# Patient Record
Sex: MALE | Race: OTHER | NOT HISPANIC OR LATINO | URBAN - METROPOLITAN AREA
[De-identification: names, ages, dates, MRNs, and addresses within clinical notes are randomized per-mention and may not be internally consistent; named-entity substitution may affect disease eponyms.]

---

## 2017-03-10 ENCOUNTER — INPATIENT (INPATIENT)
Facility: HOSPITAL | Age: 72
LOS: 2 days | Discharge: HOME | DRG: 270 | End: 2017-03-13
Attending: INTERNAL MEDICINE | Admitting: HOSPITALIST
Payer: MEDICARE

## 2017-03-10 VITALS
OXYGEN SATURATION: 100 % | TEMPERATURE: 98 F | RESPIRATION RATE: 20 BRPM | WEIGHT: 151.9 LBS | DIASTOLIC BLOOD PRESSURE: 79 MMHG | SYSTOLIC BLOOD PRESSURE: 129 MMHG | HEIGHT: 67 IN | HEART RATE: 84 BPM

## 2017-03-10 DIAGNOSIS — E11.9 TYPE 2 DIABETES MELLITUS WITHOUT COMPLICATIONS: ICD-10-CM

## 2017-03-10 DIAGNOSIS — I10 ESSENTIAL (PRIMARY) HYPERTENSION: ICD-10-CM

## 2017-03-10 DIAGNOSIS — K92.0 HEMATEMESIS: ICD-10-CM

## 2017-03-10 DIAGNOSIS — I24.9 ACUTE ISCHEMIC HEART DISEASE, UNSPECIFIED: ICD-10-CM

## 2017-03-10 DIAGNOSIS — Z95.1 PRESENCE OF AORTOCORONARY BYPASS GRAFT: Chronic | ICD-10-CM

## 2017-03-10 DIAGNOSIS — Z95.5 PRESENCE OF CORONARY ANGIOPLASTY IMPLANT AND GRAFT: Chronic | ICD-10-CM

## 2017-03-10 DIAGNOSIS — E78.00 PURE HYPERCHOLESTEROLEMIA, UNSPECIFIED: ICD-10-CM

## 2017-03-10 DIAGNOSIS — I25.10 ATHEROSCLEROTIC HEART DISEASE OF NATIVE CORONARY ARTERY WITHOUT ANGINA PECTORIS: Chronic | ICD-10-CM

## 2017-03-10 DIAGNOSIS — I25.700 ATHEROSCLEROSIS OF CORONARY ARTERY BYPASS GRAFT(S), UNSPECIFIED, WITH UNSTABLE ANGINA PECTORIS: ICD-10-CM

## 2017-03-10 DIAGNOSIS — I50.21 ACUTE SYSTOLIC (CONGESTIVE) HEART FAILURE: ICD-10-CM

## 2017-03-10 LAB
ALBUMIN SERPL ELPH-MCNC: 3.7 G/DL — SIGNIFICANT CHANGE UP (ref 3.3–5.2)
ALBUMIN SERPL ELPH-MCNC: 3.8 G/DL — SIGNIFICANT CHANGE UP (ref 3.3–5.2)
ALP SERPL-CCNC: 95 U/L — SIGNIFICANT CHANGE UP (ref 40–120)
ALP SERPL-CCNC: 97 U/L — SIGNIFICANT CHANGE UP (ref 40–120)
ALT FLD-CCNC: 19 U/L — SIGNIFICANT CHANGE UP
ALT FLD-CCNC: 21 U/L — SIGNIFICANT CHANGE UP
ANION GAP SERPL CALC-SCNC: 11 MMOL/L — SIGNIFICANT CHANGE UP (ref 5–17)
ANION GAP SERPL CALC-SCNC: 12 MMOL/L — SIGNIFICANT CHANGE UP (ref 5–17)
APTT BLD: 31.6 SEC — SIGNIFICANT CHANGE UP (ref 27.5–37.4)
APTT BLD: 76.7 SEC — HIGH (ref 27.5–37.4)
AST SERPL-CCNC: 20 U/L — SIGNIFICANT CHANGE UP
AST SERPL-CCNC: 24 U/L — SIGNIFICANT CHANGE UP
BASOPHILS # BLD AUTO: 0 K/UL — SIGNIFICANT CHANGE UP (ref 0–0.2)
BASOPHILS NFR BLD AUTO: 0.2 % — SIGNIFICANT CHANGE UP (ref 0–2)
BILIRUB SERPL-MCNC: 0.6 MG/DL — SIGNIFICANT CHANGE UP (ref 0.4–2)
BILIRUB SERPL-MCNC: 0.9 MG/DL — SIGNIFICANT CHANGE UP (ref 0.4–2)
BLD GP AB SCN SERPL QL: SIGNIFICANT CHANGE UP
BUN SERPL-MCNC: 12 MG/DL — SIGNIFICANT CHANGE UP (ref 8–20)
BUN SERPL-MCNC: 13 MG/DL — SIGNIFICANT CHANGE UP (ref 8–20)
CALCIUM SERPL-MCNC: 8.3 MG/DL — LOW (ref 8.6–10.2)
CALCIUM SERPL-MCNC: 9.3 MG/DL — SIGNIFICANT CHANGE UP (ref 8.6–10.2)
CHLORIDE SERPL-SCNC: 92 MMOL/L — LOW (ref 98–107)
CHLORIDE SERPL-SCNC: 93 MMOL/L — LOW (ref 98–107)
CHOLEST SERPL-MCNC: 117 MG/DL — SIGNIFICANT CHANGE UP (ref 110–199)
CK SERPL-CCNC: 71 U/L — SIGNIFICANT CHANGE UP (ref 30–200)
CO2 SERPL-SCNC: 26 MMOL/L — SIGNIFICANT CHANGE UP (ref 22–29)
CO2 SERPL-SCNC: 27 MMOL/L — SIGNIFICANT CHANGE UP (ref 22–29)
CREAT SERPL-MCNC: 0.81 MG/DL — SIGNIFICANT CHANGE UP (ref 0.5–1.3)
CREAT SERPL-MCNC: 0.88 MG/DL — SIGNIFICANT CHANGE UP (ref 0.5–1.3)
EOSINOPHIL # BLD AUTO: 0.4 K/UL — SIGNIFICANT CHANGE UP (ref 0–0.5)
EOSINOPHIL NFR BLD AUTO: 4.3 % — SIGNIFICANT CHANGE UP (ref 0–5)
GAS PNL BLDA: SIGNIFICANT CHANGE UP
GLUCOSE SERPL-MCNC: 249 MG/DL — HIGH (ref 70–115)
GLUCOSE SERPL-MCNC: 271 MG/DL — HIGH (ref 70–115)
HCT VFR BLD CALC: 34.5 % — LOW (ref 42–52)
HCT VFR BLD CALC: 36 % — LOW (ref 42–52)
HCT VFR BLD CALC: 37.4 % — LOW (ref 42–52)
HDLC SERPL-MCNC: 36 MG/DL — LOW
HGB BLD-MCNC: 12.4 G/DL — LOW (ref 14–18)
HGB BLD-MCNC: 12.6 G/DL — LOW (ref 14–18)
HGB BLD-MCNC: 13 G/DL — LOW (ref 14–18)
INR BLD: 1.4 RATIO — HIGH (ref 0.88–1.16)
INR BLD: 1.47 RATIO — HIGH (ref 0.88–1.16)
LIPID PNL WITH DIRECT LDL SERPL: 52 MG/DL — SIGNIFICANT CHANGE UP
LYMPHOCYTES # BLD AUTO: 2.4 K/UL — SIGNIFICANT CHANGE UP (ref 1–4.8)
LYMPHOCYTES # BLD AUTO: 25.3 % — SIGNIFICANT CHANGE UP (ref 20–55)
MAGNESIUM SERPL-MCNC: 1.9 MG/DL — SIGNIFICANT CHANGE UP (ref 1.8–2.5)
MAGNESIUM SERPL-MCNC: 2 MG/DL — SIGNIFICANT CHANGE UP (ref 1.8–2.5)
MCHC RBC-ENTMCNC: 28.7 PG — SIGNIFICANT CHANGE UP (ref 27–31)
MCHC RBC-ENTMCNC: 28.8 PG — SIGNIFICANT CHANGE UP (ref 27–31)
MCHC RBC-ENTMCNC: 34.4 G/DL — SIGNIFICANT CHANGE UP (ref 32–36)
MCHC RBC-ENTMCNC: 34.8 G/DL — SIGNIFICANT CHANGE UP (ref 32–36)
MCV RBC AUTO: 82.9 FL — SIGNIFICANT CHANGE UP (ref 80–94)
MCV RBC AUTO: 83.3 FL — SIGNIFICANT CHANGE UP (ref 80–94)
MONOCYTES # BLD AUTO: 0.9 K/UL — HIGH (ref 0–0.8)
MONOCYTES NFR BLD AUTO: 9.3 % — SIGNIFICANT CHANGE UP (ref 3–10)
NEUTROPHILS # BLD AUTO: 5.8 K/UL — SIGNIFICANT CHANGE UP (ref 1.8–8)
NEUTROPHILS NFR BLD AUTO: 60.6 % — SIGNIFICANT CHANGE UP (ref 37–73)
PHOSPHATE SERPL-MCNC: 3.1 MG/DL — SIGNIFICANT CHANGE UP (ref 2.4–4.7)
PHOSPHATE SERPL-MCNC: 4 MG/DL — SIGNIFICANT CHANGE UP (ref 2.4–4.7)
PLATELET # BLD AUTO: 259 K/UL — SIGNIFICANT CHANGE UP (ref 150–400)
PLATELET # BLD AUTO: 284 K/UL — SIGNIFICANT CHANGE UP (ref 150–400)
POTASSIUM SERPL-MCNC: 4.3 MMOL/L — SIGNIFICANT CHANGE UP (ref 3.5–5.3)
POTASSIUM SERPL-MCNC: 4.4 MMOL/L — SIGNIFICANT CHANGE UP (ref 3.5–5.3)
POTASSIUM SERPL-SCNC: 4.3 MMOL/L — SIGNIFICANT CHANGE UP (ref 3.5–5.3)
POTASSIUM SERPL-SCNC: 4.4 MMOL/L — SIGNIFICANT CHANGE UP (ref 3.5–5.3)
PROT SERPL-MCNC: 7.2 G/DL — SIGNIFICANT CHANGE UP (ref 6.6–8.7)
PROT SERPL-MCNC: 7.5 G/DL — SIGNIFICANT CHANGE UP (ref 6.6–8.7)
PROTHROM AB SERPL-ACNC: 15.5 SEC — HIGH (ref 10–13.1)
PROTHROM AB SERPL-ACNC: 16.2 SEC — HIGH (ref 10–13.1)
RBC # BLD: 4.32 M/UL — LOW (ref 4.6–6.2)
RBC # BLD: 4.51 M/UL — LOW (ref 4.6–6.2)
RBC # FLD: 12.1 % — SIGNIFICANT CHANGE UP (ref 11–15.6)
RBC # FLD: 12.2 % — SIGNIFICANT CHANGE UP (ref 11–15.6)
SODIUM SERPL-SCNC: 130 MMOL/L — LOW (ref 135–145)
SODIUM SERPL-SCNC: 131 MMOL/L — LOW (ref 135–145)
TOTAL CHOLESTEROL/HDL RATIO MEASUREMENT: 3 RATIO — LOW (ref 3.4–9.6)
TRIGL SERPL-MCNC: 146 MG/DL — SIGNIFICANT CHANGE UP (ref 10–200)
TROPONIN T SERPL-MCNC: 0.03 NG/ML — SIGNIFICANT CHANGE UP (ref 0–0.06)
TYPE + AB SCN PNL BLD: SIGNIFICANT CHANGE UP
WBC # BLD: 17.9 K/UL — HIGH (ref 4.8–10.8)
WBC # BLD: 9.5 K/UL — SIGNIFICANT CHANGE UP (ref 4.8–10.8)
WBC # FLD AUTO: 17.9 K/UL — HIGH (ref 4.8–10.8)
WBC # FLD AUTO: 9.5 K/UL — SIGNIFICANT CHANGE UP (ref 4.8–10.8)

## 2017-03-10 PROCEDURE — 99223 1ST HOSP IP/OBS HIGH 75: CPT

## 2017-03-10 PROCEDURE — 93010 ELECTROCARDIOGRAM REPORT: CPT

## 2017-03-10 PROCEDURE — 99291 CRITICAL CARE FIRST HOUR: CPT | Mod: 25

## 2017-03-10 PROCEDURE — 93459 L HRT ART/GRFT ANGIO: CPT | Mod: 26,XU

## 2017-03-10 PROCEDURE — 99285 EMERGENCY DEPT VISIT HI MDM: CPT | Mod: 25

## 2017-03-10 PROCEDURE — 71010: CPT | Mod: 26,59,77

## 2017-03-10 PROCEDURE — 92937 PRQ TRLUML REVSC CAB GRF 1: CPT | Mod: LC

## 2017-03-10 PROCEDURE — 93306 TTE W/DOPPLER COMPLETE: CPT | Mod: 26

## 2017-03-10 PROCEDURE — 71010: CPT | Mod: 26,59

## 2017-03-10 PROCEDURE — 33967 INSERT I-AORT PERCUT DEVICE: CPT

## 2017-03-10 RX ORDER — INSULIN LISPRO 100/ML
VIAL (ML) SUBCUTANEOUS EVERY 6 HOURS
Qty: 0 | Refills: 0 | Status: DISCONTINUED | OUTPATIENT
Start: 2017-03-10 | End: 2017-03-11

## 2017-03-10 RX ORDER — LOSARTAN POTASSIUM 100 MG/1
50 TABLET, FILM COATED ORAL DAILY
Qty: 0 | Refills: 0 | Status: DISCONTINUED | OUTPATIENT
Start: 2017-03-10 | End: 2017-03-10

## 2017-03-10 RX ORDER — PHENYLEPHRINE HYDROCHLORIDE 10 MG/ML
0.1 INJECTION INTRAVENOUS
Qty: 80 | Refills: 0 | Status: DISCONTINUED | OUTPATIENT
Start: 2017-03-10 | End: 2017-03-10

## 2017-03-10 RX ORDER — NOREPINEPHRINE BITARTRATE/D5W 8 MG/250ML
0.05 PLASTIC BAG, INJECTION (ML) INTRAVENOUS
Qty: 8 | Refills: 0 | Status: DISCONTINUED | OUTPATIENT
Start: 2017-03-10 | End: 2017-03-11

## 2017-03-10 RX ORDER — INSULIN GLARGINE 100 [IU]/ML
12 INJECTION, SOLUTION SUBCUTANEOUS AT BEDTIME
Qty: 0 | Refills: 0 | Status: DISCONTINUED | OUTPATIENT
Start: 2017-03-11 | End: 2017-03-13

## 2017-03-10 RX ORDER — PANTOPRAZOLE SODIUM 20 MG/1
40 TABLET, DELAYED RELEASE ORAL EVERY 12 HOURS
Qty: 0 | Refills: 0 | Status: DISCONTINUED | OUTPATIENT
Start: 2017-03-10 | End: 2017-03-10

## 2017-03-10 RX ORDER — ASPIRIN/CALCIUM CARB/MAGNESIUM 324 MG
1 TABLET ORAL
Qty: 0 | Refills: 0 | COMMUNITY

## 2017-03-10 RX ORDER — NITROGLYCERIN 6.5 MG
0.4 CAPSULE, EXTENDED RELEASE ORAL
Qty: 0 | Refills: 0 | Status: DISCONTINUED | OUTPATIENT
Start: 2017-03-10 | End: 2017-03-13

## 2017-03-10 RX ORDER — ATORVASTATIN CALCIUM 80 MG/1
80 TABLET, FILM COATED ORAL AT BEDTIME
Qty: 0 | Refills: 0 | Status: DISCONTINUED | OUTPATIENT
Start: 2017-03-10 | End: 2017-03-13

## 2017-03-10 RX ORDER — CLOPIDOGREL BISULFATE 75 MG/1
75 TABLET, FILM COATED ORAL DAILY
Qty: 0 | Refills: 0 | Status: DISCONTINUED | OUTPATIENT
Start: 2017-03-10 | End: 2017-03-13

## 2017-03-10 RX ORDER — ASPIRIN/CALCIUM CARB/MAGNESIUM 324 MG
325 TABLET ORAL ONCE
Qty: 0 | Refills: 0 | Status: COMPLETED | OUTPATIENT
Start: 2017-03-10 | End: 2017-03-10

## 2017-03-10 RX ORDER — INSULIN HUMAN 100 [IU]/ML
4 INJECTION, SOLUTION SUBCUTANEOUS
Qty: 100 | Refills: 0 | Status: DISCONTINUED | OUTPATIENT
Start: 2017-03-10 | End: 2017-03-11

## 2017-03-10 RX ORDER — DEXTROSE 50 % IN WATER 50 %
12.5 SYRINGE (ML) INTRAVENOUS ONCE
Qty: 0 | Refills: 0 | Status: DISCONTINUED | OUTPATIENT
Start: 2017-03-10 | End: 2017-03-13

## 2017-03-10 RX ORDER — CARVEDILOL PHOSPHATE 80 MG/1
25 CAPSULE, EXTENDED RELEASE ORAL EVERY 12 HOURS
Qty: 0 | Refills: 0 | Status: DISCONTINUED | OUTPATIENT
Start: 2017-03-10 | End: 2017-03-10

## 2017-03-10 RX ORDER — FAMOTIDINE 10 MG/ML
40 INJECTION INTRAVENOUS
Qty: 0 | Refills: 0 | Status: DISCONTINUED | OUTPATIENT
Start: 2017-03-10 | End: 2017-03-10

## 2017-03-10 RX ORDER — PANTOPRAZOLE SODIUM 20 MG/1
8 TABLET, DELAYED RELEASE ORAL
Qty: 80 | Refills: 0 | Status: DISCONTINUED | OUTPATIENT
Start: 2017-03-10 | End: 2017-03-11

## 2017-03-10 RX ORDER — INSULIN LISPRO 100/ML
VIAL (ML) SUBCUTANEOUS AT BEDTIME
Qty: 0 | Refills: 0 | Status: DISCONTINUED | OUTPATIENT
Start: 2017-03-10 | End: 2017-03-10

## 2017-03-10 RX ORDER — DEXMEDETOMIDINE HYDROCHLORIDE IN 0.9% SODIUM CHLORIDE 4 UG/ML
0.3 INJECTION INTRAVENOUS
Qty: 200 | Refills: 0 | Status: DISCONTINUED | OUTPATIENT
Start: 2017-03-10 | End: 2017-03-11

## 2017-03-10 RX ORDER — SODIUM CHLORIDE 9 MG/ML
1000 INJECTION, SOLUTION INTRAVENOUS
Qty: 0 | Refills: 0 | Status: DISCONTINUED | OUTPATIENT
Start: 2017-03-10 | End: 2017-03-13

## 2017-03-10 RX ORDER — HEPARIN SODIUM 5000 [USP'U]/ML
500 INJECTION INTRAVENOUS; SUBCUTANEOUS
Qty: 25000 | Refills: 0 | Status: DISCONTINUED | OUTPATIENT
Start: 2017-03-10 | End: 2017-03-10

## 2017-03-10 RX ORDER — NITROGLYCERIN 6.5 MG
80 CAPSULE, EXTENDED RELEASE ORAL
Qty: 50 | Refills: 0 | Status: DISCONTINUED | OUTPATIENT
Start: 2017-03-10 | End: 2017-03-10

## 2017-03-10 RX ORDER — DEXTROSE 50 % IN WATER 50 %
1 SYRINGE (ML) INTRAVENOUS ONCE
Qty: 0 | Refills: 0 | Status: DISCONTINUED | OUTPATIENT
Start: 2017-03-10 | End: 2017-03-13

## 2017-03-10 RX ORDER — ENOXAPARIN SODIUM 100 MG/ML
40 INJECTION SUBCUTANEOUS EVERY 24 HOURS
Qty: 0 | Refills: 0 | Status: DISCONTINUED | OUTPATIENT
Start: 2017-03-10 | End: 2017-03-10

## 2017-03-10 RX ORDER — DEXTROSE 50 % IN WATER 50 %
25 SYRINGE (ML) INTRAVENOUS ONCE
Qty: 0 | Refills: 0 | Status: DISCONTINUED | OUTPATIENT
Start: 2017-03-10 | End: 2017-03-13

## 2017-03-10 RX ORDER — CHLORHEXIDINE GLUCONATE 213 G/1000ML
15 SOLUTION TOPICAL
Qty: 0 | Refills: 0 | Status: DISCONTINUED | OUTPATIENT
Start: 2017-03-10 | End: 2017-03-11

## 2017-03-10 RX ORDER — ATORVASTATIN CALCIUM 80 MG/1
40 TABLET, FILM COATED ORAL AT BEDTIME
Qty: 0 | Refills: 0 | Status: DISCONTINUED | OUTPATIENT
Start: 2017-03-10 | End: 2017-03-10

## 2017-03-10 RX ORDER — MAGNESIUM HYDROXIDE 400 MG/1
5 TABLET, CHEWABLE ORAL DAILY
Qty: 0 | Refills: 0 | Status: DISCONTINUED | OUTPATIENT
Start: 2017-03-10 | End: 2017-03-10

## 2017-03-10 RX ORDER — INSULIN GLARGINE 100 [IU]/ML
12 INJECTION, SOLUTION SUBCUTANEOUS ONCE
Qty: 0 | Refills: 0 | Status: COMPLETED | OUTPATIENT
Start: 2017-03-10 | End: 2017-03-10

## 2017-03-10 RX ORDER — GLUCAGON INJECTION, SOLUTION 0.5 MG/.1ML
1 INJECTION, SOLUTION SUBCUTANEOUS ONCE
Qty: 0 | Refills: 0 | Status: DISCONTINUED | OUTPATIENT
Start: 2017-03-10 | End: 2017-03-13

## 2017-03-10 RX ORDER — ASPIRIN/CALCIUM CARB/MAGNESIUM 324 MG
325 TABLET ORAL DAILY
Qty: 0 | Refills: 0 | Status: DISCONTINUED | OUTPATIENT
Start: 2017-03-10 | End: 2017-03-11

## 2017-03-10 RX ORDER — INSULIN LISPRO 100/ML
VIAL (ML) SUBCUTANEOUS
Qty: 0 | Refills: 0 | Status: DISCONTINUED | OUTPATIENT
Start: 2017-03-10 | End: 2017-03-10

## 2017-03-10 RX ORDER — NITROGLYCERIN 6.5 MG
1 CAPSULE, EXTENDED RELEASE ORAL DAILY
Qty: 0 | Refills: 0 | Status: DISCONTINUED | OUTPATIENT
Start: 2017-03-10 | End: 2017-03-10

## 2017-03-10 RX ORDER — ISOSORBIDE MONONITRATE 60 MG/1
30 TABLET, EXTENDED RELEASE ORAL DAILY
Qty: 0 | Refills: 0 | Status: DISCONTINUED | OUTPATIENT
Start: 2017-03-10 | End: 2017-03-10

## 2017-03-10 RX ORDER — AMLODIPINE BESYLATE 2.5 MG/1
10 TABLET ORAL DAILY
Qty: 0 | Refills: 0 | Status: DISCONTINUED | OUTPATIENT
Start: 2017-03-10 | End: 2017-03-10

## 2017-03-10 RX ORDER — ASPIRIN/CALCIUM CARB/MAGNESIUM 324 MG
81 TABLET ORAL DAILY
Qty: 0 | Refills: 0 | Status: DISCONTINUED | OUTPATIENT
Start: 2017-03-10 | End: 2017-03-10

## 2017-03-10 RX ORDER — ERGOCALCIFEROL 1.25 MG/1
50000 CAPSULE ORAL
Qty: 0 | Refills: 0 | Status: DISCONTINUED | OUTPATIENT
Start: 2017-03-10 | End: 2017-03-13

## 2017-03-10 RX ORDER — PANTOPRAZOLE SODIUM 20 MG/1
80 TABLET, DELAYED RELEASE ORAL ONCE
Qty: 0 | Refills: 0 | Status: COMPLETED | OUTPATIENT
Start: 2017-03-10 | End: 2017-03-10

## 2017-03-10 RX ORDER — PANTOPRAZOLE SODIUM 20 MG/1
40 TABLET, DELAYED RELEASE ORAL DAILY
Qty: 0 | Refills: 0 | Status: DISCONTINUED | OUTPATIENT
Start: 2017-03-10 | End: 2017-03-10

## 2017-03-10 RX ORDER — SODIUM CHLORIDE 9 MG/ML
3 INJECTION INTRAMUSCULAR; INTRAVENOUS; SUBCUTANEOUS ONCE
Qty: 0 | Refills: 0 | Status: COMPLETED | OUTPATIENT
Start: 2017-03-10 | End: 2017-03-10

## 2017-03-10 RX ADMIN — Medication 4: at 08:50

## 2017-03-10 RX ADMIN — DEXMEDETOMIDINE HYDROCHLORIDE IN 0.9% SODIUM CHLORIDE 5.17 MICROGRAM(S)/KG/HR: 4 INJECTION INTRAVENOUS at 14:25

## 2017-03-10 RX ADMIN — SODIUM CHLORIDE 3 MILLILITER(S): 9 INJECTION INTRAMUSCULAR; INTRAVENOUS; SUBCUTANEOUS at 05:01

## 2017-03-10 RX ADMIN — Medication 325 MILLIGRAM(S): at 06:33

## 2017-03-10 RX ADMIN — PANTOPRAZOLE SODIUM 10 MG/HR: 20 TABLET, DELAYED RELEASE ORAL at 14:45

## 2017-03-10 RX ADMIN — INSULIN HUMAN 4 UNIT(S)/HR: 100 INJECTION, SOLUTION SUBCUTANEOUS at 13:54

## 2017-03-10 RX ADMIN — INSULIN GLARGINE 12 UNIT(S): 100 INJECTION, SOLUTION SUBCUTANEOUS at 22:35

## 2017-03-10 RX ADMIN — PANTOPRAZOLE SODIUM 10 MG/HR: 20 TABLET, DELAYED RELEASE ORAL at 23:32

## 2017-03-10 RX ADMIN — HEPARIN SODIUM 5 UNIT(S)/HR: 5000 INJECTION INTRAVENOUS; SUBCUTANEOUS at 13:54

## 2017-03-10 RX ADMIN — PANTOPRAZOLE SODIUM 80 MILLIGRAM(S): 20 TABLET, DELAYED RELEASE ORAL at 14:44

## 2017-03-10 RX ADMIN — CLOPIDOGREL BISULFATE 75 MILLIGRAM(S): 75 TABLET, FILM COATED ORAL at 09:56

## 2017-03-10 RX ADMIN — Medication 0.4 MILLIGRAM(S): at 09:50

## 2017-03-10 RX ADMIN — CHLORHEXIDINE GLUCONATE 15 MILLILITER(S): 213 SOLUTION TOPICAL at 18:24

## 2017-03-10 NOTE — DISCHARGE NOTE ADULT - MEDICATION SUMMARY - MEDICATIONS TO TAKE
I will START or STAY ON the medications listed below when I get home from the hospital:    aspirin 81 mg oral tablet  -- 1 tab(s) by mouth once a day (at bedtime)  -- Indication: For ACS (acute coronary syndrome)    losartan 25 mg oral tablet  -- 1 tab(s) by mouth once a day  -- Indication: For CHF    isosorbide mononitrate 30 mg oral tablet, extended release  -- 1 tab(s) by mouth once a day (in the morning)  -- Indication: For ACS (acute coronary syndrome)    GlipiZIDE XL 10 mg oral tablet, extended release  -- 1 tab(s) by mouth once a day  -- Indication: For Diabetes mellitus    metFORMIN 1000 mg oral tablet, extended release  -- 1 tab(s) by mouth 2 times a day  -- Indication: For Diabetes mellitus    Januvia 100 mg oral tablet  -- 1 tab(s) by mouth once (at bedtime)  -- Indication: For Diabetes mellitus    atorvastatin 80 mg oral tablet  -- 1 tab(s) by mouth once a day (at bedtime)  -- Indication: For ACS (acute coronary syndrome)    Plavix 75 mg oral tablet  -- 1 tab(s) by mouth once a day  -- Indication: For ACS (acute coronary syndrome)    carvedilol 12.5 mg oral tablet  -- 1 tab(s) by mouth every 12 hours  -- Indication: For ACS (acute coronary syndrome)    furosemide 40 mg oral tablet  -- 1 tab(s) by mouth once a day  -- Indication: For CHF    pantoprazole 40 mg oral delayed release tablet  -- 1 tab(s) by mouth once a day (before a meal)  -- Indication: For gastritis    Vitamin D2 50,000 intl units (1.25 mg) oral capsule  -- 1 cap(s) by mouth once a week  -- Indication: For Supplement I will START or STAY ON the medications listed below when I get home from the hospital:    aspirin 81 mg oral tablet  -- 1 tab(s) by mouth once a day (at bedtime)  -- Indication: For ACS (acute coronary syndrome)    losartan 25 mg oral tablet  -- 1 tab(s) by mouth once a day  -- Indication: For NSTEMI    isosorbide mononitrate 30 mg oral tablet, extended release  -- 1 tab(s) by mouth once a day (in the morning)  -- Indication: For ACS (acute coronary syndrome)    GlipiZIDE XL 10 mg oral tablet, extended release  -- 1 tab(s) by mouth once a day  -- Indication: For Diabetes mellitus    metFORMIN 1000 mg oral tablet, extended release  -- 1 tab(s) by mouth 2 times a day RESTART 3/14/17  -- Indication: For Diabetes mellitus    Januvia 100 mg oral tablet  -- 1 tab(s) by mouth once (at bedtime)  -- Indication: For Essential hypertension    atorvastatin 80 mg oral tablet  -- 1 tab(s) by mouth once a day (at bedtime)  -- Indication: For ACS (acute coronary syndrome)    Plavix 75 mg oral tablet  -- 1 tab(s) by mouth once a day  -- Indication: For ACS (acute coronary syndrome)    carvedilol 12.5 mg oral tablet  -- 1 tab(s) by mouth every 12 hours  -- Indication: For NSTEMI    furosemide 40 mg oral tablet  -- 1 tab(s) by mouth once a day  -- Indication: For CHF    pantoprazole 40 mg oral delayed release tablet  -- 1 tab(s) by mouth once a day (before a meal)  -- Indication: For Gastritis    Vitamin D2 50,000 intl units (1.25 mg) oral capsule  -- 1 cap(s) by mouth once a week  -- Indication: For Supplement

## 2017-03-10 NOTE — ED ADULT TRIAGE NOTE - CHIEF COMPLAINT QUOTE
patient states having chest pains thtat started about 8 pm patient states took ntg one at 8pm and #2 at 12 mid

## 2017-03-10 NOTE — ED PROVIDER NOTE - PROGRESS NOTE DETAILS
Labs as noted.  Case d/w Hospitalist/Dr. Mixon and will admit.  Cardiology consult called to [facility]

## 2017-03-10 NOTE — DISCHARGE NOTE ADULT - MEDICATION SUMMARY - MEDICATIONS TO CHANGE
I will SWITCH the dose or number of times a day I take the medications listed below when I get home from the hospital:    losartan 50 mg oral tablet  -- 1 tab(s) by mouth once a day    Coreg 25 mg oral tablet  -- 1 tab(s) by mouth 2 times a day

## 2017-03-10 NOTE — PROGRESS NOTE ADULT - SUBJECTIVE AND OBJECTIVE BOX
Patient is a 71y old  Male who presents with a chief complaint of Chest pain - tightness (10 Mar 2017 11:51)      BRIEF HOSPITAL COURSE: 71 yom with CABG, DM, HLD, HTN presenting with chest pain ACS went to cath lab stent to SVG to OM, went in to pulmonary edema and intubated given 40 lasix, IABP placed, had one episode of vomiting blood, resolved, periods of hypotension likely secondary to sedation    PAST MEDICAL & SURGICAL HISTORY:  Coronary artery disease involving coronary bypass graft of native heart with unstable angina pectoris  High cholesterol  Diabetes mellitus  Hypertension  S/P primary angioplasty with coronary stent  S/P CABG (coronary artery bypass graft)      Review of Systems:  unable to obtain    Medications:    nitroglycerin     SubLingual 0.4milliGRAM(s) SubLingual every 5 minutes PRN  norepinephrine Infusion 0.05MICROgram(s)/kG/Min IV Continuous <Continuous>      aspirin 325milliGRAM(s) Oral daily  dexmedetomidine Infusion 0.3MICROgram(s)/kG/Hr IV Continuous <Continuous>      clopidogrel Tablet 75milliGRAM(s) Oral daily    pantoprazole Infusion 8mG/Hr IV Continuous <Continuous>      dextrose Gel 1Dose(s) Oral once PRN  dextrose 50% Injectable 12.5Gram(s) IV Push once  dextrose 50% Injectable 25Gram(s) IV Push once  dextrose 50% Injectable 25Gram(s) IV Push once  glucagon  Injectable 1milliGRAM(s) IntraMuscular once PRN  atorvastatin 80milliGRAM(s) Oral at bedtime  insulin Infusion 4Unit(s)/Hr IV Continuous <Continuous>    ergocalciferol 57965Bmzg(s) Oral every week  dextrose 5%. 1000milliLiter(s) IV Continuous <Continuous>      chlorhexidine 0.12% Liquid 15milliLiter(s) Swish and Spit two times a day        Mode: AC/ CMV (Assist Control/ Continuous Mandatory Ventilation)  RR (machine): 18  TV (machine): 400  FiO2: 40  PEEP: 5  MAP: 9  PIP: 26      ICU Vital Signs Last 24 Hrs  T(C): 35.1, Max: 36.8 (03-10 @ 04:07)  T(F): 95.2, Max: 98.2 (03-10 @ 04:07)  HR: 81 (74 - 104)  BP: 132/80 (129/79 - 140/83)  BP(mean): --  ABP: 90/74 (74/43 - 146/70)  ABP(mean): 83 (63 - 120)  RR: 18 (18 - 24)  SpO2: 100% (91% - 100%)      ABG - ( 10 Mar 2017 17:19 )  pH: 7.37  /  pCO2: 50    /  pO2: 114   / HCO3: 27    / Base Excess: 2.8   /  SaO2: 98                  I&O's Detail    I & Os for current day (as of 10 Mar 2017 17:39)  =============================================  IN:    pantoprazole Infusion: 30 ml    norepinephrine Infusion: 10 ml    insulin Infusion: 9 ml    dexmedetomidine Infusion: 6 ml    Total IN: 55 ml  ---------------------------------------------  OUT:    Indwelling Catheter - Urethral: 1100 ml    Nasoenteral Tube: 150 ml    Total OUT: 1250 ml  ---------------------------------------------  Total NET: -1195 ml        LABS:                        13.0   17.9  )-----------( 284      ( 10 Mar 2017 14:32 )             37.4     10 Mar 2017 13:46    130    |  92     |  12.0   ----------------------------<  271    4.3     |  26.0   |  0.81     Ca    8.3        10 Mar 2017 13:46  Phos  4.0       10 Mar 2017 13:46  Mg     1.9       10 Mar 2017 13:46    TPro  7.2    /  Alb  3.7    /  TBili  0.9    /  DBili  x      /  AST  24     /  ALT  19     /  AlkPhos  97     10 Mar 2017 13:46      CARDIAC MARKERS ( 10 Mar 2017 05:12 )  x     / 0.03 ng/mL / 71 U/L / x     / x          CAPILLARY BLOOD GLUCOSE  125 (10 Mar 2017 17:00)    PT/INR - ( 10 Mar 2017 13:46 )   PT: 16.2 sec;   INR: 1.47 ratio         PTT - ( 10 Mar 2017 13:46 )  PTT:76.7 sec    CULTURES:      Physical Examination:    General: No acute distress.  intubated    HEENT: Pupils equal, reactive to light.  Symmetric.    PULM: Course bs left > right    CVS: Regular rate and rhythm, no murmurs, rubs, or gallops    ABD: Soft, nondistended, nontender, normoactive bowel sounds, no masses    EXT: No edema, nontender, left balloon pump, right femoral a line and single lumen CVC    SKIN: Warm and well perfused, no rashes noted.      CRITICAL CARE TIME SPENT: 45 min

## 2017-03-10 NOTE — ED PROVIDER NOTE - OBJECTIVE STATEMENT
72 yo M presents to ED with family c/o not feeling well x last few days and developed chest tightness last evening and took SL NTG at 2000 and then again at 0000.  Pt also admits to taking SL NTG for chest pain bout 1 week ago.  Pt with hx of CAD s/p 4 vessel CABG in 2004 and has approx 7 stents-last stent placed at [facility] around 6 months ago.  Pt denies any assoc SOB, N/V, diaphoresis or syncope 72 yo M presents to ED with family c/o not feeling well x last few days and developed chest tightness last evening and took SL NTG at 2000 and then again at 0000. Pt also admits to taking SL NTG for chest pain bout 1 week ago.  Pt with hx of CAD s/p 4 vessel CABG in 2004 and has approx 7 stents-last stent placed at [facility] around 6 months ago.  Pt denies any assoc SOB, N/V, diaphoresis or syncope    Home Cardiologist  Dr. Meño Medina MD in [place] ([facility])  [phone], last visit 2 weeks ago;    PCP: Dr.Abdul Blackburn in [place]  [phone]

## 2017-03-10 NOTE — H&P ADULT - HISTORY OF PRESENT ILLNESS
71 y.o. male with PMHx of HTN, HLD, DMII, CAD s/p CABG and PCIs p/w CP - tightness, relieved by NTG to 5/10 from 8/10, worse with lying down, not associated with exertion from 8 pm last night. Last Cardiac cath at [facility] 6 m.a.

## 2017-03-10 NOTE — CONSULT NOTE ADULT - ASSESSMENT
71 y.o. male with PMHx of HTN, HLD, DMII, CAD s/p CABG 1993 and PCIs last PCI as per patient's report 3/2016 [facility] p/w CP - tightness, relieved by NTG to 5/10 from 8/10, worse with lying down, not associated with exertion from 8 pm last night. Patient with signs and symptoms consistent with UA. 12 -lead ECG SR with ns st twa    recc;  ASA , Plavix , UFH  Serial biomarkers, TTE, obtain old records  Will discuss with Dr Elena diagnostic cardiac cath vs stress 71 y.o. male with PMHx of HTN, HLD, DMII, CAD s/p CABG 1993 and PCIs last PCI as per patient's report 3/2016 [facility] p/w CP - tightness, relieved by NTG to 5/10 from 8/10, worse with lying down, not associated with exertion from 8 pm last night. 12-lead ECG NSR LAD,LVH IVCD   QRS 124ms , hyperdynamic ST- TW changes V1-V4 with 1mm STD v5-V5   Initial TNI <.01,CAROLINA risk score pre-test int-high risk probability  for ACS.      recc;  ASA , Plavix , UFH, Statin  sl TNG prn  Serial biomarkers, TTE, obtain old records  Will discuss with interventional team --Ischemic eval with diagnostic cardiac cath.

## 2017-03-10 NOTE — PROGRESS NOTE ADULT - SUBJECTIVE AND OBJECTIVE BOX
RFA 6fr and RFV 5fr  sheaths pulled and manual compression held for 20 minutes and hemostasis obtained.  Patient tolerated well and no bleeding/hematoma/ecchymosis noted at right groin site. VS per protocol.

## 2017-03-10 NOTE — H&P ADULT - PROBLEM SELECTOR PLAN 1
no evidence of  ACS - ? unstable angina, concerned about pt using different hospitals for evals - f/u TTE, series CE, cardio eval - ? need in cath vs to obtain previous cath report

## 2017-03-10 NOTE — DISCHARGE NOTE ADULT - CARE PROVIDER_API CALL
Sydney Valenzuela), Cardiology; Internal Medicine  [address] [place] [address]  Phone: [phone]  Fax: [phone]

## 2017-03-10 NOTE — H&P ADULT - ASSESSMENT
71 y.o. male with Hx of CAD, HTN, HLD, DMII with recurrent episodes of CP with frequent visits to different hospitals.

## 2017-03-10 NOTE — H&P ADULT - NSHPPHYSICALEXAM_GEN_ALL_CORE
PHYSICAL EXAM:    General: Well developed; well nourished; in no acute distress  Eyes: PERRLA, EOMI; conjunctiva and sclera clear  Head: Normocephalic; atraumatic  ENMT: No nasal discharge; airway clear  Neck: Supple; non tender; no masses  Respiratory: No wheezes, rales or rhonchi  Cardiovascular: Regular rate and rhythm. S1 and S2 Normal  Gastrointestinal: Soft non-tender non-distended; Normal bowel sounds  Genitourinary: No costovertebral angle tenderness  Extremities: Normal range of motion, No clubbing, cyanosis or edema  Vascular: Peripheral pulses palpable 2+ bilaterally  Neurological: Alert and oriented x4  Skin: Warm and dry.   Musculoskeletal: Normal tone, without deformities  Psychiatric: Cooperative and appropriate Vital Signs Last 24 Hrs  T(C): 36.2, Max: 36.8 (03-10 @ 04:07)  T(F): 97.2, Max: 98.2 (03-10 @ 04:07)  HR: 74 (74 - 84)  BP: 140/83 (129/79 - 140/83)  RR: 20 (20 - 20)  SpO2: 100% (100% - 100%)    PHYSICAL EXAM:    General: Well developed; well nourished; in no acute distress  Eyes: PERRLA, EOMI; conjunctiva and sclera clear  Head: Normocephalic; atraumatic  ENMT: No nasal discharge; airway clear  Neck: Supple; non tender; no masses  Respiratory: No wheezes, rales or rhonchi  Cardiovascular: Regular rate and rhythm. S1 and S2 Normal  Gastrointestinal: Soft non-tender non-distended; Normal bowel sounds  Genitourinary: No costovertebral angle tenderness  Extremities: Normal range of motion, No clubbing, cyanosis or edema  Vascular: Peripheral pulses palpable 2+ bilaterally  Neurological: Alert and oriented x4  Skin: Warm and dry.   Musculoskeletal: Normal tone, without deformities  Psychiatric: Cooperative and appropriate

## 2017-03-10 NOTE — H&P ADULT - PMH
Coronary artery disease involving coronary bypass graft of native heart with unstable angina pectoris    Diabetes mellitus    High cholesterol    Hypertension

## 2017-03-10 NOTE — DISCHARGE NOTE ADULT - MEDICATION SUMMARY - MEDICATIONS TO STOP TAKING
I will STOP taking the medications listed below when I get home from the hospital:    Norvasc 10 mg oral tablet  -- 1 tab(s) by mouth once a day at night    Toprol-XL 25 mg oral tablet, extended release  -- 1 tab(s) by mouth once a day    Pepcid 40 mg oral tablet  -- 1 tab(s) by mouth 2 times a day

## 2017-03-10 NOTE — DISCHARGE NOTE ADULT - CARE PLAN
Instructions for follow-up, activity and diet:	No heavy lifting, driving, sex, tub baths, swimming, or any activity that submerges the lower half of the body in water for 48 hours.  Limited walking and stairs for 48 hours.    Observe the site frequently.  If bleeding or a large lump (the size of a golf ball or bigger) occurs lie flat, apply continuous direct pressure just above the puncture site for at least 10 minutes, and notify your physician immediately.  If the bleeding cannot be controlled, call 911 immediately for assistance.  Notify your physician of pain, swelling or any drainage.    Notify your physician immediately if coldness, numbness, discoloration or pain in your foot occurs. Principal Discharge DX:	ACS (acute coronary syndrome)  Goal:	stable for discharge  Instructions for follow-up, activity and diet:	No heavy lifting, driving, sex, tub baths, swimming, or any activity that submerges the lower half of the body in water for 48 hours.  Limited walking and stairs for 48 hours.    Observe the site frequently.  If bleeding or a large lump (the size of a golf ball or bigger) occurs lie flat, apply continuous direct pressure just above the puncture site for at least 10 minutes, and notify your physician immediately.  If the bleeding cannot be controlled, call 911 immediately for assistance.  Notify your physician of pain, swelling or any drainage.    Notify your physician immediately if coldness, numbness, discoloration or pain in your foot occurs.  Secondary Diagnosis:	Acute systolic congestive heart failure  Secondary Diagnosis:	Cardiogenic shock  Secondary Diagnosis:	Acute respiratory failure with hypoxia  Secondary Diagnosis:	Type 2 diabetes mellitus without complication, without long-term current use of insulin  Secondary Diagnosis:	Essential hypertension Principal Discharge DX:	NSTEMI (non-ST elevated myocardial infarction)  Goal:	stable for discharge  Instructions for follow-up, activity and diet:	No heavy lifting, driving, sex, tub baths, swimming, or any activity that submerges the lower half of the body in water for 48 hours.  Limited walking and stairs for 48 hours.    Observe the site frequently.  If bleeding or a large lump (the size of a golf ball or bigger) occurs lie flat, apply continuous direct pressure just above the puncture site for at least 10 minutes, and notify your physician immediately.  If the bleeding cannot be controlled, call 911 immediately for assistance.  Notify your physician of pain, swelling or any drainage.    Notify your physician immediately if coldness, numbness, discoloration or pain in your foot occurs.  Secondary Diagnosis:	Acute systolic congestive heart failure  Secondary Diagnosis:	Cardiogenic shock  Secondary Diagnosis:	Acute respiratory failure with hypoxia  Secondary Diagnosis:	Type 2 diabetes mellitus without complication, without long-term current use of insulin  Secondary Diagnosis:	Essential hypertension

## 2017-03-10 NOTE — DISCHARGE NOTE ADULT - ADDITIONAL INSTRUCTIONS
It is important to see your primary physician as well as the physicians noted below within the next week to perform a comprehensive medical review.  Call their offices for an appointment as soon as you leave the hospital.  If you do not have a primary physician, contact the [facility] [phone] located on [address] [place] [address].  Your medical issues appear to be stable at this time, but if your symptoms recur or worsen, contact your physicians and/or return to the hospital if necessary.  If you encounter any issues or questions with your medication, call your physicians before stopping the medication.  Do not drive.  Limit your diet to 2 grams of sodium daily.

## 2017-03-10 NOTE — ED ADULT NURSE NOTE - OBJECTIVE STATEMENT
Assumed pt care @ 0450. Pt received sitting on stretcher in NAD resp even and unlabored. Pt AOx3 C/O 5/10 left sided chest pain occasionally radiating to the left arm. "Chest tightness". Pt states he took sublingual NGT @ 2000 and then a second @ 2400. Pt also had chest pain last week and took NGT 1 day. Pt is s/p last stent about 6 months ago, with a history of 7 in total and s/p CABG x4. Neuro WNL. PERRLA. Lungs CTA, RR even unlabored. Abd soft non tender, + bowel sounds x 4quads. Denies Nausea, Vomiting, Diarrhea. Skin warm, dry, color appropriate for age and race.

## 2017-03-10 NOTE — DISCHARGE NOTE ADULT - NS AS ACTIVITY OBS
Showering allowed/Walking-Outdoors allowed/Do not drive or operate machinery/Walking-Indoors allowed/No Heavy lifting/straining

## 2017-03-10 NOTE — DISCHARGE NOTE ADULT - NS AS DC HF EDUCATION INSTRUCTIONS
Activities as tolerated/Report weight gain of 2 or more pounds in one day or 3 or more pounds in one week, worsening shortness of breath, fatigue, weakness, increased swelling of hands and feet to primary care provider/Call Primary Care Provider for follow-up after discharge/Monitor Weight Daily/Low salt diet

## 2017-03-10 NOTE — DISCHARGE NOTE ADULT - SECONDARY DIAGNOSIS.
Acute systolic congestive heart failure Cardiogenic shock Acute respiratory failure with hypoxia Type 2 diabetes mellitus without complication, without long-term current use of insulin Essential hypertension

## 2017-03-10 NOTE — CONSULT NOTE ADULT - PROBLEM SELECTOR RECOMMENDATION 9
Appears to have slowed down and may be secondary to possible esophageal intubation with initial attempt at endotracheal intubation with subsequent trauma to esophageal mucosa secondary to the above and bleeding exacerbated by Heparin and anticoagulant therapy for CAD. His Heparin has been discontinued and the amount of blood in his orogastric tube has decreased. Would recommend continous IV Pantoprazole infusion and keeping orogastric tube to suction. Given the severity of his CAD would prefer to not have to endoscope pt unless his bleeding becomes more active and need for EGD becomes emergent which it presently is not. The above case and magement was discussed with the pt's family, interventional cardiology and Dr. Wisdom her in the MICU. Will follow pt. closely with you.

## 2017-03-10 NOTE — PROGRESS NOTE ADULT - SUBJECTIVE AND OBJECTIVE BOX
HPI:  71 y.o. male with PMHx of HTN, HLD, DMII, CAD s/p CABG and PCIs p/w CP - tightness, relieved by NTG to 5/10 from 8/10, worse with lying down, not associated with exertion from 8 pm last night. Last Cardiac cath at [facility] 6 months ago. Now S/P urgent cath CODI to SVG to OM, RFA #6fr sheath, RFV #5fr sheath sutured in place, LFA with IABP via #8fr sheath sutured in place. Cardiac cath complicated by patient going into pulmonary edema, patient was intubated and sedated. Nitro gtt was given during procedure, now off due to hypotension. Neosynephrine gtt running.     PAST MEDICAL & SURGICAL HISTORY:  Coronary artery disease involving coronary bypass graft of native heart with unstable angina pectoris  High cholesterol  Diabetes mellitus  Hypertension  S/P primary angioplasty with coronary stent  S/P CABG (coronary artery bypass graft)    FAMILY HISTORY:  No pertinent family history in first degree relatives    MEDICATIONS  (STANDING):  losartan 50milliGRAM(s) Oral daily  magnesium hydroxide Suspension 5milliLiter(s) Oral daily  isosorbide   mononitrate ER Tablet (IMDUR) 30milliGRAM(s) Oral daily  clopidogrel Tablet 75milliGRAM(s) Oral daily  amLODIPine   Tablet 10milliGRAM(s) Oral daily  ergocalciferol [id number](s) Oral every week  carvedilol 25milliGRAM(s) Oral every 12 hours  dextrose 5%. 1000milliLiter(s) IV Continuous <Continuous>  dextrose 50% Injectable 12.5Gram(s) IV Push once  dextrose 50% Injectable 25Gram(s) IV Push once  dextrose 50% Injectable 25Gram(s) IV Push once  heparin  Infusion 500Unit(s)/Hr IV Continuous <Continuous>  aspirin 325milliGRAM(s) Oral daily  chlorhexidine 0.12% Liquid 15milliLiter(s) Swish and Spit two times a day  pantoprazole  Injectable 40milliGRAM(s) IV Push daily  atorvastatin 80milliGRAM(s) Oral at bedtime  phenylephrine    Infusion 0.1MICROgram(s)/kG/Min IV Continuous <Continuous>          Objective:  Vital Signs Last 24 Hrs  T(C): 36.2, Max: 36.8 (0310 @ 04:07)  T(F): 97.2, Max: 98.2 (03-10 @ 04:07)  HR: 84 (74 - 84)  BP: 132/80 (129/79 - 140/83)  BP(mean): --  RR: 18 (18 - 20)  SpO2: 97% (97% - 100%)      CM: SR  Neuro: Intubated and sebated  HEENT: NC, AT  Lungs: CTA B/L  CV: S1, S2, no murmur, RRR  Abd: Soft  Right Groin: Soft, no bleeding, no hematoma  Extremity: + distal pulses  EK.4   9.5   )-----------( 259      ( 10 Mar 2017 05:12 )             36.0     10 Mar 2017 05:12    131    |  93     |  13.0   ----------------------------<  249    4.4     |  27.0   |  0.88     Ca    9.3        10 Mar 2017 05:12  Phos  3.1       10 Mar 2017 05:12  Mg     2.0       10 Mar 2017 05:12    TPro  7.5    /  Alb  3.8    /  TBili  0.6    /  DBili  x      /  AST  20     /  ALT  21     /  AlkPhos  95     10 Mar 2017 05:12    PT/INR - ( 10 Mar 2017 05:12 )   PT: 15.5 sec;   INR: 1.40 ratio         PTT - ( 10 Mar 2017 05:12 )  PTT:31.6 sec    A/P: CAD s/p PCI: CODI to SVG to OM  1.                 Post procedure labs/EKG reviewed and stable.    2.                 Aspirin/Plavix/Statin. Will hold BB until SBP stable  3.                 Bedrest while sheaths in place  4.                 IABP via RFA #8fr sutured in place  5.                 Neosynephrine gtt, keep sbp>90  6.                 Admit to ICU, report given to intensivist by Dr. Elena  7.                 Keep right groin sheaths in place until IABP D/C'd  8.                 Heparin gtt @500units/hr to maintain IABP patency HPI:  71 y.o. male with PMHx of HTN, HLD, DMII, CAD s/p CABG and PCIs p/w CP - tightness, relieved by NTG to 5/10 from 8/10, worse with lying down, not associated with exertion from 8 pm last night. Last Cardiac cath at [facility] 6 months ago. Now S/P urgent cath CODI to SVG to OM, RFA #6fr sheath, RFV #5fr sheath sutured in place, LFA with IABP via #8fr sheath sutured in place. Cardiac cath complicated by patient going into pulmonary edema, patient was intubated and sedated. Nitro gtt was given during procedure, now off due to hypotension. Neosynephrine gtt running.     PAST MEDICAL & SURGICAL HISTORY:  Coronary artery disease involving coronary bypass graft of native heart with unstable angina pectoris  High cholesterol  Diabetes mellitus  Hypertension  S/P primary angioplasty with coronary stent  S/P CABG (coronary artery bypass graft)    FAMILY HISTORY:  No pertinent family history in first degree relatives    MEDICATIONS  (STANDING):  losartan 50milliGRAM(s) Oral daily  magnesium hydroxide Suspension 5milliLiter(s) Oral daily  isosorbide   mononitrate ER Tablet (IMDUR) 30milliGRAM(s) Oral daily  clopidogrel Tablet 75milliGRAM(s) Oral daily  amLODIPine   Tablet 10milliGRAM(s) Oral daily  ergocalciferol [id number](s) Oral every week  carvedilol 25milliGRAM(s) Oral every 12 hours  dextrose 5%. 1000milliLiter(s) IV Continuous <Continuous>  dextrose 50% Injectable 12.5Gram(s) IV Push once  dextrose 50% Injectable 25Gram(s) IV Push once  dextrose 50% Injectable 25Gram(s) IV Push once  heparin  Infusion 500Unit(s)/Hr IV Continuous <Continuous>  aspirin 325milliGRAM(s) Oral daily  chlorhexidine 0.12% Liquid 15milliLiter(s) Swish and Spit two times a day  pantoprazole  Injectable 40milliGRAM(s) IV Push daily  atorvastatin 80milliGRAM(s) Oral at bedtime  phenylephrine    Infusion 0.1MICROgram(s)/kG/Min IV Continuous <Continuous>          Objective:  Vital Signs Last 24 Hrs  T(C): 36.2, Max: 36.8 (03-10 @ 04:07)  T(F): 97.2, Max: 98.2 (03-10 @ 04:07)  HR: 84 (74 - 84)  BP: 132/80 (129/79 - 140/83)  BP(mean): --  RR: 18 (18 - 20)  SpO2: 97% (97% - 100%)      CM: SR  Neuro: Intubated and sebated  HEENT: NC, AT  Lungs: CTA B/L  CV: S1, S2, no murmur, RRR  Abd: Soft  Right Groin: Soft, no bleeding, no hematoma  Extremity: + distal pulses  EKG: NSR @94bpm st depressions better than previous in anterior leads                        12.4   9.5   )-----------( 259      ( 10 Mar 2017 05:12 )             36.0     10 Mar 2017 05:12    131    |  93     |  13.0   ----------------------------<  249    4.4     |  27.0   |  0.88     Ca    9.3        10 Mar 2017 05:12  Phos  3.1       10 Mar 2017 05:12  Mg     2.0       10 Mar 2017 05:12    TPro  7.5    /  Alb  3.8    /  TBili  0.6    /  DBili  x      /  AST  20     /  ALT  21     /  AlkPhos  95     10 Mar 2017 05:12    PT/INR - ( 10 Mar 2017 05:12 )   PT: 15.5 sec;   INR: 1.40 ratio         PTT - ( 10 Mar 2017 05:12 )  PTT:31.6 sec    A/P: CAD s/p PCI: CODI to SVG to OM  1.                 Post procedure labs/EKG reviewed and stable.    2.                 Aspirin/Plavix/Statin. Will hold BB until SBP stable  3.                 Bedrest while sheaths in place  4.                 IABP via RFA #8fr sutured in place  5.                 Neosynephrine gtt, keep sbp>90  6.                 Admit to ICU, report given to intensivist by Dr. Elena  7.                 Keep right groin sheaths in place until IABP D/C'd  8.                 Heparin gtt @500units/hr to maintain IABP patency

## 2017-03-10 NOTE — DISCHARGE NOTE ADULT - PLAN OF CARE
No heavy lifting, driving, sex, tub baths, swimming, or any activity that submerges the lower half of the body in water for 48 hours.  Limited walking and stairs for 48 hours.    Observe the site frequently.  If bleeding or a large lump (the size of a golf ball or bigger) occurs lie flat, apply continuous direct pressure just above the puncture site for at least 10 minutes, and notify your physician immediately.  If the bleeding cannot be controlled, call 911 immediately for assistance.  Notify your physician of pain, swelling or any drainage.    Notify your physician immediately if coldness, numbness, discoloration or pain in your foot occurs. stable for discharge

## 2017-03-10 NOTE — DISCHARGE NOTE ADULT - HOSPITAL COURSE
71 y.o.male with PMHx of HTN, HLD, DMII, CAD s/p CABG and PCIs p/w CP - tightness, relieved by NTG to 5/10 from 8/10.  Underwent cath with stent to SVG to OM.  During procedure developed SOB, flash pulmonary edema requiring intubation and IABP.  Pt was extubated and transferred from ICU to Telemetry floor.  TTE: ER 35-40% 71 y.o.male with PMHx of HTN, HLD, DMII, CAD s/p CABG and PCIs p/w CP - tightness, relieved by NTG to 5/10 from 8/10.  Underwent cath with stent to SVG to OM.  During procedure developed SOB, flash pulmonary edema requiring intubation and IABP.  Pt was extubated and transferred from ICU to Telemetry floor.  TTE: ER 35-40%  Seen by cardiology, stable for discharge.  Possible initiation of aldactone as outpatient

## 2017-03-10 NOTE — ED ADULT NURSE REASSESSMENT NOTE - NS ED NURSE REASSESS COMMENT FT1
pt received awake and alert c/o on and off chest pain states that he feels a little better. remains on cardiac monitor. pt made aware of plan of care.

## 2017-03-10 NOTE — DISCHARGE NOTE ADULT - PATIENT PORTAL LINK FT
“You can access the [employer] Patient Portal, offered by [facility], by registering with the following website: [email or web address]”

## 2017-03-10 NOTE — CONSULT NOTE ADULT - SUBJECTIVE AND OBJECTIVE BOX
Patient is a 71y old  Male who presents with a chief complaint of Chest pain - tightness (10 Mar 2017 08:07)      HPI:  71 y.o. male with PMHx of HTN, HLD, DMII, CAD s/p CABG 1993 and PCIs last PCI as per patient's report 3/2016 [facility] p/w CP - tightness, relieved by NTG to 5/10 from 8/10, worse with lying down, not associated with exertion from 8 pm last night.     Associated factor: radiation to left arm (-) sob (-) orthopnea (-) palp (-0 PND  Aggravating factor: exertion  Improving factor: some improvement with SL TNG  sxs similar to past presentation when he required a stent.        PAST MEDICAL & SURGICAL HISTORY:  Coronary artery disease involving coronary bypass graft of native heart with unstable angina pectoris  High cholesterol  Diabetes mellitus  Hypertension  S/P primary angioplasty with coronary stent  S/P CABG (coronary artery bypass graft)      PREVIOUS DIAGNOSTIC TESTING:      ECHO  FINDINGS:pending    cath     Allergies    No Known Allergies    Intolerances        MEDICATIONS  (STANDING):  aspirin  chewable 81milliGRAM(s) Oral daily  losartan 50milliGRAM(s) Oral daily  magnesium hydroxide Suspension 5milliLiter(s) Oral daily  isosorbide   mononitrate ER Tablet (IMDUR) 30milliGRAM(s) Oral daily  atorvastatin 40milliGRAM(s) Oral at bedtime  clopidogrel Tablet 75milliGRAM(s) Oral daily  amLODIPine   Tablet 10milliGRAM(s) Oral daily  famotidine    Tablet 40milliGRAM(s) Oral two times a day  ergocalciferol [id number](s) Oral every week  carvedilol 25milliGRAM(s) Oral every 12 hours  enoxaparin Injectable 40milliGRAM(s) SubCutaneous every 24 hours  insulin lispro (HumaLOG) corrective regimen sliding scale  SubCutaneous three times a day before meals  insulin lispro (HumaLOG) corrective regimen sliding scale  SubCutaneous at bedtime  dextrose 5%. 1000milliLiter(s) IV Continuous <Continuous>  dextrose 50% Injectable 12.5Gram(s) IV Push once  dextrose 50% Injectable 25Gram(s) IV Push once  dextrose 50% Injectable 25Gram(s) IV Push once  nitroglycerin    Patch 0.1 mG/Hr(s) 1patch Transdermal daily    MEDICATIONS  (PRN):  dextrose Gel 1Dose(s) Oral once PRN Blood Glucose LESS THAN 70 milliGRAM(s)/deciliter  glucagon  Injectable 1milliGRAM(s) IntraMuscular once PRN Glucose LESS THAN 70 milligrams/deciliter      FAMILY HISTORY:  No pertinent family history in first degree relatives      SOCIAL HISTORY:    CIGARETTES:    ALCOHOL:    REVIEW OF SYSTEMS:  CONSTITUTIONAL: No fever, weight loss, or fatigue  EYES: No eye pain, visual disturbances, or discharge  ENMT:  No difficulty hearing, tinnitus, vertigo; No sinus or throat pain  NECK: No pain or stiffness  RESPIRATORY: No cough, wheezing, chills or hemoptysis; No Shortness of Breath  CARDIOVASCULAR: No chest pain, palpitations, passing out, dizziness, or leg swelling  GASTROINTESTINAL: No abdominal or epigastric pain. No nausea, vomiting, or hematemesis; No diarrhea or constipation. No melena or hematochezia.  GENITOURINARY: No dysuria, frequency, hematuria, or incontinence  NEUROLOGICAL: No headaches, memory loss, loss of strength, numbness, or tremors  SKIN: No itching, burning, rashes, or lesions   LYMPH Nodes: No enlarged glands  ENDOCRINE: No heat or cold intolerance; No hair loss  MUSCULOSKELETAL: No joint pain or swelling; No muscle, back, or extremity pain  PSYCHIATRIC: No depression, anxiety, mood swings, or difficulty sleeping  HEME/LYMPH: No easy bruising, or bleeding gums  ALLERY AND IMMUNOLOGIC: No hives or eczema	    Vital Signs Last 24 Hrs  T(C): 36.2, Max: 36.8 (03-10 @ 04:07)  T(F): 97.2, Max: 98.2 (03-10 @ 04:07)  HR: 74 (74 - 84)  BP: 140/83 (129/79 - 140/83)  BP(mean): --  RR: 20 (20 - 20)  SpO2: 100% (100% - 100%)    Daily Height in cm: 170.18 (10 Mar 2017 04:07)    Daily     I&O's Detail      PHYSICAL EXAM:  Appearance: Normal, well nourished	  HEENT:   Normal oral mucosa, PERRL, EOMI, sclera non-icteric	  Lymphatic: No cervical lymphadenopathy  Cardiovascular: Normal S1 S2, No JVD, No cardiac murmurs, No carotid bruits, No peripheral edema  Respiratory: Lungs clear to auscultation	  Psychiatry: A & O x 3, Mood & affect appropriate  Gastrointestinal:  Soft, Non-tender, + BS, no bruits	  Skin: No rashes, No ecchymoses, No cyanosis  Neurologic: Grossly non-focal with full strength in all four extremities  Extremities: Normal range of motion, No clubbing, cyanosis or edema  Vascular: Peripheral pulses palpable 2+ bilaterally      INTERPRETATION OF TELEMETRY:    ECG:    LABS:                        12.4   9.5   )-----------( 259      ( 10 Mar 2017 05:12 )             36.0     10 Mar 2017 05:12    131    |  93     |  13.0   ----------------------------<  249    4.4     |  27.0   |  0.88     Ca    9.3        10 Mar 2017 05:12  Phos  3.1       10 Mar 2017 05:12  Mg     2.0       10 Mar 2017 05:12    TPro  7.5    /  Alb  3.8    /  TBili  0.6    /  DBili  x      /  AST  20     /  ALT  21     /  AlkPhos  95     10 Mar 2017 05:12    CARDIAC MARKERS ( 10 Mar 2017 05:12 )  x     / 0.03 ng/mL / 71 U/L / x     / x          PT/INR - ( 10 Mar 2017 05:12 )   PT: 15.5 sec;   INR: 1.40 ratio         PTT - ( 10 Mar 2017 05:12 )  PTT:31.6 sec    I&O's Summary    BNP  RADIOLOGY & ADDITIONAL STUDIES: Patient is a 71y old  Male who presents with a chief complaint of Chest pain - tightness (10 Mar 2017 08:07)      HPI:  71 y.o. male with PMHx of HTN, HLD, DMII, CAD s/p CABG 1993 and PCIs last PCI as per patient's report 3/2016 [facility] p/w CP - tightness, relieved by NTG to 5/10 from 8/10, worse with lying down, not associated with exertion from 8 pm last night.     Associated factor: radiation to left arm (-) sob (-) orthopnea (-) palp (-) PND  Aggravating factor: exertion  Improving factor: some improvement with SL TNG  sxs similar to past presentation when he required a stent at that time of PCI/stent developed acute pulmonary edema resulting in intubation.  Current 12-lead ECG NSR LAD,LVH IVCD   QRS 124ms , hyperdynamic ST- TW changes V1-V4 with 1mm STD v5-V5           PAST MEDICAL & SURGICAL HISTORY:  Coronary artery disease involving coronary bypass graft of native heart with unstable angina pectoris  High cholesterol  Diabetes mellitus  Hypertension  S/P primary angioplasty with coronary stent  S/P CABG (coronary artery bypass graft)      PREVIOUS DIAGNOSTIC TESTING:      ECHO  FINDINGS:pending    cath     Allergies    No Known Allergies    Intolerances        MEDICATIONS  (STANDING):  aspirin  chewable 81milliGRAM(s) Oral daily  losartan 50milliGRAM(s) Oral daily  magnesium hydroxide Suspension 5milliLiter(s) Oral daily  isosorbide   mononitrate ER Tablet (IMDUR) 30milliGRAM(s) Oral daily  atorvastatin 40milliGRAM(s) Oral at bedtime  clopidogrel Tablet 75milliGRAM(s) Oral daily  amLODIPine   Tablet 10milliGRAM(s) Oral daily  famotidine    Tablet 40milliGRAM(s) Oral two times a day  ergocalciferol [id number](s) Oral every week  carvedilol 25milliGRAM(s) Oral every 12 hours  enoxaparin Injectable 40milliGRAM(s) SubCutaneous every 24 hours  insulin lispro (HumaLOG) corrective regimen sliding scale  SubCutaneous three times a day before meals  insulin lispro (HumaLOG) corrective regimen sliding scale  SubCutaneous at bedtime  dextrose 5%. 1000milliLiter(s) IV Continuous <Continuous>  dextrose 50% Injectable 12.5Gram(s) IV Push once  dextrose 50% Injectable 25Gram(s) IV Push once  dextrose 50% Injectable 25Gram(s) IV Push once  nitroglycerin    Patch 0.1 mG/Hr(s) 1patch Transdermal daily    MEDICATIONS  (PRN):  dextrose Gel 1Dose(s) Oral once PRN Blood Glucose LESS THAN 70 milliGRAM(s)/deciliter  glucagon  Injectable 1milliGRAM(s) IntraMuscular once PRN Glucose LESS THAN 70 milligrams/deciliter      FAMILY HISTORY:  No pertinent family history in first degree relatives      SOCIAL HISTORY:    CIGARETTES:    ALCOHOL:    REVIEW OF SYSTEMS:  CONSTITUTIONAL: No fever, weight loss, or fatigue  EYES: No eye pain, visual disturbances, or discharge  ENMT:  No difficulty hearing, tinnitus, vertigo; No sinus or throat pain  NECK: No pain or stiffness  RESPIRATORY: No cough, wheezing, chills or hemoptysis; No Shortness of Breath  CARDIOVASCULAR: No chest pain, palpitations, passing out, dizziness, or leg swelling  GASTROINTESTINAL: No abdominal or epigastric pain. No nausea, vomiting, or hematemesis; No diarrhea or constipation. No melena or hematochezia.  GENITOURINARY: No dysuria, frequency, hematuria, or incontinence  NEUROLOGICAL: No headaches, memory loss, loss of strength, numbness, or tremors  SKIN: No itching, burning, rashes, or lesions   LYMPH Nodes: No enlarged glands  ENDOCRINE: No heat or cold intolerance; No hair loss  MUSCULOSKELETAL: No joint pain or swelling; No muscle, back, or extremity pain  PSYCHIATRIC: No depression, anxiety, mood swings, or difficulty sleeping  HEME/LYMPH: No easy bruising, or bleeding gums  ALLERY AND IMMUNOLOGIC: No hives or eczema	    Vital Signs Last 24 Hrs  T(C): 36.2, Max: 36.8 (03-10 @ 04:07)  T(F): 97.2, Max: 98.2 (03-10 @ 04:07)  HR: 74 (74 - 84)  BP: 140/83 (129/79 - 140/83)  BP(mean): --  RR: 20 (20 - 20)  SpO2: 100% (100% - 100%)    Daily Height in cm: 170.18 (10 Mar 2017 04:07)    Daily     I&O's Detail      PHYSICAL EXAM:  Appearance: Normal, well nourished	  HEENT:   Normal oral mucosa, PERRL, EOMI, sclera non-icteric	  Lymphatic: No cervical lymphadenopathy  Cardiovascular: Normal S1 S2, No JVD, No cardiac murmurs, No carotid bruits, No peripheral edema  Respiratory: Lungs clear to auscultation	  Psychiatry: A & O x 3, Mood & affect appropriate  Gastrointestinal:  Soft, Non-tender, + BS, no bruits	  Skin: No rashes, No ecchymoses, No cyanosis  Neurologic: Grossly non-focal with full strength in all four extremities  Extremities: Normal range of motion, No clubbing, cyanosis or edema  Vascular: Peripheral pulses palpable 2+ bilaterally      INTERPRETATION OF TELEMETRY:    ECG:    LABS:                        12.4   9.5   )-----------( 259      ( 10 Mar 2017 05:12 )             36.0     10 Mar 2017 05:12    131    |  93     |  13.0   ----------------------------<  249    4.4     |  27.0   |  0.88     Ca    9.3        10 Mar 2017 05:12  Phos  3.1       10 Mar 2017 05:12  Mg     2.0       10 Mar 2017 05:12    TPro  7.5    /  Alb  3.8    /  TBili  0.6    /  DBili  x      /  AST  20     /  ALT  21     /  AlkPhos  95     10 Mar 2017 05:12    CARDIAC MARKERS ( 10 Mar 2017 05:12 )  x     / 0.03 ng/mL / 71 U/L / x     / x          PT/INR - ( 10 Mar 2017 05:12 )   PT: 15.5 sec;   INR: 1.40 ratio         PTT - ( 10 Mar 2017 05:12 )  PTT:31.6 sec    I&O's Summary    BNP  RADIOLOGY & ADDITIONAL STUDIES:

## 2017-03-10 NOTE — H&P ADULT - PROBLEM SELECTOR PROBLEM 2
Coronary artery disease involving coronary bypass graft of native heart with unstable angina pectoris

## 2017-03-10 NOTE — H&P ADULT - NSHPLABSRESULTS_GEN_ALL_CORE
CARDIAC MARKERS ( 10 Mar 2017 05:12 )  x     / 0.03 ng/mL / 71 U/L / x     / x                                12.4   9.5   )-----------( 259      ( 10 Mar 2017 05:12 )             36.0     10 Mar 2017 05:12    131    |  93     |  13.0   ----------------------------<  249    4.4     |  27.0   |  0.88     Ca    9.3        10 Mar 2017 05:12  Phos  3.1       10 Mar 2017 05:12  Mg     2.0       10 Mar 2017 05:12    TPro  7.5    /  Alb  3.8    /  TBili  0.6    /  DBili  x      /  AST  20     /  ALT  21     /  AlkPhos  95     10 Mar 2017 05:12    PT/INR - ( 10 Mar 2017 05:12 )   PT: 15.5 sec;   INR: 1.40 ratio         PTT - ( 10 Mar 2017 05:12 )  PTT:31.6 sec  CAPILLARY BLOOD GLUCOSE    LIVER FUNCTIONS - ( 10 Mar 2017 05:12 )  Alb: 3.8 g/dL / Pro: 7.5 g/dL / ALK PHOS: 95 U/L / ALT: 21 U/L / AST: 20 U/L / GGT: x    EKG: NST with LAD, LVH

## 2017-03-11 DIAGNOSIS — R57.0 CARDIOGENIC SHOCK: ICD-10-CM

## 2017-03-11 LAB
ANION GAP SERPL CALC-SCNC: 14 MMOL/L — SIGNIFICANT CHANGE UP (ref 5–17)
BUN SERPL-MCNC: 16 MG/DL — SIGNIFICANT CHANGE UP (ref 8–20)
CALCIUM SERPL-MCNC: 8.7 MG/DL — SIGNIFICANT CHANGE UP (ref 8.6–10.2)
CHLORIDE SERPL-SCNC: 95 MMOL/L — LOW (ref 98–107)
CO2 SERPL-SCNC: 27 MMOL/L — SIGNIFICANT CHANGE UP (ref 22–29)
CREAT SERPL-MCNC: 1.01 MG/DL — SIGNIFICANT CHANGE UP (ref 0.5–1.3)
GLUCOSE SERPL-MCNC: 105 MG/DL — SIGNIFICANT CHANGE UP (ref 70–115)
HBA1C BLD-MCNC: 9 % — HIGH (ref 4–5.6)
HCT VFR BLD CALC: 33.5 % — LOW (ref 42–52)
HGB BLD-MCNC: 11.6 G/DL — LOW (ref 14–18)
MAGNESIUM SERPL-MCNC: 1.8 MG/DL — SIGNIFICANT CHANGE UP (ref 1.8–2.5)
MCHC RBC-ENTMCNC: 28.6 PG — SIGNIFICANT CHANGE UP (ref 27–31)
MCHC RBC-ENTMCNC: 34.6 G/DL — SIGNIFICANT CHANGE UP (ref 32–36)
MCV RBC AUTO: 82.5 FL — SIGNIFICANT CHANGE UP (ref 80–94)
PHOSPHATE SERPL-MCNC: 3.8 MG/DL — SIGNIFICANT CHANGE UP (ref 2.4–4.7)
PLATELET # BLD AUTO: 198 K/UL — SIGNIFICANT CHANGE UP (ref 150–400)
POTASSIUM SERPL-MCNC: 3.2 MMOL/L — LOW (ref 3.5–5.3)
POTASSIUM SERPL-SCNC: 3.2 MMOL/L — LOW (ref 3.5–5.3)
RBC # BLD: 4.06 M/UL — LOW (ref 4.6–6.2)
RBC # FLD: 12.2 % — SIGNIFICANT CHANGE UP (ref 11–15.6)
SODIUM SERPL-SCNC: 136 MMOL/L — SIGNIFICANT CHANGE UP (ref 135–145)
WBC # BLD: 16 K/UL — HIGH (ref 4.8–10.8)
WBC # FLD AUTO: 16 K/UL — HIGH (ref 4.8–10.8)

## 2017-03-11 PROCEDURE — 99232 SBSQ HOSP IP/OBS MODERATE 35: CPT

## 2017-03-11 PROCEDURE — 93010 ELECTROCARDIOGRAM REPORT: CPT

## 2017-03-11 PROCEDURE — 99233 SBSQ HOSP IP/OBS HIGH 50: CPT

## 2017-03-11 PROCEDURE — 99291 CRITICAL CARE FIRST HOUR: CPT

## 2017-03-11 RX ORDER — FUROSEMIDE 40 MG
20 TABLET ORAL DAILY
Qty: 0 | Refills: 0 | Status: DISCONTINUED | OUTPATIENT
Start: 2017-03-11 | End: 2017-03-12

## 2017-03-11 RX ORDER — POTASSIUM CHLORIDE 20 MEQ
10 PACKET (EA) ORAL
Qty: 0 | Refills: 0 | Status: COMPLETED | OUTPATIENT
Start: 2017-03-11 | End: 2017-03-11

## 2017-03-11 RX ORDER — PANTOPRAZOLE SODIUM 20 MG/1
40 TABLET, DELAYED RELEASE ORAL
Qty: 0 | Refills: 0 | Status: DISCONTINUED | OUTPATIENT
Start: 2017-03-11 | End: 2017-03-13

## 2017-03-11 RX ORDER — INSULIN LISPRO 100/ML
VIAL (ML) SUBCUTANEOUS
Qty: 0 | Refills: 0 | Status: DISCONTINUED | OUTPATIENT
Start: 2017-03-11 | End: 2017-03-13

## 2017-03-11 RX ORDER — ASPIRIN/CALCIUM CARB/MAGNESIUM 324 MG
81 TABLET ORAL DAILY
Qty: 0 | Refills: 0 | Status: DISCONTINUED | OUTPATIENT
Start: 2017-03-11 | End: 2017-03-13

## 2017-03-11 RX ORDER — LOSARTAN POTASSIUM 100 MG/1
25 TABLET, FILM COATED ORAL DAILY
Qty: 0 | Refills: 0 | Status: DISCONTINUED | OUTPATIENT
Start: 2017-03-11 | End: 2017-03-13

## 2017-03-11 RX ORDER — PANTOPRAZOLE SODIUM 20 MG/1
40 TABLET, DELAYED RELEASE ORAL EVERY 12 HOURS
Qty: 0 | Refills: 0 | Status: DISCONTINUED | OUTPATIENT
Start: 2017-03-11 | End: 2017-03-11

## 2017-03-11 RX ORDER — POTASSIUM CHLORIDE 20 MEQ
20 PACKET (EA) ORAL ONCE
Qty: 0 | Refills: 0 | Status: DISCONTINUED | OUTPATIENT
Start: 2017-03-11 | End: 2017-03-11

## 2017-03-11 RX ORDER — MAGNESIUM SULFATE 500 MG/ML
2 VIAL (ML) INJECTION ONCE
Qty: 0 | Refills: 0 | Status: COMPLETED | OUTPATIENT
Start: 2017-03-11 | End: 2017-03-11

## 2017-03-11 RX ORDER — POTASSIUM CHLORIDE 20 MEQ
20 PACKET (EA) ORAL ONCE
Qty: 0 | Refills: 0 | Status: COMPLETED | OUTPATIENT
Start: 2017-03-11 | End: 2017-03-11

## 2017-03-11 RX ORDER — CARVEDILOL PHOSPHATE 80 MG/1
6.25 CAPSULE, EXTENDED RELEASE ORAL EVERY 12 HOURS
Qty: 0 | Refills: 0 | Status: DISCONTINUED | OUTPATIENT
Start: 2017-03-11 | End: 2017-03-12

## 2017-03-11 RX ADMIN — Medication 100 MILLIEQUIVALENT(S): at 12:50

## 2017-03-11 RX ADMIN — CARVEDILOL PHOSPHATE 6.25 MILLIGRAM(S): 80 CAPSULE, EXTENDED RELEASE ORAL at 20:48

## 2017-03-11 RX ADMIN — CLOPIDOGREL BISULFATE 75 MILLIGRAM(S): 75 TABLET, FILM COATED ORAL at 11:52

## 2017-03-11 RX ADMIN — PANTOPRAZOLE SODIUM 10 MG/HR: 20 TABLET, DELAYED RELEASE ORAL at 07:33

## 2017-03-11 RX ADMIN — Medication 100 MILLIEQUIVALENT(S): at 10:54

## 2017-03-11 RX ADMIN — Medication 1: at 18:09

## 2017-03-11 RX ADMIN — INSULIN GLARGINE 12 UNIT(S): 100 INJECTION, SOLUTION SUBCUTANEOUS at 22:33

## 2017-03-11 RX ADMIN — Medication 50 GRAM(S): at 10:54

## 2017-03-11 RX ADMIN — ATORVASTATIN CALCIUM 80 MILLIGRAM(S): 80 TABLET, FILM COATED ORAL at 22:33

## 2017-03-11 RX ADMIN — CHLORHEXIDINE GLUCONATE 15 MILLILITER(S): 213 SOLUTION TOPICAL at 06:11

## 2017-03-11 RX ADMIN — Medication 20 MILLIEQUIVALENT(S): at 16:09

## 2017-03-11 RX ADMIN — Medication 20 MILLIGRAM(S): at 09:44

## 2017-03-11 RX ADMIN — Medication 1: at 22:33

## 2017-03-11 RX ADMIN — PANTOPRAZOLE SODIUM 40 MILLIGRAM(S): 20 TABLET, DELAYED RELEASE ORAL at 18:10

## 2017-03-11 RX ADMIN — Medication 100 MILLIEQUIVALENT(S): at 11:50

## 2017-03-11 RX ADMIN — Medication 325 MILLIGRAM(S): at 11:52

## 2017-03-11 RX ADMIN — LOSARTAN POTASSIUM 25 MILLIGRAM(S): 100 TABLET, FILM COATED ORAL at 20:49

## 2017-03-11 NOTE — PROGRESS NOTE ADULT - PROBLEM SELECTOR PLAN 1
Resolved. Likely secondary to esophageal trauma during initial attempt at endotracheal intubation. Would continue IV Pantoprazole for now but could change from continuos infusion to IVP q 12 hrs. If. pt. extubated can start diet as tolerated. No plans or need for EGD presently.

## 2017-03-11 NOTE — PROGRESS NOTE ADULT - SUBJECTIVE AND OBJECTIVE BOX
Patient is a 71y old  Male who presents with a chief complaint of Chest pain - tightness (10 Mar 2017 11:51)      BRIEF HOSPITAL COURSE: 71 yom with CABG, DM, HLD, HTN presenting with chest pain ACS went to cath lab stent to SVG to OM, went in to pulmonary edema and intubated given 40 lasix, IABP placed, had one episode of vomiting blood, resolved, periods of hypotension likely secondary to sedation    Events last 24 hours: Doing well this am, IABP out, extubated, no further episodes of bleeding    PAST MEDICAL & SURGICAL HISTORY:  Coronary artery disease involving coronary bypass graft of native heart with unstable angina pectoris  High cholesterol  Diabetes mellitus  Hypertension  S/P primary angioplasty with coronary stent  S/P CABG (coronary artery bypass graft)      Review of Systems:  CONSTITUTIONAL: No fever, chills, or fatigue  EYES: No eye pain, visual disturbances, or discharge  ENMT:  No difficulty hearing, tinnitus, vertigo; No sinus or throat pain  NECK: No pain or stiffness  RESPIRATORY: No cough, wheezing, chills or hemoptysis; No shortness of breath  CARDIOVASCULAR: No chest pain, palpitations, dizziness, or leg swelling  GASTROINTESTINAL: No abdominal or epigastric pain. No nausea, vomiting, or hematemesis; No diarrhea or constipation. No melena or hematochezia.  GENITOURINARY: No dysuria, frequency, hematuria, or incontinence  NEUROLOGICAL: No headaches, memory loss, loss of strength, numbness, or tremors  SKIN: No itching, burning, rashes, or lesions   MUSCULOSKELETAL: No joint pain or swelling; No muscle, back, or extremity pain  PSYCHIATRIC: No depression, anxiety, mood swings, or difficulty sleeping      Medications:    nitroglycerin     SubLingual 0.4milliGRAM(s) SubLingual every 5 minutes PRN  furosemide   Injectable 20milliGRAM(s) IV Push daily  aspirin 325milliGRAM(s) Oral daily  clopidogrel Tablet 75milliGRAM(s) Oral daily    pantoprazole  Injectable 40milliGRAM(s) IV Push every 12 hours      dextrose Gel 1Dose(s) Oral once PRN  dextrose 50% Injectable 12.5Gram(s) IV Push once  dextrose 50% Injectable 25Gram(s) IV Push once  dextrose 50% Injectable 25Gram(s) IV Push once  glucagon  Injectable 1milliGRAM(s) IntraMuscular once PRN  atorvastatin 80milliGRAM(s) Oral at bedtime  insulin glargine Injectable (LANTUS) 12Unit(s) SubCutaneous at bedtime  insulin lispro (HumaLOG) corrective regimen sliding scale  SubCutaneous every 6 hours    ergocalciferol 01710Gtkg(s) Oral every week  dextrose 5%. 1000milliLiter(s) IV Continuous <Continuous>  potassium chloride    Tablet ER 20milliEquivalent(s) Oral once  potassium chloride  10 mEq/100 mL IVPB 10milliEquivalent(s) IV Intermittent every 1 hour            Mode: CPAP with PS  FiO2: 30  PEEP: 5  PS: 5  MAP: 7      ICU Vital Signs Last 24 Hrs  T(C): 37.9, Max: 37.9 (03-11 @ 08:00)  T(F): 100.2, Max: 100.2 (03-11 @ 08:00)  HR: 92 (75 - 104)  BP: 129/71 (120/82 - 153/102)  BP(mean): 94 (91 - 123)  ABP: 161/74 (74/43 - 161/74)  ABP(mean): 104 (63 - 120)  RR: 25 (16 - 26)  SpO2: 97% (91% - 100%)      ABG - ( 10 Mar 2017 17:19 )  pH: 7.37  /  pCO2: 50    /  pO2: 114   / HCO3: 27    / Base Excess: 2.8   /  SaO2: 98                  I&O's Detail  I & Os for 24h ending 11 Mar 2017 07:00  =============================================  IN:    pantoprazole Infusion: 160 ml    insulin Infusion: 13 ml    norepinephrine Infusion: 10 ml    dexmedetomidine Infusion: 6 ml    Total IN: 189 ml  ---------------------------------------------  OUT:    Indwelling Catheter - Urethral: 2805 ml    Nasoenteral Tube: 150 ml    Total OUT: 2955 ml  ---------------------------------------------  Total NET: -2766 ml    I & Os for current day (as of 11 Mar 2017 12:22)  =============================================  IN:    Solution: 200 ml    Solution: 50 ml    pantoprazole Infusion: 20 ml    Total IN: 270 ml  ---------------------------------------------  OUT:    Indwelling Catheter - Urethral: 670 ml    Total OUT: 670 ml  ---------------------------------------------  Total NET: -400 ml        LABS:                        11.6   16.0  )-----------( 198      ( 11 Mar 2017 06:25 )             33.5     11 Mar 2017 06:25    136    |  95     |  16.0   ----------------------------<  105    3.2     |  27.0   |  1.01     Ca    8.7        11 Mar 2017 06:25  Phos  3.8       11 Mar 2017 06:25  Mg     1.8       11 Mar 2017 06:25    TPro  7.2    /  Alb  3.7    /  TBili  0.9    /  DBili  x      /  AST  24     /  ALT  19     /  AlkPhos  97     10 Mar 2017 13:46      CARDIAC MARKERS ( 10 Mar 2017 05:12 )  x     / 0.03 ng/mL / 71 U/L / x     / x          CAPILLARY BLOOD GLUCOSE  121 (11 Mar 2017 12:00)    PT/INR - ( 10 Mar 2017 13:46 )   PT: 16.2 sec;   INR: 1.47 ratio         PTT - ( 10 Mar 2017 13:46 )  PTT:76.7 sec    CULTURES:      Physical Examination:    General: No acute distress.      HEENT: Pupils equal, reactive to light.  Symmetric.    PULM: Clear to auscultation bilaterally, no significant sputum production    CVS: Regular rate and rhythm, no murmurs, rubs, or gallops    ABD: Soft, nondistended, nontender, normoactive bowel sounds, no masses    EXT: No edema, nontender, b/l groins dressing c/d/i    SKIN: Warm and well perfused, no rashes noted.

## 2017-03-11 NOTE — PROGRESS NOTE ADULT - ATTENDING COMMENTS
Thank you for allowing me to participate in care of your patient.   Please call as needed. Critical care cardiology. Time spent > 30 mins including discuss with patient, family and primary team and referring   Thank you for allowing me to participate in care of your patient.   Please call as needed.

## 2017-03-11 NOTE — PROGRESS NOTE ADULT - SUBJECTIVE AND OBJECTIVE BOX
Pt seen and examined. He has had no further blood in the orogastric tube. Balloon pump has been removed and plans are to try to extubate pt. today.    MEDICATIONS:  MEDICATIONS  (STANDING):  clopidogrel Tablet 75milliGRAM(s) Oral daily  ergocalciferol 32591Apod(s) Oral every week  dextrose 5%. 1000milliLiter(s) IV Continuous <Continuous>  dextrose 50% Injectable 12.5Gram(s) IV Push once  dextrose 50% Injectable 25Gram(s) IV Push once  dextrose 50% Injectable 25Gram(s) IV Push once  aspirin 325milliGRAM(s) Oral daily  chlorhexidine 0.12% Liquid 15milliLiter(s) Swish and Spit two times a day  atorvastatin 80milliGRAM(s) Oral at bedtime  pantoprazole Infusion 8mG/Hr IV Continuous <Continuous>  insulin glargine Injectable (LANTUS) 12Unit(s) SubCutaneous at bedtime  insulin lispro (HumaLOG) corrective regimen sliding scale  SubCutaneous every 6 hours  potassium chloride    Tablet ER 20milliEquivalent(s) Oral once    MEDICATIONS  (PRN):  dextrose Gel 1Dose(s) Oral once PRN Blood Glucose LESS THAN 70 milliGRAM(s)/deciliter  glucagon  Injectable 1milliGRAM(s) IntraMuscular once PRN Glucose LESS THAN 70 milligrams/deciliter  nitroglycerin     SubLingual 0.4milliGRAM(s) SubLingual every 5 minutes PRN Chest Pain      Allergies    No Known Allergies    Intolerances        Vital Signs Last 24 Hrs  T(C): 37.9, Max: 37.9 (03-11 @ 08:00)  T(F): 100.2, Max: 100.2 (03-11 @ 08:00)  HR: 101 (75 - 104)  BP: 139/78 (120/82 - 182/90)  BP(mean): 103 (91 - 132)  RR: 21 (16 - 24)  SpO2: 100% (91% - 100%)  I & Os for 24h ending 03-11 @ 07:00  =============================================  IN: 189 ml / OUT: 2955 ml / NET: -2766 ml    I & Os for current day (as of 03-11 @ 08:55)  =============================================  IN: 10 ml / OUT: 45 ml / NET: -35 ml      PHYSICAL EXAM:    General: Well developed; well nourished; in no acute distress, remains intubated.  Orogastric tube aspirate is bilious.  HEENT: MMM, conjunctiva and sclera clear  Lungs: clear to auscultation and percussion.  Cor: RRR S1, S2 only.  Gastrointestinal: Abdomen: Soft non-tender non-distended; Normal bowel sounds; No hepatosplenomegaly  Extremities: no cyanosis, clubbing or edema.  Skin: Warm and dry. No obvious rash  Neuro: Pt. a + o x 3, intubated.    LABS:  ABG - ( 10 Mar 2017 17:19 )  pH: 7.37  /  pCO2: 50    /  pO2: 114   / HCO3: 27    / Base Excess: 2.8   /  SaO2: 98                  CBC Full  -  ( 11 Mar 2017 06:25 )  WBC Count : 16.0 K/uL  Hemoglobin : 11.6 g/dL  Hematocrit : 33.5 %  Platelet Count - Automated : 198 K/uL  Mean Cell Volume : 82.5 fl  Mean Cell Hemoglobin : 28.6 pg  Mean Cell Hemoglobin Concentration : 34.6 g/dL  Auto Neutrophil # : x  Auto Lymphocyte # : x  Auto Monocyte # : x  Auto Eosinophil # : x  Auto Basophil # : x  Auto Neutrophil % : x  Auto Lymphocyte % : x  Auto Monocyte % : x  Auto Eosinophil % : x  Auto Basophil % : x    11 Mar 2017 06:25    136    |  95     |  16.0   ----------------------------<  105    3.2     |  27.0   |  1.01     Ca    8.7        11 Mar 2017 06:25  Phos  4.0       10 Mar 2017 13:46  Mg     1.9       10 Mar 2017 13:46    TPro  7.2    /  Alb  3.7    /  TBili  0.9    /  DBili  x      /  AST  24     /  ALT  19     /  AlkPhos  97     10 Mar 2017 13:46    PT/INR - ( 10 Mar 2017 13:46 )   PT: 16.2 sec;   INR: 1.47 ratio         PTT - ( 10 Mar 2017 13:46 )  PTT:76.7 sec                  RADIOLOGY & ADDITIONAL STUDIES (The following images were personally reviewed):

## 2017-03-11 NOTE — PROGRESS NOTE ADULT - SUBJECTIVE AND OBJECTIVE BOX
CARDIOLOGY PROGRESS NOTE   ([facility])                             Reason for follow up:                             Overnight: No new events.   Update: 71 yom with CABG, DM, HLD, HTN presenting with chest pain ACS went to cath lab stent to SVG to OM, went in to pulmonary edema and intubated given 40 lasix, IABP placed, had one episode of vomiting blood, resolved, periods of hypotension likely secondary to sedation.  Lot of blood from ET tube, probably due to traumatic intubation and on anticoagulation and DAPT.  Also some aspiration .  IABP out today. NO new symptoms.       Subjective: " I am feeling better.   Complains of:  No new symptoms.  + Cough.   Review of symptoms: Cardiac:  No chest pain. No dyspnea. No palpitations.  Respiratory: +  cough. No dyspnea  Gastrointestinal: No diarrhea. No abdominal pain. No bleeding.     Past medical history: No updates.   Chronic conditions:  Hypertension: controlled. CHF: Stable.  	  Vitals:  T(C): 37.2, Max: 37.9 (03-11 @ 08:00)  HR: 85 (81 - 101)  BP: 142/66 (113/61 - 153/102)  RR: 23 (16 - 26)  SpO2: 100% (95% - 100%)  Wt(kg): --  I&O's Summary  I & Os for 24h ending 11 Mar 2017 07:00  =============================================  IN: 189 ml / OUT: 2955 ml / NET: -2766 ml    I & Os for current day (as of 11 Mar 2017 18:48)  =============================================  IN: 770 ml / OUT: 1180 ml / NET: -410 ml    Weight (kg): 68.9 (03-10 @ 04:07)    PHYSICAL EXAM:  Appearance: Comfortable. No acute distress  HEENT:  Head and neck: Atraumatic. Normocephalic.  Normal oral mucosa, PERRL, Neck is supple. No JVD, No carotid bruit.   Neurologic: A & O x 3, no focal deficits. EOMI , Cranial nerves are intact.  Lymphatic: No cervical lymphadenopathy  Cardiovascular: Normal S1 S2, No murmur, rubs/gallops. No JVD, No edema  Respiratory: Bilateral basal crackles. Left worse than right. left lung upto mid lung crepts.   Gastrointestinal:  Soft, Non-tender, + BS  Lower Extremities: No edema  Psychiatry: Patient is calm. No agitation. Mood & affect appropriate  Skin: No rashes, No ecchymoses, No cyanosis  GROIN: Bilateral. No hematoma.  DP: 2+ Bilateral equal.     CURRENT MEDICATIONS:  nitroglycerin     SubLingual 0.4milliGRAM(s) SubLingual every 5 minutes PRN  furosemide   Injectable 20milliGRAM(s) IV Push daily    aspirin  pantoprazole  Injectable  dextrose 50% Injectable  dextrose 50% Injectable  dextrose 50% Injectable  atorvastatin  insulin glargine Injectable (LANTUS)  insulin lispro (HumaLOG) corrective regimen sliding scale  clopidogrel Tablet  ergocalciferol  dextrose 5%.      LABS:	 	  CARDIAC MARKERS ( 10 Mar 2017 05:12 )  x     / 0.03 ng/mL / 71 U/L / x     / x      p-BNP 10 Mar 2017 05:12: x                              11.6   16.0  )-----------( 198      ( 11 Mar 2017 06:25 )             33.5     11 Mar 2017 06:25    136    |  95     |  16.0   ----------------------------<  105    3.2     |  27.0   |  1.01     Ca    8.7        11 Mar 2017 06:25  Phos  3.8       11 Mar 2017 06:25  Mg     1.8       11 Mar 2017 06:25    TPro  7.2    /  Alb  3.7    /  TBili  0.9    /  DBili  x      /  AST  24     /  ALT  19     /  AlkPhos  97     10 Mar 2017 13:46    proBNP:   Lipid Profile: Date: 03-10 @ 05:12  Total cholesterol 117; Direct LDL: 52; HDL: 36; Triglycerides:146    HgA1c: Hemoglobin A1C, Whole Blood: 9.0 %  TSH:     TELEMETRY: Reviewed  Sinus rhythm. 8 beat NSVT .   ECG:  Reviewed by me. 	 Sinu tachycardia. .ivcd .ST changes suggestive of inferolateral ischemia.     DIAGNOSTIC TESTING:  [ ] Echocardiogram: EF 35-40%. segmental wall motion abnormality as seen in coronary artery disease. Mild MR.   [ ]  Catheterization:  REport not available Stents in SVG to OM.

## 2017-03-11 NOTE — PROGRESS NOTE ADULT - SUBJECTIVE AND OBJECTIVE BOX
S/P IABP removal, uneventful  Left groin benign, no bleeding, no hematoma noted  Patient to be extubated today  Check groin, vitals and pedal pulse as ordered  Consider beta blocker and ace   VSS HR 90's SR with occasional PVC's  Hypokalemia supplemented  Will continue to monitor MICU team to follow.

## 2017-03-12 DIAGNOSIS — I10 ESSENTIAL (PRIMARY) HYPERTENSION: ICD-10-CM

## 2017-03-12 LAB
ANION GAP SERPL CALC-SCNC: 12 MMOL/L — SIGNIFICANT CHANGE UP (ref 5–17)
BUN SERPL-MCNC: 18 MG/DL — SIGNIFICANT CHANGE UP (ref 8–20)
CALCIUM SERPL-MCNC: 8.3 MG/DL — LOW (ref 8.6–10.2)
CHLORIDE SERPL-SCNC: 94 MMOL/L — LOW (ref 98–107)
CO2 SERPL-SCNC: 29 MMOL/L — SIGNIFICANT CHANGE UP (ref 22–29)
CREAT SERPL-MCNC: 0.88 MG/DL — SIGNIFICANT CHANGE UP (ref 0.5–1.3)
GLUCOSE SERPL-MCNC: 131 MG/DL — HIGH (ref 70–115)
MAGNESIUM SERPL-MCNC: 2.2 MG/DL — SIGNIFICANT CHANGE UP (ref 1.8–2.5)
PHOSPHATE SERPL-MCNC: 3 MG/DL — SIGNIFICANT CHANGE UP (ref 2.4–4.7)
POTASSIUM SERPL-MCNC: 3.5 MMOL/L — SIGNIFICANT CHANGE UP (ref 3.5–5.3)
POTASSIUM SERPL-SCNC: 3.5 MMOL/L — SIGNIFICANT CHANGE UP (ref 3.5–5.3)
SODIUM SERPL-SCNC: 135 MMOL/L — SIGNIFICANT CHANGE UP (ref 135–145)

## 2017-03-12 PROCEDURE — 99233 SBSQ HOSP IP/OBS HIGH 50: CPT

## 2017-03-12 PROCEDURE — 99232 SBSQ HOSP IP/OBS MODERATE 35: CPT

## 2017-03-12 RX ORDER — DOCUSATE SODIUM 100 MG
100 CAPSULE ORAL
Qty: 0 | Refills: 0 | Status: DISCONTINUED | OUTPATIENT
Start: 2017-03-12 | End: 2017-03-13

## 2017-03-12 RX ORDER — FUROSEMIDE 40 MG
40 TABLET ORAL DAILY
Qty: 0 | Refills: 0 | Status: DISCONTINUED | OUTPATIENT
Start: 2017-03-12 | End: 2017-03-13

## 2017-03-12 RX ORDER — SPIRONOLACTONE 25 MG/1
25 TABLET, FILM COATED ORAL DAILY
Qty: 0 | Refills: 0 | Status: DISCONTINUED | OUTPATIENT
Start: 2017-03-12 | End: 2017-03-12

## 2017-03-12 RX ORDER — ACETAMINOPHEN 500 MG
650 TABLET ORAL EVERY 6 HOURS
Qty: 0 | Refills: 0 | Status: DISCONTINUED | OUTPATIENT
Start: 2017-03-12 | End: 2017-03-13

## 2017-03-12 RX ORDER — MAGNESIUM OXIDE 400 MG ORAL TABLET 241.3 MG
400 TABLET ORAL DAILY
Qty: 0 | Refills: 0 | Status: DISCONTINUED | OUTPATIENT
Start: 2017-03-12 | End: 2017-03-13

## 2017-03-12 RX ORDER — ONDANSETRON 8 MG/1
4 TABLET, FILM COATED ORAL EVERY 6 HOURS
Qty: 0 | Refills: 0 | Status: DISCONTINUED | OUTPATIENT
Start: 2017-03-12 | End: 2017-03-13

## 2017-03-12 RX ORDER — MORPHINE SULFATE 50 MG/1
2 CAPSULE, EXTENDED RELEASE ORAL EVERY 6 HOURS
Qty: 0 | Refills: 0 | Status: DISCONTINUED | OUTPATIENT
Start: 2017-03-12 | End: 2017-03-13

## 2017-03-12 RX ORDER — CARVEDILOL PHOSPHATE 80 MG/1
12.5 CAPSULE, EXTENDED RELEASE ORAL EVERY 12 HOURS
Qty: 0 | Refills: 0 | Status: DISCONTINUED | OUTPATIENT
Start: 2017-03-12 | End: 2017-03-13

## 2017-03-12 RX ORDER — POTASSIUM CHLORIDE 20 MEQ
40 PACKET (EA) ORAL ONCE
Qty: 0 | Refills: 0 | Status: COMPLETED | OUTPATIENT
Start: 2017-03-12 | End: 2017-03-12

## 2017-03-12 RX ORDER — LIDOCAINE 4 G/100G
5 CREAM TOPICAL EVERY 8 HOURS
Qty: 0 | Refills: 0 | Status: DISCONTINUED | OUTPATIENT
Start: 2017-03-12 | End: 2017-03-13

## 2017-03-12 RX ORDER — SENNA PLUS 8.6 MG/1
2 TABLET ORAL AT BEDTIME
Qty: 0 | Refills: 0 | Status: DISCONTINUED | OUTPATIENT
Start: 2017-03-12 | End: 2017-03-13

## 2017-03-12 RX ORDER — BENZOCAINE AND MENTHOL 5; 1 G/100ML; G/100ML
1 LIQUID ORAL EVERY 8 HOURS
Qty: 0 | Refills: 0 | Status: DISCONTINUED | OUTPATIENT
Start: 2017-03-12 | End: 2017-03-13

## 2017-03-12 RX ADMIN — Medication 1: at 21:42

## 2017-03-12 RX ADMIN — Medication 20 MILLIGRAM(S): at 06:29

## 2017-03-12 RX ADMIN — CARVEDILOL PHOSPHATE 6.25 MILLIGRAM(S): 80 CAPSULE, EXTENDED RELEASE ORAL at 06:29

## 2017-03-12 RX ADMIN — PANTOPRAZOLE SODIUM 40 MILLIGRAM(S): 20 TABLET, DELAYED RELEASE ORAL at 06:28

## 2017-03-12 RX ADMIN — LOSARTAN POTASSIUM 25 MILLIGRAM(S): 100 TABLET, FILM COATED ORAL at 06:29

## 2017-03-12 RX ADMIN — BENZOCAINE AND MENTHOL 1 LOZENGE: 5; 1 LIQUID ORAL at 17:14

## 2017-03-12 RX ADMIN — Medication 40 MILLIEQUIVALENT(S): at 21:42

## 2017-03-12 RX ADMIN — INSULIN GLARGINE 12 UNIT(S): 100 INJECTION, SOLUTION SUBCUTANEOUS at 21:42

## 2017-03-12 RX ADMIN — BENZOCAINE AND MENTHOL 1 LOZENGE: 5; 1 LIQUID ORAL at 21:42

## 2017-03-12 RX ADMIN — ATORVASTATIN CALCIUM 80 MILLIGRAM(S): 80 TABLET, FILM COATED ORAL at 21:42

## 2017-03-12 RX ADMIN — CARVEDILOL PHOSPHATE 6.25 MILLIGRAM(S): 80 CAPSULE, EXTENDED RELEASE ORAL at 17:14

## 2017-03-12 RX ADMIN — Medication 81 MILLIGRAM(S): at 11:32

## 2017-03-12 RX ADMIN — Medication 1: at 17:42

## 2017-03-12 RX ADMIN — Medication 2: at 11:32

## 2017-03-12 RX ADMIN — MAGNESIUM OXIDE 400 MG ORAL TABLET 400 MILLIGRAM(S): 241.3 TABLET ORAL at 21:42

## 2017-03-12 RX ADMIN — CLOPIDOGREL BISULFATE 75 MILLIGRAM(S): 75 TABLET, FILM COATED ORAL at 11:32

## 2017-03-12 NOTE — PROGRESS NOTE ADULT - SUBJECTIVE AND OBJECTIVE BOX
Pt seen and examined He was extubated yesterday and has had no further hematemesis. Hb this AM is stable at roughly 12 grams.    MEDICATIONS:  MEDICATIONS  (STANDING):  clopidogrel Tablet 75milliGRAM(s) Oral daily  ergocalciferol 02363Juzf(s) Oral every week  dextrose 5%. 1000milliLiter(s) IV Continuous <Continuous>  dextrose 50% Injectable 12.5Gram(s) IV Push once  dextrose 50% Injectable 25Gram(s) IV Push once  dextrose 50% Injectable 25Gram(s) IV Push once  atorvastatin 80milliGRAM(s) Oral at bedtime  insulin glargine Injectable (LANTUS) 12Unit(s) SubCutaneous at bedtime  furosemide   Injectable 20milliGRAM(s) IV Push daily  aspirin enteric coated 81milliGRAM(s) Oral daily  carvedilol 6.25milliGRAM(s) Oral every 12 hours  losartan 25milliGRAM(s) Oral daily  insulin lispro (HumaLOG) corrective regimen sliding scale  SubCutaneous Before meals and at bedtime  pantoprazole    Tablet 40milliGRAM(s) Oral before breakfast    MEDICATIONS  (PRN):  dextrose Gel 1Dose(s) Oral once PRN Blood Glucose LESS THAN 70 milliGRAM(s)/deciliter  glucagon  Injectable 1milliGRAM(s) IntraMuscular once PRN Glucose LESS THAN 70 milligrams/deciliter  nitroglycerin     SubLingual 0.4milliGRAM(s) SubLingual every 5 minutes PRN Chest Pain      Allergies    No Known Allergies    Intolerances        Vital Signs Last 24 Hrs  T(C): 37.2, Max: 37.2 (03-11 @ 16:00)  T(F): 99, Max: 99 (03-11 @ 16:00)  HR: 81 (74 - 101)  BP: 110/56 (98/54 - 150/79)  BP(mean): 78 (73 - 107)  RR: 24 (16 - 28)  SpO2: 97% (94% - 100%)    I & Os for current day (as of 03-12 @ 08:15)  =============================================  IN: 1190 ml / OUT: 1300 ml / NET: -110 ml      PHYSICAL EXAM:    General: Well developed; well nourished; in no acute distress  HEENT: MMM, conjunctiva and sclera clear  Lungs: clear to auscultation and percussion.  Cor: RRR S1, S2 only.  Gastrointestinal: Abdomen: Soft non-tender non-distended; Normal bowel sounds; No hepatosplenomegaly  Extremities: no cyanosis, clubbing or edema.  Skin: Warm and dry. No obvious rash  Neuro: Pt. a + o x 3    LABS:  ABG - ( 10 Mar 2017 17:19 )  pH: 7.37  /  pCO2: 50    /  pO2: 114   / HCO3: 27    / Base Excess: 2.8   /  SaO2: 98                  CBC Full  -  ( 11 Mar 2017 06:25 )  WBC Count : 16.0 K/uL  Hemoglobin : 11.6 g/dL  Hematocrit : 33.5 %  Platelet Count - Automated : 198 K/uL  Mean Cell Volume : 82.5 fl  Mean Cell Hemoglobin : 28.6 pg  Mean Cell Hemoglobin Concentration : 34.6 g/dL  Auto Neutrophil # : x  Auto Lymphocyte # : x  Auto Monocyte # : x  Auto Eosinophil # : x  Auto Basophil # : x  Auto Neutrophil % : x  Auto Lymphocyte % : x  Auto Monocyte % : x  Auto Eosinophil % : x  Auto Basophil % : x    12 Mar 2017 06:36    135    |  94     |  18.0   ----------------------------<  131    3.5     |  29.0   |  0.88     Ca    8.3        12 Mar 2017 06:36  Phos  3.0       12 Mar 2017 06:36  Mg     2.2       12 Mar 2017 06:36    TPro  7.2    /  Alb  3.7    /  TBili  0.9    /  DBili  x      /  AST  24     /  ALT  19     /  AlkPhos  97     10 Mar 2017 13:46    PT/INR - ( 10 Mar 2017 13:46 )   PT: 16.2 sec;   INR: 1.47 ratio         PTT - ( 10 Mar 2017 13:46 )  PTT:76.7 sec                  RADIOLOGY & ADDITIONAL STUDIES (The following images were personally reviewed):

## 2017-03-12 NOTE — PROGRESS NOTE ADULT - PROBLEM SELECTOR PLAN 1
Resolved and was likely secondary to inadvertent esophageal intubation during initial attempt at endotracheal intubation Friday. No need for EGD. Can convert Pantoprazole to PO 40 mg./d. Can also advance diet as tolerated. Signing off. Reconsult as needed. Resolved and was likely secondary to inadvertent esophageal intubation during initial attempt at endotracheal intubation Friday. No need for EGD. Continue Pantoprazole  PO 40 mg./d. Continue current diet as tolerated. Signing off. Reconsult as needed.

## 2017-03-12 NOTE — PROGRESS NOTE ADULT - ATTENDING COMMENTS
discharge plan tomorrow.     Thank you for allowing me to participate in care of your patient.   Please call as needed.

## 2017-03-12 NOTE — PROGRESS NOTE ADULT - SUBJECTIVE AND OBJECTIVE BOX
CARDIOLOGY PROGRESS NOTE   ([facility])                             Reason for follow up:                             Overnight: No new events.   Update: got transfered to floor.  ambulates. Out of Bed to Chair       Subjective: " I am doing good" .  Complains of:  I walked around. . Did all my prayers. Just a little sore throat.   + Cough.   Review of symptoms: Cardiac:  No chest pain. No dyspnea. No palpitations.  Respiratory: +  cough. No dyspnea  Gastrointestinal: No diarrhea. No abdominal pain. No bleeding.     Past medical history: No updates.   Chronic conditions:  Hypertension: controlled. CHF: Stable.  	  Vitals:  Vital Signs Last 24 Hrs  T(C): 36.7, Max: 37.2 (03-12 @ 00:00)  T(F): 98.1, Max: 99 (03-12 @ 04:00)  HR: 88 (74 - 88)  BP: 118/58 (98/54 - 137/70)  BP(mean): 86 (73 - 97)  RR: 20 (17 - 28)  SpO2: 98% (94% - 100%)  Weight (kg): 68.9 (03-10 @ 04:07)    PHYSICAL EXAM:  Appearance: Comfortable. No acute distress  HEENT:  Head and neck: Atraumatic. Normocephalic.  Normal oral mucosa, PERRL, Neck is supple. No JVD, No carotid bruit.   Neurologic: A & O x 3, no focal deficits. EOMI , Cranial nerves are intact.  Lymphatic: No cervical lymphadenopathy  Cardiovascular: Normal S1 S2, No murmur, rubs/gallops. No JVD, No edema  Respiratory: minimal basal crepts. Significantly improved from yesterday.   Gastrointestinal:  Soft, Non-tender, + BS  Lower Extremities: No edema  Psychiatry: Patient is calm. No agitation. Mood & affect appropriate  Skin: No rashes, No ecchymoses, No cyanosis  GROIN: Bilateral. No hematoma.  DP: 2+ Bilateral equal.     CURRENT MEDICATIONS:  MEDICATIONS  (STANDING):  nitroglycerin     SubLingual 0.4milliGRAM(s) SubLingual every 5 minutes PRN  furosemide   Injectable 20milliGRAM(s) IV Push daily  carvedilol 6.25milliGRAM(s) Oral every 12 hours  losartan 25milliGRAM(s) Oral daily    pantoprazole    Tablet  clopidogrel Tablet  ergocalciferol  dextrose 5%.  dextrose 50% Injectable  dextrose 50% Injectable  dextrose 50% Injectable  atorvastatin  insulin glargine Injectable (LANTUS)  aspirin enteric coated  insulin lispro (HumaLOG) corrective regimen sliding scale  benzocaine 15 mG/menthol 3.6 mG Lozenge    PRN: dextrose Gel PRN  glucagon  Injectable PRN  nitroglycerin     SubLingual PRN  acetaminophen   Tablet. PRN  ondansetron Injectable PRN  morphine  - Injectable PRN  senna PRN  docusate sodium PRN  lidocaine 2% Viscous PRN      LABS:	 	             CARDIAC MARKERS ( 10 Mar 2017 05:12 )  x     / 0.03 ng/mL / 71 U/L / x     / x      p-BNP 10 Mar 2017 05:12: x                   11.6   16.0  )-----------( 198      ( 11 Mar 2017 06:25 )             33.5   N=x    ; L=x        12 Mar 2017 06:36    135    |  94     |  18.0   ----------------------------<  131    3.5     |  29.0   |  0.88     Ca    8.3        12 Mar 2017 06:36  Phos  3.0       12 Mar 2017 06:36  Mg     2.2       12 Mar 2017 06:36        Lipid Profile: Date: 03-10 @ 05:12  Total cholesterol 117; Direct LDL: 52; HDL: 36; Triglycerides:146    HgA1c: Hemoglobin A1C, Whole Blood: 9.0 %  TSH:     TELEMETRY: Reviewed  Sinus rhythm. patient transferred to [facility]. Not available to review.   ECG:  Reviewed by me. 	 Sinu tachycardia. .ivcd .ST changes suggestive of inferolateral ischemia.     DIAGNOSTIC TESTING:  [ ] Echocardiogram: EF 35-40%. segmental wall motion abnormality as seen in coronary artery disease. Mild MR.   [ ]  Catheterization:  REport not available Stents in SVG to OM.

## 2017-03-12 NOTE — PROGRESS NOTE ADULT - SUBJECTIVE AND OBJECTIVE BOX
Patient is a 71y old  Male who presents with a chief complaint of Chest pain - tightness (10 Mar 2017 11:51)      BRIEF HOSPITAL COURSE: ***    Events last 24 hours: ***    PAST MEDICAL & SURGICAL HISTORY:  Coronary artery disease involving coronary bypass graft of native heart with unstable angina pectoris  High cholesterol  Diabetes mellitus  Hypertension  S/P primary angioplasty with coronary stent  S/P CABG (coronary artery bypass graft)      Review of Systems:  CONSTITUTIONAL: No fever, chills, or fatigue  EYES: No eye pain, visual disturbances, or discharge  ENMT:  No difficulty hearing, tinnitus, vertigo; No sinus or throat pain  NECK: No pain or stiffness  RESPIRATORY: No cough, wheezing, chills or hemoptysis; No shortness of breath  CARDIOVASCULAR: No chest pain, palpitations, dizziness, or leg swelling  GASTROINTESTINAL: No abdominal or epigastric pain. No nausea, vomiting, or hematemesis; No diarrhea or constipation. No melena or hematochezia.  GENITOURINARY: No dysuria, frequency, hematuria, or incontinence  NEUROLOGICAL: No headaches, memory loss, loss of strength, numbness, or tremors  SKIN: No itching, burning, rashes, or lesions   MUSCULOSKELETAL: No joint pain or swelling; No muscle, back, or extremity pain  PSYCHIATRIC: No depression, anxiety, mood swings, or difficulty sleeping      Medications:    nitroglycerin     SubLingual 0.4milliGRAM(s) SubLingual every 5 minutes PRN  furosemide   Injectable 20milliGRAM(s) IV Push daily  carvedilol 6.25milliGRAM(s) Oral every 12 hours  losartan 25milliGRAM(s) Oral daily          clopidogrel Tablet 75milliGRAM(s) Oral daily  aspirin enteric coated 81milliGRAM(s) Oral daily    pantoprazole    Tablet 40milliGRAM(s) Oral before breakfast      dextrose Gel 1Dose(s) Oral once PRN  dextrose 50% Injectable 12.5Gram(s) IV Push once  dextrose 50% Injectable 25Gram(s) IV Push once  dextrose 50% Injectable 25Gram(s) IV Push once  glucagon  Injectable 1milliGRAM(s) IntraMuscular once PRN  atorvastatin 80milliGRAM(s) Oral at bedtime  insulin glargine Injectable (LANTUS) 12Unit(s) SubCutaneous at bedtime  insulin lispro (HumaLOG) corrective regimen sliding scale  SubCutaneous Before meals and at bedtime    ergocalciferol 11334Xokx(s) Oral every week  dextrose 5%. 1000milliLiter(s) IV Continuous <Continuous>                ICU Vital Signs Last 24 Hrs  T(C): 37, Max: 37.2 (03-11 @ 16:00)  T(F): 98.6, Max: 99 (03-11 @ 16:00)  HR: 82 (74 - 93)  BP: 122/62 (98/54 - 142/66)  BP(mean): 86 (73 - 97)  ABP: --  ABP(mean): --  RR: 26 (17 - 28)  SpO2: 98% (94% - 100%)      ABG - ( 10 Mar 2017 17:19 )  pH: 7.37  /  pCO2: 50    /  pO2: 114   / HCO3: 27    / Base Excess: 2.8   /  SaO2: 98                  I&O's Detail    I & Os for current day (as of 12 Mar 2017 12:27)  =============================================  IN:    Oral Fluid: 820 ml    Solution: 300 ml    Solution: 50 ml    pantoprazole Infusion: 20 ml    Total IN: 1190 ml  ---------------------------------------------  OUT:    Indwelling Catheter - Urethral: 1180 ml    Voided: 120 ml    Total OUT: 1300 ml  ---------------------------------------------  Total NET: -110 ml        LABS:                        11.6   16.0  )-----------( 198      ( 11 Mar 2017 06:25 )             33.5     12 Mar 2017 06:36    135    |  94     |  18.0   ----------------------------<  131    3.5     |  29.0   |  0.88     Ca    8.3        12 Mar 2017 06:36  Phos  3.0       12 Mar 2017 06:36  Mg     2.2       12 Mar 2017 06:36    TPro  7.2    /  Alb  3.7    /  TBili  0.9    /  DBili  x      /  AST  24     /  ALT  19     /  AlkPhos  97     10 Mar 2017 13:46          CAPILLARY BLOOD GLUCOSE  133 (12 Mar 2017 07:00)    PT/INR - ( 10 Mar 2017 13:46 )   PT: 16.2 sec;   INR: 1.47 ratio         PTT - ( 10 Mar 2017 13:46 )  PTT:76.7 sec    CULTURES:      Physical Examination:    General: No acute distress.  Alert, oriented, interactive, nonfocal    HEENT: Pupils equal, reactive to light.  Symmetric.    PULM: Clear to auscultation bilaterally, no significant sputum production    CVS: Regular rate and rhythm, no murmurs, rubs, or gallops    ABD: Soft, nondistended, nontender, normoactive bowel sounds, no masses    EXT: No edema, nontender    SKIN: Warm and well perfused, no rashes noted.    RADIOLOGY: ***    CRITICAL CARE TIME SPENT: *** Patient is a 71y old  Male who presents with a chief complaint of Chest pain - tightness (10 Mar 2017 11:51)      BRIEF HOSPITAL COURSE: 71 yom with CABG, DM, HLD, HTN presenting with chest pain ACS went to cath lab stent to SVG to OM, went in to pulmonary edema and intubated given 40 lasix, IABP placed, had one episode of vomiting blood, resolved, periods of hypotension likely secondary to sedation    Events last 24 hours: extubated, IABP out x 24 hrs, doing well this am    PAST MEDICAL & SURGICAL HISTORY:  Coronary artery disease involving coronary bypass graft of native heart with unstable angina pectoris  High cholesterol  Diabetes mellitus  Hypertension  S/P primary angioplasty with coronary stent  S/P CABG (coronary artery bypass graft)      Review of Systems:  CONSTITUTIONAL: No fever, chills, or fatigue  EYES: No eye pain, visual disturbances, or discharge  ENMT:  No difficulty hearing, tinnitus, vertigo; No sinus or throat pain  NECK: No pain or stiffness  RESPIRATORY: No cough, wheezing, chills or hemoptysis; No shortness of breath  CARDIOVASCULAR: No chest pain, palpitations, dizziness, or leg swelling  GASTROINTESTINAL: No abdominal or epigastric pain. No nausea, vomiting, or hematemesis; No diarrhea or constipation. No melena or hematochezia.  GENITOURINARY: No dysuria, frequency, hematuria, or incontinence  NEUROLOGICAL: No headaches, memory loss, loss of strength, numbness, or tremors  SKIN: No itching, burning, rashes, or lesions   MUSCULOSKELETAL: No joint pain or swelling; No muscle, back, or extremity pain  PSYCHIATRIC: No depression, anxiety, mood swings, or difficulty sleeping      Medications:    nitroglycerin     SubLingual 0.4milliGRAM(s) SubLingual every 5 minutes PRN  furosemide   Injectable 20milliGRAM(s) IV Push daily  carvedilol 6.25milliGRAM(s) Oral every 12 hours  losartan 25milliGRAM(s) Oral daily          clopidogrel Tablet 75milliGRAM(s) Oral daily  aspirin enteric coated 81milliGRAM(s) Oral daily    pantoprazole    Tablet 40milliGRAM(s) Oral before breakfast      dextrose Gel 1Dose(s) Oral once PRN  dextrose 50% Injectable 12.5Gram(s) IV Push once  dextrose 50% Injectable 25Gram(s) IV Push once  dextrose 50% Injectable 25Gram(s) IV Push once  glucagon  Injectable 1milliGRAM(s) IntraMuscular once PRN  atorvastatin 80milliGRAM(s) Oral at bedtime  insulin glargine Injectable (LANTUS) 12Unit(s) SubCutaneous at bedtime  insulin lispro (HumaLOG) corrective regimen sliding scale  SubCutaneous Before meals and at bedtime    ergocalciferol 13114Orqz(s) Oral every week  dextrose 5%. 1000milliLiter(s) IV Continuous <Continuous>                ICU Vital Signs Last 24 Hrs  T(C): 37, Max: 37.2 (03-11 @ 16:00)  T(F): 98.6, Max: 99 (03-11 @ 16:00)  HR: 82 (74 - 93)  BP: 122/62 (98/54 - 142/66)  BP(mean): 86 (73 - 97)  ABP: --  ABP(mean): --  RR: 26 (17 - 28)  SpO2: 98% (94% - 100%)      ABG - ( 10 Mar 2017 17:19 )  pH: 7.37  /  pCO2: 50    /  pO2: 114   / HCO3: 27    / Base Excess: 2.8   /  SaO2: 98                  I&O's Detail    I & Os for current day (as of 12 Mar 2017 12:27)  =============================================  IN:    Oral Fluid: 820 ml    Solution: 300 ml    Solution: 50 ml    pantoprazole Infusion: 20 ml    Total IN: 1190 ml  ---------------------------------------------  OUT:    Indwelling Catheter - Urethral: 1180 ml    Voided: 120 ml    Total OUT: 1300 ml  ---------------------------------------------  Total NET: -110 ml        LABS:                        11.6   16.0  )-----------( 198      ( 11 Mar 2017 06:25 )             33.5     12 Mar 2017 06:36    135    |  94     |  18.0   ----------------------------<  131    3.5     |  29.0   |  0.88     Ca    8.3        12 Mar 2017 06:36  Phos  3.0       12 Mar 2017 06:36  Mg     2.2       12 Mar 2017 06:36    TPro  7.2    /  Alb  3.7    /  TBili  0.9    /  DBili  x      /  AST  24     /  ALT  19     /  AlkPhos  97     10 Mar 2017 13:46          CAPILLARY BLOOD GLUCOSE  133 (12 Mar 2017 07:00)    PT/INR - ( 10 Mar 2017 13:46 )   PT: 16.2 sec;   INR: 1.47 ratio         PTT - ( 10 Mar 2017 13:46 )  PTT:76.7 sec    CULTURES:      Physical Examination:    General: No acute distress.  Alert, oriented, interactive, nonfocal    HEENT: Pupils equal, reactive to light.  Symmetric.    PULM: Clear to auscultation bilaterally, no significant sputum production    CVS: Regular rate and rhythm, no murmurs, rubs, or gallops    ABD: Soft, nondistended, nontender, normoactive bowel sounds, no masses    EXT: No edema, nontender    SKIN: Warm and well perfused, no rashes noted.

## 2017-03-12 NOTE — DIETITIAN INITIAL EVALUATION ADULT. - OTHER INFO
Pt admitted with ACS, s/p cardiac cath. Diet recall with son shows good compliance. Diet education provided.

## 2017-03-13 VITALS
TEMPERATURE: 98 F | SYSTOLIC BLOOD PRESSURE: 128 MMHG | DIASTOLIC BLOOD PRESSURE: 70 MMHG | HEART RATE: 80 BPM | RESPIRATION RATE: 17 BRPM | OXYGEN SATURATION: 98 %

## 2017-03-13 LAB
ANION GAP SERPL CALC-SCNC: 10 MMOL/L — SIGNIFICANT CHANGE UP (ref 5–17)
BUN SERPL-MCNC: 18 MG/DL — SIGNIFICANT CHANGE UP (ref 8–20)
CALCIUM SERPL-MCNC: 8.6 MG/DL — SIGNIFICANT CHANGE UP (ref 8.6–10.2)
CHLORIDE SERPL-SCNC: 97 MMOL/L — LOW (ref 98–107)
CO2 SERPL-SCNC: 30 MMOL/L — HIGH (ref 22–29)
CREAT SERPL-MCNC: 0.94 MG/DL — SIGNIFICANT CHANGE UP (ref 0.5–1.3)
GLUCOSE SERPL-MCNC: 156 MG/DL — HIGH (ref 70–115)
HCT VFR BLD CALC: 34 % — LOW (ref 42–52)
HGB BLD-MCNC: 11.7 G/DL — LOW (ref 14–18)
MAGNESIUM SERPL-MCNC: 2.1 MG/DL — SIGNIFICANT CHANGE UP (ref 1.8–2.5)
MCHC RBC-ENTMCNC: 28.8 PG — SIGNIFICANT CHANGE UP (ref 27–31)
MCHC RBC-ENTMCNC: 34.4 G/DL — SIGNIFICANT CHANGE UP (ref 32–36)
MCV RBC AUTO: 83.7 FL — SIGNIFICANT CHANGE UP (ref 80–94)
PLATELET # BLD AUTO: 194 K/UL — SIGNIFICANT CHANGE UP (ref 150–400)
POTASSIUM SERPL-MCNC: 4 MMOL/L — SIGNIFICANT CHANGE UP (ref 3.5–5.3)
POTASSIUM SERPL-SCNC: 4 MMOL/L — SIGNIFICANT CHANGE UP (ref 3.5–5.3)
RBC # BLD: 4.06 M/UL — LOW (ref 4.6–6.2)
RBC # FLD: 11.9 % — SIGNIFICANT CHANGE UP (ref 11–15.6)
SODIUM SERPL-SCNC: 137 MMOL/L — SIGNIFICANT CHANGE UP (ref 135–145)
TSH SERPL-MCNC: 1.96 UIU/ML — SIGNIFICANT CHANGE UP (ref 0.27–4.2)
WBC # BLD: 12.9 K/UL — HIGH (ref 4.8–10.8)
WBC # FLD AUTO: 12.9 K/UL — HIGH (ref 4.8–10.8)

## 2017-03-13 PROCEDURE — 86900 BLOOD TYPING SEROLOGIC ABO: CPT

## 2017-03-13 PROCEDURE — 82435 ASSAY OF BLOOD CHLORIDE: CPT

## 2017-03-13 PROCEDURE — 85730 THROMBOPLASTIN TIME PARTIAL: CPT

## 2017-03-13 PROCEDURE — 80053 COMPREHEN METABOLIC PANEL: CPT

## 2017-03-13 PROCEDURE — 71045 X-RAY EXAM CHEST 1 VIEW: CPT

## 2017-03-13 PROCEDURE — 93459 L HRT ART/GRFT ANGIO: CPT | Mod: XU

## 2017-03-13 PROCEDURE — 83036 HEMOGLOBIN GLYCOSYLATED A1C: CPT

## 2017-03-13 PROCEDURE — 84100 ASSAY OF PHOSPHORUS: CPT

## 2017-03-13 PROCEDURE — C1889: CPT

## 2017-03-13 PROCEDURE — C9604: CPT | Mod: LC

## 2017-03-13 PROCEDURE — 36415 COLL VENOUS BLD VENIPUNCTURE: CPT

## 2017-03-13 PROCEDURE — 85014 HEMATOCRIT: CPT

## 2017-03-13 PROCEDURE — 97163 PT EVAL HIGH COMPLEX 45 MIN: CPT

## 2017-03-13 PROCEDURE — 93306 TTE W/DOPPLER COMPLETE: CPT

## 2017-03-13 PROCEDURE — 94002 VENT MGMT INPAT INIT DAY: CPT

## 2017-03-13 PROCEDURE — 83605 ASSAY OF LACTIC ACID: CPT

## 2017-03-13 PROCEDURE — C1894: CPT

## 2017-03-13 PROCEDURE — C1874: CPT

## 2017-03-13 PROCEDURE — 85018 HEMOGLOBIN: CPT

## 2017-03-13 PROCEDURE — 99239 HOSP IP/OBS DSCHRG MGMT >30: CPT

## 2017-03-13 PROCEDURE — 85610 PROTHROMBIN TIME: CPT

## 2017-03-13 PROCEDURE — 99285 EMERGENCY DEPT VISIT HI MDM: CPT | Mod: 25

## 2017-03-13 PROCEDURE — 99231 SBSQ HOSP IP/OBS SF/LOW 25: CPT

## 2017-03-13 PROCEDURE — 84132 ASSAY OF SERUM POTASSIUM: CPT

## 2017-03-13 PROCEDURE — 86850 RBC ANTIBODY SCREEN: CPT

## 2017-03-13 PROCEDURE — 85027 COMPLETE CBC AUTOMATED: CPT

## 2017-03-13 PROCEDURE — 86901 BLOOD TYPING SEROLOGIC RH(D): CPT

## 2017-03-13 PROCEDURE — 82330 ASSAY OF CALCIUM: CPT

## 2017-03-13 PROCEDURE — 83735 ASSAY OF MAGNESIUM: CPT

## 2017-03-13 PROCEDURE — 93005 ELECTROCARDIOGRAM TRACING: CPT

## 2017-03-13 PROCEDURE — C1769: CPT

## 2017-03-13 PROCEDURE — 80061 LIPID PANEL: CPT

## 2017-03-13 PROCEDURE — 94003 VENT MGMT INPAT SUBQ DAY: CPT

## 2017-03-13 PROCEDURE — C1887: CPT

## 2017-03-13 PROCEDURE — 33967 INSERT I-AORT PERCUT DEVICE: CPT

## 2017-03-13 PROCEDURE — 82947 ASSAY GLUCOSE BLOOD QUANT: CPT

## 2017-03-13 PROCEDURE — 84484 ASSAY OF TROPONIN QUANT: CPT

## 2017-03-13 PROCEDURE — 82803 BLOOD GASES ANY COMBINATION: CPT

## 2017-03-13 PROCEDURE — 80048 BASIC METABOLIC PNL TOTAL CA: CPT

## 2017-03-13 PROCEDURE — 84295 ASSAY OF SERUM SODIUM: CPT

## 2017-03-13 PROCEDURE — 82550 ASSAY OF CK (CPK): CPT

## 2017-03-13 PROCEDURE — 84443 ASSAY THYROID STIM HORMONE: CPT

## 2017-03-13 RX ORDER — CARVEDILOL PHOSPHATE 80 MG/1
1 CAPSULE, EXTENDED RELEASE ORAL
Qty: 0 | Refills: 0 | COMMUNITY
Start: 2017-03-13

## 2017-03-13 RX ORDER — ATORVASTATIN CALCIUM 80 MG/1
1 TABLET, FILM COATED ORAL
Qty: 0 | Refills: 0 | COMMUNITY
Start: 2017-03-13

## 2017-03-13 RX ORDER — LOSARTAN POTASSIUM 100 MG/1
1 TABLET, FILM COATED ORAL
Qty: 0 | Refills: 0 | COMMUNITY
Start: 2017-03-13

## 2017-03-13 RX ORDER — PANTOPRAZOLE SODIUM 20 MG/1
1 TABLET, DELAYED RELEASE ORAL
Qty: 0 | Refills: 0 | COMMUNITY
Start: 2017-03-13

## 2017-03-13 RX ORDER — FAMOTIDINE 10 MG/ML
1 INJECTION INTRAVENOUS
Qty: 0 | Refills: 0 | COMMUNITY

## 2017-03-13 RX ORDER — CARVEDILOL PHOSPHATE 80 MG/1
1 CAPSULE, EXTENDED RELEASE ORAL
Qty: 60 | Refills: 0 | OUTPATIENT
Start: 2017-03-13 | End: 2017-04-12

## 2017-03-13 RX ORDER — PANTOPRAZOLE SODIUM 20 MG/1
1 TABLET, DELAYED RELEASE ORAL
Qty: 0 | Refills: 0 | DISCHARGE
Start: 2017-03-13

## 2017-03-13 RX ORDER — METFORMIN HYDROCHLORIDE 850 MG/1
1 TABLET ORAL
Qty: 0 | Refills: 0 | COMMUNITY

## 2017-03-13 RX ORDER — LOSARTAN POTASSIUM 100 MG/1
1 TABLET, FILM COATED ORAL
Qty: 0 | Refills: 0 | COMMUNITY

## 2017-03-13 RX ORDER — AMLODIPINE BESYLATE 2.5 MG/1
1 TABLET ORAL
Qty: 0 | Refills: 0 | COMMUNITY

## 2017-03-13 RX ORDER — LOSARTAN POTASSIUM 100 MG/1
1 TABLET, FILM COATED ORAL
Qty: 30 | Refills: 0
Start: 2017-03-13 | End: 2017-04-12

## 2017-03-13 RX ORDER — CARVEDILOL PHOSPHATE 80 MG/1
1 CAPSULE, EXTENDED RELEASE ORAL
Qty: 0 | Refills: 0 | COMMUNITY

## 2017-03-13 RX ORDER — FUROSEMIDE 40 MG
1 TABLET ORAL
Qty: 0 | Refills: 0 | COMMUNITY
Start: 2017-03-13

## 2017-03-13 RX ORDER — ATORVASTATIN CALCIUM 80 MG/1
1 TABLET, FILM COATED ORAL
Qty: 0 | Refills: 0 | COMMUNITY

## 2017-03-13 RX ORDER — ATORVASTATIN CALCIUM 80 MG/1
1 TABLET, FILM COATED ORAL
Qty: 0 | Refills: 0 | DISCHARGE
Start: 2017-03-13

## 2017-03-13 RX ORDER — FUROSEMIDE 40 MG
1 TABLET ORAL
Qty: 30 | Refills: 0
Start: 2017-03-13 | End: 2017-04-12

## 2017-03-13 RX ADMIN — LOSARTAN POTASSIUM 25 MILLIGRAM(S): 100 TABLET, FILM COATED ORAL at 05:14

## 2017-03-13 RX ADMIN — BENZOCAINE AND MENTHOL 1 LOZENGE: 5; 1 LIQUID ORAL at 05:14

## 2017-03-13 RX ADMIN — Medication 3: at 12:27

## 2017-03-13 RX ADMIN — Medication 81 MILLIGRAM(S): at 11:29

## 2017-03-13 RX ADMIN — CARVEDILOL PHOSPHATE 12.5 MILLIGRAM(S): 80 CAPSULE, EXTENDED RELEASE ORAL at 05:14

## 2017-03-13 RX ADMIN — MAGNESIUM OXIDE 400 MG ORAL TABLET 400 MILLIGRAM(S): 241.3 TABLET ORAL at 11:29

## 2017-03-13 RX ADMIN — Medication 40 MILLIGRAM(S): at 05:14

## 2017-03-13 RX ADMIN — CLOPIDOGREL BISULFATE 75 MILLIGRAM(S): 75 TABLET, FILM COATED ORAL at 11:29

## 2017-03-13 RX ADMIN — Medication 1: at 08:35

## 2017-03-13 RX ADMIN — PANTOPRAZOLE SODIUM 40 MILLIGRAM(S): 20 TABLET, DELAYED RELEASE ORAL at 05:14

## 2017-03-13 NOTE — PROGRESS NOTE ADULT - PROBLEM SELECTOR PROBLEM 2
ACS (acute coronary syndrome)
ACS (acute coronary syndrome)
Acute systolic congestive heart failure
ACS (acute coronary syndrome)
Hematemesis without nausea
Type 2 diabetes mellitus without complication, without long-term current use of insulin

## 2017-03-13 NOTE — PROGRESS NOTE ADULT - PROBLEM SELECTOR PLAN 5
SSI and lantus, start diet today
SSI and lantus, start diet today
as above.
asa and plavix
coreg and losartan

## 2017-03-13 NOTE — PROGRESS NOTE ADULT - ASSESSMENT
71 yom with CHF EF 35-40, acute pulmonary edema during cath for NSTEMI, IABP, acute respiratory failure, GI bleed
1) Acute respitory failure secondary acute on chronic systolic CHF --> remains asymptomatic off vent support.  Continue Lasix, monitor I&Os.  ARB, BBlocker.     2) ACS, CAD of grafted coronary after s/p stent --> c.w asa and plavix  --> c.w meds.  Possible d/c     3) hematemesis --> resolved--> gi input appreciated.  Continue ASA / Plavix    4) Diabetes - monitor fingersticks.  Insulin coverage for hyperglycemia.    5) hypokalemia --> resplved    6) odynophagia --> cepacol and lidocaine    7) pt consult     Plan d/c home pending cardiology f/u.
1) Acute respitory failure secondary to flash pulmonary edema --> s.p intubation and subsequent extubation  --> doing well    2) CAD s/p stent --> plan as per cardio  --> c.w asa and plavix  --> c.w meds    3) hematemesis --> resolved  --> gi following    4) dm2 --> elevated hemoglobin a1c  --> c.w current plan    5) hypokalemia --> resplved    6) odynophagia --> cepacol and lidocaine    7) pt consult
71 yom with CHF EF 35-40, acute pulmonary edema during cath for NSTEMI, IABP, acute respiratory failure, GI bleed
71 yom with MI, DM, HTN, HLD s/p flash pulmonary edema and IABP doing well this am    - Stable for transfer Dr. Ambrocio accepted
Pt is a 71 yom with CAD and CHF intubated for respiratory failure, fluid overload, GI bleed

## 2017-03-13 NOTE — PROGRESS NOTE ADULT - PROBLEM SELECTOR PROBLEM 5
Type 2 diabetes mellitus without complication, without long-term current use of insulin
Essential hypertension
Type 2 diabetes mellitus without complication, without long-term current use of insulin
ACS (acute coronary syndrome)
Hypertension

## 2017-03-13 NOTE — PHYSICAL THERAPY INITIAL EVALUATION ADULT - ADDITIONAL COMMENTS
Pt lives in a private home with his son, Pt has total 13 children and 52 grandchildren, all supportive. 2 steps to enter without handrails, no steps inside Pt was independent PTA without assist device. Pt does not own DME

## 2017-03-13 NOTE — PROGRESS NOTE ADULT - PROBLEM SELECTOR PLAN 4
no further bleeding protonix BID  Likely due to traumatic intubation.
continue lantus and short acting insulin as per primary team
no further bleeding protonix BID  Likely due to traumatic intubation.
lasix x 1
restart b-blocker and acei in am
statin

## 2017-03-13 NOTE — PROGRESS NOTE ADULT - PROBLEM SELECTOR PLAN 3
Change to IV lasix. Add beta blocker coreg 6.25 Q12. Losartan 25 mg daily.  Increase as needed.
PO lasix tomorrow. D/c IV lasix.    increase coreg 12.5 Q12.   increase Losartan tomorrow 25--> 50  mg daily if BP improves.   outpatient initiation of aldactone .
no further bleeding protonix BID  Likely due to traumatic intubation.
SSI and lantus, start diet today
asa, plavix, hold heparin gtt, statin hold b-blocker and ACEI secondary to hypotension
lantus, SSI

## 2017-03-13 NOTE — PROGRESS NOTE ADULT - SUBJECTIVE AND OBJECTIVE BOX
CARDIOLOGY PROGRESS NOTE   ([facility])                  Subjective:No chest pain or SOB. No dizziness  Review of symptoms: Cardiac:  No chest pain. No dyspnea. No palpitations.  Respiratory: +  cough. No dyspnea  Gastrointestinal: No diarrhea. No abdominal pain. No bleeding.     Past medical history: No updates.   Chronic conditions:  Hypertension: controlled. CHF: Stable.  	  Vitals:  Vital Signs Last 24 Hrs  T(C): 36.8, Max: 37.4 (03-12 @ 21:36)  T(F): 98.2, Max: 99.4 (03-12 @ 21:36)  HR: 80 (77 - 88)  BP: 128/70 (118/58 - 129/62)  BP(mean): --  RR: 17 (16 - 20)  SpO2: 98% (95% - 98%)    PHYSICAL EXAM:  Appearance: Comfortable. No acute distress  HEENT:  Head and neck: Atraumatic. Normocephalic.  Normal oral mucosa, PERRL, Neck is supple. No JVD, No carotid bruit.   Neurologic: A & O x 3, no focal deficits. EOMI , Cranial nerves are intact.  Lymphatic: No cervical lymphadenopathy  Cardiovascular: Normal S1 S2, No murmur, rubs/gallops. No JVD, No edema  Respiratory: No rales  Gastrointestinal:  Soft, Non-tender, + BS  Lower Extremities: No edema  Psychiatry: Patient is calm. No agitation. Mood & affect appropriate  Skin: No rashes, No ecchymoses, No cyanosis  GROIN: Bilateral. No hematoma.  DP: 2+ Bilateral equal.     CURRENT MEDICATIONS:  MEDICATIONS  (STANDING):    MEDICATIONS  (STANDING):  nitroglycerin     SubLingual 0.4milliGRAM(s) SubLingual every 5 minutes PRN  losartan 25milliGRAM(s) Oral daily  carvedilol 12.5milliGRAM(s) Oral every 12 hours  furosemide    Tablet 40milliGRAM(s) Oral daily    pantoprazole    Tablet  clopidogrel Tablet  ergocalciferol  dextrose 5%.  dextrose 50% Injectable  dextrose 50% Injectable  dextrose 50% Injectable  atorvastatin  insulin glargine Injectable (LANTUS)  aspirin enteric coated  insulin lispro (HumaLOG) corrective regimen sliding scale  benzocaine 15 mG/menthol 3.6 mG Lozenge  magnesium oxide    PRN: dextrose Gel PRN  glucagon  Injectable PRN  nitroglycerin     SubLingual PRN  acetaminophen   Tablet. PRN  ondansetron Injectable PRN  morphine  - Injectable PRN  senna PRN  docusate sodium PRN  lidocaine 2% Viscous PRN        LABS:	 	                        11.7   12.9  )-----------( 194      ( 13 Mar 2017 05:04 )             34.0   N=x    ; L=x        13 Mar 2017 05:04    137    |  97     |  18.0   ----------------------------<  156    4.0     |  30.0   |  0.94     Ca    8.6        13 Mar 2017 05:04  Phos  3.0       12 Mar 2017 06:36  Mg     2.1       13 Mar 2017 05:04            Lipid Profile: Date: 03-10 @ 05:12  Total cholesterol 117; Direct LDL: 52; HDL: 36; Triglycerides:146    HgA1c: Hemoglobin A1C, Whole Blood: 9.0 %      TELEMETRY:sinus rhythm, no arrhythmias  ECG:  Reviewed by me. 	 Sinu tachycardia. .ivcd .ST changes suggestive of inferolateral ischemia.     DIAGNOSTIC TESTING:  [ ] Echocardiogram: EF 35-40%. segmental wall motion abnormality as seen in coronary artery disease. Mild MR.   [ ]  Catheterization: Resolute stent 2.75 x 26 to SVG to OM. occluded native vessels, Patent LIMA to LAD with collaterals to the RCA.

## 2017-03-13 NOTE — PHYSICAL THERAPY INITIAL EVALUATION ADULT - PERTINENT HX OF CURRENT PROBLEM, REHAB EVAL
71 y.o.male with PMHx of HTN, HLD, DMII, CAD s/p CABG and PCIs p/w CP - tightness, relieved by NTG to 5/10 from 8/10.  Underwent cath with stent to SVG to OM.  During procedure developed SOB, flash pulmonary edema requiring intubation and IABP.  Pt was extubated and transferred from ICU to Telemetry floor.

## 2017-03-13 NOTE — PROGRESS NOTE ADULT - PROBLEM SELECTOR PLAN 2
decrease asa to 81 mg daily and  ct plavix. added beta-blocker
asa to 81 mg daily and  ct plavix.  increase beta-blocker . tolerated beta-blocker and ARB
continue lasix 40 mg po daily, continue carvedilol 12.5 mg twice daily, continue losartan at current dose  outpatient initiation of aldactone .
asa and plavix, statin, b-blocker and ARB
insulin
no further bleeding protonix BID

## 2017-03-13 NOTE — PROGRESS NOTE ADULT - PROBLEM SELECTOR PROBLEM 3
Acute systolic congestive heart failure
Acute systolic congestive heart failure
Hematemesis without nausea
ACS (acute coronary syndrome)
Type 2 diabetes mellitus without complication, without long-term current use of insulin
Type 2 diabetes mellitus without complication, without long-term current use of insulin

## 2017-03-13 NOTE — PROGRESS NOTE ADULT - PROBLEM SELECTOR PROBLEM 4
Hematemesis without nausea
Hematemesis without nausea
Type 2 diabetes mellitus without complication, without long-term current use of insulin
Acute systolic congestive heart failure
Essential hypertension
High cholesterol

## 2017-03-22 ENCOUNTER — NON-APPOINTMENT (OUTPATIENT)
Age: 72
End: 2017-03-22

## 2017-03-22 ENCOUNTER — APPOINTMENT (OUTPATIENT)
Dept: CARDIOLOGY | Facility: CLINIC | Age: 72
End: 2017-03-22

## 2017-03-22 VITALS — OXYGEN SATURATION: 100 % | SYSTOLIC BLOOD PRESSURE: 139 MMHG | HEART RATE: 83 BPM | DIASTOLIC BLOOD PRESSURE: 82 MMHG

## 2017-03-22 VITALS
HEIGHT: 67 IN | WEIGHT: 149 LBS | OXYGEN SATURATION: 100 % | SYSTOLIC BLOOD PRESSURE: 172 MMHG | HEART RATE: 83 BPM | DIASTOLIC BLOOD PRESSURE: 85 MMHG | BODY MASS INDEX: 23.39 KG/M2

## 2017-04-05 ENCOUNTER — APPOINTMENT (OUTPATIENT)
Dept: CARDIOLOGY | Facility: CLINIC | Age: 72
End: 2017-04-05

## 2017-04-05 VITALS
BODY MASS INDEX: 24.01 KG/M2 | OXYGEN SATURATION: 100 % | DIASTOLIC BLOOD PRESSURE: 79 MMHG | SYSTOLIC BLOOD PRESSURE: 186 MMHG | WEIGHT: 153 LBS | HEART RATE: 79 BPM | HEIGHT: 67 IN

## 2017-04-05 VITALS — SYSTOLIC BLOOD PRESSURE: 160 MMHG | DIASTOLIC BLOOD PRESSURE: 84 MMHG

## 2017-04-05 VITALS — SYSTOLIC BLOOD PRESSURE: 164 MMHG | DIASTOLIC BLOOD PRESSURE: 82 MMHG

## 2017-04-10 ENCOUNTER — TRANSCRIPTION ENCOUNTER (OUTPATIENT)
Age: 72
End: 2017-04-10

## 2017-04-17 NOTE — PROGRESS NOTE ADULT - SUBJECTIVE AND OBJECTIVE BOX
MYRINGOTOMY TUBE INSERTION     DATE OF OPERATION:  4/17/2017    PREOPERATIVE DIAGNOSIS:    1. Eustachian tube dysfunction, left    2. Chronic serous otitis media of left ear        POSTOPERATIVE DIAGNOSIS:  Same.    PROCEDURE PERFORMED:  left and bilateral myringotomy and tube insertion.    SURGEON:  Nathan Chen MD    ANESTHESIA: topical phenol    INDICATIONS:  Ricardo Hugo is a 71 y.o. male with the above diagnosis.  After failure of conservative medical management, myringotomy tube insertion was felt to be indicated.  The risks and benefits of myringotomy tube insertion were explained including but not limited to pain, aural fullness, bleeding, infection, risks of the anesthesia, persistent tympanic membrane perforation, chronic otorrhea, early and late extrusion, and the possibility for the need of reinsertion after extrusion. Alternatives were discussed. An understanding of these risks was demonstrated. Questions were asked appropriately answered.      ESTIMATED BLOOD LOSS:  Negligible.    DRAINS: None.    SPECIMEN:  None.    COMPLICATIONS:  None.    FINDINGS: clear fluid effusion    PROCEDURE DESCRIPTION:  The patient was taken back to the treatment room and placed supine on the procedure table. A time out was performed. After this was done the operating microscope was wheeled into view. Using the speculum and curette, the external auditory canal was cleaned of its cerumen and this exposed the tympanic membrane. A myringotomy was created in a radial fashion. After suctioning, a Austin modified beveled was placed in the myringotomy. There were no complications during the case.    Nathan Chen MD  4/17/2017  2:04 PM     Patient: ADRIANNA SHANKS [id number] 71y Male  Internal Medicine Hospitalist Progress Note - Dr. aGry Argueta    Chief Complaint: Patient is a 71y old  Male who presents with a chief complaint of Chest pain - tightness (10 Mar 2017 11:51)    HPI:  71 y.o.male with PMHx of HTN, HLD, DMII, CAD s/p CABG and PCIs p/w CP - tightness, relieved by NTG to 5/10 from 8/10.  Underwent cath with stent to SVG to OM.  During procedure developed SOB, flash pulmonary edema requiring intubation and IABP.  Pt was extubated and transferred from ICU to Telemetry floor.  Seen with son at bedside.  Denies complaints.  No Chest pain / SOB / Palp.    Ambulating freely.   No additional complaints.     ____________________PHYSICAL EXAM:  Vitals reviewed as indicated below  GENERAL:  NAD Alert and Oriented x 3   HEENT: NCAT  CARDIOVASCULAR:  S1, S2  LUNGS: CTAB  ABDOMEN:  soft, (-) tenderness, (-) distension, (+) bowel sounds, (-) guarding, (-) rebound (-) rigidity  EXTREMITIES:  no cyanosis / clubbing / edema. Bl groin sites without bleeding.   ____________________    BACKGROUND:  HEALTH ISSUES - PROBLEM Dx:  Hypertension: Hypertension  Cardiogenic shock: Cardiogenic shock  Acute systolic congestive heart failure: Acute systolic congestive heart failure  Hematemesis without nausea: Hematemesis without nausea  Essential hypertension: Essential hypertension  High cholesterol: High cholesterol  Type 2 diabetes mellitus without complication, without long-term current use of insulin: Type 2 diabetes mellitus without complication, without long-term current use of insulin  Coronary artery disease involving coronary bypass graft of native heart with unstable angina pectoris: Coronary artery disease involving coronary bypass graft of native heart with unstable angina pectoris  ACS (acute coronary syndrome): ACS (acute coronary syndrome)        MEDICATIONS  (STANDING):  clopidogrel Tablet 75milliGRAM(s) Oral daily  ergocalciferol 96234Uytt(s) Oral every week  dextrose 5%. 1000milliLiter(s) IV Continuous <Continuous>  dextrose 50% Injectable 12.5Gram(s) IV Push once  dextrose 50% Injectable 25Gram(s) IV Push once  dextrose 50% Injectable 25Gram(s) IV Push once  atorvastatin 80milliGRAM(s) Oral at bedtime  insulin glargine Injectable (LANTUS) 12Unit(s) SubCutaneous at bedtime  aspirin enteric coated 81milliGRAM(s) Oral daily  losartan 25milliGRAM(s) Oral daily  insulin lispro (HumaLOG) corrective regimen sliding scale  SubCutaneous Before meals and at bedtime  pantoprazole    Tablet 40milliGRAM(s) Oral before breakfast  benzocaine 15 mG/menthol 3.6 mG Lozenge 1Lozenge Oral every 8 hours  carvedilol 12.5milliGRAM(s) Oral every 12 hours  magnesium oxide 400milliGRAM(s) Oral daily  furosemide    Tablet 40milliGRAM(s) Oral daily    MEDICATIONS  (PRN):  dextrose Gel 1Dose(s) Oral once PRN Blood Glucose LESS THAN 70 milliGRAM(s)/deciliter  glucagon  Injectable 1milliGRAM(s) IntraMuscular once PRN Glucose LESS THAN 70 milligrams/deciliter  nitroglycerin     SubLingual 0.4milliGRAM(s) SubLingual every 5 minutes PRN Chest Pain  acetaminophen   Tablet. 650milliGRAM(s) Oral every 6 hours PRN Mild Pain (1 - 3)  ondansetron Injectable 4milliGRAM(s) IV Push every 6 hours PRN Nausea and/or Vomiting  morphine  - Injectable 2milliGRAM(s) IV Push every 6 hours PRN Moderate Pain (4 - 6)  senna 2Tablet(s) Oral at bedtime PRN Constipation  docusate sodium 100milliGRAM(s) Oral two times a day PRN Constipation  lidocaine 2% Viscous 5milliLiter(s) Swish and Spit every 8 hours PRN Mouth Care    Allergies    No Known Allergies    Intolerances      PAST MEDICAL & SURGICAL HISTORY:  Coronary artery disease involving coronary bypass graft of native heart with unstable angina pectoris  High cholesterol  Diabetes mellitus  Hypertension  S/P primary angioplasty with coronary stent  S/P CABG (coronary artery bypass graft)      VITALS:  Vital Signs Last 24 Hrs  T(C): 36.8, Max: 37.4 (03-12 @ 21:36)  T(F): 98.2, Max: 99.4 (03-12 @ 21:36)  HR: 83 (77 - 88)  BP: 124/56 (115/59 - 129/62)  BP(mean): 86 (84 - 86)  RR: 16 (16 - 26)  SpO2: 98% (95% - 100%) Daily     Daily Weight in k.2 (13 Mar 2017 06:10)  CAPILLARY BLOOD GLUCOSE  172 (13 Mar 2017 08:18)  196 (12 Mar 2017 21:36)  189 (12 Mar 2017 17:00)    I&O's Summary  I & Os for 24h ending 13 Mar 2017 07:00  =============================================  IN: 200 ml / OUT: 0 ml / NET: 200 ml    I & Os for current day (as of 13 Mar 2017 09:49)  =============================================  IN: 0 ml / OUT: 250 ml / NET: -250 ml      LABS:                        11.7   12.9  )-----------( 194      ( 13 Mar 2017 05:04 )             34.0     13 Mar 2017 05:04    137    |  97     |  18.0   ----------------------------<  156    4.0     |  30.0   |  0.94     Ca    8.6        13 Mar 2017 05:04  Phos  3.0       12 Mar 2017 06:36  Mg     2.1       13 Mar 2017 05:04        RECENT CULTURES:

## 2017-04-24 ENCOUNTER — APPOINTMENT (OUTPATIENT)
Dept: CARDIOLOGY | Facility: CLINIC | Age: 72
End: 2017-04-24

## 2017-07-05 ENCOUNTER — NON-APPOINTMENT (OUTPATIENT)
Age: 72
End: 2017-07-05

## 2017-07-05 ENCOUNTER — APPOINTMENT (OUTPATIENT)
Dept: CARDIOLOGY | Facility: CLINIC | Age: 72
End: 2017-07-05

## 2017-07-05 VITALS — HEART RATE: 85 BPM | WEIGHT: 153 LBS | BODY MASS INDEX: 24.01 KG/M2 | OXYGEN SATURATION: 97 % | HEIGHT: 67 IN

## 2017-07-12 ENCOUNTER — APPOINTMENT (OUTPATIENT)
Dept: CARDIOLOGY | Facility: CLINIC | Age: 72
End: 2017-07-12

## 2017-07-12 VITALS
DIASTOLIC BLOOD PRESSURE: 72 MMHG | HEART RATE: 74 BPM | HEIGHT: 67 IN | SYSTOLIC BLOOD PRESSURE: 162 MMHG | OXYGEN SATURATION: 96 %

## 2017-07-12 VITALS — DIASTOLIC BLOOD PRESSURE: 75 MMHG | SYSTOLIC BLOOD PRESSURE: 156 MMHG

## 2017-11-01 ENCOUNTER — APPOINTMENT (OUTPATIENT)
Dept: CARDIOLOGY | Facility: CLINIC | Age: 72
End: 2017-11-01
Payer: MEDICARE

## 2017-11-01 ENCOUNTER — NON-APPOINTMENT (OUTPATIENT)
Age: 72
End: 2017-11-01

## 2017-11-01 VITALS
HEART RATE: 92 BPM | DIASTOLIC BLOOD PRESSURE: 81 MMHG | SYSTOLIC BLOOD PRESSURE: 177 MMHG | OXYGEN SATURATION: 97 % | HEIGHT: 67 IN

## 2017-11-01 PROCEDURE — 99215 OFFICE O/P EST HI 40 MIN: CPT

## 2017-11-01 PROCEDURE — 93000 ELECTROCARDIOGRAM COMPLETE: CPT

## 2017-11-02 ENCOUNTER — EMERGENCY (EMERGENCY)
Facility: HOSPITAL | Age: 72
LOS: 1 days | Discharge: HOME | End: 2017-11-02
Attending: EMERGENCY MEDICINE | Admitting: EMERGENCY MEDICINE
Payer: MEDICARE

## 2017-11-02 VITALS
DIASTOLIC BLOOD PRESSURE: 81 MMHG | TEMPERATURE: 98 F | OXYGEN SATURATION: 100 % | HEIGHT: 67 IN | HEART RATE: 74 BPM | RESPIRATION RATE: 18 BRPM | SYSTOLIC BLOOD PRESSURE: 155 MMHG | WEIGHT: 154.98 LBS

## 2017-11-02 DIAGNOSIS — I25.10 ATHEROSCLEROTIC HEART DISEASE OF NATIVE CORONARY ARTERY WITHOUT ANGINA PECTORIS: Chronic | ICD-10-CM

## 2017-11-02 DIAGNOSIS — Z95.1 PRESENCE OF AORTOCORONARY BYPASS GRAFT: Chronic | ICD-10-CM

## 2017-11-02 DIAGNOSIS — Z95.5 PRESENCE OF CORONARY ANGIOPLASTY IMPLANT AND GRAFT: Chronic | ICD-10-CM

## 2017-11-02 PROBLEM — E78.00 PURE HYPERCHOLESTEROLEMIA, UNSPECIFIED: Chronic | Status: ACTIVE | Noted: 2017-03-10

## 2017-11-02 PROCEDURE — 12002 RPR S/N/AX/GEN/TRNK2.6-7.5CM: CPT

## 2017-11-02 PROCEDURE — 90715 TDAP VACCINE 7 YRS/> IM: CPT

## 2017-11-02 PROCEDURE — 99283 EMERGENCY DEPT VISIT LOW MDM: CPT | Mod: 25

## 2017-11-02 PROCEDURE — 99284 EMERGENCY DEPT VISIT MOD MDM: CPT | Mod: 25

## 2017-11-02 PROCEDURE — 90471 IMMUNIZATION ADMIN: CPT

## 2017-11-02 RX ORDER — TETANUS TOXOID, REDUCED DIPHTHERIA TOXOID AND ACELLULAR PERTUSSIS VACCINE, ADSORBED 5; 2.5; 8; 8; 2.5 [IU]/.5ML; [IU]/.5ML; UG/.5ML; UG/.5ML; UG/.5ML
0.5 SUSPENSION INTRAMUSCULAR ONCE
Qty: 0 | Refills: 0 | Status: COMPLETED | OUTPATIENT
Start: 2017-11-02 | End: 2017-11-02

## 2017-11-02 RX ADMIN — TETANUS TOXOID, REDUCED DIPHTHERIA TOXOID AND ACELLULAR PERTUSSIS VACCINE, ADSORBED 0.5 MILLILITER(S): 5; 2.5; 8; 8; 2.5 SUSPENSION INTRAMUSCULAR at 16:11

## 2017-11-02 NOTE — ED ADULT TRIAGE NOTE - CHIEF COMPLAINT QUOTE
Patient arrived to ED today with c/o laceration to his right knee and right elbow after a shower door broke while he was leaning on it.

## 2017-11-02 NOTE — ED STATDOCS - SKIN, MLM
multiple superficial abrasion over right shin, right thigh. hematoma super medial right knee, 1 V-shaped laceration superior part of right knee,  laceration 3cm curvilinear medial knee, some oozing. 4cm curvilinear over the right elbow. 2 linear abrasion right forearm, 3 cm laceration lateral to elbow, slight oozing multiple superficial abrasion over right shin, right thigh. mod hematoma anterior medial right knee, 1 V-shaped laceration superior part of right knee,  laceration 3cm curvilinear medial knee, some oozing. 4cm curvilinear superficial appearing abrasion above the right elbow. 2 linear abrasion right forearm, 3 cm laceration lateral to elbow with slight oozing multiple superficial abrasion over right shin, right thigh. mod hematoma anterior medial right knee, 1 V-shaped laceration 3cm superior part of right knee,   curvilinear medial knee, some oozing. 4cm superficial appearing abrasion above the right elbow. 2 linear abrasion right forearm, 1 cm laceration lateral to elbow with slight oozing

## 2017-11-02 NOTE — ED STATDOCS - ATTENDING CONTRIBUTION TO CARE
I, Linda Peck, performed the initial face to face bedside interview with this patient regarding history of present illness, review of symptoms and relevant past medical, social and family history.  I completed an independent physical examination.  I was the initial provider who evaluated this patient. I have signed out the follow up of any pending tests (i.e. labs, radiological studies) to the ACP.  I have communicated the patient’s plan of care and disposition with the ACP.  The history, relevant review of systems, past medical and surgical history, medical decision making, and physical examination was documented by the scribe in my presence and I attest to the accuracy of the documentation.

## 2017-11-02 NOTE — ED ADULT NURSE NOTE - OBJECTIVE STATEMENT
patient was trying to fix shower door when it shattered, and has laceration to the right knee and right elbow.

## 2017-11-02 NOTE — ED STATDOCS - PROGRESS NOTE DETAILS
pt is refusing X-ray and pain medication. Pt seen and evaluated. Agree with HPI/PE and plan. Lac repair, dT, local wound care and pmd f/u

## 2017-11-02 NOTE — ED STATDOCS - OBJECTIVE STATEMENT
73 y/o M pt with hx of diabetes, CAD, cardiac stent, CABG presents to ED c/o laceration to right knee and right elbow s/p shower door breaking on him. Pt was fixing the broken door when it shattered on him. Pt is on Plavix and aspirin. No LOC. no head injury. denies pain. Tetanus not up to date. No other injuries. No further complaints at this time.

## 2017-11-06 ENCOUNTER — EMERGENCY (EMERGENCY)
Facility: HOSPITAL | Age: 72
LOS: 1 days | Discharge: HOME | End: 2017-11-06
Attending: EMERGENCY MEDICINE | Admitting: EMERGENCY MEDICINE
Payer: MEDICARE

## 2017-11-06 VITALS
WEIGHT: 154.98 LBS | SYSTOLIC BLOOD PRESSURE: 144 MMHG | OXYGEN SATURATION: 99 % | HEART RATE: 84 BPM | HEIGHT: 67 IN | DIASTOLIC BLOOD PRESSURE: 69 MMHG | RESPIRATION RATE: 20 BRPM | TEMPERATURE: 98 F

## 2017-11-06 DIAGNOSIS — Z95.1 PRESENCE OF AORTOCORONARY BYPASS GRAFT: Chronic | ICD-10-CM

## 2017-11-06 DIAGNOSIS — Z95.5 PRESENCE OF CORONARY ANGIOPLASTY IMPLANT AND GRAFT: Chronic | ICD-10-CM

## 2017-11-06 DIAGNOSIS — I25.10 ATHEROSCLEROTIC HEART DISEASE OF NATIVE CORONARY ARTERY WITHOUT ANGINA PECTORIS: Chronic | ICD-10-CM

## 2017-11-06 PROCEDURE — 99283 EMERGENCY DEPT VISIT LOW MDM: CPT

## 2017-11-06 NOTE — ED STATDOCS - MEDICAL DECISION MAKING DETAILS
Will place steri strips, bulking dressing. Pt to continue abx as prescribed and f/u in 10-14 days for suture removal .

## 2017-11-06 NOTE — ED ADULT TRIAGE NOTE - CHIEF COMPLAINT QUOTE
Pt c/o bleeding from stitches in right knee.  Pt was treated at [facility] last week after a fall.  Bleeding controlled by pressure dressing at this time.

## 2017-11-06 NOTE — ED STATDOCS - SKIN, MLM
Healing laceration over right knee with sutures in place with + eschar. No active bleeding. No drainage. No other signs of infection.

## 2017-11-06 NOTE — ED STATDOCS - OBJECTIVE STATEMENT
71 yo M presents to ED c/o bleeding from stitches. Pt had stitches placed to right knee on 11/2 s/p mechanical fall. Pt admits to walking and bending knee and reports bleeding from stitches. Pt is currently on Keflex. Denies fevers.

## 2017-11-14 ENCOUNTER — EMERGENCY (EMERGENCY)
Facility: HOSPITAL | Age: 72
LOS: 1 days | Discharge: HOME | End: 2017-11-14
Attending: EMERGENCY MEDICINE | Admitting: EMERGENCY MEDICINE
Payer: MEDICARE

## 2017-11-14 VITALS
SYSTOLIC BLOOD PRESSURE: 146 MMHG | RESPIRATION RATE: 18 BRPM | DIASTOLIC BLOOD PRESSURE: 69 MMHG | OXYGEN SATURATION: 99 % | TEMPERATURE: 98 F | HEART RATE: 78 BPM

## 2017-11-14 VITALS — HEIGHT: 67 IN | WEIGHT: 154.98 LBS

## 2017-11-14 DIAGNOSIS — Z95.1 PRESENCE OF AORTOCORONARY BYPASS GRAFT: Chronic | ICD-10-CM

## 2017-11-14 DIAGNOSIS — I25.10 ATHEROSCLEROTIC HEART DISEASE OF NATIVE CORONARY ARTERY WITHOUT ANGINA PECTORIS: Chronic | ICD-10-CM

## 2017-11-14 DIAGNOSIS — Z95.5 PRESENCE OF CORONARY ANGIOPLASTY IMPLANT AND GRAFT: Chronic | ICD-10-CM

## 2017-11-14 PROCEDURE — G0463: CPT

## 2017-11-14 NOTE — ED PROVIDER NOTE - ATTENDING CONTRIBUTION TO CARE
I, Ranjit Jarrett, performed the initial face to face bedside interview with this patient regarding history of present illness, review of symptoms and relevant past medical, social and family history.  I completed an independent physical examination.  I was the initial provider who evaluated this patient. I have signed out the follow up of any pending tests (i.e. labs, radiological studies) to the ACP.  I have communicated the patient’s plan of care and disposition with the ACP.

## 2017-11-14 NOTE — ED PROVIDER NOTE - PHYSICAL EXAMINATION
SKIN: + 12 SUTURES REMOVED R KNEE. R KNEE MILDLY ERYTHEMATOUS WITHOUT WARMTH OR DRAINING. WOUND HEALING WELL.

## 2017-11-14 NOTE — ED PROVIDER NOTE - MEDICAL DECISION MAKING DETAILS
PT SUTURES REMOVED. WOUND APPEARS TO BE HEALING WELL BUT MILDLY ERYTHEMATOUS. WILL GIVE DOXYCYCLINE FOR INFECTION AS PT IS DIABETIC. PT ADVISED TO FOLLOW UP WITH PMD. All questions answered and concerns addressed. pt verbalized understanding and agreement with plan and dx. pt advised to follow up with PMD. pt advised to return to ed for worsening symptoms including fever, cp, sob. will dc.

## 2017-11-14 NOTE — ED PROVIDER NOTE - OBJECTIVE STATEMENT
pt is a 71yo male with pmhx of DM and HTN c/o suture removal x today. pt reports his shower door broke on 11/6/17 and he received sutures at [facility]. pt took 1 week of keflex which he completed. pt reports he presents today for his sutures to be removed. pt denies fever, cp, sob, numbness or tingling. nkda

## 2017-11-17 NOTE — PATIENT PROFILE ADULT. - PURPOSEFUL PROACTIVE ROUNDING
I will STOP taking the medications listed below when I get home from the hospital:  None Patient Representative

## 2017-11-25 ENCOUNTER — INPATIENT (INPATIENT)
Facility: HOSPITAL | Age: 72
LOS: 3 days | Discharge: HOME | DRG: 481 | End: 2017-11-29
Attending: INTERNAL MEDICINE | Admitting: INTERNAL MEDICINE
Payer: MEDICARE

## 2017-11-25 VITALS
DIASTOLIC BLOOD PRESSURE: 74 MMHG | HEIGHT: 67 IN | WEIGHT: 154.98 LBS | RESPIRATION RATE: 19 BRPM | HEART RATE: 93 BPM | OXYGEN SATURATION: 96 % | SYSTOLIC BLOOD PRESSURE: 145 MMHG | TEMPERATURE: 98 F

## 2017-11-25 DIAGNOSIS — S72.009A FRACTURE OF UNSPECIFIED PART OF NECK OF UNSPECIFIED FEMUR, INITIAL ENCOUNTER FOR CLOSED FRACTURE: ICD-10-CM

## 2017-11-25 DIAGNOSIS — Z95.1 PRESENCE OF AORTOCORONARY BYPASS GRAFT: Chronic | ICD-10-CM

## 2017-11-25 DIAGNOSIS — Z95.5 PRESENCE OF CORONARY ANGIOPLASTY IMPLANT AND GRAFT: Chronic | ICD-10-CM

## 2017-11-25 DIAGNOSIS — I25.10 ATHEROSCLEROTIC HEART DISEASE OF NATIVE CORONARY ARTERY WITHOUT ANGINA PECTORIS: Chronic | ICD-10-CM

## 2017-11-25 LAB
ALBUMIN SERPL ELPH-MCNC: 3.9 G/DL — SIGNIFICANT CHANGE UP (ref 3.3–5.2)
ALP SERPL-CCNC: 95 U/L — SIGNIFICANT CHANGE UP (ref 40–120)
ALT FLD-CCNC: 27 U/L — SIGNIFICANT CHANGE UP
ANION GAP SERPL CALC-SCNC: 12 MMOL/L — SIGNIFICANT CHANGE UP (ref 5–17)
APTT BLD: 27.8 SEC — SIGNIFICANT CHANGE UP (ref 27.5–37.4)
AST SERPL-CCNC: 32 U/L — SIGNIFICANT CHANGE UP
BILIRUB SERPL-MCNC: 0.6 MG/DL — SIGNIFICANT CHANGE UP (ref 0.4–2)
BUN SERPL-MCNC: 13 MG/DL — SIGNIFICANT CHANGE UP (ref 8–20)
CALCIUM SERPL-MCNC: 9.2 MG/DL — SIGNIFICANT CHANGE UP (ref 8.6–10.2)
CHLORIDE SERPL-SCNC: 83 MMOL/L — LOW (ref 98–107)
CO2 SERPL-SCNC: 26 MMOL/L — SIGNIFICANT CHANGE UP (ref 22–29)
CREAT SERPL-MCNC: 0.82 MG/DL — SIGNIFICANT CHANGE UP (ref 0.5–1.3)
EOSINOPHIL NFR BLD AUTO: 7 % — HIGH (ref 0–6)
GLUCOSE SERPL-MCNC: 172 MG/DL — HIGH (ref 70–115)
HCT VFR BLD CALC: 33.2 % — LOW (ref 42–52)
HGB BLD-MCNC: 11.7 G/DL — LOW (ref 14–18)
INR BLD: 1.34 RATIO — HIGH (ref 0.88–1.16)
LYMPHOCYTES # BLD AUTO: 7 % — LOW (ref 20–55)
MANUAL DIF COMMENT BLD-IMP: SIGNIFICANT CHANGE UP
MANUAL DIF COMMENT BLD-IMP: SIGNIFICANT CHANGE UP
MCHC RBC-ENTMCNC: 28.5 PG — SIGNIFICANT CHANGE UP (ref 27–31)
MCHC RBC-ENTMCNC: 35.2 G/DL — SIGNIFICANT CHANGE UP (ref 32–36)
MCV RBC AUTO: 80.8 FL — SIGNIFICANT CHANGE UP (ref 80–94)
MONOCYTES NFR BLD AUTO: 7 % — SIGNIFICANT CHANGE UP (ref 3–10)
NEUTROPHILS NFR BLD AUTO: 79 % — HIGH (ref 37–73)
OSMOLALITY UR: 457 MOSM/KG — SIGNIFICANT CHANGE UP (ref 300–1000)
PLAT MORPH BLD: NORMAL — SIGNIFICANT CHANGE UP
PLATELET # BLD AUTO: 274 K/UL — SIGNIFICANT CHANGE UP (ref 150–400)
POTASSIUM SERPL-MCNC: 3.7 MMOL/L — SIGNIFICANT CHANGE UP (ref 3.5–5.3)
POTASSIUM SERPL-SCNC: 3.7 MMOL/L — SIGNIFICANT CHANGE UP (ref 3.5–5.3)
PROT SERPL-MCNC: 7.6 G/DL — SIGNIFICANT CHANGE UP (ref 6.6–8.7)
PROTHROM AB SERPL-ACNC: 14.8 SEC — HIGH (ref 9.8–12.7)
RBC # BLD: 4.11 M/UL — LOW (ref 4.6–6.2)
RBC # FLD: 11.4 % — SIGNIFICANT CHANGE UP (ref 11–15.6)
RBC BLD AUTO: NORMAL — SIGNIFICANT CHANGE UP
SODIUM SERPL-SCNC: 121 MMOL/L — LOW (ref 135–145)
SODIUM UR-SCNC: 54 MMOL/L — SIGNIFICANT CHANGE UP
WBC # BLD: 12.6 K/UL — HIGH (ref 4.8–10.8)
WBC # FLD AUTO: 12.6 K/UL — HIGH (ref 4.8–10.8)

## 2017-11-25 PROCEDURE — 71010: CPT | Mod: 26

## 2017-11-25 PROCEDURE — 99285 EMERGENCY DEPT VISIT HI MDM: CPT

## 2017-11-25 PROCEDURE — 93010 ELECTROCARDIOGRAM REPORT: CPT

## 2017-11-25 PROCEDURE — 93971 EXTREMITY STUDY: CPT | Mod: 26,RT

## 2017-11-25 PROCEDURE — 73502 X-RAY EXAM HIP UNI 2-3 VIEWS: CPT | Mod: 26,RT

## 2017-11-25 PROCEDURE — 99223 1ST HOSP IP/OBS HIGH 75: CPT

## 2017-11-25 RX ORDER — ENOXAPARIN SODIUM 100 MG/ML
40 INJECTION SUBCUTANEOUS DAILY
Qty: 0 | Refills: 0 | Status: DISCONTINUED | OUTPATIENT
Start: 2017-11-25 | End: 2017-11-26

## 2017-11-25 RX ORDER — ONDANSETRON 8 MG/1
4 TABLET, FILM COATED ORAL EVERY 6 HOURS
Qty: 0 | Refills: 0 | Status: COMPLETED | OUTPATIENT
Start: 2017-11-25 | End: 2017-11-26

## 2017-11-25 RX ORDER — SODIUM CHLORIDE 9 MG/ML
3 INJECTION INTRAMUSCULAR; INTRAVENOUS; SUBCUTANEOUS ONCE
Qty: 0 | Refills: 0 | Status: COMPLETED | OUTPATIENT
Start: 2017-11-25 | End: 2017-11-25

## 2017-11-25 RX ORDER — CLOPIDOGREL BISULFATE 75 MG/1
75 TABLET, FILM COATED ORAL DAILY
Qty: 0 | Refills: 0 | Status: DISCONTINUED | OUTPATIENT
Start: 2017-11-25 | End: 2017-11-25

## 2017-11-25 RX ORDER — DEXTROSE 50 % IN WATER 50 %
12.5 SYRINGE (ML) INTRAVENOUS ONCE
Qty: 0 | Refills: 0 | Status: DISCONTINUED | OUTPATIENT
Start: 2017-11-25 | End: 2017-11-29

## 2017-11-25 RX ORDER — ATORVASTATIN CALCIUM 80 MG/1
80 TABLET, FILM COATED ORAL AT BEDTIME
Qty: 0 | Refills: 0 | Status: DISCONTINUED | OUTPATIENT
Start: 2017-11-25 | End: 2017-11-27

## 2017-11-25 RX ORDER — ONDANSETRON 8 MG/1
4 TABLET, FILM COATED ORAL ONCE
Qty: 0 | Refills: 0 | Status: COMPLETED | OUTPATIENT
Start: 2017-11-25 | End: 2017-11-25

## 2017-11-25 RX ORDER — CARVEDILOL PHOSPHATE 80 MG/1
12.5 CAPSULE, EXTENDED RELEASE ORAL EVERY 12 HOURS
Qty: 0 | Refills: 0 | Status: DISCONTINUED | OUTPATIENT
Start: 2017-11-25 | End: 2017-11-27

## 2017-11-25 RX ORDER — METFORMIN HYDROCHLORIDE 850 MG/1
1 TABLET ORAL
Qty: 0 | Refills: 0 | COMMUNITY

## 2017-11-25 RX ORDER — SODIUM CHLORIDE 9 MG/ML
1000 INJECTION, SOLUTION INTRAVENOUS
Qty: 0 | Refills: 0 | Status: DISCONTINUED | OUTPATIENT
Start: 2017-11-25 | End: 2017-11-29

## 2017-11-25 RX ORDER — PANTOPRAZOLE SODIUM 20 MG/1
40 TABLET, DELAYED RELEASE ORAL
Qty: 0 | Refills: 0 | Status: DISCONTINUED | OUTPATIENT
Start: 2017-11-25 | End: 2017-11-27

## 2017-11-25 RX ORDER — SODIUM CHLORIDE 9 MG/ML
1000 INJECTION INTRAMUSCULAR; INTRAVENOUS; SUBCUTANEOUS
Qty: 0 | Refills: 0 | Status: DISCONTINUED | OUTPATIENT
Start: 2017-11-25 | End: 2017-11-25

## 2017-11-25 RX ORDER — INSULIN LISPRO 100/ML
VIAL (ML) SUBCUTANEOUS
Qty: 0 | Refills: 0 | Status: DISCONTINUED | OUTPATIENT
Start: 2017-11-25 | End: 2017-11-27

## 2017-11-25 RX ORDER — OXYCODONE AND ACETAMINOPHEN 5; 325 MG/1; MG/1
1 TABLET ORAL EVERY 4 HOURS
Qty: 0 | Refills: 0 | Status: DISCONTINUED | OUTPATIENT
Start: 2017-11-25 | End: 2017-11-29

## 2017-11-25 RX ORDER — DEXTROSE 50 % IN WATER 50 %
1 SYRINGE (ML) INTRAVENOUS ONCE
Qty: 0 | Refills: 0 | Status: DISCONTINUED | OUTPATIENT
Start: 2017-11-25 | End: 2017-11-29

## 2017-11-25 RX ORDER — GLUCAGON INJECTION, SOLUTION 0.5 MG/.1ML
1 INJECTION, SOLUTION SUBCUTANEOUS ONCE
Qty: 0 | Refills: 0 | Status: DISCONTINUED | OUTPATIENT
Start: 2017-11-25 | End: 2017-11-29

## 2017-11-25 RX ORDER — DEXTROSE 50 % IN WATER 50 %
25 SYRINGE (ML) INTRAVENOUS ONCE
Qty: 0 | Refills: 0 | Status: DISCONTINUED | OUTPATIENT
Start: 2017-11-25 | End: 2017-11-29

## 2017-11-25 RX ORDER — MORPHINE SULFATE 50 MG/1
2 CAPSULE, EXTENDED RELEASE ORAL
Qty: 0 | Refills: 0 | Status: DISCONTINUED | OUTPATIENT
Start: 2017-11-25 | End: 2017-11-29

## 2017-11-25 RX ORDER — FUROSEMIDE 40 MG
40 TABLET ORAL DAILY
Qty: 0 | Refills: 0 | Status: DISCONTINUED | OUTPATIENT
Start: 2017-11-25 | End: 2017-11-25

## 2017-11-25 RX ORDER — MORPHINE SULFATE 50 MG/1
4 CAPSULE, EXTENDED RELEASE ORAL ONCE
Qty: 0 | Refills: 0 | Status: DISCONTINUED | OUTPATIENT
Start: 2017-11-25 | End: 2017-11-25

## 2017-11-25 RX ADMIN — Medication 2: at 19:07

## 2017-11-25 RX ADMIN — MORPHINE SULFATE 2 MILLIGRAM(S): 50 CAPSULE, EXTENDED RELEASE ORAL at 19:50

## 2017-11-25 RX ADMIN — CARVEDILOL PHOSPHATE 12.5 MILLIGRAM(S): 80 CAPSULE, EXTENDED RELEASE ORAL at 22:50

## 2017-11-25 RX ADMIN — ONDANSETRON 4 MILLIGRAM(S): 8 TABLET, FILM COATED ORAL at 17:46

## 2017-11-25 RX ADMIN — OXYCODONE AND ACETAMINOPHEN 1 TABLET(S): 5; 325 TABLET ORAL at 23:49

## 2017-11-25 RX ADMIN — OXYCODONE AND ACETAMINOPHEN 1 TABLET(S): 5; 325 TABLET ORAL at 22:49

## 2017-11-25 RX ADMIN — MORPHINE SULFATE 2 MILLIGRAM(S): 50 CAPSULE, EXTENDED RELEASE ORAL at 20:20

## 2017-11-25 RX ADMIN — MORPHINE SULFATE 4 MILLIGRAM(S): 50 CAPSULE, EXTENDED RELEASE ORAL at 19:35

## 2017-11-25 RX ADMIN — SODIUM CHLORIDE 75 MILLILITER(S): 9 INJECTION INTRAMUSCULAR; INTRAVENOUS; SUBCUTANEOUS at 19:50

## 2017-11-25 RX ADMIN — SODIUM CHLORIDE 3 MILLILITER(S): 9 INJECTION INTRAMUSCULAR; INTRAVENOUS; SUBCUTANEOUS at 17:47

## 2017-11-25 RX ADMIN — ATORVASTATIN CALCIUM 80 MILLIGRAM(S): 80 TABLET, FILM COATED ORAL at 22:50

## 2017-11-25 RX ADMIN — MORPHINE SULFATE 4 MILLIGRAM(S): 50 CAPSULE, EXTENDED RELEASE ORAL at 17:47

## 2017-11-25 RX ADMIN — SODIUM CHLORIDE 3 MILLILITER(S): 9 INJECTION INTRAMUSCULAR; INTRAVENOUS; SUBCUTANEOUS at 19:05

## 2017-11-25 RX ADMIN — PANTOPRAZOLE SODIUM 40 MILLIGRAM(S): 20 TABLET, DELAYED RELEASE ORAL at 22:50

## 2017-11-25 NOTE — CONSULT NOTE ADULT - SUBJECTIVE AND OBJECTIVE BOX
[place] [facility]/EP                                                        [facility]                                                             Office: [address], [place] [address]                                                            Telephone: [phone]. Fax:[phone]            Patient is a 72y old  Male who presents with a chief complaint of     HPI:  73 y/o M pt with a hx of DM, HLD, HTN, HfrEF, CAD s/p  CABG  last cardiac cath 3/2017 at that [facility] native #VD patient had stenting of SVG-Om1  with CODI stent placement , dyana-procedure the patient developed acute pulmonary edema and was intubated  for airway protection.  He was successfully extubated and discharged for follow-up. He has been maintained on DAPT presents to the ED with c/o hip pain/injury which onset yesterday in [place]. Pt is present with X-ray results. Pt has a fx on his left hip after a fall.   Cardiology called for pre-op CV eval.      Patient denies othopnea, PND,LE edema , syncope or pre-syncope  Functional status > 10 mets  No limitations with AODL  Co-morbid: (-) DM, (_) CVA, (_) COPD (-) PE (-) DVT (-) Bleeding or clotting disorders  ECG: SRwith ns st twa  no phenotypic evidence of Brs, HCM ,LQTS  Functional status : > 4 METS          PAST MEDICAL & SURGICAL HISTORY:  Coronary artery disease involving coronary bypass graft of native heart with unstable angina pectoris  High cholesterol  Diabetes mellitus  Hypertension  S/P primary angioplasty with coronary stent  S/P CABG (coronary artery bypass graft)      PREVIOUS DIAGNOSTIC TESTING:      ECHO  FINDINGS:  < from: TTE Echo Complete w/Doppler (03.10.17 @ 15:28) >   Summary:   1. Technically limited study. Limited information is available.   2. Endocardial visualization was enhanced with intravenous echo contrast.   3. Moderately decreased global left ventricular systolic function.   4. Left ventricular ejection fraction, by visual estimation, is 35 to   40%.   5. Multiple left ventricular regional wall motion abnormalities exist.   See wall motion findings.   6. The left ventricular diastolic function could not be assessed in this   study.   7. The mitral valve leaflets are tethered which is due to reduced   systolic function and elevated LVEDP.   8. Mild mitral valve regurgitation.   9. There is no evidence of pericardial effusion.     MD Vadim Electronically signed on 3/10/2017 at 6:53:08 PM    < end of copied text >      STRESS  FINDINGS:    CATHETERIZATION  FINDINGS:< from: Cardiac Cath Lab - Adult (03.10.17 @ 10:01) >  VENTRICLES: No LV gram was performed; however, a recent echocardiogram  demonstrated an EF of 35 %.  CORONARY VESSELS: The coronary circulation is right dominant.  LM:   --  LM: Angiography showed severe atherosclerosis.  LAD:   --  LAD: There was a stenosis. This lesion is a chronic total  occlusion at its ostium Distal vessel angiography showed a large sized  vessel and minor luminal irregularities.  CX:   --  Circumflex: The vessel was small to medium sized. Angiography  showed severe atherosclerosis.  --  OM1: The vessel was medium sized supplies by SVG  RCA:   --  RCA: The vessel was small sized. There was a stenosis. This  lesion is a chronic total occlusion. There was good collateral blood  supply to the distal myocardium thorough the LAD.  GRAFTS:   --  Graft to the LAD: The graft was a LIMA. LIMA was patent  --  Graft to the 1st obtuse marginal: The graft was a saphenous vein graft  from the aorta. There was a tubular 99 % stenosis at the distal  anastomosis, at the site of a prior stent, within the stented segment. The  lesion was smoothly contoured, hazy, and concentric. There was no evidence  of associated thrombus and CAROLINA grade 2 flow through the graft (partial  perfusion). This lesion is a likely culprit for the patient's anginal  symptoms. An intervention was performed.  COMPLICATIONS: There were no complications.  DIAGNOSTIC IMPRESSIONS: 100% occluded native arteries  LIMA to LAD patent  SVG to OM1 with severe ISR in distal segment of the graft and distal  anastomosis  DIAGNOSTIC RECOMMENDATIONS: Proceed to PCI of SVG to OM1  INTERVENTIONAL RECOMMENDATIONS: s/p CODI 2.75 x 26 mm stent Patient  developed acute pulmonary edema during the procedure despite recieving IV  Lasix 40 mg IV and being started on IV NTG .  Endotracheal intubation was peformed and mechanical ventilation used  IABP was inserted for afterload reduction    < end of copied text >        Allergies    No Known Allergies    Intolerances        MEDICATIONS  (STANDING):  atorvastatin 80 milliGRAM(s) Oral at bedtime  carvedilol 12.5 milliGRAM(s) Oral every 12 hours  clopidogrel Tablet 75 milliGRAM(s) Oral daily  dextrose 5%. 1000 milliLiter(s) (50 mL/Hr) IV Continuous <Continuous>  dextrose 50% Injectable 12.5 Gram(s) IV Push once  dextrose 50% Injectable 25 Gram(s) IV Push once  dextrose 50% Injectable 25 Gram(s) IV Push once  furosemide    Tablet 40 milliGRAM(s) Oral daily  insulin lispro (HumaLOG) corrective regimen sliding scale   SubCutaneous three times a day before meals  pantoprazole    Tablet 40 milliGRAM(s) Oral before breakfast    MEDICATIONS  (PRN):  dextrose Gel 1 Dose(s) Oral once PRN Blood Glucose LESS THAN 70 milliGRAM(s)/deciliter  glucagon  Injectable 1 milliGRAM(s) IntraMuscular once PRN Glucose LESS THAN 70 milligrams/deciliter      FAMILY HISTORY:  No pertinent family history in first degree relatives      SOCIAL HISTORY:Lives with family    CIGARETTES:None    ALCOHOL:None    REVIEW OF SYSTEMS:  CONSTITUTIONAL: No fever, weight loss, or fatigue  EYES: No eye pain, visual disturbances, or discharge  ENMT:  No difficulty hearing, tinnitus, vertigo; No sinus or throat pain  NECK: No pain or stiffness  RESPIRATORY: No cough, wheezing, chills or hemoptysis; No Shortness of Breath  CARDIOVASCULAR: No chest pain, palpitations, passing out, dizziness, or leg swelling  GASTROINTESTINAL: No abdominal or epigastric pain. No nausea, vomiting, or hematemesis; No diarrhea or constipation. No melena or hematochezia.  GENITOURINARY: No dysuria, frequency, hematuria, or incontinence  NEUROLOGICAL: No headaches, memory loss, loss of strength, numbness, or tremors  SKIN: No itching, burning, rashes, or lesions   LYMPH Nodes: No enlarged glands  ENDOCRINE: No heat or cold intolerance; No hair loss  MUSCULOSKELETAL: No joint pain or swelling; No muscle, back, or extremity pain  PSYCHIATRIC: No depression, anxiety, mood swings, or difficulty sleeping  HEME/LYMPH: No easy bruising, or bleeding gums  ALLERY AND IMMUNOLOGIC: No hives or eczema	    Vital Signs Last 24 Hrs  T(C): 36.6 (25 Nov 2017 14:58), Max: 36.6 (25 Nov 2017 14:58)  T(F): 97.9 (25 Nov 2017 14:58), Max: 97.9 (25 Nov 2017 14:58)  HR: 93 (25 Nov 2017 14:58) (93 - 93)  BP: 145/74 (25 Nov 2017 14:58) (145/74 - 145/74)  BP(mean): --  RR: 19 (25 Nov 2017 14:58) (19 - 19)  SpO2: 96% (25 Nov 2017 14:58) (96% - 96%)    Daily Height in cm: 170.18 (25 Nov 2017 14:58)    Daily     I&O's Detail      PHYSICAL EXAM:  Appearance: Normal, well nourished	  HEENT:   Normal oral mucosa, PERRL, EOMI, sclera non-icteric	  Lymphatic: No cervical lymphadenopathy  Cardiovascular: Normal S1 S2, No JVD, No cardiac murmurs, No carotid bruits, No peripheral edema  Respiratory: Lungs clear to auscultation	  Psychiatry: A & O x 3, Mood & affect appropriate  Gastrointestinal:  Soft, Non-tender, + BS, no bruits	  Skin: No rashes, No ecchymoses, No cyanosis  Neurologic: Grossly non-focal with full strength in all four extremities  Extremities: Normal range of motion, No clubbing, cyanosis or edema  Vascular: Peripheral pulses palpable 2+ bilaterally      INTERPRETATION OF TELEMETRY:    ECG:    LABS:                        11.7   12.6  )-----------( 274      ( 25 Nov 2017 17:57 )             33.2                 PT/INR - ( 25 Nov 2017 17:57 )   PT: 14.8 sec;   INR: 1.34 ratio         PTT - ( 25 Nov 2017 17:57 )  PTT:27.8 sec    I&O's Summary    BNP  RADIOLOGY & ADDITIONAL STUDIES: [place] [facility]/EP                                                        [facility]                                                             Office: [address], [place] [address]                                                            Telephone: [phone]. Fax:[phone]            Patient is a 72y old  Male who presents with a chief complaint of     HPI:  73 y/o M pt with a hx of DM, HLD, HTN, HfrEF, CAD s/p  CABG  last cardiac cath 3/2017 at that [facility] native #VD patient had stenting of SVG-Om1  with CODI stent placement , dyana-procedure the patient developed acute pulmonary edema and was intubated  for airway protection.  He was successfully extubated and discharged for follow-up. He has been maintained on DAPT presents to the ED with c/o hip pain/injury which onset yesterday in [place] after mechanical fall. Pt is present with X-ray results. Pt has a fx on his left hip after a fall. Orthopedic team is planing surgical hip repair tomorrow  Cardiology called for pre-op CV eval.      Patient denies othopnea, PND,LE edema , syncope or pre-syncope  Functional status > 10 mets  No limitations with AODL  Co-morbid: (+) DM, (_) CVA, (_) COPD (-) PE (-) DVT (-) Bleeding or clotting disorders  ECG: SR with PVCs ns st twa  no phenotypic evidence of Brs, HCM ,LQTS  Functional status : > 4 METS          PAST MEDICAL & SURGICAL HISTORY:  Coronary artery disease involving coronary bypass graft of native heart with unstable angina pectoris  High cholesterol  Diabetes mellitus  Hypertension  S/P primary angioplasty with coronary stent  S/P CABG (coronary artery bypass graft)      PREVIOUS DIAGNOSTIC TESTING:      ECHO  FINDINGS:  < from: TTE Echo Complete w/Doppler (03.10.17 @ 15:28) >   Summary:   1. Technically limited study. Limited information is available.   2. Endocardial visualization was enhanced with intravenous echo contrast.   3. Moderately decreased global left ventricular systolic function.   4. Left ventricular ejection fraction, by visual estimation, is 35 to   40%.   5. Multiple left ventricular regional wall motion abnormalities exist.   See wall motion findings.   6. The left ventricular diastolic function could not be assessed in this   study.   7. The mitral valve leaflets are tethered which is due to reduced   systolic function and elevated LVEDP.   8. Mild mitral valve regurgitation.   9. There is no evidence of pericardial effusion.     MD Vadim Electronically signed on 3/10/2017 at 6:53:08 PM    < end of copied text >      STRESS  FINDINGS:    CATHETERIZATION  FINDINGS:< from: Cardiac Cath Lab - Adult (03.10.17 @ 10:01) >  VENTRICLES: No LV gram was performed; however, a recent echocardiogram  demonstrated an EF of 35 %.  CORONARY VESSELS: The coronary circulation is right dominant.  LM:   --  LM: Angiography showed severe atherosclerosis.  LAD:   --  LAD: There was a stenosis. This lesion is a chronic total  occlusion at its ostium Distal vessel angiography showed a large sized  vessel and minor luminal irregularities.  CX:   --  Circumflex: The vessel was small to medium sized. Angiography  showed severe atherosclerosis.  --  OM1: The vessel was medium sized supplies by SVG  RCA:   --  RCA: The vessel was small sized. There was a stenosis. This  lesion is a chronic total occlusion. There was good collateral blood  supply to the distal myocardium thorough the LAD.  GRAFTS:   --  Graft to the LAD: The graft was a LIMA. LIMA was patent  --  Graft to the 1st obtuse marginal: The graft was a saphenous vein graft  from the aorta. There was a tubular 99 % stenosis at the distal  anastomosis, at the site of a prior stent, within the stented segment. The  lesion was smoothly contoured, hazy, and concentric. There was no evidence  of associated thrombus and CAROLINA grade 2 flow through the graft (partial  perfusion). This lesion is a likely culprit for the patient's anginal  symptoms. An intervention was performed.  COMPLICATIONS: There were no complications.  DIAGNOSTIC IMPRESSIONS: 100% occluded native arteries  LIMA to LAD patent  SVG to OM1 with severe ISR in distal segment of the graft and distal  anastomosis  DIAGNOSTIC RECOMMENDATIONS: Proceed to PCI of SVG to OM1  INTERVENTIONAL RECOMMENDATIONS: s/p CODI 2.75 x 26 mm stent Patient  developed acute pulmonary edema during the procedure despite recieving IV  Lasix 40 mg IV and being started on IV NTG .  Endotracheal intubation was peformed and mechanical ventilation used  IABP was inserted for afterload reduction    < end of copied text >        Allergies    No Known Allergies    Intolerances        MEDICATIONS  (STANDING):  atorvastatin 80 milliGRAM(s) Oral at bedtime  carvedilol 12.5 milliGRAM(s) Oral every 12 hours  clopidogrel Tablet 75 milliGRAM(s) Oral daily  dextrose 5%. 1000 milliLiter(s) (50 mL/Hr) IV Continuous <Continuous>  dextrose 50% Injectable 12.5 Gram(s) IV Push once  dextrose 50% Injectable 25 Gram(s) IV Push once  dextrose 50% Injectable 25 Gram(s) IV Push once  furosemide    Tablet 40 milliGRAM(s) Oral daily  insulin lispro (HumaLOG) corrective regimen sliding scale   SubCutaneous three times a day before meals  pantoprazole    Tablet 40 milliGRAM(s) Oral before breakfast    MEDICATIONS  (PRN):  dextrose Gel 1 Dose(s) Oral once PRN Blood Glucose LESS THAN 70 milliGRAM(s)/deciliter  glucagon  Injectable 1 milliGRAM(s) IntraMuscular once PRN Glucose LESS THAN 70 milligrams/deciliter      FAMILY HISTORY:  No pertinent family history in first degree relatives      SOCIAL HISTORY:Lives with family    CIGARETTES:None    ALCOHOL:None    REVIEW OF SYSTEMS:  CONSTITUTIONAL: No fever, weight loss, or fatigue  EYES: No eye pain, visual disturbances, or discharge  ENMT:  No difficulty hearing, tinnitus, vertigo; No sinus or throat pain  NECK: No pain or stiffness  RESPIRATORY: No cough, wheezing, chills or hemoptysis; No Shortness of Breath  CARDIOVASCULAR: No chest pain, palpitations, passing out, dizziness, or leg swelling  GASTROINTESTINAL: No abdominal or epigastric pain. No nausea, vomiting, or hematemesis; No diarrhea or constipation. No melena or hematochezia.  GENITOURINARY: No dysuria, frequency, hematuria, or incontinence  NEUROLOGICAL: No headaches, memory loss, loss of strength, numbness, or tremors  SKIN: No itching, burning, rashes, or lesions   LYMPH Nodes: No enlarged glands  ENDOCRINE: No heat or cold intolerance; No hair loss  MUSCULOSKELETAL: No joint pain or swelling; No muscle, back, or extremity pain  PSYCHIATRIC: No depression, anxiety, mood swings, or difficulty sleeping  HEME/LYMPH: No easy bruising, or bleeding gums  ALLERY AND IMMUNOLOGIC: No hives or eczema	    Vital Signs Last 24 Hrs  T(C): 36.6 (25 Nov 2017 14:58), Max: 36.6 (25 Nov 2017 14:58)  T(F): 97.9 (25 Nov 2017 14:58), Max: 97.9 (25 Nov 2017 14:58)  HR: 93 (25 Nov 2017 14:58) (93 - 93)  BP: 145/74 (25 Nov 2017 14:58) (145/74 - 145/74)  BP(mean): --  RR: 19 (25 Nov 2017 14:58) (19 - 19)  SpO2: 96% (25 Nov 2017 14:58) (96% - 96%)    Daily Height in cm: 170.18 (25 Nov 2017 14:58)    Daily     I&O's Detail      PHYSICAL EXAM:  Appearance: Normal, well nourished	  HEENT:   Normal oral mucosa, PERRL, EOMI, sclera non-icteric	  Lymphatic: No cervical lymphadenopathy  Cardiovascular: Normal S1 S2, No JVD, No cardiac murmurs, No carotid bruits, No peripheral edema  Respiratory: Lungs clear to auscultation	  Psychiatry: A & O x 3, Mood & affect appropriate  Gastrointestinal:  Soft, Non-tender, + BS, no bruits	  Skin: No rashes, No ecchymoses, No cyanosis  Neurologic: Grossly non-focal with full strength in all four extremities  Extremities: Normal range of motion, No clubbing, cyanosis or edema  Vascular: Peripheral pulses palpable 2+ bilaterally      INTERPRETATION OF TELEMETRY:    ECG:    LABS:                        11.7   12.6  )-----------( 274      ( 25 Nov 2017 17:57 )             33.2                 PT/INR - ( 25 Nov 2017 17:57 )   PT: 14.8 sec;   INR: 1.34 ratio         PTT - ( 25 Nov 2017 17:57 )  PTT:27.8 sec    I&O's Summary    BNP  RADIOLOGY & ADDITIONAL STUDIES:

## 2017-11-25 NOTE — CONSULT NOTE ADULT - SUBJECTIVE AND OBJECTIVE BOX
Asked by the ED department to evaluate a 72 year old male who was brought in by his family after getting of a plane earlier today with a possible hip fracture.  Patient and family present at bedside helped with history.  Approximately 2 days ago while visiting in [place] patient sustained a fall.  At that time patient went to a hospital there and X-rays were taken.  It was discussed that patient sustained a hip fracture a required surgical interventions. However, patient has a complex cardio history and [place] the [place].  Found patient this even sitting up on stretcher with a fournier, he is presently comfortable but states any time he moves it hurts.  Since the fall he hasn't been able to move.  Normal he doesn't use any assistive device to ambulate.  X-rays taken by the ED department showed patient to have a Right Intratrochanteric Hip Fracture.  He will be admitted to the medical team to be optimized for surgery.      PMHx:  Coronary artery disease involving coronary bypass graft of native heart with unstable angina pectoris    Diabetes mellitus    High cholesterol    Hypertension    PSHx:  S/P CABG (coronary artery bypass graft)    S/P primary angioplasty with coronary stent    Allergies;  NKDA    Review of Systems:  Muscular Skeletal: Right Hip Pain following fall  Remaining systems were found to be negative from the history obtained    Physical:  Vital Signs Last 24 Hrs  T(C): 36.6 (25 Nov 2017 14:58), Max: 36.6 (25 Nov 2017 14:58)  T(F): 97.9 (25 Nov 2017 14:58), Max: 97.9 (25 Nov 2017 14:58)  HR: 93 (25 Nov 2017 14:58) (93 - 93)  BP: 145/74 (25 Nov 2017 14:58) (145/74 - 145/74)  BP(mean): --  RR: 19 (25 Nov 2017 14:58) (19 - 19)  SpO2: 96% (25 Nov 2017 14:58) (96% - 96%)    Right Lower Extremity:  + External Rotation, + Shortening  Calf soft, non-tender  2+ Pedal Pulse,   Deferred ROM of hip secondary to known fracture  + Sensation  + ROM of foot, + Dorsal/Plantar Flexion of foot  Bruise noted over patella from prior fall.      X-Ray  Pelvis/Right Hip  + Intratrochanteric Fracture    A/P: Right Hip Intratrochanteric Fracture  - NPO, Bedrest  - Pain medication as needed  - Hold therapeutic Anti-coagulation if ok with Cardiac team  - Cardiac Consult appreciated  - Waiting for medical optimization  - Discussed plan with Dr. Doyle and patient and family

## 2017-11-25 NOTE — H&P ADULT - HISTORY OF PRESENT ILLNESS
Mr Castellon, He is a 73 Y/O male with the past medical H/O CABG, CAD Last stenting march 2017 on ASA, plavix presented to ER with the chief presenting C/O Mechanical fall with X ray pelvis showing Rt Hip Intertrochanteric fracture. Pt reports that he was in [facility] for Visiting and there, along the side walkway, he twisted and fell. He was taken to Local ER there, where X ray showed Hip fracture. He was Offered Surgical intervention, but because of complex Cardiac History, family decided to bring him back to [facility] and have his surgery done here. C/O Severe Pain Along the Right Hip  X ray chest done in ER neg. EKG: NSR with PVC's and LVH. [facility] cardiology consulted for cardiac clearance. Orthopedics planning on surgery pending cardiac clearance  Pt currently denies CP, SOB  His Last ECHO : March 10, 2017: EF 35 -40 %

## 2017-11-25 NOTE — DIETITIAN INITIAL EVALUATION ADULT. - SIGNS/SYMPTOMS
pt returning from [facility] with right hip fx after fall, dx in [facility] yesterday, fournier cath in place to allow pt to take plane home. as evidenced by Hgb A1C 9.0.

## 2017-11-25 NOTE — H&P ADULT - ASSESSMENT
Mr Castellon, He is a 73 Y/O male with the past medical H/O CABG, CAD Last stenting march 2017 on ASA, plavix presented to ER with the chief presenting C/O Mechanical fall with X ray pelvis showing Rt Hip Intertrochanteric fracture. Pt reports that he was in [facility] for Visiting and there, along the side walkway, he twisted and fell. He was taken to Local ER there, where X ray showed Hip fracture. He was Offered Surgical intervention, but because of complex Cardiac History, family decided to bring him back to [facility] and have his surgery done here. C/O Severe Pain Along the Right Hip  X ray chest done in ER neg. EKG: NSR with PVC's and LVH. [facility] cardiology consulted for cardiac clearance. Orthopedics planning on surgery pending cardiac clearance  Pt currently denies CP, SOB  His Last ECHO : March 10, 2017: EF 35 -40 %    Rt Hip Trochenteric fracture, S/P mechanical fall  consulted Orthopedics  Planning for OR in am  consulted [facility] cardiology, notified, for Pre op clearance    H/O CAD with CABG, last stents March 2017:  consulted cardiology regarding ASA, plavix  continue statin, Coreg  no CP currently    Rt Lower Limb swelling :  get USG limb, to evaluate for DVT  pt had a long flight and Immobile with fracture    Chronic Systolic CHF:  Currently not in exacerbation  CONTINUE lasix  His Last ECHO : March 10, 2017: EF 35 -40 %  repeat ECHO and if BP permits, add low dose ACE-I    DVT prophylaxis : SQ lovenox Mr Castellon, He is a 71 Y/O male with the past medical H/O CABG, CAD Last stenting march 2017 on ASA, plavix presented to ER with the chief presenting C/O Mechanical fall with X ray pelvis showing Rt Hip Intertrochanteric fracture. Pt reports that he was in [facility] for Visiting and there, along the side walkway, he twisted and fell. He was taken to Local ER there, where X ray showed Hip fracture. He was Offered Surgical intervention, but because of complex Cardiac History, family decided to bring him back to [facility] and have his surgery done here. C/O Severe Pain Along the Right Hip  X ray chest done in ER neg. EKG: NSR with PVC's and LVH. [facility] cardiology consulted for cardiac clearance. Orthopedics planning on surgery pending cardiac clearance  Pt currently denies CP, SOB  His Last ECHO : March 10, 2017: EF 35 -40 %    Rt Hip Trochenteric fracture, S/P mechanical fall  consulted Orthopedics  Planning for OR in am  consulted [facility] cardiology, notified, for Pre op clearance    H/O CAD with CABG, last stents March 2017:  consulted cardiology regarding ASA, plavix  continue statin, Coreg  no CP currently    Rt Lower Limb swelling :  get USG limb, to evaluate for DVT  pt had a long flight and Immobile with fracture    Chronic Systolic CHF:  Currently not in exacerbation  CONTINUE lasix  His Last ECHO : March 10, 2017: EF 35 -40 %  repeat ECHO and if BP permits, add low dose ACE-I    Type 2 DM with Hyperglycemia:  Hold all home PO DM med's  start Low dose SSI  Check HBA1C    DVT prophylaxis : SQ lovenox

## 2017-11-25 NOTE — PROGRESS NOTE ADULT - SUBJECTIVE AND OBJECTIVE BOX
Labs subsequently came back and reviewed    BMP showed: Mod- Hyponatremia with serum sodium 121    Check UA, Urine Na, Urine Osm and serum osm  start NS @ 75 CC/HR  hold lasix  consulted Nephrology: Notified and requested consult to be seen today  Re check BMP in 4 hrs

## 2017-11-25 NOTE — ED PROVIDER NOTE - OBJECTIVE STATEMENT
PT C/O HIP PAIN. PT STATES HE WAS IN [place] YESTERDAY, ACCIDENTALLY TRIPPED AND FELL , LANDING ON HIS HIP. PT WENT TO A DOCTOR AND HAD AN XRAY WHICH SHOWED AN INTERTROCHANTERIC FRACTURE. THE DOCTOR WRAPPED HIS LEG WITH SIDE SUPPORTS B=INSTEAD OF TREATING HIM THERE BC THE PT TOLD HIM HE WANTED TO [place] TO [place]. PT FLEW HOME AND HIS SONS BROUGHT HIM TO THE HOSPITAL. PT HAS A HX OF CARDIAC DISEASE, CABG, STENTS , CHOLESTEROL.  MED HX Coronary artery disease involving coronary bypass graft of native heart with unstable angina pectoris    Diabetes mellitus    High cholesterol    Hypertension

## 2017-11-25 NOTE — ED ADULT TRIAGE NOTE - CHIEF COMPLAINT QUOTE
pt returning from [facility] with right hip fx after fall, dx in [facility] yesterday, fournier cath in place to allow pt to take plane home. pt returning from [facility] with right hip fx after fall, dx in [facility] yesterday, fournier cath in place to allow pt to take plane home.

## 2017-11-25 NOTE — H&P ADULT - NSHPSOCIALHISTORY_GEN_ALL_CORE
Pt Lives at Home with children  Retired  denies smoking cigarettes  no alcoholism  denies using recreational drugs

## 2017-11-25 NOTE — CONSULT NOTE ADULT - ATTENDING COMMENTS
71 yo M - Right fem IT fx (did not get chance to see pt this AM, He was away for a study in hospital).  Read pt history and reviewed Xrays.  Waiting for clearances before IM nailing surgery.

## 2017-11-25 NOTE — H&P ADULT - NSHPPHYSICALEXAM_GEN_ALL_CORE
General appearance: NAD, Awake, Alert  HEENT: NCAT, Conjunctiva clear, EOMI  Neck: Supple, No JVD  Lungs: Clear to auscultation, Breath sound equal bilaterally  Cardiovascular: S1S2, Regular rhythm  Abdomen: Soft, Nontender, Positive bowel sounds  Extremities: +  edema, Rt Lower Limb  Rt Lower limb short, and externally rotated  CNS: alert and Oriented times 3 General appearance: NAD, Awake, Alert  HEENT: NCAT, Conjunctiva clear, EOMI  Neck: Supple, No JVD  Lungs: Clear to auscultation, Breath sound equal bilaterally  Cardiovascular: S1S2, Regular rhythm  Abdomen: Soft, Nontender, Positive bowel sounds  Extremities: +  edema, Rt Lower Limb  Rt Lower limb short, and externally rotated  CNS: alert and Oriented times 3  Psych : No suicidal ideations. Not suicidal

## 2017-11-25 NOTE — ED STATDOCS - PROGRESS NOTE DETAILS
71 y/o M pt with a hx of DM, HLD, HTN, CABG presents to the ED with c/o hip pain/injury which onset yesterday in [place]. Pt is present with X-ray results. Pt has a fx on his left hip after a fall. He takes Plavix, aspirin, glipizide, metformin, Lipitor, Nexium, Coreg. NKDA. Pt will be sent to the Main for further evaluation.  PCP: Dr. Valenzuela.

## 2017-11-26 ENCOUNTER — TRANSCRIPTION ENCOUNTER (OUTPATIENT)
Age: 72
End: 2017-11-26

## 2017-11-26 LAB
ANION GAP SERPL CALC-SCNC: 11 MMOL/L — SIGNIFICANT CHANGE UP (ref 5–17)
ANION GAP SERPL CALC-SCNC: 8 MMOL/L — SIGNIFICANT CHANGE UP (ref 5–17)
BLD GP AB SCN SERPL QL: SIGNIFICANT CHANGE UP
BUN SERPL-MCNC: 10 MG/DL — SIGNIFICANT CHANGE UP (ref 8–20)
BUN SERPL-MCNC: 11 MG/DL — SIGNIFICANT CHANGE UP (ref 8–20)
CALCIUM SERPL-MCNC: 8.5 MG/DL — LOW (ref 8.6–10.2)
CALCIUM SERPL-MCNC: 8.9 MG/DL — SIGNIFICANT CHANGE UP (ref 8.6–10.2)
CHLORIDE SERPL-SCNC: 89 MMOL/L — LOW (ref 98–107)
CHLORIDE SERPL-SCNC: 90 MMOL/L — LOW (ref 98–107)
CO2 SERPL-SCNC: 28 MMOL/L — SIGNIFICANT CHANGE UP (ref 22–29)
CO2 SERPL-SCNC: 29 MMOL/L — SIGNIFICANT CHANGE UP (ref 22–29)
CREAT SERPL-MCNC: 0.86 MG/DL — SIGNIFICANT CHANGE UP (ref 0.5–1.3)
CREAT SERPL-MCNC: 0.92 MG/DL — SIGNIFICANT CHANGE UP (ref 0.5–1.3)
GLUCOSE BLDC GLUCOMTR-MCNC: 109 MG/DL — HIGH (ref 70–99)
GLUCOSE BLDC GLUCOMTR-MCNC: 109 MG/DL — HIGH (ref 70–99)
GLUCOSE BLDC GLUCOMTR-MCNC: 125 MG/DL — HIGH (ref 70–99)
GLUCOSE BLDC GLUCOMTR-MCNC: 83 MG/DL — SIGNIFICANT CHANGE UP (ref 70–99)
GLUCOSE SERPL-MCNC: 115 MG/DL — SIGNIFICANT CHANGE UP (ref 70–115)
GLUCOSE SERPL-MCNC: 78 MG/DL — SIGNIFICANT CHANGE UP (ref 70–115)
HBA1C BLD-MCNC: 7.2 % — HIGH (ref 4–5.6)
OSMOLALITY SERPL: 271 MOS/KG — LOW (ref 275–300)
OSMOLALITY UR: 328 MOSM/KG — SIGNIFICANT CHANGE UP (ref 300–1000)
POTASSIUM SERPL-MCNC: 3.5 MMOL/L — SIGNIFICANT CHANGE UP (ref 3.5–5.3)
POTASSIUM SERPL-MCNC: 4.2 MMOL/L — SIGNIFICANT CHANGE UP (ref 3.5–5.3)
POTASSIUM SERPL-SCNC: 3.5 MMOL/L — SIGNIFICANT CHANGE UP (ref 3.5–5.3)
POTASSIUM SERPL-SCNC: 4.2 MMOL/L — SIGNIFICANT CHANGE UP (ref 3.5–5.3)
SODIUM SERPL-SCNC: 127 MMOL/L — LOW (ref 135–145)
SODIUM SERPL-SCNC: 128 MMOL/L — LOW (ref 135–145)
SODIUM UR-SCNC: 46 MMOL/L — SIGNIFICANT CHANGE UP
TYPE + AB SCN PNL BLD: SIGNIFICANT CHANGE UP

## 2017-11-26 PROCEDURE — 99233 SBSQ HOSP IP/OBS HIGH 50: CPT

## 2017-11-26 PROCEDURE — 99223 1ST HOSP IP/OBS HIGH 75: CPT | Mod: 57

## 2017-11-26 PROCEDURE — 73502 X-RAY EXAM HIP UNI 2-3 VIEWS: CPT | Mod: 26,RT

## 2017-11-26 PROCEDURE — 27245 TREAT THIGH FRACTURE: CPT | Mod: RT

## 2017-11-26 PROCEDURE — 27245 TREAT THIGH FRACTURE: CPT | Mod: AS,RT

## 2017-11-26 PROCEDURE — 73503 X-RAY EXAM HIP UNI 4/> VIEWS: CPT | Mod: 26,RT

## 2017-11-26 RX ORDER — OXYCODONE HYDROCHLORIDE 5 MG/1
5 TABLET ORAL EVERY 4 HOURS
Qty: 0 | Refills: 0 | Status: DISCONTINUED | OUTPATIENT
Start: 2017-11-27 | End: 2017-11-29

## 2017-11-26 RX ORDER — SODIUM CHLORIDE 9 MG/ML
1000 INJECTION INTRAMUSCULAR; INTRAVENOUS; SUBCUTANEOUS
Qty: 0 | Refills: 0 | Status: DISCONTINUED | OUTPATIENT
Start: 2017-11-26 | End: 2017-11-26

## 2017-11-26 RX ORDER — PANTOPRAZOLE SODIUM 20 MG/1
40 TABLET, DELAYED RELEASE ORAL
Qty: 0 | Refills: 0 | Status: DISCONTINUED | OUTPATIENT
Start: 2017-11-27 | End: 2017-11-27

## 2017-11-26 RX ORDER — CARVEDILOL PHOSPHATE 80 MG/1
12.5 CAPSULE, EXTENDED RELEASE ORAL EVERY 12 HOURS
Qty: 0 | Refills: 0 | Status: DISCONTINUED | OUTPATIENT
Start: 2017-11-27 | End: 2017-11-29

## 2017-11-26 RX ORDER — ONDANSETRON 8 MG/1
4 TABLET, FILM COATED ORAL ONCE
Qty: 0 | Refills: 0 | Status: DISCONTINUED | OUTPATIENT
Start: 2017-11-26 | End: 2017-11-26

## 2017-11-26 RX ORDER — OXYCODONE HYDROCHLORIDE 5 MG/1
10 TABLET ORAL EVERY 4 HOURS
Qty: 0 | Refills: 0 | Status: DISCONTINUED | OUTPATIENT
Start: 2017-11-27 | End: 2017-11-29

## 2017-11-26 RX ORDER — CLOPIDOGREL BISULFATE 75 MG/1
75 TABLET, FILM COATED ORAL DAILY
Qty: 0 | Refills: 0 | Status: DISCONTINUED | OUTPATIENT
Start: 2017-11-27 | End: 2017-11-29

## 2017-11-26 RX ORDER — HYDROMORPHONE HYDROCHLORIDE 2 MG/ML
0.5 INJECTION INTRAMUSCULAR; INTRAVENOUS; SUBCUTANEOUS
Qty: 0 | Refills: 0 | Status: DISCONTINUED | OUTPATIENT
Start: 2017-11-26 | End: 2017-11-26

## 2017-11-26 RX ORDER — ENOXAPARIN SODIUM 100 MG/ML
40 INJECTION SUBCUTANEOUS EVERY 24 HOURS
Qty: 0 | Refills: 0 | Status: DISCONTINUED | OUTPATIENT
Start: 2017-11-27 | End: 2017-11-29

## 2017-11-26 RX ORDER — DEXTROSE 50 % IN WATER 50 %
25 SYRINGE (ML) INTRAVENOUS ONCE
Qty: 0 | Refills: 0 | Status: DISCONTINUED | OUTPATIENT
Start: 2017-11-27 | End: 2017-11-29

## 2017-11-26 RX ORDER — DEXTROSE 50 % IN WATER 50 %
12.5 SYRINGE (ML) INTRAVENOUS ONCE
Qty: 0 | Refills: 0 | Status: DISCONTINUED | OUTPATIENT
Start: 2017-11-27 | End: 2017-11-29

## 2017-11-26 RX ORDER — ATORVASTATIN CALCIUM 80 MG/1
80 TABLET, FILM COATED ORAL AT BEDTIME
Qty: 0 | Refills: 0 | Status: DISCONTINUED | OUTPATIENT
Start: 2017-11-27 | End: 2017-11-29

## 2017-11-26 RX ORDER — FENTANYL CITRATE 50 UG/ML
50 INJECTION INTRAVENOUS
Qty: 0 | Refills: 0 | Status: DISCONTINUED | OUTPATIENT
Start: 2017-11-26 | End: 2017-11-26

## 2017-11-26 RX ORDER — INSULIN LISPRO 100/ML
VIAL (ML) SUBCUTANEOUS
Qty: 0 | Refills: 0 | Status: DISCONTINUED | OUTPATIENT
Start: 2017-11-27 | End: 2017-11-29

## 2017-11-26 RX ORDER — HYDROMORPHONE HYDROCHLORIDE 2 MG/ML
0.5 INJECTION INTRAMUSCULAR; INTRAVENOUS; SUBCUTANEOUS EVERY 4 HOURS
Qty: 0 | Refills: 0 | Status: DISCONTINUED | OUTPATIENT
Start: 2017-11-27 | End: 2017-11-29

## 2017-11-26 RX ORDER — GLUCAGON INJECTION, SOLUTION 0.5 MG/.1ML
1 INJECTION, SOLUTION SUBCUTANEOUS ONCE
Qty: 0 | Refills: 0 | Status: DISCONTINUED | OUTPATIENT
Start: 2017-11-27 | End: 2017-11-29

## 2017-11-26 RX ORDER — DEXTROSE 50 % IN WATER 50 %
1 SYRINGE (ML) INTRAVENOUS ONCE
Qty: 0 | Refills: 0 | Status: DISCONTINUED | OUTPATIENT
Start: 2017-11-27 | End: 2017-11-29

## 2017-11-26 RX ADMIN — PANTOPRAZOLE SODIUM 40 MILLIGRAM(S): 20 TABLET, DELAYED RELEASE ORAL at 22:06

## 2017-11-26 RX ADMIN — ONDANSETRON 4 MILLIGRAM(S): 8 TABLET, FILM COATED ORAL at 23:25

## 2017-11-26 RX ADMIN — CARVEDILOL PHOSPHATE 12.5 MILLIGRAM(S): 80 CAPSULE, EXTENDED RELEASE ORAL at 16:05

## 2017-11-26 RX ADMIN — ATORVASTATIN CALCIUM 80 MILLIGRAM(S): 80 TABLET, FILM COATED ORAL at 22:06

## 2017-11-26 RX ADMIN — CARVEDILOL PHOSPHATE 12.5 MILLIGRAM(S): 80 CAPSULE, EXTENDED RELEASE ORAL at 05:04

## 2017-11-26 NOTE — DISCHARGE NOTE ADULT - CARE PROVIDERS DIRECT ADDRESSES
[email or web address]

## 2017-11-26 NOTE — DISCHARGE NOTE ADULT - PLAN OF CARE
FOLLOW UP ORTHOPEDICS IN 2 weeks  for wound check please continue free water restriction to 1 Litre in 24 hrs. Please follow up with Nephrology, Dr Turpin in 1 week. check BMP in 1 week return to [facility]

## 2017-11-26 NOTE — PROGRESS NOTE ADULT - ASSESSMENT
Mr Castellon, He is a 73 Y/O male with the past medical H/O CABG, CAD Last stenting march 2017 on ASA, plavix presented to ER with the chief presenting C/O Mechanical fall with X ray pelvis showing Rt Hip Intertrochanteric fracture. Pt reports that he was in [facility] for Visiting and there, along the side walkway, he twisted and fell. He was taken to Local ER there, where X ray showed Hip fracture. He was Offered Surgical intervention, but because of complex Cardiac History, family decided to bring him back to [facility] and have his surgery done here. C/O Severe Pain Along the Right Hip  X ray chest done in ER neg. EKG: NSR with PVC's and LVH. [facility] cardiology consulted for cardiac clearance. Orthopedics planning on surgery pending cardiac clearance  Pt currently denies CP, SOB  His Last ECHO : March 10, 2017: EF 35 -40 %    Rt Hip Trochenteric fracture, S/P mechanical fall  consulted Orthopedics  Planning for OR in am  consulted [facility] cardiology, notified, for Pre op clearance    H/O CAD with CABG, last stents March 2017:  consulted cardiology regarding ASA, plavix  continue statin, Coreg  no CP currently    Rt Lower Limb swelling :  get USG limb, to evaluate for DVT  pt had a long flight and Immobile with fracture    Chronic Systolic CHF:  Currently not in exacerbation  CONTINUE lasix  His Last ECHO : March 10, 2017: EF 35 -40 %  repeat ECHO and if BP permits, add low dose ACE-I    Type 2 DM with Hyperglycemia:  Hold all home PO DM med's  start Low dose SSI  Check HBA1C    DVT prophylaxis : SQ lovenox Mr Castellon, He is a 71 Y/O male with the past medical H/O CABG, CAD Last stenting march 2017 on ASA, plavix presented to ER with the chief presenting C/O Mechanical fall with X ray pelvis showing Rt Hip Intertrochanteric fracture. Pt reports that he was in [facility] for Visiting and there, along the side walkway, he twisted and fell. He was taken to Local ER there, where X ray showed Hip fracture. He was Offered Surgical intervention, but because of complex Cardiac History, family decided to bring him back to [facility] and have his surgery done here. C/O Severe Pain Along the Right Hip  X ray chest done in ER neg. EKG: NSR with PVC's and LVH. [facility] for [facility]. 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 spoke with cardiology, recent echo done in their office, EF was 45 %    Type 2 DM with Hyperglycemia:  Hold all home PO DM med's  continue  Low dose SSI  HBA1C : 7.2    DVT prophylaxis : SQ Lovenox re start after today's Hip surgery

## 2017-11-26 NOTE — DISCHARGE NOTE ADULT - CARE PROVIDER_API CALL
Shamika Doyle), Orthopaedic Surgery  [address] [place] [address]  Phone: [phone]  Fax: [phone]    Rajan Turpin), Internal Medicine; Nephrology  [address] [place] [place] [address]  Phone: [phone]  Fax: [phone]    Sydney Valenzuela), Cardiology; Internal Medicine  [address] [id number]  Phone: [phone]  Fax: [phone]

## 2017-11-26 NOTE — DISCHARGE NOTE ADULT - MEDICATION SUMMARY - MEDICATIONS TO TAKE
I will START or STAY ON the medications listed below when I get home from the hospital:    aspirin 81 mg oral tablet  -- 1 tab(s) by mouth once a day (at bedtime)  -- Indication: For CAD    oxyCODONE-acetaminophen 5 mg-325 mg oral tablet  -- 1 tab(s) by mouth every 6 hours, As Needed -Moderate Pain (4 - 6) MDD:20 mg  -- Indication: For PAIN    isosorbide mononitrate 30 mg oral tablet, extended release  -- 1 tab(s) by mouth once a day (in the morning)  -- Indication: For CAD    enoxaparin 40 mg/0.4 mL injectable solution  -- 40 milligram(s) injectable once a day     DISP - 14 units  -- It is very important that you take or use this exactly as directed.  Do not skip doses or discontinue unless directed by your doctor.    -- Indication: For DVT PROPHYLAXIS    metFORMIN 500 mg oral tablet  -- orally once a day  -- Indication: For DM-2    GlipiZIDE XL 10 mg oral tablet, extended release  -- 1 tab(s) by mouth once a day  -- Indication: For DM-2    Janumet 50 mg-1000 mg oral tablet  -- orally once a day  -- Indication: For DM-2    atorvastatin 40 mg oral tablet  -- 1 tab(s) by mouth once a day  -- Indication: For DYSLIPIDEMIA    Plavix 75 mg oral tablet  -- 1 tab(s) by mouth once a day  -- Indication: For CAD    Coreg 25 mg oral tablet  -- orally once a day  -- Indication: For HTN    senna oral tablet  -- 1 tab(s) by mouth 2 times a day   -- Indication: For CoNSTIPATION    NexIUM 40 mg oral delayed release capsule  -- 1 cap(s) by mouth once a day  -- Indication: For ACID REFLUX

## 2017-11-26 NOTE — DISCHARGE NOTE ADULT - OTHER SIGNIFICANT FINDINGS
please continue free water restriction to 1 Litre in 24 hrs. Please follow up with Nephrology, Dr Turpin in 1 week. check BMP in 1 week   Keep a log of home Bp readings and accordingly adjust BP medication doses when you follow up with your PCP in 1 week

## 2017-11-26 NOTE — DISCHARGE NOTE ADULT - PATIENT PORTAL LINK FT
“You can access the Follow[facility] Patient Portal, offered by [facility], by registering with the following website: [email or web address]”

## 2017-11-26 NOTE — DISCHARGE NOTE ADULT - MEDICATION SUMMARY - MEDICATIONS TO STOP TAKING
I will STOP taking the medications listed below when I get home from the hospital:    pantoprazole 40 mg oral delayed release tablet  -- 1 tab(s) by mouth once a day (before a meal)    doxycycline hyclate 100 mg oral tablet  -- 1 tab(s) by mouth 2 times a day   -- Avoid prolonged or excessive exposure to direct and/or artificial sunlight while taking this medication.  Do not take this drug if you are pregnant.  Finish all this medication unless otherwise directed by prescriber.  Medication should be taken with plenty of water.    atorvastatin 40 mg oral tablet  -- 1 tab(s) by mouth once a day    Coreg 25 mg oral tablet  -- orally once a day    metFORMIN 500 mg oral tablet  -- orally once a day    Exforge HCT 10 mg-160 mg-12.5 mg oral tablet  -- 1 tab(s) by mouth once a day

## 2017-11-26 NOTE — DISCHARGE NOTE ADULT - SECONDARY DIAGNOSIS.
Hyponatremia S/P CABG (coronary artery bypass graft) Type 2 diabetes mellitus with hyperglycemia, without long-term current use of insulin Essential hypertension High cholesterol Drug-induced constipation

## 2017-11-26 NOTE — PROGRESS NOTE ADULT - SUBJECTIVE AND OBJECTIVE BOX
Events noted stable overnight no acute complaints      TELE: SR    MEDICATIONS  (STANDING):  atorvastatin 80 milliGRAM(s) Oral at bedtime  carvedilol 12.5 milliGRAM(s) Oral every 12 hours  dextrose 5%. 1000 milliLiter(s) (50 mL/Hr) IV Continuous <Continuous>  dextrose 50% Injectable 12.5 Gram(s) IV Push once  dextrose 50% Injectable 25 Gram(s) IV Push once  dextrose 50% Injectable 25 Gram(s) IV Push once  insulin lispro (HumaLOG) corrective regimen sliding scale   SubCutaneous three times a day before meals  pantoprazole    Tablet 40 milliGRAM(s) Oral before breakfast    MEDICATIONS  (PRN):  dextrose Gel 1 Dose(s) Oral once PRN Blood Glucose LESS THAN 70 milliGRAM(s)/deciliter  glucagon  Injectable 1 milliGRAM(s) IntraMuscular once PRN Glucose LESS THAN 70 milligrams/deciliter  morphine  - Injectable 2 milliGRAM(s) IV Push every 3 hours PRN Moderate Pain (4 - 6)  ondansetron Injectable 4 milliGRAM(s) IV Push every 6 hours PRN Nausea and/or Vomiting  oxyCODONE    5 mG/acetaminophen 325 mG 1 Tablet(s) Oral every 4 hours PRN Moderate Pain (4 - 6)      Allergies    No Known Allergies    Intolerances      PAST MEDICAL & SURGICAL HISTORY:  Coronary artery disease involving coronary bypass graft of native heart with unstable angina pectoris  High cholesterol  Diabetes mellitus  Hypertension  S/P primary angioplasty with coronary stent  S/P CABG (coronary artery bypass graft)      Vital Signs Last 24 Hrs  T(C): 36.7 (26 Nov 2017 04:57), Max: 36.7 (25 Nov 2017 21:41)  T(F): 98 (26 Nov 2017 04:57), Max: 98 (25 Nov 2017 21:41)  HR: 84 (26 Nov 2017 04:57) (84 - 93)  BP: 139/71 (26 Nov 2017 04:57) (126/66 - 145/74)  BP(mean): --  RR: 16 (26 Nov 2017 04:57) (16 - 19)  SpO2: 100% (26 Nov 2017 04:57) (96% - 100%)    Physical Exam:  Constitutional: NAD, AAOx3  Cardiovascular: +S1S2 RRR  Pulmonary: CTA b/l, unlabored  Abd: soft NTND +BS  Groins: C/D/I bilaterally; no bleeding, hematoma, edema  Extremities: no pedal edema, +distal pulses b/l  Neuro: non focal, GRAVES x4    LABS:                        11.7   12.6  )-----------( 274      ( 25 Nov 2017 17:57 )             33.2     11-26    128<L>  |  89<L>  |  10.0  ----------------------------<  78  4.2   |  28.0  |  0.92    Ca    8.9      26 Nov 2017 05:54    TPro  7.6  /  Alb  3.9  /  TBili  0.6  /  DBili  x   /  AST  32  /  ALT  27  /  AlkPhos  95  11-25    PT/INR - ( 25 Nov 2017 17:57 )   PT: 14.8 sec;   INR: 1.34 ratio         PTT - ( 25 Nov 2017 17:57 )  PTT:27.8 sec

## 2017-11-26 NOTE — PROGRESS NOTE ADULT - SUBJECTIVE AND OBJECTIVE BOX
ADRIANNA SHANKS  ----------------------------------------  The patient was seen and evaluated for   The patient is in no acute distress.  Denied any chest pain, palpitations, dyspnea, or abdominal pain.    Vital Signs Last 24 Hrs  T(C): 36.7 (26 Nov 2017 04:57), Max: 36.7 (25 Nov 2017 21:41)  T(F): 98 (26 Nov 2017 04:57), Max: 98 (25 Nov 2017 21:41)  HR: 84 (26 Nov 2017 04:57) (84 - 93)  BP: 139/71 (26 Nov 2017 04:57) (126/66 - 145/74)  BP(mean): --  RR: 16 (26 Nov 2017 04:57) (16 - 19)  SpO2: 100% (26 Nov 2017 04:57) (96% - 100%)    CAPILLARY BLOOD GLUCOSE      POCT Blood Glucose.: 83 mg/dL (26 Nov 2017 06:04)  POCT Blood Glucose.: 202 mg/dL (25 Nov 2017 19:04)    PHYSICAL EXAMINATION:  ----------------------------------------      LABORATORY STUDIES:  ----------------------------------------                        11.7   12.6  )-----------( 274      ( 25 Nov 2017 17:57 )             33.2     11-26    128<L>  |  89<L>  |  10.0  ----------------------------<  78  4.2   |  28.0  |  0.92    Ca    8.9      26 Nov 2017 05:54    TPro  7.6  /  Alb  3.9  /  TBili  0.6  /  DBili  x   /  AST  32  /  ALT  27  /  AlkPhos  95  11-25    LIVER FUNCTIONS - ( 25 Nov 2017 17:57 )  Alb: 3.9 g/dL / Pro: 7.6 g/dL / ALK PHOS: 95 U/L / ALT: 27 U/L / AST: 32 U/L / GGT: x           PT/INR - ( 25 Nov 2017 17:57 )   PT: 14.8 sec;   INR: 1.34 ratio         PTT - ( 25 Nov 2017 17:57 )  PTT:27.8 sec        MEDICATIONS  (STANDING):  atorvastatin 80 milliGRAM(s) Oral at bedtime  carvedilol 12.5 milliGRAM(s) Oral every 12 hours  dextrose 5%. 1000 milliLiter(s) (50 mL/Hr) IV Continuous <Continuous>  dextrose 50% Injectable 12.5 Gram(s) IV Push once  dextrose 50% Injectable 25 Gram(s) IV Push once  dextrose 50% Injectable 25 Gram(s) IV Push once  insulin lispro (HumaLOG) corrective regimen sliding scale   SubCutaneous three times a day before meals  pantoprazole    Tablet 40 milliGRAM(s) Oral before breakfast    MEDICATIONS  (PRN):  dextrose Gel 1 Dose(s) Oral once PRN Blood Glucose LESS THAN 70 milliGRAM(s)/deciliter  glucagon  Injectable 1 milliGRAM(s) IntraMuscular once PRN Glucose LESS THAN 70 milligrams/deciliter  morphine  - Injectable 2 milliGRAM(s) IV Push every 3 hours PRN Moderate Pain (4 - 6)  ondansetron Injectable 4 milliGRAM(s) IV Push every 6 hours PRN Nausea and/or Vomiting  oxyCODONE    5 mG/acetaminophen 325 mG 1 Tablet(s) Oral every 4 hours PRN Moderate Pain (4 - 6)      ASSESSMENT / PLAN:  ---------------------------------------- ADRIANNA SHANKS  ----------------------------------------    CC: FOLLOW UP VISIT FOR RT SIDED HIP FRACTURE, HYPONATREMIA AND PAIN    Pain better today  family at bed side, answered all questions  discussed with cardiology, provided clearance for surgery  sodium levels improving nicely: 128 this am, with fluid restriction    Vital Signs Last 24 Hrs  T(C): 36.7 (26 Nov 2017 04:57), Max: 36.7 (25 Nov 2017 21:41)  T(F): 98 (26 Nov 2017 04:57), Max: 98 (25 Nov 2017 21:41)  HR: 84 (26 Nov 2017 04:57) (84 - 93)  BP: 139/71 (26 Nov 2017 04:57) (126/66 - 145/74)  BP(mean): --  RR: 16 (26 Nov 2017 04:57) (16 - 19)  SpO2: 100% (26 Nov 2017 04:57) (96% - 100%)    CAPILLARY BLOOD GLUCOSE      POCT Blood Glucose.: 83 mg/dL (26 Nov 2017 06:04)  POCT Blood Glucose.: 202 mg/dL (25 Nov 2017 19:04)    PHYSICAL EXAMINATION:  ----------------------------------------    General appearance: NAD, Awake, Alert  HEENT: NCAT, Conjunctiva clear, EOMI  Neck: Supple, No JVD  Lungs: Decreased Breath sounds bilaterally  Cardiovascular: S1S2, Regular rhythm  Abdomen: Soft, Nontender, Positive bowel sounds  Extremities: No clubbing, No cyanosis, No edema  Rt Lower Limb externally rotated and shortened  CNS: alert and oriented times       LABORATORY STUDIES:  ----------------------------------------                        11.7   12.6  )-----------( 274      ( 25 Nov 2017 17:57 )             33.2     11-26    128<L>  |  89<L>  |  10.0  ----------------------------<  78  4.2   |  28.0  |  0.92    Ca    8.9      26 Nov 2017 05:54    TPro  7.6  /  Alb  3.9  /  TBili  0.6  /  DBili  x   /  AST  32  /  ALT  27  /  AlkPhos  95  11-25    LIVER FUNCTIONS - ( 25 Nov 2017 17:57 )  Alb: 3.9 g/dL / Pro: 7.6 g/dL / ALK PHOS: 95 U/L / ALT: 27 U/L / AST: 32 U/L / GGT: x           PT/INR - ( 25 Nov 2017 17:57 )   PT: 14.8 sec;   INR: 1.34 ratio         PTT - ( 25 Nov 2017 17:57 )  PTT:27.8 sec      MEDICATIONS  (STANDING):    atorvastatin 80 milliGRAM(s) Oral at bedtime  carvedilol 12.5 milliGRAM(s) Oral every 12 hours  dextrose 5%. 1000 milliLiter(s) (50 mL/Hr) IV Continuous <Continuous>  dextrose 50% Injectable 12.5 Gram(s) IV Push once  dextrose 50% Injectable 25 Gram(s) IV Push once  dextrose 50% Injectable 25 Gram(s) IV Push once  insulin lispro (HumaLOG) corrective regimen sliding scale   SubCutaneous three times a day before meals  pantoprazole    Tablet 40 milliGRAM(s) Oral before breakfast    MEDICATIONS  (PRN):  dextrose Gel 1 Dose(s) Oral once PRN Blood Glucose LESS THAN 70 milliGRAM(s)/deciliter  glucagon  Injectable 1 milliGRAM(s) IntraMuscular once PRN Glucose LESS THAN 70 milligrams/deciliter  morphine  - Injectable 2 milliGRAM(s) IV Push every 3 hours PRN Moderate Pain (4 - 6)  ondansetron Injectable 4 milliGRAM(s) IV Push every 6 hours PRN Nausea and/or Vomiting  oxyCODONE    5 mG/acetaminophen 325 mG 1 Tablet(s) Oral every 4 hours PRN Moderate Pain (4 - 6)      ASSESSMENT / PLAN:  ----------------------------------------

## 2017-11-26 NOTE — DISCHARGE NOTE ADULT - HOSPITAL COURSE
Mr Castellon, He is a 73 Y/O male with the past medical H/O CABG, CAD Last stenting march 2017 on ASA, plavix presented to ER with the chief presenting C/O Mechanical fall with X ray pelvis showing Rt Hip Intertrochanteric fracture. Pt reports that he was in [facility] for Visiting and there, along the side walkway, he twisted and fell. He was taken to Local ER there, where X ray showed Hip fracture. He was Offered Surgical intervention, but because of complex Cardiac History, family decided to bring him back to [facility] and have his surgery done here. C/O Severe Pain Along the Right Hip  X ray chest done in ER neg. EKG: NSR with PVC's and LVH. [facility] cardiology consulted for cardiac clearance. Orthopedics planning on surgery pending cardiac clearance  Pt currently denies CP, SOB  His Last ECHO : March 10, 2017: EF 35 -40 %    Rt Hip Trochanteric fracture, S/P mechanical fall,  POD#2 s/p right hip IM nail.     PT.OT consulted  D/C home with C in am, consulted SW/CSM    Hyponatremia moderate:  ? from SIADH from pain  sodium levels 122 again today  continue fluid restriction to 1200 cc in 24 hrs. spoke with Nurse   repeat BMP in am  spoke with  nephrology : will see the pt  Repeat urine Osm and Urine Na pending    Constipation: Opioid induced constipation  start Senna, Miralax, dulcolax    Type 2 DM with Hyperglycemia: sugars High today  HBA1C : 7.2  Increase Lantus 6 U Q HS to 12 U and Add Humalog 6 U TID before meals along with sliding scale   Hold all home PO DM med's  continue  Low dose SSI    Leukocytosis : Likely reactive stress response from surgery post Op  No Infection  trend wbc counts    H/O CAD with CABG, last stents March 2017:  Resumed ASA, Plavix continue statin, Coreg  no CP currently    Chronic Systolic CHF:  Currently not in exacerbation  hold  Lasix, because of slight Hypotension  His Last ECHO : March 10, 2017: EF 35 -40 %    DVT prophylaxis : SQ Lovenox. Pt to Go Home on Lovenox Mr Castellon, He is a 71 Y/O male with the past medical H/O CABG, CAD Last stenting march 2017 on ASA, plavix presented to ER with the chief presenting C/O Mechanical fall with X ray pelvis showing Rt Hip Intertrochanteric fracture. Pt reports that he was in [facility] for Visiting and there, along the side walkway, he twisted and fell. He was taken to Local ER there, where X ray showed Hip fracture. He was Offered Surgical intervention, but because of complex Cardiac History, family decided to bring him back to [facility] and have his surgery done here. C/O Severe Pain Along the Right Hip  X ray chest done in ER neg. EKG: NSR with PVC's and LVH. [facility] cardiology consulted for cardiac clearance. His Last ECHO : March 10, 2017: EF 35 -40 %    Rt Hip Trochanteric fracture, S/P mechanical fall,  POD#3 at the time of discharge   s/p right hip IM nail.     PT.OT consulted  D/C home with [facility]HC consulted SW/CSM for arranging home care services    Hyponatremia moderate:  ? from SIADH from pain and HCTZ he was taking at home  sodium levels 119 on the day of admission  Nephrology Dr Turpin's team was consulted  with fluid restriction to 1200 cc in 24 hrs and salt tablets, his sodium levels improved to 130 on the day of D/C  Recommended stop taking HCTZ and continue fluid restriction at Home as well and then F/U with Nephrology in clinic in 1 week with BMP    Constipation: Opioid induced constipation  Recommended fruits and fiber in diet  gave prescriptions for Senna    Type 2 DM with Hyperglycemia: sugars High  because of stress from surery  HBA1C : 7.2  Required Insulin while in the hospital,  D/C ed home on all home PO DM med's ( Metformin, Janumet, and Glipizide )  continue  Low dose SSI    Leukocytosis : Likely reactive stress response from surgery post Op  No Infection    H/O CAD with CABG, last stents March 2017:  Resumed ASA, Plavix continue statin, Coreg  [facility] also [facility]     Chronic Systolic CHF:  Currently not in exacerbation  His Last ECHO : March 10, 2017: EF 35 -40 %  on Coreg  Not on ACE-I currently, because of slight Hypotension  Recommended follow up with Cardiology and start Low dose ACE-I/ARB if BP     DVT prophylaxis : SQ Lovenox. Pt to Go Home on Lovenox Injections for 2 more weeks to prevent from clots. pt was given education and he is comfortable giving himself injections    Disposition: Home with Home health care. Pt was offered rehab, but instead, he chouse to go home    Total time spent 45 minutes, more than 50 % time spent in counseling/cor dination of patient's care

## 2017-11-26 NOTE — BRIEF OPERATIVE NOTE - POST-OP DX
Closed displaced intertrochanteric fracture of left femur, initial encounter  11/26/2017    Active  Shamika Doyle

## 2017-11-26 NOTE — BRIEF OPERATIVE NOTE - PROCEDURE
<<-----Click on this checkbox to enter Procedure Intertrochanteric hip fracture surgery  11/26/2017  using intramedullary nail  Active  [place]

## 2017-11-26 NOTE — BRIEF OPERATIVE NOTE - PRE-OP DX
Closed displaced intertrochanteric fracture of right femur, initial encounter  11/26/2017    Active  Shamika Doyle

## 2017-11-26 NOTE — DISCHARGE NOTE ADULT - CARE PLAN
Principal Discharge DX:	Type I or II open fracture of right hip with routine healing, subsequent encounter  Instructions for follow-up, activity and diet:	FOLLOW UP ORTHOPEDICS IN 2 weeks  for wound check  Secondary Diagnosis:	Hyponatremia  Instructions for follow-up, activity and diet:	please continue free water restriction to 1 Litre in 24 hrs. Please follow up with Nephrology, Dr Turpin in 1 week. check BMP in 1 week  Secondary Diagnosis:	S/P CABG (coronary artery bypass graft)  Secondary Diagnosis:	Type 2 diabetes mellitus with hyperglycemia, without long-term current use of insulin  Secondary Diagnosis:	Essential hypertension  Secondary Diagnosis:	High cholesterol  Secondary Diagnosis:	Drug-induced constipation Principal Discharge DX:	Type I or II open fracture of right hip with routine healing, subsequent encounter  Goal:	return to [facility]  Instructions for follow-up, activity and diet:	FOLLOW UP ORTHOPEDICS IN 2 weeks  for wound check  Secondary Diagnosis:	Hyponatremia  Instructions for follow-up, activity and diet:	please continue free water restriction to 1 Litre in 24 hrs. Please follow up with Nephrology, Dr Turpin in 1 week. check BMP in 1 week  Secondary Diagnosis:	S/P CABG (coronary artery bypass graft)  Secondary Diagnosis:	Type 2 diabetes mellitus with hyperglycemia, without long-term current use of insulin  Secondary Diagnosis:	Essential hypertension  Secondary Diagnosis:	High cholesterol  Secondary Diagnosis:	Drug-induced constipation

## 2017-11-26 NOTE — CONSULT NOTE ADULT - SUBJECTIVE AND OBJECTIVE BOX
HPI:  Mr Castellon, He is a 71 Y/O male with the past medical H/O CABG, CAD Last stenting march 2017 on ASA, plavix presented to ER with the chief presenting C/O Mechanical fall with X ray pelvis showing Rt Hip Intertrochanteric fracture. Pt reports that he was in [facility] for Visiting and there, along the side walkway, he twisted and fell. He was taken to Local ER there, where X ray showed Hip fracture. He was Offered Surgical intervention, but because of complex Cardiac History, family decided to bring him back to [facility] and have his surgery done here. C/O Severe Pain Along the Right Hip  X ray chest done in ER neg. EKG: NSR with PVC's and LVH. [facility] cardiology consulted for cardiac clearance. Orthopedics planning on surgery pending cardiac clearanceWe are consulted as his Serum Na found to be 121yest evening.    ROS: Pt currently denies CP, SOB, HA N/V/D     His Last ECHO : March 10, 2017: EF 35 -40 %       PAST MEDICAL & SURGICAL HISTORY:  Coronary artery disease involving coronary bypass graft of native heart with unstable angina pectoris  High cholesterol  Diabetes mellitus  Hypertension  S/P primary angioplasty with coronary stent  S/P CABG (coronary artery bypass graft)   DM 2, MO, hypothyroidism, prior DVT and IVC filter; Cholecystectomy    FAMILY HISTORY:  No pertinent family history in first degree relatives  [place]    Social History:Non smoker    MEDICATIONS  (STANDING):  atorvastatin 80 milliGRAM(s) Oral at bedtime  carvedilol 12.5 milliGRAM(s) Oral every 12 hours  dextrose 5%. 1000 milliLiter(s) (50 mL/Hr) IV Continuous <Continuous>  dextrose 50% Injectable 12.5 Gram(s) IV Push once  dextrose 50% Injectable 25 Gram(s) IV Push once  dextrose 50% Injectable 25 Gram(s) IV Push once  insulin lispro (HumaLOG) corrective regimen sliding scale   SubCutaneous three times a day before meals  pantoprazole    Tablet 40 milliGRAM(s) Oral before breakfast    MEDICATIONS  (PRN):  dextrose Gel 1 Dose(s) Oral once PRN Blood Glucose LESS THAN 70 milliGRAM(s)/deciliter  glucagon  Injectable 1 milliGRAM(s) IntraMuscular once PRN Glucose LESS THAN 70 milligrams/deciliter  morphine  - Injectable 2 milliGRAM(s) IV Push every 3 hours PRN Moderate Pain (4 - 6)  ondansetron Injectable 4 milliGRAM(s) IV Push every 6 hours PRN Nausea and/or Vomiting  oxyCODONE    5 mG/acetaminophen 325 mG 1 Tablet(s) Oral every 4 hours PRN Moderate Pain (4 - 6)   Meds reviewed      Vital Signs Last 24 Hrs  T(C): 36.7 (26 Nov 2017 04:57), Max: 36.7 (25 Nov 2017 21:41)  T(F): 98 (26 Nov 2017 04:57), Max: 98 (25 Nov 2017 21:41)  HR: 84 (26 Nov 2017 04:57) (84 - 93)  BP: 139/71 (26 Nov 2017 04:57) (126/66 - 145/74)  BP(mean): --  RR: 16 (26 Nov 2017 04:57) (16 - 19)  SpO2: 100% (26 Nov 2017 04:57) (96% - 100%)  Daily Height in cm: 170.18 (25 Nov 2017 14:58)    Daily     PHYSICAL EXAM:    GENERAL: NAD  HEAD:  Atraumatic, Normocephalic  EYES: EOMI  NECK: Supple, neck  veins full  NERVOUS SYSTEM:  Alert & Oriented X3  CHEST/LUNG: Clear to percussion bilaterally  HEART: Regular rate and rhythm; No murmur  ABDOMEN: Soft, Nontender, Nondistended; Bowel sounds present  EXTREMITIES:  trace LE edema    LABS:                        11.7   12.6  )-----------( 274      ( 25 Nov 2017 17:57 )             33.2     11-26    128<L>  |  89<L>  |  10.0  ----------------------------<  78  4.2   |  28.0  |  0.92    Ca    8.9      26 Nov 2017 05:54    TPro  7.6  /  Alb  3.9  /  TBili  0.6  /  DBili  x   /  AST  32  /  ALT  27  /  AlkPhos  95  11-25    PT/INR - ( 25 Nov 2017 17:57 )   PT: 14.8 sec;   INR: 1.34 ratio         PTT - ( 25 Nov 2017 17:57 )  PTT:27.8 sec            RADIOLOGY & ADDITIONAL TESTS:

## 2017-11-26 NOTE — PROGRESS NOTE ADULT - ASSESSMENT
71 y/o M pt with a hx of DM, HLD, HTN, HfrEF, CAD s/p  CABG  last cardiac cath 3/2017 at that [facility] native #VD patient had stenting of SVG-Om1  with CODI stent placement , dyana-procedure the patient developed acute pulmonary edema and was intubated  for airway protection.  He was successfully extubated and discharged for follow-up. He has been maintained on DAPT presents to the ED with c/o hip pain/injury which onset yesterday in [place] after mechanical fall. Pt is present with X-ray results. Pt has a fx on his left hip after a fall. Orthopedic team is planing surgical hip repair tomorrow. Cardiology called for pre-op CV eval.  Currently hemodynamically stable appears euvolemic on exam; ECG SR with PVCs but no acute injury or ischemic pattern, cxray: NAD  based on RCRI criteria for pre-op risk assessment the patient is at elevated risk of MACE for planned surgical procedure. He is currently optimized from a cardiac standpoint . He has known HfrEF LVEF 35% 3/2017 but has recently been off standing lasix.  Benefits of procedure outweigh risks    Recc:    Discussed with Dr Reid ; patient has completed 6 months of DAPT after CODI VG--> OM1 although not optimal ,given emergent need of surgery ok to hold DAPT for surgical repair and resume as soon as it is safe to do so post-op.  Continue to  maximize beta blockers Coreg 12.5 mg po bid,, statin therapy, ok to use IV lopressor 2.5-5mg q 4 hours for HR/BP control dyana-op.  Resume lasix 20 mg po daily  Keep K>4 mg >2  Avoid excess IV hydration given h/o HFrEF  SIADH: Serum NA improving appreciate renal input  repeat 2-D echo pending if LVEF unchanged from march 2017; no further testing needed before proceeding with planned surgery 71 y/o M pt with a hx of DM, HLD, HTN, HfrEF, CAD s/p  CABG  last cardiac cath 3/2017 at that [facility] native #VD patient had stenting of SVG-Om1  with CODI stent placement , dyana-procedure the patient developed acute pulmonary edema and was intubated  for airway protection.  He was successfully extubated and discharged for follow-up. He has been maintained on DAPT presents to the ED with c/o hip pain/injury which onset yesterday in [place] after mechanical fall. Pt is present with X-ray results. Pt has a fx on his left hip after a fall. Orthopedic team is planing surgical hip repair tomorrow. Cardiology called for pre-op CV eval.  Currently hemodynamically stable appears euvolemic on exam; ECG SR with PVCs but no acute injury or ischemic pattern, cxray: NAD  based on RCRI criteria for pre-op risk assessment the patient is at elevated risk of MACE for planned surgical procedure. He is currently optimized from a cardiac standpoint . He has known HfrEF LVEF 35% 3/2017 but has recently been off standing lasix.  Benefits of procedure outweigh risks    Recc:    Discussed with Dr Reid ; patient has completed 6 months of DAPT after CODI VG--> OM1 although not optimal ,given emergent need of surgery ok to hold DAPT for surgical repair and resume as soon as it is safe to do so post-op.  Continue to  maximize beta blockers Coreg 12.5 mg po bid,, statin therapy, ok to use IV lopressor 2.5-5mg q 4 hours for HR/BP control dyana-op.  Resume lasix 20 mg po daily  Keep K>4 mg >2  Avoid excess IV hydration given h/o HFrEF  SIADH: Serum NA improving appreciate renal input  repeat 2-D echo prelim report no  significant change in LVEF 35-40%  from 3/2017 no further testing needed before proceeding with planned surgery d/w ortho [team

## 2017-11-26 NOTE — CONSULT NOTE ADULT - ASSESSMENT
Hyponatremia clinically hypervolemic  All in setting of hip Fx, Na now improving  Labs consistent w SIADH may be 2/2 pain from Fx   Recommend: Fluid restict 1200cc/24 hr    now improving, if dec once again will add NacL tabs
73 y/o M pt with a hx of DM, HLD, HTN, HfrEF, CAD s/p  CABG  last cardiac cath 3/2017 at that [facility] native #VD patient had stenting of SVG-Om1  with CODI stent placement , dyana-procedure the patient developed acute pulmonary edema and was intubated  for airway protection.  He was successfully extubated and discharged for follow-up. He has been maintained on DAPT presents to the ED with c/o hip pain/injury which onset yesterday in [place] after mechanical fall. Pt is present with X-ray results. Pt has a fx on his left hip after a fall. Orthopedic team is planing surgical hip repair tomorrow. Cardiology called for pre-op CV eval.  Currently hemodynamically stable appears euvolemic on exam; ECG SR with PVCs but no acute injury or ischemic pattern, cxray: NAD  based on RCRI criteria for pre-op risk assessment the patient is at elevated risk of MACE for planned surgical procedure. He is currently optimized from a cardiac standpoint . He has known HfrEF LVEF 35% 3/2017 but has recently been off standing lasix.  Benefits of procedure outweigh risks    Recc:    Discussed with Dr Reid ; patient has completed 6 months of DAPT after CODI VG--> OM1 although not optimal ,given emergent need of surgery ok to hold DAPT for surgical repair and resume as soon as it is safe to do so post-op.  Continue to  maximize beta blockers Coreg 12.5 mg po bid,, statin therapy, ok to use IV lopressor 2.5-5mg q 4 hours for HR/BP control dyana-op.  Resume lasix 20 mg po daily  Keep K>4 mg >2  Avoid excess IV hydration given h/o HFrEF  repeat 2-D echo pending if LVEF unchanged from march 2017; no further testing needed before proceeding with planned surgery

## 2017-11-26 NOTE — DISCHARGE NOTE ADULT - ADDITIONAL INSTRUCTIONS
Orthopedic discharge instructions  The patient will be seen in the office between 2-3 weeks for wound check. Sutures will be removed at that time. Patient may shower after post-op day #5. The dressing is to be removed on 7. IF THE DRESSING BECOMES SOILED BEFORE THE REMOVAL DATE, CHANGE WITH A SIMILAR DRESSING. IF THE DRESSING BECOMES STAINED WITH DISCHARGE, CONTACT THE OFFICE FOR FURTHER DIRECTIONS.  The patient will contact the office if the wound becomes red, has increasing pain, develops bleeding or discharge, an injury occurs, or has other concerns. The patient will continue PT for gait training. The patient will continue LOVENOX for 2 weeks and then begin ASPIRIN for blood clot prevention. The patient will take OXYCODONE AND TYLENOL for pain control and titrate according to prescription and patient needs. The patient will take Colace while taking oxycodone to prevent narcotic associated constipation.  Additionally, increase water intake (drink at least 8 glasses of water daily) and try adding fiber to the diet by eating fruits, vegetables and foods that are rich in grains. If constipation is experienced, contact the medical/primary care provider to discuss further treatment options. The patient is FULL weight bearing.

## 2017-11-27 DIAGNOSIS — I10 ESSENTIAL (PRIMARY) HYPERTENSION: ICD-10-CM

## 2017-11-27 DIAGNOSIS — I25.700 ATHEROSCLEROSIS OF CORONARY ARTERY BYPASS GRAFT(S), UNSPECIFIED, WITH UNSTABLE ANGINA PECTORIS: ICD-10-CM

## 2017-11-27 DIAGNOSIS — E87.1 HYPO-OSMOLALITY AND HYPONATREMIA: ICD-10-CM

## 2017-11-27 LAB
ANION GAP SERPL CALC-SCNC: 18 MMOL/L — HIGH (ref 5–17)
BASOPHILS # BLD AUTO: 0 K/UL — SIGNIFICANT CHANGE UP (ref 0–0.2)
BASOPHILS NFR BLD AUTO: 0.1 % — SIGNIFICANT CHANGE UP (ref 0–2)
BUN SERPL-MCNC: 20 MG/DL — SIGNIFICANT CHANGE UP (ref 8–20)
CALCIUM SERPL-MCNC: 8.8 MG/DL — SIGNIFICANT CHANGE UP (ref 8.6–10.2)
CHLORIDE SERPL-SCNC: 86 MMOL/L — LOW (ref 98–107)
CO2 SERPL-SCNC: 22 MMOL/L — SIGNIFICANT CHANGE UP (ref 22–29)
CREAT SERPL-MCNC: 1 MG/DL — SIGNIFICANT CHANGE UP (ref 0.5–1.3)
EOSINOPHIL # BLD AUTO: 0.1 K/UL — SIGNIFICANT CHANGE UP (ref 0–0.5)
EOSINOPHIL NFR BLD AUTO: 0.4 % — SIGNIFICANT CHANGE UP (ref 0–5)
GLUCOSE BLDC GLUCOMTR-MCNC: 188 MG/DL — HIGH (ref 70–99)
GLUCOSE BLDC GLUCOMTR-MCNC: 254 MG/DL — HIGH (ref 70–99)
GLUCOSE BLDC GLUCOMTR-MCNC: 301 MG/DL — HIGH (ref 70–99)
GLUCOSE BLDC GLUCOMTR-MCNC: 344 MG/DL — HIGH (ref 70–99)
GLUCOSE SERPL-MCNC: 194 MG/DL — HIGH (ref 70–115)
HCT VFR BLD CALC: 32.3 % — LOW (ref 42–52)
HGB BLD-MCNC: 11.3 G/DL — LOW (ref 14–18)
LYMPHOCYTES # BLD AUTO: 0.9 K/UL — LOW (ref 1–4.8)
LYMPHOCYTES # BLD AUTO: 5.1 % — LOW (ref 20–55)
MCHC RBC-ENTMCNC: 28.8 PG — SIGNIFICANT CHANGE UP (ref 27–31)
MCHC RBC-ENTMCNC: 35 G/DL — SIGNIFICANT CHANGE UP (ref 32–36)
MCV RBC AUTO: 82.2 FL — SIGNIFICANT CHANGE UP (ref 80–94)
MONOCYTES # BLD AUTO: 0.9 K/UL — HIGH (ref 0–0.8)
MONOCYTES NFR BLD AUTO: 5.4 % — SIGNIFICANT CHANGE UP (ref 3–10)
NEUTROPHILS # BLD AUTO: 15 K/UL — HIGH (ref 1.8–8)
NEUTROPHILS NFR BLD AUTO: 88.5 % — HIGH (ref 37–73)
PLATELET # BLD AUTO: 273 K/UL — SIGNIFICANT CHANGE UP (ref 150–400)
POTASSIUM SERPL-MCNC: 4 MMOL/L — SIGNIFICANT CHANGE UP (ref 3.5–5.3)
POTASSIUM SERPL-SCNC: 4 MMOL/L — SIGNIFICANT CHANGE UP (ref 3.5–5.3)
RBC # BLD: 3.93 M/UL — LOW (ref 4.6–6.2)
RBC # FLD: 12 % — SIGNIFICANT CHANGE UP (ref 11–15.6)
SODIUM SERPL-SCNC: 126 MMOL/L — LOW (ref 135–145)
WBC # BLD: 17 K/UL — HIGH (ref 4.8–10.8)
WBC # FLD AUTO: 17 K/UL — HIGH (ref 4.8–10.8)

## 2017-11-27 PROCEDURE — 99233 SBSQ HOSP IP/OBS HIGH 50: CPT

## 2017-11-27 RX ORDER — ASPIRIN/CALCIUM CARB/MAGNESIUM 324 MG
81 TABLET ORAL DAILY
Qty: 0 | Refills: 0 | Status: DISCONTINUED | OUTPATIENT
Start: 2017-11-27 | End: 2017-11-29

## 2017-11-27 RX ORDER — VALSARTAN 80 MG/1
160 TABLET ORAL DAILY
Qty: 0 | Refills: 0 | Status: DISCONTINUED | OUTPATIENT
Start: 2017-11-27 | End: 2017-11-27

## 2017-11-27 RX ORDER — CEFAZOLIN SODIUM 1 G
2000 VIAL (EA) INJECTION
Qty: 0 | Refills: 0 | Status: COMPLETED | OUTPATIENT
Start: 2017-11-27 | End: 2017-11-27

## 2017-11-27 RX ORDER — INSULIN GLARGINE 100 [IU]/ML
6 INJECTION, SOLUTION SUBCUTANEOUS AT BEDTIME
Qty: 0 | Refills: 0 | Status: DISCONTINUED | OUTPATIENT
Start: 2017-11-27 | End: 2017-11-28

## 2017-11-27 RX ORDER — FAMOTIDINE 10 MG/ML
20 INJECTION INTRAVENOUS DAILY
Qty: 0 | Refills: 0 | Status: DISCONTINUED | OUTPATIENT
Start: 2017-11-28 | End: 2017-11-28

## 2017-11-27 RX ORDER — ENOXAPARIN SODIUM 100 MG/ML
40 INJECTION SUBCUTANEOUS
Qty: 14 | Refills: 0
Start: 2017-11-27 | End: 2017-12-11

## 2017-11-27 RX ADMIN — Medication 81 MILLIGRAM(S): at 12:16

## 2017-11-27 RX ADMIN — CARVEDILOL PHOSPHATE 12.5 MILLIGRAM(S): 80 CAPSULE, EXTENDED RELEASE ORAL at 17:11

## 2017-11-27 RX ADMIN — INSULIN GLARGINE 6 UNIT(S): 100 INJECTION, SOLUTION SUBCUTANEOUS at 21:06

## 2017-11-27 RX ADMIN — Medication 100 MILLIGRAM(S): at 01:14

## 2017-11-27 RX ADMIN — ATORVASTATIN CALCIUM 80 MILLIGRAM(S): 80 TABLET, FILM COATED ORAL at 21:04

## 2017-11-27 RX ADMIN — OXYCODONE HYDROCHLORIDE 5 MILLIGRAM(S): 5 TABLET ORAL at 12:27

## 2017-11-27 RX ADMIN — Medication 3: at 12:18

## 2017-11-27 RX ADMIN — Medication 4: at 17:11

## 2017-11-27 RX ADMIN — Medication 100 MILLIGRAM(S): at 06:45

## 2017-11-27 RX ADMIN — OXYCODONE HYDROCHLORIDE 5 MILLIGRAM(S): 5 TABLET ORAL at 12:29

## 2017-11-27 RX ADMIN — PANTOPRAZOLE SODIUM 40 MILLIGRAM(S): 20 TABLET, DELAYED RELEASE ORAL at 06:19

## 2017-11-27 RX ADMIN — CLOPIDOGREL BISULFATE 75 MILLIGRAM(S): 75 TABLET, FILM COATED ORAL at 12:16

## 2017-11-27 RX ADMIN — ENOXAPARIN SODIUM 40 MILLIGRAM(S): 100 INJECTION SUBCUTANEOUS at 06:19

## 2017-11-27 RX ADMIN — Medication 1: at 08:19

## 2017-11-27 RX ADMIN — Medication 30 MILLILITER(S): at 02:38

## 2017-11-27 RX ADMIN — CARVEDILOL PHOSPHATE 12.5 MILLIGRAM(S): 80 CAPSULE, EXTENDED RELEASE ORAL at 06:19

## 2017-11-27 NOTE — PROGRESS NOTE ADULT - ASSESSMENT
A/P  72 year old male patient with a history of DM, HLD, HTN, HfrEF, EF 35-40%, CAD s/p CABG and PCI 3/2017 who was admitted with fall and right intertrochanteric fracture now POD#1 s/p right hip IM nail.

## 2017-11-27 NOTE — CHART NOTE - NSCHARTNOTEFT_GEN_A_CORE
pt h/o reflux. c/o reflux. denies any cp or any other complaints. NAD VS. Pt states only reflux. Does not want any cardiac workup at this time. A+OX3. continue to monitor.call pa if any changes in pts condition.

## 2017-11-27 NOTE — PHYSICAL THERAPY INITIAL EVALUATION ADULT - PERTINENT HX OF CURRENT PROBLEM, REHAB EVAL
71 yo male admitted to [facility] with s/p fall with right hip fx; s/p intertrochanteric hip fx. sx.

## 2017-11-27 NOTE — PROGRESS NOTE ADULT - SUBJECTIVE AND OBJECTIVE BOX
ADRIANNA RUIZROCÍO    [id number]    History: Patient is status post right  hip IM nail, POD#1 . Patient is doing well and is comfortable. The patient's pain is controlled using the prescribed pain medications. The patient is participating in physical therapy, WBAT.   Denies nausea, vomiting, chest pain, shortness of breath, abdominal pain or fever. No new complaints.                         11.7   12.6  )-----------( 274      ( 25 Nov 2017 17:57 )             33.2     11-26    128<L>  |  89<L>  |  10.0  ----------------------------<  78  4.2   |  28.0  |  0.92    Ca    8.9      26 Nov 2017 05:54    TPro  7.6  /  Alb  3.9  /  TBili  0.6  /  DBili  x   /  AST  32  /  ALT  27  /  AlkPhos  95  11-25      MEDICATIONS  (STANDING):  atorvastatin 80 milliGRAM(s) Oral at bedtime  carvedilol 12.5 milliGRAM(s) Oral every 12 hours  clopidogrel Tablet 75 milliGRAM(s) Oral daily  dextrose 5%. 1000 milliLiter(s) (50 mL/Hr) IV Continuous <Continuous>  dextrose 50% Injectable 12.5 Gram(s) IV Push once  dextrose 50% Injectable 25 Gram(s) IV Push once  dextrose 50% Injectable 25 Gram(s) IV Push once  dextrose 50% Injectable 12.5 Gram(s) IV Push once  dextrose 50% Injectable 25 Gram(s) IV Push once  dextrose 50% Injectable 25 Gram(s) IV Push once  enoxaparin Injectable 40 milliGRAM(s) SubCutaneous every 24 hours  insulin lispro (HumaLOG) corrective regimen sliding scale   SubCutaneous three times a day before meals  pantoprazole    Tablet 40 milliGRAM(s) Oral before breakfast    MEDICATIONS  (PRN):  dextrose Gel 1 Dose(s) Oral once PRN Blood Glucose LESS THAN 70 milliGRAM(s)/deciliter  dextrose Gel 1 Dose(s) Oral once PRN Blood Glucose LESS THAN 70 milliGRAM(s)/deciliter  glucagon  Injectable 1 milliGRAM(s) IntraMuscular once PRN Glucose LESS THAN 70 milligrams/deciliter  glucagon  Injectable 1 milliGRAM(s) IntraMuscular once PRN Glucose LESS THAN 70 milligrams/deciliter  HYDROmorphone  Injectable 0.5 milliGRAM(s) IV Push every 4 hours PRN Severe Pain (7 - 10)  morphine  - Injectable 2 milliGRAM(s) IV Push every 3 hours PRN Moderate Pain (4 - 6)  oxyCODONE    5 mG/acetaminophen 325 mG 1 Tablet(s) Oral every 4 hours PRN Moderate Pain (4 - 6)  oxyCODONE    IR 10 milliGRAM(s) Oral every 4 hours PRN Moderate Pain  oxyCODONE    IR 5 milliGRAM(s) Oral every 4 hours PRN Mild Pain      Physical exam: The right hip dressing is clean, dry and intact. No drainage or discharge. No erythema is noted. No blistering. No ecchymosis. The calf is supple nontender. Passive range of motion is acceptable to due postoperative pain. No calf tenderness. Sensation to light touch is grossly intact distally. The lateral cutaneous nerve is intact. Motor function distally is 5/5. No foot drop. 2+ dorsalis pedis pulse. Capillary refill is less than 2 seconds. No cyanosis.    Primary Orthopedic Assessment:  • s/p RIGHT hip IM nail    Secondary  Orthopedic Assessment(s):   •     Secondary  Medical Assessment(s):   •     Plan:   • DVT prophylaxis as prescribed, including use of compression devices and ankle pumps  • Continue physical therapy, WBAT  • Weightbearing as tolerated of the right lower extremity with assistance of a walker  • Incentive spirometry encouraged  • Pain control as clinically indicated  • Discharge planning – anticipated discharge is Home / subacute rehabilitation / acute rehabilitation

## 2017-11-27 NOTE — PHYSICAL THERAPY INITIAL EVALUATION ADULT - GAIT DEVIATIONS NOTED, PT EVAL
decreased step length/decreased weight-shifting ability/decreased gabriel/increased time in double stance

## 2017-11-27 NOTE — PROGRESS NOTE ADULT - ASSESSMENT
Mr Castellon, He is a 73 Y/O male with the past medical H/O CABG, CAD Last stenting march 2017 on ASA, plavix presented to ER with the chief presenting C/O Mechanical fall with X ray pelvis showing Rt Hip Intertrochanteric fracture. Pt reports that he was in [facility] for Visiting and there, along the side walkway, he twisted and fell. He was taken to Local ER there, where X ray showed Hip fracture. He was Offered Surgical intervention, but because of complex Cardiac History, family decided to bring him back to [facility] and have his surgery done here. C/O Severe Pain Along the Right Hip  X ray chest done in ER neg. EKG: NSR with PVC's and LVH. [facility] cardiology consulted for cardiac clearance. Orthopedics planning on surgery pending cardiac clearance  Pt currently denies CP, SOB  His Last ECHO : March 10, 2017: EF 35 -40 %    Rt Hip Trochanteric fracture, S/P mechanical fall,  POD#1 s/p right hip IM nail.     PT.OT consulted  D/C home with [facility]. 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 continue statin, Coreg  no CP currently    Chronic Systolic CHF:  Currently not in exacerbation  hold  Lasix, because of slight Hypotension  His Last ECHO : March 10, 2017: EF 35 -40 %    Type 2 DM with Hyperglycemia:  Hold all home PO DM med's  continue  Low dose SSI  HBA1C : 7.2  start Low dose Lantus 6 U Q HS    DVT prophylaxis : SQ Lovenox  Will D/W ortho regarding Choice of DVT prophylaxis at D/C Mr Castellon, He is a 73 Y/O male with the past medical H/O CABG, CAD Last stenting march 2017 on ASA, plavix presented to ER with the chief presenting C/O Mechanical fall with X ray pelvis showing Rt Hip Intertrochanteric fracture. Pt reports that he was in [facility] for Visiting and there, along the side walkway, he twisted and fell. He was taken to Local ER there, where X ray showed Hip fracture. He was Offered Surgical intervention, but because of complex Cardiac History, family decided to bring him back to [facility] and have his surgery done here. C/O Severe Pain Along the Right Hip  X ray chest done in ER neg. EKG: NSR with PVC's and LVH. [facility] cardiology consulted for cardiac clearance. Orthopedics planning on surgery pending cardiac clearance  Pt currently denies CP, SOB  His Last ECHO : March 10, 2017: EF 35 -40 %    Rt Hip Trochanteric fracture, S/P mechanical fall,  POD#1 s/p right hip IM nail.     PT.OT consulted  D/C home with [facility]. 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 continue statin, Coreg  no CP currently    Chronic Systolic CHF:  Currently not in exacerbation  hold  Lasix, because of slight Hypotension  His Last ECHO : March 10, 2017: EF 35 -40 %    Type 2 DM with Hyperglycemia:  Hold all home PO DM med's  continue  Low dose SSI  HBA1C : 7.2  start Low dose Lantus 6 U Q HS    DVT prophylaxis : SQ Lovenox  Will D/W ortho regarding Choice of DVT prophylaxis at D/C

## 2017-11-27 NOTE — PROGRESS NOTE ADULT - SUBJECTIVE AND OBJECTIVE BOX
Patient seen today in bed with family at bedside. No acute overnight complaints.     EK17: NSR at 92bpm; QRSD 122  TELE: NSR with rates in the 60-70s. No acute events.     MEDICATIONS  (STANDING):  aspirin  chewable 81 milliGRAM(s) Oral daily  atorvastatin 80 milliGRAM(s) Oral at bedtime  carvedilol 12.5 milliGRAM(s) Oral every 12 hours  clopidogrel Tablet 75 milliGRAM(s) Oral daily  dextrose 5%. 1000 milliLiter(s) (50 mL/Hr) IV Continuous <Continuous>  dextrose 50% Injectable 12.5 Gram(s) IV Push once  dextrose 50% Injectable 25 Gram(s) IV Push once  dextrose 50% Injectable 25 Gram(s) IV Push once  dextrose 50% Injectable 12.5 Gram(s) IV Push once  dextrose 50% Injectable 25 Gram(s) IV Push once  dextrose 50% Injectable 25 Gram(s) IV Push once  enoxaparin Injectable 40 milliGRAM(s) SubCutaneous every 24 hours  insulin lispro (HumaLOG) corrective regimen sliding scale   SubCutaneous three times a day before meals  pantoprazole    Tablet 40 milliGRAM(s) Oral before breakfast  valsartan 160 milliGRAM(s) Oral daily    MEDICATIONS  (PRN):  dextrose Gel 1 Dose(s) Oral once PRN Blood Glucose LESS THAN 70 milliGRAM(s)/deciliter  dextrose Gel 1 Dose(s) Oral once PRN Blood Glucose LESS THAN 70 milliGRAM(s)/deciliter  glucagon  Injectable 1 milliGRAM(s) IntraMuscular once PRN Glucose LESS THAN 70 milligrams/deciliter  glucagon  Injectable 1 milliGRAM(s) IntraMuscular once PRN Glucose LESS THAN 70 milligrams/deciliter  HYDROmorphone  Injectable 0.5 milliGRAM(s) IV Push every 4 hours PRN Severe Pain (7 - 10)  morphine  - Injectable 2 milliGRAM(s) IV Push every 3 hours PRN Moderate Pain (4 - 6)  oxyCODONE    5 mG/acetaminophen 325 mG 1 Tablet(s) Oral every 4 hours PRN Moderate Pain (4 - 6)  oxyCODONE    IR 10 milliGRAM(s) Oral every 4 hours PRN Moderate Pain  oxyCODONE    IR 5 milliGRAM(s) Oral every 4 hours PRN Mild Pain    Allergies    No Known Allergies    PAST MEDICAL & SURGICAL HISTORY:  Coronary artery disease involving coronary bypass graft of native heart with unstable angina pectoris  High cholesterol  Diabetes mellitus  Hypertension  S/P primary angioplasty with coronary stent  S/P CABG (coronary artery bypass graft)    Vital Signs Last 24 Hrs  T(C): 36.8 (2017 06:17), Max: 38.5 (2017 20:59)  T(F): 98.3 (2017 06:17), Max: 101.3 (2017 20:59)  HR: 98 (2017 06:17) (77 - 101)  BP: 120/73 (2017 06:17) (120/73 - 162/71)  BP(mean): 72 (2017 22:45) (72 - 89)  RR: 16 (2017 06:17) (14 - 22)  SpO2: 99% (2017 06:17) (95% - 100%)    Physical Exam:  Constitutional: NAD, AAOx3  Cardiovascular: +S1S2 RRR  Pulmonary: CTA b/l, unlabored  GI: soft NTND +BS  Extremities: no pedal edema, right hip dressing intact.   Neuro: non focal    LABS:                        11.3   17.0  )-----------( 273      ( 2017 07:55 )             32.3     11-27    126<L>  |  86<L>  |  20.0  ----------------------------<  194<H>  4.0   |  22.0  |  1.00    Ca    8.8      2017 07:55  TPro  7.6  /  Alb  3.9  /  TBili  0.6  /  DBili  x   /  AST  32  /  ALT  27  /  AlkPhos  95  11-25  PT/INR - ( 2017 17:57 )   PT: 14.8 sec;   INR: 1.34 ratio    PTT - ( 2017 17:57 )  PTT:27.8 sec    Echo: 17   Summary:   1. Left ventricular ejection fraction, by visual estimation, is 35 to   40%.   2. Moderately decreased global left ventricular systolic function.   3. Multiple left ventricular regional wall motion abnormalities exist.   See wall motion findings.   4. Mild mitral annular calcification.   5. Mild mitral valve regurgitation.   6. Thickening of the anterior and posterior mitral valve leaflets.   7. Sclerotic aortic valve with normal opening.   8. Endocardial visualization was enhanced with intravenous echo contrast.    A/P  72 year old male patient with a history of DM, HLD, HTN, HfrEF, EF 35-40%, CAD s/p CABG and PCI 3 who was admitted with fall and right intertrochanteric fracture now POD#1 s/p right hip IM nail.       - Na 126 today, Nephro recommendations appreciated. Fluid restrict.   - Telemetry reveals no acute overnight events.   - DAPT resumed  - Continue to uptitrate Coreg as clinically tolerated. Resume Exforge when ok by Renal  - Statin resumed  - Keep K >4 and Mag >2  - Now that  patient is eating my resume DM medications.     Currently hemodynamically stable appears euvolemic on exam; ECG SR with PVCs but no acute injury or ischemic pattern, cxray: NAD  based on RCRI criteria for pre-op risk assessment the patient is at elevated risk of MACE for planned surgical procedure. He is currently optimized from a cardiac standpoint . He has known HfrEF LVEF 35% 3/2017 but has recently been off standing lasix.  Benefits of procedure outweigh risks    Recc:    Discussed with Dr Reid ; patient has completed 6 months of DAPT after CODI VG--> OM1 although not optimal ,given emergent need of surgery ok to hold DAPT for surgical repair and resume as soon as it is safe to do so post-op.  Continue to  maximize beta blockers Coreg 12.5 mg po bid,, statin therapy, ok to use IV lopressor 2.5-5mg q 4 hours for HR/BP control dyana-op.  Resume lasix 20 mg po daily  Keep K>4 mg >2  Avoid excess IV hydration given h/o HFrEF  SIADH: Serum NA improving appreciate renal input  repeat 2-D echo pending if LVEF unchanged from 2017; no further testing needed before proceeding with planned surgery Patient seen today in bed with family at bedside. No acute overnight complaints.     EK17: NSR at 92bpm; QRSD 122  TELE: NSR with rates in the 60-70s. No acute events.     MEDICATIONS  (STANDING):  aspirin  chewable 81 milliGRAM(s) Oral daily  atorvastatin 80 milliGRAM(s) Oral at bedtime  carvedilol 12.5 milliGRAM(s) Oral every 12 hours  clopidogrel Tablet 75 milliGRAM(s) Oral daily  dextrose 5%. 1000 milliLiter(s) (50 mL/Hr) IV Continuous <Continuous>  dextrose 50% Injectable 12.5 Gram(s) IV Push once  dextrose 50% Injectable 25 Gram(s) IV Push once  dextrose 50% Injectable 25 Gram(s) IV Push once  dextrose 50% Injectable 12.5 Gram(s) IV Push once  dextrose 50% Injectable 25 Gram(s) IV Push once  dextrose 50% Injectable 25 Gram(s) IV Push once  enoxaparin Injectable 40 milliGRAM(s) SubCutaneous every 24 hours  insulin lispro (HumaLOG) corrective regimen sliding scale   SubCutaneous three times a day before meals  pantoprazole    Tablet 40 milliGRAM(s) Oral before breakfast  valsartan 160 milliGRAM(s) Oral daily    MEDICATIONS  (PRN):  dextrose Gel 1 Dose(s) Oral once PRN Blood Glucose LESS THAN 70 milliGRAM(s)/deciliter  dextrose Gel 1 Dose(s) Oral once PRN Blood Glucose LESS THAN 70 milliGRAM(s)/deciliter  glucagon  Injectable 1 milliGRAM(s) IntraMuscular once PRN Glucose LESS THAN 70 milligrams/deciliter  glucagon  Injectable 1 milliGRAM(s) IntraMuscular once PRN Glucose LESS THAN 70 milligrams/deciliter  HYDROmorphone  Injectable 0.5 milliGRAM(s) IV Push every 4 hours PRN Severe Pain (7 - 10)  morphine  - Injectable 2 milliGRAM(s) IV Push every 3 hours PRN Moderate Pain (4 - 6)  oxyCODONE    5 mG/acetaminophen 325 mG 1 Tablet(s) Oral every 4 hours PRN Moderate Pain (4 - 6)  oxyCODONE    IR 10 milliGRAM(s) Oral every 4 hours PRN Moderate Pain  oxyCODONE    IR 5 milliGRAM(s) Oral every 4 hours PRN Mild Pain    Allergies    No Known Allergies    PAST MEDICAL & SURGICAL HISTORY:  Coronary artery disease involving coronary bypass graft of native heart with unstable angina pectoris  High cholesterol  Diabetes mellitus  Hypertension  S/P primary angioplasty with coronary stent  S/P CABG (coronary artery bypass graft)    Vital Signs Last 24 Hrs  T(C): 36.8 (2017 06:17), Max: 38.5 (2017 20:59)  T(F): 98.3 (2017 06:17), Max: 101.3 (2017 20:59)  HR: 98 (2017 06:17) (77 - 101)  BP: 120/73 (2017 06:17) (120/73 - 162/71)  BP(mean): 72 (2017 22:45) (72 - 89)  RR: 16 (2017 06:17) (14 - 22)  SpO2: 99% (2017 06:17) (95% - 100%)    Physical Exam:  Constitutional: NAD, AAOx3  Cardiovascular: +S1S2 RRR  Pulmonary: CTA b/l, unlabored  GI: soft NTND +BS  Extremities: no pedal edema, right hip dressing intact.   Neuro: non focal    LABS:                        11.3   17.0  )-----------( 273      ( 2017 07:55 )             32.3     11-27    126<L>  |  86<L>  |  20.0  ----------------------------<  194<H>  4.0   |  22.0  |  1.00    Ca    8.8      2017 07:55  TPro  7.6  /  Alb  3.9  /  TBili  0.6  /  DBili  x   /  AST  32  /  ALT  27  /  AlkPhos  95  11-25  PT/INR - ( 2017 17:57 )   PT: 14.8 sec;   INR: 1.34 ratio    PTT - ( 2017 17:57 )  PTT:27.8 sec    Echo: 17   Summary:   1. Left ventricular ejection fraction, by visual estimation, is 35 to   40%.   2. Moderately decreased global left ventricular systolic function.   3. Multiple left ventricular regional wall motion abnormalities exist.   See wall motion findings.   4. Mild mitral annular calcification.   5. Mild mitral valve regurgitation.   6. Thickening of the anterior and posterior mitral valve leaflets.   7. Sclerotic aortic valve with normal opening.   8. Endocardial visualization was enhanced with intravenous echo contrast.

## 2017-11-27 NOTE — PROGRESS NOTE ADULT - SUBJECTIVE AND OBJECTIVE BOX
ADRIANNA SHANKS  ----------------------------------------    CC: FOLLOW UP VISIT FOR RT SIDED HIP FRACTURE, HYPONATREMIA, DM    Pain: Tolerable  Post Op day 1   family at bed side, answered all questions  sodium levels stable    Vital Signs Last 24 Hrs  T(C): 36.6 (27 Nov 2017 07:51), Max: 38.5 (26 Nov 2017 20:59)  T(F): 97.8 (27 Nov 2017 07:51), Max: 101.3 (26 Nov 2017 20:59)  HR: 93 (27 Nov 2017 07:51) (77 - 101)  BP: 112/68 (27 Nov 2017 07:51) (112/68 - 162/71)  BP(mean): 72 (26 Nov 2017 22:45) (72 - 89)  RR: 16 (27 Nov 2017 07:51) (14 - 19)  SpO2: 85% (27 Nov 2017 07:51) (85% - 100%)    CAPILLARY BLOOD GLUCOSE      POCT Blood Glucose.: 254 mg/dL (27 Nov 2017 12:14)  POCT Blood Glucose.: 188 mg/dL (27 Nov 2017 08:18)  POCT Blood Glucose.: 109 mg/dL (26 Nov 2017 21:21)  POCT Blood Glucose.: 109 mg/dL (26 Nov 2017 16:47)    PHYSICAL EXAMINATION:  ----------------------------------------    General appearance: NAD, Awake, Alert  HEENT: NCAT, Conjunctiva clear, EOMI  Neck: Supple, No JVD  Lungs: Decreased Breath sounds bilaterally  Cardiovascular: S1S2, Regular rhythm  Abdomen: Soft, Nontender, Positive bowel sounds  Extremities: No clubbing, No cyanosis, No edema  Rt Lower Limb , Hip surgery site covered with dressing   CNS: alert and oriented times       LABORATORY STUDIES:  ----------------------------------------                        11.3   17.0  )-----------( 273      ( 27 Nov 2017 07:55 )             32.3     11-27    126<L>  |  86<L>  |  20.0  ----------------------------<  194<H>  4.0   |  22.0  |  1.00    Ca    8.8      27 Nov 2017 07:55    TPro  7.6  /  Alb  3.9  /  TBili  0.6  /  DBili  x   /  AST  32  /  ALT  27  /  AlkPhos  95  11-25    LIVER FUNCTIONS - ( 25 Nov 2017 17:57 )  Alb: 3.9 g/dL / Pro: 7.6 g/dL / ALK PHOS: 95 U/L / ALT: 27 U/L / AST: 32 U/L / GGT: x           PT/INR - ( 25 Nov 2017 17:57 )   PT: 14.8 sec;   INR: 1.34 ratio         PTT - ( 25 Nov 2017 17:57 )  PTT:27.8 sec      MEDICATIONS  (STANDING):  aspirin  chewable 81 milliGRAM(s) Oral daily  atorvastatin 80 milliGRAM(s) Oral at bedtime  carvedilol 12.5 milliGRAM(s) Oral every 12 hours  clopidogrel Tablet 75 milliGRAM(s) Oral daily  dextrose 5%. 1000 milliLiter(s) (50 mL/Hr) IV Continuous <Continuous>  dextrose 50% Injectable 12.5 Gram(s) IV Push once  dextrose 50% Injectable 25 Gram(s) IV Push once  dextrose 50% Injectable 25 Gram(s) IV Push once  dextrose 50% Injectable 12.5 Gram(s) IV Push once  dextrose 50% Injectable 25 Gram(s) IV Push once  dextrose 50% Injectable 25 Gram(s) IV Push once  enoxaparin Injectable 40 milliGRAM(s) SubCutaneous every 24 hours  insulin lispro (HumaLOG) corrective regimen sliding scale   SubCutaneous three times a day before meals  pantoprazole    Tablet 40 milliGRAM(s) Oral before breakfast    MEDICATIONS  (PRN):  dextrose Gel 1 Dose(s) Oral once PRN Blood Glucose LESS THAN 70 milliGRAM(s)/deciliter  dextrose Gel 1 Dose(s) Oral once PRN Blood Glucose LESS THAN 70 milliGRAM(s)/deciliter  glucagon  Injectable 1 milliGRAM(s) IntraMuscular once PRN Glucose LESS THAN 70 milligrams/deciliter  glucagon  Injectable 1 milliGRAM(s) IntraMuscular once PRN Glucose LESS THAN 70 milligrams/deciliter  HYDROmorphone  Injectable 0.5 milliGRAM(s) IV Push every 4 hours PRN Severe Pain (7 - 10)  morphine  - Injectable 2 milliGRAM(s) IV Push every 3 hours PRN Moderate Pain (4 - 6)  oxyCODONE    5 mG/acetaminophen 325 mG 1 Tablet(s) Oral every 4 hours PRN Moderate Pain (4 - 6)  oxyCODONE    IR 10 milliGRAM(s) Oral every 4 hours PRN Moderate Pain  oxyCODONE    IR 5 milliGRAM(s) Oral every 4 hours PRN Mild Pain      ASSESSMENT / PLAN:  ----------------------------------------

## 2017-11-28 LAB
ANION GAP SERPL CALC-SCNC: 11 MMOL/L — SIGNIFICANT CHANGE UP (ref 5–17)
BUN SERPL-MCNC: 27 MG/DL — HIGH (ref 8–20)
CALCIUM SERPL-MCNC: 8.5 MG/DL — LOW (ref 8.6–10.2)
CHLORIDE SERPL-SCNC: 85 MMOL/L — LOW (ref 98–107)
CO2 SERPL-SCNC: 26 MMOL/L — SIGNIFICANT CHANGE UP (ref 22–29)
CREAT SERPL-MCNC: 0.99 MG/DL — SIGNIFICANT CHANGE UP (ref 0.5–1.3)
GLUCOSE BLDC GLUCOMTR-MCNC: 217 MG/DL — HIGH (ref 70–99)
GLUCOSE BLDC GLUCOMTR-MCNC: 309 MG/DL — HIGH (ref 70–99)
GLUCOSE BLDC GLUCOMTR-MCNC: 329 MG/DL — HIGH (ref 70–99)
GLUCOSE BLDC GLUCOMTR-MCNC: 343 MG/DL — HIGH (ref 70–99)
GLUCOSE SERPL-MCNC: 234 MG/DL — HIGH (ref 70–115)
HCT VFR BLD CALC: 31 % — LOW (ref 42–52)
HGB BLD-MCNC: 10.7 G/DL — LOW (ref 14–18)
MCHC RBC-ENTMCNC: 27.9 PG — SIGNIFICANT CHANGE UP (ref 27–31)
MCHC RBC-ENTMCNC: 34.5 G/DL — SIGNIFICANT CHANGE UP (ref 32–36)
MCV RBC AUTO: 80.9 FL — SIGNIFICANT CHANGE UP (ref 80–94)
PLATELET # BLD AUTO: 303 K/UL — SIGNIFICANT CHANGE UP (ref 150–400)
POTASSIUM SERPL-MCNC: 4.3 MMOL/L — SIGNIFICANT CHANGE UP (ref 3.5–5.3)
POTASSIUM SERPL-SCNC: 4.3 MMOL/L — SIGNIFICANT CHANGE UP (ref 3.5–5.3)
RBC # BLD: 3.83 M/UL — LOW (ref 4.6–6.2)
RBC # FLD: 11.3 % — SIGNIFICANT CHANGE UP (ref 11–15.6)
SODIUM SERPL-SCNC: 122 MMOL/L — LOW (ref 135–145)
URATE SERPL-MCNC: 4.4 MG/DL — SIGNIFICANT CHANGE UP (ref 3.4–7)
WBC # BLD: 15 K/UL — HIGH (ref 4.8–10.8)
WBC # FLD AUTO: 15 K/UL — HIGH (ref 4.8–10.8)

## 2017-11-28 PROCEDURE — 99233 SBSQ HOSP IP/OBS HIGH 50: CPT

## 2017-11-28 RX ORDER — POLYETHYLENE GLYCOL 3350 17 G/17G
17 POWDER, FOR SOLUTION ORAL DAILY
Qty: 0 | Refills: 0 | Status: DISCONTINUED | OUTPATIENT
Start: 2017-11-28 | End: 2017-11-29

## 2017-11-28 RX ORDER — INSULIN LISPRO 100/ML
4 VIAL (ML) SUBCUTANEOUS
Qty: 0 | Refills: 0 | Status: DISCONTINUED | OUTPATIENT
Start: 2017-11-28 | End: 2017-11-28

## 2017-11-28 RX ORDER — INSULIN LISPRO 100/ML
6 VIAL (ML) SUBCUTANEOUS
Qty: 0 | Refills: 0 | Status: DISCONTINUED | OUTPATIENT
Start: 2017-11-28 | End: 2017-11-29

## 2017-11-28 RX ORDER — FAMOTIDINE 10 MG/ML
20 INJECTION INTRAVENOUS DAILY
Qty: 0 | Refills: 0 | Status: DISCONTINUED | OUTPATIENT
Start: 2017-11-29 | End: 2017-11-29

## 2017-11-28 RX ORDER — SENNA PLUS 8.6 MG/1
2 TABLET ORAL AT BEDTIME
Qty: 0 | Refills: 0 | Status: DISCONTINUED | OUTPATIENT
Start: 2017-11-28 | End: 2017-11-29

## 2017-11-28 RX ORDER — DOCUSATE SODIUM 100 MG
100 CAPSULE ORAL
Qty: 0 | Refills: 0 | Status: DISCONTINUED | OUTPATIENT
Start: 2017-11-28 | End: 2017-11-29

## 2017-11-28 RX ORDER — SODIUM CHLORIDE 9 MG/ML
1 INJECTION INTRAMUSCULAR; INTRAVENOUS; SUBCUTANEOUS THREE TIMES A DAY
Qty: 0 | Refills: 0 | Status: DISCONTINUED | OUTPATIENT
Start: 2017-11-28 | End: 2017-11-29

## 2017-11-28 RX ORDER — SODIUM CHLORIDE 9 MG/ML
1000 INJECTION INTRAMUSCULAR; INTRAVENOUS; SUBCUTANEOUS
Qty: 0 | Refills: 0 | Status: DISCONTINUED | OUTPATIENT
Start: 2017-11-28 | End: 2017-11-29

## 2017-11-28 RX ORDER — INSULIN GLARGINE 100 [IU]/ML
12 INJECTION, SOLUTION SUBCUTANEOUS AT BEDTIME
Qty: 0 | Refills: 0 | Status: DISCONTINUED | OUTPATIENT
Start: 2017-11-28 | End: 2017-11-29

## 2017-11-28 RX ORDER — PANTOPRAZOLE SODIUM 20 MG/1
40 TABLET, DELAYED RELEASE ORAL
Qty: 0 | Refills: 0 | Status: DISCONTINUED | OUTPATIENT
Start: 2017-11-28 | End: 2017-11-28

## 2017-11-28 RX ADMIN — PANTOPRAZOLE SODIUM 40 MILLIGRAM(S): 20 TABLET, DELAYED RELEASE ORAL at 12:45

## 2017-11-28 RX ADMIN — Medication 100 MILLIGRAM(S): at 17:50

## 2017-11-28 RX ADMIN — Medication 5 MILLIGRAM(S): at 12:45

## 2017-11-28 RX ADMIN — Medication 6 UNIT(S): at 12:47

## 2017-11-28 RX ADMIN — Medication 4: at 09:38

## 2017-11-28 RX ADMIN — CLOPIDOGREL BISULFATE 75 MILLIGRAM(S): 75 TABLET, FILM COATED ORAL at 12:45

## 2017-11-28 RX ADMIN — ENOXAPARIN SODIUM 40 MILLIGRAM(S): 100 INJECTION SUBCUTANEOUS at 05:44

## 2017-11-28 RX ADMIN — SENNA PLUS 2 TABLET(S): 8.6 TABLET ORAL at 22:18

## 2017-11-28 RX ADMIN — CARVEDILOL PHOSPHATE 12.5 MILLIGRAM(S): 80 CAPSULE, EXTENDED RELEASE ORAL at 05:44

## 2017-11-28 RX ADMIN — OXYCODONE HYDROCHLORIDE 5 MILLIGRAM(S): 5 TABLET ORAL at 13:35

## 2017-11-28 RX ADMIN — CARVEDILOL PHOSPHATE 12.5 MILLIGRAM(S): 80 CAPSULE, EXTENDED RELEASE ORAL at 17:50

## 2017-11-28 RX ADMIN — Medication 4: at 12:45

## 2017-11-28 RX ADMIN — INSULIN GLARGINE 12 UNIT(S): 100 INJECTION, SOLUTION SUBCUTANEOUS at 22:19

## 2017-11-28 RX ADMIN — Medication 4: at 17:50

## 2017-11-28 RX ADMIN — Medication 81 MILLIGRAM(S): at 12:45

## 2017-11-28 RX ADMIN — POLYETHYLENE GLYCOL 3350 17 GRAM(S): 17 POWDER, FOR SOLUTION ORAL at 12:44

## 2017-11-28 RX ADMIN — ATORVASTATIN CALCIUM 80 MILLIGRAM(S): 80 TABLET, FILM COATED ORAL at 22:18

## 2017-11-28 RX ADMIN — OXYCODONE HYDROCHLORIDE 5 MILLIGRAM(S): 5 TABLET ORAL at 12:46

## 2017-11-28 RX ADMIN — SODIUM CHLORIDE 1 GRAM(S): 9 INJECTION INTRAMUSCULAR; INTRAVENOUS; SUBCUTANEOUS at 22:18

## 2017-11-28 RX ADMIN — Medication 6 UNIT(S): at 17:50

## 2017-11-28 NOTE — PROGRESS NOTE ADULT - PROBLEM SELECTOR PLAN 1
- Na 126 today, Nephro recommendations appreciated. Fluid restrict.    - Telemetry reveals no acute overnight events.   - DAPT resumed  - Continue to uptitrate Coreg as clinically tolerated. Resume Exforge when ok by Renal  - Statin resumed  - Keep K >4 and Mag >2  - Now that  patient is eating my resume DM medications.
- Na 122 today, Ct free water restriction. ct IV fluids.  Lasix 20 mg IV once if needed, if sodium still low tomorrow.   - ct DAPT resumed  - Continue to uptitrate Coreg as clinically tolerated. Resume Exforge when ok by Renal  - Statin resumed  - Keep K >4 and Mag >2  - Now that  patient is eating my resume DM medications.

## 2017-11-28 NOTE — PROGRESS NOTE ADULT - NSHPATTENDINGPLANDISCUSS_GEN_ALL_CORE
Patient, Family, nursing staff, cardiology, social work and casemanagement
family, patient, Nurisng staff, social work and case management
patient, Nursing staff, family, Cardiologist Dr Viramontes
Dr. Hendrickson [facility]

## 2017-11-28 NOTE — PROGRESS NOTE ADULT - SUBJECTIVE AND OBJECTIVE BOX
Patient seen and examined    Feels better   Pain controlled ep R hip area  no c/o CP SOB NV   No swelling feet    Vital Signs Last 24 Hrs  T(C): 36.7 (28 Nov 2017 16:09), Max: 37.1 (27 Nov 2017 19:15)  T(F): 98 (28 Nov 2017 16:09), Max: 98.7 (27 Nov 2017 19:15)  HR: 87 (28 Nov 2017 16:09) (80 - 94)  BP: 133/74 (28 Nov 2017 16:09) (94/59 - 133/74)  BP(mean): --  RR: 18 (28 Nov 2017 16:09) (18 - 18)  SpO2: 96% (28 Nov 2017 16:09) (92% - 96%)    PHYSICAL EXAM    GENERAL: NAD,   EYES:  conjunctiva and sclera clear  NECK: Supple, No JVD/Bruit  NERVOUS SYSTEM:  A/O x3,   CHEST:  CTA ,No rales or rhonchi  HEART:  RRR, No murmurs  ABDOMEN: Soft, NT/ND BS+  EXTREMITIES:  No Edema ex sl on R side  SKIN: No rashes    28 Nov 2017 06:54    122    |  85     |  27.0   ----------------------------<  234    4.3     |  26.0   |  0.99     Ca    8.5        28 Nov 2017 06:54                            10.7   15.0  )-----------( 303      ( 28 Nov 2017 06:54 )             31.0       uric acid checked 5.5; U Lytes pending - re order  Na lower today  cause unclear  d/c PPIs, change to pepcid  Add Nacl tabs  Continue POF restriction  Nacl for 12 hours, then d/c

## 2017-11-28 NOTE — PROGRESS NOTE ADULT - ASSESSMENT
Mr Castellon, He is a 73 Y/O male with the past medical H/O CABG, CAD Last stenting march 2017 on ASA, plavix presented to ER with the chief presenting C/O Mechanical fall with X ray pelvis showing Rt Hip Intertrochanteric fracture. Pt reports that he was in [facility] for Visiting and there, along the side walkway, he twisted and fell. He was taken to Local ER there, where X ray showed Hip fracture. He was Offered Surgical intervention, but because of complex Cardiac History, family decided to bring him back to [facility] and have his surgery done here. C/O Severe Pain Along the Right Hip  X ray chest done in ER neg. EKG: NSR with PVC's and LVH. [facility] cardiology consulted for cardiac clearance. Orthopedics planning on surgery pending cardiac clearance  Pt currently denies CP, SOB  His Last ECHO : March 10, 2017: EF 35 -40 %    Rt Hip Trochanteric fracture, S/P mechanical fall,  POD#1 s/p right hip IM nail.     PT.OT consulted  D/C home with [facility]. 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 continue statin, Coreg  no CP currently    Chronic Systolic CHF:  Currently not in exacerbation  hold  Lasix, because of slight Hypotension  His Last ECHO : March 10, 2017: EF 35 -40 %    Type 2 DM with Hyperglycemia:  Hold all home PO DM med's  continue  Low dose SSI  HBA1C : 7.2  start Low dose Lantus 6 U Q HS    DVT prophylaxis : SQ Lovenox  Will D/W ortho regarding Choice of DVT prophylaxis at D/C

## 2017-11-28 NOTE — PROGRESS NOTE ADULT - SUBJECTIVE AND OBJECTIVE BOX
[place] [facility]                                                        Office: [address] [address]                                                       Telephone: [phone]. Fax:[phone]                                                                             PROGRESS NOTE   Reason for follow up:  PReop, Ischemic CMP, CAD.                            Overnight: No new events.   Update: ambulating. Still hyponatremic     Subjective: " i am feeling fine."   Complains of:  no new symptoms,. Complains of right hip pain.   Review of symptoms: Cardiac:  No chest pain. No dyspnea. No palpitations.  Respiratory: no cough. No dyspnea  Gastrointestinal: No diarrhea. No abdominal pain. No bleeding.     Past medical history: No updates.   Chronic conditions:  Hypertension: controlled. CHF : stable.  	  Vitals:  T(C): 36.7 (11-28-17 @ 16:09), Max: 36.8 (11-28-17 @ 00:45)  HR: 87 (11-28-17 @ 16:09) (80 - 89)  BP: 133/74 (11-28-17 @ 16:09) (112/69 - 133/74)  RR: 18 (11-28-17 @ 16:09) (18 - 18)  SpO2: 96% (11-28-17 @ 16:09) (92% - 96%)  Wt(kg): --  I&O's Summary    27 Nov 2017 07:01  -  28 Nov 2017 07:00  --------------------------------------------------------  IN: 2160 mL / OUT: 200 mL / NET: 1960 mL    28 Nov 2017 07:01  -  28 Nov 2017 22:23  --------------------------------------------------------  IN: 0 mL / OUT: 200 mL / NET: -200 mL      Weight (kg): 70.3 (11-26 @ 17:14)    PHYSICAL EXAM:  Appearance: Comfortable. No acute distress  HEENT:  Head and neck: Atraumatic. Normocephalic.  Normal oral mucosa, PERRL, Neck is supple. No JVD, No carotid bruit.   Neurologic: A & O x 3, no focal deficits. EOMI , Cranial nerves are intact.  Lymphatic: No cervical lymphadenopathy  Cardiovascular: Normal S1 S2, No murmur, rubs/gallops. No JVD, No edema  Respiratory: Lungs clear to auscultation  Gastrointestinal:  Soft, Non-tender, + BS  Lower Extremities: No edema ; right LE: painful range of motion   Psychiatry: Patient is calm. No agitation. Mood & affect appropriate  Skin: No rashes/ ecchymoses/cyanosis/ulcers visualized on the face, hands or feet.    CURRENT MEDICATIONS:  carvedilol 12.5 milliGRAM(s) Oral every 12 hours    docusate sodium  polyethylene glycol 3350  senna  atorvastatin  dextrose 50% Injectable  dextrose 50% Injectable  dextrose 50% Injectable  dextrose 50% Injectable  dextrose 50% Injectable  dextrose 50% Injectable  insulin glargine Injectable (LANTUS)  insulin lispro (HumaLOG) corrective regimen sliding scale  insulin lispro Injectable (HumaLOG)  aspirin  chewable  clopidogrel Tablet  dextrose 5%.  enoxaparin Injectable  sodium chloride  sodium chloride 0.9%.      LABS:	 	                              10.7   15.0  )-----------( 303      ( 28 Nov 2017 06:54 )             31.0     11-28    122<L>  |  85<L>  |  27.0<H>  ----------------------------<  234<H>  4.3   |  26.0  |  0.99    Ca    8.5<L>      28 Nov 2017 06:54      proBNP:   Lipid Profile:   HgA1c: Hemoglobin A1C, Whole Blood: 7.2 %  TSH:     TELEMETRY: Reviewed  . not available   ECG:  Reviewed by me. Sinus rhythm with frequent Premature ventricular complexes  Possible Left atrial enlargement  Left axis deviation  Left ventricular hypertrophy with QRS widening and repolarization abnormality  Abnormal ECG    DIAGNOSTIC TESTING:  [ ] Echocardiogram: EF 35-40% Ischemic cardiomyopathy. segmental wall motion.  Normal RV.  Mild MR. no effusion. n

## 2017-11-28 NOTE — PROGRESS NOTE ADULT - SUBJECTIVE AND OBJECTIVE BOX
ADRIANNA   ----------------------------------------  The patient was seen and evaluated for   The patient is in no acute distress.  Denied any chest pain, palpitations, dyspnea, or abdominal pain.    Vital Signs Last 24 Hrs  T(C): 36.8 (28 Nov 2017 00:45), Max: 37.1 (27 Nov 2017 19:15)  T(F): 98.3 (28 Nov 2017 00:45), Max: 98.7 (27 Nov 2017 19:15)  HR: 89 (28 Nov 2017 00:45) (89 - 94)  BP: 120/68 (28 Nov 2017 00:45) (94/59 - 120/68)  BP(mean): --  RR: 18 (28 Nov 2017 00:45) (16 - 18)  SpO2: 93% (28 Nov 2017 00:45) (85% - 94%)    CAPILLARY BLOOD GLUCOSE      POCT Blood Glucose.: 301 mg/dL (27 Nov 2017 21:06)  POCT Blood Glucose.: 344 mg/dL (27 Nov 2017 17:10)  POCT Blood Glucose.: 254 mg/dL (27 Nov 2017 12:14)  POCT Blood Glucose.: 188 mg/dL (27 Nov 2017 08:18)    PHYSICAL EXAMINATION:  ----------------------------------------      LABORATORY STUDIES:  ----------------------------------------                        11.3   17.0  )-----------( 273      ( 27 Nov 2017 07:55 )             32.3     11-28    122<L>  |  85<L>  |  27.0<H>  ----------------------------<  234<H>  4.3   |  26.0  |  0.99    Ca    8.5<L>      28 Nov 2017 06:54                        MEDICATIONS  (STANDING):  aspirin  chewable 81 milliGRAM(s) Oral daily  atorvastatin 80 milliGRAM(s) Oral at bedtime  carvedilol 12.5 milliGRAM(s) Oral every 12 hours  clopidogrel Tablet 75 milliGRAM(s) Oral daily  dextrose 5%. 1000 milliLiter(s) (50 mL/Hr) IV Continuous <Continuous>  dextrose 50% Injectable 12.5 Gram(s) IV Push once  dextrose 50% Injectable 25 Gram(s) IV Push once  dextrose 50% Injectable 25 Gram(s) IV Push once  dextrose 50% Injectable 12.5 Gram(s) IV Push once  dextrose 50% Injectable 25 Gram(s) IV Push once  dextrose 50% Injectable 25 Gram(s) IV Push once  enoxaparin Injectable 40 milliGRAM(s) SubCutaneous every 24 hours  famotidine    Tablet 20 milliGRAM(s) Oral daily  insulin glargine Injectable (LANTUS) 12 Unit(s) SubCutaneous at bedtime  insulin lispro (HumaLOG) corrective regimen sliding scale   SubCutaneous three times a day before meals  insulin lispro Injectable (HumaLOG) 4 Unit(s) SubCutaneous three times a day before meals    MEDICATIONS  (PRN):  dextrose Gel 1 Dose(s) Oral once PRN Blood Glucose LESS THAN 70 milliGRAM(s)/deciliter  dextrose Gel 1 Dose(s) Oral once PRN Blood Glucose LESS THAN 70 milliGRAM(s)/deciliter  glucagon  Injectable 1 milliGRAM(s) IntraMuscular once PRN Glucose LESS THAN 70 milligrams/deciliter  glucagon  Injectable 1 milliGRAM(s) IntraMuscular once PRN Glucose LESS THAN 70 milligrams/deciliter  HYDROmorphone  Injectable 0.5 milliGRAM(s) IV Push every 4 hours PRN Severe Pain (7 - 10)  morphine  - Injectable 2 milliGRAM(s) IV Push every 3 hours PRN Moderate Pain (4 - 6)  oxyCODONE    5 mG/acetaminophen 325 mG 1 Tablet(s) Oral every 4 hours PRN Moderate Pain (4 - 6)  oxyCODONE    IR 10 milliGRAM(s) Oral every 4 hours PRN Moderate Pain  oxyCODONE    IR 5 milliGRAM(s) Oral every 4 hours PRN Mild Pain      ASSESSMENT / PLAN:  ----------------------------------------

## 2017-11-28 NOTE — PROGRESS NOTE ADULT - ASSESSMENT
Mr Castellon, He is a 73 Y/O male with the past medical H/O CABG, CAD Last stenting march 2017 on ASA, plavix presented to ER with the chief presenting C/O Mechanical fall with X ray pelvis showing Rt Hip Intertrochanteric fracture. Pt reports that he was in [facility] for Visiting and there, along the side walkway, he twisted and fell. He was taken to Local ER there, where X ray showed Hip fracture. He was Offered Surgical intervention, but because of complex Cardiac History, family decided to bring him back to [facility] and have his surgery done here. C/O Severe Pain Along the Right Hip  X ray chest done in ER neg. EKG: NSR with PVC's and LVH. [facility] ER cardiology consulted for cardiac clearance. Orthopedics planning on surgery pending cardiac clearance  Pt currently denies CP, SOB  His Last ECHO : March 10, 2017: EF 35 -40 %    Rt Hip Trochanteric fracture, S/P mechanical fall,  POD#2 s/p right hip IM nail.     PT.OT consulted  D/C home with C in am, consulted SW/CSM    Hyponatremia moderate:  ? from SIADH from pain  sodium levels 122 again today  continue fluid restriction to 1200 cc in 24 hrs. spoke with Nurse   repeat BMP in am  spoke with  nephrology : will see the pt  Repeat urine Osm and Urine Na pending    Constipation: Opioid induced constipation  start Senna, Miralax, dulcolax    Type 2 DM with Hyperglycemia: sugars High today  HBA1C : 7.2  Increase Lantus 6 U Q HS to 12 U and Add Humalog 6 U TID before meals along with sliding scale   Hold all home PO DM med's  continue  Low dose SSI    Leukocytosis : Likely reactive stress response from surgery post Op  No Infection  trend wbc counts    H/O CAD with CABG, last stents March 2017:  Resumed ASA, plavix. continue statin, Coreg  no CP currently    Chronic Systolic CHF:  Currently not in exacerbation  hold  Lasix, because of slight Hypotension  His Last ECHO : March 10, 2017: EF 35 -40 %    DVT prophylaxis : SQ Lovenox. Pt to Go Home on Lovenox

## 2017-11-28 NOTE — PROGRESS NOTE ADULT - PROBLEM SELECTOR PLAN 3
fluid restriction. IV fluids sodium chloride.  IV lasix 20 mg Once if needed tomorrow sodium still low.
fluid restriction.

## 2017-11-28 NOTE — PROGRESS NOTE ADULT - SUBJECTIVE AND OBJECTIVE BOX
ADRIANNA SHANKS  ----------------------------------------     FOLLOW UP VISIT FOR RT SIDED HIP FRACTURE, HYPONATREMIA and DM-2    Pain better today. C/O CONSTIPATION  family at bed side, answered all questions  High sugars, started Insulin  sodium levels 122. Discussed with Nephro      Vital Signs Last 24 Hrs    T(C): 36.4 (28 Nov 2017 07:54), Max: 37.1 (27 Nov 2017 19:15)  T(F): 97.6 (28 Nov 2017 07:54), Max: 98.7 (27 Nov 2017 19:15)  HR: 80 (28 Nov 2017 07:54) (80 - 94)  BP: 112/69 (28 Nov 2017 07:54) (94/59 - 120/68)  BP(mean): --  RR: 18 (28 Nov 2017 07:54) (18 - 18)  SpO2: 92% (28 Nov 2017 07:54) (92% - 94%)    CAPILLARY BLOOD GLUCOSE      POCT Blood Glucose.: 309 mg/dL (28 Nov 2017 09:04)  POCT Blood Glucose.: 301 mg/dL (27 Nov 2017 21:06)  POCT Blood Glucose.: 344 mg/dL (27 Nov 2017 17:10)    PHYSICAL EXAMINATION:  ----------------------------------------    General appearance: NAD, Awake, Alert  HEENT: NCAT, Conjunctiva clear, EOMI  Neck: Supple, No JVD  Lungs: Decreased Breath sounds bilaterally  Cardiovascular: S1S2, Regular rhythm  Abdomen: Soft, Nontender, Positive bowel sounds  Extremities: No clubbing, No cyanosis, No edema  Rt Lower Limb externally rotated and shortened  CNS: alert and oriented times    LABORATORY STUDIES:  ----------------------------------------                        10.7   15.0  )-----------( 303      ( 28 Nov 2017 06:54 )             31.0     11-28    122<L>  |  85<L>  |  27.0<H>  ----------------------------<  234<H>  4.3   |  26.0  |  0.99    Ca    8.5<L>      28 Nov 2017 06:54      MEDICATIONS  (STANDING):    aspirin  chewable 81 milliGRAM(s) Oral daily  atorvastatin 80 milliGRAM(s) Oral at bedtime  bisacodyl 5 milliGRAM(s) Oral once  carvedilol 12.5 milliGRAM(s) Oral every 12 hours  clopidogrel Tablet 75 milliGRAM(s) Oral daily  dextrose 5%. 1000 milliLiter(s) (50 mL/Hr) IV Continuous <Continuous>  dextrose 50% Injectable 12.5 Gram(s) IV Push once  dextrose 50% Injectable 25 Gram(s) IV Push once  dextrose 50% Injectable 25 Gram(s) IV Push once  dextrose 50% Injectable 12.5 Gram(s) IV Push once  dextrose 50% Injectable 25 Gram(s) IV Push once  dextrose 50% Injectable 25 Gram(s) IV Push once  docusate sodium 100 milliGRAM(s) Oral two times a day  enoxaparin Injectable 40 milliGRAM(s) SubCutaneous every 24 hours  insulin glargine Injectable (LANTUS) 12 Unit(s) SubCutaneous at bedtime  insulin lispro (HumaLOG) corrective regimen sliding scale   SubCutaneous three times a day before meals  insulin lispro Injectable (HumaLOG) 6 Unit(s) SubCutaneous three times a day before meals  pantoprazole    Tablet 40 milliGRAM(s) Oral before breakfast  polyethylene glycol 3350 17 Gram(s) Oral daily  senna 2 Tablet(s) Oral at bedtime    MEDICATIONS  (PRN):  dextrose Gel 1 Dose(s) Oral once PRN Blood Glucose LESS THAN 70 milliGRAM(s)/deciliter  dextrose Gel 1 Dose(s) Oral once PRN Blood Glucose LESS THAN 70 milliGRAM(s)/deciliter  glucagon  Injectable 1 milliGRAM(s) IntraMuscular once PRN Glucose LESS THAN 70 milligrams/deciliter  glucagon  Injectable 1 milliGRAM(s) IntraMuscular once PRN Glucose LESS THAN 70 milligrams/deciliter  HYDROmorphone  Injectable 0.5 milliGRAM(s) IV Push every 4 hours PRN Severe Pain (7 - 10)  morphine  - Injectable 2 milliGRAM(s) IV Push every 3 hours PRN Moderate Pain (4 - 6)  oxyCODONE    5 mG/acetaminophen 325 mG 1 Tablet(s) Oral every 4 hours PRN Moderate Pain (4 - 6)  oxyCODONE    IR 10 milliGRAM(s) Oral every 4 hours PRN Moderate Pain  oxyCODONE    IR 5 milliGRAM(s) Oral every 4 hours PRN Mild Pain      ASSESSMENT / PLAN:  ----------------------------------------

## 2017-11-28 NOTE — PROGRESS NOTE ADULT - PROBLEM SELECTOR PROBLEM 1
Coronary artery disease involving coronary bypass graft of native heart with unstable angina pectoris
Coronary artery disease involving coronary bypass graft of native heart with unstable angina pectoris

## 2017-11-28 NOTE — PROGRESS NOTE ADULT - SUBJECTIVE AND OBJECTIVE BOX
Pt Name: ADRIANNA SHANKS    MRN: [id number]      Patient is a 72M s/p right IM Nail POD #2. Pt found resting comfortably sitting up in chair, in NAD at this time. Pts pain controlled with medication, no issues overnight. Pt denies c/p, sob, abdominal pain, n/v/c/d, numbness/tingling and has no other complaints.      Allergies: No Known Allergies      Medications: aspirin  chewable 81 milliGRAM(s) Oral daily  atorvastatin 80 milliGRAM(s) Oral at bedtime  carvedilol 12.5 milliGRAM(s) Oral every 12 hours  clopidogrel Tablet 75 milliGRAM(s) Oral daily  dextrose 5%. 1000 milliLiter(s) IV Continuous <Continuous>  dextrose 50% Injectable 12.5 Gram(s) IV Push once  dextrose 50% Injectable 25 Gram(s) IV Push once  dextrose 50% Injectable 25 Gram(s) IV Push once  dextrose 50% Injectable 12.5 Gram(s) IV Push once  dextrose 50% Injectable 25 Gram(s) IV Push once  dextrose 50% Injectable 25 Gram(s) IV Push once  dextrose Gel 1 Dose(s) Oral once PRN  dextrose Gel 1 Dose(s) Oral once PRN  enoxaparin Injectable 40 milliGRAM(s) SubCutaneous every 24 hours  famotidine    Tablet 20 milliGRAM(s) Oral daily  glucagon  Injectable 1 milliGRAM(s) IntraMuscular once PRN  glucagon  Injectable 1 milliGRAM(s) IntraMuscular once PRN  HYDROmorphone  Injectable 0.5 milliGRAM(s) IV Push every 4 hours PRN  insulin glargine Injectable (LANTUS) 6 Unit(s) SubCutaneous at bedtime  insulin lispro (HumaLOG) corrective regimen sliding scale   SubCutaneous three times a day before meals  morphine  - Injectable 2 milliGRAM(s) IV Push every 3 hours PRN  oxyCODONE    5 mG/acetaminophen 325 mG 1 Tablet(s) Oral every 4 hours PRN  oxyCODONE    IR 10 milliGRAM(s) Oral every 4 hours PRN  oxyCODONE    IR 5 milliGRAM(s) Oral every 4 hours PRN                              11.3   17.0  )-----------( 273      ( 27 Nov 2017 07:55 )             32.3     11-27    126<L>  |  86<L>  |  20.0  ----------------------------<  194<H>  4.0   |  22.0  |  1.00    Ca    8.8      27 Nov 2017 07:55        PHYSICAL EXAM:    Vital Signs Last 24 Hrs  T(C): 36.8 (28 Nov 2017 00:45), Max: 37.1 (27 Nov 2017 19:15)  T(F): 98.3 (28 Nov 2017 00:45), Max: 98.7 (27 Nov 2017 19:15)  HR: 89 (28 Nov 2017 00:45) (89 - 94)  BP: 120/68 (28 Nov 2017 00:45) (94/59 - 120/68)  BP(mean): --  RR: 18 (28 Nov 2017 00:45) (16 - 18)  SpO2: 93% (28 Nov 2017 00:45) (85% - 94%)  Daily     Daily     Appearance: Alert, responsive, in no acute distress.    Neurological: Sensation is grossly intact to light touch. 5/5 motor function of all extremities. No focal deficits or weaknesses found. No foot drop.    Skin: no rash on visible skin. Skin is clean, dry and intact. No bleeding. No abrasions. No ulcerations.    Dressing: Clean, dry and intact without staining. Dressing removed and new dressing applied. Incision clean, dry and intact without active bleeding, discharge or dehiscence.    Vascular: 2+ distal pulses. Cap refill < 2 sec. No signs of venous   insufficiency   or stasis. No extremity ulcerations. No cyanosis.    Musculoskeletal:         Left Upper Extremity:  + NROM. Non-tender. No signs of trauma.        Right Upper Extremity:  + NROM. Non-tender. No signs of trauma.        Left Lower Extremity:  + NROM. Non-tender. No signs of trauma. No calf tenderness.       Right Lower Extremity: +plantar/dorsiflexion, able to move toes, compartments soft and compressible, no calf tenderness.      A/P:  Pt is a  72y Male s/p right IM Nail POD #2.    PLAN:   * DVT prophylaxis as prescribed, including use of compression devices and ankle pumps  * Continue physical therapy, WBAT  * Weightbearing as tolerated of the right lower extremity with assistance of a walker  * Incentive spirometry encouraged  * Pain control as clinically indicated  * Discharge planning – anticipated discharge is Home / subacute rehabilitation / acute rehabilitation

## 2017-11-29 VITALS
HEART RATE: 79 BPM | RESPIRATION RATE: 18 BRPM | DIASTOLIC BLOOD PRESSURE: 75 MMHG | TEMPERATURE: 99 F | SYSTOLIC BLOOD PRESSURE: 141 MMHG | OXYGEN SATURATION: 98 %

## 2017-11-29 LAB
ALBUMIN SERPL ELPH-MCNC: 3.2 G/DL — LOW (ref 3.3–5.2)
ALP SERPL-CCNC: 82 U/L — SIGNIFICANT CHANGE UP (ref 40–120)
ALT FLD-CCNC: 13 U/L — SIGNIFICANT CHANGE UP
ANION GAP SERPL CALC-SCNC: 12 MMOL/L — SIGNIFICANT CHANGE UP (ref 5–17)
AST SERPL-CCNC: 21 U/L — SIGNIFICANT CHANGE UP
BILIRUB SERPL-MCNC: 0.6 MG/DL — SIGNIFICANT CHANGE UP (ref 0.4–2)
BUN SERPL-MCNC: 29 MG/DL — HIGH (ref 8–20)
CALCIUM SERPL-MCNC: 8.7 MG/DL — SIGNIFICANT CHANGE UP (ref 8.6–10.2)
CHLORIDE SERPL-SCNC: 91 MMOL/L — LOW (ref 98–107)
CO2 SERPL-SCNC: 27 MMOL/L — SIGNIFICANT CHANGE UP (ref 22–29)
CREAT SERPL-MCNC: 0.89 MG/DL — SIGNIFICANT CHANGE UP (ref 0.5–1.3)
GLUCOSE BLDC GLUCOMTR-MCNC: 331 MG/DL — HIGH (ref 70–99)
GLUCOSE SERPL-MCNC: 221 MG/DL — HIGH (ref 70–115)
HCT VFR BLD CALC: 30.2 % — LOW (ref 42–52)
HGB BLD-MCNC: 10.1 G/DL — LOW (ref 14–18)
MCHC RBC-ENTMCNC: 27.5 PG — SIGNIFICANT CHANGE UP (ref 27–31)
MCHC RBC-ENTMCNC: 33.4 G/DL — SIGNIFICANT CHANGE UP (ref 32–36)
MCV RBC AUTO: 82.3 FL — SIGNIFICANT CHANGE UP (ref 80–94)
PLATELET # BLD AUTO: 283 K/UL — SIGNIFICANT CHANGE UP (ref 150–400)
POTASSIUM SERPL-MCNC: 4.5 MMOL/L — SIGNIFICANT CHANGE UP (ref 3.5–5.3)
POTASSIUM SERPL-SCNC: 4.5 MMOL/L — SIGNIFICANT CHANGE UP (ref 3.5–5.3)
PROT SERPL-MCNC: 6.7 G/DL — SIGNIFICANT CHANGE UP (ref 6.6–8.7)
RBC # BLD: 3.67 M/UL — LOW (ref 4.6–6.2)
RBC # FLD: 11.5 % — SIGNIFICANT CHANGE UP (ref 11–15.6)
SODIUM SERPL-SCNC: 130 MMOL/L — LOW (ref 135–145)
WBC # BLD: 11.9 K/UL — HIGH (ref 4.8–10.8)
WBC # FLD AUTO: 11.9 K/UL — HIGH (ref 4.8–10.8)

## 2017-11-29 PROCEDURE — 99239 HOSP IP/OBS DSCHRG MGMT >30: CPT

## 2017-11-29 RX ORDER — INSULIN LISPRO 100/ML
8 VIAL (ML) SUBCUTANEOUS
Qty: 0 | Refills: 0 | Status: DISCONTINUED | OUTPATIENT
Start: 2017-11-29 | End: 2017-11-29

## 2017-11-29 RX ORDER — SENNA PLUS 8.6 MG/1
1 TABLET ORAL
Qty: 20 | Refills: 0
Start: 2017-11-29 | End: 2017-12-09

## 2017-11-29 RX ORDER — OXYCODONE AND ACETAMINOPHEN 5; 325 MG/1; MG/1
1 TABLET ORAL
Qty: 20 | Refills: 0
Start: 2017-11-29

## 2017-11-29 RX ADMIN — CARVEDILOL PHOSPHATE 12.5 MILLIGRAM(S): 80 CAPSULE, EXTENDED RELEASE ORAL at 06:07

## 2017-11-29 RX ADMIN — Medication 81 MILLIGRAM(S): at 12:30

## 2017-11-29 RX ADMIN — Medication 4: at 09:01

## 2017-11-29 RX ADMIN — Medication 100 MILLIGRAM(S): at 06:07

## 2017-11-29 RX ADMIN — CLOPIDOGREL BISULFATE 75 MILLIGRAM(S): 75 TABLET, FILM COATED ORAL at 12:30

## 2017-11-29 RX ADMIN — ENOXAPARIN SODIUM 40 MILLIGRAM(S): 100 INJECTION SUBCUTANEOUS at 06:07

## 2017-11-29 RX ADMIN — SODIUM CHLORIDE 1 GRAM(S): 9 INJECTION INTRAMUSCULAR; INTRAVENOUS; SUBCUTANEOUS at 06:07

## 2017-11-29 NOTE — PROGRESS NOTE ADULT - SUBJECTIVE AND OBJECTIVE BOX
History:  Patient seen and eval at bedside. Patient has no complaints. Patient denies nausea, vomiting, chest pain, shortness of breath, abdominal pain or fever. Patient states he is ready to go home.    Vital Signs Last 24 Hrs  T(C): 37.1 (29 Nov 2017 04:15), Max: 37.2 (29 Nov 2017 00:00)  T(F): 98.8 (29 Nov 2017 04:15), Max: 99 (29 Nov 2017 00:00)  HR: 79 (29 Nov 2017 04:15) (79 - 87)  BP: 141/75 (29 Nov 2017 04:15) (112/69 - 141/75)  RR: 18 (29 Nov 2017 04:15) (18 - 18)  SpO2: 98% (29 Nov 2017 04:15) (92% - 98%)                          10.1   11.9  )-----------( 283      ( 29 Nov 2017 05:57 )             30.2     11-29    130<L>  |  91<L>  |  29.0<H>  ----------------------------<  221<H>  4.5   |  27.0  |  0.89    Physical exam: NAD, awake, alert  Right Lower extremity: The dressing is clean, dry and intact. No wound erythema, discharge, drainage is noted. EHL/TA/GS/FHL intact,  Sensation to light touch is grossly intact distally. Motor function distally is 5/5. No foot drop. 2+ dorsalis pedis pulse. Calf soft, NT B/L. No cyanosis.    Primary Orthopedic Assessment: s/p right hip IMN POD#3     Plan:   ·	DVT prophylaxis as prescribed - Lov/plavix, including use of compression devices and ankle pumps  ·	Continue physical therapy: weight bearing as tolerated  ·	Pain control as clinically indicated  ·	Incentive spirometry encouraged  ·	Discharge planning: likely home today  ·	Cont care as per primary team

## 2017-11-29 NOTE — PROGRESS NOTE ADULT - ATTENDING COMMENTS
pt seen this am, he is doing well.  D/C to home today  f/u 2-3 weeks
Thank you for allowing me to participate in care of your patient.   Please call as needed.

## 2017-12-12 ENCOUNTER — APPOINTMENT (OUTPATIENT)
Dept: ORTHOPEDIC SURGERY | Facility: CLINIC | Age: 72
End: 2017-12-12
Payer: MEDICARE

## 2017-12-12 VITALS
HEIGHT: 67 IN | DIASTOLIC BLOOD PRESSURE: 74 MMHG | BODY MASS INDEX: 24.01 KG/M2 | WEIGHT: 153 LBS | SYSTOLIC BLOOD PRESSURE: 133 MMHG | HEART RATE: 83 BPM

## 2017-12-12 PROCEDURE — 73551 X-RAY EXAM OF FEMUR 1: CPT | Mod: RT

## 2017-12-12 PROCEDURE — 73502 X-RAY EXAM HIP UNI 2-3 VIEWS: CPT | Mod: RT

## 2017-12-12 PROCEDURE — 99024 POSTOP FOLLOW-UP VISIT: CPT

## 2017-12-19 PROCEDURE — 97530 THERAPEUTIC ACTIVITIES: CPT

## 2017-12-19 PROCEDURE — 85610 PROTHROMBIN TIME: CPT

## 2017-12-19 PROCEDURE — C1889: CPT

## 2017-12-19 PROCEDURE — 99285 EMERGENCY DEPT VISIT HI MDM: CPT | Mod: 25

## 2017-12-19 PROCEDURE — 80053 COMPREHEN METABOLIC PANEL: CPT

## 2017-12-19 PROCEDURE — 72170 X-RAY EXAM OF PELVIS: CPT

## 2017-12-19 PROCEDURE — 93306 TTE W/DOPPLER COMPLETE: CPT

## 2017-12-19 PROCEDURE — 86900 BLOOD TYPING SEROLOGIC ABO: CPT

## 2017-12-19 PROCEDURE — 86901 BLOOD TYPING SEROLOGIC RH(D): CPT

## 2017-12-19 PROCEDURE — 73501 X-RAY EXAM HIP UNI 1 VIEW: CPT

## 2017-12-19 PROCEDURE — 96374 THER/PROPH/DIAG INJ IV PUSH: CPT

## 2017-12-19 PROCEDURE — 73502 X-RAY EXAM HIP UNI 2-3 VIEWS: CPT

## 2017-12-19 PROCEDURE — 97163 PT EVAL HIGH COMPLEX 45 MIN: CPT

## 2017-12-19 PROCEDURE — C1713: CPT

## 2017-12-19 PROCEDURE — 71045 X-RAY EXAM CHEST 1 VIEW: CPT

## 2017-12-19 PROCEDURE — 84300 ASSAY OF URINE SODIUM: CPT

## 2017-12-19 PROCEDURE — 83935 ASSAY OF URINE OSMOLALITY: CPT

## 2017-12-19 PROCEDURE — 82962 GLUCOSE BLOOD TEST: CPT

## 2017-12-19 PROCEDURE — 76000 FLUOROSCOPY <1 HR PHYS/QHP: CPT

## 2017-12-19 PROCEDURE — 86850 RBC ANTIBODY SCREEN: CPT

## 2017-12-19 PROCEDURE — 85730 THROMBOPLASTIN TIME PARTIAL: CPT

## 2017-12-19 PROCEDURE — 96375 TX/PRO/DX INJ NEW DRUG ADDON: CPT

## 2017-12-19 PROCEDURE — 93005 ELECTROCARDIOGRAM TRACING: CPT

## 2017-12-19 PROCEDURE — 97116 GAIT TRAINING THERAPY: CPT

## 2017-12-19 PROCEDURE — 83930 ASSAY OF BLOOD OSMOLALITY: CPT

## 2017-12-19 PROCEDURE — 80048 BASIC METABOLIC PNL TOTAL CA: CPT

## 2017-12-19 PROCEDURE — 83036 HEMOGLOBIN GLYCOSYLATED A1C: CPT

## 2017-12-19 PROCEDURE — 36415 COLL VENOUS BLD VENIPUNCTURE: CPT

## 2017-12-19 PROCEDURE — 93971 EXTREMITY STUDY: CPT

## 2017-12-19 PROCEDURE — 85027 COMPLETE CBC AUTOMATED: CPT

## 2017-12-19 PROCEDURE — 97110 THERAPEUTIC EXERCISES: CPT

## 2017-12-19 PROCEDURE — 84550 ASSAY OF BLOOD/URIC ACID: CPT

## 2017-12-27 ENCOUNTER — OUTPATIENT (OUTPATIENT)
Dept: OUTPATIENT SERVICES | Facility: HOSPITAL | Age: 72
LOS: 1 days | End: 2017-12-27
Payer: MEDICARE

## 2017-12-27 DIAGNOSIS — I25.10 ATHEROSCLEROTIC HEART DISEASE OF NATIVE CORONARY ARTERY WITHOUT ANGINA PECTORIS: Chronic | ICD-10-CM

## 2017-12-27 DIAGNOSIS — Z95.1 PRESENCE OF AORTOCORONARY BYPASS GRAFT: Chronic | ICD-10-CM

## 2017-12-27 DIAGNOSIS — Z51.89 ENCOUNTER FOR OTHER SPECIFIED AFTERCARE: ICD-10-CM

## 2017-12-27 DIAGNOSIS — S72.141D DISPLACED INTERTROCHANTERIC FRACTURE OF RIGHT FEMUR, SUBSEQUENT ENCOUNTER FOR CLOSED FRACTURE WITH ROUTINE HEALING: ICD-10-CM

## 2017-12-27 DIAGNOSIS — Z95.5 PRESENCE OF CORONARY ANGIOPLASTY IMPLANT AND GRAFT: Chronic | ICD-10-CM

## 2018-01-18 ENCOUNTER — APPOINTMENT (OUTPATIENT)
Dept: ORTHOPEDIC SURGERY | Facility: CLINIC | Age: 73
End: 2018-01-18
Payer: MEDICARE

## 2018-01-18 VITALS
BODY MASS INDEX: 24.01 KG/M2 | HEART RATE: 89 BPM | SYSTOLIC BLOOD PRESSURE: 141 MMHG | WEIGHT: 153 LBS | DIASTOLIC BLOOD PRESSURE: 75 MMHG | HEIGHT: 67 IN

## 2018-01-18 DIAGNOSIS — Z86.79 PERSONAL HISTORY OF OTHER DISEASES OF THE CIRCULATORY SYSTEM: ICD-10-CM

## 2018-01-18 DIAGNOSIS — Z86.39 PERSONAL HISTORY OF OTHER ENDOCRINE, NUTRITIONAL AND METABOLIC DISEASE: ICD-10-CM

## 2018-01-18 PROCEDURE — 73552 X-RAY EXAM OF FEMUR 2/>: CPT | Mod: RT

## 2018-01-18 PROCEDURE — 99024 POSTOP FOLLOW-UP VISIT: CPT

## 2018-02-03 PROCEDURE — 97162 PT EVAL MOD COMPLEX 30 MIN: CPT

## 2018-02-03 PROCEDURE — 97010 HOT OR COLD PACKS THERAPY: CPT

## 2018-02-03 PROCEDURE — 97110 THERAPEUTIC EXERCISES: CPT

## 2018-04-01 ENCOUNTER — OUTPATIENT (OUTPATIENT)
Dept: OUTPATIENT SERVICES | Facility: HOSPITAL | Age: 73
LOS: 1 days | End: 2018-04-01
Payer: MEDICAID

## 2018-04-01 DIAGNOSIS — Z95.1 PRESENCE OF AORTOCORONARY BYPASS GRAFT: Chronic | ICD-10-CM

## 2018-04-01 DIAGNOSIS — I25.10 ATHEROSCLEROTIC HEART DISEASE OF NATIVE CORONARY ARTERY WITHOUT ANGINA PECTORIS: Chronic | ICD-10-CM

## 2018-04-01 DIAGNOSIS — Z95.5 PRESENCE OF CORONARY ANGIOPLASTY IMPLANT AND GRAFT: Chronic | ICD-10-CM

## 2018-04-01 PROCEDURE — G9001: CPT

## 2018-04-10 ENCOUNTER — INPATIENT (INPATIENT)
Facility: HOSPITAL | Age: 73
LOS: 1 days | Discharge: HOME | DRG: 644 | End: 2018-04-12
Attending: INTERNAL MEDICINE | Admitting: INTERNAL MEDICINE
Payer: MEDICARE

## 2018-04-10 VITALS
OXYGEN SATURATION: 96 % | TEMPERATURE: 98 F | SYSTOLIC BLOOD PRESSURE: 136 MMHG | HEART RATE: 83 BPM | HEIGHT: 67 IN | WEIGHT: 149.91 LBS | DIASTOLIC BLOOD PRESSURE: 76 MMHG | RESPIRATION RATE: 18 BRPM

## 2018-04-10 DIAGNOSIS — Z95.5 PRESENCE OF CORONARY ANGIOPLASTY IMPLANT AND GRAFT: Chronic | ICD-10-CM

## 2018-04-10 DIAGNOSIS — J40 BRONCHITIS, NOT SPECIFIED AS ACUTE OR CHRONIC: ICD-10-CM

## 2018-04-10 DIAGNOSIS — I25.10 ATHEROSCLEROTIC HEART DISEASE OF NATIVE CORONARY ARTERY WITHOUT ANGINA PECTORIS: ICD-10-CM

## 2018-04-10 DIAGNOSIS — E87.1 HYPO-OSMOLALITY AND HYPONATREMIA: ICD-10-CM

## 2018-04-10 DIAGNOSIS — E11.9 TYPE 2 DIABETES MELLITUS WITHOUT COMPLICATIONS: ICD-10-CM

## 2018-04-10 DIAGNOSIS — I25.10 ATHEROSCLEROTIC HEART DISEASE OF NATIVE CORONARY ARTERY WITHOUT ANGINA PECTORIS: Chronic | ICD-10-CM

## 2018-04-10 DIAGNOSIS — I50.22 CHRONIC SYSTOLIC (CONGESTIVE) HEART FAILURE: ICD-10-CM

## 2018-04-10 DIAGNOSIS — Z95.1 PRESENCE OF AORTOCORONARY BYPASS GRAFT: Chronic | ICD-10-CM

## 2018-04-10 DIAGNOSIS — R69 ILLNESS, UNSPECIFIED: ICD-10-CM

## 2018-04-10 LAB
ALBUMIN SERPL ELPH-MCNC: 3.8 G/DL — SIGNIFICANT CHANGE UP (ref 3.3–5.2)
ALP SERPL-CCNC: 110 U/L — SIGNIFICANT CHANGE UP (ref 40–120)
ALT FLD-CCNC: 12 U/L — SIGNIFICANT CHANGE UP
ANION GAP SERPL CALC-SCNC: 11 MMOL/L — SIGNIFICANT CHANGE UP (ref 5–17)
ANION GAP SERPL CALC-SCNC: 11 MMOL/L — SIGNIFICANT CHANGE UP (ref 5–17)
AST SERPL-CCNC: 19 U/L — SIGNIFICANT CHANGE UP
BASOPHILS # BLD AUTO: 0 K/UL — SIGNIFICANT CHANGE UP (ref 0–0.2)
BASOPHILS NFR BLD AUTO: 0.4 % — SIGNIFICANT CHANGE UP (ref 0–2)
BILIRUB SERPL-MCNC: 0.5 MG/DL — SIGNIFICANT CHANGE UP (ref 0.4–2)
BUN SERPL-MCNC: 12 MG/DL — SIGNIFICANT CHANGE UP (ref 8–20)
BUN SERPL-MCNC: 18 MG/DL — SIGNIFICANT CHANGE UP (ref 8–20)
CALCIUM SERPL-MCNC: 9 MG/DL — SIGNIFICANT CHANGE UP (ref 8.6–10.2)
CALCIUM SERPL-MCNC: 9.8 MG/DL — SIGNIFICANT CHANGE UP (ref 8.6–10.2)
CHLORIDE SERPL-SCNC: 81 MMOL/L — LOW (ref 98–107)
CHLORIDE SERPL-SCNC: 91 MMOL/L — LOW (ref 98–107)
CK SERPL-CCNC: 132 U/L — SIGNIFICANT CHANGE UP (ref 30–200)
CO2 SERPL-SCNC: 29 MMOL/L — SIGNIFICANT CHANGE UP (ref 22–29)
CO2 SERPL-SCNC: 29 MMOL/L — SIGNIFICANT CHANGE UP (ref 22–29)
CREAT SERPL-MCNC: 0.76 MG/DL — SIGNIFICANT CHANGE UP (ref 0.5–1.3)
CREAT SERPL-MCNC: 0.84 MG/DL — SIGNIFICANT CHANGE UP (ref 0.5–1.3)
EOSINOPHIL # BLD AUTO: 1.8 K/UL — HIGH (ref 0–0.5)
EOSINOPHIL NFR BLD AUTO: 13.8 % — HIGH (ref 0–6)
GLUCOSE BLDC GLUCOMTR-MCNC: 181 MG/DL — HIGH (ref 70–99)
GLUCOSE BLDC GLUCOMTR-MCNC: 227 MG/DL — HIGH (ref 70–99)
GLUCOSE BLDC GLUCOMTR-MCNC: 227 MG/DL — HIGH (ref 70–99)
GLUCOSE BLDC GLUCOMTR-MCNC: 258 MG/DL — HIGH (ref 70–99)
GLUCOSE SERPL-MCNC: 138 MG/DL — HIGH (ref 70–115)
GLUCOSE SERPL-MCNC: 232 MG/DL — HIGH (ref 70–115)
HCT VFR BLD CALC: 33.2 % — LOW (ref 42–52)
HGB BLD-MCNC: 11.6 G/DL — LOW (ref 14–18)
LYMPHOCYTES # BLD AUTO: 1.6 K/UL — SIGNIFICANT CHANGE UP (ref 1–4.8)
LYMPHOCYTES # BLD AUTO: 12.4 % — LOW (ref 20–55)
MCHC RBC-ENTMCNC: 27.2 PG — SIGNIFICANT CHANGE UP (ref 27–31)
MCHC RBC-ENTMCNC: 34.9 G/DL — SIGNIFICANT CHANGE UP (ref 32–36)
MCV RBC AUTO: 77.9 FL — LOW (ref 80–94)
MONOCYTES # BLD AUTO: 1.4 K/UL — HIGH (ref 0–0.8)
MONOCYTES NFR BLD AUTO: 10.4 % — HIGH (ref 3–10)
NEUTROPHILS # BLD AUTO: 8.2 K/UL — HIGH (ref 1.8–8)
NEUTROPHILS NFR BLD AUTO: 62.8 % — SIGNIFICANT CHANGE UP (ref 37–73)
OSMOLALITY UR: 256 MOSM/KG — LOW (ref 300–1000)
PLATELET # BLD AUTO: 343 K/UL — SIGNIFICANT CHANGE UP (ref 150–400)
POTASSIUM SERPL-MCNC: 4.1 MMOL/L — SIGNIFICANT CHANGE UP (ref 3.5–5.3)
POTASSIUM SERPL-MCNC: 5.2 MMOL/L — SIGNIFICANT CHANGE UP (ref 3.5–5.3)
POTASSIUM SERPL-SCNC: 4.1 MMOL/L — SIGNIFICANT CHANGE UP (ref 3.5–5.3)
POTASSIUM SERPL-SCNC: 5.2 MMOL/L — SIGNIFICANT CHANGE UP (ref 3.5–5.3)
PROT SERPL-MCNC: 7.2 G/DL — SIGNIFICANT CHANGE UP (ref 6.6–8.7)
RAPID RVP RESULT: SIGNIFICANT CHANGE UP
RBC # BLD: 4.26 M/UL — LOW (ref 4.6–6.2)
RBC # FLD: 12.7 % — SIGNIFICANT CHANGE UP (ref 11–15.6)
SODIUM SERPL-SCNC: 121 MMOL/L — LOW (ref 135–145)
SODIUM SERPL-SCNC: 131 MMOL/L — LOW (ref 135–145)
SODIUM UR-SCNC: 62 MMOL/L — SIGNIFICANT CHANGE UP
TROPONIN T SERPL-MCNC: <0.01 NG/ML — SIGNIFICANT CHANGE UP (ref 0–0.06)
TROPONIN T SERPL-MCNC: <0.01 NG/ML — SIGNIFICANT CHANGE UP (ref 0–0.06)
WBC # BLD: 13 K/UL — HIGH (ref 4.8–10.8)
WBC # FLD AUTO: 13 K/UL — HIGH (ref 4.8–10.8)

## 2018-04-10 PROCEDURE — 71045 X-RAY EXAM CHEST 1 VIEW: CPT | Mod: 26

## 2018-04-10 PROCEDURE — 93010 ELECTROCARDIOGRAM REPORT: CPT | Mod: 76

## 2018-04-10 PROCEDURE — 99285 EMERGENCY DEPT VISIT HI MDM: CPT | Mod: 25

## 2018-04-10 PROCEDURE — 99223 1ST HOSP IP/OBS HIGH 75: CPT

## 2018-04-10 PROCEDURE — 93306 TTE W/DOPPLER COMPLETE: CPT | Mod: 26

## 2018-04-10 RX ORDER — CARVEDILOL PHOSPHATE 80 MG/1
12.5 CAPSULE, EXTENDED RELEASE ORAL EVERY 12 HOURS
Qty: 0 | Refills: 0 | Status: DISCONTINUED | OUTPATIENT
Start: 2018-04-10 | End: 2018-04-12

## 2018-04-10 RX ORDER — SITAGLIPTIN AND METFORMIN HYDROCHLORIDE 500; 50 MG/1; MG/1
0 TABLET, FILM COATED ORAL
Qty: 0 | Refills: 0 | COMMUNITY

## 2018-04-10 RX ORDER — TOLVAPTAN 15 MG/1
15 TABLET ORAL ONCE
Qty: 0 | Refills: 0 | Status: COMPLETED | OUTPATIENT
Start: 2018-04-10 | End: 2018-04-10

## 2018-04-10 RX ORDER — DEXTROSE 50 % IN WATER 50 %
25 SYRINGE (ML) INTRAVENOUS ONCE
Qty: 0 | Refills: 0 | Status: DISCONTINUED | OUTPATIENT
Start: 2018-04-10 | End: 2018-04-12

## 2018-04-10 RX ORDER — ACETAMINOPHEN 500 MG
650 TABLET ORAL EVERY 6 HOURS
Qty: 0 | Refills: 0 | Status: DISCONTINUED | OUTPATIENT
Start: 2018-04-10 | End: 2018-04-12

## 2018-04-10 RX ORDER — ONDANSETRON 8 MG/1
4 TABLET, FILM COATED ORAL EVERY 6 HOURS
Qty: 0 | Refills: 0 | Status: DISCONTINUED | OUTPATIENT
Start: 2018-04-10 | End: 2018-04-12

## 2018-04-10 RX ORDER — PANTOPRAZOLE SODIUM 20 MG/1
40 TABLET, DELAYED RELEASE ORAL
Qty: 0 | Refills: 0 | Status: DISCONTINUED | OUTPATIENT
Start: 2018-04-10 | End: 2018-04-11

## 2018-04-10 RX ORDER — CLOPIDOGREL BISULFATE 75 MG/1
75 TABLET, FILM COATED ORAL DAILY
Qty: 0 | Refills: 0 | Status: DISCONTINUED | OUTPATIENT
Start: 2018-04-10 | End: 2018-04-12

## 2018-04-10 RX ORDER — AZITHROMYCIN 500 MG/1
500 TABLET, FILM COATED ORAL EVERY 24 HOURS
Qty: 0 | Refills: 0 | Status: COMPLETED | OUTPATIENT
Start: 2018-04-11 | End: 2018-04-12

## 2018-04-10 RX ORDER — ISOSORBIDE MONONITRATE 60 MG/1
30 TABLET, EXTENDED RELEASE ORAL DAILY
Qty: 0 | Refills: 0 | Status: DISCONTINUED | OUTPATIENT
Start: 2018-04-10 | End: 2018-04-12

## 2018-04-10 RX ORDER — ENOXAPARIN SODIUM 100 MG/ML
40 INJECTION SUBCUTANEOUS EVERY 24 HOURS
Qty: 0 | Refills: 0 | Status: DISCONTINUED | OUTPATIENT
Start: 2018-04-10 | End: 2018-04-12

## 2018-04-10 RX ORDER — DEXTROSE 50 % IN WATER 50 %
1 SYRINGE (ML) INTRAVENOUS ONCE
Qty: 0 | Refills: 0 | Status: DISCONTINUED | OUTPATIENT
Start: 2018-04-10 | End: 2018-04-12

## 2018-04-10 RX ORDER — AZITHROMYCIN 500 MG/1
500 TABLET, FILM COATED ORAL ONCE
Qty: 0 | Refills: 0 | Status: COMPLETED | OUTPATIENT
Start: 2018-04-10 | End: 2018-04-10

## 2018-04-10 RX ORDER — AZITHROMYCIN 500 MG/1
TABLET, FILM COATED ORAL
Qty: 0 | Refills: 0 | Status: COMPLETED | OUTPATIENT
Start: 2018-04-10 | End: 2018-04-12

## 2018-04-10 RX ORDER — GLUCAGON INJECTION, SOLUTION 0.5 MG/.1ML
1 INJECTION, SOLUTION SUBCUTANEOUS ONCE
Qty: 0 | Refills: 0 | Status: DISCONTINUED | OUTPATIENT
Start: 2018-04-10 | End: 2018-04-12

## 2018-04-10 RX ORDER — ASPIRIN/CALCIUM CARB/MAGNESIUM 324 MG
81 TABLET ORAL DAILY
Qty: 0 | Refills: 0 | Status: DISCONTINUED | OUTPATIENT
Start: 2018-04-10 | End: 2018-04-12

## 2018-04-10 RX ORDER — IPRATROPIUM/ALBUTEROL SULFATE 18-103MCG
3 AEROSOL WITH ADAPTER (GRAM) INHALATION EVERY 6 HOURS
Qty: 0 | Refills: 0 | Status: DISCONTINUED | OUTPATIENT
Start: 2018-04-10 | End: 2018-04-12

## 2018-04-10 RX ORDER — CEFTRIAXONE 500 MG/1
1000 INJECTION, POWDER, FOR SOLUTION INTRAMUSCULAR; INTRAVENOUS ONCE
Qty: 0 | Refills: 0 | Status: COMPLETED | OUTPATIENT
Start: 2018-04-10 | End: 2018-04-10

## 2018-04-10 RX ORDER — SODIUM CHLORIDE 9 MG/ML
1 INJECTION INTRAMUSCULAR; INTRAVENOUS; SUBCUTANEOUS THREE TIMES A DAY
Qty: 0 | Refills: 0 | Status: DISCONTINUED | OUTPATIENT
Start: 2018-04-10 | End: 2018-04-12

## 2018-04-10 RX ORDER — SODIUM CHLORIDE 9 MG/ML
1000 INJECTION INTRAMUSCULAR; INTRAVENOUS; SUBCUTANEOUS
Qty: 0 | Refills: 0 | Status: DISCONTINUED | OUTPATIENT
Start: 2018-04-10 | End: 2018-04-11

## 2018-04-10 RX ORDER — DEXTROSE 50 % IN WATER 50 %
12.5 SYRINGE (ML) INTRAVENOUS ONCE
Qty: 0 | Refills: 0 | Status: DISCONTINUED | OUTPATIENT
Start: 2018-04-10 | End: 2018-04-12

## 2018-04-10 RX ORDER — SACCHAROMYCES BOULARDII 250 MG
250 POWDER IN PACKET (EA) ORAL
Qty: 0 | Refills: 0 | Status: DISCONTINUED | OUTPATIENT
Start: 2018-04-10 | End: 2018-04-12

## 2018-04-10 RX ORDER — INSULIN GLARGINE 100 [IU]/ML
15 INJECTION, SOLUTION SUBCUTANEOUS AT BEDTIME
Qty: 0 | Refills: 0 | Status: DISCONTINUED | OUTPATIENT
Start: 2018-04-10 | End: 2018-04-11

## 2018-04-10 RX ORDER — CEFTRIAXONE 500 MG/1
INJECTION, POWDER, FOR SOLUTION INTRAMUSCULAR; INTRAVENOUS
Qty: 0 | Refills: 0 | Status: DISCONTINUED | OUTPATIENT
Start: 2018-04-10 | End: 2018-04-10

## 2018-04-10 RX ORDER — CEFTRIAXONE 500 MG/1
1000 INJECTION, POWDER, FOR SOLUTION INTRAMUSCULAR; INTRAVENOUS EVERY 24 HOURS
Qty: 0 | Refills: 0 | Status: COMPLETED | OUTPATIENT
Start: 2018-04-11 | End: 2018-04-12

## 2018-04-10 RX ORDER — METOCLOPRAMIDE HCL 10 MG
10 TABLET ORAL ONCE
Qty: 0 | Refills: 0 | Status: COMPLETED | OUTPATIENT
Start: 2018-04-10 | End: 2018-04-11

## 2018-04-10 RX ORDER — SODIUM CHLORIDE 9 MG/ML
1000 INJECTION, SOLUTION INTRAVENOUS
Qty: 0 | Refills: 0 | Status: DISCONTINUED | OUTPATIENT
Start: 2018-04-10 | End: 2018-04-12

## 2018-04-10 RX ORDER — VALSARTAN 80 MG/1
160 TABLET ORAL DAILY
Qty: 0 | Refills: 0 | Status: DISCONTINUED | OUTPATIENT
Start: 2018-04-10 | End: 2018-04-12

## 2018-04-10 RX ORDER — CEFTRIAXONE 500 MG/1
INJECTION, POWDER, FOR SOLUTION INTRAMUSCULAR; INTRAVENOUS
Qty: 0 | Refills: 0 | Status: COMPLETED | OUTPATIENT
Start: 2018-04-10 | End: 2018-04-12

## 2018-04-10 RX ORDER — ATORVASTATIN CALCIUM 80 MG/1
40 TABLET, FILM COATED ORAL AT BEDTIME
Qty: 0 | Refills: 0 | Status: DISCONTINUED | OUTPATIENT
Start: 2018-04-10 | End: 2018-04-12

## 2018-04-10 RX ORDER — INSULIN LISPRO 100/ML
VIAL (ML) SUBCUTANEOUS
Qty: 0 | Refills: 0 | Status: DISCONTINUED | OUTPATIENT
Start: 2018-04-10 | End: 2018-04-12

## 2018-04-10 RX ADMIN — Medication 3 MILLILITER(S): at 14:47

## 2018-04-10 RX ADMIN — Medication 4: at 13:11

## 2018-04-10 RX ADMIN — SODIUM CHLORIDE 100 MILLILITER(S): 9 INJECTION INTRAMUSCULAR; INTRAVENOUS; SUBCUTANEOUS at 08:15

## 2018-04-10 RX ADMIN — TOLVAPTAN 15 MILLIGRAM(S): 15 TABLET ORAL at 13:09

## 2018-04-10 RX ADMIN — CARVEDILOL PHOSPHATE 12.5 MILLIGRAM(S): 80 CAPSULE, EXTENDED RELEASE ORAL at 17:23

## 2018-04-10 RX ADMIN — VALSARTAN 160 MILLIGRAM(S): 80 TABLET ORAL at 11:36

## 2018-04-10 RX ADMIN — Medication 4: at 17:34

## 2018-04-10 RX ADMIN — ATORVASTATIN CALCIUM 40 MILLIGRAM(S): 80 TABLET, FILM COATED ORAL at 21:19

## 2018-04-10 RX ADMIN — Medication 200 MILLIGRAM(S): at 21:19

## 2018-04-10 RX ADMIN — Medication 40 MILLIGRAM(S): at 21:19

## 2018-04-10 RX ADMIN — Medication 200 MILLIGRAM(S): at 11:36

## 2018-04-10 RX ADMIN — Medication 250 MILLIGRAM(S): at 17:23

## 2018-04-10 RX ADMIN — SODIUM CHLORIDE 1 GRAM(S): 9 INJECTION INTRAMUSCULAR; INTRAVENOUS; SUBCUTANEOUS at 21:18

## 2018-04-10 RX ADMIN — ISOSORBIDE MONONITRATE 30 MILLIGRAM(S): 60 TABLET, EXTENDED RELEASE ORAL at 11:36

## 2018-04-10 RX ADMIN — CLOPIDOGREL BISULFATE 75 MILLIGRAM(S): 75 TABLET, FILM COATED ORAL at 11:36

## 2018-04-10 RX ADMIN — ENOXAPARIN SODIUM 40 MILLIGRAM(S): 100 INJECTION SUBCUTANEOUS at 11:34

## 2018-04-10 RX ADMIN — INSULIN GLARGINE 15 UNIT(S): 100 INJECTION, SOLUTION SUBCUTANEOUS at 22:51

## 2018-04-10 RX ADMIN — AZITHROMYCIN 255 MILLIGRAM(S): 500 TABLET, FILM COATED ORAL at 11:36

## 2018-04-10 RX ADMIN — Medication 40 MILLIGRAM(S): at 14:19

## 2018-04-10 RX ADMIN — Medication 3 MILLILITER(S): at 20:13

## 2018-04-10 RX ADMIN — SODIUM CHLORIDE 1 GRAM(S): 9 INJECTION INTRAMUSCULAR; INTRAVENOUS; SUBCUTANEOUS at 14:19

## 2018-04-10 RX ADMIN — Medication 81 MILLIGRAM(S): at 14:19

## 2018-04-10 RX ADMIN — SODIUM CHLORIDE 100 MILLILITER(S): 9 INJECTION INTRAMUSCULAR; INTRAVENOUS; SUBCUTANEOUS at 17:24

## 2018-04-10 RX ADMIN — CEFTRIAXONE 1000 MILLIGRAM(S): 500 INJECTION, POWDER, FOR SOLUTION INTRAMUSCULAR; INTRAVENOUS at 11:53

## 2018-04-10 NOTE — ED PROVIDER NOTE - OBJECTIVE STATEMENT
A 72 year old male pt with a hx of HTN, DM, 6 cardiac stents, presents to the ED c/o cough, vomiting, chest tightness. For the past 3 days the pt has been dealing with a productive cough and has been using abx, Robitussin and nebulizer treatments for the cough. This morning, around 3:30 A.M., the pt suddenly woke up with a cough, feeling congested in the chest, and experienced two episodes of vomiting. Pt states that he felt that his heart was racing and that his pressure was high during this time, and had his son call 911. In the ED, the pt reports current chest tightness secondary to congestion. He has not had any fever, chills, or N/V/D prior to the episode this morning. Pt did take Lasix this morning PTA. His PMD is Dr. Louis and his cardiologist is Dr. Loving (Last catheterization 1 year ago w/ stent placement.) No further complaints at this time.

## 2018-04-10 NOTE — ED ADULT NURSE REASSESSMENT NOTE - NS ED NURSE REASSESS COMMENT FT1
Patient found laying in stretcher, awake alert, and oriented times 3, breathing unlabored.  Patient states chest tightness at present time which has improved.  patient states has episode of vomiting yesterday and chest pain after vomiting episode.  Patient states chest tightness has been present since yesterday.  No N/V.  Intermittent coughing with intermittent production that has been present for weeks as per patient and family.

## 2018-04-10 NOTE — ED ADULT NURSE NOTE - PSH
CAD S/P percutaneous coronary angioplasty  x2 in 2008  x2 in 2010  S/P CABG (coronary artery bypass graft)    S/P CABG (coronary artery bypass graft)    S/P primary angioplasty with coronary stent

## 2018-04-10 NOTE — CONSULT NOTE ADULT - SUBJECTIVE AND OBJECTIVE BOX
Patient is a 72y old  Male who presents with a chief complaint of chest pain, coughing, weakness (10 Apr 2018 09:33)      HPI:  71 yo M w/ hx CAD with stents, DM2 presents with progressive shortness of breath, cough of green sputum and chest congestion x 3-4 days. Pt saw PMD several days ago and placed on oral antibiotics; his symptoms continued and had Lasix 20mg x 1 yesterday.  On admission labs hyponatremia noted==> pt's family states this has occurred in past as well but they can provide no further details.    PAST MEDICAL & SURGICAL HISTORY:  Coronary artery disease involving coronary bypass graft of native heart with unstable angina pectoris  High cholesterol  Diabetes mellitus  Hypertension  GERD (gastroesophageal reflux disease)  HLD (hyperlipidemia)  HTN (hypertension)  DM (diabetes mellitus)  CAD (coronary artery disease)  S/P primary angioplasty with coronary stent  S/P CABG (coronary artery bypass graft)  CAD S/P percutaneous coronary angioplasty: x2 in 2008  x2 in 2010  S/P CABG (coronary artery bypass graft)      FAMILY HISTORY:  No pertinent family history in first degree relatives  No pertinent family history in first degree relatives      Social History:  No tobacco; no etoh nor drug abuse    MEDICATIONS  (STANDING):  ALBUTerol/ipratropium for Nebulization 3 milliLiter(s) Nebulizer every 6 hours  atorvastatin 40 milliGRAM(s) Oral at bedtime  azithromycin  IVPB 500 milliGRAM(s) IV Intermittent once  azithromycin  IVPB      carvedilol 12.5 milliGRAM(s) Oral every 12 hours  cefTRIAXone Injectable. 1000 milliGRAM(s) IV Push once  cefTRIAXone Injectable.      clopidogrel Tablet 75 milliGRAM(s) Oral daily  dextrose 5%. 1000 milliLiter(s) (50 mL/Hr) IV Continuous <Continuous>  dextrose 50% Injectable 12.5 Gram(s) IV Push once  dextrose 50% Injectable 25 Gram(s) IV Push once  dextrose 50% Injectable 25 Gram(s) IV Push once  enoxaparin Injectable 40 milliGRAM(s) SubCutaneous every 24 hours  insulin lispro (HumaLOG) corrective regimen sliding scale   SubCutaneous three times a day before meals  isosorbide   mononitrate ER Tablet (IMDUR) 30 milliGRAM(s) Oral daily  methylPREDNISolone sodium succinate Injectable 40 milliGRAM(s) IV Push every 8 hours  metoclopramide Injectable 10 milliGRAM(s) IV Push once  pantoprazole    Tablet 40 milliGRAM(s) Oral before breakfast  saccharomyces boulardii 250 milliGRAM(s) Oral two times a day  sodium chloride 0.9%. 1000 milliLiter(s) (100 mL/Hr) IV Continuous <Continuous>  valsartan 160 milliGRAM(s) Oral daily    MEDICATIONS  (PRN):  acetaminophen   Tablet. 650 milliGRAM(s) Oral every 6 hours PRN Moderate Pain (4 - 6) or fever >38C  dextrose Gel 1 Dose(s) Oral once PRN Blood Glucose LESS THAN 70 milliGRAM(s)/deciliter  glucagon  Injectable 1 milliGRAM(s) IntraMuscular once PRN Glucose LESS THAN 70 milligrams/deciliter  guaiFENesin   Syrup  (Sugar-Free) 200 milliGRAM(s) Oral every 6 hours PRN Cough  ondansetron Injectable 4 milliGRAM(s) IV Push every 6 hours PRN Nausea and/or Vomiting      Allergies    No Known Allergies    Intolerances        REVIEW OF SYSTEMS:    CONSTITUTIONAL: No fever, weight loss, + fatigue  EYES: No eye pain, visual disturbances, or discharge  ENMT:  No difficulty hearing, tinnitus, vertigo; No sinus or throat pain  NECK: No pain or stiffness  RESPIRATORY: + productive cough, + shortness of breath  CARDIOVASCULAR: + pleuritic chest pain  GASTROINTESTINAL: No abdominal or epigastric pain. No nausea, vomiting, or hematemesis; No diarrhea or constipation. No melena or hematochezia.  GENITOURINARY: No dysuria, frequency, hematuria, or incontinence  NEUROLOGICAL: No headaches, memory loss, loss of strength, numbness, or tremors  SKIN: No itching, burning, rashes, or lesions   LYMPH NODES: No enlarged glands  ENDOCRINE: No heat or cold intolerance; No hair loss  MUSCULOSKELETAL: No joint pain or swelling; No muscle, back, or extremity pain  PSYCHIATRIC: No depression, anxiety, mood swings, or difficulty sleeping        Vital Signs Last 24 Hrs  T(C): 36.6 (10 Apr 2018 04:57), Max: 36.6 (10 Apr 2018 04:57)  T(F): 97.9 (10 Apr 2018 04:57), Max: 97.9 (10 Apr 2018 04:57)  HR: 83 (10 Apr 2018 04:57) (83 - 83)  BP: 136/76 (10 Apr 2018 04:57) (136/76 - 136/76)  BP(mean): --  RR: 18 (10 Apr 2018 04:57) (18 - 18)  SpO2: 96% (10 Apr 2018 04:57) (96% - 96%)    PHYSICAL EXAM:    GENERAL: NAD, weakness  HEAD:  Atraumatic, Normocephalic  EYES: EOMI, PERRLA, conjunctiva and sclera clear  ENMT: No tonsillar erythema, exudates, or enlargement; Moist mucous membranes, Good dentition, No lesions  NECK: Supple, No JVD, Normal thyroid  NERVOUS SYSTEM:  Alert & Oriented X3, + intact and symmetric  CHEST/LUNG: Clear to percussion bilaterally  HEART: Regular rate and rhythm; No rub  ABDOMEN: Soft, Nontender, Nondistended; +BS  EXTREMITIES:  2+ Peripheral Pulses, No edema  LYMPH: No lymphadenopathy noted  SKIN: No rashes or lesions      LABS:                        11.6   13.0  )-----------( 343      ( 10 Apr 2018 05:37 )             33.2     04-10    121<L>  |  81<L>  |  18.0  ----------------------------<  138<H>  4.1   |  29.0  |  0.84    Ca    9.0      10 Apr 2018 05:37    TPro  7.2  /  Alb  3.8  /  TBili  0.5  /  DBili  x   /  AST  19  /  ALT  12  /  AlkPhos  110  04-10            RADIOLOGY & ADDITIONAL TESTS:  < from: Xray Chest 1 View- PORTABLE-Urgent (04.10.18 @ 06:27) >   EXAM:  XR CHEST PORTABLE URGENT 1V                          PROCEDURE DATE:  04/10/2018          INTERPRETATION:  Portable chest radiograph        CLINICAL INFORMATION: Chest pain, cough    TECHNIQUE:  Portable  AP view of the chest was obtained.    COMPARISON: 11/25/2017 available for review.    FINDINGS:    The lungs  are clear.  No pleural abnormality is seen.         The  heart is enlarged in transverse diameter. No hilar mass. Trachea   midline.        Status post coronary artery bypass graft procedure. Visualized osseous   structures are intact.        IMPRESSION:   No evidence of active chest disease.          < end of copied text >

## 2018-04-10 NOTE — CONSULT NOTE ADULT - SUBJECTIVE AND OBJECTIVE BOX
Patient is a 72y old  Male who presents with a chief complaint of chest pain, coughing, weakness (10 Apr 2018 09:33)    HPI:  73 yo M w/ hx CAD with stents, DM2 presents with progressive shortness of breath, cough of green sputum and chest congestion x 3-4 days.  No fevers/chills/sweats.  + coughing fits causing anterior chest pain.  Seen by PMD as outpatient and started on cefdinir for presumed bronchitis along with lasix for congestion.  Patient came to ED as SOB worsened overnight and chest pain concerned patient given his hx CAD.  Took 1 dose of lasix 20mg for congestion.  Denies hx copd/asthma, nonsmoker. no travel. no sick contacts. (10 Apr 2018 09:33)    Above noted. Pt with cough, feverish, sputum production and chest tightness both side of chest. Not similar to his symptoms when he needed a stent. He is on abx and nebs. Feeling slightly better.   Hx of CABG in 2010, 6 stent post cabg.     [facility] March 2017:   CORONARY VESSELS: The coronary circulation is right dominant.  LM:   --  LM: Angiography showed severe atherosclerosis.  LAD:   --  LAD: There was a stenosis. This lesion is a chronic total  occlusion at its ostium Distal vessel angiography showed a large sized  vessel and minor luminal irregularities.  CX:   --  Circumflex: The vessel was small to medium sized. Angiography  showed severe atherosclerosis.  --  OM1: The vessel was medium sized supplies by SVG  RCA:   --  RCA: The vessel was small sized. There was a stenosis. This  lesion is a chronic total occlusion. There was good collateral blood  supply to the distal myocardium thorough the LAD.  GRAFTS:   --  Graft to the LAD: The graft was a LIMA. LIMA was patent  --  Graft to the 1st obtuse marginal: The graft was a saphenous vein graft  from the aorta. There was a tubular 99 % stenosis at the distal  anastomosis, at the site of a prior stent, within the stented segment. The  lesion was smoothly contoured, hazy, and concentric. There was no evidence  of associated thrombus and CAROLINA grade 2 flow through the graft (partial  perfusion). This lesion is a likely culprit for the patient's anginal  symptoms. An intervention was performed.  COMPLICATIONS: There were no complications.  DIAGNOSTIC IMPRESSIONS: 100% occluded native arteries  LIMA to LAD patent  SVG to OM1 with severe ISR in distal segment of the graft and distal  anastomosis  DIAGNOSTIC RECOMMENDATIONS: Proceed to PCI of SVG to OM1    PAST MEDICAL & SURGICAL HISTORY:  Coronary artery disease involving coronary bypass graft of native heart with unstable angina pectoris  High cholesterol  Diabetes mellitus  Hypertension  GERD (gastroesophageal reflux disease)  HLD (hyperlipidemia)  HTN (hypertension)  DM (diabetes mellitus)  CAD (coronary artery disease)  S/P primary angioplasty with coronary stent  S/P CABG (coronary artery bypass graft)  CAD S/P percutaneous coronary angioplasty: x2 in 2008  x2 in 2010  S/P CABG (coronary artery bypass graft)    PREVIOUS DIAGNOSTIC TESTING:      TTE:     Summary:   1. Left ventricular ejection fraction, by visual estimation, is 35 to   40%.   2. Moderately decreased global left ventricular systolic function.   3. Multiple left ventricular regional wall motion abnormalities exist.   See wall motion findings.   4. Mild mitral annular calcification.   5. Mild mitral valve regurgitation.   6. Thickening of the anterior and posterior mitral valve leaflets.   7. Sclerotic aortic valve with normal opening.   8. Endocardial visualization was enhanced with intravenous echo contrast.     MD Akin Electronically signed on 11/26/2017 at 6:35:39 PM    Allergies  No Known Allergies    MEDICATIONS  (STANDING):  ALBUTerol/ipratropium for Nebulization 3 milliLiter(s) Nebulizer every 6 hours  atorvastatin 40 milliGRAM(s) Oral at bedtime  azithromycin  IVPB 500 milliGRAM(s) IV Intermittent once  azithromycin  IVPB      carvedilol 12.5 milliGRAM(s) Oral every 12 hours  cefTRIAXone Injectable. 1000 milliGRAM(s) IV Push once  cefTRIAXone Injectable.      clopidogrel Tablet 75 milliGRAM(s) Oral daily  dextrose 5%. 1000 milliLiter(s) (50 mL/Hr) IV Continuous <Continuous>  dextrose 50% Injectable 12.5 Gram(s) IV Push once  dextrose 50% Injectable 25 Gram(s) IV Push once  dextrose 50% Injectable 25 Gram(s) IV Push once  enoxaparin Injectable 40 milliGRAM(s) SubCutaneous every 24 hours  insulin lispro (HumaLOG) corrective regimen sliding scale   SubCutaneous three times a day before meals  isosorbide   mononitrate ER Tablet (IMDUR) 30 milliGRAM(s) Oral daily  methylPREDNISolone sodium succinate Injectable 40 milliGRAM(s) IV Push every 8 hours  metoclopramide Injectable 10 milliGRAM(s) IV Push once  pantoprazole    Tablet 40 milliGRAM(s) Oral before breakfast  saccharomyces boulardii 250 milliGRAM(s) Oral two times a day  sodium chloride 0.9%. 1000 milliLiter(s) (100 mL/Hr) IV Continuous <Continuous>  valsartan 160 milliGRAM(s) Oral daily  MEDICATIONS  (PRN):  acetaminophen   Tablet. 650 milliGRAM(s) Oral every 6 hours PRN Moderate Pain (4 - 6) or fever >38C  dextrose Gel 1 Dose(s) Oral once PRN Blood Glucose LESS THAN 70 milliGRAM(s)/deciliter  glucagon  Injectable 1 milliGRAM(s) IntraMuscular once PRN Glucose LESS THAN 70 milligrams/deciliter  guaiFENesin   Syrup  (Sugar-Free) 200 milliGRAM(s) Oral every 6 hours PRN Cough  ondansetron Injectable 4 milliGRAM(s) IV Push every 6 hours PRN Nausea and/or Vomiting    FAMILY HISTORY:  No pertinent family history in first degree relatives  No pertinent family history in first degree relatives    SOCIAL HISTORY: No tobacco, etoh or drug use.     REVIEW OF SYSTEMS:  CONSTITUTIONAL: No fever, weight loss, or fatigue  EYES: No eye pain, visual disturbances, or discharge  ENMT:  No difficulty hearing, tinnitus, vertigo; No sinus or throat pain  NECK: No pain or stiffness  RESPIRATORY: + cough, wheezing,.   CARDIOVASCULAR: No chest pain, palpitations, passing out, dizziness, or leg swelling  GASTROINTESTINAL: No abdominal or epigastric pain. No nausea, vomiting, or hematemesis; No diarrhea or constipation. No melena or hematochezia.  GENITOURINARY: No dysuria, frequency, hematuria, or incontinence  NEUROLOGICAL: No headaches, memory loss, loss of strength, numbness, or tremors  SKIN: No itching, burning, rashes, or lesions   LYMPH Nodes: No enlarged glands  ENDOCRINE: No heat or cold intolerance; No hair loss  MUSCULOSKELETAL: No joint pain or swelling; No muscle, back, or extremity pain  PSYCHIATRIC: No depression, anxiety, mood swings, or difficulty sleeping  HEME/LYMPH: No easy bruising, or bleeding gums  ALLERY AND IMMUNOLOGIC: No hives or eczema	    Vital Signs Last 24 Hrs  T(C): 36.6 (10 Apr 2018 04:57), Max: 36.6 (10 Apr 2018 04:57)  T(F): 97.9 (10 Apr 2018 04:57), Max: 97.9 (10 Apr 2018 04:57)  HR: 83 (10 Apr 2018 04:57) (83 - 83)  BP: 136/76 (10 Apr 2018 04:57) (136/76 - 136/76)  BP(mean): --  RR: 18 (10 Apr 2018 04:57) (18 - 18)  SpO2: 96% (10 Apr 2018 04:57) (96% - 96%)    PHYSICAL EXAM:  Appearance: Normal, well nourished	  HEENT:   Normal oral mucosa, PERRL, EOMI, sclera non-icteric	  Lymphatic: No cervical lymphadenopathy  Cardiovascular: Normal S1 S2, No JVD,, No carotid bruits, No peripheral edema  Respiratory: b/l exp wheeze	  Psychiatry: A & O x 3, Mood & affect appropriate  Gastrointestinal:  Soft, Non-tender, + BS, no bruits	  Skin: No rashes, No ecchymoses, No cyanosis  Neurologic: Grossly non-focal with full strength in all four extremities  Extremities: Normal range of motion, No clubbing, cyanosis or edema  Vascular: Peripheral pulses palpable 2+ bilaterally    ECG: NSR, LVH, RBBB, old AS MI possible.     LABS:                        11.6   13.0  )-----------( 343      ( 10 Apr 2018 05:37 )             33.2     04-10    121<L>  |  81<L>  |  18.0  ----------------------------<  138<H>  4.1   |  29.0  |  0.84    Ca    9.0      10 Apr 2018 05:37    TPro  7.2  /  Alb  3.8  /  TBili  0.5  /  DBili  x   /  AST  19  /  ALT  12  /  AlkPhos  110  04-10    CARDIAC MARKERS ( 10 Apr 2018 05:37 )  x     / <0.01 ng/mL / 132 U/L / x     / x

## 2018-04-10 NOTE — ED ADULT TRIAGE NOTE - CHIEF COMPLAINT QUOTE
patient biba from home states that he woke up at 3:30am with chest pain, throat pain, vomiting and diaphoretic. Patient took 324mg asa at home and 1 nitro prior to EMS arrival and 1 nitro by EMS, states that after his pain has improved but he still has some difficulty breathing.

## 2018-04-10 NOTE — CONSULT NOTE ADULT - ASSESSMENT
71 yo male with HTN, DM, HLD, CAD with URI symptoms, most likely suffering from acute bronchitis  He is not having any anginal pain. EKG unchanged C negative, doubt CAD as a cause for his symptoms.  cont with nebs, steroids and abx  Cont with home meds  Pt is on asa as well with recent pci to svg-om 1 year ago  Hyponatremia, euvolemic., start samsca. etiology not known. ]  DVT prophylaxis

## 2018-04-10 NOTE — CONSULT NOTE ADULT - ASSESSMENT
Chronic hyponatremia==> medical records from Nov 2017 indicate serum Na+-->122-->130 and work up suggestive of SIADH  Etiology of SIADH is unclear  - check urine Na+ and osm; check serum osmolarity  - check TSH, cortisol, uric acid  - oral fluid restriction  - NaCl tabs  - monitor labs

## 2018-04-10 NOTE — ED ADULT NURSE NOTE - GENITOURINARY WDL
12 hour chart check    MS: SR 80-90; .14/.08/.38 no ectopy   Bladder non-tender and non-distended. Urine clear yellow.

## 2018-04-10 NOTE — ED PROVIDER NOTE - CARE PLAN
Principal Discharge DX:	Hyponatremia with normal extracellular fluid volume  Secondary Diagnosis:	Bronchitis

## 2018-04-10 NOTE — ED ADULT NURSE NOTE - PMH
CAD (coronary artery disease)    Coronary artery disease involving coronary bypass graft of native heart with unstable angina pectoris    Diabetes mellitus    DM (diabetes mellitus)    GERD (gastroesophageal reflux disease)    High cholesterol    HLD (hyperlipidemia)    HTN (hypertension)    Hypertension

## 2018-04-10 NOTE — H&P ADULT - ASSESSMENT
73 yo M w/ hx CAD with stents, DM2 presents with progressive shortness of breath, cough of green sputum and chest congestion x 3-4 days, along with chest pain/chest congestion.

## 2018-04-10 NOTE — ED ADULT NURSE NOTE - OBJECTIVE STATEMENT
Pt c/o coughing x's one week and waking up tonight with vomiting and chest pain, pt admits to taking two nitro tabs and one 325 mg aspirin prior to arrival, denies difficulty breathing at this time

## 2018-04-10 NOTE — H&P ADULT - HISTORY OF PRESENT ILLNESS
73 yo M w/ hx CAD with stents, DM2 presents with progressive shortness of breath, cough of green sputum and chest congestion x 3-4 days.  No fevers/chills/sweats.  + coughing fits causing anterior chest pain.  Seen by PMD as outpatient and started on cefdinir for presumed bronchitis along with lasix for congestion.  Patient came to ED as SOB worsened overnight and chest pain concerned patient given his hx CAD.  Took 1 dose of lasix 20mg for congestion.  Denies hx copd/asthma, nonsmoker. no travel. no sick contacts.

## 2018-04-11 DIAGNOSIS — J20.9 ACUTE BRONCHITIS, UNSPECIFIED: ICD-10-CM

## 2018-04-11 DIAGNOSIS — K21.0 GASTRO-ESOPHAGEAL REFLUX DISEASE WITH ESOPHAGITIS: ICD-10-CM

## 2018-04-11 LAB
ACANTHOCYTES BLD QL SMEAR: SLIGHT — SIGNIFICANT CHANGE UP
ANION GAP SERPL CALC-SCNC: 12 MMOL/L — SIGNIFICANT CHANGE UP (ref 5–17)
ANION GAP SERPL CALC-SCNC: 14 MMOL/L — SIGNIFICANT CHANGE UP (ref 5–17)
ANISOCYTOSIS BLD QL: SLIGHT — SIGNIFICANT CHANGE UP
BUN SERPL-MCNC: 15 MG/DL — SIGNIFICANT CHANGE UP (ref 8–20)
BUN SERPL-MCNC: 15 MG/DL — SIGNIFICANT CHANGE UP (ref 8–20)
CALCIUM SERPL-MCNC: 9.3 MG/DL — SIGNIFICANT CHANGE UP (ref 8.6–10.2)
CALCIUM SERPL-MCNC: 9.4 MG/DL — SIGNIFICANT CHANGE UP (ref 8.6–10.2)
CHLORIDE SERPL-SCNC: 92 MMOL/L — LOW (ref 98–107)
CHLORIDE SERPL-SCNC: 94 MMOL/L — LOW (ref 98–107)
CO2 SERPL-SCNC: 24 MMOL/L — SIGNIFICANT CHANGE UP (ref 22–29)
CO2 SERPL-SCNC: 25 MMOL/L — SIGNIFICANT CHANGE UP (ref 22–29)
CREAT SERPL-MCNC: 0.75 MG/DL — SIGNIFICANT CHANGE UP (ref 0.5–1.3)
CREAT SERPL-MCNC: 0.75 MG/DL — SIGNIFICANT CHANGE UP (ref 0.5–1.3)
GLUCOSE BLDC GLUCOMTR-MCNC: 190 MG/DL — HIGH (ref 70–99)
GLUCOSE BLDC GLUCOMTR-MCNC: 232 MG/DL — HIGH (ref 70–99)
GLUCOSE BLDC GLUCOMTR-MCNC: 255 MG/DL — HIGH (ref 70–99)
GLUCOSE BLDC GLUCOMTR-MCNC: 301 MG/DL — HIGH (ref 70–99)
GLUCOSE SERPL-MCNC: 259 MG/DL — HIGH (ref 70–115)
GLUCOSE SERPL-MCNC: 260 MG/DL — HIGH (ref 70–115)
HBA1C BLD-MCNC: 7.4 % — HIGH (ref 4–5.6)
HCT VFR BLD CALC: 35 % — LOW (ref 42–52)
HGB BLD-MCNC: 12 G/DL — LOW (ref 14–18)
LEGIONELLA AG UR QL: NEGATIVE — SIGNIFICANT CHANGE UP
LYMPHOCYTES # BLD AUTO: 8 % — LOW (ref 20–55)
MCHC RBC-ENTMCNC: 27.1 PG — SIGNIFICANT CHANGE UP (ref 27–31)
MCHC RBC-ENTMCNC: 34.3 G/DL — SIGNIFICANT CHANGE UP (ref 32–36)
MCV RBC AUTO: 79.2 FL — LOW (ref 80–94)
MONOCYTES NFR BLD AUTO: 2 % — LOW (ref 3–10)
NEUTROPHILS NFR BLD AUTO: 90 % — HIGH (ref 37–73)
PLAT MORPH BLD: SIGNIFICANT CHANGE UP
PLATELET # BLD AUTO: 313 K/UL — SIGNIFICANT CHANGE UP (ref 150–400)
POIKILOCYTOSIS BLD QL AUTO: SLIGHT — SIGNIFICANT CHANGE UP
POTASSIUM SERPL-MCNC: 4.4 MMOL/L — SIGNIFICANT CHANGE UP (ref 3.5–5.3)
POTASSIUM SERPL-MCNC: 4.6 MMOL/L — SIGNIFICANT CHANGE UP (ref 3.5–5.3)
POTASSIUM SERPL-SCNC: 4.4 MMOL/L — SIGNIFICANT CHANGE UP (ref 3.5–5.3)
POTASSIUM SERPL-SCNC: 4.6 MMOL/L — SIGNIFICANT CHANGE UP (ref 3.5–5.3)
RBC # BLD: 4.42 M/UL — LOW (ref 4.6–6.2)
RBC # FLD: 13.3 % — SIGNIFICANT CHANGE UP (ref 11–15.6)
RBC BLD AUTO: ABNORMAL
SODIUM SERPL-SCNC: 130 MMOL/L — LOW (ref 135–145)
SODIUM SERPL-SCNC: 131 MMOL/L — LOW (ref 135–145)
TROPONIN T SERPL-MCNC: <0.01 NG/ML — SIGNIFICANT CHANGE UP (ref 0–0.06)
TROPONIN T SERPL-MCNC: <0.01 NG/ML — SIGNIFICANT CHANGE UP (ref 0–0.06)
TSH SERPL-MCNC: 1.1 UIU/ML — SIGNIFICANT CHANGE UP (ref 0.27–4.2)
URATE SERPL-MCNC: 3.8 MG/DL — SIGNIFICANT CHANGE UP (ref 3.4–7)
WBC # BLD: 9 K/UL — SIGNIFICANT CHANGE UP (ref 4.8–10.8)
WBC # FLD AUTO: 9 K/UL — SIGNIFICANT CHANGE UP (ref 4.8–10.8)

## 2018-04-11 PROCEDURE — 99233 SBSQ HOSP IP/OBS HIGH 50: CPT

## 2018-04-11 PROCEDURE — 93010 ELECTROCARDIOGRAM REPORT: CPT

## 2018-04-11 RX ORDER — PANTOPRAZOLE SODIUM 20 MG/1
40 TABLET, DELAYED RELEASE ORAL
Qty: 0 | Refills: 0 | Status: DISCONTINUED | OUTPATIENT
Start: 2018-04-11 | End: 2018-04-12

## 2018-04-11 RX ORDER — INSULIN GLARGINE 100 [IU]/ML
20 INJECTION, SOLUTION SUBCUTANEOUS AT BEDTIME
Qty: 0 | Refills: 0 | Status: DISCONTINUED | OUTPATIENT
Start: 2018-04-11 | End: 2018-04-12

## 2018-04-11 RX ORDER — NITROGLYCERIN 6.5 MG
0.4 CAPSULE, EXTENDED RELEASE ORAL
Qty: 0 | Refills: 0 | Status: DISCONTINUED | OUTPATIENT
Start: 2018-04-11 | End: 2018-04-12

## 2018-04-11 RX ORDER — INFLUENZA VIRUS VACCINE 15; 15; 15; 15 UG/.5ML; UG/.5ML; UG/.5ML; UG/.5ML
0.5 SUSPENSION INTRAMUSCULAR ONCE
Qty: 0 | Refills: 0 | Status: DISCONTINUED | OUTPATIENT
Start: 2018-04-11 | End: 2018-04-12

## 2018-04-11 RX ADMIN — VALSARTAN 160 MILLIGRAM(S): 80 TABLET ORAL at 05:58

## 2018-04-11 RX ADMIN — Medication 3 MILLILITER(S): at 02:47

## 2018-04-11 RX ADMIN — Medication 650 MILLIGRAM(S): at 12:30

## 2018-04-11 RX ADMIN — Medication 250 MILLIGRAM(S): at 05:52

## 2018-04-11 RX ADMIN — PANTOPRAZOLE SODIUM 40 MILLIGRAM(S): 20 TABLET, DELAYED RELEASE ORAL at 05:52

## 2018-04-11 RX ADMIN — CARVEDILOL PHOSPHATE 12.5 MILLIGRAM(S): 80 CAPSULE, EXTENDED RELEASE ORAL at 05:52

## 2018-04-11 RX ADMIN — SODIUM CHLORIDE 100 MILLILITER(S): 9 INJECTION INTRAMUSCULAR; INTRAVENOUS; SUBCUTANEOUS at 11:41

## 2018-04-11 RX ADMIN — AZITHROMYCIN 255 MILLIGRAM(S): 500 TABLET, FILM COATED ORAL at 11:40

## 2018-04-11 RX ADMIN — CARVEDILOL PHOSPHATE 12.5 MILLIGRAM(S): 80 CAPSULE, EXTENDED RELEASE ORAL at 17:37

## 2018-04-11 RX ADMIN — SODIUM CHLORIDE 1 GRAM(S): 9 INJECTION INTRAMUSCULAR; INTRAVENOUS; SUBCUTANEOUS at 14:10

## 2018-04-11 RX ADMIN — SODIUM CHLORIDE 1 GRAM(S): 9 INJECTION INTRAMUSCULAR; INTRAVENOUS; SUBCUTANEOUS at 22:05

## 2018-04-11 RX ADMIN — Medication 40 MILLIGRAM(S): at 05:52

## 2018-04-11 RX ADMIN — Medication 650 MILLIGRAM(S): at 11:30

## 2018-04-11 RX ADMIN — Medication 6: at 08:49

## 2018-04-11 RX ADMIN — ATORVASTATIN CALCIUM 40 MILLIGRAM(S): 80 TABLET, FILM COATED ORAL at 22:05

## 2018-04-11 RX ADMIN — Medication 3 MILLILITER(S): at 08:08

## 2018-04-11 RX ADMIN — ONDANSETRON 4 MILLIGRAM(S): 8 TABLET, FILM COATED ORAL at 01:33

## 2018-04-11 RX ADMIN — SODIUM CHLORIDE 100 MILLILITER(S): 9 INJECTION INTRAMUSCULAR; INTRAVENOUS; SUBCUTANEOUS at 06:26

## 2018-04-11 RX ADMIN — Medication 100 MILLIGRAM(S): at 14:10

## 2018-04-11 RX ADMIN — Medication 200 MILLIGRAM(S): at 04:40

## 2018-04-11 RX ADMIN — Medication 3 MILLILITER(S): at 14:39

## 2018-04-11 RX ADMIN — ENOXAPARIN SODIUM 40 MILLIGRAM(S): 100 INJECTION SUBCUTANEOUS at 11:32

## 2018-04-11 RX ADMIN — INSULIN GLARGINE 20 UNIT(S): 100 INJECTION, SOLUTION SUBCUTANEOUS at 21:06

## 2018-04-11 RX ADMIN — Medication 40 MILLIGRAM(S): at 17:38

## 2018-04-11 RX ADMIN — Medication 0.4 MILLIGRAM(S): at 04:01

## 2018-04-11 RX ADMIN — SODIUM CHLORIDE 1 GRAM(S): 9 INJECTION INTRAMUSCULAR; INTRAVENOUS; SUBCUTANEOUS at 05:52

## 2018-04-11 RX ADMIN — Medication 250 MILLIGRAM(S): at 17:37

## 2018-04-11 RX ADMIN — Medication 30 MILLILITER(S): at 19:20

## 2018-04-11 RX ADMIN — PANTOPRAZOLE SODIUM 40 MILLIGRAM(S): 20 TABLET, DELAYED RELEASE ORAL at 17:37

## 2018-04-11 RX ADMIN — Medication 200 MILLIGRAM(S): at 11:32

## 2018-04-11 RX ADMIN — Medication 3 MILLILITER(S): at 21:06

## 2018-04-11 RX ADMIN — Medication 81 MILLIGRAM(S): at 11:33

## 2018-04-11 RX ADMIN — Medication 10 MILLIGRAM(S): at 06:18

## 2018-04-11 RX ADMIN — CEFTRIAXONE 1000 MILLIGRAM(S): 500 INJECTION, POWDER, FOR SOLUTION INTRAMUSCULAR; INTRAVENOUS at 14:02

## 2018-04-11 RX ADMIN — CLOPIDOGREL BISULFATE 75 MILLIGRAM(S): 75 TABLET, FILM COATED ORAL at 11:33

## 2018-04-11 RX ADMIN — Medication 100 MILLIGRAM(S): at 21:02

## 2018-04-11 RX ADMIN — Medication 4: at 17:37

## 2018-04-11 RX ADMIN — ISOSORBIDE MONONITRATE 30 MILLIGRAM(S): 60 TABLET, EXTENDED RELEASE ORAL at 11:33

## 2018-04-11 RX ADMIN — Medication 8: at 11:29

## 2018-04-11 NOTE — PROGRESS NOTE ADULT - ASSESSMENT
73 yo M w/ hx CAD with stents, DM2 presents with progressive shortness of breath, cough of green sputum and chest congestion x 3-4 days, along with chest pain/chest congestion.   Suspect bronchitis with bronchospasm along with reflux induced chest discomfort.  hyponatremia likely due to SIADH

## 2018-04-11 NOTE — PROGRESS NOTE ADULT - SUBJECTIVE AND OBJECTIVE BOX
seen for bronchitis, hyponatremia    complaning of epigastric sharp pain radiating up the sternum/esophagus.  + coughing fits, worsening pain.  ROS otherwise negative      MEDICATIONS  (STANDING):  ALBUTerol/ipratropium for Nebulization 3 milliLiter(s) Nebulizer every 6 hours  aspirin enteric coated 81 milliGRAM(s) Oral daily  atorvastatin 40 milliGRAM(s) Oral at bedtime  azithromycin  IVPB 500 milliGRAM(s) IV Intermittent every 24 hours  azithromycin  IVPB      benzonatate 100 milliGRAM(s) Oral three times a day  carvedilol 12.5 milliGRAM(s) Oral every 12 hours  cefTRIAXone Injectable.      cefTRIAXone Injectable. 1000 milliGRAM(s) IV Push every 24 hours  clopidogrel Tablet 75 milliGRAM(s) Oral daily  dextrose 5%. 1000 milliLiter(s) (50 mL/Hr) IV Continuous <Continuous>  dextrose 50% Injectable 12.5 Gram(s) IV Push once  dextrose 50% Injectable 25 Gram(s) IV Push once  dextrose 50% Injectable 25 Gram(s) IV Push once  enoxaparin Injectable 40 milliGRAM(s) SubCutaneous every 24 hours  insulin glargine Injectable (LANTUS) 20 Unit(s) SubCutaneous at bedtime  insulin lispro (HumaLOG) corrective regimen sliding scale   SubCutaneous three times a day before meals  isosorbide   mononitrate ER Tablet (IMDUR) 30 milliGRAM(s) Oral daily  methylPREDNISolone sodium succinate Injectable 40 milliGRAM(s) IV Push every 12 hours  pantoprazole    Tablet 40 milliGRAM(s) Oral two times a day  saccharomyces boulardii 250 milliGRAM(s) Oral two times a day  sodium chloride 1 Gram(s) Oral three times a day  valsartan 160 milliGRAM(s) Oral daily    MEDICATIONS  (PRN):  acetaminophen   Tablet. 650 milliGRAM(s) Oral every 6 hours PRN Moderate Pain (4 - 6) or fever >38C  aluminum hydroxide/magnesium hydroxide/simethicone Suspension 30 milliLiter(s) Oral every 4 hours PRN Dyspepsia  dextrose Gel 1 Dose(s) Oral once PRN Blood Glucose LESS THAN 70 milliGRAM(s)/deciliter  glucagon  Injectable 1 milliGRAM(s) IntraMuscular once PRN Glucose LESS THAN 70 milligrams/deciliter  HYDROcodone/homatropine Syrup 10 milliLiter(s) Oral four times a day PRN Cough  nitroglycerin     SubLingual 0.4 milliGRAM(s) SubLingual every 5 minutes PRN Chest Pain  ondansetron Injectable 4 milliGRAM(s) IV Push every 6 hours PRN Nausea and/or Vomiting      Allergies    No Known Allergies    Vital Signs Last 24 Hrs  T(C): 37.1 (11 Apr 2018 07:31), Max: 37.1 (11 Apr 2018 07:31)  T(F): 98.7 (11 Apr 2018 07:31), Max: 98.7 (11 Apr 2018 07:31)  HR: 104 (11 Apr 2018 08:26) (72 - 117)  BP: 156/87 (11 Apr 2018 07:31) (147/72 - 171/97)  BP(mean): --  RR: 20 (11 Apr 2018 07:31) (20 - 20)  SpO2: 98% (11 Apr 2018 08:26) (97% - 100%)    PHYSICAL EXAM:    GENERAL: NAD  CHEST/LUNG: Clear to percussion bilaterally  HEART: Regular rate and rhythm; S1 S2  ABDOMEN: Soft, TTP epigastrium., Nondistended; Bowel sounds present  EXTREMITIES:  no edema.  NERVOUS SYSTEM:  Alert & Oriented X3 nonfocal  LABS:                        12.0   9.0   )-----------( 313      ( 11 Apr 2018 02:11 )             35.0     04-11    131<L>  |  94<L>  |  15.0  ----------------------------<  260<H>  4.6   |  25.0  |  0.75    Ca    9.3      11 Apr 2018 02:11    TPro  7.2  /  Alb  3.8  /  TBili  0.5  /  DBili  x   /  AST  19  /  ALT  12  /  AlkPhos  110  04-10          CAPILLARY BLOOD GLUCOSE      POCT Blood Glucose.: 301 mg/dL (11 Apr 2018 11:28)  POCT Blood Glucose.: 255 mg/dL (11 Apr 2018 08:48)  POCT Blood Glucose.: 258 mg/dL (10 Apr 2018 22:18)  POCT Blood Glucose.: 227 mg/dL (10 Apr 2018 17:31)        RADIOLOGY & ADDITIONAL TESTS:

## 2018-04-12 ENCOUNTER — TRANSCRIPTION ENCOUNTER (OUTPATIENT)
Age: 73
End: 2018-04-12

## 2018-04-12 VITALS — WEIGHT: 149.91 LBS

## 2018-04-12 LAB
CORTIS AM PEAK SERPL-MCNC: 14.3 UG/DL — SIGNIFICANT CHANGE UP (ref 6–18.4)
GLUCOSE BLDC GLUCOMTR-MCNC: 246 MG/DL — HIGH (ref 70–99)
GLUCOSE BLDC GLUCOMTR-MCNC: 342 MG/DL — HIGH (ref 70–99)

## 2018-04-12 PROCEDURE — 99285 EMERGENCY DEPT VISIT HI MDM: CPT

## 2018-04-12 PROCEDURE — 83935 ASSAY OF URINE OSMOLALITY: CPT

## 2018-04-12 PROCEDURE — 87633 RESP VIRUS 12-25 TARGETS: CPT

## 2018-04-12 PROCEDURE — 80048 BASIC METABOLIC PNL TOTAL CA: CPT

## 2018-04-12 PROCEDURE — 87486 CHLMYD PNEUM DNA AMP PROBE: CPT

## 2018-04-12 PROCEDURE — 84484 ASSAY OF TROPONIN QUANT: CPT

## 2018-04-12 PROCEDURE — 82533 TOTAL CORTISOL: CPT

## 2018-04-12 PROCEDURE — 82550 ASSAY OF CK (CPK): CPT

## 2018-04-12 PROCEDURE — 83036 HEMOGLOBIN GLYCOSYLATED A1C: CPT

## 2018-04-12 PROCEDURE — 84443 ASSAY THYROID STIM HORMONE: CPT

## 2018-04-12 PROCEDURE — 87798 DETECT AGENT NOS DNA AMP: CPT

## 2018-04-12 PROCEDURE — 99239 HOSP IP/OBS DSCHRG MGMT >30: CPT

## 2018-04-12 PROCEDURE — 82962 GLUCOSE BLOOD TEST: CPT

## 2018-04-12 PROCEDURE — 93005 ELECTROCARDIOGRAM TRACING: CPT

## 2018-04-12 PROCEDURE — 94640 AIRWAY INHALATION TREATMENT: CPT

## 2018-04-12 PROCEDURE — 83690 ASSAY OF LIPASE: CPT

## 2018-04-12 PROCEDURE — 85027 COMPLETE CBC AUTOMATED: CPT

## 2018-04-12 PROCEDURE — 80053 COMPREHEN METABOLIC PANEL: CPT

## 2018-04-12 PROCEDURE — 87449 NOS EACH ORGANISM AG IA: CPT

## 2018-04-12 PROCEDURE — 94760 N-INVAS EAR/PLS OXIMETRY 1: CPT

## 2018-04-12 PROCEDURE — 84550 ASSAY OF BLOOD/URIC ACID: CPT

## 2018-04-12 PROCEDURE — 71045 X-RAY EXAM CHEST 1 VIEW: CPT

## 2018-04-12 PROCEDURE — 87581 M.PNEUMON DNA AMP PROBE: CPT

## 2018-04-12 PROCEDURE — 93306 TTE W/DOPPLER COMPLETE: CPT

## 2018-04-12 PROCEDURE — 36415 COLL VENOUS BLD VENIPUNCTURE: CPT

## 2018-04-12 PROCEDURE — 84300 ASSAY OF URINE SODIUM: CPT

## 2018-04-12 RX ORDER — METOCLOPRAMIDE HCL 10 MG
10 TABLET ORAL ONCE
Qty: 0 | Refills: 0 | Status: COMPLETED | OUTPATIENT
Start: 2018-04-12 | End: 2018-04-12

## 2018-04-12 RX ORDER — ALBUTEROL 90 UG/1
2 AEROSOL, METERED ORAL
Qty: 1 | Refills: 0 | OUTPATIENT
Start: 2018-04-12 | End: 2018-05-11

## 2018-04-12 RX ORDER — ESOMEPRAZOLE MAGNESIUM 40 MG/1
1 CAPSULE, DELAYED RELEASE ORAL
Qty: 7 | Refills: 0 | OUTPATIENT
Start: 2018-04-12 | End: 2018-04-18

## 2018-04-12 RX ORDER — SACCHAROMYCES BOULARDII 250 MG
1 POWDER IN PACKET (EA) ORAL
Qty: 8 | Refills: 0 | OUTPATIENT
Start: 2018-04-12 | End: 2018-04-15

## 2018-04-12 RX ORDER — ESOMEPRAZOLE MAGNESIUM 40 MG/1
1 CAPSULE, DELAYED RELEASE ORAL
Qty: 0 | Refills: 0 | COMMUNITY

## 2018-04-12 RX ORDER — CARVEDILOL PHOSPHATE 80 MG/1
1 CAPSULE, EXTENDED RELEASE ORAL
Qty: 60 | Refills: 0 | OUTPATIENT
Start: 2018-04-12 | End: 2018-05-11

## 2018-04-12 RX ORDER — CARVEDILOL PHOSPHATE 80 MG/1
0 CAPSULE, EXTENDED RELEASE ORAL
Qty: 0 | Refills: 0 | COMMUNITY

## 2018-04-12 RX ADMIN — Medication 10 MILLIGRAM(S): at 02:54

## 2018-04-12 RX ADMIN — Medication 100 MILLIGRAM(S): at 12:06

## 2018-04-12 RX ADMIN — SODIUM CHLORIDE 1 GRAM(S): 9 INJECTION INTRAMUSCULAR; INTRAVENOUS; SUBCUTANEOUS at 06:24

## 2018-04-12 RX ADMIN — CLOPIDOGREL BISULFATE 75 MILLIGRAM(S): 75 TABLET, FILM COATED ORAL at 12:03

## 2018-04-12 RX ADMIN — VALSARTAN 160 MILLIGRAM(S): 80 TABLET ORAL at 06:24

## 2018-04-12 RX ADMIN — Medication 4: at 08:35

## 2018-04-12 RX ADMIN — CARVEDILOL PHOSPHATE 12.5 MILLIGRAM(S): 80 CAPSULE, EXTENDED RELEASE ORAL at 06:25

## 2018-04-12 RX ADMIN — AZITHROMYCIN 255 MILLIGRAM(S): 500 TABLET, FILM COATED ORAL at 12:04

## 2018-04-12 RX ADMIN — Medication 30 MILLILITER(S): at 00:22

## 2018-04-12 RX ADMIN — SODIUM CHLORIDE 1 GRAM(S): 9 INJECTION INTRAMUSCULAR; INTRAVENOUS; SUBCUTANEOUS at 12:04

## 2018-04-12 RX ADMIN — Medication 100 MILLIGRAM(S): at 06:24

## 2018-04-12 RX ADMIN — Medication 3 MILLILITER(S): at 09:43

## 2018-04-12 RX ADMIN — Medication 250 MILLIGRAM(S): at 06:24

## 2018-04-12 RX ADMIN — Medication 81 MILLIGRAM(S): at 12:03

## 2018-04-12 RX ADMIN — PANTOPRAZOLE SODIUM 40 MILLIGRAM(S): 20 TABLET, DELAYED RELEASE ORAL at 06:24

## 2018-04-12 RX ADMIN — Medication 40 MILLIGRAM(S): at 06:24

## 2018-04-12 RX ADMIN — Medication 8: at 12:06

## 2018-04-12 RX ADMIN — CEFTRIAXONE 1000 MILLIGRAM(S): 500 INJECTION, POWDER, FOR SOLUTION INTRAMUSCULAR; INTRAVENOUS at 12:04

## 2018-04-12 NOTE — DISCHARGE NOTE ADULT - CARE PROVIDER_API CALL
PMD,   Phone: (   )    -  Fax: (   )    -    Tao Rodriguez), Cardiovascular Disease  [address]  [place] [address]  Phone: [phone]  Fax: [phone]    Gary Mcgarry), Internal Medicine; Nephrology  [address] [place] [address]  Phone: [phone]  Fax: [phone]

## 2018-04-12 NOTE — DISCHARGE NOTE ADULT - PLAN OF CARE
Resolution of infection Complete antibiotics as prescribed  Complete Prednisone taper  Albuterol MDI as needed  Follow up with PMD 5-7 days, sooner if needed Oral fluid restriction one liter daily  Follow up with Renal 5-7 days, sooner if needed  Repeat BMP 5-7 days with PMD Coreg decreased to 12.5 BID  Continue home medications  low sodium diet Continue home medications  follow up with Cardiology 1-2 weeks, sooner if needed Pepcid

## 2018-04-12 NOTE — DISCHARGE NOTE ADULT - MEDICATION SUMMARY - MEDICATIONS TO STOP TAKING
I will STOP taking the medications listed below when I get home from the hospital:    Pepcid 40 mg oral tablet  -- 1 tab(s) by mouth once a day (at bedtime)    glipiZIDE 10 mg oral tablet  -- 1 tab(s) by mouth once a day (at bedtime)    losartan 50 mg oral tablet  -- 1 tab(s) by mouth once a day    simvastatin 40 mg oral tablet  -- 1 tab(s) by mouth once a day (at bedtime)    metoprolol tartrate 50 mg oral tablet  -- 1 tab(s) by mouth 2 times a day    amLODIPine 10 mg oral tablet  -- 1 tab(s) by mouth once a day (at bedtime)    metFORMIN 1000 mg oral tablet, extended release  -- 1 tab(s) by mouth 2 times a day Do not start until July 26th, 2015!    Lantus Solostar Pen 100 units/mL subcutaneous solution  -- 14 unit(s) subcutaneous once a day (at bedtime)    Tradjenta 5 mg oral tablet  -- 1 tab(s) by mouth once a day    GlipiZIDE XL 10 mg oral tablet, extended release  -- 1 tab(s) by mouth once a day    metFORMIN 500 mg oral tablet  -- orally once a day    enoxaparin 40 mg/0.4 mL injectable solution  -- 40 milligram(s) injectable once a day     DISP - 14 units  -- It is very important that you take or use this exactly as directed.  Do not skip doses or discontinue unless directed by your doctor.    oxyCODONE-acetaminophen 5 mg-325 mg oral tablet  -- 1 tab(s) by mouth every 6 hours, As Needed -Moderate Pain (4 - 6) MDD:20 mg

## 2018-04-12 NOTE — DISCHARGE NOTE ADULT - PATIENT PORTAL LINK FT
You can access the [facility], offered by [facility], by registering with the following website: [email or web address]

## 2018-04-12 NOTE — DISCHARGE NOTE ADULT - CARE PROVIDERS DIRECT ADDRESSES
,DirectAddress_Unknown,[email or web address],DirectAddress_Unknown

## 2018-04-12 NOTE — DISCHARGE NOTE ADULT - CARE PLAN
Principal Discharge DX:	Bronchospasm with bronchitis, acute  Goal:	Resolution of infection  Assessment and plan of treatment:	Complete antibiotics as prescribed  Complete Prednisone taper  Albuterol MDI as needed  Follow up with PMD 5-7 days, sooner if needed  Secondary Diagnosis:	Hyponatremia with normal extracellular fluid volume  Assessment and plan of treatment:	Oral fluid restriction one liter daily  Follow up with Renal 5-7 days, sooner if needed  Repeat BMP 5-7 days with PMD  Secondary Diagnosis:	Chronic systolic heart failure  Assessment and plan of treatment:	Coreg decreased to 12.5 BID  Continue home medications  low sodium diet  Secondary Diagnosis:	Coronary artery disease involving native coronary artery of native heart without angina pectoris  Assessment and plan of treatment:	Continue home medications  follow up with Cardiology 1-2 weeks, sooner if needed  Secondary Diagnosis:	GERD (gastroesophageal reflux disease)  Assessment and plan of treatment:	Pepcid

## 2018-04-12 NOTE — DISCHARGE NOTE ADULT - MEDICATION SUMMARY - MEDICATIONS TO TAKE
I will START or STAY ON the medications listed below when I get home from the hospital:    predniSONE 10 mg oral tablet  -- take 4 tabs x 2 days, then 3 tabs x 2 days, then 2 tabs x 2 days, then 1 tab x 2 days then dc  -- It is very important that you take or use this exactly as directed.  Do not skip doses or discontinue unless directed by your doctor.  Obtain medical advice before taking any non-prescription drugs as some may affect the action of this medication.  Take with food or milk.    -- Indication: For Bronchospasm with bronchitis, acute    aspirin 81 mg oral tablet  -- 1 tab(s) by mouth once a day (at bedtime)  -- Indication: For Coronary artery disease involving native coronary artery of native heart without angina pectoris    isosorbide mononitrate 30 mg oral tablet, extended release  -- 1 tab(s) by mouth once a day (in the morning)  -- Indication: For Coronary artery disease involving native coronary artery of native heart without angina pectoris    Janumet 50 mg-1000 mg oral tablet  -- 1 tab(s) by mouth once a day  -- Indication: For Type 2 diabetes mellitus without complication, without long-term current use of insulin    glipizide-metformin 5 mg-500 mg oral tablet  -- 2 tab(s) by mouth once a day (at bedtime)  -- Indication: For Type 2 diabetes mellitus without complication, without long-term current use of insulin    atorvastatin 40 mg oral tablet  -- 1 tab(s) by mouth once a day  -- Indication: For HLD    Exforge 10 mg-160 mg oral tablet  -- 1 tab(s) by mouth once a day  -- Indication: For Cardiomyopathy    clopidogrel 75 mg oral tablet  -- 1 tab(s) by mouth once a day  -- Indication: For Coronary artery disease involving native coronary artery of native heart without angina pectoris    benzonatate 100 mg oral capsule  -- 1 cap(s) by mouth 3 times a day, As Needed -for cough   -- Indication: For Bronchospasm with bronchitis, acute    carvedilol 12.5 mg oral tablet  -- 1 tab(s) by mouth every 12 hours  -- Indication: For Cardiomyopathy    albuterol 90 mcg/inh inhalation powder  -- 2 puff(s) inhaled every 4 hours, As Needed -for shortness of breath and/or wheezing   -- For inhalation only.  It is very important that you take or use this exactly as directed.  Do not skip doses or discontinue unless directed by your doctor.  Obtain medical advice before taking any non-prescription drugs as some may affect the action of this medication.    -- Indication: For Bronchospasm with bronchitis, acute    amoxicillin-clavulanate 875 mg-125 mg oral tablet  -- 1 tab(s) by mouth 2 times a day  -- Indication: For Bronchospasm with bronchitis, acute    saccharomyces boulardii lyo 250 mg oral capsule  -- 1 cap(s) by mouth 2 times a day  -- Indication: For GI prophylaxis    NexIUM 40 mg oral delayed release capsule  -- 1 cap(s) by mouth once a day  -- Indication: For GERD with esophagitis

## 2018-04-12 NOTE — DISCHARGE NOTE ADULT - HOSPITAL COURSE
73 yo M w/ hx CAD with stents, DM2 presents with progressive shortness of breath, cough of green sputum and chest congestion x 3-4 days, along with chest pain/chest congestion.   Suspect bronchitis with bronchospasm along with reflux induced chest discomfort. Followed by Cardiology.  EKG unchanged. Troponin negative. ECHO without acute changes. 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 Followed by Renal, treated with sodium tabs and fluid restriction, improved. Patient received 3 days of IV Rocephin and Zithromax will complete 7 day course of antibiotics with Augmentin. Given Prednisone taper. Patient stable for discharge.  Vital Signs Last 24 Hrs  T(C): 36.6 (12 Apr 2018 07:34), Max: 36.6 (11 Apr 2018 23:06)  T(F): 97.8 (12 Apr 2018 07:34), Max: 97.8 (11 Apr 2018 23:06)  HR: 94 (12 Apr 2018 09:45) (91 - 99)  BP: 153/87 (12 Apr 2018 07:34) (134/71 - 153/87)  BP(mean): --  RR: 18 (12 Apr 2018 07:34) (17 - 20)  SpO2: 95% (12 Apr 2018 09:45) (95% - 99%)    CARDIAC MARKERS ( 11 Apr 2018 02:11 )  x     / <0.01 ng/mL / x     / x     / x      CARDIAC MARKERS ( 11 Apr 2018 01:32 )  x     / <0.01 ng/mL / x     / x     / x      CARDIAC MARKERS ( 10 Apr 2018 17:29 )  x     / <0.01 ng/mL / x     / x     / x                                12.0   9.0   )-----------( 313      ( 11 Apr 2018 02:11 )             35.0     11 Apr 2018 02:11    131    |  94     |  15.0   ----------------------------<  260    4.6     |  25.0   |  0.75     Ca    9.3        11 Apr 2018 02:11        CAPILLARY BLOOD GLUCOSE      POCT Blood Glucose.: 342 mg/dL (12 Apr 2018 11:56)  POCT Blood Glucose.: 246 mg/dL (12 Apr 2018 08:32)  POCT Blood Glucose.: 190 mg/dL (11 Apr 2018 21:04)  POCT Blood Glucose.: 232 mg/dL (11 Apr 2018 17:36)        Hemoglobin A1C, Whole Blood: 7.4 % (04-11 @ 02:11)    PHYSICAL EXAM:    GENERAL: NAD  CHEST/LUNG: Clear to percussion bilaterally  HEART: Regular rate and rhythm; S1 S2  ABDOMEN: Soft, TTP epigastrium., Nondistended; Bowel sounds present  EXTREMITIES:  no edema.  NERVOUS SYSTEM:  Alert & Oriented X3 73 yo M w/ hx CAD with stents, DM2 presents with progressive shortness of breath, cough of green sputum and chest congestion x 3-4 days, along with chest pain/chest congestion.   Suspect bronchitis with bronchospasm along with reflux induced chest discomfort. Followed by Cardiology.  EKG unchanged. Troponin negative. ECHO without acute changes. 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 Followed by Renal, treated with sodium tabs and fluid restriction, improved. Patient received 3 days of IV Rocephin and Zithromax will complete 7 day course of antibiotics with Augmentin. Given Prednisone taper. Patient stable for discharge.  Vital Signs Last 24 Hrs  T(C): 36.6 (12 Apr 2018 07:34), Max: 36.6 (11 Apr 2018 23:06)  T(F): 97.8 (12 Apr 2018 07:34), Max: 97.8 (11 Apr 2018 23:06)  HR: 94 (12 Apr 2018 09:45) (91 - 99)  BP: 153/87 (12 Apr 2018 07:34) (134/71 - 153/87)  BP(mean): --  RR: 18 (12 Apr 2018 07:34) (17 - 20)  SpO2: 95% (12 Apr 2018 09:45) (95% - 99%)    CARDIAC MARKERS ( 11 Apr 2018 02:11 )  x     / <0.01 ng/mL / x     / x     / x      CARDIAC MARKERS ( 11 Apr 2018 01:32 )  x     / <0.01 ng/mL / x     / x     / x      CARDIAC MARKERS ( 10 Apr 2018 17:29 )  x     / <0.01 ng/mL / x     / x     / x                                12.0   9.0   )-----------( 313      ( 11 Apr 2018 02:11 )             35.0     11 Apr 2018 02:11    131    |  94     |  15.0   ----------------------------<  260    4.6     |  25.0   |  0.75     Ca    9.3        11 Apr 2018 02:11        CAPILLARY BLOOD GLUCOSE      POCT Blood Glucose.: 342 mg/dL (12 Apr 2018 11:56)  POCT Blood Glucose.: 246 mg/dL (12 Apr 2018 08:32)  POCT Blood Glucose.: 190 mg/dL (11 Apr 2018 21:04)  POCT Blood Glucose.: 232 mg/dL (11 Apr 2018 17:36)        Hemoglobin A1C, Whole Blood: 7.4 % (04-11 @ 02:11)    PHYSICAL EXAM:    GENERAL: NAD  CHEST/LUNG: Clear to percussion bilaterally  HEART: Regular rate and rhythm; S1 S2  ABDOMEN: Soft, TTP epigastrium., Nondistended; Bowel sounds present  EXTREMITIES:  no edema.  NERVOUS SYSTEM:  Alert & Oriented X3       edited where needed.  dc planning 35 min.

## 2018-04-12 NOTE — DISCHARGE NOTE ADULT - MEDICATION SUMMARY - MEDICATIONS TO CHANGE
I will SWITCH the dose or number of times a day I take the medications listed below when I get home from the hospital:    aspirin 325 mg oral tablet  -- 1 tab(s) by mouth once a day    Coreg 25 mg oral tablet  -- orally once a day

## 2018-04-12 NOTE — DISCHARGE NOTE ADULT - PROVIDER TOKENS
FREE:[LAST:[PMD],PHONE:[(   )    -],FAX:[(   )    -]],TOKEN:'[id number]',TOKEN:'[id number]'

## 2018-04-12 NOTE — DISCHARGE NOTE ADULT - SECONDARY DIAGNOSIS.
Hyponatremia with normal extracellular fluid volume Chronic systolic heart failure Coronary artery disease involving native coronary artery of native heart without angina pectoris GERD (gastroesophageal reflux disease)

## 2018-04-16 NOTE — PROGRESS NOTE ADULT - SUBJECTIVE AND OBJECTIVE BOX
ADRIANNA SHANKS    [id number]    71y      Male    INTERVAL HPI/OVERNIGHT EVENTS:    patient being seen for cad s/p stent complicated by respiratory failure requiring intubation with IABP is now extubated and doing well. Patient is now being downgraded to medical floors. Patient seen at bedside and denies any complaints other than a sore throat      REVIEW OF SYSTEMS:    CONSTITUTIONAL: sore throat  RESPIRATORY: No cough, wheezing, hemoptysis; No shortness of breath  CARDIOVASCULAR: No chest pain, palpitations  GASTROINTESTINAL: No abdominal or epigastric pain. No nausea, vomiting  NEUROLOGICAL: No headaches, memory loss, loss of strength.  MISCELLANEOUS:      Vital Signs Last 24 Hrs  T(C): 36.7, Max: 37.2 (03-12 @ 00:00)  T(F): 98.1, Max: 99 (03-12 @ 04:00)  HR: 88 (74 - 88)  BP: 118/58 (98/54 - 137/70)  BP(mean): 86 (73 - 97)  RR: 20 (17 - 28)  SpO2: 98% (94% - 100%)    PHYSICAL EXAM:    GENERAL: NAD,   HEENT: PERRL, +EOMI  NECK: soft, Supple, No JVD,   CHEST/LUNG: Clear to percussion bilaterally; No wheezing  HEART: S1S2+, Regular rate and rhythm; No murmurs  ABDOMEN: Soft, Nontender, Nondistended;  EXTREMITIES:  2+ Peripheral Pulses, No edema  SKIN: No rashes or lesions  NEURO: AAOX3, no focal deficits  PSYCH: normal mood      LABS:                        11.6   16.0  )-----------( 198      ( 11 Mar 2017 06:25 )             33.5     12 Mar 2017 06:36    135    |  94     |  18.0   ----------------------------<  131    3.5     |  29.0   |  0.88     Ca    8.3        12 Mar 2017 06:36  Phos  3.0       12 Mar 2017 06:36  Mg     2.2       12 Mar 2017 06:36        MEDICATIONS  (STANDING):  clopidogrel Tablet 75milliGRAM(s) Oral daily  ergocalciferol 70890Bdex(s) Oral every week  dextrose 5%. 1000milliLiter(s) IV Continuous <Continuous>  dextrose 50% Injectable 12.5Gram(s) IV Push once  dextrose 50% Injectable 25Gram(s) IV Push once  dextrose 50% Injectable 25Gram(s) IV Push once  atorvastatin 80milliGRAM(s) Oral at bedtime  insulin glargine Injectable (LANTUS) 12Unit(s) SubCutaneous at bedtime  aspirin enteric coated 81milliGRAM(s) Oral daily  losartan 25milliGRAM(s) Oral daily  insulin lispro (HumaLOG) corrective regimen sliding scale  SubCutaneous Before meals and at bedtime  pantoprazole    Tablet 40milliGRAM(s) Oral before breakfast  benzocaine 15 mG/menthol 3.6 mG Lozenge 1Lozenge Oral every 8 hours  carvedilol 12.5milliGRAM(s) Oral every 12 hours  magnesium oxide 400milliGRAM(s) Oral daily  furosemide    Tablet 40milliGRAM(s) Oral daily  potassium chloride    Tablet ER 40milliEquivalent(s) Oral once    MEDICATIONS  (PRN):  dextrose Gel 1Dose(s) Oral once PRN Blood Glucose LESS THAN 70 milliGRAM(s)/deciliter  glucagon  Injectable 1milliGRAM(s) IntraMuscular once PRN Glucose LESS THAN 70 milligrams/deciliter  nitroglycerin     SubLingual 0.4milliGRAM(s) SubLingual every 5 minutes PRN Chest Pain  acetaminophen   Tablet. 650milliGRAM(s) Oral every 6 hours PRN Mild Pain (1 - 3)  ondansetron Injectable 4milliGRAM(s) IV Push every 6 hours PRN Nausea and/or Vomiting  morphine  - Injectable 2milliGRAM(s) IV Push every 6 hours PRN Moderate Pain (4 - 6)  senna 2Tablet(s) Oral at bedtime PRN Constipation  docusate sodium 100milliGRAM(s) Oral two times a day PRN Constipation  lidocaine 2% Viscous 5milliLiter(s) Swish and Spit every 8 hours PRN Mouth Care      RADIOLOGY & ADDITIONAL TESTS: Note Text (......Xxx Chief Complaint.): This diagnosis correlates with the

## 2018-04-23 ENCOUNTER — APPOINTMENT (OUTPATIENT)
Dept: ORTHOPEDIC SURGERY | Facility: CLINIC | Age: 73
End: 2018-04-23
Payer: MEDICARE

## 2018-04-23 VITALS
BODY MASS INDEX: 24.01 KG/M2 | HEIGHT: 67 IN | SYSTOLIC BLOOD PRESSURE: 143 MMHG | WEIGHT: 153 LBS | DIASTOLIC BLOOD PRESSURE: 81 MMHG | HEART RATE: 97 BPM

## 2018-04-23 DIAGNOSIS — S72.141D DISPLACED INTERTROCHANTERIC FRACTURE OF RIGHT FEMUR, SUBSEQUENT ENCOUNTER FOR CLOSED FRACTURE WITH ROUTINE HEALING: ICD-10-CM

## 2018-04-23 PROCEDURE — 99214 OFFICE O/P EST MOD 30 MIN: CPT

## 2018-04-23 PROCEDURE — 73551 X-RAY EXAM OF FEMUR 1: CPT | Mod: RT

## 2018-04-24 PROBLEM — S72.141D CLOSED DISPLACED INTERTROCHANTERIC FRACTURE OF RIGHT FEMUR WITH ROUTINE HEALING, SUBSEQUENT ENCOUNTER: Status: ACTIVE | Noted: 2017-12-12

## 2018-05-09 ENCOUNTER — NON-APPOINTMENT (OUTPATIENT)
Age: 73
End: 2018-05-09

## 2018-05-09 ENCOUNTER — APPOINTMENT (OUTPATIENT)
Dept: CARDIOLOGY | Facility: CLINIC | Age: 73
End: 2018-05-09
Payer: MEDICARE

## 2018-05-09 VITALS
HEART RATE: 86 BPM | BODY MASS INDEX: 23.65 KG/M2 | SYSTOLIC BLOOD PRESSURE: 156 MMHG | WEIGHT: 151 LBS | OXYGEN SATURATION: 99 % | DIASTOLIC BLOOD PRESSURE: 71 MMHG

## 2018-05-09 PROCEDURE — 93000 ELECTROCARDIOGRAM COMPLETE: CPT

## 2018-05-09 PROCEDURE — 99215 OFFICE O/P EST HI 40 MIN: CPT

## 2018-06-27 ENCOUNTER — APPOINTMENT (OUTPATIENT)
Dept: PULMONOLOGY | Facility: CLINIC | Age: 73
End: 2018-06-27

## 2018-07-24 ENCOUNTER — OUTPATIENT (OUTPATIENT)
Dept: OUTPATIENT SERVICES | Facility: HOSPITAL | Age: 73
LOS: 1 days | End: 2018-07-24
Payer: MEDICARE

## 2018-07-24 DIAGNOSIS — I25.10 ATHEROSCLEROTIC HEART DISEASE OF NATIVE CORONARY ARTERY WITHOUT ANGINA PECTORIS: Chronic | ICD-10-CM

## 2018-07-24 DIAGNOSIS — Z95.1 PRESENCE OF AORTOCORONARY BYPASS GRAFT: Chronic | ICD-10-CM

## 2018-07-24 DIAGNOSIS — Z51.89 ENCOUNTER FOR OTHER SPECIFIED AFTERCARE: ICD-10-CM

## 2018-07-24 DIAGNOSIS — Z95.5 PRESENCE OF CORONARY ANGIOPLASTY IMPLANT AND GRAFT: Chronic | ICD-10-CM

## 2018-07-24 DIAGNOSIS — M79.662 PAIN IN LEFT LOWER LEG: ICD-10-CM

## 2018-08-23 PROCEDURE — G8979: CPT | Mod: CJ

## 2018-08-23 PROCEDURE — 97010 HOT OR COLD PACKS THERAPY: CPT

## 2018-08-23 PROCEDURE — 97162 PT EVAL MOD COMPLEX 30 MIN: CPT

## 2018-08-23 PROCEDURE — 97110 THERAPEUTIC EXERCISES: CPT

## 2018-08-23 PROCEDURE — 97140 MANUAL THERAPY 1/> REGIONS: CPT

## 2018-08-23 PROCEDURE — G8978: CPT | Mod: CK

## 2018-11-07 ENCOUNTER — INPATIENT (INPATIENT)
Facility: HOSPITAL | Age: 73
LOS: 1 days | Discharge: HOME | DRG: 270 | End: 2018-11-09
Attending: INTERNAL MEDICINE | Admitting: INTERNAL MEDICINE
Payer: MEDICARE

## 2018-11-07 VITALS — HEIGHT: 67 IN | WEIGHT: 149.91 LBS

## 2018-11-07 DIAGNOSIS — E78.5 HYPERLIPIDEMIA, UNSPECIFIED: ICD-10-CM

## 2018-11-07 DIAGNOSIS — Z95.1 PRESENCE OF AORTOCORONARY BYPASS GRAFT: Chronic | ICD-10-CM

## 2018-11-07 DIAGNOSIS — I10 ESSENTIAL (PRIMARY) HYPERTENSION: ICD-10-CM

## 2018-11-07 DIAGNOSIS — I50.22 CHRONIC SYSTOLIC (CONGESTIVE) HEART FAILURE: ICD-10-CM

## 2018-11-07 DIAGNOSIS — Z95.5 PRESENCE OF CORONARY ANGIOPLASTY IMPLANT AND GRAFT: Chronic | ICD-10-CM

## 2018-11-07 DIAGNOSIS — I21.4 NON-ST ELEVATION (NSTEMI) MYOCARDIAL INFARCTION: ICD-10-CM

## 2018-11-07 DIAGNOSIS — I25.700 ATHEROSCLEROSIS OF CORONARY ARTERY BYPASS GRAFT(S), UNSPECIFIED, WITH UNSTABLE ANGINA PECTORIS: ICD-10-CM

## 2018-11-07 DIAGNOSIS — I25.10 ATHEROSCLEROTIC HEART DISEASE OF NATIVE CORONARY ARTERY WITHOUT ANGINA PECTORIS: Chronic | ICD-10-CM

## 2018-11-07 DIAGNOSIS — I24.9 ACUTE ISCHEMIC HEART DISEASE, UNSPECIFIED: ICD-10-CM

## 2018-11-07 DIAGNOSIS — K21.9 GASTRO-ESOPHAGEAL REFLUX DISEASE WITHOUT ESOPHAGITIS: ICD-10-CM

## 2018-11-07 DIAGNOSIS — I34.0 NONRHEUMATIC MITRAL (VALVE) INSUFFICIENCY: ICD-10-CM

## 2018-11-07 DIAGNOSIS — I50.20 UNSPECIFIED SYSTOLIC (CONGESTIVE) HEART FAILURE: ICD-10-CM

## 2018-11-07 DIAGNOSIS — E11.59 TYPE 2 DIABETES MELLITUS WITH OTHER CIRCULATORY COMPLICATIONS: ICD-10-CM

## 2018-11-07 DIAGNOSIS — Z96.7 PRESENCE OF OTHER BONE AND TENDON IMPLANTS: Chronic | ICD-10-CM

## 2018-11-07 LAB
ALBUMIN SERPL ELPH-MCNC: 4.1 G/DL — SIGNIFICANT CHANGE UP (ref 3.3–5.2)
ALP SERPL-CCNC: 109 U/L — SIGNIFICANT CHANGE UP (ref 40–120)
ALT FLD-CCNC: 25 U/L — SIGNIFICANT CHANGE UP
ANION GAP SERPL CALC-SCNC: 13 MMOL/L — SIGNIFICANT CHANGE UP (ref 5–17)
APTT BLD: 190.8 SEC — CRITICAL HIGH (ref 27.5–36.3)
APTT BLD: 27.8 SEC — SIGNIFICANT CHANGE UP (ref 27.5–36.3)
AST SERPL-CCNC: 42 U/L — HIGH
BASOPHILS # BLD AUTO: 0 K/UL — SIGNIFICANT CHANGE UP (ref 0–0.2)
BASOPHILS NFR BLD AUTO: 0.3 % — SIGNIFICANT CHANGE UP (ref 0–2)
BILIRUB SERPL-MCNC: 0.3 MG/DL — LOW (ref 0.4–2)
BLD GP AB SCN SERPL QL: SIGNIFICANT CHANGE UP
BUN SERPL-MCNC: 12 MG/DL — SIGNIFICANT CHANGE UP (ref 8–20)
CALCIUM SERPL-MCNC: 9 MG/DL — SIGNIFICANT CHANGE UP (ref 8.6–10.2)
CHLORIDE SERPL-SCNC: 88 MMOL/L — LOW (ref 98–107)
CO2 SERPL-SCNC: 28 MMOL/L — SIGNIFICANT CHANGE UP (ref 22–29)
CREAT SERPL-MCNC: 0.74 MG/DL — SIGNIFICANT CHANGE UP (ref 0.5–1.3)
EOSINOPHIL # BLD AUTO: 0.6 K/UL — HIGH (ref 0–0.5)
EOSINOPHIL NFR BLD AUTO: 6 % — SIGNIFICANT CHANGE UP (ref 0–6)
GLUCOSE BLDC GLUCOMTR-MCNC: 194 MG/DL — HIGH (ref 70–99)
GLUCOSE SERPL-MCNC: 166 MG/DL — HIGH (ref 70–115)
HCT VFR BLD CALC: 34.5 % — LOW (ref 42–52)
HCT VFR BLD CALC: 34.5 % — LOW (ref 42–52)
HGB BLD-MCNC: 11.5 G/DL — LOW (ref 14–18)
HGB BLD-MCNC: 11.5 G/DL — LOW (ref 14–18)
INR BLD: 1.17 RATIO — HIGH (ref 0.88–1.16)
LYMPHOCYTES # BLD AUTO: 2 K/UL — SIGNIFICANT CHANGE UP (ref 1–4.8)
LYMPHOCYTES # BLD AUTO: 22.3 % — SIGNIFICANT CHANGE UP (ref 20–55)
MCHC RBC-ENTMCNC: 25.5 PG — LOW (ref 27–31)
MCHC RBC-ENTMCNC: 25.7 PG — LOW (ref 27–31)
MCHC RBC-ENTMCNC: 33.3 G/DL — SIGNIFICANT CHANGE UP (ref 32–36)
MCHC RBC-ENTMCNC: 33.3 G/DL — SIGNIFICANT CHANGE UP (ref 32–36)
MCV RBC AUTO: 76.5 FL — LOW (ref 80–94)
MCV RBC AUTO: 77 FL — LOW (ref 80–94)
MONOCYTES # BLD AUTO: 1.2 K/UL — HIGH (ref 0–0.8)
MONOCYTES NFR BLD AUTO: 13.2 % — HIGH (ref 3–10)
NEUTROPHILS # BLD AUTO: 5.3 K/UL — SIGNIFICANT CHANGE UP (ref 1.8–8)
NEUTROPHILS NFR BLD AUTO: 57.9 % — SIGNIFICANT CHANGE UP (ref 37–73)
NT-PROBNP SERPL-SCNC: 1811 PG/ML — HIGH (ref 0–300)
PLATELET # BLD AUTO: 314 K/UL — SIGNIFICANT CHANGE UP (ref 150–400)
PLATELET # BLD AUTO: 365 K/UL — SIGNIFICANT CHANGE UP (ref 150–400)
POTASSIUM SERPL-MCNC: 4.3 MMOL/L — SIGNIFICANT CHANGE UP (ref 3.5–5.3)
POTASSIUM SERPL-SCNC: 4.3 MMOL/L — SIGNIFICANT CHANGE UP (ref 3.5–5.3)
PROT SERPL-MCNC: 7.2 G/DL — SIGNIFICANT CHANGE UP (ref 6.6–8.7)
PROTHROM AB SERPL-ACNC: 13.5 SEC — HIGH (ref 10–12.9)
RBC # BLD: 4.48 M/UL — LOW (ref 4.6–6.2)
RBC # BLD: 4.51 M/UL — LOW (ref 4.6–6.2)
RBC # FLD: 13.6 % — SIGNIFICANT CHANGE UP (ref 11–15.6)
RBC # FLD: 13.6 % — SIGNIFICANT CHANGE UP (ref 11–15.6)
SODIUM SERPL-SCNC: 129 MMOL/L — LOW (ref 135–145)
TROPONIN T SERPL-MCNC: 0.17 NG/ML — HIGH (ref 0–0.06)
TROPONIN T SERPL-MCNC: 0.45 NG/ML — HIGH (ref 0–0.06)
TYPE + AB SCN PNL BLD: SIGNIFICANT CHANGE UP
WBC # BLD: 12.2 K/UL — HIGH (ref 4.8–10.8)
WBC # BLD: 9.2 K/UL — SIGNIFICANT CHANGE UP (ref 4.8–10.8)
WBC # FLD AUTO: 12.2 K/UL — HIGH (ref 4.8–10.8)
WBC # FLD AUTO: 9.2 K/UL — SIGNIFICANT CHANGE UP (ref 4.8–10.8)

## 2018-11-07 PROCEDURE — 93306 TTE W/DOPPLER COMPLETE: CPT | Mod: 26

## 2018-11-07 PROCEDURE — 92973 PRQ TRLUML C MCHN ASP THRMBC: CPT | Mod: LC

## 2018-11-07 PROCEDURE — 93459 L HRT ART/GRFT ANGIO: CPT | Mod: 26,59

## 2018-11-07 PROCEDURE — 93010 ELECTROCARDIOGRAM REPORT: CPT | Mod: 76

## 2018-11-07 PROCEDURE — 92941 PRQ TRLML REVSC TOT OCCL AMI: CPT | Mod: LC

## 2018-11-07 PROCEDURE — 99291 CRITICAL CARE FIRST HOUR: CPT

## 2018-11-07 PROCEDURE — 99152 MOD SED SAME PHYS/QHP 5/>YRS: CPT

## 2018-11-07 PROCEDURE — 33967 INSERT I-AORT PERCUT DEVICE: CPT

## 2018-11-07 RX ORDER — GLIPIZIDE/METFORMIN HCL 2.5-500 MG
2 TABLET ORAL
Qty: 0 | Refills: 0 | COMMUNITY

## 2018-11-07 RX ORDER — HEPARIN SODIUM 5000 [USP'U]/ML
5500 INJECTION INTRAVENOUS; SUBCUTANEOUS EVERY 6 HOURS
Qty: 0 | Refills: 0 | Status: DISCONTINUED | OUTPATIENT
Start: 2018-11-07 | End: 2018-11-08

## 2018-11-07 RX ORDER — PANTOPRAZOLE SODIUM 20 MG/1
40 TABLET, DELAYED RELEASE ORAL
Qty: 0 | Refills: 0 | Status: DISCONTINUED | OUTPATIENT
Start: 2018-11-07 | End: 2018-11-09

## 2018-11-07 RX ORDER — SITAGLIPTIN AND METFORMIN HYDROCHLORIDE 500; 50 MG/1; MG/1
1 TABLET, FILM COATED ORAL
Qty: 0 | Refills: 0 | COMMUNITY

## 2018-11-07 RX ORDER — DEXTROSE 50 % IN WATER 50 %
25 SYRINGE (ML) INTRAVENOUS ONCE
Qty: 0 | Refills: 0 | Status: DISCONTINUED | OUTPATIENT
Start: 2018-11-07 | End: 2018-11-09

## 2018-11-07 RX ORDER — ASPIRIN/CALCIUM CARB/MAGNESIUM 324 MG
81 TABLET ORAL DAILY
Qty: 0 | Refills: 0 | Status: DISCONTINUED | OUTPATIENT
Start: 2018-11-07 | End: 2018-11-09

## 2018-11-07 RX ORDER — GLUCAGON INJECTION, SOLUTION 0.5 MG/.1ML
1 INJECTION, SOLUTION SUBCUTANEOUS ONCE
Qty: 0 | Refills: 0 | Status: DISCONTINUED | OUTPATIENT
Start: 2018-11-07 | End: 2018-11-09

## 2018-11-07 RX ORDER — CLOPIDOGREL BISULFATE 75 MG/1
75 TABLET, FILM COATED ORAL DAILY
Qty: 0 | Refills: 0 | Status: DISCONTINUED | OUTPATIENT
Start: 2018-11-07 | End: 2018-11-09

## 2018-11-07 RX ORDER — ATORVASTATIN CALCIUM 80 MG/1
40 TABLET, FILM COATED ORAL AT BEDTIME
Qty: 0 | Refills: 0 | Status: DISCONTINUED | OUTPATIENT
Start: 2018-11-07 | End: 2018-11-09

## 2018-11-07 RX ORDER — HEPARIN SODIUM 5000 [USP'U]/ML
2500 INJECTION INTRAVENOUS; SUBCUTANEOUS EVERY 6 HOURS
Qty: 0 | Refills: 0 | Status: DISCONTINUED | OUTPATIENT
Start: 2018-11-07 | End: 2018-11-08

## 2018-11-07 RX ORDER — HEPARIN SODIUM 5000 [USP'U]/ML
INJECTION INTRAVENOUS; SUBCUTANEOUS
Qty: 25000 | Refills: 0 | Status: DISCONTINUED | OUTPATIENT
Start: 2018-11-07 | End: 2018-11-08

## 2018-11-07 RX ORDER — SODIUM CHLORIDE 9 MG/ML
1000 INJECTION, SOLUTION INTRAVENOUS
Qty: 0 | Refills: 0 | Status: DISCONTINUED | OUTPATIENT
Start: 2018-11-07 | End: 2018-11-09

## 2018-11-07 RX ORDER — TRAMADOL HYDROCHLORIDE 50 MG/1
50 TABLET ORAL
Qty: 0 | Refills: 0 | Status: DISCONTINUED | OUTPATIENT
Start: 2018-11-07 | End: 2018-11-09

## 2018-11-07 RX ORDER — DEXTROSE 50 % IN WATER 50 %
15 SYRINGE (ML) INTRAVENOUS ONCE
Qty: 0 | Refills: 0 | Status: DISCONTINUED | OUTPATIENT
Start: 2018-11-07 | End: 2018-11-09

## 2018-11-07 RX ORDER — AMLODIPINE AND VALSARTAN 5; 320 MG/1; MG/1
1 TABLET, FILM COATED ORAL
Qty: 0 | Refills: 0 | COMMUNITY

## 2018-11-07 RX ORDER — FUROSEMIDE 40 MG
20 TABLET ORAL DAILY
Qty: 0 | Refills: 0 | Status: DISCONTINUED | OUTPATIENT
Start: 2018-11-07 | End: 2018-11-09

## 2018-11-07 RX ORDER — DEXTROSE 50 % IN WATER 50 %
12.5 SYRINGE (ML) INTRAVENOUS ONCE
Qty: 0 | Refills: 0 | Status: DISCONTINUED | OUTPATIENT
Start: 2018-11-07 | End: 2018-11-09

## 2018-11-07 RX ORDER — INSULIN LISPRO 100/ML
VIAL (ML) SUBCUTANEOUS
Qty: 0 | Refills: 0 | Status: DISCONTINUED | OUTPATIENT
Start: 2018-11-07 | End: 2018-11-09

## 2018-11-07 RX ADMIN — ATORVASTATIN CALCIUM 40 MILLIGRAM(S): 80 TABLET, FILM COATED ORAL at 22:03

## 2018-11-07 RX ADMIN — HEPARIN SODIUM 1200 UNIT(S)/HR: 5000 INJECTION INTRAVENOUS; SUBCUTANEOUS at 19:20

## 2018-11-07 RX ADMIN — HEPARIN SODIUM 0 UNIT(S)/HR: 5000 INJECTION INTRAVENOUS; SUBCUTANEOUS at 23:50

## 2018-11-07 NOTE — H&P PST ADULT - PROBLEM SELECTOR PLAN 1
LHC and possible intervention.  Continue DAPT.  Continue Imdur LHC and possible intervention.  Continue DAPT.  Continue Imdur  Serial troponins

## 2018-11-07 NOTE — ED PROVIDER NOTE - MEDICAL DECISION MAKING DETAILS
pt with chest tightness since yesterday last nitro two hours ago   ekg LBBB with st depression 11 and lateral leads

## 2018-11-07 NOTE — H&P PST ADULT - PROBLEM SELECTOR PROBLEM 3
Type 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, without long-term current use of insul

## 2018-11-07 NOTE — H&P PST ADULT - PROBLEM SELECTOR PROBLEM 1
Coronary artery disease involving coronary bypass graft of native heart with unstable angina pectori

## 2018-11-07 NOTE — H&P PST ADULT - PROBLEM SELECTOR PLAN 4
Continue:   Carvedilol 25mg BID  Amlodipine 10mg daily  Valsartan 160mg daily  HCTZ 12.5 mg daily Will reintroduce antihypertensives as BP tolerates:  Carvedilol 25mg BID  Amlodipine 10mg daily  Valsartan 160mg daily  HCTZ 12.5 mg daily

## 2018-11-07 NOTE — ED ADULT NURSE NOTE - NSIMPLEMENTINTERV_GEN_ALL_ED
Implemented All Fall with Harm Risk Interventions:  [place] to call system. Call bell, personal items and telephone within reach. Instruct patient to call for assistance. Room bathroom lighting operational. Non-slip footwear when patient is off stretcher. Physically safe environment: no spills, clutter or unnecessary equipment. Stretcher in lowest position, wheels locked, appropriate side rails in place. Provide visual cue, wrist band, yellow gown, etc. Monitor gait and stability. Monitor for mental status changes and reorient to person, place, and time. Review medications for side effects contributing to fall risk. Reinforce activity limits and safety measures with patient and family. Provide visual clues: red socks.

## 2018-11-07 NOTE — ED PROVIDER NOTE - PMH
Coronary artery disease involving coronary bypass graft of native heart with unstable angina pectoris    Coronary artery disease involving native coronary artery of native heart, angina presence unspecifie    Essential hypertension    GERD (gastroesophageal reflux disease)    HFrEF (heart failure with reduced ejection fraction)    High cholesterol    Type 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, without long-term current use of insul

## 2018-11-07 NOTE — ED ADULT NURSE NOTE - OBJECTIVE STATEMENT
Patient A/Ox3, reports chest pain radiating to the back that started two days ago, took 1 nitro at 2pm. Hx of stents in the past Patient A/Ox3, reports chest pain radiating to the back that started two days ago pt recently returned from [place]  three days ago , took 2 nitro at 2pm and Aspirin this morning  Hx of stents in the past. Code STEMI called  , pt moved to [facility] for further treatment

## 2018-11-07 NOTE — ED ADULT TRIAGE NOTE - CHIEF COMPLAINT QUOTE
Patient A/Ox3, reports chest pain radiating to the back that started two days ago, took 1 nitro at 2pm.

## 2018-11-07 NOTE — PATIENT PROFILE ADULT - NSPROMUTINFOINDIVIDFT_GEN_A_NUR
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

## 2018-11-07 NOTE — PROGRESS NOTE ADULT - SUBJECTIVE AND OBJECTIVE BOX
Department of Cardiology                                                                  [facility]/[address]-[address]                                                            Telephone: [phone]. Fax:[phone]                                                                                         PCI NOTE       Subjective:  73y  Male who had a left heart catheterization which showed:  VENTRICLES: EF estimated was 35 %.  VALVES: MITRAL VALVE:The mitral valve exhibited severe regurgitation.  CORONARY VESSELS: The coronary circulation is right dominant.  LM:   --  LM: There was a 100 % stenosis.  LAD:   --  Proximal LAD: There was a 100 % stenosis.  CX:   --  Proximal circumflex: There wasa 100 % stenosis.  RCA:   --  Ostial RCA: There was a 100 % stenosis.  GRAFTS:   --  Graft to the mid LAD: The graft was a LIMA from the aorta.  Graft angiography showed no evidence of disease.  --  Graft to the 1st obtuse marginal: The graft was a saphenous vein graft  from the aorta. There was a 95 % stenosis in the distal third of the  graft. There was CAROLINA grade 2 flow through the graft (partial perfusion).  This is a likely culprit for the patient's clinical presentation. There  was a 95 % stenosis at the distal anastomosis.  --  Graft to the distal RCA: The graft was a saphenous vein graft from the  aorta. It was occluded and treated with thrombectomy and JAQUELIN 3.00 X 15MM drug-eluting stent.      PAST MEDICAL & SURGICAL HISTORY:  HFrEF (heart failure with reduced ejection fraction)  Essential hypertension  Type 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, without long-term current use of insul  Coronary artery disease involving native coronary artery of native heart, angina presence unspecifie  Coronary artery disease involving coronary bypass graft of native heart with unstable angina pectoris  High cholesterol  GERD (gastroesophageal reflux disease)  Status post open reduction with internal fixation of fracture  S/P primary angioplasty with coronary stent  S/P CABG (coronary artery bypass graft)    FAMILY HISTORY:  No pertinent family history in first degree relatives  No pertinent family history in first degree relatives    Home Medications:  amlodipine/valsartan/hydrochlorothiazide 10 mg-160 mg-12.5 mg oral tablet: 1 tab(s) orally once a day (2018 17:33)  aspirin 81 mg oral tablet: 1 tab(s) orally once a day (at bedtime) (2018 17:30)  atorvastatin 40 mg oral tablet: 1 tab(s) orally once a day (2018 17:30)  carvedilol 25 mg oral tablet: 1 tab(s) orally 2 times a day (2018 17:33)  clopidogrel 75 mg oral tablet: 1 tab(s) orally once a day (2018 17:30)  esomeprazole 40 mg oral delayed release capsule: 1 cap(s) orally once a day (2018 17:33)  glipiZIDE 10 mg oral tablet, extended release: 1 tab(s) orally once a day (2018 17:33)  isosorbide mononitrate 30 mg oral tablet, extended release: 1 tab(s) orally once a day (in the morning) (2018 17:30)  Lasix 20 mg oral tablet: 1 tab(s) orally once a day (2018 17:33)  metFORMIN 1000 mg oral tablet, extended release: 1 tab(s) orally once a day (2018 17:33)  traMADol 50 mg oral tablet: 1 tab(s) orally 2 times a day, As Needed (2018 17:33)    Patient is a 73y old  Male who presents with a chief complaint of Chest pain with new LBBB (2018 16:55)    HEALTH ISSUES - PROBLEM Dx:  Hyperlipidemia, unspecified hyperlipidemia type: Hyperlipidemia, unspecified hyperlipidemia type  Essential hypertension  Type 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, without long-term current use of insul  HFrEF (heart failure with reduced ejection fraction)  Coronary artery disease involving coronary bypass graft of native heart with unstable angina pectori        HPI: 74y/o never smoker with history of CAD, S/P CABG X 4 (LIMA to LAD, SVG to OM, other 2 grafts closed), S/P multiple PCI's, HFrEF, DM2, HTN, and HLD who recently returned from [place] and had been c/o of 3-4 days of chest tightness/pain. He had a cough recently but has been better for the past few days. He admits to fatigue and orthopnea, but denies SOB or nausea. EKG in the Ed showed a new LBBB.    [facility]: 3/10/2017  CORONARY VESSELS: The coronary circulation is right dominant.  LM:     --  LM: Angiography showed severe atherosclerosis.  LAD:     --  LAD: There was a stenosis. This lesion is a chronic total occlusion at its ostium Distal vessel angiography showed a large sized vessel and minor luminal irregularities.  CX:     --  Circumflex: The vessel was small to medium sized. Angiography showed severe atherosclerosis.  --  OM1: The vessel was medium sized supplies by SVG  RCA:     --  RCA: The vessel was small sized. There was a stenosis. This lesion is a chronic total occlusion. There was good collateral blood supply to the distal myocardium thorough the LAD.  GRAFTS:     --  Graft to the LAD: The graft was a LIMA. LIMA was patent  --  Graft to the 1st obtuse marginal: The graft was a saphenous vein graft from the aorta. There was a tubular 99 % stenosis at the distal anastomosis, at the site of a prior stent, within the stented segment. The lesion was smoothly contoured, hazy, and concentric. There was no evidence of associated thrombus and CAROLINA grade 2 flow through the graft (partial perfusion). This lesion is a likely culprit for the patient's anginal symptoms. An intervention was performed with a 2.75 X 26 RESOLUTE RX drug-eluting stent.    Echo: 4/10/2018  Left Ventricle: Endocardial visualization was enhanced with intravenous echo contrast. The left ventricular internal cavity size is normal. Global LV systolic function was moderately to severely decreased. Left ventricular ejection fraction, by visual estimation, is 35 to 40%. Spectral Doppler shows pseudonormal pattern of left ventricular myocardial filling (Grade II diastolic dysfunction).  LV Wall Scoring: The mid and apical anterior septum, apical inferior segment, and apex are akinetic. The basal and mid inferior septum, basal and mid inferior wall, basal and mid inferolateral wall, and apical lateral segment are hypokinetic.  Right Ventricle: Normal right ventricular size and function. The right ventricular size is normal. RV systolic function is normal.  Left Atrium: Moderately enlarged left atrium.  Right Atrium: Right atrial enlargement.  Pericardium: There is no evidence of pericardial effusion.  Mitral Valve: The mitral valve leaflets are tethered which is due to reduced systolic function and elevated LVDP. Moderate mitral valve regurgitation is seen.  Tricuspid Valve: Mild-moderate tricuspid regurgitation is visualized.  Aortic Valve: The aortic valve is trileaflet. Sclerotic aortic valve with normal opening. Trivial aortic valve regurgitation is seen. Sclerotic aortic valve with normal opening.  Pulmonic Valve: The pulmonic valve is normal. Mild pulmonic valve regurgitation.  Aorta: The aortic root is normal in size and structure.  Pulmonary Artery: The main pulmonary artery is normal in size.  Venous: A systolic blunting flow pattern is recorded from the right upper pulmonary vein. The inferior vena cava was normal sized, with respiratory size variation greater than 50%. (2018 16:55)    General: No fatigue, no fevers/chills  Respiratory: No dyspnea, no cough, no wheeze  CV: No chest pain, no palpitations  Abd: No nausea  Neuro: No headache, no dizziness  No Known Allergies      Objective:  Vital Signs Last 24 Hrs  T(C): 36.6 (2018 16:09), Max: 36.6 (2018 16:09)  T(F): 97.8 (2018 16:09), Max: 97.8 (2018 16:09)  HR: 90 (2018 16:55) (90 - 97)  BP: 101/63 (2018 16:55) (101/63 - 106/67)  BP(mean): 75 (2018 16:55) (75 - 75)  RR: 16 (2018 16:55) (16 - 18)  SpO2: 96% (2018 16:55) (96% - 100%)    CM: SR  Neuro: A&OX3, CN 2-12 intact  HEENT: NC, AT  Lungs: CTA B/L  CV: S1, S2, no murmur, RRR  Abd: Soft  Right Groin: Soft, no bleeding, no hematoma  Extremity: + distal pulses  EK.5   9.2   )-----------( 365      ( 2018 16:41 )             34.5     11-    129  |  88  |  12.0  ---------------------<  166  4.3   |  28.0  |  0.74    Ca    9.0      2018 16:41    TPro  7.2  /  Alb  4.1  /  TBili  0.3<L>  /  DBili  x   /  AST  42<H>  /  ALT  25  /  AlkPhos  109  11-07    PT/INR - ( 2018 16:41 )   PT: 13.5 sec;   INR: 1.17 ratio         PTT - ( 2018 16:41 )  PTT:27.8 sec

## 2018-11-07 NOTE — H&P PST ADULT - OTHER CARE PROVIDERS
Sydney Valenzuela ([facility], [address], [place] [address], [phone])

## 2018-11-07 NOTE — ED PROVIDER NOTE - PSH
S/P CABG (coronary artery bypass graft)    S/P primary angioplasty with coronary stent    Status post open reduction with internal fixation of fracture

## 2018-11-07 NOTE — H&P PST ADULT - NEGATIVE NEUROLOGICAL SYMPTOMS
no tremors/no hemiparesis/no weakness/no generalized seizures/no loss of consciousness/no loss of sensation/no transient paralysis/no syncope/no paresthesias/no focal seizures

## 2018-11-07 NOTE — CONSULT NOTE ADULT - SUBJECTIVE AND OBJECTIVE BOX
Patient is a 73y old  Male who presents with a chief complaint of Chest pain with new LBBB (07 Nov 2018 16:55)      BRIEF HOSPITAL COURSE: 74 yo male, PMHx history of CAD s/p 4VCABG (LIMA to LAD, SVG to OM, other 2 grafts closed), S/P multiple PCIs, HFrEF last 35-40% 4/2018, mod-severe MR, DM2, HTN, HLD, who recently returned from [place], with complaints of chest tightness for the past 3-4 days, intermittently associated with shortness of breath, fatigue, and orthopnea. Upon arrival to the ED, trop elevated 0.17. EKG in the ED showed a new LBBB. Patient was taken for urgent cardiac catheterization.     Events last 24 hours: Received in ICU s/p cardiac cath. A IABP was placed given history of heart failure and severe MR. Cath revealed patent LIMA-LAD graft, 95% occlusion of SVG-OM1 graft with 1 CODI placed. Currently denies chest pain or shortness of breath. IABP in place augmenting 1:1 with augmented . Some oozing from insertion site, manual pressure held and sandbag placed for hemostasis. 2+ DP pulses bilaterally, no numbness or paresthesias. Formal TTE was performed.     PAST MEDICAL & SURGICAL HISTORY:  HFrEF (heart failure with reduced ejection fraction)  Essential hypertension  Type 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, without long-term current use of insul  Coronary artery disease involving native coronary artery of native heart, angina presence unspecifie  Coronary artery disease involving coronary bypass graft of native heart with unstable angina pectoris  High cholesterol  GERD (gastroesophageal reflux disease)  Status post open reduction with internal fixation of fracture  S/P primary angioplasty with coronary stent  S/P CABG (coronary artery bypass graft)    Allergies    No Known Allergies    Intolerances      FAMILY HISTORY:  No pertinent family history in first degree relatives  No pertinent family history in first degree relatives      Review of Systems:  CONSTITUTIONAL: No fever, chills, or fatigue  EYES: No eye pain, visual disturbances, or discharge  ENMT:  No difficulty hearing, tinnitus, vertigo; No sinus or throat pain  NECK: No pain or stiffness  RESPIRATORY: No cough, wheezing, chills or hemoptysis; No shortness of breath  CARDIOVASCULAR: No chest pain, palpitations, dizziness, or leg swelling  GASTROINTESTINAL: No abdominal or epigastric pain. No nausea, vomiting, or hematemesis; No diarrhea or constipation. No melena or hematochezia.  GENITOURINARY: No dysuria, frequency, hematuria, or incontinence  NEUROLOGICAL: No headaches, memory loss, loss of strength, numbness, or tremors  SKIN: No itching, burning, rashes, or lesions   MUSCULOSKELETAL: No joint pain or swelling; No muscle, back, or extremity pain  PSYCHIATRIC: No depression, anxiety, mood swings, or difficulty sleeping      Medications:  traMADol 50 milliGRAM(s) Oral two times a day PRN  aspirin  chewable 81 milliGRAM(s) Oral daily  clopidogrel Tablet 75 milliGRAM(s) Oral daily  heparin  Infusion.  Unit(s)/Hr IV Continuous <Continuous>  heparin  Injectable 5500 Unit(s) IV Push every 6 hours PRN  heparin  Injectable 2500 Unit(s) IV Push every 6 hours PRN  atorvastatin 40 milliGRAM(s) Oral at bedtime  dextrose 40% Gel 15 Gram(s) Oral once PRN  dextrose 50% Injectable 12.5 Gram(s) IV Push once  dextrose 50% Injectable 25 Gram(s) IV Push once  dextrose 50% Injectable 25 Gram(s) IV Push once  glucagon  Injectable 1 milliGRAM(s) IntraMuscular once PRN  insulin lispro (HumaLOG) corrective regimen sliding scale   SubCutaneous three times a day before meals  dextrose 5%. 1000 milliLiter(s) IV Continuous <Continuous>        ICU Vital Signs Last 24 Hrs  T(C): 36.5 (07 Nov 2018 20:00), Max: 36.9 (07 Nov 2018 18:20)  T(F): 97.7 (07 Nov 2018 20:00), Max: 98.5 (07 Nov 2018 18:20)  HR: 86 (07 Nov 2018 22:00) (84 - 97)  BP: 137/72 (07 Nov 2018 22:00) (101/63 - 143/85)  BP(mean): 96 (07 Nov 2018 22:00) (75 - 110)  ABP: --  ABP(mean): --  RR: 16 (07 Nov 2018 22:00) (14 - 23)  SpO2: 99% (07 Nov 2018 22:00) (94% - 100%)    Vital Signs Last 24 Hrs  T(C): 36.5 (07 Nov 2018 20:00), Max: 36.9 (07 Nov 2018 18:20)  T(F): 97.7 (07 Nov 2018 20:00), Max: 98.5 (07 Nov 2018 18:20)  HR: 86 (07 Nov 2018 22:00) (84 - 97)  BP: 137/72 (07 Nov 2018 22:00) (101/63 - 143/85)  BP(mean): 96 (07 Nov 2018 22:00) (75 - 110)  RR: 16 (07 Nov 2018 22:00) (14 - 23)  SpO2: 99% (07 Nov 2018 22:00) (94% - 100%)        I&O's Detail    07 Nov 2018 07:01  -  07 Nov 2018 22:48  --------------------------------------------------------  IN:    heparin  Infusion.: 60 mL    Oral Fluid: 60 mL  Total IN: 120 mL    OUT:    Indwelling Catheter - Urethral: 1350 mL  Total OUT: 1350 mL    Total NET: -1230 mL            LABS:                        11.5   12.2  )-----------( 314      ( 07 Nov 2018 22:13 )             34.5     11-07    129<L>  |  88<L>  |  12.0  ----------------------------<  166<H>  4.3   |  28.0  |  0.74    Ca    9.0      07 Nov 2018 16:41    TPro  7.2  /  Alb  4.1  /  TBili  0.3<L>  /  DBili  x   /  AST  42<H>  /  ALT  25  /  AlkPhos  109  11-07      CARDIAC MARKERS ( 07 Nov 2018 16:41 )  x     / 0.17 ng/mL / x     / x     / x          CAPILLARY BLOOD GLUCOSE      POCT Blood Glucose.: 194 mg/dL (07 Nov 2018 18:37)    PT/INR - ( 07 Nov 2018 16:41 )   PT: 13.5 sec;   INR: 1.17 ratio         PTT - ( 07 Nov 2018 22:13 )  PTT:190.8 sec    CULTURES:      Physical Examination:    General: Well developed, well nourished male lying in bed in no acute distress.      HEENT: Pupils equal, reactive to light.  Symmetric.    PULM: Crackles to auscultation at bases bilaterally, no significant sputum production    CVS: Regular rate and rhythm, grade III/VI blowing holosystolic murmur    ABD: Soft, nondistended, nontender, normoactive bowel sounds, no masses    EXT: trace bilateral LE edema, IABP in place with oozing at insertion site. 2+ DP pulses bilaterally, skin warm and well perfused, no sensory deficits    SKIN: Warm and well perfused, no rashes noted.    NEURO: Alert, oriented, interactive, nonfocal        RADIOLOGY: < from: Cardiac Cath Lab - Adult (11.07.18 @ 16:39) >  VENTRICLES: EF estimated was 35 %.  VALVES: MITRAL VALVE:The mitral valve exhibited severe regurgitation.  CORONARY VESSELS: The coronary circulation is right dominant.  LM:   --  LM: There was a 100 % stenosis.  LAD:   --  Proximal LAD: There was a 100 % stenosis.  CX:   --  Proximal circumflex: There wasa 100 % stenosis.  RCA:   --  Ostial RCA: There was a 100 % stenosis.  GRAFTS:   --  Graft to the mid LAD: The graft was a LIMA from the aorta.  Graft angiography showed no evidence of disease.  --  Graft to the 1st obtuse marginal: The graft was a saphenous vein graft  from the aorta. There was a 95 % stenosis in the distal third of the  graft. There was CAROLINA grade 2 flow through the graft (partial perfusion).  This is a likely culprit for the patient's clinical presentation. There  was a 95 % stenosis at the distal anastomosis.  --  Graft to the distal RCA: The graft was a saphenous vein graft from the  aorta. It was occluded.  COMPLICATIONS: No complications occurred during the cath lab visit.  DIAGNOSTIC RECOMMENDATIONS: Subtotal occlusion of LCX graft associated with  pain, new left bundle, heart failure and severe MR. IABP inserted for MR  and heart failure.  INTERVENTIONAL RECOMMENDATIONS: Successful stent of subtotally occluded LCX  vein graft. Maintain IABP support, echo in AM toassess MR.  Prepared and signed by  Kang Babin MD        CRITICAL CARE TIME SPENT: 60 minutes assessing presenting problems of acute illness, which pose high probability of life threatening deterioration or end organ damage/dysfunction, as well as medical decision making including initiating plan of care, reviewing data, reviewing radiologic exams, discussing with multidisciplinary team, non-inclusive of procedures performed, discussing goals of care with patient and family (three sons at bedside).

## 2018-11-07 NOTE — H&P PST ADULT - ASSESSMENT
72y/o never smoker with history of CAD, S/P CABG X 4 (LIMA to LAD, SVG to OM, other 2 grafts closed), S/P multiple PCI's, HFrEF, DM2, HTN, and HLD who recently returned from [place] and had been c/o of 3-4 days of chest tightness/pain. He had a cough recently but has been better for the past few days. He admits to fatigue and orthopnea, but denies SOB or nausea. EKG in the Ed showed a new LBBB.

## 2018-11-07 NOTE — ED PROVIDER NOTE - OBJECTIVE STATEMENT
74 y/o male with know CAD stents cmes in c/o chest pressure / feels in his back and arms   x 24 hours  mild diaphoresis compliant with plavix and aspirin   code stemi / avr elevation / lateral st depressions

## 2018-11-07 NOTE — H&P PST ADULT - HISTORY OF PRESENT ILLNESS
74y/o never smoker with history of CAD, S/P CABG X 4 (LIMA to LAD, SVG to OM, other 2 grafts closed), S/P multiple PCI's, HFrEF, DM2, HTN, and HLD who recently returned from [place] and had been c/o of 3-4 days of chest tightness/pain. He had a cough recently but has been better for the past few days. He admits to fatigue and orthopnea, but denies SOB or nausea. EKG in the Ed showed a new LBBB.    LHC: 3/10/2017  CORONARY VESSELS: The coronary circulation is right dominant.  LM:     --  LM: Angiography showed severe atherosclerosis.  LAD:     --  LAD: There was a stenosis. This lesion is a chronic total occlusion at its ostium Distal vessel angiography showed a large sized vessel and minor luminal irregularities.  CX:     --  Circumflex: The vessel was small to medium sized. Angiography showed severe atherosclerosis.  --  OM1: The vessel was medium sized supplies by SVG  RCA:     --  RCA: The vessel was small sized. There was a stenosis. This lesion is a chronic total occlusion. There was good collateral blood supply to the distal myocardium thorough the LAD.  GRAFTS:     --  Graft to the LAD: The graft was a LIMA. LIMA was patent  --  Graft to the 1st obtuse marginal: The graft was a saphenous vein graft from the aorta. There was a tubular 99 % stenosis at the distal anastomosis, at the site of a prior stent, within the stented segment. The lesion was smoothly contoured, hazy, and concentric. There was no evidence of associated thrombus and CAROLINA grade 2 flow through the graft (partial perfusion). This lesion is a likely culprit for the patient's anginal symptoms. An intervention was performed with a 2.75 X 26 RESOLUTE RX drug-eluting stent.    Echo: 4/10/2018  Left Ventricle: Endocardial visualization was enhanced with intravenous echo contrast. The left ventricular internal cavity size is normal. Global LV systolic function was moderately to severely decreased. Left ventricular ejection fraction, by visual estimation, is 35 to 40%. Spectral Doppler shows pseudonormal pattern of left ventricular myocardial filling (Grade II diastolic dysfunction).  LV Wall Scoring: The mid and apical anterior septum, apical inferior segment, and apex are akinetic. The basal and mid inferior septum, basal and mid inferior wall, basal and mid inferolateral wall, and apical lateral segment are hypokinetic.  Right Ventricle: Normal right ventricular size and function. The right ventricular size is normal. RV systolic function is normal.  Left Atrium: Moderately enlarged left atrium.  Right Atrium: Right atrial enlargement.  Pericardium: There is no evidence of pericardial effusion.  Mitral Valve: The mitral valve leaflets are tethered which is due to reduced systolic function and elevated LVDP. Moderate mitral valve regurgitation is seen.  Tricuspid Valve: Mild-moderate tricuspid regurgitation is visualized.  Aortic Valve: The aortic valve is trileaflet. Sclerotic aortic valve with normal opening. Trivial aortic valve regurgitation is seen. Sclerotic aortic valve with normal opening.  Pulmonic Valve: The pulmonic valve is normal. Mild pulmonic valve regurgitation.  Aorta: The aortic root is normal in size and structure.  Pulmonary Artery: The main pulmonary artery is normal in size.  Venous: A systolic blunting flow pattern is recorded from the right upper pulmonary vein. The inferior vena cava was normal sized, with respiratory size variation greater than 50%.

## 2018-11-07 NOTE — CONSULT NOTE ADULT - PROBLEM SELECTOR RECOMMENDATION 9
s/p PCI with 1 CODI placed. Has significant chronic occlusion of native vessels and grafts  Continue on Aspirin and Plavix  Currently chest pain free  Repeat EKG in AM, low threshold for repeat EKG if recurrent chest pain  Continue to trend troponin  IABP placed for support due to high risk for acute decompensation given severity of underlying HF and MR. Augmenting 1:1, monitor ADBPs   Serial groin and neurovascular checks. Consider trial of weaning in AM depending on clinical course. On heparin gtt while IABP, titrate per PTT protocol  Hold BB for now   f/u TTE report  Restarted home Lasix. Crackles on exam but patient oxygenating well on room air in no distress. Monitor respiratory status closely, will give additional Lasix if needed  DASH/CC/Halal diet

## 2018-11-08 DIAGNOSIS — I21.29 ST ELEVATION (STEMI) MYOCARDIAL INFARCTION INVOLVING OTHER SITES: ICD-10-CM

## 2018-11-08 PROBLEM — E11.9 TYPE 2 DIABETES MELLITUS WITHOUT COMPLICATIONS: Chronic | Status: INACTIVE | Noted: 2017-03-10 | Resolved: 2018-11-07

## 2018-11-08 PROBLEM — I10 ESSENTIAL (PRIMARY) HYPERTENSION: Chronic | Status: INACTIVE | Noted: 2017-03-10 | Resolved: 2018-11-07

## 2018-11-08 LAB
ANION GAP SERPL CALC-SCNC: 14 MMOL/L — SIGNIFICANT CHANGE UP (ref 5–17)
APTT BLD: 84.7 SEC — HIGH (ref 27.5–36.3)
BUN SERPL-MCNC: 11 MG/DL — SIGNIFICANT CHANGE UP (ref 8–20)
CALCIUM SERPL-MCNC: 9 MG/DL — SIGNIFICANT CHANGE UP (ref 8.6–10.2)
CHLORIDE SERPL-SCNC: 88 MMOL/L — LOW (ref 98–107)
CHOLEST SERPL-MCNC: 122 MG/DL — SIGNIFICANT CHANGE UP (ref 110–199)
CO2 SERPL-SCNC: 27 MMOL/L — SIGNIFICANT CHANGE UP (ref 22–29)
CREAT SERPL-MCNC: 0.63 MG/DL — SIGNIFICANT CHANGE UP (ref 0.5–1.3)
GLUCOSE BLDC GLUCOMTR-MCNC: 171 MG/DL — HIGH (ref 70–99)
GLUCOSE BLDC GLUCOMTR-MCNC: 174 MG/DL — HIGH (ref 70–99)
GLUCOSE BLDC GLUCOMTR-MCNC: 188 MG/DL — HIGH (ref 70–99)
GLUCOSE SERPL-MCNC: 179 MG/DL — HIGH (ref 70–115)
HCT VFR BLD CALC: 34.1 % — LOW (ref 42–52)
HDLC SERPL-MCNC: 42 MG/DL — SIGNIFICANT CHANGE UP
HGB BLD-MCNC: 11.5 G/DL — LOW (ref 14–18)
LIPID PNL WITH DIRECT LDL SERPL: 66 MG/DL — SIGNIFICANT CHANGE UP
MAGNESIUM SERPL-MCNC: 1.9 MG/DL — SIGNIFICANT CHANGE UP (ref 1.6–2.6)
MCHC RBC-ENTMCNC: 25.9 PG — LOW (ref 27–31)
MCHC RBC-ENTMCNC: 33.7 G/DL — SIGNIFICANT CHANGE UP (ref 32–36)
MCV RBC AUTO: 76.8 FL — LOW (ref 80–94)
PHOSPHATE SERPL-MCNC: 3.7 MG/DL — SIGNIFICANT CHANGE UP (ref 2.4–4.7)
PLATELET # BLD AUTO: 308 K/UL — SIGNIFICANT CHANGE UP (ref 150–400)
POTASSIUM SERPL-MCNC: 3.7 MMOL/L — SIGNIFICANT CHANGE UP (ref 3.5–5.3)
POTASSIUM SERPL-SCNC: 3.7 MMOL/L — SIGNIFICANT CHANGE UP (ref 3.5–5.3)
RBC # BLD: 4.44 M/UL — LOW (ref 4.6–6.2)
RBC # FLD: 13.5 % — SIGNIFICANT CHANGE UP (ref 11–15.6)
SODIUM SERPL-SCNC: 129 MMOL/L — LOW (ref 135–145)
TOTAL CHOLESTEROL/HDL RATIO MEASUREMENT: 3 RATIO — LOW (ref 3.4–9.6)
TRIGL SERPL-MCNC: 69 MG/DL — SIGNIFICANT CHANGE UP (ref 10–200)
TROPONIN T SERPL-MCNC: 0.44 NG/ML — HIGH (ref 0–0.06)
WBC # BLD: 13 K/UL — HIGH (ref 4.8–10.8)
WBC # FLD AUTO: 13 K/UL — HIGH (ref 4.8–10.8)

## 2018-11-08 PROCEDURE — 99233 SBSQ HOSP IP/OBS HIGH 50: CPT

## 2018-11-08 PROCEDURE — 99223 1ST HOSP IP/OBS HIGH 75: CPT

## 2018-11-08 PROCEDURE — 93010 ELECTROCARDIOGRAM REPORT: CPT

## 2018-11-08 RX ORDER — POTASSIUM CHLORIDE 20 MEQ
40 PACKET (EA) ORAL ONCE
Qty: 0 | Refills: 0 | Status: COMPLETED | OUTPATIENT
Start: 2018-11-08 | End: 2018-11-08

## 2018-11-08 RX ORDER — CARVEDILOL PHOSPHATE 80 MG/1
12.5 CAPSULE, EXTENDED RELEASE ORAL EVERY 12 HOURS
Qty: 0 | Refills: 0 | Status: DISCONTINUED | OUTPATIENT
Start: 2018-11-08 | End: 2018-11-09

## 2018-11-08 RX ORDER — LOSARTAN POTASSIUM 100 MG/1
50 TABLET, FILM COATED ORAL DAILY
Qty: 0 | Refills: 0 | Status: DISCONTINUED | OUTPATIENT
Start: 2018-11-08 | End: 2018-11-09

## 2018-11-08 RX ADMIN — CARVEDILOL PHOSPHATE 12.5 MILLIGRAM(S): 80 CAPSULE, EXTENDED RELEASE ORAL at 11:44

## 2018-11-08 RX ADMIN — Medication 20 MILLIGRAM(S): at 06:52

## 2018-11-08 RX ADMIN — Medication 1: at 08:04

## 2018-11-08 RX ADMIN — CLOPIDOGREL BISULFATE 75 MILLIGRAM(S): 75 TABLET, FILM COATED ORAL at 11:45

## 2018-11-08 RX ADMIN — ATORVASTATIN CALCIUM 40 MILLIGRAM(S): 80 TABLET, FILM COATED ORAL at 21:15

## 2018-11-08 RX ADMIN — Medication 40 MILLIEQUIVALENT(S): at 08:39

## 2018-11-08 RX ADMIN — Medication 1: at 11:43

## 2018-11-08 RX ADMIN — HEPARIN SODIUM 0 UNIT(S)/HR: 5000 INJECTION INTRAVENOUS; SUBCUTANEOUS at 06:52

## 2018-11-08 RX ADMIN — LOSARTAN POTASSIUM 50 MILLIGRAM(S): 100 TABLET, FILM COATED ORAL at 16:54

## 2018-11-08 RX ADMIN — Medication 1: at 16:53

## 2018-11-08 RX ADMIN — Medication 81 MILLIGRAM(S): at 11:45

## 2018-11-08 RX ADMIN — PANTOPRAZOLE SODIUM 40 MILLIGRAM(S): 20 TABLET, DELAYED RELEASE ORAL at 06:52

## 2018-11-08 RX ADMIN — CARVEDILOL PHOSPHATE 12.5 MILLIGRAM(S): 80 CAPSULE, EXTENDED RELEASE ORAL at 18:15

## 2018-11-08 NOTE — PROGRESS NOTE ADULT - SUBJECTIVE AND OBJECTIVE BOX
[place] [facility]                                                        Office: [address] [address]                                                       Telephone: [phone]. Fax:[phone]                                                                             PROGRESS NOTE   Reason for follow up: STEMI, coronary artery disease , heart failure, Hypertension                             Overnight: No new events.   Update: got IABP removed today.   tachcyardia.     Subjective: " i am ok_"   Complains of:  no new symptoms. Chest pain resovled. No dyspnea. Lying flat.   Review of symptoms: Cardiac:  No chest pain. No dyspnea. No palpitations.  Respiratory:no cough. No dyspnea  Gastrointestinal: No diarrhea. No abdominal pain. No bleeding.     Past medical history: No updates.   Chronic conditions:  Hypertension: controlled. CHF: Stable. CAD: Stable ischemic heart disease. STEMI - s/p PCI   	  Vitals:  T(C): 36.8 (11-08-18 @ 21:11), Max: 37.1 (11-07-18 @ 23:33)  HR: 81 (11-08-18 @ 21:11) (79 - 106)  BP: 118/68 (11-08-18 @ 21:11) (112/67 - 169/80)  RR: 18 (11-08-18 @ 21:11) (9 - 28)  SpO2: 98% (11-08-18 @ 21:11) (93% - 99%)  Wt(kg): --  I&O's Summary    07 Nov 2018 07:01  -  08 Nov 2018 07:00  --------------------------------------------------------  IN: 320 mL / OUT: 2375 mL / NET: -2055 mL    08 Nov 2018 07:01  -  08 Nov 2018 22:21  --------------------------------------------------------  IN: 680 mL / OUT: 630 mL / NET: 50 mL      Weight (kg): 68 (11-07 @ 16:55)    PHYSICAL EXAM:  Appearance: Comfortable. No acute distress  HEENT:  Head and neck: Atraumatic. Normocephalic.  Normal oral mucosa, PERRL, Neck is supple. No JVD, No carotid bruit.   Neurologic: A & O x 3, no focal deficits. EOMI , Cranial nerves are intact.  Lymphatic: No cervical lymphadenopathy  Cardiovascular: Normal S1 S2, No murmur, rubs/gallops. No JVD, No edema  Respiratory: Lungs clear to auscultation  Gastrointestinal:  Soft, Non-tender, + BS  Lower Extremities: No edema  Psychiatry: Patient is calm. No agitation. Mood & affect appropriate  Skin: No rashes/ ecchymoses/cyanosis/ulcers visualized on the face, hands or feet.    CURRENT MEDICATIONS:  carvedilol 12.5 milliGRAM(s) Oral every 12 hours  furosemide    Tablet 20 milliGRAM(s) Oral daily  losartan 50 milliGRAM(s) Oral daily    pantoprazole    Tablet  atorvastatin  dextrose 50% Injectable  dextrose 50% Injectable  dextrose 50% Injectable  insulin lispro (HumaLOG) corrective regimen sliding scale  aspirin  chewable  clopidogrel Tablet  dextrose 5%.      LABS:	 	  CARDIAC MARKERS ( 08 Nov 2018 05:15 )  x     / 0.44 ng/mL / x     / x     / x      p-BNP 08 Nov 2018 05:15: x    , CARDIAC MARKERS ( 07 Nov 2018 22:13 )  x     / 0.45 ng/mL / x     / x     / x      p-BNP 07 Nov 2018 22:13: 1811 pg/mL, CARDIAC MARKERS ( 07 Nov 2018 16:41 )  x     / 0.17 ng/mL / x     / x     / x      p-BNP 07 Nov 2018 16:41: x                                11.5   13.0  )-----------( 308      ( 08 Nov 2018 05:17 )             34.1     11-08    129<L>  |  88<L>  |  11.0  ----------------------------<  179<H>  3.7   |  27.0  |  0.63    Ca    9.0      08 Nov 2018 05:15  Phos  3.7     11-08  Mg     1.9     11-08    TPro  7.2  /  Alb  4.1  /  TBili  0.3<L>  /  DBili  x   /  AST  42<H>  /  ALT  25  /  AlkPhos  109  11-07    proBNP: Serum Pro-Brain Natriuretic Peptide: 1811 pg/mL (11-07 @ 22:13)    Lipid Profile: Date: 11-08 @ 05:15  Total cholesterol 122; Direct LDL: 66; HDL: 42; Triglycerides:69    HgA1c: TSH:     TELEMETRY: Reviewed    ECG:  Reviewed by me. 	    DIAGNOSTIC TESTING:  [ ] Echocardiogram:  LVEF 40%. Cx territorty infarct. Grade II Diastolic dysfunction. Elevated filling pressures.  mild MR. RV normal. No effusion,.   [ ]  Catheterization:  < from: Cardiac Cath Lab - Adult (11.07.18 @ 16:39) >  CORONARY VESSELS: The coronary circulation is right dominant.  LM:   --  LM: There was a 100 % stenosis.  LAD:   --  Proximal LAD: There was a 100 % stenosis.  CX:   --  Proximal circumflex: There wasa 100 % stenosis.  RCA:   --  Ostial RCA: There was a 100 % stenosis.  GRAFTS:   --  Graft to the mid LAD: The graft was a LIMA from the aorta.  Graft angiography showed no evidence of disease.  --  Graft to the 1st obtuse marginal: The graft was a saphenous vein graft  from the aorta. There was a 95 % stenosis in the distal third of the graft. There was CAROLINA grade 2 flow through the graft (partial perfusion).  This is a likely culprit for the patient's clinical presentation. There was a 95 % stenosis at the distal anastomosis.  --  Graft to the distal RCA: The graft was a saphenous vein graft from the  aorta. It was occluded.    < from: Cardiac Cath Lab - Adult (11.07.18 @ 16:39) >  DIAGNOSTIC RECOMMENDATIONS: Subtotal occlusion of LCX graft associated with  pain, new left bundle, heart failure and severe MR. IABP inserted for MR  and heart failure.  INTERVENTIONAL RECOMMENDATIONS: Successful stent of subtotally occluded LCX  vein graft. Maintain IABP support, echo in AM to assess MR.      [ ] Stress Test:    OTHER:

## 2018-11-08 NOTE — PROGRESS NOTE ADULT - SUBJECTIVE AND OBJECTIVE BOX
Dr. Rm Hospitalist Progress Note  ADRIANNA SHANKS [id number]    Patient is a 73y old  Male who presents with a chief complaint of CAD (08 Nov 2018 17:10)    HPI:  74 y/o never smoker with history of premature CAD in his 40s, S/P CABG X 4 (LIMA to LAD, SVG to OM, other 2 grafts closed), S/P multiple PCI's, HFrEF, DM2, HTN, and HLD presented to ER with Chest pain that started 2-3 days ago when he was waiting for plain flight returning from [place]. He flew 17 hrs with the chest pain. He also head cough which was resolved. He admits to fatigue and orthopnea, but denies SOB, nausea, abd pain, excessive sweating, dizziness. palpitation, no presyncope or syncope. He passed out once when he was 18 yrs old, for unknown reason.  In ER, EKG showed a new LBBB. patient was admitted with NSTEMI/unstable angina. Patient was taken for urgent cardiac catheterization, received 1 CODI to SVG-OM1 graft. IABP was placed preemptively for support in the setting of known severe systolic heart failure and severe MR. IABP and #8fr sheath was discontinued this am. seen by cardilogy and MICU team and suggested patient is stable to DG to telemetry under hospitalist service    Interval: seen at bedside. denied complaints.       ROS:  CONSTITUTIONAL:  No distress. no fever/chills. fatigue+  HEENT:  Eyes:  No diplopia or blurred vision.   CARDIOVASCULAR:  No pressure, squeezing, tightness, heaviness or aching about the chest; no palpitations. no leg swelling, + orthopnea. no PND  RESPIRATORY:  no SOB. no wheezing.no cough or sputum.  No hemoptysis  GI: no abd pain, no nausea, no vomiting, no diarrhea, no constipation. No hematochezia or melena  EXT:No joint pain or joint swelling or redness. no led or calf pain  SKIN: no skin break or ulcer. No rash  CNS: No headaches. No weakness. no numbness. No depression or anxiety. No SI    T(C): 36.9 (11-08-18 @ 07:00), Max: 37.1 (11-07-18 @ 23:33)  HR: 84 (11-08-18 @ 18:00) (79 - 106)  BP: 139/72 (11-08-18 @ 18:00) (112/67 - 169/80)  RR: 22 (11-08-18 @ 18:00) (9 - 28)  SpO2: 99% (11-08-18 @ 18:00) (93% - 99%)    11-07-18 @ 07:01  -  11-08-18 @ 07:00  --------------------------------------------------------  IN: 320 mL / OUT: 2375 mL / NET: -2055 mL    11-08-18 @ 07:01  -  11-08-18 @ 18:44  --------------------------------------------------------  IN: 680 mL / OUT: 630 mL / NET: 50 mL    CAPILLARY BLOOD GLUCOSE      POCT Blood Glucose.: 171 mg/dL (08 Nov 2018 16:49)  POCT Blood Glucose.: 188 mg/dL (08 Nov 2018 11:40)  POCT Blood Glucose.: 174 mg/dL (08 Nov 2018 07:44)      Physical Exam:  GENERAL: Not in distress. Alert    HEENT:  Normocephalic and atraumatic. PEARLA,EOMI  NECK: Supple.  No JVD.    CARDIOVASCULAR: RRR S1, S2. No murmur/rubs/gallop.  LUNGS: BLAE+, no rales, no wheezing, no rhonchi.    ABDOMEN: ND. Soft,  NT, no guarding / rebound / rigidity. BS normoactive. No CVA tenderness.    EXTREMITIES: no cyanosis, no clubbing, no edema. no calf pain or tenderness  SKIN: no rash. No skin break or ulcer on exposed skin. sternotomy scar  NEUROLOGIC: AAO*3.strength is symmetric, sensation intact, speech fluent.    PSYCHIATRIC: Calm.  No agitation.    Labs                        11.5   13.0  )-----------( 308      ( 08 Nov 2018 05:17 )             34.1     11-08    129<L>  |  88<L>  |  11.0  ----------------------------<  179<H>  3.7   |  27.0  |  0.63    Ca    9.0      08 Nov 2018 05:15  Phos  3.7     11-08  Mg     1.9     11-08    TPro  7.2  /  Alb  4.1  /  TBili  0.3<L>  /  DBili  x   /  AST  42<H>  /  ALT  25  /  AlkPhos  109  11-07   PT/INR - ( 07 Nov 2018 16:41 )   PT: 13.5 sec;   INR: 1.17 ratio         PTT - ( 08 Nov 2018 05:17 )  PTT:84.7 sec    Hemoglobin A1C, Whole Blood (04.11.18 @ 02:11)    Hemoglobin A1C, Whole Blood: 7.4 %      < from: TTE Echo Complete w/Doppler (11.07.18 @ 20:48) >  Summary:   1. Endocardial visualization was enhanced with intravenous echo contrast.   2. Moderately decreased global left ventricular systolic function. Left   ventricular ejection fraction, by visual estimation, is 40%. There is   basal to mid inferior and inferolateral hypokinesis/akinesis.   3. Spectral Doppler shows impaired relaxation pattern of left   ventricular myocardial filling (Grade I diastolic dysfunction).   4. RV systolic function is normal.   5. Mild mitral regurgitation.   6. No pericardial effusion.   7. ** Compared to TTE dated 4/10/18, findings are comparable. 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    < end of copied text >    < from: Cardiac Cath Lab - Adult (11.07.18 @ 16:39) >  aorta. It was occluded.  COMPLICATIONS: No complications occurred during the cath lab visit.  DIAGNOSTIC RECOMMENDATIONS: Subtotal occlusion of LCX graft associated with  pain, new left bundle, heart failure and severe MR. IABP inserted for MR  and heart failure.  INTERVENTIONAL RECOMMENDATIONS: Successful stent of subtotally occluded LCX  vein graft. Maintain IABP support, echo in AM toassess MR.    < end of copied text >      MEDICATIONS  (STANDING):  aspirin  chewable 81 milliGRAM(s) Oral daily  atorvastatin 40 milliGRAM(s) Oral at bedtime  carvedilol 12.5 milliGRAM(s) Oral every 12 hours  clopidogrel Tablet 75 milliGRAM(s) Oral daily  dextrose 5%. 1000 milliLiter(s) (50 mL/Hr) IV Continuous <Continuous>  dextrose 50% Injectable 12.5 Gram(s) IV Push once  dextrose 50% Injectable 25 Gram(s) IV Push once  dextrose 50% Injectable 25 Gram(s) IV Push once  furosemide    Tablet 20 milliGRAM(s) Oral daily  insulin lispro (HumaLOG) corrective regimen sliding scale   SubCutaneous three times a day before meals  losartan 50 milliGRAM(s) Oral daily  pantoprazole    Tablet 40 milliGRAM(s) Oral before breakfast    MEDICATIONS  (PRN):  dextrose 40% Gel 15 Gram(s) Oral once PRN Blood Glucose LESS THAN 70 milliGRAM(s)/deciliter  glucagon  Injectable 1 milliGRAM(s) IntraMuscular once PRN Glucose LESS THAN 70 milligrams/deciliter  traMADol 50 milliGRAM(s) Oral two times a day PRN Moderate Pain (4 - 6)      LHC: 3/10/2017  CORONARY VESSELS: The coronary circulation is right dominant.  LM:     --  LM: Angiography showed severe atherosclerosis.  LAD:     --  LAD: There was a stenosis. This lesion is a chronic total occlusion at its ostium Distal vessel angiography showed a large sized vessel and minor luminal irregularities.  CX:     --  Circumflex: The vessel was small to medium sized. Angiography showed severe atherosclerosis.  --  OM1: The vessel was medium sized supplies by SVG  RCA:     --  RCA: The vessel was small sized. There was a stenosis. This lesion is a chronic total occlusion. There was good collateral blood supply to the distal myocardium thorough the LAD.  GRAFTS:     --  Graft to the LAD: The graft was a LIMA. LIMA was patent  --  Graft to the 1st obtuse marginal: The graft was a saphenous vein graft from the aorta. There was a tubular 99 % stenosis at the distal anastomosis, at the site of a prior stent, within the stented segment. The lesion was smoothly contoured, hazy, and concentric. There was no evidence of associated thrombus and [place] grade 2 flow through the graft (partial perfusion). This lesion is a likely culprit for the patient's anginal symptoms. An intervention was performed with a 2.75 X 26 RESOLUTE RX drug-eluting stent.    Echo: 4/10/2018  Left Ventricle: Endocardial visualization was enhanced with intravenous echo contrast. The left ventricular internal cavity size is normal. Global LV systolic function was moderately to severely decreased. Left ventricular ejection fraction, by visual estimation, is 35 to 40%. Spectral Doppler shows pseudonormal pattern of left ventricular myocardial filling (Grade II diastolic dysfunction).  LV Wall Scoring: The mid and apical anterior septum, apical inferior segment, and apex are akinetic. The basal and mid inferior septum, basal and mid inferior wall, basal and mid inferolateral wall, and apical lateral segment are hypokinetic.  Right Ventricle: Normal right ventricular size and function. The right ventricular size is normal. RV systolic function is normal.  Left Atrium: Moderately enlarged left atrium.  Right Atrium: Right atrial enlargement.  Pericardium: There is no evidence of pericardial effusion.  Mitral Valve: The mitral valve leaflets are tethered which is due to reduced systolic function and elevated LVDP. Moderate mitral valve regurgitation is seen.  Tricuspid Valve: Mild-moderate tricuspid regurgitation is visualized.  Aortic Valve: The aortic valve is trileaflet. Sclerotic aortic valve with normal opening. Trivial aortic valve regurgitation is seen. Sclerotic aortic valve with normal opening.  Pulmonic Valve: The pulmonic valve is normal. Mild pulmonic valve regurgitation.  Aorta: The aortic root is normal in size and structure.  Pulmonary Artery: The main pulmonary artery is normal in size.  Venous: A systolic blunting flow pattern is recorded from the right upper pulmonary vein. The inferior vena cava was normal sized, with respiratory size variation greater than 50%. (07 Nov 2018 16:55)

## 2018-11-08 NOTE — PROGRESS NOTE ADULT - SUBJECTIVE AND OBJECTIVE BOX
INTERVAL HPI/OVERNIGHT EVENTS: Feels well, no chest pain.  IABP removed.    Antimicrobial:    Cardiovascular:  carvedilol 12.5 milliGRAM(s) Oral every 12 hours  furosemide    Tablet 20 milliGRAM(s) Oral daily  losartan 50 milliGRAM(s) Oral daily    Pulmonary:    Hematalogic:  aspirin  chewable 81 milliGRAM(s) Oral daily  clopidogrel Tablet 75 milliGRAM(s) Oral daily    Other:  atorvastatin 40 milliGRAM(s) Oral at bedtime  dextrose 40% Gel 15 Gram(s) Oral once PRN  dextrose 5%. 1000 milliLiter(s) IV Continuous <Continuous>  dextrose 50% Injectable 12.5 Gram(s) IV Push once  dextrose 50% Injectable 25 Gram(s) IV Push once  dextrose 50% Injectable 25 Gram(s) IV Push once  glucagon  Injectable 1 milliGRAM(s) IntraMuscular once PRN  insulin lispro (HumaLOG) corrective regimen sliding scale   SubCutaneous three times a day before meals  pantoprazole    Tablet 40 milliGRAM(s) Oral before breakfast  traMADol 50 milliGRAM(s) Oral two times a day PRN      Drug Dosing Weight  Height (cm): 170.18 (07 Nov 2018 16:55)  Weight (kg): 68 (07 Nov 2018 16:55)  BMI (kg/m2): 23.5 (07 Nov 2018 16:55)  BSA (m2): 1.79 (07 Nov 2018 16:55)    CENTRAL LINE: [ ] YES [ ] NO  LOCATION:   DATE INSERTED:    HEBERT: [ ] YES [ ] NO    DATE INSERTED:    A-LINE:  [ ] YES [ ] NO  LOCATION:   DATE INSERTED:    PMH/Social Hx/Fam Hx -reviewed admission note, no change since admission  PAST MEDICAL & SURGICAL HISTORY:  HFrEF (heart failure with reduced ejection fraction)  Essential hypertension  Type 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, without long-term current use of insul  Coronary artery disease involving native coronary artery of native heart, angina presence unspecifie  Coronary artery disease involving coronary bypass graft of native heart with unstable angina pectoris  High cholesterol  GERD (gastroesophageal reflux disease)  Status post open reduction with internal fixation of fracture  S/P primary angioplasty with coronary stent  S/P CABG (coronary artery bypass graft)      T(C): 36.9 (11-08-18 @ 07:00), Max: 37.1 (11-07-18 @ 23:33)  HR: 106 (11-08-18 @ 13:00)  BP: 138/69 (11-08-18 @ 13:00)  BP(mean): 97 (11-08-18 @ 13:00)  ABP: --  ABP(mean): --  RR: 24 (11-08-18 @ 13:00)  SpO2: 94% (11-08-18 @ 13:00)  Wt(kg): --          11-07 @ 07:01  -  11-08 @ 07:00  --------------------------------------------------------  IN: 320 mL / OUT: 2375 mL / NET: -2055 mL            PHYSICAL EXAM:    GENERAL: [x ]NAD, [ x]well-groomed, [ ]well-developed;  [ ]  HEAD:  [ x]Atraumatic, [x ]Normocephalic;  [ ]  EYES: [ x]EOMI, [ ]PERRLA, [ ]conjunctiva and sclera clear;   [ ]  ENMT: [x ]No tonsillar erythema, exudates, or enlargement; [ ]Moist mucous membranes, [ ]Good dentition, [ ]No lesions; [ ]  NECK: [ x]Supple, normal appearance, [ x]No JVD; [ ]Normal thyroid; [x ]Trachea midline; [ ]  NERVOUS SYSTEM:  [x ]Alert & Oriented X3, [x ]Good concentration; [x ]Motor Strength 5/5 B/L upper and lower extremities; [ ]DTRs 2+ intact and symmetric; [ ]  CHEST/LUNG: [x ]No chest deformity; [ x]Normal percussion bilaterally; [x ]No rales, rhonchi, wheezing; [ ]Crackles at bases; [ ]  HEART: [ x]Regular rate and rhythm; [ ]No murmurs, rubs, or gallops;  [ ]  ABDOMEN: [ x]Soft, Nontender, Nondistended; [ ]Bowel sounds present;  [ ]  EXTREMITIES:  [ x]2+ Peripheral Pulses, [x ]No clubbing, cyanosis, or edema [ ]Bilat lower extremity edema;  [ ]  LYMPH: [x ]No lymphadenopathy noted;  [ ]  SKIN: [ x]No rashes or lesions; [ x]Good capillary refill;  [ ]      LABS:  CBC Full  -  ( 08 Nov 2018 05:17 )  WBC Count : 13.0 K/uL  Hemoglobin : 11.5 g/dL  Hematocrit : 34.1 %  Platelet Count - Automated : 308 K/uL  Mean Cell Volume : 76.8 fl  Mean Cell Hemoglobin : 25.9 pg  Mean Cell Hemoglobin Concentration : 33.7 g/dL  Auto Neutrophil # : x  Auto Lymphocyte # : x  Auto Monocyte # : x  Auto Eosinophil # : x  Auto Basophil # : x  Auto Neutrophil % : x  Auto Lymphocyte % : x  Auto Monocyte % : x  Auto Eosinophil % : x  Auto Basophil % : x    11-08    129<L>  |  88<L>  |  11.0  ----------------------------<  179<H>  3.7   |  27.0  |  0.63    Ca    9.0      08 Nov 2018 05:15  Phos  3.7     11-08  Mg     1.9     11-08    TPro  7.2  /  Alb  4.1  /  TBili  0.3<L>  /  DBili  x   /  AST  42<H>  /  ALT  25  /  AlkPhos  109  11-07    PT/INR - ( 07 Nov 2018 16:41 )   PT: 13.5 sec;   INR: 1.17 ratio         PTT - ( 08 Nov 2018 05:17 )  PTT:84.7 sec

## 2018-11-08 NOTE — PROGRESS NOTE ADULT - ASSESSMENT
72 y/o never smoker with history of premature CAD in his 40s, S/P CABG X 4 (LIMA to LAD, SVG to OM, other 2 grafts closed), S/P multiple PCI's, HFrEF, DM2, HTN, and HLD presented to ER with Chest pain that started 2-3 days ago when he was waiting for plain flight returning from [place]. He flew 17 hrs with the chest pain. He also head cough which was resolved. He admits to fatigue and orthopnea, but denies SOB, nausea, abd pain, excessive sweating, dizziness. palpitation, no presyncope or syncope. He passed out once when he was 18 yrs old, for unknown reason.  In ER, EKG showed a new LBBB. patient was admitted with NSTEMI/unstable angina. Patient was taken for urgent cardiac catheterization, received 1 CODI to SVG-OM1 graft. IABP was placed preemptively for support in the setting of known severe systolic heart failure and severe MR. IABP and #8fr sheath was discontinued this am. seen by cardilogy and MICU team and suggested patient is stable to DG to telemetry under hospitalist service    Problem/Plan - 1:  ·  Problem: Coronary artery disease involving coronary bypass graft of native heart with unstable angina pectori.  Plan:s/p   1 CODI to SVG-OM1 graft.  DG to telemetry  Continue DAPT and other cardiac meds.  EKG,CBC in am  [facility] [place]  cardiology on board     Problem/Plan - 2:  ·  Problem: HFrEF (heart failure with reduced ejection fraction).  Plan: Echo as above. EF 40%  - c/w coreg, losartan, lasix  - daily weight  - I and Os  - patient looks dry. encouraged PO fluid intake - reduce lasix if continues to be dehydrated  - monitor renal function     Problem/Plan - 3:  ·  Problem: Type 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, without long-term current use of insulin.  Plan: Will hold metformin for 48 hours post procedure.  Diabetic diet  FSBS Q AC and HS with coverage.   A1c, lipid panel     Problem/Plan - 4:  ·  Problem: Essential hypertension.  Plan: c/w meds. DASh diet. adjust as needed       Problem/Plan - 5:  ·  Problem: Hyperlipidemia, unspecified hyperlipidemia type.  Plan: Continue Lipitor 40mg daily  Lipids in AM.    DVT-P: heparin/lovenox once cleared by cardiology 74 y/o never smoker with history of premature CAD in his 40s, S/P CABG X 4 (LIMA to LAD, SVG to OM, other 2 grafts closed), S/P multiple PCI's, HFrEF, DM2, HTN, and HLD presented to ER with Chest pain that started 2-3 days ago when he was waiting for plain flight returning from [place]. He flew 17 hrs with the chest pain. He also head cough which was resolved. He admits to fatigue and orthopnea, but denies SOB, nausea, abd pain, excessive sweating, dizziness. palpitation, no presyncope or syncope. He passed out once when he was 18 yrs old, for unknown reason.  In ER, EKG showed a new LBBB. patient was admitted with NSTEMI/unstable angina. Patient was taken for urgent cardiac catheterization, received 1 CODI to SVG-OM1 graft. IABP was placed preemptively for support in the setting of known severe systolic heart failure and severe MR. IABP and #8fr sheath was discontinued this am. seen by cardilogy and MICU team and suggested patient is stable to DG to telemetry under hospitalist service    Problem/Plan - 1:  ·  Problem: Coronary artery disease involving coronary bypass graft of native heart with unstable angina pectori.  Plan:s/p   1 CODI to SVG-OM1 graft.  DG to telemetry  Continue DAPT and other cardiac meds.  EKG,CBC,trop in am  [facility]  cardiology on board     Problem/Plan - 2:  ·  Problem: HFrEF (heart failure with reduced ejection fraction).  Plan: Echo as above. EF 40%  - c/w coreg, losartan, lasix  - daily weight  - I and Os  - patient looks dry. encouraged PO fluid intake - reduce lasix if continues to be dehydrated  - monitor renal function     Problem/Plan - 3:  ·  Problem: Type 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, without long-term current use of insulin.  Plan: Will hold metformin for 48 hours post procedure.  Diabetic diet  FSBS Q AC and HS with coverage.   A1c, lipid panel     Problem/Plan - 4:  ·  Problem: Essential hypertension.  Plan: c/w meds. DASh diet. adjust as needed       Problem/Plan - 5:  ·  Problem: Hyperlipidemia, unspecified hyperlipidemia type.  Plan: Continue Lipitor 40mg daily  Lipids in AM.    leucocytosis: possibly related cardiac procedure/NSTEMI. will monitor. no temp    DVT-P: heparin/lovenox once cleared by cardiology

## 2018-11-08 NOTE — PROGRESS NOTE ADULT - ASSESSMENT
This is a 73 year old male with history of Hypertension and dyslipidemia and Diabetes Mellitus , coronary artery disease Prior CABG x C3 L, with pror PCI now with Acute chest pain and Occluded SVG graft to OM

## 2018-11-08 NOTE — PROGRESS NOTE ADULT - SUBJECTIVE AND OBJECTIVE BOX
Cardiology NP note:     -RFA #8Fr sheath and IABP discontinued--manual compression held to right groin x 30 minutes with hemostasis obtained; 4x4 and tegaderm dressing applied; no bruit; +2 PP  -Left groin site benign without hematoma/bleeding; tegaderm dressing with dried bloody drainage but stable; + Left PP  -VSS    -A/P: 72 yo male, PMHx history of CAD s/p 4VCABG (LIMA to LAD, SVG to OM, other 2 grafts closed), S/P multiple PCIs, HFrEF last 35-40% 4/2018, mod-severe MR, DM2, HTN, HLD, who recently returned from [place], with complaints of chest pain, found to have NSTEMI and EKG with  new LBBB. Patient was taken for urgent cardiac catheterization, received 1 CODI to SVG-OM1 graft. IABP was placed preemptively for support in the setting of known severe systolic heart failure and severe MR. Patient now hemodynamically stable and IABP and #8fr sheath discontinued.  -Frequent vital signs and neurovascular checks as ordered  -Patient may sit up at 1800   -Bedrest until 2000 then OOB as tolerated  -Dr. Valenzuela at bedside--additional plan as per his recommendations  -Additional plan as per ICU staff

## 2018-11-08 NOTE — PROGRESS NOTE ADULT - ASSESSMENT
74 yo male with hx of CAD s/p CABG and multiple PCIs, CHFrEF, HTN, DM, HLD, presented with chest pain/ unstable angina, s/p PCI with CODI to SVG.  Now feels well.  Had IABP which was removed this AM.  Now stable for downgrade.

## 2018-11-09 ENCOUNTER — TRANSCRIPTION ENCOUNTER (OUTPATIENT)
Age: 73
End: 2018-11-09

## 2018-11-09 VITALS
HEART RATE: 76 BPM | OXYGEN SATURATION: 97 % | SYSTOLIC BLOOD PRESSURE: 116 MMHG | RESPIRATION RATE: 17 BRPM | TEMPERATURE: 98 F | DIASTOLIC BLOOD PRESSURE: 58 MMHG

## 2018-11-09 LAB
ANION GAP SERPL CALC-SCNC: 12 MMOL/L — SIGNIFICANT CHANGE UP (ref 5–17)
BUN SERPL-MCNC: 11 MG/DL — SIGNIFICANT CHANGE UP (ref 8–20)
CALCIUM SERPL-MCNC: 9.2 MG/DL — SIGNIFICANT CHANGE UP (ref 8.6–10.2)
CHLORIDE SERPL-SCNC: 92 MMOL/L — LOW (ref 98–107)
CHOLEST SERPL-MCNC: 139 MG/DL — SIGNIFICANT CHANGE UP (ref 110–199)
CO2 SERPL-SCNC: 27 MMOL/L — SIGNIFICANT CHANGE UP (ref 22–29)
CREAT SERPL-MCNC: 0.75 MG/DL — SIGNIFICANT CHANGE UP (ref 0.5–1.3)
GLUCOSE BLDC GLUCOMTR-MCNC: 172 MG/DL — HIGH (ref 70–99)
GLUCOSE BLDC GLUCOMTR-MCNC: 182 MG/DL — HIGH (ref 70–99)
GLUCOSE SERPL-MCNC: 156 MG/DL — HIGH (ref 70–115)
HBA1C BLD-MCNC: 8.5 % — HIGH (ref 4–5.6)
HCT VFR BLD CALC: 36.9 % — LOW (ref 42–52)
HDLC SERPL-MCNC: 43 MG/DL — SIGNIFICANT CHANGE UP
HGB BLD-MCNC: 12.1 G/DL — LOW (ref 14–18)
LIPID PNL WITH DIRECT LDL SERPL: 76 MG/DL — SIGNIFICANT CHANGE UP
MCHC RBC-ENTMCNC: 25.4 PG — LOW (ref 27–31)
MCHC RBC-ENTMCNC: 32.8 G/DL — SIGNIFICANT CHANGE UP (ref 32–36)
MCV RBC AUTO: 77.5 FL — LOW (ref 80–94)
PLATELET # BLD AUTO: 328 K/UL — SIGNIFICANT CHANGE UP (ref 150–400)
POTASSIUM SERPL-MCNC: 4.6 MMOL/L — SIGNIFICANT CHANGE UP (ref 3.5–5.3)
POTASSIUM SERPL-SCNC: 4.6 MMOL/L — SIGNIFICANT CHANGE UP (ref 3.5–5.3)
RBC # BLD: 4.76 M/UL — SIGNIFICANT CHANGE UP (ref 4.6–6.2)
RBC # FLD: 13.7 % — SIGNIFICANT CHANGE UP (ref 11–15.6)
SODIUM SERPL-SCNC: 131 MMOL/L — LOW (ref 135–145)
TOTAL CHOLESTEROL/HDL RATIO MEASUREMENT: 3 RATIO — LOW (ref 3.4–9.6)
TRIGL SERPL-MCNC: 98 MG/DL — SIGNIFICANT CHANGE UP (ref 10–200)
TROPONIN T SERPL-MCNC: 0.48 NG/ML — HIGH (ref 0–0.06)
WBC # BLD: 12.7 K/UL — HIGH (ref 4.8–10.8)
WBC # FLD AUTO: 12.7 K/UL — HIGH (ref 4.8–10.8)

## 2018-11-09 PROCEDURE — 92973 PRQ TRLUML C MCHN ASP THRMBC: CPT | Mod: LC

## 2018-11-09 PROCEDURE — 99233 SBSQ HOSP IP/OBS HIGH 50: CPT

## 2018-11-09 PROCEDURE — 82962 GLUCOSE BLOOD TEST: CPT

## 2018-11-09 PROCEDURE — C1757: CPT

## 2018-11-09 PROCEDURE — 86901 BLOOD TYPING SEROLOGIC RH(D): CPT

## 2018-11-09 PROCEDURE — C9606: CPT | Mod: LC

## 2018-11-09 PROCEDURE — 85610 PROTHROMBIN TIME: CPT

## 2018-11-09 PROCEDURE — 80048 BASIC METABOLIC PNL TOTAL CA: CPT

## 2018-11-09 PROCEDURE — 80061 LIPID PANEL: CPT

## 2018-11-09 PROCEDURE — 93005 ELECTROCARDIOGRAM TRACING: CPT

## 2018-11-09 PROCEDURE — 85730 THROMBOPLASTIN TIME PARTIAL: CPT

## 2018-11-09 PROCEDURE — 85027 COMPLETE CBC AUTOMATED: CPT

## 2018-11-09 PROCEDURE — 99239 HOSP IP/OBS DSCHRG MGMT >30: CPT

## 2018-11-09 PROCEDURE — 99153 MOD SED SAME PHYS/QHP EA: CPT

## 2018-11-09 PROCEDURE — 83880 ASSAY OF NATRIURETIC PEPTIDE: CPT

## 2018-11-09 PROCEDURE — 93459 L HRT ART/GRFT ANGIO: CPT | Mod: 52,59

## 2018-11-09 PROCEDURE — 84484 ASSAY OF TROPONIN QUANT: CPT

## 2018-11-09 PROCEDURE — C1894: CPT

## 2018-11-09 PROCEDURE — 33967 INSERT I-AORT PERCUT DEVICE: CPT

## 2018-11-09 PROCEDURE — C1725: CPT

## 2018-11-09 PROCEDURE — 36415 COLL VENOUS BLD VENIPUNCTURE: CPT

## 2018-11-09 PROCEDURE — 80053 COMPREHEN METABOLIC PANEL: CPT

## 2018-11-09 PROCEDURE — 83036 HEMOGLOBIN GLYCOSYLATED A1C: CPT

## 2018-11-09 PROCEDURE — 93306 TTE W/DOPPLER COMPLETE: CPT

## 2018-11-09 PROCEDURE — C1769: CPT

## 2018-11-09 PROCEDURE — 99285 EMERGENCY DEPT VISIT HI MDM: CPT

## 2018-11-09 PROCEDURE — 93306 TTE W/DOPPLER COMPLETE: CPT | Mod: 26

## 2018-11-09 PROCEDURE — C1874: CPT

## 2018-11-09 PROCEDURE — C1887: CPT

## 2018-11-09 PROCEDURE — 86900 BLOOD TYPING SEROLOGIC ABO: CPT

## 2018-11-09 PROCEDURE — 93010 ELECTROCARDIOGRAM REPORT: CPT

## 2018-11-09 PROCEDURE — C1889: CPT

## 2018-11-09 PROCEDURE — 99152 MOD SED SAME PHYS/QHP 5/>YRS: CPT

## 2018-11-09 PROCEDURE — C1760: CPT

## 2018-11-09 PROCEDURE — 86850 RBC ANTIBODY SCREEN: CPT

## 2018-11-09 RX ORDER — ISOSORBIDE MONONITRATE 60 MG/1
1 TABLET, EXTENDED RELEASE ORAL
Qty: 0 | Refills: 0 | COMMUNITY

## 2018-11-09 RX ORDER — CARVEDILOL PHOSPHATE 80 MG/1
1 CAPSULE, EXTENDED RELEASE ORAL
Qty: 0 | Refills: 0 | DISCHARGE
Start: 2018-11-09

## 2018-11-09 RX ORDER — FUROSEMIDE 40 MG
1 TABLET ORAL
Qty: 0 | Refills: 0 | DISCHARGE
Start: 2018-11-09

## 2018-11-09 RX ORDER — ASPIRIN/CALCIUM CARB/MAGNESIUM 324 MG
1 TABLET ORAL
Qty: 0 | Refills: 0 | COMMUNITY

## 2018-11-09 RX ORDER — CARVEDILOL PHOSPHATE 80 MG/1
1 CAPSULE, EXTENDED RELEASE ORAL
Qty: 0 | Refills: 0 | COMMUNITY

## 2018-11-09 RX ORDER — FUROSEMIDE 40 MG
1 TABLET ORAL
Qty: 0 | Refills: 0 | COMMUNITY

## 2018-11-09 RX ORDER — ISOSORBIDE MONONITRATE 60 MG/1
1 TABLET, EXTENDED RELEASE ORAL
Qty: 30 | Refills: 0
Start: 2018-11-09 | End: 2018-12-08

## 2018-11-09 RX ORDER — ASPIRIN/CALCIUM CARB/MAGNESIUM 324 MG
1 TABLET ORAL
Qty: 0 | Refills: 0 | DISCHARGE
Start: 2018-11-09

## 2018-11-09 RX ADMIN — CARVEDILOL PHOSPHATE 12.5 MILLIGRAM(S): 80 CAPSULE, EXTENDED RELEASE ORAL at 17:22

## 2018-11-09 RX ADMIN — CARVEDILOL PHOSPHATE 12.5 MILLIGRAM(S): 80 CAPSULE, EXTENDED RELEASE ORAL at 05:12

## 2018-11-09 RX ADMIN — Medication 81 MILLIGRAM(S): at 12:43

## 2018-11-09 RX ADMIN — LOSARTAN POTASSIUM 50 MILLIGRAM(S): 100 TABLET, FILM COATED ORAL at 05:12

## 2018-11-09 RX ADMIN — Medication 1: at 08:37

## 2018-11-09 RX ADMIN — CLOPIDOGREL BISULFATE 75 MILLIGRAM(S): 75 TABLET, FILM COATED ORAL at 12:43

## 2018-11-09 RX ADMIN — PANTOPRAZOLE SODIUM 40 MILLIGRAM(S): 20 TABLET, DELAYED RELEASE ORAL at 05:12

## 2018-11-09 RX ADMIN — Medication 1: at 12:42

## 2018-11-09 RX ADMIN — Medication 20 MILLIGRAM(S): at 05:12

## 2018-11-09 NOTE — PROGRESS NOTE ADULT - ASSESSMENT
72y/o never smoker with history of CAD, S/P CABG X 4 (LIMA to LAD, SVG to OM, other 2 grafts closed), S/P multiple PCI's, HFrEF, DM2, HTN, and HLD who recently returned from [place] and had been c/o of 3-4 days of chest tightness/pain.   Received stenting and had IABP yesterday d.c and bilateral groin sites stable.  Patient and family requesting to go home.  MD aware and to follow up.  Patient also had a TTE to evaluate MR - results as above.         Problem/Plan - 1:  ·  Problem: ST elevation myocardial infarction (STEMI) involving other coronary artery.       Continue plan - aspirin,  Plavix,, statin and beta-blocker.   Life style modifications including diet and exercise explained in detail.   follow up with MD outpatient cardiology 1 week,  Groin precautions explained in detail     Problem/Plan - 2:  ·  Problem: Chronic systolic heart failure.  Continue Plan: beta-blocker and ARB Lasix

## 2018-11-09 NOTE — DISCHARGE NOTE ADULT - OTHER SIGNIFICANT FINDINGS
< from: TTE Echo Complete w/Doppler (11.09.18 @ 13:59) >  Summary:   1. Left ventricular ejection fraction, by visual estimation, is 35 to   40%.   2. Moderately decreased segmental left ventricular systolic function.   3. Mid and apical anterior septum, basal and mid inferior septum, basal   and mid inferior wall, and basal anteroseptal segment are abnormal as   described above.   4. There is no evidence of pericardial effusion.   5. Mild mitral annular calcification.   6. Mild to moderate mitral valve regurgitation.   7. Trace tricuspid regurgitation.   8. Sclerotic aortic valve with normal opening.   9. Pulmonic valve regurgitation.  10. See previous echo from 11/7/18 for comparison.    < end of copied text >

## 2018-11-09 NOTE — DISCHARGE NOTE ADULT - PATIENT PORTAL LINK FT
You can access the [facility] Patient Portal, offered by [facility], by registering with the following website: [email or web address]

## 2018-11-09 NOTE — PROGRESS NOTE ADULT - SUBJECTIVE AND OBJECTIVE BOX
NP assessment  74y/o never smoker with history of CAD, S/P CABG X 4 (LIMA to LAD, SVG to OM, other 2 grafts closed), S/P multiple PCI's, HFrEF, DM2, HTN, and HLD who recently returned from [place] and had been c/o of 3-4 days of chest tightness/pain.   Received stenting and had IABP yesterday d.c and bilateral groin sites stable.  Patient and family requesting to go home.  MD aware and to follow up.  Patient also had a TTE to evaluate MR.      [facility]  DIAGNOSTIC RECOMMENDATIONS: Subtotal occlusion of LCX graft associated with  pain, new left bundle, heart failure and severe MR. IABP inserted for MR  and heart failure.  INTERVENTIONAL RECOMMENDATIONS: Successful stent of subtotally occluded LCX  vein graft. Maintain IABP support d.c yesterday, stable sites today.      TTE   1. Endocardial visualization was enhanced with intravenous echo contrast.   2. Moderately decreased global left ventricular systolic function. Left   ventricular ejection fraction, by visual estimation, is 40%. There is   basal to mid inferior and inferolateral hypokinesis/akinesis.   3. Spectral Doppler shows impaired relaxation pattern of left   ventricular myocardial filling (Grade I diastolic dysfunction).   4. RV systolic function is normal.   5. Mild mitral regurgitation.   6. No pericardial effusion.   7. ** Compared to TTE dated 4/10/18, findings are comparable. 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    PMH  Acute coronary syndrome  Gastroesophageal reflux disease, esophagitis presence not specified  Severe mitral regurgitation  Chronic systolic heart failure  NSTEMI (non-ST elevated myocardial infarction)  Hyperlipidemia, unspecified hyperlipidemia type  Essential hypertension  Type 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, without long-term current use of insul  HFrEF (heart failure with reduced ejection fraction)  Coronary artery disease involving coronary bypass graft of native heart with unstable angina pectori  Status post open reduction with internal fixation of fracture  S/P primary angioplasty with coronary stent    REVIEW OF SYSTEMS  General: Denies fever, chills, pain, no discomfort  Respiratory and Thorax:  Denies cough, sob, or any discomfort  Cardiovascular:  Denies chest pain, palpiations, or any discomfort	  Gastrointestinal:  Denies n/v/d, constipation, or any discomfort  Genitourinary:  Denies frequency, burning, or pain  Musculoskeletal:  Denies joint pain, swelling, or any discomfort   Neurological:  Denies headache, dizzyness, blurred vision, numbing or tingling  Psychiatric:  Denies sadness or depression  Hematology  Tigre bleeding o swelling    MEDICATIONS:  carvedilol 12.5 milliGRAM(s) Oral every 12 hours  furosemide    Tablet 20 milliGRAM(s) Oral daily  losartan 50 milliGRAM(s) Oral daily  traMADol 50 milliGRAM(s) Oral two times a day PRN  pantoprazole    Tablet 40 milliGRAM(s) Oral before breakfast  atorvastatin 40 milliGRAM(s) Oral at bedtime  glucagon  Injectable 1 milliGRAM(s) IntraMuscular once PRN  insulin lispro (HumaLOG) corrective regimen sliding scale   SubCutaneous three times a day before meals  aspirin  chewable 81 milliGRAM(s) Oral daily  clopidogrel Tablet 75 milliGRAM(s) Oral daily  dextrose 5%. 1000 milliLiter(s) IV Continuous <Continuous>    PHYSICAL EXAM:  T(C): 36.9 (18 @ 10:08), Max: 36.9 (18 @ 18:45)  HR: 84 (18 @ 10:08) (81 - 93)  BP: 126/65 (18 @ 10:08) (112/67 - 139/72)  RR: 18 (18 @ 10:08) (18 - 28)  SpO2: 98% (18 @ 10:08) (96% - 99%)  I&O's Summary  2018 07:  -  2018 07:00  --------------------------------------------------------  IN: 1160 mL / OUT: 630 mL / NET: 530 mL  2018 07:  -  2018 14:00  --------------------------------------------------------  IN: 400 mL / OUT: 0 mL / NET: 400 mL  Daily Weight in k.5 (2018 04:59)  Appearance: Normal	  HEENT:   Normal oral mucosa, PERRL, EOMI	  Lymphatic: No lymphadenopathy  Cardiovascular: Normal S1 S2, No JVD, No murmurs, No edema  Respiratory: Lungs clear to auscultation	  Psychiatry: A & O x 3, Mood & affect appropriate  Gastrointestinal:  Soft, Non-tender, + BS	  Skin: No rashes, No ecchymoses, No cyanosis  Neurologic: Non-focal  Extremities: Normal range of motion, No clubbing, cyanosis or edema  Vascular: Peripheral pulses palpable 2+ bilaterally  Procedure Site:  Bilateral groin sites, dry and intack, slight bruising, no swelling, no hematoma, no pain, +PP no edema.      TELEMETRY: 	   80's  LABS:	 	                    12.1   12.7  )-----------( 328      ( 2018 06:07 )             36.9     131<L>  |  92<L>  |  11.0  ----------------------------<  156<H>  4.6   |  27.0  |  0.75  Ca    9.2      2018 06:07  Phos  3.7     11-08  Mg     1.9     -08  TPro  7.2  /  Alb  4.1  /  TBili  0.3<L>  /  DBili  x   /  AST  42<H>  /  ALT  25  /  AlkPhos  109  -07  HgA1c: Hemoglobin A1C, Whole Blood: 8.5 % ( @ 06:07) Reason for follow up: STEMI, coronary artery disease ,   Subjective: I want to go home.  No new symptoms.       NP assessment  72y/o never smoker with history of CAD, S/P CABG X 4 (LIMA to LAD, SVG to OM, other 2 grafts closed), S/P multiple PCI's, HFrEF, DM2, HTN, and HLD who recently returned from [place] and had been c/o of 3-4 days of chest tightness/pain.   Received stenting and had IABP yesterday d.c and bilateral groin sites stable.  Patient and family requesting to go home.  MD aware and to follow up.  Patient also had a TTE to evaluate MR.      [facility]  DIAGNOSTIC RECOMMENDATIONS: Subtotal occlusion of LCX graft associated with  pain, new left bundle, heart failure and severe MR. IABP inserted for MR  and heart failure.  INTERVENTIONAL RECOMMENDATIONS: Successful stent of subtotally occluded LCX  vein graft. Maintain IABP support d.c yesterday, stable sites today.      TTE   1. Endocardial visualization was enhanced with intravenous echo contrast.   2. Moderately decreased global left ventricular systolic function. Left   ventricular ejection fraction, by visual estimation, is 40%. There is   basal to mid inferior and inferolateral hypokinesis/akinesis.   3. Spectral Doppler shows impaired relaxation pattern of left   ventricular myocardial filling (Grade I diastolic dysfunction).   4. RV systolic function is normal.   5. Mild mitral regurgitation.   6. No pericardial effusion.   7. ** Compared to TTE dated 4/10/18, findings are comparable. 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    PMH  Acute coronary syndrome  Gastroesophageal reflux disease, esophagitis presence not specified  Severe mitral regurgitation  Chronic systolic heart failure  NSTEMI (non-ST elevated myocardial infarction)  Hyperlipidemia, unspecified hyperlipidemia type  Essential hypertension  Type 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, without long-term current use of insul  HFrEF (heart failure with reduced ejection fraction)  Coronary artery disease involving coronary bypass graft of native heart with unstable angina pectori  Status post open reduction with internal fixation of fracture  S/P primary angioplasty with coronary stent    REVIEW OF SYSTEMS  General: Denies fever, chills, pain, no discomfort  Respiratory and Thorax:  Denies cough, sob, or any discomfort  Cardiovascular:  Denies chest pain, palpiations, or any discomfort	  Gastrointestinal:  Denies n/v/d, constipation, or any discomfort  Genitourinary:  Denies frequency, burning, or pain  Musculoskeletal:  Denies joint pain, swelling, or any discomfort   Neurological:  Denies headache, dizzyness, blurred vision, numbing or tingling  Psychiatric:  Denies sadness or depression  Hematology  Tigre bleeding o swelling    MEDICATIONS:  carvedilol 12.5 milliGRAM(s) Oral every 12 hours  furosemide    Tablet 20 milliGRAM(s) Oral daily  losartan 50 milliGRAM(s) Oral daily  traMADol 50 milliGRAM(s) Oral two times a day PRN  pantoprazole    Tablet 40 milliGRAM(s) Oral before breakfast  atorvastatin 40 milliGRAM(s) Oral at bedtime  glucagon  Injectable 1 milliGRAM(s) IntraMuscular once PRN  insulin lispro (HumaLOG) corrective regimen sliding scale   SubCutaneous three times a day before meals  aspirin  chewable 81 milliGRAM(s) Oral daily  clopidogrel Tablet 75 milliGRAM(s) Oral daily  dextrose 5%. 1000 milliLiter(s) IV Continuous <Continuous>    PHYSICAL EXAM:  T(C): 36.9 (18 @ 10:08), Max: 36.9 (18 @ 18:45)  HR: 84 (18 @ 10:08) (81 - 93)  BP: 126/65 (18 @ 10:08) (112/67 - 139/72)  RR: 18 (18 @ 10:08) (18 - 28)  SpO2: 98% (18 @ 10:08) (96% - 99%)  I&O's Summary  2018 07:  -  2018 07:00  --------------------------------------------------------  IN: 1160 mL / OUT: 630 mL / NET: 530 mL  :  -  2018 14:00  --------------------------------------------------------  IN: 400 mL / OUT: 0 mL / NET: 400 mL  Daily Weight in k.5 (2018 04:59)  Appearance: Normal	  HEENT:   Normal oral mucosa, PERRL, EOMI	  Lymphatic: No lymphadenopathy  Cardiovascular: Normal S1 S2, No JVD, No murmurs, No edema  Respiratory: Lungs clear to auscultation	  Psychiatry: A & O x 3, Mood & affect appropriate  Gastrointestinal:  Soft, Non-tender, + BS	  Skin: No rashes, No ecchymoses, No cyanosis  Neurologic: Non-focal  Extremities: Normal range of motion, No clubbing, cyanosis or edema  Vascular: Peripheral pulses palpable 2+ bilaterally  Procedure Site:  Bilateral groin sites, dry and intack, slight bruising, no swelling, no hematoma, no pain, +PP no edema.      TELEMETRY: 	  Sr 80's  LABS:	 	                    12.1   12.7  )-----------( 328      ( 2018 06:07 )             36.9     131<L>  |  92<L>  |  11.0  ----------------------------<  156<H>  4.6   |  27.0  |  0.75  Ca    9.2      2018 06:07  Phos  3.7     11-08  Mg     1.9     11-08  TPro  7.2  /  Alb  4.1  /  TBili  0.3<L>  /  DBili  x   /  AST  42<H>  /  ALT  25  /  AlkPhos  109  11-07  HgA1c: Hemoglobin A1C, Whole Blood: 8.5 % ( @ 06:07)

## 2018-11-09 NOTE — DISCHARGE NOTE ADULT - MEDICATION SUMMARY - MEDICATIONS TO TAKE
I will START or STAY ON the medications listed below when I get home from the hospital:    aspirin 81 mg oral tablet, chewable  -- 1 tab(s) by mouth once a day  -- Indication: For Acute coronary syndrome    traMADol 50 mg oral tablet  -- 1 tab(s) by mouth 2 times a day, As Needed  -- Indication: For pain. home med    isosorbide mononitrate 30 mg oral tablet, extended release  -- 1 tab(s) by mouth once a day (in the morning)  -- Indication: For Acute coronary syndrome    metFORMIN 1000 mg oral tablet, extended release  -- 1 tab(s) by mouth once a day  -- Indication: For diabetes    glipiZIDE 10 mg oral tablet, extended release  -- 1 tab(s) by mouth once a day  -- Indication: For diabetes    atorvastatin 40 mg oral tablet  -- 1 tab(s) by mouth once a day  -- Indication: For Hyperlipidemia, unspecified hyperlipidemia type    amlodipine/valsartan/hydrochlorothiazide 10 mg-160 mg-12.5 mg oral tablet  -- 1 tab(s) by mouth once a day  -- Indication: For Essential hypertension    clopidogrel 75 mg oral tablet  -- 1 tab(s) by mouth once a day  -- Indication: For Acute coronary syndrome    carvedilol 12.5 mg oral tablet  -- 1 tab(s) by mouth every 12 hours  -- Indication: For Acute coronary syndrome    furosemide 20 mg oral tablet  -- 1 tab(s) by mouth once a day  -- Indication: For Chronic systolic heart failure    esomeprazole 40 mg oral delayed release capsule  -- 1 cap(s) by mouth once a day  -- Indication: For dyspepsia I will START or STAY ON the medications listed below when I get home from the hospital:    aspirin 81 mg oral tablet, chewable  -- 1 tab(s) by mouth once a day  -- Indication: For Acute coronary syndrome    traMADol 50 mg oral tablet  -- 1 tab(s) by mouth 2 times a day, As Needed  -- Indication: For pain    isosorbide mononitrate 30 mg oral tablet, extended release  -- 1 tab(s) by mouth once a day (in the morning)  -- Indication: For Acute coronary syndrome    metFORMIN 1000 mg oral tablet, extended release  -- 1 tab(s) by mouth once a day  -- Indication: For diabetes    glipiZIDE 10 mg oral tablet, extended release  -- 1 tab(s) by mouth once a day  -- Indication: For diabetes    atorvastatin 40 mg oral tablet  -- 1 tab(s) by mouth once a day  -- Indication: For Hyperlipidemia, unspecified hyperlipidemia type    amlodipine/valsartan/hydrochlorothiazide 10 mg-160 mg-12.5 mg oral tablet  -- 1 tab(s) by mouth once a day  -- Indication: For Essential hypertension    clopidogrel 75 mg oral tablet  -- 1 tab(s) by mouth once a day  -- Indication: For Acute coronary syndrome    carvedilol 12.5 mg oral tablet  -- 1 tab(s) by mouth every 12 hours  -- Indication: For Acute coronary syndrome    furosemide 20 mg oral tablet  -- 1 tab(s) by mouth once a day  -- Indication: For HFrEF (heart failure with reduced ejection fraction)    esomeprazole 40 mg oral delayed release capsule  -- 1 cap(s) by mouth once a day  -- Indication: For dyspepsia

## 2018-11-09 NOTE — DISCHARGE NOTE ADULT - CARE PLAN
Principal Discharge DX:	Acute coronary syndrome  Goal:	STEMI,   1 CODI to SVG-OM1 graft.  Assessment and plan of treatment:	continue meds. see cardiology in 1-2 week. keep groin area clean. follow cardiology instruction  Secondary Diagnosis:	Coronary artery disease involving coronary bypass graft of native heart with unstable angina pectori  Assessment and plan of treatment:	as above  Secondary Diagnosis:	Essential hypertension  Assessment and plan of treatment:	c/w med. see primary MD in 1 week  Secondary Diagnosis:	Chronic systolic heart failure  Assessment and plan of treatment:	continue med. see cardiology in 1 week  Secondary Diagnosis:	Type 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, without long-term current use of insul  Assessment and plan of treatment:	continue meds. hold metformin now. start from tomorrow as you got dye during cath. drink plenty of water to clean up dye from bosdy. see primary MD in 1 week

## 2018-11-09 NOTE — DISCHARGE NOTE ADULT - SECONDARY DIAGNOSIS.
Coronary artery disease involving coronary bypass graft of native heart with unstable angina pectori Essential hypertension Chronic systolic heart failure Type 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, without long-term current use of insul

## 2018-11-09 NOTE — DISCHARGE NOTE ADULT - PLAN OF CARE
STEMI,   1 CODI to SVG-OM1 graft. continue meds. see cardiology in 1-2 week. keep groin area clean. follow cardiology instruction as above c/w med. see primary MD in 1 week continue med. see cardiology in 1 week continue meds. hold metformin now. start from tomorrow as you got dye during cath. drink plenty of water to clean up dye from bosdy. see primary MD in 1 week

## 2018-11-09 NOTE — DISCHARGE NOTE ADULT - PROVIDER TOKENS
TOKEN:'[id number]',FREE:[LAST:[rosi],FIRST:[alondra SAMPSON],PHONE:[(   )    -],FAX:[(   )    -]]

## 2018-11-09 NOTE — DISCHARGE NOTE ADULT - MEDICATION SUMMARY - MEDICATIONS TO CHANGE
I will SWITCH the dose or number of times a day I take the medications listed below when I get home from the hospital:    carvedilol 25 mg oral tablet  -- 1 tab(s) by mouth 2 times a day

## 2018-11-09 NOTE — DISCHARGE NOTE ADULT - CARE PROVIDER_API CALL
Sydney Valenzuela), Cardiology; Internal Medicine  [address] [place] [address] [id number]  Phone: [phone]  Fax: [phone]    alondra aranda MD  Phone: (   )    -  Fax: (   )    -

## 2018-11-09 NOTE — DISCHARGE NOTE ADULT - ADDITIONAL INSTRUCTIONS
please call cardiology and primary MD office to schedule appointment. bring all med list or bottle and discharge papers to all health care visits. seek urgent medical attention if chest pain/shortness of breadth, palpitation/leg swelling gain/ abdominal pain/nausea/vomiting or any other cocnerning symptoms.

## 2018-11-09 NOTE — DISCHARGE NOTE ADULT - CLICK TO LAUNCH ORM
19    Patient: Ady Christian  : 1938 Visit date: 2019    Dear  Dr. Justin Mayfield MD,    Thank you for referring Ady Christian to my practice. Please find my assessment and plan below.          Assessment   Proctitis  (primary encou history is otherwise significant for appendectomy, cholecystectomy, and ventral hernia repair with mesh. Physical exam: Over the mons pubis there is a red, inflamed cyst.  There is no significant induration or fluctuance. No drainage.   Mild tenderness .

## 2018-11-09 NOTE — DISCHARGE NOTE ADULT - HOSPITAL COURSE
74 y/o never smoker with history of premature CAD in his 40s, S/P CABG X 4 (LIMA to LAD, SVG to OM, other 2 grafts closed), S/P multiple PCI's, HFrEF, DM2, HTN, and HLD presented to ER with Chest pain that started 2-3 days ago when he was waiting for plain flight returning from [place]. He flew 17 hrs with the chest pain. He also head cough which was resolved. He admits to fatigue and orthopnea, but denies SOB, nausea, abd pain, excessive sweating, dizziness. palpitation, no presyncope or syncope. He passed out once when he was 18 yrs old, for unknown reason.  In ER, EKG showed a new LBBB. patient was admitted with NSTEMI/unstable angina. Patient was taken for urgent cardiac catheterization, received 1 CODI to SVG-OM1 graft. IABP was placed preemptively for support in the setting of known severe systolic heart failure and severe MR. IABP and #8fr sheath was discontinued this am. seen by cardilogy and MICU team and suggested patient is stable to DG to telemetry under hospitalist service    Problem/Plan - 1:  ·  Problem: Coronary artery disease involving coronary bypass graft of native heart with unstable angina pectori.  Plan:s/p   1 CODI to SVG-OM1 graft.  DG to telemetry  Continue DAPT and other cardiac meds.  EKG,CBC,trop in am  [facility]  cardiology on board     Problem/Plan - 2:  ·  Problem: HFrEF (heart failure with reduced ejection fraction).  Plan: Echo as above. EF 40%  - c/w coreg, losartan, lasix  - daily weight  - I and Os  - patient looks dry. encouraged PO fluid intake - reduce lasix if continues to be dehydrated  - monitor renal function     Problem/Plan - 3:  ·  Problem: Type 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, without long-term current use of insulin.  Plan: Will hold metformin for 48 hours post procedure.  Diabetic diet  FSBS Q AC and HS with coverage.   A1c, lipid panel     Problem/Plan - 4:  ·  Problem: Essential hypertension.  Plan: c/w meds. DASh diet. adjust as needed       Problem/Plan - 5:  ·  Problem: Hyperlipidemia, unspecified hyperlipidemia type.  Plan: Continue Lipitor 40mg daily  Lipids in AM.    leucocytosis: possibly related cardiac procedure/NSTEMI. will monitor. no temp    DVT-P: heparin/lovenox once cleared by cardiology 72 y/o never smoker with history of premature CAD in his 40s, S/P CABG X 4 (LIMA to LAD, SVG to OM, other 2 grafts closed), S/P multiple PCI's, HFrEF, DM2, HTN, and HLD presented to ER with Chest pain that started 2-3 days ago when he was waiting for plain flight returning from [place]. He flew 17 hrs with the chest pain. He also head cough which was resolved. He admits to fatigue and orthopnea, but denies SOB, nausea, abd pain, excessive sweating, dizziness. palpitation, no presyncope or syncope. He passed out once when he was 18 yrs old, for unknown reason.  In ER, EKG showed a new LBBB. patient was admitted with NSTEMI/unstable angina. Patient was taken for urgent cardiac catheterization, received 1 CODI to SVG-OM1 graft. IABP was placed preemptively for support in the setting of known severe systolic heart failure and severe MR. IABP and #8fr sheath was discontinued this am. seen by cardilogy and MICU team and suggested patient is stable to DG to telemetry under hospitalist service    Problem/Plan - 1:  ·  Problem: Coronary artery disease involving coronary bypass graft of native heart with unstable angina pectori.STEMI  Plan:s/p   1 CODI to SVG-OM1 graft.  DG to telemetry  Continue DAPT and other cardiac meds.  [facility]  cardiology f/u 1-2 weeks     Problem/Plan - 2:  ·  Problem: HFrEF (heart failure with reduced ejection fraction).  Plan: Echo as above. EF 40%  - c/w coreg, losartan, lasix  - daily weight  - I and Os  - patient looks dry. encouraged PO fluid intake - reduce lasix if continues to be dehydrated  - monitor renal function  -      Problem/Plan - 3:  ·  Problem: Type 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, without long-term current use of insulin.  Plan: Will hold metformin for 48 hours post procedure.  Diabetic diet  FSBS Q AC and HS with coverage.   A1c, lipid panel     Problem/Plan - 4:  ·  Problem: Essential hypertension.  Plan: c/w meds. DASh diet. adjust as needed       Problem/Plan - 5:  ·  Problem: Hyperlipidemia, unspecified hyperlipidemia type.  Plan: Continue Lipitor 40mg daily  Lipids in AM.    leucocytosis: possibly related cardiac procedure/STEMI. will monitor. no temp    Dispo: stable for DC. no need skilled PT    seen at bedside. denied complaints. exam same    plan if care discussed with the patient and family at bedside. return precautions discussed. patient verbalized understanding.     Time spent: 40 min 72 y/o never smoker with history of premature CAD in his 40s, S/P CABG X 4 (LIMA to LAD, SVG to OM, other 2 grafts closed), S/P multiple PCI's, HFrEF, DM2, HTN, and HLD presented to ER with Chest pain that started 2-3 days ago when he was waiting for plain flight returning from [place]. He flew 17 hrs with the chest pain. He also head cough which was resolved. He admits to fatigue and orthopnea, but denies SOB, nausea, abd pain, excessive sweating, dizziness. palpitation, no presyncope or syncope. He passed out once when he was 18 yrs old, for unknown reason.  In ER, EKG showed a new LBBB. patient was admitted with NSTEMI/unstable angina. Patient was taken for urgent cardiac catheterization, received 1 CODI to SVG-OM1 graft. IABP was placed preemptively for support in the setting of known severe systolic heart failure and severe MR. IABP and #8fr sheath was discontinued this am. seen by cardilogy and MICU team and suggested patient is stable to DG to telemetry under hospitalist service    Problem/Plan - 1:  ·  Problem: Coronary artery disease involving coronary bypass graft of native heart with unstable angina pectori.STEMI  Plan:s/p   1 CODI to SVG-OM1 graft.  DG to telemetry  Continue DAPT and other cardiac meds.  [facility]  cardiology f/u 1-2 weeks     Problem/Plan - 2:  ·  Problem: HFrEF (heart failure with reduced ejection fraction).  Plan: Echo as above. EF 40%  - c/w coreg, losartan, lasix  - daily weight  - I and Os  - patient looks dry. encouraged PO fluid intake - reduce lasix if continues to be dehydrated  - monitor renal function  -      Problem/Plan - 3:  ·  Problem: Type 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, without long-term current use of insulin.  Plan: Will hold metformin for 48 hours post procedure.  Diabetic diet  FSBS Q AC and HS with coverage.   A1c, lipid panel     Problem/Plan - 4:  ·  Problem: Essential hypertension.  Plan: c/w meds. DASh diet. adjust as needed       Problem/Plan - 5:  ·  Problem: Hyperlipidemia, unspecified hyperlipidemia type.  Plan: Continue Lipitor 40mg daily  Lipids in AM.    leucocytosis: possibly related cardiac procedure/STEMI. will monitor. no temp    Dispo: stable for DC. no need skilled PT    seen at bedside. denied complaints. exam same. seen by cardiology. cleared for DC    plan if care discussed with the patient and family at bedside. return precautions discussed. patient verbalized understanding.     Time spent: 40 min

## 2018-11-14 ENCOUNTER — APPOINTMENT (OUTPATIENT)
Dept: CARDIOLOGY | Facility: CLINIC | Age: 73
End: 2018-11-14
Payer: MEDICARE

## 2018-11-14 ENCOUNTER — NON-APPOINTMENT (OUTPATIENT)
Age: 73
End: 2018-11-14

## 2018-11-14 VITALS
HEART RATE: 85 BPM | OXYGEN SATURATION: 100 % | WEIGHT: 150 LBS | SYSTOLIC BLOOD PRESSURE: 125 MMHG | DIASTOLIC BLOOD PRESSURE: 63 MMHG | BODY MASS INDEX: 23.54 KG/M2 | HEIGHT: 67 IN

## 2018-11-14 DIAGNOSIS — M54.32 SCIATICA, LEFT SIDE: ICD-10-CM

## 2018-11-14 PROBLEM — I10 ESSENTIAL (PRIMARY) HYPERTENSION: Chronic | Status: ACTIVE | Noted: 2018-11-07

## 2018-11-14 PROBLEM — I25.10 ATHEROSCLEROTIC HEART DISEASE OF NATIVE CORONARY ARTERY WITHOUT ANGINA PECTORIS: Chronic | Status: ACTIVE | Noted: 2018-11-07

## 2018-11-14 PROBLEM — E11.59 TYPE 2 DIABETES MELLITUS WITH OTHER CIRCULATORY COMPLICATIONS: Chronic | Status: ACTIVE | Noted: 2018-11-07

## 2018-11-14 PROCEDURE — 93000 ELECTROCARDIOGRAM COMPLETE: CPT

## 2018-11-14 PROCEDURE — 99215 OFFICE O/P EST HI 40 MIN: CPT

## 2018-11-14 RX ORDER — FUROSEMIDE 20 MG/1
20 TABLET ORAL DAILY
Qty: 60 | Refills: 4 | Status: DISCONTINUED | COMMUNITY
Start: 2017-07-12 | End: 2018-11-14

## 2018-11-14 NOTE — HISTORY OF PRESENT ILLNESS
[FreeTextEntry1] : f/u  For: HTN, HLD, CAD s/p CABg x4, Angina, recent PCI\par patient was admitted in [facility] for unstable angina, STEMI.  had SVG to OM occluided. S/p PCI. \par \par \par old note: Patient states he was in the hospital on april 10th for bronchitis. he was treated with steroids, abx and inhalers. got better.\par got echo done, every thing was unremarkable \par Denies any ches tpain. no dyspena. \par Now he feels better. no dizziness. compliant with meds. Low sodium in the hospital .\par \par old note: walking every day. no chest pain. no dyspnea.  no dizziness. NO headachess.. No bleeding.  states his sodium has been low. he has been taking some salt. his blood pressure. 114/68; heart rate 60's. \par  \par \par Old note: feels good. Was in the hospital on mnarch 10, 2017   with acute myocardial infarciton.s /p pCI> was complicated by oral bleeding./ [id number] Resolute stent  \par \par \par OLD NOTE: This is a 70 year old male with history HTN, DM, HLD, CAD, h/o MI,  s/p CABG, s/p PCIx4, recently was seen at [facility] for chest pain and unstable angina. he underwent a cath 7/23/2015; that resulted in CODI in SVG to Lcx, LIMA to LAD patent, 2 grafts were occluded. Echo on 7/24: Showed EF 45% and Segmental wall motion abnormality in apex and distal septum and basal inferior wall.Mild MR. On 7/25/2015. patient was discharged. \par

## 2018-11-14 NOTE — PHYSICAL EXAM
[General Appearance - Well Developed] : well developed [Normal Appearance] : normal appearance [Well Groomed] : well groomed [General Appearance - Well Nourished] : well nourished [No Deformities] : no deformities [General Appearance - In No Acute Distress] : no acute distress [Normal Conjunctiva] : the conjunctiva exhibited no abnormalities [Eyelids - No Xanthelasma] : the eyelids demonstrated no xanthelasmas [Normal Oral Mucosa] : normal oral mucosa [No Oral Pallor] : no oral pallor [No Oral Cyanosis] : no oral cyanosis [Normal Jugular Venous A Waves Present] : normal jugular venous A waves present [Normal Jugular Venous V Waves Present] : normal jugular venous V waves present [No Jugular Venous Castorena A Waves] : no jugular venous castorena A waves [Respiration, Rhythm And Depth] : normal respiratory rhythm and effort [Exaggerated Use Of Accessory Muscles For Inspiration] : no accessory muscle use [Auscultation Breath Sounds / Voice Sounds] : lungs were clear to auscultation bilaterally [Heart Rate And Rhythm] : heart rate and rhythm were normal [Heart Sounds] : normal S1 and S2 [Murmurs] : no murmurs present [Abdomen Soft] : soft [Abdomen Tenderness] : non-tender [Abdomen Mass (___ Cm)] : no abdominal mass palpated [Abnormal Walk] : normal gait [Nail Clubbing] : no clubbing of the fingernails [Cyanosis, Localized] : no localized cyanosis [Petechial Hemorrhages (___cm)] : no petechial hemorrhages [Skin Color & Pigmentation] : normal skin color and pigmentation [] : no rash [No Venous Stasis] : no venous stasis [Skin Lesions] : no skin lesions [No Skin Ulcers] : no skin ulcer [No Xanthoma] : no  xanthoma was observed [Oriented To Time, Place, And Person] : oriented to person, place, and time [Affect] : the affect was normal [Mood] : the mood was normal [No Anxiety] : not feeling anxious

## 2018-11-14 NOTE — REASON FOR VISIT
[Follow-Up - Clinic] : a clinic follow-up of [FreeTextEntry2] : f/u  For: HTN, HLD, CAD s/p CABg x4, Angina, recent PCI [FreeTextEntry1] : f/u  For: HTN, HLD, CAD s/p CABg x4, Angina, recent PCI

## 2018-11-14 NOTE — DISCUSSION/SUMMARY
[Patient] : the patient [Risks] : risks [Benefits] : benefits [Alternatives] : alternatives [With Me] : with me [___ Month(s)] : [unfilled] month(s) [FreeTextEntry1] : This is a 70 year old male with history HTN, DM, HLD, CAD, h/o MI,  s/p CABG, s/p PCIx4, recent unstable angina s/p PCI with CODI to SVG to LCX, ICM EF 45%\par 1) Recent STWEMi. occluded vein graft s/p PCI. now improved. MR resolved. \par aspirin and change plavix to brillinta ct statins and beta blocker and antianginals. \par 1) CAD, s/p CABG, s/p PCI: ASA, change plavix to brillinta , Lipitor 40 mg daily, Coreg \par s/p MI and PCI in OM1 graft.  Ct asa and plavix.  Stable symptoms. cardiac rehab.\par 2_ HTN: controlled.ct coreg to 25 Q12 .  Ct imdur 30 daily.   combination of Amliodpine- Valsartan and HCTZ.  mg and 12.5 mg daily. \par 2) ICM: EF 40%. Coreg, ARB, Euvolemic. Need better BP control. may add aldactone next visit. Lasix PRN. \par Diet and exercise counselling. \par 4) GERD: Nexium 40 daily. \par 5) Hyponateramia: Improved. recheck in 4-5 mths.

## 2018-11-14 NOTE — CARDIOLOGY SUMMARY
[LVEF ___%] : LVEF [unfilled]% [None] : no pulmonary hypertension [Enlarged] : enlarged LA size [Mild] : mild mitral regurgitation [___] : [unfilled]

## 2019-03-26 ENCOUNTER — APPOINTMENT (OUTPATIENT)
Dept: CARDIOLOGY | Facility: CLINIC | Age: 74
End: 2019-03-26
Payer: MEDICARE

## 2019-03-26 ENCOUNTER — RECORD ABSTRACTING (OUTPATIENT)
Age: 74
End: 2019-03-26

## 2019-03-26 ENCOUNTER — NON-APPOINTMENT (OUTPATIENT)
Age: 74
End: 2019-03-26

## 2019-03-26 VITALS — SYSTOLIC BLOOD PRESSURE: 170 MMHG | DIASTOLIC BLOOD PRESSURE: 80 MMHG

## 2019-03-26 VITALS
SYSTOLIC BLOOD PRESSURE: 164 MMHG | HEIGHT: 67 IN | HEART RATE: 90 BPM | DIASTOLIC BLOOD PRESSURE: 70 MMHG | WEIGHT: 151 LBS | OXYGEN SATURATION: 100 % | BODY MASS INDEX: 23.7 KG/M2

## 2019-03-26 PROCEDURE — 93000 ELECTROCARDIOGRAM COMPLETE: CPT

## 2019-03-26 PROCEDURE — 99215 OFFICE O/P EST HI 40 MIN: CPT

## 2019-03-26 RX ORDER — CARVEDILOL 12.5 MG/1
12.5 TABLET, FILM COATED ORAL
Qty: 60 | Refills: 0 | Status: DISCONTINUED | COMMUNITY
Start: 2018-10-26

## 2019-03-26 RX ORDER — TRAMADOL HYDROCHLORIDE 50 MG/1
50 TABLET, COATED ORAL
Qty: 60 | Refills: 0 | Status: DISCONTINUED | COMMUNITY
Start: 2017-07-16 | End: 2019-03-26

## 2019-03-26 RX ORDER — TICAGRELOR 90 MG/1
90 TABLET ORAL TWICE DAILY
Qty: 60 | Refills: 4 | Status: ACTIVE | COMMUNITY
Start: 2018-11-14

## 2019-03-26 RX ORDER — FUROSEMIDE 20 MG/1
20 TABLET ORAL DAILY
Qty: 90 | Refills: 3 | Status: DISCONTINUED | COMMUNITY
Start: 2018-07-24 | End: 2019-03-26

## 2019-03-26 RX ORDER — AMLODIPINE BESYLATE 10 MG/1
10 TABLET ORAL DAILY
Qty: 30 | Refills: 0 | Status: DISCONTINUED | COMMUNITY
Start: 2017-07-05 | End: 2019-03-26

## 2019-03-26 RX ORDER — METOCLOPRAMIDE 10 MG/1
10 TABLET ORAL
Qty: 30 | Refills: 0 | Status: DISCONTINUED | COMMUNITY
Start: 2017-07-21 | End: 2019-03-26

## 2019-03-26 RX ORDER — FUROSEMIDE 40 MG/1
40 TABLET ORAL
Qty: 10 | Refills: 0 | Status: DISCONTINUED | COMMUNITY
Start: 2017-08-12 | End: 2019-03-26

## 2019-03-26 RX ORDER — ESOMEPRAZOLE MAGNESIUM 40 MG/1
40 CAPSULE, DELAYED RELEASE ORAL DAILY
Qty: 30 | Refills: 0 | Status: DISCONTINUED | COMMUNITY
Start: 2017-03-22 | End: 2019-03-26

## 2019-03-26 RX ORDER — AMLODIPINE BESYLATE VALSARTAN HYDROCHLOROTHIAZIDE 10; 12.5; 16 MG/1; MG/1; MG/1
10-160-12.5 TABLET, FILM COATED ORAL DAILY
Qty: 30 | Refills: 5 | Status: DISCONTINUED | COMMUNITY
Start: 2017-07-12 | End: 2019-03-26

## 2019-03-26 NOTE — DISCUSSION/SUMMARY
[Patient] : the patient [Risks] : risks [Benefits] : benefits [Alternatives] : alternatives [With Me] : with me [___ Month(s)] : [unfilled] month(s) [FreeTextEntry1] : This is a 70 year old male with history HTN, DM, HLD, CAD, h/o MI,  s/p CABG, s/p PCIx4, recent unstable angina s/p PCI with CODI to SVG to LCX, ICM EF 45%\par 1) Recent STWEMi. occluded vein graft s/p PCI. now improved. MR resolved. \par aspirin and  ct brillinta ct statins and beta blocker and antianginals. \par 1) CAD, s/p CABG, s/p PCI: ASA, c ct brillinta , Lipitor 40 mg daily, Coreg \par s/p MI and PCI in OM1 graft.  Ct asa and plavix.  Stable symptoms. cardiac rehab.\par 2_ HTN: controlled.ct coreg to 12.5 Q12 .  Ct imdur 30 daily.   combination of Amliodpine- Valsartan and HCTZ.  mg and 12.5 mg daily. \par elevtaed Office BOP. as per patient it is 130/80. 24 hr BP monitor. \par 2) ICM: EF 40%. Coreg, ARB, Euvolemic. Need better BP control.  may add aldactone next visit. Lasix PRN. \par Diet and exercise counselling. \par 4) GERD: Nexium 40 daily. \par

## 2019-03-26 NOTE — OBJECTIVE
[History reviewed] : History reviewed. [Medications and Allergies reviewed] : Medications and allergies reviewed. - - -

## 2019-03-26 NOTE — HISTORY OF PRESENT ILLNESS
[FreeTextEntry1] : f/u  For: HTN, HLD, CAD s/p CABg x4, Angina, recent PCI\par no chest pain.no dyspnea,. no headaches. no dizziness.\par  difficulty walking foot problem. seein neurologist. NO Peripheral vascular disease . patient got US at a [facility] doctor. \par \par \par old note: patient was admitted in [facility] for unstable angina, STEMI.  had SVG to OM occluided. S/p PCI. \par \par OLD NOTE: This is a 70 year old male with history HTN, DM, HLD, CAD, h/o MI,  s/p CABG, s/p PCIx4, recently was seen at [facility] for chest pain and unstable angina. he underwent a cath 7/23/2015; that resulted in CODI in SVG to Lcx, LIMA to LAD patent, 2 grafts were occluded. Echo on 7/24: Showed EF 45% and Segmental wall motion abnormality in apex and distal septum and basal inferior wall.Mild MR. On 7/25/2015. patient was discharged. \par

## 2019-03-26 NOTE — CARDIOLOGY SUMMARY
[LVEF ___%] : LVEF [unfilled]% [None] : no pulmonary hypertension [Enlarged] : enlarged LA size [Mild] : mild mitral regurgitation [___] : [unfilled] [___] : [unfilled]

## 2019-05-06 RX ORDER — AMLODIPINE BESYLATE 10 MG/1
10 TABLET ORAL DAILY
Qty: 90 | Refills: 3 | Status: DISCONTINUED | COMMUNITY
Start: 2017-07-12 | End: 2019-05-06

## 2019-05-25 NOTE — CONSULT NOTE ADULT - SUBJECTIVE AND OBJECTIVE BOX
Yes Patient is a 71y old  Male who presents with a chief complaint of Chest pain - tightness (10 Mar 2017 11:51)      HPI:  71 y.o. male with PMHx of HTN, HLD, DMII, CAD s/p CABG and PCIs p/w CP - tightness, relieved by NTG to 5/10 from 8/10, worse with lying down, not associated with exertion from 8 pm last night. Last Cardiac cath at [facility] 6 m.a. (10 Mar 2017 08:07). Pt. underwent emergent cardiac cath this AM when he presented with chest pain and EKG changes. He previously had CABG and was found to have an occluded graft which was stented. During the procedure pt. went into pulmonary edema and wa sintubated and placed on a bloon pump. It is unclear whether the pt's initial intubation was endotracheal or possibly esophageal and the pt. was re-intubated with appropriate oxygenation. An orogastric tube was placed and BRB was aspirated. His IV Heparin was discontinued and the amount of BRB decreased. According to the pt's son who was present when I saw the pt. his father has had no previous upper or lower GI testing and has no h/o ulcer disease. He was taking full dose aspirin at home among his other multiple medications.      REVIEW OF SYSTEMS:  Constitutional: No fever, weight loss or fatigue  ENMT:  No difficulty hearing, tinnitus, vertigo; No sinus or throat pain  Respiratory: No cough, wheezing, chills or hemoptysis  Cardiovascular: + chest pain, palpitations, dizziness or leg swelling  Gastrointestinal: No abdominal or epigastric pain. positive hematemesis; No diarrhea or constipation. No melena or hematochezia.  Skin: No itching, burning, rashes or lesions   Musculoskeletal: No joint pain or swelling; No muscle, back or extremity pain  Patient has no cardiopulmonary, peripheral vascular, musculoskeletal, dermatological, neurological, gynecological or psychological symptoms or complaints at this time    PAST MEDICAL & SURGICAL HISTORY:  Coronary artery disease involving coronary bypass graft of native heart with unstable angina pectoris  High cholesterol  Diabetes mellitus  Hypertension  S/P primary angioplasty with coronary stent  S/P CABG (coronary artery bypass graft)      FAMILY HISTORY:  No pertinent family history in first degree relatives      SOCIAL HISTORY:  Smoking Status: [ ] Current, [ ] Former, [ x] Never  Pack Years: N/A.  No ETOH or drug abuse history.    MEDICATIONS:  MEDICATIONS  (STANDING):  losartan 50milliGRAM(s) Oral daily  isosorbide   mononitrate ER Tablet (IMDUR) 30milliGRAM(s) Oral daily  clopidogrel Tablet 75milliGRAM(s) Oral daily  ergocalciferol 05998Khcw(s) Oral every week  carvedilol 25milliGRAM(s) Oral every 12 hours  dextrose 5%. 1000milliLiter(s) IV Continuous <Continuous>  dextrose 50% Injectable 12.5Gram(s) IV Push once  dextrose 50% Injectable 25Gram(s) IV Push once  dextrose 50% Injectable 25Gram(s) IV Push once  aspirin 325milliGRAM(s) Oral daily  chlorhexidine 0.12% Liquid 15milliLiter(s) Swish and Spit two times a day  atorvastatin 80milliGRAM(s) Oral at bedtime  insulin Infusion 4Unit(s)/Hr IV Continuous <Continuous>  dexmedetomidine Infusion 0.3MICROgram(s)/kG/Hr IV Continuous <Continuous>  pantoprazole Infusion 8mG/Hr IV Continuous <Continuous>    MEDICATIONS  (PRN):  dextrose Gel 1Dose(s) Oral once PRN Blood Glucose LESS THAN 70 milliGRAM(s)/deciliter  glucagon  Injectable 1milliGRAM(s) IntraMuscular once PRN Glucose LESS THAN 70 milligrams/deciliter  nitroglycerin     SubLingual 0.4milliGRAM(s) SubLingual every 5 minutes PRN Chest Pain      Allergies    No Known Allergies    Intolerances        Vital Signs Last 24 Hrs  T(C): 36.2, Max: 36.8 (03-10 @ 04:07)  T(F): 97.2, Max: 98.2 (03-10 @ 04:07)  HR: 83 (74 - 84)  BP: 132/80 (129/79 - 140/83)  BP(mean): --  RR: 18 (18 - 20)  SpO2: 93% (93% - 100%)    Mode: AC/ CMV (Assist Control/ Continuous Mandatory Ventilation)  RR (machine): 18  TV (machine): 400  FiO2: 50  PEEP: 5  MAP: 11  PIP: 35      PHYSICAL EXAM:    General: Well developed; well nourished; in no acute distress, intubated with balloon pump in place and orogastric tube with BRB in tube roughly 100 cc. total.  HEENT: MMM, conjunctiva and sclera clear.   Lungs: Decreased BS and rales bilaterally.  Cor:RRR S1, S2 only.  Gastrointestinal: Soft, non-tender non-distended; Normal bowel sounds; No rebound or guarding.  GALINA: not done as pt. was supine with a balloon pump in place.  Extremities: No clubbing, cyanosis or edema  Neurological: Alert and intubated.  Skin: Warm and dry. No obvious rash      LABS:                        12.4   9.5   )-----------( 259      ( 10 Mar 2017 05:12 )             36.0     10 Mar 2017 05:12    131    |  93     |  13.0   ----------------------------<  249    4.4     |  27.0   |  0.88     Ca    9.3        10 Mar 2017 05:12  Phos  3.1       10 Mar 2017 05:12  Mg     2.0       10 Mar 2017 05:12    TPro  7.5    /  Alb  3.8    /  TBili  0.6    /  DBili  x      /  AST  20     /  ALT  21     /  AlkPhos  95     10 Mar 2017 05:12          RADIOLOGY & ADDITIONAL STUDIES: Patient is a 71y old  Male who presents with a chief complaint of Chest pain - tightness (10 Mar 2017 11:51)      HPI:  71 y.o. male with PMHx of HTN, HLD, DMII, CAD s/p CABG and PCIs p/w CP - tightness, relieved by NTG to 5/10 from 8/10, worse with lying down, not associated with exertion from 8 pm last night. Last Cardiac cath at [facility] 6 m.a. (10 Mar 2017 08:07). Pt. underwent emergent cardiac cath this AM when he presented with chest pain and EKG changes. He previously had CABG and was found to have an occluded graft which was stented. During the procedure pt. went into pulmonary edema and was intubated and placed on a balloon pump. It is unclear whether the pt's initial intubation was endotracheal or possibly esophageal and the pt. was re-intubated with appropriate oxygenation. An orogastric tube was placed and BRB was aspirated. His IV Heparin was discontinued and the amount of BRB decreased. According to the pt's son who was present when I saw the pt. his father has had no previous upper or lower GI testing and has no h/o ulcer disease. He was taking full dose aspirin at home among his other multiple medications.      REVIEW OF SYSTEMS:  Constitutional: No fever, weight loss or fatigue  ENMT:  No difficulty hearing, tinnitus, vertigo; No sinus or throat pain  Respiratory: No cough, wheezing, chills or hemoptysis  Cardiovascular: + chest pain, palpitations, dizziness or leg swelling  Gastrointestinal: No abdominal or epigastric pain. positive hematemesis; No diarrhea or constipation. No melena or hematochezia.  Skin: No itching, burning, rashes or lesions   Musculoskeletal: No joint pain or swelling; No muscle, back or extremity pain  Patient has no cardiopulmonary, peripheral vascular, musculoskeletal, dermatological, neurological, gynecological or psychological symptoms or complaints at this time    PAST MEDICAL & SURGICAL HISTORY:  Coronary artery disease involving coronary bypass graft of native heart with unstable angina pectoris  High cholesterol  Diabetes mellitus  Hypertension  S/P primary angioplasty with coronary stent  S/P CABG (coronary artery bypass graft)      FAMILY HISTORY:  No pertinent family history in first degree relatives      SOCIAL HISTORY:  Smoking Status: [ ] Current, [ ] Former, [ x] Never  Pack Years: N/A.  No ETOH or drug abuse history.    MEDICATIONS:  MEDICATIONS  (STANDING):  losartan 50milliGRAM(s) Oral daily  isosorbide   mononitrate ER Tablet (IMDUR) 30milliGRAM(s) Oral daily  clopidogrel Tablet 75milliGRAM(s) Oral daily  ergocalciferol 72192Tzzy(s) Oral every week  carvedilol 25milliGRAM(s) Oral every 12 hours  dextrose 5%. 1000milliLiter(s) IV Continuous <Continuous>  dextrose 50% Injectable 12.5Gram(s) IV Push once  dextrose 50% Injectable 25Gram(s) IV Push once  dextrose 50% Injectable 25Gram(s) IV Push once  aspirin 325milliGRAM(s) Oral daily  chlorhexidine 0.12% Liquid 15milliLiter(s) Swish and Spit two times a day  atorvastatin 80milliGRAM(s) Oral at bedtime  insulin Infusion 4Unit(s)/Hr IV Continuous <Continuous>  dexmedetomidine Infusion 0.3MICROgram(s)/kG/Hr IV Continuous <Continuous>  pantoprazole Infusion 8mG/Hr IV Continuous <Continuous>    MEDICATIONS  (PRN):  dextrose Gel 1Dose(s) Oral once PRN Blood Glucose LESS THAN 70 milliGRAM(s)/deciliter  glucagon  Injectable 1milliGRAM(s) IntraMuscular once PRN Glucose LESS THAN 70 milligrams/deciliter  nitroglycerin     SubLingual 0.4milliGRAM(s) SubLingual every 5 minutes PRN Chest Pain      Allergies    No Known Allergies    Intolerances        Vital Signs Last 24 Hrs  T(C): 36.2, Max: 36.8 (03-10 @ 04:07)  T(F): 97.2, Max: 98.2 (03-10 @ 04:07)  HR: 83 (74 - 84)  BP: 132/80 (129/79 - 140/83)  BP(mean): --  RR: 18 (18 - 20)  SpO2: 93% (93% - 100%)    Mode: AC/ CMV (Assist Control/ Continuous Mandatory Ventilation)  RR (machine): 18  TV (machine): 400  FiO2: 50  PEEP: 5  MAP: 11  PIP: 35      PHYSICAL EXAM:    General: Well developed; well nourished; in no acute distress, intubated with balloon pump in place and orogastric tube with BRB in tube roughly 100 cc. total.  HEENT: MMM, conjunctiva and sclera clear.   Lungs: Decreased BS and rales bilaterally.  Cor:RRR S1, S2 only.  Gastrointestinal: Soft, non-tender non-distended; Normal bowel sounds; No rebound or guarding.  GALINA: not done as pt. was supine with a balloon pump in place.  Extremities: No clubbing, cyanosis or edema  Neurological: Alert and intubated.  Skin: Warm and dry. No obvious rash      LABS:                        12.4   9.5   )-----------( 259      ( 10 Mar 2017 05:12 )             36.0     10 Mar 2017 05:12    131    |  93     |  13.0   ----------------------------<  249    4.4     |  27.0   |  0.88     Ca    9.3        10 Mar 2017 05:12  Phos  3.1       10 Mar 2017 05:12  Mg     2.0       10 Mar 2017 05:12    TPro  7.5    /  Alb  3.8    /  TBili  0.6    /  DBili  x      /  AST  20     /  ALT  21     /  AlkPhos  95     10 Mar 2017 05:12          RADIOLOGY & ADDITIONAL STUDIES:

## 2019-06-01 ENCOUNTER — OUTPATIENT (OUTPATIENT)
Dept: OUTPATIENT SERVICES | Facility: HOSPITAL | Age: 74
LOS: 1 days | End: 2019-06-01
Payer: MEDICARE

## 2019-06-01 DIAGNOSIS — Z96.7 PRESENCE OF OTHER BONE AND TENDON IMPLANTS: Chronic | ICD-10-CM

## 2019-06-01 DIAGNOSIS — Z95.1 PRESENCE OF AORTOCORONARY BYPASS GRAFT: Chronic | ICD-10-CM

## 2019-06-01 DIAGNOSIS — Z95.5 PRESENCE OF CORONARY ANGIOPLASTY IMPLANT AND GRAFT: Chronic | ICD-10-CM

## 2019-06-01 PROCEDURE — G9001: CPT

## 2019-06-12 ENCOUNTER — APPOINTMENT (OUTPATIENT)
Dept: CARDIOLOGY | Facility: CLINIC | Age: 74
End: 2019-06-12
Payer: MEDICARE

## 2019-06-12 ENCOUNTER — OTHER (OUTPATIENT)
Age: 74
End: 2019-06-12

## 2019-06-12 VITALS
WEIGHT: 150 LBS | DIASTOLIC BLOOD PRESSURE: 72 MMHG | HEIGHT: 67 IN | RESPIRATION RATE: 18 BRPM | SYSTOLIC BLOOD PRESSURE: 134 MMHG | HEART RATE: 85 BPM | BODY MASS INDEX: 23.54 KG/M2 | OXYGEN SATURATION: 100 %

## 2019-06-12 DIAGNOSIS — K21.9 GASTRO-ESOPHAGEAL REFLUX DISEASE W/OUT ESOPHAGITIS: ICD-10-CM

## 2019-06-12 PROCEDURE — 99215 OFFICE O/P EST HI 40 MIN: CPT

## 2019-06-12 PROCEDURE — 93000 ELECTROCARDIOGRAM COMPLETE: CPT

## 2019-06-12 NOTE — DISCUSSION/SUMMARY
[Patient] : the patient [Risks] : risks [Alternatives] : alternatives [Benefits] : benefits [___ Month(s)] : [unfilled] month(s) [With Me] : with me [FreeTextEntry1] : This is a 70 year old male with history HTN, DM, HLD, CAD, h/o MI,  s/p CABG, s/p PCIx4, recent unstable angina s/p PCI with CODI to SVG to LCX, ICM EF 45%\par 1) chest pain : Angina pectorius. Increase Imdur to 60 mg. Add PPI protonix 40mg daily. stress test. 2D echo. if recurrent symptoms, then adviced to go to the ER. patient lives near by and has family. \par 2) Recent STWEMi. occluded vein graft s/p PCI. now improved. MR resolved. \par aspirin and  ct brillinta ct statins and beta blocker and antianginals. \par 3) CAD, s/p CABG, s/p PCI: ASA, c ct Brilinta , Lipitor 40 mg daily, Coreg \par s/p MI and PCI in OM1 graft.  Ct asa and plavix.  Stable symptoms. cardiac rehab.\par 4) HTN: controlled.ct coreg to 12.5 Q12 .  Ct imdur 30 daily.   combination of Amliodpine- Valsartan and HCTZ.  mg and 12.5 mg daily. \par elevtaed Office BOP. as per patient it is 130/80. 24 hr BP monitor. \par 5) ICM: EF 40%. Coreg, ARB, Euvolemic. Need better BP control.  may add aldactone next visit. Lasix PRN. \par Diet and exercise counselling. \par 6) GERD: Nexium 40 daily. \par

## 2019-06-12 NOTE — HISTORY OF PRESENT ILLNESS
[FreeTextEntry1] : f/u  For: HTN, HLD, CAD s/p CABg x4, Angina, recent PCI\par c/c: chest pain \par Patient had an episode of chest pain, substernal with epigastric and abdominal pain.  2 days ago, it releived by NTG. \par he had another epsiode today, when he was going [place] [employer]. 6/10, subternal.also with abdominal pain. he has been taking mylanta too for acid reflux . also , he has been recnetly started on azithromycin for bronchitis,.\par He states the second episode happened today and it got relived with NTG. he has been taking IMDUR every day. \par \par \par old note: no chest pain.no dyspnea,. no headaches. no dizziness.\par  difficulty walking foot problem. seein neurologist. NO Peripheral vascular disease . patient got US at a [facility] doctor. \par \par \par old note: patient was admitted in [facility] for unstable angina, STEMI.  had SVG to OM occluided. S/p PCI. \par \par OLD NOTE: This is a 70 year old male with history HTN, DM, HLD, CAD, h/o MI,  s/p CABG, s/p PCIx4, recently was seen at [facility] for chest pain and unstable angina. he underwent a cath 7/23/2015; that resulted in CODI in SVG to Lcx, LIMA to LAD patent, 2 grafts were occluded. Echo on 7/24: Showed EF 45% and Segmental wall motion abnormality in apex and distal septum and basal inferior wall.Mild MR. On 7/25/2015. patient was discharged. \par

## 2019-06-12 NOTE — REASON FOR VISIT
[FreeTextEntry2] : f/u  For: HTN, HLD, CAD s/p CABg x4, Angina, recent PCI [Follow-Up - Clinic] : a clinic follow-up of [FreeTextEntry1] : f/u  For: HTN, HLD, CAD s/p CABg x4, Angina, recent PCI

## 2019-06-16 ENCOUNTER — INPATIENT (INPATIENT)
Facility: HOSPITAL | Age: 74
LOS: 3 days | Discharge: HOME | DRG: 250 | End: 2019-06-20
Attending: INTERNAL MEDICINE | Admitting: HOSPITALIST
Payer: MEDICARE

## 2019-06-16 VITALS
WEIGHT: 149.91 LBS | DIASTOLIC BLOOD PRESSURE: 68 MMHG | HEIGHT: 67 IN | RESPIRATION RATE: 20 BRPM | OXYGEN SATURATION: 100 % | HEART RATE: 88 BPM | TEMPERATURE: 98 F | SYSTOLIC BLOOD PRESSURE: 126 MMHG

## 2019-06-16 DIAGNOSIS — Z95.5 PRESENCE OF CORONARY ANGIOPLASTY IMPLANT AND GRAFT: Chronic | ICD-10-CM

## 2019-06-16 DIAGNOSIS — Z95.1 PRESENCE OF AORTOCORONARY BYPASS GRAFT: Chronic | ICD-10-CM

## 2019-06-16 DIAGNOSIS — Z96.7 PRESENCE OF OTHER BONE AND TENDON IMPLANTS: Chronic | ICD-10-CM

## 2019-06-16 LAB
ALBUMIN SERPL ELPH-MCNC: 4.2 G/DL — SIGNIFICANT CHANGE UP (ref 3.3–5.2)
ALP SERPL-CCNC: 126 U/L — HIGH (ref 40–120)
ALT FLD-CCNC: 13 U/L — SIGNIFICANT CHANGE UP
ANION GAP SERPL CALC-SCNC: 13 MMOL/L — SIGNIFICANT CHANGE UP (ref 5–17)
APTT BLD: 30.6 SEC — SIGNIFICANT CHANGE UP (ref 27.5–36.3)
AST SERPL-CCNC: 16 U/L — SIGNIFICANT CHANGE UP
BASOPHILS # BLD AUTO: 0 K/UL — SIGNIFICANT CHANGE UP (ref 0–0.2)
BASOPHILS NFR BLD AUTO: 0.3 % — SIGNIFICANT CHANGE UP (ref 0–2)
BILIRUB SERPL-MCNC: 0.3 MG/DL — LOW (ref 0.4–2)
BUN SERPL-MCNC: 11 MG/DL — SIGNIFICANT CHANGE UP (ref 8–20)
CALCIUM SERPL-MCNC: 9 MG/DL — SIGNIFICANT CHANGE UP (ref 8.6–10.2)
CHLORIDE SERPL-SCNC: 79 MMOL/L — LOW (ref 98–107)
CK SERPL-CCNC: 66 U/L — SIGNIFICANT CHANGE UP (ref 30–200)
CO2 SERPL-SCNC: 28 MMOL/L — SIGNIFICANT CHANGE UP (ref 22–29)
CREAT SERPL-MCNC: 0.76 MG/DL — SIGNIFICANT CHANGE UP (ref 0.5–1.3)
EOSINOPHIL # BLD AUTO: 0.2 K/UL — SIGNIFICANT CHANGE UP (ref 0–0.5)
EOSINOPHIL NFR BLD AUTO: 3.6 % — SIGNIFICANT CHANGE UP (ref 0–5)
GLUCOSE SERPL-MCNC: 193 MG/DL — HIGH (ref 70–115)
HCT VFR BLD CALC: 30.5 % — LOW (ref 42–52)
HGB BLD-MCNC: 10.1 G/DL — LOW (ref 14–18)
INR BLD: 1.19 RATIO — HIGH (ref 0.88–1.16)
LYMPHOCYTES # BLD AUTO: 1.4 K/UL — SIGNIFICANT CHANGE UP (ref 1–4.8)
LYMPHOCYTES # BLD AUTO: 20.3 % — SIGNIFICANT CHANGE UP (ref 20–55)
MCHC RBC-ENTMCNC: 23.2 PG — LOW (ref 27–31)
MCHC RBC-ENTMCNC: 33.1 G/DL — SIGNIFICANT CHANGE UP (ref 32–36)
MCV RBC AUTO: 70.1 FL — LOW (ref 80–94)
MONOCYTES # BLD AUTO: 0.9 K/UL — HIGH (ref 0–0.8)
MONOCYTES NFR BLD AUTO: 13.3 % — HIGH (ref 3–10)
NEUTROPHILS # BLD AUTO: 4.2 K/UL — SIGNIFICANT CHANGE UP (ref 1.8–8)
NEUTROPHILS NFR BLD AUTO: 62.1 % — SIGNIFICANT CHANGE UP (ref 37–73)
NT-PROBNP SERPL-SCNC: 673 PG/ML — HIGH (ref 0–300)
PLAT MORPH BLD: NORMAL — SIGNIFICANT CHANGE UP
PLATELET # BLD AUTO: 324 K/UL — SIGNIFICANT CHANGE UP (ref 150–400)
POTASSIUM SERPL-MCNC: 3.9 MMOL/L — SIGNIFICANT CHANGE UP (ref 3.5–5.3)
POTASSIUM SERPL-SCNC: 3.9 MMOL/L — SIGNIFICANT CHANGE UP (ref 3.5–5.3)
PROT SERPL-MCNC: 7.6 G/DL — SIGNIFICANT CHANGE UP (ref 6.6–8.7)
PROTHROM AB SERPL-ACNC: 13.7 SEC — HIGH (ref 10–12.9)
RBC # BLD: 4.35 M/UL — LOW (ref 4.6–6.2)
RBC # FLD: 14.9 % — SIGNIFICANT CHANGE UP (ref 11–15.6)
RBC BLD AUTO: NORMAL — SIGNIFICANT CHANGE UP
SODIUM SERPL-SCNC: 120 MMOL/L — CRITICAL LOW (ref 135–145)
TROPONIN T SERPL-MCNC: <0.01 NG/ML — SIGNIFICANT CHANGE UP (ref 0–0.06)
WBC # BLD: 6.8 K/UL — SIGNIFICANT CHANGE UP (ref 4.8–10.8)
WBC # FLD AUTO: 6.8 K/UL — SIGNIFICANT CHANGE UP (ref 4.8–10.8)

## 2019-06-16 PROCEDURE — 93010 ELECTROCARDIOGRAM REPORT: CPT

## 2019-06-16 PROCEDURE — 71046 X-RAY EXAM CHEST 2 VIEWS: CPT | Mod: 26

## 2019-06-16 PROCEDURE — 99285 EMERGENCY DEPT VISIT HI MDM: CPT

## 2019-06-16 PROCEDURE — 99223 1ST HOSP IP/OBS HIGH 75: CPT

## 2019-06-16 RX ORDER — DEXTROSE 50 % IN WATER 50 %
25 SYRINGE (ML) INTRAVENOUS ONCE
Refills: 0 | Status: DISCONTINUED | OUTPATIENT
Start: 2019-06-16 | End: 2019-06-20

## 2019-06-16 RX ORDER — DEXTROSE 50 % IN WATER 50 %
12.5 SYRINGE (ML) INTRAVENOUS ONCE
Refills: 0 | Status: DISCONTINUED | OUTPATIENT
Start: 2019-06-16 | End: 2019-06-20

## 2019-06-16 RX ORDER — ONDANSETRON 8 MG/1
4 TABLET, FILM COATED ORAL EVERY 6 HOURS
Refills: 0 | Status: DISCONTINUED | OUTPATIENT
Start: 2019-06-16 | End: 2019-06-20

## 2019-06-16 RX ORDER — ASPIRIN/CALCIUM CARB/MAGNESIUM 324 MG
81 TABLET ORAL DAILY
Refills: 0 | Status: DISCONTINUED | OUTPATIENT
Start: 2019-06-16 | End: 2019-06-20

## 2019-06-16 RX ORDER — INSULIN LISPRO 100/ML
VIAL (ML) SUBCUTANEOUS
Refills: 0 | Status: DISCONTINUED | OUTPATIENT
Start: 2019-06-16 | End: 2019-06-19

## 2019-06-16 RX ORDER — ACETAMINOPHEN 500 MG
650 TABLET ORAL EVERY 6 HOURS
Refills: 0 | Status: DISCONTINUED | OUTPATIENT
Start: 2019-06-16 | End: 2019-06-20

## 2019-06-16 RX ORDER — LOSARTAN POTASSIUM 100 MG/1
25 TABLET, FILM COATED ORAL DAILY
Refills: 0 | Status: DISCONTINUED | OUTPATIENT
Start: 2019-06-16 | End: 2019-06-19

## 2019-06-16 RX ORDER — HEPARIN SODIUM 5000 [USP'U]/ML
5000 INJECTION INTRAVENOUS; SUBCUTANEOUS EVERY 8 HOURS
Refills: 0 | Status: DISCONTINUED | OUTPATIENT
Start: 2019-06-16 | End: 2019-06-17

## 2019-06-16 RX ORDER — CARVEDILOL PHOSPHATE 80 MG/1
12.5 CAPSULE, EXTENDED RELEASE ORAL DAILY
Refills: 0 | Status: DISCONTINUED | OUTPATIENT
Start: 2019-06-16 | End: 2019-06-19

## 2019-06-16 RX ORDER — GLUCAGON INJECTION, SOLUTION 0.5 MG/.1ML
1 INJECTION, SOLUTION SUBCUTANEOUS ONCE
Refills: 0 | Status: DISCONTINUED | OUTPATIENT
Start: 2019-06-16 | End: 2019-06-20

## 2019-06-16 RX ORDER — AMLODIPINE BESYLATE 2.5 MG/1
10 TABLET ORAL DAILY
Refills: 0 | Status: DISCONTINUED | OUTPATIENT
Start: 2019-06-16 | End: 2019-06-19

## 2019-06-16 RX ORDER — DEXTROSE 50 % IN WATER 50 %
15 SYRINGE (ML) INTRAVENOUS ONCE
Refills: 0 | Status: DISCONTINUED | OUTPATIENT
Start: 2019-06-16 | End: 2019-06-20

## 2019-06-16 RX ORDER — ISOSORBIDE MONONITRATE 60 MG/1
60 TABLET, EXTENDED RELEASE ORAL DAILY
Refills: 0 | Status: DISCONTINUED | OUTPATIENT
Start: 2019-06-16 | End: 2019-06-19

## 2019-06-16 RX ORDER — TICAGRELOR 90 MG/1
90 TABLET ORAL
Refills: 0 | Status: DISCONTINUED | OUTPATIENT
Start: 2019-06-16 | End: 2019-06-20

## 2019-06-16 RX ORDER — ASPIRIN/CALCIUM CARB/MAGNESIUM 324 MG
325 TABLET ORAL ONCE
Refills: 0 | Status: COMPLETED | OUTPATIENT
Start: 2019-06-16 | End: 2019-06-16

## 2019-06-16 RX ORDER — PANTOPRAZOLE SODIUM 20 MG/1
40 TABLET, DELAYED RELEASE ORAL
Refills: 0 | Status: DISCONTINUED | OUTPATIENT
Start: 2019-06-16 | End: 2019-06-20

## 2019-06-16 RX ORDER — ATORVASTATIN CALCIUM 80 MG/1
40 TABLET, FILM COATED ORAL DAILY
Refills: 0 | Status: DISCONTINUED | OUTPATIENT
Start: 2019-06-16 | End: 2019-06-20

## 2019-06-16 RX ORDER — SODIUM CHLORIDE 9 MG/ML
1000 INJECTION, SOLUTION INTRAVENOUS
Refills: 0 | Status: DISCONTINUED | OUTPATIENT
Start: 2019-06-16 | End: 2019-06-20

## 2019-06-16 RX ADMIN — Medication 325 MILLIGRAM(S): at 20:09

## 2019-06-16 NOTE — H&P ADULT - NSICDXPASTMEDICALHX_GEN_ALL_CORE_FT
PAST MEDICAL HISTORY:  Coronary artery disease involving coronary bypass graft of native heart with unstable angina pectoris     Coronary artery disease involving native coronary artery of native heart, angina presence unspecifie     Essential hypertension     GERD (gastroesophageal reflux disease)     HFrEF (heart failure with reduced ejection fraction)     High cholesterol     Type 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, without long-term current use of insul

## 2019-06-16 NOTE — H&P ADULT - NSHPPHYSICALEXAM_GEN_ALL_CORE
T(C): 36.7 (06-16-19 @ 18:38), Max: 36.7 (06-16-19 @ 18:38)  HR: 88 (06-16-19 @ 18:38) (88 - 88)  BP: 126/68 (06-16-19 @ 18:38) (126/68 - 126/68)  RR: 20 (06-16-19 @ 18:38) (20 - 20)  SpO2: 100% (06-16-19 @ 18:38) (100% - 100%)  Wt(kg): --    Physical Exam:   GENERAL: well-groomed, well-developed, NAD  HEENT: head NC/AT; EOM intact, conjunctiva & sclera clear; hearing grossly intact, moist mucous membranes  NECK: supple, no JVD  RESPIRATORY: CTA B/L, no wheezing, rales, rhonchi or rubs  CARDIOVASCULAR: S1&S2, RRR, no murmurs or gallops, no TTP of anterior chest wall  ABDOMEN: soft, non-tender, non-distended, + Bowel sounds x4 quadrants, no guarding, rebound or rigidity  MUSCULOSKELETAL:  b/l LE pedal edema and anterior shin 1+ b/l  LYMPH: no cervical lymphadenopathy  VASCULAR: Radial pulses 2+ bilaterally, no varicose veins   SKIN: warm and dry, color normal  NEUROLOGIC: AA&O X3, CN2-12 intact w/ no focal deficits, no sensory loss, motor Strength 5/5 in UE & LE B/L  Psych: Normal mood and affect, normal behavior

## 2019-06-16 NOTE — ED PROVIDER NOTE - PHYSICAL EXAMINATION
b11  chest is too tight, discomfort. today toko 2 nitro pills, sometimes helps sometimes not.  tightness comes and goes, pill helps.  for past 2 days, pill doesn't help.  stomach n/v (couple times)  normal PO  has to stop when walking due to being tired.  pmhx CAD, multiple stents, CABG, DM, HLD, HTN.  leg surgery  cardio - dr. , stress test sched on 20th,   can taste sour in mouth  legs swollen for 2 days - has taken 20 of water pill (lasix)    PE: nml  epigastric tenderness  MDM: pt with recurrent chest discomfort and mult risk factor for ACS will likely admit for acute CHF.

## 2019-06-16 NOTE — ED PROVIDER NOTE - CARE PLAN
Principal Discharge DX:	CHF (congestive heart failure)  Secondary Diagnosis:	Hyponatremia  Secondary Diagnosis:	Chest pain due to CAD

## 2019-06-16 NOTE — H&P ADULT - NSICDXPASTSURGICALHX_GEN_ALL_CORE_FT
PAST SURGICAL HISTORY:  S/P CABG (coronary artery bypass graft)     S/P primary angioplasty with coronary stent     Status post open reduction with internal fixation of fracture

## 2019-06-16 NOTE — ED PROVIDER NOTE - OBJECTIVE STATEMENT
74 y/o M pt with PMHx HTN, CAD, CABG, DM, HLD, leg surgery (not recent), multiple stents, reduced EF presents to the ED c/o chest pain onset 2 days. Pain is described as chest tightness and discomfort, which has been constant for the past 2 days with no relief from nitroglycerin pills. Pt took 2 Nitro pills PTA. Same pain is usually intermittent, getting relief from nitroglycerin. Associated n/v (few episodes of emesis), JOINER (having to stop when walking due to being tired), occasional sour taste in mouth, swollen legs for 2 days (has taken 20 mg of Lasix PTA).  Has cardiologist appt in 4 days, but cannot wait as the pain is now constant and he is very uncomfortable and can't do anything. Denies fevers, chills, abd pain/diarrhea.  No further acute complaints at this time.   MDM: pt with recurrent chest discomfort and mult risk factor for ACS will likely admit for acute CHF.

## 2019-06-16 NOTE — ED ADULT NURSE NOTE - OBJECTIVE STATEMENT
Patient presents with sub sternal chest tightness x "a few days".  Pt w/ hx of CHF, b/l ankle edema, inability to lie flat.  normal sinus rhythm on cardiac monitor, respirations are even and unlabored.

## 2019-06-16 NOTE — H&P ADULT - ASSESSMENT
Pt is a 74 yo M presenting from home with a 7 day history of chest tightness, PMH CAD s/p CABG (LIMA to LAD, SVG to OM), HFrEF w/ EF 40%, DM2, HLD, HTN, microcytic anemia, GERD, hx of STEMI    Angina: s/p ASA and Nitro. Eval for ACS  -place on telemetry  -TTE ordered.   -consult cardiology, may need stress test as inpatient pending cardiology eval  -trend trop x3 total, initial troponin negative.     CAD s/p CABG and PCI:   -continue ASA, Brilinta, statin, will substitute losartan for valsartan due to medication shortage.     HFrEF: increased LE edema, he was started on Lasix 20mg daily last week he states, and also on Amlodipine/valsartan/HCTZ combo.   -will hold diuretic in setting of worsening hyponatremia  -fluid restrict which will help hyponatremia and his HF.   -low salt diet    Hyponatremia: see above.   consider nephro consult if no improvement    Microcytic anemia: Hgb 10.1 and stable.   -no signs of acute blood loss.   -check IRON panel  -informed to follow up with GI and discuss need for colonoscopy.     DM2: SSI    HLD: continue statin    GERD: started on Protonix last weel.     DVT ppx: heparin    Code status was reviewed with the patient. Patient is a full code. Pt is a 74 yo M presenting from home with a 7 day history of chest tightness, PMH CAD s/p CABG (LIMA to LAD, SVG to OM), HFrEF w/ EF 40%, DM2, HLD, HTN, microcytic anemia, GERD, hx of STEMI    Angina: s/p ASA and Nitro. Eval for ACS  -place on telemetry  -TTE ordered.   -consult cardiology, may need stress test as inpatient pending cardiology eval  -trend trop x3 total, initial troponin negative.     CAD s/p CABG and PCI:   -continue ASA, Brilinta, statin, will substitute losartan for valsartan due to medication shortage.     HFrEF: increased LE edema, he was started on Lasix 20mg daily last week he states, and also on Amlodipine/valsartan/HCTZ combo.   -will hold diuretic in setting of worsening hyponatremia  -fluid restrict which will help hyponatremia and his HF.   -low salt diet    Hyponatremia: see above.   consider nephro consult if no improvement  -recheck BMP in AM to avoid overcorrection of sodium    Microcytic anemia: Hgb 10.1 and stable.   -no signs of acute blood loss.   -check IRON panel  -informed to follow up with GI and discuss need for colonoscopy.     DM2: SSI    HLD: continue statin    GERD: started on Protonix last weel.     DVT ppx: heparin    Code status was reviewed with the patient. Patient is a full code.

## 2019-06-16 NOTE — ED STATDOCS - PROGRESS NOTE DETAILS
74 y/o M with h/o htn, hld, dm, cad with multiple stents p/w chest tightness and pain for 3 days with leg swelling b/l, pt took his nitro coupel of time swith releif and also on water pill, follows up with Dr. Valenzuela, no syncope    s1s2 nromal reg, b/l clear breath sounds, pedal edema+ b/l, no jvd, aox3, cn 2-12 intact    ekg nsr 76, old ekg lvh type chnages but no new changes , old ischemic changes also seen, ordered labs and sent to [facility] for monitor and eval by other provider

## 2019-06-16 NOTE — H&P ADULT - HISTORY OF PRESENT ILLNESS
Pt is a 74 yo M presenting from home with a 7 day history of chest tightness, PMH CAD s/p CABG (LIMA to LAD, SVG to OM), HFrEF w/ EF 40%, DM2, HLD, HTN, microcytic anemia, GERD  Patient states he has been having chest tightness for approximately 7 days now, he saw Dr. Leon in the outpatient setting approx 5-6 days ago and he is supposed to have a stress test and TTE performed.   His chest tightness 5/10 in severity, pressure line in mid chest, has been intermittent for about 1 week now, not necessarily associated with activity. Improved mildly with NTG. No radiation elsewhere. He was started on Lasix 20mg PO last week as he also reports increased LE edema, no associated SOB. Denies PND, orthopnea, he eats a low salt diet. Does not follow a fluid restriction. No other new medications.   Denies headaches, nausea, vomiting, SOB, palpitations, abdominal pain, constipation, diarrhea, melena, hematochezia, dysuria.   In ED patient noted to have low sodium at 120, negative troponin, no acute EKG abnormalities.

## 2019-06-17 ENCOUNTER — TRANSCRIPTION ENCOUNTER (OUTPATIENT)
Age: 74
End: 2019-06-17

## 2019-06-17 DIAGNOSIS — E78.00 PURE HYPERCHOLESTEROLEMIA, UNSPECIFIED: ICD-10-CM

## 2019-06-17 DIAGNOSIS — R07.9 CHEST PAIN, UNSPECIFIED: ICD-10-CM

## 2019-06-17 DIAGNOSIS — I50.9 HEART FAILURE, UNSPECIFIED: ICD-10-CM

## 2019-06-17 DIAGNOSIS — I25.10 ATHEROSCLEROTIC HEART DISEASE OF NATIVE CORONARY ARTERY WITHOUT ANGINA PECTORIS: ICD-10-CM

## 2019-06-17 DIAGNOSIS — I50.20 UNSPECIFIED SYSTOLIC (CONGESTIVE) HEART FAILURE: ICD-10-CM

## 2019-06-17 LAB
ANION GAP SERPL CALC-SCNC: 12 MMOL/L — SIGNIFICANT CHANGE UP (ref 5–17)
BASOPHILS # BLD AUTO: 0 K/UL — SIGNIFICANT CHANGE UP (ref 0–0.2)
BASOPHILS NFR BLD AUTO: 0.1 % — SIGNIFICANT CHANGE UP (ref 0–2)
BUN SERPL-MCNC: 10 MG/DL — SIGNIFICANT CHANGE UP (ref 8–20)
CALCIUM SERPL-MCNC: 9.2 MG/DL — SIGNIFICANT CHANGE UP (ref 8.6–10.2)
CHLORIDE SERPL-SCNC: 82 MMOL/L — LOW (ref 98–107)
CO2 SERPL-SCNC: 30 MMOL/L — HIGH (ref 22–29)
CREAT SERPL-MCNC: 0.83 MG/DL — SIGNIFICANT CHANGE UP (ref 0.5–1.3)
EOSINOPHIL # BLD AUTO: 0.4 K/UL — SIGNIFICANT CHANGE UP (ref 0–0.5)
EOSINOPHIL NFR BLD AUTO: 4.9 % — SIGNIFICANT CHANGE UP (ref 0–5)
FERRITIN SERPL-MCNC: 19 NG/ML — LOW (ref 30–400)
GLUCOSE BLDC GLUCOMTR-MCNC: 174 MG/DL — HIGH (ref 70–99)
GLUCOSE BLDC GLUCOMTR-MCNC: 190 MG/DL — HIGH (ref 70–99)
GLUCOSE BLDC GLUCOMTR-MCNC: 219 MG/DL — HIGH (ref 70–99)
GLUCOSE SERPL-MCNC: 102 MG/DL — SIGNIFICANT CHANGE UP (ref 70–115)
HBA1C BLD-MCNC: 8.6 % — HIGH (ref 4–5.6)
HCT VFR BLD CALC: 30.4 % — LOW (ref 42–52)
HCV AB S/CO SERPL IA: 0.1 S/CO — SIGNIFICANT CHANGE UP (ref 0–0.99)
HCV AB SERPL-IMP: SIGNIFICANT CHANGE UP
HGB BLD-MCNC: 9.9 G/DL — LOW (ref 14–18)
IRON SATN MFR SERPL: 26 UG/DL — LOW (ref 59–158)
IRON SATN MFR SERPL: 6 % — LOW (ref 16–55)
LYMPHOCYTES # BLD AUTO: 1.3 K/UL — SIGNIFICANT CHANGE UP (ref 1–4.8)
LYMPHOCYTES # BLD AUTO: 17.3 % — LOW (ref 20–55)
MCHC RBC-ENTMCNC: 23.1 PG — LOW (ref 27–31)
MCHC RBC-ENTMCNC: 32.6 G/DL — SIGNIFICANT CHANGE UP (ref 32–36)
MCV RBC AUTO: 71 FL — LOW (ref 80–94)
MONOCYTES # BLD AUTO: 1 K/UL — HIGH (ref 0–0.8)
MONOCYTES NFR BLD AUTO: 13 % — HIGH (ref 3–10)
NEUTROPHILS # BLD AUTO: 4.7 K/UL — SIGNIFICANT CHANGE UP (ref 1.8–8)
NEUTROPHILS NFR BLD AUTO: 64.3 % — SIGNIFICANT CHANGE UP (ref 37–73)
PLATELET # BLD AUTO: 324 K/UL — SIGNIFICANT CHANGE UP (ref 150–400)
POTASSIUM SERPL-MCNC: 4 MMOL/L — SIGNIFICANT CHANGE UP (ref 3.5–5.3)
POTASSIUM SERPL-SCNC: 4 MMOL/L — SIGNIFICANT CHANGE UP (ref 3.5–5.3)
RBC # BLD: 4.28 M/UL — LOW (ref 4.6–6.2)
RBC # FLD: 15.2 % — SIGNIFICANT CHANGE UP (ref 11–15.6)
SODIUM SERPL-SCNC: 124 MMOL/L — LOW (ref 135–145)
TIBC SERPL-MCNC: 432 UG/DL — HIGH (ref 220–430)
TRANSFERRIN SERPL-MCNC: 302 MG/DL — SIGNIFICANT CHANGE UP (ref 180–329)
TROPONIN T SERPL-MCNC: <0.01 NG/ML — SIGNIFICANT CHANGE UP (ref 0–0.06)
TROPONIN T SERPL-MCNC: <0.01 NG/ML — SIGNIFICANT CHANGE UP (ref 0–0.06)
WBC # BLD: 7.3 K/UL — SIGNIFICANT CHANGE UP (ref 4.8–10.8)
WBC # FLD AUTO: 7.3 K/UL — SIGNIFICANT CHANGE UP (ref 4.8–10.8)

## 2019-06-17 PROCEDURE — 99284 EMERGENCY DEPT VISIT MOD MDM: CPT | Mod: 25

## 2019-06-17 PROCEDURE — 76937 US GUIDE VASCULAR ACCESS: CPT | Mod: 26

## 2019-06-17 PROCEDURE — 99233 SBSQ HOSP IP/OBS HIGH 50: CPT

## 2019-06-17 PROCEDURE — 92941 PRQ TRLML REVSC TOT OCCL AMI: CPT | Mod: LC

## 2019-06-17 PROCEDURE — 93010 ELECTROCARDIOGRAM REPORT: CPT

## 2019-06-17 PROCEDURE — 99152 MOD SED SAME PHYS/QHP 5/>YRS: CPT

## 2019-06-17 PROCEDURE — 93459 L HRT ART/GRFT ANGIO: CPT | Mod: 26,XU

## 2019-06-17 RX ORDER — METFORMIN HYDROCHLORIDE 850 MG/1
500 TABLET ORAL
Qty: 0 | Refills: 0 | DISCHARGE

## 2019-06-17 RX ORDER — NITROGLYCERIN 6.5 MG
0.5 CAPSULE, EXTENDED RELEASE ORAL ONCE
Refills: 0 | Status: COMPLETED | OUTPATIENT
Start: 2019-06-17 | End: 2019-06-17

## 2019-06-17 RX ORDER — SODIUM CHLORIDE 9 MG/ML
1000 INJECTION INTRAMUSCULAR; INTRAVENOUS; SUBCUTANEOUS
Refills: 0 | Status: DISCONTINUED | OUTPATIENT
Start: 2019-06-17 | End: 2019-06-17

## 2019-06-17 RX ORDER — FUROSEMIDE 40 MG
40 TABLET ORAL
Refills: 0 | Status: DISCONTINUED | OUTPATIENT
Start: 2019-06-18 | End: 2019-06-18

## 2019-06-17 RX ORDER — NITROGLYCERIN 6.5 MG
0.4 CAPSULE, EXTENDED RELEASE ORAL ONCE
Refills: 0 | Status: COMPLETED | OUTPATIENT
Start: 2019-06-17 | End: 2019-06-17

## 2019-06-17 RX ORDER — HEPARIN SODIUM 5000 [USP'U]/ML
5000 INJECTION INTRAVENOUS; SUBCUTANEOUS EVERY 8 HOURS
Refills: 0 | Status: DISCONTINUED | OUTPATIENT
Start: 2019-06-18 | End: 2019-06-20

## 2019-06-17 RX ADMIN — Medication 1: at 16:28

## 2019-06-17 RX ADMIN — Medication 0.5 INCH(S): at 21:06

## 2019-06-17 RX ADMIN — TICAGRELOR 90 MILLIGRAM(S): 90 TABLET ORAL at 21:03

## 2019-06-17 RX ADMIN — AMLODIPINE BESYLATE 10 MILLIGRAM(S): 2.5 TABLET ORAL at 09:10

## 2019-06-17 RX ADMIN — ATORVASTATIN CALCIUM 40 MILLIGRAM(S): 80 TABLET, FILM COATED ORAL at 21:03

## 2019-06-17 RX ADMIN — ONDANSETRON 4 MILLIGRAM(S): 8 TABLET, FILM COATED ORAL at 01:09

## 2019-06-17 RX ADMIN — ISOSORBIDE MONONITRATE 60 MILLIGRAM(S): 60 TABLET, EXTENDED RELEASE ORAL at 09:09

## 2019-06-17 RX ADMIN — Medication 81 MILLIGRAM(S): at 09:08

## 2019-06-17 RX ADMIN — PANTOPRAZOLE SODIUM 40 MILLIGRAM(S): 20 TABLET, DELAYED RELEASE ORAL at 09:07

## 2019-06-17 RX ADMIN — Medication 0.4 MILLIGRAM(S): at 17:36

## 2019-06-17 RX ADMIN — Medication 2: at 12:11

## 2019-06-17 RX ADMIN — HEPARIN SODIUM 5000 UNIT(S): 5000 INJECTION INTRAVENOUS; SUBCUTANEOUS at 05:02

## 2019-06-17 RX ADMIN — Medication 650 MILLIGRAM(S): at 02:49

## 2019-06-17 RX ADMIN — LOSARTAN POTASSIUM 25 MILLIGRAM(S): 100 TABLET, FILM COATED ORAL at 09:09

## 2019-06-17 RX ADMIN — Medication 0.4 MILLIGRAM(S): at 05:03

## 2019-06-17 RX ADMIN — TICAGRELOR 90 MILLIGRAM(S): 90 TABLET ORAL at 05:01

## 2019-06-17 RX ADMIN — Medication 650 MILLIGRAM(S): at 01:09

## 2019-06-17 NOTE — DISCHARGE NOTE PROVIDER - PROVIDER TOKENS
PROVIDER:[TOKEN:[[id number]]] PROVIDER:[TOKEN:[[id number]]],PROVIDER:[TOKEN:[[id number]]]

## 2019-06-17 NOTE — DISCHARGE NOTE PROVIDER - CARE PROVIDERS DIRECT ADDRESSES
[email or web address]

## 2019-06-17 NOTE — DISCHARGE NOTE PROVIDER - NSDCCPTREATMENT_GEN_ALL_CORE_FT
PRINCIPAL PROCEDURE  Procedure: Left heart cardiac catheterization  Findings and Treatment: PRINCIPAL PROCEDURE  Procedure: Left heart cardiac catheterization  Findings and Treatment: Laser atherectomy w/ cutting balloon of SVG to circ  To have brachytherapy 4-8 weeks at [facility]

## 2019-06-17 NOTE — ED ADULT NURSE REASSESSMENT NOTE - NS ED NURSE REASSESS COMMENT FT1
pt off monitor to pray. monitor tech aware.
pt verbalizes improvement in chest discomfort, all vitals remain stable.
pt reports feeling increased chest tightness. notified dr. green who ordered sublingual nitro and ekg.  will reassess after interventions.
Patient resting comfortably in bed with family at bedside.  Pt is normal sinus rhythm on cardiac monitor, normal sinus.  C/o abdominal discomfort, medicated with tylenol and zofran. awaiting bed.  all vitals stable.

## 2019-06-17 NOTE — PROGRESS NOTE ADULT - SUBJECTIVE AND OBJECTIVE BOX
Patient s/p cardiac cath.     Patent LIMA to LAD.   Recurrent SVG to Circumflex instent restenosis.     Considering multiple layers of stents, laser atherectomy, cutting balloon performed. No stent deployed. LVEDP 36 mmhg. (CHF)    Plan:   Aspirin, brilinta.   Lasix 40 mg IV bid.   nitropaste 1/2 inch for now.   Outpatient brachytherapy in the next few weeks.

## 2019-06-17 NOTE — DISCHARGE NOTE PROVIDER - CARE PROVIDER_API CALL
Sydney Valenzuela)  Cardiology; Internal Medicine  [address] [place] [address], [address] [id number]  Phone: [phone]  Fax: [phone]  Follow Up Time: Sydney Valenzuela)  Cardiology; Internal Medicine  [address] [facility], [address]  [place], [place] [id number]  Phone: [phone]  Fax: [phone]  Follow Up Time:     Debra Steele)  EndocrinologyMetabDiabetes  [address] [place], [place] [id number]  Phone: [phone]  Fax: [phone]  Follow Up Time:

## 2019-06-17 NOTE — PROGRESS NOTE ADULT - PROBLEM SELECTOR PLAN 1
DAP aspirin / brilinta  Lasix 40 IVP for elevated LVEDP BID to start June 18  Brachytherapy outpt planned at [facility] 4-8 weeks  Continue BB, nitrates  FU Dr Valenzuela outpt  AM labs/ECG

## 2019-06-17 NOTE — CONSULT NOTE ADULT - PROBLEM SELECTOR RECOMMENDATION 2
- c/w furosemide 20mg QD  - c/w amlodipine/valsartan/hydrochlorothiazide 10 mg-160 mg-12.5 mg QD  - c/w carvedilol 12.5 mg QD

## 2019-06-17 NOTE — DISCHARGE NOTE PROVIDER - NSDCCPCAREPLAN_GEN_ALL_CORE_FT
PRINCIPAL DISCHARGE DIAGNOSIS  Diagnosis: Chest pain due to CAD  Assessment and Plan of Treatment: laser atherectomy w/cutting balloon to circ      SECONDARY DISCHARGE DIAGNOSES  Diagnosis: Chest pain due to CAD  Assessment and Plan of Treatment:     Diagnosis: Hyponatremia  Assessment and Plan of Treatment: PRINCIPAL DISCHARGE DIAGNOSIS  Diagnosis: Chest pain due to CAD  Assessment and Plan of Treatment: Coronary artery disease is a condition where the arteries the supply the heart muscle get clogged with fatty deposits & puts you at risk for a heart attack  Call your doctor if you have any new pain, pressure, or discomfort in the center of your chest, pain, tingling or discomfort in arms, back, neck, jaw, or stomach, shortness of breath, nausea, vomiting, burping or heartburn, sweating, cold and clammy skin, racing or abnormal heartbeat for more than 10 minutes or if they keep coming & going.  Call 911 and do not tr to get to hospital by care  You can help yourself with lefestyle changes (quitting smoking if you smoke), eat lots of fruits & vegetables & low fat dairy products, not a lot of meat & fatty foods, walk or some form of physical activity most days of the week, lose weight if you are overweight  Take your cardiac medication as prescribed to lower cholesterol, to lower blood pressure, aspirin to prevent blood clots, and diabetes control  Make sure to keep appointments with doctor for cardiac follow up care      SECONDARY DISCHARGE DIAGNOSES  Diagnosis: Hyponatremia  Assessment and Plan of Treatment: Hyponatremia occurs when the amount of sodium (salt) in your blood is lower than normal. Sodium is an electrolyte (mineral) that helps your muscles, heart, and digestive system work properly. It helps control blood pressure and fluid balance.  You may need to return for more tests. Write down your questions so you remember to ask them during your visits.  You will increase salt intake for the next 3 days by taking 1gram sodium chloride tablets everyday for 3 days. After that please follow up with your doctor and repeat your blood work.   Contact your healthcare provider if:  You have muscle cramps or twitching.  You feel very weak or tired.  You have nausea or are vomiting.  You have questions or concerns about your condition or care.  Return to the emergency department if:  You have a seizure.  You have an irregular heartbeat.  You have trouble breathing.  You cannot move your arms and legs.  You are confused or cannot think clearly.

## 2019-06-17 NOTE — PROGRESS NOTE ADULT - SUBJECTIVE AND OBJECTIVE BOX
s/p C LFA w/angioseal  laser atherectomy with cutting balloon to SVG to circ  LVEDP elevated per Dr Reid  Tolerated procedure well w/o complications  Seen post procedure by Dr Reid with family present  CP on arrival to  relieved with NTG  Brachytherapy planned outpt 4-8 weeks          REVIEW OF SYSTEMS:  Denies SOB, CP, NV, HA, dizziness, palpitations, site pain    PHYSICAL EXAM: A&Ox3 NAD Skin warm and dry  NEURO: Speech intact +gag +swallow Tongue midline GRAVES  NECK: No JVD, trachea midline. Eupneic  HEART: SR on tele /post ECG  PULMONARY:  CTA lo  ABDOMEN: Soft nontender X4 +BS Vdg  EXTREMITIES: LFA site: No bleed, hematoma, pain, ecchymosis or swelling Left DP/PT+Small soft non expanding hematoma nontender w/10lb sandbasteve applied report to VANGIE Avina provided

## 2019-06-17 NOTE — PROGRESS NOTE ADULT - SUBJECTIVE AND OBJECTIVE BOX
CC: CP    Patient seen and examined at the bedside. No acute overnight events. admitted yesterday. Complaining of nothing today. Denies fever/chills, headache, lightheadedness, dizziness, chest pain, palpitations, shortness of breath, cough, abd pain, nausea/vomiting/diarrhea, muscle pain.      =========================================================================================    PHYSICAL EXAM.    GEN - appears age appropriate. well nourished. pleasant. no distress.   HEENT - NCAT, EOMI, QUYNH  RESP - CTA BL, no wheeze/stridor/rhonchi/crackles. not on supplemental O2. able to speak in full sentences without distress.   CARDIO - NS1S2, RRR. No murmurs/rubs/gallops.  ABD - Soft/Non tender/Non distended. Normal BS x4 quadrants. no guarding/rebound tenderness.  Ext - No ADITYA.  MSK - BL 5/5 strength on upper and lower extremities.   Neuro - AAOx3. cn 2-12 grossly intact  Psych - normal affect  Skin - c/d/i. no rashes/lesions      I&O's Summary    Daily     Daily     =========================================================================================  LABS.    06-17    124<L>  |  82<L>  |  10.0  ----------------------------<  102  4.0   |  30.0<H>  |  0.83    Ca    9.2      17 Jun 2019 06:32    TPro  7.6  /  Alb  4.2  /  TBili  0.3<L>  /  DBili  x   /  AST  16  /  ALT  13  /  AlkPhos  126<H>  06-16                          9.9    7.3   )-----------( 324      ( 17 Jun 2019 06:32 )             30.4     LIVER FUNCTIONS - ( 16 Jun 2019 20:22 )  Alb: 4.2 g/dL / Pro: 7.6 g/dL / ALK PHOS: 126 U/L / ALT: 13 U/L / AST: 16 U/L / GGT: x           PT/INR - ( 16 Jun 2019 20:22 )   PT: 13.7 sec;   INR: 1.19 ratio         PTT - ( 16 Jun 2019 20:22 )  PTT:30.6 sec  CARDIAC MARKERS ( 17 Jun 2019 10:13 )  x     / <0.01 ng/mL / x     / x     / x      CARDIAC MARKERS ( 17 Jun 2019 02:42 )  x     / <0.01 ng/mL / x     / x     / x      CARDIAC MARKERS ( 16 Jun 2019 20:22 )  x     / <0.01 ng/mL / 66 U/L / x     / x                =========================================================================================  IMAGING.     =========================================================================================    HOME MEDS.    Home Medications:  amlodipine/valsartan/hydrochlorothiazide 10 mg-160 mg-12.5 mg oral tablet: 1 tab(s) orally once a day (16 Jun 2019 23:23)  aspirin 81 mg oral tablet, chewable: 1 tab(s) orally once a day (16 Jun 2019 23:23)  atorvastatin 40 mg oral tablet: 1 tab(s) orally once a day (16 Jun 2019 23:23)  Brilinta (ticagrelor) 90 mg oral tablet: 1 tab(s) orally 2 times a day (17 Jun 2019 03:20)  carvedilol 12.5 mg oral tablet: 1 tab(s) orally once a day (at bedtime) (17 Jun 2019 03:20)  esomeprazole 40 mg oral delayed release capsule: 1 cap(s) orally once a day (16 Jun 2019 23:23)  furosemide 20 mg oral tablet: 1 tab(s) orally once a day (16 Jun 2019 23:23)  glipiZIDE 10 mg oral tablet, extended release: 1 tab(s) orally once a day (16 Jun 2019 23:23)  isosorbide mononitrate 60 mg oral tablet, extended release: 1 tab(s) orally once a day (in the morning) (17 Jun 2019 03:20)  Januvia 100 mg oral tablet: 1 tab(s) orally once a day (17 Jun 2019 03:20)  metFORMIN 1000 mg oral tablet, extended release: 1 tab(s) orally once a day (16 Jun 2019 23:23)  metFORMIN 500 mg oral tablet: 500 milligram(s) orally once a day (at bedtime) (17 Jun 2019 03:20)      =========================================================================================    HOSPITAL MEDS.    MEDICATIONS  (STANDING):  amLODIPine   Tablet 10 milliGRAM(s) Oral daily  aspirin  chewable 81 milliGRAM(s) Oral daily  atorvastatin Oral Tab/Cap - Peds 40 milliGRAM(s) Oral daily  carvedilol 12.5 milliGRAM(s) Oral daily  dextrose 5%. 1000 milliLiter(s) (50 mL/Hr) IV Continuous <Continuous>  dextrose 50% Injectable 12.5 Gram(s) IV Push once  dextrose 50% Injectable 25 Gram(s) IV Push once  dextrose 50% Injectable 25 Gram(s) IV Push once  furosemide   Injectable 40 milliGRAM(s) IV Push <User Schedule>  heparin  Injectable 5000 Unit(s) SubCutaneous every 8 hours  insulin lispro (HumaLOG) corrective regimen sliding scale   SubCutaneous three times a day before meals  isosorbide   mononitrate ER Tablet (IMDUR) 60 milliGRAM(s) Oral daily  losartan 25 milliGRAM(s) Oral daily  pantoprazole    Tablet 40 milliGRAM(s) Oral before breakfast  ticagrelor 90 milliGRAM(s) Oral two times a day    MEDICATIONS  (PRN):  acetaminophen   Tablet .. 650 milliGRAM(s) Oral every 6 hours PRN Mild Pain (1 - 3)  dextrose 40% Gel 15 Gram(s) Oral once PRN Blood Glucose LESS THAN 70 milliGRAM(s)/deciliter  glucagon  Injectable 1 milliGRAM(s) IntraMuscular once PRN Glucose LESS THAN 70 milligrams/deciliter  ondansetron Injectable 4 milliGRAM(s) IV Push every 6 hours PRN Nausea

## 2019-06-17 NOTE — PATIENT PROFILE ADULT - NSPROMUTINFOINDIVIDFT_GEN_A_NUR
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

## 2019-06-17 NOTE — DISCHARGE NOTE PROVIDER - INSTRUCTIONS
Low cholesterol diet Low cholesterol diet    take losartan at noon  take lasix at 9 am  take coreg am and pm

## 2019-06-17 NOTE — PROGRESS NOTE ADULT - SUBJECTIVE AND OBJECTIVE BOX
NPO>4 hours  c/o CP on arrival mid sternal 6/10 relieved w NTG sl No diaphoresis or n/v  SR on tele ECG unchanged  ASA 3 Mallampati 2  BR 7.4%  Dr Reid to consent  · Assessment		  A/P:  Pt is a 74 yo M presenting from home with a 7 day history of chest tightness, PMH CAD s/p CABG (LIMA to LAD, SVG to OM), HFrEF w/ EF 40%, DM2, HLD, HTN, microcytic anemia, GERD. Patient states he has been having chest tightness for approximately 7 days now, he saw Dr. Muñoz in the outpatient setting approx 5-6 days ago and he is supposed to have a stress test and TTE performed. Last PCI 11/2018 (stent of subtotally occluded LCX vein graft.)  Patient has been experiencing chest tightness since end of fasting month.  He was seen in office with Dr. Muñoz last week with c/o chest tightness during activity relieved with Nitro.  Currently states his chest tightness only occurs while lying down, 4-6/10, and relieved by sitting up and ambulating.  Chest pressure in non-radiating. He is unsure if it is GI related. Patient reports that he was having b/l LE edema and was started on Lasix 20mg PO QD, edema has improving since Lasix started and he has decreased his fluid intake,    Problem/Recommendation - 1:  Problem: Chest pain due to CAD. Recommendation: - EKG, non-diagnostic  - Trop neg x2  - given pt Hx and symptoms LHC scheduled for today.    Problem/Recommendation - 2:  ·  Problem: HFrEF (heart failure with reduced ejection fraction).  Recommendation: - c/w furosemide 20mg QD  - c/w amlodipine/valsartan/hydrochlorothiazide 10 mg-160 mg-12.5 mg QD  - c/w carvedilol 12.5 mg QD.     Problem/Recommendation - 3:  ·  Problem: High cholesterol.  Recommendation: - c/w atorvastatin 40 mg.       REVIEW OF SYSTEMS:  Denies SOB, NV, HA, dizziness, palpitations    PHYSICAL EXAM: A&Ox3 NAD Skin warm and dry  NEURO: Speech intact +gag +swallow Tongue midline GRAVES  NECK: No JVD, trachea midline. Eupneic  HEART: S1S2 SR on tele  PULMONARY:  CTA lo  ABDOMEN: Soft nontender X4 +BS   EXTREMITIES: no edema NPO>4 hours  c/o CP on arrival mid sternal 6/10 relieved w NTG sl No diaphoresis or n/v  SR on tele ECG unchanged  ASA 3 Mallampati 2  BR 7.4%  Dr Reid to consent  NOV 2018 STEMI:  VENTRICLES: EF estimated was 35 %.  VALVES: MITRAL VALVE:The mitral valve exhibited severe regurgitation.  CORONARY VESSELS: The coronary circulation is right dominant.  LM:   --  LM: There was a 100 % stenosis.  LAD:   --  Proximal LAD: There was a 100 % stenosis.  CX:   --  Proximal circumflex: There wasa 100 % stenosis.  RCA:   --  Ostial RCA: There was a 100 % stenosis.  GRAFTS:   --  Graft to the mid LAD: The graft was a LIMA from the aorta.  Graft angiography showed no evidence of disease.  --  Graft to the 1st obtuse marginal: The graft was a saphenous vein graft  from the aorta. There was a 95 % stenosis in the distal third of the  graft. There was CAROLINA grade 2 flow through the graft (partial perfusion).  This is a likely culprit for the patient's clinical presentation. There  was a 95 % stenosis at the distal anastomosis.  --  Graft to the distal RCA: The graft was a saphenous vein graft from the  aorta. It was occluded.  COMPLICATIONS: No complications occurred during the cath lab visit.  DIAGNOSTIC RECOMMENDATIONS: Subtotal occlusion of LCX graft associated with  pain, new left bundle, heart failure and severe MR. IABP inserted for MR  and heart failure.    · Assessment		  A/P:  Pt is a 72 yo M presenting from home with a 7 day history of chest tightness, PMH CAD s/p CABG (LIMA to LAD, SVG to OM), HFrEF w/ EF 40%, DM2, HLD, HTN, microcytic anemia, GERD. Patient states he has been having chest tightness for approximately 7 days now, he saw Dr. Muñoz in the outpatient setting approx 5-6 days ago and he is supposed to have a stress test and TTE performed. Last PCI 11/2018 (stent of subtotally occluded LCX vein graft.)  Patient has been experiencing chest tightness since end of fasting month.  He was seen in office with Dr. Muñoz last week with c/o chest tightness during activity relieved with Nitro.  Currently states his chest tightness only occurs while lying down, 4-6/10, and relieved by sitting up and ambulating.  Chest pressure in non-radiating. He is unsure if it is GI related. Patient reports that he was having b/l LE edema and was started on Lasix 20mg PO QD, edema has improving since Lasix started and he has decreased his fluid intake,    Problem/Recommendation - 1:  Problem: Chest pain due to CAD. Recommendation: - EKG, non-diagnostic  - Trop neg x2  - given pt Hx and symptoms LHC scheduled for today.    Problem/Recommendation - 2:  ·  Problem: HFrEF (heart failure with reduced ejection fraction).  Recommendation: - c/w furosemide 20mg QD  - c/w amlodipine/valsartan/hydrochlorothiazide 10 mg-160 mg-12.5 mg QD  - c/w carvedilol 12.5 mg QD.     Problem/Recommendation - 3:  ·  Problem: High cholesterol.  Recommendation: - c/w atorvastatin 40 mg.       REVIEW OF SYSTEMS:  Denies SOB, NV, HA, dizziness, palpitations    PHYSICAL EXAM: A&Ox3 NAD Skin warm and dry  NEURO: Speech intact +gag +swallow Tongue midline GRAVES  NECK: No JVD, trachea midline. Eupneic  HEART: S1S2 SR on tele  PULMONARY:  CTA lo  ABDOMEN: Soft nontender X4 +BS   EXTREMITIES: no edema

## 2019-06-17 NOTE — DISCHARGE NOTE PROVIDER - NSDCFUADDAPPT_GEN_ALL_CORE_FT
FU Dr Valenzuela 1-2 weeks  You will be set up for brachytherapy at [facility]  cardiac rehab info provided/referral and communication to cardiac rehab completed

## 2019-06-17 NOTE — DISCHARGE NOTE PROVIDER - HOSPITAL COURSE
s/p C LFA w/angioseal    Laser atherectomy w/ cutting balloon of SVG to circ    To have brachytherapy 4-8 weeks at [facility] s/p C LFA w/angioseal    Laser atherectomy w/ cutting balloon of SVG to circ    To have brachytherapy 4-8 weeks at [facility]        Pt with stable vital signs, telemetry stable, physical exam unremarkable and unchanged from pre-procedural baseline, neurovascularly intact, ambulating, eating, review of systems completely negative. pt verbalized an understanding of the discharge teaching. Patient to follow up with Dr. Valenzuela outpatient within 2 weeks. s/p C LFA w/angioseal    Laser atherectomy w/ cutting balloon of SVG to circ    To have brachytherapy 4-8 weeks at [facility]        Pt with stable vital signs, telemetry stable, physical exam unremarkable and unchanged from pre-procedural baseline, neurovascularly intact, ambulating, eating, review of systems completely negative. pt verbalized an understanding of the discharge teaching. Patient to follow up with Dr. Valenzuela outpatient within 2 weeks.         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 TTE done showed Left ventricular ejection fraction, by visual estimation, is 35 to 40%, Moderately decreased global left ventricular systolic function, Multiple left ventricular regional wall motion abnormalities exist, Compared to TTE dated 11/9/18, no significant changes, patient was seen and followed by cardiology, given extensive cardiac hx + high risk, cardiac cath done which showed- Severe SVG to OM disease with instent restenosis, Appears to be 2-3 layers of stents in that region, Laser atherectomy and POBA performed and recommended Aspirin and Brilinta as previously and referral to Dr. Quinn Kaiser for brachytherapy, ct imdur,. add ranexa. Praluent for Cholesterolr control, need aggressive diabetes management. Need outpaitent Primary doctor and endocrinologist for Diabetes care.        course complicated by orthostatic hypotension. now     resolved. meds adjusted    Vital Signs Last 24 Hrs    T(C): 36.6 (06-20-19 @ 05:38), Max: 37.1 (06-19-19 @ 15:35)    T(F): 97.8 (06-20-19 @ 05:38), Max: 98.7 (06-19-19 @ 15:35)    HR: 81 (06-20-19 @ 05:38) (78 - 88)    BP: 124/66 (06-20-19 @ 05:38) (64/45 - 130/60)    BP(mean): --    RR: 18 (06-20-19 @ 05:38) (17 - 19)    SpO2: 98% (06-20-19 @ 05:38) (92% - 100%)    GENERAL: Elderly male looking comfortable     HEENT: PERRL, +EOMI    NECK: soft, Supple, No JVD,     CHEST/LUNG: Clear to auscultate bilaterally; No wheezing    HEART: S1S2+, Regular rate and rhythm; No murmurs    ABDOMEN: Soft, Nontender, Nondistended; Bowel sounds present    EXTREMITIES:  2+ Peripheral Pulses, No edema    SKIN: No rashes or lesions    NEURO: AAOX3, no focal deficits, no motor r sensory loss    PSYCH: normal mood        Medically stable and agreeable with discharge and follow up plan. Patient was advised to return to ED if any symptoms occur or worsen.    his discharge medications have been explained to him alogn with f/u plan. family at bedside also educated.        TIME 48 mins

## 2019-06-17 NOTE — CONSULT NOTE ADULT - ASSESSMENT
A/P:  Pt is a 74 yo M presenting from home with a 7 day history of chest tightness, PMH CAD s/p CABG (LIMA to LAD, SVG to OM), HFrEF w/ EF 40%, DM2, HLD, HTN, microcytic anemia, GERD. Patient states he has been having chest tightness for approximately 7 days now, he saw Dr. Muñoz in the outpatient setting approx 5-6 days ago and he is supposed to have a stress test and TTE performed. Last PCI 11/2018 (stent of subtotally occluded LCX vein graft.)  Patient has been experiencing chest tightness since end of fasting month.  He was seen in office with Dr. Muñoz last week with c/o chest tightness during activity relieved with Nitro.  Currently states his chest tightness only occurs while lying down, 4-6/10, and relieved by sitting up and ambulating.  Chest pressure in non-radiating. He is unsure if it is GI related. Patient reports that he was having b/l LE edema and was started on Lasix 20mg PO QD, edema has improving since Lasix started and he has decreased his fluid intake,

## 2019-06-17 NOTE — CONSULT NOTE ADULT - SUBJECTIVE AND OBJECTIVE BOX
Patient is a 73y old  Male who presents with a chief complaint of Chest Tightness (16 Jun 2019 23:32)      HPI:  Pt is a 72 yo M presenting from home with a 7 day history of chest tightness, PMH CAD s/p CABG (LIMA to LAD, SVG to OM), HFrEF w/ EF 40%, DM2, HLD, HTN, microcytic anemia, GERD  Patient states he has been having chest tightness for approximately 7 days now, he saw Dr. Leon in the outpatient setting approx 5-6 days ago and he is supposed to have a stress test and TTE performed.   His chest tightness 5/10 in severity, pressure line in mid chest, has been intermittent for about 1 week now, not necessarily associated with activity. Improved mildly with NTG. No radiation elsewhere. He was started on Lasix 20mg PO last week as he also reports increased LE edema, no associated SOB. Denies PND, orthopnea, he eats a low salt diet. Does not follow a fluid restriction. No other new medications.   Denies headaches, nausea, vomiting, SOB, palpitations, abdominal pain, constipation, diarrhea, melena, hematochezia, dysuria.   In ED patient noted to have low sodium at 120, negative troponin, no acute EKG abnormalities. (16 Jun 2019 23:32)  Appreciate above, confirmed accuracy with patient and medical record.  Last PCI 11/2018 (stent of subtotally occluded LCX vein graft.)  Patient has been experiencing chest tightness since end of fasting month.  He was seen in office with Dr. Muñoz last week with c/o chest tightness during activity relieved with Nitro.  Currently states his chest tightness only occurs while lying down, 4-6/10, and relieved by sitting up and ambulating.  Chest pressure in non-radiating. He is unsure if it is GI related. Patient reports that he was having b/l LE edema and was started on Lasix 20mg PO QD, edema has improving since Lasix started and he has decreased his fluid intake, Denies palpitations, irregular and/or rapid heart beat, SOB, JOINER, syncope/near syncope, dizziness, orthopnea, PND, cough, n/v/d, hematuria, or hematochezia.     PAST MEDICAL & SURGICAL HISTORY:  HFrEF (heart failure with reduced ejection fraction)  Essential hypertension  Type 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, without long-term current use of insul  Coronary artery disease involving native coronary artery of native heart, angina presence unspecifie  Coronary artery disease involving coronary bypass graft of native heart with unstable angina pectoris  High cholesterol  GERD (gastroesophageal reflux disease)  Status post open reduction with internal fixation of fracture  S/P primary angioplasty with coronary stent  S/P CABG (coronary artery bypass graft)      PREVIOUS DIAGNOSTIC TESTING:      ECHO  FINDINGS:  < from: TTE Echo Complete w/Doppler (11.09.18 @ 13:59) >  Summary:   1. Left ventricular ejection fraction, by visual estimation, is 35 to   40%.   2. Moderately decreased segmental left ventricular systolic function.   3. Mid and apical anterior septum, basal and mid inferior septum, basal   and mid inferior wall, and basal anteroseptal segment are abnormal as   described above.   4. There is no evidence of pericardial effusion.   5. Mild mitral annular calcification.   6. Mild to moderate mitral valve regurgitation.   7. Trace tricuspid regurgitation.   8. Sclerotic aortic valve with normal opening.   9. Pulmonic valve regurgitation.  10. See previous echo from 11/7/18 for comparison.    [id number] Veronique Alejandro MD, Electronically signed on 11/9/2018 at 4:15:33   PM    < end of copied text >      CATHETERIZATION  FINDINGS:  < from: Cardiac Cath Lab - Adult (11.07.18 @ 16:39) >  VALVES: MITRAL VALVE:The mitral valve exhibited severe regurgitation.  CORONARY VESSELS: The coronary circulation is right dominant.  LM:   --  LM: There was a 100 % stenosis.  LAD:   --  Proximal LAD: There was a 100 % stenosis.  CX:   --  Proximal circumflex: There wasa 100 % stenosis.  RCA:   --  Ostial RCA: There was a 100 % stenosis.  GRAFTS:   --  Graft to the mid LAD: The graft was a LIMA from the aorta.  Graft angiography showed no evidence of disease.  --  Graft to the 1st obtuse marginal: The graft was a saphenous vein graft  from the aorta. There was a 95 % stenosis in the distal third of the  graft. There was CAROLINA grade 2 flow through the graft (partial perfusion).  This is a likely culprit for the patient's clinical presentation. There  was a 95 % stenosis at the distal anastomosis.  --  Graft to the distal RCA: The graft was a saphenous vein graft from the  aorta. It was occluded.  COMPLICATIONS: No complications occurred during the cath lab visit.  DIAGNOSTIC RECOMMENDATIONS: Subtotal occlusion of LCX graft associated with  pain, new left bundle, heart failure and severe MR. IABP inserted for MR  and heart failure.  INTERVENTIONAL RECOMMENDATIONS: Successful stent of subtotally occluded LCX  vein graft. Maintain IABP support, echo in AM toassess   Prepared and signed by  Kang Babin MD  Signed 11/07/2018 18:04:02    < end of copied text >      Allergies    No Known Allergies    Intolerances        MEDICATIONS  (STANDING):  amLODIPine   Tablet 10 milliGRAM(s) Oral daily  aspirin  chewable 81 milliGRAM(s) Oral daily  atorvastatin Oral Tab/Cap - Peds 40 milliGRAM(s) Oral daily  carvedilol 12.5 milliGRAM(s) Oral daily  dextrose 5%. 1000 milliLiter(s) (50 mL/Hr) IV Continuous <Continuous>  dextrose 50% Injectable 12.5 Gram(s) IV Push once  dextrose 50% Injectable 25 Gram(s) IV Push once  dextrose 50% Injectable 25 Gram(s) IV Push once  heparin  Injectable 5000 Unit(s) SubCutaneous every 8 hours  insulin lispro (HumaLOG) corrective regimen sliding scale   SubCutaneous three times a day before meals  isosorbide   mononitrate ER Tablet (IMDUR) 60 milliGRAM(s) Oral daily  losartan 25 milliGRAM(s) Oral daily  pantoprazole    Tablet 40 milliGRAM(s) Oral before breakfast  ticagrelor 90 milliGRAM(s) Oral two times a day    MEDICATIONS  (PRN):  acetaminophen   Tablet .. 650 milliGRAM(s) Oral every 6 hours PRN Mild Pain (1 - 3)  dextrose 40% Gel 15 Gram(s) Oral once PRN Blood Glucose LESS THAN 70 milliGRAM(s)/deciliter  glucagon  Injectable 1 milliGRAM(s) IntraMuscular once PRN Glucose LESS THAN 70 milligrams/deciliter  ondansetron Injectable 4 milliGRAM(s) IV Push every 6 hours PRN Nausea    Home Medications:  amlodipine/valsartan/hydrochlorothiazide 10 mg-160 mg-12.5 mg oral tablet: 1 tab(s) orally once a day (16 Jun 2019 23:23)  aspirin 81 mg oral tablet, chewable: 1 tab(s) orally once a day (16 Jun 2019 23:23)  atorvastatin 40 mg oral tablet: 1 tab(s) orally once a day (16 Jun 2019 23:23)  Brilinta (ticagrelor) 90 mg oral tablet: 1 tab(s) orally 2 times a day (17 Jun 2019 03:20)  carvedilol 12.5 mg oral tablet: 1 tab(s) orally once a day (at bedtime) (17 Jun 2019 03:20)  esomeprazole 40 mg oral delayed release capsule: 1 cap(s) orally once a day (16 Jun 2019 23:23)  furosemide 20 mg oral tablet: 1 tab(s) orally once a day (16 Jun 2019 23:23)  glipiZIDE 10 mg oral tablet, extended release: 1 tab(s) orally once a day (16 Jun 2019 23:23)  isosorbide mononitrate 60 mg oral tablet, extended release: 1 tab(s) orally once a day (in the morning) (17 Jun 2019 03:20)  Januvia 100 mg oral tablet: 1 tab(s) orally once a day (17 Jun 2019 03:20)  metFORMIN 1000 mg oral tablet, extended release: 1 tab(s) orally once a day (16 Jun 2019 23:23)  metFORMIN 500 mg oral tablet: 500 milligram(s) orally once a day (at bedtime) (17 Jun 2019 03:20)      FAMILY HISTORY:  No pertinent family history in first degree relatives  No pertinent family history in first degree relatives      SOCIAL HISTORY:    CIGARETTES: denies    ALCOHOL: denies    REVIEW OF SYSTEMS:  CONSTITUTIONAL: No fever, weight loss, or fatigue  EYES: No eye pain, visual disturbances, or discharge  ENMT:  No difficulty hearing, tinnitus, vertigo; No sinus or throat pain  NECK: No pain or stiffness  RESPIRATORY: No cough, wheezing, chills or hemoptysis; No Shortness of Breath  CARDIOVASCULAR: as per HPI  GASTROINTESTINAL: No abdominal or epigastric pain. No nausea, vomiting, or hematemesis; No diarrhea or constipation. No melena or hematochezia.  GENITOURINARY: No dysuria, frequency, hematuria, or incontinence  NEUROLOGICAL: No headaches, memory loss, loss of strength, numbness, or tremors  SKIN: No itching, burning, rashes, or lesions   LYMPH Nodes: No enlarged glands  ENDOCRINE: No heat or cold intolerance; No hair loss  MUSCULOSKELETAL: No joint pain or swelling; No muscle, back, or extremity pain  PSYCHIATRIC: No depression, anxiety, mood swings, or difficulty sleeping  HEME/LYMPH: No easy bruising, or bleeding gums  ALLERGY AND IMMUNOLOGIC: No hives or eczema	    Vital Signs Last 24 Hrs  T(C): 36.1 (17 Jun 2019 04:51), Max: 36.7 (16 Jun 2019 18:38)  T(F): 97 (17 Jun 2019 04:51), Max: 98 (16 Jun 2019 18:38)  HR: 81 (17 Jun 2019 08:30) (72 - 88)  BP: 134/65 (17 Jun 2019 08:30) (126/68 - 145/69)  BP(mean): --  RR: 17 (17 Jun 2019 08:30) (16 - 20)  SpO2: 98% (17 Jun 2019 08:30) (97% - 100%)    Daily Height in cm: 170.18 (16 Jun 2019 18:38)    Daily     I&O's Detail      PHYSICAL EXAM:  Appearance: Normal, well nourished	  HEENT:   Normal oral mucosa, PERRL, EOMI, sclera non-icteric	  Lymphatic: No cervical lymphadenopathy  Cardiovascular: Normal S1 S2, No JVD, No cardiac murmurs, No carotid bruits, b/l LE 1+ pedal edema  Respiratory: Lungs clear to auscultation	  Psychiatry: A & O x 3, Mood & affect appropriate  Gastrointestinal:  Soft, Non-tender, + BS, no bruits	  Skin: No rashes, No ecchymoses, No cyanosis  Neurologic: Grossly non-focal with full strength in all four extremities  Extremities: Normal range of motion, No clubbing, cyanosis. b/l LE 1+ pedal edema  Vascular: Peripheral pulses palpable 2+ bilaterally      INTERPRETATION OF TELEMETRY: NSR 80s, no arrhythmic events    ECG: NSR 78bpm, LAD, LVH, widened QRS, non-diagnostic    LABS:                        9.9    7.3   )-----------( 324      ( 17 Jun 2019 06:32 )             30.4     06-17    124<L>  |  82<L>  |  10.0  ----------------------------<  102  4.0   |  30.0<H>  |  0.83    Ca    9.2      17 Jun 2019 06:32    TPro  7.6  /  Alb  4.2  /  TBili  0.3<L>  /  DBili  x   /  AST  16  /  ALT  13  /  AlkPhos  126<H>  06-16    CARDIAC MARKERS ( 17 Jun 2019 02:42 )  x     / <0.01 ng/mL / x     / x     / x      CARDIAC MARKERS ( 16 Jun 2019 20:22 )  x     / <0.01 ng/mL / 66 U/L / x     / x          PT/INR - ( 16 Jun 2019 20:22 )   PT: 13.7 sec;   INR: 1.19 ratio         PTT - ( 16 Jun 2019 20:22 )  PTT:30.6 sec    I&O's Summary    BNPSerum Pro-Brain Natriuretic Peptide: 673 pg/mL (06-16 @ 20:22)    Serum Pro-Brain Natriuretic Peptide: 673 pg/mL (06-16-19 @ 20:22)

## 2019-06-18 DIAGNOSIS — Z71.89 OTHER SPECIFIED COUNSELING: ICD-10-CM

## 2019-06-18 LAB
ALBUMIN SERPL ELPH-MCNC: 3.9 G/DL — SIGNIFICANT CHANGE UP (ref 3.3–5.2)
ALP SERPL-CCNC: 116 U/L — SIGNIFICANT CHANGE UP (ref 40–120)
ALT FLD-CCNC: 11 U/L — SIGNIFICANT CHANGE UP
ANION GAP SERPL CALC-SCNC: 11 MMOL/L — SIGNIFICANT CHANGE UP (ref 5–17)
ANION GAP SERPL CALC-SCNC: 12 MMOL/L — SIGNIFICANT CHANGE UP (ref 5–17)
ANISOCYTOSIS BLD QL: SLIGHT — SIGNIFICANT CHANGE UP
APTT BLD: 28.7 SEC — SIGNIFICANT CHANGE UP (ref 27.5–36.3)
AST SERPL-CCNC: 16 U/L — SIGNIFICANT CHANGE UP
BASOPHILS # BLD AUTO: 0 K/UL — SIGNIFICANT CHANGE UP (ref 0–0.2)
BASOPHILS NFR BLD AUTO: 0.2 % — SIGNIFICANT CHANGE UP (ref 0–2)
BILIRUB SERPL-MCNC: 0.3 MG/DL — LOW (ref 0.4–2)
BUN SERPL-MCNC: 11 MG/DL — SIGNIFICANT CHANGE UP (ref 8–20)
BUN SERPL-MCNC: 20 MG/DL — SIGNIFICANT CHANGE UP (ref 8–20)
CALCIUM SERPL-MCNC: 8.9 MG/DL — SIGNIFICANT CHANGE UP (ref 8.6–10.2)
CALCIUM SERPL-MCNC: 8.9 MG/DL — SIGNIFICANT CHANGE UP (ref 8.6–10.2)
CHLORIDE SERPL-SCNC: 81 MMOL/L — LOW (ref 98–107)
CHLORIDE SERPL-SCNC: 84 MMOL/L — LOW (ref 98–107)
CHOLEST SERPL-MCNC: 133 MG/DL — SIGNIFICANT CHANGE UP (ref 110–199)
CO2 SERPL-SCNC: 27 MMOL/L — SIGNIFICANT CHANGE UP (ref 22–29)
CO2 SERPL-SCNC: 29 MMOL/L — SIGNIFICANT CHANGE UP (ref 22–29)
CREAT SERPL-MCNC: 0.85 MG/DL — SIGNIFICANT CHANGE UP (ref 0.5–1.3)
CREAT SERPL-MCNC: 1.04 MG/DL — SIGNIFICANT CHANGE UP (ref 0.5–1.3)
EOSINOPHIL # BLD AUTO: 0.5 K/UL — SIGNIFICANT CHANGE UP (ref 0–0.5)
EOSINOPHIL NFR BLD AUTO: 4 % — SIGNIFICANT CHANGE UP (ref 0–5)
GLUCOSE BLDC GLUCOMTR-MCNC: 169 MG/DL — HIGH (ref 70–99)
GLUCOSE BLDC GLUCOMTR-MCNC: 204 MG/DL — HIGH (ref 70–99)
GLUCOSE BLDC GLUCOMTR-MCNC: 213 MG/DL — HIGH (ref 70–99)
GLUCOSE BLDC GLUCOMTR-MCNC: 257 MG/DL — HIGH (ref 70–99)
GLUCOSE SERPL-MCNC: 162 MG/DL — HIGH (ref 70–115)
GLUCOSE SERPL-MCNC: 199 MG/DL — HIGH (ref 70–115)
HCT VFR BLD CALC: 29.2 % — LOW (ref 42–52)
HCT VFR BLD CALC: 31.1 % — LOW (ref 42–52)
HDLC SERPL-MCNC: 40 MG/DL — SIGNIFICANT CHANGE UP
HGB BLD-MCNC: 10 G/DL — LOW (ref 14–18)
HGB BLD-MCNC: 9.5 G/DL — LOW (ref 14–18)
INR BLD: 1.13 RATIO — SIGNIFICANT CHANGE UP (ref 0.88–1.16)
LIPID PNL WITH DIRECT LDL SERPL: 73 MG/DL — SIGNIFICANT CHANGE UP
LYMPHOCYTES # BLD AUTO: 1.6 K/UL — SIGNIFICANT CHANGE UP (ref 1–4.8)
LYMPHOCYTES # BLD AUTO: 13 % — LOW (ref 20–55)
MACROCYTES BLD QL: SLIGHT — SIGNIFICANT CHANGE UP
MAGNESIUM SERPL-MCNC: 1.8 MG/DL — SIGNIFICANT CHANGE UP (ref 1.6–2.6)
MAGNESIUM SERPL-MCNC: 1.8 MG/DL — SIGNIFICANT CHANGE UP (ref 1.6–2.6)
MCHC RBC-ENTMCNC: 23.2 PG — LOW (ref 27–31)
MCHC RBC-ENTMCNC: 23.4 PG — LOW (ref 27–31)
MCHC RBC-ENTMCNC: 32.2 G/DL — SIGNIFICANT CHANGE UP (ref 32–36)
MCHC RBC-ENTMCNC: 32.5 G/DL — SIGNIFICANT CHANGE UP (ref 32–36)
MCV RBC AUTO: 71.4 FL — LOW (ref 80–94)
MCV RBC AUTO: 72.8 FL — LOW (ref 80–94)
MICROCYTES BLD QL: SLIGHT — SIGNIFICANT CHANGE UP
MONOCYTES # BLD AUTO: 1.4 K/UL — HIGH (ref 0–0.8)
MONOCYTES NFR BLD AUTO: 11.3 % — HIGH (ref 3–10)
NEUTROPHILS # BLD AUTO: 8.4 K/UL — HIGH (ref 1.8–8)
NEUTROPHILS NFR BLD AUTO: 71 % — SIGNIFICANT CHANGE UP (ref 37–73)
OVALOCYTES BLD QL SMEAR: SLIGHT — SIGNIFICANT CHANGE UP
PHOSPHATE SERPL-MCNC: 3.9 MG/DL — SIGNIFICANT CHANGE UP (ref 2.4–4.7)
PLAT MORPH BLD: NORMAL — SIGNIFICANT CHANGE UP
PLATELET # BLD AUTO: 362 K/UL — SIGNIFICANT CHANGE UP (ref 150–400)
PLATELET # BLD AUTO: 405 K/UL — HIGH (ref 150–400)
POIKILOCYTOSIS BLD QL AUTO: SLIGHT — SIGNIFICANT CHANGE UP
POTASSIUM SERPL-MCNC: 3.6 MMOL/L — SIGNIFICANT CHANGE UP (ref 3.5–5.3)
POTASSIUM SERPL-MCNC: 4 MMOL/L — SIGNIFICANT CHANGE UP (ref 3.5–5.3)
POTASSIUM SERPL-SCNC: 3.6 MMOL/L — SIGNIFICANT CHANGE UP (ref 3.5–5.3)
POTASSIUM SERPL-SCNC: 4 MMOL/L — SIGNIFICANT CHANGE UP (ref 3.5–5.3)
PROT SERPL-MCNC: 7.1 G/DL — SIGNIFICANT CHANGE UP (ref 6.6–8.7)
PROTHROM AB SERPL-ACNC: 13.1 SEC — HIGH (ref 10–12.9)
RBC # BLD: 4.09 M/UL — LOW (ref 4.6–6.2)
RBC # BLD: 4.27 M/UL — LOW (ref 4.6–6.2)
RBC # FLD: 15 % — SIGNIFICANT CHANGE UP (ref 11–15.6)
RBC # FLD: 15 % — SIGNIFICANT CHANGE UP (ref 11–15.6)
RBC BLD AUTO: ABNORMAL
SODIUM SERPL-SCNC: 120 MMOL/L — CRITICAL LOW (ref 135–145)
SODIUM SERPL-SCNC: 124 MMOL/L — LOW (ref 135–145)
TOTAL CHOLESTEROL/HDL RATIO MEASUREMENT: 3 RATIO — LOW (ref 3.4–9.6)
TRIGL SERPL-MCNC: 100 MG/DL — SIGNIFICANT CHANGE UP (ref 10–200)
WBC # BLD: 11.9 K/UL — HIGH (ref 4.8–10.8)
WBC # BLD: 9.4 K/UL — SIGNIFICANT CHANGE UP (ref 4.8–10.8)
WBC # FLD AUTO: 11.9 K/UL — HIGH (ref 4.8–10.8)
WBC # FLD AUTO: 9.4 K/UL — SIGNIFICANT CHANGE UP (ref 4.8–10.8)

## 2019-06-18 PROCEDURE — 99233 SBSQ HOSP IP/OBS HIGH 50: CPT

## 2019-06-18 PROCEDURE — 93306 TTE W/DOPPLER COMPLETE: CPT | Mod: 26

## 2019-06-18 PROCEDURE — 93010 ELECTROCARDIOGRAM REPORT: CPT

## 2019-06-18 PROCEDURE — 99232 SBSQ HOSP IP/OBS MODERATE 35: CPT

## 2019-06-18 PROCEDURE — 93010 ELECTROCARDIOGRAM REPORT: CPT | Mod: 77,59

## 2019-06-18 RX ORDER — METFORMIN HYDROCHLORIDE 850 MG/1
1 TABLET ORAL
Qty: 0 | Refills: 0 | DISCHARGE

## 2019-06-18 RX ORDER — RANOLAZINE 500 MG/1
1 TABLET, FILM COATED, EXTENDED RELEASE ORAL
Qty: 60 | Refills: 3
Start: 2019-06-18 | End: 2019-10-15

## 2019-06-18 RX ORDER — TICAGRELOR 90 MG/1
1 TABLET ORAL
Qty: 0 | Refills: 0 | DISCHARGE

## 2019-06-18 RX ORDER — SODIUM CHLORIDE 9 MG/ML
1 INJECTION INTRAMUSCULAR; INTRAVENOUS; SUBCUTANEOUS
Qty: 3 | Refills: 0
Start: 2019-06-18 | End: 2019-06-20

## 2019-06-18 RX ORDER — ATORVASTATIN CALCIUM 80 MG/1
1 TABLET, FILM COATED ORAL
Qty: 30 | Refills: 3
Start: 2019-06-18 | End: 2019-10-15

## 2019-06-18 RX ORDER — METFORMIN HYDROCHLORIDE 850 MG/1
500 TABLET ORAL
Qty: 0 | Refills: 0 | DISCHARGE

## 2019-06-18 RX ORDER — ISOSORBIDE MONONITRATE 60 MG/1
1 TABLET, EXTENDED RELEASE ORAL
Qty: 0 | Refills: 0 | DISCHARGE

## 2019-06-18 RX ORDER — ASPIRIN/CALCIUM CARB/MAGNESIUM 324 MG
1 TABLET ORAL
Qty: 30 | Refills: 3
Start: 2019-06-18 | End: 2019-10-15

## 2019-06-18 RX ORDER — SODIUM CHLORIDE 9 MG/ML
1 INJECTION INTRAMUSCULAR; INTRAVENOUS; SUBCUTANEOUS DAILY
Refills: 0 | Status: DISCONTINUED | OUTPATIENT
Start: 2019-06-18 | End: 2019-06-20

## 2019-06-18 RX ORDER — CARVEDILOL PHOSPHATE 80 MG/1
1 CAPSULE, EXTENDED RELEASE ORAL
Qty: 0 | Refills: 0 | DISCHARGE
Start: 2019-06-18

## 2019-06-18 RX ORDER — RANOLAZINE 500 MG/1
500 TABLET, FILM COATED, EXTENDED RELEASE ORAL
Refills: 0 | Status: DISCONTINUED | OUTPATIENT
Start: 2019-06-18 | End: 2019-06-20

## 2019-06-18 RX ORDER — FUROSEMIDE 40 MG
40 TABLET ORAL DAILY
Refills: 0 | Status: DISCONTINUED | OUTPATIENT
Start: 2019-06-18 | End: 2019-06-19

## 2019-06-18 RX ORDER — RANOLAZINE 500 MG/1
500 TABLET, FILM COATED, EXTENDED RELEASE ORAL
Qty: 30000 | Refills: 2
Start: 2019-06-18 | End: 2019-09-15

## 2019-06-18 RX ORDER — TICAGRELOR 90 MG/1
1 TABLET ORAL
Qty: 60 | Refills: 3
Start: 2019-06-18 | End: 2019-10-15

## 2019-06-18 RX ORDER — ISOSORBIDE MONONITRATE 60 MG/1
1 TABLET, EXTENDED RELEASE ORAL
Qty: 30 | Refills: 3
Start: 2019-06-18 | End: 2019-10-15

## 2019-06-18 RX ORDER — FUROSEMIDE 40 MG
1 TABLET ORAL
Qty: 90 | Refills: 3
Start: 2019-06-18 | End: 2020-06-11

## 2019-06-18 RX ADMIN — Medication 40 MILLIGRAM(S): at 06:13

## 2019-06-18 RX ADMIN — Medication 2: at 12:10

## 2019-06-18 RX ADMIN — HEPARIN SODIUM 5000 UNIT(S): 5000 INJECTION INTRAVENOUS; SUBCUTANEOUS at 14:03

## 2019-06-18 RX ADMIN — LOSARTAN POTASSIUM 25 MILLIGRAM(S): 100 TABLET, FILM COATED ORAL at 08:56

## 2019-06-18 RX ADMIN — ATORVASTATIN CALCIUM 40 MILLIGRAM(S): 80 TABLET, FILM COATED ORAL at 21:34

## 2019-06-18 RX ADMIN — TICAGRELOR 90 MILLIGRAM(S): 90 TABLET ORAL at 18:51

## 2019-06-18 RX ADMIN — Medication 81 MILLIGRAM(S): at 11:53

## 2019-06-18 RX ADMIN — ISOSORBIDE MONONITRATE 60 MILLIGRAM(S): 60 TABLET, EXTENDED RELEASE ORAL at 11:54

## 2019-06-18 RX ADMIN — Medication 3: at 17:18

## 2019-06-18 RX ADMIN — SODIUM CHLORIDE 1 GRAM(S): 9 INJECTION INTRAMUSCULAR; INTRAVENOUS; SUBCUTANEOUS at 21:33

## 2019-06-18 RX ADMIN — AMLODIPINE BESYLATE 10 MILLIGRAM(S): 2.5 TABLET ORAL at 08:55

## 2019-06-18 RX ADMIN — TICAGRELOR 90 MILLIGRAM(S): 90 TABLET ORAL at 08:13

## 2019-06-18 RX ADMIN — HEPARIN SODIUM 5000 UNIT(S): 5000 INJECTION INTRAVENOUS; SUBCUTANEOUS at 21:34

## 2019-06-18 RX ADMIN — Medication 1: at 08:08

## 2019-06-18 RX ADMIN — PANTOPRAZOLE SODIUM 40 MILLIGRAM(S): 20 TABLET, DELAYED RELEASE ORAL at 06:11

## 2019-06-18 RX ADMIN — Medication 0.5 INCH(S): at 06:18

## 2019-06-18 RX ADMIN — CARVEDILOL PHOSPHATE 12.5 MILLIGRAM(S): 80 CAPSULE, EXTENDED RELEASE ORAL at 18:51

## 2019-06-18 RX ADMIN — RANOLAZINE 500 MILLIGRAM(S): 500 TABLET, FILM COATED, EXTENDED RELEASE ORAL at 18:51

## 2019-06-18 NOTE — PROGRESS NOTE ADULT - SUBJECTIVE AND OBJECTIVE BOX
ADRIANNA SHANKS    [id number]    73y      Male    Patient is a 73y old  Male who presents with a chief complaint of Chest Tightness (18 Jun 2019 07:48)      INTERVAL HPI/OVERNIGHT EVENTS:    Patient is feeling improvement of chest tightness, he has no SOB, chills, nausea, vomiting.     REVIEW OF SYSTEMS:    CONSTITUTIONAL: No fever, weight loss, or fatigue  RESPIRATORY: No cough, No shortness of breath  CARDIOVASCULAR: No chest pain, palpitations  GASTROINTESTINAL: No abdominal, No nausea, vomiting  NEUROLOGICAL: No headaches,  loss of strength.  MISCELLANEOUS: No joint swelling or pain       Vital Signs Last 24 Hrs  T(C): 36.4 (18 Jun 2019 20:32), Max: 36.4 (18 Jun 2019 20:32)  T(F): 97.5 (18 Jun 2019 20:32), Max: 97.5 (18 Jun 2019 20:32)  HR: 92 (18 Jun 2019 20:32) (68 - 99)  BP: 130/58 (18 Jun 2019 20:32) (89/53 - 137/71)  RR: 18 (18 Jun 2019 20:32) (14 - 18)  SpO2: 96% (18 Jun 2019 20:32) (96% - 100%)    PHYSICAL EXAM:    GENERAL: Elderly male looking comfortable   HEENT: PERRL, +EOMI  NECK: soft, Supple, No JVD,   CHEST/LUNG: Clear to auscultate bilaterally; No wheezing  HEART: S1S2+, Regular rate and rhythm; No murmurs  ABDOMEN: Soft, Nontender, Nondistended; Bowel sounds present  EXTREMITIES:  2+ Peripheral Pulses, No edema  SKIN: No rashes or lesions  NEURO: AAOX3, no focal deficits, no motor r sensory loss  PSYCH: normal mood      LABS:                        10.0   9.4   )-----------( 405      ( 18 Jun 2019 06:36 )             31.1     06-18    124<L>  |  84<L>  |  11.0  ----------------------------<  162<H>  3.6   |  29.0  |  0.85    Ca    8.9      18 Jun 2019 06:36  Mg     1.8     06-18              I&O's Summary    18 Jun 2019 07:01  -  18 Jun 2019 21:39  --------------------------------------------------------  IN: 120 mL / OUT: 0 mL / NET: 120 mL        MEDICATIONS  (STANDING):  amLODIPine   Tablet 10 milliGRAM(s) Oral daily  aspirin  chewable 81 milliGRAM(s) Oral daily  atorvastatin Oral Tab/Cap - Peds 40 milliGRAM(s) Oral daily  carvedilol 12.5 milliGRAM(s) Oral daily  dextrose 5%. 1000 milliLiter(s) (50 mL/Hr) IV Continuous <Continuous>  dextrose 50% Injectable 12.5 Gram(s) IV Push once  dextrose 50% Injectable 25 Gram(s) IV Push once  dextrose 50% Injectable 25 Gram(s) IV Push once  furosemide    Tablet 40 milliGRAM(s) Oral daily  heparin  Injectable 5000 Unit(s) SubCutaneous every 8 hours  insulin lispro (HumaLOG) corrective regimen sliding scale   SubCutaneous three times a day before meals  isosorbide   mononitrate ER Tablet (IMDUR) 60 milliGRAM(s) Oral daily  losartan 25 milliGRAM(s) Oral daily  pantoprazole    Tablet 40 milliGRAM(s) Oral before breakfast  ranolazine 500 milliGRAM(s) Oral two times a day  sodium chloride 1 Gram(s) Oral daily  ticagrelor 90 milliGRAM(s) Oral two times a day    MEDICATIONS  (PRN):  acetaminophen   Tablet .. 650 milliGRAM(s) Oral every 6 hours PRN Mild Pain (1 - 3)  dextrose 40% Gel 15 Gram(s) Oral once PRN Blood Glucose LESS THAN 70 milliGRAM(s)/deciliter  glucagon  Injectable 1 milliGRAM(s) IntraMuscular once PRN Glucose LESS THAN 70 milligrams/deciliter  ondansetron Injectable 4 milliGRAM(s) IV Push every 6 hours PRN Nausea

## 2019-06-18 NOTE — PROGRESS NOTE ADULT - SUBJECTIVE AND OBJECTIVE BOX
Department of Cardiology                                                                  [facility] [address] [address] [id number]                                                            Telephone: [phone]. Fax:[phone]                                                                                   Cardiac Cath NP Note       Narrative:  73y  Male is now s/p left heart catheterization via  left groin approach (no site complications) with Dr. Reid which showed:  Patent LIMA to LAD.   Recurrent SVG to Circumflex instent restenosis.   Considering multiple layers of stents, laser atherectomy, cutting balloon performed. No stent deployed. LVEDP 36 mmhg. (CHF)     Per Dr. Reid    Pt tolerated ambulation, no overnight tele events.     PAST MEDICAL & SURGICAL HISTORY:  HFrEF (heart failure with reduced ejection fraction)  Essential hypertension  Type 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, without long-term current use of insul  Coronary artery disease involving native coronary artery of native heart, angina presence unspecifie  Coronary artery disease involving coronary bypass graft of native heart with unstable angina pectoris  High cholesterol  GERD (gastroesophageal reflux disease)  Status post open reduction with internal fixation of fracture  S/P primary angioplasty with coronary stent  S/P CABG (coronary artery bypass graft)    FAMILY HISTORY:  No pertinent family history in first degree relatives  No pertinent family history in first degree relatives    Home Medications:  amlodipine/valsartan/hydrochlorothiazide 10 mg-160 mg-12.5 mg oral tablet: 1 tab(s) orally once a day (16 Jun 2019 23:23)  aspirin 81 mg oral tablet, chewable: 1 tab(s) orally once a day (16 Jun 2019 23:23)  atorvastatin 40 mg oral tablet: 1 tab(s) orally once a day (16 Jun 2019 23:23)  Brilinta (ticagrelor) 90 mg oral tablet: 1 tab(s) orally 2 times a day (17 Jun 2019 03:20)  carvedilol 12.5 mg oral tablet: 1 tab(s) orally once a day (at bedtime) (17 Jun 2019 03:20)  esomeprazole 40 mg oral delayed release capsule: 1 cap(s) orally once a day (16 Jun 2019 23:23)  furosemide 20 mg oral tablet: 1 tab(s) orally once a day (16 Jun 2019 23:23)  glipiZIDE 10 mg oral tablet, extended release: 1 tab(s) orally once a day (16 Jun 2019 23:23)  isosorbide mononitrate 60 mg oral tablet, extended release: 1 tab(s) orally once a day (in the morning) (17 Jun 2019 03:20)  Januvia 100 mg oral tablet: 1 tab(s) orally once a day (17 Jun 2019 03:20)  metFORMIN 1000 mg oral tablet, extended release: 1 tab(s) orally once a day (16 Jun 2019 23:23)  metFORMIN 500 mg oral tablet: 500 milligram(s) orally once a day (at bedtime) (17 Jun 2019 03:20)    Patient is a 73y old  Male who presents with a chief complaint of Chest Tightness (17 Jun 2019 19:54)    HEALTH ISSUES - PROBLEM Dx:  CAD (coronary artery disease): CAD (coronary artery disease)  High cholesterol: High cholesterol  HFrEF (heart failure with reduced ejection fraction): HFrEF (heart failure with reduced ejection fraction)  Chest pain due to CAD: Chest pain due to CAD        HPI:  Pt is a 72 yo M presenting from home with a 7 day history of chest tightness, PMH CAD s/p CABG (LIMA to LAD, SVG to OM), HFrEF w/ EF 40%, DM2, HLD, HTN, microcytic anemia, GERD  Patient states he has been having chest tightness for approximately 7 days now, he saw Dr. Leon in the outpatient setting approx 5-6 days ago and he is supposed to have a stress test and TTE performed.   His chest tightness 5/10 in severity, pressure line in mid chest, has been intermittent for about 1 week now, not necessarily associated with activity. Improved mildly with NTG. No radiation elsewhere. He was started on Lasix 20mg PO last week as he also reports increased LE edema, no associated SOB. Denies PND, orthopnea, he eats a low salt diet. Does not follow a fluid restriction. No other new medications.   Denies headaches, nausea, vomiting, SOB, palpitations, abdominal pain, constipation, diarrhea, melena, hematochezia, dysuria.   In ED patient noted to have low sodium at 120, negative troponin, no acute EKG abnormalities. (16 Jun 2019 23:32)    General: No fatigue, no fevers/chills  Respiratory: No dyspnea, no cough, no wheeze  CV: No chest pain, no palpitations  Abd: No nausea  Neuro: No headache, no dizziness  No Known Allergies      Objective:  Vital Signs Last 24 Hrs  T(C): 36.1 (17 Jun 2019 16:55), Max: 36.6 (17 Jun 2019 11:20)  T(F): 97 (17 Jun 2019 16:55), Max: 97.8 (17 Jun 2019 11:20)  HR: 83 (18 Jun 2019 05:59) (81 - 99)  BP: 134/65 (18 Jun 2019 05:59) (89/53 - 157/78)  BP(mean): --  RR: 16 (18 Jun 2019 05:59) (14 - 18)  SpO2: 98% (18 Jun 2019 05:59) (95% - 100%)    CM: SR  Neuro: A&OX3, CN 2-12 intact  HEENT: NC, AT  Lungs: CTA B/L  CV: S1, S2, no murmur, RRR  Abd: Soft  Left Groin: Soft, no bleeding, no hematoma, dressing removed, site unremarkable  Extremity: + distal pulses                          10.0   9.4   )-----------( 405      ( 18 Jun 2019 06:36 )             31.1     06-18    124<L>  |  84<L>  |  11.0  ----------------------------<  162<H>  3.6   |  29.0  |  0.85    Ca    8.9      18 Jun 2019 06:36  Mg     1.8     06-18  TPro  7.6  /  Alb  4.2  /  TBili  0.3<L>  /  DBili  x   /  AST  16  /  ALT  13  /  AlkPhos  126<H>  06-16  PT/INR - ( 16 Jun 2019 20:22 )   PT: 13.7 sec;   INR: 1.19 ratio    PTT - ( 16 Jun 2019 20:22 )  PTT:30.6 sec      -medical mgmt per hospitalist  -left groin precautions  -continue current medical therapy  -discussed the importance of diet and exercise along with medication compliance to reduce hemoglobin A1c (8.6)  -Discussed with Dr. Reid implementation of ranexa 500mg po BID  -pt to continue lasix for elevated LVEDP

## 2019-06-18 NOTE — CHART NOTE - NSCHARTNOTEFT_GEN_A_CORE
Rapid Response PGY 1 Note  Patient is a 73y old Voodoo Male with PMH CAD s/p CABG (LIMA to LAD, SVG to OM), HFrEF w/ EF 40%, DM2, HLD, HTN, microcytic anemia, GERD, admitted for Angina.  Rapid response team called because pt was found sitting in chair, staring. Nursing staff talked to pt and pt was not responding to verbal commands. Pt put in bed.     Patient was seen and examined at the bedside by rapid response team, at bedside. Pt was pleasant and interactive, states remembering praying the suras in his traditional manner. According to pt, as a Voodoo, he is supposed to pray the suras and not interact with anybody no matter what as he is in communion when he prays. Son at bedside corroborates tradition and costume. Pt states remembering being talked to by staff and put in bed. Denies weakness, HA, LOC, repetitive movements, CP, SOB, Abd pain, Hx of seizures.     Allergies    No Known Allergies    PAST MEDICAL & SURGICAL HISTORY:  HFrEF (heart failure with reduced ejection fraction)  Essential hypertension  Type 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, without long-term current use of insul  Coronary artery disease involving native coronary artery of native heart, angina presence unspecifie  Coronary artery disease involving coronary bypass graft of native heart with unstable angina pectoris  High cholesterol  GERD (gastroesophageal reflux disease)  Status post open reduction with internal fixation of fracture  S/P primary angioplasty with coronary stent  S/P CABG (coronary artery bypass graft)    Vital Signs at start of Rapid (22:50)  BP: 150/80 mmHg  HR:               bpm  RR:               rpm  Temp:           F  Glucose: 204 mg/dL      GENERAL: The patient is awake and alert x4, in no apparent distress, pleasant, cooperative  HEENT: Head is normocephalic and atraumatic. Extraocular muscles are intact. Mucous membranes are moist. No throat erythema/exudates no lymphadenopathy, no JVD,   NECK: Supple.  LUNGS: Clear to auscultation BL without wheezing, rales or rhonchi; respirations unlabored  HEART: Regular rate and rhythm ,Systolic murmur III/VI, normal +S2, no rubs, gallops  ABDOMEN: Soft, nontender, and nondistended, no rebound, guarding rigidity, bowel sounds in all 4 quadrants  EXTREMITIES: Without any cyanosis, clubbing, rash, lesions or edema.  SKIN: No new rashes or lesions.  MSK: strength equal BL  VASCULAR: Radial and Dorsal pedal pulses palpable BL  NEUROLOGIC: CN II-XII WNL, Strength 5/5 in all extremities, no sensory deficits.   PSYCH: No new changes.    06-18 @ 07:01  -  06-18 @ 23:29  --------------------------------------------------------  IN: 120 mL / OUT: 0 mL / NET: 120 mL                   VITAL SIGNS.    Vital Signs Last 24 Hrs  T(C): 36.4 (18 Jun 2019 20:32), Max: 36.4 (18 Jun 2019 20:32)  T(F): 97.5 (18 Jun 2019 20:32), Max: 97.5 (18 Jun 2019 20:32)  HR: 92 (18 Jun 2019 20:32) (68 - 92)  BP: 130/58 (18 Jun 2019 20:32) (89/53 - 137/71)  RR: 18 (18 Jun 2019 20:32) (14 - 18)  SpO2: 96% (18 Jun 2019 20:32) (96% - 100%)  =================================================    LABS.                          9.5    11.9  )-----------( 362      ( 18 Jun 2019 23:21 )             29.2     06-18    124<L>  |  84<L>  |  11.0  ----------------------------<  162<H>  3.6   |  29.0  |  0.85    Ca    8.9      18 Jun 2019 06:36  Mg     1.8     06-18    PT/INR - ( 18 Jun 2019 23:21 )   PT: 13.1 sec;   INR: 1.13 ratio    PTT - ( 18 Jun 2019 23:21 )  PTT:28.7 sec    MEDICATIONS  (STANDING):  amLODIPine   Tablet 10 milliGRAM(s) Oral daily  aspirin  chewable 81 milliGRAM(s) Oral daily  atorvastatin Oral Tab/Cap - Peds 40 milliGRAM(s) Oral daily  carvedilol 12.5 milliGRAM(s) Oral daily  dextrose 5%. 1000 milliLiter(s) (50 mL/Hr) IV Continuous <Continuous>  dextrose 50% Injectable 12.5 Gram(s) IV Push once  dextrose 50% Injectable 25 Gram(s) IV Push once  dextrose 50% Injectable 25 Gram(s) IV Push once  furosemide    Tablet 40 milliGRAM(s) Oral daily  heparin  Injectable 5000 Unit(s) SubCutaneous every 8 hours  insulin lispro (HumaLOG) corrective regimen sliding scale   SubCutaneous three times a day before meals  isosorbide   mononitrate ER Tablet (IMDUR) 60 milliGRAM(s) Oral daily  losartan 25 milliGRAM(s) Oral daily  pantoprazole    Tablet 40 milliGRAM(s) Oral before breakfast  ranolazine 500 milliGRAM(s) Oral two times a day  sodium chloride 1 Gram(s) Oral daily  ticagrelor 90 milliGRAM(s) Oral two times a day    MEDICATIONS  (PRN):  acetaminophen   Tablet .. 650 milliGRAM(s) Oral every 6 hours PRN Mild Pain (1 - 3)  dextrose 40% Gel 15 Gram(s) Oral once PRN Blood Glucose LESS THAN 70 milliGRAM(s)/deciliter  glucagon  Injectable 1 milliGRAM(s) IntraMuscular once PRN Glucose LESS THAN 70 milligrams/deciliter  ondansetron Injectable 4 milliGRAM(s) IV Push every 6 hours PRN Nausea      Assessment-   Rapid Response called for 73y year old Male called for episode of non interactive stare. 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 However given hx pt will be evaluated.     Plan (STAT)-  -CBC with diff  -CMP  -Mag/Phos  -PT/PTT/INR  -EKG; No gross changes  -CT Head, deferred as there is no focal neurological symptoms at this time.    Case discussed with SROC, Dr. Valdez  Debrief medical team at bedside.  Pt and Family were updated at bedside  Will follow up in 1 hour by PA staff.    -oJnatan Maurer MD;  PGY-1 Rapid Response PGY 1 Note  Patient is a 73y old Taoist Male with PMH CAD s/p CABG (LIMA to LAD, SVG to OM), HFrEF w/ EF 40%, DM2, HLD, HTN, microcytic anemia, GERD, admitted for Angina.  Rapid response team called because pt was found sitting in chair, staring. Nursing staff talked to pt and pt was not responding to verbal commands. Pt put in bed.     Patient was seen and examined at the bedside by rapid response team, at bedside. Pt was pleasant and interactive, states remembering praying the suras in his traditional manner. According to pt, as a Taoist, he is supposed to pray the suras and not interact with anybody no matter what as he is in communion when he prays. Son at bedside corroborates tradition and costume. Pt states remembering being talked to by staff and put in bed. Denies weakness, HA, LOC, repetitive movements, CP, SOB, Abd pain, Hx of seizures.     Allergies    No Known Allergies    PAST MEDICAL & SURGICAL HISTORY:  HFrEF (heart failure with reduced ejection fraction)  Essential hypertension  Type 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, without long-term current use of insul  Coronary artery disease involving native coronary artery of native heart, angina presence unspecifie  Coronary artery disease involving coronary bypass graft of native heart with unstable angina pectoris  High cholesterol  GERD (gastroesophageal reflux disease)  Status post open reduction with internal fixation of fracture  S/P primary angioplasty with coronary stent  S/P CABG (coronary artery bypass graft)    Vital Signs at start of Rapid (22:50)  BP: 150/80 mmHg  HR: 86 bpm  RR: 18 rpm  Glucose: 204 mg/dL      GENERAL: The patient is awake and alert x4, in no apparent distress, pleasant, cooperative  HEENT: Head is normocephalic and atraumatic. Extraocular muscles are intact. Mucous membranes are moist. No throat erythema/exudates no lymphadenopathy, no JVD,   NECK: Supple.  LUNGS: Clear to auscultation BL without wheezing, rales or rhonchi; respirations unlabored  HEART: Regular rate and rhythm ,Systolic murmur III/VI, normal +S2, no rubs, gallops  ABDOMEN: Soft, nontender, and nondistended, no rebound, guarding rigidity, bowel sounds in all 4 quadrants  EXTREMITIES: Without any cyanosis, clubbing, rash, lesions or edema.  SKIN: No new rashes or lesions.  MSK: strength equal BL  VASCULAR: Radial and Dorsal pedal pulses palpable BL  NEUROLOGIC: CN II-XII WNL, Strength 5/5 in all extremities, no sensory deficits.   PSYCH: No new changes.    06-18 @ 07:01  -  06-18 @ 23:29  --------------------------------------------------------  IN: 120 mL / OUT: 0 mL / NET: 120 mL                   VITAL SIGNS.  Vital Signs Last 24 Hrs  T(C): 36.4 (18 Jun 2019 20:32), Max: 36.4 (18 Jun 2019 20:32)  T(F): 97.5 (18 Jun 2019 20:32), Max: 97.5 (18 Jun 2019 20:32)  HR: 92 (18 Jun 2019 20:32) (68 - 92)  BP: 130/58 (18 Jun 2019 20:32) (89/53 - 137/71)  RR: 18 (18 Jun 2019 20:32) (14 - 18)  SpO2: 96% (18 Jun 2019 20:32) (96% - 100%)  =================================================    LABS.                          9.5    11.9  )-----------( 362      ( 18 Jun 2019 23:21 )             29.2     06-18    124<L>  |  84<L>  |  11.0  ----------------------------<  162<H>  3.6   |  29.0  |  0.85    Ca    8.9      18 Jun 2019 06:36  Mg     1.8     06-18    PT/INR - ( 18 Jun 2019 23:21 )   PT: 13.1 sec;   INR: 1.13 ratio    PTT - ( 18 Jun 2019 23:21 )  PTT:28.7 sec    MEDICATIONS  (STANDING):  amLODIPine   Tablet 10 milliGRAM(s) Oral daily  aspirin  chewable 81 milliGRAM(s) Oral daily  atorvastatin Oral Tab/Cap - Peds 40 milliGRAM(s) Oral daily  carvedilol 12.5 milliGRAM(s) Oral daily  dextrose 5%. 1000 milliLiter(s) (50 mL/Hr) IV Continuous <Continuous>  dextrose 50% Injectable 12.5 Gram(s) IV Push once  dextrose 50% Injectable 25 Gram(s) IV Push once  dextrose 50% Injectable 25 Gram(s) IV Push once  furosemide    Tablet 40 milliGRAM(s) Oral daily  heparin  Injectable 5000 Unit(s) SubCutaneous every 8 hours  insulin lispro (HumaLOG) corrective regimen sliding scale   SubCutaneous three times a day before meals  isosorbide   mononitrate ER Tablet (IMDUR) 60 milliGRAM(s) Oral daily  losartan 25 milliGRAM(s) Oral daily  pantoprazole    Tablet 40 milliGRAM(s) Oral before breakfast  ranolazine 500 milliGRAM(s) Oral two times a day  sodium chloride 1 Gram(s) Oral daily  ticagrelor 90 milliGRAM(s) Oral two times a day    MEDICATIONS  (PRN):  acetaminophen   Tablet .. 650 milliGRAM(s) Oral every 6 hours PRN Mild Pain (1 - 3)  dextrose 40% Gel 15 Gram(s) Oral once PRN Blood Glucose LESS THAN 70 milliGRAM(s)/deciliter  glucagon  Injectable 1 milliGRAM(s) IntraMuscular once PRN Glucose LESS THAN 70 milligrams/deciliter  ondansetron Injectable 4 milliGRAM(s) IV Push every 6 hours PRN Nausea      Assessment-   Rapid Response called for 73y year old Male called for episode of non interactive stare. Pt is a Shinto prayer who follows costum of deep communion with higher deity. However given hx pt will be evaluated.   Noted to have Hyponatremia, will start 500 ml 0.9 NSS IVF x1 with follow up in AM.     Plan (STAT)-  -CBC with diff  -CMP  -Mag/Phos  -PT/PTT/INR  -EKG; No gross changes  -CT Head, deferred as there is no focal neurological symptoms at this time.    Hyponatremia  -IVF: 500 ml 0.9 NSS IVF x1  -f/u BMP in AM    Case discussed with SROC, Dr. Valdez  Debrief medical team at bedside.  Pt and Family were updated at bedside  Will follow up in 1 hour by PA staff.    -Jonatan Maurer MD;  PGY-1 Rapid Response PGY 1 Note  Patient is a 73y old Evangelical Male with PMH CAD s/p CABG (LIMA to LAD, SVG to OM), HFrEF w/ EF 40%, DM2, HLD, HTN, microcytic anemia, GERD, admitted for Angina.  Rapid response team called because pt was found sitting in chair, staring. Nursing staff talked to pt and pt was not responding to verbal commands. Pt put in bed.     Patient was seen and examined at the bedside by rapid response team, at bedside. Pt was pleasant and interactive, states remembering praying the suras in his traditional manner. According to pt, as a Evangelical, he is supposed to pray the suras and not interact with anybody no matter what as he is in communion when he prays. Son at bedside corroborates tradition and costume. Pt states remembering being talked to by staff and put in bed. Denies weakness, HA, LOC, repetitive movements, CP, SOB, Abd pain, Hx of seizures.     Allergies    No Known Allergies    PAST MEDICAL & SURGICAL HISTORY:  HFrEF (heart failure with reduced ejection fraction)  Essential hypertension  Type 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, without long-term current use of insul  Coronary artery disease involving native coronary artery of native heart, angina presence unspecifie  Coronary artery disease involving coronary bypass graft of native heart with unstable angina pectoris  High cholesterol  GERD (gastroesophageal reflux disease)  Status post open reduction with internal fixation of fracture  S/P primary angioplasty with coronary stent  S/P CABG (coronary artery bypass graft)    Vital Signs at start of Rapid (22:50)  BP: 150/80 mmHg  HR: 86 bpm  RR: 18 rpm  Glucose: 204 mg/dL      GENERAL: The patient is awake and alert x4, in no apparent distress, pleasant, cooperative  HEENT: Head is normocephalic and atraumatic. Extraocular muscles are intact. Mucous membranes are moist. No throat erythema/exudates no lymphadenopathy, no JVD,   NECK: Supple.  LUNGS: Clear to auscultation BL without wheezing, rales or rhonchi; respirations unlabored  HEART: Regular rate and rhythm ,Systolic murmur III/VI, normal +S2, no rubs, gallops  ABDOMEN: Soft, nontender, and nondistended, no rebound, guarding rigidity, bowel sounds in all 4 quadrants  EXTREMITIES: Without any cyanosis, clubbing, rash, lesions or edema.  SKIN: No new rashes or lesions.  MSK: strength equal BL  VASCULAR: Radial and Dorsal pedal pulses palpable BL  NEUROLOGIC: CN II-XII WNL, Strength 5/5 in all extremities, no sensory deficits.   PSYCH: No new changes.    06-18 @ 07:01  -  06-18 @ 23:29  --------------------------------------------------------  IN: 120 mL / OUT: 0 mL / NET: 120 mL                   VITAL SIGNS.  Vital Signs Last 24 Hrs  T(C): 36.4 (18 Jun 2019 20:32), Max: 36.4 (18 Jun 2019 20:32)  T(F): 97.5 (18 Jun 2019 20:32), Max: 97.5 (18 Jun 2019 20:32)  HR: 92 (18 Jun 2019 20:32) (68 - 92)  BP: 130/58 (18 Jun 2019 20:32) (89/53 - 137/71)  RR: 18 (18 Jun 2019 20:32) (14 - 18)  SpO2: 96% (18 Jun 2019 20:32) (96% - 100%)  =================================================    LABS.                          9.5    11.9  )-----------( 362      ( 18 Jun 2019 23:21 )             29.2     06-18    124<L>  |  84<L>  |  11.0  ----------------------------<  162<H>  3.6   |  29.0  |  0.85    Ca    8.9      18 Jun 2019 06:36  Mg     1.8     06-18    PT/INR - ( 18 Jun 2019 23:21 )   PT: 13.1 sec;   INR: 1.13 ratio    PTT - ( 18 Jun 2019 23:21 )  PTT:28.7 sec    MEDICATIONS  (STANDING):  amLODIPine   Tablet 10 milliGRAM(s) Oral daily  aspirin  chewable 81 milliGRAM(s) Oral daily  atorvastatin Oral Tab/Cap - Peds 40 milliGRAM(s) Oral daily  carvedilol 12.5 milliGRAM(s) Oral daily  dextrose 5%. 1000 milliLiter(s) (50 mL/Hr) IV Continuous <Continuous>  dextrose 50% Injectable 12.5 Gram(s) IV Push once  dextrose 50% Injectable 25 Gram(s) IV Push once  dextrose 50% Injectable 25 Gram(s) IV Push once  furosemide    Tablet 40 milliGRAM(s) Oral daily  heparin  Injectable 5000 Unit(s) SubCutaneous every 8 hours  insulin lispro (HumaLOG) corrective regimen sliding scale   SubCutaneous three times a day before meals  isosorbide   mononitrate ER Tablet (IMDUR) 60 milliGRAM(s) Oral daily  losartan 25 milliGRAM(s) Oral daily  pantoprazole    Tablet 40 milliGRAM(s) Oral before breakfast  ranolazine 500 milliGRAM(s) Oral two times a day  sodium chloride 1 Gram(s) Oral daily  ticagrelor 90 milliGRAM(s) Oral two times a day    MEDICATIONS  (PRN):  acetaminophen   Tablet .. 650 milliGRAM(s) Oral every 6 hours PRN Mild Pain (1 - 3)  dextrose 40% Gel 15 Gram(s) Oral once PRN Blood Glucose LESS THAN 70 milliGRAM(s)/deciliter  glucagon  Injectable 1 milliGRAM(s) IntraMuscular once PRN Glucose LESS THAN 70 milligrams/deciliter  ondansetron Injectable 4 milliGRAM(s) IV Push every 6 hours PRN Nausea      Assessment-   Rapid Response called for 73y year old Male called for episode of non interactive stare. Pt is a Nondenominational prayer who follows costume of deep communion with higher deity. However given hx pt will be evaluated.   Noted to have Hyponatremia, pt currently on salt tabs.     Plan (STAT)-  -CBC with diff  -CMP  -Mag/Phos  -PT/PTT/INR  -EKG; No gross changes  -CT Head, deferred as there is no focal neurological symptoms at this time.    Hyponatremia  -Pt already on salt tabs as per primary team.   -f/u BMP in AM  -Consult nephro in AM    Case discussed with SROC, Dr. Valdez and nocturnist Dr. Mixon.  Debrief medical team at bedside.  Pt and Family were updated at bedside  follow up in 1 hour by PA staff Nicole Maurer MD;  PGY-1 Rapid Response PGY 1 Note  Patient is a 73y old Jewish Male with PMH CAD s/p CABG (LIMA to LAD, SVG to OM), HFrEF w/ EF 40%, DM2, HLD, HTN, microcytic anemia, GERD, admitted for Angina.  Rapid response team called because pt was found sitting in chair, staring. Nursing staff talked to pt and pt was not responding to verbal commands. Pt put in bed.     Patient was seen and examined at the bedside by rapid response team, at bedside. Pt was pleasant and interactive, states remembering praying the suras in his traditional manner. According to pt, as a Jewish, he is supposed to pray the suras and not interact with anybody no matter what as he is in communion when he prays. Son at bedside corroborates tradition and costume. Pt states remembering being talked to by staff and put in bed. Denies weakness, HA, LOC, repetitive movements, CP, SOB, Abd pain, Hx of seizures.     Allergies    No Known Allergies    PAST MEDICAL & SURGICAL HISTORY:  HFrEF (heart failure with reduced ejection fraction)  Essential hypertension  Type 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, without long-term current use of insul  Coronary artery disease involving native coronary artery of native heart, angina presence unspecifie  Coronary artery disease involving coronary bypass graft of native heart with unstable angina pectoris  High cholesterol  GERD (gastroesophageal reflux disease)  Status post open reduction with internal fixation of fracture  S/P primary angioplasty with coronary stent  S/P CABG (coronary artery bypass graft)    Vital Signs at start of Rapid (22:50)  BP: 150/80 mmHg  HR: 86 bpm  RR: 18 rpm  Glucose: 204 mg/dL      GENERAL: The patient is awake and alert x4, in no apparent distress, pleasant, cooperative  HEENT: Head is normocephalic and atraumatic. Extraocular muscles are intact. Mucous membranes are moist. No throat erythema/exudates no lymphadenopathy, no JVD,   NECK: Supple.  LUNGS: Clear to auscultation BL without wheezing, rales or rhonchi; respirations unlabored  HEART: Regular rate and rhythm ,Systolic murmur III/VI, normal +S2, no rubs, gallops  ABDOMEN: Soft, nontender, and nondistended, no rebound, guarding rigidity, bowel sounds in all 4 quadrants  EXTREMITIES: Without any cyanosis, clubbing, rash, lesions or edema.  SKIN: No new rashes or lesions.  MSK: strength equal BL  VASCULAR: Radial and Dorsal pedal pulses palpable BL  NEUROLOGIC: CN II-XII WNL, Strength 5/5 in all extremities, no sensory deficits.   PSYCH: No new changes.    06-18 @ 07:01  -  06-18 @ 23:29  --------------------------------------------------------  IN: 120 mL / OUT: 0 mL / NET: 120 mL                   VITAL SIGNS.  Vital Signs Last 24 Hrs  T(C): 36.4 (18 Jun 2019 20:32), Max: 36.4 (18 Jun 2019 20:32)  T(F): 97.5 (18 Jun 2019 20:32), Max: 97.5 (18 Jun 2019 20:32)  HR: 92 (18 Jun 2019 20:32) (68 - 92)  BP: 130/58 (18 Jun 2019 20:32) (89/53 - 137/71)  RR: 18 (18 Jun 2019 20:32) (14 - 18)  SpO2: 96% (18 Jun 2019 20:32) (96% - 100%)  =================================================    LABS.                          9.5    11.9  )-----------( 362      ( 18 Jun 2019 23:21 )             29.2     06-18    124<L>  |  84<L>  |  11.0  ----------------------------<  162<H>  3.6   |  29.0  |  0.85    Ca    8.9      18 Jun 2019 06:36  Mg     1.8     06-18    PT/INR - ( 18 Jun 2019 23:21 )   PT: 13.1 sec;   INR: 1.13 ratio    PTT - ( 18 Jun 2019 23:21 )  PTT:28.7 sec    MEDICATIONS  (STANDING):  amLODIPine   Tablet 10 milliGRAM(s) Oral daily  aspirin  chewable 81 milliGRAM(s) Oral daily  atorvastatin Oral Tab/Cap - Peds 40 milliGRAM(s) Oral daily  carvedilol 12.5 milliGRAM(s) Oral daily  dextrose 5%. 1000 milliLiter(s) (50 mL/Hr) IV Continuous <Continuous>  dextrose 50% Injectable 12.5 Gram(s) IV Push once  dextrose 50% Injectable 25 Gram(s) IV Push once  dextrose 50% Injectable 25 Gram(s) IV Push once  furosemide    Tablet 40 milliGRAM(s) Oral daily  heparin  Injectable 5000 Unit(s) SubCutaneous every 8 hours  insulin lispro (HumaLOG) corrective regimen sliding scale   SubCutaneous three times a day before meals  isosorbide   mononitrate ER Tablet (IMDUR) 60 milliGRAM(s) Oral daily  losartan 25 milliGRAM(s) Oral daily  pantoprazole    Tablet 40 milliGRAM(s) Oral before breakfast  ranolazine 500 milliGRAM(s) Oral two times a day  sodium chloride 1 Gram(s) Oral daily  ticagrelor 90 milliGRAM(s) Oral two times a day    MEDICATIONS  (PRN):  acetaminophen   Tablet .. 650 milliGRAM(s) Oral every 6 hours PRN Mild Pain (1 - 3)  dextrose 40% Gel 15 Gram(s) Oral once PRN Blood Glucose LESS THAN 70 milliGRAM(s)/deciliter  glucagon  Injectable 1 milliGRAM(s) IntraMuscular once PRN Glucose LESS THAN 70 milligrams/deciliter  ondansetron Injectable 4 milliGRAM(s) IV Push every 6 hours PRN Nausea      Assessment-   Rapid Response called for 73y year old Male called for episode of non interactive stare. Pt is a Voodoo prayer who follows costume of deep communion with higher deity. However given hx pt will be evaluated.   Noted to have Hyponatremia, pt currently on salt tabs.     Plan (STAT)-  -CBC with diff  -CMP  -Mag/Phos  -PT/PTT/INR  -EKG; No gross changes  -CT Head, deferred as there is no focal neurological symptoms at this time.    Hyponatremia  -Pt asymptomatic otherwise without signs of neurological deficit. Pt already on salt tabs as per primary team.   -f/u BMP in AM  -Consult nephro in AM    Case discussed with SROC, Dr. Valdez and nocturnist Dr. Mixon.  Debrief medical team at bedside.  Pt and Family were updated at bedside  follow up in 1 hour by PA staff Susana.    -Jonatan Maurer MD;  PGY-1

## 2019-06-18 NOTE — PROGRESS NOTE ADULT - NSHPATTENDINGPLANDISCUSS_GEN_ALL_CORE
the patient.  All imaging and results of lab/other studies reviewed by me. All questions answered to their satisfaction. At this time they agree with the current plan of therapy.
the patient, Son and RN

## 2019-06-19 ENCOUNTER — TRANSCRIPTION ENCOUNTER (OUTPATIENT)
Age: 74
End: 2019-06-19

## 2019-06-19 LAB
ANION GAP SERPL CALC-SCNC: 15 MMOL/L — SIGNIFICANT CHANGE UP (ref 5–17)
BUN SERPL-MCNC: 19 MG/DL — SIGNIFICANT CHANGE UP (ref 8–20)
CALCIUM SERPL-MCNC: 9.4 MG/DL — SIGNIFICANT CHANGE UP (ref 8.6–10.2)
CHLORIDE SERPL-SCNC: 83 MMOL/L — LOW (ref 98–107)
CO2 SERPL-SCNC: 27 MMOL/L — SIGNIFICANT CHANGE UP (ref 22–29)
CREAT SERPL-MCNC: 0.94 MG/DL — SIGNIFICANT CHANGE UP (ref 0.5–1.3)
GLUCOSE BLDC GLUCOMTR-MCNC: 209 MG/DL — HIGH (ref 70–99)
GLUCOSE BLDC GLUCOMTR-MCNC: 210 MG/DL — HIGH (ref 70–99)
GLUCOSE BLDC GLUCOMTR-MCNC: 256 MG/DL — HIGH (ref 70–99)
GLUCOSE BLDC GLUCOMTR-MCNC: 269 MG/DL — HIGH (ref 70–99)
GLUCOSE BLDC GLUCOMTR-MCNC: 334 MG/DL — HIGH (ref 70–99)
GLUCOSE SERPL-MCNC: 187 MG/DL — HIGH (ref 70–115)
POTASSIUM SERPL-MCNC: 3.9 MMOL/L — SIGNIFICANT CHANGE UP (ref 3.5–5.3)
POTASSIUM SERPL-SCNC: 3.9 MMOL/L — SIGNIFICANT CHANGE UP (ref 3.5–5.3)
SODIUM SERPL-SCNC: 125 MMOL/L — LOW (ref 135–145)
TSH SERPL-MCNC: 3.96 UIU/ML — SIGNIFICANT CHANGE UP (ref 0.27–4.2)

## 2019-06-19 PROCEDURE — 99233 SBSQ HOSP IP/OBS HIGH 50: CPT

## 2019-06-19 RX ORDER — FERROUS SULFATE 325(65) MG
325 TABLET ORAL DAILY
Refills: 0 | Status: DISCONTINUED | OUTPATIENT
Start: 2019-06-19 | End: 2019-06-20

## 2019-06-19 RX ORDER — AMLODIPINE BESYLATE 2.5 MG/1
5 TABLET ORAL DAILY
Refills: 0 | Status: DISCONTINUED | OUTPATIENT
Start: 2019-06-19 | End: 2019-06-19

## 2019-06-19 RX ORDER — SODIUM CHLORIDE 9 MG/ML
500 INJECTION INTRAMUSCULAR; INTRAVENOUS; SUBCUTANEOUS
Refills: 0 | Status: DISCONTINUED | OUTPATIENT
Start: 2019-06-19 | End: 2019-06-19

## 2019-06-19 RX ORDER — INSULIN LISPRO 100/ML
VIAL (ML) SUBCUTANEOUS
Refills: 0 | Status: DISCONTINUED | OUTPATIENT
Start: 2019-06-19 | End: 2019-06-20

## 2019-06-19 RX ORDER — SODIUM CHLORIDE 9 MG/ML
1000 INJECTION INTRAMUSCULAR; INTRAVENOUS; SUBCUTANEOUS
Refills: 0 | Status: DISCONTINUED | OUTPATIENT
Start: 2019-06-19 | End: 2019-06-20

## 2019-06-19 RX ADMIN — RANOLAZINE 500 MILLIGRAM(S): 500 TABLET, FILM COATED, EXTENDED RELEASE ORAL at 05:50

## 2019-06-19 RX ADMIN — Medication 81 MILLIGRAM(S): at 08:21

## 2019-06-19 RX ADMIN — CARVEDILOL PHOSPHATE 12.5 MILLIGRAM(S): 80 CAPSULE, EXTENDED RELEASE ORAL at 05:50

## 2019-06-19 RX ADMIN — Medication 3: at 12:39

## 2019-06-19 RX ADMIN — AMLODIPINE BESYLATE 10 MILLIGRAM(S): 2.5 TABLET ORAL at 05:52

## 2019-06-19 RX ADMIN — RANOLAZINE 500 MILLIGRAM(S): 500 TABLET, FILM COATED, EXTENDED RELEASE ORAL at 16:52

## 2019-06-19 RX ADMIN — ATORVASTATIN CALCIUM 40 MILLIGRAM(S): 80 TABLET, FILM COATED ORAL at 21:58

## 2019-06-19 RX ADMIN — Medication 40 MILLIGRAM(S): at 05:52

## 2019-06-19 RX ADMIN — HEPARIN SODIUM 5000 UNIT(S): 5000 INJECTION INTRAVENOUS; SUBCUTANEOUS at 21:58

## 2019-06-19 RX ADMIN — TICAGRELOR 90 MILLIGRAM(S): 90 TABLET ORAL at 16:52

## 2019-06-19 RX ADMIN — SODIUM CHLORIDE 1 GRAM(S): 9 INJECTION INTRAMUSCULAR; INTRAVENOUS; SUBCUTANEOUS at 11:24

## 2019-06-19 RX ADMIN — Medication 2: at 08:21

## 2019-06-19 RX ADMIN — LOSARTAN POTASSIUM 25 MILLIGRAM(S): 100 TABLET, FILM COATED ORAL at 05:50

## 2019-06-19 RX ADMIN — TICAGRELOR 90 MILLIGRAM(S): 90 TABLET ORAL at 05:50

## 2019-06-19 RX ADMIN — SODIUM CHLORIDE 250 MILLILITER(S): 9 INJECTION INTRAMUSCULAR; INTRAVENOUS; SUBCUTANEOUS at 10:06

## 2019-06-19 RX ADMIN — Medication 4: at 16:55

## 2019-06-19 RX ADMIN — PANTOPRAZOLE SODIUM 40 MILLIGRAM(S): 20 TABLET, DELAYED RELEASE ORAL at 05:52

## 2019-06-19 NOTE — CHART NOTE - NSCHARTNOTEFT_GEN_A_CORE
Rapid Response Follow up    Pt seen and examined at bedside. Son present. Pt and son reiterated that  pt was praying and could not answer. Nothing was wrong. No complaints.  Pt remembers staff coming into room and talking to him. States he could not answer because he was praying. Pt without any complaints at this time.     PHYSICAL EXAM:  General: , NAD, breathing easy and unlabored. Appears comfortable  HEENT: NCAT, PERRLA, , moist mucous membranes   Neurology: A&Ox3,, CN II-XII grossly intact, sensation intact, , speech clear, follows commands, + sensory + motor, tongue midline  Respiratory: CTA B/L, No wheezes, rales, rhonchi  CV: RRR,   Abdominal: Soft, NT, ND +BSx4  Extremities: No clubbing, cyanosis, edema; + peripheral pulses  Skin: warm, dry, normal color, no rash or abnormal lesions      Vital Signs   B/P 110/57 (MAP) 75 P 88 R 18 PO 98% ra T 97.9                          9.5    11.9  )-----------( 362      ( 18 Jun 2019 23:21 )             29.2     06-18    120<LL>  |  81<L>  |  20.0  ----------------------------<  199<H>  4.0   |  27.0  |  1.04    Ca    8.9      18 Jun 2019 23:22  Phos  3.9     06-18  Mg     1.8     06-18    TPro  7.1  /  Alb  3.9  /  TBili  0.3<L>  /  DBili  x   /  AST  16  /  ALT  11  /  AlkPhos  116  06-18    PT/INR - ( 18 Jun 2019 23:21 )   PT: 13.1 sec;   INR: 1.13 ratio         PTT - ( 18 Jun 2019 23:21 )  PTT:28.7 sec        A/P Labs reviewed  Pt VSS NAD no complaints  Hemodynamically stable  Pts outpt chart reviewed by Dr Mixon  Pt h/o chronic hyponatremia  Hydrochlorothiazide and DM med already being held, pt currently on salt tabs  Continue Fluid restriction  BMP in am  Nephrology consult in am for further management of chronic hyponatremia with heart failure  continue to monitor Rapid Response Follow up    Pt seen and examined at bedside. Son present. Pt and son reiterated that  pt was praying and could not answer. Nothing was wrong. No complaints.  Pt remembers staff coming into room and talking to him. States he could not answer because he was praying. Pt without any complaints at this time.        Vital Signs   B/P 110/57 (MAP) 75 P 88 R 18 PO 98% ra T 97.9     PHYSICAL EXAM:  General: , NAD, breathing easy and unlabored. Appears comfortable  HEENT: NCAT, PERRLA, , moist mucous membranes   Neurology: A&Ox3,, CN II-XII grossly intact, sensation intact, , speech clear, follows commands, + sensory + motor, tongue midline  Respiratory: CTA B/L, No wheezes, rales, rhonchi  CV: RRR,   Abdominal: Soft, NT, ND +BSx4  Extremities: No clubbing, cyanosis, edema; + peripheral pulses  Skin: warm, dry, normal color, no rash or abnormal lesions                                9.5    11.9  )-----------( 362      ( 18 Jun 2019 23:21 )             29.2     06-18    120<LL>  |  81<L>  |  20.0  ----------------------------<  199<H>  4.0   |  27.0  |  1.04    Ca    8.9      18 Jun 2019 23:22  Phos  3.9     06-18  Mg     1.8     06-18    TPro  7.1  /  Alb  3.9  /  TBili  0.3<L>  /  DBili  x   /  AST  16  /  ALT  11  /  AlkPhos  116  06-18    PT/INR - ( 18 Jun 2019 23:21 )   PT: 13.1 sec;   INR: 1.13 ratio         PTT - ( 18 Jun 2019 23:21 )  PTT:28.7 sec        A/P Labs reviewed  Pt VSS NAD no complaints  Hemodynamically stable  Pts outpt chart reviewed by Dr Mixon  Pt h/o chronic hyponatremia  Hydrochlorothiazide and DM med already being held, pt currently on salt tabs  Continue Fluid restriction  BMP in am  Nephrology consult in am for further management of chronic hyponatremia with heart failure  Maintain current level of care  continue to monitor

## 2019-06-19 NOTE — PROGRESS NOTE ADULT - SUBJECTIVE AND OBJECTIVE BOX
SUBJECTIVE:  Cardiology NP F/U note:  SP: [facility] which revealed: < from: Cardiac Cath Lab - Adult (06.17.19 @ 17:32) >  Severe SVG to OM disease with instent  restenosis. Appears to be 2-3 layers of stents in that region.  Laser atherectomy and POBA performed.  denies complaints of chest pain/sob/dizziness/palps overnight / this am became hypotensive while getting OOB to chair / laid flat and episode resolved  according to RN , all 6am BP meds given   tolerating diet.         	  MEDICATIONS  (STANDING):  amLODIPine   Tablet 10 milliGRAM(s) Oral daily  aspirin  chewable 81 milliGRAM(s) Oral daily  atorvastatin Oral Tab/Cap - Peds 40 milliGRAM(s) Oral daily  carvedilol 12.5 milliGRAM(s) Oral daily  dextrose 5%. 1000 milliLiter(s) IV Continuous <Continuous>  dextrose 50% Injectable 12.5 Gram(s) IV Push once  dextrose 50% Injectable 25 Gram(s) IV Push once  dextrose 50% Injectable 25 Gram(s) IV Push once  furosemide    Tablet 40 milliGRAM(s) Oral daily  heparin  Injectable 5000 Unit(s) SubCutaneous every 8 hours  insulin lispro (HumaLOG) corrective regimen sliding scale   SubCutaneous three times a day before meals  isosorbide   mononitrate ER Tablet (IMDUR) 60 milliGRAM(s) Oral daily  losartan 25 milliGRAM(s) Oral daily  pantoprazole    Tablet 40 milliGRAM(s) Oral before breakfast  ranolazine 500 milliGRAM(s) Oral two times a day  sodium chloride 1 Gram(s) Oral daily  ticagrelor 90 milliGRAM(s) Oral two times a day    MEDICATIONS  (PRN):  acetaminophen   Tablet .. 650 milliGRAM(s) Oral every 6 hours PRN Mild Pain (1 - 3)  dextrose 40% Gel 15 Gram(s) Oral once PRN Blood Glucose LESS THAN 70 milliGRAM(s)/deciliter  glucagon  Injectable 1 milliGRAM(s) IntraMuscular once PRN Glucose LESS THAN 70 milligrams/deciliter  ondansetron Injectable 4 milliGRAM(s) IV Push every 6 hours PRN Nausea      PHYSICAL EXAM:    T(C): 36.7 (06-19-19 @ 07:28), Max: 36.7 (06-19-19 @ 07:28)  HR: 82 (06-19-19 @ 09:10) (68 - 92)  BP: 113/68 (06-19-19 @ 09:10) (64/45 - 137/71)  RR: 18 (06-19-19 @ 09:10) (15 - 18)  SpO2: 98% (06-19-19 @ 09:10) (93% - 98%)  Wt(kg): --    I&O's Summary    18 Jun 2019 07:01  -  19 Jun 2019 07:00  --------------------------------------------------------  IN: 240 mL / OUT: 0 mL / NET: 240 mL        Daily Height in cm: 170.18 (19 Jun 2019 05:46)    Daily     Appearance: Normal	  Cardiovascular: Normal S1 S2, RRR 70's  No JVD, No murmurs, No edema  Respiratory: Lungs clear to auscultation	  Psychiatry: A & O x 3, Mood & affect appropriate  Gastrointestinal:  Soft, Non-tender, + BS	  Skin: warm and dry  Neurologic: Non-focal  Extremities: Normal range of motion,:  Left Groin soft /no hematoma  dressing removed  Vascular: Peripheral pulses palpable 2+ bilaterally    TELEMETRY: 	RSR 70's no events on tele overnight       	  DIAGNOSTIC TESTING:  [X ] Echocardiogram:  < from: TTE Echo Complete w/Doppler (06.18.19 @ 15:21) >  Endocardial visualization was enhanced with intravenous echo contrast.   2. Left ventricular ejection fraction, by visual estimation, is 35 to   40%.   3. Moderately decreased global left ventricular systolic function.   4. Multiple left ventricular regional wall motion abnormalities exist.   See wall motionfindings.   5. Spectral Doppler shows impaired relaxation pattern of left   ventricular myocardial filling (Grade I diastolic dysfunction).   6. No significant valvular abnormalities.   7. There is no evidence of pericardial effusion.   8. ** Compared to TTE dated 11/9/18, no significant changes.      [ ]  Catheterization:  < from: Cardiac Cath Lab - Adult (06.17.19 @ 17:32) >  DIAGNOSTIC IMPRESSIONS: Severe SVG to OM disease with instent restenosis.  Appears to be 2-3 layers of stents in that region.  Laser atherectomy and POBA performed.  DIAGNOSTIC RECOMMENDATIONS: Aspirin and Brilinta as previously.  Plan for referral to Dr. Quinn Kaiser for brachytherapy.   	    CARDIAC MARKERS: Negative                                  9.5    11.9  )-----------( 362      ( 18 Jun 2019 23:21 )             29.2     06-19    125<L>  |  83<L>  |  19.0  ----------------------------<  187<H>  3.9   |  27.0  |  0.94    Ca    9.4      19 Jun 2019 06:27  Phos  3.9     06-18  Mg     1.8     06-18    TPro  7.1  /  Alb  3.9  /  TBili  0.3<L>  /  DBili  x   /  AST  16  /  ALT  11  /  AlkPhos  116  06-18    proBNP:   Lipid Profile:   HgA1c:   TSH: Thyroid Stimulating Hormone, Serum: 3.96 uIU/mL (06-19 @ 06:27)      ASSESSMENT:  73 M presented to ED with c/o CP x 7days  HX CAD/ CABG/LIMA-LAD SVG-OM/ DM2/HLD/HTN / acute on chronic combined HF EF 35-40%  Sp LHC : Severe SVG to OM disease with instent restenosis.  Appears to be 2-3 layers of stents in that region.  Laser atherectomy and POBA performed.  Hypotensive this am partly due to med administration and hypovolemia secondary to diuresis  Hyponatremia related to above  was 125 today    Plan:  continue with current meds: will decrease Norvasc and stop lasix for now/ ASA / Brillinta/ BB / statin / ARB   gentle fluid bolus 250cc NS / check orthostatics prior.   med compliance / groin care and outpatient follow up discussed  d/w Dr. Valdovinos

## 2019-06-19 NOTE — PROGRESS NOTE ADULT - SUBJECTIVE AND OBJECTIVE BOX
HEALTH ISSUES - PROBLEM Dx:    CAD    INTERVAL HPI/OVERNIGHT EVENTS:    this AM pt sat up in bed and almost passed out. hypotensive at that time.   he was made to lay in bed with improvement in vitals. 2 hours later when he sat up again he ahd repaet episode.  orthostatics positive.  his BP meds stopped and small amount of fludi bolus given and made bed rest temporarily  family at bedside      REVIEW OF SYSTEMS:    near syncope + orthostasis + hypotension + cp - confusion -    Vital Signs Last 24 Hrs  T(C): 37.1 (19 Jun 2019 15:35), Max: 37.1 (19 Jun 2019 15:35)  T(F): 98.7 (19 Jun 2019 15:35), Max: 98.7 (19 Jun 2019 15:35)  HR: 87 (19 Jun 2019 15:35) (78 - 92)  BP: 122/68 (19 Jun 2019 15:35) (64/45 - 130/58)  BP(mean): --  RR: 18 (19 Jun 2019 15:35) (15 - 18)  SpO2: 92% (19 Jun 2019 15:35) (92% - 100%)    PHYSICAL EXAM-  GENERAL: Elderly male looking comfortable   HEENT: PERRL, +EOMI  NECK: soft, Supple, No JVD,   CHEST/LUNG: Clear to auscultate bilaterally; No wheezing  HEART: S1S2+, Regular rate and rhythm; No murmurs  ABDOMEN: Soft, Nontender, Nondistended; Bowel sounds present  EXTREMITIES:  2+ Peripheral Pulses, No edema  SKIN: No rashes or lesions  NEURO: AAOX3, no focal deficits, no motor r sensory loss  PSYCH: normal mood        LABS:                        9.5    11.9  )-----------( 362      ( 18 Jun 2019 23:21 )             29.2     06-19    125<L>  |  83<L>  |  19.0  ----------------------------<  187<H>  3.9   |  27.0  |  0.94    Ca    9.4      19 Jun 2019 06:27  Phos  3.9     06-18  Mg     1.8     06-18    TPro  7.1  /  Alb  3.9  /  TBili  0.3<L>  /  DBili  x   /  AST  16  /  ALT  11  /  AlkPhos  116  06-18    PT/INR - ( 18 Jun 2019 23:21 )   PT: 13.1 sec;   INR: 1.13 ratio         PTT - ( 18 Jun 2019 23:21 )  PTT:28.7 sec        Assessment and Plan

## 2019-06-19 NOTE — PROGRESS NOTE ADULT - ATTENDING COMMENTS
case also discussed w/ family @ bedside
orthostatic. Too much diuresis.  and on BP meds. getting all meds today am.   d/.c all BP meds today.  Will resume meds tomorrow depending on the am bp. low dose coreg and losartan.  and IMDUR if needed.   No diuresis today. normal saline 250 cc bolus. will reevaluate tomorrow.

## 2019-06-19 NOTE — DISCHARGE NOTE NURSING/CASE MANAGEMENT/SOCIAL WORK - NSDCPEPT PROEDHF_GEN_ALL_CORE
Activities as tolerated/Monitor weight daily/Low salt diet/Report signs and symptoms to primary care provider/Call primary care provider for follow up after discharge

## 2019-06-19 NOTE — DISCHARGE NOTE NURSING/CASE MANAGEMENT/SOCIAL WORK - NSDCFUADDAPPT_GEN_ALL_CORE_FT
FU Dr Valenzuela 1-2 weeks  You will be set up for brachytherapy at [facility]  cardiac rehab info provided/referral and communication to cardiac rehab completed

## 2019-06-19 NOTE — DISCHARGE NOTE NURSING/CASE MANAGEMENT/SOCIAL WORK - NSDCDPATPORTLINK_GEN_ALL_CORE
You can access the [facility] Portal, offered by [facility], by registering with the following website: [email or web address]

## 2019-06-20 ENCOUNTER — APPOINTMENT (OUTPATIENT)
Dept: CARDIOLOGY | Facility: CLINIC | Age: 74
End: 2019-06-20

## 2019-06-20 VITALS
SYSTOLIC BLOOD PRESSURE: 134 MMHG | DIASTOLIC BLOOD PRESSURE: 73 MMHG | TEMPERATURE: 98 F | HEART RATE: 80 BPM | OXYGEN SATURATION: 98 % | RESPIRATION RATE: 18 BRPM

## 2019-06-20 LAB
ANION GAP SERPL CALC-SCNC: 13 MMOL/L — SIGNIFICANT CHANGE UP (ref 5–17)
BUN SERPL-MCNC: 15 MG/DL — SIGNIFICANT CHANGE UP (ref 8–20)
CALCIUM SERPL-MCNC: 8.9 MG/DL — SIGNIFICANT CHANGE UP (ref 8.6–10.2)
CHLORIDE SERPL-SCNC: 90 MMOL/L — LOW (ref 98–107)
CO2 SERPL-SCNC: 27 MMOL/L — SIGNIFICANT CHANGE UP (ref 22–29)
CREAT SERPL-MCNC: 0.83 MG/DL — SIGNIFICANT CHANGE UP (ref 0.5–1.3)
GLUCOSE BLDC GLUCOMTR-MCNC: 249 MG/DL — HIGH (ref 70–99)
GLUCOSE BLDC GLUCOMTR-MCNC: 274 MG/DL — HIGH (ref 70–99)
GLUCOSE SERPL-MCNC: 250 MG/DL — HIGH (ref 70–115)
POTASSIUM SERPL-MCNC: 3.4 MMOL/L — LOW (ref 3.5–5.3)
POTASSIUM SERPL-SCNC: 3.4 MMOL/L — LOW (ref 3.5–5.3)
SODIUM SERPL-SCNC: 130 MMOL/L — LOW (ref 135–145)

## 2019-06-20 PROCEDURE — 80048 BASIC METABOLIC PNL TOTAL CA: CPT

## 2019-06-20 PROCEDURE — 83880 ASSAY OF NATRIURETIC PEPTIDE: CPT

## 2019-06-20 PROCEDURE — C1887: CPT

## 2019-06-20 PROCEDURE — 80053 COMPREHEN METABOLIC PANEL: CPT

## 2019-06-20 PROCEDURE — 99239 HOSP IP/OBS DSCHRG MGMT >30: CPT

## 2019-06-20 PROCEDURE — 85730 THROMBOPLASTIN TIME PARTIAL: CPT

## 2019-06-20 PROCEDURE — 84484 ASSAY OF TROPONIN QUANT: CPT

## 2019-06-20 PROCEDURE — 93005 ELECTROCARDIOGRAM TRACING: CPT

## 2019-06-20 PROCEDURE — 93306 TTE W/DOPPLER COMPLETE: CPT

## 2019-06-20 PROCEDURE — 83735 ASSAY OF MAGNESIUM: CPT

## 2019-06-20 PROCEDURE — 80061 LIPID PANEL: CPT

## 2019-06-20 PROCEDURE — 93459 L HRT ART/GRFT ANGIO: CPT | Mod: XU

## 2019-06-20 PROCEDURE — 82550 ASSAY OF CK (CPK): CPT

## 2019-06-20 PROCEDURE — 85027 COMPLETE CBC AUTOMATED: CPT

## 2019-06-20 PROCEDURE — 99153 MOD SED SAME PHYS/QHP EA: CPT

## 2019-06-20 PROCEDURE — 84443 ASSAY THYROID STIM HORMONE: CPT

## 2019-06-20 PROCEDURE — C1769: CPT

## 2019-06-20 PROCEDURE — C1760: CPT

## 2019-06-20 PROCEDURE — 36415 COLL VENOUS BLD VENIPUNCTURE: CPT

## 2019-06-20 PROCEDURE — 83540 ASSAY OF IRON: CPT

## 2019-06-20 PROCEDURE — C1725: CPT

## 2019-06-20 PROCEDURE — 86803 HEPATITIS C AB TEST: CPT

## 2019-06-20 PROCEDURE — 71046 X-RAY EXAM CHEST 2 VIEWS: CPT

## 2019-06-20 PROCEDURE — C1894: CPT

## 2019-06-20 PROCEDURE — 99152 MOD SED SAME PHYS/QHP 5/>YRS: CPT

## 2019-06-20 PROCEDURE — C9606: CPT | Mod: LC

## 2019-06-20 PROCEDURE — 99233 SBSQ HOSP IP/OBS HIGH 50: CPT

## 2019-06-20 PROCEDURE — 82728 ASSAY OF FERRITIN: CPT

## 2019-06-20 PROCEDURE — 84100 ASSAY OF PHOSPHORUS: CPT

## 2019-06-20 PROCEDURE — 76937 US GUIDE VASCULAR ACCESS: CPT

## 2019-06-20 PROCEDURE — 83036 HEMOGLOBIN GLYCOSYLATED A1C: CPT

## 2019-06-20 PROCEDURE — 99285 EMERGENCY DEPT VISIT HI MDM: CPT | Mod: 25

## 2019-06-20 PROCEDURE — 84466 ASSAY OF TRANSFERRIN: CPT

## 2019-06-20 PROCEDURE — 82962 GLUCOSE BLOOD TEST: CPT

## 2019-06-20 PROCEDURE — 85610 PROTHROMBIN TIME: CPT

## 2019-06-20 PROCEDURE — 96374 THER/PROPH/DIAG INJ IV PUSH: CPT

## 2019-06-20 PROCEDURE — 83550 IRON BINDING TEST: CPT

## 2019-06-20 PROCEDURE — C1885: CPT

## 2019-06-20 RX ORDER — SODIUM CHLORIDE 9 MG/ML
3 INJECTION INTRAMUSCULAR; INTRAVENOUS; SUBCUTANEOUS EVERY 8 HOURS
Refills: 0 | Status: DISCONTINUED | OUTPATIENT
Start: 2019-06-20 | End: 2019-06-20

## 2019-06-20 RX ORDER — POTASSIUM CHLORIDE 20 MEQ
40 PACKET (EA) ORAL EVERY 4 HOURS
Refills: 0 | Status: COMPLETED | OUTPATIENT
Start: 2019-06-20 | End: 2019-06-20

## 2019-06-20 RX ORDER — LOSARTAN POTASSIUM 100 MG/1
1 TABLET, FILM COATED ORAL
Qty: 30 | Refills: 0
Start: 2019-06-20 | End: 2019-07-19

## 2019-06-20 RX ORDER — POTASSIUM CHLORIDE 20 MEQ
1 PACKET (EA) ORAL
Qty: 10 | Refills: 0
Start: 2019-06-20 | End: 2019-06-29

## 2019-06-20 RX ORDER — AMLODIPINE VALSARTAN AND HYDROCHLOROTHIAZIDE 10; 160; 25 MG/1; MG/1; MG/1
1 TABLET, FILM COATED ORAL
Qty: 0 | Refills: 0 | DISCHARGE

## 2019-06-20 RX ORDER — FERROUS SULFATE 325(65) MG
1 TABLET ORAL
Qty: 30 | Refills: 0
Start: 2019-06-20 | End: 2019-07-19

## 2019-06-20 RX ORDER — FUROSEMIDE 40 MG
1 TABLET ORAL
Qty: 30 | Refills: 0
Start: 2019-06-20 | End: 2019-07-19

## 2019-06-20 RX ADMIN — Medication 9: at 09:31

## 2019-06-20 RX ADMIN — Medication 325 MILLIGRAM(S): at 09:13

## 2019-06-20 RX ADMIN — SODIUM CHLORIDE 3 MILLILITER(S): 9 INJECTION INTRAMUSCULAR; INTRAVENOUS; SUBCUTANEOUS at 05:41

## 2019-06-20 RX ADMIN — Medication 81 MILLIGRAM(S): at 09:13

## 2019-06-20 RX ADMIN — Medication 40 MILLIEQUIVALENT(S): at 08:30

## 2019-06-20 RX ADMIN — SODIUM CHLORIDE 1 GRAM(S): 9 INJECTION INTRAMUSCULAR; INTRAVENOUS; SUBCUTANEOUS at 12:30

## 2019-06-20 RX ADMIN — Medication 40 MILLIEQUIVALENT(S): at 12:28

## 2019-06-20 RX ADMIN — TICAGRELOR 90 MILLIGRAM(S): 90 TABLET ORAL at 05:40

## 2019-06-20 RX ADMIN — PANTOPRAZOLE SODIUM 40 MILLIGRAM(S): 20 TABLET, DELAYED RELEASE ORAL at 05:41

## 2019-06-20 RX ADMIN — RANOLAZINE 500 MILLIGRAM(S): 500 TABLET, FILM COATED, EXTENDED RELEASE ORAL at 05:40

## 2019-06-20 NOTE — PROGRESS NOTE ADULT - SUBJECTIVE AND OBJECTIVE BOX
[place] [facility] Practice                                                        Office: [address] [address]                                                       Telephone: [phone]. Fax:[phone]                                                                             PROGRESS NOTE   Reason for follow up: coronary artery disease and unstable angina, s/p PCI , congestive heart failure                             Overnight: No new events.   Update: orthostatic improved. euvolemic.  Subjective: "  i am ok "   Complains of:  no new symptoms. Out of Bed to Chair . ambulates.   Review of symptoms: Cardiac:  No chest pain. No dyspnea. No palpitations.  Respiratory: no cough. No dyspnea  Gastrointestinal: No diarrhea. No abdominal pain. No bleeding.     Past medical history: No updates.   73 M presented to ED with c/o CP x 7days  HX CAD/ CABG/LIMA-LAD SVG-OM/ DM2/HLD/HTN / acute on chronic combined HF EF 35-40%  Sp LHC : Severe SVG to OM disease with instent restenosis.  Appears to be 2-3 layers of stents in that region.  Laser atherectomy and POBA performed.  Hypotensive this am partly due to med administration and hypovolemia secondary to diuresis  Hyponatremia related to above  was 125 today      Chronic conditions:  Hypertension: controlled. CHF: Stable. CAD: Stable ischemic heart disease. DM: not controlled.   	  Vitals:  T(C): 36.6 (06-20-19 @ 05:38), Max: 37.1 (06-19-19 @ 15:35)  HR: 81 (06-20-19 @ 05:38) (81 - 87)  BP: 124/66 (06-20-19 @ 05:38) (122/68 - 130/60)  RR: 18 (06-20-19 @ 05:38) (18 - 19)  SpO2: 98% (06-20-19 @ 05:38) (92% - 98%)  Wt(kg): --  I&O's Summary    19 Jun 2019 07:01  -  20 Jun 2019 07:00  --------------------------------------------------------  IN: 1100 mL / OUT: 1100 mL / NET: 0 mL      Weight (kg): 62.3 (06-19 @ 05:46)    PHYSICAL EXAM:  Appearance: Comfortable. No acute distress  HEENT:  Head and neck: Atraumatic. Normocephalic.  Normal oral mucosa, PERRL, Neck is supple. No JVD, No carotid bruit.   Neurologic: A & O x 3, no focal deficits. EOMI , Cranial nerves are intact.  Lymphatic: No cervical lymphadenopathy  Cardiovascular: Normal S1 S2, No murmur, rubs/gallops. No JVD, No edema  Respiratory: Lungs clear to auscultation  Gastrointestinal:  Soft, Non-tender, + BS  Lower Extremities: No edema  Psychiatry: Patient is calm. No agitation. Mood & affect appropriate  Skin: No rashes/ ecchymoses/cyanosis/ulcers visualized on the face, hands or feet.    CURRENT MEDICATIONS:  ranolazine 500 milliGRAM(s) Oral two times a day    pantoprazole    Tablet  atorvastatin Oral Tab/Cap - Peds  dextrose 50% Injectable  dextrose 50% Injectable  dextrose 50% Injectable  insulin lispro (HumaLOG) corrective regimen sliding scale  aspirin  chewable  ferrous    sulfate  heparin  Injectable  potassium chloride    Tablet ER  sodium chloride  sodium chloride 0.9% lock flush  ticagrelor      LABS:	 	                              9.5    11.9  )-----------( 362      ( 18 Jun 2019 23:21 )             29.2     06-20    130<L>  |  90<L>  |  15.0  ----------------------------<  250<H>  3.4<L>   |  27.0  |  0.83    Ca    8.9      20 Jun 2019 06:21  Phos  3.9     06-18  Mg     1.8     06-18    TPro  7.1  /  Alb  3.9  /  TBili  0.3<L>  /  DBili  x   /  AST  16  /  ALT  11  /  AlkPhos  116  06-18    proBNP: Serum Pro-Brain Natriuretic Peptide: 673 pg/mL (06-16 @ 20:22)    Lipid Profile: Date: 06-18 @ 06:36  Total cholesterol 133; Direct LDL: 73; HDL: 40; Triglycerides:100    HgA1c: Hemoglobin A1C, Whole Blood: 8.6 %  TSH: Thyroid Stimulating Hormone, Serum: 3.96 uIU/mL      TELEMETRY: Reviewed    ECG:  Reviewed by me. 	    DIAGNOSTIC TESTING:  [ ] Echocardiogram:  LVEF 35-40%.   [ ]  Catheterization:(06.17.19 @ 17:32) >  Severe SVG to OM disease with instent  restenosis. Appears to be 2-3 layers of stents in that region.  Laser atherectomy and POBA performed.    [ ] Stress Test:    OTHER: 	DIAGNOSTIC IMPRESSIONS: Severe SVG to OM disease with instent restenosis.  Appears to be 2-3 layers of stents in that region.  Laser atherectomy and POBA performed.  DIAGNOSTIC RECOMMENDATIONS: Aspirin and Brilinta as previously.  Plan for referral to Dr. Quinn Kaiser for brachytherapy.

## 2019-06-20 NOTE — PROGRESS NOTE ADULT - ASSESSMENT
Pt is a 74 yo M presenting from home with a 7 day history of chest tightness, PMH CAD s/p CABG (LIMA to LAD, SVG to OM), HFrEF w/ EF 40%, DM2, HLD, HTN, microcytic anemia, GERD, hx of STEMI    Angina: s/p ASA and Nitro. Eval for ACS  -place on telemetry  -TTE pending  -cardiology cs appreciated, although findings poss related to GERD, given extensive cardiac hx + high risk, plan for cath today    CAD s/p CABG and PCI:   -continue ASA, Brilinta, statin, will substitute losartan for valsartan due to medication shortage.     acute on chronic HFrEF:  -started on Lasix 20mg daily last week he states, and also on Amlodipine/valsartan/HCTZ combo.   -iv lasix, strict i/o, daily wt  -fluid restrict  -low salt diet    Hyponatremia: moderate, improving. asymptomatic  -serial bmp  -unclear etiology  -nephro eval if worsening    Microcytic anemia: Hgb 10.1 and stable.   -no signs of acute blood loss.   -check IRON panel  -informed to follow up with GI and discuss need for colonoscopy.     DM2: SSI    HLD: continue statin    GERD: started on Protonix last week, if unimproved can consider inc dosing to bid    DVT ppx: heparin    Code status was reviewed with the patient. Patient is a full code.     dispo. undetermined date of dc, pending outcome of cardio w/u. plan for dc home when stable.    outpt fu. pmd, cardio
72 yo M presenting from home with a 7 day history of chest tightness, PMH CAD s/p CABG (LIMA to LAD, SVG to OM), HFrEF w/ EF 40%, DM2, HLD, HTN, microcytic anemia, GERD  with unstable angina    1) Unstable angina: CABG, coronary artery disease ,. prior PCI. has instent stenosis of left Circ stent. s/p POBA.   has recurrent instent stenosis. Will send for Brachytehrayp. ct imdur if BP tolerates,.  ranexa 500Q12  Praluent for Cholesterolr control.  need aggressive diabetes management. Need outpaitent Primary doctor and endocrinologist for Diabetes care.   2) CMP: CHF: EF 35-40%. Due to circ disease. Should imporve.d coreg 3.125 and losartan 25 mg daily.    2) Hypertension : leniant salt. No restriction. Hyponatremia due to volume overload and Salt restriction. repeat BMP in 3-4 days.  Ct lasix 20 mg daily. ct ACE/ARB and beta-blocker .   d/c amlodoipine. off imdur. PRN imur as BP tolerates.,     3) cardiomyopathy : as above.     4) hypnatremia. : as above.     patient can be discharge with outpatient follow up.
A-s/p LHC LFA w/angioseal
Pt is a 72 yo M presenting from home with a 7 day history of chest tightness, PMH CAD s/p CABG (LIMA to LAD, SVG to OM), HFrEF w/ EF 40%, DM2, HLD, HTN, microcytic anemia, GERD, hx of STEMI    Angina: s/p ASA and Nitro. Eval for ACS,  place on telemetry  -TTE done showed Left ventricular ejection fraction, by visual estimation, is 35 to 40%, Moderately decreased global left ventricular systolic function, Multiple left ventricular regional wall motion abnormalities exist, Compared to TTE dated 11/9/18, no significant changes, patient was seen and followed by cardiology, given extensive cardiac hx + high risk, they did cardiac cath Severe SVG to OM disease with instent restenosis, Appears to be 2-3 layers of stents in that region, Laser atherectomy and POBA performed and recommended Aspirin and Brilinta as previously and referral to Dr. Quinn Kaiser for brachytherapy, ct imdur,. add ranexa. Praluent for Cholesterolr control, need aggressive diabetes management. Need outpaitent Primary doctor and endocrinologist for Diabetes care.        CAD s/p CABG and PCI:   -continue ASA, Brilinta, statin, will substitute losartan for valsartan due to medication shortage.     acute on chronic HFrEF:  -started on Lasix 20mg daily last week he states, and also on Amlodipine/valsartan/HCTZ combo.   changed to oral 40mgs daily, strict i/o, daily wt  -fluid restrict  -low salt diet    Hyponatremia: moderate, improving. asymptomatic, -serial bmp  -unclear etiology, nephro eval if worsening, its coming up, could be due CHF, he runs around 130, at the time of admission hois sodium level was 120 today 124, will get tomorrow if coming up likely from CHF as patient has been started on lasix.   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       Microcytic anemia: Hgb 10.1 and stable.   -no signs of acute blood loss.   -check IRON panel  -informed to follow up with GI and discuss need for colonoscopy.     DM2: SSI    HLD: continue statin    GERD: started on Protonix last week, symptoms are better    DVT ppx: heparin    Code status was reviewed with the patient. Patient is a full code.     dispo. possible dc in am if sodium level is better.    outpt fu. pmd, cardio
Pt is a 74 yo M presenting from home with a 7 day history of chest tightness, PMH CAD s/p CABG (LIMA to LAD, SVG to OM), HFrEF w/ EF 40%, DM2, HLD, HTN, microcytic anemia, GERD, hx of STEMI    orthostatic hypotension with near syncope  - sec to aggressive diuresis vs multiple medication given all at same time  - all meds stopped for now  - 250ml fluid bolus given  - will reassess meds and timing of meds in AM    Angina:   -TTE done showed Left ventricular ejection fraction, by visual estimation, is 35 to 40%, Moderately decreased global left ventricular systolic function, Multiple left ventricular regional wall motion abnormalities exist, Compared to TTE dated 11/9/18, no significant changes, patient was seen and followed by cardiology, given extensive cardiac hx + high risk, cardiac cath done which showed- Severe SVG to OM disease with instent restenosis, Appears to be 2-3 layers of stents in that region, Laser atherectomy and POBA performed and recommended Aspirin and Brilinta as previously and referral to Dr. Quinn Kaiser for brachytherapy, ct imdur,. add ranexa. Praluent for Cholesterolr control, need aggressive diabetes management. Need outpaitent Primary doctor and endocrinologist for Diabetes care.      CAD s/p CABG and PCI:   -continue ASA, Brilinta, statin,     s/p acute on chronic HFrEF:   strict i/o, daily wt  -fluid restrict  -low salt diet  - reassess meds in AM    Hyponatremia: moderate, improving. asymptomatic, -  - current episode is likely SIADH  - states his baseline NA is 130- 131 at home and he uses exforge at home (has HCTZ in it)  - on Nacl tabs PO and Na improving    Microcytic iron deficiency anemia: stable H/H  -no signs of acute blood loss.   -start iron PO  -informed to follow up with GI and discuss need for colonoscopy as age related w/u    DM2: uncontrolled  insulin in house  titrated doses    HLD: continue statin    GERD: started on Protonix last week, symptoms are better    DVT ppx: heparin    Code status was reviewed with the patient. Patient is a full code.     outpt fu. pmd, cardio
72 yo M presenting from home with a 7 day history of chest tightness, PMH CAD s/p CABG (LIMA to LAD, SVG to OM), HFrEF w/ EF 40%, DM2, HLD, HTN, microcytic anemia, GERD  with unstable angina    1) Unstable angina: CABG, coronary artery disease ,. prior PCI. has instent stenosis of left Circ stent. s/p POBA.   has recurrent instent stenosis. Will send for Brachytehrayp. ct imdur,. add ranexa. Praluent for Cholesterolr control.  need aggressive diabetes management. Need outpaitent Primary doctor and endocrinologist for Diabetes care.    2) Hypertension : leniant salt. No restriction. Hyponatremia due to volume overload and Salt restriction. repeat BMP in 2 days.  Ct lasix 40 mg daily. ct ACE/ARB and beta-blocker .    3) cardiomyopathy : as above.     4) hypnatremia. : as above.     patient can be discharge with outpatient follow up.

## 2019-06-20 NOTE — PROGRESS NOTE ADULT - REASON FOR ADMISSION
Chest Tightness

## 2019-06-20 NOTE — PROGRESS NOTE ADULT - PROVIDER SPECIALTY LIST ADULT
Cardiology
Hospitalist
Internal Medicine
Intervent Cardiology
Hospitalist

## 2019-06-24 ENCOUNTER — APPOINTMENT (OUTPATIENT)
Dept: CARDIOLOGY | Facility: CLINIC | Age: 74
End: 2019-06-24

## 2019-06-26 ENCOUNTER — APPOINTMENT (OUTPATIENT)
Dept: CARDIOLOGY | Facility: CLINIC | Age: 74
End: 2019-06-26

## 2019-06-26 ENCOUNTER — EMERGENCY (EMERGENCY)
Facility: HOSPITAL | Age: 74
LOS: 1 days | Discharge: HOME | End: 2019-06-26
Attending: EMERGENCY MEDICINE
Payer: MEDICARE

## 2019-06-26 VITALS
RESPIRATION RATE: 18 BRPM | SYSTOLIC BLOOD PRESSURE: 158 MMHG | OXYGEN SATURATION: 99 % | HEIGHT: 68 IN | WEIGHT: 147.05 LBS | TEMPERATURE: 98 F | HEART RATE: 98 BPM | DIASTOLIC BLOOD PRESSURE: 77 MMHG

## 2019-06-26 VITALS — HEART RATE: 81 BPM | DIASTOLIC BLOOD PRESSURE: 69 MMHG | SYSTOLIC BLOOD PRESSURE: 125 MMHG | TEMPERATURE: 98 F

## 2019-06-26 DIAGNOSIS — Z96.7 PRESENCE OF OTHER BONE AND TENDON IMPLANTS: Chronic | ICD-10-CM

## 2019-06-26 DIAGNOSIS — Z95.5 PRESENCE OF CORONARY ANGIOPLASTY IMPLANT AND GRAFT: Chronic | ICD-10-CM

## 2019-06-26 DIAGNOSIS — Z95.1 PRESENCE OF AORTOCORONARY BYPASS GRAFT: Chronic | ICD-10-CM

## 2019-06-26 LAB
ANION GAP SERPL CALC-SCNC: 12 MMOL/L — SIGNIFICANT CHANGE UP (ref 5–17)
ANISOCYTOSIS BLD QL: SLIGHT — SIGNIFICANT CHANGE UP
APPEARANCE UR: CLEAR — SIGNIFICANT CHANGE UP
APTT BLD: 25.5 SEC — LOW (ref 27.5–36.3)
BACTERIA # UR AUTO: ABNORMAL
BASOPHILS # BLD AUTO: 0 K/UL — SIGNIFICANT CHANGE UP (ref 0–0.2)
BASOPHILS NFR BLD AUTO: 0 % — SIGNIFICANT CHANGE UP (ref 0–2)
BILIRUB UR-MCNC: NEGATIVE — SIGNIFICANT CHANGE UP
BUN SERPL-MCNC: 16 MG/DL — SIGNIFICANT CHANGE UP (ref 8–20)
BURR CELLS BLD QL SMEAR: PRESENT — SIGNIFICANT CHANGE UP
CALCIUM SERPL-MCNC: 9.6 MG/DL — SIGNIFICANT CHANGE UP (ref 8.6–10.2)
CHLORIDE SERPL-SCNC: 89 MMOL/L — LOW (ref 98–107)
CO2 SERPL-SCNC: 25 MMOL/L — SIGNIFICANT CHANGE UP (ref 22–29)
COLOR SPEC: YELLOW — SIGNIFICANT CHANGE UP
CREAT SERPL-MCNC: 0.85 MG/DL — SIGNIFICANT CHANGE UP (ref 0.5–1.3)
CRP SERPL-MCNC: <0.4 MG/DL — SIGNIFICANT CHANGE UP (ref 0–0.4)
DIFF PNL FLD: NEGATIVE — SIGNIFICANT CHANGE UP
EOSINOPHIL # BLD AUTO: 0.61 K/UL — HIGH (ref 0–0.5)
EOSINOPHIL NFR BLD AUTO: 3.5 % — SIGNIFICANT CHANGE UP (ref 0–6)
EPI CELLS # UR: SIGNIFICANT CHANGE UP
ERYTHROCYTE [SEDIMENTATION RATE] IN BLOOD: 13 MM/HR — SIGNIFICANT CHANGE UP (ref 0–20)
GIANT PLATELETS BLD QL SMEAR: PRESENT — SIGNIFICANT CHANGE UP
GLUCOSE SERPL-MCNC: 172 MG/DL — HIGH (ref 70–115)
GLUCOSE UR QL: NEGATIVE MG/DL — SIGNIFICANT CHANGE UP
HCT VFR BLD CALC: 30.4 % — LOW (ref 39–50)
HGB BLD-MCNC: 9.8 G/DL — LOW (ref 13–17)
INR BLD: 1.15 RATIO — SIGNIFICANT CHANGE UP (ref 0.88–1.16)
KETONES UR-MCNC: NEGATIVE — SIGNIFICANT CHANGE UP
LEUKOCYTE ESTERASE UR-ACNC: ABNORMAL
LYMPHOCYTES # BLD AUTO: 0.75 K/UL — LOW (ref 1–3.3)
LYMPHOCYTES # BLD AUTO: 4.3 % — LOW (ref 13–44)
MANUAL SMEAR VERIFICATION: SIGNIFICANT CHANGE UP
MCHC RBC-ENTMCNC: 23.8 PG — LOW (ref 27–34)
MCHC RBC-ENTMCNC: 32.2 GM/DL — SIGNIFICANT CHANGE UP (ref 32–36)
MCV RBC AUTO: 73.8 FL — LOW (ref 80–100)
MONOCYTES # BLD AUTO: 0.91 K/UL — HIGH (ref 0–0.9)
MONOCYTES NFR BLD AUTO: 5.2 % — SIGNIFICANT CHANGE UP (ref 2–14)
NEUTROPHILS # BLD AUTO: 15.22 K/UL — HIGH (ref 1.8–7.4)
NEUTROPHILS NFR BLD AUTO: 85.3 % — HIGH (ref 43–77)
NEUTS BAND # BLD: 1.7 % — SIGNIFICANT CHANGE UP (ref 0–8)
NITRITE UR-MCNC: NEGATIVE — SIGNIFICANT CHANGE UP
PH UR: 8 — SIGNIFICANT CHANGE UP (ref 5–8)
PLAT MORPH BLD: NORMAL — SIGNIFICANT CHANGE UP
PLATELET # BLD AUTO: 504 K/UL — HIGH (ref 150–400)
POIKILOCYTOSIS BLD QL AUTO: SLIGHT — SIGNIFICANT CHANGE UP
POTASSIUM SERPL-MCNC: 4.7 MMOL/L — SIGNIFICANT CHANGE UP (ref 3.5–5.3)
POTASSIUM SERPL-SCNC: 4.7 MMOL/L — SIGNIFICANT CHANGE UP (ref 3.5–5.3)
PROT UR-MCNC: 15 MG/DL
PROTHROM AB SERPL-ACNC: 13.3 SEC — HIGH (ref 10–12.9)
RBC # BLD: 4.12 M/UL — LOW (ref 4.2–5.8)
RBC # FLD: 16.4 % — HIGH (ref 10.3–14.5)
RBC BLD AUTO: ABNORMAL
RBC CASTS # UR COMP ASSIST: NEGATIVE /HPF — SIGNIFICANT CHANGE UP (ref 0–4)
SODIUM SERPL-SCNC: 126 MMOL/L — LOW (ref 135–145)
SP GR SPEC: 1.01 — SIGNIFICANT CHANGE UP (ref 1.01–1.02)
TROPONIN T SERPL-MCNC: <0.01 NG/ML — SIGNIFICANT CHANGE UP (ref 0–0.06)
UROBILINOGEN FLD QL: NEGATIVE MG/DL — SIGNIFICANT CHANGE UP
WBC # BLD: 17.49 K/UL — HIGH (ref 3.8–10.5)
WBC # FLD AUTO: 17.49 K/UL — HIGH (ref 3.8–10.5)
WBC UR QL: SIGNIFICANT CHANGE UP

## 2019-06-26 PROCEDURE — 80048 BASIC METABOLIC PNL TOTAL CA: CPT

## 2019-06-26 PROCEDURE — 82962 GLUCOSE BLOOD TEST: CPT

## 2019-06-26 PROCEDURE — 82550 ASSAY OF CK (CPK): CPT

## 2019-06-26 PROCEDURE — 85610 PROTHROMBIN TIME: CPT

## 2019-06-26 PROCEDURE — 71045 X-RAY EXAM CHEST 1 VIEW: CPT

## 2019-06-26 PROCEDURE — 85027 COMPLETE CBC AUTOMATED: CPT

## 2019-06-26 PROCEDURE — 82728 ASSAY OF FERRITIN: CPT

## 2019-06-26 PROCEDURE — 85730 THROMBOPLASTIN TIME PARTIAL: CPT

## 2019-06-26 PROCEDURE — 81001 URINALYSIS AUTO W/SCOPE: CPT

## 2019-06-26 PROCEDURE — 85652 RBC SED RATE AUTOMATED: CPT

## 2019-06-26 PROCEDURE — 99283 EMERGENCY DEPT VISIT LOW MDM: CPT

## 2019-06-26 PROCEDURE — 36415 COLL VENOUS BLD VENIPUNCTURE: CPT

## 2019-06-26 PROCEDURE — 83550 IRON BINDING TEST: CPT

## 2019-06-26 PROCEDURE — 84466 ASSAY OF TRANSFERRIN: CPT

## 2019-06-26 PROCEDURE — 83540 ASSAY OF IRON: CPT

## 2019-06-26 PROCEDURE — 84484 ASSAY OF TROPONIN QUANT: CPT

## 2019-06-26 PROCEDURE — 99285 EMERGENCY DEPT VISIT HI MDM: CPT | Mod: 25

## 2019-06-26 PROCEDURE — 93005 ELECTROCARDIOGRAM TRACING: CPT

## 2019-06-26 PROCEDURE — 99285 EMERGENCY DEPT VISIT HI MDM: CPT

## 2019-06-26 PROCEDURE — 86140 C-REACTIVE PROTEIN: CPT

## 2019-06-26 PROCEDURE — 93010 ELECTROCARDIOGRAM REPORT: CPT

## 2019-06-26 RX ORDER — ASPIRIN/CALCIUM CARB/MAGNESIUM 324 MG
81 TABLET ORAL DAILY
Refills: 0 | Status: DISCONTINUED | OUTPATIENT
Start: 2019-06-26 | End: 2019-07-01

## 2019-06-26 RX ORDER — METOPROLOL TARTRATE 50 MG
1 TABLET ORAL
Qty: 30 | Refills: 0
Start: 2019-06-26 | End: 2019-07-25

## 2019-06-26 RX ORDER — METOPROLOL TARTRATE 50 MG
25 TABLET ORAL DAILY
Refills: 0 | Status: DISCONTINUED | OUTPATIENT
Start: 2019-06-26 | End: 2019-07-01

## 2019-06-26 RX ORDER — SODIUM CHLORIDE 9 MG/ML
1 INJECTION INTRAMUSCULAR; INTRAVENOUS; SUBCUTANEOUS ONCE
Refills: 0 | Status: COMPLETED | OUTPATIENT
Start: 2019-06-26 | End: 2019-06-26

## 2019-06-26 RX ORDER — ISOSORBIDE DINITRATE 5 MG/1
1 TABLET ORAL
Qty: 90 | Refills: 0
Start: 2019-06-26 | End: 2019-07-25

## 2019-06-26 RX ORDER — TICAGRELOR 90 MG/1
90 TABLET ORAL ONCE
Refills: 0 | Status: COMPLETED | OUTPATIENT
Start: 2019-06-26 | End: 2019-06-26

## 2019-06-26 RX ORDER — LOSARTAN POTASSIUM 100 MG/1
25 TABLET, FILM COATED ORAL DAILY
Refills: 0 | Status: DISCONTINUED | OUTPATIENT
Start: 2019-06-26 | End: 2019-07-01

## 2019-06-26 RX ORDER — ISOSORBIDE DINITRATE 5 MG/1
5 TABLET ORAL THREE TIMES A DAY
Refills: 0 | Status: DISCONTINUED | OUTPATIENT
Start: 2019-06-26 | End: 2019-07-01

## 2019-06-26 RX ORDER — RANOLAZINE 500 MG/1
500 TABLET, FILM COATED, EXTENDED RELEASE ORAL
Refills: 0 | Status: DISCONTINUED | OUTPATIENT
Start: 2019-06-26 | End: 2019-07-01

## 2019-06-26 RX ADMIN — SODIUM CHLORIDE 1 GRAM(S): 9 INJECTION INTRAMUSCULAR; INTRAVENOUS; SUBCUTANEOUS at 11:26

## 2019-06-26 RX ADMIN — ISOSORBIDE DINITRATE 5 MILLIGRAM(S): 5 TABLET ORAL at 11:26

## 2019-06-26 RX ADMIN — RANOLAZINE 500 MILLIGRAM(S): 500 TABLET, FILM COATED, EXTENDED RELEASE ORAL at 10:29

## 2019-06-26 RX ADMIN — LOSARTAN POTASSIUM 25 MILLIGRAM(S): 100 TABLET, FILM COATED ORAL at 10:28

## 2019-06-26 RX ADMIN — Medication 81 MILLIGRAM(S): at 10:28

## 2019-06-26 RX ADMIN — TICAGRELOR 90 MILLIGRAM(S): 90 TABLET ORAL at 10:28

## 2019-06-26 RX ADMIN — Medication 25 MILLIGRAM(S): at 11:26

## 2019-06-26 NOTE — CONSULT NOTE ADULT - SUBJECTIVE AND OBJECTIVE BOX
[facility]                                                       [facility]                                                        Office: [address] [address]                                                       Telephone: [phone]. Fax:[phone]                                                              CARDIOLOGY CONSULTATION NOTE                                                                                             Consult requested by:  Dr. Jarrett    Reason for Consultation: Chest Pain     History obtained by: Patient and medical record     obtained: No    Chief complaint:    Patient is a 73y old  Male who presents with a chief complaint of chest pain     HPI: 74 yo M PMH CAD s/p CABG (LIMA to LAD, SVG to OM), HFrEF w/ EF 40%, DM2, HLD, HTN, microcytic anemia, GERD, recent PCI 6/17 laser artherectomy with cutting balloon SVG to cx, brachy therapy planned for 4-8 weeks (Dr. EDWARD Kaiser, [facility]), presents to ED after experience chest tightness at night, relieved with two sublingual nitro and asa. Denies chest pain, pressure, fever, chills, cough, phlegm production, shortness of breath, dyspnea on exertion, orthopnea, PND, edema, palpitations, irregular, fast heart beat, nausea, vomiting, melena, rectal bleed, hematuria, lightheadedness, dizziness, syncope, near syncope.        REVIEW OF SYMPTOMS: Cardiovascular:  See HPI. No dyspnea,  No syncope,  No palpitations, No dizziness, No Orthopnea,      No Paroxsymal nocturnal dyspnea;  Respiratory:  No Dyspnea, No cough,   Genitourinary:  No dysuria, no hematuria; Gastrointestinal:  No nausea, no vomiting. No diarrhea.  No abdominal pain. No dark color stool, no melena ; Neurological: No headache, no dizziness, no slurred speech;  Psychiatric: No agitation, no anxiety.  ALL OTHER REVIEW OF SYSTEMS ARE NEGATIVE.    ALLERGIES: Allergies  No Known Allergies  Intolerances    CURRENT MEDICATIONS:        HOME MEDICATIONS:    PAST MEDICAL HISTORY  HFrEF (heart failure with reduced ejection fraction)  Essential hypertension  Type 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, without long-term current use of insul  Coronary artery disease involving native coronary artery of native heart, angina presence unspecifie  Coronary artery disease involving coronary bypass graft of native heart with unstable angina pectoris  High cholesterol  Diabetes mellitus  Hypertension  GERD (gastroesophageal reflux disease)  HLD (hyperlipidemia)  HTN (hypertension)  DM (diabetes mellitus)  CAD (coronary artery disease)      PAST SURGICAL HISTORY  Status post open reduction with internal fixation of fracture  S/P primary angioplasty with coronary stent  S/P CABG (coronary artery bypass graft)  CAD S/P percutaneous coronary angioplasty  S/P CABG (coronary artery bypass graft)      FAMILY HISTORY:  No pertinent family history in first degree relatives  No pertinent family history in first degree relatives      SOCIAL HISTORY:  Denies smoking/alcohol/drugs      Vital Signs Last 24 Hrs  T(C): 36.9 (26 Jun 2019 06:12), Max: 36.9 (26 Jun 2019 06:12)  T(F): 98.4 (26 Jun 2019 06:12), Max: 98.4 (26 Jun 2019 06:12)  HR: 82 (26 Jun 2019 06:12) (82 - 98)  BP: 116/64 (26 Jun 2019 06:12) (116/64 - 158/77)  RR: 16 (26 Jun 2019 06:12) (16 - 18)  SpO2: 98% (26 Jun 2019 06:12) (98% - 99%)      PHYSICAL EXAM:  Constitutional: Comfortable . No acute distress.   HEENT: Atraumatic and normcephalic , neck is supple . no JVD. No carotid bruit. PEERL   CNS: A&Ox3. No focal deficits. EOMI. Cranial nerves II-IX are intact.   Lymph Nodes: Cervical : Not palpable.  Respiratory: CTAB  Cardiovascular: S1S2 RRR. No murmur/rubs or gallop.  Gastrointestinal: Soft non-tender and non distended . +Bowel sounds. negative Godfrey's sign.  Extremities: No edema.   Psychiatric: Calm . no agitation.  Skin: Left groin, PCI site, clean dry, no erythema drainage. Ecchymosis noted to pelvic region, soft non tender. No skin rash/ulcers visualized to face, hands or feet.        LABS:                        9.8    17.49 )-----------( 504      ( 26 Jun 2019 02:35 )             30.4     06-26    126<L>  |  89<L>  |  16.0  ----------------------------<  172<H>  4.7   |  25.0  |  0.85    Ca    9.6      26 Jun 2019 07:43      CARDIAC MARKERS ( 26 Jun 2019 07:43 )  x     / <0.01 ng/mL / 33 U/L / x     / x      CARDIAC MARKERS ( 26 Jun 2019 03:29 )  x     / <0.01 ng/mL / 39 U/L / x     / x          PT/INR - ( 26 Jun 2019 02:35 )   PT: 13.3 sec;   INR: 1.15 ratio    PTT - ( 26 Jun 2019 02:35 )  PTT:25.5 sec      INTERPRETATION OF TELEMETRY: Reviewed by me.   ECG: SR, LAD, LVH with QRS widening and repolarization abnl     RADIOLOGY & ADDITIONAL STUDIES:    X-ray:  reviewed by me.       ECHO FINDINGS:   < from: TTE Echo Complete w/Doppler (06.18.19 @ 15:21) >  Summary:   1. Endocardial visualization was enhanced with intravenous echo contrast.   2. Left ventricular ejection fraction, by visual estimation, is 35 to   40%.   3. Moderately decreased global left ventricular systolic function.   4. Multiple left ventricular regional wall motion abnormalities exist.   See wall motionfindings.   5. Spectral Doppler shows impaired relaxation pattern of left   ventricular myocardial filling (Grade I diastolic dysfunction).   6. No significant valvular abnormalities.   7. There is no evidence of pericardial effusion.   8. ** Compared to TTE dated 11/9/18, no significant changes.    Soledad Mcleod DO Electronically signed on 6/18/2019 at 5:50:54 PM       < end of copied text >      CATHETERIZATION FINDINGS:    < from: Cardiac Cath Lab - Adult (06.17.19 @ 17:32) >  VENTRICLES: No LV gram was performed; however, a recent echocardiogram  demonstrated anEF of 55 %.  CORONARY VESSELS: The coronary circulation is right dominant.  LM:   --  LM: Diffusley diseased. 70-90% diffuse extending into left  coronary system.  LAD:   --  LAD: Diffusely diseased. Septals and other small branches noted.  CX:   --Proximal circumflex: There was a 100 % stenosis.  RCA:   --  RCA: This vessel was not injected. Known occluded with left to  right collaterals on previous angiogram.  GRAFTS:   --  Graft to the LAD: The graft was a LIMA. Graft angiography  showed no evidence of disease.  --  Graft to the 1st obtuse marginal: The graft was a saphenous vein graft  from the aorta. There was a 80 % stenosis in the middle third of the  graft, within the stented segment.  COMPLICATIONS: No complications occurred duringthe cath lab visit.  DIAGNOSTIC IMPRESSIONS: Severe SVG to OM disease with instent restenosis.  Appears to be 2-3 layers of stents in that region.  Laser atherectomy and POBA performed.  DIAGNOSTIC RECOMMENDATIONS: Aspirin and Brilinta as previously.  Plan for referral to Dr. Quinn Kaiser for brachytherapy.  INTERVENTIONAL IMPRESSIONS: Severe SVG to OM disease with instent  restenosis. Appears to be 2-3 layers of stents in that region.  Laser atherectomy and POBA performed.  INTERVENTIONAL RECOMMENDATIONS: Aspirin and Brilinta as previously.  Plan for referral to Dr. Quinn Kaiser for brachytherapy.  Prepared and signed by  Ti Reid MD  Signed 06/18/2019 14:47:15    < end of copied text > [facility]                                                       [facility]                                                        Office: [address] [address]                                                       Telephone: [phone]. Fax:[phone]                                                              CARDIOLOGY CONSULTATION NOTE                                                                                             Consult requested by:  Dr. Jarrett    Reason for Consultation: Chest Pain     History obtained by: Patient and medical record     obtained: No    Chief complaint:    Patient is a 73y old  Male who presents with a chief complaint of chest pain     HPI: 74 yo M PMH CAD s/p CABG (LIMA to LAD, SVG to OM), HFrEF w/ EF 40%, DM2, HLD, HTN, microcytic anemia, GERD, recent PCI 6/17 laser artherectomy with cutting balloon SVG to cx, brachy therapy planned for 4-8 weeks (Dr. EDWARD Kaiser, [facility]), presents to ED after experience chest tightness at night, relieved with two sublingual nitro and asa. Denies chest pain, pressure, fever, chills, cough, phlegm production, shortness of breath, dyspnea on exertion, orthopnea, PND, edema, palpitations, irregular, fast heart beat, nausea, vomiting, melena, rectal bleed, hematuria, lightheadedness, dizziness, syncope, near syncope.        REVIEW OF SYMPTOMS: Cardiovascular:  See HPI. No dyspnea,  No syncope,  No palpitations, No dizziness, No Orthopnea,      No Paroxsymal nocturnal dyspnea;  Respiratory:  No Dyspnea, No cough,   Genitourinary:  No dysuria, no hematuria; Gastrointestinal:  No nausea, no vomiting. No diarrhea.  No abdominal pain. No dark color stool, no melena ; Neurological: No headache, no dizziness, no slurred speech;  Psychiatric: No agitation, no anxiety.  ALL OTHER REVIEW OF SYSTEMS ARE NEGATIVE.    ALLERGIES: Allergies  No Known Allergies  Intolerances    CURRENT MEDICATIONS:        HOME MEDICATIONS:    PAST MEDICAL HISTORY  HFrEF (heart failure with reduced ejection fraction)  Essential hypertension  Type 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, without long-term current use of insul  Coronary artery disease involving native coronary artery of native heart, angina presence unspecifie  Coronary artery disease involving coronary bypass graft of native heart with unstable angina pectoris  High cholesterol  Diabetes mellitus  Hypertension  GERD (gastroesophageal reflux disease)  HLD (hyperlipidemia)  HTN (hypertension)  DM (diabetes mellitus)  CAD (coronary artery disease)      PAST SURGICAL HISTORY  Status post open reduction with internal fixation of fracture  S/P primary angioplasty with coronary stent  S/P CABG (coronary artery bypass graft)  CAD S/P percutaneous coronary angioplasty  S/P CABG (coronary artery bypass graft)      FAMILY HISTORY:  No pertinent family history in first degree relatives  No pertinent family history in first degree relatives      SOCIAL HISTORY:  Denies smoking/alcohol/drugs      Vital Signs Last 24 Hrs  T(C): 36.9 (26 Jun 2019 06:12), Max: 36.9 (26 Jun 2019 06:12)  T(F): 98.4 (26 Jun 2019 06:12), Max: 98.4 (26 Jun 2019 06:12)  HR: 82 (26 Jun 2019 06:12) (82 - 98)  BP: 116/64 (26 Jun 2019 06:12) (116/64 - 158/77)  RR: 16 (26 Jun 2019 06:12) (16 - 18)  SpO2: 98% (26 Jun 2019 06:12) (98% - 99%)      PHYSICAL EXAM:  Constitutional: Comfortable . No acute distress.   HEENT: Atraumatic and normcephalic , neck is supple . no JVD. No carotid bruit. PEERL   CNS: A&Ox3. No focal deficits. EOMI. Cranial nerves II-IX are intact.   Lymph Nodes: Cervical : Not palpable.  Respiratory: CTAB  Cardiovascular: S1S2 RRR. No murmur/rubs or gallop.  Gastrointestinal: Soft non-tender and non distended . +Bowel sounds. negative Godfrey's sign.  Extremities: No edema.   Psychiatric: Calm . no agitation.  Skin: Left groin, PCI site, clean dry, no erythema drainage. Ecchymosis noted to pelvic region, soft non tender. No skin rash/ulcers visualized to face, hands or feet.        LABS:                        9.8    17.49 )-----------( 504      ( 26 Jun 2019 02:35 )             30.4     06-26    126<L>  |  89<L>  |  16.0  ----------------------------<  172<H>  4.7   |  25.0  |  0.85    Ca    9.6      26 Jun 2019 07:43      CARDIAC MARKERS ( 26 Jun 2019 07:43 )  x     / <0.01 ng/mL / 33 U/L / x     / x      CARDIAC MARKERS ( 26 Jun 2019 03:29 )  x     / <0.01 ng/mL / 39 U/L / x     / x          PT/INR - ( 26 Jun 2019 02:35 )   PT: 13.3 sec;   INR: 1.15 ratio    PTT - ( 26 Jun 2019 02:35 )  PTT:25.5 sec      INTERPRETATION OF TELEMETRY: Reviewed by me.   ECG: SR, LAD, LVH, LBBB    RADIOLOGY & ADDITIONAL STUDIES:    X-ray:  reviewed by me.       ECHO FINDINGS:   < from: TTE Echo Complete w/Doppler (06.18.19 @ 15:21) >  Summary:   1. Endocardial visualization was enhanced with intravenous echo contrast.   2. Left ventricular ejection fraction, by visual estimation, is 35 to   40%.   3. Moderately decreased global left ventricular systolic function.   4. Multiple left ventricular regional wall motion abnormalities exist.   See wall motionfindings.   5. Spectral Doppler shows impaired relaxation pattern of left   ventricular myocardial filling (Grade I diastolic dysfunction).   6. No significant valvular abnormalities.   7. There is no evidence of pericardial effusion.   8. ** Compared to TTE dated 11/9/18, no significant changes.    Soledad Mcleod DO Electronically signed on 6/18/2019 at 5:50:54 PM       < end of copied text >      CATHETERIZATION FINDINGS:    < from: Cardiac Cath Lab - Adult (06.17.19 @ 17:32) >  VENTRICLES: No LV gram was performed; however, a recent echocardiogram  demonstrated anEF of 55 %.  CORONARY VESSELS: The coronary circulation is right dominant.  LM:   --  LM: Diffusley diseased. 70-90% diffuse extending into left  coronary system.  LAD:   --  LAD: Diffusely diseased. Septals and other small branches noted.  CX:   --Proximal circumflex: There was a 100 % stenosis.  RCA:   --  RCA: This vessel was not injected. Known occluded with left to  right collaterals on previous angiogram.  GRAFTS:   --  Graft to the LAD: The graft was a LIMA. Graft angiography  showed no evidence of disease.  --  Graft to the 1st obtuse marginal: The graft was a saphenous vein graft  from the aorta. There was a 80 % stenosis in the middle third of the  graft, within the stented segment.  COMPLICATIONS: No complications occurred duringthe cath lab visit.  DIAGNOSTIC IMPRESSIONS: Severe SVG to OM disease with instent restenosis.  Appears to be 2-3 layers of stents in that region.  Laser atherectomy and POBA performed.  DIAGNOSTIC RECOMMENDATIONS: Aspirin and Brilinta as previously.  Plan for referral to Dr. Quinn Kaiser for brachytherapy.  INTERVENTIONAL IMPRESSIONS: Severe SVG to OM disease with instent  restenosis. Appears to be 2-3 layers of stents in that region.  Laser atherectomy and POBA performed.  INTERVENTIONAL RECOMMENDATIONS: Aspirin and Brilinta as previously.  Plan for referral to Dr. Quinn Kaiser for brachytherapy.  Prepared and signed by  Ti Reid MD  Signed 06/18/2019 14:47:15    < end of copied text >

## 2019-06-26 NOTE — CONSULT NOTE ADULT - ASSESSMENT
72 yo M PMH CAD s/p CABG (LIMA to LAD, SVG to OM), HFrEF w/ EF 40%, DM2, HLD, HTN, microcytic anemia, GERD, recent PCI 6/17 laser artherectomy with cutting balloon SVG to cx, brachy therapy planned for 4-8 weeks (Dr. EDWARD Kaiser, [facility]), presents to ED after experience chest tightness at night, relieved with two sublingual nitro and asa.    Unstable angina s/p CABG/PCI  - chest pain relieved with Sublingual nitro/asa  - recent PCI- laser/cutting balloon; pt referred for barchy therapy in 4-6 weeks  - on renexa- as per pt imdur discontinued; will consider restarting if BP tolerates  - WBC elevated this visit; denies sick contacts/symptoms.  - ESR/CRP ordered  - Keep pt NPO- possible stress test today    CMP/CHFrEF  - ECHO EF35-40%  - continue with Lasix, ace/arb, BB    fULL CONSULT TO FOLLOW 74 yo M PMH CAD s/p CABG (LIMA to LAD, SVG to OM), HFrEF w/ EF 40%, DM2, HLD, HTN, microcytic anemia, GERD, recent PCI 6/17 laser artherectomy with cutting balloon SVG to cx, brachy therapy planned for 4-8 weeks (Dr. EDWARD Kaiser, [facility]), presents to ED after experience chest tightness at night, relieved with two sublingual nitro and asa.    Unstable angina s/p CABG/PCI  - chest pain relieved with Sublingual nitro/asa  - recent PCI- laser/cutting balloon; pt referred for barchy therapy in 4-6 weeks  - on renexa- start on isosorbide dinitrate 5mg PO TID first dose now  - WBC elevated this visit; denies sick contacts/symptoms. - repeat CBC, UA ordered   - ESR/CRP/iron studies ordered    CMP/CHFrEF  - ECHO EF35-40%  - continue with Lasix, ace/arb  - switch coreg to Toprol XL 25mg- first dose now    If blood work normal, and pt remains asymptomatic ok for discharge follow up in office.

## 2019-06-26 NOTE — CONSULT NOTE ADULT - ATTENDING COMMENTS
recurrent angina. recent balloon angioplasty. referred for brachytherapy at [facility].   initiat isosorbide dintrate 5 TID increase as BP tolerates. beta-blocker with toprol.  anemia: Low ferritin. irone tablets.  elevated white count: No infection suspected. Unclear cause. f/u UA.   Sodium tablets. f/u outpaitent with Renal and PMD.  No further in-patient cardiac work-up/management is needed.  Follow-up in cardiology office in 2 weeks.

## 2019-06-26 NOTE — ED ADULT NURSE REASSESSMENT NOTE - NS ED NURSE REASSESS COMMENT FT1
assumed pt care at 0730. Pt A&O x4. States he is feeling better than he was last night. NSR on monitor. Denies any chest pain, SOB, headaches, dizziness, N/V. No s/s of respiratory distress noted. Family at bedside. Safety maintained. Will continue to monitor.

## 2019-06-26 NOTE — ED ADULT TRIAGE NOTE - CHIEF COMPLAINT QUOTE
pt with chest pain dizzy and sweating starting around 0000 took 81mg aspirin and two nitro at home prior to EMS arrival, pt reports minor relief.

## 2019-06-26 NOTE — ED ADULT NURSE REASSESSMENT NOTE - NS ED NURSE REASSESS COMMENT FT1
pt aox3 moving all extremities equal bilaterally no acute distress noted vitals wnl family at bedside.

## 2019-06-26 NOTE — ED ADULT NURSE REASSESSMENT NOTE - NS ED NURSE REASSESS COMMENT FT1
pt ambulatory around ED after eating without difficulty. pt remains awake alert oriented with no complaints at this time, no SOB or chest pain, even and unlabored resps present on exam. family at bedside, aware of plan of care to d/c.

## 2019-06-26 NOTE — ED PROVIDER NOTE - OBJECTIVE STATEMENT
pt presents with 1 hr substrnal constant achy cp no hemoptysis. denies fever. denies HA or neck pain. no sob. no abd pain. no n/v/d. no urinary f/u/d. no back pain. no motor or sensory deficits. denies illicit drug use. no recent travel. no rash. no other acute issues symptoms or concerns

## 2019-06-26 NOTE — ED PROVIDER NOTE - PROGRESS NOTE DETAILS
family aware of need for repeat trop and am cards consult . prior recent cardiac cath reviewed [place]: received sign out, patient well appearing, cards consult appreciated, ambulated without chest pain, vitals normal, trops negative, will make med changes as per cards, ok for d/c

## 2019-06-26 NOTE — ED ADULT NURSE NOTE - NSIMPLEMENTINTERV_GEN_ALL_ED
Implemented All Universal Safety Interventions:  [place] to call system. Call bell, personal items and telephone within reach. Instruct patient to call for assistance. Room bathroom lighting operational. Non-slip footwear when patient is off stretcher. Physically safe environment: no spills, clutter or unnecessary equipment. Stretcher in lowest position, wheels locked, appropriate side rails in place.

## 2019-06-26 NOTE — ED ADULT NURSE NOTE - NS ED NURSE LEVEL OF CONSCIOUSNESS SPEECH
Speaking Coherently Consent was obtained and risks were reviewed including but not limited to scarring, infection, bleeding, scabbing, incomplete removal, and allergy to anesthesia. Prep Text (Optional): Prior to removal the treatment areas were prepped in the usual fashion. Acne Type: Comedonal Lesions Extraction Method: 18 gauge needle and comedo extractor Render Number Of Lesions Treated: no Hemostasis: Pressure Post-Care Instructions: I reviewed with the patient in detail post-care instructions. Patient is to keep the treatment areaas dry overnight, and then apply bacitracin twice daily until healed. Patient may apply hydrogen peroxide soaks to remove any crusting. Detail Level: Detailed

## 2019-07-05 ENCOUNTER — RX CHANGE (OUTPATIENT)
Age: 74
End: 2019-07-05

## 2019-07-19 ENCOUNTER — OTHER (OUTPATIENT)
Age: 74
End: 2019-07-19

## 2019-07-24 ENCOUNTER — INPATIENT (INPATIENT)
Facility: HOSPITAL | Age: 74
LOS: 0 days | Discharge: HOME | DRG: 247 | End: 2019-07-25
Attending: INTERNAL MEDICINE | Admitting: INTERNAL MEDICINE
Payer: MEDICARE

## 2019-07-24 ENCOUNTER — TRANSCRIPTION ENCOUNTER (OUTPATIENT)
Age: 74
End: 2019-07-24

## 2019-07-24 VITALS — OXYGEN SATURATION: 99 % | WEIGHT: 154.98 LBS | RESPIRATION RATE: 18 BRPM | TEMPERATURE: 98 F | HEIGHT: 68 IN

## 2019-07-24 DIAGNOSIS — Z95.5 PRESENCE OF CORONARY ANGIOPLASTY IMPLANT AND GRAFT: Chronic | ICD-10-CM

## 2019-07-24 DIAGNOSIS — Z96.7 PRESENCE OF OTHER BONE AND TENDON IMPLANTS: Chronic | ICD-10-CM

## 2019-07-24 DIAGNOSIS — Z95.1 PRESENCE OF AORTOCORONARY BYPASS GRAFT: Chronic | ICD-10-CM

## 2019-07-24 DIAGNOSIS — I25.10 ATHEROSCLEROTIC HEART DISEASE OF NATIVE CORONARY ARTERY WITHOUT ANGINA PECTORIS: ICD-10-CM

## 2019-07-24 LAB
ALBUMIN SERPL ELPH-MCNC: 4.3 G/DL — SIGNIFICANT CHANGE UP (ref 3.3–5)
ALP SERPL-CCNC: 100 U/L — SIGNIFICANT CHANGE UP (ref 40–120)
ALT FLD-CCNC: 13 U/L — SIGNIFICANT CHANGE UP (ref 10–45)
ANION GAP SERPL CALC-SCNC: 11 MMOL/L — SIGNIFICANT CHANGE UP (ref 5–17)
AST SERPL-CCNC: 26 U/L — SIGNIFICANT CHANGE UP (ref 10–40)
BILIRUB SERPL-MCNC: 0.4 MG/DL — SIGNIFICANT CHANGE UP (ref 0.2–1.2)
BUN SERPL-MCNC: 12 MG/DL — SIGNIFICANT CHANGE UP (ref 7–23)
CALCIUM SERPL-MCNC: 9.4 MG/DL — SIGNIFICANT CHANGE UP (ref 8.4–10.5)
CHLORIDE SERPL-SCNC: 88 MMOL/L — LOW (ref 96–108)
CO2 SERPL-SCNC: 26 MMOL/L — SIGNIFICANT CHANGE UP (ref 22–31)
CREAT SERPL-MCNC: 0.75 MG/DL — SIGNIFICANT CHANGE UP (ref 0.5–1.3)
GLUCOSE BLDC GLUCOMTR-MCNC: 174 MG/DL — HIGH (ref 70–99)
GLUCOSE BLDC GLUCOMTR-MCNC: 200 MG/DL — HIGH (ref 70–99)
GLUCOSE BLDC GLUCOMTR-MCNC: 235 MG/DL — HIGH (ref 70–99)
GLUCOSE BLDC GLUCOMTR-MCNC: 284 MG/DL — HIGH (ref 70–99)
GLUCOSE SERPL-MCNC: 181 MG/DL — HIGH (ref 70–99)
HBA1C BLD-MCNC: 8 % — HIGH (ref 4–5.6)
HCT VFR BLD CALC: 33.1 % — LOW (ref 39–50)
HGB BLD-MCNC: 10.6 G/DL — LOW (ref 13–17)
MCHC RBC-ENTMCNC: 24.1 PG — LOW (ref 27–34)
MCHC RBC-ENTMCNC: 32 GM/DL — SIGNIFICANT CHANGE UP (ref 32–36)
MCV RBC AUTO: 75.4 FL — LOW (ref 80–100)
PLATELET # BLD AUTO: 337 K/UL — SIGNIFICANT CHANGE UP (ref 150–400)
POTASSIUM SERPL-MCNC: 5.2 MMOL/L — SIGNIFICANT CHANGE UP (ref 3.5–5.3)
POTASSIUM SERPL-SCNC: 5.2 MMOL/L — SIGNIFICANT CHANGE UP (ref 3.5–5.3)
PROT SERPL-MCNC: 7.6 G/DL — SIGNIFICANT CHANGE UP (ref 6–8.3)
RBC # BLD: 4.39 M/UL — SIGNIFICANT CHANGE UP (ref 4.2–5.8)
RBC # FLD: 14.7 % — HIGH (ref 10.3–14.5)
SODIUM SERPL-SCNC: 125 MMOL/L — LOW (ref 135–145)
WBC # BLD: 12.2 K/UL — HIGH (ref 3.8–10.5)
WBC # FLD AUTO: 12.2 K/UL — HIGH (ref 3.8–10.5)

## 2019-07-24 PROCEDURE — 92974 CATH PLACE CARDIO BRACHYTX: CPT

## 2019-07-24 PROCEDURE — 77470 SPECIAL RADIATION TREATMENT: CPT | Mod: 26

## 2019-07-24 PROCEDURE — 77770 HDR RDNCL NTRSTL/ICAV BRCHTX: CPT | Mod: 26

## 2019-07-24 PROCEDURE — 93010 ELECTROCARDIOGRAM REPORT: CPT | Mod: 76

## 2019-07-24 PROCEDURE — 92937 PRQ TRLUML REVSC CAB GRF 1: CPT | Mod: LC

## 2019-07-24 PROCEDURE — 77263 THER RADIOLOGY TX PLNG CPLX: CPT

## 2019-07-24 PROCEDURE — 99221 1ST HOSP IP/OBS SF/LOW 40: CPT | Mod: 25

## 2019-07-24 PROCEDURE — 77290 THER RAD SIMULAJ FIELD CPLX: CPT | Mod: 26

## 2019-07-24 PROCEDURE — 77318 BRACHYTX ISODOSE COMPLEX: CPT | Mod: 26

## 2019-07-24 PROCEDURE — 99152 MOD SED SAME PHYS/QHP 5/>YRS: CPT

## 2019-07-24 RX ORDER — INSULIN LISPRO 100/ML
VIAL (ML) SUBCUTANEOUS AT BEDTIME
Refills: 0 | Status: DISCONTINUED | OUTPATIENT
Start: 2019-07-24 | End: 2019-07-25

## 2019-07-24 RX ORDER — RANOLAZINE 500 MG/1
500 TABLET, FILM COATED, EXTENDED RELEASE ORAL
Refills: 0 | Status: DISCONTINUED | OUTPATIENT
Start: 2019-07-24 | End: 2019-07-25

## 2019-07-24 RX ORDER — FERROUS SULFATE 325(65) MG
325 TABLET ORAL DAILY
Refills: 0 | Status: DISCONTINUED | OUTPATIENT
Start: 2019-07-24 | End: 2019-07-25

## 2019-07-24 RX ORDER — NITROGLYCERIN 6.5 MG
1 CAPSULE, EXTENDED RELEASE ORAL ONCE
Refills: 0 | Status: COMPLETED | OUTPATIENT
Start: 2019-07-24 | End: 2019-07-24

## 2019-07-24 RX ORDER — DEXTROSE 50 % IN WATER 50 %
15 SYRINGE (ML) INTRAVENOUS ONCE
Refills: 0 | Status: DISCONTINUED | OUTPATIENT
Start: 2019-07-24 | End: 2019-07-25

## 2019-07-24 RX ORDER — GLUCAGON INJECTION, SOLUTION 0.5 MG/.1ML
1 INJECTION, SOLUTION SUBCUTANEOUS ONCE
Refills: 0 | Status: DISCONTINUED | OUTPATIENT
Start: 2019-07-24 | End: 2019-07-25

## 2019-07-24 RX ORDER — FUROSEMIDE 40 MG
20 TABLET ORAL DAILY
Refills: 0 | Status: DISCONTINUED | OUTPATIENT
Start: 2019-07-24 | End: 2019-07-25

## 2019-07-24 RX ORDER — SODIUM CHLORIDE 9 MG/ML
3 INJECTION INTRAMUSCULAR; INTRAVENOUS; SUBCUTANEOUS EVERY 8 HOURS
Refills: 0 | Status: DISCONTINUED | OUTPATIENT
Start: 2019-07-24 | End: 2019-07-25

## 2019-07-24 RX ORDER — INSULIN LISPRO 100/ML
VIAL (ML) SUBCUTANEOUS
Refills: 0 | Status: DISCONTINUED | OUTPATIENT
Start: 2019-07-24 | End: 2019-07-25

## 2019-07-24 RX ORDER — FENTANYL CITRATE 50 UG/ML
25 INJECTION INTRAVENOUS ONCE
Refills: 0 | Status: DISCONTINUED | OUTPATIENT
Start: 2019-07-24 | End: 2019-07-24

## 2019-07-24 RX ORDER — METFORMIN HYDROCHLORIDE 850 MG/1
1 TABLET ORAL
Qty: 0 | Refills: 0 | DISCHARGE

## 2019-07-24 RX ORDER — TICAGRELOR 90 MG/1
90 TABLET ORAL EVERY 12 HOURS
Refills: 0 | Status: DISCONTINUED | OUTPATIENT
Start: 2019-07-24 | End: 2019-07-25

## 2019-07-24 RX ORDER — ATORVASTATIN CALCIUM 80 MG/1
40 TABLET, FILM COATED ORAL AT BEDTIME
Refills: 0 | Status: DISCONTINUED | OUTPATIENT
Start: 2019-07-24 | End: 2019-07-25

## 2019-07-24 RX ORDER — METOPROLOL TARTRATE 50 MG
25 TABLET ORAL DAILY
Refills: 0 | Status: DISCONTINUED | OUTPATIENT
Start: 2019-07-24 | End: 2019-07-25

## 2019-07-24 RX ORDER — DEXTROSE 50 % IN WATER 50 %
25 SYRINGE (ML) INTRAVENOUS ONCE
Refills: 0 | Status: DISCONTINUED | OUTPATIENT
Start: 2019-07-24 | End: 2019-07-25

## 2019-07-24 RX ORDER — DEXTROSE 50 % IN WATER 50 %
12.5 SYRINGE (ML) INTRAVENOUS ONCE
Refills: 0 | Status: DISCONTINUED | OUTPATIENT
Start: 2019-07-24 | End: 2019-07-25

## 2019-07-24 RX ORDER — ASPIRIN/CALCIUM CARB/MAGNESIUM 324 MG
81 TABLET ORAL DAILY
Refills: 0 | Status: DISCONTINUED | OUTPATIENT
Start: 2019-07-24 | End: 2019-07-25

## 2019-07-24 RX ORDER — LOSARTAN POTASSIUM 100 MG/1
25 TABLET, FILM COATED ORAL DAILY
Refills: 0 | Status: DISCONTINUED | OUTPATIENT
Start: 2019-07-24 | End: 2019-07-25

## 2019-07-24 RX ORDER — PANTOPRAZOLE SODIUM 20 MG/1
40 TABLET, DELAYED RELEASE ORAL
Refills: 0 | Status: DISCONTINUED | OUTPATIENT
Start: 2019-07-24 | End: 2019-07-25

## 2019-07-24 RX ORDER — SODIUM CHLORIDE 9 MG/ML
1000 INJECTION, SOLUTION INTRAVENOUS
Refills: 0 | Status: DISCONTINUED | OUTPATIENT
Start: 2019-07-24 | End: 2019-07-25

## 2019-07-24 RX ADMIN — PANTOPRAZOLE SODIUM 40 MILLIGRAM(S): 20 TABLET, DELAYED RELEASE ORAL at 20:27

## 2019-07-24 RX ADMIN — Medication 325 MILLIGRAM(S): at 17:12

## 2019-07-24 RX ADMIN — ATORVASTATIN CALCIUM 40 MILLIGRAM(S): 80 TABLET, FILM COATED ORAL at 20:27

## 2019-07-24 RX ADMIN — Medication 1 INCH(S): at 12:48

## 2019-07-24 RX ADMIN — SODIUM CHLORIDE 3 MILLILITER(S): 9 INJECTION INTRAMUSCULAR; INTRAVENOUS; SUBCUTANEOUS at 21:20

## 2019-07-24 RX ADMIN — TICAGRELOR 90 MILLIGRAM(S): 90 TABLET ORAL at 20:27

## 2019-07-24 RX ADMIN — RANOLAZINE 500 MILLIGRAM(S): 500 TABLET, FILM COATED, EXTENDED RELEASE ORAL at 20:27

## 2019-07-24 RX ADMIN — Medication 25 MILLIGRAM(S): at 12:10

## 2019-07-24 RX ADMIN — FENTANYL CITRATE 25 MICROGRAM(S): 50 INJECTION INTRAVENOUS at 12:12

## 2019-07-24 RX ADMIN — LOSARTAN POTASSIUM 25 MILLIGRAM(S): 100 TABLET, FILM COATED ORAL at 12:10

## 2019-07-24 RX ADMIN — FENTANYL CITRATE 25 MICROGRAM(S): 50 INJECTION INTRAVENOUS at 12:42

## 2019-07-24 RX ADMIN — SODIUM CHLORIDE 3 MILLILITER(S): 9 INJECTION INTRAMUSCULAR; INTRAVENOUS; SUBCUTANEOUS at 12:50

## 2019-07-24 RX ADMIN — Medication 81 MILLIGRAM(S): at 12:09

## 2019-07-24 RX ADMIN — Medication 1 INCH(S): at 23:39

## 2019-07-24 RX ADMIN — Medication 25 MILLIGRAM(S): at 20:47

## 2019-07-24 RX ADMIN — Medication 6: at 17:12

## 2019-07-24 NOTE — H&P CARDIOLOGY - PSH
S/P CABG (coronary artery bypass graft)  4V [address] [place]  S/P primary angioplasty with coronary stent    Status post open reduction with internal fixation of fracture

## 2019-07-24 NOTE — CHART NOTE - NSCHARTNOTEFT_GEN_A_CORE
Pt continues with CP pressure like after receiving Fentanyl 25 mcg.  He states that it's different from the presenting cp.  NANDINI has LBBB unable to interpret changes      Plan:  Cont tele  Apply NTP 1" to chest wall  Monitor BP closely remove if pt becomes hypotensive  Monitor cp for effects from medication.    Opal Ruiz, [facility]-BC  Cardiology

## 2019-07-24 NOTE — DISCHARGE NOTE PROVIDER - NSDCCPTREATMENT_GEN_ALL_CORE_FT
PRINCIPAL PROCEDURE  Procedure: Transcatheter insertion of brachytherapy device into coronary artery  Findings and Treatment: brachytherapy, SVG to OM1

## 2019-07-24 NOTE — CHART NOTE - NSCHARTNOTEFT_GEN_A_CORE
Removal of Femoral Sheath    Pulses in the right/left lower extremity are palpable/audible by doppler/absent.   The patient was placed in the supine position. The insertion site was identified and the sutures were removed per protocol.    The _6___ Sinhala femoral sheath was then removed by Elizabeth FOUNTAIN  Direct pressure was applied for  __20____ minutes.   Complications: None, VSS, Good Hemostasis.     Monitoring of the right groin and right lower extremity including neuro-vascular checks and vital signs every 15 minutes x 4, then every 30 minutes x 2, then every 1 hour was ordered.    Discharge Instruction discussed with patient: ASA, Plavix, statin, diet, activities, access site care, follow up care, reportable signs and symptoms.     A/P   HPI:  75 yo M (pt denies having any implantable devices) PMH HTN, HLD, CAD with CABG x4V (LIMA to LAD, SVG to OM) [place] 1983 and subsequent stents x 4,ICM, HFrEF w/ EF 35-40% (ECHO 7/2019, DMT2 (managed by PCP J Carlos Blackburn, A1c 8.5 June 2019, no complications as per patient), HLD, HTN, microcytic anemia, GERD presents today for brachy therapy. Patient had recent cardiac cath on June 17, 2019 revealing Severe SVG to OM disease with instent restenosis. Appears to be 2-3 layers of stents in that region. Laser atherectomy and POBA performed. Patient referred today for brachy therapy to diseased vessel. Patient currently denies chest pain, palpitations, SOB, PND, orthopnea (24 Jul 2019 08:43).  Pt s/p PCI/Brachytherapy/CODI x 1 to SVG to OM via RFA with reports of chest pain post procedure with no ECG changes relieved after 15 mcg Fentanyl and NTP 1 " to ACW now mild 1-2/10.        Plan: continue to monitor, Continue ASA, Plavix, Statin,   discharge home in am if stable.  Pt will follow up with his/her cardiologist in 1-2weeks.     SHEREE RomeroNORA-BC  Cardiology

## 2019-07-24 NOTE — DISCHARGE NOTE PROVIDER - NSDCPNSUBOBJ_GEN_ALL_CORE
Patient is a 74y old  Male who presents with a chief complaint of scheduled brachytherapy (2019 22:00)                    Allergies        No Known Allergies        Intolerances                Medications:    aspirin enteric coated 81 milliGRAM(s) Oral daily    atorvastatin 40 milliGRAM(s) Oral at bedtime    dextrose 40% Gel 15 Gram(s) Oral once PRN    dextrose 5%. 1000 milliLiter(s) IV Continuous <Continuous>    dextrose 50% Injectable 12.5 Gram(s) IV Push once    dextrose 50% Injectable 25 Gram(s) IV Push once    dextrose 50% Injectable 25 Gram(s) IV Push once    ferrous    sulfate 325 milliGRAM(s) Oral daily    furosemide    Tablet 20 milliGRAM(s) Oral daily    glucagon  Injectable 1 milliGRAM(s) IntraMuscular once PRN    insulin lispro (HumaLOG) corrective regimen sliding scale   SubCutaneous three times a day before meals    insulin lispro (HumaLOG) corrective regimen sliding scale   SubCutaneous at bedtime    losartan 25 milliGRAM(s) Oral daily    metoprolol succinate ER 25 milliGRAM(s) Oral daily    pantoprazole    Tablet 40 milliGRAM(s) Oral before breakfast    ranolazine 500 milliGRAM(s) Oral two times a day    sodium chloride 0.9% lock flush 3 milliLiter(s) IV Push every 8 hours    ticagrelor 90 milliGRAM(s) Oral every 12 hours            Vitals:    T(C): 36.6 (19 @ 04:35), Max: 36.6 (19 @ 04:35)    HR: 78 (19 @ 04:35) (78 - 95)    BP: 143/71 (19 @ 04:35) (124/71 - 161/78)    BP(mean): --    RR: 17 (19 @ 04:35) (15 - 18)    SpO2: 98% (19 @ 04:35) (97% - 100%)    Wt(kg): --    Daily Height in cm: 172.7 (2019 11:55)      Daily Weight in k.3 (2019 08:43)    I&O's Summary        2019 07:01  -  2019 05:41    --------------------------------------------------------    IN: 800 mL / OUT: 0 mL / NET: 800 mL                    Physical Exam:    Appearance: Normal    Eyes: PERRL, EOMI    HENT: Normal oral muscosa, NC/AT    Cardiovascular: S1S2, RRR, No M/R/G, no JVD, No Lower extremity edema    Procedural Access Site: No hematoma, Non-tender to palpation, 2+ pulse, No bruit, No Ecchymosis    Respiratory: Clear to auscultation bilaterally    Gastrointestinal: Soft, Non tender, Normal Bowel Sounds    Musculoskeletal: No clubbing, No joint deformity     Neurologic: Non-focal    Lymphatic: No lymphadenopathy    Psychiatry: AAOx3, Mood & affect appropriate    Skin: No rashes, No ecchymoses, No cyanosis                131<L>  |  92<L>  |  11    ----------------------------<  186<H>    4.4   |  28  |  0.88        Ca    9.3      2019 03:53        TPro  7.0  /  Alb  3.9  /  TBili  0.6  /  DBili  x   /  AST  102<H>  /  ALT  18  /  AlkPhos  100                          Lipid panel     Hgb A1c Hemoglobin A1C, Whole Blood: 8.0 % ( @ 20:46)                                10.7     11.2  )-----------( 346      ( 2019 03:53 )               32.4                 ECG: SR 90 bpm        Cath: brachytherapy, SVG to OM1        Imaging:        Interpretation of Telemetry: SR 70-80 bpm                CAD s/p brachytherapy    Monitor femoral site for swelling, bleeding    Continue ASA, Plavix        HTN    Continue antihypertensive medications with hold parameters         HLD    continue statin    confirm lipid panel results

## 2019-07-24 NOTE — CHART NOTE - NSCHARTNOTEFT_GEN_A_CORE
HPI:  75 yo M (pt denies having any implantable devices) PMH HTN, HLD, CAD with CABG x4V (LIMA to LAD, SVG to OM) [place] 1983 and subsequent stents x 4,ICM, HFrEF w/ EF 35-40% (ECHO 7/2019, DMT2 (managed by PCP J Carlos Blackburn, A1c 8.5 June 2019, no complications as per patient), HLD, HTN, microcytic anemia, GERD presents today for brachy therapy. Patient had recent cardiac cath on June 17, 2019 revealing Severe SVG to OM disease with instent restenosis. Appears to be 2-3 layers of stents in that region. Laser atherectomy and POBA performed. Patient referred today for brachy therapy to diseased vessel. Patient currently denies chest pain, palpitations, SOB, PND, orthopnea (24 Jul 2019 08:43)  s/p PCI to SVG to OM graft via right groin. On arrival to [facility], patient c/o chest pressure, 6/10, ECG done, has LBBB, seen and evaluated by Dr. Kaiser, advised to give fentanyl. Repeat ECG done with no changed, reviewed by Dr. Kaiser. patient still c/o pain, which he had prior to angiogram. patient's sons at bedside and sated he is very anxious with hospitalization. gave Nitro paste with relief of pain. Sheath is being pulled by RN at 1500. Patient states feeling comfortable now. Vital signs are stable.

## 2019-07-24 NOTE — DISCHARGE NOTE PROVIDER - PROVIDER TOKENS
PROVIDER:[TOKEN:[[id number]]] PROVIDER:[TOKEN:[[id number]]],FREE:[LAST:[Wahid],FIRST:[J Carlos],PHONE:[phone]],FAX:[(   )    -],ADDRESS:[[address] [place] [address]],FOLLOWUP:[1 week]]

## 2019-07-24 NOTE — PATIENT PROFILE ADULT - NSPROMUTINFOINDIVIDFT_GEN_A_NUR
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

## 2019-07-24 NOTE — H&P CARDIOLOGY - HISTORY OF PRESENT ILLNESS
75 yo M PMH HTN, HLD, CAD with CABG x4V (LIMA to LAD, SVG to OM), HFrEF w/ EF 40%, DMT2 (managed by J Carlos Blackburn, A1c 8.5 June 2019, no complications as per patient), HLD, HTN, microcytic anemia, GERD presents today for brachy therapy. Patient 75 yo M PMH HTN, HLD, CAD with CABG x4V (LIMA to LAD, SVG to OM) [place] 1983 and subsequent stents x 4, HFrEF w/ EF 45% (ECHO 7/2019, DMT2 (managed by J Carlos Blackburn, A1c 8.5 June 2019, no complications as per patient), HLD, HTN, microcytic anemia, GERD presents today for brachy therapy. Patient had recent cardiac cath on June 17, 2019 revealing Severe SVG to OM disease with instent restenosis. Appears to be 2-3 layers of stents in that region. Laser atherectomy and POBA performed. Patient referred today for brachy therapy to diseased vessel. Patient currently denies chest pain, palpitations, SOB, PND, orthopnea 73 yo M (pt denies having any implantable devices) PMH HTN, HLD, CAD with CABG x4V (LIMA to LAD, SVG to OM) [place] 1983 and subsequent stents x 4, HFrEF w/ EF 45% (ECHO 7/2019, DMT2 (managed by J Carlos Blackburn, A1c 8.5 June 2019, no complications as per patient), HLD, HTN, microcytic anemia, GERD presents today for brachy therapy. Patient had recent cardiac cath on June 17, 2019 revealing Severe SVG to OM disease with instent restenosis. Appears to be 2-3 layers of stents in that region. Laser atherectomy and POBA performed. Patient referred today for brachy therapy to diseased vessel. Patient currently denies chest pain, palpitations, SOB, PND, orthopnea 75 yo M (pt denies having any implantable devices) PMH HTN, HLD, CAD with CABG x4V (LIMA to LAD, SVG to OM) [place] 1983 and subsequent stents x 4,ICM, HFrEF w/ EF 35-40% (ECHO 7/2019, DMT2 (managed by PCP J Carlos Blackburn, A1c 8.5 June 2019, no complications as per patient), HLD, HTN, microcytic anemia, GERD presents today for brachy therapy. Patient had recent cardiac cath on June 17, 2019 revealing Severe SVG to OM disease with instent restenosis. Appears to be 2-3 layers of stents in that region. Laser atherectomy and POBA performed. Patient referred today for brachy therapy to diseased vessel. Patient currently denies chest pain, palpitations, SOB, PND, orthopnea

## 2019-07-24 NOTE — DISCHARGE NOTE PROVIDER - HOSPITAL COURSE
HPI:    75 yo M (pt denies having any implantable devices) PMH HTN, HLD, CAD with CABG x4V (LIMA to LAD, SVG to OM) [place] 1983 and subsequent stents x 4,ICM, HFrEF w/ EF 35-40% (ECHO 7/2019, DMT2 (managed by PCP J Carlos Blackburn, A1c 8.5 June 2019, no complications as per patient), HLD, HTN, microcytic anemia, GERD presents today for brachy therapy. Patient had recent cardiac cath on June 17, 2019 revealing Severe SVG to OM disease with instent restenosis. Appears to be 2-3 layers of stents in that region. Laser atherectomy and POBA performed. Patient referred today for brachy therapy to diseased vessel. Patient currently denies chest pain, palpitations, SOB, PND, orthopnea (24 Jul 2019 08:43) HPI:    73 yo M (pt denies having any implantable devices) PMH HTN, HLD, CAD with CABG x4V (LIMA to LAD, SVG to OM) [place] 1983 and subsequent stents x 4,ICM, HFrEF w/ EF 35-40% (ECHO 7/2019, DMT2 (managed by PCP J Carlos Blackburn, A1c 8.5 June 2019, no complications as per patient), HLD, HTN, microcytic anemia, GERD presents today for brachy therapy. Patient had recent cardiac cath on June 17, 2019 revealing Severe SVG to OM disease with instent restenosis. Appears to be 2-3 layers of stents in that region. Laser atherectomy and POBA performed. Patient referred today for brachy therapy to diseased vessel. Patient currently denies chest pain, palpitations, SOB, PND, orthopnea (24 Jul 2019 08:43).        7/24 s/p brachytherapy, SVG to the OM1. Right groin access site without swelling, bleeding.

## 2019-07-24 NOTE — DISCHARGE NOTE PROVIDER - NSDCCPCAREPLAN_GEN_ALL_CORE_FT
PRINCIPAL DISCHARGE DIAGNOSIS  Diagnosis: CAD of autologous bypass graft  Assessment and Plan of Treatment: Do not stop you Aspirin or Brilinta unless instructed to do so by your cardiologist, they help keep your stented arteries open.   No heavy lifting, strenuous activity, bending, straining, or unnecessary stair climbing for 2 weeks. No driving for 2 days. You may shower 24 hours following the procedure but avoid baths/swimming for 1 week. Check your groin site for bleeding and/or swelling daily following procedure and call your doctor immediately if it occurs or if you experience increased pain at the site. Follow up with your cardiologist in 1-2 weeks. You may call [facility] if you have any questions/concerns regarding your procedure [phone].      SECONDARY DISCHARGE DIAGNOSES  Diagnosis: HTN (hypertension)  Assessment and Plan of Treatment: Continue with your blood pressure medications; eat a heart healthy diet with low salt diet; exercise regularly (consult with your physician or cardiologist first); maintain a heart healthy weight; if you smoke - quit (A resource to help you stop smoking is the [facility] [employer] – phone number [phone].); include healthy ways to manage stress. Continue to follow with your primary care physician or cardiologist.    Diagnosis: HLD (hyperlipidemia)  Assessment and Plan of Treatment: Continue with your cholesterol medications. Eat a heart healthy diet that is low in saturated fats and salt, and includes whole grains, fruits, vegetables and lean protein; exercise regularly (consult with your physician or cardiologist first); maintain a heart healthy weight; if you smoke - quit (A resource to help you stop smoking is the [facility] [employer] for [employer] Control – phone number [phone].). Continue to follow with your primary physician or cardiologist.

## 2019-07-24 NOTE — DISCHARGE NOTE PROVIDER - CARE PROVIDERS DIRECT ADDRESSES
,[email or web address]

## 2019-07-24 NOTE — DISCHARGE NOTE PROVIDER - CARE PROVIDER_API CALL
Ti Reid)  Cardiovascular Disease; Interventional Cardiology  [address] [facility], [address] [place] [address]  Phone: [phone]  Fax: [phone]  Follow Up Time: Ti Reid)  Cardiovascular Disease; Interventional Cardiology  [address] [facility], [address]  [place] [address]  Phone: [phone]  Fax: [phone]  Follow Up Time:     J Carlos Blackburn  [address] [place] [address]  Phone: [phone]  Fax: (   )    -  Follow Up Time: 1 week

## 2019-07-24 NOTE — CHART NOTE - NSCHARTNOTEFT_GEN_A_CORE
Patient underwent a PCI procedure and is being admitted as they are at increased risk for major adverse cardiac and vascular events if discharged due to the following high risk characteristics:  No-reflow requiting treatment.

## 2019-07-25 ENCOUNTER — TRANSCRIPTION ENCOUNTER (OUTPATIENT)
Age: 74
End: 2019-07-25

## 2019-07-25 VITALS
DIASTOLIC BLOOD PRESSURE: 69 MMHG | HEART RATE: 75 BPM | SYSTOLIC BLOOD PRESSURE: 153 MMHG | OXYGEN SATURATION: 97 % | TEMPERATURE: 98 F | RESPIRATION RATE: 17 BRPM

## 2019-07-25 LAB
ALBUMIN SERPL ELPH-MCNC: 3.9 G/DL — SIGNIFICANT CHANGE UP (ref 3.3–5)
ALP SERPL-CCNC: 100 U/L — SIGNIFICANT CHANGE UP (ref 40–120)
ALT FLD-CCNC: 18 U/L — SIGNIFICANT CHANGE UP (ref 10–45)
ANION GAP SERPL CALC-SCNC: 11 MMOL/L — SIGNIFICANT CHANGE UP (ref 5–17)
AST SERPL-CCNC: 102 U/L — HIGH (ref 10–40)
BASOPHILS # BLD AUTO: 0 K/UL — SIGNIFICANT CHANGE UP (ref 0–0.2)
BASOPHILS NFR BLD AUTO: 0.4 % — SIGNIFICANT CHANGE UP (ref 0–2)
BILIRUB SERPL-MCNC: 0.6 MG/DL — SIGNIFICANT CHANGE UP (ref 0.2–1.2)
BUN SERPL-MCNC: 11 MG/DL — SIGNIFICANT CHANGE UP (ref 7–23)
CALCIUM SERPL-MCNC: 9.3 MG/DL — SIGNIFICANT CHANGE UP (ref 8.4–10.5)
CHLORIDE SERPL-SCNC: 92 MMOL/L — LOW (ref 96–108)
CO2 SERPL-SCNC: 28 MMOL/L — SIGNIFICANT CHANGE UP (ref 22–31)
CREAT SERPL-MCNC: 0.88 MG/DL — SIGNIFICANT CHANGE UP (ref 0.5–1.3)
EOSINOPHIL # BLD AUTO: 0.2 K/UL — SIGNIFICANT CHANGE UP (ref 0–0.5)
EOSINOPHIL NFR BLD AUTO: 1.6 % — SIGNIFICANT CHANGE UP (ref 0–6)
GLUCOSE SERPL-MCNC: 186 MG/DL — HIGH (ref 70–99)
HCT VFR BLD CALC: 32.4 % — LOW (ref 39–50)
HGB BLD-MCNC: 10.7 G/DL — LOW (ref 13–17)
LYMPHOCYTES # BLD AUTO: 1.5 K/UL — SIGNIFICANT CHANGE UP (ref 1–3.3)
LYMPHOCYTES # BLD AUTO: 13.4 % — SIGNIFICANT CHANGE UP (ref 13–44)
MCHC RBC-ENTMCNC: 24.8 PG — LOW (ref 27–34)
MCHC RBC-ENTMCNC: 33 GM/DL — SIGNIFICANT CHANGE UP (ref 32–36)
MCV RBC AUTO: 75.1 FL — LOW (ref 80–100)
MONOCYTES # BLD AUTO: 1.3 K/UL — HIGH (ref 0–0.9)
MONOCYTES NFR BLD AUTO: 11.6 % — SIGNIFICANT CHANGE UP (ref 2–14)
NEUTROPHILS # BLD AUTO: 8.2 K/UL — HIGH (ref 1.8–7.4)
NEUTROPHILS NFR BLD AUTO: 73 % — SIGNIFICANT CHANGE UP (ref 43–77)
PLATELET # BLD AUTO: 346 K/UL — SIGNIFICANT CHANGE UP (ref 150–400)
POTASSIUM SERPL-MCNC: 4.4 MMOL/L — SIGNIFICANT CHANGE UP (ref 3.5–5.3)
POTASSIUM SERPL-SCNC: 4.4 MMOL/L — SIGNIFICANT CHANGE UP (ref 3.5–5.3)
PROT SERPL-MCNC: 7 G/DL — SIGNIFICANT CHANGE UP (ref 6–8.3)
RBC # BLD: 4.31 M/UL — SIGNIFICANT CHANGE UP (ref 4.2–5.8)
RBC # FLD: 14.6 % — HIGH (ref 10.3–14.5)
SODIUM SERPL-SCNC: 131 MMOL/L — LOW (ref 135–145)
WBC # BLD: 11.2 K/UL — HIGH (ref 3.8–10.5)
WBC # FLD AUTO: 11.2 K/UL — HIGH (ref 3.8–10.5)

## 2019-07-25 PROCEDURE — 77318 BRACHYTX ISODOSE COMPLEX: CPT

## 2019-07-25 PROCEDURE — 82962 GLUCOSE BLOOD TEST: CPT

## 2019-07-25 PROCEDURE — 77370 RADIATION PHYSICS CONSULT: CPT

## 2019-07-25 PROCEDURE — 93005 ELECTROCARDIOGRAM TRACING: CPT

## 2019-07-25 PROCEDURE — 85027 COMPLETE CBC AUTOMATED: CPT

## 2019-07-25 PROCEDURE — 80053 COMPREHEN METABOLIC PANEL: CPT

## 2019-07-25 PROCEDURE — C1887: CPT

## 2019-07-25 PROCEDURE — 92937 PRQ TRLUML REVSC CAB GRF 1: CPT | Mod: LC

## 2019-07-25 PROCEDURE — C1717: CPT

## 2019-07-25 PROCEDURE — 92974 CATH PLACE CARDIO BRACHYTX: CPT

## 2019-07-25 PROCEDURE — 77290 THER RAD SIMULAJ FIELD CPLX: CPT

## 2019-07-25 PROCEDURE — 99238 HOSP IP/OBS DSCHRG MGMT 30/<: CPT

## 2019-07-25 PROCEDURE — 83036 HEMOGLOBIN GLYCOSYLATED A1C: CPT

## 2019-07-25 PROCEDURE — 99153 MOD SED SAME PHYS/QHP EA: CPT

## 2019-07-25 PROCEDURE — C1874: CPT

## 2019-07-25 PROCEDURE — 77770 HDR RDNCL NTRSTL/ICAV BRCHTX: CPT

## 2019-07-25 PROCEDURE — C1769: CPT

## 2019-07-25 PROCEDURE — 77470 SPECIAL RADIATION TREATMENT: CPT

## 2019-07-25 PROCEDURE — C1725: CPT

## 2019-07-25 PROCEDURE — C1894: CPT

## 2019-07-25 PROCEDURE — C1728: CPT

## 2019-07-25 PROCEDURE — 99152 MOD SED SAME PHYS/QHP 5/>YRS: CPT

## 2019-07-25 RX ORDER — METFORMIN HYDROCHLORIDE 850 MG/1
500 TABLET ORAL
Qty: 0 | Refills: 0 | DISCHARGE

## 2019-07-25 RX ORDER — METFORMIN HYDROCHLORIDE 850 MG/1
1 TABLET ORAL
Qty: 0 | Refills: 0 | DISCHARGE

## 2019-07-25 RX ADMIN — SODIUM CHLORIDE 3 MILLILITER(S): 9 INJECTION INTRAMUSCULAR; INTRAVENOUS; SUBCUTANEOUS at 06:08

## 2019-07-25 NOTE — DISCHARGE NOTE NURSING/CASE MANAGEMENT/SOCIAL WORK - NSDCDPATPORTLINK_GEN_ALL_CORE
You can access the [facility], offered by [facility], by registering with the following website: [email or web address]

## 2019-07-27 ENCOUNTER — TRANSCRIPTION ENCOUNTER (OUTPATIENT)
Age: 74
End: 2019-07-27

## 2019-08-13 ENCOUNTER — NON-APPOINTMENT (OUTPATIENT)
Age: 74
End: 2019-08-13

## 2019-08-13 ENCOUNTER — APPOINTMENT (OUTPATIENT)
Dept: CARDIOLOGY | Facility: CLINIC | Age: 74
End: 2019-08-13
Payer: MEDICARE

## 2019-08-13 VITALS — SYSTOLIC BLOOD PRESSURE: 148 MMHG | DIASTOLIC BLOOD PRESSURE: 80 MMHG

## 2019-08-13 VITALS
BODY MASS INDEX: 22.44 KG/M2 | WEIGHT: 143 LBS | OXYGEN SATURATION: 100 % | HEIGHT: 67 IN | SYSTOLIC BLOOD PRESSURE: 162 MMHG | HEART RATE: 84 BPM | DIASTOLIC BLOOD PRESSURE: 71 MMHG

## 2019-08-13 DIAGNOSIS — D64.9 ANEMIA, UNSPECIFIED: ICD-10-CM

## 2019-08-13 PROCEDURE — 99215 OFFICE O/P EST HI 40 MIN: CPT

## 2019-08-13 PROCEDURE — 93000 ELECTROCARDIOGRAM COMPLETE: CPT

## 2019-08-13 RX ORDER — ISOSORBIDE MONONITRATE 60 MG/1
60 TABLET, EXTENDED RELEASE ORAL DAILY
Qty: 60 | Refills: 3 | Status: DISCONTINUED | COMMUNITY
Start: 2017-03-22 | End: 2019-08-13

## 2019-08-13 RX ORDER — PANTOPRAZOLE 40 MG/1
40 TABLET, DELAYED RELEASE ORAL
Qty: 30 | Refills: 0 | Status: DISCONTINUED | COMMUNITY
Start: 2019-06-12 | End: 2019-08-13

## 2019-08-13 RX ORDER — ISOSORBIDE DINITRATE 5 MG/1
5 TABLET ORAL 3 TIMES DAILY
Refills: 0 | Status: DISCONTINUED | COMMUNITY
Start: 2019-08-13 | End: 2019-08-13

## 2019-08-13 RX ORDER — AMLODIPINE BESYLATE VALSARTAN HYDROCHLOROTHIAZIDE 10; 25; 160 MG/1; MG/1; MG/1
10-160-25 TABLET, FILM COATED ORAL DAILY
Refills: 0 | Status: DISCONTINUED | COMMUNITY
Start: 2019-08-13 | End: 2019-08-13

## 2019-08-13 RX ORDER — AMLODIPINE BESYLATE VALSARTAN HYDROCHLOROTHIAZIDE 10; 25; 160 MG/1; MG/1; MG/1
10-160-25 TABLET, FILM COATED ORAL DAILY
Qty: 90 | Refills: 3 | Status: DISCONTINUED | COMMUNITY
Start: 2019-05-06 | End: 2019-08-13

## 2019-08-13 RX ORDER — ALIROCUMAB 75 MG/ML
75 INJECTION, SOLUTION SUBCUTANEOUS
Qty: 6 | Refills: 5 | Status: DISCONTINUED | COMMUNITY
Start: 2019-06-18 | End: 2019-08-13

## 2019-08-14 NOTE — DISCUSSION/SUMMARY
[Patient] : the patient [Risks] : risks [Benefits] : benefits [Alternatives] : alternatives [___ Month(s)] : [unfilled] month(s) [With Me] : with me [FreeTextEntry1] : This is a 70 year old male with history HTN, DM, HLD, CAD, h/o MI,  s/p CABG, s/p PCIx4, recent unstable angina s/p PCI with CODI to SVG to LCX, ICM EF 45%\par 1) chest pain : Angina pectorius.  ct nitrates    recent PCI and brachy therapy. ct Dual antiplatelet therapy statins. beta blocker \par 2) Recent STWEMi. occluded vein graft s/p PCI. now improved. MR resolved. \par aspirin and  ct brillinta ct statins and beta blocker and antianginals. \par 3) CAD, s/p CABG, s/p PCI: ASA, c ct Brilinta , Lipitor 40 mg daily, Coreg \par 4) HTN: controlled.ct coreg  Ct nitrates \par 5) ICM: EF 40%. Coreg, ARB, Euvolemic. lasix PRN

## 2019-08-14 NOTE — PHYSICAL EXAM
[General Appearance - Well Developed] : well developed [Normal Appearance] : normal appearance [Well Groomed] : well groomed [General Appearance - Well Nourished] : well nourished [No Deformities] : no deformities [General Appearance - In No Acute Distress] : no acute distress [Normal Conjunctiva] : the conjunctiva exhibited no abnormalities [Eyelids - No Xanthelasma] : the eyelids demonstrated no xanthelasmas [Normal Oral Mucosa] : normal oral mucosa [No Oral Pallor] : no oral pallor [No Oral Cyanosis] : no oral cyanosis [Normal Jugular Venous A Waves Present] : normal jugular venous A waves present [Normal Jugular Venous V Waves Present] : normal jugular venous V waves present [No Jugular Venous Castorena A Waves] : no jugular venous castorena A waves [Respiration, Rhythm And Depth] : normal respiratory rhythm and effort [Exaggerated Use Of Accessory Muscles For Inspiration] : no accessory muscle use [Auscultation Breath Sounds / Voice Sounds] : lungs were clear to auscultation bilaterally [Heart Rate And Rhythm] : heart rate and rhythm were normal [Murmurs] : no murmurs present [Heart Sounds] : normal S1 and S2 [Abdomen Soft] : soft [Abdomen Tenderness] : non-tender [Abdomen Mass (___ Cm)] : no abdominal mass palpated [Abnormal Walk] : normal gait [Nail Clubbing] : no clubbing of the fingernails [Cyanosis, Localized] : no localized cyanosis [Petechial Hemorrhages (___cm)] : no petechial hemorrhages [Skin Color & Pigmentation] : normal skin color and pigmentation [] : no rash [No Venous Stasis] : no venous stasis [No Skin Ulcers] : no skin ulcer [Skin Lesions] : no skin lesions [No Xanthoma] : no  xanthoma was observed [Oriented To Time, Place, And Person] : oriented to person, place, and time [Affect] : the affect was normal [Mood] : the mood was normal [No Anxiety] : not feeling anxious

## 2019-08-14 NOTE — HISTORY OF PRESENT ILLNESS
[FreeTextEntry1] : f/u  For: HTN, HLD, CAD s/p CABg x4, Angina, recent PCI\par \par HPI for today: feels good. got Brachytherapy done at [place].  had angina for 3 days after. \par continues to take Nitrates.  did not make the appt for diabetes doctor yet. Hb A 1 C still elevated.\par Strongly recommended endocrinologist for aggressive sugar control\par Complaitn with meds. BP has been increasing now. \par \par OLD NOTE: 6/12/2019 c/c: chest pain \par Patient had an episode of chest pain, substernal with epigastric and abdominal pain.  2 days ago, it releived by NTG. \par he had another epsiode today, when he was going back from the [place]. 6/10, subternal.also with abdominal pain. he has been taking mylanta too for acid reflux . also , he has been recnetly started on azithromycin for bronchitis,.\par He states the second episode happened today and it got relived with NTG. he has been taking IMDUR every day. \par \par \par old note: no chest pain.no dyspnea,. no headaches. no dizziness.\par  difficulty walking foot problem. seein neurologist. NO Peripheral vascular disease . [facility] \par \par

## 2019-08-21 ENCOUNTER — APPOINTMENT (OUTPATIENT)
Dept: CARDIOLOGY | Facility: CLINIC | Age: 74
End: 2019-08-21

## 2019-08-28 ENCOUNTER — APPOINTMENT (OUTPATIENT)
Dept: ENDOCRINOLOGY | Facility: CLINIC | Age: 74
End: 2019-08-28
Payer: MEDICARE

## 2019-08-28 VITALS
HEIGHT: 67 IN | BODY MASS INDEX: 22.44 KG/M2 | WEIGHT: 143 LBS | DIASTOLIC BLOOD PRESSURE: 80 MMHG | HEART RATE: 81 BPM | SYSTOLIC BLOOD PRESSURE: 138 MMHG

## 2019-08-28 LAB — GLUCOSE BLDC GLUCOMTR-MCNC: 221

## 2019-08-28 PROCEDURE — 82962 GLUCOSE BLOOD TEST: CPT

## 2019-08-28 PROCEDURE — 99205 OFFICE O/P NEW HI 60 MIN: CPT | Mod: 25

## 2019-08-28 RX ORDER — ESOMEPRAZOLE MAGNESIUM 40 MG/1
40 CAPSULE, DELAYED RELEASE ORAL DAILY
Qty: 30 | Refills: 0 | Status: ACTIVE | COMMUNITY

## 2019-09-04 NOTE — ED ADULT NURSE NOTE - OBJECTIVE STATEMENT
Addended by: JENNIFER GOMEZ on: 9/4/2019 08:32 AM     Modules accepted: Orders     pt c/o chest tightness radiating to the left side, aox3 moving all extremities family at bedside, called by ekg tech for rhythm MD mejia made aware, pt on monitor on ,

## 2019-09-18 ENCOUNTER — MEDICATION RENEWAL (OUTPATIENT)
Age: 74
End: 2019-09-18

## 2019-10-09 NOTE — PROGRESS NOTE ADULT - PROBLEM SELECTOR PROBLEM 1
Cardiogenic shock
ACS (acute coronary syndrome)
Cardiogenic shock
Hematemesis without nausea
Hematemesis without nausea
Acute systolic congestive heart failure
Acute systolic congestive heart failure
Hematemesis without nausea
no

## 2019-10-22 ENCOUNTER — RESULT CHARGE (OUTPATIENT)
Age: 74
End: 2019-10-22

## 2019-10-22 ENCOUNTER — APPOINTMENT (OUTPATIENT)
Dept: ENDOCRINOLOGY | Facility: CLINIC | Age: 74
End: 2019-10-22
Payer: MEDICARE

## 2019-10-22 ENCOUNTER — CLINICAL ADVICE (OUTPATIENT)
Age: 74
End: 2019-10-22

## 2019-10-22 VITALS
DIASTOLIC BLOOD PRESSURE: 78 MMHG | SYSTOLIC BLOOD PRESSURE: 122 MMHG | BODY MASS INDEX: 22.76 KG/M2 | HEIGHT: 67 IN | WEIGHT: 145 LBS | HEART RATE: 88 BPM

## 2019-10-22 LAB — GLUCOSE BLDC GLUCOMTR-MCNC: 328

## 2019-10-22 PROCEDURE — 82962 GLUCOSE BLOOD TEST: CPT

## 2019-10-22 PROCEDURE — 99214 OFFICE O/P EST MOD 30 MIN: CPT | Mod: 25

## 2019-10-22 NOTE — REVIEW OF SYSTEMS
[Negative] : Neurological [Gas/Bloating] : gas/bloating [Recent Weight Gain (___ Lbs)] : no recent weight gain [Chest Pain] : no chest pain [Blurry Vision] : no blurred vision [Recent Weight Loss (___ Lbs)] : no recent weight loss [Palpitations] : no palpitations [Shortness Of Breath] : no shortness of breath [Nausea] : no nausea [Vomiting] : no vomiting was observed [Polyuria] : no polyuria [Abdominal Pain] : no abdominal pain [Polydipsia] : no polydipsia [Pain/Numbness of Digits] : no pain/numbness of digits [de-identified] : cold sensation in B/L feet [FreeTextEntry5] : chest tightness associated with gas- resolves with GasX

## 2019-10-22 NOTE — HISTORY OF PRESENT ILLNESS
[FreeTextEntry1] : type: 2\par diabetes since: >25 years \par number of years on insulin: none\par diagnosed by: routine labs\par current severity: suboptimal\par FH of diabetes: brother, wife (1st cousin) \par \par Current regimen for glycemic control:\par Metformin 500 mg BID, after breakfast and after dinner\par Glipizide 10 mg, after breakfast\par Januvia 100 mg daily\par SMBG once daily, fasting in AM, reports FBG has been 70s to low-100s. \par Current , ate [place] bread with eggplant for lunch two hours ago\par \par diet: eats 3 meals daily. has cereal in am. whole grain. sometimes fruit. no potatoes\par exercise: minimal\par weight: stable\par \par eye doctor: August 2019, no DR per patient. Denies vision changes.\par Foot exam: none; reports feet are always cold\par CKD: none\par other (PVD/MI/CVA): 4 CABG 1993, no CVA\par non smoker\par \par Also with history of subclinical hypothyroidism.

## 2019-10-22 NOTE — HISTORY OF PRESENT ILLNESS
[FreeTextEntry1] : type: 2\par diabetes since: >25 years \par number of years on insulin: none\par diagnosed by: routine labs\par current severity: suboptimal\par FH of diabetes: brother, wife (1st cousin) \par \par Current regimen for glycemic control:\par Metformin 500 mg BID, after breakfast and after dinner\par Glipizide 10 mg, after breakfast\par Januvia 100 mg daily\par SMBG once daily, fasting in AM, reports FBG has been 70s to low-100s. \par Current , ate [place] bread with eggplant for lunch two hours ago\par \par diet: eats 3 meals daily. has cereal in am. whole grain. sometimes fruit. no potatoes\par exercise: minimal\par weight: stable\par \par eye doctor: August 2019, no DR per patient. Denies vision changes.\par Foot exam: none; reports feet are always cold\par CKD: none\par other (PVD/MI/CVA): 4 CABG 1993, no CVA\par non smoker\par \par Also with history of subclinical hypothyroidism.

## 2019-10-22 NOTE — REASON FOR VISIT
[Initial Evaluation] : an initial evaluation [FreeTextEntry1] : T2DM, HLD, HTN [Family Member] : family member [Follow-Up: _____] : a [unfilled] follow-up visit

## 2019-10-22 NOTE — ASSESSMENT
[Carbohydrate Consistent Diet] : carbohydrate consistent diet [Long Term Vascular Complications] : long term vascular complications of diabetes [Importance of Diet and Exercise] : importance of diet and exercise to improve glycemic control, achieve weight loss and improve cardiovascular health [Self Monitoring of Blood Glucose] : self monitoring of blood glucose [FreeTextEntry1] : 74 year old male with long standing T2DM and associated CAD s/p 4V CABG, also with subclinical hypothyroidism, hypertension, hyperlipidemia, and vitamin D deficiency. Glycemic control is suboptimal, likely due to post prandial hyperglycemia.\par \par 1. T2DM\par -Increase Metformin to 500 mg, 2 tabs in AM and 1 tab in PM for the next week. If tolerating well, will increase to 500 mg, two tabs BID.\par -Continue Glipizide 10 mg daily, but take 10-15 minutes before first meal of the day instead of after. Ok to take Metformin and Januvia before meals as well.\par -Reviewed A1c target of 7.0%.\par -Discussed importance of diet in improving glycemic control. Needs to limit carbs and portion sizes. Advised 30-60 grams of carbs per meal.\par -Repeat A1c now.\par -Increase SMBG to twice daily, rotate times. If testing after a meal, wait at least 2 hours.\par -If A1c >9.0%, may need insulin. Will have patient send logs if that is the case.\par \par 2. Subclinical hypothyroidism\par -Repeat TFTs now with Tg ab, TPO ab, and Tg.\par \par 3. Hypertension- controlled\par -Continue ARB.\par \par 4. Hyperlipidemia\par -continue statin.\par -Repeat lipids now.\par \par 5. Vitamin D deficiency\par -Continue vitamin D 50,000 IU weekly.\par -Check level now.

## 2019-10-22 NOTE — PHYSICAL EXAM
[No Acute Distress] : no acute distress [Alert] : alert [Well Nourished] : well nourished [Well Developed] : well developed [Normal Sclera/Conjunctiva] : normal sclera/conjunctiva [EOMI] : extra ocular movement intact [No Proptosis] : no proptosis [Normal Oropharynx] : the oropharynx was normal [Thyroid Not Enlarged] : the thyroid was not enlarged [No Thyroid Nodules] : there were no palpable thyroid nodules [No Respiratory Distress] : no respiratory distress [Clear to Auscultation] : lungs were clear to auscultation bilaterally [No Accessory Muscle Use] : no accessory muscle use [Normal Rate] : heart rate was normal  [Normal S1, S2] : normal S1 and S2 [Regular Rhythm] : with a regular rhythm [No Edema] : there was no peripheral edema [No Stigmata of Cushings Syndrome] : no stigmata of cushings syndrome [No Tremors] : no tremors [Oriented x3] : oriented to person, place, and time [Anterior Cervical Nodes] : anterior cervical nodes [Normal] : normal and non tender [Normal Affect] : the affect was normal [Normal Mood] : the mood was normal [Acanthosis Nigricans] : no acanthosis nigricans

## 2019-10-22 NOTE — PHYSICAL EXAM
[No Acute Distress] : no acute distress [Alert] : alert [Well Nourished] : well nourished [Well Developed] : well developed [No Proptosis] : no proptosis [EOMI] : extra ocular movement intact [Normal Sclera/Conjunctiva] : normal sclera/conjunctiva [Normal Oropharynx] : the oropharynx was normal [Thyroid Not Enlarged] : the thyroid was not enlarged [No Thyroid Nodules] : there were no palpable thyroid nodules [No Respiratory Distress] : no respiratory distress [No Accessory Muscle Use] : no accessory muscle use [Clear to Auscultation] : lungs were clear to auscultation bilaterally [Normal Rate] : heart rate was normal  [Regular Rhythm] : with a regular rhythm [Normal S1, S2] : normal S1 and S2 [No Edema] : there was no peripheral edema [No Stigmata of Cushings Syndrome] : no stigmata of cushings syndrome [No Tremors] : no tremors [Oriented x3] : oriented to person, place, and time [Anterior Cervical Nodes] : anterior cervical nodes [Normal] : normal and non tender [Normal Affect] : the affect was normal [Normal Mood] : the mood was normal [Acanthosis Nigricans] : no acanthosis nigricans

## 2019-10-22 NOTE — REVIEW OF SYSTEMS
[Negative] : Neurological [Gas/Bloating] : gas/bloating [Recent Weight Gain (___ Lbs)] : no recent weight gain [Blurry Vision] : no blurred vision [Chest Pain] : no chest pain [Recent Weight Loss (___ Lbs)] : no recent weight loss [Shortness Of Breath] : no shortness of breath [Palpitations] : no palpitations [Nausea] : no nausea [Vomiting] : no vomiting was observed [Abdominal Pain] : no abdominal pain [Polyuria] : no polyuria [Pain/Numbness of Digits] : no pain/numbness of digits [Polydipsia] : no polydipsia [de-identified] : cold sensation in B/L feet [FreeTextEntry5] : chest tightness associated with gas- resolves with GasX

## 2019-10-25 ENCOUNTER — CLINICAL ADVICE (OUTPATIENT)
Age: 74
End: 2019-10-25

## 2019-10-25 LAB
25(OH)D3 SERPL-MCNC: 42.4 NG/ML
ALBUMIN SERPL ELPH-MCNC: 4.3 G/DL
ALP BLD-CCNC: 81 U/L
ALT SERPL-CCNC: 11 U/L
ANION GAP SERPL CALC-SCNC: 12 MMOL/L
AST SERPL-CCNC: 17 U/L
BASOPHILS # BLD AUTO: 0.05 K/UL
BASOPHILS NFR BLD AUTO: 0.5 %
BILIRUB SERPL-MCNC: 0.5 MG/DL
BUN SERPL-MCNC: 16 MG/DL
CALCIUM SERPL-MCNC: 9.6 MG/DL
CHLORIDE SERPL-SCNC: 97 MMOL/L
CHOLEST SERPL-MCNC: 138 MG/DL
CHOLEST/HDLC SERPL: 2.8 RATIO
CO2 SERPL-SCNC: 29 MMOL/L
CREAT SERPL-MCNC: 0.94 MG/DL
CREAT SPEC-SCNC: 67 MG/DL
EOSINOPHIL # BLD AUTO: 0.38 K/UL
EOSINOPHIL NFR BLD AUTO: 3.4 %
ESTIMATED AVERAGE GLUCOSE: 183 MG/DL
GLUCOSE SERPL-MCNC: 126 MG/DL
HBA1C MFR BLD HPLC: 8 %
HCT VFR BLD CALC: 34.5 %
HDLC SERPL-MCNC: 50 MG/DL
HGB BLD-MCNC: 10.6 G/DL
IMM GRANULOCYTES NFR BLD AUTO: 0.8 %
LDLC SERPL CALC-MCNC: 62 MG/DL
LYMPHOCYTES # BLD AUTO: 1.71 K/UL
LYMPHOCYTES NFR BLD AUTO: 15.4 %
MAN DIFF?: NORMAL
MCHC RBC-ENTMCNC: 23.4 PG
MCHC RBC-ENTMCNC: 30.7 GM/DL
MCV RBC AUTO: 76.2 FL
MICROALBUMIN 24H UR DL<=1MG/L-MCNC: <1.2 MG/DL
MICROALBUMIN/CREAT 24H UR-RTO: NORMAL MG/G
MONOCYTES # BLD AUTO: 1.12 K/UL
MONOCYTES NFR BLD AUTO: 10.1 %
NEUTROPHILS # BLD AUTO: 7.74 K/UL
NEUTROPHILS NFR BLD AUTO: 69.8 %
PLATELET # BLD AUTO: 364 K/UL
POTASSIUM SERPL-SCNC: 5.1 MMOL/L
PROT SERPL-MCNC: 7.2 G/DL
RBC # BLD: 4.53 M/UL
RBC # FLD: 17.5 %
SODIUM SERPL-SCNC: 138 MMOL/L
T4 FREE SERPL-MCNC: 1.5 NG/DL
THYROGLOB AB SERPL-ACNC: <20 IU/ML
THYROPEROXIDASE AB SERPL IA-ACNC: 13.4 IU/ML
TRIGL SERPL-MCNC: 129 MG/DL
TSH SERPL-ACNC: 6.47 UIU/ML
WBC # FLD AUTO: 11.09 K/UL

## 2019-10-29 ENCOUNTER — APPOINTMENT (OUTPATIENT)
Dept: CARDIOLOGY | Facility: CLINIC | Age: 74
End: 2019-10-29
Payer: MEDICARE

## 2019-10-29 ENCOUNTER — NON-APPOINTMENT (OUTPATIENT)
Age: 74
End: 2019-10-29

## 2019-10-29 VITALS
DIASTOLIC BLOOD PRESSURE: 74 MMHG | SYSTOLIC BLOOD PRESSURE: 130 MMHG | OXYGEN SATURATION: 100 % | RESPIRATION RATE: 16 BRPM | HEART RATE: 77 BPM | WEIGHT: 144 LBS | HEIGHT: 67 IN | BODY MASS INDEX: 22.6 KG/M2

## 2019-10-29 DIAGNOSIS — K21.0 GASTRO-ESOPHAGEAL REFLUX DISEASE WITH ESOPHAGITIS: ICD-10-CM

## 2019-10-29 DIAGNOSIS — K31.84 GASTROPARESIS: ICD-10-CM

## 2019-10-29 PROCEDURE — 99215 OFFICE O/P EST HI 40 MIN: CPT

## 2019-10-29 PROCEDURE — 93000 ELECTROCARDIOGRAM COMPLETE: CPT

## 2019-10-29 NOTE — DISCUSSION/SUMMARY
[Patient] : the patient [Risks] : risks [Benefits] : benefits [Alternatives] : alternatives [___ Month(s)] : [unfilled] month(s) [With Me] : with me [FreeTextEntry1] : This is a 70 year old male with history HTN, DM, HLD, CAD, h/o MI,  s/p CABG, s/p PCIx4, recent unstable angina s/p PCI with CODI to SVG to LCX, ICM EF 45%\par 1) chest pain : Angina pectoris. stable coronary artery disease  ct nitrates     PCI and brachy therapy this year.. ct Dual antiplatelet therapy statins. beta blocker \par aspirin and  ct brillinta ct statins and beta blocker and antianginals. \par 2) CAD, s/p CABG, s/p PCI: ASA, c ct Brilinta , Lipitor 40 mg daily, Coreg \par 3) HTN: controlled.ct coreg  Ct nitrates \par 4) ICM: EF 40%. Coreg, ARB, Euvolemic. lasix PRN \par 5) Gastric upset: gastroparesis: EGD and colonoscopy. Referred to GI.\par

## 2019-10-29 NOTE — HISTORY OF PRESENT ILLNESS
[FreeTextEntry1] : f/u  For: HTN, HLD, CAD s/p CABg x4, Angina, recent PCI\par \par HPI for today: feels good. got Brachytherapy done at [facility].  had angina for 3 days after. \par continues to take Nitrates.  did not make the appt for diabetes doctor yet. Hb A 1 C still elevated.\par Strongly recommended endocrinologist for aggressive sugar control\par Complaitn with meds. BP has been increasing now. \par \par OLD NOTE: 6/12/2019 c/c: chest pain \par Patient had an episode of chest pain, substernal with epigastric and abdominal pain.  2 days ago, it releived by NTG. \par he had another epsiode today, when he was going back from [place] [employer]. 6/10, subternal.also with abdominal pain. he has been taking mylanta too for acid reflux . also , he has been recnetly started on azithromycin for bronchitis,.\par He states the second episode happened today and it got relived with NTG. he has been taking IMDUR every day. \par \par \par old note: no chest pain.no dyspnea,. no headaches. no dizziness.\par  difficulty walking foot problem. seein neurologist. NO Peripheral vascular disease . [facility] \par \par

## 2019-11-18 NOTE — ED PROVIDER NOTE - CARDIAC, MLM
Relevant Problems   No relevant active problems       Anesthetic History   No history of anesthetic complications            Review of Systems / Medical History  Patient summary reviewed, nursing notes reviewed and pertinent labs reviewed    Pulmonary  Within defined limits                 Neuro/Psych   Within defined limits           Cardiovascular    Hypertension: well controlled              Exercise tolerance: >4 METS     GI/Hepatic/Renal     GERD: well controlled           Endo/Other    Diabetes: well controlled, type 2         Other Findings              Physical Exam    Airway  Mallampati: II  TM Distance: 4 - 6 cm  Neck ROM: normal range of motion   Mouth opening: Normal     Cardiovascular    Rhythm: regular  Rate: normal         Dental    Dentition: Poor dentition     Pulmonary  Breath sounds clear to auscultation               Abdominal  Abdominal exam normal       Other Findings            Anesthetic Plan    ASA: 3  Anesthesia type: general          Induction: Intravenous  Anesthetic plan and risks discussed with: Patient
Normal rate, regular rhythm.  Heart sounds S1, S2.  No murmurs, rubs or gallops.

## 2019-12-03 ENCOUNTER — APPOINTMENT (OUTPATIENT)
Dept: ENDOCRINOLOGY | Facility: CLINIC | Age: 74
End: 2019-12-03

## 2019-12-14 ENCOUNTER — INPATIENT (INPATIENT)
Facility: HOSPITAL | Age: 74
LOS: 4 days | Discharge: HOME | DRG: 291 | End: 2019-12-19
Attending: HOSPITALIST | Admitting: INTERNAL MEDICINE
Payer: MEDICARE

## 2019-12-14 VITALS
DIASTOLIC BLOOD PRESSURE: 79 MMHG | HEART RATE: 82 BPM | WEIGHT: 139.99 LBS | HEIGHT: 67 IN | SYSTOLIC BLOOD PRESSURE: 119 MMHG | RESPIRATION RATE: 18 BRPM | OXYGEN SATURATION: 100 %

## 2019-12-14 DIAGNOSIS — I50.20 UNSPECIFIED SYSTOLIC (CONGESTIVE) HEART FAILURE: ICD-10-CM

## 2019-12-14 DIAGNOSIS — Z96.7 PRESENCE OF OTHER BONE AND TENDON IMPLANTS: Chronic | ICD-10-CM

## 2019-12-14 DIAGNOSIS — Z95.1 PRESENCE OF AORTOCORONARY BYPASS GRAFT: Chronic | ICD-10-CM

## 2019-12-14 DIAGNOSIS — Z95.5 PRESENCE OF CORONARY ANGIOPLASTY IMPLANT AND GRAFT: Chronic | ICD-10-CM

## 2019-12-14 LAB
APTT BLD: 32.2 SEC — SIGNIFICANT CHANGE UP (ref 27.5–36.3)
HCT VFR BLD CALC: 31.5 % — LOW (ref 39–50)
HGB BLD-MCNC: 9.8 G/DL — LOW (ref 13–17)
INR BLD: 1.62 RATIO — HIGH (ref 0.88–1.16)
MCHC RBC-ENTMCNC: 23.4 PG — LOW (ref 27–34)
MCHC RBC-ENTMCNC: 31.1 GM/DL — LOW (ref 32–36)
MCV RBC AUTO: 75.2 FL — LOW (ref 80–100)
NT-PROBNP SERPL-SCNC: HIGH PG/ML (ref 0–300)
PLATELET # BLD AUTO: 316 K/UL — SIGNIFICANT CHANGE UP (ref 150–400)
PROTHROM AB SERPL-ACNC: 18.9 SEC — HIGH (ref 10–12.9)
RBC # BLD: 4.19 M/UL — LOW (ref 4.2–5.8)
RBC # FLD: 17.1 % — HIGH (ref 10.3–14.5)
TROPONIN T SERPL-MCNC: 0.08 NG/ML — HIGH (ref 0–0.06)
WBC # BLD: 9.72 K/UL — SIGNIFICANT CHANGE UP (ref 3.8–10.5)
WBC # FLD AUTO: 9.72 K/UL — SIGNIFICANT CHANGE UP (ref 3.8–10.5)

## 2019-12-14 PROCEDURE — 93010 ELECTROCARDIOGRAM REPORT: CPT | Mod: 59

## 2019-12-14 PROCEDURE — 99285 EMERGENCY DEPT VISIT HI MDM: CPT

## 2019-12-14 PROCEDURE — 71045 X-RAY EXAM CHEST 1 VIEW: CPT | Mod: 26

## 2019-12-14 PROCEDURE — 99291 CRITICAL CARE FIRST HOUR: CPT

## 2019-12-14 RX ORDER — MILRINONE LACTATE 1 MG/ML
0.28 INJECTION, SOLUTION INTRAVENOUS
Qty: 20 | Refills: 0 | Status: DISCONTINUED | OUTPATIENT
Start: 2019-12-14 | End: 2019-12-17

## 2019-12-14 RX ORDER — FUROSEMIDE 40 MG
40 TABLET ORAL ONCE
Refills: 0 | Status: COMPLETED | OUTPATIENT
Start: 2019-12-14 | End: 2019-12-14

## 2019-12-14 RX ADMIN — Medication 40 MILLIGRAM(S): at 20:55

## 2019-12-14 RX ADMIN — Medication 40 MILLIGRAM(S): at 18:35

## 2019-12-14 RX ADMIN — MILRINONE LACTATE 5.33 MICROGRAM(S)/KG/MIN: 1 INJECTION, SOLUTION INTRAVENOUS at 20:55

## 2019-12-14 NOTE — ED ADULT NURSE NOTE - OBJECTIVE STATEMENT
74 year old male, PMHx of CAD with bypass and stents and DM, presents to the ED from home with intermittent mid sternal CP since yesterday with associated SOB and bilateral leg edema. Patient states that he has been having to sleep at night with pillows to prop himself up. Patient states that he called Dr. Posada and was told to come to the ED.

## 2019-12-14 NOTE — ED PROVIDER NOTE - EYES NEGATIVE STATEMENT, MLM
Normal shape and contour; nares, nostrils and choana patent; no nasal flaring; mucosa pink and moist. no discharge, no irritation, no pain, no redness, and no visual changes.

## 2019-12-14 NOTE — CONSULT NOTE ADULT - SUBJECTIVE AND OBJECTIVE BOX
[facility]                                                        Office: [address] [address]                                                       Telephone: [phone]. Fax:[phone]                                                              CARDIOLOGY CONSULTATION NOTE                                                                                             Consult requested by: Milad De Paz MD (ER physician)       Reason for Consultation: congestive heart failure     History obtained by: Patient and medical record     obtained: No    Chief complaint:    Patient is a 74y old  Male who presents with a chief complaint of SOB (15 Dec 2019 08:46)      HPI:  75 y/o male with htn, hyperlipidemia, chf, dm,cad presents to [facility] ER c/o increased sob for the past 1 week. As per patient, he has been unable to lye flat with increased swelling in his lower feet. He currently denies any n/v/d/ha/sob/ Pt was seen by cardiology in the ER and was started on primacor drip. (15 Dec 2019 03:59)    Above history reviewed and agreed.     This is a 74 year old male with history of dyslipidemia and dyslipidemia, coronary artery disease prior MI, prior PCI, recent brachytherapy, presents with dyspnea on exertion and shortness of breathe . Patient states he had a recent travele to [place]. he was goood there.   Once he arrived here he felt cold. and also fatigue and tired . He thought he may have had a cold. Decreas epo intake. not feeling fine.   LE edema. symptoms going on for 1 week.   Patient complains of progressively worsening shortness of breathe, increase weight gain, orthopnea  and paroxsymal nocturnal dyspnea.     REVIEW OF SYMPTOMS: Cardiovascular:  See HPI. No chest pain,  +  dyspnea,  No syncope,  No palpitations, No dizziness, +  Orthopnea,      + Paroxsymal nocturnal dyspnea;  Respiratory:  No Dyspnea, No cough,     Genitourinary:  No dysuria, no hematuria; Gastrointestinal:  No nausea, no vomiting. No diarrhea.  No abdominal pain. No dark color stool, no melena ; Neurological: No headache, no dizziness, no slurred speech;  Psychiatric: No agitation, no anxiety.  ALL OTHER REVIEW OF SYSTEMS ARE NEGATIVE.    ALLERGIES: Allergies    No Known Allergies    Intolerances          CURRENT MEDICATIONS:  furosemide   Injectable 40 milliGRAM(s) IV Push two times a day  metolazone 5 milliGRAM(s) Oral daily  metoprolol succinate ER 50 milliGRAM(s) Oral daily  milrinone Infusion 0.28 MICROgram(s)/kG/Min IV Continuous <Continuous>     atorvastatin  dextrose 5%.  dextrose 50% Injectable  dextrose 50% Injectable  dextrose 50% Injectable  enoxaparin Injectable  insulin lispro (HumaLOG) corrective regimen sliding scale  ticagrelor      HOME MEDICATIONS:    PAST MEDICAL HISTORY  HFrEF (heart failure with reduced ejection fraction)  Essential hypertension  Type 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, without long-term current use of insul  Coronary artery disease involving native coronary artery of native heart, angina presence unspecifie  Coronary artery disease involving coronary bypass graft of native heart with unstable angina pectoris  High cholesterol  Diabetes mellitus  Hypertension  GERD (gastroesophageal reflux disease)  HLD (hyperlipidemia)  HTN (hypertension)  DM (diabetes mellitus)  CAD (coronary artery disease)      PAST SURGICAL HISTORY  Status post open reduction with internal fixation of fracture  S/P primary angioplasty with coronary stent  S/P CABG (coronary artery bypass graft)  CAD S/P percutaneous coronary angioplasty  S/P CABG (coronary artery bypass graft)      FAMILY HISTORY:  No pertinent family history in first degree relatives  No pertinent family history in first degree relatives      SOCIAL HISTORY:  Denies smoking/alcohol/drugs        Vital Signs Last 24 Hrs  reviewed.       PHYSICAL EXAM:  Constitutional: Comfortable . No acute distress. orthopnea.fatigue lethargic. tired. sleepy.   HEENT: Atraumatic and normcephalic , neck is supple . n+  JVD.   CNS: A&Ox3. No focal deficits. EOMI. Cranial nerves II-IX are intact.   Lymph Nodes: Cervical : Not palpable.  Respiratory:  bilaetearl decreas ebreath sound sna crackles.   Cardiovascular: S1S2 RRR. 2/6 sytolic murmur. No   ru ubs or gallop.  Gastrointestinal: Soft non-tender and non distended . +Bowel sounds. negative Godfrey's sign.  Extremities:2+  edema.   Psychiatric: Calm . no agitation.  Skin: . cold to touch. redness on the left leg     Intake and output:   -15 @ 07:01  -  12-15 @ 23:02  --------------------------------------------------------  IN: 0 mL / OUT: 200 mL / NET: -200 mL        LABS:                        9.8    9.72  )-----------( 316      ( 14 Dec 2019 17:43 )             31.5     12-15    141  |  94<L>  |  64.0<H>  ----------------------------<  133<H>  4.3   |  29.0  |  2.92<H>    Ca    9.5      15 Dec 2019 08:57  Phos  5.3     12-15  Mg     2.0     12-15    TPro  6.9  /  Alb  3.8  /  TBili  0.4  /  DBili  0.2  /  AST  38  /  ALT  32  /  AlkPhos  85  12-15    CARDIAC MARKERS ( 14 Dec 2019 16:35 )  x     / 0.08 ng/mL / x     / x     / x        ;p-BNP=Serum Pro-Brain Natriuretic Peptide: 58718 pg/mL (12-15 @ 08:57)  Serum Pro-Brain Natriuretic Peptide: 49047 pg/mL ( @ 16:35)    PT/INR - ( 14 Dec 2019 16:35 )   PT: 18.9 sec;   INR: 1.62 ratio         PTT - ( 14 Dec 2019 16:35 )  PTT:32.2 sec  Urinalysis Basic - ( 15 Dec 2019 00:42 )    Color: Yellow / Appearance: Clear / S.015 / pH: x  Gluc: x / Ketone: Negative  / Bili: Negative / Urobili: Negative mg/dL   Blood: x / Protein: 15 mg/dL / Nitrite: Negative   Leuk Esterase: Negative / RBC: 0-2 /HPF / WBC 0-2   Sq Epi: x / Non Sq Epi: Occasional / Bacteria: Occasional        INTERPRETATION OF TELEMETRY: Reviewed by me. Sinus . LBBBB PVCs  ECG: Reviewed by me. Sinus LBBB    RADIOLOGY & ADDITIONAL STUDIES:    X-ray:  reviewed by me. cardiomegaly         ECHO FINDINGS: Date:      Quick bedside hand held (point of care ultrasound) POCUS echo: Sevre biventricular failure. IVC dilated. Bilateral effusion. eleverted pulmonary pressures.       < from: Cardiac Cath Lab - Adult (19 @ 10:12) >  GRAFTS:   --  Graft to the 1st obtuse marginal: The graft was a saphenous  vein graft from the aorta. There was a 80 % stenosis in the middle third  of the graft.  COMPLICATIONS: There were no complications.  DIAGNOSTIC RECOMMENDATIONS: Brachytherapy done to the distal SVG to OM  lesion  Stent done in the native OM due to a coronary dissection  INTERVENTIONAL RECOMMENDATIONS: Brachytherapy done to the distal SVG to OM  lesion  Stent done in the native OM due to a coronary dissection    < end of copied text >

## 2019-12-14 NOTE — ED PROVIDER NOTE - OBJECTIVE STATEMENT
73 yo male pmh cad with cabg, cardiac stents  comes to ed with chest pain and increasing weakness ; pt is seen by Dr Posada of cardiology

## 2019-12-14 NOTE — ED PROVIDER NOTE - PROGRESS NOTE DETAILS
case discussed with Dr Posada who evaluated patient; pt to be admitted to telemetiry on milrinone drip

## 2019-12-14 NOTE — CONSULT NOTE ADULT - ATTENDING COMMENTS
admit to Step down. NO ICU for now. Will see how he responds to Milrinone and Iv lasix.    Critical Care time: >40 minutes of noncontinuous critical care time spent evaluating/treating, reviewing imaging, labs, discussing case with multidisciplinary team, discussing plans/goals of care with patient/family to prevent further life threatening depreciation of the patient's condition. Non-inclusive is procedure time.

## 2019-12-14 NOTE — ED ADULT NURSE NOTE - CHPI ED NUR SYMPTOMS NEG
no back pain/no dizziness/no chills/no fever/no diaphoresis/no congestion/no syncope/no vomiting/no nausea

## 2019-12-14 NOTE — ED ADULT NURSE NOTE - NSIMPLEMENTINTERV_GEN_ALL_ED
Implemented All Universal Safety Interventions:  [place] to call system. Call bell, personal items and telephone within reach. Instruct patient to call for assistance. Room bathroom lighting operational. Non-slip footwear when patient is off stretcher. Physically safe environment: no spills, clutter or unnecessary equipment. Stretcher in lowest position, wheels locked, appropriate side rails in place.

## 2019-12-14 NOTE — CONSULT NOTE ADULT - ASSESSMENT
This is a 74 year old male with history of dyslipidemia and dyslipidemia, coronary artery disease prior MI, prior PCI, recent brachytherapy, presents with dyspnea on exertion and shortness of breathe     1) Acute decomepnsated heart failure.  : Acute wornseing of LV and RV functon. unclear cause. Appears non ischehmic due to acuity.   started on diuretics lasix. mildrinone drip for inotropy.  low dose doppmine if needed if BP low.   hold beta-blocker for now.   no ACE-ARB.  metoloazone 5 mg daily. x 3 days.  Admit to step down.    no ICU for now. If Bp does not tolerate diuresis, then will admit to ICU and then pressors.  Mag and potassium check.

## 2019-12-14 NOTE — CONSULT NOTE ADULT - SUBJECTIVE AND OBJECTIVE BOX
75 yo male pmhx CAD s/p CABG (LIMA to LAD, SVG to OM), HFrEF with EF 40%, DM2, HTN, HLD, microcytic anemia, GERD presented with orthopnea x1 week as per patient and his son at bedside the past week the patient has only been sleeping upright due to SOB when lying down.  Today patient presented to hospital due to worsening symptomology and that they thought the patient had "water on his lungs" Patient seen by Dr. Leon, bedside echo performed revealing EF <20%, patient given total Lasix 80mg IV and started on fixed dose milrinone for decompensated systolic CHF.  Patient seen in ED, lying down resting comfortably sleeping.  Patient woken up and endorses great improvement in his SOB.        PAST MEDICAL & SURGICAL HISTORY:  HFrEF (heart failure with reduced ejection fraction)  Essential hypertension  Type 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, without long-term current use of insul  Coronary artery disease involving native coronary artery of native heart, angina presence unspecifie  Coronary artery disease involving coronary bypass graft of native heart with unstable angina pectoris  High cholesterol  GERD (gastroesophageal reflux disease)  Status post open reduction with internal fixation of fracture  S/P primary angioplasty with coronary stent  S/P CABG (coronary artery bypass graft): 4V  Solo    Allergies  No Known Allergies      FAMILY HISTORY:  No pertinent family history in first degree relatives  No pertinent family history in first degree relatives      Social History:   From home.       Review of Systems:  All ROS negative except as appreciated above.       Vitals During Exam:   HR: 82  BP: 128/74 mmHg   RR: 14  sPO2: 100%    Physical Examination:    General: Elderly male, lying in bed, appears comfortable    HEENT: NC/AT, Pupils 4mm, equal, reactive to light.  Symmetric. NC in place.    PULM: Symmetrical thorax expansion upon respiration.  Fine bibasilar crackles, no significant sputum production appreciated    CVS: Regular rate and rhythm, no murmurs, rubs, or gallops appreciated.     ABD: Soft, nondistended, nontender, normoactive bowel sounds, no masses appreciated.     EXT: No edema, nontender, DP pulses symmetrical     SKIN: Warm and well perfused, no rashes noted appreciated.      NEURO: Alert, oriented, interactive, nonfocal      Medications:  furosemide   Injectable 40 milliGRAM(s) IV Push two times a day  metolazone 5 milliGRAM(s) Oral daily  milrinone Infusion 0.28 MICROgram(s)/kG/Min IV Continuous <Continuous>      ICU Vital Signs Last 24 Hrs  T(C): 37.1 (14 Dec 2019 19:46), Max: 37.1 (14 Dec 2019 19:46)  T(F): 98.7 (14 Dec 2019 19:46), Max: 98.7 (14 Dec 2019 19:46)  HR: 76 (15 Dec 2019 01:39) (76 - 82)  BP: 130/73 (15 Dec 2019 01:39) (111/74 - 130/73)  BP(mean): --  ABP: --  ABP(mean): --  RR: 20 (15 Dec 2019 01:39) (18 - 20)  SpO2: 100% (15 Dec 2019 01:39) (100% - 100%)    Vital Signs Last 24 Hrs  T(C): 37.1 (14 Dec 2019 19:46), Max: 37.1 (14 Dec 2019 19:46)  T(F): 98.7 (14 Dec 2019 19:46), Max: 98.7 (14 Dec 2019 19:46)  HR: 76 (15 Dec 2019 01:39) (76 - 82)  BP: 130/73 (15 Dec 2019 01:39) (111/74 - 130/73)  BP(mean): --  RR: 20 (15 Dec 2019 01:39) (18 - 20)  SpO2: 100% (15 Dec 2019 01:39) (100% - 100%)      LABS:                        9.8    9.72  )-----------( 316      ( 14 Dec 2019 17:43 )             31.5     12-14    136  |  92<L>  |  60.0<H>  ----------------------------<  229<H>  4.8   |  22.0  |  3.14<H>    Ca    9.5      14 Dec 2019 16:35    TPro  7.5  /  Alb  4.2  /  TBili  0.5  /  DBili  x   /  AST  46<H>  /  ALT  38  /  AlkPhos  100  12-14      CARDIAC MARKERS ( 14 Dec 2019 16:35 )  x     / 0.08 ng/mL / x     / x     / x          PT/INR - ( 14 Dec 2019 16:35 )   PT: 18.9 sec;   INR: 1.62 ratio    PTT - ( 14 Dec 2019 16:35 )  PTT:32.2 sec      Urinalysis Basic - ( 15 Dec 2019 00:42 )  Color: Yellow / Appearance: Clear / S.015 / pH: x  Gluc: x / Ketone: Negative  / Bili: Negative / Urobili: Negative mg/dL   Blood: x / Protein: 15 mg/dL / Nitrite: Negative   Leuk Esterase: Negative / RBC: 0-2 /HPF / WBC 0-2   Sq Epi: x / Non Sq Epi: Occasional / Bacteria: Occasional      RADIOLOGY:   < from: Xray Chest 1 View-PORTABLE IMMEDIATE (19 @ 17:17) >  EXAM:  XR CHEST PORTABLE IMMED 1V                          PROCEDURE DATE:  2019      INTERPRETATION:  PROCEDURE: AP view of the chest.    CLINICAL INFORMATION: Chest pain.    COMPARISON: 2019.    FINDINGS:     The patient is statuspost sternotomy.    Lungs: There are small bilateral pleural effusions.  Heart: There is cardiomegaly.  Mediastinum: The mediastinum is within normal limits.    IMPRESSION:    Small bilateral pleural effusions.      ISAAC BARTHOLOMEW M.D., ATTENDING RADIOLOGIST  This document has been electronically signed. Dec 14 2019  5:23PM    < end of copied text >      SUPPLEMENTAL O2: NC  LINES: Peripheral   IVF: N  HEBERT: N  PPx: N/A  CONTACT: N

## 2019-12-14 NOTE — ED PROVIDER NOTE - PSH
S/P CABG (coronary artery bypass graft)  4V 1981 [place]  S/P primary angioplasty with coronary stent    Status post open reduction with internal fixation of fracture

## 2019-12-14 NOTE — ED ADULT NURSE NOTE - PSH
S/P CABG (coronary artery bypass graft)  4V 1981 [place]  S/P primary angioplasty with coronary stent    Status post open reduction with internal fixation of fracture

## 2019-12-14 NOTE — CONSULT NOTE ADULT - ASSESSMENT
75 yo male pmhx CAD s/p CABG (LIMA to LAD, SVG to OM), HFrEF with EF 40%, DM2, HTN, HLD, microcytic anemia, GERD admitted with     1. Decompensated Systolic CHF  2. Acute Hypoxic Respiratory Failure   3. Acute Pulmonary Edema       CV: Acute decompensated Systolic CHF, fournier in place, patient diuresed as tolerated and placed on fixed dose milrinone with stable vital signs.  Suggest Strict I&Os.  Cardiology following.  Suggest admit to telemetry. Suggest trending cardiac enzymes.    RESP: Acute hypoxic respiratory failure in setting of acute pulmonary edema; greatly improved from presentation.  Patient resting comfortably, able to lie down and was sleeping upon my arrival to bedside.  Patient diuresed well, lung sounds with fine bibasilar crackles. NC titrated to 2L with spo2 100%, likely can be weaned off.    RENAL: RAS likely prerenal from poor perfusion 2/2 decompensated CHF.  Suggest avoiding nephrotoxic medications, renally dosing medications, trend urine output, bun/cr and electrolytes.      Patient seen and examined at bedside, vitals/chart/medications reviewed, case discussed with eICU attending Dr. Gordillo.  At this time patient does not require MICU admission, recommendations as above.

## 2019-12-15 LAB
ALBUMIN SERPL ELPH-MCNC: 3.8 G/DL — SIGNIFICANT CHANGE UP (ref 3.3–5.2)
ALP SERPL-CCNC: 85 U/L — SIGNIFICANT CHANGE UP (ref 40–120)
ALT FLD-CCNC: 32 U/L — SIGNIFICANT CHANGE UP
ANION GAP SERPL CALC-SCNC: 18 MMOL/L — HIGH (ref 5–17)
APPEARANCE UR: CLEAR — SIGNIFICANT CHANGE UP
AST SERPL-CCNC: 38 U/L — SIGNIFICANT CHANGE UP
BACTERIA # UR AUTO: ABNORMAL
BILIRUB DIRECT SERPL-MCNC: 0.2 MG/DL — SIGNIFICANT CHANGE UP (ref 0–0.3)
BILIRUB INDIRECT FLD-MCNC: 0.2 MG/DL — SIGNIFICANT CHANGE UP (ref 0.2–1)
BILIRUB SERPL-MCNC: 0.4 MG/DL — SIGNIFICANT CHANGE UP (ref 0.4–2)
BILIRUB UR-MCNC: NEGATIVE — SIGNIFICANT CHANGE UP
BUN SERPL-MCNC: 64 MG/DL — HIGH (ref 8–20)
CALCIUM SERPL-MCNC: 9.5 MG/DL — SIGNIFICANT CHANGE UP (ref 8.6–10.2)
CHLORIDE SERPL-SCNC: 94 MMOL/L — LOW (ref 98–107)
CO2 SERPL-SCNC: 29 MMOL/L — SIGNIFICANT CHANGE UP (ref 22–29)
COLOR SPEC: YELLOW — SIGNIFICANT CHANGE UP
CREAT SERPL-MCNC: 2.92 MG/DL — HIGH (ref 0.5–1.3)
DIFF PNL FLD: NEGATIVE — SIGNIFICANT CHANGE UP
EPI CELLS # UR: SIGNIFICANT CHANGE UP
GLUCOSE BLDC GLUCOMTR-MCNC: 117 MG/DL — HIGH (ref 70–99)
GLUCOSE BLDC GLUCOMTR-MCNC: 198 MG/DL — HIGH (ref 70–99)
GLUCOSE BLDC GLUCOMTR-MCNC: 243 MG/DL — HIGH (ref 70–99)
GLUCOSE SERPL-MCNC: 133 MG/DL — HIGH (ref 70–115)
GLUCOSE UR QL: NEGATIVE MG/DL — SIGNIFICANT CHANGE UP
KETONES UR-MCNC: NEGATIVE — SIGNIFICANT CHANGE UP
LEUKOCYTE ESTERASE UR-ACNC: NEGATIVE — SIGNIFICANT CHANGE UP
MAGNESIUM SERPL-MCNC: 2 MG/DL — SIGNIFICANT CHANGE UP (ref 1.8–2.6)
NITRITE UR-MCNC: NEGATIVE — SIGNIFICANT CHANGE UP
NT-PROBNP SERPL-SCNC: HIGH PG/ML (ref 0–300)
PH UR: 5 — SIGNIFICANT CHANGE UP (ref 5–8)
PHOSPHATE SERPL-MCNC: 5.3 MG/DL — HIGH (ref 2.4–4.7)
POTASSIUM SERPL-MCNC: 3.9 MMOL/L — SIGNIFICANT CHANGE UP (ref 3.5–5.3)
POTASSIUM SERPL-MCNC: 4.3 MMOL/L — SIGNIFICANT CHANGE UP (ref 3.5–5.3)
POTASSIUM SERPL-SCNC: 3.9 MMOL/L — SIGNIFICANT CHANGE UP (ref 3.5–5.3)
POTASSIUM SERPL-SCNC: 4.3 MMOL/L — SIGNIFICANT CHANGE UP (ref 3.5–5.3)
PROT SERPL-MCNC: 6.9 G/DL — SIGNIFICANT CHANGE UP (ref 6.6–8.7)
PROT UR-MCNC: 15 MG/DL
RBC CASTS # UR COMP ASSIST: SIGNIFICANT CHANGE UP /HPF (ref 0–4)
SODIUM SERPL-SCNC: 141 MMOL/L — SIGNIFICANT CHANGE UP (ref 135–145)
SP GR SPEC: 1.01 — SIGNIFICANT CHANGE UP (ref 1.01–1.02)
T4 AB SER-ACNC: 8.6 UG/DL — SIGNIFICANT CHANGE UP (ref 4.5–12)
TSH SERPL-MCNC: 3.1 UIU/ML — SIGNIFICANT CHANGE UP (ref 0.27–4.2)
UROBILINOGEN FLD QL: NEGATIVE MG/DL — SIGNIFICANT CHANGE UP
WBC UR QL: SIGNIFICANT CHANGE UP

## 2019-12-15 PROCEDURE — 12345: CPT | Mod: NC

## 2019-12-15 PROCEDURE — 99291 CRITICAL CARE FIRST HOUR: CPT

## 2019-12-15 PROCEDURE — 99223 1ST HOSP IP/OBS HIGH 75: CPT

## 2019-12-15 PROCEDURE — 76775 US EXAM ABDO BACK WALL LIM: CPT | Mod: 26

## 2019-12-15 RX ORDER — METOPROLOL TARTRATE 50 MG
50 TABLET ORAL DAILY
Refills: 0 | Status: DISCONTINUED | OUTPATIENT
Start: 2019-12-15 | End: 2019-12-15

## 2019-12-15 RX ORDER — TICAGRELOR 90 MG/1
90 TABLET ORAL
Refills: 0 | Status: DISCONTINUED | OUTPATIENT
Start: 2019-12-15 | End: 2019-12-19

## 2019-12-15 RX ORDER — DEXTROSE 50 % IN WATER 50 %
12.5 SYRINGE (ML) INTRAVENOUS ONCE
Refills: 0 | Status: DISCONTINUED | OUTPATIENT
Start: 2019-12-15 | End: 2019-12-19

## 2019-12-15 RX ORDER — GLUCAGON INJECTION, SOLUTION 0.5 MG/.1ML
1 INJECTION, SOLUTION SUBCUTANEOUS ONCE
Refills: 0 | Status: DISCONTINUED | OUTPATIENT
Start: 2019-12-15 | End: 2019-12-19

## 2019-12-15 RX ORDER — ENOXAPARIN SODIUM 100 MG/ML
30 INJECTION SUBCUTANEOUS DAILY
Refills: 0 | Status: DISCONTINUED | OUTPATIENT
Start: 2019-12-15 | End: 2019-12-19

## 2019-12-15 RX ORDER — MAGNESIUM OXIDE 400 MG ORAL TABLET 241.3 MG
400 TABLET ORAL ONCE
Refills: 0 | Status: COMPLETED | OUTPATIENT
Start: 2019-12-15 | End: 2019-12-15

## 2019-12-15 RX ORDER — ATORVASTATIN CALCIUM 80 MG/1
40 TABLET, FILM COATED ORAL AT BEDTIME
Refills: 0 | Status: DISCONTINUED | OUTPATIENT
Start: 2019-12-15 | End: 2019-12-19

## 2019-12-15 RX ORDER — DEXTROSE 50 % IN WATER 50 %
25 SYRINGE (ML) INTRAVENOUS ONCE
Refills: 0 | Status: DISCONTINUED | OUTPATIENT
Start: 2019-12-15 | End: 2019-12-19

## 2019-12-15 RX ORDER — INSULIN LISPRO 100/ML
VIAL (ML) SUBCUTANEOUS
Refills: 0 | Status: DISCONTINUED | OUTPATIENT
Start: 2019-12-15 | End: 2019-12-19

## 2019-12-15 RX ORDER — FUROSEMIDE 40 MG
40 TABLET ORAL
Refills: 0 | Status: DISCONTINUED | OUTPATIENT
Start: 2019-12-15 | End: 2019-12-15

## 2019-12-15 RX ORDER — DOPAMINE HYDROCHLORIDE 40 MG/ML
3 INJECTION, SOLUTION, CONCENTRATE INTRAVENOUS
Qty: 400 | Refills: 0 | Status: DISCONTINUED | OUTPATIENT
Start: 2019-12-15 | End: 2019-12-15

## 2019-12-15 RX ORDER — SODIUM CHLORIDE 9 MG/ML
1000 INJECTION, SOLUTION INTRAVENOUS
Refills: 0 | Status: DISCONTINUED | OUTPATIENT
Start: 2019-12-15 | End: 2019-12-19

## 2019-12-15 RX ORDER — DEXTROSE 50 % IN WATER 50 %
15 SYRINGE (ML) INTRAVENOUS ONCE
Refills: 0 | Status: DISCONTINUED | OUTPATIENT
Start: 2019-12-15 | End: 2019-12-19

## 2019-12-15 RX ADMIN — MAGNESIUM OXIDE 400 MG ORAL TABLET 400 MILLIGRAM(S): 241.3 TABLET ORAL at 12:33

## 2019-12-15 RX ADMIN — TICAGRELOR 90 MILLIGRAM(S): 90 TABLET ORAL at 06:07

## 2019-12-15 RX ADMIN — Medication 2: at 17:11

## 2019-12-15 RX ADMIN — ENOXAPARIN SODIUM 30 MILLIGRAM(S): 100 INJECTION SUBCUTANEOUS at 13:09

## 2019-12-15 RX ADMIN — Medication 1: at 13:27

## 2019-12-15 RX ADMIN — Medication 40 MILLIGRAM(S): at 06:07

## 2019-12-15 RX ADMIN — TICAGRELOR 90 MILLIGRAM(S): 90 TABLET ORAL at 17:41

## 2019-12-15 RX ADMIN — Medication 40 MILLIGRAM(S): at 17:40

## 2019-12-15 RX ADMIN — Medication 40 MILLIGRAM(S): at 01:40

## 2019-12-15 RX ADMIN — Medication 50 MILLIGRAM(S): at 06:07

## 2019-12-15 RX ADMIN — DOPAMINE HYDROCHLORIDE 7.14 MICROGRAM(S)/KG/MIN: 40 INJECTION, SOLUTION, CONCENTRATE INTRAVENOUS at 11:47

## 2019-12-15 NOTE — PROGRESS NOTE ADULT - SUBJECTIVE AND OBJECTIVE BOX
[place] CARDIOLOGY-[facility] Faculty Practice                                                        Office: [address] [address]                                                       Telephone: [phone]. Fax:[phone]                                                                             PROGRESS NOTE   Reason for follow up: acute decompensated heart failure                             Overnight: No new events.   Update: perked up with diruesis and milrinone.   > 2 L output.   Did not tolerate dopamine drip .  significant reduction in edema.     Subjective: " i am feelign much better "   Complains of:  No new symptoms.   Review of symptoms: Cardiac:  No chest pain. +  dyspnea. No palpitations.  Respiratory:no cough. No dyspnea  Gastrointestinal: No diarrhea. No abdominal pain. No bleeding.     Past medical history: No updates.   Chronic conditions:  Hypertension: controlled. CHF: Stable. CAD: Stable ischemic heart disease. DM: Stable;    	  Vitals:  T(C): 36.6 (12-15-19 @ 17:40), Max: 36.6 (12-15-19 @ 06:06)  HR: 89 (12-15-19 @ 19:40) (76 - 93)  BP: 104/60 (12-15-19 @ 19:40) (74/44 - 147/81)  RR: 20 (12-15-19 @ 19:40) (18 - 20)  SpO2: 100% (12-15-19 @ 19:40) (98% - 100%)  Wt(kg): --  I&O's Summary    15 Dec 2019 07:01  -  15 Dec 2019 23:13  --------------------------------------------------------  IN: 0 mL / OUT: 200 mL / NET: -200 mL      Weight (kg): 63.5 (12-14 @ 15:46)    PHYSICAL EXAM:  Appearance: Comfortable. No acute distress  HEENT:  Head and neck: Atraumatic. Normocephalic.  Normal oral mucosa, PERRL, Neck is supple. + JVD, No carotid bruit.   Neurologic: A & O x 3, no focal deficits. EOMI , Cranial nerves are intact.  Lymphatic: No cervical lymphadenopathy  Cardiovascular: Normal S1 S2, No murmur, rubs/gallops.   Respiratory: bilateral basal crpt.s   Gastrointestinal:  Soft, Non-tender, + BS  Lower Extremities: trivial edema  Psychiatry: Patient is calm. No agitation. Mood & affect appropriate  Skin: No rashes/ ecchymoses/cyanosis/ulcers visualized on the face, hands or feet.    CURRENT MEDICATIONS:  furosemide   Injectable 40 milliGRAM(s) IV Push two times a day  metolazone 5 milliGRAM(s) Oral daily  metoprolol succinate ER 50 milliGRAM(s) Oral daily  milrinone Infusion 0.28 MICROgram(s)/kG/Min IV Continuous <Continuous>    atorvastatin  dextrose 50% Injectable  dextrose 50% Injectable  dextrose 50% Injectable  insulin lispro (HumaLOG) corrective regimen sliding scale  dextrose 5%.  enoxaparin Injectable  ticagrelor      LABS:	 	  CARDIAC MARKERS ( 15 Dec 2019 08:57 )  x     / x     / x     / x     / x      p-BNP 15 Dec 2019 08:57: 87462 pg/mL, CARDIAC MARKERS ( 14 Dec 2019 16:35 )  x     / 0.08 ng/mL / x     / x     / x      p-BNP 14 Dec 2019 16:35: 76204 pg/mL                            9.8    9.72  )-----------( 316      ( 14 Dec 2019 17:43 )             31.5     12-15    141  |  94<L>  |  64.0<H>  ----------------------------<  133<H>  4.3   |  29.0  |  2.92<H>    Ca    9.5      15 Dec 2019 08:57  Phos  5.3     12-15  Mg     2.0     12-15    TPro  6.9  /  Alb  3.8  /  TBili  0.4  /  DBili  0.2  /  AST  38  /  ALT  32  /  AlkPhos  85  12-15    proBNP: Serum Pro-Brain Natriuretic Peptide: 57631 pg/mL (12-15 @ 08:57)  Serum Pro-Brain Natriuretic Peptide: 87997 pg/mL (12-14 @ 16:35)    Lipid Profile:   HgA1c: TSH: Thyroid Stimulating Hormone, Serum: 3.10 uIU/mL      	NTERPRETATION OF TELEMETRY: Reviewed by me. Sinus . LBBBB PVCs  ECG: Reviewed by me. Sinus LBBB    RADIOLOGY & ADDITIONAL STUDIES:    X-ray:  reviewed by me. cardiomegaly         ECHO FINDINGS: Date:      Quick bedside hand held (point of care ultrasound) POCUS echo: Sevre biventricular failure. IVC dilated. Bilateral effusion. eleverted pulmonary pressures.       < from: Cardiac Cath Lab - Adult (07.24.19 @ 10:12) >  GRAFTS:   --  Graft to the 1st obtuse marginal: The graft was a saphenous  vein graft from the aorta. There was a 80 % stenosis in the middle third  of the graft.

## 2019-12-15 NOTE — H&P ADULT - NSICDXPASTSURGICALHX_GEN_ALL_CORE_FT
PAST SURGICAL HISTORY:  S/P CABG (coronary artery bypass graft) 4V 1983 [place]    S/P primary angioplasty with coronary stent     Status post open reduction with internal fixation of fracture

## 2019-12-15 NOTE — H&P ADULT - ASSESSMENT
73 y/o male with chf, htn, hyperlipidemia, admitted to [facility] for worsening sob and heart failure.     Plan  1. Acute on Chronic Systolic Heart Failure  -seen by cardio in ed started on primacor  -mgmt as per cardio  -c/w asa 81mg po QD  -hold cozaar for now in setting of RAS  -Toprol 50mg PO QD hold for sbp less than 100 or hr less than 60    2. CAD  c/w asa  c/w berlinta 90mg po BID    3. DM  diabetes order set    4. GERD  Nexium 40mg po QD

## 2019-12-15 NOTE — CONSULT NOTE ADULT - SUBJECTIVE AND OBJECTIVE BOX
Patient is a 74y old  Male who presents with a chief complaint of SOB (15 Dec 2019 03:59)      HPI:  73 y/o male with htn, hyperlipidemia, chf, dm,cad presents to [facility] ER c/o increased sob for the past 1 week. As per patient, he has been unable to lye flat with increased swelling in his lower feet. He currently denies any n/v/d/ha/sob/ Pt was seen by cardiology in the ER and was started on primacor drip. (15 Dec 2019 03:59)    Above noted . His creat was 0.8 in 2019 .  Presented with decompensated CHF  Clinically improving with increased UO on IV Lssix and Primacor infusion   No outflow complaints or h/o retention   Was on Metformin and Losartan PTA , Now held   EF reported at < 20 %      PAST MEDICAL & SURGICAL HISTORY:  HFrEF (heart failure with reduced ejection fraction)  Essential hypertension  Type 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, without long-term current use of insul  Coronary artery disease involving native coronary artery of native heart, angina presence unspecifie  Coronary artery disease involving coronary bypass graft of native heart with unstable angina pectoris  High cholesterol  GERD (gastroesophageal reflux disease)  Status post open reduction with internal fixation of fracture  S/P primary angioplasty with coronary stent  S/P CABG (coronary artery bypass graft): 4V  [place]      FAMILY HISTORY:  No pertinent family history in first degree relatives  No pertinent family history in first degree relatives      Social History:    MEDICATIONS  (STANDING):  atorvastatin 40 milliGRAM(s) Oral at bedtime  dextrose 5%. 1000 milliLiter(s) (50 mL/Hr) IV Continuous <Continuous>  dextrose 50% Injectable 12.5 Gram(s) IV Push once  dextrose 50% Injectable 25 Gram(s) IV Push once  dextrose 50% Injectable 25 Gram(s) IV Push once  enoxaparin Injectable 30 milliGRAM(s) SubCutaneous daily  furosemide   Injectable 40 milliGRAM(s) IV Push two times a day  insulin lispro (HumaLOG) corrective regimen sliding scale   SubCutaneous three times a day before meals  metolazone 5 milliGRAM(s) Oral daily  metoprolol succinate ER 50 milliGRAM(s) Oral daily  milrinone Infusion 0.28 MICROgram(s)/kG/Min (5.334 mL/Hr) IV Continuous <Continuous>  ticagrelor 90 milliGRAM(s) Oral two times a day    MEDICATIONS  (PRN):  dextrose 40% Gel 15 Gram(s) Oral once PRN Blood Glucose LESS THAN 70 milliGRAM(s)/deciliter  glucagon  Injectable 1 milliGRAM(s) IntraMuscular once PRN Glucose LESS THAN 70 milligrams/deciliter      Allergies    No Known Allergies    Intolerances      Vital Signs Last 24 Hrs  T(C): 36.6 (15 Dec 2019 06:06), Max: 37.1 (14 Dec 2019 19:46)  T(F): 97.9 (15 Dec 2019 06:06), Max: 98.7 (14 Dec 2019 19:46)  HR: 83 (15 Dec 2019 07:27) (76 - 83)  BP: 134/69 (15 Dec 2019 07:27) (111/74 - 134/69)  BP(mean): --  RR: 20 (15 Dec 2019 07:27) (18 - 20)  SpO2: 100% (15 Dec 2019 07:27) (100% - 100%)  Daily Height in cm: 170.18 (14 Dec 2019 15:46)    Daily   I&O's Detail    I&O's Summary      PHYSICAL EXAM:    GENERAL: NAD, feels better in ER   HEAD:  Atraumatic, No facial edema   EYES: EOMI, PERRLA, conjunctiva and sclera clear  NECK: Supple, + JVP   NERVOUS SYSTEM:  Alert & Oriented X3, Good concentration; Motor Strength 5/5 B/L upper and lower extremities; DTRs 2+ intact and symmetric  CHEST/LUNG: EAE , Bilateral fine rhonchi , No wheeze   HEART: Regular rate and rhythm; No murmurs, or rub   ABDOMEN: Soft, Nontender, Nondistended; Bowel sounds present. Bladder NP . NO CVAT   EXTREMITIES:  2+ Peripheral Pulses, + edema         LABS:                        9.8    9.72  )-----------( 316      ( 14 Dec 2019 17:43 )             31.5     12-14    136  |  92<L>  |  60.0<H>  ----------------------------<  229<H>  4.8   |  22.0  |  3.14<H>    Ca    9.5      14 Dec 2019 16:35    TPro  7.5  /  Alb  4.2  /  TBili  0.5  /  DBili  x   /  AST  46<H>  /  ALT  38  /  AlkPhos  100  12-    PT/INR - ( 14 Dec 2019 16:35 )   PT: 18.9 sec;   INR: 1.62 ratio         PTT - ( 14 Dec 2019 16:35 )  PTT:32.2 sec  Urinalysis Basic - ( 15 Dec 2019 00:42 )    Color: Yellow / Appearance: Clear / S.015 / pH: x  Gluc: x / Ketone: Negative  / Bili: Negative / Urobili: Negative mg/dL   Blood: x / Protein: 15 mg/dL / Nitrite: Negative   Leuk Esterase: Negative / RBC: 0-2 /HPF / WBC 0-2   Sq Epi: x / Non Sq Epi: Occasional / Bacteria: Occasional             EXAM:  XR CHEST PORTABLE IMMED 1V                          PROCEDURE DATE:  2019          INTERPRETATION:  PROCEDURE: AP view of the chest.    CLINICAL INFORMATION: Chest pain.    COMPARISON: 2019.    FINDINGS:     The patient is statuspost sternotomy.    Lungs: There are small bilateral pleural effusions.  Heart: There is cardiomegaly.  Mediastinum: The mediastinum is within normal limits.    IMPRESSION:    Small bilateral pleural effusions.                ISAAC BARTHOLOMEW M.D., ATTENDING RADIOLOGIST  This document has been electronically signed. Dec 14 2019  5:23PM                  RADIOLOGY & ADDITIONAL TESTS:

## 2019-12-15 NOTE — CHART NOTE - NSCHARTNOTEFT_GEN_A_CORE
Patient seen and examined at bedside. Pt was c/o of feeling uneasy after dopamine gtt started. BP 80/60. Dopamin gtt d/bobby. Case discussed with cardiology. Pt feels a lot better now with no CP, palp, SOB, dizziness. Continue Lasix & Milrinone infusion. Losartan and Metformin held. Follow bladder scan, Renal sonogram & Echo        PHYSICAL EXAM-  GENERAL: NAD, well-groomed, well-developed  HEAD:  Atraumatic, Normocephalic  EYES: EOMI, PERRLA, conjunctiva and sclera clear  NECK: Supple, No JVD  NERVOUS SYSTEM:  Alert & Oriented X3, Motor Strength 5/5 B/L upper and lower extremities; DTRs 2+ intact and symmetric  CHEST/LUNG: Clear to percussion bilaterally; No rales, rhonchi, wheezing, or rubs  HEART: Regular rate and rhythm; No murmurs, rubs, or gallops  ABDOMEN: Soft, Nontender, Nondistended; Bowel sounds present  EXTREMITIES:  2+ Peripheral Pulses, 1+ edema b/l  SKIN: No rashes or lesions

## 2019-12-15 NOTE — H&P ADULT - HISTORY OF PRESENT ILLNESS
75 y/o male with htn, hyperlipidemia, chf, dm,cad presents to [facility] ER c/o increased sob for the past 1 week. As per patient, he has been unable to lye flat with increased swelling in his lower feet. He currently denies any n/v/d/ha/sob/ Pt was seen by cardiology in the ER and was started on primacor drip.

## 2019-12-15 NOTE — CONSULT NOTE ADULT - ASSESSMENT
RAS - Baseline creat 0.8 in July 2018  Decompensated CM   Improved pulm edema   Suspect CRS with added dec in renal perfusion due to Losartan  [place] UA   Continue Lasix for now , breathing much more comfortably   Strict I/O   Milrinone infusion   Losartan and Metformin held   Follow bladder scan   Renal sonogram   RPGN serology   Follow up on official ECHO   Trend labs     Anemia - Add epogen if Hgb remains < 10  Check iron stores , TFT's , B 12 and folate     RO - Check PTH , Phos , 25 Vit D     Will follow   D/W son

## 2019-12-15 NOTE — CHART NOTE - NSCHARTNOTEFT_GEN_A_CORE
Pt seen by Dr Valenzuela   Lasix , metolazone , bmp , UA, phosphate and mg were ordered as recommended by Dr Valenzuela

## 2019-12-16 LAB
24R-OH-CALCIDIOL SERPL-MCNC: 54.4 NG/ML — SIGNIFICANT CHANGE UP (ref 30–80)
ANION GAP SERPL CALC-SCNC: 16 MMOL/L — SIGNIFICANT CHANGE UP (ref 5–17)
BUN SERPL-MCNC: 57 MG/DL — HIGH (ref 8–20)
C3 SERPL-MCNC: 117 MG/DL — SIGNIFICANT CHANGE UP (ref 81–157)
C4 SERPL-MCNC: 30 MG/DL — SIGNIFICANT CHANGE UP (ref 13–39)
CALCIUM SERPL-MCNC: 9.4 MG/DL — SIGNIFICANT CHANGE UP (ref 8.6–10.2)
CALCIUM SERPL-MCNC: 9.6 MG/DL — SIGNIFICANT CHANGE UP (ref 8.4–10.5)
CHLORIDE SERPL-SCNC: 92 MMOL/L — LOW (ref 98–107)
CO2 SERPL-SCNC: 32 MMOL/L — HIGH (ref 22–29)
CREAT SERPL-MCNC: 2.5 MG/DL — HIGH (ref 0.5–1.3)
FERRITIN SERPL-MCNC: 23 NG/ML — LOW (ref 30–400)
FOLATE SERPL-MCNC: 15 NG/ML — SIGNIFICANT CHANGE UP
GLUCOSE BLDC GLUCOMTR-MCNC: 161 MG/DL — HIGH (ref 70–99)
GLUCOSE BLDC GLUCOMTR-MCNC: 194 MG/DL — HIGH (ref 70–99)
GLUCOSE BLDC GLUCOMTR-MCNC: 210 MG/DL — HIGH (ref 70–99)
GLUCOSE BLDC GLUCOMTR-MCNC: 329 MG/DL — HIGH (ref 70–99)
GLUCOSE SERPL-MCNC: 235 MG/DL — HIGH (ref 70–115)
HBA1C BLD-MCNC: 8 % — HIGH (ref 4–5.6)
HCT VFR BLD CALC: 32.4 % — LOW (ref 39–50)
HGB BLD-MCNC: 9.9 G/DL — LOW (ref 13–17)
IRON SATN MFR SERPL: 25 UG/DL — LOW (ref 59–158)
IRON SATN MFR SERPL: 6 % — LOW (ref 16–55)
MAGNESIUM SERPL-MCNC: 2.1 MG/DL — SIGNIFICANT CHANGE UP (ref 1.6–2.6)
MCHC RBC-ENTMCNC: 23 PG — LOW (ref 27–34)
MCHC RBC-ENTMCNC: 30.6 GM/DL — LOW (ref 32–36)
MCV RBC AUTO: 75.3 FL — LOW (ref 80–100)
PHOSPHATE SERPL-MCNC: 3.6 MG/DL — SIGNIFICANT CHANGE UP (ref 2.4–4.7)
PLATELET # BLD AUTO: 328 K/UL — SIGNIFICANT CHANGE UP (ref 150–400)
POTASSIUM SERPL-MCNC: 3.2 MMOL/L — LOW (ref 3.5–5.3)
POTASSIUM SERPL-SCNC: 3.2 MMOL/L — LOW (ref 3.5–5.3)
PTH-INTACT FLD-MCNC: 54 PG/ML — SIGNIFICANT CHANGE UP (ref 15–65)
RBC # BLD: 4.3 M/UL — SIGNIFICANT CHANGE UP (ref 4.2–5.8)
RBC # FLD: 17.1 % — HIGH (ref 10.3–14.5)
SODIUM SERPL-SCNC: 140 MMOL/L — SIGNIFICANT CHANGE UP (ref 135–145)
T4 FREE SERPL-MCNC: 1.7 NG/DL — SIGNIFICANT CHANGE UP (ref 0.9–1.8)
TIBC SERPL-MCNC: 435 UG/DL — HIGH (ref 220–430)
TRANSFERRIN SERPL-MCNC: 304 MG/DL — SIGNIFICANT CHANGE UP (ref 180–329)
TSH SERPL-MCNC: 3.7 UIU/ML — SIGNIFICANT CHANGE UP (ref 0.27–4.2)
VIT B12 SERPL-MCNC: 1462 PG/ML — HIGH (ref 232–1245)
WBC # BLD: 9.34 K/UL — SIGNIFICANT CHANGE UP (ref 3.8–10.5)
WBC # FLD AUTO: 9.34 K/UL — SIGNIFICANT CHANGE UP (ref 3.8–10.5)

## 2019-12-16 PROCEDURE — 99232 SBSQ HOSP IP/OBS MODERATE 35: CPT

## 2019-12-16 PROCEDURE — 99291 CRITICAL CARE FIRST HOUR: CPT

## 2019-12-16 RX ORDER — FUROSEMIDE 40 MG
40 TABLET ORAL EVERY 12 HOURS
Refills: 0 | Status: DISCONTINUED | OUTPATIENT
Start: 2019-12-16 | End: 2019-12-16

## 2019-12-16 RX ORDER — POTASSIUM CHLORIDE 20 MEQ
40 PACKET (EA) ORAL ONCE
Refills: 0 | Status: COMPLETED | OUTPATIENT
Start: 2019-12-16 | End: 2019-12-16

## 2019-12-16 RX ORDER — FUROSEMIDE 40 MG
40 TABLET ORAL DAILY
Refills: 0 | Status: DISCONTINUED | OUTPATIENT
Start: 2019-12-16 | End: 2019-12-17

## 2019-12-16 RX ORDER — METOPROLOL TARTRATE 50 MG
25 TABLET ORAL EVERY 12 HOURS
Refills: 0 | Status: DISCONTINUED | OUTPATIENT
Start: 2019-12-16 | End: 2019-12-17

## 2019-12-16 RX ORDER — IRON SUCROSE 20 MG/ML
100 INJECTION, SOLUTION INTRAVENOUS EVERY 24 HOURS
Refills: 0 | Status: DISCONTINUED | OUTPATIENT
Start: 2019-12-16 | End: 2019-12-19

## 2019-12-16 RX ORDER — FERROUS SULFATE 325(65) MG
325 TABLET ORAL
Refills: 0 | Status: DISCONTINUED | OUTPATIENT
Start: 2019-12-16 | End: 2019-12-17

## 2019-12-16 RX ORDER — MAGNESIUM OXIDE 400 MG ORAL TABLET 241.3 MG
400 TABLET ORAL DAILY
Refills: 0 | Status: COMPLETED | OUTPATIENT
Start: 2019-12-16 | End: 2019-12-17

## 2019-12-16 RX ORDER — POLYETHYLENE GLYCOL 3350 17 G/17G
17 POWDER, FOR SOLUTION ORAL DAILY
Refills: 0 | Status: DISCONTINUED | OUTPATIENT
Start: 2019-12-16 | End: 2019-12-19

## 2019-12-16 RX ORDER — ASPIRIN/CALCIUM CARB/MAGNESIUM 324 MG
81 TABLET ORAL DAILY
Refills: 0 | Status: DISCONTINUED | OUTPATIENT
Start: 2019-12-16 | End: 2019-12-19

## 2019-12-16 RX ORDER — POTASSIUM CHLORIDE 20 MEQ
40 PACKET (EA) ORAL ONCE
Refills: 0 | Status: DISCONTINUED | OUTPATIENT
Start: 2019-12-16 | End: 2019-12-16

## 2019-12-16 RX ORDER — POTASSIUM CHLORIDE 20 MEQ
20 PACKET (EA) ORAL ONCE
Refills: 0 | Status: COMPLETED | OUTPATIENT
Start: 2019-12-16 | End: 2019-12-16

## 2019-12-16 RX ORDER — PANTOPRAZOLE SODIUM 20 MG/1
40 TABLET, DELAYED RELEASE ORAL
Refills: 0 | Status: DISCONTINUED | OUTPATIENT
Start: 2019-12-16 | End: 2019-12-19

## 2019-12-16 RX ADMIN — TICAGRELOR 90 MILLIGRAM(S): 90 TABLET ORAL at 06:17

## 2019-12-16 RX ADMIN — POLYETHYLENE GLYCOL 3350 17 GRAM(S): 17 POWDER, FOR SOLUTION ORAL at 23:13

## 2019-12-16 RX ADMIN — Medication 4: at 12:30

## 2019-12-16 RX ADMIN — Medication 25 MILLIGRAM(S): at 17:41

## 2019-12-16 RX ADMIN — Medication 325 MILLIGRAM(S): at 17:40

## 2019-12-16 RX ADMIN — Medication 40 MILLIEQUIVALENT(S): at 17:41

## 2019-12-16 RX ADMIN — Medication 1: at 08:30

## 2019-12-16 RX ADMIN — Medication 40 MILLIGRAM(S): at 22:02

## 2019-12-16 RX ADMIN — IRON SUCROSE 105 MILLIGRAM(S): 20 INJECTION, SOLUTION INTRAVENOUS at 17:40

## 2019-12-16 RX ADMIN — ENOXAPARIN SODIUM 30 MILLIGRAM(S): 100 INJECTION SUBCUTANEOUS at 12:31

## 2019-12-16 RX ADMIN — ATORVASTATIN CALCIUM 40 MILLIGRAM(S): 80 TABLET, FILM COATED ORAL at 21:59

## 2019-12-16 RX ADMIN — Medication 2: at 17:42

## 2019-12-16 RX ADMIN — MAGNESIUM OXIDE 400 MG ORAL TABLET 400 MILLIGRAM(S): 241.3 TABLET ORAL at 17:41

## 2019-12-16 RX ADMIN — TICAGRELOR 90 MILLIGRAM(S): 90 TABLET ORAL at 17:40

## 2019-12-16 RX ADMIN — Medication 20 MILLIEQUIVALENT(S): at 15:22

## 2019-12-16 NOTE — ED ADULT NURSE REASSESSMENT NOTE - COMFORT CARE
repositioned
repositioned/side rails up/plan of care explained/treatment delay explained/wait time explained/warm blanket provided

## 2019-12-16 NOTE — PROGRESS NOTE ADULT - SUBJECTIVE AND OBJECTIVE BOX
NEPHROLOGY INTERVAL HPI/OVERNIGHT EVENTS:    feels better   Good UO   Less SOB , Less edema     MEDICATIONS  (STANDING):  atorvastatin 40 milliGRAM(s) Oral at bedtime  dextrose 5%. 1000 milliLiter(s) (50 mL/Hr) IV Continuous <Continuous>  dextrose 50% Injectable 12.5 Gram(s) IV Push once  dextrose 50% Injectable 25 Gram(s) IV Push once  dextrose 50% Injectable 25 Gram(s) IV Push once  enoxaparin Injectable 30 milliGRAM(s) SubCutaneous daily  furosemide   Injectable 40 milliGRAM(s) IV Push every 12 hours  insulin lispro (HumaLOG) corrective regimen sliding scale   SubCutaneous three times a day before meals  metolazone 5 milliGRAM(s) Oral daily  milrinone Infusion 0.28 MICROgram(s)/kG/Min (5.334 mL/Hr) IV Continuous <Continuous>  potassium chloride    Tablet ER 20 milliEquivalent(s) Oral once  ticagrelor 90 milliGRAM(s) Oral two times a day    MEDICATIONS  (PRN):  dextrose 40% Gel 15 Gram(s) Oral once PRN Blood Glucose LESS THAN 70 milliGRAM(s)/deciliter  glucagon  Injectable 1 milliGRAM(s) IntraMuscular once PRN Glucose LESS THAN 70 milligrams/deciliter      Allergies    No Known Allergies    Intolerances    Vital Signs Last 24 Hrs  T(C): 36.4 (16 Dec 2019 11:11), Max: 37.1 (16 Dec 2019 08:19)  T(F): 97.5 (16 Dec 2019 11:11), Max: 98.7 (16 Dec 2019 08:19)  HR: 100 (16 Dec 2019 11:11) (83 - 100)  BP: 151/84 (16 Dec 2019 11:11) (95/53 - 151/84)  BP(mean): --  RR: 18 (16 Dec 2019 11:11) (16 - 20)  SpO2: 98% (16 Dec 2019 11:11) (98% - 100%)  Daily     Daily   I&O's Detail    15 Dec 2019 07:01  -  16 Dec 2019 07:00  --------------------------------------------------------  IN:  Total IN: 0 mL    OUT:    Voided: 650 mL  Total OUT: 650 mL    Total NET: -650 mL        I&O's Summary    15 Dec 2019 07:01  -  16 Dec 2019 07:00  --------------------------------------------------------  IN: 0 mL / OUT: 650 mL / NET: -650 mL        PHYSICAL EXAM:  GENERAL: NAD, feels better in ER   HEAD:  Atraumatic, No facial edema   EYES: EOMI, PERRLA, conjunctiva and sclera clear  NECK: Supple, + JVP   NERVOUS SYSTEM:  Alert & Oriented X3, Good concentration; Motor Strength 5/5 B/L upper and lower extremities; DTRs 2+ intact and symmetric  CHEST/LUNG: EAE , Bilateral fine rhonchi , improved aeration   HEART: Regular rate and rhythm; No murmurs, or rub   ABDOMEN: Soft, Nontender, Nondistended; Bowel sounds present. Bladder NP . NO CVAT   EXTREMITIES:  edema better       LABS:                        9.9    9.34  )-----------( 328      ( 16 Dec 2019 10:07 )             32.4     12-16    140  |  92<L>  |  57.0<H>  ----------------------------<  235<H>  3.2<L>   |  32.0<H>  |  2.50<H>    Ca    9.4      16 Dec 2019 10:07  Phos  3.6     12-16  Mg     2.1     12-16    TPro  6.9  /  Alb  3.8  /  TBili  0.4  /  DBili  0.2  /  AST  38  /  ALT  32  /  AlkPhos  85  12-15    PT/INR - ( 14 Dec 2019 16:35 )   PT: 18.9 sec;   INR: 1.62 ratio         PTT - ( 14 Dec 2019 16:35 )  PTT:32.2 sec  Urinalysis Basic - ( 15 Dec 2019 00:42 )    Color: Yellow / Appearance: Clear / S.015 / pH: x  Gluc: x / Ketone: Negative  / Bili: Negative / Urobili: Negative mg/dL   Blood: x / Protein: 15 mg/dL / Nitrite: Negative   Leuk Esterase: Negative / RBC: 0-2 /HPF / WBC 0-2   Sq Epi: x / Non Sq Epi: Occasional / Bacteria: Occasional      Phosphorus Level, Serum: 3.6 mg/dL ( @ 10:07)  Magnesium, Serum: 2.1 mg/dL ( @ 10:07)         EXAM:  US KIDNEY(S)                          PROCEDURE DATE:  12/15/2019          INTERPRETATION:  CLINICAL INFORMATION: Acute kidney insufficiency.   Evaluate for obstruction. No additional information is provided.    COMPARISON: None available.    TECHNIQUE: Sonography of the kidneys and bladder.     FINDINGS:    Right kidney:  10.5 cm. No renal mass, hydronephrosis or calculi.    Left kidney:  10.8 cm. No renal mass, hydronephrosis or calculi.    Urinary bladder: Was not imaged.    IMPRESSION:     Unremarkable renal ultrasound examination.          RADIOLOGY & ADDITIONAL TESTS:

## 2019-12-16 NOTE — PROGRESS NOTE ADULT - SUBJECTIVE AND OBJECTIVE BOX
[place] CARDIOLOGY-[facility] Practice                                                               Office: [address] [address]                                                              Telephone: [phone]. Fax:[phone]                                                                             PROGRESS NOTE  Reason for follow up:   Overnight: No new events.   Update:     Subjective: Pt son states "He is feeling alot better"    Pt is a 75 y/o male with medical history of HTN, HLD, CHF, DM, CAD s/p PCI, MI who presents to [facility]-ED with SOB and JOINER X1 week. Pt states he has not been able to rest lying down, and has noticed increased swelling in his legs. He recently travelled to [facility] where denies current symptoms. Once he arrived from his trip, he started to feel cold, and fatigued. Pt Denies fever, chills, cough, phlegm production,  PND, edema, chest pain, pressure, palpitations, irregular, fast heart beat, nausea, vomiting, melena, rectal bleed, hematuria, lightheadedness, dizziness, syncope, near syncope.   As of 12/16/19, pt has reduced lower extremity swelling, lungs clear to auscultation. Pt states he has been urinating with no c/o retention. Pt feels well enough to walk around.       	  Vitals:  T(C): 37.1 (12-16-19 @ 08:19), Max: 37.1 (12-16-19 @ 08:19)  HR: 94 (12-16-19 @ 08:19) (83 - 94)  BP: 145/76 (12-16-19 @ 08:19) (74/44 - 147/81)  RR: 18 (12-16-19 @ 08:19) (16 - 20)  SpO2: 98% (12-16-19 @ 08:19) (98% - 100%)  Wt(kg): --  I&O's Summary    15 Dec 2019 07:01  -  16 Dec 2019 07:00  --------------------------------------------------------  IN: 0 mL / OUT: 650 mL / NET: -650 mL      Weight (kg): 63.5 (12-14 @ 15:46)      PHYSICAL EXAM:  Appearance: Comfortable. No acute distress  HEENT:  Head and neck: Atraumatic. Normocephalic.  Normal oral mucosa, PERRL, Neck is supple. No JVD, No carotid bruit.   Neurologic: A & O x 3, no focal deficits. EOMI.  Lymphatic: No cervical lymphadenopathy  Cardiovascular: Normal S1 S2, No murmur, rubs/gallops. No JVD, No edema  Respiratory: Lungs clear to auscultation  Gastrointestinal:  Soft, Non-tender, + BS  Lower Extremities: No edema  Psychiatry: Patient is calm. No agitation. Mood & affect appropriate  Skin: No rashes/ ecchymoses/cyanosis/ulcers visualized on the face, hands or feet.      CURRENT MEDICATIONS:  milrinone Infusion 0.28 MICROgram(s)/kG/Min IV Continuous <Continuous>    atorvastatin  dextrose 50% Injectable  dextrose 50% Injectable  dextrose 50% Injectable  insulin lispro (HumaLOG) corrective regimen sliding scale  dextrose 5%.  enoxaparin Injectable  ticagrelor      DIAGNOSTIC TESTING:  [ ] Echocardiogram: < from: TTE Echo Complete w/Doppler (06.18.19 @ 15:21) >  Summary:   1. Endocardial visualization was enhanced with intravenous echo contrast.   2. Left ventricular ejection fraction, by visual estimation, is 35 to   40%.   3. Moderately decreased global left ventricular systolic function.   4. Multiple left ventricular regional wall motion abnormalities exist.   See wall motionfindings.   5. Spectral Doppler shows impaired relaxation pattern of left   ventricular myocardial filling (Grade I diastolic dysfunction).   6. No significant valvular abnormalities.   7. There is no evidence of pericardial effusion.   8. ** Compared to TTE dated 11/9/18, no significant changes.    < end of copied text >    [ ]  Catheterization:< from: Cardiac Cath Lab - Adult (07.24.19 @ 10:12) >  CORONARY VESSELS:  GRAFTS:   --  Graft to the 1st obtuse marginal: The graft was a saphenous  vein graft from the aorta. There was a 80 % stenosis in the middle third  of the graft.  COMPLICATIONS: There were no complications.  DIAGNOSTIC RECOMMENDATIONS: Brachytherapy done to the distal SVG to OM  lesion  Stent done in the native OM due to a coronary dissection  INTERVENTIONAL RECOMMENDATIONS: Brachytherapy done to the distal SVG to OM  lesion  Stent done in the native OM due to a coronary dissection    < end of copied text >    [ ] Stress Test:     OTHER: 	  U/S: < from: US Renal (12.15.19 @ 06:40) >  FINDINGS:    Right kidney:  10.5 cm. No renal mass, hydronephrosis or calculi.    Left kidney:  10.8 cm. No renal mass, hydronephrosis or calculi.    Urinary bladder: Was not imaged.    IMPRESSION:     Unremarkable renal ultrasound examination.      < end of copied text >      LABS:	 	  CARDIAC MARKERS ( 15 Dec 2019 08:57 )  x     / x     / x     / x     / x      p-BNP 15 Dec 2019 08:57: 00719 pg/mL, CARDIAC MARKERS ( 14 Dec 2019 16:35 )  x     / 0.08 ng/mL / x     / x     / x      p-BNP 14 Dec 2019 16:35: 62290 pg/mL                          9.9    9.34  )-----------( 328      ( 16 Dec 2019 10:07 )             32.4     12-15    141  |  94<L>  |  64.0<H>  ----------------------------<  133<H>  4.3   |  29.0  |  2.92<H>    Ca    9.5      15 Dec 2019 08:57  Phos  5.3     12-15  Mg     2.0     12-15    TPro  6.9  /  Alb  3.8  /  TBili  0.4  /  DBili  0.2  /  AST  38  /  ALT  32  /  AlkPhos  85  12-15    proBNP: Serum Pro-Brain Natriuretic Peptide: 49909 pg/mL (12-15 @ 08:57)  Serum Pro-Brain Natriuretic Peptide: 85453 pg/mL (12-14 @ 16:35)    TSH: Thyroid Stimulating Hormone, Serum: 3.10 uIU/mL        TELEMETRY: ST HR @ 103  ECG:  SR, LBBB, HR @ 82

## 2019-12-16 NOTE — PROGRESS NOTE ADULT - ASSESSMENT
Pt is a 75 y/o male with medical history of HTN, HLD, CHF, DM, CAD s/p PCI, MI who presents to [facility]ED with SOB and JOINER X1 week. Pt states he has not been able to rest lying down, and has noticed increased swelling in his legs. He recently travelled to [place] where denies current symptoms. Once he arrived from his trip, he started to feel cold, and fatigued. Pt Denies fever, chills, cough, phlegm production,  PND, edema, chest pain, pressure, palpitations, irregular, fast heart beat, nausea, vomiting, melena, rectal bleed, hematuria, lightheadedness, dizziness, syncope, near syncope.    As of 12/16/19, pt has reduced lower extremity swelling, lungs clear to auscultation. Pt states he has been urinating with no c/o retention. Pt feels well enough to walk around.       1) Acute decomepnsated heart failure.    As per attenting cardiologist:  Acute wornseing of LV and RV functon. unclear cause. Appears non ischehmic due to acuity.   started on diuretics lasix. mildrinone drip for inotropy.  low dose dopamine if needed if BP low.   hold beta-blocker for now.   no ACE-ARB.  metoloazone 5 mg daily. x 3 days.  Admit to step down.    no ICU for now. If Bp does not tolerate diuresis, then will admit to ICU and then pressors.  Mag and potassium check.   12/16/19-Continue previous plan    2) BUN/Creatnin  - Recent BUN/Creatinin 64/2.92  -Pt currently followed by Nephrology  -Continue holding nephrotoxic meds Pt is a 73 y/o male with medical history of HTN, HLD, CHF, DM, CAD s/p PCI, MI who presents to [facility]-ED with SOB and JOINER X1 week. Pt states he has not been able to rest lying down, and has noticed increased swelling in his legs. He recently travelled to [facility] where denies current symptoms. Once he arrived from his trip, he started to feel cold, and fatigued. Pt Denies fever, chills, cough, phlegm production,  PND, edema, chest pain, pressure, palpitations, irregular, fast heart beat, nausea, vomiting, melena, rectal bleed, hematuria, lightheadedness, dizziness, syncope, near syncope.    As of 12/16/19, pt has reduced lower extremity swelling, lungs clear to auscultation. Pt states he has been urinating with no c/o retention. Pt feels well enough to walk around.       1) Acute decomepnsated heart failure.    As per attenting cardiologist:  Acute wornseing of LV and RV functon. unclear cause. Appears non ischehmic due to acuity.     2) BUN/Creatnin  - Recent BUN/Creatinin 64/2.92  -Pt currently followed by Nephrology  -Continue holding nephrotoxic meds

## 2019-12-16 NOTE — PROGRESS NOTE ADULT - SUBJECTIVE AND OBJECTIVE BOX
CHIEF COMPLAINT/INTERVAL HISTORY:    Patient is a 74y old  Male who presents with a chief complaint of SOB (16 Dec 2019 14:21)      HPI:  75 y/o male with htn, hyperlipidemia, chf, dm,cad presents to [facility] ER c/o increased sob for the past 1 week. As per patient, he has been unable to lye flat with increased swelling in his lower feet. He currently denies any n/v/d/ha/sob/ Pt was seen by cardiology in the ER and was started on primacor drip. (15 Dec 2019 03:59)      SUBJECTIVE & OBJECTIVE/ ROS: Pt seen and examined at bedside. Diuresing well. BP stable. No chest pain, palpitations, sob, light headedness/dizziness, difficulty breathing/cough, fevers/chills, abdominal pain, n/v, diarrhea/constipation, dysuria  ICU Vital Signs Last 24 Hrs  T(C): 36.7 (16 Dec 2019 15:35), Max: 37.1 (16 Dec 2019 08:19)  T(F): 98 (16 Dec 2019 15:35), Max: 98.7 (16 Dec 2019 08:19)  HR: 90 (16 Dec 2019 15:35) (83 - 100)  BP: 133/70 (16 Dec 2019 15:35) (95/53 - 151/84)  BP(mean): --  ABP: --  ABP(mean): --  RR: 16 (16 Dec 2019 15:35) (16 - 20)  SpO2: 99% (16 Dec 2019 15:35) (98% - 100%)        MEDICATIONS  (STANDING):  atorvastatin 40 milliGRAM(s) Oral at bedtime  dextrose 5%. 1000 milliLiter(s) (50 mL/Hr) IV Continuous <Continuous>  dextrose 50% Injectable 12.5 Gram(s) IV Push once  dextrose 50% Injectable 25 Gram(s) IV Push once  dextrose 50% Injectable 25 Gram(s) IV Push once  enoxaparin Injectable 30 milliGRAM(s) SubCutaneous daily  ferrous    sulfate 325 milliGRAM(s) Oral two times a day  furosemide    Tablet 40 milliGRAM(s) Oral daily  insulin lispro (HumaLOG) corrective regimen sliding scale   SubCutaneous three times a day before meals  iron sucrose IVPB 100 milliGRAM(s) IV Intermittent every 24 hours  magnesium oxide 400 milliGRAM(s) Oral daily  metoprolol tartrate 25 milliGRAM(s) Oral every 12 hours  milrinone Infusion 0.28 MICROgram(s)/kG/Min (5.334 mL/Hr) IV Continuous <Continuous>  ticagrelor 90 milliGRAM(s) Oral two times a day    MEDICATIONS  (PRN):  dextrose 40% Gel 15 Gram(s) Oral once PRN Blood Glucose LESS THAN 70 milliGRAM(s)/deciliter  glucagon  Injectable 1 milliGRAM(s) IntraMuscular once PRN Glucose LESS THAN 70 milligrams/deciliter      LABS:                        9.9    9.34  )-----------( 328      ( 16 Dec 2019 10:07 )             32.4     12-    140  |  92<L>  |  57.0<H>  ----------------------------<  235<H>  3.2<L>   |  32.0<H>  |  2.50<H>    Ca    9.4      16 Dec 2019 10:07  Phos  3.6     -  Mg     2.1         TPro  6.9  /  Alb  3.8  /  TBili  0.4  /  DBili  0.2  /  AST  38  /  ALT  32  /  AlkPhos  85  12-15      Urinalysis Basic - ( 15 Dec 2019 00:42 )    Color: Yellow / Appearance: Clear / S.015 / pH: x  Gluc: x / Ketone: Negative  / Bili: Negative / Urobili: Negative mg/dL   Blood: x / Protein: 15 mg/dL / Nitrite: Negative   Leuk Esterase: Negative / RBC: 0-2 /HPF / WBC 0-2   Sq Epi: x / Non Sq Epi: Occasional / Bacteria: Occasional        CAPILLARY BLOOD GLUCOSE      POCT Blood Glucose.: 210 mg/dL (16 Dec 2019 17:27)  POCT Blood Glucose.: 329 mg/dL (16 Dec 2019 12:11)  POCT Blood Glucose.: 194 mg/dL (16 Dec 2019 08:22)      RECENT CULTURES:      RADIOLOGY & ADDITIONAL TESTS:      PHYSICAL EXAM:    GENERAL: NAD, well-groomed, well-developed  HEAD:  Atraumatic, Normocephalic  EYES: EOMI, PERRLA, conjunctiva and sclera clear  ENMT: Moist mucous membranes  NECK: Supple, No JVD  NERVOUS SYSTEM:  Alert & Oriented X3, Motor Strength 5/5 B/L upper and lower extremities; DTRs 2+ intact and symmetric  CHEST/LUNG: Clear to auscultation bilaterally; No rales, rhonchi, wheezing, or rubs  HEART: Regular rate and rhythm; No murmurs, rubs, or gallops  ABDOMEN: Soft, Nontender, Nondistended; Bowel sounds present  EXTREMITIES:  2+ Peripheral Pulses, 1+ edema b/l

## 2019-12-16 NOTE — ED ADULT NURSE REASSESSMENT NOTE - GENERAL PATIENT STATE
family/SO at bedside/comfortable appearance
comfortable appearance/family/SO at bedside
comfortable appearance/family/SO at bedside
family/SO at bedside
comfortable appearance
comfortable appearance/family/SO at bedside

## 2019-12-16 NOTE — PROGRESS NOTE ADULT - ASSESSMENT
RAS - Baseline creat 0.8 in July 2018 ---> creat better today   Decompensated CM   Improved pulm edema   Suspect CRS with added dec in renal perfusion due to Losartan  Ebony UA   Continue Lasix for now , breathing much more comfortably   Strict I/O   Milrinone infusion   Losartan and Metformin held   Renal sonogram normal 12-15  RPGN serology pending , will follow   Follow up on official ECHO read   Trend labs     Anemia - Add epogen if Hgb remains < 10   TFT's , B 12 and folate WNL   Low iron stores - Add venofer     RO - Follow up  PTH , Phos , 25 Vit D     Hypokalemia - Supplement - see orders     Will follow   D/W son

## 2019-12-16 NOTE — PATIENT PROFILE ADULT - NSPROPTRIGHTREPPHONE_GEN_A_NUR
[phone]

## 2019-12-16 NOTE — PROGRESS NOTE ADULT - ASSESSMENT
75 y/o male with chf, htn, hyperlipidemia, admitted to [facility] for worsening sob and heart failure.     Plan  1. Acute on Chronic Systolic Heart Failure  Acute worsening of LV and RV functon. unclear cause.   Pt doing better clinically  c/w primicor gtt  lasix IV switched to PO  Add metolazone  Had a reaction to domapine with hypotension & dizziness. No further dopamine due to intolerance   -mgmt as per cardio  -c/w asa 81mg po QD  -hold cozaar for now in setting of RAS  -c/w Toprol 25 bid  f/u Echo      2. CAD  c/w asa  c/w Brilinta 90mg po BID  -c/w Toprol 25 bid & statin    3. DM  diabetes order set    4. GERD  Nexium 40mg po QD    RAS on CKD   Liekly from poor renal perfusion due to CHF & losartan use  baseline creat 0.8 in July 2018 ---> creat better today   Improved pulm edema and kidney function  Continue Lasix for now , breathing much more comfortably   Strict I/O   Losartan and Metformin held   Renal sonogram normal  RPGN serology pending , will follow   Trend labs   Renal appreciated    Anemia - Add epogen if Hgb remains < 10   TFT's , B 12 and folate WNL   Low iron stores - Add venofer     RO - Follow up  PTH , Phos , 25 Vit D     Hypokalemia - Supplemented

## 2019-12-16 NOTE — PATIENT PROFILE ADULT - NSPROMUTINFOINDIVIDFT_GEN_A_NUR
Mr Castellon is considered [employer] within his [employer] (according to son, Mr Castellon status is similar to that of a )

## 2019-12-17 LAB
ANION GAP SERPL CALC-SCNC: 13 MMOL/L — SIGNIFICANT CHANGE UP (ref 5–17)
BUN SERPL-MCNC: 36 MG/DL — HIGH (ref 8–20)
CALCIUM SERPL-MCNC: 9.3 MG/DL — SIGNIFICANT CHANGE UP (ref 8.6–10.2)
CHLORIDE SERPL-SCNC: 95 MMOL/L — LOW (ref 98–107)
CO2 SERPL-SCNC: 28 MMOL/L — SIGNIFICANT CHANGE UP (ref 22–29)
CREAT SERPL-MCNC: 1.46 MG/DL — HIGH (ref 0.5–1.3)
DSDNA AB SER-ACNC: <12 IU/ML — SIGNIFICANT CHANGE UP
GLUCOSE BLDC GLUCOMTR-MCNC: 198 MG/DL — HIGH (ref 70–99)
GLUCOSE BLDC GLUCOMTR-MCNC: 306 MG/DL — HIGH (ref 70–99)
GLUCOSE BLDC GLUCOMTR-MCNC: 316 MG/DL — HIGH (ref 70–99)
GLUCOSE BLDC GLUCOMTR-MCNC: 327 MG/DL — HIGH (ref 70–99)
GLUCOSE SERPL-MCNC: 216 MG/DL — HIGH (ref 70–115)
HCT VFR BLD CALC: 30.8 % — LOW (ref 39–50)
HGB BLD-MCNC: 9.4 G/DL — LOW (ref 13–17)
MAGNESIUM SERPL-MCNC: 1.8 MG/DL — SIGNIFICANT CHANGE UP (ref 1.6–2.6)
MCHC RBC-ENTMCNC: 23.3 PG — LOW (ref 27–34)
MCHC RBC-ENTMCNC: 30.5 GM/DL — LOW (ref 32–36)
MCV RBC AUTO: 76.2 FL — LOW (ref 80–100)
PLATELET # BLD AUTO: 286 K/UL — SIGNIFICANT CHANGE UP (ref 150–400)
POTASSIUM SERPL-MCNC: 4 MMOL/L — SIGNIFICANT CHANGE UP (ref 3.5–5.3)
POTASSIUM SERPL-SCNC: 4 MMOL/L — SIGNIFICANT CHANGE UP (ref 3.5–5.3)
RBC # BLD: 4.04 M/UL — LOW (ref 4.2–5.8)
RBC # FLD: 17 % — HIGH (ref 10.3–14.5)
SODIUM SERPL-SCNC: 136 MMOL/L — SIGNIFICANT CHANGE UP (ref 135–145)
TOTAL HEM COMP BLD-ACNC: 61 U/ML — SIGNIFICANT CHANGE UP (ref 42–95)
WBC # BLD: 8.41 K/UL — SIGNIFICANT CHANGE UP (ref 3.8–10.5)
WBC # FLD AUTO: 8.41 K/UL — SIGNIFICANT CHANGE UP (ref 3.8–10.5)

## 2019-12-17 PROCEDURE — 99232 SBSQ HOSP IP/OBS MODERATE 35: CPT

## 2019-12-17 PROCEDURE — 99291 CRITICAL CARE FIRST HOUR: CPT

## 2019-12-17 PROCEDURE — 93010 ELECTROCARDIOGRAM REPORT: CPT

## 2019-12-17 RX ORDER — METOPROLOL TARTRATE 50 MG
25 TABLET ORAL EVERY 8 HOURS
Refills: 0 | Status: DISCONTINUED | OUTPATIENT
Start: 2019-12-17 | End: 2019-12-19

## 2019-12-17 RX ORDER — INSULIN GLARGINE 100 [IU]/ML
8 INJECTION, SOLUTION SUBCUTANEOUS AT BEDTIME
Refills: 0 | Status: DISCONTINUED | OUTPATIENT
Start: 2019-12-17 | End: 2019-12-17

## 2019-12-17 RX ORDER — MAGNESIUM OXIDE 400 MG ORAL TABLET 241.3 MG
400 TABLET ORAL ONCE
Refills: 0 | Status: COMPLETED | OUTPATIENT
Start: 2019-12-17 | End: 2019-12-17

## 2019-12-17 RX ORDER — INSULIN LISPRO 100/ML
4 VIAL (ML) SUBCUTANEOUS ONCE
Refills: 0 | Status: COMPLETED | OUTPATIENT
Start: 2019-12-17 | End: 2019-12-17

## 2019-12-17 RX ORDER — ERYTHROPOIETIN 10000 [IU]/ML
20000 INJECTION, SOLUTION INTRAVENOUS; SUBCUTANEOUS
Refills: 0 | Status: DISCONTINUED | OUTPATIENT
Start: 2019-12-17 | End: 2019-12-19

## 2019-12-17 RX ADMIN — Medication 40 MILLIGRAM(S): at 05:31

## 2019-12-17 RX ADMIN — IRON SUCROSE 105 MILLIGRAM(S): 20 INJECTION, SOLUTION INTRAVENOUS at 16:44

## 2019-12-17 RX ADMIN — TICAGRELOR 90 MILLIGRAM(S): 90 TABLET ORAL at 05:32

## 2019-12-17 RX ADMIN — TICAGRELOR 90 MILLIGRAM(S): 90 TABLET ORAL at 16:45

## 2019-12-17 RX ADMIN — ENOXAPARIN SODIUM 30 MILLIGRAM(S): 100 INJECTION SUBCUTANEOUS at 12:03

## 2019-12-17 RX ADMIN — ATORVASTATIN CALCIUM 40 MILLIGRAM(S): 80 TABLET, FILM COATED ORAL at 22:11

## 2019-12-17 RX ADMIN — Medication 25 MILLIGRAM(S): at 22:11

## 2019-12-17 RX ADMIN — MAGNESIUM OXIDE 400 MG ORAL TABLET 400 MILLIGRAM(S): 241.3 TABLET ORAL at 12:03

## 2019-12-17 RX ADMIN — ERYTHROPOIETIN 20000 UNIT(S): 10000 INJECTION, SOLUTION INTRAVENOUS; SUBCUTANEOUS at 16:46

## 2019-12-17 RX ADMIN — Medication 4: at 16:45

## 2019-12-17 RX ADMIN — MAGNESIUM OXIDE 400 MG ORAL TABLET 400 MILLIGRAM(S): 241.3 TABLET ORAL at 15:21

## 2019-12-17 RX ADMIN — Medication 25 MILLIGRAM(S): at 05:32

## 2019-12-17 RX ADMIN — Medication 325 MILLIGRAM(S): at 05:32

## 2019-12-17 RX ADMIN — Medication 25 MILLIGRAM(S): at 15:21

## 2019-12-17 RX ADMIN — PANTOPRAZOLE SODIUM 40 MILLIGRAM(S): 20 TABLET, DELAYED RELEASE ORAL at 05:31

## 2019-12-17 RX ADMIN — Medication 81 MILLIGRAM(S): at 12:03

## 2019-12-17 RX ADMIN — Medication 4: at 12:03

## 2019-12-17 RX ADMIN — POLYETHYLENE GLYCOL 3350 17 GRAM(S): 17 POWDER, FOR SOLUTION ORAL at 12:03

## 2019-12-17 RX ADMIN — Medication 1: at 08:10

## 2019-12-17 NOTE — PROGRESS NOTE ADULT - ASSESSMENT
This is a 74 year old male with history of dyslipidemia and dyslipidemia, coronary artery disease prior MI, prior PCI, recent brachytherapy, presents with dyspnea on exertion and shortness of breathe     1) Acute decomepnsated heart failure.  : Acute wornseing of LV and RV functon. unclear cause. Appears non ischehmic due to acuity.    d/i milrinone    ct beta-blocker   no ACE-ARB.  reevaluate tomorrow for ARB       d/c Step down uint., off milrinone .     Mag and potassium check.     cath images reviewed by interventiaonlist. Multiple CAD. recent intervention  in une with rca dissection and stenting., disstal diseection.\  no cath for now. discharge plan aftrer heart failure optimized  repeat echo tomorrow.   2) coronary artery disease : statins aspirn. dual antiplatelet therapy    3) Deep venous thrombosis prophylaxis: heparin    4) anemia: Irone management as renal.   No need for step down. regular temlemtry floor.

## 2019-12-17 NOTE — PROGRESS NOTE ADULT - SUBJECTIVE AND OBJECTIVE BOX
[place] [facility]                                                        Office: [address] [address]                                                       Telephone: [phone]. Fax:[phone]                                                                             PROGRESS NOTE   Reason for follow up: acute decompensated heart failure                             Overnight: No new events.   Update:  good urine ouotput without diuresis. kidney funciton normalized.      Subjective: " i am ok  "   Complains of:  No new symptoms.   Review of symptoms: Cardiac:  No chest pain. +  dyspnea. No palpitations.  Respiratory:no cough. No dyspnea  Gastrointestinal: No diarrhea. No abdominal pain. No bleeding.     Past medical history: No updates.   Chronic conditions:  Hypertension: controlled. CHF: Stable.   	  Vitals:  Vital Signs Last 24 Hrs  T(C): 36.3 (12-17-19 @ 18:27), Max: 37 (12-17-19 @ 00:14)  T(F): 97.4 (12-17-19 @ 18:27), Max: 98.6 (12-17-19 @ 00:14)  HR: 92 (12-17-19 @ 18:27) (85 - 98)  BP: 145/81 (12-17-19 @ 18:27) (111/65 - 148/94)  BP(mean): 95 (12-16-19 @ 21:55) (95 - 95)  RR: 18 (12-17-19 @ 18:27) (15 - 20)  SpO2: 100% (12-17-19 @ 18:27) (98% - 100%)    Weight (kg): 63.5 (12-14 @ 15:46)    PHYSICAL EXAM:  Appearance: Comfortable. No acute distress  HEENT:  Head and neck: Atraumatic. Normocephalic.  Normal oral mucosa, PERRL, Neck is supple.  No JVD   Neurologic: A & O x 3, no focal deficits. EOMI , Cranial nerves are intact.  Lymphatic: No cervical lymphadenopathy  Cardiovascular: Normal S1 S2, No murmur, rubs/gallops.   Respiratory: bilateral basal crpt.s   Gastrointestinal:  Soft, Non-tender, + BS  Lower Extremities: trivial edema  Psychiatry: Patient is calm. No agitation. Mood & affect appropriate  Skin: No rashes/ ecchymoses/cyanosis/ulcers visualized on the face, hands or feet.    CURRENT MEDICATIONS:   MEDICATIONS  (STANDING):  metoprolol tartrate 25 milliGRAM(s) Oral every 8 hours    pantoprazole    Tablet  polyethylene glycol 3350  aspirin  chewable  atorvastatin  dextrose 5%.  dextrose 50% Injectable  dextrose 50% Injectable  dextrose 50% Injectable  enoxaparin Injectable  epoetin kim Injectable  insulin lispro (HumaLOG) corrective regimen sliding scale  iron sucrose IVPB  ticagrelor    PRN: dextrose 40% Gel PRN  glucagon  Injectable PRN        LABS:	 	                        9.4    8.41  )-----------( 286      ( 17 Dec 2019 06:32 )             30.8   N=x    ; L=x        17 Dec 2019 06:32    136    |  95     |  36.0   ----------------------------<  216    4.0     |  28.0   |  1.46     Ca    9.3        17 Dec 2019 06:32  Phos  3.6       16 Dec 2019 10:07  Mg     1.8       17 Dec 2019 06:32        Lipid Profile:   HgA1c: TSH: Thyroid Stimulating Hormone, Serum: 3.10 uIU/mL      	NTERPRETATION OF TELEMETRY: Reviewed by me.  shows run of atach and afib.  repeat ECKG sinus.,   ECG: Reviewed by me. Sinus LBBB    RADIOLOGY & ADDITIONAL STUDIES:    X-ray:  reviewed by me. cardiomegaly         ECHO FINDINGS: Date:      Quick bedside hand held (point of care ultrasound) POCUS echo: Sevre biventricular failure. IVC dilated. Bilateral effusion. eleverted pulmonary pressures.         < from: Cardiac Cath Lab - Adult (07.24.19 @ 10:12) >  GRAFTS:   --  Graft to the 1st obtuse marginal: The graft was a saphenous  vein graft from the aorta. There was a 80 % stenosis in the middle third  of the graft.

## 2019-12-17 NOTE — ED ADULT NURSE REASSESSMENT NOTE - NS ED NURSE REASSESS COMMENT FT1
Assistant of MD Posada at bedside.
Dr Lubin back to see pt, requested pt be provided incentive spirometer, incentive spirometer obtained from 2 bracket and provided to pt with instruction. pt states he knows how to use incentive spirometer as he has used in the past (before).
Dr. Posada at the bedside
Handoff received from offgoing RN. Charting as noted. Pt. received AxOx4, resting in stretcher, in NAD. Vital signs stable and as charted. Pt. in NSR on cardiac monitor with occasional PVC's. Breathing spontaneous, unlabored, and symmetrical on 2L NC. Midodrine gtt running @ 02.8mcg/kg/min as per MAR. Skin warm, dry, normal for race. Pt. offers no complaints at this time. Fall Precautions maintained. Call bell within reach. Educated pt. on plan of care, pt verblized understanding. Awaiting 4 RICHELLE bed at this time. Pt. safety and comfort measures maintained.
Handoff report given to АЛЕКСАНДР Olmedo.
Handoff report received from off-going RN Yash.  Pt received resting in bed, offering no complaints at this time.   vital signs stable as per flowsheet.  Primacor gtt infusing at 5.3 ml/hr as ordered.  Will continue to monitor and reassess.
Manual BP obtained, admitting MD called and notified  - will place orders to discontinue Dopamine.
Med PA contacted regarding pt BP 95/53 whilst on milrinone gtt, as per PA no new intervention needed.
Received report from offgoing RN. Charting as noted. Patient A&Ox3 in no apparent distress. Resting comfortably in stretcher. Denies any pain at this time. Remains on cardiac monitor. IV patent and flushing. Plan of care explained. Verbalized understanding.
patient aox4 comfortable in appearance. respirations clear equal and unlabored. heart sounds s1 s2  WDL, abdomen soft nontender + bowel sounds all 4 quadrants, moves all extremities. + pulse all 4 extremities. + sensation all 4 extremities. patient and family updated on plan of care. patient and family educated on hourly rounding procedures and understands call bell system.
Dopamine drip discontinued per admitting MD Fofana.
Family arrives to ED and brought food for pt, pt on special diet, does not eat non Halal food. Pt tolerated food from home well. remains A & Ox4, waiting for room assignment.
Pt's BP improved after dopamine drip was discontinued.
Assumed pt care at this time. Pt A & Ox4, pt was medicated by Prior shift RN with daily meds, tolerated well. Pt denies chest pain or SOB at this time. Pt waiting for cardiac consult and room assignment, family at bedside, pt and family aware of plan of care.
Pt urinated in the urinal and spilled on the bed, pt's bed changed and cleaned, pt repositioned for comfort Pt c/o feeling worse after starting Dopamine  BP low 74/44, MD called and aware requested manual BP
Pt [id number] in the stretcher resting comfortably, no distress noted, no chest pain reported , VSS will continue to monitor

## 2019-12-17 NOTE — PROGRESS NOTE ADULT - SUBJECTIVE AND OBJECTIVE BOX
CHIEF COMPLAINT/INTERVAL HISTORY:    Patient is a 74y old  Male who presents with a chief complaint of SOB (16 Dec 2019 14:21)      HPI:  73 y/o male with htn, hyperlipidemia, chf, dm,cad presents to [facility] ER c/o increased sob for the past 1 week. As per patient, he has been unable to lye flat with increased swelling in his lower feet. He currently denies any n/v/d/ha/sob/ Pt was seen by cardiology in the ER and was started on primacor drip. (15 Dec 2019 03:59)      SUBJECTIVE & OBJECTIVE/ ROS: Pt seen and examined at bedside. Diuresing well. BP stable. No chest pain, palpitations, sob, light headedness/dizziness, difficulty breathing/cough, fevers/chills, abdominal pain, n/v, diarrhea/constipation, dysuria  ICU Vital Signs Last 24 Hrs  T(C): 36.7 (16 Dec 2019 15:35), Max: 37.1 (16 Dec 2019 08:19)  T(F): 98 (16 Dec 2019 15:35), Max: 98.7 (16 Dec 2019 08:19)  HR: 90 (16 Dec 2019 15:35) (83 - 100)  BP: 133/70 (16 Dec 2019 15:35) (95/53 - 151/84)  BP(mean): --  ABP: --  ABP(mean): --  RR: 16 (16 Dec 2019 15:35) (16 - 20)  SpO2: 99% (16 Dec 2019 15:35) (98% - 100%)        MEDICATIONS  (STANDING):  atorvastatin 40 milliGRAM(s) Oral at bedtime  dextrose 5%. 1000 milliLiter(s) (50 mL/Hr) IV Continuous <Continuous>  dextrose 50% Injectable 12.5 Gram(s) IV Push once  dextrose 50% Injectable 25 Gram(s) IV Push once  dextrose 50% Injectable 25 Gram(s) IV Push once  enoxaparin Injectable 30 milliGRAM(s) SubCutaneous daily  ferrous    sulfate 325 milliGRAM(s) Oral two times a day  furosemide    Tablet 40 milliGRAM(s) Oral daily  insulin lispro (HumaLOG) corrective regimen sliding scale   SubCutaneous three times a day before meals  iron sucrose IVPB 100 milliGRAM(s) IV Intermittent every 24 hours  magnesium oxide 400 milliGRAM(s) Oral daily  metoprolol tartrate 25 milliGRAM(s) Oral every 12 hours  milrinone Infusion 0.28 MICROgram(s)/kG/Min (5.334 mL/Hr) IV Continuous <Continuous>  ticagrelor 90 milliGRAM(s) Oral two times a day    MEDICATIONS  (PRN):  dextrose 40% Gel 15 Gram(s) Oral once PRN Blood Glucose LESS THAN 70 milliGRAM(s)/deciliter  glucagon  Injectable 1 milliGRAM(s) IntraMuscular once PRN Glucose LESS THAN 70 milligrams/deciliter      LABS:                        9.9    9.34  )-----------( 328      ( 16 Dec 2019 10:07 )             32.4     12-    140  |  92<L>  |  57.0<H>  ----------------------------<  235<H>  3.2<L>   |  32.0<H>  |  2.50<H>    Ca    9.4      16 Dec 2019 10:07  Phos  3.6     -  Mg     2.1         TPro  6.9  /  Alb  3.8  /  TBili  0.4  /  DBili  0.2  /  AST  38  /  ALT  32  /  AlkPhos  85  12-15      Urinalysis Basic - ( 15 Dec 2019 00:42 )    Color: Yellow / Appearance: Clear / S.015 / pH: x  Gluc: x / Ketone: Negative  / Bili: Negative / Urobili: Negative mg/dL   Blood: x / Protein: 15 mg/dL / Nitrite: Negative   Leuk Esterase: Negative / RBC: 0-2 /HPF / WBC 0-2   Sq Epi: x / Non Sq Epi: Occasional / Bacteria: Occasional        CAPILLARY BLOOD GLUCOSE      POCT Blood Glucose.: 210 mg/dL (16 Dec 2019 17:27)  POCT Blood Glucose.: 329 mg/dL (16 Dec 2019 12:11)  POCT Blood Glucose.: 194 mg/dL (16 Dec 2019 08:22)      RECENT CULTURES:      RADIOLOGY & ADDITIONAL TESTS:      PHYSICAL EXAM:    GENERAL: NAD, well-groomed, well-developed  HEAD:  Atraumatic, Normocephalic  EYES: EOMI, PERRLA, conjunctiva and sclera clear  ENMT: Moist mucous membranes  NECK: Supple, No JVD  NERVOUS SYSTEM:  Alert & Oriented X3, Motor Strength 5/5 B/L upper and lower extremities; DTRs 2+ intact and symmetric  CHEST/LUNG: Clear to auscultation bilaterally; No rales, rhonchi, wheezing, or rubs  HEART: Regular rate and rhythm; No murmurs, rubs, or gallops  ABDOMEN: Soft, Nontender, Nondistended; Bowel sounds present  EXTREMITIES:  2+ Peripheral Pulses, 1+ edema b/l

## 2019-12-17 NOTE — PROGRESS NOTE ADULT - ASSESSMENT
73 y/o male with chf, htn, hyperlipidemia, admitted to [facility] for worsening sob and heart failure.     Plan  1. Acute on Chronic Systolic Heart Failure  Acute worsening of LV and RV functon. unclear cause.   Pt doing better clinically  primicor gtt d/bobby  lasix IV switched to PO  Added metolazone  Had a reaction to domapine with hypotension & dizziness. No further dopamine due to intolerance   -mgmt as per cardio  -c/w asa 81mg po QD  -hold cozaar for now in setting of RAS  -c/w Toprol 25 bid  f/u Echo      2. CAD  c/w asa  c/w Brilinta 90mg po BID  -c/w Toprol 25 bid & statin  cardiac cath in am  NPO after mn    3. DM  diabetes order set    4. GERD  Nexium 40mg po QD    RAS on CKD   Liekly from poor renal perfusion due to CHF & losartan use  baseline creat 0.8 in July 2018 ---> creat better today   Improved pulm edema and kidney function  Continue Lasix for now , breathing much more comfortably   Strict I/O   Losartan and Metformin held   Renal sonogram normal  RPGN serology pending , will follow   Trend labs   Renal appreciated    Anemia - Add epogen if Hgb remains < 10   TFT's , B 12 and folate WNL   Low iron stores - Add venofer     RO - Follow up  PTH , Phos , 25 Vit D     Hypokalemia - Supplemented

## 2019-12-17 NOTE — PROGRESS NOTE ADULT - SUBJECTIVE AND OBJECTIVE BOX
NEPHROLOGY INTERVAL HPI/OVERNIGHT EVENTS:    Feels much better   Changed to oral Lasix   + Primacar drip     MEDICATIONS  (STANDING):  aspirin  chewable 81 milliGRAM(s) Oral daily  atorvastatin 40 milliGRAM(s) Oral at bedtime  dextrose 5%. 1000 milliLiter(s) (50 mL/Hr) IV Continuous <Continuous>  dextrose 50% Injectable 12.5 Gram(s) IV Push once  dextrose 50% Injectable 25 Gram(s) IV Push once  dextrose 50% Injectable 25 Gram(s) IV Push once  enoxaparin Injectable 30 milliGRAM(s) SubCutaneous daily  furosemide    Tablet 40 milliGRAM(s) Oral daily  insulin glargine Injectable (LANTUS) 8 Unit(s) SubCutaneous at bedtime  insulin lispro (HumaLOG) corrective regimen sliding scale   SubCutaneous three times a day before meals  iron sucrose IVPB 100 milliGRAM(s) IV Intermittent every 24 hours  metoprolol tartrate 25 milliGRAM(s) Oral every 12 hours  milrinone Infusion 0.28 MICROgram(s)/kG/Min (5.334 mL/Hr) IV Continuous <Continuous>  pantoprazole    Tablet 40 milliGRAM(s) Oral before breakfast  polyethylene glycol 3350 17 Gram(s) Oral daily  ticagrelor 90 milliGRAM(s) Oral two times a day    MEDICATIONS  (PRN):  dextrose 40% Gel 15 Gram(s) Oral once PRN Blood Glucose LESS THAN 70 milliGRAM(s)/deciliter  glucagon  Injectable 1 milliGRAM(s) IntraMuscular once PRN Glucose LESS THAN 70 milligrams/deciliter      Allergies    Allergy Status Unknown    Intolerances    DOPamine (Hypotension)        Vital Signs Last 24 Hrs  T(C): 37 (17 Dec 2019 12:11), Max: 37 (17 Dec 2019 00:14)  T(F): 98.6 (17 Dec 2019 12:11), Max: 98.6 (17 Dec 2019 00:14)  HR: 98 (17 Dec 2019 12:11) (85 - 98)  BP: 127/74 (17 Dec 2019 12:11) (111/65 - 148/94)  BP(mean): 95 (16 Dec 2019 21:55) (95 - 95)  RR: 15 (17 Dec 2019 12:11) (15 - 20)  SpO2: 98% (17 Dec 2019 12:11) (98% - 100%)  Daily     Daily   I&O's Detail    16 Dec 2019 07:01  -  17 Dec 2019 07:00  --------------------------------------------------------  IN:    milrinone  Infusion: 47.7 mL    Oral Fluid: 150 mL  Total IN: 197.7 mL    OUT:    Voided: 700 mL  Total OUT: 700 mL    Total NET: -502.3 mL        I&O's Summary    16 Dec 2019 07:01  -  17 Dec 2019 07:00  --------------------------------------------------------  IN: 197.7 mL / OUT: 700 mL / NET: -502.3 mL        PHYSICAL EXAM:  GENERAL: NAD, feels better in ER   HEAD:  Atraumatic, No facial edema   EYES: EOMI, PERRLA, conjunctiva and sclera clear  NECK: Supple, No JVP   CHEST/LUNG: EAE ,  improved aeration , no wheeze   HEART: Regular rate and rhythm; No murmurs, or rub   ABDOMEN: Soft, Nontender, Nondistended; Bowel sounds present. Bladder NP . NO CVAT   EXTREMITIES:  edema better   ns    LABS:                        9.4    8.41  )-----------( 286      ( 17 Dec 2019 06:32 )             30.8     12-17    136  |  95<L>  |  36.0<H>  ----------------------------<  216<H>  4.0   |  28.0  |  1.46<H>    Ca    9.3      17 Dec 2019 06:32  Phos  3.6     12-16  Mg     1.8     12-17          Magnesium, Serum: 1.8 mg/dL (12-17 @ 06:32)  Intact PTH: 54 pg/mL (12-16 @ 16:45)  Vitamin D, 25-Hydroxy: 54.4 ng/mL (12-16 @ 16:45)  C3 Complement, Serum: 117 mg/dL (12-16 @ 16:37)  C4 Complement, Serum: 30 mg/dL (12-16 @ 16:37)          RADIOLMedical Rec #:  [id number]    Accession #:      [id number]  Account #:                    Height:           66.9 in 170.0 cm  YOB: 1945      Weight:           149.9 lb 68.00 kg  Patient Age:    73 years      BSA:              1.79 m²  Patient Gender: M             BP:        136/95 mmHg       Date of Exam:        6/18/2019 3:21:42 PM  Sonographer:         Yael Mckeon  Referring Physician: Rojelio Akers MD    Procedure:   Complete Echocardiogram with Contrast.  Indications: Chest pain, unspecified - R07.9  Diagnosis:   Chest pain, unspecified - R07.9         2D AND M-MODE MEASUREMENTS (normal ranges within parentheses):  Left                 Normal   Aorta/Left            Normal  Ventricle:                    Atrium:  IVSd (2D):    0.84  (0.7-1.1) Aortic Root 3.00  (2.4-3.7)                 cm             (Mmode):       cm  LVPWd (2D):   0.95  (0.7-1.1) Left Atrium   3.80  (1.9-4.0)                 cm             (Mmode):       cm  LVIDd (2D):   5.30  (3.4-5.7) LA Volume     18.4                 cm      Index        ml/m²  LVIDs (2D):   3.84                 cm  LV FS (2D):   27.5   (>25%)                  %  Relative Wall 0.36   (<0.42)  Thickness    LV SYSTOLIC FUNCTION BY 2D PLANIMETRY (MOD):  EF-A4C View: 40.5 % EF-Biplane: 36 %    LV DIASTOLIC FUNCTION:  MV Peak E: 0.94 m/s E/e' Ratio: 13.40  MV Peak A: 1.05 m/s  E/A Ratio: 0.89    SPECTRAL DOPPLER ANALYSIS (where applicable):  LVOT Vmax:  LVOT VTI:  LVOT Diameter: 1.85 cm    Tricuspid Valve and PA/RV Systolic Pressure: TR Max Velocity: 2.15 m/s RA   Pressure: 3 mmHg RVSP/PASP: 21.5 mmHg       PHYSICIAN INTERPRETATION:  Left Ventricle: Endocardial visualization was enhanced with intravenous   echo contrast. The left ventricular internal cavity size is normal.  Global LV systolic function was moderately decreased. Left ventricular   ejection fraction, by visual estimation, is 35 to 40%. Spectral Doppler   shows impaired relaxation pattern of left ventricular myocardial filling   (Grade I diastolic dysfunction).       LV Wall Scoring:  The apical septal segment is dyskinetic. The basal and mid inferior wall   and  mid inferoseptal segment are akinetic. The mid anteroseptal segment, basal  inferoseptal segment, and mid inferolateral segment are hypokinetic.    Right Ventricle:Normal right ventricular size and function.  Left Atrium: The left atrium is normal in size.  Right Atrium: The right atrium is normal in size.  Pericardium: There is no evidence of pericardial effusion.  Mitral Valve: Thickening of the anterior and posterior mitral valve   leaflets. Trace mitral valve regurgitation is seen.  Tricuspid Valve: The tricuspid valve is normal in structure. Mild   tricuspid regurgitation is visualized.  Aortic Valve: The aortic valve is trileaflet. No evidence of aortic valve   regurgitation is seen.  Pulmonic Valve: Structurally normal pulmonic valve, with normal leaflet   excursion. Mild pulmonic valve regurgitation.  Aorta: The aortic root and ascending aorta are structurally normal, with   no evidence of dilitation.  Pulmonary Artery: The main pulmonary artery is normal in size.  Venous: The inferior vena cava was normal sized, with respiratory size   variation greater than 50%.       Summary:   1. Endocardial visualization was enhanced with intravenous echo contrast.   2. Left ventricular ejection fraction, by visual estimation, is 35 to   40%.   3. Moderately decreased global left ventricular systolic function.   4. Multiple left ventricular regional wall motion abnormalities exist.   See wall motionfindings.   5. Spectral Doppler shows impaired relaxation pattern of left   ventricular myocardial filling (Grade I diastolic dysfunction).   6. No significant valvular abnormalities.   7. There is no evidence of pericardial effusion.   8. ** Compared to TTE dated 11/9/18, no significant changes.    Soledad Mcleod DO Electronically signed on 6/18/2019 at 5:50:54 PM              *** Final ***

## 2019-12-17 NOTE — PROGRESS NOTE ADULT - ASSESSMENT
Improving RAS - Baseline creat 0.8 in July 2018 ---> creat better today   Decompensated CM   Improved pulm edema   Suspect CRS with added dec in renal perfusion due to Losartan  Hepzibah UA   Continue Lasix for now ,changed to 40 po daily   Strict I/O   Milrinone infusion   Losartan and Metformin held   Renal sonogram normal 12-15  RPGN serology pending , NEG thus far   Follow up on official ECHO read   Trend labs , possible cardiac cath when creat a little better     Anemia - Add epogen    TFT's , B 12 and folate WNL   Low iron stores - Added venofer     RO - Follow up  PTH , Phos , 25 Vit D

## 2019-12-18 LAB
ANION GAP SERPL CALC-SCNC: 14 MMOL/L — SIGNIFICANT CHANGE UP (ref 5–17)
AUTO DIFF PNL BLD: NEGATIVE — SIGNIFICANT CHANGE UP
BUN SERPL-MCNC: 28 MG/DL — HIGH (ref 8–20)
C-ANCA SER-ACNC: NEGATIVE — SIGNIFICANT CHANGE UP
CALCIUM SERPL-MCNC: 9.6 MG/DL — SIGNIFICANT CHANGE UP (ref 8.6–10.2)
CHLORIDE SERPL-SCNC: 96 MMOL/L — LOW (ref 98–107)
CO2 SERPL-SCNC: 30 MMOL/L — HIGH (ref 22–29)
CREAT SERPL-MCNC: 1.3 MG/DL — SIGNIFICANT CHANGE UP (ref 0.5–1.3)
GLUCOSE BLDC GLUCOMTR-MCNC: 240 MG/DL — HIGH (ref 70–99)
GLUCOSE BLDC GLUCOMTR-MCNC: 242 MG/DL — HIGH (ref 70–99)
GLUCOSE BLDC GLUCOMTR-MCNC: 348 MG/DL — HIGH (ref 70–99)
GLUCOSE BLDC GLUCOMTR-MCNC: 352 MG/DL — HIGH (ref 70–99)
GLUCOSE SERPL-MCNC: 166 MG/DL — HIGH (ref 70–115)
HCT VFR BLD CALC: 33.4 % — LOW (ref 39–50)
HGB BLD-MCNC: 10.4 G/DL — LOW (ref 13–17)
MAGNESIUM SERPL-MCNC: 1.7 MG/DL — SIGNIFICANT CHANGE UP (ref 1.6–2.6)
MCHC RBC-ENTMCNC: 23.4 PG — LOW (ref 27–34)
MCHC RBC-ENTMCNC: 31.1 GM/DL — LOW (ref 32–36)
MCV RBC AUTO: 75.1 FL — LOW (ref 80–100)
P-ANCA SER-ACNC: NEGATIVE — SIGNIFICANT CHANGE UP
PLATELET # BLD AUTO: 353 K/UL — SIGNIFICANT CHANGE UP (ref 150–400)
POTASSIUM SERPL-MCNC: 3.5 MMOL/L — SIGNIFICANT CHANGE UP (ref 3.5–5.3)
POTASSIUM SERPL-SCNC: 3.5 MMOL/L — SIGNIFICANT CHANGE UP (ref 3.5–5.3)
RBC # BLD: 4.45 M/UL — SIGNIFICANT CHANGE UP (ref 4.2–5.8)
RBC # FLD: 16.9 % — HIGH (ref 10.3–14.5)
SODIUM SERPL-SCNC: 140 MMOL/L — SIGNIFICANT CHANGE UP (ref 135–145)
WBC # BLD: 8.71 K/UL — SIGNIFICANT CHANGE UP (ref 3.8–10.5)
WBC # FLD AUTO: 8.71 K/UL — SIGNIFICANT CHANGE UP (ref 3.8–10.5)

## 2019-12-18 PROCEDURE — 99232 SBSQ HOSP IP/OBS MODERATE 35: CPT

## 2019-12-18 PROCEDURE — 99233 SBSQ HOSP IP/OBS HIGH 50: CPT

## 2019-12-18 RX ORDER — DEXTROSE 50 % IN WATER 50 %
25 SYRINGE (ML) INTRAVENOUS ONCE
Refills: 0 | Status: DISCONTINUED | OUTPATIENT
Start: 2019-12-18 | End: 2019-12-19

## 2019-12-18 RX ORDER — INSULIN LISPRO 100/ML
VIAL (ML) SUBCUTANEOUS AT BEDTIME
Refills: 0 | Status: DISCONTINUED | OUTPATIENT
Start: 2019-12-18 | End: 2019-12-19

## 2019-12-18 RX ORDER — GLUCAGON INJECTION, SOLUTION 0.5 MG/.1ML
1 INJECTION, SOLUTION SUBCUTANEOUS ONCE
Refills: 0 | Status: DISCONTINUED | OUTPATIENT
Start: 2019-12-18 | End: 2019-12-19

## 2019-12-18 RX ORDER — POTASSIUM CHLORIDE 20 MEQ
20 PACKET (EA) ORAL DAILY
Refills: 0 | Status: DISCONTINUED | OUTPATIENT
Start: 2019-12-18 | End: 2019-12-19

## 2019-12-18 RX ORDER — MAGNESIUM OXIDE 400 MG ORAL TABLET 241.3 MG
400 TABLET ORAL DAILY
Refills: 0 | Status: DISCONTINUED | OUTPATIENT
Start: 2019-12-18 | End: 2019-12-19

## 2019-12-18 RX ORDER — DEXTROSE 50 % IN WATER 50 %
12.5 SYRINGE (ML) INTRAVENOUS ONCE
Refills: 0 | Status: DISCONTINUED | OUTPATIENT
Start: 2019-12-18 | End: 2019-12-19

## 2019-12-18 RX ORDER — POTASSIUM CHLORIDE 20 MEQ
40 PACKET (EA) ORAL ONCE
Refills: 0 | Status: COMPLETED | OUTPATIENT
Start: 2019-12-18 | End: 2019-12-18

## 2019-12-18 RX ORDER — DEXTROSE 50 % IN WATER 50 %
15 SYRINGE (ML) INTRAVENOUS ONCE
Refills: 0 | Status: DISCONTINUED | OUTPATIENT
Start: 2019-12-18 | End: 2019-12-19

## 2019-12-18 RX ORDER — SIMETHICONE 80 MG/1
80 TABLET, CHEWABLE ORAL ONCE
Refills: 0 | Status: DISCONTINUED | OUTPATIENT
Start: 2019-12-18 | End: 2019-12-19

## 2019-12-18 RX ADMIN — ENOXAPARIN SODIUM 30 MILLIGRAM(S): 100 INJECTION SUBCUTANEOUS at 21:50

## 2019-12-18 RX ADMIN — Medication 25 MILLIGRAM(S): at 05:35

## 2019-12-18 RX ADMIN — Medication 2: at 08:50

## 2019-12-18 RX ADMIN — Medication 20 MILLIEQUIVALENT(S): at 12:47

## 2019-12-18 RX ADMIN — Medication 25 MILLIGRAM(S): at 21:50

## 2019-12-18 RX ADMIN — IRON SUCROSE 105 MILLIGRAM(S): 20 INJECTION, SOLUTION INTRAVENOUS at 17:47

## 2019-12-18 RX ADMIN — Medication 81 MILLIGRAM(S): at 12:47

## 2019-12-18 RX ADMIN — Medication 4 UNIT(S): at 00:50

## 2019-12-18 RX ADMIN — Medication 40 MILLIEQUIVALENT(S): at 12:48

## 2019-12-18 RX ADMIN — ATORVASTATIN CALCIUM 40 MILLIGRAM(S): 80 TABLET, FILM COATED ORAL at 21:50

## 2019-12-18 RX ADMIN — TICAGRELOR 90 MILLIGRAM(S): 90 TABLET ORAL at 05:34

## 2019-12-18 RX ADMIN — PANTOPRAZOLE SODIUM 40 MILLIGRAM(S): 20 TABLET, DELAYED RELEASE ORAL at 05:34

## 2019-12-18 RX ADMIN — POLYETHYLENE GLYCOL 3350 17 GRAM(S): 17 POWDER, FOR SOLUTION ORAL at 12:47

## 2019-12-18 RX ADMIN — TICAGRELOR 90 MILLIGRAM(S): 90 TABLET ORAL at 17:46

## 2019-12-18 RX ADMIN — Medication 5: at 12:49

## 2019-12-18 RX ADMIN — Medication 25 MILLIGRAM(S): at 14:16

## 2019-12-18 RX ADMIN — MAGNESIUM OXIDE 400 MG ORAL TABLET 400 MILLIGRAM(S): 241.3 TABLET ORAL at 12:49

## 2019-12-18 RX ADMIN — Medication 2: at 17:47

## 2019-12-18 NOTE — CHART NOTE - NSCHARTNOTEFT_GEN_A_CORE
Upon Nutritional Assessment by the Registered Dietitian your patient was determined to meet criteria / has evidence of the following diagnosis/diagnoses:          [x ]  Moderate Protein Calorie Malnutrition         Findings as based on:  •  Comprehensive nutrition assessment and consultation  •  Calorie counts (nutrient intake analysis)  •  Food acceptance and intake status from observations by staff  •  Follow up  •  Patient education  •  Intervention secondary to interdisciplinary rounds  •   concerns      Treatment:    The following diet has been recommended:  RX: Glucerna TID     PROVIDER Section:     By signing this assessment you are acknowledging and agree with the diagnosis/diagnoses assigned by the Registered Dietitian    Comments:

## 2019-12-18 NOTE — DIETITIAN INITIAL EVALUATION ADULT. - OTHER INFO
Pt with h/o HTN, HLD, CHF, DM, CAD, presents to [facility] ER c/o increased SOB for the past 1 week- admitted with heart failure exacerbation.  Spoke with pt and family- pt with fair po intake at meals.  As per son, pt recently with decreased po intake prior to admission and ~6# wt loss.  Discussed heart failure nutrition guidelines and cons cho meal plan- also encouraged adequate protein intake at meals.  Pt appeared slightly resistant, however son demonstrated good understanding.  Nutrition literature provided.

## 2019-12-18 NOTE — PROGRESS NOTE ADULT - SUBJECTIVE AND OBJECTIVE BOX
CHIEF COMPLAINT/INTERVAL HISTORY:    Patient is a 74y old  Male who presents with a chief complaint of SOB (16 Dec 2019 14:21)      HPI:  73 y/o male with htn, hyperlipidemia, chf, dm,cad presents to [facility] ER c/o increased sob for the past 1 week. As per patient, he has been unable to lye flat with increased swelling in his lower feet. He currently denies any n/v/d/ha/sob/ Pt was seen by cardiology in the ER and was started on primacor drip. (15 Dec 2019 03:59)      SUBJECTIVE & OBJECTIVE/ ROS: Pt seen and examined at bedside. Diuresing well. BP stable. No chest pain, palpitations, sob, light headedness/dizziness, difficulty breathing/cough, fevers/chills, abdominal pain, n/v, diarrhea/constipation, dysuria  ICU Vital Signs Last 24 Hrs  T(C): 36.7 (16 Dec 2019 15:35), Max: 37.1 (16 Dec 2019 08:19)  T(F): 98 (16 Dec 2019 15:35), Max: 98.7 (16 Dec 2019 08:19)  HR: 90 (16 Dec 2019 15:35) (83 - 100)  BP: 133/70 (16 Dec 2019 15:35) (95/53 - 151/84)  BP(mean): --  ABP: --  ABP(mean): --  RR: 16 (16 Dec 2019 15:35) (16 - 20)  SpO2: 99% (16 Dec 2019 15:35) (98% - 100%)        MEDICATIONS  (STANDING):  atorvastatin 40 milliGRAM(s) Oral at bedtime  dextrose 5%. 1000 milliLiter(s) (50 mL/Hr) IV Continuous <Continuous>  dextrose 50% Injectable 12.5 Gram(s) IV Push once  dextrose 50% Injectable 25 Gram(s) IV Push once  dextrose 50% Injectable 25 Gram(s) IV Push once  enoxaparin Injectable 30 milliGRAM(s) SubCutaneous daily  ferrous    sulfate 325 milliGRAM(s) Oral two times a day  furosemide    Tablet 40 milliGRAM(s) Oral daily  insulin lispro (HumaLOG) corrective regimen sliding scale   SubCutaneous three times a day before meals  iron sucrose IVPB 100 milliGRAM(s) IV Intermittent every 24 hours  magnesium oxide 400 milliGRAM(s) Oral daily  metoprolol tartrate 25 milliGRAM(s) Oral every 12 hours  milrinone Infusion 0.28 MICROgram(s)/kG/Min (5.334 mL/Hr) IV Continuous <Continuous>  ticagrelor 90 milliGRAM(s) Oral two times a day    MEDICATIONS  (PRN):  dextrose 40% Gel 15 Gram(s) Oral once PRN Blood Glucose LESS THAN 70 milliGRAM(s)/deciliter  glucagon  Injectable 1 milliGRAM(s) IntraMuscular once PRN Glucose LESS THAN 70 milligrams/deciliter      LABS:                        9.9    9.34  )-----------( 328      ( 16 Dec 2019 10:07 )             32.4     12-    140  |  92<L>  |  57.0<H>  ----------------------------<  235<H>  3.2<L>   |  32.0<H>  |  2.50<H>    Ca    9.4      16 Dec 2019 10:07  Phos  3.6     -  Mg     2.1         TPro  6.9  /  Alb  3.8  /  TBili  0.4  /  DBili  0.2  /  AST  38  /  ALT  32  /  AlkPhos  85  12-15      Urinalysis Basic - ( 15 Dec 2019 00:42 )    Color: Yellow / Appearance: Clear / S.015 / pH: x  Gluc: x / Ketone: Negative  / Bili: Negative / Urobili: Negative mg/dL   Blood: x / Protein: 15 mg/dL / Nitrite: Negative   Leuk Esterase: Negative / RBC: 0-2 /HPF / WBC 0-2   Sq Epi: x / Non Sq Epi: Occasional / Bacteria: Occasional        CAPILLARY BLOOD GLUCOSE      POCT Blood Glucose.: 210 mg/dL (16 Dec 2019 17:27)  POCT Blood Glucose.: 329 mg/dL (16 Dec 2019 12:11)  POCT Blood Glucose.: 194 mg/dL (16 Dec 2019 08:22)      RECENT CULTURES:      RADIOLOGY & ADDITIONAL TESTS:      PHYSICAL EXAM:    GENERAL: NAD, well-groomed, well-developed  HEAD:  Atraumatic, Normocephalic  EYES: EOMI, PERRLA, conjunctiva and sclera clear  ENMT: Moist mucous membranes  NECK: Supple, No JVD  NERVOUS SYSTEM:  Alert & Oriented X3, Motor Strength 5/5 B/L upper and lower extremities; DTRs 2+ intact and symmetric  CHEST/LUNG: Clear to auscultation bilaterally; No rales, rhonchi, wheezing, or rubs  HEART: Regular rate and rhythm; No murmurs, rubs, or gallops  ABDOMEN: Soft, Nontender, Nondistended; Bowel sounds present  EXTREMITIES:  2+ Peripheral Pulses, 1+ edema b/l

## 2019-12-18 NOTE — DIETITIAN INITIAL EVALUATION ADULT. - PERTINENT LABORATORY DATA
12-18 Na140 mmol/L Glu 166 mg/dL<H> K+ 3.5 mmol/L Cr  1.30 mg/dL BUN 28.0 mg/dL<H> Phos n/a   Alb n/a   PAB n/a

## 2019-12-18 NOTE — DIETITIAN INITIAL EVALUATION ADULT. - MALNUTRITION
Limited NFPE performed- mild muscle wasting at temples.  Mild fat loss in orbital region and buccal pads. moderate (acute)

## 2019-12-18 NOTE — PROGRESS NOTE ADULT - ASSESSMENT
This is a 74 year old male with history of dyslipidemia and dyslipidemia, coronary artery disease prior MI, prior PCI, recent brachytherapy, presents with dyspnea on exertion and shortness of breathe     1) Acute decomepnsated heart failure.  : Acute wornseing of LV and RV functon. unclear cause. Appears non ischehmic due to acuity.    off milrinone    ct beta-blocker   no ACE-ARB.  reevaluate tomorrow for ARB    off lasix.    echo today.  Aldactone 25 mg on discharge with low dose ARB.      Mag and potassium check. repletion adn check,         2) coronary artery disease : statins aspirn. dual antiplatelet therapy    3) Deep venous thrombosis prophylaxis: heparin    4) anemia: Irone management as renal. '  5) Irregular heart beat: Possible transifent afib. oyutpauitent MCOT monitor.   No need for step down. regular temlemtry floor.

## 2019-12-18 NOTE — DIETITIAN INITIAL EVALUATION ADULT. - ETIOLOGY
related to inability to consume sufficient protein energy intake with recent decreased appetite in setting of SOB/heart failure

## 2019-12-18 NOTE — PROGRESS NOTE ADULT - SUBJECTIVE AND OBJECTIVE BOX
[facility]                                                        Office: [address] [address]                                                       Telephone: [phone]. Fax:[phone]                                                                             PROGRESS NOTE   Reason for follow up: acute decompensated heart failure                             Overnight: No new events.   Update:   kidney function normalized.   off milrinone. Off diuretic.      Subjective: " i am ok  "   Complains of:  No new symptoms.   Review of symptoms: Cardiac:  No chest pain. +  dyspnea. No palpitations.  Respiratory:no cough. No dyspnea  Gastrointestinal: No diarrhea. No abdominal pain. No bleeding.     Past medical history: No updates.   Chronic conditions:  Hypertension: controlled. CHF: Stable.   	   Vital Signs Last 24 Hrs  T(C): 36.5 (12-18-19 @ 05:31), Max: 36.5 (12-18-19 @ 05:31)  T(F): 97.7 (12-18-19 @ 05:31), Max: 97.7 (12-18-19 @ 05:31)  HR: 95 (12-18-19 @ 05:31) (90 - 96)  BP: 128/81 (12-18-19 @ 05:31) (128/73 - 145/81)  BP(mean): --  RR: 16 (12-18-19 @ 05:31) (16 - 18)  SpO2: 98% (12-18-19 @ 05:31) (98% - 100%)      PHYSICAL EXAM:  Appearance: Comfortable. No acute distress  HEENT:  Head and neck: Atraumatic. Normocephalic.  Normal oral mucosa, PERRL, Neck is supple.  No JVD   Neurologic: A & O x 3, no focal deficits. EOMI , Cranial nerves are intact.  Lymphatic: No cervical lymphadenopathy  Cardiovascular: Normal S1 S2, No murmur, rubs/gallops.   Respiratory: bilateral basal crpt.s   Gastrointestinal:  Soft, Non-tender, + BS  Lower Extremities: trivial edema  Psychiatry: Patient is calm. No agitation. Mood & affect appropriate  Skin: No rashes/ ecchymoses/cyanosis/ulcers visualized on the face, hands or feet.    CURRENT MEDICATIONS:  MEDICATIONS  (STANDING):  metoprolol tartrate 25 milliGRAM(s) Oral every 8 hours    pantoprazole    Tablet  polyethylene glycol 3350  aspirin  chewable  atorvastatin  dextrose 5%.  dextrose 50% Injectable  dextrose 50% Injectable  dextrose 50% Injectable  enoxaparin Injectable  epoetin kim Injectable  insulin lispro (HumaLOG) corrective regimen sliding scale  iron sucrose IVPB  magnesium oxide  potassium chloride    Tablet ER  ticagrelor    PRN: dextrose 40% Gel PRN  glucagon  Injectable PRN      LABS:	 	                                             10.4   8.71  )-----------( 353      ( 18 Dec 2019 05:53 )             33.4   N=x    ; L=x        18 Dec 2019 05:53    140    |  96     |  28.0   ----------------------------<  166    3.5     |  30.0   |  1.30     Ca    9.6        18 Dec 2019 05:53  Mg     1.7       18 Dec 2019 05:53          Lipid Profile:   HgA1c: TSH: Thyroid Stimulating Hormone, Serum: 3.10 uIU/mL      	NTERPRETATION OF TELEMETRY: Reviewed by me.  shows run of atach and afib.  repeat ECKG sinus.,   ECG: Reviewed by me. Sinus LBBB    RADIOLOGY & ADDITIONAL STUDIES:    X-ray:  reviewed by me. cardiomegaly         ECHO FINDINGS: Date:      Quick bedside hand held (point of care ultrasound) POCUS echo: Sevre biventricular failure. IVC dilated. Bilateral effusion. eleverted pulmonary pressures.         < from: Cardiac Cath Lab - Adult (07.24.19 @ 10:12) >  GRAFTS:   --  Graft to the 1st obtuse marginal: The graft was a saphenous  vein graft from the aorta. There was a 80 % stenosis in the middle third  of the graft.

## 2019-12-18 NOTE — PROGRESS NOTE ADULT - ASSESSMENT
75 y/o male with chf, htn, hyperlipidemia, admitted to [facility] for worsening sob and heart failure.     Plan  1. Acute on Chronic Systolic Heart Failure  Acute worsening of LV and RV functon. unclear cause.   Pt doing better clinically  primicor gtt d/bobby  Euvolemia achieved. Off lasix & metolazone per cardio  Had a reaction to domapine with hypotension & dizziness. No further dopamine due to intolerance   -c/w asa 81mg po QD  Aldactone 25 mg on discharge with low dose ARB.   -c/w Toprol 25 bid  f/u Echo (RN notified)      2. CAD  c/w asa  c/w Brilinta 90mg po BID  -c/w Toprol 25 bid & statin  cardiac cath in am  NPO after mn    3. DM  diabetes order set    4. GERD  Nexium 40mg po QD    RAS on CKD   Liekly from poor renal perfusion due to CHF & losartan use  baseline creat 0.8 in July 2018   creat better today 1.3  Improved pulm edema and kidney function  Continue Lasix for now , breathing much more comfortably   Strict I/O   Losartan and Metformin held   Renal sonogram normal  RPGN serology pending , will follow   Trend labs   Renal appreciated    Anemia - Add epogen if Hgb remains < 10   TFT's , B 12 and folate WNL   Low iron stores -c/w venofer     RO - Follow up  PTH , Phos , 25 Vit D     Hypokalemia - Supplemented

## 2019-12-19 ENCOUNTER — TRANSCRIPTION ENCOUNTER (OUTPATIENT)
Age: 74
End: 2019-12-19

## 2019-12-19 VITALS
TEMPERATURE: 98 F | SYSTOLIC BLOOD PRESSURE: 122 MMHG | RESPIRATION RATE: 16 BRPM | DIASTOLIC BLOOD PRESSURE: 68 MMHG | HEART RATE: 90 BPM | OXYGEN SATURATION: 98 %

## 2019-12-19 DIAGNOSIS — N17.9 ACUTE KIDNEY FAILURE, UNSPECIFIED: ICD-10-CM

## 2019-12-19 DIAGNOSIS — I50.23 ACUTE ON CHRONIC SYSTOLIC (CONGESTIVE) HEART FAILURE: ICD-10-CM

## 2019-12-19 LAB
ANA TITR SER: NEGATIVE — SIGNIFICANT CHANGE UP
ANION GAP SERPL CALC-SCNC: 15 MMOL/L — SIGNIFICANT CHANGE UP (ref 5–17)
BUN SERPL-MCNC: 22 MG/DL — HIGH (ref 8–20)
CALCIUM SERPL-MCNC: 9.3 MG/DL — SIGNIFICANT CHANGE UP (ref 8.6–10.2)
CHLORIDE SERPL-SCNC: 98 MMOL/L — SIGNIFICANT CHANGE UP (ref 98–107)
CO2 SERPL-SCNC: 26 MMOL/L — SIGNIFICANT CHANGE UP (ref 22–29)
CREAT SERPL-MCNC: 1 MG/DL — SIGNIFICANT CHANGE UP (ref 0.5–1.3)
GBM IGG SER-ACNC: <1 AI — SIGNIFICANT CHANGE UP
GLUCOSE BLDC GLUCOMTR-MCNC: 240 MG/DL — HIGH (ref 70–99)
GLUCOSE BLDC GLUCOMTR-MCNC: 386 MG/DL — HIGH (ref 70–99)
GLUCOSE SERPL-MCNC: 238 MG/DL — HIGH (ref 70–115)
POTASSIUM SERPL-MCNC: 3.7 MMOL/L — SIGNIFICANT CHANGE UP (ref 3.5–5.3)
POTASSIUM SERPL-SCNC: 3.7 MMOL/L — SIGNIFICANT CHANGE UP (ref 3.5–5.3)
SODIUM SERPL-SCNC: 139 MMOL/L — SIGNIFICANT CHANGE UP (ref 135–145)

## 2019-12-19 PROCEDURE — 81001 URINALYSIS AUTO W/SCOPE: CPT

## 2019-12-19 PROCEDURE — 83970 ASSAY OF PARATHORMONE: CPT

## 2019-12-19 PROCEDURE — 86038 ANTINUCLEAR ANTIBODIES: CPT

## 2019-12-19 PROCEDURE — 80053 COMPREHEN METABOLIC PANEL: CPT

## 2019-12-19 PROCEDURE — 85610 PROTHROMBIN TIME: CPT

## 2019-12-19 PROCEDURE — 96375 TX/PRO/DX INJ NEW DRUG ADDON: CPT

## 2019-12-19 PROCEDURE — 84466 ASSAY OF TRANSFERRIN: CPT

## 2019-12-19 PROCEDURE — 93306 TTE W/DOPPLER COMPLETE: CPT

## 2019-12-19 PROCEDURE — 82728 ASSAY OF FERRITIN: CPT

## 2019-12-19 PROCEDURE — 96374 THER/PROPH/DIAG INJ IV PUSH: CPT

## 2019-12-19 PROCEDURE — 80048 BASIC METABOLIC PNL TOTAL CA: CPT

## 2019-12-19 PROCEDURE — 82746 ASSAY OF FOLIC ACID SERUM: CPT

## 2019-12-19 PROCEDURE — 83735 ASSAY OF MAGNESIUM: CPT

## 2019-12-19 PROCEDURE — 82962 GLUCOSE BLOOD TEST: CPT

## 2019-12-19 PROCEDURE — 84443 ASSAY THYROID STIM HORMONE: CPT

## 2019-12-19 PROCEDURE — 93005 ELECTROCARDIOGRAM TRACING: CPT

## 2019-12-19 PROCEDURE — 84100 ASSAY OF PHOSPHORUS: CPT

## 2019-12-19 PROCEDURE — 71045 X-RAY EXAM CHEST 1 VIEW: CPT

## 2019-12-19 PROCEDURE — 83540 ASSAY OF IRON: CPT

## 2019-12-19 PROCEDURE — 84439 ASSAY OF FREE THYROXINE: CPT

## 2019-12-19 PROCEDURE — 36415 COLL VENOUS BLD VENIPUNCTURE: CPT

## 2019-12-19 PROCEDURE — 80076 HEPATIC FUNCTION PANEL: CPT

## 2019-12-19 PROCEDURE — 83550 IRON BINDING TEST: CPT

## 2019-12-19 PROCEDURE — 83516 IMMUNOASSAY NONANTIBODY: CPT

## 2019-12-19 PROCEDURE — 85027 COMPLETE CBC AUTOMATED: CPT

## 2019-12-19 PROCEDURE — 96376 TX/PRO/DX INJ SAME DRUG ADON: CPT

## 2019-12-19 PROCEDURE — 85730 THROMBOPLASTIN TIME PARTIAL: CPT

## 2019-12-19 PROCEDURE — 86225 DNA ANTIBODY NATIVE: CPT

## 2019-12-19 PROCEDURE — 76775 US EXAM ABDO BACK WALL LIM: CPT

## 2019-12-19 PROCEDURE — 99233 SBSQ HOSP IP/OBS HIGH 50: CPT

## 2019-12-19 PROCEDURE — 83880 ASSAY OF NATRIURETIC PEPTIDE: CPT

## 2019-12-19 PROCEDURE — 84484 ASSAY OF TROPONIN QUANT: CPT

## 2019-12-19 PROCEDURE — 83036 HEMOGLOBIN GLYCOSYLATED A1C: CPT

## 2019-12-19 PROCEDURE — 99285 EMERGENCY DEPT VISIT HI MDM: CPT | Mod: 25

## 2019-12-19 PROCEDURE — 82306 VITAMIN D 25 HYDROXY: CPT

## 2019-12-19 PROCEDURE — 84132 ASSAY OF SERUM POTASSIUM: CPT

## 2019-12-19 PROCEDURE — 86036 ANCA SCREEN EACH ANTIBODY: CPT

## 2019-12-19 PROCEDURE — 99239 HOSP IP/OBS DSCHRG MGMT >30: CPT

## 2019-12-19 PROCEDURE — 82607 VITAMIN B-12: CPT

## 2019-12-19 PROCEDURE — 86160 COMPLEMENT ANTIGEN: CPT

## 2019-12-19 PROCEDURE — 84436 ASSAY OF TOTAL THYROXINE: CPT

## 2019-12-19 PROCEDURE — 82310 ASSAY OF CALCIUM: CPT

## 2019-12-19 PROCEDURE — 86162 COMPLEMENT TOTAL (CH50): CPT

## 2019-12-19 RX ORDER — METFORMIN HYDROCHLORIDE 850 MG/1
1 TABLET ORAL
Qty: 0 | Refills: 0 | DISCHARGE

## 2019-12-19 RX ORDER — METOPROLOL TARTRATE 50 MG
1 TABLET ORAL
Qty: 0 | Refills: 0 | DISCHARGE

## 2019-12-19 RX ORDER — METOPROLOL TARTRATE 50 MG
50 TABLET ORAL DAILY
Refills: 0 | Status: DISCONTINUED | OUTPATIENT
Start: 2019-12-19 | End: 2019-12-19

## 2019-12-19 RX ORDER — METFORMIN HYDROCHLORIDE 850 MG/1
500 TABLET ORAL
Qty: 0 | Refills: 0 | DISCHARGE

## 2019-12-19 RX ORDER — SPIRONOLACTONE 25 MG/1
1 TABLET, FILM COATED ORAL
Qty: 30 | Refills: 0
Start: 2019-12-19 | End: 2020-01-17

## 2019-12-19 RX ORDER — RANOLAZINE 500 MG/1
500 TABLET, FILM COATED, EXTENDED RELEASE ORAL
Refills: 0 | Status: DISCONTINUED | OUTPATIENT
Start: 2019-12-19 | End: 2019-12-19

## 2019-12-19 RX ORDER — SPIRONOLACTONE 25 MG/1
25 TABLET, FILM COATED ORAL DAILY
Refills: 0 | Status: DISCONTINUED | OUTPATIENT
Start: 2019-12-19 | End: 2019-12-19

## 2019-12-19 RX ORDER — METOPROLOL TARTRATE 50 MG
1 TABLET ORAL
Qty: 30 | Refills: 0
Start: 2019-12-19 | End: 2020-01-17

## 2019-12-19 RX ADMIN — POLYETHYLENE GLYCOL 3350 17 GRAM(S): 17 POWDER, FOR SOLUTION ORAL at 12:40

## 2019-12-19 RX ADMIN — Medication 5: at 12:40

## 2019-12-19 RX ADMIN — Medication 2: at 07:56

## 2019-12-19 RX ADMIN — TICAGRELOR 90 MILLIGRAM(S): 90 TABLET ORAL at 05:40

## 2019-12-19 RX ADMIN — MAGNESIUM OXIDE 400 MG ORAL TABLET 400 MILLIGRAM(S): 241.3 TABLET ORAL at 12:39

## 2019-12-19 RX ADMIN — Medication 25 MILLIGRAM(S): at 05:42

## 2019-12-19 RX ADMIN — Medication 81 MILLIGRAM(S): at 12:39

## 2019-12-19 RX ADMIN — PANTOPRAZOLE SODIUM 40 MILLIGRAM(S): 20 TABLET, DELAYED RELEASE ORAL at 05:40

## 2019-12-19 RX ADMIN — Medication 20 MILLIEQUIVALENT(S): at 12:40

## 2019-12-19 NOTE — DISCHARGE NOTE PROVIDER - NSDCMRMEDTOKEN_GEN_ALL_CORE_FT
aspirin 81 mg oral tablet, chewable: 1 tab(s) orally once a day  atorvastatin 40 mg oral tablet: 1 tab(s) orally once a day  Brilinta (ticagrelor) 90 mg oral tablet: 1 tab(s) orally 2 times a day  esomeprazole 40 mg oral delayed release capsule: 1 cap(s) orally once a day  ferrous sulfate 250 mg oral capsule, extended release: 1 cap(s) orally once a day with meal  glipiZIDE 10 mg oral tablet, extended release: 1 tab(s) orally once a day  Januvia 100 mg oral tablet: 1 tab(s) orally once a day  metFORMIN 1000 mg oral tablet, extended release: 1 tab(s) orally 2 times a day  metoprolol succinate 50 mg oral tablet, extended release: 1 tab(s) orally once a day  Ranexa 500 mg oral tablet, extended release: 1 tab(s) orally 2 times a day  spironolactone 25 mg oral tablet: 1 tab(s) orally once a day  Vitamin C 500 mg oral tablet: 1 tab(s) orally once a day

## 2019-12-19 NOTE — PROGRESS NOTE ADULT - ATTENDING COMMENTS
Critical Care time: >40 minutes of noncontinuous critical care time spent evaluating/treating, reviewing imaging, labs, discussing case with multidisciplinary team, discussing plans/goals of care with patient/family to prevent further life threatening depreciation of the patient's condition. Non-inclusive is procedure time.
Critical care cardiology.  I have personally provided critical care time > 45 mins excluding time spent on separate procedures.
D/c in am
Follow up labs .
OK from renal point of view to start Entresto or ACE or ARB.
decompensated heart failure.   ct milrinone drip. lopressor 25 mg Q12.    Self diuresis.  off diuretics . hypokalemia. - Aggressive potassium repletion. Magnesium.    Cardaic cath when cr stable.   PLeural effusion. Incentive spirometry.   Iron deficiency anemia: Ferrous sulfate. 325 mg q12 .
Acute decompensated heart failure.  meds as above.  euvolemic. cannot tolerate ARB.    No further in-patient cardiac work-up/management is needed.  Follow-up in cardiology office in 2 weeks.

## 2019-12-19 NOTE — DISCHARGE NOTE PROVIDER - NSDCFUSCHEDAPPT_GEN_ALL_CORE_FT
ADRIANNA SHANKS ; 01/30/2020 ; [facility] [address] [facility] ADRIANNA SHANKS ; 01/30/2020 ; [facility] [address] [facility]

## 2019-12-19 NOTE — PROGRESS NOTE ADULT - ASSESSMENT
ASSESSMENT:  74 m presented with increasing SOB x 1 week unable to lie flat for one week and increased swelling to lower ext. /+Acute on chronic systolic HF  Improved with diuresis / hemodynamically stable / CKD stable   HX: CHF/ CAD / CABG/ HTN/ HLD/GERD /DM2   Plan:  continue current meds and management   stable for D/c   med compliance / out pt follow up discussed /   resume home meds: ASA/ Brillinta/ statin/ BB/ Aldactone/ ranexa/ metoprolol/ will hold on ACE/ ARB for now  secondary to CKD / will resume as out pt.    Hold lasix.   D/W Dr. Muñoz.

## 2019-12-19 NOTE — DISCHARGE NOTE NURSING/CASE MANAGEMENT/SOCIAL WORK - NSDCPEPT PROEDHF_GEN_ALL_CORE
Report signs and symptoms to primary care provider/Monitor weight daily/Low salt diet/Call primary care provider for follow up after discharge/Activities as tolerated

## 2019-12-19 NOTE — DISCHARGE NOTE PROVIDER - NSDCCPCAREPLAN_GEN_ALL_CORE_FT
PRINCIPAL DISCHARGE DIAGNOSIS  Diagnosis: HFrEF (heart failure with reduced ejection fraction)  Assessment and Plan of Treatment: Continue with home meds as directed. Follow up with your primary doctor within 1 week of discharge & cardiology in 2 weeks. Check BP daily while on new medication aldactone. Stop using losartan for now until seen by cardiology or PMD.      SECONDARY DISCHARGE DIAGNOSES  Diagnosis: CAD (coronary artery disease)  Assessment and Plan of Treatment: Continue with home meds as directed. Follow up with your primary doctor within 1 week of discharge & cardiology in 2 weeks PRINCIPAL DISCHARGE DIAGNOSIS  Diagnosis: HFrEF (heart failure with reduced ejection fraction)  Assessment and Plan of Treatment: Continue with home meds as directed. Follow up with your primary doctor within 1 week of discharge & cardiology in 2 weeks. Check BP daily while on new medication aldactone. Stop using losartan for now until seen by cardiology or PMD.      SECONDARY DISCHARGE DIAGNOSES  Diagnosis: CAD (coronary artery disease)  Assessment and Plan of Treatment: Continue with home meds as directed. Follow up with your primary doctor within 1 week of discharge & cardiology in 2 weeks. Stop using imdur. Continue with ranexa, aspirin, brillinta, statin  & metoprolol

## 2019-12-19 NOTE — DISCHARGE NOTE PROVIDER - CARE PROVIDER_API CALL
Sydney Valenzuela)  Cardiology; Internal Medicine  [address] [facility], [address]  [place], [place] [address]  Phone: [phone]  Fax: [phone]  Follow Up Time:     Asa Martínez (DO)  Medicine  [address] [place] [address], [place] [address]  Phone: [phone]  Fax: [phone]  Follow Up Time:

## 2019-12-19 NOTE — PROGRESS NOTE ADULT - SUBJECTIVE AND OBJECTIVE BOX
NEPHROLOGY INTERVAL HPI/OVERNIGHT EVENTS:    No new events.    MEDICATIONS  (STANDING):  aspirin  chewable 81 milliGRAM(s) Oral daily  atorvastatin 40 milliGRAM(s) Oral at bedtime  dextrose 5%. 1000 milliLiter(s) (50 mL/Hr) IV Continuous <Continuous>  dextrose 50% Injectable 12.5 Gram(s) IV Push once  dextrose 50% Injectable 25 Gram(s) IV Push once  dextrose 50% Injectable 25 Gram(s) IV Push once  enoxaparin Injectable 30 milliGRAM(s) SubCutaneous daily  furosemide    Tablet 40 milliGRAM(s) Oral daily  insulin glargine Injectable (LANTUS) 8 Unit(s) SubCutaneous at bedtime  insulin lispro (HumaLOG) corrective regimen sliding scale   SubCutaneous three times a day before meals  iron sucrose IVPB 100 milliGRAM(s) IV Intermittent every 24 hours  metoprolol tartrate 25 milliGRAM(s) Oral every 12 hours  milrinone Infusion 0.28 MICROgram(s)/kG/Min (5.334 mL/Hr) IV Continuous <Continuous>  pantoprazole    Tablet 40 milliGRAM(s) Oral before breakfast  polyethylene glycol 3350 17 Gram(s) Oral daily  ticagrelor 90 milliGRAM(s) Oral two times a day    MEDICATIONS  (PRN):  dextrose 40% Gel 15 Gram(s) Oral once PRN Blood Glucose LESS THAN 70 milliGRAM(s)/deciliter  glucagon  Injectable 1 milliGRAM(s) IntraMuscular once PRN Glucose LESS THAN 70 milligrams/deciliter      Allergies    Allergy Status Unknown    Intolerances    DOPamine (Hypotension)        Vital Signs Last 24 Hrs  T(C): 36.2 (19 Dec 2019 05:37), Max: 37.6 (18 Dec 2019 21:34)  T(F): 97.1 (19 Dec 2019 05:37), Max: 99.7 (18 Dec 2019 21:34)  HR: 90 (19 Dec 2019 05:37) (77 - 102)  BP: 121/69 (19 Dec 2019 05:37) (105/69 - 142/82)  BP(mean): --  RR: 16 (19 Dec 2019 05:37) (16 - 17)  SpO2: 100% (19 Dec 2019 05:37) (93% - 100%)  T(C): 36.5 (18 Dec 2019 05:31), Max: 37 (17 Dec 2019 12:11)  T(F): 97.7 (18 Dec 2019 05:31), Max: 98.6 (17 Dec 2019 12:11)  HR: 95 (18 Dec 2019 05:31) (90 - 98)  BP: 128/81 (18 Dec 2019 05:31) (127/74 - 145/81)        PHYSICAL EXAM:  GENERAL: comfortable oob in chair.  HEAD:  Atraumatic, No facial edema   EYES: EOMI, PERRLA, conjunctiva and sclera clear  NECK: Supple, No JVP   CHEST/LUNG: Clear  HEART: Regular rate and rhythm; No  rub   ABDOMEN: Soft, Nontender, Nondistended;   EXTREMITIES:  No leg edema   neg      LABS:    12-19    139  |  98  |  22.0<H>  ----------------------------<  238<H>  3.7   |  26.0  |  1.00    Ca    9.3      19 Dec 2019 07:27  Mg     1.7     12-18 12-18    140  |  96<L>  |  28.0<H>  ----------------------------<  166<H>  3.5   |  30.0<H>  |  1.30    Ca    9.6      18 Dec 2019 05:53  Mg     1.7     12-18                            9.4    8.41  )-----------( 286      ( 17 Dec 2019 06:32 )             30.8     12-17    136  |  95<L>  |  36.0<H>  ----------------------------<  216<H>  4.0   |  28.0  |  1.46<H>    Ca    9.3      17 Dec 2019 06:32  Phos  3.6     12-16  Mg     1.8     12-17    Magnesium, Serum: 1.8 mg/dL (12-17 @ 06:32)

## 2019-12-19 NOTE — DISCHARGE NOTE NURSING/CASE MANAGEMENT/SOCIAL WORK - PATIENT PORTAL LINK FT
You can access the FollowMyHealth Patient Portal offered by [facility] by registering at the following website: [email or web address]. By joining [facility]’s FollowMyHealth portal, you will also be able to view your health information using other applications (apps) compatible with our system.

## 2019-12-19 NOTE — DISCHARGE NOTE PROVIDER - HOSPITAL COURSE
75 y/o male with chf, htn, hyperlipidemia, admitted to [facility] for worsening sob and heart failure.         Plan    1. Acute on Chronic Systolic Heart Failure- now euvolemic    Acute worsening of LV and RV functon. unclear cause.     Pt doing better clinically    primicor gtt d/bobby    Euvolemia achieved. Off lasix & metolazone per cardio    Had a reaction to domapine with hypotension & dizziness. No further dopamine due to intolerance     -c/w asa 81mg po QD    Aldactone 25 mg on discharge     Hold off ARB, add as outpatient as BP tolerates as per cardio    -c/w Toprol 25 bid    Echo: Left ventricular ejection fraction, by visual estimation, is 25 to 30%.Entire inferior wall, mid and apical anterior septum, basal and mid inferior septum, and mid inferolateral segment are abnormal as described above.                2. CAD    c/w asa    c/w Brilinta 90mg po BID    -c/w Toprol 25 bid & statin    No plans for cath as pt has a recent cath.      Cardiac Cath Lab - Adult (07.24.19 @ 10:12) >    GRAFTS:   --  Graft to the 1st obtuse marginal: The graft was a saphenousvein graft from the aorta. There was a 80 % stenosis in the middle third    of the graft.            3. DM    c/w januvia, metformin & glipizide on d/c. Pt's HBA1c is 8. Monitor renal function while on metformin         4. GERD    Nexium 40mg po QD        RAS on CKD     Liekly from poor renal perfusion due to CHF & losartan use    baseline creat 0.8 in July 2018     creat better today 1.00    Improved pulm edema and kidney function    Continue Lasix for now , breathing much more comfortably     Strict I/O     Losartan and Metformin held     Renal sonogram normal    RPGN serology pending , will follow     Trend labs     Renal appreciated        Anemia - Add epogen if Hgb remains < 10     TFT's , B 12 and folate WNL     Low iron stores -c/w venofer             Hypokalemia - Supplemented        PHYSICAL EXAM:        GENERAL: NAD, well-groomed, well-developed    HEAD:  Atraumatic, Normocephalic    EYES: EOMI, PERRLA, conjunctiva and sclera clear    ENMT: Moist mucous membranes    NECK: Supple, No JVD    NERVOUS SYSTEM:  Alert & Oriented X3, Motor Strength 5/5 B/L upper and lower extremities; DTRs 2+ intact and symmetric    CHEST/LUNG: Clear to auscultation bilaterally; No rales, rhonchi, wheezing, or rubs    HEART: Regular rate and rhythm; No murmurs, rubs, or gallops    ABDOMEN: Soft, Nontender, Nondistended; Bowel sounds present    EXTREMITIES:  2+ Peripheral Pulses, 1+ edema b/l

## 2019-12-19 NOTE — PROGRESS NOTE ADULT - SUBJECTIVE AND OBJECTIVE BOX
SUBJECTIVE:  Cardiology NP F/U note:  decompensated HF   denies complaints of chest pain/sob/dizziness/palps overnnight   " feels good" son at bedside  alannah diet / ambulating.       	  MEDICATIONS  (STANDING):  aspirin  chewable 81 milliGRAM(s) Oral daily  atorvastatin 40 milliGRAM(s) Oral at bedtime  dextrose 5%. 1000 milliLiter(s) IV Continuous <Continuous>  dextrose 50% Injectable 12.5 Gram(s) IV Push once  dextrose 50% Injectable 25 Gram(s) IV Push once  dextrose 50% Injectable 25 Gram(s) IV Push once  dextrose 50% Injectable 12.5 Gram(s) IV Push once  dextrose 50% Injectable 25 Gram(s) IV Push once  dextrose 50% Injectable 25 Gram(s) IV Push once  enoxaparin Injectable 30 milliGRAM(s) SubCutaneous daily  epoetin kim Injectable 42490 Unit(s) SubCutaneous every 7 days  insulin lispro (HumaLOG) corrective regimen sliding scale   SubCutaneous three times a day before meals  insulin lispro (HumaLOG) corrective regimen sliding scale   SubCutaneous at bedtime  iron sucrose IVPB 100 milliGRAM(s) IV Intermittent every 24 hours  magnesium oxide 400 milliGRAM(s) Oral daily  metoprolol tartrate 25 milliGRAM(s) Oral every 8 hours  pantoprazole    Tablet 40 milliGRAM(s) Oral before breakfast  polyethylene glycol 3350 17 Gram(s) Oral daily  potassium chloride    Tablet ER 20 milliEquivalent(s) Oral daily  simethicone 80 milliGRAM(s) Chew once  ticagrelor 90 milliGRAM(s) Oral two times a day    MEDICATIONS  (PRN):  dextrose 40% Gel 15 Gram(s) Oral once PRN Blood Glucose LESS THAN 70 milliGRAM(s)/deciliter  dextrose 40% Gel 15 Gram(s) Oral once PRN Blood Glucose LESS THAN 70 milliGRAM(s)/deciliter  glucagon  Injectable 1 milliGRAM(s) IntraMuscular once PRN Glucose LESS THAN 70 milligrams/deciliter  glucagon  Injectable 1 milliGRAM(s) IntraMuscular once PRN Glucose LESS THAN 70 milligrams/deciliter      PHYSICAL EXAM:    T(C): 36.8 (19 @ 10:30), Max: 37.6 (19 @ 21:34)  HR: 91 (19 @ 10:30) (77 - 102)  BP: 118/71 (19 @ 10:30) (105/69 - 142/82)  RR: 16 (19 @ 10:30) (16 - 17)  SpO2: 100% (19 @ 10:30) (93% - 100%)  Wt(kg): --    I&O's Summary    18 Dec 2019 07:01  -  19 Dec 2019 07:00  --------------------------------------------------------  IN: 480 mL / OUT: 0 mL / NET: 480 mL        Daily     Daily Weight in k.1 (19 Dec 2019 05:03)    Appearance: NAD	  Cardiovascular: Normal S1 S2, RRR 80's  No JVD, No murmurs, No edema  Respiratory: Lungs clear to auscultation	  Psychiatry: A & O x 3, Mood & affect appropriate  Gastrointestinal:  Soft, Non-tender, + BS	  Skin: warm and dry  Neurologic: Non-focal  Extremities: Normal range of motion,:  Vascular: Peripheral pulses palpable 2+ bilaterally    TELEMETRY: 	RSR 80's one brief episode to 130 self limited   	  RADIOLOGY:   DIAGNOSTIC TESTING:  [ ] Echocardiogram:  < from: TTE Echo Complete w/Doppler (19 @ 10:42) >  . Left ventricular ejection fraction, by visual estimation, is 25 to 30%.   2. Entire inferior wall, mid and apical anterior septum, basal and mid inferior septum, and mid inferolateral segmentare abnormal as described above.   3. Normal left ventricular internal cavity size.   4. The mitral in-flow pattern reveals no discernable A-wave, therefore no comment on diastolic function can be made.   5. There is no evidence of pericardial effusion.   6. The mitral valve leaflets are tethered which is due to reduced systolic function and elevated LVDP.   7. Sclerotic aortic valve with decreased opening.   8. Estimated pulmonary artery systolic pressure is 49.5 mmHg assuming a right atrial pressure of 3 mmHg, which is consistent with mild pulmonary hypertension.   9. Moderately dilated pulmonary artery.  10. Peak transaortic gradient equals 7.3 mmHg, mean transaortic gradient equals 4.0 mmHg, the calculated aortic valve area equals 1.63 cm² by the continuity equation consistent with mild aortic stenosis.      [ ]  Catheterization:	    LABS:	 	    CARDIAC MARKERS:                                  10.4   8.71  )-----------( 353      ( 18 Dec 2019 05:53 )             33.4         139  |  98  |  22.0<H>  ----------------------------<  238<H>  3.7   |  26.0  |  1.00    Ca    9.3      19 Dec 2019 07:27  Mg     1.7           ASSESSMENT:  74 m presented with increasing SOB x 1 week unable to lie flat for one week and increased swelling to lower ext. /+Acute on chronic systolic HF  Improved with diuresis / hemodynamically stable / CKD stable   HX: CHF/ CAD / CABG/ HTN/ HLD/GERD /DM2   Plan:  continue current meds and management   stable for D/c   med compliance / out pt follow up discussed /   resume home meds: ASA/ Brillinta/ statin/ BB/ Aldactone/ ranexa/ metoprolol/ will hold on ACE/ ARB for now  secondary to CKD / will resume as out pt.   hold Lasix for now  D/W Dr. Muñoz. SUBJECTIVE:  Cardiology NP F/U note:  decompensated HF/ Acute on chronic systolic HF EF 25-30%   denies complaints of chest pain/sob/dizziness/palps overnnight   " feels good" son at bedside  alannah diet / ambulating.       	  MEDICATIONS  (STANDING):  aspirin  chewable 81 milliGRAM(s) Oral daily  atorvastatin 40 milliGRAM(s) Oral at bedtime  dextrose 5%. 1000 milliLiter(s) IV Continuous <Continuous>  dextrose 50% Injectable 12.5 Gram(s) IV Push once  dextrose 50% Injectable 25 Gram(s) IV Push once  dextrose 50% Injectable 25 Gram(s) IV Push once  dextrose 50% Injectable 12.5 Gram(s) IV Push once  dextrose 50% Injectable 25 Gram(s) IV Push once  dextrose 50% Injectable 25 Gram(s) IV Push once  enoxaparin Injectable 30 milliGRAM(s) SubCutaneous daily  epoetin kim Injectable 61010 Unit(s) SubCutaneous every 7 days  insulin lispro (HumaLOG) corrective regimen sliding scale   SubCutaneous three times a day before meals  insulin lispro (HumaLOG) corrective regimen sliding scale   SubCutaneous at bedtime  iron sucrose IVPB 100 milliGRAM(s) IV Intermittent every 24 hours  magnesium oxide 400 milliGRAM(s) Oral daily  metoprolol tartrate 25 milliGRAM(s) Oral every 8 hours  pantoprazole    Tablet 40 milliGRAM(s) Oral before breakfast  polyethylene glycol 3350 17 Gram(s) Oral daily  potassium chloride    Tablet ER 20 milliEquivalent(s) Oral daily  simethicone 80 milliGRAM(s) Chew once  ticagrelor 90 milliGRAM(s) Oral two times a day    MEDICATIONS  (PRN):  dextrose 40% Gel 15 Gram(s) Oral once PRN Blood Glucose LESS THAN 70 milliGRAM(s)/deciliter  dextrose 40% Gel 15 Gram(s) Oral once PRN Blood Glucose LESS THAN 70 milliGRAM(s)/deciliter  glucagon  Injectable 1 milliGRAM(s) IntraMuscular once PRN Glucose LESS THAN 70 milligrams/deciliter  glucagon  Injectable 1 milliGRAM(s) IntraMuscular once PRN Glucose LESS THAN 70 milligrams/deciliter      PHYSICAL EXAM:    T(C): 36.8 (19 @ 10:30), Max: 37.6 (19 @ 21:34)  HR: 91 (19 @ 10:30) (77 - 102)  BP: 118/71 (19 @ 10:30) (105/69 - 142/82)  RR: 16 (19 @ 10:30) (16 - 17)  SpO2: 100% (19 @ 10:30) (93% - 100%)  Wt(kg): --    I&O's Summary    18 Dec 2019 07:01  -  19 Dec 2019 07:00  --------------------------------------------------------  IN: 480 mL / OUT: 0 mL / NET: 480 mL        Daily     Daily Weight in k.1 (19 Dec 2019 05:03)    Appearance: NAD	  Cardiovascular: Normal S1 S2, RRR 80's  No JVD, No murmurs, No edema  Respiratory: Lungs clear to auscultation	  Psychiatry: A & O x 3, Mood & affect appropriate  Gastrointestinal:  Soft, Non-tender, + BS	  Skin: warm and dry  Neurologic: Non-focal  Extremities: Normal range of motion,:  Vascular: Peripheral pulses palpable 2+ bilaterally    TELEMETRY: 	RSR 80's one brief episode to 130 self limited   	  RADIOLOGY:   DIAGNOSTIC TESTING:  [ ] Echocardiogram:  < from: TTE Echo Complete w/Doppler (19 @ 10:42) >  . Left ventricular ejection fraction, by visual estimation, is 25 to 30%.   2. Entire inferior wall, mid and apical anterior septum, basal and mid inferior septum, and mid inferolateral segmentare abnormal as described above.   3. Normal left ventricular internal cavity size.   4. The mitral in-flow pattern reveals no discernable A-wave, therefore no comment on diastolic function can be made.   5. There is no evidence of pericardial effusion.   6. The mitral valve leaflets are tethered which is due to reduced systolic function and elevated LVDP.   7. Sclerotic aortic valve with decreased opening.   8. Estimated pulmonary artery systolic pressure is 49.5 mmHg assuming a right atrial pressure of 3 mmHg, which is consistent with mild pulmonary hypertension.   9. Moderately dilated pulmonary artery.  10. Peak transaortic gradient equals 7.3 mmHg, mean transaortic gradient equals 4.0 mmHg, the calculated aortic valve area equals 1.63 cm² by the continuity equation consistent with mild aortic stenosis.      [ ]  Catheterization:	    LABS:	 	    CARDIAC MARKERS:                                  10.4   8.71  )-----------( 353      ( 18 Dec 2019 05:53 )             33.4     12-    139  |  98  |  22.0<H>  ----------------------------<  238<H>  3.7   |  26.0  |  1.00    Ca    9.3      19 Dec 2019 07:27  Mg     1.7           ASSESSMENT:  74 m presented with increasing SOB x 1 week unable to lie flat for one week and increased swelling to lower ext. /+Acute on chronic systolic HF  Improved with diuresis / hemodynamically stable / CKD stable   HX: CHF/ CAD / CABG/ HTN/ HLD/GERD /DM2   Plan:  continue current meds and management   stable for D/c   med compliance / out pt follow up discussed /   resume home meds: ASA/ Brillinta/ statin/ BB/ Aldactone/ ranexa/ metoprolol/ will hold on ACE/ ARB for now  secondary to CKD / will resume as out pt.   hold Lasix for now  D/W Dr. Muñoz. SUBJECTIVE:  Cardiology NP F/U note:  decompensated HF/ Acute on chronic systolic HF EF 25-30%   denies complaints of chest pain/sob/dizziness/palps overnnight   " feels good" son at bedside  alannah diet / ambulating.       	  MEDICATIONS  (STANDING):  aspirin  chewable 81 milliGRAM(s) Oral daily  atorvastatin 40 milliGRAM(s) Oral at bedtime  dextrose 5%. 1000 milliLiter(s) IV Continuous <Continuous>  dextrose 50% Injectable 12.5 Gram(s) IV Push once  dextrose 50% Injectable 25 Gram(s) IV Push once  dextrose 50% Injectable 25 Gram(s) IV Push once  dextrose 50% Injectable 12.5 Gram(s) IV Push once  dextrose 50% Injectable 25 Gram(s) IV Push once  dextrose 50% Injectable 25 Gram(s) IV Push once  enoxaparin Injectable 30 milliGRAM(s) SubCutaneous daily  epoetin kim Injectable 39282 Unit(s) SubCutaneous every 7 days  insulin lispro (HumaLOG) corrective regimen sliding scale   SubCutaneous three times a day before meals  insulin lispro (HumaLOG) corrective regimen sliding scale   SubCutaneous at bedtime  iron sucrose IVPB 100 milliGRAM(s) IV Intermittent every 24 hours  magnesium oxide 400 milliGRAM(s) Oral daily  metoprolol tartrate 25 milliGRAM(s) Oral every 8 hours  pantoprazole    Tablet 40 milliGRAM(s) Oral before breakfast  polyethylene glycol 3350 17 Gram(s) Oral daily  potassium chloride    Tablet ER 20 milliEquivalent(s) Oral daily  simethicone 80 milliGRAM(s) Chew once  ticagrelor 90 milliGRAM(s) Oral two times a day    MEDICATIONS  (PRN):  dextrose 40% Gel 15 Gram(s) Oral once PRN Blood Glucose LESS THAN 70 milliGRAM(s)/deciliter  dextrose 40% Gel 15 Gram(s) Oral once PRN Blood Glucose LESS THAN 70 milliGRAM(s)/deciliter  glucagon  Injectable 1 milliGRAM(s) IntraMuscular once PRN Glucose LESS THAN 70 milligrams/deciliter  glucagon  Injectable 1 milliGRAM(s) IntraMuscular once PRN Glucose LESS THAN 70 milligrams/deciliter      PHYSICAL EXAM:    T(C): 36.8 (19 @ 10:30), Max: 37.6 (19 @ 21:34)  HR: 91 (19 @ 10:30) (77 - 102)  BP: 118/71 (19 @ 10:30) (105/69 - 142/82)  RR: 16 (19 @ 10:30) (16 - 17)  SpO2: 100% (19 @ 10:30) (93% - 100%)  Wt(kg): --    I&O's Summary    18 Dec 2019 07:01  -  19 Dec 2019 07:00  --------------------------------------------------------  IN: 480 mL / OUT: 0 mL / NET: 480 mL        Daily     Daily Weight in k.1 (19 Dec 2019 05:03)    Appearance: NAD	  Cardiovascular: Normal S1 S2, RRR 80's  No JVD, No murmurs, No edema  Respiratory: Lungs clear to auscultation	  Psychiatry: A & O x 3, Mood & affect appropriate  Gastrointestinal:  Soft, Non-tender, + BS	  Skin: warm and dry  Neurologic: Non-focal  Extremities: Normal range of motion,:  Vascular: Peripheral pulses palpable 2+ bilaterally  Spcych calm , no agitation  SKin: No skin changes    TELEMETRY: 	RSR 80's one brief episode to 130 self limited   	  RADIOLOGY:   DIAGNOSTIC TESTING:  [ ] Echocardiogram:  < from: TTE Echo Complete w/Doppler (19 @ 10:42) >  . Left ventricular ejection fraction, by visual estimation, is 25 to 30%.   2. Entire inferior wall, mid and apical anterior septum, basal and mid inferior septum, and mid inferolateral segmentare abnormal as described above.   3. Normal left ventricular internal cavity size.   4. The mitral in-flow pattern reveals no discernable A-wave, therefore no comment on diastolic function can be made.   5. There is no evidence of pericardial effusion.   6. The mitral valve leaflets are tethered which is due to reduced systolic function and elevated LVDP.   7. Sclerotic aortic valve with decreased opening.   8. Estimated pulmonary artery systolic pressure is 49.5 mmHg assuming a right atrial pressure of 3 mmHg, which is consistent with mild pulmonary hypertension.   9. Moderately dilated pulmonary artery.  10. Peak transaortic gradient equals 7.3 mmHg, mean transaortic gradient equals 4.0 mmHg, the calculated aortic valve area equals 1.63 cm² by the continuity equation consistent with mild aortic stenosis.      [ ]  Catheterization:	    LABS:	 	    CARDIAC MARKERS:                                  10.4   8.71  )-----------( 353      ( 18 Dec 2019 05:53 )             33.4         139  |  98  |  22.0<H>  ----------------------------<  238<H>  3.7   |  26.0  |  1.00    Ca    9.3      19 Dec 2019 07:27  Mg     1.7

## 2019-12-20 ENCOUNTER — APPOINTMENT (OUTPATIENT)
Dept: ENDOCRINOLOGY | Facility: CLINIC | Age: 74
End: 2019-12-20

## 2019-12-22 ENCOUNTER — INPATIENT (INPATIENT)
Facility: HOSPITAL | Age: 74
LOS: 8 days | Discharge: HOME | DRG: 291 | End: 2019-12-31
Attending: HOSPITALIST | Admitting: HOSPITALIST
Payer: MEDICARE

## 2019-12-22 VITALS
DIASTOLIC BLOOD PRESSURE: 75 MMHG | OXYGEN SATURATION: 100 % | SYSTOLIC BLOOD PRESSURE: 103 MMHG | HEIGHT: 67 IN | HEART RATE: 107 BPM | RESPIRATION RATE: 22 BRPM

## 2019-12-22 DIAGNOSIS — I50.9 HEART FAILURE, UNSPECIFIED: ICD-10-CM

## 2019-12-22 DIAGNOSIS — E11.10 TYPE 2 DIABETES MELLITUS WITH KETOACIDOSIS WITHOUT COMA: ICD-10-CM

## 2019-12-22 DIAGNOSIS — N17.9 ACUTE KIDNEY FAILURE, UNSPECIFIED: ICD-10-CM

## 2019-12-22 DIAGNOSIS — Z95.5 PRESENCE OF CORONARY ANGIOPLASTY IMPLANT AND GRAFT: Chronic | ICD-10-CM

## 2019-12-22 DIAGNOSIS — E87.5 HYPERKALEMIA: ICD-10-CM

## 2019-12-22 DIAGNOSIS — Z95.1 PRESENCE OF AORTOCORONARY BYPASS GRAFT: Chronic | ICD-10-CM

## 2019-12-22 DIAGNOSIS — Z96.7 PRESENCE OF OTHER BONE AND TENDON IMPLANTS: Chronic | ICD-10-CM

## 2019-12-22 DIAGNOSIS — R74.0 NONSPECIFIC ELEVATION OF LEVELS OF TRANSAMINASE AND LACTIC ACID DEHYDROGENASE [LDH]: ICD-10-CM

## 2019-12-22 DIAGNOSIS — I25.10 ATHEROSCLEROTIC HEART DISEASE OF NATIVE CORONARY ARTERY WITHOUT ANGINA PECTORIS: ICD-10-CM

## 2019-12-22 LAB
ALBUMIN SERPL ELPH-MCNC: 4.5 G/DL — SIGNIFICANT CHANGE UP (ref 3.3–5.2)
ALP SERPL-CCNC: 109 U/L — SIGNIFICANT CHANGE UP (ref 40–120)
ALT FLD-CCNC: 306 U/L — HIGH
ANION GAP SERPL CALC-SCNC: 19 MMOL/L — HIGH (ref 5–17)
ANION GAP SERPL CALC-SCNC: 31 MMOL/L — HIGH (ref 5–17)
APTT BLD: 31.3 SEC — SIGNIFICANT CHANGE UP (ref 27.5–36.3)
AST SERPL-CCNC: 293 U/L — HIGH
BASE EXCESS BLDV CALC-SCNC: -10.7 MMOL/L — LOW (ref -2–2)
BILIRUB SERPL-MCNC: 1.9 MG/DL — SIGNIFICANT CHANGE UP (ref 0.4–2)
BUN SERPL-MCNC: 51 MG/DL — HIGH (ref 8–20)
BUN SERPL-MCNC: 57 MG/DL — HIGH (ref 8–20)
CA-I SERPL-SCNC: 1.17 MMOL/L — SIGNIFICANT CHANGE UP (ref 1.15–1.33)
CALCIUM SERPL-MCNC: 9.7 MG/DL — SIGNIFICANT CHANGE UP (ref 8.6–10.2)
CALCIUM SERPL-MCNC: 9.8 MG/DL — SIGNIFICANT CHANGE UP (ref 8.6–10.2)
CHLORIDE BLDV-SCNC: 98 MMOL/L — SIGNIFICANT CHANGE UP (ref 98–107)
CHLORIDE SERPL-SCNC: 86 MMOL/L — LOW (ref 98–107)
CHLORIDE SERPL-SCNC: 96 MMOL/L — LOW (ref 98–107)
CO2 SERPL-SCNC: 13 MMOL/L — LOW (ref 22–29)
CO2 SERPL-SCNC: 23 MMOL/L — SIGNIFICANT CHANGE UP (ref 22–29)
CREAT SERPL-MCNC: 2.32 MG/DL — HIGH (ref 0.5–1.3)
CREAT SERPL-MCNC: 2.68 MG/DL — HIGH (ref 0.5–1.3)
GAS PNL BLDV: 140 MMOL/L — SIGNIFICANT CHANGE UP (ref 135–145)
GAS PNL BLDV: SIGNIFICANT CHANGE UP
GLUCOSE BLDC GLUCOMTR-MCNC: 249 MG/DL — HIGH (ref 70–99)
GLUCOSE BLDC GLUCOMTR-MCNC: 250 MG/DL — HIGH (ref 70–99)
GLUCOSE BLDC GLUCOMTR-MCNC: 253 MG/DL — HIGH (ref 70–99)
GLUCOSE BLDC GLUCOMTR-MCNC: 268 MG/DL — HIGH (ref 70–99)
GLUCOSE BLDC GLUCOMTR-MCNC: 289 MG/DL — HIGH (ref 70–99)
GLUCOSE BLDV-MCNC: 356 MG/DL — HIGH (ref 70–99)
GLUCOSE SERPL-MCNC: 258 MG/DL — HIGH (ref 70–115)
GLUCOSE SERPL-MCNC: 463 MG/DL — HIGH (ref 70–115)
HCO3 BLDV-SCNC: 16 MMOL/L — LOW (ref 21–29)
HCT VFR BLD CALC: 38.5 % — LOW (ref 39–50)
HGB BLD-MCNC: 11.5 G/DL — LOW (ref 13–17)
INR BLD: 1.61 RATIO — HIGH (ref 0.88–1.16)
LACTATE BLDV-MCNC: 16 MMOL/L — CRITICAL HIGH (ref 0.5–2)
LACTATE BLDV-MCNC: 7.3 MMOL/L — CRITICAL HIGH (ref 0.5–2)
LIDOCAIN IGE QN: 39 U/L — SIGNIFICANT CHANGE UP (ref 22–51)
MAGNESIUM SERPL-MCNC: 2.6 MG/DL — SIGNIFICANT CHANGE UP (ref 1.6–2.6)
MAGNESIUM SERPL-MCNC: 2.8 MG/DL — HIGH (ref 1.6–2.6)
MCHC RBC-ENTMCNC: 24.4 PG — LOW (ref 27–34)
MCHC RBC-ENTMCNC: 29.9 GM/DL — LOW (ref 32–36)
MCV RBC AUTO: 81.7 FL — SIGNIFICANT CHANGE UP (ref 80–100)
NT-PROBNP SERPL-SCNC: HIGH PG/ML (ref 0–300)
PCO2 BLDV: 45 MMHG — SIGNIFICANT CHANGE UP (ref 35–50)
PH BLDV: 7.19 — CRITICAL LOW (ref 7.32–7.43)
PHOSPHATE SERPL-MCNC: 3.3 MG/DL — SIGNIFICANT CHANGE UP (ref 2.4–4.7)
PLATELET # BLD AUTO: 365 K/UL — SIGNIFICANT CHANGE UP (ref 150–400)
PO2 BLDV: 44 MMHG — SIGNIFICANT CHANGE UP (ref 25–45)
POTASSIUM BLDV-SCNC: 5.7 MMOL/L — HIGH (ref 3.4–4.5)
POTASSIUM SERPL-MCNC: 5 MMOL/L — SIGNIFICANT CHANGE UP (ref 3.5–5.3)
POTASSIUM SERPL-MCNC: 6.4 MMOL/L — CRITICAL HIGH (ref 3.5–5.3)
POTASSIUM SERPL-SCNC: 5 MMOL/L — SIGNIFICANT CHANGE UP (ref 3.5–5.3)
POTASSIUM SERPL-SCNC: 6.4 MMOL/L — CRITICAL HIGH (ref 3.5–5.3)
PROT SERPL-MCNC: 8.7 G/DL — SIGNIFICANT CHANGE UP (ref 6.6–8.7)
PROTHROM AB SERPL-ACNC: 18.8 SEC — HIGH (ref 10–12.9)
RBC # BLD: 4.71 M/UL — SIGNIFICANT CHANGE UP (ref 4.2–5.8)
RBC # FLD: 19.4 % — HIGH (ref 10.3–14.5)
SAO2 % BLDV: 62 % — SIGNIFICANT CHANGE UP
SODIUM SERPL-SCNC: 130 MMOL/L — LOW (ref 135–145)
SODIUM SERPL-SCNC: 138 MMOL/L — SIGNIFICANT CHANGE UP (ref 135–145)
TROPONIN T SERPL-MCNC: 0.16 NG/ML — HIGH (ref 0–0.06)
WBC # BLD: 11.46 K/UL — HIGH (ref 3.8–10.5)
WBC # FLD AUTO: 11.46 K/UL — HIGH (ref 3.8–10.5)

## 2019-12-22 PROCEDURE — 71045 X-RAY EXAM CHEST 1 VIEW: CPT | Mod: 26

## 2019-12-22 PROCEDURE — 99285 EMERGENCY DEPT VISIT HI MDM: CPT

## 2019-12-22 PROCEDURE — 93010 ELECTROCARDIOGRAM REPORT: CPT

## 2019-12-22 PROCEDURE — 99223 1ST HOSP IP/OBS HIGH 75: CPT

## 2019-12-22 RX ORDER — INFLUENZA VIRUS VACCINE 15; 15; 15; 15 UG/.5ML; UG/.5ML; UG/.5ML; UG/.5ML
0.5 SUSPENSION INTRAMUSCULAR ONCE
Refills: 0 | Status: DISCONTINUED | OUTPATIENT
Start: 2019-12-22 | End: 2019-12-31

## 2019-12-22 RX ORDER — DEXTROSE 50 % IN WATER 50 %
50 SYRINGE (ML) INTRAVENOUS ONCE
Refills: 0 | Status: DISCONTINUED | OUTPATIENT
Start: 2019-12-22 | End: 2019-12-22

## 2019-12-22 RX ORDER — TICAGRELOR 90 MG/1
60 TABLET ORAL EVERY 12 HOURS
Refills: 0 | Status: DISCONTINUED | OUTPATIENT
Start: 2019-12-22 | End: 2019-12-24

## 2019-12-22 RX ORDER — DEXTROSE 50 % IN WATER 50 %
25 SYRINGE (ML) INTRAVENOUS ONCE
Refills: 0 | Status: DISCONTINUED | OUTPATIENT
Start: 2019-12-22 | End: 2019-12-31

## 2019-12-22 RX ORDER — SODIUM CHLORIDE 9 MG/ML
1000 INJECTION, SOLUTION INTRAVENOUS
Refills: 0 | Status: DISCONTINUED | OUTPATIENT
Start: 2019-12-22 | End: 2019-12-31

## 2019-12-22 RX ORDER — NOREPINEPHRINE BITARTRATE/D5W 8 MG/250ML
0.05 PLASTIC BAG, INJECTION (ML) INTRAVENOUS
Qty: 8 | Refills: 0 | Status: DISCONTINUED | OUTPATIENT
Start: 2019-12-22 | End: 2019-12-23

## 2019-12-22 RX ORDER — CALCIUM GLUCONATE 100 MG/ML
1 VIAL (ML) INTRAVENOUS ONCE
Refills: 0 | Status: COMPLETED | OUTPATIENT
Start: 2019-12-22 | End: 2019-12-22

## 2019-12-22 RX ORDER — SODIUM BICARBONATE 1 MEQ/ML
50 SYRINGE (ML) INTRAVENOUS ONCE
Refills: 0 | Status: COMPLETED | OUTPATIENT
Start: 2019-12-22 | End: 2019-12-22

## 2019-12-22 RX ORDER — ASPIRIN/CALCIUM CARB/MAGNESIUM 324 MG
81 TABLET ORAL DAILY
Refills: 0 | Status: DISCONTINUED | OUTPATIENT
Start: 2019-12-22 | End: 2019-12-31

## 2019-12-22 RX ORDER — INSULIN HUMAN 100 [IU]/ML
1 INJECTION, SOLUTION SUBCUTANEOUS
Qty: 250 | Refills: 0 | Status: DISCONTINUED | OUTPATIENT
Start: 2019-12-22 | End: 2019-12-23

## 2019-12-22 RX ORDER — HEPARIN SODIUM 5000 [USP'U]/ML
5000 INJECTION INTRAVENOUS; SUBCUTANEOUS EVERY 12 HOURS
Refills: 0 | Status: DISCONTINUED | OUTPATIENT
Start: 2019-12-22 | End: 2019-12-31

## 2019-12-22 RX ORDER — MILRINONE LACTATE 1 MG/ML
0.12 INJECTION, SOLUTION INTRAVENOUS
Qty: 20 | Refills: 0 | Status: DISCONTINUED | OUTPATIENT
Start: 2019-12-22 | End: 2019-12-25

## 2019-12-22 RX ORDER — SODIUM CHLORIDE 9 MG/ML
500 INJECTION INTRAMUSCULAR; INTRAVENOUS; SUBCUTANEOUS ONCE
Refills: 0 | Status: DISCONTINUED | OUTPATIENT
Start: 2019-12-22 | End: 2019-12-22

## 2019-12-22 RX ORDER — SODIUM CHLORIDE 9 MG/ML
1000 INJECTION, SOLUTION INTRAVENOUS
Refills: 0 | Status: DISCONTINUED | OUTPATIENT
Start: 2019-12-22 | End: 2019-12-22

## 2019-12-22 RX ORDER — GLUCAGON INJECTION, SOLUTION 0.5 MG/.1ML
1 INJECTION, SOLUTION SUBCUTANEOUS ONCE
Refills: 0 | Status: DISCONTINUED | OUTPATIENT
Start: 2019-12-22 | End: 2019-12-31

## 2019-12-22 RX ORDER — DEXTROSE 50 % IN WATER 50 %
15 SYRINGE (ML) INTRAVENOUS ONCE
Refills: 0 | Status: DISCONTINUED | OUTPATIENT
Start: 2019-12-22 | End: 2019-12-31

## 2019-12-22 RX ORDER — ONDANSETRON 8 MG/1
4 TABLET, FILM COATED ORAL ONCE
Refills: 0 | Status: COMPLETED | OUTPATIENT
Start: 2019-12-22 | End: 2019-12-22

## 2019-12-22 RX ORDER — DEXTROSE 50 % IN WATER 50 %
12.5 SYRINGE (ML) INTRAVENOUS ONCE
Refills: 0 | Status: DISCONTINUED | OUTPATIENT
Start: 2019-12-22 | End: 2019-12-31

## 2019-12-22 RX ORDER — SODIUM CHLORIDE 9 MG/ML
250 INJECTION INTRAMUSCULAR; INTRAVENOUS; SUBCUTANEOUS ONCE
Refills: 0 | Status: COMPLETED | OUTPATIENT
Start: 2019-12-22 | End: 2019-12-22

## 2019-12-22 RX ORDER — INSULIN HUMAN 100 [IU]/ML
5 INJECTION, SOLUTION SUBCUTANEOUS ONCE
Refills: 0 | Status: COMPLETED | OUTPATIENT
Start: 2019-12-22 | End: 2019-12-22

## 2019-12-22 RX ORDER — SODIUM CHLORIDE 9 MG/ML
1000 INJECTION, SOLUTION INTRAVENOUS
Refills: 0 | Status: DISCONTINUED | OUTPATIENT
Start: 2019-12-22 | End: 2019-12-23

## 2019-12-22 RX ORDER — CHLORHEXIDINE GLUCONATE 213 G/1000ML
1 SOLUTION TOPICAL
Refills: 0 | Status: DISCONTINUED | OUTPATIENT
Start: 2019-12-22 | End: 2019-12-24

## 2019-12-22 RX ADMIN — Medication 50 MILLIEQUIVALENT(S): at 16:42

## 2019-12-22 RX ADMIN — INSULIN HUMAN 5 UNIT(S)/HR: 100 INJECTION, SOLUTION SUBCUTANEOUS at 19:19

## 2019-12-22 RX ADMIN — ONDANSETRON 4 MILLIGRAM(S): 8 TABLET, FILM COATED ORAL at 14:59

## 2019-12-22 RX ADMIN — INSULIN HUMAN 5 UNIT(S): 100 INJECTION, SOLUTION SUBCUTANEOUS at 16:42

## 2019-12-22 RX ADMIN — SODIUM CHLORIDE 80 MILLILITER(S): 9 INJECTION, SOLUTION INTRAVENOUS at 21:50

## 2019-12-22 RX ADMIN — SODIUM CHLORIDE 250 MILLILITER(S): 9 INJECTION INTRAMUSCULAR; INTRAVENOUS; SUBCUTANEOUS at 16:43

## 2019-12-22 RX ADMIN — Medication 100 GRAM(S): at 16:59

## 2019-12-22 RX ADMIN — MILRINONE LACTATE 6.64 MICROGRAM(S)/KG/MIN: 1 INJECTION, SOLUTION INTRAVENOUS at 20:29

## 2019-12-22 NOTE — ED PROVIDER NOTE - PROGRESS NOTE DETAILS
john mcleod PA at bedside for evaluation. given hx of EF of 25-30%, will start with  cc IVF. john MOSER at bedside for evaluation. given hx of EF of 25-30%, will hold IVF for now. K 6.3, creatine 2.35. I spoke to , agree with small 250cc bolus for dehydration. will need admission. I ordered calcium gluconate 1 g IV, sodium bicarb 50 meq IV, insulin 5 mg IV, and D50 1amp. glucose 465, gap 31. will sent VBG. patient started on insulin gtt. ICU consulted. patient seen by ICU, will admit him to ICU.

## 2019-12-22 NOTE — ED ADULT NURSE NOTE - PSH
S/P CABG (coronary artery bypass graft)  4V [address] [place]  S/P primary angioplasty with coronary stent    Status post open reduction with internal fixation of fracture

## 2019-12-22 NOTE — ED PROVIDER NOTE - PHYSICAL EXAMINATION
VITAL SIGNS: I have reviewed nursing notes and confirm.  CONSTITUTIONAL: Well-developed; well-nourished; (+)  appears weak   SKIN: Skin exam is warm and dry, no acute rash.  HEAD: Normocephalic; atraumatic.  EYES: PERRL, EOM intact; conjunctiva and sclera clear.  ENT: No nasal discharge; airway clear. Throat clear.  NECK: Supple; non tender.    CARD: S1, S2 normal; no murmurs, gallops, or rubs. (+)  tachycardiaa (+) old mid-chest surgical scar   RESP: No wheezes,  no rales or rhonchi.   ABD:  soft; non-distended; non-tender;   EXT: Normal ROM. No clubbing, cyanosis or edema.  NEURO: Alert, oriented. Grossly unremarkable. No focal deficits. no facial droop, moves all extremities,   PSYCH: Cooperative, appropriate.

## 2019-12-22 NOTE — H&P ADULT - NSICDXPASTSURGICALHX_GEN_ALL_CORE_FT
PAST SURGICAL HISTORY:  S/P CABG (coronary artery bypass graft) 4V 1983 [place]    S/P primary angioplasty with coronary stent     Status post open reduction with internal fixation of fracture

## 2019-12-22 NOTE — CONSULT NOTE ADULT - SUBJECTIVE AND OBJECTIVE BOX
[place] [facility]                                                               Office:  [address] [address]                                                              Telephone: [phone]. Fax:[phone]                                                                        CARDIOLOGY CONSULTATION NOTE                                                                                             History obtained by: Patient and medical record   obtained: No    Chief complaint:    Patient is a 74y old  Male who presents with a chief complaint of vomiting feeling cold      HPI: 75y/o male PMH HTN, HLD, HFrEF, DM, CKD, CAD s/p CABG, prior MI, s/p PCI/brachy, (6/20/19- LM 70/90%, LAD diffuse disease, Cx Prox 100%, RCA known , LIMA To LAD patent, SVG to OM 80% with ISR)/(7/2019- SVG to OM1 80%, brachy to SVG to OM1, PCI native OM), TTE 12/18/19- EF 25-30%, WMA, mild PHTN, mild AS, present to ED with feeling cold, nausea vomiting since last night. As per family and patient, c/o weakness, fatigue x 2 days, started to vomit last night, no relief with Zofran at home. Family took BP at home less then 90, and HR >125 at rest. Pt recently hospitalized for acute decompensated heart failure, requiring IV dopamine and milrinone, dopamine not tolerated. Irregular heart beat during hospitalization, possible transient Afib. RAS on CKD last hospitalization, ACE/ARB held. Denies fever, chills, cough, phlegm production, shortness of breath, dyspnea on exertion, orthopnea, PND, edema, chest pain, pressure, palpitations, irregular, melena, rectal bleed, hematuria, lightheadedness, dizziness, syncope, near syncope.         REVIEW OF SYMPTOMS:     CONSTITUTIONAL:+ fatigue No fever, weight loss,   ENMT: No difficulty hearing, tinnitus, vertigo; No sinus or throat pain  NECK: No pain or stiffness  CARDIOVASCULAR: No chest pain, dyspnea, syncope, palpitations, dizziness, Orthopnea, Paroxsymal nocturnal dyspnea  RESPIRATORY: No Dyspnea on exertion, Shortness of breath, cough, wheezing  : No dysuria, no hematuria   GI: + nausea, + vomiting No dark color stool, no melena, no diarrhea, no constipation, no abdominal pain   NEURO: No headache, no dizziness, no slurred speech   MUSCULOSKELETAL: No joint pain or swelling; No muscle, back, or extremity pain  PSYCH: No agitation, no anxiety.    ALL OTHER REVIEW OF SYSTEMS ARE NEGATIVE.      PREVIOUS DIAGNOSTIC TESTING  ECHO FINDINGS:  < from: TTE Echo Complete w/Doppler (12.18.19 @ 10:42) >  Summary:   1. Left ventricular ejection fraction, by visual estimation, is 25 to 30%.   2. Entire inferior wall, mid and apical anterior septum, basal and mid inferior septum, and mid inferolateral segmentare abnormal as described above.   3. Normal left ventricular internal cavity size.   4. The mitral in-flow pattern reveals no discernable A-wave, therefore no comment on diastolic function can be made.   5. There is no evidence of pericardial effusion.   6. The mitral valve leaflets are tethered which is due to reduced systolic function and elevated LVDP.   7. Sclerotic aortic valve with decreased opening.   8. Estimated pulmonary artery systolic pressure is 49.5 mmHg assuming a right atrial pressure of 3 mmHg, which is consistent with mild pulmonary hypertension.   9. Moderately dilated pulmonary artery.  10. Peak transaortic gradient equals 7.3 mmHg, mean transaortic gradient equals 4.0 mmHg, the calculated aortic valve area equals 1.63 cm² by the continuity equation consistent with mild aortic stenosis.    MD Analia Electronically signed on 12/18/2019 at 5:24:13 PM       < end of copied text >      CATHETERIZATION FINDINGS:   < from: Cardiac Cath Lab - Adult (07.24.19 @ 10:12) >  CORONARY VESSELS:  GRAFTS:   --  Graft to the 1st obtuse marginal: The graft was a saphenous  vein graft from the aorta. There was a 80 % stenosis in the middle third  of the graft.  COMPLICATIONS: There were no complications.  DIAGNOSTIC RECOMMENDATIONS: Brachytherapy done to the distal SVG to OM  lesion  Stent done in the native OM due to a coronary dissection  INTERVENTIONAL RECOMMENDATIONS: Brachytherapy done to the distal SVG to OM  lesion  Stent done in the native OM due to a coronary dissection  Prepared and signed by  Quinn Kaiser M.D.  Signed 07/24/2019 12:58:47    < end of copied text >        ALLERGIES: Allergies  Allergy Status Unknown  Intolerances  DOPamine (Hypotension)      PAST MEDICAL HISTORY  HFrEF (heart failure with reduced ejection fraction)  Essential hypertension  Type 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, without long-term current use of insul  Coronary artery disease involving native coronary artery of native heart, angina presence unspecifie  Coronary artery disease involving coronary bypass graft of native heart with unstable angina pectoris  High cholesterol  Diabetes mellitus  GERD (gastroesophageal reflux disease)  HLD (hyperlipidemia)      PAST SURGICAL HISTORY  Status post open reduction with internal fixation of fracture  S/P primary angioplasty with coronary stent  S/P CABG (coronary artery bypass graft)  CAD S/P percutaneous coronary angioplasty  S/P CABG (coronary artery bypass graft)      FAMILY HISTORY:  No pertinent family history in first degree relatives  No pertinent family history in first degree relatives      SOCIAL HISTORY:  Denies smoking/alcohol/drugs      CURRENT MEDICATIONS:       HOME MEDICATIONS:      Vital Signs Last 24 Hrs  T(C): 36.4 (22 Dec 2019 13:53), Max: 36.4 (22 Dec 2019 13:53)  T(F): 97.6 (22 Dec 2019 13:53), Max: 97.6 (22 Dec 2019 13:53)  HR: 107 (22 Dec 2019 13:34) (107 - 107)  BP: 103/75 (22 Dec 2019 13:34) (103/75 - 103/75)  RR: 22 (22 Dec 2019 13:34) (22 - 22)  SpO2: 100% (22 Dec 2019 13:34) (100% - 100%)      PHYSICAL EXAM:  Constitutional: tired. ill appearing   HEENT: Atraumatic and normocephalic , neck is supple . no JVD. No carotid bruit. PEERL   CNS: A&Ox3. No focal deficits. EOMI. Cranial nerves II-IX are intact.   Lymph Nodes: Cervical : Not palpable.  Respiratory: CTAB  Cardiovascular: Tachycardia S1S2 RRR. No murmur/rubs or gallop.  Gastrointestinal: Soft non-tender and non distended . +Bowel sounds. negative Godfrey's sign.  Extremities: No edema.   Psychiatric: Calm . no agitation.  Skin: No skin rash/ulcers visualized to face, hands or feet.      LABS:          INTERPRETATION OF TELEMETRY: SR, LBBB  ECG: ST, LBBB, LAD    RADIOLOGY & ADDITIONAL STUDIES:

## 2019-12-22 NOTE — H&P ADULT - HISTORY OF PRESENT ILLNESS
Pt is a 74 YOM h/o CAD, CABG, HFrEF, MI, HTN, HLD, DM, PAF on last admission pt was recently discharged on Thursday after being treated for decompensated HF with milrinone, dopamine(which he did not tolerate).  Pt presents to ER with weakness and vomiting since yesterday.  Per pt's son since he was discharged his appetite has been very poor and he has been weak but yesterday started vomiting and not tolerating PO medications.  In ER found to have multiple organ dysfunction including RAS, acute liver transaminitis, DKA/anion gap acidosis. Pt is a 74 YOM h/o CAD, CABG, HFrEF, MI, HTN, HLD, DM, PAF on last admission pt was recently discharged on Thursday after being treated for decompensated HF with milrinone, dopamine(which he did not tolerate).  Pt presents to ER with weakness and vomiting since yesterday.  Per pt's son since he was discharged his appetite has been very poor and he has been weak but yesterday started vomiting and not tolerating PO medications.  In ER found to have multiple organ dysfunction including RAS, acute liver transaminitis, DKA/anion gap acidosis.  Pt denies CP, no SOB, admits to feeling very cold.

## 2019-12-22 NOTE — CONSULT NOTE ADULT - ASSESSMENT
75y/o male PMH HTN, HLD, HFrEF, DM, CKD, CAD s/p CABG, prior MI, s/p PCI/brachy, (6/20/19- LM 70/90%, LAD diffuse disease, Cx Prox 100%, RCA known , LIMA To LAD patent, SVG to OM 80% with ISR)/(7/2019- SVG to OM1 80%, brachy to SVG to OM1, PCI native OM), TTE 12/18/19- EF 25-30%, WMA, mild PHTN, mild AS, present to ED with feeling cold, nausea vomiting since last night. As per family and patient, c/o weakness, fatigue x 2 days, started to vomit last night, no relief with Zofran at home. Family took BP at home less then 90, and HR >125 at rest. Pt recently hospitalized for acute decompensated heart failure, requiring IV dopamine and milrinone, dopamine not tolerated. Irregular heart beat during hospitalization, possible transient Afib. RAS on CKD last hospitalization, ACE/ARB held.     CAD- s/p CABG, s/p PCI  - 7/2019- brachy therapy   - EKG- ST, LBBB unchanged from prior   - REpeat EKG ordered       HFrEF  - all labs pending  - euvolemic if not hypovolemic on exam  - IVF in moderation  - TTE EF 25-30%    Irregular Heart rate  - SR on monitor  - last hospitalization possible afib- plan for [facility] monitor out patient.

## 2019-12-22 NOTE — H&P ADULT - NSICDXPASTMEDICALHX_GEN_ALL_CORE_FT
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

## 2019-12-22 NOTE — ED PROVIDER NOTE - PMH
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

## 2019-12-22 NOTE — ED ADULT NURSE NOTE - NSIMPLEMENTINTERV_GEN_ALL_ED
Implemented All Fall with Harm Risk Interventions:  [place] to call system. Call bell, personal items and telephone within reach. Instruct patient to call for assistance. Room bathroom lighting operational. Non-slip footwear when patient is off stretcher. Physically safe environment: no spills, clutter or unnecessary equipment. Stretcher in lowest position, wheels locked, appropriate side rails in place. Provide visual cue, wrist band, yellow gown, etc. Monitor gait and stability. Monitor for mental status changes and reorient to person, place, and time. Review medications for side effects contributing to fall risk. Reinforce activity limits and safety measures with patient and family. Provide visual clues: red socks.

## 2019-12-22 NOTE — H&P ADULT - PROBLEM SELECTOR PLAN 3
hold metformin and PO DM meds  received calcium/insulin/dextrose in ED  hydrate gently  monitor cr  renal evaluation called

## 2019-12-22 NOTE — ED PROVIDER NOTE - CLINICAL SUMMARY MEDICAL DECISION MAKING FREE TEXT BOX
75 yo M with hx of DM, HTN, HDL, CHF (EF 25-30%) p/w intractable vomiting, found to be in DKA, gap 31. shocked liver. RAS, hyperkalemia. patient started on insulin infusion. Unable to give him large amount IVF due to low EF.  He was given calcium and bicarb for hyperkalemia. trop 0.16, elevated from prior. likely demand. EKG showed LBBB. patient admitted to ICU.

## 2019-12-22 NOTE — H&P ADULT - ASSESSMENT
74 YOM with anion gap acidosis likely due to DKA as well as RAS with elevated cr., transaminitis, decompensated CHF, multiple organ dysfunction 74 YOM with anion gap acidosis likely due to DKA as well as RAS with elevated cr, hyperkalemia., transaminitis, decompensated CHF, multiple organ dysfunction

## 2019-12-22 NOTE — H&P ADULT - PROBLEM SELECTOR PLAN 2
likely decompensated HF  spoke with Dr Mccray since labs now resulted will start pt on Milrinone 0.375, will add Levophed if pressor needed  New TTE tonight stat  monitor closely for arrhythmia

## 2019-12-22 NOTE — H&P ADULT - PROBLEM SELECTOR PLAN 1
will need gentle IV hydration  Insulin infusion with hourly accuchecks  monitor and replete electrolytes every 4-6 hours

## 2019-12-22 NOTE — CONSULT NOTE ADULT - ATTENDING COMMENTS
Pt is seen, examined, chart reviewed, d/w NP/PA.    75y/o male PMH HTN, HLD, HFrEF, DM, CKD, CAD s/p CABG, prior MI, s/p PCI/brachy, (6/20/19- LM 70/90%, LAD diffuse disease, Cx Prox 100%, RCA known , LIMA To LAD patent, SVG to OM 80% with ISR)/(7/2019- SVG to OM1 80%, brachy to SVG to OM1, PCI native OM), TTE 12/18/19- EF 25-30%, WMA, mild PHTN, mild AS, present to ED with feeling cold, nausea vomiting since last night. As per family and patient, c/o weakness, fatigue x 2 days, started to vomit last night, no relief with Zofran at home. Family took BP at home less then 90, and HR >125 at rest. Pt recently hospitalized for acute decompensated heart failure, requiring IV dopamine and milrinone, dopamine not tolerated. Irregular heart beat during hospitalization, possible transient Afib. RAS on CKD last hospitalization, ACE/ARB held.     CAD- s/p CABG, s/p PCI  brachy therapy 7/2019-  EKG- ST, LBBB unchanged from prior    REpeat EKG ordered       HFrEF  all labs pending  euvolemic if not hypovolemic on exam  IVF in moderation   TTE EF 25-30%    Irregular Heart rate  SR on monitor  last hospitalization possible afib- plan for [facility] monitor out patient.

## 2019-12-22 NOTE — ED ADULT TRIAGE NOTE - CHIEF COMPLAINT QUOTE
Pt's son states pt has been vomiting all night and BP was low and Pulse was high. Pt was told to come in by Dr Worley. Pt was discharged last Thursday.  Pt A & Ox4.

## 2019-12-22 NOTE — ED PROVIDER NOTE - NS ED ROS FT
Review of Systems  •	CONSTITUTIONAL - no  fever, no diaphoresis, no weight change  •	SKIN - no rash  •	HEMATOLOGIC - no bleeding, no bruising  •	EYES - no eye pain, no blurred vision  •	ENT - no change in hearing, no pain  •	RESPIRATORY - no shortness of breath, no cough  •	CARDIAC - no chest pain, no palpitations  •	GI - no abd pain, (+)  nausea, (+)  vomiting, no diarrhea, no constipation, no bleeding  •	GENITO-URINARY - no discharge, no dysuria; no hematuria,   •	ENDO - no polydipsia, no polyuria, no heat/no cold intolerance  •	MUSCULOSKELETAL - no joint pain, no swelling, no redness  •	NEUROLOGIC - no weakness, no headache, no anesthesia, no paresthesias  •	PSYCH - no anxiety, non suicidal, non homicidal, no hallucination, no depression

## 2019-12-22 NOTE — ED PROVIDER NOTE - CARE PLAN
Principal Discharge DX:	Hyperkalemia  Secondary Diagnosis:	RAS (acute kidney injury)  Secondary Diagnosis:	Dehydration Principal Discharge DX:	DKA (diabetic ketoacidoses)  Secondary Diagnosis:	RAS (acute kidney injury)  Secondary Diagnosis:	Dehydration  Secondary Diagnosis:	Hyperkalemia

## 2019-12-22 NOTE — ED PROVIDER NOTE - OBJECTIVE STATEMENT
75 yo M hx of htn, hyperlipidemia, chf (EF 25-30%), dm, cad s/p CABG. He was recently admitted for decompensated CHF. Patient report nausea and vomiting since last night, unable to tolerate PO. He was vomiting zofran pills. No chest pain. no sob. No abdominal pain. he had a small BM this morning. Family report tachycardia, HR to 120s. They spoke to cardiology  who advised the patient to come to the ED. no abdominal surgery.

## 2019-12-23 LAB
ALBUMIN SERPL ELPH-MCNC: 3.9 G/DL — SIGNIFICANT CHANGE UP (ref 3.3–5.2)
ALBUMIN SERPL ELPH-MCNC: 4 G/DL — SIGNIFICANT CHANGE UP (ref 3.3–5.2)
ALBUMIN SERPL ELPH-MCNC: 4.1 G/DL — SIGNIFICANT CHANGE UP (ref 3.3–5.2)
ALP SERPL-CCNC: 105 U/L — SIGNIFICANT CHANGE UP (ref 40–120)
ALP SERPL-CCNC: 118 U/L — SIGNIFICANT CHANGE UP (ref 40–120)
ALP SERPL-CCNC: 92 U/L — SIGNIFICANT CHANGE UP (ref 40–120)
ALT FLD-CCNC: 2695 U/L — HIGH
ALT FLD-CCNC: 3223 U/L — HIGH
ALT FLD-CCNC: 3379 U/L — HIGH
ANION GAP SERPL CALC-SCNC: 14 MMOL/L — SIGNIFICANT CHANGE UP (ref 5–17)
ANION GAP SERPL CALC-SCNC: 17 MMOL/L — SIGNIFICANT CHANGE UP (ref 5–17)
ANION GAP SERPL CALC-SCNC: 18 MMOL/L — HIGH (ref 5–17)
APPEARANCE UR: CLEAR — SIGNIFICANT CHANGE UP
AST SERPL-CCNC: 3147 U/L — HIGH
AST SERPL-CCNC: 3532 U/L — HIGH
AST SERPL-CCNC: 3888 U/L — HIGH
BACTERIA # UR AUTO: ABNORMAL
BILIRUB DIRECT SERPL-MCNC: 0.3 MG/DL — SIGNIFICANT CHANGE UP (ref 0–0.3)
BILIRUB DIRECT SERPL-MCNC: 0.3 MG/DL — SIGNIFICANT CHANGE UP (ref 0–0.3)
BILIRUB INDIRECT FLD-MCNC: 0.3 MG/DL — SIGNIFICANT CHANGE UP (ref 0.2–1)
BILIRUB INDIRECT FLD-MCNC: 0.4 MG/DL — SIGNIFICANT CHANGE UP (ref 0.2–1)
BILIRUB SERPL-MCNC: 0.6 MG/DL — SIGNIFICANT CHANGE UP (ref 0.4–2)
BILIRUB SERPL-MCNC: 0.7 MG/DL — SIGNIFICANT CHANGE UP (ref 0.4–2)
BILIRUB SERPL-MCNC: 0.7 MG/DL — SIGNIFICANT CHANGE UP (ref 0.4–2)
BILIRUB UR-MCNC: ABNORMAL
BUN SERPL-MCNC: 58 MG/DL — HIGH (ref 8–20)
BUN SERPL-MCNC: 61 MG/DL — HIGH (ref 8–20)
BUN SERPL-MCNC: 62 MG/DL — HIGH (ref 8–20)
CALCIUM SERPL-MCNC: 9.6 MG/DL — SIGNIFICANT CHANGE UP (ref 8.6–10.2)
CALCIUM SERPL-MCNC: 9.7 MG/DL — SIGNIFICANT CHANGE UP (ref 8.6–10.2)
CALCIUM SERPL-MCNC: 9.7 MG/DL — SIGNIFICANT CHANGE UP (ref 8.6–10.2)
CHLORIDE SERPL-SCNC: 96 MMOL/L — LOW (ref 98–107)
CHLORIDE SERPL-SCNC: 97 MMOL/L — LOW (ref 98–107)
CHLORIDE SERPL-SCNC: 99 MMOL/L — SIGNIFICANT CHANGE UP (ref 98–107)
CO2 SERPL-SCNC: 22 MMOL/L — SIGNIFICANT CHANGE UP (ref 22–29)
CO2 SERPL-SCNC: 23 MMOL/L — SIGNIFICANT CHANGE UP (ref 22–29)
CO2 SERPL-SCNC: 27 MMOL/L — SIGNIFICANT CHANGE UP (ref 22–29)
COLOR SPEC: ABNORMAL
CREAT SERPL-MCNC: 2.12 MG/DL — HIGH (ref 0.5–1.3)
CREAT SERPL-MCNC: 2.28 MG/DL — HIGH (ref 0.5–1.3)
CREAT SERPL-MCNC: 2.46 MG/DL — HIGH (ref 0.5–1.3)
DIFF PNL FLD: ABNORMAL
EPI CELLS # UR: SIGNIFICANT CHANGE UP
GLUCOSE BLDC GLUCOMTR-MCNC: 115 MG/DL — HIGH (ref 70–99)
GLUCOSE BLDC GLUCOMTR-MCNC: 129 MG/DL — HIGH (ref 70–99)
GLUCOSE BLDC GLUCOMTR-MCNC: 136 MG/DL — HIGH (ref 70–99)
GLUCOSE BLDC GLUCOMTR-MCNC: 167 MG/DL — HIGH (ref 70–99)
GLUCOSE BLDC GLUCOMTR-MCNC: 175 MG/DL — HIGH (ref 70–99)
GLUCOSE BLDC GLUCOMTR-MCNC: 184 MG/DL — HIGH (ref 70–99)
GLUCOSE BLDC GLUCOMTR-MCNC: 205 MG/DL — HIGH (ref 70–99)
GLUCOSE BLDC GLUCOMTR-MCNC: 208 MG/DL — HIGH (ref 70–99)
GLUCOSE BLDC GLUCOMTR-MCNC: 238 MG/DL — HIGH (ref 70–99)
GLUCOSE BLDC GLUCOMTR-MCNC: 88 MG/DL — SIGNIFICANT CHANGE UP (ref 70–99)
GLUCOSE BLDC GLUCOMTR-MCNC: 92 MG/DL — SIGNIFICANT CHANGE UP (ref 70–99)
GLUCOSE BLDC GLUCOMTR-MCNC: 95 MG/DL — SIGNIFICANT CHANGE UP (ref 70–99)
GLUCOSE SERPL-MCNC: 181 MG/DL — HIGH (ref 70–115)
GLUCOSE SERPL-MCNC: 188 MG/DL — HIGH (ref 70–115)
GLUCOSE SERPL-MCNC: 82 MG/DL — SIGNIFICANT CHANGE UP (ref 70–115)
GLUCOSE UR QL: 250
HBA1C BLD-MCNC: 8.2 % — HIGH (ref 4–5.6)
HCT VFR BLD CALC: 29.8 % — LOW (ref 39–50)
HCT VFR BLD CALC: 34.9 % — LOW (ref 39–50)
HGB BLD-MCNC: 11 G/DL — LOW (ref 13–17)
HGB BLD-MCNC: 9.1 G/DL — LOW (ref 13–17)
HYALINE CASTS # UR AUTO: ABNORMAL /LPF
KETONES UR-MCNC: ABNORMAL
LACTATE BLDV-MCNC: 2.7 MMOL/L — HIGH (ref 0.5–2)
LACTATE SERPL-SCNC: 2.1 MMOL/L — HIGH (ref 0.5–2)
LEUKOCYTE ESTERASE UR-ACNC: ABNORMAL
MAGNESIUM SERPL-MCNC: 2.5 MG/DL — SIGNIFICANT CHANGE UP (ref 1.6–2.6)
MAGNESIUM SERPL-MCNC: 2.6 MG/DL — SIGNIFICANT CHANGE UP (ref 1.6–2.6)
MCHC RBC-ENTMCNC: 23.5 PG — LOW (ref 27–34)
MCHC RBC-ENTMCNC: 24.1 PG — LOW (ref 27–34)
MCHC RBC-ENTMCNC: 30.5 GM/DL — LOW (ref 32–36)
MCHC RBC-ENTMCNC: 31.5 GM/DL — LOW (ref 32–36)
MCV RBC AUTO: 76.5 FL — LOW (ref 80–100)
MCV RBC AUTO: 76.8 FL — LOW (ref 80–100)
NITRITE UR-MCNC: POSITIVE
PH UR: 5 — SIGNIFICANT CHANGE UP (ref 5–8)
PHOSPHATE SERPL-MCNC: 2.8 MG/DL — SIGNIFICANT CHANGE UP (ref 2.4–4.7)
PHOSPHATE SERPL-MCNC: 3.2 MG/DL — SIGNIFICANT CHANGE UP (ref 2.4–4.7)
PLATELET # BLD AUTO: 210 K/UL — SIGNIFICANT CHANGE UP (ref 150–400)
PLATELET # BLD AUTO: 223 K/UL — SIGNIFICANT CHANGE UP (ref 150–400)
POTASSIUM SERPL-MCNC: 4.2 MMOL/L — SIGNIFICANT CHANGE UP (ref 3.5–5.3)
POTASSIUM SERPL-MCNC: 4.3 MMOL/L — SIGNIFICANT CHANGE UP (ref 3.5–5.3)
POTASSIUM SERPL-MCNC: 4.5 MMOL/L — SIGNIFICANT CHANGE UP (ref 3.5–5.3)
POTASSIUM SERPL-SCNC: 4.2 MMOL/L — SIGNIFICANT CHANGE UP (ref 3.5–5.3)
POTASSIUM SERPL-SCNC: 4.3 MMOL/L — SIGNIFICANT CHANGE UP (ref 3.5–5.3)
POTASSIUM SERPL-SCNC: 4.5 MMOL/L — SIGNIFICANT CHANGE UP (ref 3.5–5.3)
PROT SERPL-MCNC: 7.1 G/DL — SIGNIFICANT CHANGE UP (ref 6.6–8.7)
PROT SERPL-MCNC: 7.5 G/DL — SIGNIFICANT CHANGE UP (ref 6.6–8.7)
PROT SERPL-MCNC: 7.7 G/DL — SIGNIFICANT CHANGE UP (ref 6.6–8.7)
PROT UR-MCNC: 100
RBC # BLD: 3.88 M/UL — LOW (ref 4.2–5.8)
RBC # BLD: 4.56 M/UL — SIGNIFICANT CHANGE UP (ref 4.2–5.8)
RBC # FLD: 19.2 % — HIGH (ref 10.3–14.5)
RBC # FLD: 20.1 % — HIGH (ref 10.3–14.5)
RBC CASTS # UR COMP ASSIST: ABNORMAL /HPF (ref 0–4)
SODIUM SERPL-SCNC: 136 MMOL/L — SIGNIFICANT CHANGE UP (ref 135–145)
SODIUM SERPL-SCNC: 137 MMOL/L — SIGNIFICANT CHANGE UP (ref 135–145)
SODIUM SERPL-SCNC: 140 MMOL/L — SIGNIFICANT CHANGE UP (ref 135–145)
SP GR SPEC: 1.02 — SIGNIFICANT CHANGE UP (ref 1.01–1.02)
UROBILINOGEN FLD QL: 1
WBC # BLD: 13.87 K/UL — HIGH (ref 3.8–10.5)
WBC # BLD: 14.92 K/UL — HIGH (ref 3.8–10.5)
WBC # FLD AUTO: 13.87 K/UL — HIGH (ref 3.8–10.5)
WBC # FLD AUTO: 14.92 K/UL — HIGH (ref 3.8–10.5)
WBC UR QL: SIGNIFICANT CHANGE UP

## 2019-12-23 PROCEDURE — 93308 TTE F-UP OR LMTD: CPT | Mod: 26

## 2019-12-23 PROCEDURE — 99291 CRITICAL CARE FIRST HOUR: CPT

## 2019-12-23 PROCEDURE — 99233 SBSQ HOSP IP/OBS HIGH 50: CPT

## 2019-12-23 RX ORDER — INSULIN GLARGINE 100 [IU]/ML
20 INJECTION, SOLUTION SUBCUTANEOUS AT BEDTIME
Refills: 0 | Status: DISCONTINUED | OUTPATIENT
Start: 2019-12-23 | End: 2019-12-31

## 2019-12-23 RX ORDER — PANTOPRAZOLE SODIUM 20 MG/1
40 TABLET, DELAYED RELEASE ORAL
Refills: 0 | Status: DISCONTINUED | OUTPATIENT
Start: 2019-12-23 | End: 2019-12-31

## 2019-12-23 RX ORDER — INSULIN LISPRO 100/ML
VIAL (ML) SUBCUTANEOUS
Refills: 0 | Status: DISCONTINUED | OUTPATIENT
Start: 2019-12-23 | End: 2019-12-27

## 2019-12-23 RX ORDER — INSULIN GLARGINE 100 [IU]/ML
10 INJECTION, SOLUTION SUBCUTANEOUS EVERY MORNING
Refills: 0 | Status: DISCONTINUED | OUTPATIENT
Start: 2019-12-23 | End: 2019-12-24

## 2019-12-23 RX ORDER — SODIUM CHLORIDE 9 MG/ML
1000 INJECTION, SOLUTION INTRAVENOUS
Refills: 0 | Status: DISCONTINUED | OUTPATIENT
Start: 2019-12-23 | End: 2019-12-23

## 2019-12-23 RX ORDER — METOPROLOL TARTRATE 50 MG
50 TABLET ORAL DAILY
Refills: 0 | Status: DISCONTINUED | OUTPATIENT
Start: 2019-12-23 | End: 2019-12-31

## 2019-12-23 RX ADMIN — HEPARIN SODIUM 5000 UNIT(S): 5000 INJECTION INTRAVENOUS; SUBCUTANEOUS at 18:07

## 2019-12-23 RX ADMIN — INSULIN GLARGINE 20 UNIT(S): 100 INJECTION, SOLUTION SUBCUTANEOUS at 21:05

## 2019-12-23 RX ADMIN — Medication 4: at 16:57

## 2019-12-23 RX ADMIN — MILRINONE LACTATE 6.64 MICROGRAM(S)/KG/MIN: 1 INJECTION, SOLUTION INTRAVENOUS at 22:41

## 2019-12-23 RX ADMIN — Medication 81 MILLIGRAM(S): at 12:48

## 2019-12-23 RX ADMIN — Medication 50 MILLIGRAM(S): at 12:48

## 2019-12-23 RX ADMIN — HEPARIN SODIUM 5000 UNIT(S): 5000 INJECTION INTRAVENOUS; SUBCUTANEOUS at 05:59

## 2019-12-23 RX ADMIN — SODIUM CHLORIDE 80 MILLILITER(S): 9 INJECTION, SOLUTION INTRAVENOUS at 04:44

## 2019-12-23 RX ADMIN — INSULIN GLARGINE 10 UNIT(S): 100 INJECTION, SOLUTION SUBCUTANEOUS at 06:20

## 2019-12-23 RX ADMIN — PANTOPRAZOLE SODIUM 40 MILLIGRAM(S): 20 TABLET, DELAYED RELEASE ORAL at 12:48

## 2019-12-23 RX ADMIN — TICAGRELOR 60 MILLIGRAM(S): 90 TABLET ORAL at 18:07

## 2019-12-23 RX ADMIN — Medication 2: at 11:30

## 2019-12-23 RX ADMIN — TICAGRELOR 60 MILLIGRAM(S): 90 TABLET ORAL at 06:00

## 2019-12-23 NOTE — PROGRESS NOTE ADULT - ASSESSMENT
75y/o male PMH HTN, HLD, HFrEF, DM, CKD, CAD s/p CABG, prior MI, s/p PCI/brachy, (6/20/19- LM 70/90%, LAD diffuse disease, Cx Prox 100%, RCA known , LIMA To LAD patent, SVG to OM 80% with ISR)/(7/2019- SVG to OM1 80%, brachy to SVG to OM1, PCI native OM), TTE 12/18/19- EF 25-30%, WMA, mild PHTN, mild AS, present to ED with feeling cold, nausea vomiting since last night. As per family and patient, c/o weakness, fatigue x 2 days, started to vomit last night, no relief with Zofran at home. Family took BP at home less then 90, and HR >125 at rest. Pt recently hospitalized for acute decompensated heart failure, requiring IV dopamine and milrinone, dopamine not tolerated. Irregular heart beat during hospitalization, possible transient Afib. RAS on CKD last hospitalization, ACE/ARB held.     CAD- s/p CABG, s/p PCI  - 7/2019- brachy therapy   - EKG- ST, LBBB unchanged from prior   - REpeat EKG ordered       HFrEF  - all labs pending  - euvolemic if not hypovolemic on exam  - IVF in moderation  - TTE EF 25-30%    Irregular Heart rate  - SR on monitor  - last hospitalization possible afib- plan for [facility] monitor out patient. 73y/o male PMH HTN, HLD, HFrEF, DM, CKD, CAD s/p CABG, prior MI, s/p PCI/brachy, (6/20/19- LM 70/90%, LAD diffuse disease, Cx Prox 100%, RCA known , LIMA To LAD patent, SVG to OM 80% with ISR)/(7/2019- SVG to OM1 80%, brachy to SVG to OM1, PCI native OM), TTE 12/18/19- EF 25-30%, WMA, mild PHTN, mild AS, present to ED with feeling cold, nausea vomiting since last night. As per family and patient, c/o weakness, fatigue x 2 days, started to vomit last night, no relief with Zofran at home. Family took BP at home less then 90, and HR >125 at rest. Pt recently hospitalized for acute decompensated heart failure, requiring IV dopamine and milrinone, dopamine not tolerated. Irregular heart beat during hospitalization, possible transient Afib. RAS on CKD last hospitalization, ACE/ARB held.   As of 12/23/19 evaluation, pt is euvolemic on exam, "feels stronger", no c/o chest pain, palpitations, SOB, or vomitting. Pt +productive cough with yellow sputum, minimal pulmonary vascular congestion noted on xray.    1. CAD- s/p CABG, s/p PCI  - 7/2019- brachy therapy   - EKG- ST, LBBB unchanged from prior     HFrEF  - BNP- elevated at 16,174   - euvolemic if not hypovolemic on exam  - IVF in moderation  - TTE EF 25-30%, moderately to severely decreased left ventricle systolic function, moderate pulmonary HTN    Irregular Heart rate  - ST on monitor  - last hospitalization possible afib- plan for [facility] monitor out patient. 75y/o male PMH HTN, HLD, HFrEF, DM, CKD, CAD s/p CABG, prior MI, s/p PCI/brachy, (6/20/19- LM 70/90%, LAD diffuse disease, Cx Prox 100%, RCA known , LIMA To LAD patent, SVG to OM 80% with ISR)/(7/2019- SVG to OM1 80%, brachy to SVG to OM1, PCI native OM), TTE 12/18/19- EF 25-30%, WMA, mild PHTN, mild AS, present to ED with feeling cold, nausea vomiting since last night. As per family and patient, c/o weakness, fatigue x 2 days, started to vomit last night, no relief with Zofran at home. Family took BP at home less then 90, and HR >125 at rest. Pt recently hospitalized for acute decompensated heart failure, requiring IV dopamine and milrinone, dopamine not tolerated. Irregular heart beat during hospitalization, possible transient Afib. RAS on CKD last hospitalization, ACE/ARB held.   As of 12/23/19 evaluation, pt is euvolemic on exam, "feels stronger", no c/o chest pain, palpitations, SOB, or vomitting. Pt +productive cough with yellow sputum, minimal pulmonary vascular congestion noted on xray.    1. CAD- s/p CABG, s/p PCI  - 7/2019- brachy therapy   - EKG- ST, LBBB unchanged from prior     2.HFrEF  - BNP- elevated at 16,174   - euvolemic if not hypovolemic on exam  - IVF in moderation  - TTE EF 25-30%, moderately to severely decreased left ventricle systolic function, moderate pulmonary HTN  -Continue Milrinone gtt for perfusion optimization    3.Irregular Heart rate  - ST on monitor  - last hospitalization possible afib- plan for [facility] monitor out patient.

## 2019-12-23 NOTE — PROGRESS NOTE ADULT - ATTENDING COMMENTS
diabteci ketoacidosis on metforming and congestive heart failure  worening congestive heart failure. RV dysfunction  need sinsulin IV. hyperglycemia.  euvolemic now  ct milrinone for now  no Diuresi sfor now.  off ARB. oiff Aldactone. diabteci ketoacidosis on metforming and congestive heart failure  worening congestive heart failure. RV dysfunction  need sinsulin IV. hyperglycemia.  euvolemic now  ct milrinone for now  no Diuresi sfor now.  off ARB. oiff Aldactone.    Critical Care time: >40 minutes of noncontinuous critical care time spent evaluating/treating, reviewing imaging, labs, discussing case with multidisciplinary team, discussing plans/goals of care with patient/family to prevent further life threatening depreciation of the patient's condition. Non-inclusive is procedure time.

## 2019-12-23 NOTE — PROGRESS NOTE ADULT - ASSESSMENT
74 male with hx of CHFrEF, CAD s/p CABG, HTN, HLD, DM, PAF, recent hospitalization for decompensated heart failure requiring inotropes, presented to the hospital with weakness and vomiting, found to have lactic acidosis, RAS, transaminitis. 74 male with hx of CHFrEF, CAD s/p CABG, HTN, HLD, DM, PAF, recent hospitalization for decompensated heart failure requiring inotropes, presented to the hospital with weakness and vomiting, found to have lactic acidosis, RAS, transaminitis, mild hyperglycemia/dka.  Presentation likely due to a combination of metformin toxicity and decompensated CHF/ cardiogenic shock.  Symptoms improved after insulin infusion, discontinuation of metformin, and initiation of milrinone infusion.     Plan:  Cardiogenic shock:  - continue milrinone at 0.375 mcg  - holding beta blocker for now    CAD  recent cath showing multivessel disease  - continue ASA, brilinta  - holding statin due to transaminitis       ?PAF  - not on AC yet, possible plan for loop recorder    DKA  - resolved, now on lantus and lispro

## 2019-12-23 NOTE — PROGRESS NOTE ADULT - SUBJECTIVE AND OBJECTIVE BOX
INTERVAL HPI/OVERNIGHT EVENTS: no complaints    PHYSICAL EXAM:  GENERAL: NAD   HEAD:  Atraumatic, normocephalic  EYES: EOMI, PERRL, sclera and conjunctiva clear  ENMT:  MMM, No oropharyngeal erythema or exudates  NECK:  Supple, normal appearance, trachea midline  NERVOUS SYSTEM:  Alert & Oriented X3, Symmetric strength in all extremities    CHEST/LUNG: Bilateral air entry, no chest deformity, no crackles or wheeze  HEART: Regular rate, regular rhythm;   ABDOMEN: Soft, Nontender, Nondistended; No rebound or guarding, bowel sounds present  EXTREMITIES:  No edema, no clubbing, no cyanosis.  LYMPH:  No lymphadenopathy noted  SKIN:  No visible rashes or skin lesions, good capillary refill  PSYCH: appropriate affect and behavior    RADIOLOGY personally reviewed: cxr- pulm vasc congestion, no infiltrates or effusions   GOALS OF CARE DISCUSSION WITH PATIENT/FAMILY/PROXY: family updated on progress  --------------------------------------------------------------------------------------------------------------------------------------------------------------  On Admission  19 (1d)  HPI:  Pt is a 74 YOM h/o CAD, CABG, HFrEF, MI, HTN, HLD, DM, PAF on last admission pt was recently discharged on Thursday after being treated for decompensated HF with milrinone, dopamine(which he did not tolerate).  Pt presents to ER with weakness and vomiting since yesterday.  Per pt's son since he was discharged his appetite has been very poor and he has been weak but yesterday started vomiting and not tolerating PO medications.  In ER found to have multiple organ dysfunction including RAS, acute liver transaminitis, DKA/anion gap acidosis.  Pt denies CP, no SOB, admits to feeling very cold. (22 Dec 2019 18:25)    PAST MEDICAL & SURGICAL HISTORY:  Heart failure  Essential hypertension  Type 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, without long-term current use of insul  Coronary artery disease involving native coronary artery of native heart, angina presence unspecifie  Coronary artery disease involving coronary bypass graft of native heart with unstable angina pectoris  High cholesterol  GERD (gastroesophageal reflux disease)  Status post open reduction with internal fixation of fracture  S/P primary angioplasty with coronary stent  S/P CABG (coronary artery bypass graft): 4V  [place]      Antimicrobial:    Cardiovascular:  milrinone Infusion 0.375 MICROgram(s)/kG/Min IV Continuous <Continuous>    Pulmonary:    Hematalogic:  aspirin  chewable 81 milliGRAM(s) Oral daily  heparin  Injectable 5000 Unit(s) SubCutaneous every 12 hours  ticagrelor 60 milliGRAM(s) Oral every 12 hours    Other:  chlorhexidine 4% Liquid 1 Application(s) Topical <User Schedule>  dextrose 40% Gel 15 Gram(s) Oral once PRN  dextrose 5%. 1000 milliLiter(s) IV Continuous <Continuous>  dextrose 50% Injectable 12.5 Gram(s) IV Push once  dextrose 50% Injectable 25 Gram(s) IV Push once  dextrose 50% Injectable 25 Gram(s) IV Push once  glucagon  Injectable 1 milliGRAM(s) IntraMuscular once PRN  influenza   Vaccine 0.5 milliLiter(s) IntraMuscular once  insulin glargine Injectable (LANTUS) 10 Unit(s) SubCutaneous every morning  insulin lispro (HumaLOG) corrective regimen sliding scale   SubCutaneous three times a day before meals  pantoprazole    Tablet 40 milliGRAM(s) Oral before breakfast      Drug Dosing Weight  Height (cm): 170.2 (22 Dec 2019 20:10)  Weight (kg): 58.7 (22 Dec 2019 20:10)  BMI (kg/m2): 20.3 (22 Dec 2019 20:10)  BSA (m2): 1.68 (22 Dec 2019 20:10)    T(C): 36.6 (19 @ 07:32), Max: 37.2 (19 @ 23:53)  HR: 107 (19 @ 08:00)  BP: 113/64 (19 @ 08:00)  BP(mean): 83 (19 @ 08:00)  ABP: --  ABP(mean): --  RR: 21 (19 @ 08:00)  SpO2: 99% (19 @ 08:00)           @ 07:01  -   @ 07:00  --------------------------------------------------------  IN: 867.6 mL / OUT: 200 mL / NET: 667.6 mL              LABS:  CBC Full  -  ( 23 Dec 2019 04:40 )  WBC Count : 13.87 K/uL  RBC Count : 3.88 M/uL  Hemoglobin : 9.1 g/dL  Hematocrit : 29.8 %  Platelet Count - Automated : 223 K/uL  Mean Cell Volume : 76.8 fl  Mean Cell Hemoglobin : 23.5 pg  Mean Cell Hemoglobin Concentration : 30.5 gm/dL  Auto Neutrophil # : x  Auto Lymphocyte # : x  Auto Monocyte # : x  Auto Eosinophil # : x  Auto Basophil # : x  Auto Neutrophil % : x  Auto Lymphocyte % : x  Auto Monocyte % : x  Auto Eosinophil % : x  Auto Basophil % : x        140  |  99  |  61.0<H>  ----------------------------<  82  4.5   |  27.0  |  2.46<H>    Ca    9.7      23 Dec 2019 04:40  Phos  3.2       Mg     2.6         TPro  7.1  /  Alb  3.9  /  TBili  0.6  /  DBili  0.3  /  AST  3888<H>  /  ALT  2695<H>  /  AlkPhos  92      PT/INR - ( 22 Dec 2019 15:32 )   PT: 18.8 sec;   INR: 1.61 ratio         PTT - ( 22 Dec 2019 15:32 )  PTT:31.3 sec  Urinalysis Basic - ( 23 Dec 2019 04:46 )    Color: [place] / Appearance: Clear / S.025 / pH: x  Gluc: x / Ketone: Trace  / Bili: Small / Urobili: 1   Blood: x / Protein: 100 / Nitrite: Positive   Leuk Esterase: Trace / RBC: 6-10 /HPF / WBC 3-5   Sq Epi: x / Non Sq Epi: Occasional / Bacteria: Moderate INTERVAL HPI/OVERNIGHT EVENTS: no complaints    PHYSICAL EXAM:  GENERAL: NAD   HEAD:  Atraumatic, normocephalic  EYES: EOMI, PERRL, sclera and conjunctiva clear  ENMT:  MMM, No oropharyngeal erythema or exudates  NECK:  Supple, normal appearance, trachea midline  NERVOUS SYSTEM:  Alert & Oriented X3, Symmetric strength in all extremities    CHEST/LUNG: Bilateral air entry, no chest deformity, no crackles or wheeze  HEART: Regular rate, regular rhythm;   ABDOMEN: Soft, Nontender, Nondistended; No rebound or guarding, bowel sounds present  EXTREMITIES:  No edema, no clubbing, no cyanosis.  LYMPH:  No lymphadenopathy noted  SKIN:  No visible rashes or skin lesions, good capillary refill  PSYCH: appropriate affect and behavior    RADIOLOGY personally reviewed: cxr- pulm vasc congestion, no infiltrates or effusions   GOALS OF CARE DISCUSSION WITH PATIENT/FAMILY/PROXY: family updated on status and prognosis   --------------------------------------------------------------------------------------------------------------------------------------------------------------  On Admission  19 (1d)  HPI:  Pt is a 74 YOM h/o CAD, CABG, HFrEF, MI, HTN, HLD, DM, PAF on last admission pt was recently discharged on Thursday after being treated for decompensated HF with milrinone, dopamine(which he did not tolerate).  Pt presents to ER with weakness and vomiting since yesterday.  Per pt's son since he was discharged his appetite has been very poor and he has been weak but yesterday started vomiting and not tolerating PO medications.  In ER found to have multiple organ dysfunction including RAS, acute liver transaminitis, DKA/anion gap acidosis.  Pt denies CP, no SOB, admits to feeling very cold. (22 Dec 2019 18:25)    PAST MEDICAL & SURGICAL HISTORY:  Heart failure  Essential hypertension  Type 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, without long-term current use of insul  Coronary artery disease involving native coronary artery of native heart, angina presence unspecifie  Coronary artery disease involving coronary bypass graft of native heart with unstable angina pectoris  High cholesterol  GERD (gastroesophageal reflux disease)  Status post open reduction with internal fixation of fracture  S/P primary angioplasty with coronary stent  S/P CABG (coronary artery bypass graft): 4V  [place]      Antimicrobial:    Cardiovascular:  milrinone Infusion 0.375 MICROgram(s)/kG/Min IV Continuous <Continuous>    Pulmonary:    Hematalogic:  aspirin  chewable 81 milliGRAM(s) Oral daily  heparin  Injectable 5000 Unit(s) SubCutaneous every 12 hours  ticagrelor 60 milliGRAM(s) Oral every 12 hours    Other:  chlorhexidine 4% Liquid 1 Application(s) Topical <User Schedule>  dextrose 40% Gel 15 Gram(s) Oral once PRN  dextrose 5%. 1000 milliLiter(s) IV Continuous <Continuous>  dextrose 50% Injectable 12.5 Gram(s) IV Push once  dextrose 50% Injectable 25 Gram(s) IV Push once  dextrose 50% Injectable 25 Gram(s) IV Push once  glucagon  Injectable 1 milliGRAM(s) IntraMuscular once PRN  influenza   Vaccine 0.5 milliLiter(s) IntraMuscular once  insulin glargine Injectable (LANTUS) 10 Unit(s) SubCutaneous every morning  insulin lispro (HumaLOG) corrective regimen sliding scale   SubCutaneous three times a day before meals  pantoprazole    Tablet 40 milliGRAM(s) Oral before breakfast      Drug Dosing Weight  Height (cm): 170.2 (22 Dec 2019 20:10)  Weight (kg): 58.7 (22 Dec 2019 20:10)  BMI (kg/m2): 20.3 (22 Dec 2019 20:10)  BSA (m2): 1.68 (22 Dec 2019 20:10)    T(C): 36.6 (19 @ 07:32), Max: 37.2 (19 @ 23:53)  HR: 107 (19 @ 08:00)  BP: 113/64 (19 @ 08:00)  BP(mean): 83 (19 @ 08:00)  ABP: --  ABP(mean): --  RR: 21 (19 @ 08:00)  SpO2: 99% (19 @ 08:00)           @ 07:01  -   @ 07:00  --------------------------------------------------------  IN: 867.6 mL / OUT: 200 mL / NET: 667.6 mL              LABS:  CBC Full  -  ( 23 Dec 2019 04:40 )  WBC Count : 13.87 K/uL  RBC Count : 3.88 M/uL  Hemoglobin : 9.1 g/dL  Hematocrit : 29.8 %  Platelet Count - Automated : 223 K/uL  Mean Cell Volume : 76.8 fl  Mean Cell Hemoglobin : 23.5 pg  Mean Cell Hemoglobin Concentration : 30.5 gm/dL  Auto Neutrophil # : x  Auto Lymphocyte # : x  Auto Monocyte # : x  Auto Eosinophil # : x  Auto Basophil # : x  Auto Neutrophil % : x  Auto Lymphocyte % : x  Auto Monocyte % : x  Auto Eosinophil % : x  Auto Basophil % : x        140  |  99  |  61.0<H>  ----------------------------<  82  4.5   |  27.0  |  2.46<H>    Ca    9.7      23 Dec 2019 04:40  Phos  3.2       Mg     2.6         TPro  7.1  /  Alb  3.9  /  TBili  0.6  /  DBili  0.3  /  AST  3888<H>  /  ALT  2695<H>  /  AlkPhos  92      PT/INR - ( 22 Dec 2019 15:32 )   PT: 18.8 sec;   INR: 1.61 ratio         PTT - ( 22 Dec 2019 15:32 )  PTT:31.3 sec  Urinalysis Basic - ( 23 Dec 2019 04:46 )    Color: [place] / Appearance: Clear / S.025 / pH: x  Gluc: x / Ketone: Trace  / Bili: Small / Urobili: 1   Blood: x / Protein: 100 / Nitrite: Positive   Leuk Esterase: Trace / RBC: 6-10 /HPF / WBC 3-5   Sq Epi: x / Non Sq Epi: Occasional / Bacteria: Moderate

## 2019-12-23 NOTE — CONSULT NOTE ADULT - SUBJECTIVE AND OBJECTIVE BOX
HPI:  Pt is a 74 YOM h/o CAD, CABG, HFrEF, MI, HTN, HLD, DM, PAF on last admission pt was recently discharged on Thursday after being treated for decompensated HF with milrinone, dopamine(which he did not tolerate).  Pt presents to ER with weakness and vomiting since yesterday.  Per pt's son since he was discharged his appetite has been very poor and he has been weak but yesterday started vomiting and not tolerating PO medications.  In ER found to have multiple organ dysfunction including RAS, acute liver transaminitis, DKA/anion gap acidosis.  Pt denies CP, no SOB, admits to feeling very cold. (22 Dec 2019 18:25)   Hx renal stones x1 not aware of type, no other renal  problems; elevated creatinine on admission.    PAST MEDICAL & SURGICAL HISTORY:  Heart failure  Essential hypertension  Type 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, without long-term current use of insul  Coronary artery disease involving native coronary artery of native heart, angina presence unspecifie  Coronary artery disease involving coronary bypass graft of native heart with unstable angina pectoris  High cholesterol  GERD (gastroesophageal reflux disease)  Status post open reduction with internal fixation of fracture  S/P primary angioplasty with coronary stent  S/P CABG (coronary artery bypass graft): 4V  [place]      FAMILY HISTORY:  NC    Social History:Non smoker    MEDICATIONS  (STANDING):  aspirin  chewable 81 milliGRAM(s) Oral daily  chlorhexidine 4% Liquid 1 Application(s) Topical <User Schedule>  dextrose 5%. 1000 milliLiter(s) (50 mL/Hr) IV Continuous <Continuous>  dextrose 50% Injectable 12.5 Gram(s) IV Push once  dextrose 50% Injectable 25 Gram(s) IV Push once  dextrose 50% Injectable 25 Gram(s) IV Push once  heparin  Injectable 5000 Unit(s) SubCutaneous every 12 hours  influenza   Vaccine 0.5 milliLiter(s) IntraMuscular once  insulin glargine Injectable (LANTUS) 20 Unit(s) SubCutaneous at bedtime  insulin glargine Injectable (LANTUS) 10 Unit(s) SubCutaneous every morning  insulin lispro (HumaLOG) corrective regimen sliding scale   SubCutaneous three times a day before meals  metoprolol succinate ER 50 milliGRAM(s) Oral daily  milrinone Infusion 0.375 MICROgram(s)/kG/Min (6.638 mL/Hr) IV Continuous <Continuous>  pantoprazole    Tablet 40 milliGRAM(s) Oral before breakfast  ticagrelor 60 milliGRAM(s) Oral every 12 hours    MEDICATIONS  (PRN):  dextrose 40% Gel 15 Gram(s) Oral once PRN Blood Glucose LESS THAN 70 milliGRAM(s)/deciliter  glucagon  Injectable 1 milliGRAM(s) IntraMuscular once PRN Glucose LESS THAN 70 milligrams/deciliter   Meds reviewed    Allergies    Allergy Status Unknown    Intolerances    DOPamine (Hypotension)   ? lasix    REVIEW OF SYSTEMS:    CONSTITUTIONAL:  sob, weight gain, feels weak  EYES: No eye pain, visual disturbances, or discharge  ENMT:  No difficulty hearing, tinnitus, vertigo; No sinus or throat pain  NECK: No pain or stiffness  BREASTS: No pain, masses, or nipple discharge  RESPIRATORY: sob  CARDIOVASCULAR: No chest pain, palpitations, dizziness,   GASTROINTESTINAL: No abdominal or epigastric pain. No nausea, vomiting, or hematemesis; No diarrhea or constipation. No melena or hematochezia.Trunckal obesity  GENITOURINARY: No dysuria, frequency, hematuria, or incontinence  NEUROLOGICAL: No headaches, memory loss, loss of strength, numbness, or tremors  SKIN: Diffuse erythema, no blisters  LYMPH NODES: No enlarged glands  ENDOCRINE: No heat or cold intolerance; No hair loss  MUSCULOSKELETAL: Chronic lymphedema legs with scars  PSYCHIATRIC: No depression, anxiety, mood swings, or difficulty sleeping  HEME/LYMPH: No easy bruising, or bleeding gums  ALLERY AND IMMUNOLOGIC: No hives or eczema      Vital Signs Last 24 Hrs  T(C): 36.6 (23 Dec 2019 07:32), Max: 37.2 (22 Dec 2019 23:53)  T(F): 97.8 (23 Dec 2019 07:32), Max: 98.9 (22 Dec 2019 23:53)  HR: 117 (23 Dec 2019 11:00) (92 - 117)  BP: 113/64 (23 Dec 2019 11:00) (93/58 - 123/71)  BP(mean): 83 (23 Dec 2019 11:00) (70 - 92)  RR: 24 (23 Dec 2019 11:00) (18 - 31)  SpO2: 99% (23 Dec 2019 11:00) (98% - 100%)  Daily Height in cm: 170.18 (22 Dec 2019 20:10)    Daily Weight in k.9 (23 Dec 2019 04:15)    PHYSICAL EXAM:    GENERAL: appears chronically ill, oriented.  HEAD:  Atraumatic, Normocephalic  EYES: EOMI, PERRLA, conjunctiva and sclera clear  ENMT: No tonsillar erythema, exudates, or enlargement; Moist mucous membranes, Good dentition, No lesions  NECK: Supple, neck  veins full  NERVOUS SYSTEM:  Alert & Oriented X3, Good concentration; Motor Strength wnl upper and lower extremities;CHEST/LUNG: Clear to percussion bilaterally; No rales, rhonchi, wheezing, or rubs  HEART: Regular rate and rhythm; No murmurs, rubs, or gallops  ABDOMEN: Soft, Nontender, Nondistended; Bowel sounds present, body wall and flank edema  EXTREMITIES:  +2 - +3 leg edema with sligt erythema edema  LYMPH: No lymphadenopathy noted  SKIN: No rashes or lesions, pale      LABS:                        9.1    13.87 )-----------( 223      ( 23 Dec 2019 04:40 )             29.8         140  |  99  |  61.0<H>  ----------------------------<  82  4.5   |  27.0  |  2.46<H>    Ca    9.7      23 Dec 2019 04:40  Phos  3.2       Mg     2.6         TPro  7.1  /  Alb  3.9  /  TBili  0.6  /  DBili  0.3  /  AST  3888<H>  /  ALT  2695<H>  /  AlkPhos  92      PT/INR - ( 22 Dec 2019 15:32 )   PT: 18.8 sec;   INR: 1.61 ratio         PTT - ( 22 Dec 2019 15:32 )  PTT:31.3 sec  Urinalysis Basic - ( 23 Dec 2019 04:46 )    Color: [place] / Appearance: Clear / S.025 / pH: x  Gluc: x / Ketone: Trace  / Bili: Small / Urobili: 1   Blood: x / Protein: 100 / Nitrite: Positive   Leuk Esterase: Trace / RBC: 6-10 /HPF / WBC 3-5   Sq Epi: x / Non Sq Epi: Occasional / Bacteria: Moderate      Magnesium, Serum: 2.6 mg/dL ( @ 04:40)  Phosphorus Level, Serum: 3.2 mg/dL ( @ 04:40)  Phosphorus Level, Serum: 3.3 mg/dL ( @ 22:05)  Magnesium, Serum: 2.6 mg/dL ( @ 22:05)  Magnesium, Serum: 2.8 mg/dL ( @ 15:32)          RADIOLOGY & ADDITIONAL TESTS: HPI:  Pt is a 74 YOM h/o CAD, CABG, HFrEF, MI, HTN, HLD, DM, PAF on last admission pt was recently discharged on Thursday after being treated for decompensated HF with milrinone, dopamine(which he did not tolerate).  Pt presents to ER with weakness and vomiting since yesterday.  Per pt's son since he was discharged his appetite has been very poor and he has been weak but yesterday started vomiting and not tolerating PO medications.  In ER found to have multiple organ dysfunction including RAS, acute liver transaminitis, DKA/anion gap acidosis.  Pt denies CP, no SOB, admits to feeling very cold.      PAST MEDICAL & SURGICAL HISTORY:  Heart failure  Essential hypertension  Type 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, without long-term current use of insul  Coronary artery disease involving native coronary artery of native heart, angina presence unspecifie  Coronary artery disease involving coronary bypass graft of native heart with unstable angina pectoris  High cholesterol  GERD (gastroesophageal reflux disease)  Status post open reduction with internal fixation of fracture  S/P primary angioplasty with coronary stent  S/P CABG (coronary artery bypass graft): 4V  [place]      FAMILY HISTORY:  NC    Social History:Non smoker    MEDICATIONS  (STANDING):  aspirin  chewable 81 milliGRAM(s) Oral daily  chlorhexidine 4% Liquid 1 Application(s) Topical <User Schedule>  dextrose 5%. 1000 milliLiter(s) (50 mL/Hr) IV Continuous <Continuous>  dextrose 50% Injectable 12.5 Gram(s) IV Push once  dextrose 50% Injectable 25 Gram(s) IV Push once  dextrose 50% Injectable 25 Gram(s) IV Push once  heparin  Injectable 5000 Unit(s) SubCutaneous every 12 hours  influenza   Vaccine 0.5 milliLiter(s) IntraMuscular once  insulin glargine Injectable (LANTUS) 20 Unit(s) SubCutaneous at bedtime  insulin glargine Injectable (LANTUS) 10 Unit(s) SubCutaneous every morning  insulin lispro (HumaLOG) corrective regimen sliding scale   SubCutaneous three times a day before meals  metoprolol succinate ER 50 milliGRAM(s) Oral daily  milrinone Infusion 0.375 MICROgram(s)/kG/Min (6.638 mL/Hr) IV Continuous <Continuous>  pantoprazole    Tablet 40 milliGRAM(s) Oral before breakfast  ticagrelor 60 milliGRAM(s) Oral every 12 hours    MEDICATIONS  (PRN):  dextrose 40% Gel 15 Gram(s) Oral once PRN Blood Glucose LESS THAN 70 milliGRAM(s)/deciliter  glucagon  Injectable 1 milliGRAM(s) IntraMuscular once PRN Glucose LESS THAN 70 milligrams/deciliter   Meds reviewed    Allergies    Allergy Status Unknown    Intolerances    DOPamine (Hypotension)        Vital Signs Last 24 Hrs  T(C): 36.6 (23 Dec 2019 07:32), Max: 37.2 (22 Dec 2019 23:53)  T(F): 97.8 (23 Dec 2019 07:32), Max: 98.9 (22 Dec 2019 23:53)  HR: 117 (23 Dec 2019 11:00) (92 - 117)  BP: 113/64 (23 Dec 2019 11:00) (93/58 - 123/71)  BP(mean): 83 (23 Dec 2019 11:00) (70 - 92)  RR: 24 (23 Dec 2019 11:00) (18 - 31)  SpO2: 99% (23 Dec 2019 11:00) (98% - 100%)  Daily Height in cm: 170.18 (22 Dec 2019 20:10)    Daily Weight in k.9 (23 Dec 2019 04:15)    PHYSICAL EXAM:    GENERAL: NAD  HEAD:  Atraumatic, Normocephalic  EYES: EOMI  NECK: Supple, neck  veins full  NERVOUS SYSTEM:  Alert & Oriented X3  CHEST/LUNG: Clear to percussion bilaterally  HEART: Regular rate and rhythm  ABDOMEN: Soft, Nontender, Nondistended; Bowel sounds present  EXTREMITIES: no edema      LABS:                        9.1    13.87 )-----------( 223      ( 23 Dec 2019 04:40 )             29.8         140  |  99  |  61.0<H>  ----------------------------<  82  4.5   |  27.0  |  2.46<H>    Ca    9.7      23 Dec 2019 04:40  Phos  3.2       Mg     2.6         TPro  7.1  /  Alb  3.9  /  TBili  0.6  /  DBili  0.3  /  AST  3888<H>  /  ALT  2695<H>  /  AlkPhos  92      PT/INR - ( 22 Dec 2019 15:32 )   PT: 18.8 sec;   INR: 1.61 ratio         PTT - ( 22 Dec 2019 15:32 )  PTT:31.3 sec  Urinalysis Basic - ( 23 Dec 2019 04:46 )    Color: [place] / Appearance: Clear / S.025 / pH: x  Gluc: x / Ketone: Trace  / Bili: Small / Urobili: 1   Blood: x / Protein: 100 / Nitrite: Positive   Leuk Esterase: Trace / RBC: 6-10 /HPF / WBC 3-5   Sq Epi: x / Non Sq Epi: Occasional / Bacteria: Moderate      Magnesium, Serum: 2.6 mg/dL ( @ 04:40)  Phosphorus Level, Serum: 3.2 mg/dL ( @ 04:40)  Phosphorus Level, Serum: 3.3 mg/dL ( @ 22:05)  Magnesium, Serum: 2.6 mg/dL ( @ 22:05)  Magnesium, Serum: 2.8 mg/dL ( @ 15:32)          RADIOLOGY & ADDITIONAL TESTS:

## 2019-12-23 NOTE — CONSULT NOTE ADULT - ASSESSMENT
RAS suspect 2/2 hypoperfusion from emesis DKA  in combination w metformin toxication he had lactate 16 yest now down to 2.7  Transaminitis  decompensated CHF/ cardiogenic shock  On pressors IVF  has good UOP renal function improving  No need for RRT at this point    Will follow

## 2019-12-23 NOTE — PROGRESS NOTE ADULT - SUBJECTIVE AND OBJECTIVE BOX
[place] [facility] Faculty Practice                                                               Office: [address], [place] [address]                                                              Telephone: [phone]. Fax:[phone]                                                                             PROGRESS NOTE  Reason for follow up:   Overnight: No new events.   Update:     Subjective: "  ______________________"    75y/o male PMH HTN, HLD, HFrEF, DM, CKD, CAD s/p CABG, prior MI, s/p PCI/brachy, (6/20/19- LM 70/90%, LAD diffuse disease, Cx Prox 100%, RCA known , LIMA To LAD patent, SVG to OM 80% with ISR)/(7/2019- SVG to OM1 80%, brachy to SVG to OM1, PCI native OM), TTE 12/18/19- EF 25-30%, WMA, mild PHTN, mild AS, present to ED with feeling cold, nausea vomiting since last night. As per family and patient, c/o weakness, fatigue x 2 days, started to vomit last night, no relief with Zofran at home. Family took BP at home less then 90, and HR >125 at rest. Pt recently hospitalized for acute decompensated heart failure, requiring IV dopamine and milrinone, dopamine not tolerated. Irregular heart beat during hospitalization, possible transient Afib. RAS on CKD last hospitalization, ACE/ARB held.     As of 12/23/19 evaluation,     Vitals:  T(C): 36.4 (12-23-19 @ 12:02), Max: 37.2 (12-22-19 @ 23:53)  HR: 111 (12-23-19 @ 15:00) (92 - 117)  BP: 111/65 (12-23-19 @ 15:00) (93/58 - 125/77)  RR: 26 (12-23-19 @ 15:00) (18 - 31)  SpO2: 99% (12-23-19 @ 15:00) (98% - 100%)    I&O's Summary    22 Dec 2019 07:01  -  23 Dec 2019 07:00  --------------------------------------------------------  IN: 867.6 mL / OUT: 200 mL / NET: 667.6 mL    23 Dec 2019 07:01  -  23 Dec 2019 15:21  --------------------------------------------------------  IN: 641.4 mL / OUT: 200 mL / NET: 441.4 mL      Weight (kg): 58.7 (12-22 @ 20:10)      PHYSICAL EXAM:  Appearance: Comfortable. No acute distress  HEENT:  Head and neck: Atraumatic. Normocephalic.  Normal oral mucosa, PERRL, Neck is supple. No JVD, No carotid bruit.   Neurologic: A & O x 3, no focal deficits. EOMI.  Lymphatic: No cervical lymphadenopathy  Cardiovascular: Normal S1 S2, No murmur, rubs/gallops. No JVD, No edema  Respiratory: Lungs clear to auscultation  Gastrointestinal:  Soft, Non-tender, + BS  Lower Extremities: No edema  Psychiatry: Patient is calm. No agitation. Mood & affect appropriate  Skin: No rashes/ ecchymoses/cyanosis/ulcers visualized on the face, hands or feet.      CURRENT MEDICATIONS:  metoprolol succinate ER 50 milliGRAM(s) Oral daily  milrinone Infusion 0.375 MICROgram(s)/kG/Min IV Continuous <Continuous>  pantoprazole    Tablet  dextrose 50% Injectable  dextrose 50% Injectable  dextrose 50% Injectable  insulin glargine Injectable (LANTUS)  insulin glargine Injectable (LANTUS)  insulin lispro (HumaLOG) corrective regimen sliding scale  aspirin  chewable  chlorhexidine 4% Liquid  dextrose 5%.  heparin  Injectable  influenza   Vaccine  ticagrelor      DIAGNOSTIC TESTING:  [ ] Echocardiogram: < from: TTE Echo Complete w/Doppler (12.23.19 @ 12:47) >    Summary:   1. Endocardial visualization was enhanced with intravenous echo contrast.   2. Moderately to severely decreased global left ventricular systolic function.   3. Left ventricular ejection fraction, by visual estimation, is 25 to 30%.   4. Severely enlarged left atrium.   5. The mitral in-flow pattern reveals no discernable A-wave, therefore no comment on diastolic function can be made.   6. Sclerotic aortic valve with decreased opening.   7. The mitral valve leaflets are tethered which is due to reduced systolic function and elevated LVDP.   8. Moderate tricuspid regurgitation.   9. Estimated pulmonary artery systolic pressure is 50.1 mmHg assuming a right atrial pressure of 3 mmHg, which is consistent with moderate pulmonary hypertension.    < end of copied text >    [ ]  Catheterization:  [ ] Stress Test:    OTHER: 	      LABS:	 	  CARDIAC MARKERS ( 22 Dec 2019 15:32 )  x     / 0.16 ng/mL / x     / x     / x      p-BNP 22 Dec 2019 15:32: 35654 pg/mL                          9.1    13.87 )-----------( 223      ( 23 Dec 2019 04:40 )             29.8     12-23    137  |  97<L>  |  62.0<H>  ----------------------------<  188<H>  4.3   |  22.0  |  2.28<H>    Ca    9.7      23 Dec 2019 14:24  Phos  3.2     12-23  Mg     2.6     12-23    TPro  7.5  /  Alb  4.0  /  TBili  0.7  /  DBili  0.3  /  AST  3532<H>  /  ALT  3223<H>  /  AlkPhos  105  12-23    proBNP: Serum Pro-Brain Natriuretic Peptide: 28488 pg/mL (12-22 @ 15:32)    Lipid Profile:   HgA1c:   TSH:       TELEMETRY: Reviewed    ECG:  Reviewed by me. [place] [facility]                                                       [facility] Faculty Practice                                                               Office: [address] [facility], [place] [address]                                                              Telephone: [phone]. Fax:[phone]                                                                             PROGRESS NOTE  Reason for follow up: DKA, multiple organ failure    Overnight: No new events.   Update:   Pt is euvolemic on exam,     Subjective: "I'm good"  73y/o male PMH HTN, HLD, HFrEF, DM, CKD, CAD s/p CABG, prior MI, s/p PCI/brachy, (6/20/19- LM 70/90%, LAD diffuse disease, Cx Prox 100%, RCA known , LIMA To LAD patent, SVG to OM 80% with ISR)/(7/2019- SVG to OM1 80%, brachy to SVG to OM1, PCI native OM), TTE 12/18/19- EF 25-30%, WMA, mild PHTN, mild AS, present to ED with feeling cold, nausea vomiting since last night. As per family and patient, c/o weakness, fatigue x 2 days, started to vomit last night, no relief with Zofran at home. Family took BP at home less then 90, and HR >125 at rest. Pt recently hospitalized for acute decompensated heart failure, requiring IV dopamine and milrinone, dopamine not tolerated. Irregular heart beat during hospitalization, possible transient Afib. RAS on CKD last hospitalization, ACE/ARB held.     As of 12/23/19 evaluation, pt is euvolemic on exam, "feels stronger", no c/o chest pain, palpitations, SOB, or vomitting. Pt +productive cough with yellow sputum, minimal pulmonary vascular congestion noted on xray.    Vitals:  T(C): 36.4 (12-23-19 @ 12:02), Max: 37.2 (12-22-19 @ 23:53)  HR: 111 (12-23-19 @ 15:00) (92 - 117)  BP: 111/65 (12-23-19 @ 15:00) (93/58 - 125/77)  RR: 26 (12-23-19 @ 15:00) (18 - 31)  SpO2: 99% (12-23-19 @ 15:00) (98% - 100%)    I&O's Summary    22 Dec 2019 07:01  -  23 Dec 2019 07:00  --------------------------------------------------------  IN: 867.6 mL / OUT: 200 mL / NET: 667.6 mL    23 Dec 2019 07:01  -  23 Dec 2019 15:21  --------------------------------------------------------  IN: 641.4 mL / OUT: 200 mL / NET: 441.4 mL      Weight (kg): 58.7 (12-22 @ 20:10)      PHYSICAL EXAM:  Appearance: Comfortable. No acute distress  HEENT:  Head and neck: Atraumatic. Normocephalic.  Normal oral mucosa, PERRL, Neck is supple. No JVD, No carotid bruit.   Neurologic: A & O x 3, no focal deficits. EOMI.  Lymphatic: No cervical lymphadenopathy  Cardiovascular:S3 gallop noted on exam  Respiratory: Lungs clear to auscultation  Gastrointestinal:  Soft, Non-tender, +BS  Lower Extremities: very slight edema  Psychiatry: Patient is calm. No agitation. Mood & affect appropriate  Skin: No rashes/ ecchymoses/cyanosis/ulcers visualized on the face, hands or feet.      CURRENT MEDICATIONS:  metoprolol succinate ER 50 milliGRAM(s) Oral daily  milrinone Infusion 0.375 MICROgram(s)/kG/Min IV Continuous <Continuous>  pantoprazole    Tablet  dextrose 50% Injectable  dextrose 50% Injectable  dextrose 50% Injectable  insulin glargine Injectable (LANTUS)  insulin glargine Injectable (LANTUS)  insulin lispro (HumaLOG) corrective regimen sliding scale  aspirin  chewable  chlorhexidine 4% Liquid  dextrose 5%.  heparin  Injectable  influenza   Vaccine  ticagrelor      DIAGNOSTIC TESTING:  [ ] Echocardiogram: < from: TTE Echo Complete w/Doppler (12.23.19 @ 12:47) >    Summary:   1. Endocardial visualization was enhanced with intravenous echo contrast.   2. Moderately to severely decreased global left ventricular systolic function.   3. Left ventricular ejection fraction, by visual estimation, is 25 to 30%.   4. Severely enlarged left atrium.   5. The mitral in-flow pattern reveals no discernable A-wave, therefore no comment on diastolic function can be made.   6. Sclerotic aortic valve with decreased opening.   7. The mitral valve leaflets are tethered which is due to reduced systolic function and elevated LVDP.   8. Moderate tricuspid regurgitation.   9. Estimated pulmonary artery systolic pressure is 50.1 mmHg assuming a right atrial pressure of 3 mmHg, which is consistent with moderate pulmonary hypertension.    < end of copied text >      OTHER: 	  < from: Xray Chest 1 View-PORTABLE IMMEDIATE (12.22.19 @ 16:22) >  Impression:    Minimal pulmonary vascular congestion with trace bilateral pleural effusions improved from prior exam.      < end of copied text >      LABS:	 	  CARDIAC MARKERS ( 22 Dec 2019 15:32 )  x     / 0.16 ng/mL / x     / x     / x      p-BNP 22 Dec 2019 15:32: 07238 pg/mL                          9.1    13.87 )-----------( 223      ( 23 Dec 2019 04:40 )             29.8     12-23    137  |  97<L>  |  62.0<H>  ----------------------------<  188<H>  4.3   |  22.0  |  2.28<H>    Ca    9.7      23 Dec 2019 14:24  Phos  3.2     12-23  Mg     2.6     12-23    TPro  7.5  /  Alb  4.0  /  TBili  0.7  /  DBili  0.3  /  AST  3532<H>  /  ALT  3223<H>  /  AlkPhos  105  12-23    proBNP: Serum Pro-Brain Natriuretic Peptide: 64638 pg/mL (12-22 @ 15:32)      TELEMETRY: ST HR @ 102 LBBB  ECG: SR HR @ 83, with PACs LBBB [place] [facility]                                                       [facility] Faculty Practice                                                               Office: [address] [facility], [place] [address]                                                              Telephone: [phone]. Fax:[phone]                                                                             PROGRESS NOTE  Reason for follow up: DKA, multiple organ failure    Overnight: No new events.   Update:   Pt is euvolemic on exam,     Subjective: "I'm good"  75y/o male PMH HTN, HLD, HFrEF, DM, CKD, CAD s/p CABG, prior MI, s/p PCI/brachy, (6/20/19- LM 70/90%, LAD diffuse disease, Cx Prox 100%, RCA known , LIMA To LAD patent, SVG to OM 80% with ISR)/(7/2019- SVG to OM1 80%, brachy to SVG to OM1, PCI native OM), TTE 12/18/19- EF 25-30%, WMA, mild PHTN, mild AS, present to ED with feeling cold, nausea vomiting since last night. As per family and patient, c/o weakness, fatigue x 2 days, started to vomit last night, no relief with Zofran at home. Family took BP at home less then 90, and HR >125 at rest. Pt recently hospitalized for acute decompensated heart failure, requiring IV dopamine and milrinone, dopamine not tolerated. Irregular heart beat during hospitalization, possible transient Afib. RAS on CKD last hospitalization, ACE/ARB held.     As of 12/23/19 evaluation, pt is euvolemic on exam, "feels stronger", no c/o chest pain, palpitations, SOB, or vomitting. Pt +productive cough with yellow sputum, minimal pulmonary vascular congestion noted on xray.    Vitals:  T(C): 36.4 (12-23-19 @ 12:02), Max: 37.2 (12-22-19 @ 23:53)  HR: 111 (12-23-19 @ 15:00) (92 - 117)  BP: 111/65 (12-23-19 @ 15:00) (93/58 - 125/77)  RR: 26 (12-23-19 @ 15:00) (18 - 31)  SpO2: 99% (12-23-19 @ 15:00) (98% - 100%)    I&O's Summary    22 Dec 2019 07:01  -  23 Dec 2019 07:00  --------------------------------------------------------  IN: 867.6 mL / OUT: 200 mL / NET: 667.6 mL    23 Dec 2019 07:01  -  23 Dec 2019 15:21  --------------------------------------------------------  IN: 641.4 mL / OUT: 200 mL / NET: 441.4 mL      Weight (kg): 58.7 (12-22 @ 20:10)      PHYSICAL EXAM:  Appearance: Comfortable. No acute distress  HEENT:  Head and neck: Atraumatic. Normocephalic.  Normal oral mucosa, PERRL, Neck is supple. No JVD, No carotid bruit.   Neurologic: A & O x 3, no focal deficits. EOMI.  Lymphatic: No cervical lymphadenopathy  Cardiovascular:S3 gallop noted on exam  Respiratory: Lungs clear to auscultation  Gastrointestinal:  Soft, Non-tender, +BS  Lower Extremities: very slight edema  Psychiatry: Patient is calm. No agitation. Mood & affect appropriate  Skin: No rashes/ ecchymoses/cyanosis/ulcers visualized on the face, hands or feet.      CURRENT MEDICATIONS:  metoprolol succinate ER 50 milliGRAM(s) Oral daily  milrinone Infusion 0.375 MICROgram(s)/kG/Min IV Continuous <Continuous>  pantoprazole    Tablet  dextrose 50% Injectable  dextrose 50% Injectable  dextrose 50% Injectable  insulin glargine Injectable (LANTUS)  insulin glargine Injectable (LANTUS)  insulin lispro (HumaLOG) corrective regimen sliding scale  aspirin  chewable  chlorhexidine 4% Liquid  dextrose 5%.  heparin  Injectable  influenza   Vaccine  ticagrelor      DIAGNOSTIC TESTING:  [ ] Echocardiogram: < from: TTE Echo Complete w/Doppler (12.23.19 @ 12:47) >    Summary:   1. Endocardial visualization was enhanced with intravenous echo contrast.   2. Moderately to severely decreased global left ventricular systolic function.   3. Left ventricular ejection fraction, by visual estimation, is 25 to 30%.   4. Severely enlarged left atrium.   5. The mitral in-flow pattern reveals no discernable A-wave, therefore no comment on diastolic function can be made.   6. Sclerotic aortic valve with decreased opening.   7. The mitral valve leaflets are tethered which is due to reduced systolic function and elevated LVDP.   8. Moderate tricuspid regurgitation.   9. Estimated pulmonary artery systolic pressure is 50.1 mmHg assuming a right atrial pressure of 3 mmHg, which is consistent with moderate pulmonary hypertension.    < end of copied text >      OTHER: 	  < from: Xray Chest 1 View-PORTABLE IMMEDIATE (12.22.19 @ 16:22) >  Impression:    Minimal pulmonary vascular congestion with trace bilateral pleural effusions improved from prior exam.      < end of copied text >      LABS:	 	  CARDIAC MARKERS ( 22 Dec 2019 15:32 )  x     / 0.16 ng/mL / x     / x     / x      p-BNP 22 Dec 2019 15:32: 84335 pg/mL                          9.1    13.87 )-----------( 223      ( 23 Dec 2019 04:40 )             29.8     12-23    137  |  97<L>  |  62.0<H>  ----------------------------<  188<H>  4.3   |  22.0  |  2.28<H>    Ca    9.7      23 Dec 2019 14:24  Phos  3.2     12-23  Mg     2.6     12-23    TPro  7.5  /  Alb  4.0  /  TBili  0.7  /  DBili  0.3  /  AST  3532<H>  /  ALT  3223<H>  /  AlkPhos  105  12-23    proBNP: Serum Pro-Brain Natriuretic Peptide: 79791 pg/mL (12-22 @ 15:32)      TELEMETRY: ST HR @ 102 LBBB  ECG: SR HR @ 83, LBBB

## 2019-12-23 NOTE — PROGRESS NOTE ADULT - SUBJECTIVE AND OBJECTIVE BOX
Patient is a 74y old  Male who presents with a chief complaint of DKA, multiple organ failure (23 Dec 2019 15:20)    PAST MEDICAL & SURGICAL HISTORY:  Heart failure  Essential hypertension  Type 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, without long-term current use of insul  Coronary artery disease involving native coronary artery of native heart, angina presence unspecifie  Coronary artery disease involving coronary bypass graft of native heart with unstable angina pectoris  High cholesterol  GERD (gastroesophageal reflux disease)  Status post open reduction with internal fixation of fracture  S/P primary angioplasty with coronary stent  S/P CABG (coronary artery bypass graft): 4V  [place]    ADRIANNA SHANKS   74y    Male    BRIEF HOSPITAL COURSE:    Review of Systems:                       All other ROS are negative.    Allergies    Allergy Status Unknown    Intolerances    DOPamine (Hypotension)        ICU Vital Signs Last 24 Hrs  T(C): 36.8 (23 Dec 2019 20:00), Max: 37.1 (23 Dec 2019 16:09)  T(F): 98.3 (23 Dec 2019 20:00), Max: 98.8 (23 Dec 2019 16:09)  HR: 98 (23 Dec 2019 23:00) (98 - 117)  BP: 99/62 (23 Dec 2019 23:00) (96/54 - 125/77)  BP(mean): 75 (23 Dec 2019 23:00) (70 - 95)  ABP: --  ABP(mean): --  RR: 25 (23 Dec 2019 23:00) (20 - 31)  SpO2: 98% (23 Dec 2019 23:00) (97% - 100%)    Physical Examination:    General:     HEENT:     PULM:     CVS:     ABD:     EXT:     SKIN:     Neuro:          LABS:                        11.0   14.92 )-----------( 210      ( 23 Dec 2019 18:32 )             34.9         136  |  96<L>  |  58.0<H>  ----------------------------<  181<H>  4.2   |  23.0  |  2.12<H>    Ca    9.6      23 Dec 2019 18:32  Phos  2.8       Mg     2.5         TPro  7.7  /  Alb  4.1  /  TBili  0.7  /  DBili  x   /  AST  3147<H>  /  ALT  3379<H>  /  AlkPhos  118  12-23      CARDIAC MARKERS ( 22 Dec 2019 15:32 )  x     / 0.16 ng/mL / x     / x     / x          CAPILLARY BLOOD GLUCOSE  184 (23 Dec 2019 11:00)  116 (23 Dec 2019 08:00)  92 (23 Dec 2019 06:30)  88 (23 Dec 2019 05:30)  95 (23 Dec 2019 04:00)  129 (23 Dec 2019 03:00)  175 (23 Dec 2019 02:00)  205 (23 Dec 2019 01:00)  238 (23 Dec 2019 00:00)      POCT Blood Glucose.: 167 mg/dL (23 Dec 2019 21:04)  POCT Blood Glucose.: 208 mg/dL (23 Dec 2019 16:55)  POCT Blood Glucose.: 184 mg/dL (23 Dec 2019 11:25)  POCT Blood Glucose.: 136 mg/dL (23 Dec 2019 08:32)  POCT Blood Glucose.: 115 mg/dL (23 Dec 2019 07:46)  POCT Blood Glucose.: 92 mg/dL (23 Dec 2019 06:30)  POCT Blood Glucose.: 88 mg/dL (23 Dec 2019 05:30)  POCT Blood Glucose.: 95 mg/dL (23 Dec 2019 04:07)  POCT Blood Glucose.: 129 mg/dL (23 Dec 2019 02:58)  POCT Blood Glucose.: 175 mg/dL (23 Dec 2019 01:59)  POCT Blood Glucose.: 205 mg/dL (23 Dec 2019 00:58)  POCT Blood Glucose.: 238 mg/dL (22 Dec 2019 23:59)    PT/INR - ( 22 Dec 2019 15:32 )   PT: 18.8 sec;   INR: 1.61 ratio         PTT - ( 22 Dec 2019 15:32 )  PTT:31.3 sec  Urinalysis Basic - ( 23 Dec 2019 04:46 )    Color: [place] / Appearance: Clear / S.025 / pH: x  Gluc: x / Ketone: Trace  / Bili: Small / Urobili: 1   Blood: x / Protein: 100 / Nitrite: Positive   Leuk Esterase: Trace / RBC: 6-10 /HPF / WBC 3-5   Sq Epi: x / Non Sq Epi: Occasional / Bacteria: Moderate      CULTURES:      Medications:  MEDICATIONS  (STANDING):  aspirin  chewable 81 milliGRAM(s) Oral daily  chlorhexidine 4% Liquid 1 Application(s) Topical <User Schedule>  dextrose 5%. 1000 milliLiter(s) (50 mL/Hr) IV Continuous <Continuous>  dextrose 50% Injectable 12.5 Gram(s) IV Push once  dextrose 50% Injectable 25 Gram(s) IV Push once  dextrose 50% Injectable 25 Gram(s) IV Push once  heparin  Injectable 5000 Unit(s) SubCutaneous every 12 hours  influenza   Vaccine 0.5 milliLiter(s) IntraMuscular once  insulin glargine Injectable (LANTUS) 20 Unit(s) SubCutaneous at bedtime  insulin glargine Injectable (LANTUS) 10 Unit(s) SubCutaneous every morning  insulin lispro (HumaLOG) corrective regimen sliding scale   SubCutaneous three times a day before meals  metoprolol succinate ER 50 milliGRAM(s) Oral daily  milrinone Infusion 0.375 MICROgram(s)/kG/Min (6.638 mL/Hr) IV Continuous <Continuous>  pantoprazole    Tablet 40 milliGRAM(s) Oral before breakfast  ticagrelor 60 milliGRAM(s) Oral every 12 hours    MEDICATIONS  (PRN):  dextrose 40% Gel 15 Gram(s) Oral once PRN Blood Glucose LESS THAN 70 milliGRAM(s)/deciliter  glucagon  Injectable 1 milliGRAM(s) IntraMuscular once PRN Glucose LESS THAN 70 milligrams/deciliter         @ 07:  -   @ 07:00  --------------------------------------------------------  IN: 867.6 mL / OUT: 200 mL / NET: 667.6 mL     @ 07:  -   @ 23:54  --------------------------------------------------------  IN: 934.2 mL / OUT: 650 mL / NET: 284.2 mL        RADIOLOGY/IMAGING/ECHO    < from: Xray Chest 1 View-PORTABLE IMMEDIATE (19 @ 16:22) >  Impression:    Minimal pulmonary vascular congestion with trace bilateral pleural effusions improved from prior exam.    < from: TTE Echo Complete w/Doppler (19 @ 12:47) >  1. Endocardial visualization was enhanced with intravenous echo contrast.   2. Moderately to severely decreased global left ventricular systolic function.   3. Left ventricular ejection fraction, by visual estimation, is 25 to 30%.   4. Severely enlarged left atrium.   5. The mitral in-flow pattern reveals no discernable A-wave, therefore no comment on diastolic function can be made.   6. Sclerotic aortic valve with decreased opening.   7. The mitral valve leaflets are tethered which is due to reduced systolic function and elevated LVDP.   8. Moderate tricuspid regurgitation.   9. Estimated pulmonary artery systolic pressure is 50.1 mmHg assuming a right atrial pressure of 3 mmHg, which is consistent with moderate pulmonary hypertension.        Assessment/Plan:     74 YOM h/o CAD, CABG, HFrEF, MI, HTN, HLD, DM, PAF  Admit  with   weakness and vomiting.  In ER found to have multiple organ dysfunction including RAS, acute liver transaminitis, DKA/anion gap  lactic acidosis attributing to metformin.         ?? etiology   hep panel tylenol level imaging study   Lactic acidosis mostly cleared, due to metformin and HF not sepsis. Patient is a 74y old  Male who presents with a chief complaint of DKA, multiple organ failure (23 Dec 2019 15:20)    PAST MEDICAL & SURGICAL HISTORY:  Heart failure  Essential hypertension  Type 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, without long-term current use of insul  Coronary artery disease involving native coronary artery of native heart, angina presence unspecifie  Coronary artery disease involving coronary bypass graft of native heart with unstable angina pectoris  High cholesterol  GERD (gastroesophageal reflux disease)  Status post open reduction with internal fixation of fracture  S/P primary angioplasty with coronary stent  S/P CABG (coronary artery bypass graft): 4V  [place]    ADRIANNA SHANKS   74y    Male    BRIEF HOSPITAL COURSE:    Review of Systems:  + SOB                     All other ROS are negative.    Allergies    Allergy Status Unknown    Intolerances    DOPamine (Hypotension)        ICU Vital Signs Last 24 Hrs  T(C): 36.8 (23 Dec 2019 20:00), Max: 37.1 (23 Dec 2019 16:09)  T(F): 98.3 (23 Dec 2019 20:00), Max: 98.8 (23 Dec 2019 16:09)  HR: 98 (23 Dec 2019 23:00) (98 - 117)  BP: 99/62 (23 Dec 2019 23:00) (96/54 - 125/77)  BP(mean): 75 (23 Dec 2019 23:00) (70 - 95)  ABP: --  ABP(mean): --  RR: 25 (23 Dec 2019 23:00) (20 - 31)  SpO2: 98% (23 Dec 2019 23:00) (97% - 100%)    Physical Examination:    General: SOB laying flat     HEENT: _ JVD    PULM: basilar rales    CVS: s1 s2     ABD: soft NT     EXT: no edema     SKIN: warm    Neuro: alert NF          LABS:                        11.0   14.92 )-----------( 210      ( 23 Dec 2019 18:32 )             34.9     12    136  |  96<L>  |  58.0<H>  ----------------------------<  181<H>  4.2   |  23.0  |  2.12<H>    Ca    9.6      23 Dec 2019 18:32  Phos  2.8       Mg     2.5         TPro  7.7  /  Alb  4.1  /  TBili  0.7  /  DBili  x   /  AST  3147<H>  /  ALT  3379<H>  /  AlkPhos  118  12-23      CARDIAC MARKERS ( 22 Dec 2019 15:32 )  x     / 0.16 ng/mL / x     / x     / x          CAPILLARY BLOOD GLUCOSE  184 (23 Dec 2019 11:00)  116 (23 Dec 2019 08:00)  92 (23 Dec 2019 06:30)  88 (23 Dec 2019 05:30)  95 (23 Dec 2019 04:00)  129 (23 Dec 2019 03:00)  175 (23 Dec 2019 02:00)  205 (23 Dec 2019 01:00)  238 (23 Dec 2019 00:00)      POCT Blood Glucose.: 167 mg/dL (23 Dec 2019 21:04)  POCT Blood Glucose.: 208 mg/dL (23 Dec 2019 16:55)  POCT Blood Glucose.: 184 mg/dL (23 Dec 2019 11:25)  POCT Blood Glucose.: 136 mg/dL (23 Dec 2019 08:32)  POCT Blood Glucose.: 115 mg/dL (23 Dec 2019 07:46)  POCT Blood Glucose.: 92 mg/dL (23 Dec 2019 06:30)  POCT Blood Glucose.: 88 mg/dL (23 Dec 2019 05:30)  POCT Blood Glucose.: 95 mg/dL (23 Dec 2019 04:07)  POCT Blood Glucose.: 129 mg/dL (23 Dec 2019 02:58)  POCT Blood Glucose.: 175 mg/dL (23 Dec 2019 01:59)  POCT Blood Glucose.: 205 mg/dL (23 Dec 2019 00:58)  POCT Blood Glucose.: 238 mg/dL (22 Dec 2019 23:59)    PT/INR - ( 22 Dec 2019 15:32 )   PT: 18.8 sec;   INR: 1.61 ratio         PTT - ( 22 Dec 2019 15:32 )  PTT:31.3 sec  Urinalysis Basic - ( 23 Dec 2019 04:46 )    Color: [place] / Appearance: Clear / S.025 / pH: x  Gluc: x / Ketone: Trace  / Bili: Small / Urobili: 1   Blood: x / Protein: 100 / Nitrite: Positive   Leuk Esterase: Trace / RBC: 6-10 /HPF / WBC 3-5   Sq Epi: x / Non Sq Epi: Occasional / Bacteria: Moderate      CULTURES:      Medications:  MEDICATIONS  (STANDING):  aspirin  chewable 81 milliGRAM(s) Oral daily  chlorhexidine 4% Liquid 1 Application(s) Topical <User Schedule>  dextrose 5%. 1000 milliLiter(s) (50 mL/Hr) IV Continuous <Continuous>  dextrose 50% Injectable 12.5 Gram(s) IV Push once  dextrose 50% Injectable 25 Gram(s) IV Push once  dextrose 50% Injectable 25 Gram(s) IV Push once  heparin  Injectable 5000 Unit(s) SubCutaneous every 12 hours  influenza   Vaccine 0.5 milliLiter(s) IntraMuscular once  insulin glargine Injectable (LANTUS) 20 Unit(s) SubCutaneous at bedtime  insulin glargine Injectable (LANTUS) 10 Unit(s) SubCutaneous every morning  insulin lispro (HumaLOG) corrective regimen sliding scale   SubCutaneous three times a day before meals  metoprolol succinate ER 50 milliGRAM(s) Oral daily  milrinone Infusion 0.375 MICROgram(s)/kG/Min (6.638 mL/Hr) IV Continuous <Continuous>  pantoprazole    Tablet 40 milliGRAM(s) Oral before breakfast  ticagrelor 60 milliGRAM(s) Oral every 12 hours    MEDICATIONS  (PRN):  dextrose 40% Gel 15 Gram(s) Oral once PRN Blood Glucose LESS THAN 70 milliGRAM(s)/deciliter  glucagon  Injectable 1 milliGRAM(s) IntraMuscular once PRN Glucose LESS THAN 70 milligrams/deciliter         @ 07:  -   @ 07:00  --------------------------------------------------------  IN: 867.6 mL / OUT: 200 mL / NET: 667.6 mL     @ 07:01  -   @ 23:54  --------------------------------------------------------  IN: 934.2 mL / OUT: 650 mL / NET: 284.2 mL        RADIOLOGY/IMAGING/ECHO    < from: Xray Chest 1 View-PORTABLE IMMEDIATE (19 @ 16:22) >  Impression:    Minimal pulmonary vascular congestion with trace bilateral pleural effusions improved from prior exam.    < from: TTE Echo Complete w/Doppler (19 @ 12:47) >  1. Endocardial visualization was enhanced with intravenous echo contrast.   2. Moderately to severely decreased global left ventricular systolic function.   3. Left ventricular ejection fraction, by visual estimation, is 25 to 30%.   4. Severely enlarged left atrium.   5. The mitral in-flow pattern reveals no discernable A-wave, therefore no comment on diastolic function can be made.   6. Sclerotic aortic valve with decreased opening.   7. The mitral valve leaflets are tethered which is due to reduced systolic function and elevated LVDP.   8. Moderate tricuspid regurgitation.   9. Estimated pulmonary artery systolic pressure is 50.1 mmHg assuming a right atrial pressure of 3 mmHg, which is consistent with moderate pulmonary hypertension.    POCUS low EF plump IVC bilateral B lines R>L    Assessment/Plan:     74 YOM h/o CAD, CABG, HFrEF, MI, HTN, HLD, DM, PAF  Admit  with   weakness and vomiting.  In ER found to have multiple organ dysfunction including RAS, acute liver transaminitis, DKA/anion gap  lactic acidosis attributing to metformin.       ?? etiology of transaminitis    hep panel tylenol level imaging study GI consult if not improving   Lactic acidosis mostly cleared, due to metformin and HF not sepsis.       Now SOB pulm edema by US   Milrinone infusion  dose diuretic now,

## 2019-12-24 DIAGNOSIS — K72.00 ACUTE AND SUBACUTE HEPATIC FAILURE WITHOUT COMA: ICD-10-CM

## 2019-12-24 DIAGNOSIS — E11.10 TYPE 2 DIABETES MELLITUS WITH KETOACIDOSIS WITHOUT COMA: ICD-10-CM

## 2019-12-24 DIAGNOSIS — R57.0 CARDIOGENIC SHOCK: ICD-10-CM

## 2019-12-24 DIAGNOSIS — I50.9 HEART FAILURE, UNSPECIFIED: ICD-10-CM

## 2019-12-24 LAB
ALBUMIN SERPL ELPH-MCNC: 3.7 G/DL — SIGNIFICANT CHANGE UP (ref 3.3–5.2)
ALP SERPL-CCNC: 99 U/L — SIGNIFICANT CHANGE UP (ref 40–120)
ALT FLD-CCNC: 3934 U/L — HIGH
ANION GAP SERPL CALC-SCNC: 11 MMOL/L — SIGNIFICANT CHANGE UP (ref 5–17)
APAP SERPL-MCNC: <7.5 UG/ML — LOW (ref 10–26)
APTT BLD: 31 SEC — SIGNIFICANT CHANGE UP (ref 27.5–36.3)
AST SERPL-CCNC: 3621 U/L — HIGH
BILIRUB SERPL-MCNC: 0.7 MG/DL — SIGNIFICANT CHANGE UP (ref 0.4–2)
BUN SERPL-MCNC: 54 MG/DL — HIGH (ref 8–20)
CALCIUM SERPL-MCNC: 9.4 MG/DL — SIGNIFICANT CHANGE UP (ref 8.6–10.2)
CHLORIDE SERPL-SCNC: 97 MMOL/L — LOW (ref 98–107)
CO2 SERPL-SCNC: 28 MMOL/L — SIGNIFICANT CHANGE UP (ref 22–29)
CREAT SERPL-MCNC: 1.86 MG/DL — HIGH (ref 0.5–1.3)
FERRITIN SERPL-MCNC: 4357 NG/ML — HIGH (ref 30–400)
GGT SERPL-CCNC: 32 U/L — SIGNIFICANT CHANGE UP (ref 8–61)
GLUCOSE BLDC GLUCOMTR-MCNC: 183 MG/DL — HIGH (ref 70–99)
GLUCOSE BLDC GLUCOMTR-MCNC: 229 MG/DL — HIGH (ref 70–99)
GLUCOSE BLDC GLUCOMTR-MCNC: 263 MG/DL — HIGH (ref 70–99)
GLUCOSE BLDC GLUCOMTR-MCNC: 55 MG/DL — LOW (ref 70–99)
GLUCOSE BLDC GLUCOMTR-MCNC: 89 MG/DL — SIGNIFICANT CHANGE UP (ref 70–99)
GLUCOSE SERPL-MCNC: 83 MG/DL — SIGNIFICANT CHANGE UP (ref 70–115)
HAV IGM SER-ACNC: SIGNIFICANT CHANGE UP
HBV CORE IGM SER-ACNC: SIGNIFICANT CHANGE UP
HBV SURFACE AG SER-ACNC: SIGNIFICANT CHANGE UP
HCT VFR BLD CALC: 30.6 % — LOW (ref 39–50)
HCV AB S/CO SERPL IA: 0.13 S/CO — SIGNIFICANT CHANGE UP (ref 0–0.99)
HCV AB SERPL-IMP: SIGNIFICANT CHANGE UP
HGB BLD-MCNC: 10 G/DL — LOW (ref 13–17)
INR BLD: 2.02 RATIO — HIGH (ref 0.88–1.16)
IRON SATN MFR SERPL: 29 UG/DL — LOW (ref 59–158)
IRON SATN MFR SERPL: 8 % — LOW (ref 16–55)
MAGNESIUM SERPL-MCNC: 2.3 MG/DL — SIGNIFICANT CHANGE UP (ref 1.8–2.6)
MCHC RBC-ENTMCNC: 25 PG — LOW (ref 27–34)
MCHC RBC-ENTMCNC: 32.7 GM/DL — SIGNIFICANT CHANGE UP (ref 32–36)
MCV RBC AUTO: 76.5 FL — LOW (ref 80–100)
PHOSPHATE SERPL-MCNC: 3.4 MG/DL — SIGNIFICANT CHANGE UP (ref 2.4–4.7)
PLATELET # BLD AUTO: 199 K/UL — SIGNIFICANT CHANGE UP (ref 150–400)
POTASSIUM SERPL-MCNC: 3.5 MMOL/L — SIGNIFICANT CHANGE UP (ref 3.5–5.3)
POTASSIUM SERPL-SCNC: 3.5 MMOL/L — SIGNIFICANT CHANGE UP (ref 3.5–5.3)
PROT SERPL-MCNC: 6.8 G/DL — SIGNIFICANT CHANGE UP (ref 6.6–8.7)
PROTHROM AB SERPL-ACNC: 23.7 SEC — HIGH (ref 10–12.9)
RAPID RVP RESULT: SIGNIFICANT CHANGE UP
RBC # BLD: 4 M/UL — LOW (ref 4.2–5.8)
RBC # FLD: 19.7 % — HIGH (ref 10.3–14.5)
SODIUM SERPL-SCNC: 136 MMOL/L — SIGNIFICANT CHANGE UP (ref 135–145)
TIBC SERPL-MCNC: 365 UG/DL — SIGNIFICANT CHANGE UP (ref 220–430)
TRANSFERRIN SERPL-MCNC: 255 MG/DL — SIGNIFICANT CHANGE UP (ref 180–329)
WBC # BLD: 10.35 K/UL — SIGNIFICANT CHANGE UP (ref 3.8–10.5)
WBC # FLD AUTO: 10.35 K/UL — SIGNIFICANT CHANGE UP (ref 3.8–10.5)

## 2019-12-24 PROCEDURE — 93975 VASCULAR STUDY: CPT | Mod: 26

## 2019-12-24 PROCEDURE — 99233 SBSQ HOSP IP/OBS HIGH 50: CPT

## 2019-12-24 PROCEDURE — 99291 CRITICAL CARE FIRST HOUR: CPT

## 2019-12-24 RX ORDER — FUROSEMIDE 40 MG
40 TABLET ORAL DAILY
Refills: 0 | Status: DISCONTINUED | OUTPATIENT
Start: 2019-12-24 | End: 2019-12-24

## 2019-12-24 RX ORDER — ACETYLCYSTEINE 200 MG/ML
6 VIAL (ML) MISCELLANEOUS ONCE
Refills: 0 | Status: COMPLETED | OUTPATIENT
Start: 2019-12-24 | End: 2019-12-25

## 2019-12-24 RX ORDER — BENZOCAINE AND MENTHOL 5; 1 G/100ML; G/100ML
1 LIQUID ORAL EVERY 4 HOURS
Refills: 0 | Status: DISCONTINUED | OUTPATIENT
Start: 2019-12-24 | End: 2019-12-31

## 2019-12-24 RX ORDER — ACETYLCYSTEINE 200 MG/ML
2.9 VIAL (ML) MISCELLANEOUS ONCE
Refills: 0 | Status: COMPLETED | OUTPATIENT
Start: 2019-12-24 | End: 2019-12-24

## 2019-12-24 RX ORDER — FUROSEMIDE 40 MG
80 TABLET ORAL ONCE
Refills: 0 | Status: COMPLETED | OUTPATIENT
Start: 2019-12-24 | End: 2019-12-24

## 2019-12-24 RX ORDER — ACETYLCYSTEINE 200 MG/ML
9 VIAL (ML) MISCELLANEOUS ONCE
Refills: 0 | Status: COMPLETED | OUTPATIENT
Start: 2019-12-24 | End: 2019-12-24

## 2019-12-24 RX ORDER — POTASSIUM CHLORIDE 20 MEQ
20 PACKET (EA) ORAL ONCE
Refills: 0 | Status: COMPLETED | OUTPATIENT
Start: 2019-12-24 | End: 2019-12-24

## 2019-12-24 RX ORDER — TICAGRELOR 90 MG/1
90 TABLET ORAL EVERY 12 HOURS
Refills: 0 | Status: DISCONTINUED | OUTPATIENT
Start: 2019-12-24 | End: 2019-12-31

## 2019-12-24 RX ORDER — IPRATROPIUM/ALBUTEROL SULFATE 18-103MCG
3 AEROSOL WITH ADAPTER (GRAM) INHALATION EVERY 6 HOURS
Refills: 0 | Status: DISCONTINUED | OUTPATIENT
Start: 2019-12-24 | End: 2019-12-31

## 2019-12-24 RX ORDER — POTASSIUM CHLORIDE 20 MEQ
10 PACKET (EA) ORAL
Refills: 0 | Status: DISCONTINUED | OUTPATIENT
Start: 2019-12-24 | End: 2019-12-24

## 2019-12-24 RX ADMIN — Medication 128.63 GRAM(S): at 19:47

## 2019-12-24 RX ADMIN — Medication 100 MILLIGRAM(S): at 21:02

## 2019-12-24 RX ADMIN — TICAGRELOR 60 MILLIGRAM(S): 90 TABLET ORAL at 05:38

## 2019-12-24 RX ADMIN — Medication 3 MILLILITER(S): at 22:44

## 2019-12-24 RX ADMIN — Medication 80 MILLIGRAM(S): at 00:43

## 2019-12-24 RX ADMIN — Medication 81 MILLIGRAM(S): at 18:10

## 2019-12-24 RX ADMIN — Medication 295 GRAM(S): at 18:10

## 2019-12-24 RX ADMIN — PANTOPRAZOLE SODIUM 40 MILLIGRAM(S): 20 TABLET, DELAYED RELEASE ORAL at 06:38

## 2019-12-24 RX ADMIN — TICAGRELOR 90 MILLIGRAM(S): 90 TABLET ORAL at 18:10

## 2019-12-24 RX ADMIN — MILRINONE LACTATE 2.21 MICROGRAM(S)/KG/MIN: 1 INJECTION, SOLUTION INTRAVENOUS at 15:30

## 2019-12-24 RX ADMIN — HEPARIN SODIUM 5000 UNIT(S): 5000 INJECTION INTRAVENOUS; SUBCUTANEOUS at 18:11

## 2019-12-24 RX ADMIN — Medication 2: at 12:01

## 2019-12-24 RX ADMIN — INSULIN GLARGINE 20 UNIT(S): 100 INJECTION, SOLUTION SUBCUTANEOUS at 21:22

## 2019-12-24 RX ADMIN — Medication 50 MILLIGRAM(S): at 05:38

## 2019-12-24 RX ADMIN — Medication 20 MILLIEQUIVALENT(S): at 06:38

## 2019-12-24 RX ADMIN — Medication 6: at 16:59

## 2019-12-24 RX ADMIN — HEPARIN SODIUM 5000 UNIT(S): 5000 INJECTION INTRAVENOUS; SUBCUTANEOUS at 05:38

## 2019-12-24 NOTE — DIETITIAN INITIAL EVALUATION ADULT. - ETIOLOGY
related to inability to meet sufficient protein-energy in setting of poor appetite/recent hospitalization, CHF, and multiple organ dysfunction

## 2019-12-24 NOTE — PROGRESS NOTE ADULT - SUBJECTIVE AND OBJECTIVE BOX
INTERVAL HPI/OVERNIGHT EVENTS: Received lasix with good urine output, breathing better today    PHYSICAL EXAM:  GENERAL: NAD   HEAD:  Atraumatic, normocephalic  EYES: EOMI, PERRL, sclera and conjunctiva clear  ENMT:  MMM, No oropharyngeal erythema or exudates  NECK:  Supple, normal appearance, trachea midline  NERVOUS SYSTEM:  Alert & Oriented X3, Symmetric strength in all extremities    CHEST/LUNG: Bilateral air entry, no chest deformity,   HEART: Regular rate, regular rhythm;  ABDOMEN: Soft, Nontender, Nondistended; No rebound or guarding, bowel sounds present  EXTREMITIES:  , no clubbing, no cyanosis.  LYMPH:  No lymphadenopathy noted  SKIN:  No visible rashes or skin lesions, good capillary refill  PSYCH: appropriate affect and behavior    GOALS OF CARE DISCUSSION WITH PATIENT/FAMILY/PROXY: sons updated at bedside  --------------------------------------------------------------------------------------------------------------------------------------------------------------  On Admission  19 (2d)  HPI:  Pt is a 74 YOM h/o CAD, CABG, HFrEF, MI, HTN, HLD, DM, PAF on last admission pt was recently discharged on Thursday after being treated for decompensated HF with milrinone, dopamine(which he did not tolerate).  Pt presents to ER with weakness and vomiting since yesterday.  Per pt's son since he was discharged his appetite has been very poor and he has been weak but yesterday started vomiting and not tolerating PO medications.  In ER found to have multiple organ dysfunction including RAS, acute liver transaminitis, DKA/anion gap acidosis.  Pt denies CP, no SOB, admits to feeling very cold. (22 Dec 2019 18:25)    PAST MEDICAL & SURGICAL HISTORY:  Heart failure  Essential hypertension  Type 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, without long-term current use of insul  Coronary artery disease involving native coronary artery of native heart, angina presence unspecifie  Coronary artery disease involving coronary bypass graft of native heart with unstable angina pectoris  High cholesterol  GERD (gastroesophageal reflux disease)  Status post open reduction with internal fixation of fracture  S/P primary angioplasty with coronary stent  S/P CABG (coronary artery bypass graft): 4V  [place]      Antimicrobial:    Cardiovascular:  furosemide   Injectable 40 milliGRAM(s) IV Push daily  metoprolol succinate ER 50 milliGRAM(s) Oral daily  milrinone Infusion 0.375 MICROgram(s)/kG/Min IV Continuous <Continuous>    Pulmonary:    Hematalogic:  aspirin  chewable 81 milliGRAM(s) Oral daily  heparin  Injectable 5000 Unit(s) SubCutaneous every 12 hours  ticagrelor 60 milliGRAM(s) Oral every 12 hours    Other:  chlorhexidine 4% Liquid 1 Application(s) Topical <User Schedule>  dextrose 40% Gel 15 Gram(s) Oral once PRN  dextrose 5%. 1000 milliLiter(s) IV Continuous <Continuous>  dextrose 50% Injectable 12.5 Gram(s) IV Push once  dextrose 50% Injectable 25 Gram(s) IV Push once  dextrose 50% Injectable 25 Gram(s) IV Push once  glucagon  Injectable 1 milliGRAM(s) IntraMuscular once PRN  influenza   Vaccine 0.5 milliLiter(s) IntraMuscular once  insulin glargine Injectable (LANTUS) 20 Unit(s) SubCutaneous at bedtime  insulin glargine Injectable (LANTUS) 10 Unit(s) SubCutaneous every morning  insulin lispro (HumaLOG) corrective regimen sliding scale   SubCutaneous three times a day before meals  pantoprazole    Tablet 40 milliGRAM(s) Oral before breakfast      Drug Dosing Weight  Height (cm): 170.2 (22 Dec 2019 20:10)  Weight (kg): 58.7 (22 Dec 2019 20:10)  BMI (kg/m2): 20.3 (22 Dec 2019 20:10)  BSA (m2): 1.68 (22 Dec 2019 20:10)    T(C): 36.9 (19 @ 07:50), Max: 37.4 (19 @ 03:09)  HR: 96 (19 @ 07:00)  BP: 130/80 (19 @ 07:00)  BP(mean): 98 (19 @ 07:00)  ABP: --  ABP(mean): --  RR: 24 (19 @ 07:00)  SpO2: 100% (19 @ 07:00)           @ 07:01  -   @ 07:00  --------------------------------------------------------  IN: 1220.4 mL / OUT: 2500 mL / NET: -1279.6 mL              LABS:  CBC Full  -  ( 24 Dec 2019 04:36 )  WBC Count : 10.35 K/uL  RBC Count : 4.00 M/uL  Hemoglobin : 10.0 g/dL  Hematocrit : 30.6 %  Platelet Count - Automated : 199 K/uL  Mean Cell Volume : 76.5 fl  Mean Cell Hemoglobin : 25.0 pg  Mean Cell Hemoglobin Concentration : 32.7 gm/dL  Auto Neutrophil # : x  Auto Lymphocyte # : x  Auto Monocyte # : x  Auto Eosinophil # : x  Auto Basophil # : x  Auto Neutrophil % : x  Auto Lymphocyte % : x  Auto Monocyte % : x  Auto Eosinophil % : x  Auto Basophil % : x        136  |  97<L>  |  54.0<H>  ----------------------------<  83  3.5   |  28.0  |  1.86<H>    Ca    9.4      24 Dec 2019 04:36  Phos  3.4       Mg     2.3         TPro  6.8  /  Alb  3.7  /  TBili  0.7  /  DBili  x   /  AST  3621<H>  /  ALT  3934<H>  /  AlkPhos  99      PT/INR - ( 24 Dec 2019 04:36 )   PT: 23.7 sec;   INR: 2.02 ratio         PTT - ( 24 Dec 2019 04:36 )  PTT:31.0 sec  Urinalysis Basic - ( 23 Dec 2019 04:46 )    Color: [place] / Appearance: Clear / S.025 / pH: x  Gluc: x / Ketone: Trace  / Bili: Small / Urobili: 1   Blood: x / Protein: 100 / Nitrite: Positive   Leuk Esterase: Trace / RBC: 6-10 /HPF / WBC 3-5   Sq Epi: x / Non Sq Epi: Occasional / Bacteria: Moderate

## 2019-12-24 NOTE — PROGRESS NOTE ADULT - PROBLEM SELECTOR PLAN 2
Improving. D/c IV diuresis as per cardiology as patient appears euvolemic. Continue BB. Hold ACE-I in the setting of renal failure. Improving. Continue IV diuresis wit furosemide. Continue BB. Hold ACE-I in the setting of renal failure. Given stat DuoNeb for wheezing. Persistent cough. CXR does not appear to show new lung infiltrates. Does not appear overtly volume overloaded from a clinical standpoint. Afebrile, WBC normal, tolerating room air. Improving. Continue IV diuresis wit furosemide. Continue BB. Hold ACE-I in the setting of renal failure. Given stat DuoNeb for wheezing. Persistent cough. CXR does not appear to show new lung infiltrates. Only left foot mildly edematous, will order doppler. Does not appear overtly volume overloaded from a clinical standpoint. Afebrile, WBC normal, tolerating room air.

## 2019-12-24 NOTE — PROGRESS NOTE ADULT - SUBJECTIVE AND OBJECTIVE BOX
Patient is a 74y old  Male who presents with a chief complaint of DKA, multiple organ failure (24 Dec 2019 18:07)      BRIEF HOSPITAL COURSE: 75 y/o M with a h/o CHFrEF, CAD s/p CABG, HTN, HLD, DM, PAF, recent hospitalization for decompensated heart failure requiring inotropes, admitted on  with weakness and vomiting. Found to have lactic acidosis, RAS, transaminitis, DKA. Presentation likely due to a combination of metformin toxicity and decompensated systolic CHF/cardiogenic shock. Started on milrinone and insulin infusions.       Events last 24 hours: Patient transitioned off insulin infusion. Weaning milrinone. Patient with cough/wheezing and newly swollen left foot. LFTs still severely elevated in 3000s.      PAST MEDICAL & SURGICAL HISTORY:  Heart failure  Essential hypertension  Type 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, without long-term current use of insul  Coronary artery disease involving native coronary artery of native heart, angina presence unspecifie  Coronary artery disease involving coronary bypass graft of native heart with unstable angina pectoris  High cholesterol  GERD (gastroesophageal reflux disease)  Status post open reduction with internal fixation of fracture  S/P primary angioplasty with coronary stent  S/P CABG (coronary artery bypass graft): 4V  [facility]      Review of Systems:  CONSTITUTIONAL: No fever, chills, or fatigue  EYES: No eye pain, visual disturbances, or discharge  ENMT:  No difficulty hearing, tinnitus, vertigo; No sinus or throat pain  NECK: No pain or stiffness  RESPIRATORY: (+) cough, (+)wheezing, no chills or hemoptysis; No shortness of breath  CARDIOVASCULAR: No chest pain, palpitations, dizziness, (+)swollen left foot  GASTROINTESTINAL: No abdominal or epigastric pain. No nausea, vomiting, or hematemesis; No diarrhea or constipation. No melena or hematochezia.  GENITOURINARY: No dysuria, frequency, hematuria, or incontinence  NEUROLOGICAL: No headaches, memory loss, loss of strength, numbness, or tremors  SKIN: No itching, burning, rashes, or lesions   MUSCULOSKELETAL: No joint pain or swelling; No muscle, back, or extremity pain  PSYCHIATRIC: No depression, anxiety, mood swings, or difficulty sleeping      Medications:    furosemide   Injectable 40 milliGRAM(s) IV Push daily  metoprolol succinate ER 50 milliGRAM(s) Oral daily  milrinone Infusion 0.125 MICROgram(s)/kG/Min IV Continuous <Continuous>  albuterol/ipratropium for Nebulization 3 milliLiter(s) Nebulizer every 6 hours PRN  aspirin  chewable 81 milliGRAM(s) Oral daily  heparin  Injectable 5000 Unit(s) SubCutaneous every 12 hours  ticagrelor 90 milliGRAM(s) Oral every 12 hours  pantoprazole    Tablet 40 milliGRAM(s) Oral before breakfast  dextrose 40% Gel 15 Gram(s) Oral once PRN  dextrose 50% Injectable 12.5 Gram(s) IV Push once  dextrose 50% Injectable 25 Gram(s) IV Push once  dextrose 50% Injectable 25 Gram(s) IV Push once  glucagon  Injectable 1 milliGRAM(s) IntraMuscular once PRN  insulin glargine Injectable (LANTUS) 20 Unit(s) SubCutaneous at bedtime  insulin lispro (HumaLOG) corrective regimen sliding scale   SubCutaneous three times a day before meals  dextrose 5%. 1000 milliLiter(s) IV Continuous <Continuous>  influenza   Vaccine 0.5 milliLiter(s) IntraMuscular once  acetylcysteine IVPB 6 Gram(s) IV Intermittent once          ICU Vital Signs Last 24 Hrs  T(C): 37.2 (24 Dec 2019 20:35), Max: 37.4 (24 Dec 2019 03:09)  T(F): 98.9 (24 Dec 2019 20:35), Max: 99.4 (24 Dec 2019 03:09)  HR: 95 (24 Dec 2019 21:00) (92 - 102)  BP: 116/76 (24 Dec 2019 21:00) (99/62 - 147/68)  BP(mean): 91 (24 Dec 2019 21:00) (75 - 98)  ABP: --  ABP(mean): --  RR: 36 (24 Dec 2019 21:00) (21 - 39)  SpO2: 100% (24 Dec 2019 21:00) (98% - 100%)          I&O's Detail    23 Dec 2019 07:01  -  24 Dec 2019 07:00  --------------------------------------------------------  IN:    dextrose 10% + sodium chloride 0.9%: 100 mL    insulin regular Infusion: 2 mL    milrinone  Infusion: 158.4 mL    Oral Fluid: 960 mL  Total IN: 1220.4 mL    OUT:    Voided: 2500 mL  Total OUT: 2500 mL    Total NET: -1279.6 mL      24 Dec 2019 07:01  -  24 Dec 2019 21:57  --------------------------------------------------------  IN:    milrinone  Infusion: 15.4 mL    Oral Fluid: 300 mL    Solution: 670 mL  Total IN: 985.4 mL    OUT:    Voided: 650 mL  Total OUT: 650 mL    Total NET: 335.4 mL            LABS:                        10.0   10.35 )-----------( 199      ( 24 Dec 2019 04:36 )             30.6     12    136  |  97<L>  |  54.0<H>  ----------------------------<  83  3.5   |  28.0  |  1.86<H>    Ca    9.4      24 Dec 2019 04:36  Phos  3.4       Mg     2.3         TPro  6.8  /  Alb  3.7  /  TBili  0.7  /  DBili  x   /  AST  3621<H>  /  ALT  3934<H>  /  AlkPhos  99  -          CAPILLARY BLOOD GLUCOSE  184 (23 Dec 2019 11:00)      POCT Blood Glucose.: 229 mg/dL (24 Dec 2019 20:51)    PT/INR - ( 24 Dec 2019 04:36 )   PT: 23.7 sec;   INR: 2.02 ratio         PTT - ( 24 Dec 2019 04:36 )  PTT:31.0 sec  Urinalysis Basic - ( 23 Dec 2019 04:46 )    Color: Latonia / Appearance: Clear / S.025 / pH: x  Gluc: x / Ketone: Trace  / Bili: Small / Urobili: 1   Blood: x / Protein: 100 / Nitrite: Positive   Leuk Esterase: Trace / RBC: 6-10 /HPF / WBC 3-5   Sq Epi: x / Non Sq Epi: Occasional / Bacteria: Moderate      CULTURES:  Rapid RVP Result: Pamela (19 @ 22:49)        Physical Examination:    General: No acute distress.  Alert, oriented, interactive, nonfocal    HEENT: Pupils equal, reactive to light.  Symmetric.    PULM: scattered rhonchi and wheezing on right side, (+) sputum production    CVS: Regular rate and rhythm, no murmurs, rubs, or gallops    ABD: Soft, nondistended, nontender, normoactive bowel sounds, no masses    EXT: mild edema of left foot, warm and slightly erythematous, nontender    SKIN: Warm and well perfused, no rashes noted.    NEURO: A&Ox3, strength 5/5 all extremities, cranial nerves grossly intact, no focal deficits      RADIOLOGY:     < from: US Doppler Portal/Hepatic Vein (19 @ 09:07) >  Findings/  Impression:Incidental note is made of right pleural effusion. The liver is normal in echotexture. The hepatic veins are patent. The main portal vein is patent with normal direction of flow. Images of the right and left main portal branches are not provided. Images of the hepatic arteries are not provided. If there is continued clinical concern for portal vein thrombosis consider contrast-enhanced CT of the abdomen/pelvis.      < from: Xray Chest 1 View-PORTABLE IMMEDIATE (19 @ 16:22) >  Findings:    Prior sternotomy. Heart is enlarged. Coronary vascular calcification. Minimal pulmonary vascular congestion with trace bilateral pleural effusions improved from prior exam. . The apices and hemidiaphragms are unremarkable. Degenerative changes of the visualized osseous structures.    Impression:    Minimal pulmonary vascular congestion with trace bilateral pleural effusions improved from prior exam.

## 2019-12-24 NOTE — DIETITIAN INITIAL EVALUATION ADULT. - PROBLEM SELECTOR PROBLEM 6
Coronary artery disease involving native coronary artery of native heart, angina presence unspecifie

## 2019-12-24 NOTE — DIETITIAN INITIAL EVALUATION ADULT. - ADD RECOMMEND
Rx: MVI and vit C 500mg daily. Encourage po intake at all meals, specifically HBV protein sources. Obtain daily weights to monitor trends.

## 2019-12-24 NOTE — PROGRESS NOTE ADULT - PROBLEM SELECTOR PLAN 4
Likely cardiorenal in nature. Continue to optimize end-organ perfusion with inotropic support. Trend BUN/Cr. Monitor lytes and UOP. Avoid nephrotoxic agents. No urgent indications for hemodialysis at this time.

## 2019-12-24 NOTE — PROGRESS NOTE ADULT - ASSESSMENT
75y/o male PMH HTN, HLD, HFrEF, DM, CKD, CAD s/p CABG, prior MI, s/p PCI/brachy, (6/20/19- LM 70/90%, LAD diffuse disease, Cx Prox 100%, RCA known , LIMA To LAD patent, SVG to OM 80% with ISR)/(7/2019- SVG to OM1 80%, brachy to SVG to OM1, PCI native OM), TTE 12/18/19- EF 25-30%, WMA, mild PHTN, mild AS, present to ED with feeling cold, nausea vomiting since last night. As per family and patient, c/o weakness, fatigue x 2 days, started to vomit last night, no relief with Zofran at home. Family took BP at home less then 90, and HR >125 at rest. Pt recently hospitalized for acute decompensated heart failure, requiring IV dopamine and milrinone, dopamine not tolerated. Irregular heart beat during hospitalization, possible transient Afib. RAS on CKD last hospitalization, ACE/ARB held.   As of 12/23/19 evaluation, pt is euvolemic on exam, "feels stronger", no c/o chest pain, palpitations, SOB, or vomitting. Pt +productive cough with yellow sputum, minimal pulmonary vascular congestion noted on xray.    1. CAD- s/p CABG, s/p PCI  - 7/2019- brachy therapy   - EKG- ST, LBBB unchanged from prior     2.HFrEF  - TTE EF 25-30%,     titrate off milrinone.   Initiate beta-blocker  toprol 25 mg daily.   hold ACE and ARB and hold aldactone for now.  no iduretics patient euvolemic.     3) acute liver injury. unclear cause. possibiolity dKA,  other possiblty acute liver injury dure to iron IV  Iron profile.  GI evaluation.    n acetyle cystein

## 2019-12-24 NOTE — DIETITIAN INITIAL EVALUATION ADULT. - OTHER INFO
74 year old male PMH CAD, CABG, HFrEF, MI, HTN, HLD, DM, PAF, admitted 12/22 with weakness and vomiting. In ER found to have multiple organ dysfunction including RAS, acute liver transaminitis, DKA/anion gap lactic acidosis attributing to metformin. Spoke with son at bedside who reports pt with ongoing poor appetite over the last month. Pt was admitted earlier this month and met criteria for malnutrition. Weight ~3 weeks ago was 146 lbs, current weight 137.3 lbs which is indicative of a 9 lb wt loss. Son reports pt was eating very poor since last discharged. Son reports he does check pts blood sugars at home. Pt follows a Halal diet. Son declined oral nutrition supplement at this time, states pt had emesis last time while consuming.

## 2019-12-24 NOTE — DIETITIAN INITIAL EVALUATION ADULT. - PERTINENT MEDS FT
MEDICATIONS  (STANDING):  aspirin  chewable 81 milliGRAM(s) Oral daily  chlorhexidine 4% Liquid 1 Application(s) Topical <User Schedule>  dextrose 5%. 1000 milliLiter(s) (50 mL/Hr) IV Continuous <Continuous>  dextrose 50% Injectable 12.5 Gram(s) IV Push once  dextrose 50% Injectable 25 Gram(s) IV Push once  dextrose 50% Injectable 25 Gram(s) IV Push once  furosemide   Injectable 40 milliGRAM(s) IV Push daily  heparin  Injectable 5000 Unit(s) SubCutaneous every 12 hours  influenza   Vaccine 0.5 milliLiter(s) IntraMuscular once  insulin glargine Injectable (LANTUS) 20 Unit(s) SubCutaneous at bedtime  insulin glargine Injectable (LANTUS) 10 Unit(s) SubCutaneous every morning  insulin lispro (HumaLOG) corrective regimen sliding scale   SubCutaneous three times a day before meals  metoprolol succinate ER 50 milliGRAM(s) Oral daily  milrinone Infusion 0.375 MICROgram(s)/kG/Min (6.638 mL/Hr) IV Continuous <Continuous>  pantoprazole    Tablet 40 milliGRAM(s) Oral before breakfast  ticagrelor 60 milliGRAM(s) Oral every 12 hours    MEDICATIONS  (PRN):  dextrose 40% Gel 15 Gram(s) Oral once PRN Blood Glucose LESS THAN 70 milliGRAM(s)/deciliter  glucagon  Injectable 1 milliGRAM(s) IntraMuscular once PRN Glucose LESS THAN 70 milligrams/deciliter

## 2019-12-24 NOTE — PROVIDER CONTACT NOTE (EICU) - ACTION/TREATMENT ORDERED:
Request for Tessalon Perle for cough by EHSAN Winters. Placed as requested.
YASMANI fournier/princess ordered.
10mEq IV potassium x3 ordered for repletion.

## 2019-12-24 NOTE — PROGRESS NOTE ADULT - SUBJECTIVE AND OBJECTIVE BOX
[facility]                                                       [facility]                                                               Office: [address] [address]                                                              Telephone: [phone]. Fax:[phone]                                                                             PROGRESS NOTE  Reason for follow up: DKA, multiple organ failure  , congestive heart failure and coronary artery disease   Overnight: No new events.   Update:    elevated LFTs.   euvolemic.     Subjective: "I'm good"  75y/o male PMH HTN, HLD, HFrEF, DM, CKD, CAD s/p CABG, prior MI, s/p PCI/brachy, (6/20/19- LM 70/90%, LAD diffuse disease, Cx Prox 100%, RCA known , LIMA To LAD patent, SVG to OM 80% with ISR)/(7/2019- SVG to OM1 80%, brachy to SVG to OM1, PCI native OM), TTE 12/18/19- EF 25-30%,       .Review of symptoms: Cardiac:  No chest pain. No dyspnea. No palpitations.  Respiratory:no cough. No dyspnea  Gastrointestinal: No diarrhea. No abdominal pain. No bleeding.     Vitals:   Vital Signs Last 24 Hrs  T(C): 37.2 (12-24-19 @ 15:55), Max: 37.4 (12-24-19 @ 03:09)  T(F): 98.9 (12-24-19 @ 15:55), Max: 99.4 (12-24-19 @ 03:09)  HR: 93 (12-24-19 @ 18:00) (92 - 106)  BP: 122/72 (12-24-19 @ 18:00) (98/62 - 147/68)  BP(mean): 91 (12-24-19 @ 18:00) (75 - 98)  RR: 22 (12-24-19 @ 18:00) (21 - 39)  SpO2: 100% (12-24-19 @ 18:00) (97% - 100%)    Weight (kg): 58.7 (12-22 @ 20:10)      PHYSICAL EXAM:  Appearance: Comfortable. No acute distress  HEENT:  Head and neck: Atraumatic. Normocephalic.  Normal oral mucosa, PERRL, Neck is supple. No JVD, No carotid bruit.   Neurologic: A & O x 3, no focal deficits. EOMI.  Lymphatic: No cervical lymphadenopathy  Cardiovascular:S3 gallop noted on exam  Respiratory: Lungs clear to auscultation  Gastrointestinal:  Soft, Non-tender, +BS  Lower Extremities: very slight edema  Psychiatry: Patient is calm. No agitation. Mood & affect appropriate  Skin: No rashes/ ecchymoses/cyanosis/ulcers visualized on the face, hands or feet.       MEDICATIONS  (STANDING):  furosemide   Injectable 40 milliGRAM(s) IV Push daily  metoprolol succinate ER 50 milliGRAM(s) Oral daily  milrinone Infusion 0.125 MICROgram(s)/kG/Min IV Continuous <Continuous>    pantoprazole    Tablet  acetylcysteine IVPB  acetylcysteine IVPB  acetylcysteine IVPB  aspirin  chewable  dextrose 5%.  dextrose 50% Injectable  dextrose 50% Injectable  dextrose 50% Injectable  heparin  Injectable  influenza   Vaccine  insulin glargine Injectable (LANTUS)  insulin lispro (HumaLOG) corrective regimen sliding scale  ticagrelor    PRN: dextrose 40% Gel PRN  glucagon  Injectable PRN      DIAGNOSTIC TESTING:  [ ] Echocardiogram: < from: TTE Echo Complete w/Doppler (12.23.19 @ 12:47) >    Summary:   1. Endocardial visualization was enhanced with intravenous echo contrast.   2. Moderately to severely decreased global left ventricular systolic function.   3. Left ventricular ejection fraction, by visual estimation, is 25 to 30%.   4. Severely enlarged left atrium.   5. The mitral in-flow pattern reveals no discernable A-wave, therefore no comment on diastolic function can be made.   6. Sclerotic aortic valve with decreased opening.   7. The mitral valve leaflets are tethered which is due to reduced systolic function and elevated LVDP.   8. Moderate tricuspid regurgitation.   9. Estimated pulmonary artery systolic pressure is 50.1 mmHg assuming a right atrial pressure of 3 mmHg, which is consistent with moderate pulmonary hypertension.    < end of copied text >      OTHER: 	  < from: Xray Chest 1 View-PORTABLE IMMEDIATE (12.22.19 @ 16:22) >  Impression:    Minimal pulmonary vascular congestion with trace bilateral pleural effusions improved from prior exam.      < end of copied text >      LABS:	 	                        10.0   10.35 )-----------( 199      ( 24 Dec 2019 04:36 )             30.6   N=x    ; L=x        24 Dec 2019 04:36    136    |  97     |  54.0   ----------------------------<  83     3.5     |  28.0   |  1.86     Ca    9.4        24 Dec 2019 04:36  Phos  3.4       24 Dec 2019 04:36  Mg     2.3       24 Dec 2019 04:36    TPro  6.8    /  Alb  3.7    /  TBili  0.7    /  DBili  x      /  AST  3621   /  ALT  3934   /  AlkPhos  99     24 Dec 2019 04:36      Hepatic panel: 24 Dec 2019 04:36  6.8   | 3.7                            0.7   | 0.7  /x                              3621  | 3934                              /99   \par                                     TELEMETRY: ST HR @ 102 LBBB  ECG: SR HR @ 83, LBBB

## 2019-12-24 NOTE — CHART NOTE - NSCHARTNOTEFT_GEN_A_CORE
Upon Nutritional Assessment by the Registered Dietitian your patient was determined to meet criteria / has evidence of the following diagnosis/diagnoses:          [ ]  Mild Protein Calorie Malnutrition        [ ]  Moderate Protein Calorie Malnutrition        [ x] Severe Protein Calorie Malnutrition        [ ] Unspecified Protein Calorie Malnutrition        [ ] Underweight / BMI <19        [ ] Morbid Obesity / BMI > 40    Pt presents at high nutrition risk secondary to malnutrition (severe, acute) related to inability to meet sufficient protein-energy in setting of poor appetite/recent hospitalization, CHF, and multiple organ dysfunction as evidenced by meeting <50% nutrient needs >/5 days, moderate muscle loss of temples and shoulders, severe muscle loss of clavicles, moderate fat loss of orbitals and buccal pads, 6.2% wt loss x ~3 weeks.     Findings as based on:  •  Comprehensive nutrition assessment and consultation  •  Calorie counts (nutrient intake analysis)  •  Food acceptance and intake status from observations by staff  •  Follow up  •  Patient education  •  Intervention secondary to interdisciplinary rounds  •   concerns      Treatment:    The following has been recommended:    1) Continue diet as tolerated.  2) Rx: MVI and vit C 500mg daily.   3) Encourage po intake.  4) Obtain daily weights.       PROVIDER Section:     By signing this assessment you are acknowledging and agree with the diagnosis/diagnoses assigned by the Registered Dietitian    Comments:

## 2019-12-24 NOTE — PROGRESS NOTE ADULT - ASSESSMENT
RAS suspect 2/2 hypoperfusion from emesis DKA  in combination w metformin toxication he had lactate 16 yest now down to 2.1  Transaminitis  decompensated CHF/ cardiogenic shock  Was on pressors now off  Renal function improving  has good UOP renal function improving  No need for RRT at this point  Would not restart Metformin     Will follow

## 2019-12-24 NOTE — PROGRESS NOTE ADULT - PROBLEM SELECTOR PLAN 1
Continue to wean milrinone infusion as tolerated while monitoring evidence of end-organ perfusion. Tolerating BB from a hemodynamic standpoint. Stat DuoNeb for wheezing. Check CXR given persistent productive cough and new wheezing. Afebrile, WBC normal, tolerating room air. Continue to wean milrinone infusion as tolerated while monitoring evidence of end-organ perfusion. Tolerating BB from a hemodynamic standpoint.

## 2019-12-24 NOTE — PROGRESS NOTE ADULT - SUBJECTIVE AND OBJECTIVE BOX
NEPHROLOGY INTERVAL HPI/OVERNIGHT EVENTS:    Examined   sitting up in bed son visiting  Feeling better has good UOP    MEDICATIONS  (STANDING):  aspirin  chewable 81 milliGRAM(s) Oral daily  dextrose 5%. 1000 milliLiter(s) (50 mL/Hr) IV Continuous <Continuous>  dextrose 50% Injectable 12.5 Gram(s) IV Push once  dextrose 50% Injectable 25 Gram(s) IV Push once  dextrose 50% Injectable 25 Gram(s) IV Push once  furosemide   Injectable 40 milliGRAM(s) IV Push daily  heparin  Injectable 5000 Unit(s) SubCutaneous every 12 hours  influenza   Vaccine 0.5 milliLiter(s) IntraMuscular once  insulin glargine Injectable (LANTUS) 20 Unit(s) SubCutaneous at bedtime  insulin lispro (HumaLOG) corrective regimen sliding scale   SubCutaneous three times a day before meals  metoprolol succinate ER 50 milliGRAM(s) Oral daily  milrinone Infusion 0.25 MICROgram(s)/kG/Min (4.425 mL/Hr) IV Continuous <Continuous>  pantoprazole    Tablet 40 milliGRAM(s) Oral before breakfast  ticagrelor 90 milliGRAM(s) Oral every 12 hours    MEDICATIONS  (PRN):  dextrose 40% Gel 15 Gram(s) Oral once PRN Blood Glucose LESS THAN 70 milliGRAM(s)/deciliter  glucagon  Injectable 1 milliGRAM(s) IntraMuscular once PRN Glucose LESS THAN 70 milligrams/deciliter      Allergies    Allergy Status Unknown    Intolerances    DOPamine (Hypotension)      Vital Signs Last 24 Hrs  T(C): 36.9 (24 Dec 2019 11:17), Max: 37.4 (24 Dec 2019 03:09)  T(F): 98.5 (24 Dec 2019 11:17), Max: 99.4 (24 Dec 2019 03:09)  HR: 96 (24 Dec 2019 07:00) (96 - 114)  BP: 130/80 (24 Dec 2019 07:00) (98/62 - 130/80)  BP(mean): 98 (24 Dec 2019 07:00) (75 - 98)  RR: 24 (24 Dec 2019 07:00) (22 - 29)  SpO2: 100% (24 Dec 2019 07:00) (97% - 100%)  Daily     Daily Weight in k.2 (24 Dec 2019 09:01)    PHYSICAL EXAM:  GENERAL: NAD  HEAD:  Atraumatic, Normocephalic  EYES: EOMI  NECK: Supple, neck  veins full  NERVOUS SYSTEM:  Alert & Oriented X3  CHEST/LUNG: Clear to percussion bilaterally  HEART: Regular rate and rhythm  ABDOMEN: Soft, Nontender, Nondistended; Bowel sounds present  EXTREMITIES: no edema    LABS:                        10.0   10.35 )-----------( 199      ( 24 Dec 2019 04:36 )             30.6     12-    136  |  97<L>  |  54.0<H>  ----------------------------<  83  3.5   |  28.0  |  1.86<H>    Ca    9.4      24 Dec 2019 04:36  Phos  3.4       Mg     2.3         TPro  6.8  /  Alb  3.7  /  TBili  0.7  /  DBili  x   /  AST  3621<H>  /  ALT  3934<H>  /  AlkPhos  99      PT/INR - ( 24 Dec 2019 04:36 )   PT: 23.7 sec;   INR: 2.02 ratio         PTT - ( 24 Dec 2019 04:36 )  PTT:31.0 sec  Urinalysis Basic - ( 23 Dec 2019 04:46 )    Color: Latonia / Appearance: Clear / S.025 / pH: x  Gluc: x / Ketone: Trace  / Bili: Small / Urobili: 1   Blood: x / Protein: 100 / Nitrite: Positive   Leuk Esterase: Trace / RBC: 6-10 /HPF / WBC 3-5   Sq Epi: x / Non Sq Epi: Occasional / Bacteria: Moderate      Magnesium, Serum: 2.3 mg/dL ( @ 04:36)  Phosphorus Level, Serum: 3.4 mg/dL ( @ 04:36)  Magnesium, Serum: 2.5 mg/dL ( @ 18:32)  Phosphorus Level, Serum: 2.8 mg/dL ( @ 18:32)          RADIOLOGY & ADDITIONAL TESTS:

## 2019-12-24 NOTE — PROGRESS NOTE ADULT - PROBLEM SELECTOR PLAN 5
Resolved. Transitioned to sliding scale insulin coverage and Lantus. Hold metformin given recent toxicity.

## 2019-12-24 NOTE — PROGRESS NOTE ADULT - ATTENDING COMMENTS
Critical Care time: >40 minutes of noncontinuous critical care time spent evaluating/treating, reviewing imaging, labs, discussing case with multidisciplinary team, discussing plans/goals of care with patient/family to prevent further life threatening depreciation of the patient's condition. Non-inclusive is procedure time.

## 2019-12-24 NOTE — DIETITIAN INITIAL EVALUATION ADULT. - PERTINENT LABORATORY DATA
12-24 Na136 mmol/L Glu 83 mg/dL K+ 3.5 mmol/L Cr  1.86 mg/dL<H> BUN 54.0 mg/dL<H> Phos 3.4 mg/dL Alb 3.7 g/dL PAB n/a HgA1c 8.2%

## 2019-12-24 NOTE — DIETITIAN INITIAL EVALUATION ADULT. - MALNUTRITION
NFPE: moderate muscle loss of temples and shoulders, severe muscle loss of clavicles, moderate fat loss of orbitals and buccal pads severe, acute

## 2019-12-24 NOTE — PROGRESS NOTE ADULT - ASSESSMENT
75 y/o M with a h/o CHFrEF, CAD s/p CABG, HTN, HLD, DM, PAF, with cardiogenic shock, acute decompensated systolic heart failure, shock liver, RAS, DKA.

## 2019-12-25 LAB
ALBUMIN SERPL ELPH-MCNC: 3.2 G/DL — LOW (ref 3.3–5.2)
ALP SERPL-CCNC: 106 U/L — SIGNIFICANT CHANGE UP (ref 40–120)
ALT FLD-CCNC: 3321 U/L — HIGH
ANION GAP SERPL CALC-SCNC: 16 MMOL/L — SIGNIFICANT CHANGE UP (ref 5–17)
AST SERPL-CCNC: 1942 U/L — HIGH
BILIRUB SERPL-MCNC: 1 MG/DL — SIGNIFICANT CHANGE UP (ref 0.4–2)
BUN SERPL-MCNC: 37 MG/DL — HIGH (ref 8–20)
CALCIUM SERPL-MCNC: 8.6 MG/DL — SIGNIFICANT CHANGE UP (ref 8.6–10.2)
CHLORIDE SERPL-SCNC: 93 MMOL/L — LOW (ref 98–107)
CO2 SERPL-SCNC: 24 MMOL/L — SIGNIFICANT CHANGE UP (ref 22–29)
CREAT SERPL-MCNC: 1.28 MG/DL — SIGNIFICANT CHANGE UP (ref 0.5–1.3)
GLUCOSE BLDC GLUCOMTR-MCNC: 249 MG/DL — HIGH (ref 70–99)
GLUCOSE BLDC GLUCOMTR-MCNC: 266 MG/DL — HIGH (ref 70–99)
GLUCOSE BLDC GLUCOMTR-MCNC: 315 MG/DL — HIGH (ref 70–99)
GLUCOSE BLDC GLUCOMTR-MCNC: 89 MG/DL — SIGNIFICANT CHANGE UP (ref 70–99)
GLUCOSE SERPL-MCNC: 93 MG/DL — SIGNIFICANT CHANGE UP (ref 70–115)
HAV IGG SER QL IA: REACTIVE
HAV IGM SER-ACNC: SIGNIFICANT CHANGE UP
HBV CORE AB SER-ACNC: REACTIVE
HBV CORE IGM SER-ACNC: SIGNIFICANT CHANGE UP
HBV SURFACE AB SER-ACNC: REACTIVE
HBV SURFACE AG SER-ACNC: SIGNIFICANT CHANGE UP
HCT VFR BLD CALC: 30.4 % — LOW (ref 39–50)
HGB BLD-MCNC: 9.7 G/DL — LOW (ref 13–17)
MAGNESIUM SERPL-MCNC: 1.9 MG/DL — SIGNIFICANT CHANGE UP (ref 1.6–2.6)
MCHC RBC-ENTMCNC: 24.1 PG — LOW (ref 27–34)
MCHC RBC-ENTMCNC: 31.9 GM/DL — LOW (ref 32–36)
MCV RBC AUTO: 75.6 FL — LOW (ref 80–100)
PHOSPHATE SERPL-MCNC: 3.1 MG/DL — SIGNIFICANT CHANGE UP (ref 2.4–4.7)
PLATELET # BLD AUTO: 182 K/UL — SIGNIFICANT CHANGE UP (ref 150–400)
POTASSIUM SERPL-MCNC: 3.1 MMOL/L — LOW (ref 3.5–5.3)
POTASSIUM SERPL-SCNC: 3.1 MMOL/L — LOW (ref 3.5–5.3)
PROT SERPL-MCNC: 6.2 G/DL — LOW (ref 6.6–8.7)
RBC # BLD: 4.02 M/UL — LOW (ref 4.2–5.8)
RBC # FLD: 19.9 % — HIGH (ref 10.3–14.5)
SODIUM SERPL-SCNC: 133 MMOL/L — LOW (ref 135–145)
WBC # BLD: 8.41 K/UL — SIGNIFICANT CHANGE UP (ref 3.8–10.5)
WBC # FLD AUTO: 8.41 K/UL — SIGNIFICANT CHANGE UP (ref 3.8–10.5)

## 2019-12-25 PROCEDURE — 99233 SBSQ HOSP IP/OBS HIGH 50: CPT

## 2019-12-25 PROCEDURE — 99223 1ST HOSP IP/OBS HIGH 75: CPT

## 2019-12-25 PROCEDURE — 93971 EXTREMITY STUDY: CPT | Mod: 26,LT

## 2019-12-25 PROCEDURE — 71045 X-RAY EXAM CHEST 1 VIEW: CPT | Mod: 26

## 2019-12-25 RX ORDER — FUROSEMIDE 40 MG
40 TABLET ORAL DAILY
Refills: 0 | Status: DISCONTINUED | OUTPATIENT
Start: 2019-12-26 | End: 2019-12-27

## 2019-12-25 RX ORDER — IPRATROPIUM/ALBUTEROL SULFATE 18-103MCG
3 AEROSOL WITH ADAPTER (GRAM) INHALATION EVERY 6 HOURS
Refills: 0 | Status: DISCONTINUED | OUTPATIENT
Start: 2019-12-25 | End: 2019-12-26

## 2019-12-25 RX ORDER — FUROSEMIDE 40 MG
40 TABLET ORAL DAILY
Refills: 0 | Status: DISCONTINUED | OUTPATIENT
Start: 2019-12-25 | End: 2019-12-25

## 2019-12-25 RX ORDER — TIOTROPIUM BROMIDE 18 UG/1
1 CAPSULE ORAL; RESPIRATORY (INHALATION) DAILY
Refills: 0 | Status: DISCONTINUED | OUTPATIENT
Start: 2019-12-25 | End: 2019-12-31

## 2019-12-25 RX ORDER — MAGNESIUM SULFATE 500 MG/ML
1 VIAL (ML) INJECTION ONCE
Refills: 0 | Status: COMPLETED | OUTPATIENT
Start: 2019-12-25 | End: 2019-12-25

## 2019-12-25 RX ORDER — SODIUM CHLORIDE 9 MG/ML
1 INJECTION INTRAMUSCULAR; INTRAVENOUS; SUBCUTANEOUS THREE TIMES A DAY
Refills: 0 | Status: DISCONTINUED | OUTPATIENT
Start: 2019-12-25 | End: 2019-12-26

## 2019-12-25 RX ORDER — POTASSIUM CHLORIDE 20 MEQ
40 PACKET (EA) ORAL EVERY 4 HOURS
Refills: 0 | Status: COMPLETED | OUTPATIENT
Start: 2019-12-25 | End: 2019-12-25

## 2019-12-25 RX ADMIN — Medication 40 MILLIEQUIVALENT(S): at 08:10

## 2019-12-25 RX ADMIN — TICAGRELOR 90 MILLIGRAM(S): 90 TABLET ORAL at 06:21

## 2019-12-25 RX ADMIN — HEPARIN SODIUM 5000 UNIT(S): 5000 INJECTION INTRAVENOUS; SUBCUTANEOUS at 06:20

## 2019-12-25 RX ADMIN — Medication 4: at 11:44

## 2019-12-25 RX ADMIN — BENZOCAINE AND MENTHOL 1 LOZENGE: 5; 1 LIQUID ORAL at 20:15

## 2019-12-25 RX ADMIN — Medication 200 MILLIGRAM(S): at 12:06

## 2019-12-25 RX ADMIN — Medication 200 MILLIGRAM(S): at 23:36

## 2019-12-25 RX ADMIN — TICAGRELOR 90 MILLIGRAM(S): 90 TABLET ORAL at 17:33

## 2019-12-25 RX ADMIN — Medication 100 GRAM(S): at 08:08

## 2019-12-25 RX ADMIN — Medication 8: at 17:34

## 2019-12-25 RX ADMIN — Medication 40 MILLIEQUIVALENT(S): at 11:43

## 2019-12-25 RX ADMIN — SODIUM CHLORIDE 1 GRAM(S): 9 INJECTION INTRAMUSCULAR; INTRAVENOUS; SUBCUTANEOUS at 11:55

## 2019-12-25 RX ADMIN — Medication 40 MILLIGRAM(S): at 06:20

## 2019-12-25 RX ADMIN — Medication 64.38 GRAM(S): at 00:41

## 2019-12-25 RX ADMIN — BENZOCAINE AND MENTHOL 1 LOZENGE: 5; 1 LIQUID ORAL at 10:00

## 2019-12-25 RX ADMIN — BENZOCAINE AND MENTHOL 1 LOZENGE: 5; 1 LIQUID ORAL at 00:41

## 2019-12-25 RX ADMIN — Medication 200 MILLIGRAM(S): at 17:33

## 2019-12-25 RX ADMIN — PANTOPRAZOLE SODIUM 40 MILLIGRAM(S): 20 TABLET, DELAYED RELEASE ORAL at 06:21

## 2019-12-25 RX ADMIN — INSULIN GLARGINE 20 UNIT(S): 100 INJECTION, SOLUTION SUBCUTANEOUS at 22:04

## 2019-12-25 RX ADMIN — Medication 50 MILLIGRAM(S): at 06:22

## 2019-12-25 RX ADMIN — Medication 3 MILLILITER(S): at 15:12

## 2019-12-25 RX ADMIN — HEPARIN SODIUM 5000 UNIT(S): 5000 INJECTION INTRAVENOUS; SUBCUTANEOUS at 17:34

## 2019-12-25 RX ADMIN — SODIUM CHLORIDE 1 GRAM(S): 9 INJECTION INTRAMUSCULAR; INTRAVENOUS; SUBCUTANEOUS at 20:15

## 2019-12-25 RX ADMIN — Medication 81 MILLIGRAM(S): at 11:43

## 2019-12-25 NOTE — PROGRESS NOTE ADULT - ASSESSMENT
75y/o male PMH HTN, HLD, HFrEF, DM, CKD, CAD s/p CABG, prior MI, s/p PCI/brachy, (6/20/19- LM 70/90%, LAD diffuse disease, Cx Prox 100%, RCA known , LIMA To LAD patent, SVG to OM 80% with ISR)/(7/2019- SVG to OM1 80%, brachy to SVG to OM1, PCI native OM), TTE 12/18/19- EF 25-30%, WMA, mild PHTN, mild AS, present to ED with feeling cold, nausea vomiting since last night. As per family and patient, c/o weakness, fatigue x 2 days, started to vomit last night, no relief with Zofran at home. Family took BP at home less then 90, and HR >125 at rest. Pt recently hospitalized for acute decompensated heart failure, requiring IV dopamine and milrinone, dopamine not tolerated. Irregular heart beat during hospitalization, possible transient Afib. RAS on CKD last hospitalization, ACE/ARB held.   As of 12/25/19, pt denies chest pain, palpitations, SOB. No acute resp. distress, lungs clear to auscultation no JVD. LLE edematous, some erythema noted no change in temp to touch. Pt c/o of consistent coughing currently on acetylcysteine gtt. Pt has been weaned off milrinone gtt. BP currently 129/67, and .        CAD  - s/p CABG, s/p PCI  - 7/2019- brachy therapy   - EKG- ST, LBBB unchanged from prior     Lower extremity Edema  -U/S - ordered and pending as per ICU team       HFrEF  - all labs pending  - euvolemic if not hypovolemic on exam  - IVF in moderation  - TTE EF 25-30%  -Pt currently off milrinone, continue to monitor     Irregular Heart rate  - ST on monitor  - last hospitalization possible afib- plan for [facility] monitor outpatient.     Acute Liver injury  -Iron Profile resulted- elevated Ferritin level of 4357, Total Iron 29   -Consider GI evaluation   -N acetyl Cystein continued

## 2019-12-25 NOTE — CONSULT NOTE ADULT - ASSESSMENT
Again, this is a 74-year-old man with multiple severe medical comorbidities who presented with multisystem organ failure for whom GI was consulted for grossly elevated liver enzymes.  The etiology his enzyme elevation is likely ischemic hepatopathy in the setting of shock.  His sonogram was normal and hepatitis serologies were negative.  There was no significant Tylenol usage, but a DILI is also possible.  Recommend conservative management and continuing to trend liver panel, including INR.    PRELIMINARY NOTE, PLEASE AWAIT FINALIZATION AND ATTESTATION. Again, this is a 74-year-old man with multiple severe medical comorbidities who presented with multisystem organ failure for whom GI was consulted for grossly elevated liver enzymes.  The etiology his enzyme elevation is likely ischemic hepatopathy in the setting of shock, and they appeared to have peaked.  His sonogram was normal and hepatitis serologies were negative.  There was no significant Tylenol usage, but a DILI is also possible.  Recommend conservative management and continuing to trend liver panel, including INR.    Thank you for the consult.  Please do not hesitate to call with any questions or concerns.    ERICK James MD  [facility] Physician [facility]  Division of Gastroenterology  Tel [phone]  Fax [phone]  12-25-19 @ 15:04

## 2019-12-25 NOTE — PROGRESS NOTE ADULT - ATTENDING COMMENTS
feeling well today. Milrinone stopped. Pt denies SOB, palpitation, CP. Tele notable for sinus tachycardia 100-110 bpm.   Continues to appear euvolemic.   Will monitor today off inotrope. continue beta blocker as tolerated.   continue to hold off on ACEI/ARB due to renal dysfunction and low BP.   hold diuresis today.

## 2019-12-25 NOTE — PROGRESS NOTE ADULT - SUBJECTIVE AND OBJECTIVE BOX
73y/o male PMH HTN, HLD, HFrEF, DM, CKD, CAD s/p CABG, prior MI, s/p PCI/brachy, (6/20/19- LM 70/90%, LAD diffuse disease, Cx Prox 100%, RCA known , LIMA To LAD patent, SVG to OM 80% with ISR)/(7/2019- SVG to OM1 80%, brachy to SVG to OM1, PCI native OM), TTE 12/18/19- EF 25-30%, WMA, mild PHTN, mild AS, present to ED with feeling cold, nausea vomiting and weakness for one day. no relief with Zofran at home. Family took BP at home less then 90, and HR >125 at rest. Pt recently hospitalized for acute decompensated heart failure, requiring IV dopamine and milrinone, dopamine not tolerated. On Admission, Patient was found to have lactic acidosis, RAS, transaminitis, mild hyperglycemia/dka, admitted to MICU. Presentation was suspected to be due to a combination of metformin toxicity and decompensated CHF/ cardiogenic shock.  Symptoms improved after insulin infusion, discontinuation of metformin, and initiation of milrinone infusion. Cardiology and renal was consulted. GI cx was called for persistent transaminitis. Hepatitis serology and USG was neg. Echo showed EF 25-30%, moderate pulm HTN.   RVP was negative. Iron panel showed very high ferritin but iron sat 8%. TIBC is normal. Pateint was seen by MICU team this am and downgraded to hospitalist service.     PAST MEDICAL HISTORY:  Heart failure  Essential hypertension  Type 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, without long-term current use of insul  Coronary artery disease involving native coronary artery of native heart, angina presence unspecifie  Coronary artery disease involving coronary bypass graft of native heart with unstable angina pectoris  High cholesterol  GERD (gastroesophageal reflux disease)    Past surgical H:   Status post open reduction with internal fixation of fracture  S/P primary angioplasty with coronary stent  S/P CABG (coronary artery bypass graft): 4V 1983 [place]   Family h:  Social H: denied smoking/ETOH/drugs  Allergy/intolerance: NKDA. Dopamine caused hypotension    MEDICATIONS  (STANDING):  aspirin  chewable 81 milliGRAM(s) Oral daily  dextrose 5%. 1000 milliLiter(s) (50 mL/Hr) IV Continuous <Continuous>  dextrose 50% Injectable 12.5 Gram(s) IV Push once  dextrose 50% Injectable 25 Gram(s) IV Push once  dextrose 50% Injectable 25 Gram(s) IV Push once  heparin  Injectable 5000 Unit(s) SubCutaneous every 12 hours  influenza   Vaccine 0.5 milliLiter(s) IntraMuscular once  insulin glargine Injectable (LANTUS) 20 Unit(s) SubCutaneous at bedtime  insulin lispro (HumaLOG) corrective regimen sliding scale   SubCutaneous three times a day before meals  metoprolol succinate ER 50 milliGRAM(s) Oral daily  pantoprazole    Tablet 40 milliGRAM(s) Oral before breakfast  potassium chloride    Tablet ER 40 milliEquivalent(s) Oral every 4 hours  sodium chloride 1 Gram(s) Oral three times a day  ticagrelor 90 milliGRAM(s) Oral every 12 hours    MEDICATIONS  (PRN):  albuterol/ipratropium for Nebulization 3 milliLiter(s) Nebulizer every 6 hours PRN Shortness of Breath and/or Wheezing  benzocaine 15 mG/menthol 3.6 mG (Sugar-Free) Lozenge 1 Lozenge Oral every 4 hours PRN Sore Throat  dextrose 40% Gel 15 Gram(s) Oral once PRN Blood Glucose LESS THAN 70 milliGRAM(s)/deciliter  glucagon  Injectable 1 milliGRAM(s) IntraMuscular once PRN Glucose LESS THAN 70 milligrams/deciliter    ROS:     CONSTITUTIONAL:  No distress.no fever/chills/fatigue/weight loss  HEENT:  Eyes:  No diplopia or blurred vision.   CARDIOVASCULAR:  No pressure, squeezing, tightness, heaviness or aching about the chest; no palpitations.no leg swelling, no orthopnea or PND  RESPIRATORY:  no SOB. no wheezing.no cough or sputum.  No hemoptysis  GI: no nausea, no vomiting, no diarrhea, no constipation. No hematochezia or melena  EXT:No joint pain or joint swelling or redness  SKIN: no skin break or ulcer. No cellulitis.   CNS: No headaches. No weakness.no numbness. No depression or anxiety. No SI        Vital Signs Last 24 Hrs  T(C): 36.7 (25 Dec 2019 07:37), Max: 37.2 (24 Dec 2019 15:55)  T(F): 98.1 (25 Dec 2019 07:37), Max: 98.9 (24 Dec 2019 15:55)  HR: 99 (25 Dec 2019 09:00) (90 - 101)  BP: 121/70 (25 Dec 2019 09:00) (102/58 - 127/68)  BP(mean): 89 (25 Dec 2019 09:00) (75 - 101)  RR: 30 (25 Dec 2019 09:00) (15 - 39)  SpO2: 99% (25 Dec 2019 09:00) (98% - 100%)    CAPILLARY BLOOD GLUCOSE      POCT Blood Glucose.: 89 mg/dL (25 Dec 2019 07:34)  POCT Blood Glucose.: 229 mg/dL (24 Dec 2019 20:51)  POCT Blood Glucose.: 263 mg/dL (24 Dec 2019 16:41)  POCT Blood Glucose.: 183 mg/dL (24 Dec 2019 11:53)      I&O's Summary    24 Dec 2019 07:01  -  25 Dec 2019 07:00  --------------------------------------------------------  IN: 1567.7 mL / OUT: 1150 mL / NET: 417.7 mL    25 Dec 2019 07:01  -  25 Dec 2019 09:38  --------------------------------------------------------  IN: 327.2 mL / OUT: 350 mL / NET: -22.8 mL    GENERAL: Not in distress. Alert    HEENT:  Normocephalic and atraumatic. PEARLA,EOMI  NECK: Supple.  No JVD.    CARDIOVASCULAR: RRR S1, S2. No murmur/rubs/gallop  LUNGS: BLAE+, no rales, no wheezing, no rhonchi.    ABDOMEN: ND. Soft,  NT, no guarding / rebound / rigidity. BS normoactive. No CVA tenderness.    BACK: No spine tenderness.  EXTREMITIES: no cyanosis, no clubbing, no edema.   SKIN: no rash. No skin break or ulcer. No cellulitis.  NEUROLOGIC: AAO*3.strength is symmetric, sensation intact, speech fluent.    PSYCHIATRIC: Calm.  No agitation.                          9.7    8.41  )-----------( 182      ( 25 Dec 2019 05:32 )             30.4       12-25    133<L>  |  93<L>  |  37.0<H>  ----------------------------<  93  3.1<L>   |  24.0  |  1.28    Ca    8.6      25 Dec 2019 05:32  Phos  3.1     12-25  Mg     1.9     12-25    TPro  6.2<L>  /  Alb  3.2<L>  /  TBili  1.0  /  DBili  x   /  AST  1942<H>  /  ALT  3321<H>  /  AlkPhos  106  12-25    < from: TTE Echo Complete w/Doppler (12.23.19 @ 12:47) >  Summary:   1. Endocardial visualization was enhanced with intravenous echo contrast.   2. Moderately to severely decreased global left ventricular systolic function.   3. Left ventricular ejection fraction, by visual estimation, is 25 to 30%.   4. Severely enlarged left atrium.   5. The mitral in-flow pattern reveals no discernable A-wave, therefore no comment on diastolic function can be made.   6. Sclerotic aortic valve with decreased opening.   7. The mitral valve leaflets are tethered which is due to reduced systolic function and elevated LVDP.   8. Moderate tricuspid regurgitation.   9. Estimated pulmonary artery systolic pressure is 50.1 mmHg assuming a right atrial pressure of 3 mmHg, which is consistent with moderate pulmonary hypertension.    Demetrice Man MD Electronically signed on 12/23/2019 at 2:44:02 PM    < end of copied text >    < from: Xray Chest 1 View- PORTABLE-Urgent (12.25.19 @ 00:38) >    IMPRESSION:     Cardiomegaly.    No infiltrates.    < end of copied text >    < from: 12 Lead ECG (12.22.19 @ 15:36) >  Ventricular Rate 93 BPM    Atrial Rate 90 BPM    QRS Duration 180 ms    Q-T Interval 490 ms    QTC Calculation(Bezet) 609 ms    R Axis -6 degrees    T Axis 134 degrees    Diagnosis Line Atrial fibrillation  Left bundle branch block  Abnormal ECG    < end of copied text > Patient is a 74y old  Male who presents with a chief complaint of DKA, multiple organ failure (25 Dec 2019 09:36)  seen for DKA/DM/RAS/PAF/acute transaminitis      73y/o male PMH HTN, HLD, HFrEF, DM, CKD, CAD s/p CABG, prior MI, s/p PCI/brachy, (6/20/19- LM 70/90%, LAD diffuse disease, Cx Prox 100%, RCA known , LIMA To LAD patent, SVG to OM 80% with ISR)/(7/2019- SVG to OM1 80%, brachy to SVG to OM1, PCI native OM), TTE 12/18/19- EF 25-30%, WMA, mild PHTN, mild AS, present to ED with feeling cold, nausea vomiting and weakness for one day. no relief with Zofran at home. Family took BP at home less then 90, and HR >125 at rest. Pt recently hospitalized for acute decompensated heart failure, requiring IV dopamine and milrinone, dopamine not tolerated. On Admission, Patient was found to have lactic acidosis, RAS, transaminitis, mild hyperglycemia/dka, admitted to MICU. Presentation was suspected to be due to a combination of metformin toxicity and decompensated CHF/ cardiogenic shock.  Symptoms improved after insulin infusion, discontinuation of metformin, and initiation of milrinone infusion. Cardiology and renal was consulted. GI cx was called for persistent transaminitis. Hepatitis serology and USG was neg. Echo showed EF 25-30%, moderate pulm HTN. RVP was negative. Iron panel showed very high ferritin but iron sat 8%. TIBC is normal. Pateint was seen by MICU team this am and downgraded to hospitalist service.     interval: patient was and examined at with family members at bedside. denied complaints except dry cough yesterday. askign for [facility] medicine and [facility]. no personal of Fhx BA or COPD. no h/o smoking cig. overall feeling much better    ROS:     CONSTITUTIONAL:  No distress. no fever/chills/fatigue/weight loss  HEENT:  Eyes:  No diplopia or blurred vision.   CARDIOVASCULAR:  No pressure, squeezing, tightness, heaviness or aching about the chest; no palpitations. + leg swelling, + orthopnea. no PND  RESPIRATORY:  no SOB. no wheezing. + cough. no sputum.  No hemoptysis  GI: no abd pan, no nausea, no vomiting, no diarrhea, no constipation. No hematochezia or melena  EXT:No leg or calf pain  SKIN: no skin break or ulcer. No eash  CNS: No headaches. No weakness.no numbness. No depression or anxiety. No SI    PAST MEDICAL HISTORY:  Heart failure  Essential hypertension  Type 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, without long-term current use of insul  Coronary artery disease involving native coronary artery of native heart, angina presence unspecifie  Coronary artery disease involving coronary bypass graft of native heart with unstable angina pectoris  High cholesterol  GERD (gastroesophageal reflux disease)    Past surgical H:   Status post open reduction with internal fixation of fracture  S/P primary angioplasty with coronary stent  S/P CABG (coronary artery bypass graft): 4V 1983 [facility] h:  Social H: denied smoking/ETOH/drugs  Allergy/intolerance: NKDA. Dopamine caused hypotension    MEDICATIONS  (STANDING):  aspirin  chewable 81 milliGRAM(s) Oral daily  dextrose 5%. 1000 milliLiter(s) (50 mL/Hr) IV Continuous <Continuous>  dextrose 50% Injectable 12.5 Gram(s) IV Push once  dextrose 50% Injectable 25 Gram(s) IV Push once  dextrose 50% Injectable 25 Gram(s) IV Push once  heparin  Injectable 5000 Unit(s) SubCutaneous every 12 hours  influenza   Vaccine 0.5 milliLiter(s) IntraMuscular once  insulin glargine Injectable (LANTUS) 20 Unit(s) SubCutaneous at bedtime  insulin lispro (HumaLOG) corrective regimen sliding scale   SubCutaneous three times a day before meals  metoprolol succinate ER 50 milliGRAM(s) Oral daily  pantoprazole    Tablet 40 milliGRAM(s) Oral before breakfast  potassium chloride    Tablet ER 40 milliEquivalent(s) Oral every 4 hours  sodium chloride 1 Gram(s) Oral three times a day  ticagrelor 90 milliGRAM(s) Oral every 12 hours    MEDICATIONS  (PRN):  albuterol/ipratropium for Nebulization 3 milliLiter(s) Nebulizer every 6 hours PRN Shortness of Breath and/or Wheezing  benzocaine 15 mG/menthol 3.6 mG (Sugar-Free) Lozenge 1 Lozenge Oral every 4 hours PRN Sore Throat  dextrose 40% Gel 15 Gram(s) Oral once PRN Blood Glucose LESS THAN 70 milliGRAM(s)/deciliter  glucagon  Injectable 1 milliGRAM(s) IntraMuscular once PRN Glucose LESS THAN 70 milligrams/deciliter        Vital Signs Last 24 Hrs  T(C): 36.7 (25 Dec 2019 07:37), Max: 37.2 (24 Dec 2019 15:55)  T(F): 98.1 (25 Dec 2019 07:37), Max: 98.9 (24 Dec 2019 15:55)  HR: 99 (25 Dec 2019 09:00) (90 - 101)  BP: 121/70 (25 Dec 2019 09:00) (102/58 - 127/68)  BP(mean): 89 (25 Dec 2019 09:00) (75 - 101)  RR: 30 (25 Dec 2019 09:00) (15 - 39)  SpO2: 99% (25 Dec 2019 09:00) (98% - 100%)    CAPILLARY BLOOD GLUCOSE      POCT Blood Glucose.: 89 mg/dL (25 Dec 2019 07:34)  POCT Blood Glucose.: 229 mg/dL (24 Dec 2019 20:51)  POCT Blood Glucose.: 263 mg/dL (24 Dec 2019 16:41)  POCT Blood Glucose.: 183 mg/dL (24 Dec 2019 11:53)      I&O's Summary    24 Dec 2019 07:01  -  25 Dec 2019 07:00  --------------------------------------------------------  IN: 1567.7 mL / OUT: 1150 mL / NET: 417.7 mL    25 Dec 2019 07:01  -  25 Dec 2019 09:38  --------------------------------------------------------  IN: 327.2 mL / OUT: 350 mL / NET: -22.8 mL    GENERAL: Not in distress. Alert    HEENT:  Normocephalic and atraumatic. PEARLA,EOMI  NECK: Supple.  No JVD.    CARDIOVASCULAR: irregular S1, S2. S3 gallop. No murmur/rubs/gallop  LUNGS: BLAE recued, no rales, no wheezing, no rhonchi.    ABDOMEN: ND. Soft,  NT, no guarding / rebound / rigidity. BS normoactive. No CVA tenderness.    EXTREMITIES: no cyanosis, no clubbing, trace edema.   SKIN: no rash om exposed skin  NEUROLOGIC: AAO*3.strength is symmetric, sensation intact, speech fluent.    PSYCHIATRIC: Calm.  No agitation.                          9.7    8.41  )-----------( 182      ( 25 Dec 2019 05:32 )             30.4       12-25    133<L>  |  93<L>  |  37.0<H>  ----------------------------<  93  3.1<L>   |  24.0  |  1.28    Ca    8.6      25 Dec 2019 05:32  Phos  3.1     12-25  Mg     1.9     12-25    TPro  6.2<L>  /  Alb  3.2<L>  /  TBili  1.0  /  DBili  x   /  AST  1942<H>  /  ALT  3321<H>  /  AlkPhos  106  12-25    < from: TTE Echo Complete w/Doppler (12.23.19 @ 12:47) >  Summary:   1. Endocardial visualization was enhanced with intravenous echo contrast.   2. Moderately to severely decreased global left ventricular systolic function.   3. Left ventricular ejection fraction, by visual estimation, is 25 to 30%.   4. Severely enlarged left atrium.   5. The mitral in-flow pattern reveals no discernable A-wave, therefore no comment on diastolic function can be made.   6. Sclerotic aortic valve with decreased opening.   7. The mitral valve leaflets are tethered which is due to reduced systolic function and elevated LVDP.   8. Moderate tricuspid regurgitation.   9. Estimated pulmonary artery systolic pressure is 50.1 mmHg assuming a right atrial pressure of 3 mmHg, which is consistent with moderate pulmonary hypertension.    Demetrice Man MD Electronically signed on 12/23/2019 at 2:44:02 PM    < end of copied text >    < from: Xray Chest 1 View- PORTABLE-Urgent (12.25.19 @ 00:38) >    IMPRESSION:     Cardiomegaly.    No infiltrates.    < end of copied text >    < from: 12 Lead ECG (12.22.19 @ 15:36) >  Ventricular Rate 93 BPM    Atrial Rate 90 BPM    QRS Duration 180 ms    Q-T Interval 490 ms    QTC Calculation(Bezet) 609 ms    R Axis -6 degrees    T Axis 134 degrees    Diagnosis Line Atrial fibrillation  Left bundle branch block  Abnormal ECG    < end of copied text >

## 2019-12-25 NOTE — PROGRESS NOTE ADULT - ASSESSMENT
74 male with hx of CHFrEF, CAD s/p CABG, HTN, HLD, DM, PAF, recent hospitalization for decompensated heart failure requiring inotropes, presented to the hospital with weakness and vomiting, found to have lactic acidosis, RAS, transaminitis, mild hyperglycemia/dka.  Presentation likely due to a combination of metformin toxicity and decompensated CHF/ cardiogenic shock.  Symptoms improved after insulin infusion, discontinuation of metformin, and initiation of milrinone infusion.   LFTs still significantly elevated.    Plan:  RAS  -resolving     Cardiogenic shock:  - wean off milrinone today     CAD  recent cath showing multivessel disease  - continue ASA, brilinta, metoprolol  - holding statin due to transaminitis       ?PAF  - not on AC yet, possible plan for loop recorder    DKA  - resolved, now on lantus and lispro    Transaminitis  Unclear etiology.  Resolving.  - hepatitis serology, RUQ US negative for pathology  - undergoing NAC protocol   - GI called

## 2019-12-25 NOTE — PROGRESS NOTE ADULT - ASSESSMENT
74 YOM with anion gap acidosis likely due to DKA as well as RAYMOND with elevated cr, hyperkalemia., transaminitis, decompensated CHF, multiple organ dysfunction    >DKA: current BS better controlled   - c/w lantus and ISS. Adjust dose  - A1c: 8.4  - patient was on metformin at home, avoid on DC  - insulin education. DC with lantus  - DM education  - endo cx OP    > Raymond: resolved- resume ACEI/ARBs/aldactone as per cardio and renal    >hypoK: replaced. Monitor    >Hyponatremia: on salt tab. Monitor  >acute transminitis: unclear eitiology  - Iron profile noted  - hep panel neg  - portal Doppler did not show any portal mabel thrombosis or features opf cirrhosis  - GI consult called by MICU team    > CAD- s/p CABG, s/p PCI  - 7/2019- brachy therapy   - EKG- ST, LBBB unchanged from prior     > HFrEF  - TTE EF 25-30%,     - off milrinone.   - c/w toprol 25 mg daily. Titrate to desirable HR  - resume ACE and ARB and aldactone when feasible  - Diuretics as per cardio and renal 74 YOM with anion gap acidosis likely due to DKA as well as RAYMOND with elevated cr, hyperkalemia., transaminitis, decompensated CHF, multiple organ dysfunction    >DKA: current BS better controlled   - c/w lantus and ISS. Adjust dose  - A1c: 8.4  - patient was on metformin at home, avoid on DC  - insulin education. DC with lantus  - DM education  - endo cx OP    > Raymond: resolved- resume ACEI/ARBs/aldactone as per cardio and renal    >hypoK: replaced. Monitor    >Hyponatremia: on salt tab. Monitor  >acute transminitis: unclear eitiology  - Iron profile noted  - hep panel neg  - portal Doppler did not show any portal mabel thrombosis or features opf cirrhosis  - GI consult called by MICU team    > CAD- s/p CABG, s/p PCI  - 7/2019- brachy therapy   - EKG- ST, LBBB unchanged from prior     > HFrEF  - TTE EF 25-30%,     - off milrinone.   - c/w toprol 25 mg daily. Titrate to desirable HR  - resume ACE and ARB and aldactone when feasible  - Diuretics as per cardio and renal    > cough: h/o GERD,CHF  CXr this am : no efffusion or nfoltrate  wbc wnl  will treat with cough syrup, neb  PPI  head elevation. meals atleast 3 hrs before breakfast  add flonase if improvement  monitor    > DVT: Heparin  > GI: PPi  > Diet: consistent carb, halal  > Dispo: DG to Tele. dispo pending improvement and cardio clearance 74 YOM with anion gap acidosis likely due to DKA as well as RAYMOND with elevated cr, hyperkalemia., transaminitis, decompensated CHF, multiple organ dysfunction    >DKA: current BS better controlled   - c/w lantus and ISS. Adjust dose  - A1c: 8.4  - patient was on metformin at home, avoid on DC  - insulin education. DC with lantus  - DM education  - endo cx OP    > Raymond: resolved- resume ACEI/ARBs/aldactone as per cardio and renal    >hypoK: replaced. Monitor    >Hyponatremia: on salt tab. Monitor  >acute transminitis: unclear eitiology  - Iron profile noted  - hep panel neg  - portal Doppler did not show any portal mabel thrombosis or features opf cirrhosis  - GI consult called by MICU team    > CAD- s/p CABG, s/p PCI  - 7/2019- brachy therapy   - EKG- ST, LBBB unchanged from prior     > HFrEF  - TTE EF 25-30%,     - off milrinone.   - c/w toprol 25 mg daily. Titrate to desirable HR  - resume ACE and ARB and aldactone when feasible  - Diuretics as per cardio and renal    > cough: h/o GERD,CHF  CXr this am : no efffusion or nfoltrate  wbc wnl  will treat with cough syrup, neb  PPI  head elevation. meals atleast 3 hrs before breakfast  add flonase if improvement  monitor    > PAF: plan for holter OP  - on BB  - Ac as per cardio    > DVT: Heparin  > GI: PPi  > Diet: consistent carb, halal  > Dispo: DG to Tele. dispo pending improvement and cardio clearance

## 2019-12-25 NOTE — CONSULT NOTE ADULT - SUBJECTIVE AND OBJECTIVE BOX
HISTORY OF PRESENT ILLNESS: This is a 74y old man with a past medical history significant for HTN, HLD, HFrEF, DM, CKD, CAD s/p CABG, prior MI, s/p PCI/brachy, (6/20/19- LM 70/90%, LAD diffuse disease, Cx Prox 100%, RCA known , LIMA To LAD patent, SVG to OM 80% with ISR)/(7/2019- SVG to OM1 80%, brachy to SVG to OM1, PCI native OM), TTE 12/18/19- EF 25-30%, WMA, mild PHTN, mild AS, who initially present to the ED with feeling cold, with nausea, vomiting, and weakness for one day.  There was no relief with Zofran at home.  Family took BP at home, which was reportedly less then SBP 90, and HR >125 at rest.  He was recently hospitalized for acute decompensated heart failure, requiring IV dopamine and milrinone.  On admission, he was found to have lactic acidosis, RAS, elevated aminotransferases, mild hyperglycemia/DKA, and was subsequently admitted to MICU.  His presentation was suspected to be due to a combination of metformin toxicity and decompensated CHF/ cardiogenic shock.  Symptoms improved after insulin infusion, discontinuation of metformin, and initiation of milrinone infusion. Cardiology and renal were consulted.  GI consulted for persistent aminotransferase elevation.  Hepatitis serology and US was neg.  RVP was negative.  Iron panel showed very high ferritin but iron sat 8%.  TIBC wass normal.      ROS: A 14-point review of systems was completed and was otherwise negative save what was reported in the HPI.    PAST MEDICAL/SURGICAL HISTORY:  Heart failure  Essential hypertension  Type 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, without long-term current use of insul  Coronary artery disease involving native coronary artery of native heart, angina presence unspecifie  Coronary artery disease involving coronary bypass graft of native heart with unstable angina pectoris  High cholesterol  GERD (gastroesophageal reflux disease)  Status post open reduction with internal fixation of fracture  S/P primary angioplasty with coronary stent  S/P CABG (coronary artery bypass graft): 4V 1983 [place]    SOCIAL HISTORY:  - TOBACCO: Denies  - ALCOHOL: Denies  - ILLICIT DRUG USE: Denies    FAMILY HISTORY:  No known history of gastrointestinal or liver disease;      HOME MEDICATIONS:  albuterol 90 mcg/inh inhalation aerosol: 2 puff(s) inhaled 4 times a day (24 Dec 2019 11:35)  ALPRAZolam 0.5 mg oral tablet: 1 tab(s) orally once a day (24 Dec 2019 11:35)  Atrovent 500 mcg/2.5 mL inhalation solution: 2.5 milliliter(s) inhaled 4 times a day, As Needed (24 Dec 2019 11:35)  Brilinta (ticagrelor) 90 mg oral tablet: 1 tab(s) orally 2 times a day (24 Dec 2019 11:35)  esomeprazole 40 mg oral delayed release capsule: 1 cap(s) orally once a day (24 Dec 2019 11:35)  furosemide 20 mg oral tablet: 1 tab(s) orally once a day (24 Dec 2019 11:35)  glipiZIDE 10 mg oral tablet, extended release: 1 tab(s) orally once a day (24 Dec 2019 11:35)  Januvia 100 mg oral tablet: 1 tab(s) orally once a day (24 Dec 2019 11:35)  losartan 50 mg oral tablet: 1 tab(s) orally once a day (24 Dec 2019 11:35)  metFORMIN 1000 mg oral tablet, extended release: 1 tab(s) orally 2 times a day (24 Dec 2019 11:35)  montelukast 10 mg oral tablet: 1 tab(s) orally once a day (24 Dec 2019 11:35)  potassium chloride 10 mEq oral capsule, extended release: 1 cap(s) orally once a day (24 Dec 2019 11:35)  Ranexa 500 mg oral tablet, extended release: 1 tab(s) orally 2 times a day (24 Dec 2019 11:35)  Vitamin C 500 mg oral tablet: 1 tab(s) orally once a day (24 Dec 2019 11:35)    INPATIENT MEDICATIONS:  MEDICATIONS  (STANDING):  aspirin  chewable 81 milliGRAM(s) Oral daily  dextrose 5%. 1000 milliLiter(s) (50 mL/Hr) IV Continuous <Continuous>  dextrose 50% Injectable 12.5 Gram(s) IV Push once  dextrose 50% Injectable 25 Gram(s) IV Push once  dextrose 50% Injectable 25 Gram(s) IV Push once  heparin  Injectable 5000 Unit(s) SubCutaneous every 12 hours  influenza   Vaccine 0.5 milliLiter(s) IntraMuscular once  insulin glargine Injectable (LANTUS) 20 Unit(s) SubCutaneous at bedtime  insulin lispro (HumaLOG) corrective regimen sliding scale   SubCutaneous three times a day before meals  metoprolol succinate ER 50 milliGRAM(s) Oral daily  pantoprazole    Tablet 40 milliGRAM(s) Oral before breakfast  potassium chloride    Tablet ER 40 milliEquivalent(s) Oral every 4 hours  sodium chloride 1 Gram(s) Oral three times a day  ticagrelor 90 milliGRAM(s) Oral every 12 hours    MEDICATIONS  (PRN):  albuterol/ipratropium for Nebulization 3 milliLiter(s) Nebulizer every 6 hours PRN Shortness of Breath and/or Wheezing  benzocaine 15 mG/menthol 3.6 mG (Sugar-Free) Lozenge 1 Lozenge Oral every 4 hours PRN Sore Throat  dextrose 40% Gel 15 Gram(s) Oral once PRN Blood Glucose LESS THAN 70 milliGRAM(s)/deciliter  glucagon  Injectable 1 milliGRAM(s) IntraMuscular once PRN Glucose LESS THAN 70 milligrams/deciliter  guaiFENesin   Syrup  (Sugar-Free) 200 milliGRAM(s) Oral every 6 hours PRN Cough    ALLERGIES:  Allergy Status Unknown    T(C): 36.7 (12-25-19 @ 07:37), Max: 37.2 (12-24-19 @ 15:55)  HR: 104 (12-25-19 @ 10:00) (90 - 104)  BP: 129/67 (12-25-19 @ 10:00) (102/58 - 129/67)  RR: 29 (12-25-19 @ 10:00) (15 - 39)  SpO2: 100% (12-25-19 @ 10:00) (98% - 100%)    12-24-19 @ 07:01  -  12-25-19 @ 07:00  --------------------------------------------------------  IN: 1567.7 mL / OUT: 1150 mL / NET: 417.7 mL    12-25-19 @ 07:01  -  12-25-19 @ 10:45  --------------------------------------------------------  IN: 389.7 mL / OUT: 350 mL / NET: 39.7 mL        PHYSICAL EXAM:  Constitutional: Well-developed, well-nourished, in no apparent distress  Eyes: Sclerae anicteric, conjunctivae normal  ENMT: Mucus membranes moist, no oropharyngeal thrush noted  Neck: No thyroid nodules appreciated, no significant cervical or supraclavicular lymphadenopathy  Respiratory: Breathing nonlabored; clear to auscultation  Cardiovascular: Regular rate and rhythm  Gastrointestinal: Soft, nontender, nondistended, normoactive bowel sounds; no hepatosplenomegaly appreciated; no rebound tenderness or involuntary guarding  Extremities: No clubbing, cyanosis or edema  Neurological: Alert and oriented to person, place and time; no asterixis  Skin: No jaundice  Lymph Nodes: No significant lymphadenopathy  Musculoskeletal: No significant peripheral atrophy  Psychiatric: Affect and mood appropriate      LABS:             9.7    8.41  )-----------( 182      ( 12-25 @ 05:32 )             30.4                10.0   10.35 )-----------( 199      ( 12-24 @ 04:36 )             30.6                11.0   14.92 )-----------( 210      ( 12-23 @ 18:32 )             34.9                9.1    13.87 )-----------( 223      ( 12-23 @ 04:40 )             29.8                11.5   11.46 )-----------( 365      ( 12-22 @ 15:32 )             38.5       PT/INR - ( 24 Dec 2019 04:36 )   PT: 23.7 sec;   INR: 2.02 ratio         PTT - ( 24 Dec 2019 04:36 )  PTT:31.0 sec  12-25    133<L>  |  93<L>  |  37.0<H>  ----------------------------<  93  3.1<L>   |  24.0  |  1.28    Ca    8.6      25 Dec 2019 05:32  Phos  3.1     12-25  Mg     1.9     12-25    TPro  6.2<L>  /  Alb  3.2<L>  /  TBili  1.0  /  DBili  x   /  AST  1942<H>  /  ALT  3321<H>  /  AlkPhos  106  12-25    LIVER FUNCTIONS - ( 25 Dec 2019 05:32 )  Alb: 3.2 g/dL / Pro: 6.2 g/dL / ALK PHOS: 106 U/L / ALT: 3321 U/L / AST: 1942 U/L / GGT: x             Lipase, Serum: 39 U/L (12-22-19 @ 15:32)      IMAGING: I personally reviewed the US, and I agree with the radiologist's interpretation as described below:    < from: US Doppler Portal/Hepatic Vein (12.24.19 @ 09:07) >  Impression:Incidental note is made of right pleural effusion. The liver is normal in echotexture. The hepatic veins are patent. The main portal vein is patent with normal direction of flow. Images of the right and left main portal branches are not provided. Images of the hepatic arteries are not provided. If there is continued clinical concern for portal vein thrombosis consider contrast-enhanced CT of the abdomen/pelvis.    < end of copied text > HISTORY OF PRESENT ILLNESS: This is a 74y old man with a past medical history significant for HTN, HLD, HFrEF, DM, CKD, CAD s/p CABG, prior MI, s/p PCI/brachy, (6/20/19- LM 70/90%, LAD diffuse disease, Cx Prox 100%, RCA known , LIMA To LAD patent, SVG to OM 80% with ISR)/(7/2019- SVG to OM1 80%, brachy to SVG to OM1, PCI native OM), TTE 12/18/19- EF 25-30%, WMA, mild PHTN, mild AS, who initially present to the ED with feeling cold, with nausea, vomiting, and weakness for one day.  There was no relief with Zofran at home.  Family took BP at home, which was reportedly less then SBP 90, and HR >125 at rest.  He was recently hospitalized for acute decompensated heart failure, requiring IV dopamine and milrinone.  On admission, he was found to have lactic acidosis, RAS, elevated aminotransferases, mild hyperglycemia/DKA, and was subsequently admitted to MICU.  His presentation was suspected to be due to a combination of metformin toxicity and decompensated CHF/ cardiogenic shock.  Symptoms improved after insulin infusion, discontinuation of metformin, and initiation of milrinone infusion. Cardiology and renal were consulted.  GI consulted for persistent aminotransferase elevation.  Hepatitis serology and US was neg.  RVP was negative.  Iron panel showed very high ferritin but iron sat 8%.  TIBC was normal.      ROS: A 14-point review of systems was completed and was otherwise negative save what was reported in the HPI.    PAST MEDICAL/SURGICAL HISTORY:  Heart failure  Essential hypertension  Type 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, without long-term current use of insul  Coronary artery disease involving native coronary artery of native heart, angina presence unspecifie  Coronary artery disease involving coronary bypass graft of native heart with unstable angina pectoris  High cholesterol  GERD (gastroesophageal reflux disease)  Status post open reduction with internal fixation of fracture  S/P primary angioplasty with coronary stent  S/P CABG (coronary artery bypass graft): 4V 1983 [place]    SOCIAL HISTORY:  - TOBACCO: Denies  - ALCOHOL: Denies  - ILLICIT DRUG USE: Denies    FAMILY HISTORY:  No known history of gastrointestinal or liver disease;      HOME MEDICATIONS:  albuterol 90 mcg/inh inhalation aerosol: 2 puff(s) inhaled 4 times a day (24 Dec 2019 11:35)  ALPRAZolam 0.5 mg oral tablet: 1 tab(s) orally once a day (24 Dec 2019 11:35)  Atrovent 500 mcg/2.5 mL inhalation solution: 2.5 milliliter(s) inhaled 4 times a day, As Needed (24 Dec 2019 11:35)  Brilinta (ticagrelor) 90 mg oral tablet: 1 tab(s) orally 2 times a day (24 Dec 2019 11:35)  esomeprazole 40 mg oral delayed release capsule: 1 cap(s) orally once a day (24 Dec 2019 11:35)  furosemide 20 mg oral tablet: 1 tab(s) orally once a day (24 Dec 2019 11:35)  glipiZIDE 10 mg oral tablet, extended release: 1 tab(s) orally once a day (24 Dec 2019 11:35)  Januvia 100 mg oral tablet: 1 tab(s) orally once a day (24 Dec 2019 11:35)  losartan 50 mg oral tablet: 1 tab(s) orally once a day (24 Dec 2019 11:35)  metFORMIN 1000 mg oral tablet, extended release: 1 tab(s) orally 2 times a day (24 Dec 2019 11:35)  montelukast 10 mg oral tablet: 1 tab(s) orally once a day (24 Dec 2019 11:35)  potassium chloride 10 mEq oral capsule, extended release: 1 cap(s) orally once a day (24 Dec 2019 11:35)  Ranexa 500 mg oral tablet, extended release: 1 tab(s) orally 2 times a day (24 Dec 2019 11:35)  Vitamin C 500 mg oral tablet: 1 tab(s) orally once a day (24 Dec 2019 11:35)    INPATIENT MEDICATIONS:  MEDICATIONS  (STANDING):  aspirin  chewable 81 milliGRAM(s) Oral daily  dextrose 5%. 1000 milliLiter(s) (50 mL/Hr) IV Continuous <Continuous>  dextrose 50% Injectable 12.5 Gram(s) IV Push once  dextrose 50% Injectable 25 Gram(s) IV Push once  dextrose 50% Injectable 25 Gram(s) IV Push once  heparin  Injectable 5000 Unit(s) SubCutaneous every 12 hours  influenza   Vaccine 0.5 milliLiter(s) IntraMuscular once  insulin glargine Injectable (LANTUS) 20 Unit(s) SubCutaneous at bedtime  insulin lispro (HumaLOG) corrective regimen sliding scale   SubCutaneous three times a day before meals  metoprolol succinate ER 50 milliGRAM(s) Oral daily  pantoprazole    Tablet 40 milliGRAM(s) Oral before breakfast  potassium chloride    Tablet ER 40 milliEquivalent(s) Oral every 4 hours  sodium chloride 1 Gram(s) Oral three times a day  ticagrelor 90 milliGRAM(s) Oral every 12 hours    MEDICATIONS  (PRN):  albuterol/ipratropium for Nebulization 3 milliLiter(s) Nebulizer every 6 hours PRN Shortness of Breath and/or Wheezing  benzocaine 15 mG/menthol 3.6 mG (Sugar-Free) Lozenge 1 Lozenge Oral every 4 hours PRN Sore Throat  dextrose 40% Gel 15 Gram(s) Oral once PRN Blood Glucose LESS THAN 70 milliGRAM(s)/deciliter  glucagon  Injectable 1 milliGRAM(s) IntraMuscular once PRN Glucose LESS THAN 70 milligrams/deciliter  guaiFENesin   Syrup  (Sugar-Free) 200 milliGRAM(s) Oral every 6 hours PRN Cough    ALLERGIES:  Allergy Status Unknown    T(C): 36.7 (12-25-19 @ 07:37), Max: 37.2 (12-24-19 @ 15:55)  HR: 104 (12-25-19 @ 10:00) (90 - 104)  BP: 129/67 (12-25-19 @ 10:00) (102/58 - 129/67)  RR: 29 (12-25-19 @ 10:00) (15 - 39)  SpO2: 100% (12-25-19 @ 10:00) (98% - 100%)    12-24-19 @ 07:01  -  12-25-19 @ 07:00  --------------------------------------------------------  IN: 1567.7 mL / OUT: 1150 mL / NET: 417.7 mL    12-25-19 @ 07:01  -  12-25-19 @ 10:45  --------------------------------------------------------  IN: 389.7 mL / OUT: 350 mL / NET: 39.7 mL        PHYSICAL EXAM:  Constitutional: Well-developed, well-nourished, in no apparent distress  Eyes: Sclerae anicteric, conjunctivae normal  ENMT: Mucus membranes moist, no oropharyngeal thrush noted  Neck: No thyroid nodules appreciated, no significant cervical or supraclavicular lymphadenopathy  Respiratory: Breathing nonlabored; clear to auscultation  Cardiovascular: Regular rate and rhythm  Gastrointestinal: Soft, nontender, nondistended, normoactive bowel sounds; no hepatosplenomegaly appreciated; no rebound tenderness or involuntary guarding  Extremities: No clubbing, cyanosis or edema  Neurological: Alert and oriented to person, place and time; no asterixis  Skin: No jaundice  Lymph Nodes: No significant lymphadenopathy  Musculoskeletal: No significant peripheral atrophy  Psychiatric: Affect and mood appropriate      LABS:             9.7    8.41  )-----------( 182      ( 12-25 @ 05:32 )             30.4                10.0   10.35 )-----------( 199      ( 12-24 @ 04:36 )             30.6                11.0   14.92 )-----------( 210      ( 12-23 @ 18:32 )             34.9                9.1    13.87 )-----------( 223      ( 12-23 @ 04:40 )             29.8                11.5   11.46 )-----------( 365      ( 12-22 @ 15:32 )             38.5       PT/INR - ( 24 Dec 2019 04:36 )   PT: 23.7 sec;   INR: 2.02 ratio         PTT - ( 24 Dec 2019 04:36 )  PTT:31.0 sec  12-25    133<L>  |  93<L>  |  37.0<H>  ----------------------------<  93  3.1<L>   |  24.0  |  1.28    Ca    8.6      25 Dec 2019 05:32  Phos  3.1     12-25  Mg     1.9     12-25    TPro  6.2<L>  /  Alb  3.2<L>  /  TBili  1.0  /  DBili  x   /  AST  1942<H>  /  ALT  3321<H>  /  AlkPhos  106  12-25    LIVER FUNCTIONS - ( 25 Dec 2019 05:32 )  Alb: 3.2 g/dL / Pro: 6.2 g/dL / ALK PHOS: 106 U/L / ALT: 3321 U/L / AST: 1942 U/L / GGT: x             Lipase, Serum: 39 U/L (12-22-19 @ 15:32)      IMAGING: I personally reviewed the US, and I agree with the radiologist's interpretation as described below:    < from: US Doppler Portal/Hepatic Vein (12.24.19 @ 09:07) >  Impression:Incidental note is made of right pleural effusion. The liver is normal in echotexture. The hepatic veins are patent. The main portal vein is patent with normal direction of flow. Images of the right and left main portal branches are not provided. Images of the hepatic arteries are not provided. If there is continued clinical concern for portal vein thrombosis consider contrast-enhanced CT of the abdomen/pelvis.    < end of copied text >

## 2019-12-25 NOTE — PROGRESS NOTE ADULT - SUBJECTIVE AND OBJECTIVE BOX
[facility]                                                       [facility]                                                               Office: [address], [place] [address]                                                              Telephone: [phone]. Fax:[phone]                                                                             PROGRESS NOTE  Reason for follow up: DKA, multiple organ failure   Overnight: No new events.   Update: Continue current treatment plan    Subjective:   73y/o male PMH HTN, HLD, HFrEF, DM, CKD, CAD s/p CABG, prior MI, s/p PCI/brachy, (6/20/19- LM 70/90%, LAD diffuse disease, Cx Prox 100%, RCA known , LIMA To LAD patent, SVG to OM 80% with ISR)/(7/2019- SVG to OM1 80%, brachy to SVG to OM1, PCI native OM), TTE 12/18/19- EF 25-30%, WMA, mild PHTN, mild AS, present to ED with feeling cold, nausea vomiting since last night. As per family and patient, c/o weakness, fatigue x 2 days, started to vomit last night, no relief with Zofran at home. Family took BP at home less then 90, and HR >125 at rest. Pt recently hospitalized for acute decompensated heart failure, requiring IV dopamine and milrinone, dopamine not tolerated. Irregular heart beat during hospitalization, possible transient Afib. RAS on CKD last hospitalization, ACE/ARB held.     As of 12/25/19, pt denies chest pain, palpitations, SOB. No acute resp. distress, lungs clear to auscultation no JVD. LLE edematous, some erythema noted no change in temp to touch. Pt c/o of consistent coughing currently on acetylcysteine gtt. Pt has been weaned off milrinone gtt. BP currently 129/67, and .         	  Vitals:  T(C): 36.7 (12-25-19 @ 07:37), Max: 37.2 (12-24-19 @ 15:55)  HR: 99 (12-25-19 @ 09:00) (90 - 101)  BP: 121/70 (12-25-19 @ 09:00) (102/58 - 127/68)  RR: 30 (12-25-19 @ 09:00) (15 - 39)  SpO2: 99% (12-25-19 @ 09:00) (98% - 100%)    I&O's Summary    24 Dec 2019 07:01  -  25 Dec 2019 07:00  --------------------------------------------------------  IN: 1567.7 mL / OUT: 1150 mL / NET: 417.7 mL    25 Dec 2019 07:01  -  25 Dec 2019 10:36  --------------------------------------------------------  IN: 327.2 mL / OUT: 350 mL / NET: -22.8 mL      Weight (kg): 58.7 (12-22 @ 20:10)      PHYSICAL EXAM:  Appearance: Comfortable. No acute distress  HEENT:  Head and neck: Atraumatic. Normocephalic.  Normal oral mucosa, PERRL, Neck is supple. No JVD, No carotid bruit.   Neurologic: A & O x 3, no focal deficits. EOMI  Lymphatic: No cervical lymphadenopathy  Cardiovascular: Normal S1 S2, No murmur, rubs/gallops. No JVD,   Respiratory: Lungs clear to auscultation  Gastrointestinal:  Soft, Non-tender, + BS  Lower Extremities: LLE edematous, especially left foot with some erythema  Psychiatry: Patient is calm. No agitation. Mood & affect appropriate  Skin: No rashes/ ecchymoses/cyanosis/ulcers visualized on the face, hands or feet.      CURRENT MEDICATIONS:  metoprolol succinate ER 50 milliGRAM(s) Oral daily  pantoprazole    Tablet  dextrose 50% Injectable  dextrose 50% Injectable  dextrose 50% Injectable  insulin glargine Injectable (LANTUS)  insulin lispro (HumaLOG) corrective regimen sliding scale  aspirin  chewable  dextrose 5%.  heparin  Injectable  influenza   Vaccine  potassium chloride    Tablet ER  sodium chloride  ticagrelor      DIAGNOSTIC TESTING:  [ ] Echocardiogram: < from: TTE Echo Complete w/Doppler (12.23.19 @ 12:47) >  Summary:   1. Endocardial visualization was enhanced with intravenous echo contrast.   2. Moderately to severely decreased global left ventricular systolic function.   3. Left ventricular ejection fraction, by visual estimation, is 25 to 30%.   4. Severely enlarged left atrium.   5. The mitral in-flow pattern reveals no discernable A-wave, therefore no comment on diastolic function can be made.   6. Sclerotic aortic valve with decreased opening.   7. The mitral valve leaflets are tethered which is due to reduced systolic function and elevated LVDP.   8. Moderate tricuspid regurgitation.   9. Estimated pulmonary artery systolic pressure is 50.1 mmHg assuming a right atrial pressure of 3 mmHg, which is consistent with moderate pulmonary hypertension.    < end of copied text >    OTHER: 	  U/S lower extremity doppler-pending    LABS:	 	  CARDIAC MARKERS ( 22 Dec 2019 15:32 )  x     / 0.16 ng/mL / x     / x     / x      p-BNP 22 Dec 2019 15:32: 25259 pg/mL                          9.7    8.41  )-----------( 182      ( 25 Dec 2019 05:32 )             30.4     12-25    133<L>  |  93<L>  |  37.0<H>  ----------------------------<  93  3.1<L>   |  24.0  |  1.28    Ca    8.6      25 Dec 2019 05:32  Phos  3.1     12-25  Mg     1.9     12-25    TPro  6.2<L>  /  Alb  3.2<L>  /  TBili  1.0  /  DBili  x   /  AST  1942<H>  /  ALT  3321<H>  /  AlkPhos  106  12-25    proBNP: Serum Pro-Brain Natriuretic Peptide: 40719 pg/mL (12-22 @ 15:32)      HgA1c: Hemoglobin A1C, Whole Blood: 8.2 %    TELEMETRY: ST HR @ 106

## 2019-12-26 LAB
ALBUMIN SERPL ELPH-MCNC: 3.1 G/DL — LOW (ref 3.3–5.2)
ALP SERPL-CCNC: 115 U/L — SIGNIFICANT CHANGE UP (ref 40–120)
ALT FLD-CCNC: 2058 U/L — HIGH
ANION GAP SERPL CALC-SCNC: 13 MMOL/L — SIGNIFICANT CHANGE UP (ref 5–17)
AST SERPL-CCNC: 431 U/L — HIGH
BILIRUB SERPL-MCNC: 1.7 MG/DL — SIGNIFICANT CHANGE UP (ref 0.4–2)
BUN SERPL-MCNC: 24 MG/DL — HIGH (ref 8–20)
CALCIUM SERPL-MCNC: 8.8 MG/DL — SIGNIFICANT CHANGE UP (ref 8.6–10.2)
CHLORIDE SERPL-SCNC: 98 MMOL/L — SIGNIFICANT CHANGE UP (ref 98–107)
CO2 SERPL-SCNC: 26 MMOL/L — SIGNIFICANT CHANGE UP (ref 22–29)
CREAT SERPL-MCNC: 0.98 MG/DL — SIGNIFICANT CHANGE UP (ref 0.5–1.3)
GLUCOSE BLDC GLUCOMTR-MCNC: 138 MG/DL — HIGH (ref 70–99)
GLUCOSE BLDC GLUCOMTR-MCNC: 188 MG/DL — HIGH (ref 70–99)
GLUCOSE BLDC GLUCOMTR-MCNC: 255 MG/DL — HIGH (ref 70–99)
GLUCOSE BLDC GLUCOMTR-MCNC: 362 MG/DL — HIGH (ref 70–99)
GLUCOSE SERPL-MCNC: 183 MG/DL — HIGH (ref 70–115)
HCT VFR BLD CALC: 31.5 % — LOW (ref 39–50)
HGB BLD-MCNC: 9.8 G/DL — LOW (ref 13–17)
MCHC RBC-ENTMCNC: 23.7 PG — LOW (ref 27–34)
MCHC RBC-ENTMCNC: 31.1 GM/DL — LOW (ref 32–36)
MCV RBC AUTO: 76.1 FL — LOW (ref 80–100)
PHOSPHATE SERPL-MCNC: 2.4 MG/DL — SIGNIFICANT CHANGE UP (ref 2.4–4.7)
PLATELET # BLD AUTO: 189 K/UL — SIGNIFICANT CHANGE UP (ref 150–400)
POTASSIUM SERPL-MCNC: 3.3 MMOL/L — LOW (ref 3.5–5.3)
POTASSIUM SERPL-SCNC: 3.3 MMOL/L — LOW (ref 3.5–5.3)
PROT SERPL-MCNC: 6.4 G/DL — LOW (ref 6.6–8.7)
RBC # BLD: 4.14 M/UL — LOW (ref 4.2–5.8)
RBC # FLD: 20.3 % — HIGH (ref 10.3–14.5)
SODIUM SERPL-SCNC: 137 MMOL/L — SIGNIFICANT CHANGE UP (ref 135–145)
WBC # BLD: 16.93 K/UL — HIGH (ref 3.8–10.5)
WBC # FLD AUTO: 16.93 K/UL — HIGH (ref 3.8–10.5)

## 2019-12-26 PROCEDURE — 99232 SBSQ HOSP IP/OBS MODERATE 35: CPT

## 2019-12-26 PROCEDURE — 99233 SBSQ HOSP IP/OBS HIGH 50: CPT

## 2019-12-26 PROCEDURE — 71045 X-RAY EXAM CHEST 1 VIEW: CPT | Mod: 26

## 2019-12-26 RX ORDER — LANOLIN ALCOHOL/MO/W.PET/CERES
1 CREAM (GRAM) TOPICAL AT BEDTIME
Refills: 0 | Status: DISCONTINUED | OUTPATIENT
Start: 2019-12-26 | End: 2019-12-31

## 2019-12-26 RX ORDER — MULTIVIT-MIN/FERROUS GLUCONATE 9 MG/15 ML
1 LIQUID (ML) ORAL DAILY
Refills: 0 | Status: DISCONTINUED | OUTPATIENT
Start: 2019-12-26 | End: 2019-12-31

## 2019-12-26 RX ORDER — VANCOMYCIN HCL 1 G
1000 VIAL (EA) INTRAVENOUS ONCE
Refills: 0 | Status: DISCONTINUED | OUTPATIENT
Start: 2019-12-26 | End: 2019-12-26

## 2019-12-26 RX ORDER — PIPERACILLIN AND TAZOBACTAM 4; .5 G/20ML; G/20ML
3.38 INJECTION, POWDER, LYOPHILIZED, FOR SOLUTION INTRAVENOUS ONCE
Refills: 0 | Status: COMPLETED | OUTPATIENT
Start: 2019-12-26 | End: 2019-12-26

## 2019-12-26 RX ORDER — ASCORBIC ACID 60 MG
500 TABLET,CHEWABLE ORAL DAILY
Refills: 0 | Status: DISCONTINUED | OUTPATIENT
Start: 2019-12-26 | End: 2019-12-31

## 2019-12-26 RX ORDER — VANCOMYCIN HCL 1 G
1000 VIAL (EA) INTRAVENOUS EVERY 12 HOURS
Refills: 0 | Status: DISCONTINUED | OUTPATIENT
Start: 2019-12-26 | End: 2019-12-26

## 2019-12-26 RX ORDER — LISINOPRIL 2.5 MG/1
2.5 TABLET ORAL DAILY
Refills: 0 | Status: DISCONTINUED | OUTPATIENT
Start: 2019-12-26 | End: 2019-12-31

## 2019-12-26 RX ORDER — PIPERACILLIN AND TAZOBACTAM 4; .5 G/20ML; G/20ML
3.38 INJECTION, POWDER, LYOPHILIZED, FOR SOLUTION INTRAVENOUS EVERY 8 HOURS
Refills: 0 | Status: DISCONTINUED | OUTPATIENT
Start: 2019-12-26 | End: 2019-12-31

## 2019-12-26 RX ORDER — POTASSIUM CHLORIDE 20 MEQ
40 PACKET (EA) ORAL EVERY 4 HOURS
Refills: 0 | Status: COMPLETED | OUTPATIENT
Start: 2019-12-26 | End: 2019-12-26

## 2019-12-26 RX ORDER — POLYETHYLENE GLYCOL 3350 17 G/17G
17 POWDER, FOR SOLUTION ORAL DAILY
Refills: 0 | Status: DISCONTINUED | OUTPATIENT
Start: 2019-12-26 | End: 2019-12-31

## 2019-12-26 RX ORDER — MAGNESIUM OXIDE 400 MG ORAL TABLET 241.3 MG
400 TABLET ORAL
Refills: 0 | Status: COMPLETED | OUTPATIENT
Start: 2019-12-26 | End: 2019-12-27

## 2019-12-26 RX ORDER — VANCOMYCIN HCL 1 G
VIAL (EA) INTRAVENOUS
Refills: 0 | Status: DISCONTINUED | OUTPATIENT
Start: 2019-12-26 | End: 2019-12-26

## 2019-12-26 RX ORDER — VANCOMYCIN HCL 1 G
750 VIAL (EA) INTRAVENOUS EVERY 12 HOURS
Refills: 0 | Status: DISCONTINUED | OUTPATIENT
Start: 2019-12-26 | End: 2019-12-28

## 2019-12-26 RX ADMIN — BENZOCAINE AND MENTHOL 1 LOZENGE: 5; 1 LIQUID ORAL at 10:55

## 2019-12-26 RX ADMIN — Medication 500 MILLIGRAM(S): at 17:21

## 2019-12-26 RX ADMIN — Medication 40 MILLIEQUIVALENT(S): at 10:56

## 2019-12-26 RX ADMIN — POLYETHYLENE GLYCOL 3350 17 GRAM(S): 17 POWDER, FOR SOLUTION ORAL at 17:21

## 2019-12-26 RX ADMIN — INSULIN GLARGINE 20 UNIT(S): 100 INJECTION, SOLUTION SUBCUTANEOUS at 22:27

## 2019-12-26 RX ADMIN — LISINOPRIL 2.5 MILLIGRAM(S): 2.5 TABLET ORAL at 12:32

## 2019-12-26 RX ADMIN — Medication 50 MILLIGRAM(S): at 05:39

## 2019-12-26 RX ADMIN — PIPERACILLIN AND TAZOBACTAM 200 GRAM(S): 4; .5 INJECTION, POWDER, LYOPHILIZED, FOR SOLUTION INTRAVENOUS at 12:33

## 2019-12-26 RX ADMIN — HEPARIN SODIUM 5000 UNIT(S): 5000 INJECTION INTRAVENOUS; SUBCUTANEOUS at 22:26

## 2019-12-26 RX ADMIN — Medication 10: at 17:20

## 2019-12-26 RX ADMIN — Medication 81 MILLIGRAM(S): at 10:54

## 2019-12-26 RX ADMIN — Medication 1 MILLIGRAM(S): at 22:26

## 2019-12-26 RX ADMIN — Medication 40 MILLIGRAM(S): at 05:40

## 2019-12-26 RX ADMIN — TICAGRELOR 90 MILLIGRAM(S): 90 TABLET ORAL at 05:40

## 2019-12-26 RX ADMIN — MAGNESIUM OXIDE 400 MG ORAL TABLET 400 MILLIGRAM(S): 241.3 TABLET ORAL at 17:21

## 2019-12-26 RX ADMIN — PIPERACILLIN AND TAZOBACTAM 25 GRAM(S): 4; .5 INJECTION, POWDER, LYOPHILIZED, FOR SOLUTION INTRAVENOUS at 22:26

## 2019-12-26 RX ADMIN — PANTOPRAZOLE SODIUM 40 MILLIGRAM(S): 20 TABLET, DELAYED RELEASE ORAL at 05:39

## 2019-12-26 RX ADMIN — Medication 250 MILLIGRAM(S): at 17:21

## 2019-12-26 RX ADMIN — Medication 200 MILLIGRAM(S): at 10:55

## 2019-12-26 RX ADMIN — MAGNESIUM OXIDE 400 MG ORAL TABLET 400 MILLIGRAM(S): 241.3 TABLET ORAL at 12:32

## 2019-12-26 RX ADMIN — SODIUM CHLORIDE 1 GRAM(S): 9 INJECTION INTRAMUSCULAR; INTRAVENOUS; SUBCUTANEOUS at 10:54

## 2019-12-26 RX ADMIN — Medication 40 MILLIEQUIVALENT(S): at 14:40

## 2019-12-26 RX ADMIN — Medication 6: at 12:32

## 2019-12-26 RX ADMIN — SODIUM CHLORIDE 1 GRAM(S): 9 INJECTION INTRAMUSCULAR; INTRAVENOUS; SUBCUTANEOUS at 05:39

## 2019-12-26 RX ADMIN — TICAGRELOR 90 MILLIGRAM(S): 90 TABLET ORAL at 17:21

## 2019-12-26 RX ADMIN — HEPARIN SODIUM 5000 UNIT(S): 5000 INJECTION INTRAVENOUS; SUBCUTANEOUS at 05:40

## 2019-12-26 NOTE — PROGRESS NOTE ADULT - ASSESSMENT
75y/o male PMH HTN, HLD, HFrEF, DM, CKD, CAD s/p CABG, prior MI, s/p PCI/brachy, (6/20/19- LM 70/90%, LAD diffuse disease, Cx Prox 100%, RCA known , LIMA To LAD patent, SVG to OM 80% with ISR)/(7/2019- SVG to OM1 80%, brachy to SVG to OM1, PCI native OM), TTE 12/18/19- EF 25-30%, WMA, mild PHTN, mild AS, present to ED with feeling cold, nausea vomiting and weakness for one day. no relief with Zofran at home. Family took BP at home less then 90, and HR >125 at rest. Pt recently hospitalized for acute decompensated heart failure, requiring IV dopamine and milrinone, dopamine not tolerated. On Admission, Patient was found to have lactic acidosis, RAYMOND, transaminitis, mild hyperglycemia/dka, admitted to MICU. Presentation was suspected to be due to a combination of metformin toxicity and decompensated CHF/ cardiogenic shock.  Symptoms improved after insulin infusion, discontinuation of metformin, and initiation of milrinone infusion. Cardiology and renal was consulted. GI cx was called for persistent transaminitis. Hepatitis serology and USG was neg. Echo showed EF 25-30%, moderate pulm HTN. RVP was negative. Iron panel showed very high ferritin but iron sat 8%. TIBC is normal. Pateint was seen by MICU team this am and downgraded to hospitalist service.     >DKA: BS fluctuating  - c/w lantus and ISS. Adjust dose  - A1c: 8.4  - patient was on metformin, januvia and glipizide at home, avoid on DC  - insulin education.   - DM education.  - endo cx OP    > Raymond: resolved- resume ACEI/ARBs/aldactone as per cardio and renal    >hypoK: replaced. Monitor    >Hyponatremia: on salt tab. Na better now. dc salt tab. monitor  >acute transminitis: unclear eitiology  - Iron profile noted  - hep panel --> resolved hep B. hep B pCR pending. [ ordered by GI]  - portal Doppler did not show any portal vein thrombosis or features of cirrhosis  - GI consult called by MICU team--> suggested transaminitis likely due to shock   - avoid hepatotoxic meds    > CAD- s/p CABG, s/p PCI  - 7/2019- brachy therapy   - EKG- ST, LBBB unchanged from prior     > HFrEF  - TTE EF 25-30%,     - off milrinone.   - c/w toprol 25 mg daily. Titrate to desirable HR  - resumed ACE and ARB  - hold aldactone as per cardio  - Diuretics as per cardio and renal    > cough: h/o GERD,CHF  CXr: left sided infiltrate/ initial CXR was clear  wbc elevated  started on vanco and zosyn  c/w cough syrup, neb PRN, PPI  send sputum cx and legionella  reportedly treated for PNA/bronchitis recently with z-pack  head elevation. meals atleast 3 hrs before breakfast  monitor    > PAF: plan for holter OP  - on BB  - Ac as per cardio    > severe [rotien calori malnutrition. add glucerna. MVI, Vit C. nutrition cx appreciated.    > DVT: Heparin  > GI: PPi  > Diet: consistent carb, halal  > Dispo: pending improvement and cardio clearance and cultures result.

## 2019-12-26 NOTE — CDI QUERY NOTE - NSCDI_DOCCLARIFY2_GEN_ALL_CORE_FT
Documentation clarification is required for accuracy in coding and billing, claim validation and reporting severity of illness, quality data and risk of mortality. How Severe Is Your Skin Lesion?: mild Has Your Skin Lesion Been Treated?: not been treated Is This A New Presentation, Or A Follow-Up?: Mole

## 2019-12-26 NOTE — PROGRESS NOTE ADULT - ASSESSMENT
RAS suspect 2/2 hypoperfusion from emesis DKA  in combination w metformin toxication he had lactate 16 at admit  Renal function better Cr 0.9     decompensated CHF/ cardiogenic shock/ transaminitis  Was on pressors now off  Renal function improving  has good UOP renal function improving  Would not restart Metformin regardless of renal function    Will follow

## 2019-12-26 NOTE — PROGRESS NOTE ADULT - ASSESSMENT
75y/o male PMH HTN, HLD, HFrEF, DM, CKD, CAD s/p CABG, prior MI, s/p PCI/brachy, (6/20/19- LM 70/90%, LAD diffuse disease, Cx Prox 100%, RCA known , LIMA To LAD patent, SVG to OM 80% with ISR)/(7/2019- SVG to OM1 80%, brachy to SVG to OM1, PCI native OM), TTE 12/18/19- EF 25-30%, WMA, mild PHTN, mild AS, present to ED with feeling cold, nausea vomiting since last night. As per family and patient, c/o weakness, fatigue x 2 days,      COUGH: left lung pneumonia. ABx.   Mag and potassium repletion    Melatonin at night time.           HFrEF     - TTE EF 25-30%  -Pt currently off milrinone,    Toprol XL.  low dose ACe-I tomorrow   PO lasix.   will hold of on aldacotne. Not sure if aldaxtone cauuse of liver injury.   Irregular Heart rate  - ST on monitor  - last hospitalization possible afib- plan for [facility] monitor outpatient.     Acute Liver injury   unclear caus.e   NAC given LFts improving.  differntial: Acute loiver injury dut to DKA, or any drug induced.   Less likjely to be congestive hepatopathy as patient was given fluids and volume depleted.   No evidence of shock by any criteria. patient nnot requiring pressors and receive < 3 L of fluids.       CAD  - s/p CABG, s/p PCI  - 7/2019- brachy therapy   - EKG- ST, LBBB unchanged from prior

## 2019-12-26 NOTE — PROGRESS NOTE ADULT - SUBJECTIVE AND OBJECTIVE BOX
INTERVAL HPI/OVERNIGHT EVENTS:      ROS wnl     PAST MEDICAL & SURGICAL HISTORY:  Heart failure  Essential hypertension  Type 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, without long-term current use of insul  Coronary artery disease involving native coronary artery of native heart, angina presence unspecifie  Coronary artery disease involving coronary bypass graft of native heart with unstable angina pectoris  High cholesterol  GERD (gastroesophageal reflux disease)  Status post open reduction with internal fixation of fracture  S/P primary angioplasty with coronary stent  S/P CABG (coronary artery bypass graft): 4V  [facility] Medications:  albuterol 90 mcg/inh inhalation aerosol: 2 puff(s) inhaled 4 times a day (24 Dec 2019 11:35)  ALPRAZolam 0.5 mg oral tablet: 1 tab(s) orally once a day (24 Dec 2019 11:35)  Atrovent 500 mcg/2.5 mL inhalation solution: 2.5 milliliter(s) inhaled 4 times a day, As Needed (24 Dec 2019 11:35)  Brilinta (ticagrelor) 90 mg oral tablet: 1 tab(s) orally 2 times a day (24 Dec 2019 11:35)  esomeprazole 40 mg oral delayed release capsule: 1 cap(s) orally once a day (24 Dec 2019 11:35)  furosemide 20 mg oral tablet: 1 tab(s) orally once a day (24 Dec 2019 11:35)  glipiZIDE 10 mg oral tablet, extended release: 1 tab(s) orally once a day (24 Dec 2019 11:35)  Januvia 100 mg oral tablet: 1 tab(s) orally once a day (24 Dec 2019 11:35)  losartan 50 mg oral tablet: 1 tab(s) orally once a day (24 Dec 2019 11:35)  metFORMIN 1000 mg oral tablet, extended release: 1 tab(s) orally 2 times a day (24 Dec 2019 11:35)  montelukast 10 mg oral tablet: 1 tab(s) orally once a day (24 Dec 2019 11:35)  potassium chloride 10 mEq oral capsule, extended release: 1 cap(s) orally once a day (24 Dec 2019 11:35)  Ranexa 500 mg oral tablet, extended release: 1 tab(s) orally 2 times a day (24 Dec 2019 11:35)  Vitamin C 500 mg oral tablet: 1 tab(s) orally once a day (24 Dec 2019 11:35)      MEDICATIONS  (STANDING):  aspirin  chewable 81 milliGRAM(s) Oral daily  dextrose 5%. 1000 milliLiter(s) (50 mL/Hr) IV Continuous <Continuous>  dextrose 50% Injectable 12.5 Gram(s) IV Push once  dextrose 50% Injectable 25 Gram(s) IV Push once  dextrose 50% Injectable 25 Gram(s) IV Push once  furosemide    Tablet 40 milliGRAM(s) Oral daily  heparin  Injectable 5000 Unit(s) SubCutaneous every 12 hours  influenza   Vaccine 0.5 milliLiter(s) IntraMuscular once  insulin glargine Injectable (LANTUS) 20 Unit(s) SubCutaneous at bedtime  insulin lispro (HumaLOG) corrective regimen sliding scale   SubCutaneous three times a day before meals  lisinopril 2.5 milliGRAM(s) Oral daily  magnesium oxide 400 milliGRAM(s) Oral three times a day with meals  melatonin 1 milliGRAM(s) Oral at bedtime  metoprolol succinate ER 50 milliGRAM(s) Oral daily  pantoprazole    Tablet 40 milliGRAM(s) Oral before breakfast  piperacillin/tazobactam IVPB.. 3.375 Gram(s) IV Intermittent every 8 hours  polyethylene glycol 3350 17 Gram(s) Oral daily  potassium chloride    Tablet ER 40 milliEquivalent(s) Oral every 4 hours  ticagrelor 90 milliGRAM(s) Oral every 12 hours  tiotropium 18 MICROgram(s) Capsule 1 Capsule(s) Inhalation daily  vancomycin  IVPB 750 milliGRAM(s) IV Intermittent every 12 hours    MEDICATIONS  (PRN):  albuterol/ipratropium for Nebulization 3 milliLiter(s) Nebulizer every 6 hours PRN Shortness of Breath and/or Wheezing  benzocaine 15 mG/menthol 3.6 mG (Sugar-Free) Lozenge 1 Lozenge Oral every 4 hours PRN Sore Throat  dextrose 40% Gel 15 Gram(s) Oral once PRN Blood Glucose LESS THAN 70 milliGRAM(s)/deciliter  glucagon  Injectable 1 milliGRAM(s) IntraMuscular once PRN Glucose LESS THAN 70 milligrams/deciliter  guaiFENesin   Syrup  (Sugar-Free) 200 milliGRAM(s) Oral every 6 hours PRN Cough      Allergies    Allergy Status Unknown    Intolerances    DOPamine (Hypotension)        PHYSICAL EXAM:   Vital Signs:  Vital Signs Last 24 Hrs  T(C): 36.8 (26 Dec 2019 05:36), Max: 37.8 (25 Dec 2019 16:11)  T(F): 98.2 (26 Dec 2019 05:36), Max: 100.1 (25 Dec 2019 16:11)  HR: 98 (26 Dec 2019 10:30) (98 - 110)  BP: 110/70 (26 Dec 2019 10:30) (104/69 - 124/72)  BP(mean): 93 (25 Dec 2019 22:00) (75 - 93)  RR: 16 (26 Dec 2019 10:30) (15 - 32)  SpO2: 100% (26 Dec 2019 10:30) (96% - 100%)  Daily Height in cm: 170.18 (25 Dec 2019 22:33)    Daily Weight in k.2 (26 Dec 2019 06:09)    GENERAL:  no distress  HEENT:  NC/AT,  anicteric  ABDOMEN:  Soft, non-tender, non-distended, normoactive bowel sounds  EXTEREMITIES:  no cyanosis      LABS:                        9.8    16.93 )-----------( 189      ( 26 Dec 2019 06:26 )             31.5       Hemoglobin: 9.8 g/dL (19 @ 06:26)  Hemoglobin: 9.7 g/dL (19 @ 05:32)  Hemoglobin: 10.0 g/dL (19 @ 04:36)  Hemoglobin: 11.0 g/dL (19 @ 18:32)          137  |  98  |  24.0<H>  ----------------------------<  183<H>  3.3<L>   |  26.0  |  0.98    Ca    8.8      26 Dec 2019 06:26  Phos  2.4       Mg     1.9         TPro  6.4<L>  /  Alb  3.1<L>  /  TBili  1.7  /  DBili  x   /  AST  431<H>  /  ALT  2058<H>  /  AlkPhos  115        INR: 2.02 ratio (19 @ 04:36)      Aspartate Aminotransferase (AST/SGOT): 431 U/L (19 @ 06:26)  Alkaline Phosphatase, Serum: 115 U/L (19 @ 06:26)  Alanine Aminotransferase (ALT/SGPT): 2058 U/L (19 @ 06:26)  Alkaline Phosphatase, Serum: 106 U/L (19 @ 05:32)  Aspartate Aminotransferase (AST/SGOT): 1942 U/L (19 @ 05:32)  Alanine Aminotransferase (ALT/SGPT): 3321 U/L (19 @ 05:32)  Alkaline Phosphatase, Serum: 99 U/L (19 @ 04:36)  Aspartate Aminotransferase (AST/SGOT): 3621 U/L (19 @ 04:36)  Alanine Aminotransferase (ALT/SGPT): 3934 U/L (19 @ 04:36)  Alkaline Phosphatase, Serum: 118 U/L (19 @ 18:32)  Aspartate Aminotransferase (AST/SGOT): 3147 U/L (19 @ 18:32)  Alanine Aminotransferase (ALT/SGPT): 3379 U/L (19 @ 18:32)  Alkaline Phosphatase, Serum: 105 U/L (19 @ 14:24)  Bilirubin Direct, Serum: 0.3 mg/dL (19 @ 14:24)  Alanine Aminotransferase (ALT/SGPT): 3223 U/L (19 @ 14:24)  Aspartate Aminotransferase (AST/SGOT): 3532 U/L (19 @ 14:24)    RADIOLOGY & ADDITIONAL TESTS:  < from: Xray Chest 1 View- PORTABLE-Urgent (19 @ 09:08) >  FINDINGS:   The lungs  display linear opacity/infiltrates LEFT lower lobe. LEFT upper lung and RIGHT lung parenchyma clear.    The  heart is enlarged in transverse diameter. No hilar mass. Trachea midline.  Status post median sternotomy.   Visualized osseous structures are intact.    IMPRESSION:   A LEFT lower lobe linear opacity/infiltrates..    < end of copied text >

## 2019-12-26 NOTE — CDI QUERY NOTE - NSCDIOTHERTXTBX_GEN_ALL_CORE_HH
If you agree with nutrition consult, please document diagnosis in your next progress note.  Thank you.    Nutrition Diagnosis:  Nutrition diagnosis yes...     Nutrition Diagnostic Terminology #1 Malnutrition...     Malnutrition severe, acute.     Etiology related to inability to meet sufficient protein-energy in setting of poor appetite/recent hospitalization, CHF, and multiple organ dysfunction.     Signs/Symptoms as evidenced by meeting <50% nutrient needs >/5 days, muscle/fat loss, 6.2% wt loss x ~3 weeks.     Goal/Expected Outcome Pt will meet >75% of estimated protein-energy needs.    · Note Type  Nutrition Services      Upon Nutritional Assessment by the Registered Dietitian your patient was determined to meet criteria / has evidence of the following diagnosis/diagnoses:          [ ]  Mild Protein Calorie Malnutrition        [ ]  Moderate Protein Calorie Malnutrition        [ x] Severe Protein Calorie Malnutrition        [ ] Unspecified Protein Calorie Malnutrition        [ ] Underweight / BMI <19        [ ] Morbid Obesity / BMI > 40    Pt presents at high nutrition risk secondary to malnutrition (severe, acute) related to inability to meet sufficient protein-energy in setting of poor appetite/recent hospitalization, CHF, and multiple organ dysfunction as evidenced by meeting <50% nutrient needs >/5 days, moderate muscle loss of temples and shoulders, severe muscle loss of clavicles, moderate fat loss of orbitals and buccal pads, 6.2% wt loss x ~3 weeks.     Findings as based on:  •  Comprehensive nutrition assessment and consultation  •  Calorie counts (nutrient intake analysis)  •  Food acceptance and intake status from observations by staff  •  Follow up  •  Patient education  •  Intervention secondary to interdisciplinary rounds  •   concerns      Treatment:    The following has been recommended:    1) Continue diet as tolerated.  2) Rx: MVI and vit C 500mg daily.   3) Encourage po intake.  4) Obtain daily weights.

## 2019-12-26 NOTE — PROGRESS NOTE ADULT - SUBJECTIVE AND OBJECTIVE BOX
NEPHROLOGY INTERVAL HPI/OVERNIGHT EVENTS:    Examined   sitting up in bed son/ wife visiting  Feeling better has good UOP    MEDICATIONS  (STANDING):  aspirin  chewable 81 milliGRAM(s) Oral daily  dextrose 5%. 1000 milliLiter(s) (50 mL/Hr) IV Continuous <Continuous>  dextrose 50% Injectable 12.5 Gram(s) IV Push once  dextrose 50% Injectable 25 Gram(s) IV Push once  dextrose 50% Injectable 25 Gram(s) IV Push once  furosemide    Tablet 40 milliGRAM(s) Oral daily  heparin  Injectable 5000 Unit(s) SubCutaneous every 12 hours  influenza   Vaccine 0.5 milliLiter(s) IntraMuscular once  insulin glargine Injectable (LANTUS) 20 Unit(s) SubCutaneous at bedtime  insulin lispro (HumaLOG) corrective regimen sliding scale   SubCutaneous three times a day before meals  lisinopril 2.5 milliGRAM(s) Oral daily  magnesium oxide 400 milliGRAM(s) Oral three times a day with meals  melatonin 1 milliGRAM(s) Oral at bedtime  metoprolol succinate ER 50 milliGRAM(s) Oral daily  pantoprazole    Tablet 40 milliGRAM(s) Oral before breakfast  piperacillin/tazobactam IVPB. 3.375 Gram(s) IV Intermittent once  piperacillin/tazobactam IVPB.. 3.375 Gram(s) IV Intermittent every 8 hours  potassium chloride    Tablet ER 40 milliEquivalent(s) Oral every 4 hours  ticagrelor 90 milliGRAM(s) Oral every 12 hours  tiotropium 18 MICROgram(s) Capsule 1 Capsule(s) Inhalation daily  vancomycin  IVPB 1000 milliGRAM(s) IV Intermittent once  vancomycin  IVPB 1000 milliGRAM(s) IV Intermittent every 12 hours  vancomycin  IVPB        MEDICATIONS  (PRN):  albuterol/ipratropium for Nebulization 3 milliLiter(s) Nebulizer every 6 hours PRN Shortness of Breath and/or Wheezing  benzocaine 15 mG/menthol 3.6 mG (Sugar-Free) Lozenge 1 Lozenge Oral every 4 hours PRN Sore Throat  dextrose 40% Gel 15 Gram(s) Oral once PRN Blood Glucose LESS THAN 70 milliGRAM(s)/deciliter  glucagon  Injectable 1 milliGRAM(s) IntraMuscular once PRN Glucose LESS THAN 70 milligrams/deciliter  guaiFENesin   Syrup  (Sugar-Free) 200 milliGRAM(s) Oral every 6 hours PRN Cough      Allergies    Allergy Status Unknown    Intolerances    DOPamine (Hypotension)      Vital Signs Last 24 Hrs  T(C): 36.8 (26 Dec 2019 05:36), Max: 37.8 (25 Dec 2019 16:11)  T(F): 98.2 (26 Dec 2019 05:36), Max: 100.1 (25 Dec 2019 16:11)  HR: 100 (26 Dec 2019 05:36) (100 - 110)  BP: 109/75 (26 Dec 2019 05:36) (104/69 - 128/63)  BP(mean): 93 (25 Dec 2019 22:00) (75 - 93)  RR: 17 (26 Dec 2019 05:36) (15 - 32)  SpO2: 100% (26 Dec 2019 05:36) (96% - 100%)  Daily Height in cm: 170.18 (25 Dec 2019 22:33)    Daily Weight in k.2 (26 Dec 2019 06:09)    PHYSICAL EXAM:  GENERAL: NAD  HEAD:  Atraumatic, Normocephalic  EYES: EOMI  NECK: Supple, neck  veins full  NERVOUS SYSTEM:  Alert & Oriented X3  CHEST/LUNG: Clear to percussion bilaterally  HEART: Regular rate and rhythm  ABDOMEN: Soft, Nontender, Nondistended; Bowel sounds present  EXTREMITIES: no edema    LABS:                        9.8    16.93 )-----------( 189      ( 26 Dec 2019 06:26 )             31.5         137  |  98  |  24.0<H>  ----------------------------<  183<H>  3.3<L>   |  26.0  |  0.98    Ca    8.8      26 Dec 2019 06:26  Phos  2.4       Mg     1.9         TPro  6.4<L>  /  Alb  3.1<L>  /  TBili  1.7  /  DBili  x   /  AST  431<H>  /  ALT  2058<H>  /  AlkPhos  115          Phosphorus Level, Serum: 2.4 mg/dL ( @ 06:26)          RADIOLOGY & ADDITIONAL TESTS:

## 2019-12-26 NOTE — PROGRESS NOTE ADULT - SUBJECTIVE AND OBJECTIVE BOX
[facility]                                                        Office: [address] [address]                                                       Telephone: [phone]. Fax:[phone]                                                                             PROGRESS NOTE   Reason for follow up: congestive heart failure and multiorgan failure                             Overnight: No new events.   Update:  cough elevated white count.  Gi evaln completed. ryan received NAC n - acetyle cystein 3 doses.     Subjective: " i hav ecough _"   Complains of:  cough.  insomnia a tnight time  not able to sleep. denies any paroxsymal nocturnal dyspnea . no orthopnea.   Review of symptoms: Cardiac:  No chest pain. No dyspnea. No palpitations.  Respiratory:no cough. No dyspnea  Gastrointestinal: No diarrhea. No abdominal pain. No bleeding.     Past medical history: No updates.   Chronic conditions:  Hypertension: controlled. CHF:  imporved.   	  Vitals:  T(C): 36.8 (12-26-19 @ 05:36), Max: 37.8 (12-25-19 @ 16:11)  HR: 98 (12-26-19 @ 10:30) (98 - 110)  BP: 110/70 (12-26-19 @ 10:30) (104/69 - 124/72)  RR: 16 (12-26-19 @ 10:30) (15 - 32)  SpO2: 100% (12-26-19 @ 10:30) (96% - 100%)  Wt(kg): --  I&O's Summary    25 Dec 2019 07:01  -  26 Dec 2019 07:00  --------------------------------------------------------  IN: 1629.7 mL / OUT: 1700 mL / NET: -70.3 mL    26 Dec 2019 07:01  -  26 Dec 2019 14:59  --------------------------------------------------------  IN: 240 mL / OUT: 0 mL / NET: 240 mL      Weight (kg): 63.4 (12-25 @ 22:33)    PHYSICAL EXAM:  Appearance: Comfortable. No acute distress  HEENT:  Head and neck: Atraumatic. Normocephalic.  Normal oral mucosa, PERRL, Neck is supple. No JVD, No carotid bruit.   Neurologic: A & O x 3, no focal deficits. EOMI , Cranial nerves are intact.  Lymphatic: No cervical lymphadenopathy  Cardiovascular: Normal S1 S2, No murmur, rubs/gallops. No JVD, No edema  Respiratory: Left lung bronchiual breath sounds and crackles.   Gastrointestinal:  Soft, Non-tender, + BS  Lower Extremities: No edema  Psychiatry: Patient is calm. No agitation. Mood & affect appropriate  Skin: No rashes/ ecchymoses/cyanosis/ulcers visualized on the face, hands or feet.    CURRENT MEDICATIONS:   MEDICATIONS  (STANDING):  furosemide    Tablet 40 milliGRAM(s) Oral daily  lisinopril 2.5 milliGRAM(s) Oral daily  metoprolol succinate ER 50 milliGRAM(s) Oral daily    tiotropium 18 MICROgram(s) Capsule  pantoprazole    Tablet  polyethylene glycol 3350  piperacillin/tazobactam IVPB..  vancomycin  IVPB  aspirin  chewable  dextrose 5%.  dextrose 50% Injectable  dextrose 50% Injectable  dextrose 50% Injectable  heparin  Injectable  influenza   Vaccine  insulin glargine Injectable (LANTUS)  insulin lispro (HumaLOG) corrective regimen sliding scale  magnesium oxide  melatonin  ticagrelor    PRN: albuterol/ipratropium for Nebulization PRN  benzocaine 15 mG/menthol 3.6 mG (Sugar-Free) Lozenge PRN  dextrose 40% Gel PRN  glucagon  Injectable PRN  guaiFENesin   Syrup  (Sugar-Free) PRN    ticagrelor      LABS:	 	                                        9.8    16.93 )-----------( 189      ( 26 Dec 2019 06:26 )             31.5   N=x    ; L=x        26 Dec 2019 06:26    137    |  98     |  24.0   ----------------------------<  183    3.3     |  26.0   |  0.98     Ca    8.8        26 Dec 2019 06:26  Phos  2.4       26 Dec 2019 06:26  Mg     1.9       25 Dec 2019 05:32    TPro  6.4    /  Alb  3.1    /  TBili  1.7    /  DBili  x      /  AST  431    /  ALT  2058   /  AlkPhos  115    26 Dec 2019 06:26      Hepatic panel: 26 Dec 2019 06:26  6.4   | 3.1                            1.7   | 1.7  /x                              431   | 2058                              /115  \par                                   TPro  6.4<L>  /  Alb  3.1<L>  /  TBili  1.7  /  DBili  x   /  AST  431<H>  /  ALT  2058<H>  /  AlkPhos  115  12-26    proBNP: Serum Pro-Brain Natriuretic Peptide: 47147 pg/mL (12-22 @ 15:32)    Lipid Profile:   HgA1c: Hemoglobin A1C, Whole Blood: 8.2 %  TSH:     TELEMETRY: Reviewed  heart rat e90-110  ECG:  Reviewed by me. 	Sinus . LBBB     DIAGNOSTIC TESTING:

## 2019-12-26 NOTE — PROGRESS NOTE ADULT - ASSESSMENT
74-year-old man with PMHX CAD, CABG, HTN, DM who was recently treated for CHF and was discharged. She presented with multisystem organ failure and elevated liver enzymes. 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 Hep B core + Hep SAG negative, Hep B S ab + possibly secondary to previous resolved infection.   LFTs improving.   - Hep B PCR   - monitor liver panel and INR.    Thanks

## 2019-12-26 NOTE — PROGRESS NOTE ADULT - SUBJECTIVE AND OBJECTIVE BOX
Patient is a 74y old  Male who presents with a chief complaint of DKA, multiple organ failure (25 Dec 2019 09:36)  seen for DKA/DM/RAS/PAF/acute transaminitis          interval: patient was and examined at with family members at bedside. denied complaints except dry cough getting better. CXr reported PNA. son reproted patient was recently treated with Z-apck for PNA/bronchitis. completed course but did not help, another antibiotc was prescribed OP but he never took it. reported fever and scanty sputum. no other symptoms.     ROS:     CONSTITUTIONAL:  No distress.  CARDIOVASCULAR:  No pressure, squeezing, tightness, heaviness or aching about the chest; no palpitations. + leg swelling, + orthopnea. no PND  RESPIRATORY:  no SOB. no wheezing. + cough. and sputum.  No hemoptysis  GI: no abd pan, no nausea, no vomiting, no diarrhea, no constipation. No hematochezia or melena  EXT:No leg or calf pain  SKIN: no skin break or ulcer. No rash  CNS: No headaches. No weakness.no numbness. No depression or anxiety. No SI    PAST MEDICAL HISTORY:  Heart failure  Essential hypertension  Type 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, without long-term current use of insul  Coronary artery disease involving native coronary artery of native heart, angina presence unspecifie  Coronary artery disease involving coronary bypass graft of native heart with unstable angina pectoris  High cholesterol  GERD (gastroesophageal reflux disease)    Past surgical H:   Status post open reduction with internal fixation of fracture  S/P primary angioplasty with coronary stent  S/P CABG (coronary artery bypass graft): 4V 1983 [place]   Family h:  Social H: denied smoking/ETOH/drugs  Allergy/intolerance: NKDA. Dopamine caused hypotension    MEDICATIONS  (STANDING):  ascorbic acid 500 milliGRAM(s) Oral daily  aspirin  chewable 81 milliGRAM(s) Oral daily  dextrose 5%. 1000 milliLiter(s) (50 mL/Hr) IV Continuous <Continuous>  dextrose 50% Injectable 12.5 Gram(s) IV Push once  dextrose 50% Injectable 25 Gram(s) IV Push once  dextrose 50% Injectable 25 Gram(s) IV Push once  furosemide    Tablet 40 milliGRAM(s) Oral daily  heparin  Injectable 5000 Unit(s) SubCutaneous every 12 hours  influenza   Vaccine 0.5 milliLiter(s) IntraMuscular once  insulin glargine Injectable (LANTUS) 20 Unit(s) SubCutaneous at bedtime  insulin lispro (HumaLOG) corrective regimen sliding scale   SubCutaneous three times a day before meals  lisinopril 2.5 milliGRAM(s) Oral daily  magnesium oxide 400 milliGRAM(s) Oral three times a day with meals  melatonin 1 milliGRAM(s) Oral at bedtime  metoprolol succinate ER 50 milliGRAM(s) Oral daily  multivitamin/minerals 1 Tablet(s) Oral daily  pantoprazole    Tablet 40 milliGRAM(s) Oral before breakfast  piperacillin/tazobactam IVPB.. 3.375 Gram(s) IV Intermittent every 8 hours  polyethylene glycol 3350 17 Gram(s) Oral daily  ticagrelor 90 milliGRAM(s) Oral every 12 hours  tiotropium 18 MICROgram(s) Capsule 1 Capsule(s) Inhalation daily  vancomycin  IVPB 750 milliGRAM(s) IV Intermittent every 12 hours    MEDICATIONS  (PRN):  albuterol/ipratropium for Nebulization 3 milliLiter(s) Nebulizer every 6 hours PRN Shortness of Breath and/or Wheezing  benzocaine 15 mG/menthol 3.6 mG (Sugar-Free) Lozenge 1 Lozenge Oral every 4 hours PRN Sore Throat  dextrose 40% Gel 15 Gram(s) Oral once PRN Blood Glucose LESS THAN 70 milliGRAM(s)/deciliter  glucagon  Injectable 1 milliGRAM(s) IntraMuscular once PRN Glucose LESS THAN 70 milligrams/deciliter  guaiFENesin   Syrup  (Sugar-Free) 200 milliGRAM(s) Oral every 6 hours PRN Cough      MEDICATIONS  (STANDING):  ascorbic acid 500 milliGRAM(s) Oral daily  aspirin  chewable 81 milliGRAM(s) Oral daily  dextrose 5%. 1000 milliLiter(s) (50 mL/Hr) IV Continuous <Continuous>  dextrose 50% Injectable 12.5 Gram(s) IV Push once  dextrose 50% Injectable 25 Gram(s) IV Push once  dextrose 50% Injectable 25 Gram(s) IV Push once  furosemide    Tablet 40 milliGRAM(s) Oral daily  heparin  Injectable 5000 Unit(s) SubCutaneous every 12 hours  influenza   Vaccine 0.5 milliLiter(s) IntraMuscular once  insulin glargine Injectable (LANTUS) 20 Unit(s) SubCutaneous at bedtime  insulin lispro (HumaLOG) corrective regimen sliding scale   SubCutaneous three times a day before meals  lisinopril 2.5 milliGRAM(s) Oral daily  magnesium oxide 400 milliGRAM(s) Oral three times a day with meals  melatonin 1 milliGRAM(s) Oral at bedtime  metoprolol succinate ER 50 milliGRAM(s) Oral daily  multivitamin/minerals 1 Tablet(s) Oral daily  pantoprazole    Tablet 40 milliGRAM(s) Oral before breakfast  piperacillin/tazobactam IVPB.. 3.375 Gram(s) IV Intermittent every 8 hours  polyethylene glycol 3350 17 Gram(s) Oral daily  ticagrelor 90 milliGRAM(s) Oral every 12 hours  tiotropium 18 MICROgram(s) Capsule 1 Capsule(s) Inhalation daily  vancomycin  IVPB 750 milliGRAM(s) IV Intermittent every 12 hours    MEDICATIONS  (PRN):  albuterol/ipratropium for Nebulization 3 milliLiter(s) Nebulizer every 6 hours PRN Shortness of Breath and/or Wheezing  benzocaine 15 mG/menthol 3.6 mG (Sugar-Free) Lozenge 1 Lozenge Oral every 4 hours PRN Sore Throat  dextrose 40% Gel 15 Gram(s) Oral once PRN Blood Glucose LESS THAN 70 milliGRAM(s)/deciliter  glucagon  Injectable 1 milliGRAM(s) IntraMuscular once PRN Glucose LESS THAN 70 milligrams/deciliter  guaiFENesin   Syrup  (Sugar-Free) 200 milliGRAM(s) Oral every 6 hours PRN Cough      Vital Signs Last 24 Hrs  T(C): 36.4 (26 Dec 2019 15:43), Max: 37.3 (25 Dec 2019 20:02)  T(F): 97.6 (26 Dec 2019 15:43), Max: 99.2 (25 Dec 2019 20:02)  HR: 98 (26 Dec 2019 15:43) (98 - 110)  BP: 94/58 (26 Dec 2019 15:43) (94/58 - 124/72)  BP(mean): 93 (25 Dec 2019 22:00) (75 - 93)  RR: 16 (26 Dec 2019 15:43) (15 - 32)  SpO2: 93% (26 Dec 2019 15:43) (93% - 100%)    I&O's Summary    25 Dec 2019 07:01  -  26 Dec 2019 07:00  --------------------------------------------------------  IN: 1629.7 mL / OUT: 1700 mL / NET: -70.3 mL    26 Dec 2019 07:01  -  26 Dec 2019 16:26  --------------------------------------------------------  IN: 240 mL / OUT: 0 mL / NET: 240 mL      CAPILLARY BLOOD GLUCOSE      POCT Blood Glucose.: 255 mg/dL (26 Dec 2019 12:09)  POCT Blood Glucose.: 138 mg/dL (26 Dec 2019 08:25)  POCT Blood Glucose.: 266 mg/dL (25 Dec 2019 21:56)  POCT Blood Glucose.: 315 mg/dL (25 Dec 2019 17:29)      GENERAL: Not in distress. Alert    HEENT:  Normocephalic and atraumatic.   NECK: Supple.  No JVD.    CARDIOVASCULAR: irregular S1, S2. S3 gallop. No murmur/rubs/gallop  LUNGS: BLAE redued, no rales, no wheezing, no rhonchi.    ABDOMEN: ND. Soft,  NT, no guarding / rebound / rigidity. BS normoactive. No CVA tenderness.    EXTREMITIES: no cyanosis, no clubbing, trace edema.   SKIN: no rash on exposed skin  NEUROLOGIC: AAO*3. grossly intact  PSYCHIATRIC: Calm.  No agitation.                                     9.8    16.93 )-----------( 189      ( 26 Dec 2019 06:26 )             31.5       12-26    137  |  98  |  24.0<H>  ----------------------------<  183<H>  3.3<L>   |  26.0  |  0.98    Ca    8.8      26 Dec 2019 06:26  Phos  2.4     12-26  Mg     1.9     12-25    TPro  6.4<L>  /  Alb  3.1<L>  /  TBili  1.7  /  DBili  x   /  AST  431<H>  /  ALT  2058<H>  /  AlkPhos  115  12-26      < from: TTE Echo Complete w/Doppler (12.23.19 @ 12:47) >  Summary:   1. Endocardial visualization was enhanced with intravenous echo contrast.   2. Moderately to severely decreased global left ventricular systolic function.   3. Left ventricular ejection fraction, by visual estimation, is 25 to 30%.   4. Severely enlarged left atrium.   5. The mitral in-flow pattern reveals no discernable A-wave, therefore no comment on diastolic function can be made.   6. Sclerotic aortic valve with decreased opening.   7. The mitral valve leaflets are tethered which is due to reduced systolic function and elevated LVDP.   8. Moderate tricuspid regurgitation.   9. Estimated pulmonary artery systolic pressure is 50.1 mmHg assuming a right atrial pressure of 3 mmHg, which is consistent with moderate pulmonary hypertension.    Demetrice Man MD Electronically signed on 12/23/2019 at 2:44:02 PM    < end of copied text >    < from: Xray Chest 1 View- PORTABLE-Urgent (12.25.19 @ 00:38) >    IMPRESSION:     Cardiomegaly.    No infiltrates.    < end of copied text >    < from: 12 Lead ECG (12.22.19 @ 15:36) >  Ventricular Rate 93 BPM    Atrial Rate 90 BPM    QRS Duration 180 ms    Q-T Interval 490 ms    QTC Calculation(Bezet) 609 ms    R Axis -6 degrees    T Axis 134 degrees    Diagnosis Line Atrial fibrillation  Left bundle branch block  Abnormal ECG    < end of copied text >

## 2019-12-27 LAB
ALBUMIN SERPL ELPH-MCNC: 3.5 G/DL — SIGNIFICANT CHANGE UP (ref 3.3–5.2)
ALP SERPL-CCNC: 135 U/L — HIGH (ref 40–120)
ALT FLD-CCNC: 1400 U/L — HIGH
ANION GAP SERPL CALC-SCNC: 14 MMOL/L — SIGNIFICANT CHANGE UP (ref 5–17)
AST SERPL-CCNC: 240 U/L — HIGH
BILIRUB DIRECT SERPL-MCNC: 0.5 MG/DL — HIGH (ref 0–0.3)
BILIRUB INDIRECT FLD-MCNC: 1.1 MG/DL — HIGH (ref 0.2–1)
BILIRUB SERPL-MCNC: 1.6 MG/DL — SIGNIFICANT CHANGE UP (ref 0.4–2)
BUN SERPL-MCNC: 28 MG/DL — HIGH (ref 8–20)
CALCIUM SERPL-MCNC: 9.1 MG/DL — SIGNIFICANT CHANGE UP (ref 8.6–10.2)
CHLORIDE SERPL-SCNC: 99 MMOL/L — SIGNIFICANT CHANGE UP (ref 98–107)
CO2 SERPL-SCNC: 24 MMOL/L — SIGNIFICANT CHANGE UP (ref 22–29)
CREAT SERPL-MCNC: 1.26 MG/DL — SIGNIFICANT CHANGE UP (ref 0.5–1.3)
CRP SERPL-MCNC: 7.49 MG/DL — HIGH (ref 0–0.4)
GLUCOSE BLDC GLUCOMTR-MCNC: 128 MG/DL — HIGH (ref 70–99)
GLUCOSE BLDC GLUCOMTR-MCNC: 130 MG/DL — HIGH (ref 70–99)
GLUCOSE BLDC GLUCOMTR-MCNC: 198 MG/DL — HIGH (ref 70–99)
GLUCOSE BLDC GLUCOMTR-MCNC: 278 MG/DL — HIGH (ref 70–99)
GLUCOSE BLDC GLUCOMTR-MCNC: 284 MG/DL — HIGH (ref 70–99)
GLUCOSE BLDC GLUCOMTR-MCNC: 349 MG/DL — HIGH (ref 70–99)
GLUCOSE SERPL-MCNC: 110 MG/DL — SIGNIFICANT CHANGE UP (ref 70–115)
HCT VFR BLD CALC: 30.8 % — LOW (ref 39–50)
HGB BLD-MCNC: 9.7 G/DL — LOW (ref 13–17)
MAGNESIUM SERPL-MCNC: 1.9 MG/DL — SIGNIFICANT CHANGE UP (ref 1.8–2.6)
MCHC RBC-ENTMCNC: 24.2 PG — LOW (ref 27–34)
MCHC RBC-ENTMCNC: 31.5 GM/DL — LOW (ref 32–36)
MCV RBC AUTO: 76.8 FL — LOW (ref 80–100)
PLATELET # BLD AUTO: 214 K/UL — SIGNIFICANT CHANGE UP (ref 150–400)
POTASSIUM SERPL-MCNC: 4.3 MMOL/L — SIGNIFICANT CHANGE UP (ref 3.5–5.3)
POTASSIUM SERPL-SCNC: 4.3 MMOL/L — SIGNIFICANT CHANGE UP (ref 3.5–5.3)
PROCALCITONIN SERPL-MCNC: 0.13 NG/ML — HIGH (ref 0.02–0.1)
PROT SERPL-MCNC: 6.9 G/DL — SIGNIFICANT CHANGE UP (ref 6.6–8.7)
RBC # BLD: 4.01 M/UL — LOW (ref 4.2–5.8)
RBC # FLD: 20 % — HIGH (ref 10.3–14.5)
SODIUM SERPL-SCNC: 137 MMOL/L — SIGNIFICANT CHANGE UP (ref 135–145)
VANCOMYCIN TROUGH SERPL-MCNC: 10.3 UG/ML — SIGNIFICANT CHANGE UP (ref 10–20)
WBC # BLD: 13.1 K/UL — HIGH (ref 3.8–10.5)
WBC # FLD AUTO: 13.1 K/UL — HIGH (ref 3.8–10.5)

## 2019-12-27 PROCEDURE — 99233 SBSQ HOSP IP/OBS HIGH 50: CPT

## 2019-12-27 PROCEDURE — 93970 EXTREMITY STUDY: CPT | Mod: 26

## 2019-12-27 PROCEDURE — 99232 SBSQ HOSP IP/OBS MODERATE 35: CPT

## 2019-12-27 PROCEDURE — 99223 1ST HOSP IP/OBS HIGH 75: CPT

## 2019-12-27 PROCEDURE — 71250 CT THORAX DX C-: CPT | Mod: 26

## 2019-12-27 RX ORDER — INSULIN LISPRO 100/ML
VIAL (ML) SUBCUTANEOUS
Refills: 0 | Status: DISCONTINUED | OUTPATIENT
Start: 2019-12-27 | End: 2019-12-31

## 2019-12-27 RX ORDER — SPIRONOLACTONE 25 MG/1
25 TABLET, FILM COATED ORAL DAILY
Refills: 0 | Status: DISCONTINUED | OUTPATIENT
Start: 2019-12-27 | End: 2019-12-31

## 2019-12-27 RX ORDER — SACCHAROMYCES BOULARDII 250 MG
250 POWDER IN PACKET (EA) ORAL
Refills: 0 | Status: DISCONTINUED | OUTPATIENT
Start: 2019-12-27 | End: 2019-12-31

## 2019-12-27 RX ORDER — FLUTICASONE PROPIONATE 50 MCG
1 SPRAY, SUSPENSION NASAL
Refills: 0 | Status: DISCONTINUED | OUTPATIENT
Start: 2019-12-27 | End: 2019-12-31

## 2019-12-27 RX ORDER — FUROSEMIDE 40 MG
20 TABLET ORAL DAILY
Refills: 0 | Status: DISCONTINUED | OUTPATIENT
Start: 2019-12-27 | End: 2019-12-31

## 2019-12-27 RX ADMIN — Medication 8: at 12:35

## 2019-12-27 RX ADMIN — Medication 1200 MILLIGRAM(S): at 22:20

## 2019-12-27 RX ADMIN — TICAGRELOR 90 MILLIGRAM(S): 90 TABLET ORAL at 06:20

## 2019-12-27 RX ADMIN — POLYETHYLENE GLYCOL 3350 17 GRAM(S): 17 POWDER, FOR SOLUTION ORAL at 12:35

## 2019-12-27 RX ADMIN — Medication 250 MILLIGRAM(S): at 17:40

## 2019-12-27 RX ADMIN — Medication 1 SPRAY(S): at 22:21

## 2019-12-27 RX ADMIN — Medication 6: at 22:59

## 2019-12-27 RX ADMIN — SPIRONOLACTONE 25 MILLIGRAM(S): 25 TABLET, FILM COATED ORAL at 12:45

## 2019-12-27 RX ADMIN — Medication 50 MILLIGRAM(S): at 06:20

## 2019-12-27 RX ADMIN — Medication 1 MILLIGRAM(S): at 22:22

## 2019-12-27 RX ADMIN — HEPARIN SODIUM 5000 UNIT(S): 5000 INJECTION INTRAVENOUS; SUBCUTANEOUS at 23:39

## 2019-12-27 RX ADMIN — PIPERACILLIN AND TAZOBACTAM 25 GRAM(S): 4; .5 INJECTION, POWDER, LYOPHILIZED, FOR SOLUTION INTRAVENOUS at 06:20

## 2019-12-27 RX ADMIN — INSULIN GLARGINE 20 UNIT(S): 100 INJECTION, SOLUTION SUBCUTANEOUS at 22:59

## 2019-12-27 RX ADMIN — Medication 40 MILLIGRAM(S): at 06:20

## 2019-12-27 RX ADMIN — MAGNESIUM OXIDE 400 MG ORAL TABLET 400 MILLIGRAM(S): 241.3 TABLET ORAL at 08:30

## 2019-12-27 RX ADMIN — Medication 81 MILLIGRAM(S): at 12:34

## 2019-12-27 RX ADMIN — Medication 250 MILLIGRAM(S): at 22:22

## 2019-12-27 RX ADMIN — Medication 1 TABLET(S): at 12:35

## 2019-12-27 RX ADMIN — Medication 250 MILLIGRAM(S): at 04:55

## 2019-12-27 RX ADMIN — PANTOPRAZOLE SODIUM 40 MILLIGRAM(S): 20 TABLET, DELAYED RELEASE ORAL at 06:20

## 2019-12-27 RX ADMIN — Medication 500 MILLIGRAM(S): at 12:35

## 2019-12-27 RX ADMIN — HEPARIN SODIUM 5000 UNIT(S): 5000 INJECTION INTRAVENOUS; SUBCUTANEOUS at 12:41

## 2019-12-27 RX ADMIN — Medication 2: at 17:19

## 2019-12-27 RX ADMIN — PIPERACILLIN AND TAZOBACTAM 25 GRAM(S): 4; .5 INJECTION, POWDER, LYOPHILIZED, FOR SOLUTION INTRAVENOUS at 15:37

## 2019-12-27 RX ADMIN — PIPERACILLIN AND TAZOBACTAM 25 GRAM(S): 4; .5 INJECTION, POWDER, LYOPHILIZED, FOR SOLUTION INTRAVENOUS at 23:38

## 2019-12-27 RX ADMIN — MAGNESIUM OXIDE 400 MG ORAL TABLET 400 MILLIGRAM(S): 241.3 TABLET ORAL at 12:43

## 2019-12-27 RX ADMIN — LISINOPRIL 2.5 MILLIGRAM(S): 2.5 TABLET ORAL at 06:20

## 2019-12-27 RX ADMIN — TICAGRELOR 90 MILLIGRAM(S): 90 TABLET ORAL at 17:40

## 2019-12-27 NOTE — PROGRESS NOTE ADULT - SUBJECTIVE AND OBJECTIVE BOX
Patient is a 74y old  Male who presents with a chief complaint of DKA, multiple organ failure (25 Dec 2019 09:36)  seen for DKA/DM/RAS/PAF/acute transaminitis/PNA      interval: patient was and examined with son at bedside. +cough more when laying flat, better when sits up. no post nasal drip. currently on Abx for PNA. concern about CHF vs PNA causing worsening cough   no other complaints.    Tele: ST    ROS:     CONSTITUTIONAL:  No distress. no fever/chills  CARDIOVASCULAR:  No pressure, squeezing, tightness, heaviness or aching about the chest; no palpitations. + left foot leg swelling, + orthopnea. and  PND  RESPIRATORY:  + SOB. no wheezing. + cough. and sputum.  No hemoptysis  GI: no abd pan, no nausea, no vomiting, no diarrhea, no constipation. No hematochezia or melena  EXT:No leg or calf pain  SKIN: no skin break or ulcer. No rash  CNS: No headaches. No weakness. no numbness. No depression or anxiety. No SI    PAST MEDICAL HISTORY:  Heart failure  Essential hypertension  Type 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, without long-term current use of insul  Coronary artery disease involving native coronary artery of native heart, angina presence unspecifie  Coronary artery disease involving coronary bypass graft of native heart with unstable angina pectoris  High cholesterol  GERD (gastroesophageal reflux disease)    Past surgical H:   Status post open reduction with internal fixation of fracture  S/P primary angioplasty with coronary stent  S/P CABG (coronary artery bypass graft): 4V 1983 [place]    Family h:  Social H: denied smoking/ETOH/drugs  Allergy/intolerance: NKDA. Dopamine caused hypotension    MEDICATIONS  (STANDING):  ascorbic acid 500 milliGRAM(s) Oral daily  aspirin  chewable 81 milliGRAM(s) Oral daily  dextrose 5%. 1000 milliLiter(s) (50 mL/Hr) IV Continuous <Continuous>  dextrose 50% Injectable 12.5 Gram(s) IV Push once  dextrose 50% Injectable 25 Gram(s) IV Push once  dextrose 50% Injectable 25 Gram(s) IV Push once  fluticasone propionate 50 MICROgram(s)/spray Nasal Spray 1 Spray(s) Both Nostrils two times a day  furosemide    Tablet 20 milliGRAM(s) Oral daily  guaiFENesin ER 1200 milliGRAM(s) Oral every 12 hours  heparin  Injectable 5000 Unit(s) SubCutaneous every 12 hours  influenza   Vaccine 0.5 milliLiter(s) IntraMuscular once  insulin glargine Injectable (LANTUS) 20 Unit(s) SubCutaneous at bedtime  insulin lispro (HumaLOG) corrective regimen sliding scale   SubCutaneous three times a day before meals  lisinopril 2.5 milliGRAM(s) Oral daily  magnesium oxide 400 milliGRAM(s) Oral three times a day with meals  melatonin 1 milliGRAM(s) Oral at bedtime  metoprolol succinate ER 50 milliGRAM(s) Oral daily  multivitamin/minerals 1 Tablet(s) Oral daily  pantoprazole    Tablet 40 milliGRAM(s) Oral before breakfast  piperacillin/tazobactam IVPB.. 3.375 Gram(s) IV Intermittent every 8 hours  polyethylene glycol 3350 17 Gram(s) Oral daily  saccharomyces boulardii 250 milliGRAM(s) Oral two times a day  spironolactone 25 milliGRAM(s) Oral daily  ticagrelor 90 milliGRAM(s) Oral every 12 hours  tiotropium 18 MICROgram(s) Capsule 1 Capsule(s) Inhalation daily  vancomycin  IVPB 750 milliGRAM(s) IV Intermittent every 12 hours    MEDICATIONS  (PRN):  albuterol/ipratropium for Nebulization 3 milliLiter(s) Nebulizer every 6 hours PRN Shortness of Breath and/or Wheezing  benzocaine 15 mG/menthol 3.6 mG (Sugar-Free) Lozenge 1 Lozenge Oral every 4 hours PRN Sore Throat  dextrose 40% Gel 15 Gram(s) Oral once PRN Blood Glucose LESS THAN 70 milliGRAM(s)/deciliter  glucagon  Injectable 1 milliGRAM(s) IntraMuscular once PRN Glucose LESS THAN 70 milligrams/deciliter    Vital Signs Last 24 Hrs  T(C): 36.5 (27 Dec 2019 09:21), Max: 36.8 (26 Dec 2019 22:22)  T(F): 97.7 (27 Dec 2019 09:21), Max: 98.2 (26 Dec 2019 22:22)  HR: 89 (27 Dec 2019 09:21) (89 - 98)  BP: 93/56 (27 Dec 2019 09:21) (93/56 - 108/62)  BP(mean): --  RR: 18 (27 Dec 2019 09:21) (16 - 18)  SpO2: 98% (27 Dec 2019 09:21) (93% - 100%)    I&O's Summary    26 Dec 2019 07:01  -  27 Dec 2019 07:00  --------------------------------------------------------  IN: 240 mL / OUT: 0 mL / NET: 240 mL    CAPILLARY BLOOD GLUCOSE      POCT Blood Glucose.: 349 mg/dL (27 Dec 2019 12:24)  POCT Blood Glucose.: 130 mg/dL (27 Dec 2019 08:11)  POCT Blood Glucose.: 128 mg/dL (27 Dec 2019 03:09)  POCT Blood Glucose.: 188 mg/dL (26 Dec 2019 22:02)  POCT Blood Glucose.: 362 mg/dL (26 Dec 2019 17:19)        GENERAL: Not in distress. Alert    HEENT:  Normocephalic and atraumatic.   NECK: Supple.  No JVD.    CARDIOVASCULAR: irregular S1, S2. S3 gallop. No murmur/rubs/gallop  LUNGS: BLAE reduced no rales, no wheezing, no rhonchi.    ABDOMEN: ND. Soft,  NT, no guarding / rebound / rigidity. BS normoactive. No CVA tenderness.    EXTREMITIES: no cyanosis, no clubbing, trace edema.   SKIN: no rash on exposed skin. mild redness over the left feet and toes, blanching. NT  NEUROLOGIC: AAO*3. grossly intact  PSYCHIATRIC: Calm.  No agitation.                          9.7    13.10 )-----------( 214      ( 27 Dec 2019 06:29 )             30.8       12-27    137  |  99  |  28.0<H>  ----------------------------<  110  4.3   |  24.0  |  1.26    Ca    9.1      27 Dec 2019 06:24  Phos  2.4     12-26  Mg     1.9     12-27    TPro  6.9  /  Alb  3.5  /  TBili  1.6  /  DBili  0.5<H>  /  AST  240<H>  /  ALT  1400<H>  /  AlkPhos  135<H>  12-27        < from: TTE Echo Complete w/Doppler (12.23.19 @ 12:47) >  Summary:   1. Endocardial visualization was enhanced with intravenous echo contrast.   2. Moderately to severely decreased global left ventricular systolic function.   3. Left ventricular ejection fraction, by visual estimation, is 25 to 30%.   4. Severely enlarged left atrium.   5. The mitral in-flow pattern reveals no discernable A-wave, therefore no comment on diastolic function can be made.   6. Sclerotic aortic valve with decreased opening.   7. The mitral valve leaflets are tethered which is due to reduced systolic function and elevated LVDP.   8. Moderate tricuspid regurgitation.   9. Estimated pulmonary artery systolic pressure is 50.1 mmHg assuming a right atrial pressure of 3 mmHg, which is consistent with moderate pulmonary hypertension.    Demetrice Man MD Electronically signed on 12/23/2019 at 2:44:02 PM    < end of copied text >    < from: Xray Chest 1 View- PORTABLE-Urgent (12.26.19 @ 09:08) >  INTERPRETATION:  Portable chest radiograph        CLINICAL INFORMATION:   Worsening cough. Assess for pneumonia.    TECHNIQUE:  Portable  AP view of the chest was obtained.    COMPARISON: 12/25/2019 available for review.    FINDINGS:   The lungs  display linear opacity/infiltrates LEFT lower lobe. LEFT upper lung and RIGHT lung parenchyma clear.    The  heart is enlarged in transverse diameter. No hilar mass. Trachea midline.  Status post median sternotomy.   Visualized osseous structures are intact.        IMPRESSION:   A LEFT lower lobe linear opacity/infiltrates..    < end of copied text >      < from: Xray Chest 1 View- PORTABLE-Urgent (12.25.19 @ 00:38) >    IMPRESSION:     Cardiomegaly.    No infiltrates.    < end of copied text >    < from: 12 Lead ECG (12.22.19 @ 15:36) >  Ventricular Rate 93 BPM    Atrial Rate 90 BPM    QRS Duration 180 ms    Q-T Interval 490 ms    QTC Calculation(Bezet) 609 ms    R Axis -6 degrees    T Axis 134 degrees    Diagnosis Line Atrial fibrillation  Left bundle branch block  Abnormal ECG    < end of copied text >

## 2019-12-27 NOTE — PROGRESS NOTE ADULT - ASSESSMENT
Patient with abnormal liver tests, most likely ischemic hepatitis. Liver serologies pending. Unlikely hepatitis B.   GI will follow up

## 2019-12-27 NOTE — CONSULT NOTE ADULT - SUBJECTIVE AND OBJECTIVE BOX
[facility]  INFECTIOUS DISEASES AND INTERNAL MEDICINE at [place]  =======================================================  Austyn Delgadillo MD  Diplomates American Board of Internal Medicine and Infectious Diseases  Tel: [phone]      Fax: [phone]  =======================================================      [id number]  ADRIANNA SHANKS is a 74y  Male     CC: Patient is a 74y old  Male who presents with a chief complaint of DKA, multiple organ failure (27 Dec 2019 12:23)    HPI:  Pt is a 74 YOM h/o CAD, CABG, HFrEF, MI, HTN, HLD, DM, PAF on last admission pt was recently discharged on Thursday after being treated for decompensated HF with milrinone, dopamine(which he did not tolerate).  Pt presents to ER with weakness and vomiting since yesterday.  Per pt's son since he was discharged his appetite has been very poor and he has been weak but yesterday started vomiting and not tolerating PO medications.  In ER found to have multiple organ dysfunction including RAS, acute liver transaminitis, DKA/anion gap acidosis.  Pt denies CP, no SOB, admits to feeling very cold. (22 Dec 2019 18:25)      PAST MEDICAL & SURGICAL HISTORY:  Heart failure  Essential hypertension  Type 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, without long-term current use of insul  Coronary artery disease involving native coronary artery of native heart, angina presence unspecifie  Coronary artery disease involving coronary bypass graft of native heart with unstable angina pectoris  High cholesterol  GERD (gastroesophageal reflux disease)  Status post open reduction with internal fixation of fracture  S/P primary angioplasty with coronary stent  S/P CABG (coronary artery bypass graft): 4V 1983 [place]      Social Hx: non smoker    FAMILY HISTORY: not contributory      Allergies    Allergy Status Unknown    Intolerances    DOPamine (Hypotension)      MEDICATIONS  (STANDING):  ascorbic acid 500 milliGRAM(s) Oral daily  aspirin  chewable 81 milliGRAM(s) Oral daily  dextrose 5%. 1000 milliLiter(s) (50 mL/Hr) IV Continuous <Continuous>  dextrose 50% Injectable 12.5 Gram(s) IV Push once  dextrose 50% Injectable 25 Gram(s) IV Push once  dextrose 50% Injectable 25 Gram(s) IV Push once  fluticasone propionate 50 MICROgram(s)/spray Nasal Spray 1 Spray(s) Both Nostrils two times a day  furosemide    Tablet 20 milliGRAM(s) Oral daily  guaiFENesin ER 1200 milliGRAM(s) Oral every 12 hours  heparin  Injectable 5000 Unit(s) SubCutaneous every 12 hours  influenza   Vaccine 0.5 milliLiter(s) IntraMuscular once  insulin glargine Injectable (LANTUS) 20 Unit(s) SubCutaneous at bedtime  insulin lispro (HumaLOG) corrective regimen sliding scale   SubCutaneous three times a day before meals  lisinopril 2.5 milliGRAM(s) Oral daily  melatonin 1 milliGRAM(s) Oral at bedtime  metoprolol succinate ER 50 milliGRAM(s) Oral daily  multivitamin/minerals 1 Tablet(s) Oral daily  pantoprazole    Tablet 40 milliGRAM(s) Oral before breakfast  piperacillin/tazobactam IVPB.. 3.375 Gram(s) IV Intermittent every 8 hours  polyethylene glycol 3350 17 Gram(s) Oral daily  saccharomyces boulardii 250 milliGRAM(s) Oral two times a day  spironolactone 25 milliGRAM(s) Oral daily  ticagrelor 90 milliGRAM(s) Oral every 12 hours  tiotropium 18 MICROgram(s) Capsule 1 Capsule(s) Inhalation daily  vancomycin  IVPB 750 milliGRAM(s) IV Intermittent every 12 hours    MEDICATIONS  (PRN):  albuterol/ipratropium for Nebulization 3 milliLiter(s) Nebulizer every 6 hours PRN Shortness of Breath and/or Wheezing  benzocaine 15 mG/menthol 3.6 mG (Sugar-Free) Lozenge 1 Lozenge Oral every 4 hours PRN Sore Throat  dextrose 40% Gel 15 Gram(s) Oral once PRN Blood Glucose LESS THAN 70 milliGRAM(s)/deciliter  glucagon  Injectable 1 milliGRAM(s) IntraMuscular once PRN Glucose LESS THAN 70 milligrams/deciliter      ANTIMICROBIALS:  piperacillin/tazobactam IVPB.. 3.375 every 8 hours  vancomycin  IVPB 750 every 12 hours      OTHER MEDS: MEDICATIONS  (STANDING):  albuterol/ipratropium for Nebulization 3 every 6 hours PRN  aspirin  chewable 81 daily  dextrose 40% Gel 15 once PRN  dextrose 50% Injectable 12.5 once  dextrose 50% Injectable 25 once  dextrose 50% Injectable 25 once  furosemide    Tablet 20 daily  glucagon  Injectable 1 once PRN  guaiFENesin ER 1200 every 12 hours  heparin  Injectable 5000 every 12 hours  influenza   Vaccine 0.5 once  insulin glargine Injectable (LANTUS) 20 at bedtime  insulin lispro (HumaLOG) corrective regimen sliding scale  three times a day before meals  lisinopril 2.5 daily  melatonin 1 at bedtime  metoprolol succinate ER 50 daily  pantoprazole    Tablet 40 before breakfast  polyethylene glycol 3350 17 daily  spironolactone 25 daily  ticagrelor 90 every 12 hours  tiotropium 18 MICROgram(s) Capsule 1 daily             REVIEW OF SYSTEMS:  CONSTITUTIONAL:  No Fever or chills  HEENT:  No diplopia or blurred vision.  No earache, sore throat or runny nose.  CARDIOVASCULAR:  No pressure, squeezing, strangling, tightness, heaviness or aching about the chest, neck, axilla or epigastrium.  RESPIRATORY:  No cough, shortness of breath  GASTROINTESTINAL:  No nausea, vomiting or diarrhea.  GENITOURINARY:  No dysuria, frequency or urgency. No Blood in urine  MUSCULOSKELETAL:  no joint aches, no muscle pain  SKIN:  No change in skin, hair or nails.  NEUROLOGIC:  No Headaches, seizures or weakness.  PSYCHIATRIC:  No disorder of thought or mood.  ENDOCRINE:  No heat or cold intolerance  HEMATOLOGICAL:  No easy bruising or bleeding.           I&O's Detail    26 Dec 2019 07:01  -  27 Dec 2019 07:00  --------------------------------------------------------  IN:    Oral Fluid: 240 mL  Total IN: 240 mL    OUT:  Total OUT: 0 mL    Total NET: 240 mL            Physical Exam:  Vital Signs Last 24 Hrs  T(C): 36.5 (27 Dec 2019 09:21), Max: 36.8 (26 Dec 2019 22:22)  T(F): 97.7 (27 Dec 2019 09:21), Max: 98.2 (26 Dec 2019 22:22)  HR: 89 (27 Dec 2019 09:21) (89 - 93)  BP: 93/56 (27 Dec 2019 09:21) (93/56 - 108/62)  BP(mean): --  RR: 18 (27 Dec 2019 09:21) (16 - 18)  SpO2: 98% (27 Dec 2019 09:21) (96% - 100%)    GEN: NAD, pleasant  HEENT: normocephalic and atraumatic. EOMI. PERRL.  Anicteric  NECK: Supple.   LUNGS: Clear to auscultation.  HEART: Regular rate and rhythm without murmur.  ABDOMEN: Soft, nontender, and nondistended.  Positive bowel sounds.    : No CVA tenderness  EXTREMITIES: Without any edema.  MSK: No joint swelling  NEUROLOGIC: Cranial nerves II through XII are grossly intact. No Focal Deficits  PSYCHIATRIC: Appropriate affect .  SKIN: No Rash        Labs:  12-27    137  |  99  |  28.0<H>  ----------------------------<  110  4.3   |  24.0  |  1.26    Ca    9.1      27 Dec 2019 06:24  Phos  2.4     12-26  Mg     1.9     12-27    TPro  6.9  /  Alb  3.5  /  TBili  1.6  /  DBili  0.5<H>  /  AST  240<H>  /  ALT  1400<H>  /  AlkPhos  135<H>  12-27                          9.7    13.10 )-----------( 214      ( 27 Dec 2019 06:29 )             30.8           LIVER FUNCTIONS - ( 27 Dec 2019 06:24 )  Alb: 3.5 g/dL / Pro: 6.9 g/dL / ALK PHOS: 135 U/L / ALT: 1400 U/L / AST: 240 U/L / GGT: x               CAPILLARY BLOOD GLUCOSE      POCT Blood Glucose.: 349 mg/dL (27 Dec 2019 12:24)  POCT Blood Glucose.: 130 mg/dL (27 Dec 2019 08:11)  POCT Blood Glucose.: 128 mg/dL (27 Dec 2019 03:09)  POCT Blood Glucose.: 188 mg/dL (26 Dec 2019 22:02)  POCT Blood Glucose.: 362 mg/dL (26 Dec 2019 17:19)        RECENT CULTURES:  12-23 @ 22:49          [facility]        Radiology:  < from: Xray Chest 1 View- PORTABLE-Urgent (12.26.19 @ 09:08) >  FINDINGS:   The lungs  display linear opacity/infiltrates LEFT lower lobe. LEFT upper lung and RIGHT lung parenchyma clear.    The  heart is enlarged in transverse diameter. No hilar mass. Trachea midline.  Status post median sternotomy.   Visualized osseous structures are intact.        IMPRESSION:   A LEFT lower lobe linear opacity/infiltrates..      < end of copied text >    < from: US Duplex Venous Lower Ext Ltd, Left (12.25.19 @ 13:52) >  FINDINGS:    There is normal compressibility of the left common femoral, femoral and popliteal veins.     Doppler examination shows normal spontaneous and phasic flow.    No calf vein thrombosis is detected.    Calcified plaque is seen within the left common femoral and superficial femoral arteries.    There is a left popliteal fossa cyst measuring 2.9 x 1.0 x 2.4 cm.    IMPRESSION:     No evidence of left lower extremity deep venous thrombosis.      < end of copied text >

## 2019-12-27 NOTE — PROGRESS NOTE ADULT - ASSESSMENT
73y/o male PMH HTN, HLD, HFrEF, DM, CKD, CAD s/p CABG, prior MI, s/p PCI/brachy, (6/20/19- LM 70/90%, LAD diffuse disease, Cx Prox 100%, RCA known , LIMA To LAD patent, SVG to OM 80% with ISR)/(7/2019- SVG to OM1 80%, brachy to SVG to OM1, PCI native OM), TTE 12/18/19- EF 25-30%, WMA, mild PHTN, mild AS, present to ED with feeling cold, nausea vomiting since last night. As per family and patient, c/o weakness, fatigue x 2 days,      COUGH: left lung pneumonia. ABx.   Mag and potassium repletion  Melatonin at night time.   PND./cough at supine: CHF vs. Pneumonia.  appears euvolemic.        HFrEF     - TTE EF 25-30%  -Pt currently off milrinone,    Toprol XL.  low dose ACe-I tomorrow   PO lasix. add aldactone f/u lfts tomorrow.      Irregular Heart rate  - ST on monitor  - last hospitalization possible afib- plan for [facility] monitor outpatient.     Acute Liver injury   unclear caus.e   NAC given LFts improving.  differntial: Acute loiver injury dut to DKA, or any drug induced.   Less likjely to be congestive hepatopathy as patient was given fluids and volume depleted.          CAD  - s/p CABG, s/p PCI  - 7/2019- brachy therapy   - EKG- ST, LBBB unchanged from prior     patient to remain in hospital until monday. see response to abx,. adn see how patient responds to CHF meds. patient is a readmission with acute liver adn kidnery injury.   Paitent off milrinone . Need to se ehow he does without it for few days. if recurernet injurty, then may need advance heart failure therapy.

## 2019-12-27 NOTE — PROGRESS NOTE ADULT - SUBJECTIVE AND OBJECTIVE BOX
INTERVAL HPI/OVERNIGHT EVENTS:FU for elevated LFTs. Improving fast. NO GI complaints.     MEDICATIONS  (STANDING):  ascorbic acid 500 milliGRAM(s) Oral daily  aspirin  chewable 81 milliGRAM(s) Oral daily  dextrose 5%. 1000 milliLiter(s) (50 mL/Hr) IV Continuous <Continuous>  dextrose 50% Injectable 12.5 Gram(s) IV Push once  dextrose 50% Injectable 25 Gram(s) IV Push once  dextrose 50% Injectable 25 Gram(s) IV Push once  furosemide    Tablet 20 milliGRAM(s) Oral daily  heparin  Injectable 5000 Unit(s) SubCutaneous every 12 hours  influenza   Vaccine 0.5 milliLiter(s) IntraMuscular once  insulin glargine Injectable (LANTUS) 20 Unit(s) SubCutaneous at bedtime  insulin lispro (HumaLOG) corrective regimen sliding scale   SubCutaneous three times a day before meals  lisinopril 2.5 milliGRAM(s) Oral daily  magnesium oxide 400 milliGRAM(s) Oral three times a day with meals  melatonin 1 milliGRAM(s) Oral at bedtime  metoprolol succinate ER 50 milliGRAM(s) Oral daily  multivitamin/minerals 1 Tablet(s) Oral daily  pantoprazole    Tablet 40 milliGRAM(s) Oral before breakfast  piperacillin/tazobactam IVPB.. 3.375 Gram(s) IV Intermittent every 8 hours  polyethylene glycol 3350 17 Gram(s) Oral daily  saccharomyces boulardii 250 milliGRAM(s) Oral two times a day  spironolactone 25 milliGRAM(s) Oral daily  ticagrelor 90 milliGRAM(s) Oral every 12 hours  tiotropium 18 MICROgram(s) Capsule 1 Capsule(s) Inhalation daily  vancomycin  IVPB 750 milliGRAM(s) IV Intermittent every 12 hours    MEDICATIONS  (PRN):  albuterol/ipratropium for Nebulization 3 milliLiter(s) Nebulizer every 6 hours PRN Shortness of Breath and/or Wheezing  benzocaine 15 mG/menthol 3.6 mG (Sugar-Free) Lozenge 1 Lozenge Oral every 4 hours PRN Sore Throat  dextrose 40% Gel 15 Gram(s) Oral once PRN Blood Glucose LESS THAN 70 milliGRAM(s)/deciliter  glucagon  Injectable 1 milliGRAM(s) IntraMuscular once PRN Glucose LESS THAN 70 milligrams/deciliter  guaiFENesin   Syrup  (Sugar-Free) 200 milliGRAM(s) Oral every 6 hours PRN Cough      Allergies    Allergy Status Unknown    Intolerances    DOPamine (Hypotension)      Vital Signs Last 24 Hrs  T(C): 36.5 (27 Dec 2019 09:21), Max: 36.8 (26 Dec 2019 22:22)  T(F): 97.7 (27 Dec 2019 09:21), Max: 98.2 (26 Dec 2019 22:22)  HR: 89 (27 Dec 2019 09:21) (89 - 98)  BP: 93/56 (27 Dec 2019 09:21) (93/56 - 108/62)  BP(mean): --  RR: 18 (27 Dec 2019 09:21) (16 - 18)  SpO2: 98% (27 Dec 2019 09:21) (93% - 100%)    LABS:                        9.7    13.10 )-----------( 214      ( 27 Dec 2019 06:29 )             30.8     12-27    137  |  99  |  28.0<H>  ----------------------------<  110  4.3   |  24.0  |  1.26    Ca    9.1      27 Dec 2019 06:24  Phos  2.4     12-26  Mg     1.9     12-27    TPro  6.9  /  Alb  3.5  /  TBili  1.6  /  DBili  0.5<H>  /  AST  240<H>  /  ALT  1400<H>  /  AlkPhos  135<H>  12-27          RADIOLOGY & ADDITIONAL TESTS:  < from: US Doppler Portal/Hepatic Vein (12.24.19 @ 09:07) >     EXAM:  US DPLX PORTAL HEPATIC VEIN                          PROCEDURE DATE:  12/24/2019          INTERPRETATION:  History:   Heart failure, worsening LFTs. Rule out portal vein thrombosis    Technique: Grayscale, color Doppler ultrasound, and spectral analysis of the liver    Comparison: None    Findings/  Impression:Incidental note is made of right pleural effusion. The liver is normal in echotexture. The hepatic veins are patent. The main portal vein is patent with normal direction of flow. Images of the right and left main portal branches are not provided. Images of the hepatic arteries are not provided. If there is continued clinical concern for portal vein thrombosis consider contrast-enhanced CT of the abdomen/pelvis.                ANISA LOYA M.D., ATTENDING RADIOLOGIST  This document has been electronically signed. Dec 24 2019 12:49PM                  < end of copied text >

## 2019-12-27 NOTE — PROGRESS NOTE ADULT - ASSESSMENT
75y/o male PMH HTN, HLD, HFrEF, DM, CKD, CAD s/p CABG, prior MI, s/p PCI/brachy, (6/20/19- LM 70/90%, LAD diffuse disease, Cx Prox 100%, RCA known , LIMA To LAD patent, SVG to OM 80% with ISR)/(7/2019- SVG to OM1 80%, brachy to SVG to OM1, PCI native OM), TTE 12/18/19- EF 25-30%, WMA, mild PHTN, mild AS, present to ED with feeling cold, nausea vomiting and weakness for one day. no relief with Zofran at home. Family took BP at home less then 90, and HR >125 at rest. Pt recently hospitalized for acute decompensated heart failure, requiring IV dopamine and milrinone, dopamine not tolerated. On Admission, Patient was found to have lactic acidosis, RAYMOND, transaminitis, mild hyperglycemia/dka, admitted to MICU. Presentation was suspected to be due to a combination of metformin toxicity and decompensated CHF/ cardiogenic shock.  Symptoms improved after insulin infusion, discontinuation of metformin, and initiation of milrinone infusion. Cardiology and renal was consulted. GI cx was called for persistent transaminitis. Hepatitis serology and USG was neg. Echo showed EF 25-30%, moderate pulm HTN. RVP was negative. Iron panel showed very high ferritin but iron sat 8%. TIBC is normal. Pateint was seen by MICU team this am and downgraded to hospitalist service.     >DKA: BS fluctuating  - likely dietary non-compliance  - c/w lantus and ISS. Adjust dose  - A1c: 8.4  - patient was on metformin, januvia and glipizide at home,   - would avoid metformin and glipizide on DC  - insulin education.   - DM education.  - endo cx OP    > Raymond: improving.   - cardio resumes lasix and lisinopril  - monitor RF  - renal on board    >hypoK: replaced. Monitor    >Hyponatremia: off salt tab. monitor    >acute transminitis: unclear etiology  - likely due to hypoperfusion  - Iron profile noted  - hep panel --> resolved hep B. hep B pCR pending. [ ordered by GI]  - portal Doppler did not show any portal vein thrombosis or features of cirrhosis  - GI consult called by MICU team--> suggested transaminitis likely due to shock   - avoid hepatotoxic meds    > CAD- s/p CABG, s/p PCI  - 7/2019- brachy therapy   - EKG- ST, LBBB unchanged from prior     > HFrEF  - TTE EF 25-30%,     - off milrinone.   - c/w toprol 25 mg daily. Titrate to desirable HR  - c/w lisinipril, lasix  - hold aldactone as per cardio  - cardio on biard    > cough worse during laying flat:   - possibly combination of  CHF, GERD, ?Post nasal drip, ?PNA  - clinically euvolumic. no fever/CP, scanty sputum  - left foot swelling  - tachycardia. no hypoxia  - CXr: left sided infiltrate on 12/26  - initial CXR on 12/25 was clear  - wbc elevated  - resumed lasix   - started on vanco and zosyn 12/25  - c/w mucinex, neb PRN, PPI,  - added flonase  - sputum cx not sent yet  - f/u urine legionella  - f/u BC  - CT chest w/o contrast  - reportedly treated for PNA/bronchitis recently with z-pack  - head elevation. meals atleast 3 hrs before breakfast  - monitor  - ID consult was called to r/o PNA [ Dr. Delgadillo]      > PAF: plan for holter OP  - on BB  - Ac as per cardio [ Dr. Morley]    > left foot and ankle and mild redness. does not look like cellulitis. venous doppler to r/o DVT. leg elevation    > severe Protein calori malnutrition.  - c/w glucerna. MVI, Vit C.   - nutrition cx appreciated.    > DVT: Heparin  > GI: PPi  > Diet: consistent carb, halal  > Dispo: pending improvement and cardio clearance and cultures result. cardio wants to monitor on Tele today will reasses tomorrow. Dr. Valenzuela wants to keep him until monday

## 2019-12-27 NOTE — PROGRESS NOTE ADULT - SUBJECTIVE AND OBJECTIVE BOX
[facility]                                                        Office: [address] [address]                                                       Telephone: [phone]. Fax:[phone]                                                                             PROGRESS NOTE   Reason for follow up: congestive heart failure and multiorgan failure                             Overnight: No new events.   Update:  cough elevated white count.   started on .abx.   complains of cough while sleeping.   difficulty skleeping.     Subjective: " i still hav ecough _"   Complains of:  cough.  insomnia a tnight time  not able to sleep. denies any paroxsymal nocturnal dyspnea . no orthopnea.   Review of symptoms: Cardiac:  No chest pain. No dyspnea. No palpitations.  Respiratory:no cough. No dyspnea  Gastrointestinal: No diarrhea. No abdominal pain. No bleeding.     Past medical history: No updates.   Chronic conditions:  Hypertension: controlled. CHF:  imporved.   	  Vitals:   Vital Signs Last 24 Hrs  T(C): 36.5 (12-27-19 @ 09:21), Max: 36.8 (12-26-19 @ 22:22)  T(F): 97.7 (12-27-19 @ 09:21), Max: 98.2 (12-26-19 @ 22:22)  HR: 89 (12-27-19 @ 09:21) (89 - 98)  BP: 93/56 (12-27-19 @ 09:21) (93/56 - 108/62)  BP(mean): --  RR: 18 (12-27-19 @ 09:21) (16 - 18)  SpO2: 98% (12-27-19 @ 09:21) (93% - 100%)      PHYSICAL EXAM:  Appearance: Comfortable. No acute distress  HEENT:  Head and neck: Atraumatic. Normocephalic.  Normal oral mucosa, PERRL, Neck is supple. No JVD, No carotid bruit.   Neurologic: A & O x 3, no focal deficits. EOMI , Cranial nerves are intact.  Lymphatic: No cervical lymphadenopathy  Cardiovascular: Normal S1 S2, No murmur, rubs/gallops. No JVD, No edema  Respiratory: Left lung bronchiual breath sounds and crackles.   Gastrointestinal:  Soft, Non-tender, + BS  Lower Extremities: No edema  Psychiatry: Patient is calm. No agitation. Mood & affect appropriate  Skin: No rashes/ ecchymoses/cyanosis/ulcers visualized on the face, hands or feet.        MEDICATIONS  (STANDING):  furosemide    Tablet 20 milliGRAM(s) Oral daily  lisinopril 2.5 milliGRAM(s) Oral daily  metoprolol succinate ER 50 milliGRAM(s) Oral daily  spironolactone 25 milliGRAM(s) Oral daily    tiotropium 18 MICROgram(s) Capsule  pantoprazole    Tablet  polyethylene glycol 3350  piperacillin/tazobactam IVPB..  vancomycin  IVPB  ascorbic acid  aspirin  chewable  dextrose 5%.  dextrose 50% Injectable  dextrose 50% Injectable  dextrose 50% Injectable  heparin  Injectable  influenza   Vaccine  insulin glargine Injectable (LANTUS)  insulin lispro (HumaLOG) corrective regimen sliding scale  magnesium oxide  melatonin  multivitamin/minerals  saccharomyces boulardii  ticagrelor    PRN: albuterol/ipratropium for Nebulization PRN  benzocaine 15 mG/menthol 3.6 mG (Sugar-Free) Lozenge PRN  dextrose 40% Gel PRN  glucagon  Injectable PRN  guaiFENesin   Syrup  (Sugar-Free) PRN    ticagrelor      LABS:	 	                             9.7    13.10 )-----------( 214      ( 27 Dec 2019 06:29 )             30.8   N=x    ; L=x        27 Dec 2019 06:24    137    |  99     |  28.0   ----------------------------<  110    4.3     |  24.0   |  1.26     Ca    9.1        27 Dec 2019 06:24  Phos  2.4       26 Dec 2019 06:26  Mg     1.9       27 Dec 2019 06:24    TPro  6.9    /  Alb  3.5    /  TBili  1.6    /  DBili  0.5    /  AST  240    /  ALT  1400   /  AlkPhos  135    27 Dec 2019 06:24      Hepatic panel: 27 Dec 2019 06:24  6.9   | 3.5                            1.6   | 1.6  /0.5                            240   | 1400                              /135  \par                                                 TPro  6.4<L>  /  Alb  3.1<L>  /  TBili  1.7  /  DBili  x   /  AST  431<H>  /  ALT  2058<H>  /  AlkPhos  115  12-26    proBNP: Serum Pro-Brain Natriuretic Peptide: 12472 pg/mL (12-22 @ 15:32)    Lipid Profile:   HgA1c: Hemoglobin A1C, Whole Blood: 8.2 %  TSH:     TELEMETRY: Reviewed  heart rat [id number]  ECG:  Reviewed by me. 	Sinus . LBBB     DIAGNOSTIC TESTING:

## 2019-12-27 NOTE — CHART NOTE - NSCHARTNOTEFT_GEN_A_CORE
Source: Patient [ ]  Family [x ]   other [ ]    Current Diet: Diet, Consistent Carbohydrate Renal/No Snacks:   Halal  Supplement Feeding Modality:  Oral  Glucerna Shake Cans or Servings Per Day:  1       Frequency:  Two Times a day (12-26-19 @ 16:22)    PO intake: Pt at test during assessment, family members x2 present. Family reported good po intake and improving.     Current Weight:   (12/24) 141.3#  (12/26) 139.3#  (12/25) 138#  (12/24) 137.3#  (12/23) 136.4#  -Will continue to monitor. Dependent trace and left foot mild edema noted per EMR (12/27).     Pertinent Medications: MEDICATIONS  (STANDING):  ascorbic acid 500 milliGRAM(s) Oral daily  aspirin  chewable 81 milliGRAM(s) Oral daily  dextrose 5%. 1000 milliLiter(s) (50 mL/Hr) IV Continuous <Continuous>  dextrose 50% Injectable 12.5 Gram(s) IV Push once  dextrose 50% Injectable 25 Gram(s) IV Push once  dextrose 50% Injectable 25 Gram(s) IV Push once  fluticasone propionate 50 MICROgram(s)/spray Nasal Spray 1 Spray(s) Both Nostrils two times a day  furosemide    Tablet 20 milliGRAM(s) Oral daily  guaiFENesin ER 1200 milliGRAM(s) Oral every 12 hours  heparin  Injectable 5000 Unit(s) SubCutaneous every 12 hours  influenza   Vaccine 0.5 milliLiter(s) IntraMuscular once  insulin glargine Injectable (LANTUS) 20 Unit(s) SubCutaneous at bedtime  insulin lispro (HumaLOG) corrective regimen sliding scale   SubCutaneous three times a day before meals  lisinopril 2.5 milliGRAM(s) Oral daily  melatonin 1 milliGRAM(s) Oral at bedtime  metoprolol succinate ER 50 milliGRAM(s) Oral daily  multivitamin/minerals 1 Tablet(s) Oral daily  pantoprazole    Tablet 40 milliGRAM(s) Oral before breakfast  piperacillin/tazobactam IVPB.. 3.375 Gram(s) IV Intermittent every 8 hours  polyethylene glycol 3350 17 Gram(s) Oral daily  saccharomyces boulardii 250 milliGRAM(s) Oral two times a day  spironolactone 25 milliGRAM(s) Oral daily  ticagrelor 90 milliGRAM(s) Oral every 12 hours  tiotropium 18 MICROgram(s) Capsule 1 Capsule(s) Inhalation daily  vancomycin  IVPB 750 milliGRAM(s) IV Intermittent every 12 hours    MEDICATIONS  (PRN):  albuterol/ipratropium for Nebulization 3 milliLiter(s) Nebulizer every 6 hours PRN Shortness of Breath and/or Wheezing  benzocaine 15 mG/menthol 3.6 mG (Sugar-Free) Lozenge 1 Lozenge Oral every 4 hours PRN Sore Throat  dextrose 40% Gel 15 Gram(s) Oral once PRN Blood Glucose LESS THAN 70 milliGRAM(s)/deciliter  glucagon  Injectable 1 milliGRAM(s) IntraMuscular once PRN Glucose LESS THAN 70 milligrams/deciliter    Pertinent Labs: CBC Full  -  ( 27 Dec 2019 06:29 )  WBC Count : 13.10 K/uL  RBC Count : 4.01 M/uL  Hemoglobin : 9.7 g/dL  Hematocrit : 30.8 %  Platelet Count - Automated : 214 K/uL  Mean Cell Volume : 76.8 fl  Mean Cell Hemoglobin : 24.2 pg  Mean Cell Hemoglobin Concentration : 31.5 gm/dL  Auto Neutrophil # : x  Auto Lymphocyte # : x  Auto Monocyte # : x  Auto Eosinophil # : x  Auto Basophil # : x  Auto Neutrophil % : x  Auto Lymphocyte % : x  Auto Monocyte % : x  Auto Eosinophil % : x  Auto Basophil % : x  12-27 Na137 mmol/L Glu 110 mg/dL K+ 4.3 mmol/L Cr  1.26 mg/dL BUN 28.0 mg/dL<H> Phos n/a   Alb 3.5 g/dL PAB n/a       Skin: Intact    Nutrition focused physical exam previously conducted - found signs of malnutrition [ ]absent [ x]present    Subcutaneous fat loss: [x ] Orbital fat pads region, [x ]Buccal fat region, [ ]Triceps region,  [ ]Ribs region    Muscle wasting: [x ]Temples region, [x ]Clavicle region, [ x]Shoulder region, [ ]Scapula region, [ ]Interosseous region,  [ ]thigh region, [ ]Calf region    Estimated Needs:   [x ] no change since previous assessment  [ ] recalculated:     Current Nutrition Diagnosis: Pt remains at high nutrition risk and meets criteria for severe (acute) malnutrition related to inability to meet sufficient protein-energy in setting of poor appetite/recent hospitalization, CHF, and multiple organ dysfunction as evidenced by meeting <50% nutrient needs >/5 days, muscle wasting and fat loss, 6.2% wt loss x ~3 weeks. Family reported pt has much improved po intake. Pt family educated to encourage HBV protein intake at meals for pt. Family has no current nutrition questions or concerns. RD to remain available.     Recommendations:   1) Continue glucerna BID to optimize po intake and provide an additional 220kcal, 10g protein per serving   2) Encourage po intake and HBV protein  3) Monitor weights and labs  4) Continue MVI and Vitamin C (500mg) daily     Monitoring and Evaluation:   [ x] PO intake [x ] Tolerance to diet prescription [X] Weights  [X] Follow up per protocol [X] Labs:

## 2019-12-27 NOTE — CONSULT NOTE ADULT - ASSESSMENT
74 YOM h/o CAD, CABG, HFrEF, MI, HTN, HLD, DM, PAF on last admission pt was recently discharged on Thursday after being treated for decompensated HF with milrinone, dopamine(which he did not tolerate).  Pt presents to ER with weakness and vomiting since yesterday.  Per pt's son since he was discharged his appetite has been very poor and he has been weak but yesterday started vomiting and not tolerating PO medications.   In ER found to have multiple organ dysfunction including RAS, acute liver transaminitis, DKA/anion gap acidosis.  Pt denies CP, no SOB, admits to feeling very cold. (22 Dec 2019 18:25) 74 YOM h/o CAD, CABG, HFrEF, MI, HTN, HLD, DM, PAF on last admission pt was recently discharged on Thursday after being treated for decompensated HF with milrinone, dopamine(which he did not tolerate).  Pt presents to ER with weakness and vomiting since yesterday.  Per pt's son since he was discharged his appetite has been very poor and he has been weak but yesterday started vomiting and not tolerating PO medications.   In ER found to have multiple organ dysfunction including RAS, acute liver transaminitis, DKA/anion gap acidosis.  Also being treated for pulmonary edema. Per patient his furosemide was changed to spironolactone recently.      Overall DM, s/p DKA, s/p pulmonary edema, transaminitis  leukocytosis, pneumonia concern for HAP  - patient developed productive cough and leukocytosis after being admitted  - Blood cultures   - Sputum CX  - Ct chest  - legionella urine  - Procalcitonin level 0.13  - RVP (-)  - Continue Zosyn and vancomycin adjusted for renal function  - Check vanco trough prior to 4th dose  - Trend Fever  - Trend Leukocytosis  - Trend LFT's    d/w Dr Rm  Will Follow

## 2019-12-28 LAB
ALBUMIN SERPL ELPH-MCNC: 3.2 G/DL — LOW (ref 3.3–5.2)
ALP SERPL-CCNC: 112 U/L — SIGNIFICANT CHANGE UP (ref 40–120)
ALT FLD-CCNC: 973 U/L — HIGH
ANION GAP SERPL CALC-SCNC: 12 MMOL/L — SIGNIFICANT CHANGE UP (ref 5–17)
APTT BLD: 30.7 SEC — SIGNIFICANT CHANGE UP (ref 27.5–36.3)
AST SERPL-CCNC: 98 U/L — HIGH
BILIRUB DIRECT SERPL-MCNC: 0.4 MG/DL — HIGH (ref 0–0.3)
BILIRUB INDIRECT FLD-MCNC: 0.7 MG/DL — SIGNIFICANT CHANGE UP (ref 0.2–1)
BILIRUB SERPL-MCNC: 1.1 MG/DL — SIGNIFICANT CHANGE UP (ref 0.4–2)
BUN SERPL-MCNC: 25 MG/DL — HIGH (ref 8–20)
CALCIUM SERPL-MCNC: 8.7 MG/DL — SIGNIFICANT CHANGE UP (ref 8.6–10.2)
CHLORIDE SERPL-SCNC: 96 MMOL/L — LOW (ref 98–107)
CO2 SERPL-SCNC: 27 MMOL/L — SIGNIFICANT CHANGE UP (ref 22–29)
CREAT SERPL-MCNC: 1.16 MG/DL — SIGNIFICANT CHANGE UP (ref 0.5–1.3)
GLUCOSE BLDC GLUCOMTR-MCNC: 118 MG/DL — HIGH (ref 70–99)
GLUCOSE BLDC GLUCOMTR-MCNC: 219 MG/DL — HIGH (ref 70–99)
GLUCOSE BLDC GLUCOMTR-MCNC: 228 MG/DL — HIGH (ref 70–99)
GLUCOSE BLDC GLUCOMTR-MCNC: 356 MG/DL — HIGH (ref 70–99)
GLUCOSE SERPL-MCNC: 144 MG/DL — HIGH (ref 70–115)
GRAM STN FLD: SIGNIFICANT CHANGE UP
HCT VFR BLD CALC: 29.4 % — LOW (ref 39–50)
HGB BLD-MCNC: 9.1 G/DL — LOW (ref 13–17)
INR BLD: 1.23 RATIO — HIGH (ref 0.88–1.16)
LEGIONELLA AG UR QL: NEGATIVE — SIGNIFICANT CHANGE UP
MAGNESIUM SERPL-MCNC: 1.8 MG/DL — SIGNIFICANT CHANGE UP (ref 1.6–2.6)
MCHC RBC-ENTMCNC: 24.2 PG — LOW (ref 27–34)
MCHC RBC-ENTMCNC: 31 GM/DL — LOW (ref 32–36)
MCV RBC AUTO: 78.2 FL — LOW (ref 80–100)
NT-PROBNP SERPL-SCNC: 2075 PG/ML — HIGH (ref 0–300)
PLATELET # BLD AUTO: 223 K/UL — SIGNIFICANT CHANGE UP (ref 150–400)
POTASSIUM SERPL-MCNC: 4.2 MMOL/L — SIGNIFICANT CHANGE UP (ref 3.5–5.3)
POTASSIUM SERPL-SCNC: 4.2 MMOL/L — SIGNIFICANT CHANGE UP (ref 3.5–5.3)
PROT SERPL-MCNC: 6.4 G/DL — LOW (ref 6.6–8.7)
PROTHROM AB SERPL-ACNC: 14.2 SEC — HIGH (ref 10–12.9)
RBC # BLD: 3.76 M/UL — LOW (ref 4.2–5.8)
RBC # FLD: 20.3 % — HIGH (ref 10.3–14.5)
SODIUM SERPL-SCNC: 135 MMOL/L — SIGNIFICANT CHANGE UP (ref 135–145)
SPECIMEN SOURCE: SIGNIFICANT CHANGE UP
WBC # BLD: 9.06 K/UL — SIGNIFICANT CHANGE UP (ref 3.8–10.5)
WBC # FLD AUTO: 9.06 K/UL — SIGNIFICANT CHANGE UP (ref 3.8–10.5)

## 2019-12-28 PROCEDURE — 99232 SBSQ HOSP IP/OBS MODERATE 35: CPT

## 2019-12-28 RX ORDER — VANCOMYCIN HCL 1 G
1000 VIAL (EA) INTRAVENOUS
Refills: 0 | Status: DISCONTINUED | OUTPATIENT
Start: 2019-12-28 | End: 2019-12-30

## 2019-12-28 RX ORDER — POTASSIUM CHLORIDE 20 MEQ
40 PACKET (EA) ORAL ONCE
Refills: 0 | Status: COMPLETED | OUTPATIENT
Start: 2019-12-28 | End: 2019-12-28

## 2019-12-28 RX ORDER — MAGNESIUM OXIDE 400 MG ORAL TABLET 241.3 MG
400 TABLET ORAL EVERY 8 HOURS
Refills: 0 | Status: COMPLETED | OUTPATIENT
Start: 2019-12-28 | End: 2019-12-29

## 2019-12-28 RX ADMIN — SPIRONOLACTONE 25 MILLIGRAM(S): 25 TABLET, FILM COATED ORAL at 06:35

## 2019-12-28 RX ADMIN — MAGNESIUM OXIDE 400 MG ORAL TABLET 400 MILLIGRAM(S): 241.3 TABLET ORAL at 22:13

## 2019-12-28 RX ADMIN — PANTOPRAZOLE SODIUM 40 MILLIGRAM(S): 20 TABLET, DELAYED RELEASE ORAL at 06:35

## 2019-12-28 RX ADMIN — Medication 20 MILLIGRAM(S): at 06:43

## 2019-12-28 RX ADMIN — Medication 1 TABLET(S): at 11:03

## 2019-12-28 RX ADMIN — Medication 100 MILLIGRAM(S): at 03:02

## 2019-12-28 RX ADMIN — HEPARIN SODIUM 5000 UNIT(S): 5000 INJECTION INTRAVENOUS; SUBCUTANEOUS at 22:13

## 2019-12-28 RX ADMIN — PIPERACILLIN AND TAZOBACTAM 25 GRAM(S): 4; .5 INJECTION, POWDER, LYOPHILIZED, FOR SOLUTION INTRAVENOUS at 06:31

## 2019-12-28 RX ADMIN — TICAGRELOR 90 MILLIGRAM(S): 90 TABLET ORAL at 18:08

## 2019-12-28 RX ADMIN — Medication 4: at 22:09

## 2019-12-28 RX ADMIN — Medication 1200 MILLIGRAM(S): at 11:04

## 2019-12-28 RX ADMIN — Medication 50 MILLIGRAM(S): at 06:35

## 2019-12-28 RX ADMIN — Medication 250 MILLIGRAM(S): at 06:36

## 2019-12-28 RX ADMIN — Medication 1200 MILLIGRAM(S): at 22:13

## 2019-12-28 RX ADMIN — INSULIN GLARGINE 20 UNIT(S): 100 INJECTION, SOLUTION SUBCUTANEOUS at 22:09

## 2019-12-28 RX ADMIN — Medication 250 MILLIGRAM(S): at 18:08

## 2019-12-28 RX ADMIN — Medication 10: at 12:29

## 2019-12-28 RX ADMIN — POLYETHYLENE GLYCOL 3350 17 GRAM(S): 17 POWDER, FOR SOLUTION ORAL at 11:04

## 2019-12-28 RX ADMIN — BENZOCAINE AND MENTHOL 1 LOZENGE: 5; 1 LIQUID ORAL at 19:51

## 2019-12-28 RX ADMIN — PIPERACILLIN AND TAZOBACTAM 25 GRAM(S): 4; .5 INJECTION, POWDER, LYOPHILIZED, FOR SOLUTION INTRAVENOUS at 15:03

## 2019-12-28 RX ADMIN — Medication 250 MILLIGRAM(S): at 11:20

## 2019-12-28 RX ADMIN — LISINOPRIL 2.5 MILLIGRAM(S): 2.5 TABLET ORAL at 06:35

## 2019-12-28 RX ADMIN — Medication 40 MILLIEQUIVALENT(S): at 22:14

## 2019-12-28 RX ADMIN — Medication 4: at 18:08

## 2019-12-28 RX ADMIN — HEPARIN SODIUM 5000 UNIT(S): 5000 INJECTION INTRAVENOUS; SUBCUTANEOUS at 11:03

## 2019-12-28 RX ADMIN — PIPERACILLIN AND TAZOBACTAM 25 GRAM(S): 4; .5 INJECTION, POWDER, LYOPHILIZED, FOR SOLUTION INTRAVENOUS at 23:50

## 2019-12-28 RX ADMIN — TICAGRELOR 90 MILLIGRAM(S): 90 TABLET ORAL at 06:35

## 2019-12-28 RX ADMIN — Medication 1 MILLIGRAM(S): at 22:13

## 2019-12-28 RX ADMIN — Medication 81 MILLIGRAM(S): at 11:03

## 2019-12-28 RX ADMIN — Medication 1 SPRAY(S): at 22:14

## 2019-12-28 RX ADMIN — Medication 250 MILLIGRAM(S): at 22:13

## 2019-12-28 RX ADMIN — Medication 1 SPRAY(S): at 11:04

## 2019-12-28 RX ADMIN — Medication 500 MILLIGRAM(S): at 11:08

## 2019-12-28 NOTE — PROGRESS NOTE ADULT - ASSESSMENT
75y/o male PMH HTN, HLD, HFrEF, DM, CKD, CAD s/p CABG, prior MI, s/p PCI/brachy, (6/20/19- LM 70/90%, LAD diffuse disease, Cx Prox 100%, RCA known , LIMA To LAD patent, SVG to OM 80% with ISR)/(7/2019- SVG to OM1 80%, brachy to SVG to OM1, PCI native OM), TTE 12/18/19- EF 25-30%, WMA, mild PHTN, mild AS, present to ED with feeling cold, nausea vomiting since last night.  Found to have DKA, Pneumonia and multi system organ failure.       CHF systolic dysfunction  TTE EF 25-30%  s/p run of milrinone,    Toprol XL.  low dose ACe-I tomorrow   PO lasix. add aldactone f/u lfts tomorrow.      Irregular Heart rate  - ST on monitor  - last hospitalization possible afib- plan for [facility] monitor outpatient.     Acute Liver injury   unclear caus.e   NAC given LFts improving.  differntial: Acute loiver injury dut to DKA, or any drug induced.   Less likjely to be congestive hepatopathy as patient was given fluids and volume depleted. 73y/o male PMH HTN, HLD, HFrEF, DM, CKD, CAD s/p CABG, prior MI, s/p PCI/brachy, (6/20/19- LM 70/90%, LAD diffuse disease, Cx Prox 100%, RCA known , LIMA To LAD patent, SVG to OM 80% with ISR)/(7/2019- SVG to OM1 80%, brachy to SVG to OM1, PCI native OM), TTE 12/18/19- EF 25-30%, WMA, mild PHTN, mild AS, present to ED with feeling cold, nausea vomiting since last night.  Found to have DKA, Pneumonia and multi system organ failure.       CHF systolic dysfunction -> EF 25-30%  s/p run of milrinone,    Start low dose ace today  on po lasix and aldactone       Irregular Heart rate  Sinus rhythmn  on monitor last hospitalization  possible afib- plan for [facility] monitor outpatient.     Acute Liver injury  LFT trending downward    Thyroid nodule/Diabetes discussed with son  to follow up with endocrine

## 2019-12-28 NOTE — PROGRESS NOTE ADULT - SUBJECTIVE AND OBJECTIVE BOX
INTERVAL HPI/OVERNIGHT EVENTS:Doing well. No complaints. LFTs trending down nicely. INR is stable    MEDICATIONS  (STANDING):  ascorbic acid 500 milliGRAM(s) Oral daily  aspirin  chewable 81 milliGRAM(s) Oral daily  dextrose 5%. 1000 milliLiter(s) (50 mL/Hr) IV Continuous <Continuous>  dextrose 50% Injectable 12.5 Gram(s) IV Push once  dextrose 50% Injectable 25 Gram(s) IV Push once  dextrose 50% Injectable 25 Gram(s) IV Push once  fluticasone propionate 50 MICROgram(s)/spray Nasal Spray 1 Spray(s) Both Nostrils two times a day  furosemide    Tablet 20 milliGRAM(s) Oral daily  guaiFENesin ER 1200 milliGRAM(s) Oral every 12 hours  heparin  Injectable 5000 Unit(s) SubCutaneous every 12 hours  influenza   Vaccine 0.5 milliLiter(s) IntraMuscular once  insulin glargine Injectable (LANTUS) 20 Unit(s) SubCutaneous at bedtime  insulin lispro (HumaLOG) corrective regimen sliding scale   SubCutaneous Before meals and at bedtime  lisinopril 2.5 milliGRAM(s) Oral daily  melatonin 1 milliGRAM(s) Oral at bedtime  metoprolol succinate ER 50 milliGRAM(s) Oral daily  multivitamin/minerals 1 Tablet(s) Oral daily  pantoprazole    Tablet 40 milliGRAM(s) Oral before breakfast  piperacillin/tazobactam IVPB.. 3.375 Gram(s) IV Intermittent every 8 hours  polyethylene glycol 3350 17 Gram(s) Oral daily  saccharomyces boulardii 250 milliGRAM(s) Oral two times a day  spironolactone 25 milliGRAM(s) Oral daily  ticagrelor 90 milliGRAM(s) Oral every 12 hours  tiotropium 18 MICROgram(s) Capsule 1 Capsule(s) Inhalation daily  vancomycin  IVPB 1000 milliGRAM(s) IV Intermittent <User Schedule>    MEDICATIONS  (PRN):  albuterol/ipratropium for Nebulization 3 milliLiter(s) Nebulizer every 6 hours PRN Shortness of Breath and/or Wheezing  benzocaine 15 mG/menthol 3.6 mG (Sugar-Free) Lozenge 1 Lozenge Oral every 4 hours PRN Sore Throat  benzonatate 100 milliGRAM(s) Oral every 8 hours PRN Cough  dextrose 40% Gel 15 Gram(s) Oral once PRN Blood Glucose LESS THAN 70 milliGRAM(s)/deciliter  glucagon  Injectable 1 milliGRAM(s) IntraMuscular once PRN Glucose LESS THAN 70 milligrams/deciliter      Allergies    Allergy Status Unknown    Intolerances    DOPamine (Hypotension)      Vital Signs Last 24 Hrs  T(C): 36.8 (28 Dec 2019 09:44), Max: 36.8 (28 Dec 2019 09:44)  T(F): 98.3 (28 Dec 2019 09:44), Max: 98.3 (28 Dec 2019 09:44)  HR: 87 (28 Dec 2019 09:44) (87 - 92)  BP: 100/64 (28 Dec 2019 09:44) (100/64 - 109/73)  BP(mean): --  RR: 20 (28 Dec 2019 09:44) (16 - 20)  SpO2: 100% (28 Dec 2019 09:44) (99% - 100%)    LABS:                        9.1    9.06  )-----------( 223      ( 28 Dec 2019 06:17 )             29.4     12-28    135  |  96<L>  |  25.0<H>  ----------------------------<  144<H>  4.2   |  27.0  |  1.16    Ca    8.7      28 Dec 2019 06:15  Mg     1.8     12-28    TPro  6.4<L>  /  Alb  3.2<L>  /  TBili  1.1  /  DBili  0.4<H>  /  AST  98<H>  /  ALT  973<H>  /  AlkPhos  112  12-28    PT/INR - ( 28 Dec 2019 06:17 )   PT: 14.2 sec;   INR: 1.23 ratio         PTT - ( 28 Dec 2019 06:17 )  PTT:30.7 sec      RADIOLOGY & ADDITIONAL TESTS:

## 2019-12-28 NOTE — PROGRESS NOTE ADULT - SUBJECTIVE AND OBJECTIVE BOX
ADRIANNA SHANKS    [id number]    74y      Male    CC: DKA    INTERVAL HPI/OVERNIGHT EVENTS: Pt seen and examined. Family at bedside. Feeling much better. Denies current CP, SOB ,abd pain, n/v    REVIEW OF SYSTEMS:    CONSTITUTIONAL: No fever, weight loss, or fatigue  RESPIRATORY: No cough, wheezing, hemoptysis; No shortness of breath  CARDIOVASCULAR: No chest pain, palpitations  GASTROINTESTINAL: No abdominal or epigastric pain. No nausea, vomiting  NEUROLOGICAL: No headaches, memory loss, loss of strength.    Vital Signs Last 24 Hrs  T(C): 36.8 (28 Dec 2019 09:44), Max: 36.8 (28 Dec 2019 09:44)  T(F): 98.3 (28 Dec 2019 09:44), Max: 98.3 (28 Dec 2019 09:44)  HR: 87 (28 Dec 2019 09:44) (87 - 92)  BP: 103/65 (28 Dec 2019 18:00) (100/64 - 109/73)  BP(mean): --  RR: 20 (28 Dec 2019 09:44) (16 - 20)  SpO2: 100% (28 Dec 2019 09:44) (99% - 100%)    PHYSICAL EXAM:    GENERAL: NAD  HEENT: PERRL, +EOMI  NECK: soft, supple  CHEST/LUNG: Clear to auscultation bilaterally; No wheezing  HEART: S1S2+, No murmurs, rubs, or gallops  ABDOMEN: Soft, Nontender, Nondistended; Bowel sounds present  SKIN: warm, dry  NEURO: AAOX3, no focal deficits  PSYCH: normal mood    LABS:                        9.1    9.06  )-----------( 223      ( 28 Dec 2019 06:17 )             29.4     12-28    135  |  96<L>  |  25.0<H>  ----------------------------<  144<H>  4.2   |  27.0  |  1.16    Ca    8.7      28 Dec 2019 06:15  Mg     1.8     12-28    TPro  6.4<L>  /  Alb  3.2<L>  /  TBili  1.1  /  DBili  0.4<H>  /  AST  98<H>  /  ALT  973<H>  /  AlkPhos  112  12-28    PT/INR - ( 28 Dec 2019 06:17 )   PT: 14.2 sec;   INR: 1.23 ratio         PTT - ( 28 Dec 2019 06:17 )  PTT:30.7 sec        MEDICATIONS  (STANDING):  ascorbic acid 500 milliGRAM(s) Oral daily  aspirin  chewable 81 milliGRAM(s) Oral daily  dextrose 5%. 1000 milliLiter(s) (50 mL/Hr) IV Continuous <Continuous>  dextrose 50% Injectable 12.5 Gram(s) IV Push once  dextrose 50% Injectable 25 Gram(s) IV Push once  dextrose 50% Injectable 25 Gram(s) IV Push once  fluticasone propionate 50 MICROgram(s)/spray Nasal Spray 1 Spray(s) Both Nostrils two times a day  furosemide    Tablet 20 milliGRAM(s) Oral daily  guaiFENesin ER 1200 milliGRAM(s) Oral every 12 hours  heparin  Injectable 5000 Unit(s) SubCutaneous every 12 hours  influenza   Vaccine 0.5 milliLiter(s) IntraMuscular once  insulin glargine Injectable (LANTUS) 20 Unit(s) SubCutaneous at bedtime  insulin lispro (HumaLOG) corrective regimen sliding scale   SubCutaneous Before meals and at bedtime  lisinopril 2.5 milliGRAM(s) Oral daily  melatonin 1 milliGRAM(s) Oral at bedtime  metoprolol succinate ER 50 milliGRAM(s) Oral daily  multivitamin/minerals 1 Tablet(s) Oral daily  pantoprazole    Tablet 40 milliGRAM(s) Oral before breakfast  piperacillin/tazobactam IVPB.. 3.375 Gram(s) IV Intermittent every 8 hours  polyethylene glycol 3350 17 Gram(s) Oral daily  saccharomyces boulardii 250 milliGRAM(s) Oral two times a day  spironolactone 25 milliGRAM(s) Oral daily  ticagrelor 90 milliGRAM(s) Oral every 12 hours  tiotropium 18 MICROgram(s) Capsule 1 Capsule(s) Inhalation daily  vancomycin  IVPB 1000 milliGRAM(s) IV Intermittent <User Schedule>    MEDICATIONS  (PRN):  albuterol/ipratropium for Nebulization 3 milliLiter(s) Nebulizer every 6 hours PRN Shortness of Breath and/or Wheezing  benzocaine 15 mG/menthol 3.6 mG (Sugar-Free) Lozenge 1 Lozenge Oral every 4 hours PRN Sore Throat  benzonatate 100 milliGRAM(s) Oral every 8 hours PRN Cough  dextrose 40% Gel 15 Gram(s) Oral once PRN Blood Glucose LESS THAN 70 milliGRAM(s)/deciliter  glucagon  Injectable 1 milliGRAM(s) IntraMuscular once PRN Glucose LESS THAN 70 milligrams/deciliter      RADIOLOGY & ADDITIONAL TESTS:

## 2019-12-28 NOTE — PROGRESS NOTE ADULT - SUBJECTIVE AND OBJECTIVE BOX
[facility]  INFECTIOUS DISEASES AND INTERNAL MEDICINE at [place]  =======================================================  Austyn Delgadillo MD  Diplomates American Board of Internal Medicine and Infectious Diseases  Tel: [phone]      Fax: [phone]  =======================================================    BEARCHARLENEROCÍO ADRIANNA [id number]    Follow up: pneumonia    Allergies:  Allergy Status Unknown  DOPamine (Hypotension)      Medications:  albuterol/ipratropium for Nebulization 3 milliLiter(s) Nebulizer every 6 hours PRN  ascorbic acid 500 milliGRAM(s) Oral daily  aspirin  chewable 81 milliGRAM(s) Oral daily  benzocaine 15 mG/menthol 3.6 mG (Sugar-Free) Lozenge 1 Lozenge Oral every 4 hours PRN  benzonatate 100 milliGRAM(s) Oral every 8 hours PRN  dextrose 40% Gel 15 Gram(s) Oral once PRN  dextrose 5%. 1000 milliLiter(s) IV Continuous <Continuous>  dextrose 50% Injectable 12.5 Gram(s) IV Push once  dextrose 50% Injectable 25 Gram(s) IV Push once  dextrose 50% Injectable 25 Gram(s) IV Push once  fluticasone propionate 50 MICROgram(s)/spray Nasal Spray 1 Spray(s) Both Nostrils two times a day  furosemide    Tablet 20 milliGRAM(s) Oral daily  glucagon  Injectable 1 milliGRAM(s) IntraMuscular once PRN  guaiFENesin ER 1200 milliGRAM(s) Oral every 12 hours  heparin  Injectable 5000 Unit(s) SubCutaneous every 12 hours  influenza   Vaccine 0.5 milliLiter(s) IntraMuscular once  insulin glargine Injectable (LANTUS) 20 Unit(s) SubCutaneous at bedtime  insulin lispro (HumaLOG) corrective regimen sliding scale   SubCutaneous Before meals and at bedtime  lisinopril 2.5 milliGRAM(s) Oral daily  melatonin 1 milliGRAM(s) Oral at bedtime  metoprolol succinate ER 50 milliGRAM(s) Oral daily  multivitamin/minerals 1 Tablet(s) Oral daily  pantoprazole    Tablet 40 milliGRAM(s) Oral before breakfast  piperacillin/tazobactam IVPB.. 3.375 Gram(s) IV Intermittent every 8 hours  polyethylene glycol 3350 17 Gram(s) Oral daily  saccharomyces boulardii 250 milliGRAM(s) Oral two times a day  spironolactone 25 milliGRAM(s) Oral daily  ticagrelor 90 milliGRAM(s) Oral every 12 hours  tiotropium 18 MICROgram(s) Capsule 1 Capsule(s) Inhalation daily  vancomycin  IVPB 1000 milliGRAM(s) IV Intermittent <User Schedule>            REVIEW OF SYSTEMS:  CONSTITUTIONAL:  No Fever or chills  HEENT:   No diplopia or blurred vision.  No earache, sore throat or runny nose.  CARDIOVASCULAR:  No pressure, squeezing, strangling, tightness, heaviness or aching about the chest, neck, axilla or epigastrium.  RESPIRATORY:  No cough, shortness of breath  GASTROINTESTINAL:  No nausea, vomiting or diarrhea.  GENITOURINARY:  No dysuria, frequency or urgency. No Blood in urine  MUSCULOSKELETAL:  no joint aches, no muscle pain  SKIN:  No change in skin, hair or nails.  NEUROLOGIC:  No Headaches, seizures or weakness.  PSYCHIATRIC:  No disorder of thought or mood.  ENDOCRINE:  No heat or cold intolerance  HEMATOLOGICAL:  No easy bruising or bleeding.            Physical Exam:  ICU Vital Signs Last 24 Hrs  T(C): 36.8 (28 Dec 2019 09:44), Max: 36.8 (28 Dec 2019 09:44)  T(F): 98.3 (28 Dec 2019 09:44), Max: 98.3 (28 Dec 2019 09:44)  HR: 87 (28 Dec 2019 09:44) (87 - 92)  BP: 100/64 (28 Dec 2019 09:44) (99/63 - 109/73)  BP(mean): --  ABP: --  ABP(mean): --  RR: 20 (28 Dec 2019 09:44) (16 - 20)  SpO2: 100% (28 Dec 2019 09:44) (99% - 100%)      GEN: NAD, pleasant  HEENT: normocephalic and atraumatic. EOMI. PERRL.  Anicteric   NECK: Supple.   LUNGS: Clear to auscultation.  HEART: Regular rate and rhythm without murmur.  ABDOMEN: Soft, nontender, and nondistended.  Positive bowel sounds.    : No CVA tenderness  EXTREMITIES: Without any edema.  MSK: no joint swelling  NEUROLOGIC: Cranial nerves II through XII are grossly intact. No focal deficits  PSYCHIATRIC: Appropriate affect .  SKIN: No Rash      Labs:  12-28    135  |  96<L>  |  25.0<H>  ----------------------------<  144<H>  4.2   |  27.0  |  1.16    Ca    8.7      28 Dec 2019 06:15  Mg     1.8     12-28    TPro  6.4<L>  /  Alb  3.2<L>  /  TBili  1.1  /  DBili  0.4<H>  /  AST  98<H>  /  ALT  973<H>  /  AlkPhos  112  12-28                          9.1    9.06  )-----------( 223      ( 28 Dec 2019 06:17 )             29.4       PT/INR - ( 28 Dec 2019 06:17 )   PT: 14.2 sec;   INR: 1.23 ratio         PTT - ( 28 Dec 2019 06:17 )  PTT:30.7 sec    LIVER FUNCTIONS - ( 28 Dec 2019 06:15 )  Alb: 3.2 g/dL / Pro: 6.4 g/dL / ALK PHOS: 112 U/L / ALT: 973 U/L / AST: 98 U/L / GGT: x               CAPILLARY BLOOD GLUCOSE      POCT Blood Glucose.: 356 mg/dL (28 Dec 2019 12:18)  POCT Blood Glucose.: 118 mg/dL (28 Dec 2019 08:08)  POCT Blood Glucose.: 278 mg/dL (27 Dec 2019 22:18)  POCT Blood Glucose.: 284 mg/dL (27 Dec 2019 20:52)  POCT Blood Glucose.: 198 mg/dL (27 Dec 2019 17:06)        RECENT CULTURES:  12-23 @ 22:49          [facility]

## 2019-12-28 NOTE — PROGRESS NOTE ADULT - SUBJECTIVE AND OBJECTIVE BOX
[facility]  [address], [place] [address]    REASON FOR VISIT:  Follow up on multiorgan failure  UPDATE:  Feeling much better   Ct chest with infiltrate and small pleural effusions. Liver enzymes trending downward  TELEMETRY MONITORING:  sinus rhythmn        12-28    135  |  96<L>  |  25.0<H>  ----------------------------<  144<H>  4.2   |  27.0  |  1.16    Ca    8.7      28 Dec 2019 06:15  Mg     1.8     12-28    TPro  6.4<L>  /  Alb  3.2<L>  /  TBili  1.1  /  DBili  0.4<H>  /  AST  98<H>  /  ALT  973<H>  /  AlkPhos  112  12-28    LIVER FUNCTIONS - ( 28 Dec 2019 06:15 )  Alb: 3.2 g/dL / Pro: 6.4 g/dL / ALK PHOS: 112 U/L / ALT: 973 U/L / AST: 98 U/L / GGT: x               12-27-19 @ 07:01  -  12-28-19 @ 07:00  --------------------------------------------------------  IN: 550 mL / OUT: 850 mL / NET: -300 mL    MEDICATIONS  (STANDING):  ascorbic acid 500 milliGRAM(s) Oral daily  aspirin  chewable 81 milliGRAM(s) Oral daily  fluticasone propionate 50 MICROgram(s)/spray Nasal Spray 1 Spray(s) Both Nostrils two times a day  furosemide    Tablet 20 milliGRAM(s) Oral daily  guaiFENesin ER 1200 milliGRAM(s) Oral every 12 hours  heparin  Injectable 5000 Unit(s) SubCutaneous every 12 hours  influenza   Vaccine 0.5 milliLiter(s) IntraMuscular once  insulin glargine Injectable (LANTUS) 20 Unit(s) SubCutaneous at bedtime  insulin lispro (HumaLOG) corrective regimen sliding scale   SubCutaneous Before meals and at bedtime  lisinopril 2.5 milliGRAM(s) Oral daily  melatonin 1 milliGRAM(s) Oral at bedtime  metoprolol succinate ER 50 milliGRAM(s) Oral daily  multivitamin/minerals 1 Tablet(s) Oral daily  pantoprazole    Tablet 40 milliGRAM(s) Oral before breakfast  piperacillin/tazobactam IVPB.. 3.375 Gram(s) IV Intermittent every 8 hours  polyethylene glycol 3350 17 Gram(s) Oral daily  saccharomyces boulardii 250 milliGRAM(s) Oral two times a day  spironolactone 25 milliGRAM(s) Oral daily  ticagrelor 90 milliGRAM(s) Oral every 12 hours  tiotropium 18 MICROgram(s) Capsule 1 Capsule(s) Inhalation daily  vancomycin  IVPB 1000 milliGRAM(s) IV Intermittent <User Schedule>    Vital Signs Last 24 Hrs  T(C): 36.8 (28 Dec 2019 09:44), Max: 36.8 (28 Dec 2019 09:44)  T(F): 98.3 (28 Dec 2019 09:44), Max: 98.3 (28 Dec 2019 09:44)  HR: 87 (28 Dec 2019 09:44) (87 - 92)  BP: 100/64 (28 Dec 2019 09:44) (99/63 - 109/73)  BP(mean): --  RR: 20 (28 Dec 2019 09:44) (16 - 20)  SpO2: 100% (28 Dec 2019 09:44) (99% - 100%)  T(C): 36.8 (12-28-19 @ 09:44), Max: 36.8 (12-28-19 @ 09:44)  HR: 87 (12-28-19 @ 09:44) (87 - 92)  BP: 100/64 (12-28-19 @ 09:44) (99/63 - 109/73)  RR: 20 (12-28-19 @ 09:44) (16 - 20)  SpO2: 100% (12-28-19 @ 09:44) (99% - 100%)    HEENT Head atraumatic eomi, oral mucosa moist  CV S1&S2 Regular  RESP  CTA  GI  Soft active bowel sounds non tender  EXT  No clubbing/Cyanosis /No edema of legs  Feet with edema  NEURO  Alert oriented  No gross motor or sensory deficits    < from: TTE Echo Complete w/Doppler (12.18.19 @ 10:42) >   1. Left ventricular ejection fraction, by visual estimation, is 25 to 30%.   2. Entire inferior wall, mid and apical anterior septum, basal and mid inferior septum, and mid inferolateral segmentare abnormal as described above.   3. Normal left ventricular internal cavity size.   4. The mitral in-flow pattern reveals no discernable A-wave, therefore no comment on diastolic function can be made.   5. There is no evidence of pericardial effusion.   6. The mitral valve leaflets are tethered which is due to reduced systolic function and elevated LVDP.   7. Sclerotic aortic valve with decreased opening.   8. Estimated pulmonary artery systolic pressure is 49.5 mmHg assuming a right atrial pressure of 3 mmHg, which is consistent with mild pulmonary hypertension.   9. Moderately dilated pulmonary artery.  10. Peak transaortic gradient equals 7.3 mmHg, mean transaortic gradient equals 4.0 mmHg, the calculated aortic valve area equals 1.63 cm² by the continuity equation consistent with mild aortic stenosis.    < from: Cardiac Cath Lab - Adult (07.24.19 @ 10:12) >  CORONARY VESSELS:  GRAFTS:   --  Graft to the 1st obtuse marginal: The graft was a saphenous  vein graft from the aorta. There was a 80 % stenosis in the middle third  of the graft.  COMPLICATIONS: There were no complications.  DIAGNOSTIC RECOMMENDATIONS: Brachytherapy done to the distal SVG to OM  lesion  Stent done in the native OM due to a coronary dissection  INTERVENTIONAL RECOMMENDATIONS: Brachytherapy done to the distal SVG to OM  lesion  Stent done in the native OM due to a coronary dissection    < from: CT Chest No Cont (12.27.19 @ 15:07) >  IMPRESSION:     Small infiltrate in the left lower lobe consistent with pneumonia.  Small bilateralpleural effusions, left larger than right.  1 cm nodule arising from the inferior aspect of the right thyroid lobe. [facility]  [facility]  [address], [place] [address]    REASON FOR VISIT:  Follow up on multiorgan failure  UPDATE:  Feeling much better   Ct chest with infiltrate and small pleural effusions. Liver enzymes trending downward  TELEMETRY MONITORING:  sinus rhythmn    12-28    135  |  96<L>  |  25.0<H>  ----------------------------<  144<H>  4.2   |  27.0  |  1.16    Ca    8.7      28 Dec 2019 06:15  Mg     1.8     12-28    TPro  6.4<L>  /  Alb  3.2<L>  /  TBili  1.1  /  DBili  0.4<H>  /  AST  98<H>  /  ALT  973<H>  /  AlkPhos  112  12-28    LIVER FUNCTIONS - ( 28 Dec 2019 06:15 )  Alb: 3.2 g/dL / Pro: 6.4 g/dL / ALK PHOS: 112 U/L / ALT: 973 U/L / AST: 98 U/L / GGT: x           12-27-19 @ 07:01  -  12-28-19 @ 07:00  --------------------------------------------------------  IN: 550 mL / OUT: 850 mL / NET: -300 mL    MEDICATIONS  (STANDING):  ascorbic acid 500 milliGRAM(s) Oral daily  aspirin  chewable 81 milliGRAM(s) Oral daily  fluticasone propionate 50 MICROgram(s)/spray Nasal Spray 1 Spray(s) Both Nostrils two times a day  furosemide    Tablet 20 milliGRAM(s) Oral daily  guaiFENesin ER 1200 milliGRAM(s) Oral every 12 hours  heparin  Injectable 5000 Unit(s) SubCutaneous every 12 hours  influenza   Vaccine 0.5 milliLiter(s) IntraMuscular once  insulin glargine Injectable (LANTUS) 20 Unit(s) SubCutaneous at bedtime  insulin lispro (HumaLOG) corrective regimen sliding scale   SubCutaneous Before meals and at bedtime  lisinopril 2.5 milliGRAM(s) Oral daily  melatonin 1 milliGRAM(s) Oral at bedtime  metoprolol succinate ER 50 milliGRAM(s) Oral daily  multivitamin/minerals 1 Tablet(s) Oral daily  pantoprazole    Tablet 40 milliGRAM(s) Oral before breakfast  piperacillin/tazobactam IVPB.. 3.375 Gram(s) IV Intermittent every 8 hours  polyethylene glycol 3350 17 Gram(s) Oral daily  saccharomyces boulardii 250 milliGRAM(s) Oral two times a day  spironolactone 25 milliGRAM(s) Oral daily  ticagrelor 90 milliGRAM(s) Oral every 12 hours  tiotropium 18 MICROgram(s) Capsule 1 Capsule(s) Inhalation daily  vancomycin  IVPB 1000 milliGRAM(s) IV Intermittent <User Schedule>    Vital Signs Last 24 Hrs  T(C): 36.8 (28 Dec 2019 09:44), Max: 36.8 (28 Dec 2019 09:44)  T(F): 98.3 (28 Dec 2019 09:44), Max: 98.3 (28 Dec 2019 09:44)  HR: 87 (28 Dec 2019 09:44) (87 - 92)  BP: 100/64 (28 Dec 2019 09:44) (99/63 - 109/73)  BP(mean): --  RR: 20 (28 Dec 2019 09:44) (16 - 20)  SpO2: 100% (28 Dec 2019 09:44) (99% - 100%)  T(C): 36.8 (12-28-19 @ 09:44), Max: 36.8 (12-28-19 @ 09:44)  HR: 87 (12-28-19 @ 09:44) (87 - 92)  BP: 100/64 (12-28-19 @ 09:44) (99/63 - 109/73)  RR: 20 (12-28-19 @ 09:44) (16 - 20)  SpO2: 100% (12-28-19 @ 09:44) (99% - 100%)    HEENT Head atraumatic eomi, oral mucosa moist  CV S1&S2 Regular  RESP  CTA  GI  Soft active bowel sounds non tender  EXT  No clubbing/Cyanosis /No edema of legs  Feet with edema  NEURO  Alert oriented  No gross motor or sensory deficits    < from: TTE Echo Complete w/Doppler (12.18.19 @ 10:42) >   1. Left ventricular ejection fraction, by visual estimation, is 25 to 30%.   2. Entire inferior wall, mid and apical anterior septum, basal and mid inferior septum, and mid inferolateral segmentare abnormal as described above.   3. Normal left ventricular internal cavity size.   4. The mitral in-flow pattern reveals no discernable A-wave, therefore no comment on diastolic function can be made.   5. There is no evidence of pericardial effusion.   6. The mitral valve leaflets are tethered which is due to reduced systolic function and elevated LVDP.   7. Sclerotic aortic valve with decreased opening.   8. Estimated pulmonary artery systolic pressure is 49.5 mmHg assuming a right atrial pressure of 3 mmHg, which is consistent with mild pulmonary hypertension.   9. Moderately dilated pulmonary artery.  10. Peak transaortic gradient equals 7.3 mmHg, mean transaortic gradient equals 4.0 mmHg, the calculated aortic valve area equals 1.63 cm² by the continuity equation consistent with mild aortic stenosis.    < from: Cardiac Cath Lab - Adult (07.24.19 @ 10:12) >  CORONARY VESSELS:  GRAFTS:   --  Graft to the 1st obtuse marginal: The graft was a saphenous  vein graft from the aorta. There was a 80 % stenosis in the middle third  of the graft.  COMPLICATIONS: There were no complications.  DIAGNOSTIC RECOMMENDATIONS: Brachytherapy done to the distal SVG to OM  lesion  Stent done in the native OM due to a coronary dissection  INTERVENTIONAL RECOMMENDATIONS: Brachytherapy done to the distal SVG to OM  lesion  Stent done in the native OM due to a coronary dissection    < from: CT Chest No Cont (12.27.19 @ 15:07) >  IMPRESSION:     Small infiltrate in the left lower lobe consistent with pneumonia.  Small bilateralpleural effusions, left larger than right.  1 cm nodule arising from the inferior aspect of the right thyroid lobe.

## 2019-12-28 NOTE — PROGRESS NOTE ADULT - ASSESSMENT
Abnormal LFTs: Due to ischemic hepatitis. Doing well. Trended LFTs intermittently. No additional GI work up needed. Call GI prn

## 2019-12-28 NOTE — PROGRESS NOTE ADULT - ASSESSMENT
74 YOM h/o CAD, CABG, HFrEF, MI, HTN, HLD, DM, PAF on last admission pt was recently discharged on Thursday after being treated for decompensated HF with milrinone, dopamine(which he did not tolerate).  Pt presents to ER with weakness and vomiting since yesterday.  Per pt's son since he was discharged his appetite has been very poor and he has been weak but yesterday started vomiting and not tolerating PO medications.   In ER found to have multiple organ dysfunction including RAS, acute liver transaminitis, DKA/anion gap acidosis.  Also being treated for pulmonary edema. Per patient his furosemide was changed to spironolactone recently.      Overall DM, s/p DKA, s/p pulmonary edema, transaminitis  leukocytosis, pneumonia concern for HAP  - patient developed productive cough and leukocytosis after being admitted  - Blood cultures   - Sputum CX  - Ct chest with left sided pneumonia  - legionella urine  - Procalcitonin level 0.13  - RVP (-)  - Continue Zosyn and vancomycin adjusted for renal function  - Check vanco trough prior to 4th dose  - Trend Fever  - Trend Leukocytosis  - Trend LFT's      Will Follow

## 2019-12-29 LAB
ANION GAP SERPL CALC-SCNC: 12 MMOL/L — SIGNIFICANT CHANGE UP (ref 5–17)
BUN SERPL-MCNC: 20 MG/DL — SIGNIFICANT CHANGE UP (ref 8–20)
CALCIUM SERPL-MCNC: 9.1 MG/DL — SIGNIFICANT CHANGE UP (ref 8.6–10.2)
CHLORIDE SERPL-SCNC: 98 MMOL/L — SIGNIFICANT CHANGE UP (ref 98–107)
CO2 SERPL-SCNC: 24 MMOL/L — SIGNIFICANT CHANGE UP (ref 22–29)
CREAT SERPL-MCNC: 1.11 MG/DL — SIGNIFICANT CHANGE UP (ref 0.5–1.3)
GLUCOSE BLDC GLUCOMTR-MCNC: 120 MG/DL — HIGH (ref 70–99)
GLUCOSE BLDC GLUCOMTR-MCNC: 162 MG/DL — HIGH (ref 70–99)
GLUCOSE BLDC GLUCOMTR-MCNC: 314 MG/DL — HIGH (ref 70–99)
GLUCOSE BLDC GLUCOMTR-MCNC: 314 MG/DL — HIGH (ref 70–99)
GLUCOSE SERPL-MCNC: 167 MG/DL — HIGH (ref 70–115)
HCT VFR BLD CALC: 32.4 % — LOW (ref 39–50)
HGB BLD-MCNC: 9.6 G/DL — LOW (ref 13–17)
MAGNESIUM SERPL-MCNC: 2 MG/DL — SIGNIFICANT CHANGE UP (ref 1.6–2.6)
MCHC RBC-ENTMCNC: 23.5 PG — LOW (ref 27–34)
MCHC RBC-ENTMCNC: 29.6 GM/DL — LOW (ref 32–36)
MCV RBC AUTO: 79.4 FL — LOW (ref 80–100)
PLATELET # BLD AUTO: 259 K/UL — SIGNIFICANT CHANGE UP (ref 150–400)
POTASSIUM SERPL-MCNC: 5.5 MMOL/L — HIGH (ref 3.5–5.3)
POTASSIUM SERPL-SCNC: 5.5 MMOL/L — HIGH (ref 3.5–5.3)
RBC # BLD: 4.08 M/UL — LOW (ref 4.2–5.8)
RBC # FLD: 20.4 % — HIGH (ref 10.3–14.5)
SODIUM SERPL-SCNC: 134 MMOL/L — LOW (ref 135–145)
VANCOMYCIN TROUGH SERPL-MCNC: 29.7 UG/ML — CRITICAL HIGH (ref 10–20)
WBC # BLD: 8.74 K/UL — SIGNIFICANT CHANGE UP (ref 3.8–10.5)
WBC # FLD AUTO: 8.74 K/UL — SIGNIFICANT CHANGE UP (ref 3.8–10.5)

## 2019-12-29 PROCEDURE — 99232 SBSQ HOSP IP/OBS MODERATE 35: CPT

## 2019-12-29 RX ORDER — FUROSEMIDE 40 MG
20 TABLET ORAL ONCE
Refills: 0 | Status: COMPLETED | OUTPATIENT
Start: 2019-12-29 | End: 2019-12-29

## 2019-12-29 RX ADMIN — Medication 1 SPRAY(S): at 13:14

## 2019-12-29 RX ADMIN — HEPARIN SODIUM 5000 UNIT(S): 5000 INJECTION INTRAVENOUS; SUBCUTANEOUS at 21:49

## 2019-12-29 RX ADMIN — Medication 1 MILLIGRAM(S): at 21:48

## 2019-12-29 RX ADMIN — MAGNESIUM OXIDE 400 MG ORAL TABLET 400 MILLIGRAM(S): 241.3 TABLET ORAL at 13:16

## 2019-12-29 RX ADMIN — Medication 250 MILLIGRAM(S): at 06:33

## 2019-12-29 RX ADMIN — POLYETHYLENE GLYCOL 3350 17 GRAM(S): 17 POWDER, FOR SOLUTION ORAL at 13:16

## 2019-12-29 RX ADMIN — INSULIN GLARGINE 20 UNIT(S): 100 INJECTION, SOLUTION SUBCUTANEOUS at 21:56

## 2019-12-29 RX ADMIN — TICAGRELOR 90 MILLIGRAM(S): 90 TABLET ORAL at 17:43

## 2019-12-29 RX ADMIN — BENZOCAINE AND MENTHOL 1 LOZENGE: 5; 1 LIQUID ORAL at 20:13

## 2019-12-29 RX ADMIN — Medication 1 SPRAY(S): at 21:49

## 2019-12-29 RX ADMIN — Medication 20 MILLIGRAM(S): at 06:33

## 2019-12-29 RX ADMIN — Medication 250 MILLIGRAM(S): at 17:43

## 2019-12-29 RX ADMIN — Medication 1200 MILLIGRAM(S): at 13:16

## 2019-12-29 RX ADMIN — LISINOPRIL 2.5 MILLIGRAM(S): 2.5 TABLET ORAL at 06:33

## 2019-12-29 RX ADMIN — TICAGRELOR 90 MILLIGRAM(S): 90 TABLET ORAL at 06:33

## 2019-12-29 RX ADMIN — Medication 81 MILLIGRAM(S): at 08:43

## 2019-12-29 RX ADMIN — PIPERACILLIN AND TAZOBACTAM 25 GRAM(S): 4; .5 INJECTION, POWDER, LYOPHILIZED, FOR SOLUTION INTRAVENOUS at 16:04

## 2019-12-29 RX ADMIN — HEPARIN SODIUM 5000 UNIT(S): 5000 INJECTION INTRAVENOUS; SUBCUTANEOUS at 08:43

## 2019-12-29 RX ADMIN — PIPERACILLIN AND TAZOBACTAM 25 GRAM(S): 4; .5 INJECTION, POWDER, LYOPHILIZED, FOR SOLUTION INTRAVENOUS at 22:21

## 2019-12-29 RX ADMIN — Medication 8: at 17:43

## 2019-12-29 RX ADMIN — Medication 500 MILLIGRAM(S): at 08:43

## 2019-12-29 RX ADMIN — Medication 1 TABLET(S): at 08:43

## 2019-12-29 RX ADMIN — Medication 8: at 13:14

## 2019-12-29 RX ADMIN — PIPERACILLIN AND TAZOBACTAM 25 GRAM(S): 4; .5 INJECTION, POWDER, LYOPHILIZED, FOR SOLUTION INTRAVENOUS at 06:34

## 2019-12-29 RX ADMIN — Medication 50 MILLIGRAM(S): at 06:33

## 2019-12-29 RX ADMIN — MAGNESIUM OXIDE 400 MG ORAL TABLET 400 MILLIGRAM(S): 241.3 TABLET ORAL at 06:36

## 2019-12-29 RX ADMIN — Medication 250 MILLIGRAM(S): at 13:14

## 2019-12-29 RX ADMIN — PANTOPRAZOLE SODIUM 40 MILLIGRAM(S): 20 TABLET, DELAYED RELEASE ORAL at 06:34

## 2019-12-29 RX ADMIN — Medication 20 MILLIGRAM(S): at 16:11

## 2019-12-29 RX ADMIN — Medication 2: at 08:43

## 2019-12-29 RX ADMIN — Medication 1200 MILLIGRAM(S): at 21:48

## 2019-12-29 RX ADMIN — SPIRONOLACTONE 25 MILLIGRAM(S): 25 TABLET, FILM COATED ORAL at 06:33

## 2019-12-29 NOTE — PROGRESS NOTE ADULT - PROBLEM SELECTOR PLAN 1
s/p course of milrinone  will give extra Lasix today due to feet edema and cough at night see if it improves  started on low dose of ace yesterday and K is slightly elevated    repeat k in am  lasix today  Cough present prior to initiation of ace

## 2019-12-29 NOTE — PROGRESS NOTE ADULT - SUBJECTIVE AND OBJECTIVE BOX
ADRIANNA SHANKS    [id number]    74y      Male    CC: DKA    INTERVAL HPI/OVERNIGHT EVENTS: Pt seen and examined. No acute events overnight. Pt feeling well except for continued cough, which is interfering with sleep. Otherwise no CP, SOB, abd pain,N/v.     REVIEW OF SYSTEMS:  all other ROS negative except as in HPI    Vital Signs Last 24 Hrs  T(C): 36.2 (29 Dec 2019 16:10), Max: 36.9 (28 Dec 2019 22:07)  T(F): 97.2 (29 Dec 2019 16:10), Max: 98.5 (29 Dec 2019 10:28)  HR: 88 (29 Dec 2019 16:10) (87 - 89)  BP: 102/64 (29 Dec 2019 16:10) (102/64 - 110/72)  BP(mean): --  RR: 18 (29 Dec 2019 16:10) (15 - 20)  SpO2: 100% (29 Dec 2019 16:10) (99% - 100%)    PHYSICAL EXAM:    GENERAL: NAD  HEENT: PERRL, +EOMI  NECK: soft, supple  CHEST/LUNG: Clear to auscultation bilaterally  HEART: S1S2+, Regular rate and rhythm  ABDOMEN: Soft, Nontender, Nondistended; Bowel sounds present  SKIN: No rashes or lesions  NEURO: AAOX3, no focal deficits  PSYCH: normal mood    LABS:                        9.6    8.74  )-----------( 259      ( 29 Dec 2019 06:09 )             32.4     12-29    134<L>  |  98  |  20.0  ----------------------------<  167<H>  5.5<H>   |  24.0  |  1.11    Ca    9.1      29 Dec 2019 06:06  Mg     2.0     12-29    TPro  6.4<L>  /  Alb  3.2<L>  /  TBili  1.1  /  DBili  0.4<H>  /  AST  98<H>  /  ALT  973<H>  /  AlkPhos  112  12-28    PT/INR - ( 28 Dec 2019 06:17 )   PT: 14.2 sec;   INR: 1.23 ratio         PTT - ( 28 Dec 2019 06:17 )  PTT:30.7 sec        MEDICATIONS  (STANDING):  ascorbic acid 500 milliGRAM(s) Oral daily  aspirin  chewable 81 milliGRAM(s) Oral daily  dextrose 5%. 1000 milliLiter(s) (50 mL/Hr) IV Continuous <Continuous>  dextrose 50% Injectable 12.5 Gram(s) IV Push once  dextrose 50% Injectable 25 Gram(s) IV Push once  dextrose 50% Injectable 25 Gram(s) IV Push once  fluticasone propionate 50 MICROgram(s)/spray Nasal Spray 1 Spray(s) Both Nostrils two times a day  furosemide    Tablet 20 milliGRAM(s) Oral daily  guaiFENesin ER 1200 milliGRAM(s) Oral every 12 hours  heparin  Injectable 5000 Unit(s) SubCutaneous every 12 hours  influenza   Vaccine 0.5 milliLiter(s) IntraMuscular once  insulin glargine Injectable (LANTUS) 20 Unit(s) SubCutaneous at bedtime  insulin lispro (HumaLOG) corrective regimen sliding scale   SubCutaneous Before meals and at bedtime  lisinopril 2.5 milliGRAM(s) Oral daily  melatonin 1 milliGRAM(s) Oral at bedtime  metoprolol succinate ER 50 milliGRAM(s) Oral daily  multivitamin/minerals 1 Tablet(s) Oral daily  pantoprazole    Tablet 40 milliGRAM(s) Oral before breakfast  piperacillin/tazobactam IVPB.. 3.375 Gram(s) IV Intermittent every 8 hours  polyethylene glycol 3350 17 Gram(s) Oral daily  saccharomyces boulardii 250 milliGRAM(s) Oral two times a day  spironolactone 25 milliGRAM(s) Oral daily  ticagrelor 90 milliGRAM(s) Oral every 12 hours  tiotropium 18 MICROgram(s) Capsule 1 Capsule(s) Inhalation daily  vancomycin  IVPB 1000 milliGRAM(s) IV Intermittent <User Schedule>    MEDICATIONS  (PRN):  albuterol/ipratropium for Nebulization 3 milliLiter(s) Nebulizer every 6 hours PRN Shortness of Breath and/or Wheezing  benzocaine 15 mG/menthol 3.6 mG (Sugar-Free) Lozenge 1 Lozenge Oral every 4 hours PRN Sore Throat  benzonatate 100 milliGRAM(s) Oral every 8 hours PRN Cough  dextrose 40% Gel 15 Gram(s) Oral once PRN Blood Glucose LESS THAN 70 milliGRAM(s)/deciliter  glucagon  Injectable 1 milliGRAM(s) IntraMuscular once PRN Glucose LESS THAN 70 milligrams/deciliter      RADIOLOGY & ADDITIONAL TESTS:

## 2019-12-29 NOTE — PROGRESS NOTE ADULT - ASSESSMENT
73y/o male PMH HTN, HLD, HFrEF, DM, CKD, CAD s/p CABG, prior MI, s/p PCI/brachy, (6/20/19- LM 70/90%, LAD diffuse disease, Cx Prox 100%, RCA known , LIMA To LAD patent, SVG to OM 80% with ISR)/(7/2019- SVG to OM1 80%, brachy to SVG to OM1, PCI native OM), TTE 12/18/19- EF 25-30%, WMA, mild PHTN, mild AS, present to ED with feeling cold, nausea vomiting since last night.  Found to have DKA, Pneumonia and multi system organ failure.              Irregular Heart rate  Sinus rhythmn  on monitor last hospitalization  possible afib- plan for [facility] monitor outpatient.     Acute Liver injury  LFT trending downward    Thyroid nodule/Diabetes discussed with son  to follow up with endocrine

## 2019-12-29 NOTE — PROGRESS NOTE ADULT - SUBJECTIVE AND OBJECTIVE BOX
[facility]  [address], [place] [address]    REASON FOR VISIT:  Follow up on multiorgan failure  UPDATE:  Complaining of cough, sputum is white Bothersome at night when he is sleeping, continues on piperacillin/tazobactam  TELEMETRY MONITORING:  Sinus with occasional pvc    ROS:  No fever chills  Cardiac  No cp sob or palp  Resp  no cough with mucus production  ++ cough   Gi  no abd pain no melana  Ext No calf tenderness, edema decreased in feet      12-29    134<L>  |  98  |  20.0  ----------------------------<  167<H>  5.5<H>   |  24.0  |  1.11    Ca    9.1      29 Dec 2019 06:06  Mg     2.0     12-29    TPro  6.4<L>  /  Alb  3.2<L>  /  TBili  1.1  /  DBili  0.4<H>  /  AST  98<H>  /  ALT  973<H>  /  AlkPhos  112  12-28    LIVER FUNCTIONS - ( 28 Dec 2019 06:15 )  Alb: 3.2 g/dL / Pro: 6.4 g/dL / ALK PHOS: 112 U/L / ALT: 973 U/L / AST: 98 U/L / GGT: x             12-28-19 @ 07:01  -  12-29-19 @ 07:00  --------------------------------------------------------  IN: 1190 mL / OUT: 1000 mL / NET: 190 mL    MEDICATIONS  (STANDING):  ascorbic acid 500 milliGRAM(s) Oral daily  aspirin  chewable 81 milliGRAM(s) Oral daily  fluticasone propionate 50 MICROgram(s)/spray Nasal Spray 1 Spray(s) Both Nostrils two times a day  furosemide    Tablet 20 milliGRAM(s) Oral daily  guaiFENesin ER 1200 milliGRAM(s) Oral every 12 hours  heparin  Injectable 5000 Unit(s) SubCutaneous every 12 hours  influenza   Vaccine 0.5 milliLiter(s) IntraMuscular once  insulin glargine Injectable (LANTUS) 20 Unit(s) SubCutaneous at bedtime  insulin lispro (HumaLOG) corrective regimen sliding scale   SubCutaneous Before meals and at bedtime  lisinopril 2.5 milliGRAM(s) Oral daily  magnesium oxide 400 milliGRAM(s) Oral every 8 hours  melatonin 1 milliGRAM(s) Oral at bedtime  metoprolol succinate ER 50 milliGRAM(s) Oral daily  multivitamin/minerals 1 Tablet(s) Oral daily  pantoprazole    Tablet 40 milliGRAM(s) Oral before breakfast  piperacillin/tazobactam IVPB.. 3.375 Gram(s) IV Intermittent every 8 hours  polyethylene glycol 3350 17 Gram(s) Oral daily  saccharomyces boulardii 250 milliGRAM(s) Oral two times a day  spironolactone 25 milliGRAM(s) Oral daily  ticagrelor 90 milliGRAM(s) Oral every 12 hours  tiotropium 18 MICROgram(s) Capsule 1 Capsule(s) Inhalation daily  vancomycin  IVPB 1000 milliGRAM(s) IV Intermittent <User Schedule>    Vital Signs Last 24 Hrs  T(C): 36.7 (29 Dec 2019 06:31), Max: 36.9 (28 Dec 2019 22:07)  T(F): 98.1 (29 Dec 2019 06:31), Max: 98.4 (28 Dec 2019 22:07)  HR: 89 (29 Dec 2019 06:31) (87 - 89)  BP: 104/72 (29 Dec 2019 06:31) (103/65 - 110/72)  BP(mean): --  RR: 20 (29 Dec 2019 06:31) (20 - 20)  SpO2: 100% (29 Dec 2019 06:31) (100% - 100%)  T(C): 36.7 (12-29-19 @ 06:31), Max: 36.9 (12-28-19 @ 22:07)  HR: 89 (12-29-19 @ 06:31) (87 - 89)  BP: 104/72 (12-29-19 @ 06:31) (103/65 - 110/72)  RR: 20 (12-29-19 @ 06:31) (20 - 20)  SpO2: 100% (12-29-19 @ 06:31) (100% - 100%)    HEENT Head atraumatic eomi, oral mucosa moist  CV S1&S2 regular 2/6 julissa lsb  RESP  Cta  GI  Soft active bowel sounds non tender  EXT  No clubbing/Cyanosis / trace edema of feet  NEURO  Alert oriented  No gross motor or sensory deficits    < from: TTE Echo Complete w/Doppler (12.18.19 @ 10:42) >   1. Left ventricular ejection fraction, by visual estimation, is 25 to 30%.   2. Entire inferior wall, mid and apical anterior septum, basal and mid inferior septum, and mid inferolateral segmentare abnormal as described above.   3. Normal left ventricular internal cavity size.   4. The mitral in-flow pattern reveals no discernable A-wave, therefore no comment on diastolic function can be made.   5. There is no evidence of pericardial effusion.   6. The mitral valve leaflets are tethered which is due to reduced systolic function and elevated LVDP.   7. Sclerotic aortic valve with decreased opening.   8. Estimated pulmonary artery systolic pressure is 49.5 mmHg assuming a right atrial pressure of 3 mmHg, which is consistent with mild pulmonary hypertension.   9. Moderately dilated pulmonary artery.  10. Peak transaortic gradient equals 7.3 mmHg, mean transaortic gradient equals 4.0 mmHg, the calculated aortic valve area equals 1.63 cm² by the continuity equation consistent with mild aortic stenosis.    < from: Cardiac Cath Lab - Adult (07.24.19 @ 10:12) >  CORONARY VESSELS:  GRAFTS:   --  Graft to the 1st obtuse marginal: The graft was a saphenous  vein graft from the aorta. There was a 80 % stenosis in the middle third  of the graft.  COMPLICATIONS: There were no complications.  DIAGNOSTIC RECOMMENDATIONS: Brachytherapy done to the distal SVG to OM  lesion  Stent done in the native OM due to a coronary dissection  INTERVENTIONAL RECOMMENDATIONS: Brachytherapy done to the distal SVG to OM  lesion  Stent done in the native OM due to a coronary dissection    < from: CT Chest No Cont (12.27.19 @ 15:07) >  IMPRESSION:     Small infiltrate in the left lower lobe consistent with pneumonia.  Small bilateralpleural effusions, left larger than right.  1 cm nodule arising from the inferior aspect of the right thyroid lobe.

## 2019-12-29 NOTE — PROGRESS NOTE ADULT - ASSESSMENT
73y/o male PMH HTN, HLD, HFrEF, DM, CKD, CAD s/p CABG, prior MI, s/p PCI/brachy, (6/20/19- LM 70/90%, LAD diffuse disease, Cx Prox 100%, RCA known , LIMA To LAD patent, SVG to OM 80% with ISR)/(7/2019- SVG to OM1 80%, brachy to SVG to OM1, PCI native OM), TTE 12/18/19- EF 25-30%, WMA, mild PHTN, mild AS, present to ED with feeling cold, nausea vomiting and weakness for one day. no relief with Zofran at home. Family took BP at home less then 90, and HR >125 at rest. Pt recently hospitalized for acute decompensated heart failure, requiring IV dopamine and milrinone, dopamine not tolerated. On Admission, Patient was found to have lactic acidosis, RAYMOND, transaminitis, mild hyperglycemia/dka, admitted to MICU. Presentation was suspected to be due to a combination of metformin toxicity and decompensated CHF/ cardiogenic shock.  Symptoms improved after insulin infusion, discontinuation of metformin, and initiation of milrinone infusion. Cardiology and renal consulted. GI cx was called for persistent transaminitis. Hepatitis serology and USG was neg. Echo showed EF 25-30%, moderate pulm HTN. RVP was negative. Iron panel showed very high ferritin but iron sat 8%. TIBC is normal. Pt was downgraded to hospitalist service 12/26    >DKA: BS fluctuating  - likely dietary non-compliance  - c/w lantus and ISS. Adjust dose  - A1c: 8.4  - patient was on metformin, januvia and glipizide at home,   - avoid metformin and glipizide on DC  - insulin education.   - DM education.  - endo cx OP    > Raymond: improving.   - cont lasix and lisinopril  - monitor RF  - renal on board    >hypoK: replaced. Monitor    >Hyponatremia: off salt tab. monitor    >acute transminitis: unclear etiology  - likely due to hypoperfusion  - Iron profile noted  - hep panel --> resolved hep B. hep B pCR pending. [ ordered by GI]  - portal Doppler did not show any portal vein thrombosis or features of cirrhosis  - GI consult called by MICU team--> suggested transaminitis likely due to shock   - avoid hepatotoxic meds    > CAD- s/p CABG, s/p PCI  - 7/2019- brachy therapy   - EKG- ST, LBBB unchanged from prior     > HFrEF  - TTE EF 25-30%,     - off milrinone.   - c/w toprol 25 mg daily. Titrate to desirable HR  - c/w lisinipril, lasix  - hold aldactone as per cardio  - cardio on biard    > cough worse during laying flat:   - possibly combination of  CHF, GERD, ?Post nasal drip, ?PNA  - no fever/CP, scant sputum  - left foot swelling  - tachycardia. no hypoxia  - CXr: left sided infiltrate on 12/26  - initial CXR on 12/25 was clear  - wbc elevated  - resumed lasix   - started on vanco and zosyn 12/25; cont with renal dosing  - c/w mucinex, neb PRN, PPI,  - added flonase  - sputum cx: few gram neg rods, gram pos cocci in pairs  - urine legionella: neg  - blood cx: neg  - CT chest w/o contrast  - reportedly treated for PNA/bronchitis recently with z-pack  - head elevation. meals atleast 3 hrs before breakfast  - monitor  - ID consult was called to r/o PNA [ Dr. Delgadillo]    > PAF: plan for holter OP  - on BB  - Ac as per cardio [ Dr. Morley]    > left foot and ankle and mild redness. does not look like cellulitis. venous doppler neg for DVT. leg elevation    > severe Protein calori malnutrition.  - c/w glucerna. MVI, Vit C.   - nutrition cx appreciated.    > DVT: Heparin  > GI: PPi  > Diet: consistent carb, halal  > Dispo: pending improvement and cardio clearance and cultures . poss d/c Mon

## 2019-12-30 LAB
-  AMIKACIN: SIGNIFICANT CHANGE UP
-  AMPICILLIN/SULBACTAM: SIGNIFICANT CHANGE UP
-  CEFEPIME: SIGNIFICANT CHANGE UP
-  CEFTAZIDIME: SIGNIFICANT CHANGE UP
-  CIPROFLOXACIN: SIGNIFICANT CHANGE UP
-  GENTAMICIN: SIGNIFICANT CHANGE UP
-  LEVOFLOXACIN: SIGNIFICANT CHANGE UP
-  MEROPENEM: SIGNIFICANT CHANGE UP
-  TOBRAMYCIN: SIGNIFICANT CHANGE UP
-  TRIMETHOPRIM/SULFAMETHOXAZOLE: SIGNIFICANT CHANGE UP
ALBUMIN SERPL ELPH-MCNC: 3.5 G/DL — SIGNIFICANT CHANGE UP (ref 3.3–5.2)
ALP SERPL-CCNC: 116 U/L — SIGNIFICANT CHANGE UP (ref 40–120)
ALT FLD-CCNC: 507 U/L — HIGH
ANION GAP SERPL CALC-SCNC: 13 MMOL/L — SIGNIFICANT CHANGE UP (ref 5–17)
AST SERPL-CCNC: 48 U/L — HIGH
BILIRUB SERPL-MCNC: 0.7 MG/DL — SIGNIFICANT CHANGE UP (ref 0.4–2)
BUN SERPL-MCNC: 23 MG/DL — HIGH (ref 8–20)
CALCIUM SERPL-MCNC: 8.8 MG/DL — SIGNIFICANT CHANGE UP (ref 8.6–10.2)
CHLORIDE SERPL-SCNC: 96 MMOL/L — LOW (ref 98–107)
CO2 SERPL-SCNC: 23 MMOL/L — SIGNIFICANT CHANGE UP (ref 22–29)
CREAT SERPL-MCNC: 1.16 MG/DL — SIGNIFICANT CHANGE UP (ref 0.5–1.3)
CULTURE RESULTS: SIGNIFICANT CHANGE UP
GLUCOSE BLDC GLUCOMTR-MCNC: 150 MG/DL — HIGH (ref 70–99)
GLUCOSE BLDC GLUCOMTR-MCNC: 305 MG/DL — HIGH (ref 70–99)
GLUCOSE BLDC GLUCOMTR-MCNC: 313 MG/DL — HIGH (ref 70–99)
GLUCOSE BLDC GLUCOMTR-MCNC: 378 MG/DL — HIGH (ref 70–99)
GLUCOSE SERPL-MCNC: 178 MG/DL — HIGH (ref 70–115)
HBV DNA # SERPL NAA+PROBE: SIGNIFICANT CHANGE UP IU/ML
HBV DNA SERPL NAA+PROBE-LOG#: SIGNIFICANT CHANGE UP LOG10IU/ML
HCT VFR BLD CALC: 30.1 % — LOW (ref 39–50)
HGB BLD-MCNC: 9.6 G/DL — LOW (ref 13–17)
MCHC RBC-ENTMCNC: 24.5 PG — LOW (ref 27–34)
MCHC RBC-ENTMCNC: 31.9 GM/DL — LOW (ref 32–36)
MCV RBC AUTO: 76.8 FL — LOW (ref 80–100)
METHOD TYPE: SIGNIFICANT CHANGE UP
ORGANISM # SPEC MICROSCOPIC CNT: SIGNIFICANT CHANGE UP
ORGANISM # SPEC MICROSCOPIC CNT: SIGNIFICANT CHANGE UP
PLATELET # BLD AUTO: 301 K/UL — SIGNIFICANT CHANGE UP (ref 150–400)
POTASSIUM SERPL-MCNC: 4.3 MMOL/L — SIGNIFICANT CHANGE UP (ref 3.5–5.3)
POTASSIUM SERPL-SCNC: 4.3 MMOL/L — SIGNIFICANT CHANGE UP (ref 3.5–5.3)
PROT SERPL-MCNC: 6.6 G/DL — SIGNIFICANT CHANGE UP (ref 6.6–8.7)
RBC # BLD: 3.92 M/UL — LOW (ref 4.2–5.8)
RBC # FLD: 20.2 % — HIGH (ref 10.3–14.5)
SODIUM SERPL-SCNC: 132 MMOL/L — LOW (ref 135–145)
SPECIMEN SOURCE: SIGNIFICANT CHANGE UP
VANCOMYCIN TROUGH SERPL-MCNC: 22.4 UG/ML — HIGH (ref 10–20)
WBC # BLD: 8.61 K/UL — SIGNIFICANT CHANGE UP (ref 3.8–10.5)
WBC # FLD AUTO: 8.61 K/UL — SIGNIFICANT CHANGE UP (ref 3.8–10.5)

## 2019-12-30 PROCEDURE — 99233 SBSQ HOSP IP/OBS HIGH 50: CPT

## 2019-12-30 PROCEDURE — 99232 SBSQ HOSP IP/OBS MODERATE 35: CPT

## 2019-12-30 RX ADMIN — Medication 250 MILLIGRAM(S): at 06:02

## 2019-12-30 RX ADMIN — PIPERACILLIN AND TAZOBACTAM 25 GRAM(S): 4; .5 INJECTION, POWDER, LYOPHILIZED, FOR SOLUTION INTRAVENOUS at 21:38

## 2019-12-30 RX ADMIN — TICAGRELOR 90 MILLIGRAM(S): 90 TABLET ORAL at 17:55

## 2019-12-30 RX ADMIN — Medication 250 MILLIGRAM(S): at 17:59

## 2019-12-30 RX ADMIN — Medication 8: at 17:56

## 2019-12-30 RX ADMIN — SPIRONOLACTONE 25 MILLIGRAM(S): 25 TABLET, FILM COATED ORAL at 06:01

## 2019-12-30 RX ADMIN — INSULIN GLARGINE 20 UNIT(S): 100 INJECTION, SOLUTION SUBCUTANEOUS at 21:38

## 2019-12-30 RX ADMIN — Medication 10: at 21:40

## 2019-12-30 RX ADMIN — PANTOPRAZOLE SODIUM 40 MILLIGRAM(S): 20 TABLET, DELAYED RELEASE ORAL at 06:02

## 2019-12-30 RX ADMIN — TICAGRELOR 90 MILLIGRAM(S): 90 TABLET ORAL at 06:01

## 2019-12-30 RX ADMIN — Medication 500 MILLIGRAM(S): at 10:26

## 2019-12-30 RX ADMIN — PIPERACILLIN AND TAZOBACTAM 25 GRAM(S): 4; .5 INJECTION, POWDER, LYOPHILIZED, FOR SOLUTION INTRAVENOUS at 06:00

## 2019-12-30 RX ADMIN — Medication 20 MILLIGRAM(S): at 06:01

## 2019-12-30 RX ADMIN — Medication 50 MILLIGRAM(S): at 06:01

## 2019-12-30 RX ADMIN — Medication 1 SPRAY(S): at 17:55

## 2019-12-30 RX ADMIN — Medication 1 SPRAY(S): at 06:00

## 2019-12-30 RX ADMIN — Medication 1200 MILLIGRAM(S): at 06:02

## 2019-12-30 RX ADMIN — HEPARIN SODIUM 5000 UNIT(S): 5000 INJECTION INTRAVENOUS; SUBCUTANEOUS at 10:26

## 2019-12-30 RX ADMIN — PIPERACILLIN AND TAZOBACTAM 25 GRAM(S): 4; .5 INJECTION, POWDER, LYOPHILIZED, FOR SOLUTION INTRAVENOUS at 13:32

## 2019-12-30 RX ADMIN — LISINOPRIL 2.5 MILLIGRAM(S): 2.5 TABLET ORAL at 06:01

## 2019-12-30 RX ADMIN — BENZOCAINE AND MENTHOL 1 LOZENGE: 5; 1 LIQUID ORAL at 06:02

## 2019-12-30 RX ADMIN — POLYETHYLENE GLYCOL 3350 17 GRAM(S): 17 POWDER, FOR SOLUTION ORAL at 10:27

## 2019-12-30 RX ADMIN — HEPARIN SODIUM 5000 UNIT(S): 5000 INJECTION INTRAVENOUS; SUBCUTANEOUS at 21:39

## 2019-12-30 RX ADMIN — Medication 1 MILLIGRAM(S): at 21:39

## 2019-12-30 RX ADMIN — Medication 1200 MILLIGRAM(S): at 17:56

## 2019-12-30 RX ADMIN — Medication 1 TABLET(S): at 10:26

## 2019-12-30 RX ADMIN — Medication 8: at 12:45

## 2019-12-30 RX ADMIN — Medication 81 MILLIGRAM(S): at 10:26

## 2019-12-30 NOTE — PROGRESS NOTE ADULT - ATTENDING COMMENTS
congestive heart failure euvoelmeic.   hyperkalemia due to exogfenous potawssium dose and not due to aldactone,   ct current congestive heart failure meds  awaaiting final sputum speciation of bacteria .  discharge plan tomorrow.  increase lisinopril to 5 mg dialy if tolerated. ranexa 500 mg Q12.   ct aldaconte, lasix, beta-blocker

## 2019-12-30 NOTE — PROGRESS NOTE ADULT - SUBJECTIVE AND OBJECTIVE BOX
CC: pneumonia     INTERVAL HPI/OVERNIGHT EVENTS: seen and examined , sputum culture still pending . will continue with iv antibiotics .    REVIEW OF SYSTEMS:    CONSTITUTIONAL: feels good   RESPIRATORY: No cough, wheezing, hemoptysis; No shortness of breath  CARDIOVASCULAR: No chest pain, palpitations  GASTROINTESTINAL: No abdominal or epigastric pain. No nausea, vomiting  NEUROLOGICAL: No headaches, or blurry vision       Vital Signs Last 24 Hrs  T(C): 36.4 (30 Dec 2019 15:28), Max: 36.8 (29 Dec 2019 21:43)  T(F): 97.6 (30 Dec 2019 15:28), Max: 98.2 (29 Dec 2019 21:43)  HR: 82 (30 Dec 2019 15:28) (59 - 100)  BP: 97/61 (30 Dec 2019 15:28) (97/61 - 122/60)  BP(mean): --  RR: 18 (30 Dec 2019 15:28) (18 - 20)  SpO2: 98% (30 Dec 2019 15:28) (98% - 100%)    PHYSICAL EXAM:    GENERAL: NAD, resting comfortable in bed   CHEST/LUNG: CTAB , no wheezing , rales, rhonchi heard   HEART: S1S2+, Regular rate and rhythm; No murmurs, rubs, or gallops  ABDOMEN: Soft, Nontender, Nondistended; Bowel sounds present  EXTREMITIES:  no pitting edema , pulses palpated BL.        LABS:                        9.6    8.61  )-----------( 301      ( 30 Dec 2019 06:44 )             30.1     12-30    132<L>  |  96<L>  |  23.0<H>  ----------------------------<  178<H>  4.3   |  23.0  |  1.16    Ca    8.8      30 Dec 2019 06:44  Mg     2.0     12-29    TPro  6.6  /  Alb  3.5  /  TBili  0.7  /  DBili  x   /  AST  48<H>  /  ALT  507<H>  /  AlkPhos  116  12-30            MEDICATIONS  (STANDING):  ascorbic acid 500 milliGRAM(s) Oral daily  aspirin  chewable 81 milliGRAM(s) Oral daily  dextrose 5%. 1000 milliLiter(s) (50 mL/Hr) IV Continuous <Continuous>  dextrose 50% Injectable 12.5 Gram(s) IV Push once  dextrose 50% Injectable 25 Gram(s) IV Push once  dextrose 50% Injectable 25 Gram(s) IV Push once  fluticasone propionate 50 MICROgram(s)/spray Nasal Spray 1 Spray(s) Both Nostrils two times a day  furosemide    Tablet 20 milliGRAM(s) Oral daily  guaiFENesin ER 1200 milliGRAM(s) Oral every 12 hours  heparin  Injectable 5000 Unit(s) SubCutaneous every 12 hours  influenza   Vaccine 0.5 milliLiter(s) IntraMuscular once  insulin glargine Injectable (LANTUS) 20 Unit(s) SubCutaneous at bedtime  insulin lispro (HumaLOG) corrective regimen sliding scale   SubCutaneous Before meals and at bedtime  lisinopril 2.5 milliGRAM(s) Oral daily  melatonin 1 milliGRAM(s) Oral at bedtime  metoprolol succinate ER 50 milliGRAM(s) Oral daily  multivitamin/minerals 1 Tablet(s) Oral daily  pantoprazole    Tablet 40 milliGRAM(s) Oral before breakfast  piperacillin/tazobactam IVPB.. 3.375 Gram(s) IV Intermittent every 8 hours  polyethylene glycol 3350 17 Gram(s) Oral daily  saccharomyces boulardii 250 milliGRAM(s) Oral two times a day  spironolactone 25 milliGRAM(s) Oral daily  ticagrelor 90 milliGRAM(s) Oral every 12 hours  tiotropium 18 MICROgram(s) Capsule 1 Capsule(s) Inhalation daily    MEDICATIONS  (PRN):  albuterol/ipratropium for Nebulization 3 milliLiter(s) Nebulizer every 6 hours PRN Shortness of Breath and/or Wheezing  benzocaine 15 mG/menthol 3.6 mG (Sugar-Free) Lozenge 1 Lozenge Oral every 4 hours PRN Sore Throat  benzonatate 100 milliGRAM(s) Oral every 8 hours PRN Cough  dextrose 40% Gel 15 Gram(s) Oral once PRN Blood Glucose LESS THAN 70 milliGRAM(s)/deciliter  glucagon  Injectable 1 milliGRAM(s) IntraMuscular once PRN Glucose LESS THAN 70 milligrams/deciliter      RADIOLOGY & ADDITIONAL TESTS:

## 2019-12-30 NOTE — PROGRESS NOTE ADULT - SUBJECTIVE AND OBJECTIVE BOX
[facility]  INFECTIOUS DISEASES AND INTERNAL MEDICINE at [place]  =======================================================  Austyn Delgadillo MD  Diplomates American Board of Internal Medicine and Infectious Diseases  Tel: [phone]      Fax: [phone]  =======================================================    BEARCHARLENEROC[facility]OADRIANNA [id number]    Follow up:    Allergies:  Allergy Status Unknown  DOPamine (Hypotension)      Medications:  albuterol/ipratropium for Nebulization 3 milliLiter(s) Nebulizer every 6 hours PRN  ascorbic acid 500 milliGRAM(s) Oral daily  aspirin  chewable 81 milliGRAM(s) Oral daily  benzocaine 15 mG/menthol 3.6 mG (Sugar-Free) Lozenge 1 Lozenge Oral every 4 hours PRN  benzonatate 100 milliGRAM(s) Oral every 8 hours PRN  dextrose 40% Gel 15 Gram(s) Oral once PRN  dextrose 5%. 1000 milliLiter(s) IV Continuous <Continuous>  dextrose 50% Injectable 12.5 Gram(s) IV Push once  dextrose 50% Injectable 25 Gram(s) IV Push once  dextrose 50% Injectable 25 Gram(s) IV Push once  fluticasone propionate 50 MICROgram(s)/spray Nasal Spray 1 Spray(s) Both Nostrils two times a day  furosemide    Tablet 20 milliGRAM(s) Oral daily  glucagon  Injectable 1 milliGRAM(s) IntraMuscular once PRN  guaiFENesin ER 1200 milliGRAM(s) Oral every 12 hours  heparin  Injectable 5000 Unit(s) SubCutaneous every 12 hours  influenza   Vaccine 0.5 milliLiter(s) IntraMuscular once  insulin glargine Injectable (LANTUS) 20 Unit(s) SubCutaneous at bedtime  insulin lispro (HumaLOG) corrective regimen sliding scale   SubCutaneous Before meals and at bedtime  lisinopril 2.5 milliGRAM(s) Oral daily  melatonin 1 milliGRAM(s) Oral at bedtime  metoprolol succinate ER 50 milliGRAM(s) Oral daily  multivitamin/minerals 1 Tablet(s) Oral daily  pantoprazole    Tablet 40 milliGRAM(s) Oral before breakfast  piperacillin/tazobactam IVPB.. 3.375 Gram(s) IV Intermittent every 8 hours  polyethylene glycol 3350 17 Gram(s) Oral daily  saccharomyces boulardii 250 milliGRAM(s) Oral two times a day  spironolactone 25 milliGRAM(s) Oral daily  ticagrelor 90 milliGRAM(s) Oral every 12 hours  tiotropium 18 MICROgram(s) Capsule 1 Capsule(s) Inhalation daily            REVIEW OF SYSTEMS:  CONSTITUTIONAL:  No Fever or chills  HEENT:   No diplopia or blurred vision.  No earache, sore throat or runny nose.  CARDIOVASCULAR:  No pressure, squeezing, strangling, tightness, heaviness or aching about the chest, neck, axilla or epigastrium.  RESPIRATORY:  No cough, shortness of breath  GASTROINTESTINAL:  No nausea, vomiting or diarrhea.  GENITOURINARY:  No dysuria, frequency or urgency. No Blood in urine  MUSCULOSKELETAL:  no joint aches, no muscle pain  SKIN:  No change in skin, hair or nails.  NEUROLOGIC:  No Headaches, seizures or weakness.  PSYCHIATRIC:  No disorder of thought or mood.  ENDOCRINE:  No heat or cold intolerance  HEMATOLOGICAL:  No easy bruising or bleeding.            Physical Exam:  ICU Vital Signs Last 24 Hrs  T(C): 36.4 (30 Dec 2019 15:28), Max: 36.8 (29 Dec 2019 21:43)  T(F): 97.6 (30 Dec 2019 15:28), Max: 98.2 (29 Dec 2019 21:43)  HR: 82 (30 Dec 2019 15:28) (59 - 100)  BP: 97/61 (30 Dec 2019 15:28) (97/61 - 122/60)  BP(mean): --  ABP: --  ABP(mean): --  RR: 18 (30 Dec 2019 15:28) (18 - 20)  SpO2: 98% (30 Dec 2019 15:28) (98% - 100%)      GEN: NAD, pleasant  HEENT: normocephalic and atraumatic. EOMI. PERRL.  Anicteric   NECK: Supple.   LUNGS: Clear to auscultation.  HEART: Regular rate and rhythm without murmur.  ABDOMEN: Soft, nontender, and nondistended.  Positive bowel sounds.    : No CVA tenderness  EXTREMITIES: Without any edema.  MSK: no joint swelling  NEUROLOGIC: Cranial nerves II through XII are grossly intact. No focal deficits  PSYCHIATRIC: Appropriate affect .  SKIN: No Rash      Labs:  12-30    132<L>  |  96<L>  |  23.0<H>  ----------------------------<  178<H>  4.3   |  23.0  |  1.16    Ca    8.8      30 Dec 2019 06:44  Mg     2.0     12-29    TPro  6.6  /  Alb  3.5  /  TBili  0.7  /  DBili  x   /  AST  48<H>  /  ALT  507<H>  /  AlkPhos  116  12-30                          9.6    8.61  )-----------( 301      ( 30 Dec 2019 06:44 )             30.1           LIVER FUNCTIONS - ( 30 Dec 2019 06:44 )  Alb: 3.5 g/dL / Pro: 6.6 g/dL / ALK PHOS: 116 U/L / ALT: 507 U/L / AST: 48 U/L / GGT: x               CAPILLARY BLOOD GLUCOSE      POCT Blood Glucose.: 305 mg/dL (30 Dec 2019 17:10)  POCT Blood Glucose.: 313 mg/dL (30 Dec 2019 12:14)  POCT Blood Glucose.: 150 mg/dL (30 Dec 2019 08:10)  POCT Blood Glucose.: 120 mg/dL (29 Dec 2019 21:54)        RECENT CULTURES:  12-28 @ 00:22 .Sputum Acinetobacter lwoffii group    Few Acinetobacter lwoffii group  Numerous Routine respiratory judi present  Culture in progress    Few White blood cells  Few Gram Variable Rods  Few Gram positive cocci in pairs      12-26 @ 15:27 .Blood     No growth at 48 hours        12-23 @ 22:49          NotDetec

## 2019-12-30 NOTE — PROGRESS NOTE ADULT - ASSESSMENT
75y/o male PMH HTN, HLD, HFrEF, DM, CKD, CAD s/p CABG, prior MI, s/p PCI/brachy, (6/20/19- LM 70/90%, LAD diffuse disease, Cx Prox 100%, RCA known , LIMA To LAD patent, SVG to OM 80% with ISR)/(7/2019- SVG to OM1 80%, brachy to SVG to OM1, PCI native OM), TTE 12/18/19- EF 25-30%, WMA, mild PHTN, mild AS, present to ED with feeling cold, nausea vomiting since last night.  Found to have DKA, Pneumonia and multi system organ failure.              Irregular Heart rate  Sinus rhythmn  on monitor last hospitalization  possible afib- plan for [facility] monitor outpatient.     Acute Liver injury  LFT trending downward    Thyroid nodule/Diabetes discussed with son  to follow up with endocrine

## 2019-12-30 NOTE — PROGRESS NOTE ADULT - ASSESSMENT
74 YOM h/o CAD, CABG, HFrEF, MI, HTN, HLD, DM, PAF on last admission pt was recently discharged on Thursday after being treated for decompensated HF with milrinone, dopamine(which he did not tolerate).  Pt presents to ER with weakness and vomiting since yesterday.  Per pt's son since he was discharged his appetite has been very poor and he has been weak but yesterday started vomiting and not tolerating PO medications.   In ER found to have multiple organ dysfunction including RAS, acute liver transaminitis, DKA/anion gap acidosis.  Also being treated for pulmonary edema. Per patient his furosemide was changed to spironolactone recently.      Overall DM, s/p DKA, s/p pulmonary edema, transaminitis  leukocytosis, pneumonia concern for HAP  - patient developed productive cough and leukocytosis after being admitted  - Blood cultures   - Sputum CX Acinetobacter lwoffii-suspect contaminant as patient has improved on current regimen  - Ct chest with left sided pneumonia  - legionella urine  - Procalcitonin level 0.13  - RVP (-)  - Continue Zosyn   - Dc vanco  - f/u final cultures-likely Dc home on augmentin 875/125 bid through 1/4      d/w Dr Rodriguez

## 2019-12-30 NOTE — PROGRESS NOTE ADULT - ASSESSMENT
1. HCAP  on iv antibiotics .  ID on board , sputum culture shows gram negative rods .    2. ischemic liver injury   improving , GI signed off.  will trend LFTs .  avoid hepatotoxic agents .     3. status post acute decompensated systolic CHF   improved.  on medical management .  oral lasix , aldactone , lisinopril.    4. CAD   continue with management .     5. DM   controlled.  continue with current regimen .    6. questionable afib   plan is for monitor outpatient .    7. DVT prophylaxis   heparin     details discussed with patient and his son at bedside .

## 2019-12-30 NOTE — PROGRESS NOTE ADULT - PROBLEM SELECTOR PLAN 1
s/p course of milrinone  Euvolemic on exam  continue ASA, brilinta, lasix, spironolactone, lisinopril, toprol  Potassium elevated yesterday 2/2 to supplemental potassium, this AM normal. avoid potassium supplementation unless deficient.   Stable for DC from cardiology standpoint   follow up outpatient in office w/ Bianca SAMPSON

## 2019-12-31 ENCOUNTER — TRANSCRIPTION ENCOUNTER (OUTPATIENT)
Age: 74
End: 2019-12-31

## 2019-12-31 VITALS
SYSTOLIC BLOOD PRESSURE: 108 MMHG | DIASTOLIC BLOOD PRESSURE: 68 MMHG | OXYGEN SATURATION: 100 % | HEART RATE: 87 BPM | TEMPERATURE: 98 F | RESPIRATION RATE: 20 BRPM

## 2019-12-31 LAB
ALBUMIN SERPL ELPH-MCNC: 3.3 G/DL — SIGNIFICANT CHANGE UP (ref 3.3–5.2)
ALP SERPL-CCNC: 107 U/L — SIGNIFICANT CHANGE UP (ref 40–120)
ALT FLD-CCNC: 393 U/L — HIGH
ANION GAP SERPL CALC-SCNC: 12 MMOL/L — SIGNIFICANT CHANGE UP (ref 5–17)
AST SERPL-CCNC: 39 U/L — SIGNIFICANT CHANGE UP
BILIRUB SERPL-MCNC: 0.6 MG/DL — SIGNIFICANT CHANGE UP (ref 0.4–2)
BUN SERPL-MCNC: 19 MG/DL — SIGNIFICANT CHANGE UP (ref 8–20)
CALCIUM SERPL-MCNC: 8.8 MG/DL — SIGNIFICANT CHANGE UP (ref 8.6–10.2)
CHLORIDE SERPL-SCNC: 96 MMOL/L — LOW (ref 98–107)
CO2 SERPL-SCNC: 24 MMOL/L — SIGNIFICANT CHANGE UP (ref 22–29)
CREAT SERPL-MCNC: 1.04 MG/DL — SIGNIFICANT CHANGE UP (ref 0.5–1.3)
CULTURE RESULTS: SIGNIFICANT CHANGE UP
CULTURE RESULTS: SIGNIFICANT CHANGE UP
GLUCOSE BLDC GLUCOMTR-MCNC: 377 MG/DL — HIGH (ref 70–99)
GLUCOSE BLDC GLUCOMTR-MCNC: 92 MG/DL — SIGNIFICANT CHANGE UP (ref 70–99)
GLUCOSE SERPL-MCNC: 115 MG/DL — SIGNIFICANT CHANGE UP (ref 70–115)
POTASSIUM SERPL-MCNC: 4.4 MMOL/L — SIGNIFICANT CHANGE UP (ref 3.5–5.3)
POTASSIUM SERPL-SCNC: 4.4 MMOL/L — SIGNIFICANT CHANGE UP (ref 3.5–5.3)
PROT SERPL-MCNC: 6.4 G/DL — LOW (ref 6.6–8.7)
SODIUM SERPL-SCNC: 132 MMOL/L — LOW (ref 135–145)
SPECIMEN SOURCE: SIGNIFICANT CHANGE UP
SPECIMEN SOURCE: SIGNIFICANT CHANGE UP

## 2019-12-31 PROCEDURE — 93970 EXTREMITY STUDY: CPT

## 2019-12-31 PROCEDURE — 86705 HEP B CORE ANTIBODY IGM: CPT

## 2019-12-31 PROCEDURE — 87186 SC STD MICRODIL/AGAR DIL: CPT

## 2019-12-31 PROCEDURE — 80307 DRUG TEST PRSMV CHEM ANLYZR: CPT

## 2019-12-31 PROCEDURE — 93005 ELECTROCARDIOGRAM TRACING: CPT

## 2019-12-31 PROCEDURE — 96374 THER/PROPH/DIAG INJ IV PUSH: CPT

## 2019-12-31 PROCEDURE — 83036 HEMOGLOBIN GLYCOSYLATED A1C: CPT

## 2019-12-31 PROCEDURE — 86706 HEP B SURFACE ANTIBODY: CPT

## 2019-12-31 PROCEDURE — 84132 ASSAY OF SERUM POTASSIUM: CPT

## 2019-12-31 PROCEDURE — 86708 HEPATITIS A ANTIBODY: CPT

## 2019-12-31 PROCEDURE — 80048 BASIC METABOLIC PNL TOTAL CA: CPT

## 2019-12-31 PROCEDURE — 83540 ASSAY OF IRON: CPT

## 2019-12-31 PROCEDURE — 82330 ASSAY OF CALCIUM: CPT

## 2019-12-31 PROCEDURE — 85610 PROTHROMBIN TIME: CPT

## 2019-12-31 PROCEDURE — 85027 COMPLETE CBC AUTOMATED: CPT

## 2019-12-31 PROCEDURE — 86704 HEP B CORE ANTIBODY TOTAL: CPT

## 2019-12-31 PROCEDURE — 87517 HEPATITIS B DNA QUANT: CPT

## 2019-12-31 PROCEDURE — 87633 RESP VIRUS 12-25 TARGETS: CPT

## 2019-12-31 PROCEDURE — 84145 PROCALCITONIN (PCT): CPT

## 2019-12-31 PROCEDURE — 81001 URINALYSIS AUTO W/SCOPE: CPT

## 2019-12-31 PROCEDURE — 87581 M.PNEUMON DNA AMP PROBE: CPT

## 2019-12-31 PROCEDURE — 83605 ASSAY OF LACTIC ACID: CPT

## 2019-12-31 PROCEDURE — 99233 SBSQ HOSP IP/OBS HIGH 50: CPT

## 2019-12-31 PROCEDURE — 82435 ASSAY OF BLOOD CHLORIDE: CPT

## 2019-12-31 PROCEDURE — 71250 CT THORAX DX C-: CPT

## 2019-12-31 PROCEDURE — 99239 HOSP IP/OBS DSCHRG MGMT >30: CPT

## 2019-12-31 PROCEDURE — 87449 NOS EACH ORGANISM AG IA: CPT

## 2019-12-31 PROCEDURE — 93306 TTE W/DOPPLER COMPLETE: CPT

## 2019-12-31 PROCEDURE — 87040 BLOOD CULTURE FOR BACTERIA: CPT

## 2019-12-31 PROCEDURE — 80053 COMPREHEN METABOLIC PANEL: CPT

## 2019-12-31 PROCEDURE — 84466 ASSAY OF TRANSFERRIN: CPT

## 2019-12-31 PROCEDURE — 82962 GLUCOSE BLOOD TEST: CPT

## 2019-12-31 PROCEDURE — 82947 ASSAY GLUCOSE BLOOD QUANT: CPT

## 2019-12-31 PROCEDURE — 83550 IRON BINDING TEST: CPT

## 2019-12-31 PROCEDURE — 87070 CULTURE OTHR SPECIMN AEROBIC: CPT

## 2019-12-31 PROCEDURE — 87486 CHLMYD PNEUM DNA AMP PROBE: CPT

## 2019-12-31 PROCEDURE — 84484 ASSAY OF TROPONIN QUANT: CPT

## 2019-12-31 PROCEDURE — 94640 AIRWAY INHALATION TREATMENT: CPT

## 2019-12-31 PROCEDURE — 86709 HEPATITIS A IGM ANTIBODY: CPT

## 2019-12-31 PROCEDURE — 84295 ASSAY OF SERUM SODIUM: CPT

## 2019-12-31 PROCEDURE — 36415 COLL VENOUS BLD VENIPUNCTURE: CPT

## 2019-12-31 PROCEDURE — 99285 EMERGENCY DEPT VISIT HI MDM: CPT | Mod: 25

## 2019-12-31 PROCEDURE — 93975 VASCULAR STUDY: CPT

## 2019-12-31 PROCEDURE — 84100 ASSAY OF PHOSPHORUS: CPT

## 2019-12-31 PROCEDURE — 80074 ACUTE HEPATITIS PANEL: CPT

## 2019-12-31 PROCEDURE — 85014 HEMATOCRIT: CPT

## 2019-12-31 PROCEDURE — 83880 ASSAY OF NATRIURETIC PEPTIDE: CPT

## 2019-12-31 PROCEDURE — 80202 ASSAY OF VANCOMYCIN: CPT

## 2019-12-31 PROCEDURE — 85730 THROMBOPLASTIN TIME PARTIAL: CPT

## 2019-12-31 PROCEDURE — 87340 HEPATITIS B SURFACE AG IA: CPT

## 2019-12-31 PROCEDURE — 96375 TX/PRO/DX INJ NEW DRUG ADDON: CPT

## 2019-12-31 PROCEDURE — 93971 EXTREMITY STUDY: CPT

## 2019-12-31 PROCEDURE — 83735 ASSAY OF MAGNESIUM: CPT

## 2019-12-31 PROCEDURE — 82728 ASSAY OF FERRITIN: CPT

## 2019-12-31 PROCEDURE — 80076 HEPATIC FUNCTION PANEL: CPT

## 2019-12-31 PROCEDURE — 87798 DETECT AGENT NOS DNA AMP: CPT

## 2019-12-31 PROCEDURE — 94760 N-INVAS EAR/PLS OXIMETRY 1: CPT

## 2019-12-31 PROCEDURE — 86140 C-REACTIVE PROTEIN: CPT

## 2019-12-31 PROCEDURE — 83690 ASSAY OF LIPASE: CPT

## 2019-12-31 PROCEDURE — 71045 X-RAY EXAM CHEST 1 VIEW: CPT

## 2019-12-31 PROCEDURE — 82977 ASSAY OF GGT: CPT

## 2019-12-31 PROCEDURE — 82803 BLOOD GASES ANY COMBINATION: CPT

## 2019-12-31 RX ORDER — METOPROLOL TARTRATE 50 MG
1 TABLET ORAL
Qty: 0 | Refills: 0 | DISCHARGE
Start: 2019-12-31

## 2019-12-31 RX ORDER — INSULIN ASPART 100 [IU]/ML
8 INJECTION, SOLUTION SUBCUTANEOUS
Qty: 1 | Refills: 0
Start: 2019-12-31 | End: 2020-01-29

## 2019-12-31 RX ORDER — TIOTROPIUM BROMIDE 18 UG/1
1 CAPSULE ORAL; RESPIRATORY (INHALATION)
Qty: 1 | Refills: 0
Start: 2019-12-31 | End: 2020-01-29

## 2019-12-31 RX ORDER — IPRATROPIUM/ALBUTEROL SULFATE 18-103MCG
3 AEROSOL WITH ADAPTER (GRAM) INHALATION
Qty: 10 | Refills: 0
Start: 2019-12-31 | End: 2020-01-09

## 2019-12-31 RX ORDER — INSULIN GLARGINE 100 [IU]/ML
15 INJECTION, SOLUTION SUBCUTANEOUS
Qty: 5 | Refills: 0
Start: 2019-12-31 | End: 2020-01-29

## 2019-12-31 RX ORDER — RANOLAZINE 500 MG/1
500 TABLET, FILM COATED, EXTENDED RELEASE ORAL
Refills: 0 | Status: DISCONTINUED | OUTPATIENT
Start: 2019-12-31 | End: 2019-12-31

## 2019-12-31 RX ORDER — SITAGLIPTIN 50 MG/1
1 TABLET, FILM COATED ORAL
Qty: 0 | Refills: 0 | DISCHARGE

## 2019-12-31 RX ORDER — INSULIN GLARGINE 100 [IU]/ML
12 INJECTION, SOLUTION SUBCUTANEOUS
Qty: 5 | Refills: 0
Start: 2019-12-31 | End: 2024-06-18

## 2019-12-31 RX ORDER — FLUTICASONE PROPIONATE 50 MCG
1 SPRAY, SUSPENSION NASAL
Qty: 0 | Refills: 0 | DISCHARGE
Start: 2019-12-31

## 2019-12-31 RX ORDER — SPIRONOLACTONE 25 MG/1
1 TABLET, FILM COATED ORAL
Qty: 0 | Refills: 0 | DISCHARGE
Start: 2019-12-31

## 2019-12-31 RX ORDER — RANOLAZINE 500 MG/1
1 TABLET, FILM COATED, EXTENDED RELEASE ORAL
Qty: 0 | Refills: 0 | DISCHARGE
Start: 2019-12-31

## 2019-12-31 RX ORDER — INSULIN GLARGINE 100 [IU]/ML
20 INJECTION, SOLUTION SUBCUTANEOUS
Qty: 6 | Refills: 0
Start: 2019-12-31 | End: 2020-01-29

## 2019-12-31 RX ORDER — ENOXAPARIN SODIUM 100 MG/ML
20 INJECTION SUBCUTANEOUS
Qty: 6 | Refills: 0
Start: 2019-12-31 | End: 2020-01-29

## 2019-12-31 RX ORDER — INSULIN ASPART 100 [IU]/ML
0 INJECTION, SOLUTION SUBCUTANEOUS
Qty: 0 | Refills: 0 | DISCHARGE
Start: 2019-12-31 | End: 2020-01-29

## 2019-12-31 RX ORDER — TICAGRELOR 90 MG/1
1 TABLET ORAL
Qty: 0 | Refills: 0 | DISCHARGE

## 2019-12-31 RX ORDER — POTASSIUM CHLORIDE 20 MEQ
1 PACKET (EA) ORAL
Qty: 0 | Refills: 0 | DISCHARGE

## 2019-12-31 RX ORDER — INSULIN LISPRO 100/ML
2 VIAL (ML) SUBCUTANEOUS
Qty: 600 | Refills: 0
Start: 2019-12-31 | End: 2020-01-29

## 2019-12-31 RX ORDER — POLYETHYLENE GLYCOL 3350 17 G/17G
17 POWDER, FOR SOLUTION ORAL
Qty: 0 | Refills: 0 | DISCHARGE
Start: 2019-12-31

## 2019-12-31 RX ORDER — INSULIN GLARGINE 100 [IU]/ML
12 INJECTION, SOLUTION SUBCUTANEOUS
Qty: 5 | Refills: 0
Start: 2019-12-31 | End: 2024-07-11

## 2019-12-31 RX ORDER — RANOLAZINE 500 MG/1
1 TABLET, FILM COATED, EXTENDED RELEASE ORAL
Qty: 0 | Refills: 0 | DISCHARGE

## 2019-12-31 RX ORDER — INSULIN LISPRO 100/ML
2 VIAL (ML) SUBCUTANEOUS
Qty: 3 | Refills: 0
Start: 2019-12-31

## 2019-12-31 RX ORDER — MULTIVIT-MIN/FERROUS GLUCONATE 9 MG/15 ML
1 LIQUID (ML) ORAL
Qty: 0 | Refills: 0 | DISCHARGE
Start: 2019-12-31

## 2019-12-31 RX ORDER — SACCHAROMYCES BOULARDII 250 MG
1 POWDER IN PACKET (EA) ORAL
Qty: 10 | Refills: 0
Start: 2019-12-31 | End: 2020-01-04

## 2019-12-31 RX ORDER — TICAGRELOR 90 MG/1
1 TABLET ORAL
Qty: 0 | Refills: 0 | DISCHARGE
Start: 2019-12-31

## 2019-12-31 RX ORDER — ASPIRIN/CALCIUM CARB/MAGNESIUM 324 MG
1 TABLET ORAL
Qty: 0 | Refills: 0 | DISCHARGE
Start: 2019-12-31

## 2019-12-31 RX ORDER — LISINOPRIL 2.5 MG/1
1 TABLET ORAL
Qty: 30 | Refills: 0
Start: 2019-12-31 | End: 2020-01-29

## 2019-12-31 RX ORDER — FUROSEMIDE 40 MG
1 TABLET ORAL
Qty: 0 | Refills: 0 | DISCHARGE

## 2019-12-31 RX ORDER — METFORMIN HYDROCHLORIDE 850 MG/1
1 TABLET ORAL
Qty: 0 | Refills: 0 | DISCHARGE

## 2019-12-31 RX ORDER — LOSARTAN POTASSIUM 100 MG/1
1 TABLET, FILM COATED ORAL
Qty: 0 | Refills: 0 | DISCHARGE

## 2019-12-31 RX ORDER — FUROSEMIDE 40 MG
1 TABLET ORAL
Qty: 0 | Refills: 0 | DISCHARGE
Start: 2019-12-31

## 2019-12-31 RX ADMIN — Medication 20 MILLIGRAM(S): at 05:55

## 2019-12-31 RX ADMIN — Medication 1 TABLET(S): at 12:42

## 2019-12-31 RX ADMIN — LISINOPRIL 2.5 MILLIGRAM(S): 2.5 TABLET ORAL at 05:55

## 2019-12-31 RX ADMIN — Medication 500 MILLIGRAM(S): at 12:42

## 2019-12-31 RX ADMIN — Medication 10: at 12:42

## 2019-12-31 RX ADMIN — Medication 1 SPRAY(S): at 05:55

## 2019-12-31 RX ADMIN — HEPARIN SODIUM 5000 UNIT(S): 5000 INJECTION INTRAVENOUS; SUBCUTANEOUS at 12:41

## 2019-12-31 RX ADMIN — POLYETHYLENE GLYCOL 3350 17 GRAM(S): 17 POWDER, FOR SOLUTION ORAL at 12:41

## 2019-12-31 RX ADMIN — Medication 50 MILLIGRAM(S): at 05:55

## 2019-12-31 RX ADMIN — Medication 250 MILLIGRAM(S): at 05:55

## 2019-12-31 RX ADMIN — Medication 81 MILLIGRAM(S): at 12:47

## 2019-12-31 RX ADMIN — PIPERACILLIN AND TAZOBACTAM 25 GRAM(S): 4; .5 INJECTION, POWDER, LYOPHILIZED, FOR SOLUTION INTRAVENOUS at 05:56

## 2019-12-31 RX ADMIN — TICAGRELOR 90 MILLIGRAM(S): 90 TABLET ORAL at 05:55

## 2019-12-31 RX ADMIN — Medication 1200 MILLIGRAM(S): at 05:55

## 2019-12-31 RX ADMIN — SPIRONOLACTONE 25 MILLIGRAM(S): 25 TABLET, FILM COATED ORAL at 05:55

## 2019-12-31 RX ADMIN — PANTOPRAZOLE SODIUM 40 MILLIGRAM(S): 20 TABLET, DELAYED RELEASE ORAL at 05:55

## 2019-12-31 NOTE — DISCHARGE NOTE PROVIDER - NSDCCPCAREPLAN_GEN_ALL_CORE_FT
PRINCIPAL DISCHARGE DIAGNOSIS  Diagnosis: DKA (diabetic ketoacidoses)  Assessment and Plan of Treatment:       SECONDARY DISCHARGE DIAGNOSES  Diagnosis: Hyperkalemia  Assessment and Plan of Treatment:     Diagnosis: Dehydration  Assessment and Plan of Treatment:     Diagnosis: RAS (acute kidney injury)  Assessment and Plan of Treatment:

## 2019-12-31 NOTE — ADVANCED PRACTICE NURSE CONSULT - RECOMMEDATIONS
continue diabetes self management education   pt to inject all inuslin doses while in the hospital using prefilled insulin syringe and rn supervision  pls consider dc pt on basaglar 15 nits qhs and humalog 8 units before meals via insulin pen faiza needles  cc- pls task pt to diabetes wellness out pt

## 2019-12-31 NOTE — PROGRESS NOTE ADULT - PROBLEM SELECTOR PLAN 1
s/p course of milrinone  Euvolemic on exam  continue ASA, brilinta, lasix, spironolactone, lisinopril, toprol  cannot increase lisinopril 2/2 to hypotension   Potassium elevated yesterday 2/2 to supplemental potassium, this AM normal. avoid potassium supplementation unless deficient.   Stable for DC from cardiology standpoint   follow up outpatient in office w/ Bianca SAMPSON

## 2019-12-31 NOTE — DISCHARGE NOTE PROVIDER - NSDCMRMEDTOKEN_GEN_ALL_CORE_FT
albuterol 90 mcg/inh inhalation aerosol: 2 puff(s) inhaled 4 times a day  ALPRAZolam 0.5 mg oral tablet: 1 tab(s) orally once a day  aspirin 81 mg oral tablet, chewable: 1 tab(s) orally once a day  atorvastatin 40 mg oral tablet: 1 tab(s) orally once a day  Atrovent 500 mcg/2.5 mL inhalation solution: 2.5 milliliter(s) inhaled 4 times a day, As Needed  esomeprazole 40 mg oral delayed release capsule: 1 cap(s) orally once a day  ferrous sulfate 250 mg oral capsule, extended release: 1 cap(s) orally once a day with meal  furosemide 20 mg oral tablet: 1 tab(s) orally once a day  glipiZIDE 10 mg oral tablet, extended release: 1 tab(s) orally once a day  ipratropium-albuterol 0.5 mg-2.5 mg/3 mLinhalation solution: 3 milliliter(s) inhaled every 6 hours, As needed, Shortness of Breath and/or Wheezing  Januvia 100 mg oral tablet: 1 tab(s) orally once a day  Lantus Solostar Pen 100 units/mL subcutaneous solution: 20 unit(s) subcutaneous once a day (at bedtime)   losartan 50 mg oral tablet: 1 tab(s) orally once a day  metFORMIN 1000 mg oral tablet, extended release: 1 tab(s) orally 2 times a day  metoprolol succinate 50 mg oral tablet, extended release: 1 tab(s) orally once a day  montelukast 10 mg oral tablet: 1 tab(s) orally once a day  potassium chloride 10 mEq oral capsule, extended release: 1 cap(s) orally once a day  ranolazine 500 mg oral tablet, extended release: 1 tab(s) orally 2 times a day  spironolactone 25 mg oral tablet: 1 tab(s) orally once a day  ticagrelor 90 mg oral tablet: 1 tab(s) orally every 12 hours  Vitamin C 500 mg oral tablet: 1 tab(s) orally once a day albuterol 90 mcg/inh inhalation aerosol: 2 puff(s) inhaled 4 times a day  ALPRAZolam 0.5 mg oral tablet: 1 tab(s) orally once a day  aspirin 81 mg oral tablet, chewable: 1 tab(s) orally once a day  Atrovent 500 mcg/2.5 mL inhalation solution: 2.5 milliliter(s) inhaled 4 times a day, As Needed  benzonatate 100 mg oral capsule: 1 cap(s) orally every 8 hours, As needed, Cough  esomeprazole 40 mg oral delayed release capsule: 1 cap(s) orally once a day  ferrous sulfate 250 mg oral capsule, extended release: 1 cap(s) orally once a day with meal  fluticasone 50 mcg/inh nasal spray: 1 spray(s) nasal 2 times a day  furosemide 20 mg oral tablet: 1 tab(s) orally once a day  guaiFENesin 1200 mg oral tablet, extended release: 1 tab(s) orally every 12 hours  HumaLOG KwikPen 200 units/mL (Concentrated) subcutaneous solution: 2 unit(s) subcutaneous 4 times a day (after meals and at bedtime)   2 Unit(s) if Glucose 151 - 200  4 Unit(s) if Glucose 201 - 250  6 Unit(s) if Glucose 251 - 300  8 Unit(s) if Glucose 301 - 350  10 Unit(s) if Glucose 351 - 400  12 Unit(s) if Glucose GREATER THAN 400  insulin glargine 100 units/mL subcutaneous solution: 20 unit(s) subcutaneous once a day (at bedtime)   ipratropium-albuterol 0.5 mg-2.5 mg/3 mLinhalation solution: 3 milliliter(s) inhaled every 6 hours, As needed, Shortness of Breath and/or Wheezing  Lantus Solostar Pen 100 units/mL subcutaneous solution: 20 unit(s) subcutaneous once a day (at bedtime)   losartan 50 mg oral tablet: 1 tab(s) orally once a day  metoprolol succinate 50 mg oral tablet, extended release: 1 tab(s) orally once a day  montelukast 10 mg oral tablet: 1 tab(s) orally once a day  Multiple Vitamins with Minerals oral tablet: 1 tab(s) orally once a day  polyethylene glycol 3350 oral powder for reconstitution: 17 gram(s) orally once a day  ranolazine 500 mg oral tablet, extended release: 1 tab(s) orally 2 times a day  spironolactone 25 mg oral tablet: 1 tab(s) orally once a day  ticagrelor 90 mg oral tablet: 1 tab(s) orally every 12 hours  tiotropium 18 mcg inhalation capsule: 1 cap(s) inhaled once a day  Vitamin C 500 mg oral tablet: 1 tab(s) orally once a day albuterol 90 mcg/inh inhalation aerosol: 2 puff(s) inhaled 4 times a day  ALPRAZolam 0.5 mg oral tablet: 1 tab(s) orally once a day  aspirin 81 mg oral tablet, chewable: 1 tab(s) orally once a day  Atrovent 500 mcg/2.5 mL inhalation solution: 2.5 milliliter(s) inhaled 4 times a day, As Needed  benzonatate 100 mg oral capsule: 1 cap(s) orally every 8 hours, As needed, Cough  esomeprazole 40 mg oral delayed release capsule: 1 cap(s) orally once a day  ferrous sulfate 250 mg oral capsule, extended release: 1 cap(s) orally once a day with meal  fluticasone 50 mcg/inh nasal spray: 1 spray(s) nasal 2 times a day  furosemide 20 mg oral tablet: 1 tab(s) orally once a day  guaiFENesin 1200 mg oral tablet, extended release: 1 tab(s) orally every 12 hours  insulin glargine 100 units/mL subcutaneous solution: 20 unit(s) subcutaneous once a day (at bedtime)   insulin lispro 100 units/mL injectable solution: 2 unit(s) injectable 4 times a day (before meals and at bedtime)     ipratropium-albuterol 0.5 mg-2.5 mg/3 mLinhalation solution: 3 milliliter(s) inhaled every 6 hours, As needed, Shortness of Breath and/or Wheezing  Lantus Solostar Pen 100 units/mL subcutaneous solution: 20 unit(s) subcutaneous once a day (at bedtime)   losartan 50 mg oral tablet: 1 tab(s) orally once a day  metoprolol succinate 50 mg oral tablet, extended release: 1 tab(s) orally once a day  montelukast 10 mg oral tablet: 1 tab(s) orally once a day  Multiple Vitamins with Minerals oral tablet: 1 tab(s) orally once a day  polyethylene glycol 3350 oral powder for reconstitution: 17 gram(s) orally once a day  ranolazine 500 mg oral tablet, extended release: 1 tab(s) orally 2 times a day  spironolactone 25 mg oral tablet: 1 tab(s) orally once a day  ticagrelor 90 mg oral tablet: 1 tab(s) orally every 12 hours  tiotropium 18 mcg inhalation capsule: 1 cap(s) inhaled once a day  Vitamin C 500 mg oral tablet: 1 tab(s) orally once a day albuterol 90 mcg/inh inhalation aerosol: 2 puff(s) inhaled 4 times a day  ALPRAZolam 0.5 mg oral tablet: 1 tab(s) orally once a day  amoxicillin-clavulanate 875 mg-125 mg oral tablet: 1 tab(s) orally 2 times a day   aspirin 81 mg oral tablet, chewable: 1 tab(s) orally once a day  Atrovent 500 mcg/2.5 mL inhalation solution: 2.5 milliliter(s) inhaled 4 times a day, As Needed  benzonatate 100 mg oral capsule: 1 cap(s) orally every 8 hours, As needed, Cough  esomeprazole 40 mg oral delayed release capsule: 1 cap(s) orally once a day  ferrous sulfate 250 mg oral capsule, extended release: 1 cap(s) orally once a day with meal  fluticasone 50 mcg/inh nasal spray: 1 spray(s) nasal 2 times a day  furosemide 20 mg oral tablet: 1 tab(s) orally once a day  guaiFENesin 1200 mg oral tablet, extended release: 1 tab(s) orally every 12 hours  insulin glargine 100 units/mL subcutaneous solution: 20 unit(s) subcutaneous once a day (at bedtime)   insulin lispro 100 units/mL injectable solution: 2 unit(s) injectable 4 times a day (before meals and at bedtime)     ipratropium-albuterol 0.5 mg-2.5 mg/3 mLinhalation solution: 3 milliliter(s) inhaled every 6 hours, As needed, Shortness of Breath and/or Wheezing  Lantus Solostar Pen 100 units/mL subcutaneous solution: 20 unit(s) subcutaneous once a day (at bedtime)   lisinopril 2.5 mg oral tablet: 1 tab(s) orally once a day  metoprolol succinate 50 mg oral tablet, extended release: 1 tab(s) orally once a day  montelukast 10 mg oral tablet: 1 tab(s) orally once a day  Multiple Vitamins with Minerals oral tablet: 1 tab(s) orally once a day  polyethylene glycol 3350 oral powder for reconstitution: 17 gram(s) orally once a day  ranolazine 500 mg oral tablet, extended release: 1 tab(s) orally 2 times a day  saccharomyces boulardii lyo 250 mg oral capsule: 1 cap(s) orally 2 times a day  spironolactone 25 mg oral tablet: 1 tab(s) orally once a day  ticagrelor 90 mg oral tablet: 1 tab(s) orally every 12 hours  tiotropium 18 mcg inhalation capsule: 1 cap(s) inhaled once a day  Vitamin C 500 mg oral tablet: 1 tab(s) orally once a day albuterol 90 mcg/inh inhalation aerosol: 2 puff(s) inhaled 4 times a day  ALPRAZolam 0.5 mg oral tablet: 1 tab(s) orally once a day  amoxicillin-clavulanate 875 mg-125 mg oral tablet: 1 tab(s) orally 2 times a day   aspirin 81 mg oral tablet, chewable: 1 tab(s) orally once a day  Atrovent 500 mcg/2.5 mL inhalation solution: 2.5 milliliter(s) inhaled 4 times a day, As Needed  Basaglar KwikPen 100 units/mL subcutaneous solution: 15 unit(s) subcutaneous once a day (at bedtime)   benzonatate 100 mg oral capsule: 1 cap(s) orally every 8 hours, As needed, Cough  esomeprazole 40 mg oral delayed release capsule: 1 cap(s) orally once a day  ferrous sulfate 250 mg oral capsule, extended release: 1 cap(s) orally once a day with meal  fluticasone 50 mcg/inh nasal spray: 1 spray(s) nasal 2 times a day  furosemide 20 mg oral tablet: 1 tab(s) orally once a day  guaiFENesin 1200 mg oral tablet, extended release: 1 tab(s) orally every 12 hours  insulin lispro 100 units/mL injectable solution: 2 unit(s) injectable 4 times a day (before meals and at bedtime)     ipratropium-albuterol 0.5 mg-2.5 mg/3 mLinhalation solution: 3 milliliter(s) inhaled every 6 hours, As needed, Shortness of Breath and/or Wheezing  lisinopril 2.5 mg oral tablet: 1 tab(s) orally once a day  metoprolol succinate 50 mg oral tablet, extended release: 1 tab(s) orally once a day  montelukast 10 mg oral tablet: 1 tab(s) orally once a day  Multiple Vitamins with Minerals oral tablet: 1 tab(s) orally once a day  faiza needles:   polyethylene glycol 3350 oral powder for reconstitution: 17 gram(s) orally once a day  ranolazine 500 mg oral tablet, extended release: 1 tab(s) orally 2 times a day  saccharomyces boulardii lyo 250 mg oral capsule: 1 cap(s) orally 2 times a day  spironolactone 25 mg oral tablet: 1 tab(s) orally once a day  ticagrelor 90 mg oral tablet: 1 tab(s) orally every 12 hours  tiotropium 18 mcg inhalation capsule: 1 cap(s) inhaled once a day  Vitamin C 500 mg oral tablet: 1 tab(s) orally once a day

## 2019-12-31 NOTE — PROGRESS NOTE ADULT - REASON FOR ADMISSION
DKA, multiple organ failure

## 2019-12-31 NOTE — DISCHARGE NOTE PROVIDER - CARE PROVIDERS DIRECT ADDRESSES
,DirectAddress_Unknown,[email or web address],DirectAddress_Unknown ,DirectAddress_Unknown,[email or web address],DirectAddress_Unknown,DirectAddress_Unknown ,DirectAddress_Unknown,[email or web address] [email or web address],DirectAddress_Unknown,DirectAddress_Unknown,[email or web address] [email or web address]

## 2019-12-31 NOTE — PROGRESS NOTE ADULT - PROBLEM SELECTOR PLAN 3
LFTs appear to have plateaued in the 3000s. No portal vein thrombosis seen on RUQ US. Hepatitis panel neg. Continue acetylcysteine infusion.
improving  500 from 900
improving  LFT in the am
improving  500 from 900

## 2019-12-31 NOTE — PROGRESS NOTE ADULT - ASSESSMENT
75y/o male PMH HTN, HLD, HFrEF, DM, CKD, CAD s/p CABG, prior MI, s/p PCI/brachy, (6/20/19- LM 70/90%, LAD diffuse disease, Cx Prox 100%, RCA known , LIMA To LAD patent, SVG to OM 80% with ISR)/(7/2019- SVG to OM1 80%, brachy to SVG to OM1, PCI native OM), TTE 12/18/19- EF 25-30%, WMA, mild PHTN, mild AS, present to ED with feeling cold, nausea vomiting since last night.  Found to have DKA, Pneumonia and multi system organ failure.

## 2019-12-31 NOTE — PROGRESS NOTE ADULT - SUBJECTIVE AND OBJECTIVE BOX
[facility]                          [address] [place] [address]                       Phone: [phone]. Fax:[phone]                      ________________________________________________           Reason for follow up/Overnight events: follow up, stable from cardiology standpoint.     HPI:  Pt is a 74 YOM h/o CAD, CABG, HFrEF, MI, HTN, HLD, DM, PAF on last admission pt was recently discharged on Thursday after being treated for decompensated HF with milrinone, dopamine(which he did not tolerate).  Pt presents to ER with weakness and vomiting since yesterday.  Per pt's son since he was discharged his appetite has been very poor and he has been weak but yesterday started vomiting and not tolerating PO medications.  In ER found to have multiple organ dysfunction including RAS, acute liver transaminitis, DKA/anion gap acidosis.  Pt denies CP, no SOB, admits to feeling very cold. (22 Dec 2019 18:25)      ROS: All review of systems negative unless indicated otherwise below.                          LAB RESULTS                   COMPLETE BLOOD COUNT( 30 Dec 2019 06:44 )                            9.6 g/dL<L>  8.61 K/uL )---------------( 301 K/uL                        30.1 %<L>      Automated Differential     Auto Basophil # - X      Auto Basophil % - X      Auto Eosinophil # - X      Auto Eosinophil % - X      Auto Immature Granulocyte # - X      Auto Immature Granulocyte % - X      Auto Lymphocyte # - X      Auto Lymphocyte % - X      Auto Monocyte # - X      Auto Monocyte % - X      Auto Neutrophil # - X      Auto Neutrophil % - X                                      CHEMISTRY                 Basic Metabolic Panel (19 @ 06:44)    132<L>  |  96<L>  |  23.0<H>  ----------------------------<  178<H>  4.3   |  23.0  |  1.16    Ca    8.8      30 Dec 2019 06:44  Phos  2.4     12-  Mg     2.0     12-29                    Liver Functions (19 @ 06:44))  TPro  6.6  /  Alb  3.5  /  TBili  0.7  /  DBili  x   /  AST  48  /  ALT  507  /  AlkPhos  116     PT/INR/PTT ( 28 Dec 2019 06:17 )                        :                       :      14.2         :       30.7                  .        .                   .              .           .       1.23        .                                                                   Cardiac Enzymes   ( 28 Dec 2019 06:14 )  Troponin T  X    ,  CPK  X    , CKMB  X    , BNP 2075<H>    , ( 22 Dec 2019 15:32 )  Troponin T  0.16<H>,  CPK  X    , CKMB  X    , BNP 07287<H>                              MICROBIOLOGY/CULTURES:    Culture - Blood (collected 19 @ 15:27)  Source: .Blood  Preliminary Report (19 @ 17:00):    No growth at 48 hours    Culture - Blood (collected 19 @ 15:27)  Source: .Blood  Preliminary Report (19 @ 17:00):    No growth at 48 hours    Rapid RVP Result: NotDetec (19 @ 22:49)                          CARDIOLOGY RESULTS: Official Report/Preliminary Verbal Reports    ECHO:   < from: TTE Echo Complete w/Doppler (19 @ 12:47) >  Summary:   1. Endocardial visualization was enhanced with intravenous echo contrast.   2. Moderately to severely decreased global left ventricular systolic function.   3. Left ventricular ejection fraction, by visual estimation, is 25 to 30%.   4. Severely enlarged left atrium.   5. The mitral in-flow pattern reveals no discernable A-wave, therefore no comment on diastolic function can be made.   6. Sclerotic aortic valve with decreased opening.   7. The mitral valve leaflets are tethered which is due to reduced systolic function and elevated LVDP.   8. Moderate tricuspid regurgitation.   9. Estimated pulmonary artery systolic pressure is 50.1 mmHg assuming a right atrial pressure of 3 mmHg, which is consistent with moderate pulmonary hypertension.    Demetrice Man MD Electronically signed on 2019 at 2:44:02 PM    < end of copied text >                            CARDIOLOGY REVIEW: Personally visualized and reviewed  Telemetry Last 24h: NSR 80s, infrequent PVC                             DAILY WEIGHTS - 48 HOUR TREND     Daily Weight in k.9 (30 Dec 2019 04:40), Weight in k.6 (29 Dec 2019 04:40)                             INTAKE AND OUTPUT - 48 HOUR TREND     19 @ 07:01  -  19 @ 07:00  --------------------------------------------------------  IN:  Total IN: 0 mL    OUT:    Voided: 1000 mL  Total OUT: 1000 mL    Total NET: -1000 mL      19 @ 07:01  -  19 @ 07:00  --------------------------------------------------------  IN:  Total IN: 0 mL    OUT:    Voided: 850 mL  Total OUT: 850 mL    Total NET: -850 mL      HOME MEDICATIONS:  albuterol 90 mcg/inh inhalation aerosol: 2 puff(s) inhaled 4 times a day (24 Dec 2019 11:35)  ALPRAZolam 0.5 mg oral tablet: 1 tab(s) orally once a day (24 Dec 2019 11:35)  Atrovent 500 mcg/2.5 mL inhalation solution: 2.5 milliliter(s) inhaled 4 times a day, As Needed (24 Dec 2019 11:35)  Brilinta (ticagrelor) 90 mg oral tablet: 1 tab(s) orally 2 times a day (24 Dec 2019 11:35)  esomeprazole 40 mg oral delayed release capsule: 1 cap(s) orally once a day (24 Dec 2019 11:35)  furosemide 20 mg oral tablet: 1 tab(s) orally once a day (24 Dec 2019 11:35)  glipiZIDE 10 mg oral tablet, extended release: 1 tab(s) orally once a day (24 Dec 2019 11:35)  Januvia 100 mg oral tablet: 1 tab(s) orally once a day (24 Dec 2019 11:35)  losartan 50 mg oral tablet: 1 tab(s) orally once a day (24 Dec 2019 11:35)  metFORMIN 1000 mg oral tablet, extended release: 1 tab(s) orally 2 times a day (24 Dec 2019 11:35)  montelukast 10 mg oral tablet: 1 tab(s) orally once a day (24 Dec 2019 11:35)  potassium chloride 10 mEq oral capsule, extended release: 1 cap(s) orally once a day (24 Dec 2019 11:35)  Ranexa 500 mg oral tablet, extended release: 1 tab(s) orally 2 times a day (24 Dec 2019 11:35)  Vitamin C 500 mg oral tablet: 1 tab(s) orally once a day (24 Dec 2019 11:35)                             Current Admission Active Medications    albuterol/ipratropium for Nebulization 3 milliLiter(s) Nebulizer every 6 hours PRN Shortness of Breath and/or Wheezing  ascorbic acid 500 milliGRAM(s) Oral daily  aspirin  chewable 81 milliGRAM(s) Oral daily  benzocaine 15 mG/menthol 3.6 mG (Sugar-Free) Lozenge 1 Lozenge Oral every 4 hours PRN Sore Throat  benzonatate 100 milliGRAM(s) Oral every 8 hours PRN Cough  dextrose 40% Gel 15 Gram(s) Oral once PRN Blood Glucose LESS THAN 70 milliGRAM(s)/deciliter  dextrose 5%. 1000 milliLiter(s) (50 mL/Hr) IV Continuous <Continuous>  dextrose 50% Injectable 12.5 Gram(s) IV Push once  dextrose 50% Injectable 25 Gram(s) IV Push once  dextrose 50% Injectable 25 Gram(s) IV Push once  fluticasone propionate 50 MICROgram(s)/spray Nasal Spray 1 Spray(s) Both Nostrils two times a day  furosemide    Tablet 20 milliGRAM(s) Oral daily  glucagon  Injectable 1 milliGRAM(s) IntraMuscular once PRN Glucose LESS THAN 70 milligrams/deciliter  guaiFENesin ER 1200 milliGRAM(s) Oral every 12 hours  heparin  Injectable 5000 Unit(s) SubCutaneous every 12 hours  influenza   Vaccine 0.5 milliLiter(s) IntraMuscular once  insulin glargine Injectable (LANTUS) 20 Unit(s) SubCutaneous at bedtime  insulin lispro (HumaLOG) corrective regimen sliding scale   SubCutaneous Before meals and at bedtime  lisinopril 2.5 milliGRAM(s) Oral daily  melatonin 1 milliGRAM(s) Oral at bedtime  metoprolol succinate ER 50 milliGRAM(s) Oral daily  multivitamin/minerals 1 Tablet(s) Oral daily  pantoprazole    Tablet 40 milliGRAM(s) Oral before breakfast  piperacillin/tazobactam IVPB.. 3.375 Gram(s) IV Intermittent every 8 hours  polyethylene glycol 3350 17 Gram(s) Oral daily  saccharomyces boulardii 250 milliGRAM(s) Oral two times a day  spironolactone 25 milliGRAM(s) Oral daily  ticagrelor 90 milliGRAM(s) Oral every 12 hours  tiotropium 18 MICROgram(s) Capsule 1 Capsule(s) Inhalation daily  vancomycin  IVPB 1000 milliGRAM(s) IV Intermittent <User Schedule>                        PHYSICAL EXAM:    Vital Signs Last 24 Hrs  T(C): 36.7 (30 Dec 2019 09:28), Max: 36.9 (29 Dec 2019 10:28)  T(F): 98 (30 Dec 2019 09:28), Max: 98.5 (29 Dec 2019 10:28)  HR: 59 (30 Dec 2019 09:28) (59 - 100)  BP: 103/66 (30 Dec 2019 09:28) (102/64 - 122/60)  BP(mean): --  RR: 20 (30 Dec 2019 09:28) (15 - 20)  SpO2: 100% (30 Dec 2019 09:28) (99% - 100%)    GENERAL: NAD  NECK: Supple, No JVD  NERVOUS SYSTEM:  Alert & Oriented X3, non focal neuro exam.   CHEST/LUNG: clear lungs, No rales, rhonchi, wheezing, or rubs  HEART: Regular rate and rhythm; s1 and s2 auscultated, No murmurs, rubs, or gallops  ABDOMEN: Soft, Nontender, Nondistended; Bowel sounds present and normoactive.   EXTREMITIES:  2+ Peripheral Pulses, No clubbing, cyanosis, +1 LE Edema

## 2019-12-31 NOTE — DISCHARGE NOTE PROVIDER - PROVIDER TOKENS
PROVIDER:[TOKEN:[[id number]]],PROVIDER:[TOKEN:[[id number]]],PROVIDER:[TOKEN:[[id number]]] PROVIDER:[TOKEN:[[id number]]],PROVIDER:[TOKEN:[[id number]]],PROVIDER:[TOKEN:[[id number]]],PROVIDER:[TOKEN:[[id number]],FOLLOWUP:[1 week]] PROVIDER:[TOKEN:[[id number]]],PROVIDER:[TOKEN:[[id number]]],PROVIDER:[TOKEN:[[id number]]],PROVIDER:[TOKEN:[[id number]],FOLLOWUP:[1 week]],PROVIDER:[TOKEN:[[id number]]]

## 2019-12-31 NOTE — DISCHARGE NOTE NURSING/CASE MANAGEMENT/SOCIAL WORK - PATIENT PORTAL LINK FT
You can access the FollowMyHealth Patient Portal offered by [facility] by registering at the following website: [email or web address]. By joining [employer]’s FollowMyHealth portal, you will also be able to view your health information using other applications (apps) compatible with our system.

## 2019-12-31 NOTE — PROGRESS NOTE ADULT - PROBLEM SELECTOR PROBLEM 1
Acute decompensated heart failure
Acute decompensated heart failure
Cardiogenic shock
Acute decompensated heart failure

## 2019-12-31 NOTE — PROGRESS NOTE ADULT - PROBLEM SELECTOR PROBLEM 2
Acute decompensated heart failure
Coronary artery disease involving native coronary artery of native heart, angina presence unspecifie

## 2019-12-31 NOTE — DISCHARGE NOTE PROVIDER - CARE PROVIDER_API CALL
Mabel Blackburn)  Hospitalists  [address] [place] [address]  Phone: [phone]  Fax: [phone]  Follow Up Time:     Sydney Valenzuela)  Cardiology; Internal Medicine  [address] [place] [address], [address] [id number]  Phone: [phone]  Fax: [phone]  Follow Up Time:     Asa Martínez (DO)  Medicine  [address] [place] [address], [place] [address]  Phone: [phone]  Fax: [phone]  Follow Up Time: Mabel Blackburn)  Hospitalists  [address] [place] [address]  Phone: [phone]  Fax: [phone]  Follow Up Time:     Sydney Valenzuela)  Cardiology; Internal Medicine  [address] [id number]  Phone: [phone]  Fax: [phone]  Follow Up Time:     Asa Martínez (DO)  Medicine  [address] [place] [address] [place] [address]  Phone: [phone]  Fax: [phone]  Follow Up Time:     Agnes Delgadillo)  Internal Medicine  [address] [place] [address]  Phone: [phone]  Fax: [phone]  Follow Up Time: 1 week Mabel Blackburn)  Hospitalists  [address] [place] [address]  Phone: [phone]  Fax: [phone]  Follow Up Time:     Sydney Valenzuela)  Cardiology; Internal Medicine  [address] [facility], [address] [id number]  Phone: [phone]  Fax: [phone]  Follow Up Time:     Asa Martínez (DO)  Medicine  [address] [place] [address] [place] [address]  Phone: [phone]  Fax: [phone]  Follow Up Time:     Agnes Delgadillo)  Internal Medicine  [address] [place] [address]  Phone: [phone]  Fax: [phone]  Follow Up Time: 1 week    Debra Steele)  EndocrinologyMetabDiabetes  [address] [place], [place] [id number]  Phone: [phone]  Fax: [phone]  Follow Up Time:

## 2019-12-31 NOTE — DISCHARGE NOTE PROVIDER - HOSPITAL COURSE
Pt is a 74 year old M h/o CAD, CABG, HFrEF, MI, HTN, HLD, DM, PAF on last admission pt was recently discharged on 12/19 after being treated for decompensated HF with milrinone, dopamine(which he did not tolerate).  Pt presents to ER with weakness and vomiting since yesterday.  Per pt's son since he was discharged his appetite has been very poor and he has been weak but yesterday started vomiting and not tolerating PO medications.  In ER found to have multiple organ dysfunction including RAS, acute liver transaminitis, DKA/anion gap acidosis. Pt is a 74 year old M h/o CAD, CABG, HFrEF, MI, HTN, HLD, DM, PAF on last admission pt was recently discharged on 12/19 after being treated for decompensated HF with milrinone, dopamine(which he did not tolerate).  Pt presents to ER with weakness and vomiting since yesterday.  Per pt's son since he was discharged his appetite has been very poor and he has been weak but yesterday started vomiting and not tolerating PO medications.  In ER found to have multiple organ dysfunction including RAS, acute liver transaminitis, DKA/anion gap acidosis. Admitted to MICU. Symptoms improved with insulin gtt, Pt is a 74 year old M h/o CAD, CABG, HFrEF, MI, HTN, HLD, DM, PAF on last admission pt was recently discharged on 12/19 after being treated for decompensated HF with milrinone, dopamine(which he did not tolerate).  Pt presents to ER with weakness and vomiting since yesterday.  Per pt's son since he was discharged his appetite has been very poor and he has been weak but yesterday started vomiting and not tolerating PO medications.  In ER found to have multiple organ dysfunction including RAS, acute liver transaminitis, DKA/anion gap acidosis. Admitted to MICU. Symptoms improved with insulin gtt, d/c'ed metformin, milrinone gtt. Downgraded to hospitalist service 12/25. GI consulted for elevated LFTs, likely due to ischemic hepatitis, no further GI workup. Followed by cardio. s/p milrinone, lasix. now euvolemic. Seen by ID, sputum cx with acinetobacter, poss Pt is a 74 year old M h/o CAD, CABG, HFrEF, MI, HTN, HLD, DM, PAF on last admission pt was recently discharged on 12/19 after being treated for decompensated HF with milrinone, dopamine(which he did not tolerate).  Pt presents to ER with weakness and vomiting since yesterday.  Per pt's son since he was discharged his appetite has been very poor and he has been weak but yesterday started vomiting and not tolerating PO medications.  In ER found to have multiple organ dysfunction including RAS, acute liver transaminitis, DKA/anion gap acidosis. Admitted to MICU. Symptoms improved with insulin gtt, d/c'ed metformin, milrinone gtt. Downgraded to hospitalist service 12/25. GI consulted for elevated LFTs, likely due to ischemic hepatitis, no further GI workup. Followed by cardio. s/p milrinone, lasix. now euvolemic. Seen by ID, sputum cx with acinetobacter, poss contaminant. Pt is a 74 year old M h/o CAD, CABG, HFrEF, MI, HTN, HLD, DM, PAF on last admission pt was recently discharged on 12/19 after being treated for decompensated HF with milrinone, dopamine(which he did not tolerate).  Pt presents to ER with weakness and vomiting since yesterday.  Per pt's son since he was discharged his appetite has been very poor and he has been weak but yesterday started vomiting and not tolerating PO medications.  In ER found to have multiple organ dysfunction including RAS, acute liver transaminitis, DKA/anion gap acidosis. Admitted to MICU. Symptoms improved with insulin gtt, d/c'ed metformin, milrinone gtt. Downgraded to hospitalist service 12/25. GI consulted for elevated LFTs, likely due to ischemic hepatitis, no further GI workup. Followed by cardio. s/p milrinone, lasix. now euvolemic. Seen by ID, sputum cx with acinetobacter, poss contaminant, as per ID, can d/c home with augmentin until 1/4. As per CM, pt refusing home care. Pt seen and examined. Stable for discharge.         On physical exam:    Vital Signs Last 24 Hrs    T(C): 36.3 (31 Dec 2019 09:47), Max: 36.4 (30 Dec 2019 15:28)    T(F): 97.4 (31 Dec 2019 09:47), Max: 97.6 (30 Dec 2019 15:28)    HR: 93 (31 Dec 2019 09:47) (82 - 96)    BP: 95/62 (31 Dec 2019 09:47) (95/62 - 103/67)    BP(mean): --    RR: 20 (31 Dec 2019 09:47) (16 - 20)    SpO2: 100% (31 Dec 2019 09:47) (98% - 100%)        CONSTITUTIONAL: NAD    EYES: +EOMI    CARDIAC: Regular rate, regular rhythm.  normal +S1, S2.     RESPIRATORY: Clear to auscultation bilaterally, no wheezes noted    GASTROINTESTINAL: Abdomen soft, non-tender, no guarding.    NEUROLOGICAL: Alert and oriented, no focal deficits, no motor or sensory deficits.    SKIN: warm, dry, no rashes noted    PSYCHIATRIC: Alert and oriented to person, place, time/situation. normal mood and affect. no apparent risk to self or others.    HEME LYMPH: No adenopathy or splenomegaly. No cervical or inguinal lymphadenopathy. Pt is a 74 year old M h/o CAD, CABG, HFrEF, MI, HTN, HLD, DM, PAF on last admission pt was recently discharged on 12/19 after being treated for decompensated HF with milrinone, dopamine(which he did not tolerate).  Pt presents to ER with weakness and vomiting since yesterday.  Per pt's son since he was discharged his appetite has been very poor and he has been weak but yesterday started vomiting and not tolerating PO medications.  In ER found to have multiple organ dysfunction including RAS, acute liver transaminitis, DKA/anion gap acidosis. Admitted to MICU. Symptoms improved with insulin gtt, d/c'ed metformin, milrinone gtt. Downgraded to hospitalist service 12/25. GI consulted for elevated LFTs, likely due to ischemic hepatitis, no further GI workup. Followed by cardio. s/p milrinone, lasix. now euvolemic. Seen by ID, sputum cx with acinetobacter, poss contaminant, as per ID, can d/c home with augmentin until 1/4. As per CM, pt refusing home care. Pt seen and examined. Stable for discharge.         On physical exam:    Vital Signs Last 24 Hrs    T(C): 36.3 (31 Dec 2019 09:47), Max: 36.4 (30 Dec 2019 15:28)    T(F): 97.4 (31 Dec 2019 09:47), Max: 97.6 (30 Dec 2019 15:28)    HR: 93 (31 Dec 2019 09:47) (82 - 96)    BP: 95/62 (31 Dec 2019 09:47) (95/62 - 103/67)    BP(mean): --    RR: 20 (31 Dec 2019 09:47) (16 - 20)    SpO2: 100% (31 Dec 2019 09:47) (98% - 100%)        CONSTITUTIONAL: NAD    EYES: +EOMI    CARDIAC: Regular rate, regular rhythm.  normal +S1, S2.     RESPIRATORY: Clear to auscultation bilaterally, no wheezes noted    GASTROINTESTINAL: Abdomen soft, non-tender, no guarding.    NEUROLOGICAL: Alert and oriented, no focal deficits, no motor or sensory deficits.    SKIN: warm, dry, no rashes noted    PSYCHIATRIC: Alert and oriented to person, place, time/situation. normal mood and affect. no apparent risk to self or others.    HEME LYMPH: No adenopathy or splenomegaly. No cervical or inguinal lymphadenopathy.        35minutes spent on discharge.

## 2019-12-31 NOTE — PROGRESS NOTE ADULT - ATTENDING COMMENTS
congestive heart failure . stable cr. stable LFTs.   tolerating diuretic and CHF meds.  on antianginal meds,.  outpaitent ischemic evaln and repeat echo .   No further in-patient cardiac work-up/management is needed.  Follow-up in cardiology office in 2 weeks.

## 2019-12-31 NOTE — ADVANCED PRACTICE NURSE CONSULT - ASSESSMENT
went to see pt in afternoon. pt is a+ox3 c/o 0 pain. pt has diabetes for many years he was taking metformin jenuvia and a sulfanulurea. pt was educated about the importance of using insulin @ this time and the need to hold all pills. pt and family verbalized understanding. pt and family were educated about the 2 types of insulin and their different schedules. pt was advised of performing bg monitoring before meals. pt states he fu w dr black pt was advised to have a fu appointment w dr black win 3 weeks

## 2019-12-31 NOTE — DISCHARGE NOTE PROVIDER - NSDCFUSCHEDAPPT_GEN_ALL_CORE_FT
ADRIANNA SHANKS ; 01/30/2020 ; [facility] [address] [facility] ADRIANNA SHANKS ; 01/30/2020 ; [facility] Endocrin [address] [facility] ADRIANNA SHANKS ; 01/30/2020 ; [facility] Endocrin [address] [facility] ADRIANNA SHANKS ; 01/30/2020 ; [facility] Endocrin [address] [facility] ADRIANNA SHANKS ; 01/30/2020 ; NPP Endocrin [address] [facility] ADRIANNA SHANKS ; 01/30/2020 ; [facility]PP Endocrin [address] [facility] ADRIANNA SHANKS ; 01/30/2020 ; [facility] Endocrin [address] [facility] ADRIANNA SHANKS ; 01/30/2020 ; NPP Endocrin [address] [facility]

## 2020-01-01 LAB — CULTURE RESULTS: SIGNIFICANT CHANGE UP

## 2020-01-01 RX ORDER — INSULIN LISPRO 100/ML
2 VIAL (ML) SUBCUTANEOUS
Qty: 10 | Refills: 0
Start: 2020-01-01 | End: 2020-01-30

## 2020-01-01 NOTE — CHART NOTE - NSCHARTNOTEFT_GEN_A_CORE
Called by nurse Ibarra due to pt not been able to get his insulin , pt was d/c home on 12/31/19 , his pharmacy was close when he went to  the meds , glucose was high at home . Humalog was sent to [facility] 24 hour at [place] , son will go to the pharmacy to  the insulin .

## 2020-01-07 ENCOUNTER — APPOINTMENT (OUTPATIENT)
Dept: ENDOCRINOLOGY | Facility: CLINIC | Age: 75
End: 2020-01-07
Payer: MEDICARE

## 2020-01-07 PROCEDURE — 99214 OFFICE O/P EST MOD 30 MIN: CPT

## 2020-01-07 RX ORDER — SITAGLIPTIN 100 MG/1
100 TABLET, FILM COATED ORAL DAILY
Refills: 0 | Status: DISCONTINUED | COMMUNITY
Start: 2019-08-13 | End: 2020-01-07

## 2020-01-07 NOTE — ASSESSMENT
[Carbohydrate Consistent Diet] : carbohydrate consistent diet [FreeTextEntry1] : 74 year old male with long standing T2DM and associated CAD s/p 4V CABG, also with subclinical hypothyroidism, hypertension, hyperlipidemia, and vitamin D deficiency. Glycemic control is suboptimal, likely due to post prandial hyperglycemia.\par \par 1. T2DM  bgs am   lunch = 160-180  evening 237 -350 \par - continue basaglar 15 u HS \par - increase novolog 10 u TID w meals. \par - might consider adding back metformin \par - check actid and hs and fax em logbook in 2-3 weeks \par -Reviewed A1c target of 7.0%.\par -Discussed importance of diet in improving glycemic control. Needs to limit carbs and portion sizes. Advised 30-60 grams of carbs per meal\par -Increase SMBG to twice daily, rotate times. If testing after a meal, wait at least 2 hours.\par - flushot done \par - GFR normal. \par - microalb spot normal. continue ARB \par - continue ASA \par - feet exam normal today \par \par 2. Subclinical hypothyroidism\par -Repeat TFTs now with Tg ab, TPO ab, and Tg.\par \par 3. Hypertension- controlled\par -Continue ARB.\par \par 4. Hyperlipidemia\par -continue statin.\par -Repeat lipids in 3 mos \par \par 5. Vitamin D deficiency\par -Continue vitamin D 50,000 IU weekly.

## 2020-01-07 NOTE — HISTORY OF PRESENT ILLNESS
[FreeTextEntry1] : referred by [facility] for dm2 management after recent admission to [facility] for pneumonia \par diagn dm2 22 yr ago

## 2020-01-07 NOTE — PHYSICAL EXAM
[Alert] : alert [Well Nourished] : well nourished [Well Developed] : well developed [No Acute Distress] : no acute distress [Normal Sclera/Conjunctiva] : normal sclera/conjunctiva [EOMI] : extra ocular movement intact [Normal Oropharynx] : the oropharynx was normal [No Proptosis] : no proptosis [Thyroid Not Enlarged] : the thyroid was not enlarged [No Respiratory Distress] : no respiratory distress [No Thyroid Nodules] : there were no palpable thyroid nodules [No Accessory Muscle Use] : no accessory muscle use [Clear to Auscultation] : lungs were clear to auscultation bilaterally [Normal S1, S2] : normal S1 and S2 [Normal Rate] : heart rate was normal  [No Edema] : there was no peripheral edema [Pedal Pulses Normal] : the pedal pulses are present [Regular Rhythm] : with a regular rhythm [Normal Bowel Sounds] : normal bowel sounds [Not Tender] : non-tender [Soft] : abdomen soft [Not Distended] : not distended [Post Cervical Nodes] : posterior cervical nodes [Anterior Cervical Nodes] : anterior cervical nodes [No Spinal Tenderness] : no spinal tenderness [Spine Straight] : spine straight [No Stigmata of Cushings Syndrome] : no stigmata of cushings syndrome [Normal Gait] : normal gait [Normal Strength/Tone] : muscle strength and tone were normal [No Rash] : no rash [Normal Reflexes] : deep tendon reflexes were 2+ and symmetric [No Tremors] : no tremors [Oriented x3] : oriented to person, place, and time [Acanthosis Nigricans] : no acanthosis nigricans [Full ROM] : with full range of motion [Normal] : normal [Diminished Throughout Both Feet] : normal tactile sensation with monofilament testing throughout both feet

## 2020-01-14 NOTE — DISCHARGE NOTE ADULT - PROVIDER RX CONTACT NUMBER
[Chills] : chills [Fatigue] : fatigue [Musculoskeletal Symptoms Arms] : arm  [Musculoskeletal Symptoms Hips] : hip [___ Days ago] : [unfilled] days ago [FreeTextEntry2] : pain and discomfort when movement of the arm, mostly when she raises the arm.  [FreeTextEntry8] : Kirstin is a 40-year old female who presents as a new patient with left shoulder pain for the past 1 week. Worse with sidebending. Better with Tylenol. Associated with 4 days of fatigue, chills, dizziness, and palpitations. She also reports lost voice 1 day ago. No cough, congestions, sore throat. Nothing makes it better, nothing makes it worse. No radiation of pain, no diaphoresis. Not associated with exertion.\par She was seen at an urgent care last week complaining of left shoulder pain and found to have "thyroid fullness." [phone]

## 2020-01-22 ENCOUNTER — APPOINTMENT (OUTPATIENT)
Dept: ENDOCRINOLOGY | Facility: CLINIC | Age: 75
End: 2020-01-22
Payer: MEDICARE

## 2020-01-22 PROCEDURE — G0108 DIAB MANAGE TRN  PER INDIV: CPT

## 2020-01-27 DIAGNOSIS — N17.9 ACUTE KIDNEY FAILURE, UNSPECIFIED: ICD-10-CM

## 2020-01-27 DIAGNOSIS — Z86.39 PERSONAL HISTORY OF OTHER ENDOCRINE, NUTRITIONAL AND METABOLIC DISEASE: ICD-10-CM

## 2020-01-27 DIAGNOSIS — R74.0 NONSPECIFIC ELEVATION OF LEVELS OF TRANSAMINASE AND LACTIC ACID DEHYDROGENASE [LDH]: ICD-10-CM

## 2020-01-27 DIAGNOSIS — Z87.448 PERSONAL HISTORY OF OTHER DISEASES OF URINARY SYSTEM: ICD-10-CM

## 2020-01-27 DIAGNOSIS — E11.10 TYPE 2 DIABETES MELLITUS WITH KETOACIDOSIS WITHOUT COMA: ICD-10-CM

## 2020-01-27 DIAGNOSIS — R57.0 CARDIOGENIC SHOCK: ICD-10-CM

## 2020-01-27 DIAGNOSIS — K72.00 ACUTE AND SUBACUTE HEPATIC FAILURE W/OUT COMA: ICD-10-CM

## 2020-01-27 DIAGNOSIS — Z87.01 PERSONAL HISTORY OF PNEUMONIA (RECURRENT): ICD-10-CM

## 2020-01-27 DIAGNOSIS — I50.20 UNSPECIFIED SYSTOLIC (CONGESTIVE) HEART FAILURE: ICD-10-CM

## 2020-01-27 DIAGNOSIS — Z87.898 PERSONAL HISTORY OF OTHER SPECIFIED CONDITIONS: ICD-10-CM

## 2020-01-27 LAB
ALBUMIN SERPL ELPH-MCNC: 3.9 G/DL
ALP BLD-CCNC: 102 U/L
ALT SERPL-CCNC: 19 U/L
ANION GAP SERPL CALC-SCNC: 13 MMOL/L
AST SERPL-CCNC: 23 U/L
BASOPHILS # BLD AUTO: 0.03 K/UL
BASOPHILS NFR BLD AUTO: 0.4 %
BILIRUB SERPL-MCNC: 0.6 MG/DL
BUN SERPL-MCNC: 27 MG/DL
CALCIUM SERPL-MCNC: 9.3 MG/DL
CHLORIDE SERPL-SCNC: 97 MMOL/L
CO2 SERPL-SCNC: 26 MMOL/L
CREAT SERPL-MCNC: 1.19 MG/DL
EOSINOPHIL # BLD AUTO: 0.24 K/UL
EOSINOPHIL NFR BLD AUTO: 2.9 %
GLUCOSE SERPL-MCNC: 114 MG/DL
HCT VFR BLD CALC: 35.3 %
HGB BLD-MCNC: 10.9 G/DL
IMM GRANULOCYTES NFR BLD AUTO: 0.6 %
LYMPHOCYTES # BLD AUTO: 1.53 K/UL
LYMPHOCYTES NFR BLD AUTO: 18.6 %
MAN DIFF?: NORMAL
MCHC RBC-ENTMCNC: 25.2 PG
MCHC RBC-ENTMCNC: 30.9 GM/DL
MCV RBC AUTO: 81.7 FL
MONOCYTES # BLD AUTO: 0.92 K/UL
MONOCYTES NFR BLD AUTO: 11.2 %
NEUTROPHILS # BLD AUTO: 5.46 K/UL
NEUTROPHILS NFR BLD AUTO: 66.3 %
NT-PROBNP SERPL-MCNC: 4543 PG/ML
PLATELET # BLD AUTO: 296 K/UL
POTASSIUM SERPL-SCNC: 4.9 MMOL/L
PROT SERPL-MCNC: 6.7 G/DL
RBC # BLD: 4.32 M/UL
RBC # FLD: 19.8 %
SODIUM SERPL-SCNC: 137 MMOL/L
WBC # FLD AUTO: 8.23 K/UL

## 2020-01-28 ENCOUNTER — APPOINTMENT (OUTPATIENT)
Dept: CARDIOLOGY | Facility: CLINIC | Age: 75
End: 2020-01-28
Payer: MEDICARE

## 2020-01-28 VITALS
DIASTOLIC BLOOD PRESSURE: 78 MMHG | BODY MASS INDEX: 21.66 KG/M2 | OXYGEN SATURATION: 100 % | WEIGHT: 138 LBS | HEART RATE: 80 BPM | SYSTOLIC BLOOD PRESSURE: 124 MMHG | RESPIRATION RATE: 16 BRPM | HEIGHT: 67 IN

## 2020-01-28 PROCEDURE — 93000 ELECTROCARDIOGRAM COMPLETE: CPT

## 2020-01-28 PROCEDURE — 99215 OFFICE O/P EST HI 40 MIN: CPT

## 2020-01-28 RX ORDER — ISOSORBIDE MONONITRATE 30 MG/1
30 TABLET, EXTENDED RELEASE ORAL DAILY
Qty: 60 | Refills: 5 | Status: DISCONTINUED | COMMUNITY
Start: 2019-08-13 | End: 2020-01-28

## 2020-01-28 RX ORDER — METOPROLOL SUCCINATE 50 MG/1
50 TABLET, EXTENDED RELEASE ORAL DAILY
Qty: 30 | Refills: 3 | Status: ACTIVE | COMMUNITY
Start: 2020-01-28

## 2020-01-28 RX ORDER — LISINOPRIL 2.5 MG/1
2.5 TABLET ORAL DAILY
Qty: 90 | Refills: 3 | Status: DISCONTINUED | COMMUNITY
End: 2020-01-28

## 2020-01-28 RX ORDER — LOSARTAN POTASSIUM 50 MG/1
50 TABLET, FILM COATED ORAL
Qty: 60 | Refills: 0 | Status: DISCONTINUED | COMMUNITY
Start: 2019-08-13 | End: 2020-01-28

## 2020-01-28 RX ORDER — METOPROLOL SUCCINATE 50 MG/1
50 TABLET, EXTENDED RELEASE ORAL DAILY
Qty: 60 | Refills: 3 | Status: DISCONTINUED | COMMUNITY
Start: 2019-08-13 | End: 2020-01-28

## 2020-01-28 NOTE — DISCUSSION/SUMMARY
[Patient] : the patient [Benefits] : benefits [Risks] : risks [Alternatives] : alternatives [___ Week(s)] : [unfilled] week(s) [With Me] : with me [FreeTextEntry1] : This is a 70 year old male with history HTN, DM, HLD, CAD, h/o MI,  s/p CABG, s/p PCIx4, recent unstable angina s/p PCI with CODI to SVG to LCX, ICM EF 45%\par 1)  Angina pectoris:  stable coronary artery disease      PCI and brachy therapy this year.. ct Dual antiplatelet therapy not on statins due to lover dysfunciton. LFTs now normalzied. restart lipitor 20 mg daily . recheck hepatic panel in 3 weeks.  \par aspirin and  ct brillinta  restart liptior 20 mg daily  \par 2) CAD, s/p CABG, s/p PCI: ASA, c ct Brilinta ,  \par 3) HTN: controlled \par 4) ICM: EF 25-30 %  .  tolerated toprol 50 mg daily , Euvolemic. lasix 40 mg,  Aldactone 25 mg daily.   initaite lisinoprol 2.5 mg daily . echo in 4-5 mths. for ICD evaln. \par 5) Gastric upset: gastroparesis:  Gi  evaln  \par

## 2020-01-28 NOTE — PHYSICAL EXAM
[General Appearance - Well Developed] : well developed [Well Groomed] : well groomed [Normal Appearance] : normal appearance [No Deformities] : no deformities [General Appearance - In No Acute Distress] : no acute distress [General Appearance - Well Nourished] : well nourished [Eyelids - No Xanthelasma] : the eyelids demonstrated no xanthelasmas [Normal Conjunctiva] : the conjunctiva exhibited no abnormalities [Normal Oral Mucosa] : normal oral mucosa [No Oral Pallor] : no oral pallor [No Oral Cyanosis] : no oral cyanosis [Normal Jugular Venous A Waves Present] : normal jugular venous A waves present [No Jugular Venous Castorena A Waves] : no jugular venous castorena A waves [Normal Jugular Venous V Waves Present] : normal jugular venous V waves present [Respiration, Rhythm And Depth] : normal respiratory rhythm and effort [Exaggerated Use Of Accessory Muscles For Inspiration] : no accessory muscle use [Heart Rate And Rhythm] : heart rate and rhythm were normal [Auscultation Breath Sounds / Voice Sounds] : lungs were clear to auscultation bilaterally [Murmurs] : no murmurs present [Heart Sounds] : normal S1 and S2 [Abdomen Tenderness] : non-tender [Abdomen Soft] : soft [Abdomen Mass (___ Cm)] : no abdominal mass palpated [Abnormal Walk] : normal gait [Nail Clubbing] : no clubbing of the fingernails [Cyanosis, Localized] : no localized cyanosis [Petechial Hemorrhages (___cm)] : no petechial hemorrhages [Skin Color & Pigmentation] : normal skin color and pigmentation [] : no ischemic changes [Skin Lesions] : no skin lesions [No Skin Ulcers] : no skin ulcer [No Venous Stasis] : no venous stasis [No Xanthoma] : no  xanthoma was observed [Oriented To Time, Place, And Person] : oriented to person, place, and time [Mood] : the mood was normal [Affect] : the affect was normal [No Anxiety] : not feeling anxious

## 2020-01-28 NOTE — HISTORY OF PRESENT ILLNESS
[FreeTextEntry1] : f/u  For: HTN, HLD, CAD s/p CABg x4, Angina, recent PCI\par \par CHF. : stable. Tolerating toprol 50 . ordered lisinopril. Elevated BNP.  on lasix 40 ad n aldactone 25 mg dialy. \par \par \par old note: HPI for today: feels good. got Brachytherapy done at [facility].  had angina for 3 days after. \par continues to take Nitrates.  did not make the appt for diabetes doctor yet. Hb A 1 C still elevated.\par Strongly recommended endocrinologist for aggressive sugar control\par Complaitn with meds. BP has been increasing now. \par \par OLD NOTE: 6/12/2019 c/c: chest pain \par Patient had an episode of chest pain, substernal with epigastric and abdominal pain.  2 days ago, it releived by NTG. \par he had another epsiode today, when he was going back from the [place]. 6/10, subternal.also with abdominal pain. he has been taking mylanta too for acid reflux . also , he has been recnetly started on azithromycin for bronchitis,.\par He states the second episode happened today and it got relived with NTG. he has been taking IMDUR every day. \par \par \par old note: no chest pain.no dyspnea,. no headaches. no dizziness.\par  difficulty walking foot problem. seein neurologist. NO Peripheral vascular disease . patient got US at a [facility] doctor. \par \par

## 2020-01-30 ENCOUNTER — APPOINTMENT (OUTPATIENT)
Dept: ENDOCRINOLOGY | Facility: CLINIC | Age: 75
End: 2020-01-30
Payer: MEDICARE

## 2020-01-30 VITALS
SYSTOLIC BLOOD PRESSURE: 110 MMHG | HEIGHT: 67 IN | DIASTOLIC BLOOD PRESSURE: 70 MMHG | BODY MASS INDEX: 21.5 KG/M2 | WEIGHT: 137 LBS

## 2020-01-30 LAB
GLUCOSE BLDC GLUCOMTR-MCNC: 358
HBA1C MFR BLD HPLC: 7.3

## 2020-01-30 PROCEDURE — 99214 OFFICE O/P EST MOD 30 MIN: CPT | Mod: 25

## 2020-01-30 PROCEDURE — 83036 HEMOGLOBIN GLYCOSYLATED A1C: CPT | Mod: QW

## 2020-01-30 PROCEDURE — 82962 GLUCOSE BLOOD TEST: CPT

## 2020-01-30 NOTE — ASSESSMENT
[Carbohydrate Consistent Diet] : carbohydrate consistent diet [Importance of Diet and Exercise] : importance of diet and exercise to improve glycemic control, achieve weight loss and improve cardiovascular health [FreeTextEntry1] : 74 year old male with long standing T2DM and associated CAD s/p 4V CABG, also with subclinical hypothyroidism, hypertension, hyperlipidemia, and vitamin D deficiency. Glycemic control is suboptimal, likely due to post prandial hyperglycemia. now 7.2 \par \par 1. T2DM  bgs am   lunch = 160-180  evening 237 -350 \par - continue basaglar 15 u HS \par - increase novolog 10-10-  14 u TID w meals. \par - might consider adding back metformin \par - bgs 4x day \par -Discussed importance of diet in improving glycemic control. Needs to limit carbs and portion sizes. Advised 30-60 grams of carbs per meal\par -Increase SMBG to twice daily, rotate times. \par - flushot done \par - GFR normal. \par - microalb spot normal. continue ARB \par - continue ASA \par \par 2. Subclinical hypothyroidism\par -Repeat TFTs now with Tg ab, TPO ab, and Tg.\par \par 3. Hypertension- controlled\par -Continue ARB.\par \par 4. Hyperlipidemia\par -continue statin.\par -Repeat lipids in 3 mos \par \par 5. Vitamin D deficiency\par -Continue vitamin D 50,000 IU weekly.

## 2020-01-30 NOTE — PHYSICAL EXAM
[Well Nourished] : well nourished [No Acute Distress] : no acute distress [Alert] : alert [Normal Sclera/Conjunctiva] : normal sclera/conjunctiva [Well Developed] : well developed [EOMI] : extra ocular movement intact [No Proptosis] : no proptosis [Normal Oropharynx] : the oropharynx was normal [Thyroid Not Enlarged] : the thyroid was not enlarged [No Thyroid Nodules] : there were no palpable thyroid nodules [No Accessory Muscle Use] : no accessory muscle use [No Respiratory Distress] : no respiratory distress [Clear to Auscultation] : lungs were clear to auscultation bilaterally [Normal Rate] : heart rate was normal  [Normal S1, S2] : normal S1 and S2 [Regular Rhythm] : with a regular rhythm [Pedal Pulses Normal] : the pedal pulses are present [No Edema] : there was no peripheral edema [Normal Bowel Sounds] : normal bowel sounds [Not Tender] : non-tender [Not Distended] : not distended [Soft] : abdomen soft [Anterior Cervical Nodes] : anterior cervical nodes [Post Cervical Nodes] : posterior cervical nodes [Spine Straight] : spine straight [Normal] : normal and non tender [No Spinal Tenderness] : no spinal tenderness [Normal Gait] : normal gait [No Stigmata of Cushings Syndrome] : no stigmata of cushings syndrome [No Rash] : no rash [Normal Strength/Tone] : muscle strength and tone were normal [No Tremors] : no tremors [Normal Reflexes] : deep tendon reflexes were 2+ and symmetric [Oriented x3] : oriented to person, place, and time [Acanthosis Nigricans] : no acanthosis nigricans

## 2020-02-19 ENCOUNTER — RX CHANGE (OUTPATIENT)
Age: 75
End: 2020-02-19

## 2020-05-14 ENCOUNTER — APPOINTMENT (OUTPATIENT)
Dept: ENDOCRINOLOGY | Facility: CLINIC | Age: 75
End: 2020-05-14

## 2020-07-27 ENCOUNTER — APPOINTMENT (OUTPATIENT)
Dept: ENDOCRINOLOGY | Facility: CLINIC | Age: 75
End: 2020-07-27
Payer: MEDICARE

## 2020-07-27 VITALS
WEIGHT: 152 LBS | BODY MASS INDEX: 23.86 KG/M2 | HEIGHT: 67 IN | SYSTOLIC BLOOD PRESSURE: 104 MMHG | DIASTOLIC BLOOD PRESSURE: 62 MMHG

## 2020-07-27 PROCEDURE — 99214 OFFICE O/P EST MOD 30 MIN: CPT

## 2020-07-27 NOTE — ASSESSMENT
[Exercise/Effect on Glucose] : exercise/effect on glucose [Importance of Diet and Exercise] : importance of diet and exercise to improve glycemic control, achieve weight loss and improve cardiovascular health [Retinopathy Screening] : Patient was referred to ophthalmology for retinopathy screening [FreeTextEntry1] : 74 year old male with long standing T2DM and associated CAD s/p 4V CABG, also with hypothyroidism now, hypertension, hyperlipidemia, and vitamin D deficiency. Glycemic control is suboptimal  due to post prandial hyperglycemia. now 7.6. he admits overeating and no exercise.  \par \par 1. T2DM \par - logbook reveiwed and Bgs am   lunch 160s' bedtime 155-200 \par - continue basaglar 15 u HS \par - increase novolog 12-12- 16 u TID w meals. \par - bgs 4x day \par -Discussed importance of diet in improving glycemic control. Needs to limit carbs and portion sizes. Advised 30-60 grams of carbs per meal\par - encouraged more exercise walking 30 min 3 x week\par - needs to try to have more protein for meals\par - Targets reviewed. Recommended pt try to keep blood glucose level below 130 (110) before meals and below 160 (140) two hours after meals. \par - creatinine slightly high , probably some degree of CKD. \par - microalb spot normal. continue ARB \par - continue ASA \par - eye exam july 2020 \par - foot exam normal today: normal monofilament and vibratory B/L. he has onychomycosis toes. Advised to use antifungal cream \par \par 2. Hypothyroidism : will start LT4 50 mcg qd and repeat TFst in 3 mos and adjust dose if need be \par Take daily in AM on an empty stomach at least 30 minutes before eating or taking other medication.\par TPo ab negative. No family h/o thyroid disease. \par \par 3. Hypertension-  bp at target on meds. Continue current management.\par I advised low fat/low cholesterol diet, low salt diet, and weight loss\par Continue ARB.\par \par 4. Hyperlipidemia: LDL at target.\par low fat/low cholesterol diet and weight loss advised\par continue statin.\par Repeat lipids in 3 mos \par \par 5. Vitamin D deficiency: continue vitamin D 50,000 IU weekly. Vitamin d level normal now. \par

## 2020-07-27 NOTE — PHYSICAL EXAM
[Alert] : alert [Well Nourished] : well nourished [Well Developed] : well developed [No Acute Distress] : no acute distress [EOMI] : extra ocular movement intact [Normal Sclera/Conjunctiva] : normal sclera/conjunctiva [No Proptosis] : no proptosis [Normal Oropharynx] : the oropharynx was normal [No Thyroid Nodules] : no palpable thyroid nodules [Thyroid Not Enlarged] : the thyroid was not enlarged [No Respiratory Distress] : no respiratory distress [No Accessory Muscle Use] : no accessory muscle use [Clear to Auscultation] : lungs were clear to auscultation bilaterally [Normal S1, S2] : normal S1 and S2 [Normal Rate] : heart rate was normal [Regular Rhythm] : with a regular rhythm [No Edema] : no peripheral edema [Not Tender] : non-tender [Pedal Pulses Normal] : the pedal pulses are present [Normal Bowel Sounds] : normal bowel sounds [Not Distended] : not distended [Soft] : abdomen soft [Normal Anterior Cervical Nodes] : no anterior cervical lymphadenopathy [Normal Posterior Cervical Nodes] : no posterior cervical lymphadenopathy [No Spinal Tenderness] : no spinal tenderness [No Stigmata of Cushings Syndrome] : no stigmata of Cushings Syndrome [Normal Gait] : normal gait [Spine Straight] : spine straight [No Rash] : no rash [Normal Strength/Tone] : muscle strength and tone were normal [Normal] : normal [Full ROM] : with full range of motion [No Tremors] : no tremors [Normal Reflexes] : deep tendon reflexes were 2+ and symmetric [Oriented x3] : oriented to person, place, and time [de-identified] : onychomycosis toes  [Diminished Throughout Both Feet] : normal tactile sensation with monofilament testing throughout both feet [Acanthosis Nigricans] : no acanthosis nigricans

## 2020-08-10 NOTE — ED PROVIDER NOTE - CLINICAL SUMMARY MEDICAL DECISION MAKING FREE TEXT BOX
Discharge Planning Assessment   [facility]     Patient Name: Lucinda Dickey  MRN: [id number]  [phone]  Cell phone [phone]     Pt with recurrent chest discomfort and multiple risk factors for ACS. Will likely admit for acute CHF.

## 2020-08-19 ENCOUNTER — APPOINTMENT (OUTPATIENT)
Dept: CARDIOLOGY | Facility: CLINIC | Age: 75
End: 2020-08-19
Payer: MEDICARE

## 2020-08-19 VITALS
DIASTOLIC BLOOD PRESSURE: 73 MMHG | RESPIRATION RATE: 16 BRPM | OXYGEN SATURATION: 99 % | TEMPERATURE: 98 F | HEART RATE: 73 BPM | SYSTOLIC BLOOD PRESSURE: 149 MMHG

## 2020-08-19 VITALS — WEIGHT: 152 LBS | HEIGHT: 67 IN | BODY MASS INDEX: 23.86 KG/M2

## 2020-08-19 PROCEDURE — 99215 OFFICE O/P EST HI 40 MIN: CPT

## 2020-08-19 PROCEDURE — 93000 ELECTROCARDIOGRAM COMPLETE: CPT

## 2020-08-19 RX ORDER — ESOMEPRAZOLE MAGNESIUM 40 MG/1
40 CAPSULE, DELAYED RELEASE ORAL DAILY
Refills: 0 | Status: DISCONTINUED | COMMUNITY
End: 2020-08-19

## 2020-08-19 RX ORDER — FLUTICASONE PROPIONATE 50 MCG
50 SPRAY, SUSPENSION NASAL
Refills: 0 | Status: DISCONTINUED | COMMUNITY
End: 2020-08-19

## 2020-08-19 RX ORDER — BENZONATATE 100 MG/1
100 CAPSULE ORAL
Refills: 0 | Status: DISCONTINUED | COMMUNITY
End: 2020-08-19

## 2020-08-19 RX ORDER — GUAIFENESIN 1200 MG
TABLET, EXTENDED RELEASE 12 HR ORAL
Refills: 0 | Status: DISCONTINUED | COMMUNITY
End: 2020-08-19

## 2020-08-19 RX ORDER — METFORMIN ER 500 MG 500 MG/1
500 TABLET ORAL DAILY
Refills: 0 | Status: DISCONTINUED | COMMUNITY
Start: 2019-06-12 | End: 2020-08-19

## 2020-08-19 RX ORDER — ALPRAZOLAM 0.5 MG/1
0.5 TABLET ORAL 3 TIMES DAILY
Refills: 0 | Status: DISCONTINUED | COMMUNITY
End: 2020-08-19

## 2020-08-19 NOTE — PHYSICAL EXAM
[General Appearance - Well Developed] : well developed [Normal Appearance] : normal appearance [Well Groomed] : well groomed [General Appearance - In No Acute Distress] : no acute distress [No Deformities] : no deformities [General Appearance - Well Nourished] : well nourished [Normal Conjunctiva] : the conjunctiva exhibited no abnormalities [Eyelids - No Xanthelasma] : the eyelids demonstrated no xanthelasmas [Normal Oral Mucosa] : normal oral mucosa [No Oral Cyanosis] : no oral cyanosis [No Oral Pallor] : no oral pallor [Normal Jugular Venous A Waves Present] : normal jugular venous A waves present [Normal Jugular Venous V Waves Present] : normal jugular venous V waves present [No Jugular Venous Castorena A Waves] : no jugular venous castorena A waves [Respiration, Rhythm And Depth] : normal respiratory rhythm and effort [Exaggerated Use Of Accessory Muscles For Inspiration] : no accessory muscle use [Auscultation Breath Sounds / Voice Sounds] : lungs were clear to auscultation bilaterally [Heart Rate And Rhythm] : heart rate and rhythm were normal [Murmurs] : no murmurs present [Heart Sounds] : normal S1 and S2 [Abdomen Soft] : soft [Abdomen Mass (___ Cm)] : no abdominal mass palpated [Abdomen Tenderness] : non-tender [Nail Clubbing] : no clubbing of the fingernails [Cyanosis, Localized] : no localized cyanosis [Abnormal Walk] : normal gait [Petechial Hemorrhages (___cm)] : no petechial hemorrhages [No Venous Stasis] : no venous stasis [Skin Color & Pigmentation] : normal skin color and pigmentation [] : no rash [Skin Lesions] : no skin lesions [No Skin Ulcers] : no skin ulcer [No Xanthoma] : no  xanthoma was observed [Oriented To Time, Place, And Person] : oriented to person, place, and time [Affect] : the affect was normal [Mood] : the mood was normal [No Anxiety] : not feeling anxious

## 2020-08-19 NOTE — DISCUSSION/SUMMARY
[Patient] : the patient [Risks] : risks [Benefits] : benefits [Alternatives] : alternatives [___ Month(s)] : [unfilled] month(s) [With Me] : with me [FreeTextEntry1] : This is a 70 year old male with history HTN, DM, HLD, CAD, h/o MI,  s/p CABG, s/p PCIx4, recent unstable angina s/p PCI with CODI to SVG to LCX,  \par 1)  Angina pectoris:  stable coronary artery disease      PCI and brachy therapy this year.. ct Dual antiplatelet therapy lipitor 20 mg daily .  rabnexa 500 mg Q12 \par aspirin and  ct brillinta  liptior 20 mg daily  \par 2) CAD, s/p CABG, s/p PCI: ASA, c ct Brilinta ,  \par 3) HTN: controlled \par 4) ICM: EF 25-30 %  .  toprol 50 mg daily , . lasix 40 mg,  Aldactone 25 mg daily.   lisinoprol 2.5 mg every other day./ echo ordered . for ICD evaln. \par 5) Gastric upset: gastroparesis:  Gi  evaln  \par 6) Anemia: 10.2 Irone normal. \par

## 2020-08-19 NOTE — CARDIOLOGY SUMMARY
[LVEF ___%] : LVEF [unfilled]% [___] : [unfilled] [Mild] : mild mitral regurgitation [None] : no pulmonary hypertension [Enlarged] : enlarged LA size Admission Reconciliation is Completed  Discharge Reconciliation is Not Complete Admission Reconciliation is Completed  Discharge Reconciliation is Completed

## 2020-08-19 NOTE — HISTORY OF PRESENT ILLNESS
[FreeTextEntry1] : f/u  For: HTN, HLD, CAD s/p CABg x4, Angina, recent PCI\par \par CHF. : stable. Tolerating toprol 50 . ordered lisinopril. Elevated BNP.  on lasix 40 ad n aldactone 25 mg dialy. \par \par \par old note: HPI for today: feels good. got Brachytherapy done at [place].  had angina for 3 days after. \par continues to take Nitrates.  did not make the appt for diabetes doctor yet. Hb A 1 C still elevated.\par Strongly recommended endocrinologist for aggressive sugar control\par Complaitn with meds. BP has been increasing now. \par \par OLD NOTE: 6/12/2019 c/c: chest pain \par Patient had an episode of chest pain, substernal with epigastric and abdominal pain.  2 days ago, it releived by NTG. \par he had another epsiode today, when he was going [place]. 6/10, subternal.also with abdominal pain. he has been taking mylanta too for acid reflux . also , he has been recnetly started on azithromycin for bronchitis,.\par He states the second episode happened today and it got relived with NTG. he has been taking IMDUR every day. \par \par \par old note: no chest pain.no dyspnea,. no headaches. no dizziness.\par  difficulty walking foot problem. seein neurologist. NO Peripheral vascular disease . patient got US at a [facility] doctor. \par \par

## 2020-09-03 ENCOUNTER — APPOINTMENT (OUTPATIENT)
Dept: ENDOCRINOLOGY | Facility: CLINIC | Age: 75
End: 2020-09-03

## 2020-09-22 ENCOUNTER — APPOINTMENT (OUTPATIENT)
Dept: CARDIOLOGY | Facility: CLINIC | Age: 75
End: 2020-09-22
Payer: MEDICARE

## 2020-09-22 PROCEDURE — 93356 MYOCRD STRAIN IMG SPCKL TRCK: CPT

## 2020-09-22 PROCEDURE — 76376 3D RENDER W/INTRP POSTPROCES: CPT

## 2020-09-22 PROCEDURE — 93306 TTE W/DOPPLER COMPLETE: CPT

## 2020-09-25 ENCOUNTER — RX CHANGE (OUTPATIENT)
Age: 75
End: 2020-09-25

## 2020-09-25 DIAGNOSIS — E87.5 HYPERKALEMIA: ICD-10-CM

## 2020-09-25 LAB
ALBUMIN SERPL ELPH-MCNC: 4.2 G/DL
ALP BLD-CCNC: 101 U/L
ALT SERPL-CCNC: 14 U/L
ANION GAP SERPL CALC-SCNC: 12 MMOL/L
APO B SERPL-MCNC: 81 MG/DL
AST SERPL-CCNC: 17 U/L
BILIRUB SERPL-MCNC: 0.4 MG/DL
BUN SERPL-MCNC: 25 MG/DL
CALCIUM SERPL-MCNC: 9.3 MG/DL
CHLORIDE SERPL-SCNC: 97 MMOL/L
CO2 SERPL-SCNC: 26 MMOL/L
CREAT SERPL-MCNC: 1.35 MG/DL
ESTIMATED AVERAGE GLUCOSE: 166 MG/DL
FERRITIN SERPL-MCNC: 27 NG/ML
GLUCOSE SERPL-MCNC: 95 MG/DL
HBA1C MFR BLD HPLC: 7.4 %
MAGNESIUM SERPL-MCNC: 2 MG/DL
NT-PROBNP SERPL-MCNC: 993 PG/ML
POTASSIUM SERPL-SCNC: 5.6 MMOL/L
PROT SERPL-MCNC: 7.2 G/DL
SODIUM SERPL-SCNC: 134 MMOL/L

## 2020-09-29 ENCOUNTER — NON-APPOINTMENT (OUTPATIENT)
Age: 75
End: 2020-09-29

## 2020-09-29 ENCOUNTER — APPOINTMENT (OUTPATIENT)
Dept: CARDIOLOGY | Facility: CLINIC | Age: 75
End: 2020-09-29
Payer: MEDICARE

## 2020-09-29 VITALS
HEART RATE: 75 BPM | HEIGHT: 67 IN | DIASTOLIC BLOOD PRESSURE: 82 MMHG | RESPIRATION RATE: 16 BRPM | SYSTOLIC BLOOD PRESSURE: 149 MMHG | TEMPERATURE: 98.5 F | WEIGHT: 152 LBS | OXYGEN SATURATION: 100 % | BODY MASS INDEX: 23.86 KG/M2

## 2020-09-29 VITALS — SYSTOLIC BLOOD PRESSURE: 126 MMHG | DIASTOLIC BLOOD PRESSURE: 72 MMHG

## 2020-09-29 LAB — APO LP(A) SERPL-MCNC: 499.5 NMOL/L

## 2020-09-29 PROCEDURE — 99215 OFFICE O/P EST HI 40 MIN: CPT

## 2020-09-29 RX ORDER — ISOSORBIDE DINITRATE 5 MG/1
5 TABLET ORAL EVERY 8 HOURS
Qty: 90 | Refills: 3 | Status: DISCONTINUED | COMMUNITY
Start: 2020-09-23 | End: 2020-09-29

## 2020-09-29 NOTE — DISCUSSION/SUMMARY
[Patient] : the patient [Risks] : risks [Benefits] : benefits [Alternatives] : alternatives [With Me] : with me [___ Month(s)] : [unfilled] month(s) [FreeTextEntry1] : This is a 70 year old male with history HTN, DM, HLD, CAD, h/o MI,  s/p CABG, s/p PCIx4, recent unstable angina s/p PCI with CODI to SVG to LCX,  \par 1)  Angina pectoris:  stable coronary artery disease      PCI and brachy therapy this year.. ct Dual antiplatelet therapy . increase lipitor 20 mg daily .  ranexa 500 mg Q12 \par aspirin and  ct brillinta  liptior 20 mg daily  \par 2) CAD, s/p CABG, s/p PCI: ASA, c ct Brilinta ,  \par 3) HTN: controlled \par 4) ICM: EF 25-30 %  .  toprol 50 mg daily , . lasix 40 mg,  Aldactone 25 mg daily. d/c  lisinoprol 2.5 mg  every day.  d./c due to cough.  chnge to valsartan 40 mg daily. will get viability \par 5) Gastric upset: gastroparesis:  Gi  evaln  \par 6) Anemia: 10.2 Irone normal. \par 7) hypothyroidsm and Diabetes Mellitus : tsh elevated. follo wup with endocrine. \par \par

## 2020-09-29 NOTE — HISTORY OF PRESENT ILLNESS
[FreeTextEntry1] : f/u  For: HTN, HLD, CAD s/p CABg x4, Angina, recent PCI\par \par CHF. : stable. Tolerating toprol 50 . ordered lisinopril. Elevated BNP.  on lasix 40 ad n aldactone 25 mg dialy. \par \par \par old note: HPI for today: feels good. got Brachytherapy done at [place].  had angina for 3 days after. \par continues to take Nitrates.  did not make the appt for diabetes doctor yet. Hb A 1 C still elevated.\par Strongly recommended endocrinologist for aggressive sugar control\par Complaitn with meds. BP has been increasing now. \par \par OLD NOTE: 6/12/2019 c/c: chest pain \par Patient had an episode of chest pain, substernal with epigastric and abdominal pain.  2 days ago, it releived by NTG. \par he had another epsiode today, when he was going back from the [employer]. 6/10, subternal.also with abdominal pain. he has been taking mylanta too for acid reflux . also , he has been recnetly started on azithromycin for bronchitis,.\par He states the second episode happened today and it got relived with NTG. he has been taking IMDUR every day. \par \par \par old note: no chest pain.no dyspnea,. no headaches. no dizziness.\par  difficulty walking foot problem. seein neurologist. NO Peripheral vascular disease . patient got US at a [facility] doctor. \par \par

## 2020-10-17 ENCOUNTER — LABORATORY RESULT (OUTPATIENT)
Age: 75
End: 2020-10-17

## 2020-10-18 RX ORDER — VALSARTAN 40 MG/1
40 TABLET, COATED ORAL
Qty: 30 | Refills: 3 | Status: DISCONTINUED | COMMUNITY
Start: 2020-09-29 | End: 2020-10-18

## 2020-10-18 RX ORDER — SPIRONOLACTONE 25 MG/1
25 TABLET ORAL
Qty: 30 | Refills: 0 | Status: DISCONTINUED | COMMUNITY
End: 2020-10-18

## 2020-10-19 ENCOUNTER — RX CHANGE (OUTPATIENT)
Age: 75
End: 2020-10-19

## 2020-10-19 ENCOUNTER — APPOINTMENT (OUTPATIENT)
Dept: CARDIOLOGY | Facility: CLINIC | Age: 75
End: 2020-10-19
Payer: MEDICARE

## 2020-10-19 PROCEDURE — 93015 CV STRESS TEST SUPVJ I&R: CPT

## 2020-10-19 PROCEDURE — A9500: CPT

## 2020-10-19 PROCEDURE — A9505: CPT

## 2020-10-19 PROCEDURE — 78452 HT MUSCLE IMAGE SPECT MULT: CPT

## 2020-10-20 ENCOUNTER — APPOINTMENT (OUTPATIENT)
Dept: CARDIOLOGY | Facility: CLINIC | Age: 75
End: 2020-10-20

## 2020-10-20 LAB
ANION GAP SERPL CALC-SCNC: 11 MMOL/L
BUN SERPL-MCNC: 36 MG/DL
CALCIUM SERPL-MCNC: 9 MG/DL
CHLORIDE SERPL-SCNC: 99 MMOL/L
CO2 SERPL-SCNC: 26 MMOL/L
CREAT SERPL-MCNC: 1.55 MG/DL
GLUCOSE SERPL-MCNC: 143 MG/DL
NT-PROBNP SERPL-MCNC: 1147 PG/ML
POTASSIUM SERPL-SCNC: 6.3 MMOL/L
SODIUM SERPL-SCNC: 136 MMOL/L
TSH SERPL-ACNC: 4.72 UIU/ML

## 2020-10-22 ENCOUNTER — NON-APPOINTMENT (OUTPATIENT)
Age: 75
End: 2020-10-22

## 2020-10-26 LAB
ALBUMIN SERPL ELPH-MCNC: 3.7 G/DL
ALP BLD-CCNC: 103 U/L
ALT SERPL-CCNC: 12 U/L
ANION GAP SERPL CALC-SCNC: 10 MMOL/L
AST SERPL-CCNC: 18 U/L
BASOPHILS # BLD AUTO: 0.03 K/UL
BASOPHILS NFR BLD AUTO: 0.2 %
BILIRUB SERPL-MCNC: 0.4 MG/DL
BUN SERPL-MCNC: 25 MG/DL
CALCIUM SERPL-MCNC: 8.9 MG/DL
CHLORIDE SERPL-SCNC: 99 MMOL/L
CHOLEST SERPL-MCNC: 154 MG/DL
CO2 SERPL-SCNC: 31 MMOL/L
CREAT SERPL-MCNC: 1.5 MG/DL
CREAT SPEC-SCNC: 109 MG/DL
EOSINOPHIL # BLD AUTO: 0.47 K/UL
EOSINOPHIL NFR BLD AUTO: 3.5 %
ESTIMATED AVERAGE GLUCOSE: 166 MG/DL
GLUCOSE SERPL-MCNC: 101 MG/DL
HBA1C MFR BLD HPLC: 7.4 %
HCT VFR BLD CALC: 35 %
HDLC SERPL-MCNC: 53 MG/DL
HGB BLD-MCNC: 11 G/DL
IMM GRANULOCYTES NFR BLD AUTO: 0.5 %
LDLC SERPL CALC-MCNC: 82 MG/DL
LDLC SERPL DIRECT ASSAY-MCNC: 86 MG/DL
LYMPHOCYTES # BLD AUTO: 1.86 K/UL
LYMPHOCYTES NFR BLD AUTO: 14 %
MAN DIFF?: NORMAL
MCHC RBC-ENTMCNC: 26.9 PG
MCHC RBC-ENTMCNC: 31.4 GM/DL
MCV RBC AUTO: 85.6 FL
MICROALBUMIN 24H UR DL<=1MG/L-MCNC: 1.3 MG/DL
MICROALBUMIN/CREAT 24H UR-RTO: 12 MG/G
MONOCYTES # BLD AUTO: 1.28 K/UL
MONOCYTES NFR BLD AUTO: 9.6 %
NEUTROPHILS # BLD AUTO: 9.57 K/UL
NEUTROPHILS NFR BLD AUTO: 72.2 %
NONHDLC SERPL-MCNC: 100 MG/DL
PLATELET # BLD AUTO: 351 K/UL
POTASSIUM SERPL-SCNC: 4.3 MMOL/L
PROT SERPL-MCNC: 6.9 G/DL
RBC # BLD: 4.09 M/UL
RBC # FLD: 13.1 %
SODIUM SERPL-SCNC: 141 MMOL/L
T4 FREE SERPL-MCNC: 1.2 NG/DL
TRIGL SERPL-MCNC: 94 MG/DL
TSH SERPL-ACNC: 4.31 UIU/ML
WBC # FLD AUTO: 13.28 K/UL

## 2020-10-27 ENCOUNTER — APPOINTMENT (OUTPATIENT)
Dept: CARDIOLOGY | Facility: CLINIC | Age: 75
End: 2020-10-27
Payer: MEDICARE

## 2020-10-27 VITALS — HEART RATE: 76 BPM | OXYGEN SATURATION: 100 % | DIASTOLIC BLOOD PRESSURE: 82 MMHG | SYSTOLIC BLOOD PRESSURE: 145 MMHG

## 2020-10-27 PROCEDURE — 99072 ADDL SUPL MATRL&STAF TM PHE: CPT

## 2020-10-27 PROCEDURE — 99215 OFFICE O/P EST HI 40 MIN: CPT

## 2020-10-27 NOTE — DISCUSSION/SUMMARY
[Patient] : the patient [Risks] : risks [Benefits] : benefits [Alternatives] : alternatives [___ Week(s)] : [unfilled] week(s) [With Me] : with me [FreeTextEntry1] : This is a 70 year old male with history HTN, DM, HLD, CAD, h/o MI,  s/p CABG, s/p PCIx4, recent unstable angina s/p PCI with CODI to SVG to LCX,  \par 1)  Angina pectoris:  stable coronary artery disease      PCI and brachy therapy this year.. ct Dual antiplatelet therapy .aspirin. brillinta.  ct lipitor 20 mg daily .  ranexa 500 mg Q12  \par aspirin and  ct brillinta  liptior 20 mg daily  \par 2) CAD, s/p CABG, s/p PCI: ASA, c ct Brilinta ,  \par 3) HTN: controlled \par 4) ICM: EF 25-30 %  .  toprol 50 mg daily , . lasix 40 mg,   stop lisinopril . stop aldactone.  .restart   valsartan 40 mg daily. farxiga.  after cath will plan for CRT-D if repeat echo EF <35%.  (has LBBB) \par 5) Gastric upset: gastroparesis:  Gi  evaln  nexium \par 6) Anemia:  iron \par 7) hypothyroidsm and Diabetes Mellitus : endocrin follows up. \par \par

## 2020-10-27 NOTE — HISTORY OF PRESENT ILLNESS
[FreeTextEntry1] : f/u  For: HTN, HLD, CAD s/p CABg x4, Angina, recent PCI\par \par \par HPI for today: : feesl good. got blood  test done yesteryda.  Potassium normal. On diuersis. restrited figs.  off aldctone and off valsartan. patietn was also takign lisinopril. \par \par old note: CHF. : stable. Tolerating toprol 50 . ordered lisinopril. Elevated BNP.  on lasix 40 ad n aldactone 25 mg dialy. \par \par \par old note: HPI for today: feels good. got Brachytherapy done at [place].  had angina for 3 days after. \par continues to take Nitrates.  did not make the appt for diabetes doctor yet. Hb A 1 C still elevated.\par Strongly recommended endocrinologist for aggressive sugar control\par Complaitn with meds. BP has been increasing now. \par \par OLD NOTE: 6/12/2019 c/c: chest pain \par Patient had an episode of chest pain, substernal with epigastric and abdominal pain.  2 days ago, it releived by NTG. \par he had another epsiode today, when he was going back from the [place]. 6/10, subternal.also with abdominal pain. he has been taking mylanta too for acid reflux . also , he has been recnetly started on azithromycin for bronchitis,.\par He states the second episode happened today and it got relived with NTG. he has been taking IMDUR every day. \par \par \par old note: no chest pain.no dyspnea,. no headaches. no dizziness.\par  difficulty walking foot problem. seein neurologist. NO Peripheral vascular disease . [facility]. \par \par

## 2020-10-30 LAB
ANION GAP SERPL CALC-SCNC: 12 MMOL/L
BUN SERPL-MCNC: 26 MG/DL
CALCIUM SERPL-MCNC: 8.8 MG/DL
CHLORIDE SERPL-SCNC: 98 MMOL/L
CO2 SERPL-SCNC: 30 MMOL/L
CREAT SERPL-MCNC: 1.44 MG/DL
GLUCOSE SERPL-MCNC: 101 MG/DL
POTASSIUM SERPL-SCNC: 4.2 MMOL/L
SODIUM SERPL-SCNC: 139 MMOL/L

## 2020-11-05 ENCOUNTER — APPOINTMENT (OUTPATIENT)
Dept: ENDOCRINOLOGY | Facility: CLINIC | Age: 75
End: 2020-11-05
Payer: MEDICARE

## 2020-11-05 VITALS
BODY MASS INDEX: 23.86 KG/M2 | DIASTOLIC BLOOD PRESSURE: 60 MMHG | WEIGHT: 152 LBS | HEIGHT: 67 IN | SYSTOLIC BLOOD PRESSURE: 104 MMHG | TEMPERATURE: 96.4 F

## 2020-11-05 PROCEDURE — 99214 OFFICE O/P EST MOD 30 MIN: CPT

## 2020-11-05 PROCEDURE — 99072 ADDL SUPL MATRL&STAF TM PHE: CPT

## 2020-11-05 RX ORDER — MONTELUKAST 10 MG/1
10 TABLET, FILM COATED ORAL
Refills: 0 | Status: DISCONTINUED | COMMUNITY
End: 2020-11-05

## 2020-11-05 RX ORDER — IPRATROPIUM BROMIDE 0.2 MG/ML
0.02 SOLUTION, NON-ORAL INHALATION 4 TIMES DAILY
Refills: 0 | Status: DISCONTINUED | COMMUNITY
End: 2020-11-05

## 2020-11-05 RX ORDER — TIOTROPIUM BROMIDE 18 UG/1
18 CAPSULE ORAL; RESPIRATORY (INHALATION)
Refills: 0 | Status: DISCONTINUED | COMMUNITY
End: 2020-11-05

## 2020-11-05 RX ORDER — INSULIN ASPART 100 [IU]/ML
100 INJECTION, SOLUTION INTRAVENOUS; SUBCUTANEOUS
Refills: 0 | Status: DISCONTINUED | COMMUNITY
End: 2020-11-05

## 2020-11-05 RX ORDER — IPRATROPIUM BROMIDE AND ALBUTEROL SULFATE 2.5; .5 MG/3ML; MG/3ML
0.5-2.5 (3) SOLUTION RESPIRATORY (INHALATION) 4 TIMES DAILY
Refills: 0 | Status: DISCONTINUED | COMMUNITY
End: 2020-11-05

## 2020-11-05 NOTE — ASSESSMENT
[Carbohydrate Consistent Diet] : carbohydrate consistent diet [Importance of Diet and Exercise] : importance of diet and exercise to improve glycemic control, achieve weight loss and improve cardiovascular health [Exercise/Effect on Glucose] : exercise/effect on glucose [Insulin Self-Administration] : insulin self-administration [FreeTextEntry1] : 74 year old male with long standing T2DM and associated CAD s/p 4V CABG, also with hypothyroidism,  hypertension, hyperlipidemia, and vitamin D deficiency. Glycemic control is suboptimal  due to post prandial hyperglycemia. a1C  7.4. \par \par 1. T2DM : bgs with some hyperG after meals 170--200 \par - continue basaglar  15 u HS \par - increase novolog 12-12- 16 u TID w meals. \par - bgs 4x day \par - Targets reviewed. Recommended pt try to keep blood glucose level below 130 (110) before meals and below 160 (140) two hours after meals. \par - microalb spot normal. continue ARB \par - continue ASA \par - discussed diet and exercise\par encouraged more exercise walking 30 min 3 x week\par Needs to try to have more protein for meals\par Importance of blood glucose monitoring discussed. Targets reviewed. Recommended pt try to keep blood glucose level below 130 (110) before meals and below 160 (140) two hours after meals.\par ADA diet (30-50 gm carbohydrates with each meal, 15 grams with snacks. Balance with lean proteins and low fat diet.) Medication, risks and benefits reviewed.\par \par 2. Hypothyroidism : Lt4 was stopped by cardiology. TSh slightly off . \par will continue to monitor for now. \par TPo ab negative. \par \par 3. Hypertension-  bp at target on meds. Continue current management.\par I advised low fat/low cholesterol diet, low salt diet, and weight loss\par Continue ARB.\par \par 4. Hyperlipidemia: LDL at target.\par low fat/low cholesterol diet and weight loss advised\par continue statin.\par \par 5. Vitamin D deficiency: continue vitamin D 50,000 IU weekly. \par

## 2020-11-10 ENCOUNTER — TRANSCRIPTION ENCOUNTER (OUTPATIENT)
Age: 75
End: 2020-11-10

## 2020-11-12 DIAGNOSIS — Z01.818 ENCOUNTER FOR OTHER PREPROCEDURAL EXAMINATION: ICD-10-CM

## 2020-11-13 ENCOUNTER — APPOINTMENT (OUTPATIENT)
Dept: DISASTER EMERGENCY | Facility: CLINIC | Age: 75
End: 2020-11-13

## 2020-11-13 NOTE — H&P PST ADULT - NSICDXPASTSURGICALHX_GEN_ALL_CORE_FT
PAST SURGICAL HISTORY:  S/P CABG (coronary artery bypass graft) 4V 1983 [place]    S/P primary angioplasty with coronary stent     Status post open reduction with internal fixation of fracture

## 2020-11-13 NOTE — H&P PST ADULT - HISTORY OF PRESENT ILLNESS
Narrative:     Symptoms:        Anigina (Class):        Ischemic Symptoms:     Heart Failure:        Systolic/Diastolic/Combined:        NYHA Class (within 2 weeks):     Assessment of LVEF (Must be within 6 months):       EF:        Assessed by:        Date:     Prior Cardiac Interventions (LHC, stents, CABG):       PCI's (Date, Stents, Vessels):        CABG (Date, Grafts):     Noninvasive Testing:   Stress Test: Date:        Protocol:        Duration of Exercise:        Symptoms:        EKG Changes:        DTS:        Myocardial Imaging:        Risk Assessment (Low, Medium, High):     Echo (Date, Findings):     Antianginal Therapies:        Beta Blockers:         Calcium Channel Blockers:        Long Acting Nitrates:        Ranexa:     EF of 55 %.  CORONARY VESSELS: The coronary circulation is right dominant.  LM: Diffusely diseased. 70-90% diffuse extending into left coronary system.  LAD: Diffusely diseased. Septals and other small branches noted.  Proximal circumflex: There was a 100 % stenosis.  RCA: This vessel was not injected. Known occluded with left to right collaterals on previous angiogram.  GRAFTS:  Graft to the LAD: The graft was a LIMA patent    Graft to the 1st obtuse marginal: The graft was a saphenous vein graft  from the aorta. There was a 80 % stenosis in the middle third of the  graft, within the stented segment.    Associated Risk Factors:        Cerebrovascular Disease: N/A       Chronic Lung Disease: N/A       Peripheral Arterial Disease: N/A       Chronic Kidney Disease (if yes, what is GFR): N/A       Uncontrolled Diabetes (if yes, what is HgbA1C or FBS): N/A       Poorly Controlled Hypertension (if yes, what is SBP): N/A       Morbid Obesity (if yes, what is BMI): N/A       History of Recent Ventricular Arrhythmia: N/A       Inability to Ambulate Safely: N/A       Need for Therapeutic Anticoagulation: N/A       Antiplatelet or Contrast Allergy: N/A         Narrative: This is a 75 year old male PMH: DM ( ON Insulin Farixga)  HTN HL  MI CAD/ CABG x 4 Stents  ICM Ef 25-30% . Recent unstable angina( on Ranexa/ Toprol )   with PCI with CODI SVG- LCX   Pt presented to cardiologist 10/27/20 with Angina , Bilateral leg edema ,   He was referred for a cardiac will    Symptoms: chest pain        Angina (Class):        Ischemic Symptoms:     Heart Failure: HFref        Systolic/Diastolic/Combined:        NYHA Class (within 2 weeks):     Assessment of LVEF (Must be within 6 months):       EF: 29 %        Assessed by: echo        Date: 9/23/20     Prior Cardiac Interventions:     7/24/19   CORONARY VESSELS:  GRAFTS:   --  Graft to the 1st obtuse marginal: The graft was a saphenous  vein graft from the aorta. There was a 80 % stenosis in the middle third of the graft.  INTERVENTIONAL RECOMMENDATIONS: Brachytherapy done to the distal SVG to OM lesion  Stent done in the native OM due to a coronary dissection      Cardiac cath: 6/17/19    CORONARY VESSELS: The coronary circulation is right dominant.  LM: Diffusely diseased. 70-90% diffuse extending into left coronary system.  LAD: Diffusely diseased. Septals and other small branches noted.  Proximal circumflex: There was a 100 % stenosis.  RCA: This vessel was not injected. Known occluded with left to right collaterals on previous angiogram.  GRAFTS:  Graft to the LAD: The graft was a LIMA patent    Graft to the 1st obtuse marginal: The graft was a saphenous vein graft  from the aorta. There was a 80 % stenosis in the middle third of the  graft, within the stented segment.  Diasostic IMPRESSIONS: Severe SVG to OM disease with in stent restenosis.  Appears to be 2-3 layers of stents in that region.  Laser atherectomy and POBA performed.  DIAGNOSTIC RECOMMENDATIONS: Aspirin and Brilinta as previously.  Referred to  Dr. Quinn Kaiser for brachytherapy.  INTERVENTIONAL IMPRESSIONS: Severe SVG to OM disease with instent  restenosis. Appears to be 2-3 layers of stents in that region.  Laser atherectomy and POBA performed.       Noninvasive Testing:   Stress Test: Date:        Protocol:        Duration of Exercise:        Symptoms:        EKG Changes:        DTS:        Myocardial Imaging:        Risk Assessment (Low, Medium, High):     Echo: 9/23/20 : EF29% RA normal size, LA mildly enlarged, segmental wall motion abnormalities in multivessel distrubution ,Elevated LA and LV end diastolic pressures, moderate to severe mitral  regurgitation, moderate  TR ,  severe p HTN     Antianginal Therapies:        Beta Blockers:  Toprol        Calcium Channel Blockers:        Long Acting Nitrates:        Ranexa: 500 mg po BID         Associated Risk Factors:        Cerebrovascular Disease: N/A       Chronic Lung Disease: N/A       Peripheral Arterial Disease: N/A       Chronic Kidney Disease (if yes, what is GFR): N/A       Uncontrolled Diabetes (if yes, what is HgbA1C or FBS): N/A       Poorly Controlled Hypertension (if yes, what is SBP): N/A       Morbid Obesity (if yes, what is BMI): N/A       History of Recent Ventricular Arrhythmia: N/A       Inability to Ambulate Safely: N/A       Need for Therapeutic Anticoagulation: N/A       Antiplatelet or Contrast Allergy: N/A         Narrative: This is a 75 year old male PMH: DM ( ON Insulin Farixga)  HTN HL  MI CAD/ CABG x 4 Stents  ICM Ef 25-30% . Recent unstable angina( on Ranexa/ Toprol )   with PCI with CODI SVG- LCX   Pt presented to cardiologist  with Angina , Bilateral leg edema ,was sent for echo and NST . The echo revealed a decline in his EF and NST with moderate ischemia in the RCA territory with moderate ischemia in the LCX territory and mild  ischemia in distal LAD territory . He was referred fir cardiac cath   Mallampati 2   ASA3   BRA     Symptoms: chest pain        Angina (Class):        Ischemic Symptoms:     Heart Failure: HFref        Systolic/Diastolic/Combined:        NYHA Class (within 2 weeks):     Assessment of LVEF (Must be within 6 months):       EF: 29 %        Assessed by: echo        Date: 9/23/20     Prior Cardiac Interventions:     7/24/19   CORONARY VESSELS:  GRAFTS:   --  Graft to the 1st obtuse marginal: The graft was a saphenous  vein graft from the aorta. There was a 80 % stenosis in the middle third of the graft.  INTERVENTIONAL RECOMMENDATIONS: Brachytherapy done to the distal SVG to OM lesion  Stent done in the native OM due to a coronary dissection      Cardiac cath: 6/17/19    CORONARY VESSELS: The coronary circulation is right dominant.  LM: Diffusely diseased. 70-90% diffuse extending into left coronary system.  LAD: Diffusely diseased. Septals and other small branches noted.  Proximal circumflex: There was a 100 % stenosis.  RCA: This vessel was not injected. Known occluded with left to right collaterals on previous angiogram.  GRAFTS:  Graft to the LAD: The graft was a LIMA patent    Graft to the 1st obtuse marginal: The graft was a saphenous vein graft  from the aorta. There was a 80 % stenosis in the middle third of the  graft, within the stented segment.  Diasostic IMPRESSIONS: Severe SVG to OM disease with in stent restenosis.  Appears to be 2-3 layers of stents in that region.  Laser atherectomy and POBA performed.  DIAGNOSTIC RECOMMENDATIONS: Aspirin and Brilinta as previously.  Referred to  Dr. Quinn Kaiser for brachytherapy.  INTERVENTIONAL IMPRESSIONS: Severe SVG to OM disease with instent  restenosis. Appears to be 2-3 layers of stents in that region.  Laser atherectomy and POBA performed.       Noninvasive Testing:   Stress Test: Date:        Protocol:        Duration of Exercise:        Symptoms:        EKG Changes:        DTS:        Myocardial Imaging:        Risk Assessment (Low, Medium, High):     Echo: 9/23/20 : EF29% RA normal size, LA mildly enlarged, segmental wall motion abnormalities in multivessel distrubution ,Elevated LA and LV end diastolic pressures, moderate to severe mitral  regurgitation, moderate  TR ,  severe p HTN     Antianginal Therapies:        Beta Blockers:  Toprol        Calcium Channel Blockers:        Long Acting Nitrates:        Ranexa: 500 mg po BID         Associated Risk Factors:        Cerebrovascular Disease: N/A       Chronic Lung Disease: N/A       Peripheral Arterial Disease: N/A       Chronic Kidney Disease (if yes, what is GFR): N/A       Uncontrolled Diabetes (if yes, what is HgbA1C or FBS): N/A       Poorly Controlled Hypertension (if yes, what is SBP): N/A       Morbid Obesity (if yes, what is BMI): N/A       History of Recent Ventricular Arrhythmia: N/A       Inability to Ambulate Safely: N/A       Need for Therapeutic Anticoagulation: N/A       Antiplatelet or Contrast Allergy: N/A         Narrative: This is a 75 year old male PMH: DM ( ON Insulin Farixga)  HTN HL  MI CAD/ CABG x 4 Stents  ICM Ef 25-30% . Recent unstable angina( on Ranexa/ Toprol )   with PCI with CODI SVG- LCX   Pt presented to cardiologist  with Angina , Bilateral leg edema ,was sent for echo and NST . The echo revealed a decline in his EF and NST with moderate ischemia in the RCA territory with moderate ischemia in the LCX territory and mild  ischemia in distal LAD territory . He was referred for  cardiac cath   Mallampati 2   ASA3   BRA 5.3    Symptoms: chest pain        Angina (Class):        Ischemic Symptoms:     Heart Failure: HFref        Systolic/Diastolic/Combined:        NYHA Class (within 2 weeks):     Assessment of LVEF (Must be within 6 months):       EF: 29 %        Assessed by: echo        Date: 9/23/20     Prior Cardiac Interventions:     7/24/19   CORONARY VESSELS:  GRAFTS:   --  Graft to the 1st obtuse marginal: The graft was a saphenous  vein graft from the aorta. There was a 80 % stenosis in the middle third of the graft.  INTERVENTIONAL RECOMMENDATIONS: Brachytherapy done to the distal SVG to OM lesion  Stent done in the native OM due to a coronary dissection      Cardiac cath: 6/17/19    CORONARY VESSELS: The coronary circulation is right dominant.  LM: Diffusely diseased. 70-90% diffuse extending into left coronary system.  LAD: Diffusely diseased. Septals and other small branches noted.  Proximal circumflex: There was a 100 % stenosis.  RCA: This vessel was not injected. Known occluded with left to right collaterals on previous angiogram.  GRAFTS:  Graft to the LAD: The graft was a LIMA patent    Graft to the 1st obtuse marginal: The graft was a saphenous vein graft  from the aorta. There was a 80 % stenosis in the middle third of the  graft, within the stented segment.  Diasostic IMPRESSIONS: Severe SVG to OM disease with in stent restenosis.  Appears to be 2-3 layers of stents in that region.  Laser atherectomy and POBA performed.  DIAGNOSTIC RECOMMENDATIONS: Aspirin and Brilinta as previously.  Referred to  Dr. Quinn Kaiser for brachytherapy.  INTERVENTIONAL IMPRESSIONS: Severe SVG to OM disease with instent  restenosis. Appears to be 2-3 layers of stents in that region.  Laser atherectomy and POBA performed.       Noninvasive Testing: NST   Stress Test: Date: 10/19/20        Protocol: Regadenoson        Duration of Exercise: n/a       Symptoms: SOB        EKG Changes: Baseline EKG LBBB       DTS:        Myocardial Imaging: moderate ischemia in the RCA territory with moderate ischemia in the LCX territory and mild  ischemia in distal LAD territory .entire myocardium is viable except scar with dyana-infarct ischemia in distal LAD        Risk Assessment (Low, Medium, High): Moderate     Echo: 9/23/20 : EF29% RA normal size, LA mildly enlarged, segmental wall motion abnormalities in multivessel distrubution ,Elevated LA and LV end diastolic pressures, moderate to severe mitral  regurgitation, moderate  TR ,  severe phtn     Antianginal Therapies:        Beta Blockers:  Toprol        Calcium Channel Blockers:        Long Acting Nitrates:        Ranexa: 500 mg po BID         Associated Risk Factors:        Cerebrovascular Disease: N/A       Chronic Lung Disease: N/A       Peripheral Arterial Disease: N/A       Chronic Kidney Disease (if yes, what is GFR): N/A       Uncontrolled Diabetes (if yes, what is HgbA1C or FBS): N/A       Poorly Controlled Hypertension (if yes, what is SBP): N/A       Morbid Obesity (if yes, what is BMI): N/A       History of Recent Ventricular Arrhythmia: N/A       Inability to Ambulate Safely: N/A       Need for Therapeutic Anticoagulation: N/A       Antiplatelet or Contrast Allergy: N/A

## 2020-11-13 NOTE — H&P PST ADULT - ASSESSMENT
75 year old male PMH: DM ( ON Insulin Farixga)  HTN HL  MI CAD/ CABG x 4 Stents  ICM Ef 25-30% . Recent unstable angina( on Ranexa/ Toprol )   with PCI with CODI SVG- LCX   Pt presented to cardiologist  with Angina , Bilateral leg edema ,was sent for echo and NST . The echo revealed a decline in his EF and NST with moderate ischemia in the RCA territory with moderate ischemia in the LCX territory and mild  ischemia in distal LAD territory .   He has been maintained on  ASA/ Brilinta / Toprol / Ranexa He was referred for  cardiac cath   PRE-PROCEDURE ASSESSMENT  [facility] -Patient seen and examined  -Labs and EKG reviewed   -Pre-procedure teaching completed with patient   - verbal Informed consent   -Questions answered  - Pt received Asprin/ Brilinta prior to cardiac cath   Risks & benefits of procedure and sedation and risks and benefits for the alternative therapy have been explained to the patient in layman’s terms including but not limited to: allergic reaction, bleeding, infection, arrhythmia, respiratory compromise, renal and vascular compromise, limb damage, MI, CVA, emergent CABG/Vascular Surgery and death. Informed consent obtained and in chart.

## 2020-11-14 LAB — SARS-COV-2 N GENE NPH QL NAA+PROBE: NOT DETECTED

## 2020-11-16 ENCOUNTER — INPATIENT (INPATIENT)
Facility: HOSPITAL | Age: 75
LOS: 0 days | Discharge: HOME | DRG: 247 | End: 2020-11-17
Attending: INTERNAL MEDICINE | Admitting: INTERNAL MEDICINE
Payer: MEDICARE

## 2020-11-16 ENCOUNTER — TRANSCRIPTION ENCOUNTER (OUTPATIENT)
Age: 75
End: 2020-11-16

## 2020-11-16 VITALS
TEMPERATURE: 98 F | OXYGEN SATURATION: 100 % | RESPIRATION RATE: 20 BRPM | SYSTOLIC BLOOD PRESSURE: 167 MMHG | HEART RATE: 83 BPM | DIASTOLIC BLOOD PRESSURE: 81 MMHG

## 2020-11-16 DIAGNOSIS — Z95.5 PRESENCE OF CORONARY ANGIOPLASTY IMPLANT AND GRAFT: Chronic | ICD-10-CM

## 2020-11-16 DIAGNOSIS — Z95.1 PRESENCE OF AORTOCORONARY BYPASS GRAFT: Chronic | ICD-10-CM

## 2020-11-16 DIAGNOSIS — Z96.7 PRESENCE OF OTHER BONE AND TENDON IMPLANTS: Chronic | ICD-10-CM

## 2020-11-16 DIAGNOSIS — I25.5 ISCHEMIC CARDIOMYOPATHY: ICD-10-CM

## 2020-11-16 LAB
ANION GAP SERPL CALC-SCNC: 12 MMOL/L — SIGNIFICANT CHANGE UP (ref 5–17)
APTT BLD: 32.3 SEC — SIGNIFICANT CHANGE UP (ref 27.5–35.5)
BLD GP AB SCN SERPL QL: SIGNIFICANT CHANGE UP
BLD GP AB SCN SERPL QL: SIGNIFICANT CHANGE UP
BUN SERPL-MCNC: 28 MG/DL — HIGH (ref 8–20)
CALCIUM SERPL-MCNC: 9.2 MG/DL — SIGNIFICANT CHANGE UP (ref 8.6–10.2)
CHLORIDE SERPL-SCNC: 98 MMOL/L — SIGNIFICANT CHANGE UP (ref 98–107)
CO2 SERPL-SCNC: 29 MMOL/L — SIGNIFICANT CHANGE UP (ref 22–29)
CREAT SERPL-MCNC: 1.4 MG/DL — HIGH (ref 0.5–1.3)
GLUCOSE BLDC GLUCOMTR-MCNC: 79 MG/DL — SIGNIFICANT CHANGE UP (ref 70–99)
GLUCOSE BLDC GLUCOMTR-MCNC: 97 MG/DL — SIGNIFICANT CHANGE UP (ref 70–99)
GLUCOSE SERPL-MCNC: 87 MG/DL — SIGNIFICANT CHANGE UP (ref 70–99)
HCT VFR BLD CALC: 35.2 % — LOW (ref 39–50)
HGB BLD-MCNC: 11.1 G/DL — LOW (ref 13–17)
INR BLD: 1.25 RATIO — HIGH (ref 0.88–1.16)
MAGNESIUM SERPL-MCNC: 2.4 MG/DL — SIGNIFICANT CHANGE UP (ref 1.6–2.6)
MCHC RBC-ENTMCNC: 26.9 PG — LOW (ref 27–34)
MCHC RBC-ENTMCNC: 31.5 GM/DL — LOW (ref 32–36)
MCV RBC AUTO: 85.4 FL — SIGNIFICANT CHANGE UP (ref 80–100)
PLATELET # BLD AUTO: 405 K/UL — HIGH (ref 150–400)
POTASSIUM SERPL-MCNC: 4.1 MMOL/L — SIGNIFICANT CHANGE UP (ref 3.5–5.3)
POTASSIUM SERPL-SCNC: 4.1 MMOL/L — SIGNIFICANT CHANGE UP (ref 3.5–5.3)
PROTHROM AB SERPL-ACNC: 14.4 SEC — HIGH (ref 10.6–13.6)
RBC # BLD: 4.12 M/UL — LOW (ref 4.2–5.8)
RBC # FLD: 12.6 % — SIGNIFICANT CHANGE UP (ref 10.3–14.5)
SODIUM SERPL-SCNC: 139 MMOL/L — SIGNIFICANT CHANGE UP (ref 135–145)
WBC # BLD: 14.7 K/UL — HIGH (ref 3.8–10.5)
WBC # FLD AUTO: 14.7 K/UL — HIGH (ref 3.8–10.5)

## 2020-11-16 PROCEDURE — 99223 1ST HOSP IP/OBS HIGH 75: CPT | Mod: 25

## 2020-11-16 PROCEDURE — 99152 MOD SED SAME PHYS/QHP 5/>YRS: CPT

## 2020-11-16 PROCEDURE — 92937 PRQ TRLUML REVSC CAB GRF 1: CPT | Mod: LC

## 2020-11-16 PROCEDURE — 93010 ELECTROCARDIOGRAM REPORT: CPT

## 2020-11-16 PROCEDURE — 93459 L HRT ART/GRFT ANGIO: CPT | Mod: 26,XU

## 2020-11-16 RX ORDER — ATORVASTATIN CALCIUM 80 MG/1
20 TABLET, FILM COATED ORAL AT BEDTIME
Refills: 0 | Status: DISCONTINUED | OUTPATIENT
Start: 2020-11-16 | End: 2020-11-17

## 2020-11-16 RX ORDER — ESOMEPRAZOLE MAGNESIUM 40 MG/1
1 CAPSULE, DELAYED RELEASE ORAL
Qty: 0 | Refills: 0 | DISCHARGE

## 2020-11-16 RX ORDER — ALPRAZOLAM 0.25 MG
1 TABLET ORAL
Qty: 0 | Refills: 0 | DISCHARGE

## 2020-11-16 RX ORDER — MULTIVIT-MIN/FERROUS GLUCONATE 9 MG/15 ML
1 LIQUID (ML) ORAL DAILY
Refills: 0 | Status: DISCONTINUED | OUTPATIENT
Start: 2020-11-16 | End: 2020-11-17

## 2020-11-16 RX ORDER — ALBUTEROL 90 UG/1
2 AEROSOL, METERED ORAL
Qty: 0 | Refills: 0 | DISCHARGE

## 2020-11-16 RX ORDER — IPRATROPIUM BROMIDE 0.2 MG/ML
2.5 SOLUTION, NON-ORAL INHALATION
Qty: 0 | Refills: 0 | DISCHARGE

## 2020-11-16 RX ORDER — FERROUS SULFATE 325(65) MG
325 TABLET ORAL DAILY
Refills: 0 | Status: DISCONTINUED | OUTPATIENT
Start: 2020-11-16 | End: 2020-11-17

## 2020-11-16 RX ORDER — METOPROLOL TARTRATE 50 MG
50 TABLET ORAL DAILY
Refills: 0 | Status: DISCONTINUED | OUTPATIENT
Start: 2020-11-16 | End: 2020-11-17

## 2020-11-16 RX ORDER — PANTOPRAZOLE SODIUM 20 MG/1
40 TABLET, DELAYED RELEASE ORAL
Refills: 0 | Status: DISCONTINUED | OUTPATIENT
Start: 2020-11-16 | End: 2020-11-17

## 2020-11-16 RX ORDER — ASPIRIN/CALCIUM CARB/MAGNESIUM 324 MG
81 TABLET ORAL DAILY
Refills: 0 | Status: DISCONTINUED | OUTPATIENT
Start: 2020-11-16 | End: 2020-11-17

## 2020-11-16 RX ORDER — ASCORBIC ACID 60 MG
1 TABLET,CHEWABLE ORAL
Qty: 0 | Refills: 0 | DISCHARGE

## 2020-11-16 RX ORDER — LOSARTAN POTASSIUM 100 MG/1
25 TABLET, FILM COATED ORAL DAILY
Refills: 0 | Status: DISCONTINUED | OUTPATIENT
Start: 2020-11-16 | End: 2020-11-17

## 2020-11-16 RX ORDER — TICAGRELOR 90 MG/1
90 TABLET ORAL
Refills: 0 | Status: DISCONTINUED | OUTPATIENT
Start: 2020-11-16 | End: 2020-11-17

## 2020-11-16 RX ORDER — MONTELUKAST 4 MG/1
1 TABLET, CHEWABLE ORAL
Qty: 0 | Refills: 0 | DISCHARGE

## 2020-11-16 RX ORDER — RANOLAZINE 500 MG/1
500 TABLET, FILM COATED, EXTENDED RELEASE ORAL
Refills: 0 | Status: DISCONTINUED | OUTPATIENT
Start: 2020-11-16 | End: 2020-11-17

## 2020-11-16 RX ADMIN — RANOLAZINE 500 MILLIGRAM(S): 500 TABLET, FILM COATED, EXTENDED RELEASE ORAL at 17:00

## 2020-11-16 RX ADMIN — ATORVASTATIN CALCIUM 20 MILLIGRAM(S): 80 TABLET, FILM COATED ORAL at 21:57

## 2020-11-16 RX ADMIN — TICAGRELOR 90 MILLIGRAM(S): 90 TABLET ORAL at 17:01

## 2020-11-16 RX ADMIN — PANTOPRAZOLE SODIUM 40 MILLIGRAM(S): 20 TABLET, DELAYED RELEASE ORAL at 17:07

## 2020-11-16 RX ADMIN — LOSARTAN POTASSIUM 25 MILLIGRAM(S): 100 TABLET, FILM COATED ORAL at 21:57

## 2020-11-16 NOTE — DISCHARGE NOTE PROVIDER - HOSPITAL COURSE
75 year old male PMH of DM ( ON Insulin Farixga), HTN, HLD, MI, CAD/ CABG x 4 Stents  ICM Ef 25-30% . Recent unstable angina( on Ranexa/ Toprol). Pt presented to cardiologist with angina, Bilateral leg edema ,was sent for echo and NST. The echo revealed a decline in his EF and NST with moderate ischemia in the RCA territory with moderate ischemia in the LCX territory and mild ischemia in distal LAD territory. He was referred for cardiac cath with Dr. Reid  revealing in-stent restenosis and is now s/p 2 CODI to SVG LCX via left femoral artery approach. Seen this morning, left groin benign s/p cath. No bleeding, no ecchymosis, no hematoma. Extremity Warm to touch, with palpable distal pulses, and brisk capillary refill.   s/p one CODI Synergy 2.5 x 24mm   s/p one CODI Synergy 3.00x 20mm     No telemetry events overnight. Patient tolerated ambulation and voiding.   Diet/lifestyle modifications and medication compliance heavily reinforced  Cardiac rehab info provided/referral and communication to cardiac rehab completed.                   75 year old male PMH of DM ( ON Insulin Farixga), HTN, HLD, MI, CAD/ CABG x 4 Stents  ICM Ef 25-30% . Recent unstable angina( on Ranexa/ Toprol). Pt presented to cardiologist with angina, Bilateral leg edema ,was sent for echo and NST. The echo revealed a decline in his EF and NST with moderate ischemia in the RCA territory with moderate ischemia in the LCX territory and mild ischemia in distal LAD territory. He was referred for cardiac cath with Dr. Reid  revealing in-stent restenosis and is now s/p 2 CODI to SVG LCX via left femoral artery approach. Seen this morning, left groin benign s/p cath. No bleeding, no ecchymosis, no hematoma. Extremity Warm to touch, with palpable distal pulses, and brisk capillary refill.   s/p one CODI Synergy 2.5 x 24mm   s/p one CODI Synergy 3.00x 20mm     -No telemetry events overnight. Patient tolerated ambulation and voiding.   -Diet/lifestyle modifications and medication compliance heavily reinforced  -Cardiac rehab info provided/referral and communication to cardiac rehab completed.  -Patient educated about groin site care, signs and symptoms of complication post procedure, and plan of care moving forward. Patient verbalized understanding with teach-back.   -Further plan of care and disposition as per [facility] cardiovascular - Dr. Valenzuela.                     75 year old male PMH of DM ( ON Insulin Farixga), HTN, HLD, MI, CAD/ CABG x 4 Stents  ICM Ef 25-30% . Recent unstable angina( on Ranexa/ Toprol). Pt presented to cardiologist with angina, Bilateral leg edema ,was sent for echo and NST. The echo revealed a decline in his EF and NST with moderate ischemia in the RCA territory with moderate ischemia in the LCX territory and mild ischemia in distal LAD territory. He was referred for cardiac cath with Dr. Reid  revealing in-stent restenosis and is now s/p 2 CODI to SVG LCX via left femoral artery approach. Seen this morning, left groin benign s/p cath. No bleeding, no ecchymosis, no hematoma. Extremity Warm to touch, with palpable distal pulses, and brisk capillary refill.   s/p one CODI Synergy 2.5 x 24mm   s/p one CODI Synergy 3.00x 20mm     -No telemetry events overnight. Patient tolerated ambulation and voiding.   -Diet/lifestyle modifications and medication compliance heavily reinforced  -Cardiac rehab info provided/referral and communication to cardiac rehab completed.  -Patient educated about groin site care, signs and symptoms of complication post procedure, and plan of care moving forward. Patient verbalized understanding with teach-back.   -Further plan of care and disposition as per [place] [facility] - Dr. Valenzuela.

## 2020-11-16 NOTE — PROGRESS NOTE ADULT - ASSESSMENT
: This is a 75 year old male PMH: DM ( ON Insulin Farixga)  HTN HL  MI CAD/ CABG x 4 Stents  ICM Ef 25-30% . Recent unstable angina( on Ranexa/ Toprol )   with PCI with CODI SVG- LCX   Pt presented to cardiologist  with Angina , Bilateral leg edema ,was sent for echo and NST . The echo revealed a decline in his EF and NST with moderate ischemia in the RCA territory with moderate ischemia in the LCX territory and mild  ischemia in distal LAD territory . He was referred for  cardiac cath   s/p Cardiac cath revealed SVG - LCX( OM1 )  with 2 lesions mid and distal  with in stent restenosis done via left femoral cath site tolerated well   s/p one CODI Synergy 2.5 x 24mm   s/p one CODI Synergy 3.00x 20mm     Post procedure no chest pain or SOB     a/p   Admit to Hospital due to chronic systolic HF with Ef less then 30 % CKD stage III with GFR 49   Post procedure orders and observations   DAPT: ASA and Brilinta   Statin therapy   Beta blocker therapy continue      Groin  precautions maintained   Bedrest for 3  Ambulate if stable   Cardiac rehab information provided / referral and communication to cardiac rehab completed   Follow up with cardiologist

## 2020-11-16 NOTE — DISCHARGE NOTE PROVIDER - NSDCMRMEDTOKEN_GEN_ALL_CORE_FT
aspirin 81 mg oral tablet, chewable: 1 tab(s) orally once a day  atorvastatin 20 mg oral tablet: 1 tab(s) orally once a day  Basaglar KwikPen 100 units/mL subcutaneous solution: 15 unit(s) subcutaneous once a day (at bedtime)   Farxiga 10 mg oral tablet: 1 tab(s) orally once a day  Feosol 200 mg (65 mg elemental iron) oral tablet: 1 tab(s) orally 2 times a day  metoprolol succinate 50 mg oral tablet, extended release: 1 tab(s) orally once a day  Multiple Vitamins with Minerals oral tablet: 1 tab(s) orally once a day  NexIUM 40 mg oral delayed release capsule: 1 cap(s) orally once a day (at bedtime)  NovoLOG 100 units/mL injectable solution: 12, 12, 16 unit(s) injectable 3 times a day (before meals)  ranolazine 500 mg oral tablet, extended release: 1 tab(s) orally 2 times a day  ticagrelor 90 mg oral tablet: 1 tab(s) orally every 12 hours  valsartan 40 mg oral tablet: 1 tab(s) orally once a day (at bedtime)

## 2020-11-16 NOTE — DISCHARGE NOTE PROVIDER - CARE PROVIDER_API CALL
Sydney Valenzuela  CARDIOLOGY  [facility], [address] [id number]  Phone: [phone]  Fax: [phone]  Follow Up Time:

## 2020-11-16 NOTE — DISCHARGE NOTE PROVIDER - NSDCFUADDAPPT_GEN_ALL_CORE_FT
- Bruising at the groin, sometimes extending down the leg, and/or a small lump under the skin at the groin access site is normal and will resolve within 2 – 3 weeks.   - Occasional skipped beats or palpitations that last for a few beats are common and generally resolve within 1-2 months.   - You may walk and take stairs at a regular pace.   - Do not perform any exercise more strenuous than walking for 1 week.   - Do not strain or lift heavy objects for 1 week.  - You may shower the day after the procedure.  - Do not soak in water (such as tub baths, hot tubs, swimming, etc.) for 1 week.   - You may resume all other activities the day after the procedure.  Call your doctor if:   - you notice bleeding, redness, drainage, swelling, increased tenderness or a hot sensation around the catheter insertion site.   - your temperature is greater than 100 degrees F for more than 24 hours.  - your rapid heart rhythm returns.  - you have any questions or concerns regarding the procedure.  If significant bleeding and/or a large lump (the size of a golf ball or bigger) occurs:  - Lie flat and apply continuous direct pressure just above the puncture site for at least 10 minutes  - If the issue resolves, notify your physician immediately.    - If the bleeding cannot be controlled, please seek immediate medical attention.  If you experience increased difficulty breathing or chest pain, or if you faint or have dizzy spells, please seek immediate medical attention.   Follow up with Dr. Valenzuela

## 2020-11-16 NOTE — DISCHARGE NOTE PROVIDER - NSDCCPTREATMENT_GEN_ALL_CORE_FT
PRINCIPAL PROCEDURE  Procedure: Left heart cardiac catheterization  Findings and Treatment: No heavy lifting, driving, sex, tub baths, swimming, or any activity that submerges the lower half of the body in water for 48 hours.  Limited walking and stairs for 48 hours.    Change the bandaid after 24 hours and every 24 hours after that.  Keep the puncture site dry and covered with a bandaid until a scab forms.    Observe the site frequently.  If bleeding or a large lump (the size of a golf ball or bigger) occurs lie flat, apply continuous direct pressure just above the puncture site for at least 10 minutes, and notify your physician immediately.  If the bleeding cannot be controlled, call 911 immediately for assistance.  Notify your physician of pain, swelling or any drainage.    Notify your physician immediately if coldness, numbness, discoloration or pain in your foot occurs.

## 2020-11-16 NOTE — PROGRESS NOTE ADULT - SUBJECTIVE AND OBJECTIVE BOX
Pt presented for cardiac cath today due to abnormal testing and decline in EF   s/p Cardiac cath revealed SVG - LCX( OM1 )  with 2 lesions mid and distal  with in stent restenosis done via left femoral cath site tolerated well   s/p one CODI Synergy 2.5 x 24mm   s/p one CODI Synergy 3.00x 20mm     Medications during cardiac cath :   Versed 1.5   Fentanyl 50 mcg   Heparin 6,000 unit s  Nicardipine 250 mcg   Omnipaque dye 70cc          Vital Signs Last 24 Hrs  T(C): 36.8 (16 Nov 2020 08:38), Max: 36.8 (16 Nov 2020 08:38)  T(F): 98.3 (16 Nov 2020 08:38), Max: 98.3 (16 Nov 2020 08:38)  HR: 83 (16 Nov 2020 08:38) (83 - 83)  BP: 167/81 (16 Nov 2020 08:38) (167/81 - 167/81)  RR: 20 (16 Nov 2020 08:38) (20 - 20)  SpO2: 100% (16 Nov 2020 08:38) (100% - 100%)  Post EKG : NSR rate LBBB no acute changes noted       Medications:  aspirin  chewable 81 milliGRAM(s) Oral daily  atorvastatin 20 milliGRAM(s) Oral at bedtime  ferrous    sulfate 325 milliGRAM(s) Oral daily  metoprolol succinate ER 50 milliGRAM(s) Oral daily  multivitamin/minerals 1 Tablet(s) Oral daily  pantoprazole    Tablet 40 milliGRAM(s) Oral before breakfast  ranolazine 500 milliGRAM(s) Oral two times a day  ticagrelor 90 milliGRAM(s) Oral two times a day      General: A/ox 3, No acute Distress  Neck: Supple, NO JVD  Cardiac: S1 S2,   Pulmonary: CTAB, Breathing unlabored, No Rhonchi/Rales/Wheezing  Abdomen: Soft, Non -tender, +BS   Extremities: No Rashes, No edema  Left Femoral cath site no evidence of bleeding + pp hemostasis maintained   Neuro: A/o x 3, No focal deficits  Psch: normal mood , normal affect    LABS:                          11.1   14.70 )-----------( 405      ( 16 Nov 2020 08:14 )             35.2     11-16    139  |  98  |  28.0<H>  ----------------------------<  87  4.1   |  29.0  |  1.40<H>    Ca    9.2      16 Nov 2020 08:14  Mg     2.4     11-16      PT/INR - ( 16 Nov 2020 08:14 )   PT: 14.4 sec;   INR: 1.25 ratio         PTT - ( 16 Nov 2020 08:14 )  PTT:32.3 sec

## 2020-11-16 NOTE — DISCHARGE NOTE PROVIDER - NSDCCPCAREPLAN_GEN_ALL_CORE_FT
PRINCIPAL DISCHARGE DIAGNOSIS  Diagnosis: S/P cardiac catheterization  Assessment and Plan of Treatment: 2 CODI to SVG to LCX   ASA and brilinta daily with out interruption   continue current medications   groin precutions       PRINCIPAL DISCHARGE DIAGNOSIS  Diagnosis: S/P cardiac catheterization  Assessment and Plan of Treatment: -2 drug eluting stents were placed to to SVG (saphenous vein graft) to your left circumflex artery   -dual anti platelet therapy with aspirin and brilinta daily with out interruption (do not stop taking these medications without discussing with your Cardiologist first)  -continue current medications   -groin precutions  -follow up with Dr. Valenzuela  -diet and exercise modifications

## 2020-11-17 ENCOUNTER — TRANSCRIPTION ENCOUNTER (OUTPATIENT)
Age: 75
End: 2020-11-17

## 2020-11-17 VITALS — RESPIRATION RATE: 18 BRPM | SYSTOLIC BLOOD PRESSURE: 143 MMHG | HEART RATE: 83 BPM | DIASTOLIC BLOOD PRESSURE: 70 MMHG

## 2020-11-17 LAB
ALBUMIN SERPL ELPH-MCNC: 3.4 G/DL — SIGNIFICANT CHANGE UP (ref 3.3–5.2)
ALP SERPL-CCNC: 104 U/L — SIGNIFICANT CHANGE UP (ref 40–120)
ALT FLD-CCNC: 14 U/L — SIGNIFICANT CHANGE UP
ANION GAP SERPL CALC-SCNC: 10 MMOL/L — SIGNIFICANT CHANGE UP (ref 5–17)
AST SERPL-CCNC: 21 U/L — SIGNIFICANT CHANGE UP
BASOPHILS # BLD AUTO: 0.04 K/UL — SIGNIFICANT CHANGE UP (ref 0–0.2)
BASOPHILS NFR BLD AUTO: 0.3 % — SIGNIFICANT CHANGE UP (ref 0–2)
BILIRUB SERPL-MCNC: 0.5 MG/DL — SIGNIFICANT CHANGE UP (ref 0.4–2)
BUN SERPL-MCNC: 25 MG/DL — HIGH (ref 8–20)
CALCIUM SERPL-MCNC: 8.9 MG/DL — SIGNIFICANT CHANGE UP (ref 8.6–10.2)
CHLORIDE SERPL-SCNC: 102 MMOL/L — SIGNIFICANT CHANGE UP (ref 98–107)
CO2 SERPL-SCNC: 26 MMOL/L — SIGNIFICANT CHANGE UP (ref 22–29)
CREAT SERPL-MCNC: 1.05 MG/DL — SIGNIFICANT CHANGE UP (ref 0.5–1.3)
EOSINOPHIL # BLD AUTO: 0.26 K/UL — SIGNIFICANT CHANGE UP (ref 0–0.5)
EOSINOPHIL NFR BLD AUTO: 2.2 % — SIGNIFICANT CHANGE UP (ref 0–6)
GLUCOSE BLDC GLUCOMTR-MCNC: 128 MG/DL — HIGH (ref 70–99)
GLUCOSE SERPL-MCNC: 138 MG/DL — HIGH (ref 70–99)
HCT VFR BLD CALC: 33 % — LOW (ref 39–50)
HGB BLD-MCNC: 10.4 G/DL — LOW (ref 13–17)
IMM GRANULOCYTES NFR BLD AUTO: 0.6 % — SIGNIFICANT CHANGE UP (ref 0–1.5)
LYMPHOCYTES # BLD AUTO: 1.09 K/UL — SIGNIFICANT CHANGE UP (ref 1–3.3)
LYMPHOCYTES # BLD AUTO: 9.3 % — LOW (ref 13–44)
MCHC RBC-ENTMCNC: 27.2 PG — SIGNIFICANT CHANGE UP (ref 27–34)
MCHC RBC-ENTMCNC: 31.5 GM/DL — LOW (ref 32–36)
MCV RBC AUTO: 86.2 FL — SIGNIFICANT CHANGE UP (ref 80–100)
MONOCYTES # BLD AUTO: 0.84 K/UL — SIGNIFICANT CHANGE UP (ref 0–0.9)
MONOCYTES NFR BLD AUTO: 7.2 % — SIGNIFICANT CHANGE UP (ref 2–14)
NEUTROPHILS # BLD AUTO: 9.41 K/UL — HIGH (ref 1.8–7.4)
NEUTROPHILS NFR BLD AUTO: 80.4 % — HIGH (ref 43–77)
PLATELET # BLD AUTO: 380 K/UL — SIGNIFICANT CHANGE UP (ref 150–400)
POTASSIUM SERPL-MCNC: 4.6 MMOL/L — SIGNIFICANT CHANGE UP (ref 3.5–5.3)
POTASSIUM SERPL-SCNC: 4.6 MMOL/L — SIGNIFICANT CHANGE UP (ref 3.5–5.3)
PROT SERPL-MCNC: 7.5 G/DL — SIGNIFICANT CHANGE UP (ref 6.6–8.7)
RBC # BLD: 3.83 M/UL — LOW (ref 4.2–5.8)
RBC # FLD: 12.7 % — SIGNIFICANT CHANGE UP (ref 10.3–14.5)
SODIUM SERPL-SCNC: 138 MMOL/L — SIGNIFICANT CHANGE UP (ref 135–145)
WBC # BLD: 11.71 K/UL — HIGH (ref 3.8–10.5)
WBC # FLD AUTO: 11.71 K/UL — HIGH (ref 3.8–10.5)

## 2020-11-17 PROCEDURE — C1887: CPT

## 2020-11-17 PROCEDURE — 86901 BLOOD TYPING SEROLOGIC RH(D): CPT

## 2020-11-17 PROCEDURE — 99232 SBSQ HOSP IP/OBS MODERATE 35: CPT

## 2020-11-17 PROCEDURE — C1725: CPT

## 2020-11-17 PROCEDURE — 83735 ASSAY OF MAGNESIUM: CPT

## 2020-11-17 PROCEDURE — C1894: CPT

## 2020-11-17 PROCEDURE — 93005 ELECTROCARDIOGRAM TRACING: CPT

## 2020-11-17 PROCEDURE — 85025 COMPLETE CBC W/AUTO DIFF WBC: CPT

## 2020-11-17 PROCEDURE — 82962 GLUCOSE BLOOD TEST: CPT

## 2020-11-17 PROCEDURE — C1874: CPT

## 2020-11-17 PROCEDURE — C1769: CPT

## 2020-11-17 PROCEDURE — 86850 RBC ANTIBODY SCREEN: CPT

## 2020-11-17 PROCEDURE — 80053 COMPREHEN METABOLIC PANEL: CPT

## 2020-11-17 PROCEDURE — 86900 BLOOD TYPING SEROLOGIC ABO: CPT

## 2020-11-17 PROCEDURE — 36415 COLL VENOUS BLD VENIPUNCTURE: CPT

## 2020-11-17 PROCEDURE — 93010 ELECTROCARDIOGRAM REPORT: CPT

## 2020-11-17 PROCEDURE — 93459 L HRT ART/GRFT ANGIO: CPT | Mod: XU

## 2020-11-17 PROCEDURE — 85027 COMPLETE CBC AUTOMATED: CPT

## 2020-11-17 PROCEDURE — 85610 PROTHROMBIN TIME: CPT

## 2020-11-17 PROCEDURE — C1760: CPT

## 2020-11-17 PROCEDURE — 99153 MOD SED SAME PHYS/QHP EA: CPT

## 2020-11-17 PROCEDURE — C9604: CPT | Mod: LC

## 2020-11-17 PROCEDURE — 85730 THROMBOPLASTIN TIME PARTIAL: CPT

## 2020-11-17 PROCEDURE — 99152 MOD SED SAME PHYS/QHP 5/>YRS: CPT

## 2020-11-17 PROCEDURE — 80048 BASIC METABOLIC PNL TOTAL CA: CPT

## 2020-11-17 RX ORDER — LANOLIN ALCOHOL/MO/W.PET/CERES
3 CREAM (GRAM) TOPICAL AT BEDTIME
Refills: 0 | Status: COMPLETED | OUTPATIENT
Start: 2020-11-17 | End: 2020-11-17

## 2020-11-17 RX ORDER — ASPIRIN/CALCIUM CARB/MAGNESIUM 324 MG
1 TABLET ORAL
Qty: 90 | Refills: 3
Start: 2020-11-17 | End: 2021-11-11

## 2020-11-17 RX ORDER — TICAGRELOR 90 MG/1
1 TABLET ORAL
Qty: 180 | Refills: 3
Start: 2020-11-17 | End: 2021-11-11

## 2020-11-17 RX ADMIN — PANTOPRAZOLE SODIUM 40 MILLIGRAM(S): 20 TABLET, DELAYED RELEASE ORAL at 05:18

## 2020-11-17 RX ADMIN — RANOLAZINE 500 MILLIGRAM(S): 500 TABLET, FILM COATED, EXTENDED RELEASE ORAL at 05:18

## 2020-11-17 RX ADMIN — Medication 3 MILLIGRAM(S): at 01:19

## 2020-11-17 RX ADMIN — Medication 50 MILLIGRAM(S): at 05:18

## 2020-11-17 RX ADMIN — TICAGRELOR 90 MILLIGRAM(S): 90 TABLET ORAL at 05:18

## 2020-11-17 NOTE — DISCHARGE NOTE NURSING/CASE MANAGEMENT/SOCIAL WORK - PATIENT PORTAL LINK FT
You can access the FollowMyHealth Patient Portal offered by  by registering at the following website: [email or web address]. By joining [facility]’s FollowMyHealth portal, you will also be able to view your health information using other applications (apps) compatible with our system.

## 2020-11-24 ENCOUNTER — NON-APPOINTMENT (OUTPATIENT)
Age: 75
End: 2020-11-24

## 2020-11-24 ENCOUNTER — APPOINTMENT (OUTPATIENT)
Dept: CARDIOLOGY | Facility: CLINIC | Age: 75
End: 2020-11-24
Payer: MEDICARE

## 2020-11-24 VITALS
OXYGEN SATURATION: 98 % | SYSTOLIC BLOOD PRESSURE: 134 MMHG | DIASTOLIC BLOOD PRESSURE: 74 MMHG | HEIGHT: 67 IN | WEIGHT: 149 LBS | BODY MASS INDEX: 23.39 KG/M2 | HEART RATE: 86 BPM | TEMPERATURE: 98 F

## 2020-11-24 DIAGNOSIS — Z98.890 OTHER SPECIFIED POSTPROCEDURAL STATES: ICD-10-CM

## 2020-11-24 DIAGNOSIS — I20.8 OTHER FORMS OF ANGINA PECTORIS: ICD-10-CM

## 2020-11-24 DIAGNOSIS — Z95.1 PRESENCE OF AORTOCORONARY BYPASS GRAFT: ICD-10-CM

## 2020-11-24 DIAGNOSIS — Z86.79 PERSONAL HISTORY OF OTHER DISEASES OF THE CIRCULATORY SYSTEM: ICD-10-CM

## 2020-11-24 PROCEDURE — 93000 ELECTROCARDIOGRAM COMPLETE: CPT

## 2020-11-24 PROCEDURE — 99215 OFFICE O/P EST HI 40 MIN: CPT

## 2020-11-24 RX ORDER — LEVOTHYROXINE SODIUM 0.05 MG/1
50 TABLET ORAL
Qty: 30 | Refills: 2 | Status: DISCONTINUED | COMMUNITY
Start: 2020-07-27 | End: 2020-11-24

## 2020-11-24 RX ORDER — SODIUM POLYSTYRENE SULFONATE 15 G/60ML
15 SUSPENSION ORAL; RECTAL
Qty: 4 | Refills: 5 | Status: DISCONTINUED | COMMUNITY
Start: 2020-09-25 | End: 2020-11-24

## 2020-11-24 NOTE — HISTORY OF PRESENT ILLNESS
[FreeTextEntry1] : f/u  For: HTN, HLD, CAD s/p CABg x4, Angina, recent PCI\par \par HPI for today: : feels good. no chest pain. no headaches. no dizziness. no headaches.  toelrated cath well.\par \par \par \par old note: eesl good. got blood  test done yesteryda.  Potassium normal. On diuersis. restrited figs.  off aldctone and off valsartan. patietn was also takign lisinopril. \par \par old note: CHF. : stable. Tolerating toprol 50 . ordered lisinopril. Elevated BNP.  on lasix 40 ad n aldactone 25 mg dialy. \par \par \par old note: HPI for today: feels good. got Brachytherapy done at [facility].  had angina for 3 days after. \par continues to take Nitrates.  did not make the appt for diabetes doctor yet. Hb A 1 C still elevated.\par Strongly recommended endocrinologist for aggressive sugar control\par Complaitn with meds. BP has been increasing now. \par \par OLD NOTE: 6/12/2019 c/c: chest pain \par Patient had an episode of chest pain, substernal with epigastric and abdominal pain.  2 days ago, it releived by NTG. \par he had another epsiode today, when he was going back from [place]. 6/10, subternal.also with abdominal pain. he has been taking mylanta too for acid reflux . also , he has been recnetly started on azithromycin for bronchitis,.\par He states the second episode happened today and it got relived with NTG. he has been taking IMDUR every day. \par \par \par old note: no chest pain.no dyspnea,. no headaches. no dizziness.\par  difficulty walking foot problem. seein neurologist. NO Peripheral vascular disease . patient got US at a [facility] doctor. \par \par

## 2020-11-24 NOTE — DISCUSSION/SUMMARY
[Patient] : the patient [Risks] : risks [Benefits] : benefits [Alternatives] : alternatives [___ Month(s)] : [unfilled] month(s) [With Me] : with me [FreeTextEntry1] : This is a 70 year old male with history HTN, DM, HLD, CAD, h/o MI,  s/p CABG, s/p PCIx4, recent unstable angina s/p PCI with CODI to SVG to LCX,  \par 1)  Angina pectoris:  stable coronary artery disease      PCI and brachy therapy recent PCI to SVG.  nov 2020 ct Dual antiplatelet therapy .aspirin. brillinta. increase lipitor 40 mg daily .  ranexa 500 mg Q12  \par aspirin and  ct brillinta  liptior 20 mg daily  \par 2) CAD, s/p CABG, s/p PCI: ASA, c ct Brilinta ,  \par 3) HTN: controlled \par 4) ICM: EF 25-30 %  .  toprol 50 mg daily , . lasix 40 mg,   valsartan 40 mg daily. farxiga. aldactone 25 mg Every other day.  after cath will plan for CRT-D if repeat echo EF <35%.  (has LBBB)  \par Will evaluate for entresto and will switch valsartan to entresto. \par 5) Gastric upset: gastroparesis:  Gi  evaln  nexium \par 6) Anemia:  iron \par 7) hypothyroidsm and Diabetes Mellitus : endocrin follows up. \par 8) Eleavted lipoprotein a and \par

## 2020-12-08 ENCOUNTER — NON-APPOINTMENT (OUTPATIENT)
Age: 75
End: 2020-12-08

## 2020-12-22 ENCOUNTER — APPOINTMENT (OUTPATIENT)
Dept: CARDIOLOGY | Facility: CLINIC | Age: 75
End: 2020-12-22
Payer: MEDICARE

## 2020-12-22 VITALS
WEIGHT: 146 LBS | SYSTOLIC BLOOD PRESSURE: 118 MMHG | HEIGHT: 67 IN | DIASTOLIC BLOOD PRESSURE: 63 MMHG | OXYGEN SATURATION: 100 % | BODY MASS INDEX: 22.91 KG/M2 | TEMPERATURE: 98.6 F | HEART RATE: 77 BPM

## 2020-12-22 DIAGNOSIS — R06.02 SHORTNESS OF BREATH: ICD-10-CM

## 2020-12-22 PROCEDURE — 99215 OFFICE O/P EST HI 40 MIN: CPT

## 2020-12-22 PROCEDURE — 99072 ADDL SUPL MATRL&STAF TM PHE: CPT

## 2020-12-22 NOTE — DISCUSSION/SUMMARY
[Patient] : the patient [Risks] : risks [Benefits] : benefits [Alternatives] : alternatives [___ Month(s)] : [unfilled] month(s) [With Me] : with me [FreeTextEntry1] : This is a 70 year old male with history HTN, DM, HLD, CAD, h/o MI,  s/p CABG, s/p PCIx4, recent unstable angina s/p PCI with CODI to SVG to LCX,  \par 1)  Angina pectoris:  stable coronary artery disease      PCI and brachy therapy recent PCI to SVG.  nov 2020 ct Dual antiplatelet therapy .aspirin. brillinta. increase lipitor 40 mg daily .  ranexa 500 mg Q12  \par aspirin and  ct brillinta  liptior 20 mg daily  \par 2) CAD, s/p CABG, s/p PCI: ASA, c ct Brilinta ,  \par 3) HTN: controlled \par 4) ICM: EF40%  .  toprol 50 mg daily , . lasix 40 mg,   valsartan 40 mg daily. farxiga. aldactone 25 mg Every other day.  after cath will plan for CRT-D if repeat echo EF <35%.  (has LBBB)  \par  on valsartan . \par 5) Gastric upset: gastroparesis:  Gi  evaln  nexium \par 6) Anemia:  iron \par 7) hypothyroidsm and Diabetes Mellitus : endocrin follows up. \par 8) Eleavted lipoprotein a and \par

## 2020-12-22 NOTE — HISTORY OF PRESENT ILLNESS
[FreeTextEntry1] : f/u  For: HTN, HLD, CAD s/p CABg x4, Angina, recent PCI\par \par HPI for today: : feels good. no chestp ain. no headaches. no dyspnea on exertion . no LE edema. compliant with meds.\par Patient has been having left ear pain and left parotid swelling and enlargement. he had an infection. he is currently on abx. 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\par he had gone to [facility]. after the proedure patient felt that his sugar was low.   he had transient LOC and was intubated. Had ? cardiac arrest bradycardia. She ahd eleavted lactate and eleavted trops. he was intuabted for few hours and erxtrubated the same night.\par the next day lactate improved. LVEf in the hspital was 40-45%.  I recommened patient to be dishcarged.  no cardiac cath or intervention.\par Patient was sdischarged the next day\par feels good now. No headhcaes. no diziznes.s . no chest pain\par \par \par \par \par old note: : feels good. no chest pain. no headaches. no dizziness. no headaches.  toelrated cath well.\par \par old note: eesl good. got blood  test done yesteryda.  Potassium normal. On diuersis. restrited figs.  off aldctone and off valsartan. patietn was also takign lisinopril. \par \par old note: CHF. : stable. Tolerating toprol 50 . ordered lisinopril. Elevated BNP.  on lasix 40 ad n aldactone 25 mg dialy. \par \par \par old note: HPI for today: feels good. got Brachytherapy done at [facility].  had angina for 3 days after. \par continues to take Nitrates.  did not make the appt for diabetes doctor yet. Hb A 1 C still elevated.\par Strongly recommended endocrinologist for aggressive sugar control\par Complaitn with meds. BP has been increasing now. \par \par OLD NOTE: 6/12/2019 c/c: chest pain \par Patient had an episode of chest pain, substernal with epigastric and abdominal pain.  2 days ago, it releived by NTG. \par he had another epsiode today, when he was going back from the [employer]. 6/10, subternal.also with abdominal pain. he has been taking mylanta too for acid reflux . also , he has been recnetly started on azithromycin for bronchitis,.\par He states the second episode happened today and it got relived with NTG. he has been taking IMDUR every day. \par \par \par old note: no chest pain.no dyspnea,. no headaches. no dizziness.\par  difficulty walking foot problem. seein neurologist. NO Peripheral vascular disease . patient got US at a [facility] doctor. \par \par

## 2021-01-19 ENCOUNTER — APPOINTMENT (OUTPATIENT)
Dept: NEUROSURGERY | Facility: CLINIC | Age: 76
End: 2021-01-19
Payer: MEDICARE

## 2021-01-19 VITALS
BODY MASS INDEX: 23.54 KG/M2 | OXYGEN SATURATION: 95 % | TEMPERATURE: 97.3 F | WEIGHT: 150 LBS | SYSTOLIC BLOOD PRESSURE: 122 MMHG | HEIGHT: 67 IN | DIASTOLIC BLOOD PRESSURE: 71 MMHG | HEART RATE: 87 BPM

## 2021-01-19 DIAGNOSIS — J39.2 OTHER DISEASES OF PHARYNX: ICD-10-CM

## 2021-01-19 PROCEDURE — 99072 ADDL SUPL MATRL&STAF TM PHE: CPT

## 2021-01-19 PROCEDURE — 99204 OFFICE O/P NEW MOD 45 MIN: CPT

## 2021-01-19 NOTE — REASON FOR VISIT
[Consultation] : a consultation visit [Referred By: _________] : Patient was referred by CHRIS [Family Member] : family member

## 2021-01-19 NOTE — PHYSICAL EXAM
[General Appearance - Alert] : alert [General Appearance - In No Acute Distress] : in no acute distress [Oriented To Time, Place, And Person] : oriented to person, place, and time [Impaired Insight] : insight and judgment were intact [Person] : oriented to person [Place] : oriented to place [Time] : oriented to time [Short Term Intact] : short term memory intact [Remote Intact] : remote memory intact [Span Intact] : the attention span was normal [Concentration Intact] : normal concentrating ability [Fluency] : fluency intact [Comprehension] : comprehension intact [Current Events] : adequate knowledge of current events [Past History] : adequate knowledge of personal past history [Vocabulary] : adequate range of vocabulary [Cranial Nerves Oculomotor (III)] : extraocular motion intact [Cranial Nerves Trigeminal (V)] : facial sensation intact symmetrically [Cranial Nerves Facial (VII)] : face symmetrical [Cranial Nerves Glossopharyngeal (IX)] : tongue and palate midline [Cranial Nerves Accessory (XI - Cranial And Spinal)] : head turning and shoulder shrug symmetric [Cranial Nerves Hypoglossal (XII)] : there was no tongue deviation with protrusion [Motor Tone] : muscle tone was normal in all four extremities [Motor Strength] : muscle strength was normal in all four extremities [No Muscle Atrophy] : normal bulk in all four extremities [Sensation Tactile Decrease] : light touch was intact [Balance] : balance was intact [2+] : Patella left 2+ [Extraocular Movements] : extraocular movements were intact [Outer Ear] : the ears and nose were normal in appearance [Neck Appearance] : the appearance of the neck was normal [Respiration, Rhythm And Depth] : normal respiratory rhythm and effort [] : no respiratory distress [Exaggerated Use Of Accessory Muscles For Inspiration] : no accessory muscle use [Heart Rate And Rhythm] : heart rate was normal and rhythm regular [Abnormal Walk] : normal gait [Involuntary Movements] : no involuntary movements were seen [Skin Color & Pigmentation] : normal skin color and pigmentation [Skin Turgor] : normal skin turgor [Limited Balance] : balance was intact [Past-pointing] : there was no past-pointing [Tremor] : no tremor present [FreeTextEntry5] : Hearing loss in left ear

## 2021-01-19 NOTE — REVIEW OF SYSTEMS
[As Noted in HPI] : as noted in HPI [Earache] : earache [Loss Of Hearing] : hearing loss [Negative] : Heme/Lymph [FreeTextEntry5] : history of stents

## 2021-01-19 NOTE — HISTORY OF PRESENT ILLNESS
[FreeTextEntry1] : nasopharyngeal mass [de-identified] : 76 yo male presents to the office for a neurosurgical consultation for nasopharyngeal mass. Approximately 3 months ago, pt developed pain in left ear with purulent and bloody  drainage. Pt also had severe pain on left ear and neck area with swelling of the left side of face. Dr. Campbell, ENT, prescribed antibiotics without any noted improvement. MRI imaging was performed demonstrating the diffuse mass and inflammation. Son states he had a biopsy of mass in [place] last month and path revealed inflammation and lymph nodes were negative. I do have a report. They went back to ENT and he was placed on Levaquin which he is presently taking. Pt remains with drainage from left ear as well as left ear and face pain and headaches. He denies fevers.\par \par Plan: MRI w wo contrast with brain lab protocol, full series, DTI, GRE, T2 , possible pre op\par CBC, CMP, CRP, ESR labs\par if surgery is indicated, then will perform Transphenoidal with ENT \par IF PRE OP, WILL NEED TO CONFER WITH CARDIOLOGY REGARDING STOPPING BRILLINTA AND ASA

## 2021-02-02 ENCOUNTER — APPOINTMENT (OUTPATIENT)
Dept: MRI IMAGING | Facility: CLINIC | Age: 76
End: 2021-02-02
Payer: MEDICARE

## 2021-02-02 ENCOUNTER — OUTPATIENT (OUTPATIENT)
Dept: OUTPATIENT SERVICES | Facility: HOSPITAL | Age: 76
LOS: 1 days | End: 2021-02-02

## 2021-02-02 ENCOUNTER — RESULT REVIEW (OUTPATIENT)
Age: 76
End: 2021-02-02

## 2021-02-02 DIAGNOSIS — Z96.7 PRESENCE OF OTHER BONE AND TENDON IMPLANTS: Chronic | ICD-10-CM

## 2021-02-02 DIAGNOSIS — J39.2 OTHER DISEASES OF PHARYNX: ICD-10-CM

## 2021-02-02 DIAGNOSIS — Z95.5 PRESENCE OF CORONARY ANGIOPLASTY IMPLANT AND GRAFT: Chronic | ICD-10-CM

## 2021-02-02 DIAGNOSIS — Z95.1 PRESENCE OF AORTOCORONARY BYPASS GRAFT: Chronic | ICD-10-CM

## 2021-02-02 PROCEDURE — 70553 MRI BRAIN STEM W/O & W/DYE: CPT | Mod: 26

## 2021-02-09 ENCOUNTER — APPOINTMENT (OUTPATIENT)
Dept: NEUROSURGERY | Facility: CLINIC | Age: 76
End: 2021-02-09
Payer: MEDICARE

## 2021-02-09 VITALS
TEMPERATURE: 97.7 F | SYSTOLIC BLOOD PRESSURE: 138 MMHG | BODY MASS INDEX: 23.54 KG/M2 | DIASTOLIC BLOOD PRESSURE: 76 MMHG | OXYGEN SATURATION: 99 % | HEART RATE: 86 BPM | WEIGHT: 150 LBS | HEIGHT: 67 IN

## 2021-02-09 DIAGNOSIS — M89.9 DISORDER OF BONE, UNSPECIFIED: ICD-10-CM

## 2021-02-09 PROCEDURE — 99072 ADDL SUPL MATRL&STAF TM PHE: CPT

## 2021-02-09 PROCEDURE — 99213 OFFICE O/P EST LOW 20 MIN: CPT

## 2021-02-09 NOTE — DATA REVIEWED
[de-identified] : \par EXAM: MR BRAIN WAW IC\par \par \par PROCEDURE DATE: 02/02/2021\par \par \par \par INTERPRETATION: NECK MRI WITH AND WITHOUT CONTRAST\par \par INDICATION: 75-year-old male, diabetes mellitus on insulin, hypertension, hyperlipidemia, MI, CABG with 4 stents, unstable angina, neurosurgical consultation for nasopharyngeal mass, 3 months ago patient developed pain in left ear with per bloody drainage, MRI imaging demonstrating mass with inflammation, per son had biopsy of mass improvement pathology revealing inflammation, patient remains with drainage from left ear, left ear and face pain\par \par TECHNIQUE: Multiplanar T1 weighted and STIR images were obtained before contrast. Following intravenous gadolinium, multiplanar T1 weighted fat suppressed images were obtained. 7 cc of Gadovist were administered., 0.5 cc were discarded.\par \par COMPARISON EXAMINATION: None.\par \par FINDINGS:\par AERODIGESTIVE TRACT:\par \par Loss of the left retromandibular fat pad with abnormal heterogeneous soft tissue with decreased decreased T1 signal, heterogeneous predominantly peripheral enhancement and central nonenhancement, restricted diffusion, DWI hyperintensity in the left greater than right nasopharynx, left skull base, clivus, and left petrous apex. There is diffuse irregular inflammatory change and enhancement surrounding the left mandible condylar head and left temporomandibular joint with asymmetric left TMJ joint fluid collection. There is abnormal opacification of the left mastoid air cells and middle ear fluid levels with intrinsic increase T1 signal, which may reflect hemorrhagic and/or proteinaceous debris. There is irregular soft tissue swelling of the left external auditory canal involving the left temporomandibular joint, left  space with irregular enhancement and abnormal marrow signal with enhancement involving the left mandibular condylar head (1100:9). There are rim enhancing collections with central nonenhancement concerning for abscesses in prevertebral musculature, longus colli, and nasopharynx (1100:11, 1102:45). Findings concerning for aggressive process skull base and bony involvement of the clivus, left greater than right nasopharynx and base correlate for aggressive necrotizing otitis externa with petrous apicitis, with infectious and/or inflammatory change from Pseudomonas, with other masses not completely excluded.\par \par The diffuse inflammatory change extends to and involves the left V3 mandibular nerve with enhancement extending to left foramen ovale and asymmetric dural enhancement along floor of the left middle cranial fossa (1102:42) possible extension of infectious/inflammatory change.\par \par LYMPH NODES: Small bilateral retropharyngeal lymph nodes, and small nodes in upper cervical chains, all nodes are less than 1.5 cm in short axis and too small to characterize\par \par PAROTID GLANDS: Infectious/plantar change adjacent to the left temporomandibular joint involves the left parotid gland, left external artery canal, with likely extension of infectious inflammatory change. Through the fissures of Santorini into the left parotid space, with asymmetric soft tissue swelling and enhancement (1100:10, 1102:45).\par \par Right parotid gland is generally unremarkable.\par \par SUBMANDIBULAR GLANDS: Incompletely visualized.\par .\par SINONASAL CAVITY: Mucosal thickening within the maxillary, ethmoid, frontal and sphenoid sinuses with retracted bilateral maxillary antra with a hypoplastic right greater than left maxillary sinus (1102:29). Decreased pneumatization mastoid tips, opacification left mastoid and middle ear\par \par MISCELLANEOUS:\par \par Intracranially moderately enlarged ventricles and sulci consistent with volume loss, nonspecific T2 and FLAIR white matter hyperintensities likely sequela of microvascular disease, with multiple remote lacunar infarcts in the centrum semiovale, lentiform nuclei, thalami, ayde and midbrain. Partially empty sella. Irregular enhancement extends from the left nasopharynx through the left sinus more gas in the into the left skull base with irregular enhancement along the floor of the left middle cranial fossa concerning for extension of likely infectious/inflammatory change from petrous apicitis. No supratentorial midline shift. Flow voids are in the intracranial arterial vessels indicative of patency with slightly decreased contrast enhancement in narrowing of the flow void of the left internal common carotid artery in the region of marked inflammatory change is prior to the petrous portion, with 360 degrees encasement and narrowing of the vessel in the distal left carotid space (1100:10).\par \par IMPRESSION:\par \par Loss of left retromandibular fat pad, abnormal heterogeneous enhancing tissue left nasopharynx, skull base, left hypermetabolic joint involving the left mandibular condylar head, asymmetric left TMJ joint fluid collection narrowing circumference encasing and narrowing the distal left ICA. Opacified left tympanomastoid cavity with heterogeneous signal, possibly hemorrhagic and/or proteinaceous debris. Tissue swelling left external auditory canal, left parotid and  space with rim-enhancing collections prevertebral tissues, skull base, nasopharynx. Differential includes aggressive process involving clivus, left greater than right nasopharynx and skull base, concerning for necrotizing otitis externa with petrous apicitis, infectious/inflammatory change from Pseudomonas, with other masses or tumors not completely excluded with extension of inflammatory or infectious change to the left skull base with asymmetric dural enhancement of the left middle cranial fossa floor (1102:44).\par

## 2021-02-09 NOTE — REVIEW OF SYSTEMS
[Facial Weakness] : facial weakness [Dizziness] : dizziness [Negative] : Heme/Lymph [de-identified] : facial swelling [FreeTextEntry4] : ear discharge, hearing loss

## 2021-02-09 NOTE — PHYSICAL EXAM
[General Appearance - Alert] : alert [General Appearance - In No Acute Distress] : in no acute distress [Oriented To Time, Place, And Person] : oriented to person, place, and time [Impaired Insight] : insight and judgment were intact [Person] : oriented to person [Place] : oriented to place [Time] : oriented to time [Cranial Nerves Optic (II)] : visual acuity intact bilaterally,  pupils equal round and reactive to light [Cranial Nerves Oculomotor (III)] : extraocular motion intact [Cranial Nerves Trigeminal (V)] : facial sensation intact symmetrically [Cranial Nerves Facial (VII)] : face symmetrical [Cranial Nerves Vestibulocochlear (VIII)] : hearing was intact bilaterally [Cranial Nerves Glossopharyngeal (IX)] : tongue and palate midline [Cranial Nerves Hypoglossal (XII)] : there was no tongue deviation with protrusion [Cranial Nerves Accessory (XI - Cranial And Spinal)] : head turning and shoulder shrug symmetric [Motor Strength] : muscle strength was normal in all four extremities [Sensation Tactile Decrease] : light touch was intact [Involuntary Movements] : no involuntary movements were seen [Sensation Pain / Temperature Decrease] : pain and temperature was intact [Limited Balance] : the patient's balance was impaired [No Visual Abnormalities] : no visible abnormalities [Normal] : normal [Intact] : all motor groups within normal limits of strength and tone bilaterally [Sclera] : the sclera and conjunctiva were normal [PERRL With Normal Accommodation] : pupils were equal in size, round, reactive to light, with normal accommodation [Extraocular Movements] : extraocular movements were intact [Neck Appearance] : the appearance of the neck was normal [] : no respiratory distress [No Spinal Tenderness] : no spinal tenderness [Abnormal Walk] : normal gait [Motor Tone] : muscle strength and tone were normal [Skin Color & Pigmentation] : normal skin color and pigmentation [Romberg's Sign] : Romberg's sign was negtive [Coordination - Dysmetria Impaired Finger-to-Nose Right Only] : not present on the ride side [FreeTextEntry5] : swelling of left side of face with left sided facial droop [FreeTextEntry6] : No drifts  [FreeTextEntry1] : puss like discharge out of left ear

## 2021-02-09 NOTE — HISTORY OF PRESENT ILLNESS
[FreeTextEntry1] : ADRIANNA SHANKS is a 75 year old male who presents for follow up neurosurgical evaluation of skull based lesion. 4 months ago, pt developed pain in left ear with purulent and bloody drainage. Pt also had severe pain on left ear and neck area with swelling of the left side of face. Dr. Campbell, ENT, prescribed antibiotics without any noted improvement. MRI imaging was performed demonstrating the diffuse mass and inflammation. Son states he had a biopsy of mass in [place] last month and path revealed inflammation and lymph nodes were negative. I don't have a report. They went back to ENT and he was placed on Levaquin. \par \par Today the patient has drainage from left ear that he has packed with a cotton ball as well as left ear and left side facial swelling. Son states it looks like "his mouth is twisting".  He has occasional headaches managed with Mobic and Tylenol with relief, denies fevers, seizures, dizziness, numbness/tingling, loss of balance/strength, and incontinence. He was on a few different antibiotics and is currently not taking any anymore because they did not help. He was also placed on steroids with no relief, as well as Zovirax. \par \par Plan: Follow with ENT Dr. Kruse\marissa

## 2021-02-10 ENCOUNTER — APPOINTMENT (OUTPATIENT)
Dept: OTOLARYNGOLOGY | Facility: CLINIC | Age: 76
End: 2021-02-10
Payer: MEDICARE

## 2021-02-10 ENCOUNTER — OUTPATIENT (OUTPATIENT)
Dept: OUTPATIENT SERVICES | Facility: HOSPITAL | Age: 76
LOS: 1 days | Discharge: HOME | End: 2021-02-10

## 2021-02-10 VITALS
BODY MASS INDEX: 22.13 KG/M2 | SYSTOLIC BLOOD PRESSURE: 115 MMHG | HEART RATE: 77 BPM | HEIGHT: 68 IN | WEIGHT: 146 LBS | DIASTOLIC BLOOD PRESSURE: 62 MMHG

## 2021-02-10 DIAGNOSIS — Z95.1 PRESENCE OF AORTOCORONARY BYPASS GRAFT: Chronic | ICD-10-CM

## 2021-02-10 DIAGNOSIS — Z96.7 PRESENCE OF OTHER BONE AND TENDON IMPLANTS: Chronic | ICD-10-CM

## 2021-02-10 DIAGNOSIS — D48.0 NEOPLASM OF UNCERTAIN BEHAVIOR OF BONE AND ARTICULAR CARTILAGE: ICD-10-CM

## 2021-02-10 DIAGNOSIS — Z95.5 PRESENCE OF CORONARY ANGIOPLASTY IMPLANT AND GRAFT: Chronic | ICD-10-CM

## 2021-02-10 PROCEDURE — 92504 EAR MICROSCOPY EXAMINATION: CPT

## 2021-02-10 PROCEDURE — 99205 OFFICE O/P NEW HI 60 MIN: CPT | Mod: 25

## 2021-02-10 PROCEDURE — 99072 ADDL SUPL MATRL&STAF TM PHE: CPT

## 2021-02-11 PROBLEM — D48.0: Status: ACTIVE | Noted: 2021-02-11

## 2021-02-12 NOTE — REVIEW OF SYSTEMS
[As Noted in HPI] : as noted in HPI [Ear Pain] : ear pain [Ear Drainage] : ear drainage [Negative] : Heme/Lymph

## 2021-02-12 NOTE — DATA REVIEWED
[de-identified] : EXAM: MR BRAIN WAW IC with enhancements over anterior temporal bone\par CT with erosive changes along anterior aspect of petrous carotid\par \par \par PROCEDURE DATE: 02/02/2021\par \par \par \par INTERPRETATION: NECK MRI WITH AND WITHOUT CONTRAST\par \par INDICATION: 75-year-old male, diabetes mellitus on insulin, hypertension, hyperlipidemia, MI, CABG with 4 stents, unstable angina, neurosurgical consultation for nasopharyngeal mass, 3 months ago patient developed pain in left ear with per bloody drainage, MRI imaging demonstrating mass with inflammation, per son had biopsy of mass improvement pathology revealing inflammation, patient remains with drainage from left ear, left ear and face pain\par \par TECHNIQUE: Multiplanar T1 weighted and STIR images were obtained before contrast. Following intravenous gadolinium, multiplanar T1 weighted fat suppressed images were obtained. 7 cc of Gadovist were administered., 0.5 cc were discarded.\par \par COMPARISON EXAMINATION: None.\par \par FINDINGS:\par AERODIGESTIVE TRACT:\par \par Loss of the left retromandibular fat pad with abnormal heterogeneous soft tissue with decreased decreased T1 signal, heterogeneous predominantly peripheral enhancement and central nonenhancement, restricted diffusion, DWI hyperintensity in the left greater than right nasopharynx, left skull base, clivus, and left petrous apex. There is diffuse irregular inflammatory change and enhancement surrounding the left mandible condylar head and left temporomandibular joint with asymmetric left TMJ joint fluid collection. There is abnormal opacification of the left mastoid air cells and middle ear fluid levels with intrinsic increase T1 signal, which may reflect hemorrhagic and/or proteinaceous debris. There is irregular soft tissue swelling of the left external auditory canal involving the left temporomandibular joint, left  space with irregular enhancement and abnormal marrow signal with enhancement involving the left mandibular condylar head (1100:9). There are rim enhancing collections with central nonenhancement concerning for abscesses in prevertebral musculature, longus colli, and nasopharynx (1100:11, 1102:45). Findings concerning for aggressive process skull base and bony involvement of the clivus, left greater than right nasopharynx and base correlate for aggressive necrotizing otitis externa with petrous apicitis, with infectious and/or inflammatory change from Pseudomonas, with other masses not completely excluded.\par \par The diffuse inflammatory change extends to and involves the left V3 mandibular nerve with enhancement extending to left foramen ovale and asymmetric dural enhancement along floor of the left middle cranial fossa (1102:42) possible extension of infectious/inflammatory change.\par \par LYMPH NODES: Small bilateral retropharyngeal lymph nodes, and small nodes in upper cervical chains, all nodes are less than 1.5 cm in short axis and too small to characterize\par \par PAROTID GLANDS: Infectious/plantar change adjacent to the left temporomandibular joint involves the left parotid gland, left external artery canal, with likely extension of infectious inflammatory change. Through the fissures of Santorini into the left parotid space, with asymmetric soft tissue swelling and enhancement (1100:10, 1102:45).\par \par Right parotid gland is generally unremarkable.\par \par SUBMANDIBULAR GLANDS: Incompletely visualized.\par .\par SINONASAL CAVITY: Mucosal thickening within the maxillary, ethmoid, frontal and sphenoid sinuses with retracted bilateral maxillary antra with a hypoplastic right greater than left maxillary sinus (1102:29). Decreased pneumatization mastoid tips, opacification left mastoid and middle ear\par \par MISCELLANEOUS:\par \par Intracranially moderately enlarged ventricles and sulci consistent with volume loss, nonspecific T2 and FLAIR white matter hyperintensities likely sequela of microvascular disease, with multiple remote lacunar infarcts in the centrum semiovale, lentiform nuclei, thalami, ayde and midbrain. Partially empty sella. Irregular enhancement extends from the left nasopharynx through the left sinus more gas in the into the left skull base with irregular enhancement along the floor of the left middle cranial fossa concerning for extension of likely infectious/inflammatory change from petrous apicitis. No supratentorial midline shift. Flow voids are in the intracranial arterial vessels indicative of patency with slightly decreased contrast enhancement in narrowing of the flow void of the left internal common carotid artery in the region of marked inflammatory change is prior to the petrous portion, with 360 degrees encasement and narrowing of the vessel in the distal left carotid space (1100:10).\par \par IMPRESSION:\par \par Loss of left retromandibular fat pad, abnormal heterogeneous enhancing tissue left nasopharynx, skull base, left hypermetabolic joint involving the left mandibular condylar head, asymmetric left TMJ joint fluid collection narrowing circumference encasing and narrowing the distal left ICA. Opacified left tympanomastoid cavity with heterogeneous signal, possibly hemorrhagic and/or proteinaceous debris. Tissue swelling left external auditory canal, left parotid and  space with rim-enhancing collections prevertebral tissues, skull base, nasopharynx. Differential includes aggressive process involving clivus, left greater than right nasopharynx and skull base, concerning for necrotizing otitis externa with petrous apicitis, infectious/inflammatory change from Pseudomonas, with other masses or tumors not completely excluded with extension of inflammatory or infectious change to the left skull base with asymmetric dural enhancement of the left middle cranial fossa floor (1102:44).\par \par \par \par \par \par \par KELL WRIGHT MD; Attending Radiologist\par This document has been electronically signed. Feb 2 2021 4:05PM\par

## 2021-02-12 NOTE — HISTORY OF PRESENT ILLNESS
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 Hu Sosa, neurosurgeon\par Originally dx with L OM 4mo ago-- was given abx but subsequently did not improve\par Since then, consistent purulent otorrhea\par Was then sent for MRI and PET scan\par Subsequently underwent NP biopsy and L posterior cervical biopsy around December 2020-- reportedly demonstrated inflammation only\par MRI 02/03/2021-- reviewed personally\par \par Hx R-sided mastoidectomy in [place] in 1974 for "hearing loss"\par \par Presently w left otalgia but now w 2wk history of L facial swelling, inability to move face\par Has been on multiple courses of abx, most recently 7d course of levaquin 2wk ago\par DM type 2, prior A1c 9 but then started on insulin, now in 7s\par \par Denies fevers, chills, night sweats, weight loss.\par Son states has been on continuous Antibiotics and steroids for 4 months with no relief.\par

## 2021-02-12 NOTE — PHYSICAL EXAM
[Nasal Endoscopy Performed] : nasal endoscopy was performed, see procedure section for findings [Binocular Microscopic Exam] : Binocular microscopic exam was performed [Normal] : the left external canal was normal [Hearing Loss Right Only] : diminished [Hearing Loss Left Only] : diminished [FreeTextEntry8] : CWD cavity, epithelialized [de-identified] : remnant [de-identified] : posterior perforation with large polyp, cultured and cauterized, wick placed with GV

## 2021-02-12 NOTE — REASON FOR VISIT
[Initial Consultation] : an initial consultation for [FreeTextEntry2] : referred by Dr. Hu Sosa, neurosurgeon for left otorrhea.  [Other: _____] : [unfilled]

## 2021-02-12 NOTE — PROCEDURE
[Same] : same as the Pre Op Dx. [] : Binocular Microscopy [FreeTextEntry1] : L otorrhea [FreeTextEntry4] : none [FreeTextEntry6] : Operative microscope was used to examine the ear canal, ear drum and visible middle ear landmarks. Adequate exam would not have been possible without the use of a microscope. Findings are described.\par \par

## 2021-02-13 ENCOUNTER — APPOINTMENT (OUTPATIENT)
Dept: OTOLARYNGOLOGY | Facility: CLINIC | Age: 76
End: 2021-02-13
Payer: MEDICARE

## 2021-02-13 DIAGNOSIS — H61.92 DISORDER OF LEFT EXTERNAL EAR, UNSPECIFIED: ICD-10-CM

## 2021-02-13 PROCEDURE — 69100 BIOPSY OF EXTERNAL EAR: CPT | Mod: LT

## 2021-02-13 PROCEDURE — 99214 OFFICE O/P EST MOD 30 MIN: CPT | Mod: 25

## 2021-02-13 PROCEDURE — 99072 ADDL SUPL MATRL&STAF TM PHE: CPT

## 2021-02-13 PROCEDURE — 92504 EAR MICROSCOPY EXAMINATION: CPT

## 2021-02-15 NOTE — PHYSICAL EXAM
[Binocular Microscopic Exam] : Binocular microscopic exam was performed [Hearing Loss Right Only] : diminished [Hearing Loss Left Only] : diminished [FreeTextEntry8] : CWD cavity, epithelialized [de-identified] : posterior perforation with smaller polyp, cauterized, GV and powder instilled; biopsied polyp and sent in formalin [de-identified] : remnant [Nasal Endoscopy Performed] : nasal endoscopy was performed, see procedure section for findings [Normal] : no rashes

## 2021-02-15 NOTE — HISTORY OF PRESENT ILLNESS
[de-identified] : better with pain and drainage\par believes face with more tone\par here for cultures, remove wick and further treatment

## 2021-02-15 NOTE — PROCEDURE
[Same] : same as the Pre Op Dx. [] : Binocular Microscopy [FreeTextEntry4] : none [FreeTextEntry1] : L otorrhea [FreeTextEntry6] : Operative microscope was used to examine the ear canal, ear drum and visible middle ear landmarks. Adequate exam would not have been possible without the use of a microscope. Findings are described.\par \par

## 2021-02-15 NOTE — REASON FOR VISIT
[Subsequent Evaluation] : a subsequent evaluation for [Family Member] : family member [FreeTextEntry2] : left ear follow up.

## 2021-02-16 LAB — BACTERIA FLD CULT: ABNORMAL

## 2021-02-17 ENCOUNTER — APPOINTMENT (OUTPATIENT)
Dept: OTOLARYNGOLOGY | Facility: CLINIC | Age: 76
End: 2021-02-17
Payer: MEDICARE

## 2021-02-17 ENCOUNTER — APPOINTMENT (OUTPATIENT)
Dept: MRI IMAGING | Facility: CLINIC | Age: 76
End: 2021-02-17

## 2021-02-17 VITALS — WEIGHT: 147 LBS | BODY MASS INDEX: 22.28 KG/M2 | HEIGHT: 68 IN

## 2021-02-17 PROCEDURE — 99213 OFFICE O/P EST LOW 20 MIN: CPT | Mod: 25

## 2021-02-17 PROCEDURE — 92504 EAR MICROSCOPY EXAMINATION: CPT

## 2021-02-17 PROCEDURE — 99072 ADDL SUPL MATRL&STAF TM PHE: CPT

## 2021-02-17 RX ORDER — CEPHALEXIN 500 MG/1
500 CAPSULE ORAL TWICE DAILY
Refills: 0 | Status: DISCONTINUED | COMMUNITY
End: 2021-02-17

## 2021-02-17 RX ORDER — CIPROFLOXACIN HYDROCHLORIDE 500 MG/1
500 TABLET, FILM COATED ORAL
Qty: 56 | Refills: 0 | Status: DISCONTINUED | COMMUNITY
Start: 2021-02-16 | End: 2021-02-17

## 2021-02-17 RX ORDER — UREA 10 %
140 (45 FE) LOTION (ML) TOPICAL DAILY
Refills: 0 | Status: DISCONTINUED | COMMUNITY
Start: 2019-08-13 | End: 2021-02-17

## 2021-02-18 NOTE — HISTORY OF PRESENT ILLNESS
[de-identified] : 75 year old male follow up for Left otorrhea, history of COM and Left ear lesion, s/p culture during last visit, wick removed.  States continues to have Left otalgia, with yellow otorrhea, completed course of antibiotics with no relief.  States becoming more difficult to hear and intermittent tinnitus.  Patient has hearing aids, does not wear them.  Denies recent fevers.

## 2021-02-18 NOTE — PHYSICAL EXAM
[Nasal Endoscopy Performed] : nasal endoscopy was performed, see procedure section for findings [Binocular Microscopic Exam] : Binocular microscopic exam was performed [Normal] : the left external canal was normal [Hearing Loss Right Only] : diminished [Hearing Loss Left Only] : diminished [FreeTextEntry8] : CWD cavity, epithelialized, minor purulent debris removed and treated with mastoid powder [de-identified] : remnant [de-identified] : posterior perforation with smaller polyp, GV and powder instilled; biopsied polyp and sent in formalin

## 2021-02-18 NOTE — REASON FOR VISIT
[Subsequent Evaluation] : a subsequent evaluation for [Family Member] : family member [FreeTextEntry2] : follow up for Left otorrhea

## 2021-02-22 ENCOUNTER — APPOINTMENT (OUTPATIENT)
Dept: ENDOCRINOLOGY | Facility: CLINIC | Age: 76
End: 2021-02-22

## 2021-02-22 ENCOUNTER — NON-APPOINTMENT (OUTPATIENT)
Age: 76
End: 2021-02-22

## 2021-02-22 LAB — CORE LAB BIOPSY: NORMAL

## 2021-02-24 ENCOUNTER — APPOINTMENT (OUTPATIENT)
Dept: OTOLARYNGOLOGY | Facility: CLINIC | Age: 76
End: 2021-02-24
Payer: MEDICARE

## 2021-02-24 VITALS
BODY MASS INDEX: 22.28 KG/M2 | DIASTOLIC BLOOD PRESSURE: 65 MMHG | WEIGHT: 147 LBS | TEMPERATURE: 98 F | SYSTOLIC BLOOD PRESSURE: 108 MMHG | HEIGHT: 68 IN | HEART RATE: 76 BPM

## 2021-02-24 PROCEDURE — 92504 EAR MICROSCOPY EXAMINATION: CPT

## 2021-02-24 PROCEDURE — 99213 OFFICE O/P EST LOW 20 MIN: CPT

## 2021-02-24 PROCEDURE — 99072 ADDL SUPL MATRL&STAF TM PHE: CPT

## 2021-02-24 NOTE — PHYSICAL EXAM
[Binocular Microscopic Exam] : Binocular microscopic exam was performed [Hearing Loss Right Only] : diminished [Hearing Loss Left Only] : diminished [FreeTextEntry8] : CWD cavity, epithelialized, GV, powder, mupirocin applied [de-identified] : remnant [de-identified] : posterior perforation polyp decrease, mucoid effusion cleared, GV and powder instilled [Nasal Endoscopy Performed] : nasal endoscopy was performed, see procedure section for findings [Normal] : no rashes

## 2021-02-24 NOTE — REASON FOR VISIT
[Subsequent Evaluation] : a subsequent evaluation for [FreeTextEntry2] : 74yo man with L otorrhea and h/o COM. findings on imaging show COM and an erosive lesion anterior  petrous bone. 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 started PO cipro and topical mupirocin, will obtain an ID consult and likely will need PICC line will observe for response to PO abx next week. need to f/u path as well from biopsied polyp.

## 2021-02-24 NOTE — DATA REVIEWED
[de-identified] : CRP and ESR from jan 2021 elevated [de-identified] : biopsy: inflammatory polyp

## 2021-02-24 NOTE — HISTORY OF PRESENT ILLNESS
[de-identified] : f/u L otorrhea/osteomyelitis\par doing better with ear pain and drainage\par L facial weakness persists\par no eye dryness

## 2021-03-03 ENCOUNTER — OUTPATIENT (OUTPATIENT)
Dept: OUTPATIENT SERVICES | Facility: HOSPITAL | Age: 76
LOS: 1 days | Discharge: HOME | End: 2021-03-03

## 2021-03-03 ENCOUNTER — APPOINTMENT (OUTPATIENT)
Dept: OTOLARYNGOLOGY | Facility: CLINIC | Age: 76
End: 2021-03-03
Payer: MEDICARE

## 2021-03-03 VITALS
SYSTOLIC BLOOD PRESSURE: 114 MMHG | HEIGHT: 68 IN | WEIGHT: 147 LBS | BODY MASS INDEX: 22.28 KG/M2 | HEART RATE: 69 BPM | DIASTOLIC BLOOD PRESSURE: 69 MMHG

## 2021-03-03 DIAGNOSIS — H92.12 OTORRHEA, LEFT EAR: ICD-10-CM

## 2021-03-03 DIAGNOSIS — Z95.1 PRESENCE OF AORTOCORONARY BYPASS GRAFT: Chronic | ICD-10-CM

## 2021-03-03 DIAGNOSIS — Z95.5 PRESENCE OF CORONARY ANGIOPLASTY IMPLANT AND GRAFT: Chronic | ICD-10-CM

## 2021-03-03 DIAGNOSIS — H66.93 OTITIS MEDIA, UNSPECIFIED, BILATERAL: ICD-10-CM

## 2021-03-03 DIAGNOSIS — H61.92 DISORDER OF LEFT EXTERNAL EAR, UNSPECIFIED: ICD-10-CM

## 2021-03-03 DIAGNOSIS — Z96.7 PRESENCE OF OTHER BONE AND TENDON IMPLANTS: Chronic | ICD-10-CM

## 2021-03-03 DIAGNOSIS — M86.9 OSTEOMYELITIS, UNSPECIFIED: ICD-10-CM

## 2021-03-03 PROCEDURE — 99214 OFFICE O/P EST MOD 30 MIN: CPT | Mod: 25

## 2021-03-03 PROCEDURE — 92504 EAR MICROSCOPY EXAMINATION: CPT

## 2021-03-03 PROCEDURE — 99072 ADDL SUPL MATRL&STAF TM PHE: CPT

## 2021-03-04 NOTE — PHYSICAL EXAM
[Binocular Microscopic Exam] : Binocular microscopic exam was performed [Hearing Loss Right Only] : diminished [Hearing Loss Left Only] : diminished [FreeTextEntry8] : CWD cavity, epithelialized, GV, powder, mupirocin applied [de-identified] : remnant [de-identified] : posterior perforation polyp completely resolved, mucoid brown-colored effusion cleared, GV and powder instilled [Nasal Endoscopy Performed] : nasal endoscopy was performed, see procedure section for findings [Normal] : no rashes [de-identified] : HB 6/6 L face

## 2021-03-04 NOTE — HISTORY OF PRESENT ILLNESS
[de-identified] : 75 year man with L otorrhea and h/o COM. findings on imaging show COM and an erosive lesion anterior petrous bone. Biopsy 02/13/2021. \par Son states otorrhea has resolved. \par States hearing has improved. \par Still has mild left sided facial pain. \par Denies otalgia, ear infections.

## 2021-03-04 NOTE — PROCEDURE
[Same] : same as the Pre Op Dx. [] : Binocular Microscopy [FreeTextEntry1] : L otorrhea [FreeTextEntry4] : none [FreeTextEntry6] : Operative microscope was used to examine/treat the ear canal, ear drum and visible middle ear landmarks. Adequate exam/treatment would not have been possible without the use of a microscope. Findings are described.\par \par

## 2021-03-08 ENCOUNTER — APPOINTMENT (OUTPATIENT)
Dept: ENDOCRINOLOGY | Facility: CLINIC | Age: 76
End: 2021-03-08
Payer: MEDICARE

## 2021-03-08 VITALS
TEMPERATURE: 96.4 F | HEIGHT: 68 IN | BODY MASS INDEX: 22.73 KG/M2 | DIASTOLIC BLOOD PRESSURE: 68 MMHG | SYSTOLIC BLOOD PRESSURE: 110 MMHG | WEIGHT: 150 LBS

## 2021-03-08 PROCEDURE — 99072 ADDL SUPL MATRL&STAF TM PHE: CPT

## 2021-03-08 PROCEDURE — 99214 OFFICE O/P EST MOD 30 MIN: CPT

## 2021-03-08 RX ORDER — INSULIN ASPART 100 [IU]/ML
100 INJECTION, SOLUTION INTRAVENOUS; SUBCUTANEOUS
Refills: 0 | Status: DISCONTINUED | COMMUNITY
End: 2021-03-08

## 2021-03-08 NOTE — ASSESSMENT
Nursing Note by Cathleen Hernandez at 03/16/18 08:41 AM     Author:  Cathleen Hernandez Service:  (none) Author Type:  Medical Records Staff     Filed:  03/21/18 01:35 PM Encounter Date:  3/15/2018 Status:  Addendum     :  Cathleen Hernandez (Medical Records Staff)            Date Form Received by Disability:[KK1.1T] 3/ 15/ 2018[KK1.1M]   Authorization?[KK1.1T] no[KK1.1M]   Date sent for auth:[KK1.1T] 3/16/2018[KK1.1M]   Date auth returned:[KK1.1T] 3/20/2018[KK1.2M]   Charge:    Type of form:[KK1.1T] FMLA[KK1.1M]   Company:[KK1.1T] [employer][KK1.1M]   When form complete:[KK1.1T] Patient to  at  [facility][KK1.2M]   Comments[KK1.1T] SENT PATIENT AUTHORIZATION VIA MAIL.[KK1.1M]  FMLA FOR DAUGHTER.  RECEIVED AUTHORIZATION, PATIENT WILL PAY WHEN  AT [facility] LOCATION.[KK1.2M]          Revision History        User Key Date/Time User Provider Type Action    > KK1.2 03/21/18 01:35 PM Cathleen Hernandez Medical Records Staff Addend     KK1.1 03/16/18 08:41 AM Cathleen Hernandez Medical Records Staff Sign    M - Manual, T - Template             [Carbohydrate Consistent Diet] : carbohydrate consistent diet [Exercise/Effect on Glucose] : exercise/effect on glucose [Self Monitoring of Blood Glucose] : self monitoring of blood glucose [FreeTextEntry1] : 74 year old male with long standing T2DM and associated CAD s/p 4V CABG, also with hypothyroidism,  hypertension, hyperlipidemia, and vitamin D deficiency. Glycemic control is suboptimal  due to post prandial .\par He continues to eat rice every day.   A1c 6.8. \par \par 1. T2DM : bgs with some hyperG after meals 180-240  \par - continue basaglar  15 u HS \par - increase novolog 12-12- 16 u TID w meals. \par - bgs 4x day \par - continue ARB \par - continue ASA \par -- Targets reviewed. Recommended pt try to keep blood glucose level below 130 (110) before meals and below 160 (140) two hours after meals. \par - discussed diet and exercise\par encouraged more exercise walking 30 min 3 x week\par Needs to try to have more protein for meals\par Importance of blood glucose monitoring discussed. Targets reviewed. Recommended pt try to keep blood glucose level below 130 (110) before meals and below 160 (140) two hours after meals.\par - eye exam 2020 done \par - foot exam normal today: normal monofilament and vibratory B/L \par \par 2. Hypothyroidism : Lt4 was stopped by cardiology. TSh slightly off . \par will continue to monitor for now. check tfts in 3 mos. \par TPo ab negative. \par \par 3. Hypertension-  bp at target on meds. Continue current management.\par I advised low fat/low cholesterol diet, low salt diet, and weight loss\par \par 4. Hyperlipidemia: continue statin \par low fat/low cholesterol diet and weight loss advised\par \par 5. Vitamin D deficiency: continue vitamin D 50,000 IU weekly. \par

## 2021-03-08 NOTE — PHYSICAL EXAM
[Alert] : alert [Well Nourished] : well nourished [No Acute Distress] : no acute distress [Well Developed] : well developed [Normal Sclera/Conjunctiva] : normal sclera/conjunctiva [EOMI] : extra ocular movement intact [No Proptosis] : no proptosis [Normal Oropharynx] : the oropharynx was normal [Thyroid Not Enlarged] : the thyroid was not enlarged [No Thyroid Nodules] : no palpable thyroid nodules [No Respiratory Distress] : no respiratory distress [No Accessory Muscle Use] : no accessory muscle use [Clear to Auscultation] : lungs were clear to auscultation bilaterally [Normal S1, S2] : normal S1 and S2 [Normal Rate] : heart rate was normal [Regular Rhythm] : with a regular rhythm [No Edema] : no peripheral edema [Pedal Pulses Normal] : the pedal pulses are present [Normal Bowel Sounds] : normal bowel sounds [Not Tender] : non-tender [Not Distended] : not distended [Soft] : abdomen soft [Normal Anterior Cervical Nodes] : no anterior cervical lymphadenopathy [Normal Gait] : normal gait [Normal] : normal [Full ROM] : with full range of motion [No Tremors] : no tremors [Oriented x3] : oriented to person, place, and time [Acanthosis Nigricans] : no acanthosis nigricans [Diminished Throughout Both Feet] : normal tactile sensation with monofilament testing throughout both feet

## 2021-03-16 ENCOUNTER — APPOINTMENT (OUTPATIENT)
Dept: CARDIOLOGY | Facility: CLINIC | Age: 76
End: 2021-03-16
Payer: MEDICARE

## 2021-03-16 ENCOUNTER — NON-APPOINTMENT (OUTPATIENT)
Age: 76
End: 2021-03-16

## 2021-03-16 VITALS
DIASTOLIC BLOOD PRESSURE: 74 MMHG | SYSTOLIC BLOOD PRESSURE: 138 MMHG | HEIGHT: 68 IN | TEMPERATURE: 97.8 F | BODY MASS INDEX: 22.28 KG/M2 | HEART RATE: 76 BPM | WEIGHT: 147 LBS | OXYGEN SATURATION: 100 %

## 2021-03-16 DIAGNOSIS — G51.0 BELL'S PALSY: ICD-10-CM

## 2021-03-16 DIAGNOSIS — M86.9 OSTEOMYELITIS, UNSPECIFIED: ICD-10-CM

## 2021-03-16 DIAGNOSIS — E11.59 TYPE 2 DIABETES MELLITUS WITH OTHER CIRCULATORY COMPLICATIONS: ICD-10-CM

## 2021-03-16 DIAGNOSIS — Z98.890 OTHER SPECIFIED POSTPROCEDURAL STATES: ICD-10-CM

## 2021-03-16 DIAGNOSIS — H66.93 OTITIS MEDIA, UNSPECIFIED, BILATERAL: ICD-10-CM

## 2021-03-16 DIAGNOSIS — H92.12 OTORRHEA, LEFT EAR: ICD-10-CM

## 2021-03-16 LAB
BASOPHILS # BLD AUTO: 0.03 K/UL
BASOPHILS NFR BLD AUTO: 0.3 %
CRP SERPL-MCNC: 5 MG/L
EOSINOPHIL # BLD AUTO: 0.44 K/UL
EOSINOPHIL NFR BLD AUTO: 5.1 %
ERYTHROCYTE [SEDIMENTATION RATE] IN BLOOD BY WESTERGREN METHOD: 77 MM/HR
HCT VFR BLD CALC: 32.6 %
HGB BLD-MCNC: 9.6 G/DL
IMM GRANULOCYTES NFR BLD AUTO: 0.5 %
LYMPHOCYTES # BLD AUTO: 1.9 K/UL
LYMPHOCYTES NFR BLD AUTO: 22.1 %
MAN DIFF?: NORMAL
MCHC RBC-ENTMCNC: 24 PG
MCHC RBC-ENTMCNC: 29.4 GM/DL
MCV RBC AUTO: 81.5 FL
MONOCYTES # BLD AUTO: 1.09 K/UL
MONOCYTES NFR BLD AUTO: 12.7 %
NEUTROPHILS # BLD AUTO: 5.08 K/UL
NEUTROPHILS NFR BLD AUTO: 59.3 %
PLATELET # BLD AUTO: 358 K/UL
RBC # BLD: 4 M/UL
RBC # FLD: 16.1 %
WBC # FLD AUTO: 8.58 K/UL

## 2021-03-16 PROCEDURE — 99215 OFFICE O/P EST HI 40 MIN: CPT

## 2021-03-16 PROCEDURE — 93000 ELECTROCARDIOGRAM COMPLETE: CPT

## 2021-03-16 PROCEDURE — 99072 ADDL SUPL MATRL&STAF TM PHE: CPT

## 2021-03-16 RX ORDER — ALBUTEROL 90 MCG
90 AEROSOL (GRAM) INHALATION 4 TIMES DAILY
Refills: 0 | Status: DISCONTINUED | COMMUNITY
End: 2021-03-16

## 2021-03-17 ENCOUNTER — APPOINTMENT (OUTPATIENT)
Dept: OTOLARYNGOLOGY | Facility: CLINIC | Age: 76
End: 2021-03-17
Payer: MEDICARE

## 2021-03-17 PROCEDURE — 99213 OFFICE O/P EST LOW 20 MIN: CPT | Mod: 25

## 2021-03-17 PROCEDURE — 99072 ADDL SUPL MATRL&STAF TM PHE: CPT

## 2021-03-17 PROCEDURE — 92504 EAR MICROSCOPY EXAMINATION: CPT

## 2021-03-17 NOTE — PHYSICAL EXAM
[Nasal Endoscopy Performed] : nasal endoscopy was performed, see procedure section for findings [de-identified] : HB 6/6 L face [Binocular Microscopic Exam] : Binocular microscopic exam was performed [Normal] : the left external canal was normal [Hearing Loss Right Only] : diminished [Hearing Loss Left Only] : diminished [FreeTextEntry8] : CWD cavity, epithelialized, dry today [de-identified] : remnant [de-identified] : posterior perforation, no polyps, clear mucous scant, GV and powder instilled

## 2021-03-17 NOTE — HISTORY OF PRESENT ILLNESS
[de-identified] : 75 year man with L otorrhea and h/o COM. findings on imaging show COM and an erosive lesion anterior petrous bone. Biopsy 02/13/2021. \par Son states otorrhea has resolved. \par States hearing has improved. \par Still has facial weakness, but speaking better

## 2021-03-24 DIAGNOSIS — G51.0 BELL'S PALSY: ICD-10-CM

## 2021-03-26 NOTE — DISCUSSION/SUMMARY
[Patient] : the patient [Risks] : risks [Benefits] : benefits [Alternatives] : alternatives [___ Month(s)] : [unfilled] month(s) [With Me] : with me [FreeTextEntry1] : This is a 70 year old male with history HTN, DM, HLD, CAD, h/o MI,  s/p CABG, s/p PCIx4, recent unstable angina s/p PCI with CODI to SVG to LCX,  \par 1)  Angina pectoris:  stable coronary artery disease      PCI and brachy therapy recent PCI to SVG.  nov 2020 ct Dual antiplatelet therapy .aspirin. brillinta.  ct lipitor 40 mg daily .  ranexa 500 mg Q12  \par aspirin and  ct brillinta  \par 2) CAD, s/p CABG, s/p PCI: ASA, c ct Brilinta ,  \par 3) HTN: controlled \par 4) ICM: EF <30%  .  toprol 50 mg daily , . lasix 40 mg,   valsartan 20 mg  every other day. and increase as tolerated.  farxiga. aldactone 25 mg Every other day.   will plan for CRT-D if repeat echo EF <35%.  (has LBBB)  echo ordered. \par 5) Gastric upset: gastroparesis:  Gi  evaln  nexium \par 6) Anemia:  iron \par 7) hypothyroidsm and Diabetes Mellitus : endocrin follows up. \par 8) Eleavted lipoprotein a and \par az

## 2021-03-26 NOTE — HISTORY OF PRESENT ILLNESS
[FreeTextEntry1] : f/u  For: HTN, HLD, CAD s/p CABg x4, Angina, recent PCI\par \par \par HPI for today: : whitney has been struggling with his ear exam.  signfiicant bre.  has been struggling with the ear infection\par now getting under control. \par no hcest pain. no dyspnea.  no aplptiatiomnops. \par low BP when he takes valsartan at night. he is not takign valsartan \par \par old note:  : feels good. no chestp ain. no headaches. no dyspnea on exertion . no LE edema. compliant with meds.\par Patient has been having left ear pain and left parotid swelling and enlargement. he had an infection. he is currently on abx. 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\par he had gone to [facility]. after the proedure patient felt that his sugar was low.   he had transient LOC and was intubated. Had ? cardiac arrest bradycardia. She ahd eleavted lactate and eleavted trops. he was intuabted for few hours and erxtrubated the same night.\par the next day lactate improved. LVEf in the hspital was 40-45%.  I recommened patient to be dishcarged.  no cardiac cath or intervention.\par Patient was sdischarged the next day\par feels good now. No headhcaes. no diziznes.s . no chest pain\par \par \par \par \par old note: : feels good. no chest pain. no headaches. no dizziness. no headaches.  toelrated cath well.\par \par old note: eesl good. got blood  test done yesteryda.  Potassium normal. On diuersis. restrited figs.  off aldctone and off valsartan. whitney was also takign lisinopril. \par \par old note: CHF. : stable. Tolerating toprol 50 . ordered lisinopril. Elevated BNP.  on lasix 40 ad n aldactone 25 mg dialy. \par \par \par old note: HPI for today: feels good. got Brachytherapy done at [place].  had angina for 3 days after. \par continues to take Nitrates.  did not make the appt for diabetes doctor yet. Hb A 1 C still elevated.\par Strongly recommended endocrinologist for aggressive sugar control\par Complaitn with meds. BP has been increasing now. \par \par OLD NOTE: 6/12/2019 c/c: chest pain \par Patient had an episode of chest pain, substernal with epigastric and abdominal pain.  2 days ago, it releived by NTG. \par he had another epsiode today, when he was going back from the [facility]. 6/10, subternal.also with abdominal pain. he has been taking mylanta too for acid reflux . also , he has been recnetly started on azithromycin for bronchitis,.\par He states the second episode happened today and it got relived with NTG. he has been taking IMDUR every day. \par \par \par old note: no chest pain.no dyspnea,. no headaches. no dizziness.\par  difficulty walking foot problem. seein neurologist. NO Peripheral vascular disease . patient got US at a [facility] doctor. \par \par

## 2021-03-29 ENCOUNTER — RX RENEWAL (OUTPATIENT)
Age: 76
End: 2021-03-29

## 2021-03-31 ENCOUNTER — APPOINTMENT (OUTPATIENT)
Dept: OTOLARYNGOLOGY | Facility: CLINIC | Age: 76
End: 2021-03-31
Payer: MEDICARE

## 2021-03-31 VITALS
DIASTOLIC BLOOD PRESSURE: 63 MMHG | SYSTOLIC BLOOD PRESSURE: 103 MMHG | WEIGHT: 147 LBS | BODY MASS INDEX: 22.28 KG/M2 | HEART RATE: 73 BPM | HEIGHT: 68 IN

## 2021-03-31 DIAGNOSIS — H90.6 MIXED CONDUCTIVE AND SENSORINEURAL HEARING LOSS, BILATERAL: ICD-10-CM

## 2021-03-31 PROCEDURE — 99214 OFFICE O/P EST MOD 30 MIN: CPT | Mod: 25

## 2021-03-31 PROCEDURE — 92567 TYMPANOMETRY: CPT

## 2021-03-31 PROCEDURE — 92504 EAR MICROSCOPY EXAMINATION: CPT

## 2021-03-31 PROCEDURE — 99072 ADDL SUPL MATRL&STAF TM PHE: CPT

## 2021-03-31 PROCEDURE — 92557 COMPREHENSIVE HEARING TEST: CPT

## 2021-03-31 NOTE — DATA REVIEWED
[de-identified] : I have independently reviewed the patient's audiogram from today and my findings include lo mixed loss, L ear slightly worse, large volumes

## 2021-03-31 NOTE — PHYSICAL EXAM
[Binocular Microscopic Exam] : Binocular microscopic exam was performed [Hearing Loss Right Only] : diminished [Hearing Loss Left Only] : diminished [FreeTextEntry8] : CWD cavity, epithelialized, dry today [de-identified] : remnant [de-identified] : posterior perforation, no polyps, clear mucous scant, powder instilled [Nasal Endoscopy Performed] : nasal endoscopy was performed, see procedure section for findings [Normal] : no rashes [de-identified] : HB 6/6 L face

## 2021-03-31 NOTE — HISTORY OF PRESENT ILLNESS
[de-identified] : f/u L otorrhea\par doing better\par still with poor facial movement\par no drainage from ear\par on and off hearing

## 2021-03-31 NOTE — REASON FOR VISIT
[Subsequent Evaluation] : a subsequent evaluation for [Family Member] : family member [FreeTextEntry2] : follow up on left otorrhea.

## 2021-03-31 NOTE — PROCEDURE
[facility] Hospital Medicine  History & Physical    Patient Name: Karena Young  MRN: [id number]  Admission Date: 11/11/2017  Attending Physician: Maciej Stanton MD   Primary Care Provider: Lee Dwyer MD         Patient information was obtained from patient and ER records.     Subjective:     Principal Problem:<principal problem not specified>    Chief Complaint:   Chief Complaint   Patient presents with    Shortness of Breath     was seen at [facility] in [place] this morning and was sent here        HPI: Karena Young is a 79 y.o. female patient  with PMHx HTN, Breast Cancer, A-fib, Cardiomyopathy,AICD, and Hyperlipidemia who presents to the Emergency Department for SOB which onset gradually this morning. Pt states that she was seen at a clinic today and told to come to ED for further evaluation.  Pt has a hx of Afib, Aflutter, and ablation.Patient has an ablation scheduled in 2 weeks. Associated sxs include weakness and fatigue. Patient denies any fever, chills, CP, abd pain, n/v/d, dizziness, syncope, and all other sxs at this time. ED evaluation  BUN 35, Creatinine 1.7, , CXR shows New 1 cm pulmonary nodule right upper lobe. Patient admitted to observation.        Past Medical History:   Diagnosis Date    *Atrial fibrillation     *Atrial flutter     Breast cancer     Cardiomyopathy     HOCM (hypertrophic obstructive cardiomyopathy) 9/6/2013    Hyperlipidemia     Hypertension     Macular degeneration     Orthostatic hypotension     Ventricular tachycardia        Past Surgical History:   Procedure Laterality Date    BREAST BIOPSY      CARDIAC ELECTROPHYSIOLOGY STUDY AND ABLATION  4/06    CATARACT EXTRACTION      ICD implantation      INSERT / REPLACE / REMOVE PACEMAKER         Review of patient's allergies indicates:  No Known Allergies    No current facility-administered medications on file prior to encounter.      Current Outpatient Prescriptions on File  Prior to Encounter   Medication Sig    amiodarone (PACERONE) 200 MG Tab Take 1 tablet (200 mg total) by mouth once daily.    aspirin 81 mg Tab Take 1 tablet by mouth Daily.     atenolol (TENORMIN) 50 MG tablet Take 1 tablet (50 mg total) by mouth every evening.    atorvastatin (LIPITOR) 20 MG tablet TAKE 1 TABLET BY MOUTH EVERY DAY    calcium citrate-vitamin D3 315-200 mg (CITRACAL+D) 315-200 mg-unit per tablet Take 1 tablet by mouth 2 (two) times daily.      cholecalciferol, vitamin D3, 1,000 unit capsule Take 1 capsule (1,000 Units total) by mouth once daily.    donepezil (ARICEPT) 10 MG tablet Take 1 tablet (10 mg total) by mouth every evening.    hydrochlorothiazide (MICROZIDE) 12.5 mg capsule TAKE 1 CAPSULE(12.5 MG) BY MOUTH EVERY DAY    letrozole (FEMARA) 2.5 mg Tab Take 1 tablet (2.5 mg total) by mouth once daily.    lisinopril 10 MG tablet Take 0.5 tablets (5 mg total) by mouth once daily. 1 tablet Oral Every day    metoprolol succinate (TOPROL-XL) 50 MG 24 hr tablet Take 1 tablet (50 mg total) by mouth once daily.    walker (ULTRA-LIGHT ROLLATOR) Misc 1 each by Misc.(Non-Drug; Combo Route) route daily as needed.     Family History     Problem Relation (Age of Onset)    Cancer Mother, Brother (60)    Heart disease Mother, Father        Social History Main Topics    Smoking status: Never Smoker    Smokeless tobacco: Never Used    Alcohol use No    Drug use: No    Sexual activity: Not Currently     Partners: Male     Review of Systems   Constitutional: Positive for fatigue. Negative for activity change, appetite change, chills and fever.   HENT: Negative.    Eyes: Negative.    Respiratory: Positive for shortness of breath. Negative for cough and chest tightness.    Cardiovascular: Negative.  Negative for chest pain, palpitations and leg swelling.   Gastrointestinal: Negative.  Negative for abdominal pain, diarrhea, nausea and vomiting.   Endocrine: Negative.    Genitourinary: Negative.   Negative for dysuria, flank pain, frequency and urgency.   Musculoskeletal: Negative.    Skin: Negative.    Allergic/Immunologic: Negative.    Neurological: Positive for weakness. Negative for dizziness, syncope, numbness and headaches.   Hematological: Negative.    Psychiatric/Behavioral: Negative.      Objective:     Vital Signs (Most Recent):  Temp: 97.9 °F (36.6 °C) (11/11/17 1710)  Pulse: 105 (11/11/17 1710)  Resp: 18 (11/11/17 1710)  BP: 116/80 (11/11/17 1710)  SpO2: 97 % (11/11/17 1710) Vital Signs (24h Range):  Temp:  [97.4 °F (36.3 °C)-97.9 °F (36.6 °C)] 97.9 °F (36.6 °C)  Pulse:  [101-137] 105  Resp:  [15-20] 18  SpO2:  [95 %-99 %] 97 %  BP: ()/(58-81) 116/80     Weight: 55.8 kg (123 lb 0.3 oz)  Body mass index is 21.12 kg/m².    Physical Exam   Constitutional: She is oriented to person, place, and time. She appears well-developed and well-nourished.   HENT:   Head: Normocephalic and atraumatic.   Eyes: Conjunctivae and EOM are normal. Pupils are equal, round, and reactive to light.   Neck: Normal range of motion. Neck supple.   Cardiovascular: Normal rate, normal heart sounds and intact distal pulses.  An irregularly irregular rhythm present.   Pulmonary/Chest: Effort normal and breath sounds normal.   Abdominal: Soft. Bowel sounds are normal.   Musculoskeletal: Normal range of motion.   Neurological: She is alert and oriented to person, place, and time. She has normal reflexes.   Skin: Skin is warm and dry.   Psychiatric: She has a normal mood and affect. Her behavior is normal. Judgment and thought content normal.   Nursing note and vitals reviewed.       Significant Labs:   BMP:   Recent Labs  Lab 11/11/17  1357         K 3.9   CL 97   CO2 26   BUN 35*   CREATININE 1.7*   CALCIUM 10.1     CBC:   Recent Labs  Lab 11/11/17  1357   WBC 7.58   HGB 14.0   HCT 41.1        CMP:   Recent Labs  Lab 11/11/17  1357      K 3.9   CL 97   CO2 26      BUN 35*   CREATININE 1.7*    CALCIUM 10.1   ANIONGAP 14   EGFRNONAA 29*     Troponin:   Recent Labs  Lab 11/11/17  1357   TROPONINI 0.020     All pertinent labs within the past 24 hours have been reviewed.    Significant Imaging:  Imaging Results          X-Ray Chest 1 View (Final result)  Result time 11/11/17 14:14:42    Final result by Ashley Monique MD (11/11/17 14:14:42)                 Impression:     New 1 cm pulmonary nodule right upper lobe.          Electronically signed by: ASHLEY MONIQUE  Date:     11/11/17  Time:    14:14              Narrative:    Chest x-ray single view    Indication: Shortness of breath.    Findings: Comparison study 4/30/2015.  There is a 1 cm nodular opacity right upper lobe projected between the 1st and 2nd anterior ribs, new since the prior study.  Otherwise, clear lungs.  Normal size heart.  No congestion.  Left chest wall AICD with intact leads.  No pneumothorax or pleural fluid collection.  Chronic right rotator cuff tear with narrowed acromiohumeral interval.                            Assessment/Plan:     Acute kidney failure    Creatinine 1.7 (baseline Creatinine 1.5)  Hold nephrotoxic medications  ACEI held   Monitor           HTN (hypertension)    BP meds held to to hypotension, will resume when appropriate   BB and Amiodarone continued   Will continue to monitor           Paroxysmal atrial fibrillation    EKG:Atrial fibrillation with rapid ventricular response with premature ventricular or aberrantly conducted complexes. Right bundle branch block, rate 129.  Telemetry monitoring   ASA   Amiodarone and Metoprolol   Cardiology consult   Heart Rate low 100's  [id number] Score 4              VTE Risk Mitigation         Ordered     enoxaparin injection 40 mg  ED 1 Time     Route:  Subcutaneous        11/11/17 1719     Medium Risk of VTE  Once      11/11/17 1719     Place YEN hose  Until discontinued      11/11/17 1719             Tanya Almaraz NP  Department of Hospital Medicine   [facility]   [Same] : same as the Pre Op Dx. [] : Binocular Microscopy [FreeTextEntry1] : L otorrhea [FreeTextEntry4] : none [FreeTextEntry6] : Operative microscope was used to examine/treat the ear canal, ear drum and visible middle ear landmarks. Adequate exam/treatment would not have been possible without the use of a microscope. Findings are described.\par \par

## 2021-04-07 ENCOUNTER — APPOINTMENT (OUTPATIENT)
Dept: CARDIOLOGY | Facility: CLINIC | Age: 76
End: 2021-04-07
Payer: MEDICARE

## 2021-04-07 PROCEDURE — 93306 TTE W/DOPPLER COMPLETE: CPT

## 2021-04-07 PROCEDURE — 76377 3D RENDER W/INTRP POSTPROCES: CPT

## 2021-04-07 PROCEDURE — 99072 ADDL SUPL MATRL&STAF TM PHE: CPT

## 2021-04-07 PROCEDURE — 93356 MYOCRD STRAIN IMG SPCKL TRCK: CPT

## 2021-04-08 ENCOUNTER — APPOINTMENT (OUTPATIENT)
Dept: MRI IMAGING | Facility: CLINIC | Age: 76
End: 2021-04-08
Payer: MEDICARE

## 2021-04-08 ENCOUNTER — APPOINTMENT (OUTPATIENT)
Dept: CT IMAGING | Facility: CLINIC | Age: 76
End: 2021-04-08
Payer: MEDICARE

## 2021-04-08 ENCOUNTER — NON-APPOINTMENT (OUTPATIENT)
Age: 76
End: 2021-04-08

## 2021-04-08 ENCOUNTER — OUTPATIENT (OUTPATIENT)
Dept: OUTPATIENT SERVICES | Facility: HOSPITAL | Age: 76
LOS: 1 days | End: 2021-04-08

## 2021-04-08 DIAGNOSIS — Z95.1 PRESENCE OF AORTOCORONARY BYPASS GRAFT: Chronic | ICD-10-CM

## 2021-04-08 DIAGNOSIS — Z96.7 PRESENCE OF OTHER BONE AND TENDON IMPLANTS: Chronic | ICD-10-CM

## 2021-04-08 DIAGNOSIS — Z95.5 PRESENCE OF CORONARY ANGIOPLASTY IMPLANT AND GRAFT: Chronic | ICD-10-CM

## 2021-04-08 DIAGNOSIS — H66.93 OTITIS MEDIA, UNSPECIFIED, BILATERAL: ICD-10-CM

## 2021-04-08 PROCEDURE — 70486 CT MAXILLOFACIAL W/O DYE: CPT | Mod: 26

## 2021-04-08 PROCEDURE — 70553 MRI BRAIN STEM W/O & W/DYE: CPT | Mod: 26

## 2021-04-09 LAB
25(OH)D3 SERPL-MCNC: 52.7 NG/ML
ALBUMIN SERPL ELPH-MCNC: 3.9 G/DL
ALP BLD-CCNC: 108 U/L
ALT SERPL-CCNC: 15 U/L
ANION GAP SERPL CALC-SCNC: 9 MMOL/L
AST SERPL-CCNC: 20 U/L
BASOPHILS # BLD AUTO: 0.05 K/UL
BASOPHILS NFR BLD AUTO: 0.4 %
BILIRUB SERPL-MCNC: 0.5 MG/DL
BUN SERPL-MCNC: 35 MG/DL
CALCIUM SERPL-MCNC: 9.2 MG/DL
CHLORIDE SERPL-SCNC: 101 MMOL/L
CO2 SERPL-SCNC: 27 MMOL/L
CREAT SERPL-MCNC: 1.53 MG/DL
CRP SERPL-MCNC: <3 MG/L
EOSINOPHIL # BLD AUTO: 0.48 K/UL
EOSINOPHIL NFR BLD AUTO: 4.1 %
ERYTHROCYTE [SEDIMENTATION RATE] IN BLOOD BY WESTERGREN METHOD: 68 MM/HR
GLUCOSE SERPL-MCNC: 147 MG/DL
HCT VFR BLD CALC: 35.1 %
HGB BLD-MCNC: 10.5 G/DL
IMM GRANULOCYTES NFR BLD AUTO: 0.3 %
LYMPHOCYTES # BLD AUTO: 1.91 K/UL
LYMPHOCYTES NFR BLD AUTO: 16.4 %
MAN DIFF?: NORMAL
MCHC RBC-ENTMCNC: 23.1 PG
MCHC RBC-ENTMCNC: 29.9 GM/DL
MCV RBC AUTO: 77.3 FL
MONOCYTES # BLD AUTO: 1.29 K/UL
MONOCYTES NFR BLD AUTO: 11.1 %
NEUTROPHILS # BLD AUTO: 7.86 K/UL
NEUTROPHILS NFR BLD AUTO: 67.7 %
NT-PROBNP SERPL-MCNC: 986 PG/ML
PLATELET # BLD AUTO: 355 K/UL
POTASSIUM SERPL-SCNC: 5.5 MMOL/L
PROT SERPL-MCNC: 7.4 G/DL
RBC # BLD: 4.54 M/UL
RBC # FLD: 16.3 %
SODIUM SERPL-SCNC: 138 MMOL/L
WBC # FLD AUTO: 11.63 K/UL

## 2021-04-12 ENCOUNTER — NON-APPOINTMENT (OUTPATIENT)
Age: 76
End: 2021-04-12

## 2021-04-13 DIAGNOSIS — H90.6 MIXED CONDUCTIVE AND SENSORINEURAL HEARING LOSS, BILATERAL: ICD-10-CM

## 2021-04-13 DIAGNOSIS — H90.3 SENSORINEURAL HEARING LOSS, BILATERAL: ICD-10-CM

## 2021-06-10 ENCOUNTER — APPOINTMENT (OUTPATIENT)
Dept: OTOLARYNGOLOGY | Facility: CLINIC | Age: 76
End: 2021-06-10
Payer: MEDICARE

## 2021-06-10 VITALS
HEART RATE: 75 BPM | HEIGHT: 68 IN | DIASTOLIC BLOOD PRESSURE: 68 MMHG | SYSTOLIC BLOOD PRESSURE: 119 MMHG | BODY MASS INDEX: 21.98 KG/M2 | WEIGHT: 145 LBS

## 2021-06-10 DIAGNOSIS — H92.12 OTORRHEA, LEFT EAR: ICD-10-CM

## 2021-06-10 PROCEDURE — 99213 OFFICE O/P EST LOW 20 MIN: CPT | Mod: 25

## 2021-06-10 PROCEDURE — 92504 EAR MICROSCOPY EXAMINATION: CPT

## 2021-06-10 RX ORDER — MUPIROCIN 20 MG/G
2 OINTMENT TOPICAL
Qty: 2 | Refills: 0 | Status: DISCONTINUED | COMMUNITY
Start: 2021-02-16 | End: 2021-06-10

## 2021-06-10 RX ORDER — CIPROFLOXACIN HYDROCHLORIDE 500 MG/1
500 TABLET, FILM COATED ORAL
Qty: 28 | Refills: 0 | Status: DISCONTINUED | COMMUNITY
Start: 2021-02-17 | End: 2021-06-10

## 2021-06-10 RX ORDER — NEOMYCIN/BACITRACIN/POLYMYXINB 3.5-400-5K
OINTMENT (GRAM) TOPICAL DAILY
Refills: 0 | Status: DISCONTINUED | COMMUNITY
End: 2021-06-10

## 2021-06-11 PROBLEM — H92.12 OTORRHEA OF LEFT EAR: Status: ACTIVE | Noted: 2021-02-10

## 2021-06-11 NOTE — REASON FOR VISIT
[Subsequent Evaluation] : a subsequent evaluation for [FreeTextEntry2] : follow up bilateral hearing loss, left otorrhea

## 2021-06-11 NOTE — HISTORY OF PRESENT ILLNESS
[de-identified] : 75 year old man follow up bilateral hearing loss, left otorrhea.  Wearing bilateral hearing aids. States took the antibiotics as prescribed and otorrhea has resolved.  still with poor facial movement.\par wears HA

## 2021-06-11 NOTE — H&P PST ADULT - TOBACCO USE
[facility] Well Child Check 4-5 Years    ASSESSMENT & PLAN  Kain Johnson is a 6  y.o. 0  m.o. who has normal growth and normal development.    Diagnoses and all orders for this visit:    Encounter for routine child health examination without abnormal findings  -     DTaP IPV combined vaccine IM  -     MMR and varicella combined vaccine subcutaneous  -     Influenza, Seasonal Quad, PF, =/> 6months (syringe)  -     Pediatric Symptom Checklist (42640)  -     Hearing Screening  -     Vision Screening    Mild intermittent asthma without complication  Kain is doing well with her breathing overall. She does have some difficulty with illness and in the spring and fall. Flovent as needed with illness. Albuterol every 4 hours as needed for cough or wheezing.  -     fluticasone propionate (FLOVENT HFA) 44 mcg/actuation inhaler; Inhale 2 puffs 2 (two) times a day.  Dispense: 1 Inhaler; Refill: 2  -     albuterol (VENTOLIN HFA) 90 mcg/actuation inhaler; Inhale 2 puffs every 6 (six) hours as needed for wheezing or shortness of breath.  Dispense: 18 g; Refill: 2  -     albuterol (PROVENTIL) 2.5 mg /3 mL (0.083 %) nebulizer solution; Take 3 mL (2.5 mg total) by nebulization every 4 (four) hours as needed for wheezing or shortness of breath.  Dispense: 180 mL; Refill: 2    Seasonal allergic rhinitis due to pollen  She has seasonal rhinitis. Recommend zyrtec daily. Also recommend flonase sensimist daily to help with her congestion and cough that is consistent with post-nasal drip and congestion.    Family history of probable Lor Danlos syndrome  Recommend monitoring for joint pain or difficult. Will monitor and could consider genetic evaluation for monitor.     Return to clinic in 1 year for a Well Child Check or sooner as needed    IMMUNIZATIONS  Appropriate vaccinations were ordered. and I have discussed the risks and benefits of each component with the patient/parents today and have answered all  questions.    REFERRALS  Dental:  The patient has already established care with a dentist.  Other:  No additional referrals were made at this time.    ANTICIPATORY GUIDANCE  I have reviewed age appropriate anticipatory guidance.    HEALTH HISTORY  Do you have any concerns that you'd like to discuss today?: No concerns     Family history of probable Lor Danlos syndrome. Mother is wondering about checking Kain's hips due to mother's hip dysplasia. Kain is not having any pain or discomfort.     No question data found.    Do you have any significant health concerns in your family history?: No  Family History   Problem Relation Age of Onset     Hypertension Maternal Grandmother      Breast cancer Maternal Grandmother      No Medical Problems Maternal Grandfather      Hip dysplasia Mother      Celiac disease Father      Asthma Brother      Celiac disease Paternal Grandmother      Since your last visit, have there been any major changes in your family, such as a move, job change, separation, divorce, or death in the family?: No  Has a lack of transportation kept you from medical appointments?: No    Who lives in your home?:  Mom, dad and brother   Social History     Social History Narrative    Lives at home with mother, father and brother.        Brother - Esa     Do you have any concerns about losing your housing?: No  Is your housing safe and comfortable?: Yes  Who provides care for your child?:   center    What does your child do for exercise?:  Plays outside, goes on walks, rides bike, running, soccer, dance   What activities is your child involved with?:  Soccer and dance   How many hours per day is your child viewing a screen (phone, TV, laptop, tablet, computer)?: up to an hour     What school does your child attend?:  [employer]   What grade is your child in?:    Do you have any concerns with school for your child (social, academic, behavioral)?: None    Nutrition:  What is your child  drinking (cow's milk, water, soda, juice, sports drinks, energy drinks, etc)?: cow's milk- 1%, cow's milk- 2% and water  What type of water does your child drink?:  filtered water  Have you been worried that you don't have enough food?: No  Do you have any questions about feeding your child?:  No    Sleep:  What time does your child go to bed?: 8-10pm   What time does your child wake up?: 7am   How many naps does your child take during the day?: 0-1     Elimination:  Do you have any concerns about your child's bowels or bladder (peeing, pooping, constipation?):  No    TB Risk Assessment:  Has your child had any of the following?:  no known risk of TB    Lead   Date/Time Value Ref Range Status   09/14/2018 10:15 AM <1.9 <5.0 ug/dL Final       Lead Screening  During the past six months has the child lived in or regularly visited a home, childcare, or  other building built before 1950? No    During the past six months has the child lived in or regularly visited a home, childcare, or  other building built before 1978 with recent or ongoing repair, remodeling or damage  (such as water damage or chipped paint)? No    Has the child or his/her sibling, playmate, or housemate had an elevated blood lead level?  No    Dyslipidemia Risk Screening  Have any of the child's parents or grandparents had a stroke or heart attack before age 55?: No  Any parents with high cholesterol or currently taking medications to treat?: No     Dental  When was the last time your child saw the dentist?: 1-3 months ago   Parent/Guardian declines the fluoride varnish application today. Fluoride not applied today.    VISION/HEARING  Do you have any concerns about your child's hearing?  No  Do you have any concerns about your child's vision?  No  Vision:  Completed. See Results  Hearing: Completed. See Results     Hearing Screening    125Hz 250Hz 500Hz 1000Hz 2000Hz 3000Hz 4000Hz 6000Hz 8000Hz   Right ear:   20 20 20  20     Left ear:   20 20 20  20    "     Visual Acuity Screening    Right eye Left eye Both eyes   Without correction:   20/25   With correction:      Comments: Attempted couldn't complete       DEVELOPMENT/SOCIAL-EMOTIONAL SCREEN  Do you have any concerns about your child's development?  No  Early Childhood Screen:  Not done yet  Screening tool used, reviewed with parent or guardian: PSC-17 PASS (<15 pass), no followup necessary  Milestones (by observation/ exam/ report) 75-90% ile   PERSONAL/ SOCIAL/COGNITIVE:    Dresses without help    Plays with other children    Says name and age  LANGUAGE:    Counts 5 or more objects    Knows 4 colors    Speech all understandable    Balances 2 sec each foot    Hops on one foot    Runs/ climbs well  FINE MOTOR/ ADAPTIVE:    Copies [place], +    Cuts paper with small scissors    Draws recognizable pictures      Patient Active Problem List   Diagnosis     Atopic dermatitis     Mild intermittent asthma without complication     Seasonal allergic rhinitis       MEASUREMENTS    Height:  3' 5.5\" (1.054 m) (85 %, Z= 1.04, Source: [place] (Girls, 2-20 Years))  Weight: 36 lb 4.8 oz (16.5 kg) (62 %, Z= 0.30, Source: [place] (Girls, 2-20 Years))  BMI: Body mass index is 14.82 kg/m .  Blood Pressure: 84/46  Blood pressure percentiles are 19 % systolic and 24 % diastolic based on the 2017 AAP Clinical Practice Guideline. Blood pressure percentile targets: 90: 106/66, 95: 110/69, 95 + 12 mmH/81. This reading is in the normal blood pressure range.    PHYSICAL EXAM  Constitutional: She appears well-developed and well-nourished.   HEENT: Head: Normocephalic.    Right Ear: Tympanic membrane, external ear and canal normal.    Left Ear: Tympanic membrane, external ear and canal normal.    Nose: Nose normal.    Mouth/Throat: Mucous membranes are moist. Dentition is normal. Oropharynx is clear.    Eyes: Conjunctivae and lids are normal. Red reflex is present bilaterally. Pupils are equal, round, and reactive to light.   Neck: Neck supple. " No tenderness is present.   Cardiovascular: Normal rate and regular rhythm. No murmur heard.  Pulses: Femoral pulses are 2+ bilaterally.   Pulmonary/Chest: Effort normal and breath sounds normal. There is normal air entry.   Abdominal: Soft. Bowel sounds are normal. There is no hepatosplenomegaly. No umbilical or inguinal hernia.   Genitourinary: Normal external female genitalia.   Musculoskeletal: Normal range of motion. Normal strength and tone. Spine without abnormalities.   Neurological: She is alert. She has normal reflexes. No cranial nerve deficit.   Skin: No rashes.      Never smoker

## 2021-06-11 NOTE — PROCEDURE
[Same] : same as the Pre Op Dx. [] : Binocular Microscopy [FreeTextEntry1] : lo COM [FreeTextEntry6] : Operative microscope was used to examine the ear canal, ear drum and visible middle ear landmarks. Adequate exam would not have been possible without the use of a microscope. Findings are described.\par \par  [FreeTextEntry4] : none

## 2021-06-15 ENCOUNTER — NON-APPOINTMENT (OUTPATIENT)
Age: 76
End: 2021-06-15

## 2021-06-15 ENCOUNTER — APPOINTMENT (OUTPATIENT)
Dept: CARDIOLOGY | Facility: CLINIC | Age: 76
End: 2021-06-15
Payer: MEDICARE

## 2021-06-15 VITALS
SYSTOLIC BLOOD PRESSURE: 134 MMHG | TEMPERATURE: 98.1 F | DIASTOLIC BLOOD PRESSURE: 72 MMHG | HEIGHT: 68 IN | OXYGEN SATURATION: 100 % | HEART RATE: 73 BPM | BODY MASS INDEX: 22.13 KG/M2 | WEIGHT: 146 LBS

## 2021-06-15 PROCEDURE — 99215 OFFICE O/P EST HI 40 MIN: CPT

## 2021-06-15 PROCEDURE — 93000 ELECTROCARDIOGRAM COMPLETE: CPT

## 2021-06-15 RX ORDER — ERGOCALCIFEROL 1.25 MG/1
1.25 MG CAPSULE, LIQUID FILLED ORAL
Refills: 0 | Status: DISCONTINUED | COMMUNITY
End: 2021-06-15

## 2021-06-15 NOTE — PHYSICAL EXAM
[Well Developed] : well developed [Well Nourished] : well nourished [No Acute Distress] : no acute distress [Normal Venous Pressure] : normal venous pressure [No Carotid Bruit] : no carotid bruit [Normal S1, S2] : normal S1, S2 [No Murmur] : no murmur [No Rub] : no rub [No Gallop] : no gallop [Clear Lung Fields] : clear lung fields [Good Air Entry] : good air entry [No Respiratory Distress] : no respiratory distress  [Soft] : abdomen soft [Non Tender] : non-tender [No Masses/organomegaly] : no masses/organomegaly [Normal Bowel Sounds] : normal bowel sounds [Normal Gait] : normal gait [No Edema] : no edema [No Cyanosis] : no cyanosis [No Clubbing] : no clubbing [No Varicosities] : no varicosities [No Rash] : no rash [No Skin Lesions] : no skin lesions [Moves all extremities] : moves all extremities [No Focal Deficits] : no focal deficits [Normal Speech] : normal speech [Alert and Oriented] : alert and oriented [Normal memory] : normal memory [General Appearance - Well Developed] : well developed [Normal Appearance] : normal appearance [Well Groomed] : well groomed [General Appearance - Well Nourished] : well nourished [No Deformities] : no deformities [General Appearance - In No Acute Distress] : no acute distress [Normal Conjunctiva] : the conjunctiva exhibited no abnormalities [Eyelids - No Xanthelasma] : the eyelids demonstrated no xanthelasmas [Normal Oral Mucosa] : normal oral mucosa [No Oral Pallor] : no oral pallor [No Oral Cyanosis] : no oral cyanosis [Normal Jugular Venous A Waves Present] : normal jugular venous A waves present [Normal Jugular Venous V Waves Present] : normal jugular venous V waves present [No Jugular Venous Castorena A Waves] : no jugular venous castorena A waves [Respiration, Rhythm And Depth] : normal respiratory rhythm and effort [Exaggerated Use Of Accessory Muscles For Inspiration] : no accessory muscle use [Auscultation Breath Sounds / Voice Sounds] : lungs were clear to auscultation bilaterally [Heart Rate And Rhythm] : heart rate and rhythm were normal [Heart Sounds] : normal S1 and S2 [Murmurs] : no murmurs present [Abdomen Soft] : soft [Abdomen Tenderness] : non-tender [Abdomen Mass (___ Cm)] : no abdominal mass palpated [Abnormal Walk] : normal gait [Nail Clubbing] : no clubbing of the fingernails [Cyanosis, Localized] : no localized cyanosis [Petechial Hemorrhages (___cm)] : no petechial hemorrhages [] : no rash [Skin Color & Pigmentation] : normal skin color and pigmentation [No Venous Stasis] : no venous stasis [Skin Lesions] : no skin lesions [No Skin Ulcers] : no skin ulcer [No Xanthoma] : no  xanthoma was observed [Oriented To Time, Place, And Person] : oriented to person, place, and time [Affect] : the affect was normal [Mood] : the mood was normal [No Anxiety] : not feeling anxious

## 2021-06-15 NOTE — REASON FOR VISIT
[FreeTextEntry1] : f/u  For: HTN, HLD, CAD s/p CABg x4, Angina, recent PCI [Follow-Up - Clinic] : a clinic follow-up of [FreeTextEntry2] : f/u  For: HTN, HLD, CAD s/p CABg x4, Angina, recent PCI

## 2021-06-15 NOTE — HISTORY OF PRESENT ILLNESS
[FreeTextEntry1] : f/u  For: HTN, HLD, CAD s/p CABg x4, Angina, recent PCI\par \par \par HPI for today: :  feels good. no palpitaitons. no chest pain. no dyspnea on exertion  walks for 5-10 mins. \par complaitn with meds. \par \par \par \par old noteL: : whitney has been struggling with his ear exam.  signfiicant xam.  has been struggling with the ear infection\par now getting under control. \par no hcest pain. no dyspnea.  no aplptiatiomnops. \par low BP when he takes valsartan at night. he is not takign valsartan \par \par old note:  : feels good. no chestp ain. no headaches. no dyspnea on exertion . no LE edema. compliant with meds.\par Patient has been having left ear pain and left parotid swelling and enlargement. he had an infection. he is currently on abx. 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\par he had gone to [facility]. after the proedure patient felt that his sugar was low.   he had transient LOC and was intubated. Had ? cardiac arrest bradycardia. She ahd eleavted lactate and eleavted trops. he was intuabted for few hours and erxtrubated the same night.\par the next day lactate improved. LVEf in the hspital was 40-45%.  I recommened patient to be dishcarged.  no cardiac cath or intervention.\par Patient was sdischarged the next day\par feels good now. No headhcaes. no diziznes.s . no chest pain\par \par \par \par \par old note: : feels good. no chest pain. no headaches. no dizziness. no headaches.  toelrated cath well.\par \par old note: eesl good. got blood  test done yesteryda.  Potassium normal. On diuersis. restrited figs.  off aldctone and off valsartan. whitney was also takign lisinopril. \par \par old note: CHF. : stable. Tolerating toprol 50 . ordered lisinopril. Elevated BNP.  on lasix 40 ad n aldactone 25 mg dialy. \par \par \par old note: HPI for today: feels good. got Brachytherapy done at [facility].  had angina for 3 days after. \par continues to take Nitrates.  did not make the appt for diabetes doctor yet. Hb A 1 C still elevated.\par Strongly recommended endocrinologist for aggressive sugar control\par Complaitn with meds. BP has been increasing now. \par \par OLD NOTE: 6/12/2019 c/c: chest pain \par Patient had an episode of chest pain, substernal with epigastric and abdominal pain.  2 days ago, it releived by NTG. \par he had another epsiode today, when he was going back from the [place]. 6/10, subternal.also with abdominal pain. he has been taking mylanta too for acid reflux . also , he has been recnetly started on azithromycin for bronchitis,.\par He states the second episode happened today and it got relived with NTG. he has been taking IMDUR every day. \par \par \par old note: no chest pain.no dyspnea,. no headaches. no dizziness.\par  difficulty walking foot problem. seein neurologist. NO Peripheral vascular disease . patient got US at a vascual doctor. \par \par

## 2021-06-15 NOTE — DISCUSSION/SUMMARY
[Patient] : the patient [Risks] : risks [Benefits] : benefits [With Me] : with me [Alternatives] : alternatives [___ Month(s)] : in [unfilled] month(s) [FreeTextEntry1] : This is a 70 year old male with history HTN, DM, HLD, CAD, h/o MI,  s/p CABG, s/p PCIx4, recent unstable angina s/p PCI with CODI to SVG to LCX,  \par 1)  Angina pectoris:  stable coronary artery disease      PCI and brachy therapy recent PCI to SVG.  nov 2020 ct Dual antiplatelet therapy .aspirin. brillinta.  ct lipitor 40 mg daily .  ranexa 500 mg Q12  \par aspirin and  ct brillinta  \par 2) CAD, s/p CABG, s/p PCI: ASA, c ct Brilinta ,  \par 3) HTN: controlled \par 4) ICM: EF <30%  .  toprol 50 mg daily , . lasix 40 mg,   valsartan 20 mg   . and increase as tolerated.  farxiga. aldactone 25 mg Every other day.   will plan for CRT-D if repeat echo EF <35%.  (has LBBB)  echo ordered. \par they want to defer CRT-D evaluationf ro now. repeat echo and MUGA scan. \par 5) Gastric upset: gastroparesis:  Gi  evaln  nexium \par 6) Anemia:  iron \par 7) hypothyroidsm and Diabetes Mellitus : endocrin follows up. \par 8) Eleavted lipoprotein a and \par az

## 2021-06-15 NOTE — CARDIOLOGY SUMMARY
[de-identified] : 6/15/2021 Sinus  Rhythm \par -Left bundle branch block. \par  -Left atrial enlargement. \par \par ABNORMAL \par  [___] : [unfilled] [LVEF ___%] : LVEF [unfilled]% [Severe] : severe LV dysfunction [None] : no pulmonary hypertension [Enlarged] : enlarged LA size [Mild] : mild mitral regurgitation

## 2021-06-21 ENCOUNTER — APPOINTMENT (OUTPATIENT)
Dept: ELECTROPHYSIOLOGY | Facility: CLINIC | Age: 76
End: 2021-06-21
Payer: MEDICARE

## 2021-06-21 VITALS
BODY MASS INDEX: 21.98 KG/M2 | TEMPERATURE: 98 F | HEART RATE: 75 BPM | HEIGHT: 68 IN | OXYGEN SATURATION: 100 % | SYSTOLIC BLOOD PRESSURE: 112 MMHG | DIASTOLIC BLOOD PRESSURE: 60 MMHG | WEIGHT: 145 LBS

## 2021-06-21 PROCEDURE — 99214 OFFICE O/P EST MOD 30 MIN: CPT | Mod: 25

## 2021-06-21 RX ORDER — FERROUS SULFATE TAB EC 325 MG (65 MG FE EQUIVALENT) 325 (65 FE) MG
325 (65 FE) TABLET DELAYED RESPONSE ORAL
Qty: 90 | Refills: 3 | Status: DISCONTINUED | COMMUNITY
Start: 2020-09-25 | End: 2021-06-21

## 2021-06-21 NOTE — REASON FOR VISIT
[Symptom and Test Evaluation] : symptom and test evaluation [Cardiac Failure] : cardiac failure [Family Member] : family member

## 2021-06-22 NOTE — HISTORY OF PRESENT ILLNESS
[FreeTextEntry1] : This is a 75 years old man with HTN, HLD, CAD s/p CABG 1993 and PCI last on late 2020, LBBB and HFrEF (EF 31% on 3D TTE on 4/2021), anemia, GERD, DM on insulin, right hip fracture s/p surgery 2-3 years ago. \par \par Patient follow up closely with Dr. Valenzuela who consulted me to assess him for consideration of CRTD for HF management and protection against sudden cardiac death. \par \par His EF noted to be 30% on TTE in 2018,. and remained low despite GDMT as well as PCI. He also has LBBB with QRS duration at 160 ms. His baseline functional class is 2. He cannot walk fast or for too long due to right leg pain since the hip surgery, and now walks for 5-10 minutes only. \par He reported that he had left facial swelling and left ear discharge which was diagnosed as an infection. He follows with [facility] and from their notes a tumor could not be excluded. Patient stated that his facial swelling and shift have significantly improved. Now doing acupunture for this which is helping. \par \par He denies any fevers, chills, cough, sweats, chest pain, palpitations, orthopnea, paroxysmal nocturnal dyspnea, peripheral edema, lightheadedness, dizziness, syncope, nausea, vomiting, melena, hematochezia, or hematemesis.\par \par \par Social Hx:\par Negative for smoking or alcohol use\par \par Family Hx:\par Negative for premature CAD or SCD \par \par \par Cardiac Tests;\par EKG 6/15/2021 SR with LBBB,  ms\par \par TTE 4/2021: Severe LV dysfunction, LVEF 31% by 3D, Normal RV, Moderate MR. \par \par [facility] 11/2020:\par - CODI PCI to SVG-OM1 (middle 1/3)\par - CODI PCI to SVG-OM1 (distal 1/3)\par - LM 70-80% diffuse narrowing with 100% occluded LCx\par - % occlusion\par - LCx 100% occlusion\par - % occlusion\par - Patent LIMA-LAD\par - Recurrent in-stent restenosis of old stents to SVG to OM1. PCI to these lesions as abvoe. \par - Occluded SVG to RCA

## 2021-06-22 NOTE — DISCUSSION/SUMMARY
[FreeTextEntry1] : 1. Severe ischemic cardiomyopathy with indication for primary prevention ICD. \par - Follow up in 3 weeks. If EF remains 35% or less on the TTE and nuclear scan then patient said he will agree to proceed with this\par - Will need to discuss with Dr. Augustin from END to assess if there is any risk of active infection and to understand his overall prognosis \par \par 2. LBBB and HF. I suggested a CRTD rather than a simple ICD. He is agreeable if his EF remains low.

## 2021-06-22 NOTE — REVIEW OF SYSTEMS
[Fever] : no fever [Headache] : no headache [Chills] : no chills [Blurry Vision] : no blurred vision [Earache] : no earache [Discharge From Ears] : no discharge from the ears [Sore Throat] : no sore throat [Sinus Pressure] : no sinus pressure [SOB] : no shortness of breath [Chest Discomfort] : no chest discomfort [Lower Ext Edema] : no extremity edema [Leg Claudication] : no intermittent leg claudication [Palpitations] : no palpitations [Orthopnea] : no orthopnea [PND] : no PND [Syncope] : no syncope [Cough] : no cough [Wheezing] : no wheezing [Abdominal Pain] : no abdominal pain [Nausea] : no nausea [Vomiting] : no vomiting [Joint Pain] : no joint pain [Joint Swelling] : no joint swelling [Rash] : no rash [Dizziness] : no dizziness [Confusion] : no confusion was observed [Easy Bleeding] : no tendency for easy bleeding

## 2021-06-22 NOTE — PHYSICAL EXAM
[Well Developed] : well developed [Well Nourished] : well nourished [No Acute Distress] : no acute distress [Normal Conjunctiva] : normal conjunctiva [Normal Venous Pressure] : normal venous pressure [No Carotid Bruit] : no carotid bruit [Normal S1, S2] : normal S1, S2 [No Murmur] : no murmur [No Rub] : no rub [No Gallop] : no gallop [Clear Lung Fields] : clear lung fields [Good Air Entry] : good air entry [No Respiratory Distress] : no respiratory distress  [Soft] : abdomen soft [Non Tender] : non-tender [Normal Gait] : normal gait [No Edema] : no edema [No Cyanosis] : no cyanosis [No Varicosities] : no varicosities [No Rash] : no rash [No Skin Lesions] : no skin lesions [Moves all extremities] : moves all extremities [No Focal Deficits] : no focal deficits [Normal Speech] : normal speech [Alert and Oriented] : alert and oriented [Normal memory] : normal memory

## 2021-06-22 NOTE — ASSESSMENT
[FreeTextEntry1] : This is a 75 years old man with HTN, HLD, CAD s/p CABG 1993 and PCI last on late 2020, LBBB and HFrEF (EF 31% on 3D TTE on 4/2021), anemia, GERD, DM on insulin, right hip fracture s/p surgery 2-3 years ago. \par \par Patient follow up closely with Dr. Valenzuela who consulted me to assess him for consideration of CRTD for HF management and protection against sudden cardiac death. \par \par His EF noted to be 30% on TTE in 2018,. and remained low despite GDMT as well as PCI. He also has LBBB with QRS duration at 160 ms. His baseline functional class is 2. He cannot walk fast or for too long due to right leg pain since the hip surgery, and now walks for 5-10 minutes only. \par \par He reported that he had left facial swelling and left ear discharge which was diagnosed as an infection. He follows with Jose Mariee and from their notes a tumor could not be excluded. Patient stated that his facial swelling and shift have significantly improved. Now doing acupunture for this which is helping. \par \par I explained that he is at elevated risk of sudden cardiac death given his persistent severe ischemic cardiomyopathy, previous MI, and wide QRS. I advised he consider and go forward with an ICD for protection against SCD. He was advised to consider this by Dr. Neil but asked that more testing be performed to see if his EF improved. I told him, I doubt that a significant improvement in his EF to be seen, but he insisted to wait till he gets a repeat TTE and a nuclear scan to r/a his EF. He fully understands that he is not protected while waiting.  \par \par Also, the role of CRT in his case (severe HF and LBBB), though with relatively mild symptoms was discussed as well. \par \par The risks, benefits, and alternatives were discussed at length. The risks explained include, but are not limited to: pain, bleeding, infection, contrast reaction, acute kidney injury, radiation-induced skin irritation and/or burning, vascular injury and/or thrombosis, pneumothorax, cardiac perforation and/or tamponade, valvular injury, pocket hematoma, lead dislodgement, device infection requiring extraction, lead fracture/de-insulation/failure, inappropriate shocks, death, heart attack, or stroke. The opportunity to ask questions was provided and all were answered to the patient's satisfaction.

## 2021-06-29 ENCOUNTER — APPOINTMENT (OUTPATIENT)
Dept: CARDIOLOGY | Facility: CLINIC | Age: 76
End: 2021-06-29
Payer: MEDICARE

## 2021-06-29 PROCEDURE — 78472 GATED HEART PLANAR SINGLE: CPT

## 2021-06-29 PROCEDURE — A9560: CPT

## 2021-07-01 ENCOUNTER — APPOINTMENT (OUTPATIENT)
Dept: CARDIOLOGY | Facility: CLINIC | Age: 76
End: 2021-07-01
Payer: MEDICARE

## 2021-07-01 PROCEDURE — 93306 TTE W/DOPPLER COMPLETE: CPT

## 2021-07-09 NOTE — PATIENT PROFILE ADULT. - HEALTHCARE QUESTIONS, PROFILE
Called patient back to help answer her questions about surgery and the post op period. She was appreciative of return call.
none

## 2021-07-12 ENCOUNTER — APPOINTMENT (OUTPATIENT)
Dept: ELECTROPHYSIOLOGY | Facility: CLINIC | Age: 76
End: 2021-07-12
Payer: MEDICARE

## 2021-07-12 VITALS
DIASTOLIC BLOOD PRESSURE: 70 MMHG | HEART RATE: 74 BPM | SYSTOLIC BLOOD PRESSURE: 113 MMHG | OXYGEN SATURATION: 100 % | WEIGHT: 145 LBS | HEIGHT: 68 IN | TEMPERATURE: 97.9 F | BODY MASS INDEX: 21.98 KG/M2

## 2021-07-12 PROCEDURE — 99214 OFFICE O/P EST MOD 30 MIN: CPT

## 2021-07-12 NOTE — HISTORY OF PRESENT ILLNESS
[FreeTextEntry1] : This is a 76 years old man with HTN, HLD, CAD s/p CABG 1993 and PCI last on late 2020, LBBB and HFrEF (EF 31% on 3D TTE on 4/2021), anemia, GERD, DM on insulin, right hip fracture s/p surgery 2-3 years ago. \par \par Patient follow up closely with Dr. Valenzuela who consulted me to assess him for consideration of CRTD for HF management and protection against sudden cardiac death. \par \par His EF noted to be 30% on TTE in 2018,. and remained low despite GDMT as well as PCI. He also has LBBB with QRS duration at 160 ms. His baseline functional class is 2. He cannot walk fast or for too long due to right leg pain since the hip surgery, and now walks for 5-10 minutes only. \par He reported that he had left facial swelling and left ear discharge which was diagnosed as an infection. He follows with Jose Mariee and from their notes a tumor could not be excluded. Patient stated that his facial swelling and shift have significantly improved. Now doing acupunture for this which is helping. \par \par He denies any fevers, chills, cough, sweats, chest pain, palpitations, orthopnea, paroxysmal nocturnal dyspnea, peripheral edema, lightheadedness, dizziness, syncope, nausea, vomiting, melena, hematochezia, or hematemesis.\par \par Above from initial consult note on 6/21/2021. \par At that time, we talked about the potential role of an ICD and/or CRTD. Given his minimal symptoms and borderline EF, he wanted to wait till the repeat TTE and MUGA scan and only undergo an invasive procedure if its absolutely needed. Today, he is here for a follow up. Since last visit, he has been doing well with no complaints. His MUGA showed an EF of 39% and his TTE showed an EF of 37% with 3D EF. \par \par He continues to deny any fevers, chills, cough, sweats, chest pain, palpitations, dyspnea on exertion, orthopnea, paroxysmal nocturnal dyspnea, peripheral edema, lightheadedness, dizziness, syncope, nausea, vomiting, melena, hematochezia, or hematemesis.\par \par \par \par Social Hx:\par Negative for smoking or alcohol use\par \par Family Hx:\par Negative for premature CAD or SCD \par \par \par Cardiac Tests;\par EKG 6/15/2021 SR with LBBB,  ms\par \par TTE 7/1/2021:\par EF 37% by ed assessment. Moderate MR. Severe pulm HTN\par \par MUGA   6/29:    EF 39%\par TTE 4/2021: Severe LV dysfunction, LVEF 31% by 3D, Normal RV, Moderate MR. \par \par [facility] 11/2020:\par - CODI PCI to SVG-OM1 (middle 1/3)\par - CODI PCI to SVG-OM1 (distal 1/3)\par - LM 70-80% diffuse narrowing with 100% occluded LCx\par - % occlusion\par - LCx 100% occlusion\par - % occlusion\par - Patent LIMA-LAD\par - Recurrent in-stent restenosis of old stents to SVG to OM1. PCI to these lesions as abvoe. \par - Occluded SVG to RCA

## 2021-07-12 NOTE — ASSESSMENT
[FreeTextEntry1] : This is a 76 years old man with HTN, HLD, CAD s/p CABG 1993 and PCI last on late 2020, LBBB and HFrEF (EF 31% on 3D TTE on 4/2021), anemia, GERD, DM on insulin, right hip fracture s/p surgery 2-3 years ago. \par \par Patient follow up closely with Dr. Valenzuela who consulted me to assess him for consideration of CRTD for HF management and protection against sudden cardiac death on 6/21/2021. \par \par His EF noted to be 30% on TTE in 2018,. and remained low despite GDMT as well as PCI. He also has LBBB with QRS duration at 160 ms. His baseline functional class is 2. He cannot walk fast or for too long due to right leg pain since the hip surgery, and now walks for 5-10 minutes only. \par \par He reported that he had left facial swelling and left ear discharge which was diagnosed as an infection. He follows with [facility] Mariee and from their notes a tumor could not be excluded. Patient stated that his facial swelling and shift have significantly improved. Now doing acupunture for this which is helping. \par \par During the initial consult on 6/21/2021, we talked about the potential role of an ICD and/or CRTD. Given his minimal symptoms and borderline EF, he wanted to wait till the repeat TTE and MUGA scan and only undergo an invasive procedure if its absolutely needed. Today, he is here for a follow up. Since last visit, he has been doing well with no complaints. His MUGA showed an EF of 39% and his TTE showed an EF of 37% with 3D EF.

## 2021-07-12 NOTE — END OF VISIT
[Time Spent: ___ minutes] : I have spent [unfilled] minutes of time on the encounter.
[Negative] : Psychiatric

## 2021-07-12 NOTE — DISCUSSION/SUMMARY
[FreeTextEntry1] : 1. Ischemic cardiomyopathy with borderline EF for ICD indication. Most recent EF has been documented to be >35% with two different methods. Patient likes to avoid an ICD unless its absolutely needed\par - 48 hours Holter monitor to screen for NSVT.\par - If NSVT, then will consider EPS for VT inducibility. If no NSVT, then no further tests and can follow up with DR. Valenzuela and come back to see me if needed.  \par \par 2. LBBB and HF.No cardiac device at this stage. If an ICD is needed based on the above, we will consider a CRTD based on his symptoms and QRS

## 2021-07-29 ENCOUNTER — APPOINTMENT (OUTPATIENT)
Dept: ENDOCRINOLOGY | Facility: CLINIC | Age: 76
End: 2021-07-29
Payer: MEDICARE

## 2021-07-29 VITALS
HEIGHT: 67 IN | DIASTOLIC BLOOD PRESSURE: 68 MMHG | OXYGEN SATURATION: 99 % | SYSTOLIC BLOOD PRESSURE: 112 MMHG | BODY MASS INDEX: 22.76 KG/M2 | HEART RATE: 70 BPM | WEIGHT: 145 LBS

## 2021-07-29 PROCEDURE — 99214 OFFICE O/P EST MOD 30 MIN: CPT

## 2021-07-29 NOTE — PHYSICAL EXAM
[Alert] : alert [Well Nourished] : well nourished [No Acute Distress] : no acute distress [Well Developed] : well developed [Normal Sclera/Conjunctiva] : normal sclera/conjunctiva [EOMI] : extra ocular movement intact [No Proptosis] : no proptosis [Normal Oropharynx] : the oropharynx was normal [Thyroid Not Enlarged] : the thyroid was not enlarged [No Thyroid Nodules] : no palpable thyroid nodules [No Respiratory Distress] : no respiratory distress [No Accessory Muscle Use] : no accessory muscle use [Clear to Auscultation] : lungs were clear to auscultation bilaterally [Normal S1, S2] : normal S1 and S2 [Normal Rate] : heart rate was normal [Regular Rhythm] : with a regular rhythm [No Edema] : no peripheral edema [Pedal Pulses Normal] : the pedal pulses are present [Normal Bowel Sounds] : normal bowel sounds [Not Tender] : non-tender [Not Distended] : not distended [Soft] : abdomen soft [Normal Gait] : normal gait [Normal] : normal [Full ROM] : with full range of motion [No Tremors] : no tremors [Oriented x3] : oriented to person, place, and time [Diminished Throughout Both Feet] : normal tactile sensation with monofilament testing throughout both feet [Acanthosis Nigricans] : no acanthosis nigricans

## 2021-07-29 NOTE — ASSESSMENT
[Carbohydrate Consistent Diet] : carbohydrate consistent diet [Exercise/Effect on Glucose] : exercise/effect on glucose [Self Monitoring of Blood Glucose] : self monitoring of blood glucose [FreeTextEntry1] : 76 year old male with long standing T2DM and associated CAD s/p 4V CABG, also with hypothyroidism,  hypertension, hyperlipidemia, and vitamin D deficiency.   A1c 6.8. \par \par 1. T2DM : bgs  better, no hypoG anymore He decreased the doses himself \par bgs rarely above 140-150  \par - continue basaglar  15 u HS \par - decrease  novolog 10-10-14  u TID w meals. \par  bgs 4x day \par continue ARB \par continue ASA \par Targets reviewed. Recommended pt try to keep blood glucose level below 130 (110) before meals and below 160 (140) two hours after meals. \par - discussed diet and exercise\par encouraged more exercise walking 30 min 3 x week\par Needs to try to have more protein for meals\par eye exam uptodate  \par covid vaccine \par discussed elayne \par \par 2. Subclinical hypothyroidism :  TSh slightly abnormal  .will continue to monitor for now.  \par TPo ab negative. \par \par 3. Hypertension-  bp at target on meds. Continue current management.\par I advised low fat/low cholesterol diet, low salt diet, and weight loss\par \par 4. Hyperlipidemia: continue statin \par low fat/low cholesterol diet and weight loss advised\par \par 5. Vitamin D deficiency: continue vitamin D 50,000 IU weekly. \par

## 2021-08-17 ENCOUNTER — APPOINTMENT (OUTPATIENT)
Dept: OTOLARYNGOLOGY | Facility: CLINIC | Age: 76
End: 2021-08-17
Payer: MEDICARE

## 2021-08-17 ENCOUNTER — OUTPATIENT (OUTPATIENT)
Dept: OUTPATIENT SERVICES | Facility: HOSPITAL | Age: 76
LOS: 1 days | Discharge: HOME | End: 2021-08-17

## 2021-08-17 VITALS — WEIGHT: 146 LBS | HEIGHT: 68 IN | BODY MASS INDEX: 22.13 KG/M2

## 2021-08-17 DIAGNOSIS — Z96.7 PRESENCE OF OTHER BONE AND TENDON IMPLANTS: Chronic | ICD-10-CM

## 2021-08-17 DIAGNOSIS — Z95.1 PRESENCE OF AORTOCORONARY BYPASS GRAFT: Chronic | ICD-10-CM

## 2021-08-17 DIAGNOSIS — Z95.5 PRESENCE OF CORONARY ANGIOPLASTY IMPLANT AND GRAFT: Chronic | ICD-10-CM

## 2021-08-17 PROCEDURE — 99213 OFFICE O/P EST LOW 20 MIN: CPT | Mod: 25

## 2021-08-17 PROCEDURE — 92504 EAR MICROSCOPY EXAMINATION: CPT

## 2021-08-17 NOTE — REASON FOR VISIT
[Subsequent Evaluation] : a subsequent evaluation for [Family Member] : family member [FreeTextEntry2] : follow up for Left TM perforation

## 2021-08-17 NOTE — PROCEDURE
[Same] : same as the Pre Op Dx. [] : Binocular Microscopy [FreeTextEntry1] : lo COM [FreeTextEntry4] : none [FreeTextEntry6] : Operative microscope was used to examine the ear canal, ear drum and visible middle ear landmarks. Adequate exam would not have been possible without the use of a microscope. Findings are described.\par \par

## 2021-08-17 NOTE — HISTORY OF PRESENT ILLNESS
[de-identified] : 76 year old male follow up for Left TM perforation, with Left otorrhea\par History of bilateral chronic otitis media, with effusion and osteomyelitis of skull\par s/p MRI IACs 4/08/21 showing COM and erosive lesion of anterior petrous bone\par Wears bilateral hearing aids\par completed oral Cipro and topical Bacitracin\par Recommended for rehab for Left facial nerve, possible Tplasty of Left ear if necessary\par Continues to have Left facial swelling, no facial movement\par No change with hearing\par Denies otorrhea, otalgia, tinnitus, dizziness, vertigo and headaches. No recent fevers

## 2021-08-27 ENCOUNTER — RX RENEWAL (OUTPATIENT)
Age: 76
End: 2021-08-27

## 2021-08-27 RX ORDER — INSULIN GLARGINE 100 [IU]/ML
100 INJECTION, SOLUTION SUBCUTANEOUS
Qty: 15 | Refills: 1 | Status: ACTIVE | COMMUNITY
Start: 2020-11-05 | End: 1900-01-01

## 2021-08-28 ENCOUNTER — RX RENEWAL (OUTPATIENT)
Age: 76
End: 2021-08-28

## 2021-09-01 DIAGNOSIS — H90.3 SENSORINEURAL HEARING LOSS, BILATERAL: ICD-10-CM

## 2021-09-01 DIAGNOSIS — H66.93 OTITIS MEDIA, UNSPECIFIED, BILATERAL: ICD-10-CM

## 2021-09-01 DIAGNOSIS — G51.0 BELL'S PALSY: ICD-10-CM

## 2021-10-19 ENCOUNTER — APPOINTMENT (OUTPATIENT)
Dept: OTOLARYNGOLOGY | Facility: CLINIC | Age: 76
End: 2021-10-19
Payer: MEDICARE

## 2021-10-19 VITALS
HEART RATE: 71 BPM | WEIGHT: 146 LBS | BODY MASS INDEX: 22.13 KG/M2 | SYSTOLIC BLOOD PRESSURE: 126 MMHG | DIASTOLIC BLOOD PRESSURE: 74 MMHG | HEIGHT: 68 IN | TEMPERATURE: 98 F

## 2021-10-19 DIAGNOSIS — H92.21 OTORRHAGIA, RIGHT EAR: ICD-10-CM

## 2021-10-19 PROCEDURE — 92504 EAR MICROSCOPY EXAMINATION: CPT

## 2021-10-19 PROCEDURE — 99213 OFFICE O/P EST LOW 20 MIN: CPT

## 2021-10-19 NOTE — PHYSICAL EXAM
[Binocular Microscopic Exam] : Binocular microscopic exam was performed [Hearing Loss Right Only] : diminished [Hearing Loss Left Only] : diminished [FreeTextEntry8] : CWD cavity, epithelialized, fungal debris, cultured, debrided and treated with GV, powder, clotrimazole [de-identified] : remnant [de-identified] : posterior perforation, no polyps, clear mucous scant, powder instilled [Nasal Endoscopy Performed] : nasal endoscopy was performed, see procedure section for findings [Normal] : no rashes [de-identified] : HB 5/6 L face; good tone

## 2021-10-19 NOTE — REASON FOR VISIT
[Subsequent Evaluation] : a subsequent evaluation for [FreeTextEntry2] : 6 months follow up left TM perforation, c/o of right ear pain started 6 days ago and noticed drainage

## 2021-10-19 NOTE — HISTORY OF PRESENT ILLNESS
[de-identified] : R otorrhea\par started after a trip to [facility]\par was very hot\par R ear itch

## 2021-10-20 ENCOUNTER — APPOINTMENT (OUTPATIENT)
Dept: CARDIOLOGY | Facility: CLINIC | Age: 76
End: 2021-10-20
Payer: MEDICARE

## 2021-10-20 ENCOUNTER — NON-APPOINTMENT (OUTPATIENT)
Age: 76
End: 2021-10-20

## 2021-10-20 VITALS
BODY MASS INDEX: 22.13 KG/M2 | SYSTOLIC BLOOD PRESSURE: 146 MMHG | TEMPERATURE: 98.1 F | DIASTOLIC BLOOD PRESSURE: 80 MMHG | OXYGEN SATURATION: 99 % | WEIGHT: 146 LBS | HEIGHT: 68 IN | HEART RATE: 73 BPM

## 2021-10-20 PROCEDURE — 93000 ELECTROCARDIOGRAM COMPLETE: CPT

## 2021-10-20 PROCEDURE — 99215 OFFICE O/P EST HI 40 MIN: CPT

## 2021-10-20 RX ORDER — VALSARTAN 40 MG/1
40 TABLET, COATED ORAL
Qty: 60 | Refills: 3 | Status: DISCONTINUED | COMMUNITY
Start: 2020-12-22 | End: 2021-10-20

## 2021-10-20 NOTE — HISTORY OF PRESENT ILLNESS
[FreeTextEntry1] : f/u  For: HTN, HLD, CAD s/p CABg x4, Angina, recent PCI\par \par \par HPI for today: :  he states he was takign valsartan 20 mg at night. he states his BP goes down to 84/50.  he stopped his valsartan. \par he went for his pilgrimage.  his ENT infection getting better. \par he gets sweating and diaphoresis  when he takes valsartan. \par \par \par  old note: feels good. no palpitaitons. no chest pain. no dyspnea on exertion  walks for 5-10 mins. \par complaitn with meds. \par \par \par \par old noteL: : danian has been struggling with his ear exam.  signfiicant elvam.  has been struggling with the ear infection\par now getting under control. \par no hcest pain. no dyspnea.  no aplptiatiomnops. \par low BP when he takes valsartan at night. he is not takign valsartan \par \par old note:  : feels good. no chestp ain. no headaches. no dyspnea on exertion . no LE edema. compliant with meds.\par Patient has been having left ear pain and left parotid swelling and enlargement. he had an infection. he is currently on abx. 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\par he had gone to [facility]. after the proedure patient felt that his sugar was low.   he had transient LOC and was intubated. Had ? cardiac arrest bradycardia. She ahd eleavted lactate and eleavted trops. he was intuabted for few hours and erxtrubated the same night.\par the next day lactate improved. LVEf in the hspital was 40-45%.  I recommened patient to be dishcarged.  no cardiac cath or intervention.\par Patient was sdischarged the next day\par feels good now. No headhcaes. no diziznes.s . no chest pain\par \par \par \par \par old note: : feels good. no chest pain. no headaches. no dizziness. no headaches.  toelrated cath well.\par \par old note: eesl good. got blood  test done yesteryda.  Potassium normal. On diuersis. restrited figs.  off aldctone and off valsartan. patietn was also takign lisinopril. \par \par old note: CHF. : stable. Tolerating toprol 50 . ordered lisinopril. Elevated BNP.  on lasix 40 ad n aldactone 25 mg dialy. \par \par \par old note: HPI for today: feels good. got Brachytherapy done at [place].  had angina for 3 days after. \par continues to take Nitrates.  did not make the appt for diabetes doctor yet. Hb A 1 C still elevated.\par Strongly recommended endocrinologist for aggressive sugar control\par Complaitn with meds. BP has been increasing now. \par \par OLD NOTE: 6/12/2019 c/c: chest pain \par Patient had an episode of chest pain, substernal with epigastric and abdominal pain.  2 days ago, it releived by NTG. \par he had another epsiode today, when he was going back from the [place]. 6/10, subternal.also with abdominal pain. he has been taking mylanta too for acid reflux . also , he has been recnetly started on azithromycin for bronchitis,.\par He states the second episode happened today and it got relived with NTG. he has been taking IMDUR every day. \par \par \par old note: no chest pain.no dyspnea,. no headaches. no dizziness.\par  difficulty walking foot problem. seein neurologist. NO Peripheral vascular disease . patient got US at a vascual doctor. \par \par

## 2021-10-20 NOTE — CARDIOLOGY SUMMARY
[de-identified] : 10 20 2021: Sinus  Rhythm \par -Left bundle branch block and left axis. \par  -Left atrial enlargement. \par \par ABNORMAL \par \par \par 6/15/2021 Sinus  Rhythm \par -Left bundle branch block. \par  -Left atrial enlargement. \par \par ABNORMAL \par  [de-identified] : july 2021:  LVEF 37%  Moderte MR.  Severe pulm HTN.  [de-identified] : MUGA scan : june 2021:  LVEF 39%.   [___] : [unfilled] [LVEF ___%] : LVEF [unfilled]% [Severe] : severe LV dysfunction [None] : no pulmonary hypertension [Enlarged] : enlarged LA size [Mild] : mild mitral regurgitation

## 2021-10-20 NOTE — DISCUSSION/SUMMARY
[Patient] : the patient [Risks] : risks [Benefits] : benefits [Alternatives] : alternatives [With Me] : with me [___ Month(s)] : in [unfilled] month(s) [FreeTextEntry1] : This is a 70 year old male with history HTN, DM, HLD, CAD, h/o MI,  s/p CABG, s/p PCIx4, recent unstable angina s/p PCI with CODI to SVG to LCX,  \par 1)  Angina pectoris:  stable coronary artery disease    PCI and brachy therapy recent PCI to SVG.  nov 2020 ct Dual antiplatelet therapy .aspirin. brillinta.  ct lipitor 40 mg daily .  ranexa 500 mg Q12  \par aspirin and  ct brillinta  \par 2) CAD, s/p CABG, s/p PCI: ASA, c ct Brilinta ,  \par 3) HTN: controlled \par 4) ICM: EF <30%  .  toprol 50 mg daily , . lasix 40 mg,   . farxiga. aldactone 25 mg Every other day. did not tolerate valsartan \par  5) Gastric upset: gastroparesis:  Gi  evaln  nexium \par 6) Anemia:  iron \par 7) hypothyroidsm and Diabetes Mellitus : endocrin follows up. \par 8) Eleavted lipoprotein a and \par Will order and review ECG for the above mentioned diagnosis/condition/symptoms \par

## 2021-10-20 NOTE — PHYSICAL EXAM
[Well Developed] : well developed [Well Nourished] : well nourished [No Acute Distress] : no acute distress [Normal Venous Pressure] : normal venous pressure [No Carotid Bruit] : no carotid bruit [Normal S1, S2] : normal S1, S2 [No Murmur] : no murmur [No Rub] : no rub [No Gallop] : no gallop [Clear Lung Fields] : clear lung fields [Good Air Entry] : good air entry [No Respiratory Distress] : no respiratory distress  [Soft] : abdomen soft [Non Tender] : non-tender [No Masses/organomegaly] : no masses/organomegaly [Normal Bowel Sounds] : normal bowel sounds [Normal Gait] : normal gait [No Edema] : no edema [No Cyanosis] : no cyanosis [No Clubbing] : no clubbing [No Varicosities] : no varicosities [No Rash] : no rash [No Skin Lesions] : no skin lesions [Moves all extremities] : moves all extremities [No Focal Deficits] : no focal deficits [Normal Speech] : normal speech [Alert and Oriented] : alert and oriented [Normal memory] : normal memory [General Appearance - Well Developed] : well developed [Normal Appearance] : normal appearance [Well Groomed] : well groomed [General Appearance - Well Nourished] : well nourished [No Deformities] : no deformities [General Appearance - In No Acute Distress] : no acute distress [Normal Conjunctiva] : the conjunctiva exhibited no abnormalities [Eyelids - No Xanthelasma] : the eyelids demonstrated no xanthelasmas [Normal Oral Mucosa] : normal oral mucosa [No Oral Pallor] : no oral pallor [No Oral Cyanosis] : no oral cyanosis [Normal Jugular Venous A Waves Present] : normal jugular venous A waves present [Normal Jugular Venous V Waves Present] : normal jugular venous V waves present [No Jugular Venous Castorena A Waves] : no jugular venous castorena A waves [Respiration, Rhythm And Depth] : normal respiratory rhythm and effort [Exaggerated Use Of Accessory Muscles For Inspiration] : no accessory muscle use [Auscultation Breath Sounds / Voice Sounds] : lungs were clear to auscultation bilaterally [Heart Rate And Rhythm] : heart rate and rhythm were normal [Heart Sounds] : normal S1 and S2 [Murmurs] : no murmurs present [Abdomen Soft] : soft [Abdomen Tenderness] : non-tender [Abdomen Mass (___ Cm)] : no abdominal mass palpated [Abnormal Walk] : normal gait [Nail Clubbing] : no clubbing of the fingernails [Cyanosis, Localized] : no localized cyanosis [Petechial Hemorrhages (___cm)] : no petechial hemorrhages [Skin Color & Pigmentation] : normal skin color and pigmentation [] : no rash [No Venous Stasis] : no venous stasis [Skin Lesions] : no skin lesions [No Skin Ulcers] : no skin ulcer [No Xanthoma] : no  xanthoma was observed [Oriented To Time, Place, And Person] : oriented to person, place, and time [Affect] : the affect was normal [Mood] : the mood was normal [No Anxiety] : not feeling anxious

## 2021-10-27 LAB — BACTERIA FLD CULT: ABNORMAL

## 2021-11-09 ENCOUNTER — APPOINTMENT (OUTPATIENT)
Dept: OTOLARYNGOLOGY | Facility: CLINIC | Age: 76
End: 2021-11-09
Payer: MEDICARE

## 2021-11-09 VITALS
WEIGHT: 145 LBS | HEIGHT: 68 IN | BODY MASS INDEX: 21.98 KG/M2 | DIASTOLIC BLOOD PRESSURE: 87 MMHG | SYSTOLIC BLOOD PRESSURE: 144 MMHG | HEART RATE: 77 BPM

## 2021-11-09 PROCEDURE — 99213 OFFICE O/P EST LOW 20 MIN: CPT

## 2021-11-09 PROCEDURE — 92504 EAR MICROSCOPY EXAMINATION: CPT

## 2021-11-10 NOTE — REASON FOR VISIT
[Subsequent Evaluation] : a subsequent evaluation for [Family Member] : family member [FreeTextEntry2] : Right otorrhea

## 2021-11-10 NOTE — PHYSICAL EXAM
[Nasal Endoscopy Performed] : nasal endoscopy was performed, see procedure section for findings [Binocular Microscopic Exam] : Binocular microscopic exam was performed [Hearing Loss Right Only] : diminished [Hearing Loss Left Only] : diminished [Rinne Test Air Conduction Persists > Bone Conduction Right] : air conduction greater than bone conduction on the right [Rinne Test Air Conduction Persists > Bone Conduction Left] : air conduction greater than bone conduction on the left [Hearing Bone Test (Tuning Fork On Forehead)] : no lateralization of tone [Normal] : gait was normal [de-identified] : HB 5/6 L face; good tone [Nystagmus] : ~T no ~M nystagmus was seen [Fukuda Step Test] : Fukuda Step Test was Negative [Romberg's Sign] : Romberg's sign was absent [Fistula Sign] : Fistula Sign: Negative [Past-Pointing] : Past-Pointing: Negative [Chato-Hallmalike] : [place]-Hallpike: Negative [FreeTextEntry8] : CWD cavity dry [de-identified] : remnant [de-identified] : posterior perforation, no polyps, clear mucous scant, powder instilled

## 2021-11-10 NOTE — DATA REVIEWED
[de-identified] : An audiogram was ordered and performed including pure tones, tympanometry and speech testing for the patients complaint of

## 2021-11-10 NOTE — HISTORY OF PRESENT ILLNESS
[de-identified] : 76 year old man follow up for Right otorrhea/otorrhagia\par History of chronic bilateral otitis media, Right mastoiditis\par States symptoms presented after a trip to [place] , associated symptom of Left facial palsy\par Ordered for EMG, s/p culture 10/19/21\par Continues to wear bilateral hearing aids, no issues or changes with hearing, facial swelling improved, mild numbing near Left mouth, seen by Dental\par Denies otorrhea, otalgia, tinnitus, dizziness, vertigo, headaches related to hearing, recent fevers and ear infections

## 2021-11-10 NOTE — CONSULT LETTER
[Sincerely,] : Sincerely, [Dear  ___] : Dear  [unfilled], [FreeTextEntry2] : J Carlos Blackburn MD [FreeTextEntry3] : Jose Augustin MD, Otology Neurotology & Skull Base Surgery

## 2021-11-11 ENCOUNTER — APPOINTMENT (OUTPATIENT)
Dept: ENDOCRINOLOGY | Facility: CLINIC | Age: 76
End: 2021-11-11
Payer: MEDICARE

## 2021-11-11 VITALS
DIASTOLIC BLOOD PRESSURE: 66 MMHG | BODY MASS INDEX: 22.73 KG/M2 | HEIGHT: 68 IN | SYSTOLIC BLOOD PRESSURE: 114 MMHG | WEIGHT: 150 LBS

## 2021-11-11 PROCEDURE — 99214 OFFICE O/P EST MOD 30 MIN: CPT

## 2021-11-11 RX ORDER — FLASH GLUCOSE SENSOR
KIT MISCELLANEOUS
Qty: 2 | Refills: 3 | Status: DISCONTINUED | COMMUNITY
Start: 2021-07-29 | End: 2021-11-11

## 2021-11-11 RX ORDER — FLASH GLUCOSE SCANNING READER
EACH MISCELLANEOUS
Qty: 1 | Refills: 0 | Status: DISCONTINUED | COMMUNITY
Start: 2021-07-29 | End: 2021-11-11

## 2021-11-11 NOTE — PHYSICAL EXAM
[Alert] : alert [Well Nourished] : well nourished [No Acute Distress] : no acute distress [Well Developed] : well developed [Normal Sclera/Conjunctiva] : normal sclera/conjunctiva [EOMI] : extra ocular movement intact [No Proptosis] : no proptosis [Normal Oropharynx] : the oropharynx was normal [Thyroid Not Enlarged] : the thyroid was not enlarged [No Thyroid Nodules] : no palpable thyroid nodules [No Respiratory Distress] : no respiratory distress [No Accessory Muscle Use] : no accessory muscle use [Clear to Auscultation] : lungs were clear to auscultation bilaterally [Normal S1, S2] : normal S1 and S2 [Normal Rate] : heart rate was normal [Regular Rhythm] : with a regular rhythm [No Edema] : no peripheral edema [Pedal Pulses Normal] : the pedal pulses are present [Normal Bowel Sounds] : normal bowel sounds [Not Tender] : non-tender [Not Distended] : not distended [Soft] : abdomen soft [Normal] : normal [Full ROM] : with full range of motion [No Tremors] : no tremors [Oriented x3] : oriented to person, place, and time [Acanthosis Nigricans] : no acanthosis nigricans [Diminished Throughout Both Feet] : normal tactile sensation with monofilament testing throughout both feet

## 2021-11-11 NOTE — ASSESSMENT
[Carbohydrate Consistent Diet] : carbohydrate consistent diet [Exercise/Effect on Glucose] : exercise/effect on glucose [FreeTextEntry1] : 76 year old male with long standing T2DM and associated CAD s/p 4V CABG, also with hypothyroidism,  hypertension, hyperlipidemia, and vitamin D deficiency.   \par \par 1. T2DM : bgs averages 145s'/. he has rare hypoG. \par he always adjust doses himself. \par - continue basaglar  15 u HS \par - decrease  novolog 8-8-12 u TIDw meals.  \par  bgs 4x day \par  discussed diet and exercise\par encouraged more exercise walking 30 min 3 x week\par Needs to try to have more protein for meals\par eye exam uptodate  \par continue ARB \par continue ASA \par discussed elayne \par \par 2. Subclinical hypothyroidism :  TSh slightly abnormal  .will continue to monitor for now.  \par TPo ab negative. \par \par 3. Hypertension-  bp at target on meds. Continue current management.\par I advised low fat/low cholesterol diet, low salt diet, and weight loss\par \par 4. Hyperlipidemia: continue statin \par low fat/low cholesterol diet and weight loss advised\par \par 5. Vitamin D deficiency: continue vitamin D 50,000 IU weekly. \par

## 2022-02-06 NOTE — ED ADULT NURSE NOTE - GENITOURINARY WDL
S: 8:40a Wendie w/ DEC called Intake w/ clinical on a 33y/M present in the [place] [facility] w/ altered mental status.    B:  The pt is currently at the [place] [facility] presenting w/ altered mental status. Pt is experience paranoia. Thoughts in the form of believing that he is a baby. Pt is experiencing Yazidi preoccupancy pt believes he is god. [place] [employer]- pt would not leave. The was brought to the ER by EMS.    The pt denies using any substances. Pt has a Hx of alcohol and THC.     The pt has been not IP for MH before. [facility]    The pts MH Hx is as follows: schizoaffective disorder bipolar type & SHENA.    The pt is Rx MH medications. Medications are unknown. The pt is not complaint.     The pt does have a medication management provider, [facility].    The pts does have not a therapist.    There is no concern for aggression this visit. There is no concern for HI.    Per  the pt can ambulate independently.     Per  the pt is indep with ADLs.    The pt does not have any known medical concerns.     Covid needs to be collected.  Utox needs to be collected.    A: 72 hr hold- started 2/6/2022 at 8:11a- intake requested a note that the pt has been read their rights.    R: The pt is currently in the [place] [facility] awaiting bed placement.     8:49a the pt has been added to the work-list. Intake awaiting labs for placement. Intake working on identifying appropriate bed placement.     9:47a Intake checked on labs- labs still need to be collected.     12:10p Intake checked on labs- labs still need to be collected for outside bed placement.    Bladder non-tender and non-distended. Urine clear yellow.

## 2022-02-17 ENCOUNTER — APPOINTMENT (OUTPATIENT)
Dept: OTOLARYNGOLOGY | Facility: CLINIC | Age: 77
End: 2022-02-17
Payer: MEDICARE

## 2022-02-17 VITALS
WEIGHT: 148 LBS | DIASTOLIC BLOOD PRESSURE: 68 MMHG | SYSTOLIC BLOOD PRESSURE: 110 MMHG | BODY MASS INDEX: 22.43 KG/M2 | HEIGHT: 68 IN

## 2022-02-17 DIAGNOSIS — M86.9 OSTEOMYELITIS, UNSPECIFIED: ICD-10-CM

## 2022-02-17 PROCEDURE — 99213 OFFICE O/P EST LOW 20 MIN: CPT

## 2022-02-17 PROCEDURE — 92504 EAR MICROSCOPY EXAMINATION: CPT

## 2022-02-18 PROBLEM — M86.9 OSTEOMYELITIS OF SKULL: Status: ACTIVE | Noted: 2021-02-11

## 2022-02-18 NOTE — DATA REVIEWED
[de-identified] : An audiogram was ordered and performed including pure tones, tympanometry and speech testing for the patients complaint of

## 2022-02-18 NOTE — REASON FOR VISIT
[Subsequent Evaluation] : a subsequent evaluation for [FreeTextEntry2] : Right otorrhea [Family Member] : family member

## 2022-02-18 NOTE — HISTORY OF PRESENT ILLNESS
[de-identified] : 76 year old man\par f/u R CWD cavity, stable no drainage\par L facial nerve palsy, improving\par L ear no sig otorrhea or otalgia\par

## 2022-02-18 NOTE — PHYSICAL EXAM
[Nasal Endoscopy Performed] : nasal endoscopy was performed, see procedure section for findings [de-identified] : HB 2-3/6, good tone, mobility over all branches [Binocular Microscopic Exam] : Binocular microscopic exam was performed [Hearing Loss Right Only] : diminished [Hearing Loss Left Only] : diminished [Rinne Test Air Conduction Persists > Bone Conduction Right] : air conduction greater than bone conduction on the right [Rinne Test Air Conduction Persists > Bone Conduction Left] : air conduction greater than bone conduction on the left [Hearing Bone Test (Tuning Fork On Forehead)] : no lateralization of tone [Normal] : gait was normal [Nystagmus] : ~T no ~M nystagmus was seen [Fukuda Step Test] : Fukuda Step Test was Negative [Romberg's Sign] : Romberg's sign was absent [Fistula Sign] : Fistula Sign: Negative [Past-Pointing] : Past-Pointing: Negative [Chato-Hallmalike] : [place]-Hallpike: Negative [FreeTextEntry8] : CWD cavity dry [de-identified] : remnant [de-identified] : posterior perforation, no polyps, clear mucous scant, powder instilled

## 2022-02-18 NOTE — CONSULT LETTER
[Dear  ___] : Dear  [unfilled], [Sincerely,] : Sincerely, [FreeTextEntry2] : J Carlos Blackburn MD [FreeTextEntry3] : Jose Augustin MD, Otology Neurotology & Skull Base Surgery

## 2022-03-07 ENCOUNTER — APPOINTMENT (OUTPATIENT)
Dept: ENDOCRINOLOGY | Facility: CLINIC | Age: 77
End: 2022-03-07
Payer: MEDICARE

## 2022-03-07 VITALS
SYSTOLIC BLOOD PRESSURE: 122 MMHG | DIASTOLIC BLOOD PRESSURE: 78 MMHG | BODY MASS INDEX: 22.43 KG/M2 | HEIGHT: 68 IN | WEIGHT: 148 LBS

## 2022-03-07 PROCEDURE — 99214 OFFICE O/P EST MOD 30 MIN: CPT

## 2022-03-07 NOTE — ASSESSMENT
[Exercise/Effect on Glucose] : exercise/effect on glucose [Self Monitoring of Blood Glucose] : self monitoring of blood glucose [FreeTextEntry1] : 76 year old male with long standing T2DM and associated CAD s/p 4V CABG, also with hypothyroidism,  hypertension, hyperlipidemia, and vitamin D deficiency.   \par \par 1. T2DM : bgs  later i nteh day 140. A1c 6.9 now\par all lab results reviewed and discussed with patient. All questions answered\par continue basaglar  15 u HS \par cont   novolog  8-8-12 u TIDw meals.  \par  bgs 4x day \par encouraged more exercise walking 30 min 3 x week\par eye exam uptodate  \par continue ARB and ASA \par foot exam normal today: normal monofilament and vibratory B/L\par \par 2. Subclinical hypothyroidism :  TSh slightly abnormal  TPo negative\par No need to treat \par \par 3. Hypertension-  bp at target on meds. Continue current management.\par I advised low fat/low cholesterol diet, low salt diet, and weight loss\par \par 4. Hyperlipidemia: continue statin \par low fat/low cholesterol diet and weight loss advised\par \par 5. Vitamin D deficiency: continue vitamin D 50,000 IU weekly. \par

## 2022-03-29 ENCOUNTER — APPOINTMENT (OUTPATIENT)
Dept: CARDIOLOGY | Facility: CLINIC | Age: 77
End: 2022-03-29
Payer: MEDICARE

## 2022-03-29 ENCOUNTER — NON-APPOINTMENT (OUTPATIENT)
Age: 77
End: 2022-03-29

## 2022-03-29 VITALS
HEIGHT: 68 IN | BODY MASS INDEX: 22.73 KG/M2 | WEIGHT: 150 LBS | TEMPERATURE: 97.9 F | OXYGEN SATURATION: 97 % | DIASTOLIC BLOOD PRESSURE: 82 MMHG | SYSTOLIC BLOOD PRESSURE: 130 MMHG | HEART RATE: 68 BPM

## 2022-03-29 DIAGNOSIS — I20.9 ANGINA PECTORIS, UNSPECIFIED: ICD-10-CM

## 2022-03-29 PROCEDURE — 93000 ELECTROCARDIOGRAM COMPLETE: CPT

## 2022-03-29 PROCEDURE — 99215 OFFICE O/P EST HI 40 MIN: CPT

## 2022-03-29 RX ORDER — INSULIN LISPRO 100 [IU]/ML
INJECTION, SOLUTION INTRAVENOUS; SUBCUTANEOUS
Refills: 0 | Status: DISCONTINUED | COMMUNITY
End: 2022-03-29

## 2022-04-01 NOTE — CARDIOLOGY SUMMARY
[___] : [unfilled] [LVEF ___%] : LVEF [unfilled]% [Severe] : severe LV dysfunction [None] : no pulmonary hypertension [Enlarged] : enlarged LA size [Mild] : mild mitral regurgitation [de-identified] : 3 29 2022  Sinus  Rhythm \par -Left bundle branch block. \par \par ABNORMAL \par \par \par 10 20 2021: Sinus  Rhythm \par -Left bundle branch block and left axis. \par  -Left atrial enlargement. \par \par ABNORMAL \par \par \par 6/15/2021 Sinus  Rhythm \par -Left bundle branch block. \par  -Left atrial enlargement. \par \par ABNORMAL \par  [de-identified] : july 2021:  LVEF 37%  Moderte MR.  Severe pulm HTN.  [de-identified] : MUGA scan : june 2021:  LVEF 39%.

## 2022-04-01 NOTE — REASON FOR VISIT
[Follow-Up - Clinic] : a clinic follow-up of [FreeTextEntry1] : f/u  For: HTN, HLD, CAD s/p CABg x4, Angina, recent PCI [FreeTextEntry2] : f/u  For: HTN, HLD, CAD s/p CABg x4, Angina, recent PCI

## 2022-04-01 NOTE — DISCUSSION/SUMMARY
[Patient] : the patient [Risks] : risks [Benefits] : benefits [Alternatives] : alternatives [With Me] : with me [___ Month(s)] : in [unfilled] month(s) [FreeTextEntry1] : This is a 70 year old male with history HTN, DM, HLD, CAD, h/o MI,  s/p CABG, s/p PCIx4, recent unstable angina s/p PCI with CODI to SVG to LCX,  \par 1)  Angina pectoris:  stable coronary artery disease    PCI and brachy therapy recent PCI to SVG.  nov 2020 ct Dual antiplatelet therapy .aspirin. brillinta.  ct lipitor 40 mg daily .  ranexa 500 mg Q12  \par aspirin and  ct brillinta  \par 2) CAD, s/p CABG, s/p PCI: ASA, c ct Brilinta ,  \par 3) HTN: controlled \par 4) ICM: EF <30%  .  toprol 50 mg daily . lasix 40 mg,   . farxiga. aldactone 25 mg Every other day. did not tolerate valsartan  repeat echo and MUGA scan to evalute for ICD. \par  5) Gastric upset: gastroparesis:  Gi  evaln  nexium \par 6) Anemia:  iron \par 7) hypothyroidsm and Diabetes Mellitus : endocrine follows up. \par 8) Eleavted lipoprotein a  : on statins.  \par Will order and review ECG for the above mentioned diagnosis/condition/symptoms az\par

## 2022-04-01 NOTE — HISTORY OF PRESENT ILLNESS
[FreeTextEntry1] : f/u  For: HTN, HLD, CAD s/p CABg x4, Angina, recent PCI\par \par \par HPI for today: :   feels good. no chest pain. no dyspnea.   no palpitaitons.  no leg edema.  no orthostatic.   offers prayers. comlpaitn wioth meds,.\par no headaches. \par az\par \par \par \par \par old note:  he states he was takign valsartan 20 mg at night. he states his BP goes down to 84/50.  he stopped his valsartan. \par he went for his pilgrimage.  his ENT infection getting better. \par he gets sweating and diaphoresis  when he takes valsartan. \par \par \par  old note: feels good. no palpitaitons. no chest pain. no dyspnea on exertion  walks for 5-10 mins. \par complaitn with meds. \par \par \par \par old noteL: : danian has been struggling with his ear exam.  signfiicant bre.  has been struggling with the ear infection\par now getting under control. \par no hcest pain. no dyspnea.  no aplptiatiomnops. \par low BP when he takes valsartan at night. he is not takign valsartan \par \par old note:  : feels good. no chestp ain. no headaches. no dyspnea on exertion . no LE edema. compliant with meds.\par Patient has been having left ear pain and left parotid swelling and enlargement. he had an infection. he is currently on abx. 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\par he had gone to [facility]. after the proedure patient felt that his sugar was low.   he had transient LOC and was intubated. Had ? cardiac arrest bradycardia. She ahd eleavted lactate and eleavted trops. he was intuabted for few hours and erxtrubated the same night.\par the next day lactate improved. LVEf in the hspital was 40-45%.  I recommened patient to be dishcarged.  no cardiac cath or intervention.\par Patient was sdischarged the next day\par feels good now. No headhcaes. no diziznes.s . no chest pain\par \par \par \par \par old note: : feels good. no chest pain. no headaches. no dizziness. no headaches.  toelrated cath well.\par \par old note: eesl good. got blood  test done yesteryda.  Potassium normal. On diuersis. restrited figs.  off aldctone and off valsartan. patietn was also takign lisinopril. \par \par old note: CHF. : stable. Tolerating toprol 50 . ordered lisinopril. Elevated BNP.  on lasix 40 ad n aldactone 25 mg dialy. \par \par \par old note: HPI for today: feels good. got Brachytherapy done at [place].  had angina for 3 days after. \par continues to take Nitrates.  did not make the appt for diabetes doctor yet. Hb A 1 C still elevated.\par Strongly recommended endocrinologist for aggressive sugar control\par Complaitn with meds. BP has been increasing now. \par \par OLD NOTE: 6/12/2019 c/c: chest pain \par Patient had an episode of chest pain, substernal with epigastric and abdominal pain.  2 days ago, it releived by NTG. \par he had another epsiode today, when he was going back from the [place]. 6/10, subternal.also with abdominal pain. he has been taking mylanta too for acid reflux . also , he has been recnetly started on azithromycin for bronchitis,.\par He states the second episode happened today and it got relived with NTG. he has been taking IMDUR every day. \par \par \par old note: no chest pain.no dyspnea,. no headaches. no dizziness.\par  difficulty walking foot problem. seein neurologist. NO Peripheral vascular disease . patient got US at a vascualr doctor. \par \par

## 2022-04-21 ENCOUNTER — RX RENEWAL (OUTPATIENT)
Age: 77
End: 2022-04-21

## 2022-05-10 ENCOUNTER — APPOINTMENT (OUTPATIENT)
Dept: CARDIOLOGY | Facility: CLINIC | Age: 77
End: 2022-05-10
Payer: MEDICARE

## 2022-05-10 ENCOUNTER — MED ADMIN CHARGE (OUTPATIENT)
Age: 77
End: 2022-05-10

## 2022-05-10 PROCEDURE — 93356 MYOCRD STRAIN IMG SPCKL TRCK: CPT

## 2022-05-10 PROCEDURE — 76376 3D RENDER W/INTRP POSTPROCES: CPT

## 2022-05-10 PROCEDURE — 93306 TTE W/DOPPLER COMPLETE: CPT

## 2022-05-10 RX ORDER — PERFLUTREN 6.52 MG/ML
6.52 INJECTION, SUSPENSION INTRAVENOUS
Qty: 2 | Refills: 0 | Status: COMPLETED | OUTPATIENT
Start: 2022-05-10

## 2022-05-10 RX ADMIN — PERFLUTREN MG/ML: 6.52 INJECTION, SUSPENSION INTRAVENOUS at 00:00

## 2022-05-17 ENCOUNTER — APPOINTMENT (OUTPATIENT)
Dept: CARDIOLOGY | Facility: CLINIC | Age: 77
End: 2022-05-17
Payer: MEDICARE

## 2022-05-17 PROCEDURE — 78472 GATED HEART PLANAR SINGLE: CPT

## 2022-05-17 PROCEDURE — A9560: CPT

## 2022-06-15 ENCOUNTER — APPOINTMENT (OUTPATIENT)
Dept: OTOLARYNGOLOGY | Facility: CLINIC | Age: 77
End: 2022-06-15
Payer: MEDICARE

## 2022-06-15 VITALS
WEIGHT: 150 LBS | DIASTOLIC BLOOD PRESSURE: 75 MMHG | BODY MASS INDEX: 22.73 KG/M2 | HEIGHT: 68 IN | SYSTOLIC BLOOD PRESSURE: 134 MMHG | HEART RATE: 65 BPM

## 2022-06-15 PROCEDURE — 99214 OFFICE O/P EST MOD 30 MIN: CPT

## 2022-06-15 PROCEDURE — 92504 EAR MICROSCOPY EXAMINATION: CPT

## 2022-06-16 ENCOUNTER — APPOINTMENT (OUTPATIENT)
Dept: ENDOCRINOLOGY | Facility: CLINIC | Age: 77
End: 2022-06-16
Payer: MEDICARE

## 2022-06-16 VITALS — OXYGEN SATURATION: 98 % | HEART RATE: 72 BPM | SYSTOLIC BLOOD PRESSURE: 108 MMHG | DIASTOLIC BLOOD PRESSURE: 62 MMHG

## 2022-06-16 VITALS — WEIGHT: 153 LBS | HEIGHT: 68 IN | BODY MASS INDEX: 23.19 KG/M2

## 2022-06-16 PROCEDURE — 99214 OFFICE O/P EST MOD 30 MIN: CPT

## 2022-06-16 NOTE — DATA REVIEWED
[de-identified] : An audiogram was ordered and performed including pure tones, tympanometry and speech testing for the patients complaint of hearing loss\par I have independently reviewed the patient's audiogram from today and my findings include SRT worse by 5-10dB and SDS is at 80s\par

## 2022-06-16 NOTE — HISTORY OF PRESENT ILLNESS
[de-identified] : 76 year old man follow up for Right otorrhea\par History of chronic bilateral otitis media, Right mastoiditis\par States symptoms presented after a trip to [place], associated symptom of Left facial palsy\par L facial nerve palsy, improving\par Reports bilateral decreased hearing. \par Reports he is currently wearing bilateral hearing aids for about one year. \par Denies otalgia, otorrhea, recent fevers or infections, tinnitus, dizziness, vertigo, headaches related to hearing. \par

## 2022-06-16 NOTE — ASSESSMENT
[Importance of Diet and Exercise] : importance of diet and exercise to improve glycemic control, achieve weight loss and improve cardiovascular health [Exercise/Effect on Glucose] : exercise/effect on glucose [Self Monitoring of Blood Glucose] : self monitoring of blood glucose [Retinopathy Screening] : Patient was referred to ophthalmology for retinopathy screening [FreeTextEntry1] : 76 year old male with long standing T2DM and associated CAD s/p 4V CABG, also with hypothyroidism,  hypertension, hyperlipidemia, and vitamin D deficiency.   \par \par 1. T2DM :bgs 100-130 -140 \par a1c 6..9 \par continue basaglar  15 u HS \par cont   novolog  8-8-12 u TIDw meals.  \par  bgs 4x day \par encouraged more exercise walking 30 min 3 x week\par eye exam uptodate  \par continue ARB and ASA \par \par 2. Subclinical hypothyroidism :  TSh slightly abnormal  TPo negative. Monitor. \par \par 3. Hypertension-  bp at target on meds. Continue current management.\par I advised low fat/low cholesterol diet, low salt diet, and weight loss\par \par 4. Hyperlipidemia: continue statin . Lipids very good. low fat/low cholesterol diet and weight loss advised\par \par 5. Vitamin D deficiency: continue vitamin D 50,000 IU weekly and MVI \par \par \par all lab results reviewed and discussed with patient with extensive discussion. All questions answered\par Laboratory tests ordered today-see list below\par Diagnosis and prognosis discussed\par Continue other current medications \par \par \par

## 2022-06-16 NOTE — PHYSICAL EXAM
[Nasal Endoscopy Performed] : nasal endoscopy was performed, see procedure section for findings [Binocular Microscopic Exam] : Binocular microscopic exam was performed [Hearing Loss Right Only] : diminished [Hearing Loss Left Only] : diminished [Rinne Test Air Conduction Persists > Bone Conduction Right] : air conduction greater than bone conduction on the right [Rinne Test Air Conduction Persists > Bone Conduction Left] : air conduction greater than bone conduction on the left [Hearing Bone Test (Tuning Fork On Forehead)] : no lateralization of tone [Normal] : gait was normal [de-identified] : HB 2-3/6, good tone, mobility over all branches [Nystagmus] : ~T no ~M nystagmus was seen [Fukuda Step Test] : Fukuda Step Test was Negative [Romberg's Sign] : Romberg's sign was absent [Fistula Sign] : Fistula Sign: Negative [Past-Pointing] : Past-Pointing: Negative [Chato-Hallmalike] : [place]-Hallpike: Negative [FreeTextEntry8] : CWD cavity dry [de-identified] : remnant [de-identified] : posterior perforation, no polyps, clear mucous scant, powder instilled

## 2022-06-22 NOTE — PATIENT PROFILE ADULT - CENTRAL VENOUS CATHETER/PICC LINE
No. That is not a medical necessity. She should definitely address the anxiety, and we could refer her to behavioral health, but a pool is not a necessity for anyone. It's nice, but not medically necessary   no

## 2022-07-19 ENCOUNTER — APPOINTMENT (OUTPATIENT)
Dept: CARDIOLOGY | Facility: CLINIC | Age: 77
End: 2022-07-19

## 2022-07-19 VITALS
OXYGEN SATURATION: 98 % | WEIGHT: 156 LBS | HEART RATE: 63 BPM | DIASTOLIC BLOOD PRESSURE: 65 MMHG | HEIGHT: 68 IN | TEMPERATURE: 98.4 F | SYSTOLIC BLOOD PRESSURE: 121 MMHG | BODY MASS INDEX: 23.64 KG/M2

## 2022-07-19 PROCEDURE — 93000 ELECTROCARDIOGRAM COMPLETE: CPT | Mod: 59

## 2022-07-19 PROCEDURE — 99215 OFFICE O/P EST HI 40 MIN: CPT

## 2022-07-19 PROCEDURE — 93270 REMOTE 30 DAY ECG REV/REPORT: CPT

## 2022-07-19 RX ORDER — CLOTRIMAZOLE AND BETAMETHASONE DIPROPIONATE 10; .5 MG/G; MG/G
1-0.05 CREAM TOPICAL
Qty: 45 | Refills: 0 | Status: DISCONTINUED | COMMUNITY
Start: 2021-11-04 | End: 2022-07-19

## 2022-07-19 RX ORDER — AZITHROMYCIN 250 MG/1
250 TABLET, FILM COATED ORAL
Qty: 6 | Refills: 0 | Status: DISCONTINUED | COMMUNITY
Start: 2022-02-11 | End: 2022-07-19

## 2022-07-19 RX ORDER — FLUTICASONE FUROATE AND VILANTEROL TRIFENATATE 100; 25 UG/1; UG/1
100-25 POWDER RESPIRATORY (INHALATION)
Qty: 60 | Refills: 0 | Status: DISCONTINUED | COMMUNITY
Start: 2022-02-19 | End: 2022-07-19

## 2022-07-19 RX ORDER — HYDROCORTISONE 25 MG/G
2.5 CREAM TOPICAL
Qty: 28 | Refills: 0 | Status: DISCONTINUED | COMMUNITY
Start: 2022-02-21 | End: 2022-07-19

## 2022-07-19 RX ORDER — INSULIN LISPRO 100 [IU]/ML
100 INJECTION, SOLUTION INTRAVENOUS; SUBCUTANEOUS
Qty: 12 | Refills: 0 | Status: DISCONTINUED | COMMUNITY
Start: 2020-11-05 | End: 2022-07-19

## 2022-07-25 NOTE — HISTORY OF PRESENT ILLNESS
[FreeTextEntry1] : f/u  For: HTN, HLD, CAD s/p CABg x4, Angina, recent PCI\par \par \par HPI for today: :   complaitn with mneds. d.dm undercontrol. on farxiga. no chst pain. no dizziness. [no syncope\par \par ol;d note:  feels good. no chest pain. no dyspnea.   no palpitaitons.  no leg edema.  no orthostatic.   offers prayers. comlpaitn wioth meds,.\par no headaches. \par az\par \par \par \par \par old note:  he states he was takign valsartan 20 mg at night. he states his BP goes down to 84/50.  he stopped his valsartan. \par he went for his pilgrimage.  his ENT infection getting better. \par he gets sweating and diaphoresis  when he takes valsartan. \par \par \par  old note: feels good. no palpitaitons. no chest pain. no dyspnea on exertion  walks for 5-10 mins. \par complaitn with meds. \par \par \par \par old noteL: : danian has been struggling with his ear exam.  signfiicant elvam.  has been struggling with the ear infection\par now getting under control. \par no hcest pain. no dyspnea.  no aplptiatiomnops. \par low BP when he takes valsartan at night. he is not takign valsartan \par \par old note:  : feels good. no chestp ain. no headaches. no dyspnea on exertion . no LE edema. compliant with meds.\par Patient has been having left ear pain and left parotid swelling and enlargement. he had an infection. he is currently on abx. 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\par he had gone to [facility]. after the proedure patient felt that his sugar was low.   he had transient LOC and was intubated. Had ? cardiac arrest bradycardia. She ahd eleavted lactate and eleavted trops. he was intuabted for few hours and erxtrubated the same night.\par the next day lactate improved. LVEf in the hspital was 40-45%.  I recommened patient to be dishcarged.  no cardiac cath or intervention.\par Patient was sdischarged the next day\par feels good now. No headhcaes. no diziznes.s . no chest pain\par \par \par \par \par old note: : feels good. no chest pain. no headaches. no dizziness. no headaches.  toelrated cath well.\par \par old note: eesl good. got blood  test done yesteryda.  Potassium normal. On diuersis. restrited figs.  off aldctone and off valsartan. patietn was also takign lisinopril. \par \par old note: CHF. : stable. Tolerating toprol 50 . ordered lisinopril. Elevated BNP.  on lasix 40 ad n aldactone 25 mg dialy. \par \par \par old note: HPI for today: feels good. got Brachytherapy done at [place].  had angina for 3 days after. \par continues to take Nitrates.  did not make the appt for diabetes doctor yet. Hb A 1 C still elevated.\par Strongly recommended endocrinologist for aggressive sugar control\par Complaitn with meds. BP has been increasing now. \par \par OLD NOTE: 6/12/2019 c/c: chest pain \par Patient had an episode of chest pain, substernal with epigastric and abdominal pain.  2 days ago, it releived by NTG. \par he had another epsiode today, when he was going back from the [employer]. 6/10, subternal.also with abdominal pain. he has been taking mylanta too for acid reflux . also , he has been recnetly started on azithromycin for bronchitis,.\par He states the second episode happened today and it got relived with NTG. he has been taking IMDUR every day. \par \par \par old note: no chest pain.no dyspnea,. no headaches. no dizziness.\par  difficulty walking foot problem. seein neurologist. NO Peripheral vascular disease . patient got US at a [facility]. \par \par

## 2022-07-25 NOTE — CARDIOLOGY SUMMARY
[___] : [unfilled] [LVEF ___%] : LVEF [unfilled]% [Severe] : severe LV dysfunction [None] : no pulmonary hypertension [Enlarged] : enlarged LA size [Mild] : mild mitral regurgitation [___] : [unfilled] [de-identified] : \par 3 29 2022  Sinus  Rhythm \par -Left bundle branch block. \par \par ABNORMAL \par \par \par 10 20 2021: Sinus  Rhythm \par -Left bundle branch block and left axis. \par  -Left atrial enlargement. \par \par ABNORMAL \par \par \par 6/15/2021 Sinus  Rhythm \par -Left bundle branch block. \par  -Left atrial enlargement. \par \par ABNORMAL \par  [de-identified] : july 2021:  LVEF 37%  Moderte MR.  Severe pulm HTN.  [de-identified] : MUGA scan : june 2021:  LVEF 39%.

## 2022-07-25 NOTE — DISCUSSION/SUMMARY
[Patient] : the patient [Risks] : risks [Benefits] : benefits [Alternatives] : alternatives [With Me] : with me [___ Month(s)] : in [unfilled] month(s) [EKG obtained to assist in diagnosis and management of assessed problem(s)] : EKG obtained to assist in diagnosis and management of assessed problem(s) [FreeTextEntry1] : This is a 77 year old male with history HTN, DM, HLD, CAD, h/o MI,  s/p CABG, s/p PCIx4, recent unstable angina s/p PCI with CODI to SVG to LCX,  \par 1)  Angina pectoris:  stable coronary artery disease  PCI and brachy therapy recent PCI to SVG.  nov 2020 ct Dual antiplatelet therapy .aspirin. brillinta.  ct lipitor 40 mg daily .  ranexa 500 mg Q12  \par aspirin and  ct brillinta    Lipid LDL not at goal. 104.  does not want to increae meds or add any more meds. \par 2) CAD, s/p CABG, s/p PCI: ASA, c ct Brilinta ,  \par 3) HTN: controlled \par 4) ICM: EF    .  toprol 50 mg daily . lasix 40 mg,   . farxiga. aldactone 25 mg Every other day. did not tolerate valsartan  repeat Muga 38% ; LVEF 36% 4 weeks EVEnT nt monitor to evaluate for any arrhyhtmic episodes to further risk stratify for ICD.\par  5) Gastric upset: gastroparesis:  Gi  evaln  nexium \par 6) Anemia:  iron \par 7) hypothyroidism:  and Diabetes Mellitus : endocrine follows up. \par 8) Elevated lipoprotein a  : on statins.    wants to defer injectable PCSK 9 inhibitor. repeat lipoprotein. \par Will order and review ECG for the above mentioned diagnosis/condition/symptoms a a\par

## 2022-09-07 NOTE — PROGRESS NOTE ADULT - ASSESSMENT
Patient Notified order set up for patient and a1c added. To late to add. Informed her to come back to lab    74 male with hx of CHFrEF, CAD s/p CABG, HTN, HLD, DM, PAF, recent hospitalization for decompensated heart failure requiring inotropes, presented to the hospital with weakness and vomiting, found to have lactic acidosis, RAS, transaminitis, mild hyperglycemia/dka.  Presentation likely due to a combination of metformin toxicity and decompensated CHF/ cardiogenic shock.  Symptoms improved after insulin infusion, discontinuation of metformin, and initiation of milrinone infusion.   LFTs still significantly elevated.    Plan:  Cardiogenic shock:  - will start weaning milrinone    CAD  recent cath showing multivessel disease  - continue ASA, brilinta, metoprolol  - holding statin due to transaminitis       ?PAF  - not on AC yet, possible plan for loop recorder    DKA  - resolved, now on lantus and lispro    Transaminitis  - RUQ US with doppler to rule out portal vein thrombosis  - Hepatitis serology 74 male with hx of CHFrEF, CAD s/p CABG, HTN, HLD, DM, PAF, recent hospitalization for decompensated heart failure requiring inotropes, presented to the hospital with weakness and vomiting, found to have lactic acidosis, RAS, transaminitis, mild hyperglycemia/dka.  Presentation likely due to a combination of metformin toxicity and decompensated CHF/ cardiogenic shock.  Symptoms improved after insulin infusion, discontinuation of metformin, and initiation of milrinone infusion.   LFTs still significantly elevated.    Plan:  RAS  -resolving     Cardiogenic shock:  - will start weaning milrinone    CAD  recent cath showing multivessel disease  - continue ASA, brilinta, metoprolol  - holding statin due to transaminitis       ?PAF  - not on AC yet, possible plan for loop recorder    DKA  - resolved, now on lantus and lispro    Transaminitis  - RUQ US with doppler to rule out portal vein thrombosis  - Hepatitis serology 74 male with hx of CHFrEF, CAD s/p CABG, HTN, HLD, DM, PAF, recent hospitalization for decompensated heart failure requiring inotropes, presented to the hospital with weakness and vomiting, found to have lactic acidosis, RAS, transaminitis, mild hyperglycemia/dka.  Presentation likely due to a combination of metformin toxicity and decompensated CHF/ cardiogenic shock.  Symptoms improved after insulin infusion, discontinuation of metformin, and initiation of milrinone infusion.   LFTs still significantly elevated.    Plan:  RAS  -resolving     Cardiogenic shock:  - will start weaning milrinone    CAD  recent cath showing multivessel disease  - continue ASA, brilinta, metoprolol  - holding statin due to transaminitis       ?PAF  - not on AC yet, possible plan for loop recorder    DKA  - resolved, now on lantus and lispro    Transaminitis  - RUQ US with doppler to rule out portal vein thrombosis  - Hepatitis serology  - N- Acetylcysteine protocol

## 2022-09-21 ENCOUNTER — TRANSCRIPTION ENCOUNTER (OUTPATIENT)
Age: 77
End: 2022-09-21

## 2022-10-15 ENCOUNTER — LABORATORY RESULT (OUTPATIENT)
Age: 77
End: 2022-10-15

## 2022-10-17 ENCOUNTER — NON-APPOINTMENT (OUTPATIENT)
Age: 77
End: 2022-10-17

## 2022-10-20 ENCOUNTER — APPOINTMENT (OUTPATIENT)
Dept: ENDOCRINOLOGY | Facility: CLINIC | Age: 77
End: 2022-10-20

## 2022-10-20 VITALS
HEIGHT: 68 IN | DIASTOLIC BLOOD PRESSURE: 82 MMHG | BODY MASS INDEX: 24.1 KG/M2 | SYSTOLIC BLOOD PRESSURE: 130 MMHG | WEIGHT: 159 LBS | HEART RATE: 92 BPM | OXYGEN SATURATION: 99 %

## 2022-10-20 PROCEDURE — 99214 OFFICE O/P EST MOD 30 MIN: CPT

## 2022-10-20 RX ORDER — SULFAMETHOXAZOLE AND TRIMETHOPRIM 800; 160 MG/1; MG/1
800-160 TABLET ORAL
Qty: 28 | Refills: 0 | Status: DISCONTINUED | COMMUNITY
Start: 2022-01-20 | End: 2022-10-20

## 2022-10-20 NOTE — ASSESSMENT
[Importance of Diet and Exercise] : importance of diet and exercise to improve glycemic control, achieve weight loss and improve cardiovascular health [Exercise/Effect on Glucose] : exercise/effect on glucose [FreeTextEntry1] : 76 year old male with long standing T2DM and associated CAD s/p 4V CABG, also with hypothyroidism,  hypertension, hyperlipidemia, and vitamin D deficiency.   \par \par 1. T2DM  : bgs well control with a1c 6.6. \par continue basaglar  15 u HS \par cont   novolog  8-8-12 u TIDw meals.  \par cont farxiga \par  bgs 4x day \par encouraged more exercise walking 30 min 3 x week\par diabetic nephropathy CKD stage 3. continue ARB and ASA \par \par 2. Subclinical hypothyroidism :  tfts normal\par \par 3. Hypertension-  bp at target on meds. Continue current management.\par I advised low fat/low cholesterol diet, low salt diet\par \par 4. Hyperlipidemia: continue statin . Lipids very good. low fat/low cholesterol diet \par \par 5. Vitamin D deficiency: continue vitamin D 50,000 IU weekly and MVI \par \par all lab results reviewed and discussed with patient with extensive discussion. All questions answered\par Laboratory tests ordered today-see list below\par Diagnosis and prognosis discussed\par Continue other current medications \par \par \par

## 2022-11-14 ENCOUNTER — APPOINTMENT (OUTPATIENT)
Dept: CARDIOLOGY | Facility: CLINIC | Age: 77
End: 2022-11-14

## 2022-11-15 ENCOUNTER — APPOINTMENT (OUTPATIENT)
Dept: OTOLARYNGOLOGY | Facility: CLINIC | Age: 77
End: 2022-11-15
Payer: MEDICARE

## 2022-11-15 ENCOUNTER — APPOINTMENT (OUTPATIENT)
Dept: CARDIOLOGY | Facility: CLINIC | Age: 77
End: 2022-11-15

## 2022-11-15 VITALS
HEART RATE: 68 BPM | HEIGHT: 68 IN | DIASTOLIC BLOOD PRESSURE: 65 MMHG | BODY MASS INDEX: 23.49 KG/M2 | SYSTOLIC BLOOD PRESSURE: 137 MMHG | WEIGHT: 155 LBS

## 2022-11-15 VITALS
DIASTOLIC BLOOD PRESSURE: 80 MMHG | TEMPERATURE: 98 F | HEART RATE: 63 BPM | SYSTOLIC BLOOD PRESSURE: 160 MMHG | OXYGEN SATURATION: 97 % | WEIGHT: 160 LBS | BODY MASS INDEX: 24.25 KG/M2 | HEIGHT: 68 IN

## 2022-11-15 DIAGNOSIS — H70.001 ACUTE MASTOIDITIS W/OUT COMPLICATIONS, RIGHT EAR: ICD-10-CM

## 2022-11-15 DIAGNOSIS — G51.0 BELL'S PALSY: ICD-10-CM

## 2022-11-15 PROCEDURE — 99215 OFFICE O/P EST HI 40 MIN: CPT

## 2022-11-15 PROCEDURE — 92504 EAR MICROSCOPY EXAMINATION: CPT

## 2022-11-15 PROCEDURE — 69220 CLEAN OUT MASTOID CAVITY: CPT | Mod: RT

## 2022-11-15 PROCEDURE — 99213 OFFICE O/P EST LOW 20 MIN: CPT | Mod: 25

## 2022-11-15 RX ORDER — BLOOD SUGAR DIAGNOSTIC
STRIP MISCELLANEOUS
Qty: 100 | Refills: 0 | Status: ACTIVE | COMMUNITY
Start: 2022-10-22

## 2022-11-15 RX ORDER — PEN NEEDLE, DIABETIC 31 GX3/16"
31G X 5 MM NEEDLE, DISPOSABLE MISCELLANEOUS
Qty: 100 | Refills: 0 | Status: ACTIVE | COMMUNITY
Start: 2022-10-22

## 2022-11-15 RX ORDER — LANCETS 30 GAUGE
EACH MISCELLANEOUS
Qty: 100 | Refills: 0 | Status: ACTIVE | COMMUNITY
Start: 2022-10-22

## 2022-11-15 RX ORDER — INSULIN ASPART 100 [IU]/ML
100 INJECTION, SOLUTION INTRAVENOUS; SUBCUTANEOUS
Qty: 15 | Refills: 0 | Status: ACTIVE | COMMUNITY
Start: 2022-02-19

## 2022-11-15 RX ORDER — ISOPROPYL ALCOHOL 70 ML/100ML
70 SWAB TOPICAL
Qty: 100 | Refills: 0 | Status: ACTIVE | COMMUNITY
Start: 2022-10-22

## 2022-11-15 NOTE — CARDIOLOGY SUMMARY
[___] : [unfilled] [LVEF ___%] : LVEF [unfilled]% [Severe] : severe LV dysfunction [None] : no pulmonary hypertension [Enlarged] : enlarged LA size [Mild] : mild mitral regurgitation [___] : [unfilled] [de-identified] : \par 3 29 2022  Sinus  Rhythm \par -Left bundle branch block. \par \par ABNORMAL \par \par \par 10 20 2021: Sinus  Rhythm \par -Left bundle branch block and left axis. \par  -Left atrial enlargement. \par \par ABNORMAL \par \par \par 6/15/2021 Sinus  Rhythm \par -Left bundle branch block. \par  -Left atrial enlargement. \par \par ABNORMAL \par  [de-identified] : aug 2022  PVCs: no signifciant arrhythmias  [de-identified] : may 2022:  MUGA : 38%  [de-identified] : may 2022:   LVEF 36%.  Normal Rv. ,mild MR.  \par july 2021:  LVEF 37%  Moderte MR.  Severe pulm HTN.  [de-identified] : MUGA scan : june 2021:  LVEF 39%.

## 2022-11-15 NOTE — HISTORY OF PRESENT ILLNESS
[de-identified] : 77 year old man presents for follow-up for Right otorrhea.\par History of chronic bilateral otitis media, Right mastoiditis\par States symptoms presented after a trip to [place], associated symptom of Left facial palsy\par L facial nerve palsy, improving\par Reports hearing has improved since last clinic visit and does not need to wear hearing aids anymore.\par Denies otalgia, otorrhea, recent fevers or infections, tinnitus, dizziness, vertigo, headaches related to hearing. \par Last audio 06/15/2022

## 2022-11-15 NOTE — CONSULT LETTER
[Dear  ___] : Dear  [unfilled], [Sincerely,] : Sincerely, [Consult Letter:] : I had the pleasure of evaluating your patient, [unfilled]. [Please see my note below.] : Please see my note below. [Consult Closing:] : Thank you very much for allowing me to participate in the care of this patient.  If you have any questions, please do not hesitate to contact me. [FreeTextEntry2] : J Carlos Blackburn MD [FreeTextEntry3] : Jose Augustin MD, Otology Neurotology & Skull Base Surgery

## 2022-11-15 NOTE — PROCEDURE
[Same] : same as the Pre Op Dx. [] : Debridement of Mastoid [FreeTextEntry1] : lo COM [FreeTextEntry4] : none [FreeTextEntry6] : Operative microscope was used to examine the ear canal, ear drum and visible middle ear landmarks. Adequate exam would not have been possible without the use of a microscope. Findings are described.\par Cerumen was removed under binocular microscopy from CWD cavity with a combination of a suction, june needle, alligator and/or a loop curette. The patient tolerated the procedure well and there were no complications. The included findings were noted.\par \par \par

## 2022-11-15 NOTE — HISTORY OF PRESENT ILLNESS
[FreeTextEntry1] : f/u  For: HTN, HLD, CAD s/p CABg x4, Angina, recent PCI\par \par \par HPI for today: : feels good. no new symptoms. a1C : 6.6 .   no leg jose,a.  can ambulate  and he goes around [employer] and [employer].  no dyspnea on exertion .  no leg edema. one pillow use. \par \par \par old note:    complaitn with mneds. d.dm undercontrol. on farxiga. no chst pain. no dizziness. [no syncope\par \par ol;d note:  feels good. no chest pain. no dyspnea.   no palpitaitons.  no leg edema.  no orthostatic.   offers prayers. comlpaitn wioth meds,.\par no headaches. \par az\par \par \par \par \par old note:  he states he was takign valsartan 20 mg at night. he states his BP goes down to 84/50.  he stopped his valsartan. \par he went for his pilgrimage.  his ENT infection getting better. \par he gets sweating and diaphoresis  when he takes valsartan. \par \par \par  old note: feels good. no palpitaitons. no chest pain. no dyspnea on exertion  walks for 5-10 mins. \par complaitn with meds. \par \par \par \par old noteL: : patiyarieln has been struggling with his ear exam.  signfiicant xam.  has been struggling with the ear infection\par now getting under control. \par no hcest pain. no dyspnea.  no aplptiatiomnops. \par low BP when he takes valsartan at night. he is not takign valsartan \par \par old note:  : feels good. no chestp ain. no headaches. no dyspnea on exertion . no LE edema. compliant with meds.\par Patient has been having left ear pain and left parotid swelling and enlargement. he had an infection. he is currently on abx. 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\par he had gone to [facility]. after the proedure patient felt that his sugar was low.   he had transient LOC and was intubated. Had ? cardiac arrest bradycardia. She ahd eleavted lactate and eleavted trops. he was intuabted for few hours and erxtrubated the same night.\par the next day lactate improved. LVEf in the hspital was 40-45%.  I recommened patient to be dishcarged.  no cardiac cath or intervention.\par Patient was sdischarged the next day\par feels good now. No headhcaes. no diziznes.s . no chest pain\par \par \par \par \par old note: : feels good. no chest pain. no headaches. no dizziness. no headaches.  toelrated cath well.\par \par old note: eesl good. got blood  test done yesteryda.  Potassium normal. On diuersis. restrited figs.  off aldctone and off valsartan. patietn was also takign lisinopril. \par \par old note: CHF. : stable. Tolerating toprol 50 . ordered lisinopril. Elevated BNP.  on lasix 40 ad n aldactone 25 mg dialy. \par \par \par old note: HPI for today: feels good. got Brachytherapy done at [place].  had angina for 3 days after. \par continues to take Nitrates.  did not make the appt for diabetes doctor yet. Hb A 1 C still elevated.\par Strongly recommended endocrinologist for aggressive sugar control\par Complaitn with meds. BP has been increasing now. \par \par OLD NOTE: 6/12/2019 c/c: chest pain \par Patient had an episode of chest pain, substernal with epigastric and abdominal pain.  2 days ago, it releived by NTG. \par he had another epsiode today, when he was going back from the [place]. 6/10, subternal.also with abdominal pain. he has been taking mylanta too for acid reflux . also , he has been recnetly started on azithromycin for bronchitis,.\par He states the second episode happened today and it got relived with NTG. he has been taking IMDUR every day. \par \par \par old note: no chest pain.no dyspnea,. no headaches. no dizziness.\par  difficulty walking foot problem. seein neurologist. NO Peripheral vascular disease . patient got US at a [facility] doctor. \par \par

## 2022-11-15 NOTE — PHYSICAL EXAM
[Nasal Endoscopy Performed] : nasal endoscopy was performed, see procedure section for findings [Binocular Microscopic Exam] : Binocular microscopic exam was performed [Hearing Loss Right Only] : diminished [Hearing Loss Left Only] : diminished [Rinne Test Air Conduction Persists > Bone Conduction Right] : air conduction greater than bone conduction on the right [Rinne Test Air Conduction Persists > Bone Conduction Left] : air conduction greater than bone conduction on the left [Hearing Bone Test (Tuning Fork On Forehead)] : no lateralization of tone [Normal] : gait was normal [de-identified] : HB 2-3/6, good tone, mobility over all branches [Nystagmus] : ~T no ~M nystagmus was seen [Fukuda Step Test] : Fukuda Step Test was Negative [Romberg's Sign] : Romberg's sign was absent [Fistula Sign] : Fistula Sign: Negative [Past-Pointing] : Past-Pointing: Negative [[place]] : [place]Hall[place]: Negative [FreeTextEntry8] : CWD cavity dry [de-identified] : remnant [de-identified] : posterior perforation, no polyps, clear mucous scant, powder instilled; cerumen covering TM defect

## 2022-12-06 ENCOUNTER — APPOINTMENT (OUTPATIENT)
Dept: CARDIOLOGY | Facility: CLINIC | Age: 77
End: 2022-12-06

## 2023-01-13 ENCOUNTER — RX RENEWAL (OUTPATIENT)
Age: 78
End: 2023-01-13

## 2023-01-20 NOTE — ED PROVIDER NOTE - CADM POA URETHRAL CATHETER
Ardatank Goo  1/20/2023  [id number]    Chief Complaint: Closed fracture of left femur, sequela    Subjective:   Agitated overnight prompting a code violet. Calm and conversing this am.     ROS: No CP, SOB, dyspnea    Objective:  Patient Vitals for the past 24 hrs:   BP Temp Temp src Pulse Resp SpO2   01/19/23 2115 (!) 160/99 98.2 °F (36.8 °C) Oral 54 18 96 %   01/19/23 0939 132/74 97.8 °F (36.6 °C) -- 62 -- 96 %       Gen: No distress, pleasant. Resting in bed  HEENT: Normocephalic, atraumatic. CV: Regular rate and rhythm. No MRG   Resp: No respiratory distress. CTAB   Abd: Soft, nontender, nondistended  Ext: No edema. Neuro: Alert, oriented, appropriately interactive. Higher level cognitive deficits present    Laboratory data: Available via EMR. Therapy progress:    PT    Supine to Sit: Supervision or touching assistance  Sit to Supine:     Sit to Stand: Supervision or touching assistance  Chair/Bed to Chair Transfer: Supervision or touching assistance  Car Transfer: Partial/moderate assistance  Ambulation 10 ft: Supervision or touching assistance  Ambulation 50 ft: Supervision or touching assistance  Ambulation 150 ft:    Stairs - 1 Step: Partial/moderate assistance  Stairs - 4 Step: Partial/moderate assistance  Stairs - 12 Step:      OT    Eating: Setup or clean-up assistance  Oral Hygiene: Setup or clean-up assistance  Bathing: Partial/moderate assistance  Upper Body Dressing: Supervision or touching assistance  Lower Body Dressing: Partial/moderate assistance  Toilet Transfer: Partial/moderate assistance  Toilet Hygiene: Partial/moderate assistance    Speech Therapy    Pt presents with moderate to severe short term memory deficits and reduced carry over of newly learned information impacting his cognitive flexibility and problem solving.  During evaluation, pt looked to cognitive aid in room (calendar) to improve orientation and recall of written information, however unable to recall why certain things were written. Per pt report pt was independent at home with family support prior to admission and has since demonstrated increased confusion and disorientation in new environment. Pt would benefit from a trial of speech therapy to enhance cognitive function with use of cognitive aides and space retrieval techniques in attempt to improve functional carry over and maximize independence at discharge. Body mass index is 28.94 kg/m². Assessment:  Patient Active Problem List   Diagnosis    Benign prostatic hyperplasia without urinary obstruction    Mixed hyperlipidemia    Bradycardia    Primary hypertension    Pre-diabetes    Primary osteoarthritis of right knee    Memory impairment    Nipple soreness    Skin lesion on examination    Late onset Alzheimer's dementia without behavioral disturbance (HCC)    Vitamin D insufficiency    Nondisplaced fracture of greater trochanter of left femur, initial encounter for closed fracture (HCC)    Unable to ambulate    Alzheimer's dementia ([facility] [address]    Imbalance    Ataxia    Closed fracture of left femur, sequela       Plan:   Left greater trochanter femur fracture: 50% WBAT in LLE. PT/OT. Pain control. Dementia: resumed namenda and aricept per home regimen. Seroquel 25 as needed     HTN: hold lisinopril 10 held. - Resumed      Bowels: Per protocol  Bladder: Per protocol   Sleep: Trazodone provided prn. Pain: tylenol scheduled, kelly PRN 5mg  DVT PPx: lovenox   ARSENIO: 1/26    Joel Jackson MD 1/20/2023, 7:49 AM    * This document was created using dictation software. While all precautions were taken to ensure accuracy, errors may have occurred. Please disregard any typographical errors. No

## 2023-02-06 NOTE — ED ADULT TRIAGE NOTE - NSWEIGHTCALCTOOLDRUG_GEN_A_CORE
used
Alert-The patient is alert, awake and responds to voice. The patient is oriented to time, place, and person. The triage nurse is able to obtain subjective information.

## 2023-05-09 ENCOUNTER — OUTPATIENT (OUTPATIENT)
Dept: OUTPATIENT SERVICES | Facility: HOSPITAL | Age: 78
LOS: 1 days | End: 2023-05-09
Payer: MEDICARE

## 2023-05-09 DIAGNOSIS — I25.5 ISCHEMIC CARDIOMYOPATHY: ICD-10-CM

## 2023-05-09 DIAGNOSIS — Z95.5 PRESENCE OF CORONARY ANGIOPLASTY IMPLANT AND GRAFT: Chronic | ICD-10-CM

## 2023-05-09 DIAGNOSIS — Z96.7 PRESENCE OF OTHER BONE AND TENDON IMPLANTS: Chronic | ICD-10-CM

## 2023-05-09 DIAGNOSIS — Z95.1 PRESENCE OF AORTOCORONARY BYPASS GRAFT: Chronic | ICD-10-CM

## 2023-05-09 LAB
ANION GAP SERPL CALC-SCNC: 12 MMOL/L
BASOPHILS # BLD AUTO: 0.02 K/UL
BASOPHILS NFR BLD AUTO: 0.3 %
BUN SERPL-MCNC: 23 MG/DL
CALCIUM SERPL-MCNC: 8.9 MG/DL
CHLORIDE SERPL-SCNC: 98 MMOL/L
CO2 SERPL-SCNC: 26 MMOL/L
CREAT SERPL-MCNC: 1.54 MG/DL
EGFR: 47 ML/MIN/1.73M2
EOSINOPHIL # BLD AUTO: 0.18 K/UL
EOSINOPHIL NFR BLD AUTO: 2.3 %
GLUCOSE SERPL-MCNC: 151 MG/DL
HCT VFR BLD CALC: 41.4 %
HGB BLD-MCNC: 13.1 G/DL
IMM GRANULOCYTES NFR BLD AUTO: 1 %
LYMPHOCYTES # BLD AUTO: 1.61 K/UL
LYMPHOCYTES NFR BLD AUTO: 20.6 %
MAGNESIUM SERPL-MCNC: 2.5 MG/DL
MAN DIFF?: NORMAL
MCHC RBC-ENTMCNC: 30.3 PG
MCHC RBC-ENTMCNC: 31.6 GM/DL
MCV RBC AUTO: 95.8 FL
MONOCYTES # BLD AUTO: 1.12 K/UL
MONOCYTES NFR BLD AUTO: 14.4 %
NEUTROPHILS # BLD AUTO: 4.79 K/UL
NEUTROPHILS NFR BLD AUTO: 61.4 %
NT-PROBNP SERPL-MCNC: 2535 PG/ML
PLATELET # BLD AUTO: 204 K/UL
POTASSIUM SERPL-SCNC: 4.2 MMOL/L
RBC # BLD: 4.32 M/UL
RBC # FLD: 13.6 %
SODIUM SERPL-SCNC: 136 MMOL/L
WBC # FLD AUTO: 7.8 K/UL

## 2023-05-09 PROCEDURE — 71046 X-RAY EXAM CHEST 2 VIEWS: CPT | Mod: 26

## 2023-05-10 ENCOUNTER — NON-APPOINTMENT (OUTPATIENT)
Age: 78
End: 2023-05-10

## 2023-05-10 ENCOUNTER — APPOINTMENT (OUTPATIENT)
Dept: CARDIOLOGY | Facility: CLINIC | Age: 78
End: 2023-05-10
Payer: MEDICARE

## 2023-05-10 VITALS
SYSTOLIC BLOOD PRESSURE: 138 MMHG | OXYGEN SATURATION: 97 % | BODY MASS INDEX: 22.43 KG/M2 | WEIGHT: 148 LBS | HEIGHT: 68 IN | DIASTOLIC BLOOD PRESSURE: 74 MMHG | HEART RATE: 68 BPM | TEMPERATURE: 97.7 F

## 2023-05-10 PROCEDURE — 99215 OFFICE O/P EST HI 40 MIN: CPT | Mod: 25

## 2023-05-10 PROCEDURE — 93000 ELECTROCARDIOGRAM COMPLETE: CPT

## 2023-05-24 LAB
CREAT SPEC-SCNC: 10.4
HBA1C MFR BLD HPLC: 6.9
LDLC SERPL DIRECT ASSAY-MCNC: 88

## 2023-05-25 ENCOUNTER — APPOINTMENT (OUTPATIENT)
Dept: ENDOCRINOLOGY | Facility: CLINIC | Age: 78
End: 2023-05-25
Payer: MEDICARE

## 2023-05-25 VITALS
HEIGHT: 68 IN | WEIGHT: 156 LBS | DIASTOLIC BLOOD PRESSURE: 58 MMHG | OXYGEN SATURATION: 98 % | HEART RATE: 68 BPM | BODY MASS INDEX: 23.64 KG/M2 | SYSTOLIC BLOOD PRESSURE: 116 MMHG

## 2023-05-25 PROCEDURE — 99214 OFFICE O/P EST MOD 30 MIN: CPT

## 2023-05-25 NOTE — PHYSICAL EXAM
[Alert] : alert [Well Nourished] : well nourished [No Acute Distress] : no acute distress [Well Developed] : well developed [Normal Sclera/Conjunctiva] : normal sclera/conjunctiva [EOMI] : extra ocular movement intact [No Proptosis] : no proptosis [Normal Oropharynx] : the oropharynx was normal [Thyroid Not Enlarged] : the thyroid was not enlarged [No Thyroid Nodules] : no palpable thyroid nodules [No Respiratory Distress] : no respiratory distress [No Accessory Muscle Use] : no accessory muscle use [Clear to Auscultation] : lungs were clear to auscultation bilaterally [Normal S1, S2] : normal S1 and S2 [Normal Rate] : heart rate was normal [Regular Rhythm] : with a regular rhythm [No Edema] : no peripheral edema [Pedal Pulses Normal] : the pedal pulses are present [Normal Bowel Sounds] : normal bowel sounds [Not Tender] : non-tender [Not Distended] : not distended [Soft] : abdomen soft [Normal] : normal [Full ROM] : with full range of motion [No Tremors] : no tremors [Oriented x3] : oriented to person, place, and time [Right foot was examined, including] : right foot ~C was examined, including visual inspection with sensory and pulse exams [Left foot was examined, including] : left foot ~C was examined, including visual inspection with sensory and pulse exams [Acanthosis Nigricans] : no acanthosis nigricans [Erythema] : not erythematous [Diminished Throughout Both Feet] : normal tactile sensation with monofilament testing throughout both feet

## 2023-05-25 NOTE — ASSESSMENT
[FreeTextEntry1] : 76 year old male with long standing T2DM and associated CAD s/p 4V CABG, also with hypothyroidism,  hypertension, hyperlipidemia, and vitamin D deficiency.   \par \par 1. T2DM  : \par check labs  \par continue basaglar  15 u HS \par cont   novolog  8-8-12 u TID w meals.  \par cont farxiga \par bgs 4x day \par diabetic nephropathy CKD stage 3. continue ARB and ASA \par foot exam normal today: normal monofilament and vibratory B/L\par \par \par 2. Subclinical hypothyroidism : check tfts \par \par 3. Hypertension-  bp at target on meds. Continue current management.\par \par 4. Hyperlipidemia: continue statin .low fat/low cholesterol diet . check lipids now. \par \par 5. Vitamin D deficiency: cont MVI \par \par all lab results reviewed and discussed with patient with extensive discussion. All questions answered\par Laboratory tests ordered today-see list below\par Continue other current medications \par \par \par

## 2023-05-30 ENCOUNTER — NON-APPOINTMENT (OUTPATIENT)
Age: 78
End: 2023-05-30

## 2023-05-30 LAB
ALBUMIN SERPL ELPH-MCNC: 4.1 G/DL
ALP BLD-CCNC: 88 U/L
ALT SERPL-CCNC: 16 U/L
ANION GAP SERPL CALC-SCNC: 12 MMOL/L
AST SERPL-CCNC: 25 U/L
BILIRUB SERPL-MCNC: 0.6 MG/DL
BUN SERPL-MCNC: 28 MG/DL
CALCIUM SERPL-MCNC: 9.3 MG/DL
CHLORIDE SERPL-SCNC: 100 MMOL/L
CHOLEST SERPL-MCNC: 158 MG/DL
CO2 SERPL-SCNC: 28 MMOL/L
CREAT SERPL-MCNC: 1.41 MG/DL
CREAT SPEC-SCNC: 111 MG/DL
EGFR: 52 ML/MIN/1.73M2
ESTIMATED AVERAGE GLUCOSE: 154 MG/DL
GLUCOSE SERPL-MCNC: 115 MG/DL
HBA1C MFR BLD HPLC: 7 %
HDLC SERPL-MCNC: 54 MG/DL
LDLC SERPL CALC-MCNC: 83 MG/DL
MICROALBUMIN 24H UR DL<=1MG/L-MCNC: <1.2 MG/DL
MICROALBUMIN/CREAT 24H UR-RTO: NORMAL MG/G
NONHDLC SERPL-MCNC: 104 MG/DL
POTASSIUM SERPL-SCNC: 4.6 MMOL/L
PROT SERPL-MCNC: 7 G/DL
SODIUM SERPL-SCNC: 140 MMOL/L
T4 FREE SERPL-MCNC: 1.3 NG/DL
TRIGL SERPL-MCNC: 105 MG/DL
TSH SERPL-ACNC: 8.53 UIU/ML

## 2023-06-12 ENCOUNTER — NON-APPOINTMENT (OUTPATIENT)
Age: 78
End: 2023-06-12

## 2023-06-12 LAB — NT-PROBNP SERPL-MCNC: 1671 PG/ML

## 2023-06-12 NOTE — ED PROVIDER NOTE - NEURO NEGATIVE STATEMENT, MLM
no loss of consciousness, no gait abnormality, no headache, no sensory deficits, and no weakness.
No/Unable to asses

## 2023-06-13 ENCOUNTER — NON-APPOINTMENT (OUTPATIENT)
Age: 78
End: 2023-06-13

## 2023-06-13 LAB
ANION GAP SERPL CALC-SCNC: 10 MMOL/L
BUN SERPL-MCNC: 32 MG/DL
CALCIUM SERPL-MCNC: 9.2 MG/DL
CHLORIDE SERPL-SCNC: 102 MMOL/L
CO2 SERPL-SCNC: 30 MMOL/L
CREAT SERPL-MCNC: 1.62 MG/DL
EGFR: 44 ML/MIN/1.73M2
GLUCOSE SERPL-MCNC: 106 MG/DL
POTASSIUM SERPL-SCNC: 4.9 MMOL/L
SODIUM SERPL-SCNC: 142 MMOL/L

## 2023-06-14 ENCOUNTER — APPOINTMENT (OUTPATIENT)
Dept: CARDIOLOGY | Facility: CLINIC | Age: 78
End: 2023-06-14
Payer: MEDICARE

## 2023-06-14 VITALS
BODY MASS INDEX: 23.26 KG/M2 | HEART RATE: 76 BPM | SYSTOLIC BLOOD PRESSURE: 140 MMHG | WEIGHT: 153 LBS | OXYGEN SATURATION: 98 % | TEMPERATURE: 97.9 F | DIASTOLIC BLOOD PRESSURE: 60 MMHG

## 2023-06-14 PROCEDURE — 99215 OFFICE O/P EST HI 40 MIN: CPT

## 2023-06-14 RX ORDER — MELOXICAM 15 MG/1
15 TABLET ORAL
Qty: 30 | Refills: 0 | Status: DISCONTINUED | COMMUNITY
Start: 2021-11-04 | End: 2023-06-14

## 2023-06-14 RX ORDER — NITROGLYCERIN 0.4 MG/1
0.4 TABLET SUBLINGUAL
Qty: 1 | Refills: 3 | Status: DISCONTINUED | COMMUNITY
Start: 2017-07-12 | End: 2023-06-14

## 2023-06-14 NOTE — HISTORY OF PRESENT ILLNESS
[FreeTextEntry1] : f/u  For: HTN, HLD, CAD s/p CABg x4, Angina, recent PCI\par \par \par HPI for today: :  feels good. no chest pain . no dyspnea.  no dizziness. no syncope. complaitnw ith emed.s  he gets cough with draiy products.  also his thyroid is abnormal and started on meds.\par \par old note:  feels good. no new symptoms. a1C : 6.6 .   no leg jose,a.  can ambulate  and he goes around [employer].  no dyspnea on exertion .  no leg edema. one pillow use. \par \par  \par old note:  : feels good. no chestp ain. no headaches. no dyspnea on exertion . no LE edema. compliant with meds.\par Patient has been having left ear pain and left parotid swelling and enlargement. he had an infection. he is currently on abx. 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\par he had gone to [facility]. after the proedure patient felt that his sugar was low.   he had transient LOC and was intubated. Had ? cardiac arrest bradycardia. She ahd eleavted lactate and eleavted trops. he was intuabted for few hours and erxtrubated the same night.\par the next day lactate improved. LVEf in the hspital was 40-45%.  I recommened patient to be dishcarged.  no cardiac cath or intervention.\par Patient was sdischarged the next day\par feels good now. No headhcaes. no diziznes.s . no chest pain\par \par \par old note: HPI for today: feels good. got Brachytherapy done at [place].  had angina for 3 days after. \par continues to take Nitrates.  did not make the appt for diabetes doctor yet. Hb A 1 C still elevated.\par Strongly recommended endocrinologist for aggressive sugar control\par Complaitn with meds. BP has been increasing now. \par \par OLD NOTE: 6/12/2019 c/c: chest pain \par Patient had an episode of chest pain, substernal with epigastric and abdominal pain.  2 days ago, it releived by NTG. \par he had another epsiode today, when he was going back from the [place]. 6/10, subternal.also with abdominal pain. he has been taking mylanta too for acid reflux . also , he has been recnetly started on azithromycin for bronchitis,.\par He states the second episode happened today and it got relived with NTG. he has been taking IMDUR every day. \par \par \par old note: no chest pain.no dyspnea,. no headaches. no dizziness.\par  difficulty walking foot problem. seein neurologist. NO Peripheral vascular disease . [facility] \par \par

## 2023-06-14 NOTE — CARDIOLOGY SUMMARY
[___] : [unfilled] [LVEF ___%] : LVEF [unfilled]% [Severe] : severe LV dysfunction [None] : no pulmonary hypertension [Enlarged] : enlarged LA size [Mild] : mild mitral regurgitation [de-identified] : \par 3 29 2022  Sinus  Rhythm \par -Left bundle branch block. \par \par ABNORMAL \par \par \par 10 20 2021: Sinus  Rhythm \par -Left bundle branch block and left axis. \par  -Left atrial enlargement. \par \par ABNORMAL \par \par \par 6/15/2021 Sinus  Rhythm \par -Left bundle branch block. \par  -Left atrial enlargement. \par \par ABNORMAL \par  [de-identified] : aug 2022  PVCs: no signifciant arrhythmias  [de-identified] : may 2022:  MUGA : 38%  [de-identified] : may 2022:   LVEF 36%.  Normal Rv. ,mild MR.  \par july 2021:  LVEF 37%  Moderte MR.  Severe pulm HTN.  [de-identified] : MUGA scan : june 2021:  LVEF 39%.

## 2023-06-14 NOTE — PHYSICAL EXAM
[Well Developed] : well developed [Well Nourished] : well nourished [No Acute Distress] : no acute distress [Normal Venous Pressure] : normal venous pressure [No Carotid Bruit] : no carotid bruit [Normal S1, S2] : normal S1, S2 [No Murmur] : no murmur [No Rub] : no rub [No Gallop] : no gallop [Clear Lung Fields] : clear lung fields [Good Air Entry] : good air entry [No Respiratory Distress] : no respiratory distress  [Soft] : abdomen soft [Non Tender] : non-tender [No Masses/organomegaly] : no masses/organomegaly [Normal Bowel Sounds] : normal bowel sounds [Normal Gait] : normal gait [No Edema] : no edema [No Cyanosis] : no cyanosis [No Clubbing] : no clubbing [No Varicosities] : no varicosities [No Rash] : no rash [No Skin Lesions] : no skin lesions [Moves all extremities] : moves all extremities [No Focal Deficits] : no focal deficits [Normal Speech] : normal speech [Alert and Oriented] : alert and oriented [Normal memory] : normal memory [General Appearance - Well Developed] : well developed [Normal Appearance] : normal appearance [Well Groomed] : well groomed [General Appearance - Well Nourished] : well nourished [No Deformities] : no deformities [General Appearance - In No Acute Distress] : no acute distress [Normal Conjunctiva] : the conjunctiva exhibited no abnormalities [Eyelids - No Xanthelasma] : the eyelids demonstrated no xanthelasmas [Normal Oral Mucosa] : normal oral mucosa [No Oral Pallor] : no oral pallor [No Oral Cyanosis] : no oral cyanosis [Normal Jugular Venous A Waves Present] : normal jugular venous A waves present [Normal Jugular Venous V Waves Present] : normal jugular venous V waves present [No Jugular Venous Castorena A Waves] : no jugular venous castorena A waves [Respiration, Rhythm And Depth] : normal respiratory rhythm and effort [Exaggerated Use Of Accessory Muscles For Inspiration] : no accessory muscle use [Auscultation Breath Sounds / Voice Sounds] : lungs were clear to auscultation bilaterally [Heart Rate And Rhythm] : heart rate and rhythm were normal [Heart Sounds] : normal S1 and S2 [Murmurs] : no murmurs present [Abdomen Soft] : soft [Abdomen Tenderness] : non-tender [Abdomen Mass (___ Cm)] : no abdominal mass palpated [Abnormal Walk] : normal gait [Nail Clubbing] : no clubbing of the fingernails [Petechial Hemorrhages (___cm)] : no petechial hemorrhages [Cyanosis, Localized] : no localized cyanosis [Skin Color & Pigmentation] : normal skin color and pigmentation [] : no rash [No Venous Stasis] : no venous stasis [Skin Lesions] : no skin lesions [No Skin Ulcers] : no skin ulcer [No Xanthoma] : no  xanthoma was observed [Oriented To Time, Place, And Person] : oriented to person, place, and time [Affect] : the affect was normal [Mood] : the mood was normal [No Anxiety] : not feeling anxious

## 2023-06-14 NOTE — DISCUSSION/SUMMARY
[Patient] : the patient [Risks] : risks [Benefits] : benefits [Alternatives] : alternatives [With Me] : with me [___ Month(s)] : in [unfilled] month(s) [FreeTextEntry1] : This is a 77 year old male with history HTN, DM, HLD, CAD, h/o MI,  s/p CABG, s/p PCIx4, recent unstable angina s/p PCI with CODI to SVG to LCX,  \par 1)  Angina pectoris:  stable coronary artery disease  PCI and brachy therapy recent PCI to SVG.  nov 2020 ct Dual antiplatelet therapy .aspirin. brillinta.  ct lipitor 40 mg daily .  ranexa 500 mg Q12  \par aspirin and  ct brillinta    LDL 83\par 2) CAD, s/p CABG, s/p PCI: ASA, c ct Brilinta ,  \par 3) HTN:  CHF meds. uncotnrolled. \par 4) ICM: EF    .  toprol 50 mg daily . lasix 40 mg daily .   farxiga daily . aldactone 25 mg Every day   half valsartan 40 mg  daily. .  \par 7) hypothyroidism:  and Diabetes Mellitus : endocrine follows up. \par 8) Elevated lipoprotein a  : on statins.    wants to defer injectable PCSK 9 inhibitor. r \par

## 2023-08-02 ENCOUNTER — OFFICE (OUTPATIENT)
Dept: URBAN - METROPOLITAN AREA CLINIC 94 | Facility: CLINIC | Age: 78
Setting detail: OPHTHALMOLOGY
End: 2023-08-02
Payer: MEDICARE

## 2023-08-02 DIAGNOSIS — Z96.1: ICD-10-CM

## 2023-08-02 DIAGNOSIS — H35.033: ICD-10-CM

## 2023-08-02 DIAGNOSIS — E11.3393: ICD-10-CM

## 2023-08-02 DIAGNOSIS — H43.812: ICD-10-CM

## 2023-08-02 PROCEDURE — 92250 FUNDUS PHOTOGRAPHY W/I&R: CPT | Performed by: OPHTHALMOLOGY

## 2023-08-02 PROCEDURE — 92014 COMPRE OPH EXAM EST PT 1/>: CPT | Performed by: OPHTHALMOLOGY

## 2023-08-02 ASSESSMENT — VISUAL ACUITY
OS_BCVA: 20/25-
OD_BCVA: 20/30-

## 2023-08-02 ASSESSMENT — KERATOMETRY
OD_K2POWER_DIOPTERS: 42.75
OS_K2POWER_DIOPTERS: 43.75
OD_K1POWER_DIOPTERS: 41.50
OS_K1POWER_DIOPTERS: 42.75
OD_AXISANGLE_DEGREES: 151
METHOD_AUTO_MANUAL: AUTO
OS_AXISANGLE_DEGREES: 169

## 2023-08-02 ASSESSMENT — REFRACTION_AUTOREFRACTION
OS_CYLINDER: -1.50
OD_SPHERE: +0.75
OS_AXIS: 088
OD_AXIS: 077
OS_SPHERE: +0.50
OD_CYLINDER: -1.50

## 2023-08-02 ASSESSMENT — SPHEQUIV_DERIVED
OD_SPHEQUIV: 0
OS_SPHEQUIV: -0.25

## 2023-08-02 ASSESSMENT — CONFRONTATIONAL VISUAL FIELD TEST (CVF)
OD_FINDINGS: FULL
OS_FINDINGS: FULL

## 2023-08-02 ASSESSMENT — AXIALLENGTH_DERIVED
OS_AL: 23.7827
OD_AL: 24.108

## 2023-08-07 NOTE — ED ADULT TRIAGE NOTE - NS ED TRIAGE AVPU SCALE
119 Alert-The patient is alert, awake and responds to voice. The patient is oriented to time, place, and person. The triage nurse is able to obtain subjective information.

## 2023-08-08 NOTE — H&P CARDIOLOGY - WEIGHT IN KG
Telephone call from patient    Confirmed 3 pm time for blood draw at [facility] infusion center    Patient expressed appreciation for coordination of appointment    Discussed that we will send breast portion stat hopefully getting results within 7-10 days. Reviewed and confirmed that patient would like to proceed with Cancer next panel. I agree this is a good option and will provide her with an actionable panel without gene findings where no guidelines or literature exists. We also discussed  or patient advocacy support. Patient states she is thinking about talking with an outside therapist. I encouraged connecting her with one of the individuals within our cancer center to help serve as a guide and consistent support throughout this process. I recommended she focus on information from her cancer team, Umm Yee and the nurse navigators. The patient was given time to ask questions. All questions answered. The patient communicated an understanding of and agreement with the plan.      Jairon SINGH RN ANP-BC OCN ACGN  Advanced Clinical Genetics Nurse  Oncology Nurse Practitioner  Mobile: [phone]   Fax: [phone]  [email or web address]    The [address] [facility] [place], [address] [facility], [facility], [address], [facility], [address], [address], [facility], [address] [facility], [address] [place] [address]    To schedule an appointment Tel: [phone]
70.3

## 2023-08-10 NOTE — PATIENT PROFILE ADULT - NSPROHMDIABETBLDGLCTARGETFT_GEN_A_NUR
Caller: Brigitte Chino    Relationship: Self    Best call back number: [phone]     What is the best time to reach you: ANY    Who are you requesting to speak with (clinical staff, provider,  specific staff member): CLINICAL    What was the call regarding: PATIENT STATED THAT SHE RECEIVED A MESSAGE IN MY CHART STATING THAT HER LABS WERE OVERDUE, BUT THE PATIENT DID NOT KNOW THAT THESE NEEDED TO BE COMPLETED. PLEASE CALL HER AND LET HER KNOW IF SHE NEEDS TO COME IN FOR LABS.    Is it okay if the provider responds through Websensehart: YES    
Informed pt she didn't have any active labs, also gave results from her test that was done.  
120

## 2023-09-01 NOTE — ED PROCEDURE NOTE - PROCEDURE NAME, MLM
Laceration Repair abdominal pain, nausea, vomiting, constipation, diarrhea, black tarry stools, blood in the stools, dysphagia, odynophagia, sour burps, hematemesis, change in bowel habits, flatulence, jaundice and hemorrhoids  MUSCULOSKELETAL: denies joint stiffness, joint pain, joint swelling, muscle pain, blanching of the fingers with cold exposure, stiff neck, tension headache, back pain, shoulder pain and radiating symptoms    PHYSICAL EXAM  Visit Vitals  /86 (BP Location: LUE - Left upper extremity, Patient Position: Sitting)   Pulse 74   Ht 5' 10\" (1.778 m)   Wt 136.1 kg (300 lb)   SpO2 96%   BMI 43.05 kg/m²     Visit Vitals  /86 (BP Location: LUE - Left upper extremity, Patient Position: Sitting)   Pulse 74   Ht 5' 10\" (1.778 m)   Wt 136.1 kg (300 lb)   SpO2 96%   BMI 43.05 kg/m²     General appearance: alert  Head: Normocephalic, without obvious abnormality, atraumatic  Eyes: Conjunctivae/sclerae normal. No erythema, edema or exudate.  Lungs: clear to auscultation bilaterally  Heart: regular rate and rhythm, S1, S2 normal, no murmur, click, rub or gallop  Extremities: extremities normal, atraumatic, no cyanosis or edema  Pulses: 2+ and symmetric  Skin: Skin color, texture, turgor normal. No rashes or lesions      ASSESSMENT  1. Acute saddle pulmonary embolism, unspecified whether acute cor pulmonale present (CMD)    2. Acute deep vein thrombosis (DVT) of proximal vein of right lower extremity (CMD)        PLAN  Doing well Asx  On Eliquis     Patient adherence to his treatment plan was assessed. He is   fully adherent with the entire discharge treatment plan.

## 2023-09-11 LAB
ALBUMIN SERPL ELPH-MCNC: 4 G/DL
ALP BLD-CCNC: 83 U/L
ALT SERPL-CCNC: 12 U/L
ANION GAP SERPL CALC-SCNC: 9 MMOL/L
AST SERPL-CCNC: 21 U/L
BILIRUB SERPL-MCNC: 0.6 MG/DL
BUN SERPL-MCNC: 26 MG/DL
CALCIUM SERPL-MCNC: 9 MG/DL
CHLORIDE SERPL-SCNC: 100 MMOL/L
CHOLEST SERPL-MCNC: 149 MG/DL
CO2 SERPL-SCNC: 29 MMOL/L
CREAT SERPL-MCNC: 1.58 MG/DL
CREAT SPEC-SCNC: 133 MG/DL
EGFR: 45 ML/MIN/1.73M2
ESTIMATED AVERAGE GLUCOSE: 148 MG/DL
FOLATE SERPL-MCNC: 19.9 NG/ML
GLUCOSE SERPL-MCNC: 80 MG/DL
HBA1C MFR BLD HPLC: 6.8 %
HCT VFR BLD CALC: 40.7 %
HDLC SERPL-MCNC: 53 MG/DL
HGB BLD-MCNC: 13.2 G/DL
LDLC SERPL CALC-MCNC: 80 MG/DL
MCHC RBC-ENTMCNC: 30.7 PG
MCHC RBC-ENTMCNC: 32.4 GM/DL
MCV RBC AUTO: 94.7 FL
MICROALBUMIN 24H UR DL<=1MG/L-MCNC: <1.2 MG/DL
MICROALBUMIN/CREAT 24H UR-RTO: NORMAL MG/G
NONHDLC SERPL-MCNC: 96 MG/DL
PLATELET # BLD AUTO: 234 K/UL
POTASSIUM SERPL-SCNC: 4.7 MMOL/L
PROT SERPL-MCNC: 7.1 G/DL
RBC # BLD: 4.3 M/UL
RBC # FLD: 12.9 %
SODIUM SERPL-SCNC: 138 MMOL/L
TRIGL SERPL-MCNC: 85 MG/DL
VIT B12 SERPL-MCNC: 617 PG/ML
WBC # FLD AUTO: 11.75 K/UL

## 2023-09-12 ENCOUNTER — APPOINTMENT (OUTPATIENT)
Dept: ENDOCRINOLOGY | Facility: CLINIC | Age: 78
End: 2023-09-12
Payer: MEDICARE

## 2023-09-12 VITALS
WEIGHT: 315 LBS | HEIGHT: 68 IN | DIASTOLIC BLOOD PRESSURE: 70 MMHG | BODY MASS INDEX: 47.74 KG/M2 | SYSTOLIC BLOOD PRESSURE: 110 MMHG

## 2023-09-12 DIAGNOSIS — E53.8 DEFICIENCY OF OTHER SPECIFIED B GROUP VITAMINS: ICD-10-CM

## 2023-09-12 DIAGNOSIS — E03.8 OTHER SPECIFIED HYPOTHYROIDISM: ICD-10-CM

## 2023-09-12 DIAGNOSIS — E55.9 VITAMIN D DEFICIENCY, UNSPECIFIED: ICD-10-CM

## 2023-09-12 LAB — GLUCOSE BLDC GLUCOMTR-MCNC: 95

## 2023-09-12 PROCEDURE — 82962 GLUCOSE BLOOD TEST: CPT

## 2023-09-12 PROCEDURE — 99214 OFFICE O/P EST MOD 30 MIN: CPT | Mod: 25

## 2023-09-13 LAB — TSH SERPL-ACNC: 6.3 UIU/ML

## 2023-10-09 ENCOUNTER — NON-APPOINTMENT (OUTPATIENT)
Age: 78
End: 2023-10-09

## 2023-10-10 ENCOUNTER — APPOINTMENT (OUTPATIENT)
Dept: OTOLARYNGOLOGY | Facility: CLINIC | Age: 78
End: 2023-10-10
Payer: MEDICARE

## 2023-10-10 VITALS — HEART RATE: 89 BPM | SYSTOLIC BLOOD PRESSURE: 135 MMHG | DIASTOLIC BLOOD PRESSURE: 82 MMHG

## 2023-10-10 VITALS — BODY MASS INDEX: 23.79 KG/M2 | WEIGHT: 157 LBS | HEIGHT: 68 IN

## 2023-10-10 PROCEDURE — 69220 CLEAN OUT MASTOID CAVITY: CPT | Mod: RT

## 2023-10-10 PROCEDURE — 99213 OFFICE O/P EST LOW 20 MIN: CPT | Mod: 25

## 2023-10-10 PROCEDURE — 92504 EAR MICROSCOPY EXAMINATION: CPT

## 2023-10-11 NOTE — ED PROVIDER NOTE - ENMT NEGATIVE STATEMENT, MLM
Carolyn Jaime (wife) brought back paper work that was not completed by Dr. Eder Mixon is the same from 10/5. Paperwork given to [facility] to have Dr Tonya Arauz complete. Please call her at either home [phone] or cell [phone] once paperwork has been completed. She wants to  paperwork. Ears: no ear pain and no hearing problems.Nose: no nasal congestion and no nasal drainage.Mouth/Throat: no dysphagia, no hoarseness and no throat pain.Neck: no lumps, no pain, no stiffness and no swollen glands.

## 2023-10-17 ENCOUNTER — APPOINTMENT (OUTPATIENT)
Dept: CARDIOLOGY | Facility: CLINIC | Age: 78
End: 2023-10-17
Payer: MEDICARE

## 2023-10-17 ENCOUNTER — APPOINTMENT (OUTPATIENT)
Dept: CARDIOLOGY | Facility: CLINIC | Age: 78
End: 2023-10-17

## 2023-10-17 VITALS
TEMPERATURE: 98.4 F | BODY MASS INDEX: 23.34 KG/M2 | HEIGHT: 68 IN | OXYGEN SATURATION: 99 % | DIASTOLIC BLOOD PRESSURE: 70 MMHG | SYSTOLIC BLOOD PRESSURE: 128 MMHG | WEIGHT: 154 LBS | HEART RATE: 89 BPM

## 2023-10-17 DIAGNOSIS — R05.9 COUGH, UNSPECIFIED: ICD-10-CM

## 2023-10-17 PROCEDURE — 99215 OFFICE O/P EST HI 40 MIN: CPT | Mod: 25

## 2023-10-17 PROCEDURE — 93000 ELECTROCARDIOGRAM COMPLETE: CPT

## 2023-10-21 ENCOUNTER — OUTPATIENT (OUTPATIENT)
Dept: OUTPATIENT SERVICES | Facility: HOSPITAL | Age: 78
LOS: 1 days | End: 2023-10-21
Payer: MEDICARE

## 2023-10-21 ENCOUNTER — APPOINTMENT (OUTPATIENT)
Dept: RADIOLOGY | Facility: CLINIC | Age: 78
End: 2023-10-21
Payer: MEDICARE

## 2023-10-21 DIAGNOSIS — Z96.7 PRESENCE OF OTHER BONE AND TENDON IMPLANTS: Chronic | ICD-10-CM

## 2023-10-21 DIAGNOSIS — Z95.1 PRESENCE OF AORTOCORONARY BYPASS GRAFT: Chronic | ICD-10-CM

## 2023-10-21 DIAGNOSIS — I10 ESSENTIAL (PRIMARY) HYPERTENSION: ICD-10-CM

## 2023-10-21 DIAGNOSIS — Z95.5 PRESENCE OF CORONARY ANGIOPLASTY IMPLANT AND GRAFT: Chronic | ICD-10-CM

## 2023-10-21 PROCEDURE — 71046 X-RAY EXAM CHEST 2 VIEWS: CPT | Mod: 26

## 2023-10-21 PROCEDURE — 71046 X-RAY EXAM CHEST 2 VIEWS: CPT

## 2023-10-24 ENCOUNTER — APPOINTMENT (OUTPATIENT)
Dept: PULMONOLOGY | Facility: CLINIC | Age: 78
End: 2023-10-24
Payer: MEDICARE

## 2023-10-24 VITALS
OXYGEN SATURATION: 96 % | WEIGHT: 154 LBS | HEART RATE: 75 BPM | DIASTOLIC BLOOD PRESSURE: 60 MMHG | SYSTOLIC BLOOD PRESSURE: 120 MMHG | RESPIRATION RATE: 16 BRPM | BODY MASS INDEX: 23.42 KG/M2

## 2023-10-24 PROCEDURE — 99204 OFFICE O/P NEW MOD 45 MIN: CPT

## 2023-10-24 RX ORDER — SPIRONOLACTONE 25 MG/1
25 TABLET ORAL DAILY
Qty: 30 | Refills: 1 | Status: COMPLETED | COMMUNITY
Start: 2023-05-10 | End: 2023-10-24

## 2023-10-24 RX ORDER — RANOLAZINE 500 MG/1
500 TABLET, EXTENDED RELEASE ORAL TWICE DAILY
Refills: 0 | Status: COMPLETED | COMMUNITY
End: 2023-10-24

## 2023-10-26 ENCOUNTER — APPOINTMENT (OUTPATIENT)
Dept: PULMONOLOGY | Facility: CLINIC | Age: 78
End: 2023-10-26

## 2023-10-26 ENCOUNTER — INPATIENT (INPATIENT)
Facility: HOSPITAL | Age: 78
LOS: 3 days | Discharge: HOME | DRG: 291 | End: 2023-10-30
Attending: INTERNAL MEDICINE | Admitting: HOSPITALIST
Payer: MEDICARE

## 2023-10-26 VITALS
HEART RATE: 103 BPM | DIASTOLIC BLOOD PRESSURE: 71 MMHG | HEIGHT: 68 IN | SYSTOLIC BLOOD PRESSURE: 119 MMHG | WEIGHT: 154.32 LBS | OXYGEN SATURATION: 97 % | RESPIRATION RATE: 20 BRPM | TEMPERATURE: 97 F

## 2023-10-26 DIAGNOSIS — Z96.7 PRESENCE OF OTHER BONE AND TENDON IMPLANTS: Chronic | ICD-10-CM

## 2023-10-26 DIAGNOSIS — Z95.1 PRESENCE OF AORTOCORONARY BYPASS GRAFT: Chronic | ICD-10-CM

## 2023-10-26 DIAGNOSIS — I10 ESSENTIAL (PRIMARY) HYPERTENSION: ICD-10-CM

## 2023-10-26 DIAGNOSIS — Z95.5 PRESENCE OF CORONARY ANGIOPLASTY IMPLANT AND GRAFT: Chronic | ICD-10-CM

## 2023-10-26 DIAGNOSIS — I50.21 ACUTE SYSTOLIC (CONGESTIVE) HEART FAILURE: ICD-10-CM

## 2023-10-26 DIAGNOSIS — I50.9 HEART FAILURE, UNSPECIFIED: ICD-10-CM

## 2023-10-26 DIAGNOSIS — I25.10 ATHEROSCLEROTIC HEART DISEASE OF NATIVE CORONARY ARTERY WITHOUT ANGINA PECTORIS: ICD-10-CM

## 2023-10-26 LAB
ALBUMIN SERPL ELPH-MCNC: 3.5 G/DL — SIGNIFICANT CHANGE UP (ref 3.3–5.2)
ALBUMIN SERPL ELPH-MCNC: 3.5 G/DL — SIGNIFICANT CHANGE UP (ref 3.3–5.2)
ALP SERPL-CCNC: 124 U/L — HIGH (ref 40–120)
ALP SERPL-CCNC: 124 U/L — HIGH (ref 40–120)
ALT FLD-CCNC: 39 U/L — SIGNIFICANT CHANGE UP
ALT FLD-CCNC: 39 U/L — SIGNIFICANT CHANGE UP
ANION GAP SERPL CALC-SCNC: 12 MMOL/L — SIGNIFICANT CHANGE UP (ref 5–17)
ANION GAP SERPL CALC-SCNC: 12 MMOL/L — SIGNIFICANT CHANGE UP (ref 5–17)
AST SERPL-CCNC: 67 U/L — HIGH
AST SERPL-CCNC: 67 U/L — HIGH
BASOPHILS # BLD AUTO: 0.04 K/UL — SIGNIFICANT CHANGE UP (ref 0–0.2)
BASOPHILS # BLD AUTO: 0.04 K/UL — SIGNIFICANT CHANGE UP (ref 0–0.2)
BASOPHILS NFR BLD AUTO: 0.3 % — SIGNIFICANT CHANGE UP (ref 0–2)
BASOPHILS NFR BLD AUTO: 0.3 % — SIGNIFICANT CHANGE UP (ref 0–2)
BILIRUB SERPL-MCNC: 0.7 MG/DL — SIGNIFICANT CHANGE UP (ref 0.4–2)
BILIRUB SERPL-MCNC: 0.7 MG/DL — SIGNIFICANT CHANGE UP (ref 0.4–2)
BUN SERPL-MCNC: 38.6 MG/DL — HIGH (ref 8–20)
BUN SERPL-MCNC: 38.6 MG/DL — HIGH (ref 8–20)
CALCIUM SERPL-MCNC: 8.9 MG/DL — SIGNIFICANT CHANGE UP (ref 8.4–10.5)
CALCIUM SERPL-MCNC: 8.9 MG/DL — SIGNIFICANT CHANGE UP (ref 8.4–10.5)
CHLORIDE SERPL-SCNC: 97 MMOL/L — SIGNIFICANT CHANGE UP (ref 96–108)
CHLORIDE SERPL-SCNC: 97 MMOL/L — SIGNIFICANT CHANGE UP (ref 96–108)
CO2 SERPL-SCNC: 30 MMOL/L — HIGH (ref 22–29)
CO2 SERPL-SCNC: 30 MMOL/L — HIGH (ref 22–29)
CREAT SERPL-MCNC: 1.76 MG/DL — HIGH (ref 0.5–1.3)
CREAT SERPL-MCNC: 1.76 MG/DL — HIGH (ref 0.5–1.3)
CRP SERPL-MCNC: 63 MG/L — HIGH
CRP SERPL-MCNC: 63 MG/L — HIGH
D DIMER BLD IA.RAPID-MCNC: <150 NG/ML DDU — SIGNIFICANT CHANGE UP
D DIMER BLD IA.RAPID-MCNC: <150 NG/ML DDU — SIGNIFICANT CHANGE UP
EGFR: 40 ML/MIN/1.73M2 — LOW
EGFR: 40 ML/MIN/1.73M2 — LOW
EOSINOPHIL # BLD AUTO: 0.05 K/UL — SIGNIFICANT CHANGE UP (ref 0–0.5)
EOSINOPHIL # BLD AUTO: 0.05 K/UL — SIGNIFICANT CHANGE UP (ref 0–0.5)
EOSINOPHIL NFR BLD AUTO: 0.3 % — SIGNIFICANT CHANGE UP (ref 0–6)
EOSINOPHIL NFR BLD AUTO: 0.3 % — SIGNIFICANT CHANGE UP (ref 0–6)
GLUCOSE BLDC GLUCOMTR-MCNC: 115 MG/DL — HIGH (ref 70–99)
GLUCOSE BLDC GLUCOMTR-MCNC: 115 MG/DL — HIGH (ref 70–99)
GLUCOSE SERPL-MCNC: 127 MG/DL — HIGH (ref 70–99)
GLUCOSE SERPL-MCNC: 127 MG/DL — HIGH (ref 70–99)
HCT VFR BLD CALC: 41 % — SIGNIFICANT CHANGE UP (ref 39–50)
HCT VFR BLD CALC: 41 % — SIGNIFICANT CHANGE UP (ref 39–50)
HGB BLD-MCNC: 13.9 G/DL — SIGNIFICANT CHANGE UP (ref 13–17)
HGB BLD-MCNC: 13.9 G/DL — SIGNIFICANT CHANGE UP (ref 13–17)
IMM GRANULOCYTES NFR BLD AUTO: 1 % — HIGH (ref 0–0.9)
IMM GRANULOCYTES NFR BLD AUTO: 1 % — HIGH (ref 0–0.9)
LYMPHOCYTES # BLD AUTO: 1.5 K/UL — SIGNIFICANT CHANGE UP (ref 1–3.3)
LYMPHOCYTES # BLD AUTO: 1.5 K/UL — SIGNIFICANT CHANGE UP (ref 1–3.3)
LYMPHOCYTES # BLD AUTO: 10.3 % — LOW (ref 13–44)
LYMPHOCYTES # BLD AUTO: 10.3 % — LOW (ref 13–44)
MAGNESIUM SERPL-MCNC: 2.7 MG/DL — HIGH (ref 1.8–2.6)
MCHC RBC-ENTMCNC: 31.6 PG — SIGNIFICANT CHANGE UP (ref 27–34)
MCHC RBC-ENTMCNC: 31.6 PG — SIGNIFICANT CHANGE UP (ref 27–34)
MCHC RBC-ENTMCNC: 33.9 GM/DL — SIGNIFICANT CHANGE UP (ref 32–36)
MCHC RBC-ENTMCNC: 33.9 GM/DL — SIGNIFICANT CHANGE UP (ref 32–36)
MCV RBC AUTO: 93.2 FL — SIGNIFICANT CHANGE UP (ref 80–100)
MCV RBC AUTO: 93.2 FL — SIGNIFICANT CHANGE UP (ref 80–100)
MONOCYTES # BLD AUTO: 1.73 K/UL — HIGH (ref 0–0.9)
MONOCYTES # BLD AUTO: 1.73 K/UL — HIGH (ref 0–0.9)
MONOCYTES NFR BLD AUTO: 11.9 % — SIGNIFICANT CHANGE UP (ref 2–14)
MONOCYTES NFR BLD AUTO: 11.9 % — SIGNIFICANT CHANGE UP (ref 2–14)
NEUTROPHILS # BLD AUTO: 11.06 K/UL — HIGH (ref 1.8–7.4)
NEUTROPHILS # BLD AUTO: 11.06 K/UL — HIGH (ref 1.8–7.4)
NEUTROPHILS NFR BLD AUTO: 76.2 % — SIGNIFICANT CHANGE UP (ref 43–77)
NEUTROPHILS NFR BLD AUTO: 76.2 % — SIGNIFICANT CHANGE UP (ref 43–77)
NT-PROBNP SERPL-SCNC: 8960 PG/ML — HIGH (ref 0–300)
NT-PROBNP SERPL-SCNC: 8960 PG/ML — HIGH (ref 0–300)
PLATELET # BLD AUTO: 265 K/UL — SIGNIFICANT CHANGE UP (ref 150–400)
PLATELET # BLD AUTO: 265 K/UL — SIGNIFICANT CHANGE UP (ref 150–400)
POTASSIUM SERPL-MCNC: 4.1 MMOL/L — SIGNIFICANT CHANGE UP (ref 3.5–5.3)
POTASSIUM SERPL-MCNC: 4.1 MMOL/L — SIGNIFICANT CHANGE UP (ref 3.5–5.3)
POTASSIUM SERPL-SCNC: 4.1 MMOL/L — SIGNIFICANT CHANGE UP (ref 3.5–5.3)
POTASSIUM SERPL-SCNC: 4.1 MMOL/L — SIGNIFICANT CHANGE UP (ref 3.5–5.3)
PROT SERPL-MCNC: 7.1 G/DL — SIGNIFICANT CHANGE UP (ref 6.6–8.7)
PROT SERPL-MCNC: 7.1 G/DL — SIGNIFICANT CHANGE UP (ref 6.6–8.7)
RAPID RVP RESULT: SIGNIFICANT CHANGE UP
RAPID RVP RESULT: SIGNIFICANT CHANGE UP
RBC # BLD: 4.4 M/UL — SIGNIFICANT CHANGE UP (ref 4.2–5.8)
RBC # BLD: 4.4 M/UL — SIGNIFICANT CHANGE UP (ref 4.2–5.8)
RBC # FLD: 13.7 % — SIGNIFICANT CHANGE UP (ref 10.3–14.5)
RBC # FLD: 13.7 % — SIGNIFICANT CHANGE UP (ref 10.3–14.5)
SARS-COV-2 RNA SPEC QL NAA+PROBE: SIGNIFICANT CHANGE UP
SARS-COV-2 RNA SPEC QL NAA+PROBE: SIGNIFICANT CHANGE UP
SODIUM SERPL-SCNC: 139 MMOL/L — SIGNIFICANT CHANGE UP (ref 135–145)
SODIUM SERPL-SCNC: 139 MMOL/L — SIGNIFICANT CHANGE UP (ref 135–145)
TROPONIN T SERPL-MCNC: 0.05 NG/ML — SIGNIFICANT CHANGE UP (ref 0–0.06)
TROPONIN T SERPL-MCNC: 0.05 NG/ML — SIGNIFICANT CHANGE UP (ref 0–0.06)
WBC # BLD: 14.52 K/UL — HIGH (ref 3.8–10.5)
WBC # BLD: 14.52 K/UL — HIGH (ref 3.8–10.5)
WBC # FLD AUTO: 14.52 K/UL — HIGH (ref 3.8–10.5)
WBC # FLD AUTO: 14.52 K/UL — HIGH (ref 3.8–10.5)

## 2023-10-26 PROCEDURE — 99223 1ST HOSP IP/OBS HIGH 75: CPT

## 2023-10-26 PROCEDURE — 71045 X-RAY EXAM CHEST 1 VIEW: CPT | Mod: 26

## 2023-10-26 PROCEDURE — 71250 CT THORAX DX C-: CPT | Mod: 26

## 2023-10-26 PROCEDURE — 93970 EXTREMITY STUDY: CPT | Mod: 26

## 2023-10-26 PROCEDURE — 99285 EMERGENCY DEPT VISIT HI MDM: CPT

## 2023-10-26 PROCEDURE — 93306 TTE W/DOPPLER COMPLETE: CPT | Mod: 26

## 2023-10-26 PROCEDURE — 99255 IP/OBS CONSLTJ NEW/EST HI 80: CPT

## 2023-10-26 RX ORDER — FERROUS SULFATE 325(65) MG
1 TABLET ORAL
Qty: 0 | Refills: 0 | DISCHARGE

## 2023-10-26 RX ORDER — LEVOTHYROXINE SODIUM 125 MCG
75 TABLET ORAL DAILY
Refills: 0 | Status: DISCONTINUED | OUTPATIENT
Start: 2023-10-26 | End: 2023-10-30

## 2023-10-26 RX ORDER — HEPARIN SODIUM 5000 [USP'U]/ML
5000 INJECTION INTRAVENOUS; SUBCUTANEOUS EVERY 12 HOURS
Refills: 0 | Status: DISCONTINUED | OUTPATIENT
Start: 2023-10-26 | End: 2023-10-30

## 2023-10-26 RX ORDER — SODIUM CHLORIDE 9 MG/ML
1000 INJECTION, SOLUTION INTRAVENOUS
Refills: 0 | Status: DISCONTINUED | OUTPATIENT
Start: 2023-10-26 | End: 2023-10-28

## 2023-10-26 RX ORDER — FUROSEMIDE 40 MG
40 TABLET ORAL ONCE
Refills: 0 | Status: COMPLETED | OUTPATIENT
Start: 2023-10-26 | End: 2023-10-26

## 2023-10-26 RX ORDER — PANTOPRAZOLE SODIUM 20 MG/1
40 TABLET, DELAYED RELEASE ORAL
Refills: 0 | Status: DISCONTINUED | OUTPATIENT
Start: 2023-10-26 | End: 2023-10-30

## 2023-10-26 RX ORDER — DEXTROSE 50 % IN WATER 50 %
15 SYRINGE (ML) INTRAVENOUS ONCE
Refills: 0 | Status: DISCONTINUED | OUTPATIENT
Start: 2023-10-26 | End: 2023-10-28

## 2023-10-26 RX ORDER — VALSARTAN 80 MG/1
40 TABLET ORAL DAILY
Refills: 0 | Status: DISCONTINUED | OUTPATIENT
Start: 2023-10-26 | End: 2023-10-29

## 2023-10-26 RX ORDER — INSULIN LISPRO 100/ML
VIAL (ML) SUBCUTANEOUS
Refills: 0 | Status: DISCONTINUED | OUTPATIENT
Start: 2023-10-26 | End: 2023-10-28

## 2023-10-26 RX ORDER — DEXTROSE 50 % IN WATER 50 %
25 SYRINGE (ML) INTRAVENOUS ONCE
Refills: 0 | Status: DISCONTINUED | OUTPATIENT
Start: 2023-10-26 | End: 2023-10-28

## 2023-10-26 RX ORDER — RANOLAZINE 500 MG/1
500 TABLET, FILM COATED, EXTENDED RELEASE ORAL
Refills: 0 | Status: DISCONTINUED | OUTPATIENT
Start: 2023-10-26 | End: 2023-10-30

## 2023-10-26 RX ORDER — DEXTROSE 50 % IN WATER 50 %
12.5 SYRINGE (ML) INTRAVENOUS ONCE
Refills: 0 | Status: DISCONTINUED | OUTPATIENT
Start: 2023-10-26 | End: 2023-10-28

## 2023-10-26 RX ORDER — ATORVASTATIN CALCIUM 80 MG/1
40 TABLET, FILM COATED ORAL AT BEDTIME
Refills: 0 | Status: DISCONTINUED | OUTPATIENT
Start: 2023-10-26 | End: 2023-10-30

## 2023-10-26 RX ORDER — TICAGRELOR 90 MG/1
90 TABLET ORAL EVERY 12 HOURS
Refills: 0 | Status: DISCONTINUED | OUTPATIENT
Start: 2023-10-26 | End: 2023-10-30

## 2023-10-26 RX ORDER — GLUCAGON INJECTION, SOLUTION 0.5 MG/.1ML
1 INJECTION, SOLUTION SUBCUTANEOUS ONCE
Refills: 0 | Status: DISCONTINUED | OUTPATIENT
Start: 2023-10-26 | End: 2023-10-28

## 2023-10-26 RX ORDER — ASPIRIN/CALCIUM CARB/MAGNESIUM 324 MG
81 TABLET ORAL DAILY
Refills: 0 | Status: DISCONTINUED | OUTPATIENT
Start: 2023-10-26 | End: 2023-10-30

## 2023-10-26 RX ORDER — FUROSEMIDE 40 MG
60 TABLET ORAL EVERY 12 HOURS
Refills: 0 | Status: DISCONTINUED | OUTPATIENT
Start: 2023-10-26 | End: 2023-10-28

## 2023-10-26 RX ORDER — MONTELUKAST 4 MG/1
10 TABLET, CHEWABLE ORAL DAILY
Refills: 0 | Status: DISCONTINUED | OUTPATIENT
Start: 2023-10-26 | End: 2023-10-30

## 2023-10-26 RX ADMIN — HEPARIN SODIUM 5000 UNIT(S): 5000 INJECTION INTRAVENOUS; SUBCUTANEOUS at 18:20

## 2023-10-26 RX ADMIN — Medication 60 MILLIGRAM(S): at 18:19

## 2023-10-26 RX ADMIN — TICAGRELOR 90 MILLIGRAM(S): 90 TABLET ORAL at 18:20

## 2023-10-26 RX ADMIN — ATORVASTATIN CALCIUM 40 MILLIGRAM(S): 80 TABLET, FILM COATED ORAL at 21:33

## 2023-10-26 RX ADMIN — Medication 40 MILLIGRAM(S): at 09:52

## 2023-10-26 RX ADMIN — RANOLAZINE 500 MILLIGRAM(S): 500 TABLET, FILM COATED, EXTENDED RELEASE ORAL at 18:20

## 2023-10-26 NOTE — H&P ADULT - ASSESSMENT
78M with a history of coronary artery disease, heart failure, hypertension, gastroesophageal reflux, and hypothyroidism who presented with increased lower extremity edema, orthopnea, and persistent cough for the past few weeks.    Acute on chronic systolic heart failure  - On intravenous furosemide for diuresis  - To monitor intake and output, to monitor weights  - On valsartan    Coronary artery disease  - Continue on aspirin and ticagrelor  - Continue on atorvastatin  - Troponin level was not elevated and EKG was without acute ischemic changes    Cough  - Noted to have been previously treated with multiple courses of antibiotics  - No hypoxia on examination  - D-dimer was not elevated and lower extremity doppler was without deep vein thrombosis  - Chest Xray was without obvious infiltrates  - For CT of the chest to further assess    Acute kidney injury  - Continue to monitor while on diuretics  - To continue on valsartan for now, no hyperkalemia    Hypothyroidism  - On levothyroxine    Diabetes  - Insulin coverage  - Close monitoring of blood glucose levels    Discussed with the patient and his son at the bedside. 78M with a history of coronary artery disease, heart failure, hypertension, gastroesophageal reflux, and hypothyroidism who presented with increased lower extremity edema, orthopnea, and persistent cough for the past few weeks.    Acute on chronic systolic heart failure  - On intravenous furosemide for diuresis  - To monitor intake and output, to monitor weights  - On valsartan    Coronary artery disease  - Continue on aspirin and ticagrelor  - Continue on atorvastatin  - Troponin level was not elevated and EKG was without acute ischemic changes    Cough  - Noted to have been previously treated with multiple courses of antibiotics  - No hypoxia on examination  - D-dimer was not elevated and lower extremity doppler was without deep vein thrombosis  - Chest Xray was without obvious infiltrates  - For CT of the chest to further assess    Gastroesophageal reflux  - On pantoprazole    Acute kidney injury  - Continue to monitor while on diuretics  - To continue on valsartan for now, no hyperkalemia    Hypothyroidism  - On levothyroxine    Diabetes  - Insulin coverage  - Close monitoring of blood glucose levels    Discussed with the patient and his son at the bedside. 78M with a history of coronary artery disease, heart failure, hypertension, gastroesophageal reflux, and hypothyroidism who presented with increased lower extremity edema, orthopnea, and persistent cough for the past few weeks.    Acute on chronic systolic heart failure  - On intravenous furosemide for diuresis  - To monitor intake and output, to monitor weights  - On valsartan    Coronary artery disease  - Continue on aspirin and ticagrelor  - Continue on atorvastatin  - Troponin level was not elevated and EKG was without acute ischemic changes    Cough  - Noted to have been previously treated with multiple courses of antibiotics  - No hypoxia on examination  - D-dimer was not elevated and lower extremity doppler was without deep vein thrombosis  - Chest Xray was without obvious infiltrates  - For CT of the chest to further assess    Gastroesophageal reflux  - On pantoprazole    Acute kidney injury  - Meloxicam to be held for now  - Continue to monitor while on diuretics  - To continue on valsartan for now, no hyperkalemia    Hypothyroidism  - On levothyroxine    Diabetes  - Insulin coverage  - Close monitoring of blood glucose levels    Discussed with the patient and his son at the bedside. 78M with a history of coronary artery disease, heart failure, hypertension, gastroesophageal reflux, and hypothyroidism who presented with increased lower extremity edema, orthopnea, and persistent cough for the past few weeks.    Acute on chronic systolic heart failure  - On intravenous furosemide for diuresis  - To monitor intake and output, to monitor weights  - On valsartan    Coronary artery disease  - Continue on aspirin and ticagrelor  - Continue on atorvastatin  - Troponin level was not elevated and EKG was without acute ischemic changes    Cough / Leukocytosis  - Noted to have been previously treated with multiple courses of antibiotics  - No hypoxia on examination  - Afebrile  - D-dimer was not elevated and lower extremity doppler was without deep vein thrombosis  - Chest Xray was without obvious infiltrates  - For CT of the chest to further assess    Gastroesophageal reflux  - On pantoprazole    Acute kidney injury  - Meloxicam to be held for now  - Continue to monitor while on diuretics  - To continue on valsartan for now, no hyperkalemia    Hypothyroidism  - On levothyroxine    Diabetes  - Insulin coverage  - Close monitoring of blood glucose levels    Discussed with the patient and his son at the bedside.

## 2023-10-26 NOTE — ED ADULT TRIAGE NOTE - SPO2 (%)
Pepper Daly is a 57year old female.   HPI:     CC:  Patient presents with:  Psoriasis: LOV 10/10/18 pt seen for follow up on psoriasis, here today for follow up needs refill for Kareem Black doing well [facility]  Derm Problem: asking to have skin tag removed fr HCl 0.1 % Nasal Solution 2 sprays by Nasal route 2 (two) times daily. 1 Bottle 5   • Mometasone Furoate (NASONEX) 50 MCG/ACT Nasal Suspension 2 sprays by Nasal route daily.  1 Inhaler 5   • MELOXICAM 7.5 MG Oral Tab TAKE 1 TABLET(7.5 MG) BY MOUTH DAILY 30 t file        Non-medical: Not on file    Tobacco Use      Smoking status: Never Smoker      Smokeless tobacco: Never Used      Tobacco comment: Grew up with a smoker, no present household smokers. Substance and Sexual Activity      Alcohol use:  No Cancer Father    • Colon Cancer Father         Cause of death   • Stroke Mother         Cause of death   • Hypertension Mother    • Diabetes Maternal Grandmother    • Glaucoma Neg    • Macular degeneration Neg        There were no vitals filed for this vis lesions as noted on map.   See details of examination  See Assessment /Plan for additional history and physical exam also:    Assessment / plan:    Orders Placed This Encounter      Specimen to Pathology, Tissue [IHP Pt to 77 S[address] Ferguson fo bothersome. Scalp involvement in particular has been worsening nail involvement noted as well. At last visit. Patient happy with Junito Hernandez. Due to history of gingivitis does not want to consider alternative injectable Biologics at this time.   Occasional f 97

## 2023-10-26 NOTE — CONSULT NOTE ADULT - SUBJECTIVE AND OBJECTIVE BOX
[facility]                                              CARDIOLOGY AT [address], [place] [address]                                             Telephone: [phone]. Fax:[phone]                                                       CARDIOLOGY CONSULTATION NOTE                                                                                             History obtained by: Patient and medical record  Community Cardiologist: Dr. Valenzuela   obtained: Yes [  ] No [ x ]  Reason for Consultation: Acute Decompensated HFrEF  Available out pt records reviewed: Yes [ x ] No [  ]    Chief complaint:    Patient is a 78y old  Male who presents with a chief complaint of leg swelling and orthopnea.    HPI: Patient is a 78 y/o M with a PMHx of CAD s/p CABG x 3 (patent LIMA-LAD, known occluded SVG-RCA) with multiple PCI's post surgery to native OM1 and SVG-OM (last CODI x 2 to SVG-OM1 ISR 11/2020), ischemic CM HFrEF (EF 36% 05/22), DMII, HTN, HLD, and GERD who presented to the ED with worsening orthopnea and leg swelling. Patient went to [facility] and returned 3 weeks ago, and states he has been experiencing some leg swelling and productive cough ever since. Patient states that he also has been unable to sleep and lie down flat due to shortness of breath. Patient has been on antibiotics and had his diuretics increased as well. Symptoms continued to worsen, so he was advised to go the ER for further workup. Patient's pBNP noted to be 8960 with last pBNP as an outpatient around 1K. Patient also with leukocytosis as well as elevated ESR/CRP. Will also need to evaluate for pulmonary embolism in setting of recent long flight. Patient denies any active fevers, chills, syncope, chest pain, abdominal pain, N/V/D, headache, or dizziness.       CARDIAC TESTING   ECHO:  Echo 05/22   EF 36%, mild MR      CATH:   < from: Cardiac Cath Lab - Adult (11.16.20 @ 09:29) >  VENTRICLES: No LV gram was performed; however, a recent echocardiogram  demonstrated an EF of 25 %.  CORONARY VESSELS: The coronary circulation is right dominant.  LM:   --  LM: Diffusely diseased 70-80% diseased vessel. Proximal  circumflex 100%.  LAD:   --  Proximal LAD: There was a 100 % stenosis.  CX:   --  Proximal circumflex: There was a 100 % stenosis.  RCA:   --  Ostial RCA: There was a 100 % stenosis.  GRAFTS:   --  Graft to the LAD: The graft was a LIMA. Graft angiography  showed no evidence of disease.  --  Graft to the 1st obtuse marginal: The graft was a saphenous vein graft  from the aorta. There was a 90 % stenosis in the middle third of the  graft. There was a 95 % stenosis in the distal third of the graft.  COMPLICATIONS: No complications occurred during the cath lab visit.  DIAGNOSTIC IMPRESSIONS: Normal LVEDP.  Patent NOGUERA to LAD, Occluded known SVG to RCA. Recurrent instent  re-stenosis of SVG to OM - 2 separate sites.  PCI performed with 2 CODI, instent re-stenosis.  (guideliner and wiggle wire used)  DIAGNOSTIC RECOMMENDATIONS: Aspirin and Brilinta.  INTERVENTIONAL IMPRESSIONS: Normal LVEDP.  Patent NOGUERA to LAD, Occluded known SVG to RCA. Recurrent instent  re-stenosis of SVG to OM - 2 separate sites.  PCI performed with 2 CODI, instent re-stenosis.  (guideliner and wiggle wire used)  INTERVENTIONAL RECOMMENDATIONS: Aspirin and Brilinta.  Prepared and signed by  Ti Reid MD  Signed 11/16/2020 15:37:07    < end of copied text >    ELECTROPHYSIOLOGY:     PAST MEDICAL HISTORY  CAD (coronary artery disease)    DM (diabetes mellitus)    HTN (hypertension)    HLD (hyperlipidemia)    GERD (gastroesophageal reflux disease)    Hypertension    Diabetes mellitus    High cholesterol    Coronary artery disease involving coronary bypass graft of native heart with unstable angina pectoris    Coronary artery disease involving native coronary artery of native heart, angina presence unspecifie    Type 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, without long-term current use of insul    Essential hypertension    HFrEF (heart failure with reduced ejection fraction)    Heart failure        PAST SURGICAL HISTORY  S/P CABG (coronary artery bypass graft)    CAD S/P percutaneous coronary angioplasty    S/P CABG (coronary artery bypass graft)    S/P primary angioplasty with coronary stent    Status post open reduction with internal fixation of fracture        SOCIAL HISTORY:  Denies smoking/alcohol/drugs  CIGARETTES:     ALCOHOL:  DRUGS:    FAMILY HISTORY:    Family History of Cardiovascular Disease:  Yes [  ] No [  ]  Coronary Artery Disease in first degree relative: Yes [  ] No [  ]  Sudden Cardiac Death in First degree relative: Yes [  ] No [  ]    HOME MEDICATIONS:  atorvastatin 20 mg oral tablet: 1 tab(s) orally once a day (16 Nov 2020 18:58)  Farxiga 10 mg oral tablet: 1 tab(s) orally once a day (16 Nov 2020 18:58)  Feosol 200 mg (65 mg elemental iron) oral tablet: 1 tab(s) orally 2 times a day (16 Nov 2020 18:58)  metoprolol succinate 50 mg oral tablet, extended release: 1 tab(s) orally once a day (16 Nov 2020 17:16)  Multiple Vitamins with Minerals oral tablet: 1 tab(s) orally once a day (16 Nov 2020 17:16)  NexIUM 40 mg oral delayed release capsule: 1 cap(s) orally once a day (at bedtime) (16 Nov 2020 18:58)  NovoLOG 100 units/mL injectable solution: 12, 12, 16 unit(s) injectable 3 times a day (before meals) (16 Nov 2020 18:58)  ranolazine 500 mg oral tablet, extended release: 1 tab(s) orally 2 times a day (16 Nov 2020 17:16)  valsartan 40 mg oral tablet: 1 tab(s) orally once a day (at bedtime) (16 Nov 2020 18:58)      CURRENT CARDIAC MEDICATIONS:      CURRENT OTHER MEDICATIONS:      ALLERGIES:   DOPamine (Hypotension)  Allergy Status Unknown      REVIEW OF SYMPTOMS:   CONSTITUTIONAL: No fever, no chills, no weight loss, no weight gain, no fatigue   ENMT:  No vertigo; No sinus or throat pain  NECK: No pain or stiffness  CARDIOVASCULAR: No chest pain, no dyspnea, no syncope/presyncope, no palpitations, no dizziness, no Orthopnea, no Paroxsymal nocturnal dyspnea  RESPIRATORY: no Shortness of breath, no cough, no wheezing  : No dysuria, no hematuria   GI: No dark color stool, no nausea, no diarrhea, no constipation, no abdominal pain   NEURO: No headache, no slurred speech   MUSCULOSKELETAL: No joint pain or swelling; No muscle, back, or extremity pain  PSYCH: No agitation, no anxiety.    ALL OTHER REVIEW OF SYSTEMS ARE NEGATIVE.    VITAL SIGNS:  T(C): 36.3 (10-26-23 @ 08:33), Max: 36.3 (10-26-23 @ 08:33)  T(F): 97.3 (10-26-23 @ 08:33), Max: 97.3 (10-26-23 @ 08:33)  HR: 93 (10-26-23 @ 09:51) (93 - 103)  BP: 121/82 (10-26-23 @ 09:51) (119/71 - 121/82)  RR: 20 (10-26-23 @ 08:33) (20 - 20)  SpO2: 97% (10-26-23 @ 08:33) (97% - 97%)    INTAKE AND OUTPUT:       PHYSICAL EXAM:  Constitutional: Comfortable . No acute distress.   HEENT: Atraumatic and normocephalic , neck is supple . no JVD. No carotid bruit.  CNS: A&Ox3. No focal deficits.   Respiratory: Trace bibasilar rales with rhonchi in right upper lungs   Cardiovascular: Tachycardic s1 s2. No rubs or gallop. II/VI systolic apex   Gastrointestinal: Soft, non-tender. +Bowel sounds.   Extremities: 2+ Peripheral Pulses, No clubbing, cyanosis, 1+ b/l pedal edema  Psychiatric: Calm . no agitation.   Skin: Warm and dry, no ulcers on extremities     LABS:  ( 26 Oct 2023 09:50 )  Troponin T  0.05 ,  CPK  X    , CKMB  X    , BNP X                                  13.9   14.52 )-----------( 265      ( 26 Oct 2023 09:50 )             41.0     10-26    139  |  97  |  38.6<H>  ----------------------------<  127<H>  4.1   |  30.0<H>  |  1.76<H>    Ca    8.9      26 Oct 2023 09:50  Mg     2.7     10-26    TPro  7.1  /  Alb  3.5  /  TBili  0.7  /  DBili  x   /  AST  67<H>  /  ALT  39  /  AlkPhos  124<H>  10-26      Urinalysis Basic - ( 26 Oct 2023 09:50 )    Color: x / Appearance: x / SG: x / pH: x  Gluc: 127 mg/dL / Ketone: x  / Bili: x / Urobili: x   Blood: x / Protein: x / Nitrite: x   Leuk Esterase: x / RBC: x / WBC x   Sq Epi: x / Non Sq Epi: x / Bacteria: x        INTERPRETATION OF TELEMETRY:     ECG: ST, LBBB  Prior ECG: Yes [  ] No [  ]    RADIOLOGY & ADDITIONAL STUDIES:    X-ray:    < from: Xray Chest 1 View- PORTABLE-Urgent (Xray Chest 1 View- PORTABLE-Urgent .) (10.26.23 @ 10:36) >  CLINICAL HISTORY: cough    FINDINGS:    Single frontal view of the chest demonstrates small bilateral pleural   effusions. Mediastinal sternotomy wires. The cardiomediastinal silhouette   is enlarged. No acute osseous abnormalities. Overlying EKG leads and   wires are noted    IMPRESSION: Small bilateral pleural effusions. Cardiomegaly.    < end of copied text >    CT scan:   MRI:   US:

## 2023-10-26 NOTE — H&P ADULT - NSICDXPASTSURGICALHX_GEN_ALL_CORE_FT
PAST SURGICAL HISTORY:  S/P CABG (coronary artery bypass graft) 4V 1983 [place]    S/P primary angioplasty with coronary stent     Status post open reduction with internal fixation of fracture

## 2023-10-26 NOTE — H&P ADULT - NSHPPHYSICALEXAM_GEN_ALL_CORE
Vital Signs Last 24 Hrs  T(C): 36.1 (26 Oct 2023 11:28), Max: 36.3 (26 Oct 2023 08:33)  T(F): 97 (26 Oct 2023 11:28), Max: 97.3 (26 Oct 2023 08:33)  HR: 91 (26 Oct 2023 11:28) (91 - 103)  BP: 132/80 (26 Oct 2023 11:28) (119/71 - 132/80)  BP(mean): --  RR: 20 (26 Oct 2023 11:28) (20 - 20)  SpO2: 96% (26 Oct 2023 11:28) (96% - 97%)    Parameters below as of 26 Oct 2023 08:33  Patient On (Oxygen Delivery Method): room air    General appearance: No acute distress, Awake, Alert  HEENT: Normocephalic, Atraumatic, Conjunctiva clear, EOMI  Neck: Supple, No JVD, No tenderness  Lungs: Breath sound equal bilaterally, No wheezes, Basilar rales  Cardiovascular: S1S2, Regular rhythm  Abdomen: Soft, Nontender, Nondistended, No guarding/rebound, Positive bowel sounds  Extremities: No clubbing, No cyanosis, No calf tenderness, Lower extremity edema  Neuro: Strength equal bilaterally, No tremors  Psychiatric: Appropriate mood, Normal affect

## 2023-10-26 NOTE — CONSULT NOTE ADULT - ASSESSMENT
A/P: Patient is a 76 y/o M with a PMHx of CAD s/p CABG x 3 (patent LIMA-LAD, known occluded SVG-RCA) with multiple PCI's post surgery to native OM1 and SVG-OM (last CODI x 2 to SVG-OM1 ISR 11/2020), ischemic CM HFrEF (EF 36% 05/22), DMII, HTN, HLD, and GERD who presented to the ED with worsening orthopnea and leg swelling. Patient went to [facility] and returned 3 weeks ago, and states he has been experiencing some leg swelling and productive cough ever since. Patient states that he also has been unable to sleep and lie down flat due to shortness of breath. Patient has been on antibiotics and had his diuretics increased as well. Symptoms continued to worsen, so he was advised to go the ER for further workup. Patient's pBNP noted to be 8960 with last pBNP as an outpatient around 1K. Patient also with leukocytosis as well as elevated ESR/CRP. Will also need to evaluate for pulmonary embolism in setting of recent long flight. Patient denies any active fevers, chills, syncope, chest pain, abdominal pain, N/V/D, headache, or dizziness.   Troponin negative x 1  pBNP 8960

## 2023-10-26 NOTE — CONSULT NOTE ADULT - PROBLEM SELECTOR RECOMMENDATION 2
- Patient with extensive history of CAD with CABG  x 3 with patent LIMA-LAD, occluded SVG-RCA, and most recently CODI x 2 to SVG-OM1 for ISR.   - Continue aspirin and brilinta at this time due to multilayered stents and extensive CAD.   - Continue toprol XL, valsartan, and lipitor.   - Echocardiogram as above.

## 2023-10-26 NOTE — ED PROVIDER NOTE - NS ED ROS FT
Review of Systems  SKIN: warm, dry w/ no rash or bleeding  RESPIRATORY: +COUGH, ORTHOPNEA  CARDIAC: no chest pain & no palpitations  GI: no abd pain, nausea, vomiting, diarrhea  EXT:  no joint pain +ANKLE SWELLING  NEURO: Alert, oriented x3. No weakness, numbness.

## 2023-10-26 NOTE — H&P ADULT - HISTORY OF PRESENT ILLNESS
78M who presented with increased dyspnea and lower extremity edema. The patient was noted to have a history of dyspnea and productive cough over the past month despite multiple coursed of antibiotics. He had also noted increased lower extremity edema and orthopnea with increased coughing when lying down. He denied any chest pain or palpitations. There were no recent fevers, chills, or sick contacts. He was evaluated by his pulmonologist on an outpatient basis and was planned for CT of the chest. He has been compliant with his medications and was unaware of any aggravating factors.

## 2023-10-26 NOTE — ED PROVIDER NOTE - OBJECTIVE STATEMENT
ADRIANNA SHANKS is a 77yo Male with PMH GERD, HLD, CAD s/p CAGB, T2DM on orals, HTN, CHF who presents c/o two weeks of orthopnea and lower extremity swelling. PT also c/o of productive cough. Denies fevers. States he was seen by his cardiologist a few days ago who recommended he come in for echocardiogram. PT denies any symptoms of cp, fevers, chills, nausea, vomiting, abdominal pain, urinary symptoms, weakness, confusion.

## 2023-10-26 NOTE — CONSULT NOTE ADULT - NS ATTEND AMEND GEN_ALL_CORE FT
77M hx CAD s/p CABG x 3 (patent LIMA-LAD, known occluded SVG-RCA) with multiple PCI's post surgery to native OM1 and SVG-OM (last CODI x 2 to SVG-OM1 ISR 11/2020), ischemic CM HFrEF (EF 36% 05/22), DMII, HTN, HLD, and GERD who presented to the ED with worsening orthopnea and leg swelling.     Acute on Chronic HFrEF  Patient's pBNP noted to be 8960 with last pBNP as an outpatient around 1K.   Patient also with leukocytosis as well as elevated ESR/CRP. Will also need to evaluate for pulmonary embolism in setting of recent long flight. Patient denies any active fevers, chills, syncope, chest pain, abdominal pain, N/V/D, headache, or dizziness.   Troponin negative x 1  However likely multifactorial due to elevated ESR/CRP and leukocytosis.   IV Diuretics  Obtain TTE.   Patient also with long international flight 2 weeks ago.   If D-Dimer is negative, would check non-contrast CT to evaluate for any pneumonia.   GDMT for HFrEF as tolerated    CAD   Extensive history of CAD with CABG  x 3 with patent LIMA-LAD, occluded SVG-RCA, and most recently CODI x 2 to SVG-OM1 for ISR.   Continue aspirin and brilinta at this time due to multilayered stents and extensive CAD.   Continue toprol XL, valsartan, and lipitor.

## 2023-10-26 NOTE — ED PROVIDER NOTE - PHYSICAL EXAMINATION
Gen: Well appearing in NAD  Head: NC/AT  Neck: trachea midline  Resp:  +RHONCHI RIGHT LUNG, left lung clear.   Abd: soft, nd, nt  Ext: +B/L PITTING EDEMA TO ANKLES  Neuro:  A&O appears non focal  Skin:  Warm and dry as visualized  Psych:  Normal affect and mood

## 2023-10-26 NOTE — ED PROVIDER NOTE - CLINICAL SUMMARY MEDICAL DECISION MAKING FREE TEXT BOX
ASSESSMENT:   ADRIANNA SHANKS is a 79yo M who presented with orthopnea and lower extremity edema x two weeks. Borderline tachycardia but w/o hypoxia. Normotensive. Physical exam w/ ronchi in right lung, lower extremity edema. PT sent in by cardiology for echocardiogram.     PLAN: Treat fluid overload w/ diuretics. EKG. Check cardiac enzymes. Screen for infection, electrolyte disturbances on cbc, cmp. CXR to eval for fluid, infection. Echo. Reassess.

## 2023-10-26 NOTE — ED ADULT NURSE NOTE - CHPI ED NUR SYMPTOMS NEG
no back pain/no chest pain/no chills/no diaphoresis/no dizziness/no fever/no nausea/no shortness of breath/no syncope/no vomiting

## 2023-10-26 NOTE — ED ADULT NURSE NOTE - NS ED NURSE REPORT GIVEN TO FT
verbal report given to Catrina FOUNTAIN in [facility], RN said ok for pt to come to unit from US. RN in understanding of plan of care.

## 2023-10-26 NOTE — ED PROVIDER NOTE - ATTENDING CONTRIBUTION TO CARE
Pt with worsening LE swelling and orthopnea worsening over the past several days.    physical - rrr. ctab. abd - soft, nt. B/L LE edema. no rash.    plan - labs and imaging reviewed. lasix given. cardio consult appreciated. will admit to medicine.

## 2023-10-26 NOTE — H&P ADULT - NSHPLABSRESULTS_GEN_ALL_CORE
Chest Xray was reviewed, noted small pleural effusions  EKG was reviewed, sinus rhythm with left bundle branch block

## 2023-10-26 NOTE — ED ADULT NURSE NOTE - OBJECTIVE STATEMENT
79 y/o male sent by cardiologist to ED presenting with b/l LE edema and redness x 2 days. pt states they have also been experiencing a cough with "yellow" and "black" secretions. denies CP/pressure/tightness, palpitations, SOB/dyspnea, dizziness/headache/lightheadedness/blurry vision/change in mental status, tingling/numbness, fevers/chills, N/V/D, urinary S&S.

## 2023-10-26 NOTE — CONSULT NOTE ADULT - PROBLEM SELECTOR RECOMMENDATION 9
- Patient with worsening orthopnea, cough, and leg swelling.   - pBNP 8960 with last office pBNP around 1K  - However likely multifactorial due to elevated ESR/CRP and leukocytosis.   - Start Lasix 60mg IV BID.   - Obtain TTE.   - Patient also with long international flight 2 weeks ago.   - Check D-Dimer, if elevated will need CTA chest vs. V/Q scan.   - If D-Dimer is negative, would check non-contrast CT to evaluate for any pneumonia.   - GDMT for HFrEF  Beta Blocker: Continue Toprol XL 50mg PO qday.   RAAS Inhibitor: Continue Valsartan 20mg PO qday.   MRA: On hold for BP  Diuretic: Lasix as above.   SGLT2i: Hold Farxiga for RAS at this time.   - Monitor on telemetry.    - Strict i/o and daily weights.    - Keep K > 4, Mg > 2.    - Monitor renal function with ongoing diuresis.

## 2023-10-26 NOTE — ED ADULT TRIAGE NOTE - CHIEF COMPLAINT QUOTE
Pt A&Ox4, NAD. Per family pt sent in from cardio for Echo. B/L LE swelling noted.  Denies CP at this time. Breathing even and unlabored.

## 2023-10-27 LAB
A1C WITH ESTIMATED AVERAGE GLUCOSE RESULT: 6.4 % — HIGH (ref 4–5.6)
A1C WITH ESTIMATED AVERAGE GLUCOSE RESULT: 6.4 % — HIGH (ref 4–5.6)
ANION GAP SERPL CALC-SCNC: 14 MMOL/L — SIGNIFICANT CHANGE UP (ref 5–17)
ANION GAP SERPL CALC-SCNC: 14 MMOL/L — SIGNIFICANT CHANGE UP (ref 5–17)
BUN SERPL-MCNC: 36.3 MG/DL — HIGH (ref 8–20)
BUN SERPL-MCNC: 36.3 MG/DL — HIGH (ref 8–20)
CALCIUM SERPL-MCNC: 9.3 MG/DL — SIGNIFICANT CHANGE UP (ref 8.4–10.5)
CALCIUM SERPL-MCNC: 9.3 MG/DL — SIGNIFICANT CHANGE UP (ref 8.4–10.5)
CHLORIDE SERPL-SCNC: 96 MMOL/L — SIGNIFICANT CHANGE UP (ref 96–108)
CHLORIDE SERPL-SCNC: 96 MMOL/L — SIGNIFICANT CHANGE UP (ref 96–108)
CO2 SERPL-SCNC: 30 MMOL/L — HIGH (ref 22–29)
CO2 SERPL-SCNC: 30 MMOL/L — HIGH (ref 22–29)
CREAT SERPL-MCNC: 1.79 MG/DL — HIGH (ref 0.5–1.3)
CREAT SERPL-MCNC: 1.79 MG/DL — HIGH (ref 0.5–1.3)
EGFR: 39 ML/MIN/1.73M2 — LOW
EGFR: 39 ML/MIN/1.73M2 — LOW
ESTIMATED AVERAGE GLUCOSE: 137 MG/DL — HIGH (ref 68–114)
ESTIMATED AVERAGE GLUCOSE: 137 MG/DL — HIGH (ref 68–114)
GLUCOSE BLDC GLUCOMTR-MCNC: 101 MG/DL — HIGH (ref 70–99)
GLUCOSE BLDC GLUCOMTR-MCNC: 101 MG/DL — HIGH (ref 70–99)
GLUCOSE BLDC GLUCOMTR-MCNC: 154 MG/DL — HIGH (ref 70–99)
GLUCOSE BLDC GLUCOMTR-MCNC: 154 MG/DL — HIGH (ref 70–99)
GLUCOSE BLDC GLUCOMTR-MCNC: 290 MG/DL — HIGH (ref 70–99)
GLUCOSE BLDC GLUCOMTR-MCNC: 290 MG/DL — HIGH (ref 70–99)
GLUCOSE SERPL-MCNC: 130 MG/DL — HIGH (ref 70–99)
GLUCOSE SERPL-MCNC: 130 MG/DL — HIGH (ref 70–99)
HCT VFR BLD CALC: 43.4 % — SIGNIFICANT CHANGE UP (ref 39–50)
HCT VFR BLD CALC: 43.4 % — SIGNIFICANT CHANGE UP (ref 39–50)
HGB BLD-MCNC: 13.9 G/DL — SIGNIFICANT CHANGE UP (ref 13–17)
HGB BLD-MCNC: 13.9 G/DL — SIGNIFICANT CHANGE UP (ref 13–17)
MCHC RBC-ENTMCNC: 30.3 PG — SIGNIFICANT CHANGE UP (ref 27–34)
MCHC RBC-ENTMCNC: 30.3 PG — SIGNIFICANT CHANGE UP (ref 27–34)
MCHC RBC-ENTMCNC: 32 GM/DL — SIGNIFICANT CHANGE UP (ref 32–36)
MCHC RBC-ENTMCNC: 32 GM/DL — SIGNIFICANT CHANGE UP (ref 32–36)
MCV RBC AUTO: 94.8 FL — SIGNIFICANT CHANGE UP (ref 80–100)
MCV RBC AUTO: 94.8 FL — SIGNIFICANT CHANGE UP (ref 80–100)
PLATELET # BLD AUTO: 266 K/UL — SIGNIFICANT CHANGE UP (ref 150–400)
PLATELET # BLD AUTO: 266 K/UL — SIGNIFICANT CHANGE UP (ref 150–400)
POTASSIUM SERPL-MCNC: 4.3 MMOL/L — SIGNIFICANT CHANGE UP (ref 3.5–5.3)
POTASSIUM SERPL-MCNC: 4.3 MMOL/L — SIGNIFICANT CHANGE UP (ref 3.5–5.3)
POTASSIUM SERPL-SCNC: 4.3 MMOL/L — SIGNIFICANT CHANGE UP (ref 3.5–5.3)
POTASSIUM SERPL-SCNC: 4.3 MMOL/L — SIGNIFICANT CHANGE UP (ref 3.5–5.3)
RBC # BLD: 4.58 M/UL — SIGNIFICANT CHANGE UP (ref 4.2–5.8)
RBC # BLD: 4.58 M/UL — SIGNIFICANT CHANGE UP (ref 4.2–5.8)
RBC # FLD: 14 % — SIGNIFICANT CHANGE UP (ref 10.3–14.5)
RBC # FLD: 14 % — SIGNIFICANT CHANGE UP (ref 10.3–14.5)
SODIUM SERPL-SCNC: 139 MMOL/L — SIGNIFICANT CHANGE UP (ref 135–145)
SODIUM SERPL-SCNC: 139 MMOL/L — SIGNIFICANT CHANGE UP (ref 135–145)
TROPONIN T SERPL-MCNC: 0.05 NG/ML — SIGNIFICANT CHANGE UP (ref 0–0.06)
TROPONIN T SERPL-MCNC: 0.05 NG/ML — SIGNIFICANT CHANGE UP (ref 0–0.06)
WBC # BLD: 10.73 K/UL — HIGH (ref 3.8–10.5)
WBC # BLD: 10.73 K/UL — HIGH (ref 3.8–10.5)
WBC # FLD AUTO: 10.73 K/UL — HIGH (ref 3.8–10.5)
WBC # FLD AUTO: 10.73 K/UL — HIGH (ref 3.8–10.5)

## 2023-10-27 PROCEDURE — 99291 CRITICAL CARE FIRST HOUR: CPT

## 2023-10-27 PROCEDURE — 99233 SBSQ HOSP IP/OBS HIGH 50: CPT

## 2023-10-27 RX ORDER — CEFTRIAXONE 500 MG/1
INJECTION, POWDER, FOR SOLUTION INTRAMUSCULAR; INTRAVENOUS
Refills: 0 | Status: DISCONTINUED | OUTPATIENT
Start: 2023-10-27 | End: 2023-10-30

## 2023-10-27 RX ORDER — CEFTRIAXONE 500 MG/1
INJECTION, POWDER, FOR SOLUTION INTRAMUSCULAR; INTRAVENOUS
Refills: 0 | Status: DISCONTINUED | OUTPATIENT
Start: 2023-10-27 | End: 2023-10-27

## 2023-10-27 RX ORDER — CEFTRIAXONE 500 MG/1
1000 INJECTION, POWDER, FOR SOLUTION INTRAMUSCULAR; INTRAVENOUS EVERY 24 HOURS
Refills: 0 | Status: DISCONTINUED | OUTPATIENT
Start: 2023-10-28 | End: 2023-10-30

## 2023-10-27 RX ORDER — CEFTRIAXONE 500 MG/1
1000 INJECTION, POWDER, FOR SOLUTION INTRAMUSCULAR; INTRAVENOUS ONCE
Refills: 0 | Status: COMPLETED | OUTPATIENT
Start: 2023-10-27 | End: 2023-10-27

## 2023-10-27 RX ORDER — AZITHROMYCIN 500 MG/1
500 TABLET, FILM COATED ORAL ONCE
Refills: 0 | Status: COMPLETED | OUTPATIENT
Start: 2023-10-27 | End: 2023-10-27

## 2023-10-27 RX ORDER — AZITHROMYCIN 500 MG/1
500 TABLET, FILM COATED ORAL EVERY 24 HOURS
Refills: 0 | Status: DISCONTINUED | OUTPATIENT
Start: 2023-10-28 | End: 2023-10-29

## 2023-10-27 RX ORDER — DIPHENHYDRAMINE HCL 50 MG
25 CAPSULE ORAL ONCE
Refills: 0 | Status: COMPLETED | OUTPATIENT
Start: 2023-10-27 | End: 2023-10-27

## 2023-10-27 RX ORDER — AZITHROMYCIN 500 MG/1
TABLET, FILM COATED ORAL
Refills: 0 | Status: DISCONTINUED | OUTPATIENT
Start: 2023-10-27 | End: 2023-10-29

## 2023-10-27 RX ADMIN — Medication 60 MILLIGRAM(S): at 06:27

## 2023-10-27 RX ADMIN — ATORVASTATIN CALCIUM 40 MILLIGRAM(S): 80 TABLET, FILM COATED ORAL at 21:19

## 2023-10-27 RX ADMIN — Medication 100 MILLIGRAM(S): at 14:40

## 2023-10-27 RX ADMIN — Medication 6: at 08:43

## 2023-10-27 RX ADMIN — Medication 81 MILLIGRAM(S): at 12:08

## 2023-10-27 RX ADMIN — VALSARTAN 40 MILLIGRAM(S): 80 TABLET ORAL at 05:42

## 2023-10-27 RX ADMIN — AZITHROMYCIN 255 MILLIGRAM(S): 500 TABLET, FILM COATED ORAL at 10:02

## 2023-10-27 RX ADMIN — RANOLAZINE 500 MILLIGRAM(S): 500 TABLET, FILM COATED, EXTENDED RELEASE ORAL at 17:57

## 2023-10-27 RX ADMIN — HEPARIN SODIUM 5000 UNIT(S): 5000 INJECTION INTRAVENOUS; SUBCUTANEOUS at 17:56

## 2023-10-27 RX ADMIN — RANOLAZINE 500 MILLIGRAM(S): 500 TABLET, FILM COATED, EXTENDED RELEASE ORAL at 05:41

## 2023-10-27 RX ADMIN — TICAGRELOR 90 MILLIGRAM(S): 90 TABLET ORAL at 06:27

## 2023-10-27 RX ADMIN — HEPARIN SODIUM 5000 UNIT(S): 5000 INJECTION INTRAVENOUS; SUBCUTANEOUS at 05:40

## 2023-10-27 RX ADMIN — Medication 75 MICROGRAM(S): at 05:41

## 2023-10-27 RX ADMIN — Medication 60 MILLIGRAM(S): at 17:57

## 2023-10-27 RX ADMIN — MONTELUKAST 10 MILLIGRAM(S): 4 TABLET, CHEWABLE ORAL at 12:08

## 2023-10-27 RX ADMIN — PANTOPRAZOLE SODIUM 40 MILLIGRAM(S): 20 TABLET, DELAYED RELEASE ORAL at 05:43

## 2023-10-27 RX ADMIN — CEFTRIAXONE 1000 MILLIGRAM(S): 500 INJECTION, POWDER, FOR SOLUTION INTRAMUSCULAR; INTRAVENOUS at 10:02

## 2023-10-27 RX ADMIN — TICAGRELOR 90 MILLIGRAM(S): 90 TABLET ORAL at 18:15

## 2023-10-27 RX ADMIN — Medication 2: at 17:59

## 2023-10-27 RX ADMIN — Medication 25 MILLIGRAM(S): at 15:51

## 2023-10-27 NOTE — PROGRESS NOTE ADULT - SUBJECTIVE AND OBJECTIVE BOX
[facility] PHYSIC[facility]                                                         CARDIOLOGY AT [facility]                                                                  [address] [facility] [address] [facility], [place] [place] [address]                                                         Telephone: [phone]. Fax:[phone]                                                                             PROGRESS NOTE    Reason for follow up: Acute HFrEF  Update: Patient had repeat echocardiogram with EF <20%, grade II DD, elevated LA pressure, mod MR, mild AI, mild TR, and moderate pulmonary HTN. Patient given a dose of metolazone this morning for further diuresis, still with pedal edema and orthopnea.       Review of symptoms:   Cardiac:  No chest pain. + dyspnea. No palpitations.  Respiratory: no cough. + dyspnea  Gastrointestinal: No diarrhea. No abdominal pain. No bleeding.   Neuro: No focal neuro complaints.    Vitals:  T(C): 36.6 (10-27-23 @ 04:33), Max: 37 (10-26-23 @ 17:37)  HR: 101 (10-27-23 @ 04:33) (85 - 103)  BP: 124/80 (10-27-23 @ 04:33) (119/71 - 132/80)  RR: 18 (10-27-23 @ 04:33) (18 - 20)  SpO2: 98% (10-27-23 @ 04:33) (91% - 98%)  Wt(kg): --  I&O's Summary    Weight (kg): 70 (10-26 @ 08:33)    PHYSICAL EXAM:  Constitutional: Comfortable . No acute distress.   HEENT: Atraumatic and normocephalic , neck is supple . no JVD. No carotid bruit.  CNS: A&Ox3. No focal deficits.   Respiratory: Decreased BS at b/l bases   Cardiovascular: Tachycardic s1 s2. No rubs or gallop. II/VI systolic apex   Gastrointestinal: Soft, non-tender. +Bowel sounds.   Extremities: 2+ Peripheral Pulses, No clubbing, cyanosis, 1+ b/l pedal edema  Psychiatric: Calm . no agitation.   Skin: Warm and dry, no ulcers on extremities     CURRENT CARDIAC MEDICATIONS:  furosemide   Injectable 60 milliGRAM(s) IV Push every 12 hours  ranolazine 500 milliGRAM(s) Oral two times a day  valsartan 40 milliGRAM(s) Oral daily      CURRENT OTHER MEDICATIONS:  montelukast 10 milliGRAM(s) Oral daily  azithromycin  IVPB 500 milliGRAM(s) IV Intermittent once  azithromycin  IVPB      cefTRIAXone Injectable.      cefTRIAXone Injectable. 1000 milliGRAM(s) IV Push once  pantoprazole    Tablet 40 milliGRAM(s) Oral before breakfast  atorvastatin 40 milliGRAM(s) Oral at bedtime  dextrose 50% Injectable 25 Gram(s) IV Push once, Stop order after: 1 Doses  dextrose 50% Injectable 12.5 Gram(s) IV Push once, Stop order after: 1 Doses  dextrose 50% Injectable 25 Gram(s) IV Push once, Stop order after: 1 Doses  dextrose Oral Gel 15 Gram(s) Oral once, Stop order after: 1 Doses PRN Blood Glucose LESS THAN 70 milliGRAM(s)/deciliter  glucagon  Injectable 1 milliGRAM(s) IntraMuscular once, Stop order after: 1 Doses  insulin lispro (ADMELOG) corrective regimen sliding scale   SubCutaneous three times a day before meals  levothyroxine 75 MICROGram(s) Oral daily  aspirin enteric coated 81 milliGRAM(s) Oral daily  dextrose 5%. 1000 milliLiter(s) (50 mL/Hr) IV Continuous <Continuous>  dextrose 5%. 1000 milliLiter(s) (100 mL/Hr) IV Continuous <Continuous>  heparin   Injectable 5000 Unit(s) SubCutaneous every 12 hours  ticagrelor 90 milliGRAM(s) Oral every 12 hours      LABS:	 	  ( 26 Oct 2023 09:50 )  Troponin T  0.05 ,  CPK  X    , CKMB  X    , BNP X                                  13.9   10.73 )-----------( 266      ( 27 Oct 2023 03:25 )             43.4     10-27    139  |  96  |  36.3<H>  ----------------------------<  130<H>  4.3   |  30.0<H>  |  1.79<H>    Ca    9.3      27 Oct 2023 03:25  Mg     2.7     10-26    TPro  7.1  /  Alb  3.5  /  TBili  0.7  /  DBili  x   /  AST  67<H>  /  ALT  39  /  AlkPhos  124<H>  10-26      Lipid Profile:   HgA1c:   TSH:     TELEMETRY: ST  ECG:    DIAGNOSTIC TESTING:  [ ] Echocardiogram:   < from: TTE Echo Complete w/ Contrast w/ Doppler (10.26.23 @ 12:30) >    PHYSICIAN INTERPRETATION:  Left Ventricle: Endocardial visualization was enhanced with intravenous   echo contrast. The left ventricular internal cavity size is normal.  Global LV systolic function was severely decreased. Left ventricular   ejection fraction, by visual estimation, is <20%. Spectral Doppler shows   restrictive pattern of left ventricular myocardial filling (Grade III   diastolic dysfunction). Elevated mean left atrial pressure.  Right Ventricle: The right ventricular size is mildly enlarged. RV   systolic function is severely reduced.  Left Atrium: Moderately enlarged left atrium.  Right Atrium: Mildly enlarged right atrium.  Pericardium: There is no evidence of pericardial effusion.  Mitral Valve: The mitral valve leaflets are tethered which is due to   reduced systolic function and elevated LVDP. There is mild mitral annular   calcification. Moderate mitral valve regurgitation is seen.  Tricuspid Valve: Mild tricuspid regurgitation is visualized. Estimated   pulmonary artery systolic pressure is 50.5 mmHg assuming a right atrial   pressure of 8 mmHg, which is consistent with moderate pulmonary   hypertension.  Aortic Valve: The aortic valve is trileaflet. Sclerotic aortic valve with   decreased opening. Mild aortic valve regurgitation is seen.  Pulmonic Valve: Trace pulmonic valve regurgitation.  Aorta: The aortic root is normal in size and structure.  Pulmonary Artery: The main pulmonary artery is normal in size.  Venous: The inferior vena cava was normal sized, with respiratory size   variation less than 50%.      Summary:   1. Endocardial visualization was enhanced with intravenous echo contrast.   2. Moderately enlarged left atrium.   3. Left ventricular ejection fraction, by visual estimation, is <20%.   4. Grade III diastolic dysfunction. Elevated mean left atrial pressure.   5. Mildly enlarged right atrium.   6. Mildly enlarged right ventricle. Severely reduced RV systolic   function.   7. Moderate mitral valve regurgitation.   8. Mild aortic regurgitation.   9. Mild tricuspid regurgitation.  10. Estimated pulmonary artery systolic pressure is 51 mmHg -moderate   pulmonary hypertension.  11. There is no evidence of pericardial effusion.    MD Zacarias, RPVI Electronically signed on 10/26/2023 at 6:39:09   PM    < end of copied text >    [ ]  Catheterization:  < from: Cardiac Cath Lab - Adult (11.16.20 @ 09:29) >  CONTRAST GIVEN: Omnipaque 24 ml. Omnipaque 46 ml.  MEDICATIONS GIVEN: Midazolam, 0.5 mg, IV. Fentanyl, 25 mcg, IV. Midazolam,  1 mg, IV. Fentanyl, 25 mcg, IV. Nicardipine (Cardene), 250 mcg,  intracoronary. Heparin, 6000 units, IV. 1% Lidocaine, 10 ml,  subcutaneously.  VENTRICLES: No LV gram was performed; however, a recent echocardiogram  demonstrated an EF of 25 %.  CORONARY VESSELS: The coronary circulation is right dominant.  LM:   --  LM: Diffusely diseased 70-80% diseased vessel. Proximal  circumflex 100%.  LAD:   --  Proximal LAD: There was a 100 % stenosis.  CX:   --  Proximal circumflex: There was a 100 % stenosis.  RCA:   --  Ostial RCA: There was a 100 % stenosis.  GRAFTS:   --  Graft to the LAD: The graft was a LIMA. Graft angiography  showed no evidence of disease.  --  Graft to the 1st obtuse marginal: The graft was a saphenous vein graft  from the aorta. There was a 90 % stenosis in the middle third of the  graft. There was a 95 % stenosis in the distal third of the graft.  COMPLICATIONS: No complications occurred during the cath lab visit.  DIAGNOSTIC IMPRESSIONS: Normal LVEDP.  Patent NOGUERA to LAD, Occluded known SVG to RCA. Recurrent instent  re-stenosis of SVG to OM - 2 separate sites.  PCI performed with 2 CODI, instent re-stenosis.  (guideliner and wiggle wire used)  DIAGNOSTIC RECOMMENDATIONS: Aspirin and Brilinta.  INTERVENTIONAL IMPRESSIONS: Normal LVEDP.  Patent NOGUERA to LAD, Occluded known SVG to RCA. Recurrent instent  re-stenosis of SVG to OM - 2 separate sites.  PCI performed with 2 CODI, instent re-stenosis.  (guideliner and wiggle wire used)  INTERVENTIONAL RECOMMENDATIONS: Aspirin and Brilinta.  Prepared and signed by  Ti Reid MD  Signed 11/16/2020 15:37:07    < end of copied text >    [ ] Stress Test:    OTHER:

## 2023-10-27 NOTE — CHART NOTE - NSCHARTNOTEFT_GEN_A_CORE
79 y/o male with pneumonia and CHF c/o cough. He denies any SOB, CP or any other c/o @ present other than cough. O2 sat = 96 - 100 on ra.     O NAD, A&Ox3  VS: T 97.6, / 66, HR 85, R 18, Sat 96 - 100%  HEENT: AT, pupils reactive  Neck: Supple  Heart: +S1, S2 noted  Lungs: +bibasilar rales, no SOB, occasional non-productive cough    A/P: Cough in pt. with CHF and pneumonia  - Guaifenesin 100ml q6h   * Dr. Trujillo made aware, no further ord. at present. Nsg to continue to monitor pt. progress.

## 2023-10-27 NOTE — PROGRESS NOTE ADULT - PROBLEM SELECTOR PLAN 1
- Patient with worsening orthopnea, cough, and leg swelling.   - pBNP 8960 with last office pBNP around 1K.  - However likely multifactorial due to elevated ESR/CRP and leukocytosis.   - Repeat echocardiogram with EF now <20%, grade II DD, elevated LA pressure, mod MR, mild AI, mild TR, and moderate pulmonary HTN.  - Continue Lasix 60mg IV BID.   - Given metolazone 2.5mg PO x 1 today.   - Patient also with long international flight 2 weeks ago.   - D-Dimer is negative.   - CT chest with Rt upper lobe opacities which may be infectious or inflammatory.  - If D-Dimer is negative, would check non-contrast CT to evaluate for any pneumonia.   - GDMT for HFrEF  Beta Blocker: Continue Toprol XL 50mg PO qday.   RAAS Inhibitor: Continue Valsartan 20mg PO qday.   MRA: On hold for BP  Diuretic: Lasix as above.   SGLT2i: Hold Farxiga for RAS at this time.   - Monitor on telemetry.    - Strict i/o and daily weights.    - Keep K > 4, Mg > 2.    - Monitor renal function with ongoing diuresis.

## 2023-10-27 NOTE — PROGRESS NOTE ADULT - PROBLEM SELECTOR PLAN 2
- Patient with extensive history of CAD with CABG  x 3 with patent LIMA-LAD, occluded SVG-RCA, and most recently CODI x 2 to SVG-OM1 for ISR.   - Continue aspirin and brilinta at this time due to multilayered stents and extensive CAD.   - Continue toprol XL, valsartan, and lipitor.   - Echocardiogram as above.  - Will consider ischemic evaluation when more euvolemic.

## 2023-10-27 NOTE — PROGRESS NOTE ADULT - SUBJECTIVE AND OBJECTIVE BOX
ADRIANNA SHANKS  ----------------------------------------  The patient was seen earlier at bedside. Patient with heart failure. Noted some improvement in the dyspnea. Noted persistent cough.    Vital Signs Last 24 Hrs  T(C): 36.7 (27 Oct 2023 08:24), Max: 37 (26 Oct 2023 17:37)  T(F): 98.1 (27 Oct 2023 08:24), Max: 98.6 (26 Oct 2023 17:37)  HR: 100 (27 Oct 2023 08:24) (85 - 101)  BP: 128/79 (27 Oct 2023 08:24) (119/73 - 132/80)  BP(mean): --  RR: 18 (27 Oct 2023 08:24) (18 - 20)  SpO2: 99% (27 Oct 2023 08:24) (91% - 99%)    Parameters below as of 27 Oct 2023 08:24  Patient On (Oxygen Delivery Method): room air    CAPILLARY BLOOD GLUCOSE  POCT Blood Glucose.: 290 mg/dL (27 Oct 2023 08:30)  POCT Blood Glucose.: 115 mg/dL (26 Oct 2023 16:48)    PHYSICAL EXAMINATION:  ----------------------------------------  General appearance: No acute distress, Awake, Alert  HEENT: Normocephalic, Atraumatic, Conjunctiva clear, EOMI  Neck: Supple, No JVD, No tenderness  Lungs: Breath sound equal bilaterally, No wheezes, Basilar rales  Cardiovascular: S1S2, Regular rhythm  Abdomen: Soft, Nontender, Nondistended, No guarding/rebound, Positive bowel sounds  Extremities: No clubbing, No cyanosis, No calf tenderness, Lower extremity edema L>R  Neuro: Strength equal bilaterally, No tremors  Psychiatric: Appropriate mood, Normal affect    LABORATORY STUDIES:  ----------------------------------------             13.9   10.73 )-----------( 266      ( 27 Oct 2023 03:25 )             43.4     10-27    139  |  96  |  36.3<H>  ----------------------------<  130<H>  4.3   |  30.0<H>  |  1.79<H>    Ca    9.3      27 Oct 2023 03:25  Mg     2.7     10-26    TPro  7.1  /  Alb  3.5  /  TBili  0.7  /  DBili  x   /  AST  67<H>  /  ALT  39  /  AlkPhos  124<H>  10-26    LIVER FUNCTIONS - ( 26 Oct 2023 09:50 )  Alb: 3.5 g/dL / Pro: 7.1 g/dL / ALK PHOS: 124 U/L / ALT: 39 U/L / AST: 67 U/L / GGT: x           CARDIAC MARKERS ( 26 Oct 2023 09:50 )  x     / 0.05 ng/mL / x     / x     / x        Urinalysis Basic - ( 27 Oct 2023 03:25 )  Color: x / Appearance: x / SG: x / pH: x  Gluc: 130 mg/dL / Ketone: x  / Bili: x / Urobili: x   Blood: x / Protein: x / Nitrite: x   Leuk Esterase: x / RBC: x / WBC x   Sq Epi: x / Non Sq Epi: x / Bacteria: x    MEDICATIONS  (STANDING):  aspirin enteric coated 81 milliGRAM(s) Oral daily  atorvastatin 40 milliGRAM(s) Oral at bedtime  azithromycin  IVPB      cefTRIAXone Injectable.      dextrose 5%. 1000 milliLiter(s) (50 mL/Hr) IV Continuous <Continuous>  dextrose 5%. 1000 milliLiter(s) (100 mL/Hr) IV Continuous <Continuous>  dextrose 50% Injectable 25 Gram(s) IV Push once  dextrose 50% Injectable 12.5 Gram(s) IV Push once  dextrose 50% Injectable 25 Gram(s) IV Push once  furosemide   Injectable 60 milliGRAM(s) IV Push every 12 hours  glucagon  Injectable 1 milliGRAM(s) IntraMuscular once  heparin   Injectable 5000 Unit(s) SubCutaneous every 12 hours  insulin lispro (ADMELOG) corrective regimen sliding scale   SubCutaneous three times a day before meals  levothyroxine 75 MICROGram(s) Oral daily  montelukast 10 milliGRAM(s) Oral daily  pantoprazole    Tablet 40 milliGRAM(s) Oral before breakfast  ranolazine 500 milliGRAM(s) Oral two times a day  ticagrelor 90 milliGRAM(s) Oral every 12 hours  valsartan 40 milliGRAM(s) Oral daily    MEDICATIONS  (PRN):  dextrose Oral Gel 15 Gram(s) Oral once PRN Blood Glucose LESS THAN 70 milliGRAM(s)/deciliter      ASSESSMENT / PLAN:  ----------------------------------------  78M with a history of coronary artery disease, heart failure, hypertension, gastroesophageal reflux, and hypothyroidism who presented with increased lower extremity edema, orthopnea, and persistent cough for the past few weeks.    Acute on chronic diastolic and systolic heart failure  - On intravenous furosemide for diuresis  - To monitor intake and output, to monitor weights  - Echocardiogram noted ejection fraction of <20%  - On valsartan    Coronary artery disease  - Troponin level was not elevated and EKG was without acute ischemic changes  - Continue on aspirin and ticagrelor  - Continue on atorvastatin  - Possible ischemic evaluation when more euvolemic    Pneumonia  - Noted to have been previously treated with multiple courses of antibiotics  - No hypoxia on examination  - Afebrile with improved leukocytosis  - D-dimer was not elevated and lower extremity doppler was without deep vein thrombosis  - CT of the chest noted right upper lobe opacities  - On ceftriaxone and azithromycin    Gastroesophageal reflux  - On pantoprazole    Acute kidney injury  - Meloxicam was held  - Continue to monitor renal function while on diuretics  - To continue on valsartan for now, no hyperkalemia    Hypothyroidism  - On levothyroxine    Diabetes  - Insulin coverage  - Close monitoring of blood glucose levels

## 2023-10-27 NOTE — PROGRESS NOTE ADULT - ASSESSMENT
A/P: Patient is a 78 y/o M with a PMHx of CAD s/p CABG x 3 (patent LIMA-LAD, known occluded SVG-RCA) with multiple PCI's post surgery to native OM1 and SVG-OM (last CODI x 2 to SVG-OM1 ISR 11/2020), ischemic CM HFrEF (EF 36% 05/22), DMII, HTN, HLD, and GERD who presented to the ED with worsening orthopnea and leg swelling. Patient went to [facility] and returned 3 weeks ago, and states he has been experiencing some leg swelling and productive cough ever since. Patient states that he also has been unable to sleep and lie down flat due to shortness of breath. Patient has been on antibiotics and had his diuretics increased as well. Symptoms continued to worsen, so he was advised to go the ER for further workup. Patient's pBNP noted to be 8960 with last pBNP as an outpatient around 1K. Patient also with leukocytosis as well as elevated ESR/CRP. Will also need to evaluate for pulmonary embolism in setting of recent long flight. Patient denies any active fevers, chills, syncope, chest pain, abdominal pain, N/V/D, headache, or dizziness.   Troponin negative x 1  pBNP 8960

## 2023-10-28 LAB
ANION GAP SERPL CALC-SCNC: 10 MMOL/L — SIGNIFICANT CHANGE UP (ref 5–17)
ANION GAP SERPL CALC-SCNC: 10 MMOL/L — SIGNIFICANT CHANGE UP (ref 5–17)
BUN SERPL-MCNC: 40.6 MG/DL — HIGH (ref 8–20)
BUN SERPL-MCNC: 40.6 MG/DL — HIGH (ref 8–20)
CALCIUM SERPL-MCNC: 9.1 MG/DL — SIGNIFICANT CHANGE UP (ref 8.4–10.5)
CALCIUM SERPL-MCNC: 9.1 MG/DL — SIGNIFICANT CHANGE UP (ref 8.4–10.5)
CHLORIDE SERPL-SCNC: 90 MMOL/L — LOW (ref 96–108)
CHLORIDE SERPL-SCNC: 90 MMOL/L — LOW (ref 96–108)
CO2 SERPL-SCNC: 34 MMOL/L — HIGH (ref 22–29)
CO2 SERPL-SCNC: 34 MMOL/L — HIGH (ref 22–29)
CREAT SERPL-MCNC: 2.45 MG/DL — HIGH (ref 0.5–1.3)
CREAT SERPL-MCNC: 2.45 MG/DL — HIGH (ref 0.5–1.3)
EGFR: 27 ML/MIN/1.73M2 — LOW
EGFR: 27 ML/MIN/1.73M2 — LOW
GLUCOSE BLDC GLUCOMTR-MCNC: 171 MG/DL — HIGH (ref 70–99)
GLUCOSE BLDC GLUCOMTR-MCNC: 171 MG/DL — HIGH (ref 70–99)
GLUCOSE BLDC GLUCOMTR-MCNC: 209 MG/DL — HIGH (ref 70–99)
GLUCOSE BLDC GLUCOMTR-MCNC: 209 MG/DL — HIGH (ref 70–99)
GLUCOSE BLDC GLUCOMTR-MCNC: 223 MG/DL — HIGH (ref 70–99)
GLUCOSE BLDC GLUCOMTR-MCNC: 223 MG/DL — HIGH (ref 70–99)
GLUCOSE BLDC GLUCOMTR-MCNC: 233 MG/DL — HIGH (ref 70–99)
GLUCOSE BLDC GLUCOMTR-MCNC: 233 MG/DL — HIGH (ref 70–99)
GLUCOSE SERPL-MCNC: 223 MG/DL — HIGH (ref 70–99)
GLUCOSE SERPL-MCNC: 223 MG/DL — HIGH (ref 70–99)
HCT VFR BLD CALC: 41.3 % — SIGNIFICANT CHANGE UP (ref 39–50)
HCT VFR BLD CALC: 41.3 % — SIGNIFICANT CHANGE UP (ref 39–50)
HGB BLD-MCNC: 13.8 G/DL — SIGNIFICANT CHANGE UP (ref 13–17)
HGB BLD-MCNC: 13.8 G/DL — SIGNIFICANT CHANGE UP (ref 13–17)
MCHC RBC-ENTMCNC: 30.9 PG — SIGNIFICANT CHANGE UP (ref 27–34)
MCHC RBC-ENTMCNC: 30.9 PG — SIGNIFICANT CHANGE UP (ref 27–34)
MCHC RBC-ENTMCNC: 33.4 GM/DL — SIGNIFICANT CHANGE UP (ref 32–36)
MCHC RBC-ENTMCNC: 33.4 GM/DL — SIGNIFICANT CHANGE UP (ref 32–36)
MCV RBC AUTO: 92.6 FL — SIGNIFICANT CHANGE UP (ref 80–100)
MCV RBC AUTO: 92.6 FL — SIGNIFICANT CHANGE UP (ref 80–100)
PLATELET # BLD AUTO: 280 K/UL — SIGNIFICANT CHANGE UP (ref 150–400)
PLATELET # BLD AUTO: 280 K/UL — SIGNIFICANT CHANGE UP (ref 150–400)
POTASSIUM SERPL-MCNC: 3.8 MMOL/L — SIGNIFICANT CHANGE UP (ref 3.5–5.3)
POTASSIUM SERPL-MCNC: 3.8 MMOL/L — SIGNIFICANT CHANGE UP (ref 3.5–5.3)
POTASSIUM SERPL-SCNC: 3.8 MMOL/L — SIGNIFICANT CHANGE UP (ref 3.5–5.3)
POTASSIUM SERPL-SCNC: 3.8 MMOL/L — SIGNIFICANT CHANGE UP (ref 3.5–5.3)
RBC # BLD: 4.46 M/UL — SIGNIFICANT CHANGE UP (ref 4.2–5.8)
RBC # BLD: 4.46 M/UL — SIGNIFICANT CHANGE UP (ref 4.2–5.8)
RBC # FLD: 13.5 % — SIGNIFICANT CHANGE UP (ref 10.3–14.5)
RBC # FLD: 13.5 % — SIGNIFICANT CHANGE UP (ref 10.3–14.5)
SODIUM SERPL-SCNC: 134 MMOL/L — LOW (ref 135–145)
SODIUM SERPL-SCNC: 134 MMOL/L — LOW (ref 135–145)
WBC # BLD: 8.6 K/UL — SIGNIFICANT CHANGE UP (ref 3.8–10.5)
WBC # BLD: 8.6 K/UL — SIGNIFICANT CHANGE UP (ref 3.8–10.5)
WBC # FLD AUTO: 8.6 K/UL — SIGNIFICANT CHANGE UP (ref 3.8–10.5)
WBC # FLD AUTO: 8.6 K/UL — SIGNIFICANT CHANGE UP (ref 3.8–10.5)

## 2023-10-28 PROCEDURE — 99233 SBSQ HOSP IP/OBS HIGH 50: CPT | Mod: GC

## 2023-10-28 PROCEDURE — 99291 CRITICAL CARE FIRST HOUR: CPT

## 2023-10-28 RX ORDER — DEXTROSE 50 % IN WATER 50 %
25 SYRINGE (ML) INTRAVENOUS ONCE
Refills: 0 | Status: DISCONTINUED | OUTPATIENT
Start: 2023-10-28 | End: 2023-10-30

## 2023-10-28 RX ORDER — GLUCAGON INJECTION, SOLUTION 0.5 MG/.1ML
1 INJECTION, SOLUTION SUBCUTANEOUS ONCE
Refills: 0 | Status: DISCONTINUED | OUTPATIENT
Start: 2023-10-28 | End: 2023-10-30

## 2023-10-28 RX ORDER — SODIUM CHLORIDE 9 MG/ML
1000 INJECTION, SOLUTION INTRAVENOUS
Refills: 0 | Status: DISCONTINUED | OUTPATIENT
Start: 2023-10-28 | End: 2023-10-30

## 2023-10-28 RX ORDER — INSULIN LISPRO 100/ML
VIAL (ML) SUBCUTANEOUS
Refills: 0 | Status: DISCONTINUED | OUTPATIENT
Start: 2023-10-28 | End: 2023-10-30

## 2023-10-28 RX ORDER — INSULIN LISPRO 100/ML
1 VIAL (ML) SUBCUTANEOUS
Refills: 0 | Status: DISCONTINUED | OUTPATIENT
Start: 2023-10-28 | End: 2023-10-29

## 2023-10-28 RX ORDER — INSULIN GLARGINE 100 [IU]/ML
10 INJECTION, SOLUTION SUBCUTANEOUS AT BEDTIME
Refills: 0 | Status: DISCONTINUED | OUTPATIENT
Start: 2023-10-28 | End: 2023-10-29

## 2023-10-28 RX ORDER — DEXTROSE 50 % IN WATER 50 %
15 SYRINGE (ML) INTRAVENOUS ONCE
Refills: 0 | Status: DISCONTINUED | OUTPATIENT
Start: 2023-10-28 | End: 2023-10-30

## 2023-10-28 RX ORDER — DEXTROSE 50 % IN WATER 50 %
12.5 SYRINGE (ML) INTRAVENOUS ONCE
Refills: 0 | Status: DISCONTINUED | OUTPATIENT
Start: 2023-10-28 | End: 2023-10-30

## 2023-10-28 RX ORDER — FUROSEMIDE 40 MG
40 TABLET ORAL EVERY 12 HOURS
Refills: 0 | Status: DISCONTINUED | OUTPATIENT
Start: 2023-10-28 | End: 2023-10-30

## 2023-10-28 RX ORDER — FUROSEMIDE 40 MG
40 TABLET ORAL
Refills: 0 | Status: DISCONTINUED | OUTPATIENT
Start: 2023-10-28 | End: 2023-10-28

## 2023-10-28 RX ADMIN — RANOLAZINE 500 MILLIGRAM(S): 500 TABLET, FILM COATED, EXTENDED RELEASE ORAL at 05:39

## 2023-10-28 RX ADMIN — AZITHROMYCIN 255 MILLIGRAM(S): 500 TABLET, FILM COATED ORAL at 08:38

## 2023-10-28 RX ADMIN — MONTELUKAST 10 MILLIGRAM(S): 4 TABLET, CHEWABLE ORAL at 18:19

## 2023-10-28 RX ADMIN — Medication 81 MILLIGRAM(S): at 18:19

## 2023-10-28 RX ADMIN — Medication 40 MILLIGRAM(S): at 18:18

## 2023-10-28 RX ADMIN — Medication 1 UNIT(S): at 18:20

## 2023-10-28 RX ADMIN — Medication 2: at 12:09

## 2023-10-28 RX ADMIN — HEPARIN SODIUM 5000 UNIT(S): 5000 INJECTION INTRAVENOUS; SUBCUTANEOUS at 18:19

## 2023-10-28 RX ADMIN — Medication 1 UNIT(S): at 12:09

## 2023-10-28 RX ADMIN — HEPARIN SODIUM 5000 UNIT(S): 5000 INJECTION INTRAVENOUS; SUBCUTANEOUS at 05:40

## 2023-10-28 RX ADMIN — PANTOPRAZOLE SODIUM 40 MILLIGRAM(S): 20 TABLET, DELAYED RELEASE ORAL at 05:38

## 2023-10-28 RX ADMIN — Medication 100 MILLIGRAM(S): at 21:27

## 2023-10-28 RX ADMIN — Medication 60 MILLIGRAM(S): at 05:41

## 2023-10-28 RX ADMIN — ATORVASTATIN CALCIUM 40 MILLIGRAM(S): 80 TABLET, FILM COATED ORAL at 21:15

## 2023-10-28 RX ADMIN — Medication 75 MICROGRAM(S): at 05:40

## 2023-10-28 RX ADMIN — CEFTRIAXONE 1000 MILLIGRAM(S): 500 INJECTION, POWDER, FOR SOLUTION INTRAMUSCULAR; INTRAVENOUS at 08:39

## 2023-10-28 RX ADMIN — Medication 4: at 18:19

## 2023-10-28 RX ADMIN — RANOLAZINE 500 MILLIGRAM(S): 500 TABLET, FILM COATED, EXTENDED RELEASE ORAL at 18:19

## 2023-10-28 RX ADMIN — TICAGRELOR 90 MILLIGRAM(S): 90 TABLET ORAL at 05:39

## 2023-10-28 RX ADMIN — Medication 100 MILLIGRAM(S): at 06:31

## 2023-10-28 RX ADMIN — INSULIN GLARGINE 10 UNIT(S): 100 INJECTION, SOLUTION SUBCUTANEOUS at 21:14

## 2023-10-28 RX ADMIN — TICAGRELOR 90 MILLIGRAM(S): 90 TABLET ORAL at 18:19

## 2023-10-28 RX ADMIN — Medication 4: at 08:43

## 2023-10-28 RX ADMIN — VALSARTAN 40 MILLIGRAM(S): 80 TABLET ORAL at 05:39

## 2023-10-28 NOTE — PROGRESS NOTE ADULT - SUBJECTIVE AND OBJECTIVE BOX
Patient is a 78y old  Male who presents with a chief complaint of     BRIEF HOSPITAL COURSE:    INTERVAL HPI/OVERNIGHT EVENTS:    TODAY:    MEDICATIONS  (STANDING):  aspirin enteric coated 81 milliGRAM(s) Oral daily  atorvastatin 40 milliGRAM(s) Oral at bedtime  azithromycin  IVPB 500 milliGRAM(s) IV Intermittent every 24 hours  azithromycin  IVPB      cefTRIAXone Injectable.      cefTRIAXone Injectable. 1000 milliGRAM(s) IV Push every 24 hours  dextrose 5%. 1000 milliLiter(s) (50 mL/Hr) IV Continuous <Continuous>  dextrose 5%. 1000 milliLiter(s) (100 mL/Hr) IV Continuous <Continuous>  dextrose 50% Injectable 25 Gram(s) IV Push once  dextrose 50% Injectable 25 Gram(s) IV Push once  dextrose 50% Injectable 12.5 Gram(s) IV Push once  furosemide   Injectable 60 milliGRAM(s) IV Push every 12 hours  glucagon  Injectable 1 milliGRAM(s) IntraMuscular once  heparin   Injectable 5000 Unit(s) SubCutaneous every 12 hours  insulin lispro (ADMELOG) corrective regimen sliding scale   SubCutaneous three times a day before meals  levothyroxine 75 MICROGram(s) Oral daily  montelukast 10 milliGRAM(s) Oral daily  pantoprazole    Tablet 40 milliGRAM(s) Oral before breakfast  ranolazine 500 milliGRAM(s) Oral two times a day  ticagrelor 90 milliGRAM(s) Oral every 12 hours  valsartan 40 milliGRAM(s) Oral daily    MEDICATIONS  (PRN):  dextrose Oral Gel 15 Gram(s) Oral once PRN Blood Glucose LESS THAN 70 milliGRAM(s)/deciliter  guaiFENesin Oral Liquid (Sugar-Free) 100 milliGRAM(s) Oral every 6 hours PRN Cough      Allergies  Allergy Status Unknown  Intolerances  DOPamine (Hypotension)    REVIEW OF SYSTEMS:  CONSTITUTIONAL: No fever, weight loss, or fatigue  RESPIRATORY: No cough, wheezing, chills or hemoptysis; No shortness of breath  CARDIOVASCULAR: No chest pain, palpitations, dizziness, or leg swelling  GASTROINTESTINAL: No abdominal or epigastric pain. No nausea, vomiting, or hematemesis; No diarrhea or constipation. No melena or hematochezia.  NEUROLOGICAL: No headaches, memory loss, loss of strength, numbness, or tremors  MUSCULOSKELETAL: No joint pain or swelling; No muscle, back, or extremity pain    Vital Signs Last 24 Hrs  T(C): 36.8 (28 Oct 2023 05:37), Max: 36.8 (28 Oct 2023 05:37)  T(F): 98.2 (28 Oct 2023 05:37), Max: 98.2 (28 Oct 2023 05:37)  HR: 104 (28 Oct 2023 05:37) (84 - 104)  BP: 120/74 (28 Oct 2023 05:37) (98/64 - 128/79)  BP(mean): --  RR: 18 (28 Oct 2023 05:37) (18 - 18)  SpO2: 98% (28 Oct 2023 05:37) (96% - 99%)    Parameters below as of 28 Oct 2023 05:37  Patient On (Oxygen Delivery Method): room air    PHYSICAL EXAM:  GENERAL:  A&Ox3, No acute distress, lying comfortably in bed  HEAD:  Atraumatic, Normocephalic  EYES: EOMI, PERRLA, conjunctiva and sclera clear  NECK: Supple, No JVD, Normal thyroid  NERVOUS SYSTEM:  Alert & Oriented, No gross focal deficits, 5/5 strength in all 4 extremities  CHEST/LUNG: Clear to auscultation bilaterally; No rales, rhonchi, wheezing, or rubs  HEART: Regular rate and rhythm; No murmurs, rubs, or gallops  ABDOMEN: Soft, Nontender, Nondistended; Bowel sounds present  EXTREMITIES:  No clubbing, cyanosis, or edema  SKIN: No rashes or lesions    LABS:                        13.9   10.73 )-----------( 266      ( 27 Oct 2023 03:25 )             43.4     10-27    139  |  96  |  36.3<H>  ----------------------------<  130<H>  4.3   |  30.0<H>  |  1.79<H>    Ca    9.3      27 Oct 2023 03:25  Mg     2.7     10-26    TPro  7.1  /  Alb  3.5  /  TBili  0.7  /  DBili  x   /  AST  67<H>  /  ALT  39  /  AlkPhos  124<H>  10-26      Urinalysis Basic - ( 27 Oct 2023 03:25 )    Color: x / Appearance: x / SG: x / pH: x  Gluc: 130 mg/dL / Ketone: x  / Bili: x / Urobili: x   Blood: x / Protein: x / Nitrite: x   Leuk Esterase: x / RBC: x / WBC x   Sq Epi: x / Non Sq Epi: x / Bacteria: x      CAPILLARY BLOOD GLUCOSE      POCT Blood Glucose.: 154 mg/dL (27 Oct 2023 17:59)  POCT Blood Glucose.: 101 mg/dL (27 Oct 2023 12:11)  POCT Blood Glucose.: 290 mg/dL (27 Oct 2023 08:30)      RADIOLOGY & ADDITIONAL TESTS:  < from: CT Chest No Cont (10.26.23 @ 22:23) >  FINDINGS:    AIRWAYS AND LUNGS: Scattered bronchial secretions.  A few patchy right   upper lobe opacities are new from the prior study. Bilateral visual   nodules measuring up to 4 mm are similar to the prior study.   Redemonstrated right upper lobe thick linear opacities and architectural   distortion.    MEDIASTINUM AND PLEURA: Calcified mediastinal lymph nodes. The visualized   portion of the thyroid gland is heterogeneous. There are small bilateral   pleural effusions.  There is no pneumothorax.    HEART AND VESSELS: There is cardiomegaly.  There are atherosclerotic   calcifications of the aorta and coronary arteries.  There is no   pericardial effusion.  Sternotomy and CABG.    UPPER ABDOMEN: Images of the upper abdomen demonstrate no abnormality.    BONES AND SOFT TISSUES: The bones are unremarkable.  The soft tissues are   unremarkable.    IMPRESSION:  A few right upper lobe patchy opacities may be infectious or inflammatory.    < end of copied text >    < from: TTE Echo Complete w/ Contrast w/ Doppler (10.26.23 @ 12:30) >  Summary:   1. Endocardial visualization was enhanced with intravenous echo contrast.   2. Moderately enlarged left atrium.   3. Left ventricular ejection fraction, by visual estimation, is <20%.   4. Grade III diastolic dysfunction. Elevated mean left atrial pressure.   5. Mildly enlarged right atrium.   6. Mildly enlarged right ventricle. Severely reduced RV systolic   function.   7. Moderate mitral valve regurgitation.   8. Mild aortic regurgitation.   9. Mild tricuspid regurgitation.  10. Estimated pulmonary artery systolic pressure is 51 mmHg -moderate   pulmonary hypertension.  11. There is no evidence of pericardial effusion.    < end of copied text >    < from: US Duplex Venous Lower Ext Complete, Bilateral (10.26.23 @ 12:13) >  IMPRESSION:  No evidence of deep venous thrombosis in either lower extremity.    < end of copied text >    Imaging Personally Reviewed:  [X] YES  [ ] NO  Consultant(s) Notes Reviewed:  [X] YES  [ ] NO  Care Discussed with Consultants/Other Providers [X] YES  [ ] NO  Plan of Care discussed with House Staff: [X]YES [ ] NO Patient is a 78y old  Male who presents with a chief complaint of acute CHF exacerbation.    BRIEF HOSPITAL COURSE: 78M who presented with increased dyspnea, productive cough despite antibiotics, and lower extremity edema admitted for acute CHF exacerbation. Given travel hx, LE doppler negative for thrombus. Echo significant for EF < 20%. Diuresis and management    INTERVAL HPI/OVERNIGHT EVENTS:  No acute events overnight.    TODAY: Patient reports that he had good sleep last night, no SOB, was able to sleep laying down. Reports improvement in his swelling bilaterally. There is some redness and warmth of both feet but that is already improved from yesterday after benadryl.     MEDICATIONS  (STANDING):  aspirin enteric coated 81 milliGRAM(s) Oral daily  atorvastatin 40 milliGRAM(s) Oral at bedtime  azithromycin  IVPB 500 milliGRAM(s) IV Intermittent every 24 hours  azithromycin  IVPB      cefTRIAXone Injectable.      cefTRIAXone Injectable. 1000 milliGRAM(s) IV Push every 24 hours  dextrose 5%. 1000 milliLiter(s) (50 mL/Hr) IV Continuous <Continuous>  dextrose 5%. 1000 milliLiter(s) (100 mL/Hr) IV Continuous <Continuous>  dextrose 50% Injectable 25 Gram(s) IV Push once  dextrose 50% Injectable 25 Gram(s) IV Push once  dextrose 50% Injectable 12.5 Gram(s) IV Push once  furosemide   Injectable 60 milliGRAM(s) IV Push every 12 hours  glucagon  Injectable 1 milliGRAM(s) IntraMuscular once  heparin   Injectable 5000 Unit(s) SubCutaneous every 12 hours  insulin lispro (ADMELOG) corrective regimen sliding scale   SubCutaneous three times a day before meals  levothyroxine 75 MICROGram(s) Oral daily  montelukast 10 milliGRAM(s) Oral daily  pantoprazole    Tablet 40 milliGRAM(s) Oral before breakfast  ranolazine 500 milliGRAM(s) Oral two times a day  ticagrelor 90 milliGRAM(s) Oral every 12 hours  valsartan 40 milliGRAM(s) Oral daily    MEDICATIONS  (PRN):  dextrose Oral Gel 15 Gram(s) Oral once PRN Blood Glucose LESS THAN 70 milliGRAM(s)/deciliter  guaiFENesin Oral Liquid (Sugar-Free) 100 milliGRAM(s) Oral every 6 hours PRN Cough      Allergies  Allergy Status Unknown  Intolerances  DOPamine (Hypotension)    REVIEW OF SYSTEMS:  CONSTITUTIONAL: No fever, weight loss, or fatigue  RESPIRATORY: No cough, wheezing, chills or hemoptysis; No shortness of breath  CARDIOVASCULAR: No chest pain, palpitations, dizziness, + leg swelling  GASTROINTESTINAL: No abdominal or epigastric pain. No nausea, vomiting, or hematemesis  NEUROLOGICAL: No headaches, memory loss, loss of strength, numbness, or tremors  MUSCULOSKELETAL: No joint pain or swelling;     Vital Signs Last 24 Hrs  T(C): 36.8 (28 Oct 2023 05:37), Max: 36.8 (28 Oct 2023 05:37)  T(F): 98.2 (28 Oct 2023 05:37), Max: 98.2 (28 Oct 2023 05:37)  HR: 104 (28 Oct 2023 05:37) (84 - 104)  BP: 120/74 (28 Oct 2023 05:37) (98/64 - 128/79)  BP(mean): --  RR: 18 (28 Oct 2023 05:37) (18 - 18)  SpO2: 98% (28 Oct 2023 05:37) (96% - 99%)    Parameters below as of 28 Oct 2023 05:37  Patient On (Oxygen Delivery Method): room air    PHYSICAL EXAM:  GENERAL:  A&Ox3, No acute distress, sitting comfortably in bed  HEAD:  Atraumatic, Normocephalic  EYES: EOMI, PERRLA, conjunctiva and sclera clear  NERVOUS SYSTEM:  Alert & Oriented, No gross focal deficits,   CHEST/LUNG: Clear to auscultation bilaterally; No rales, rhonchi, wheezing, or rubs  HEART: Regular rate and rhythm; No murmurs, rubs, or gallops  ABDOMEN: Soft, Nontender, Nondistended;   EXTREMITIES:  No clubbing, cyanosis, + 1+ edema in bilateral feet. Both feet are erythematous without clear demarcation or pain with reported improvement from yesterday. 2+ pulses in dorsalis pedis and tibialis anterior bilaterally  SKIN: No rashes or lesions    LABS:             13.9   10.73 )-----------( 266      ( 27 Oct 2023 03:25 )             43.4   10-27    139  |  96  |  36.3<H>  ----------------------------<  130<H>  4.3   |  30.0<H>  |  1.79<H>    Ca    9.3      27 Oct 2023 03:25  Mg     2.7     10-26    TPro  7.1  /  Alb  3.5  /  TBili  0.7  /  DBili  x   /  AST  67<H>  /  ALT  39  /  AlkPhos  124<H>  10-26    Urinalysis Basic - ( 27 Oct 2023 03:25 )    Color: x / Appearance: x / SG: x / pH: x  Gluc: 130 mg/dL / Ketone: x  / Bili: x / Urobili: x   Blood: x / Protein: x / Nitrite: x   Leuk Esterase: x / RBC: x / WBC x   Sq Epi: x / Non Sq Epi: x / Bacteria: x    CAPILLARY BLOOD GLUCOSE    POCT Blood Glucose.: 154 mg/dL (27 Oct 2023 17:59)  POCT Blood Glucose.: 101 mg/dL (27 Oct 2023 12:11)  POCT Blood Glucose.: 290 mg/dL (27 Oct 2023 08:30)      RADIOLOGY & ADDITIONAL TESTS:  < from: CT Chest No Cont (10.26.23 @ 22:23) >  FINDINGS:    AIRWAYS AND LUNGS: Scattered bronchial secretions.  A few patchy right   upper lobe opacities are new from the prior study. Bilateral visual   nodules measuring up to 4 mm are similar to the prior study.   Redemonstrated right upper lobe thick linear opacities and architectural   distortion.    MEDIASTINUM AND PLEURA: Calcified mediastinal lymph nodes. The visualized   portion of the thyroid gland is heterogeneous. There are small bilateral   pleural effusions.  There is no pneumothorax.    HEART AND VESSELS: There is cardiomegaly.  There are atherosclerotic   calcifications of the aorta and coronary arteries.  There is no   pericardial effusion.  Sternotomy and CABG.    UPPER ABDOMEN: Images of the upper abdomen demonstrate no abnormality.    BONES AND SOFT TISSUES: The bones are unremarkable.  The soft tissues are   unremarkable.    IMPRESSION:  A few right upper lobe patchy opacities may be infectious or inflammatory.    < end of copied text >    < from: TTE Echo Complete w/ Contrast w/ Doppler (10.26.23 @ 12:30) >  Summary:   1. Endocardial visualization was enhanced with intravenous echo contrast.   2. Moderately enlarged left atrium.   3. Left ventricular ejection fraction, by visual estimation, is <20%.   4. Grade III diastolic dysfunction. Elevated mean left atrial pressure.   5. Mildly enlarged right atrium.   6. Mildly enlarged right ventricle. Severely reduced RV systolic   function.   7. Moderate mitral valve regurgitation.   8. Mild aortic regurgitation.   9. Mild tricuspid regurgitation.  10. Estimated pulmonary artery systolic pressure is 51 mmHg -moderate   pulmonary hypertension.  11. There is no evidence of pericardial effusion.    < end of copied text >    < from: US Duplex Venous Lower Ext Complete, Bilateral (10.26.23 @ 12:13) >  IMPRESSION:  No evidence of deep venous thrombosis in either lower extremity.    < end of copied text >    Imaging Personally Reviewed:  [X] YES  [ ] NO  Consultant(s) Notes Reviewed:  [X] YES  [ ] NO  Care Discussed with Consultants/Other Providers [X] YES  [ ] NO  Plan of Care discussed with House Staff: [X]YES [ ] NO Patient is a 78y old  Male who presents with a chief complaint of acute CHF exacerbation.    BRIEF HOSPITAL COURSE: 78M who presented with increased dyspnea, productive cough despite antibiotics, and lower extremity edema admitted for acute CHF exacerbation. Given travel hx, LE doppler negative for thrombus. Echo significant for EF < 20%. Diuresis and management    INTERVAL HPI/OVERNIGHT EVENTS:  No acute events overnight.    TODAY: Patient reports that he had good sleep last night, no SOB, was able to sleep laying down. Reports improvement in his swelling bilaterally. There is some redness and warmth of both feet but that is already improved from yesterday after benadryl.     MEDICATIONS  (STANDING):  aspirin enteric coated 81 milliGRAM(s) Oral daily  atorvastatin 40 milliGRAM(s) Oral at bedtime  azithromycin  IVPB 500 milliGRAM(s) IV Intermittent every 24 hours  azithromycin  IVPB      cefTRIAXone Injectable.      cefTRIAXone Injectable. 1000 milliGRAM(s) IV Push every 24 hours  dextrose 5%. 1000 milliLiter(s) (50 mL/Hr) IV Continuous <Continuous>  dextrose 5%. 1000 milliLiter(s) (100 mL/Hr) IV Continuous <Continuous>  dextrose 50% Injectable 25 Gram(s) IV Push once  dextrose 50% Injectable 25 Gram(s) IV Push once  dextrose 50% Injectable 12.5 Gram(s) IV Push once  furosemide   Injectable 60 milliGRAM(s) IV Push every 12 hours  glucagon  Injectable 1 milliGRAM(s) IntraMuscular once  heparin   Injectable 5000 Unit(s) SubCutaneous every 12 hours  insulin lispro (ADMELOG) corrective regimen sliding scale   SubCutaneous three times a day before meals  levothyroxine 75 MICROGram(s) Oral daily  montelukast 10 milliGRAM(s) Oral daily  pantoprazole    Tablet 40 milliGRAM(s) Oral before breakfast  ranolazine 500 milliGRAM(s) Oral two times a day  ticagrelor 90 milliGRAM(s) Oral every 12 hours  valsartan 40 milliGRAM(s) Oral daily    MEDICATIONS  (PRN):  dextrose Oral Gel 15 Gram(s) Oral once PRN Blood Glucose LESS THAN 70 milliGRAM(s)/deciliter  guaiFENesin Oral Liquid (Sugar-Free) 100 milliGRAM(s) Oral every 6 hours PRN Cough      Allergies  Allergy Status Unknown  Intolerances  DOPamine (Hypotension)    REVIEW OF SYSTEMS:  CONSTITUTIONAL: No fever, weight loss, or fatigue  RESPIRATORY: No cough, wheezing, chills or hemoptysis; No shortness of breath  CARDIOVASCULAR: No chest pain, palpitations, dizziness, + leg swelling  GASTROINTESTINAL: No abdominal or epigastric pain. No nausea, vomiting, or hematemesis  NEUROLOGICAL: No headaches, memory loss, loss of strength, numbness, or tremors  MUSCULOSKELETAL: No joint pain or swelling;     Vital Signs Last 24 Hrs  T(C): 36.8 (28 Oct 2023 05:37), Max: 36.8 (28 Oct 2023 05:37)  T(F): 98.2 (28 Oct 2023 05:37), Max: 98.2 (28 Oct 2023 05:37)  HR: 104 (28 Oct 2023 05:37) (84 - 104)  BP: 120/74 (28 Oct 2023 05:37) (98/64 - 128/79)  BP(mean): --  RR: 18 (28 Oct 2023 05:37) (18 - 18)  SpO2: 98% (28 Oct 2023 05:37) (96% - 99%)    Parameters below as of 28 Oct 2023 05:37  Patient On (Oxygen Delivery Method): room air    PHYSICAL EXAM:  GENERAL:  A&Ox3, No acute distress, sitting comfortably in bed  HEAD:  Atraumatic, Normocephalic  EYES: EOMI, PERRLA, conjunctiva and sclera clear  NERVOUS SYSTEM:  Alert & Oriented, No gross focal deficits,   CHEST/LUNG: Clear to auscultation bilaterally; No rales, rhonchi, wheezing, or rubs  HEART: Regular rate and rhythm; No murmurs, rubs, or gallops  ABDOMEN: Soft, Nontender, Nondistended;   EXTREMITIES:  No clubbing, cyanosis, + 1+ edema in bilateral feet with trace edema up to knees. Both feet are erythematous without clear demarcation or pain with reported improvement from yesterday. 2+ pulses in dorsalis pedis and tibialis anterior bilaterally  SKIN: No rashes or lesions    LABS:             13.9   10.73 )-----------( 266      ( 27 Oct 2023 03:25 )             43.4   10-27    139  |  96  |  36.3<H>  ----------------------------<  130<H>  4.3   |  30.0<H>  |  1.79<H>    Ca    9.3      27 Oct 2023 03:25  Mg     2.7     10-26    TPro  7.1  /  Alb  3.5  /  TBili  0.7  /  DBili  x   /  AST  67<H>  /  ALT  39  /  AlkPhos  124<H>  10-26    Urinalysis Basic - ( 27 Oct 2023 03:25 )    Color: x / Appearance: x / SG: x / pH: x  Gluc: 130 mg/dL / Ketone: x  / Bili: x / Urobili: x   Blood: x / Protein: x / Nitrite: x   Leuk Esterase: x / RBC: x / WBC x   Sq Epi: x / Non Sq Epi: x / Bacteria: x    CAPILLARY BLOOD GLUCOSE    POCT Blood Glucose.: 154 mg/dL (27 Oct 2023 17:59)  POCT Blood Glucose.: 101 mg/dL (27 Oct 2023 12:11)  POCT Blood Glucose.: 290 mg/dL (27 Oct 2023 08:30)      RADIOLOGY & ADDITIONAL TESTS:  < from: CT Chest No Cont (10.26.23 @ 22:23) >  FINDINGS:    AIRWAYS AND LUNGS: Scattered bronchial secretions.  A few patchy right   upper lobe opacities are new from the prior study. Bilateral visual   nodules measuring up to 4 mm are similar to the prior study.   Redemonstrated right upper lobe thick linear opacities and architectural   distortion.    MEDIASTINUM AND PLEURA: Calcified mediastinal lymph nodes. The visualized   portion of the thyroid gland is heterogeneous. There are small bilateral   pleural effusions.  There is no pneumothorax.    HEART AND VESSELS: There is cardiomegaly.  There are atherosclerotic   calcifications of the aorta and coronary arteries.  There is no   pericardial effusion.  Sternotomy and CABG.    UPPER ABDOMEN: Images of the upper abdomen demonstrate no abnormality.    BONES AND SOFT TISSUES: The bones are unremarkable.  The soft tissues are   unremarkable.    IMPRESSION:  A few right upper lobe patchy opacities may be infectious or inflammatory.    < end of copied text >    < from: TTE Echo Complete w/ Contrast w/ Doppler (10.26.23 @ 12:30) >  Summary:   1. Endocardial visualization was enhanced with intravenous echo contrast.   2. Moderately enlarged left atrium.   3. Left ventricular ejection fraction, by visual estimation, is <20%.   4. Grade III diastolic dysfunction. Elevated mean left atrial pressure.   5. Mildly enlarged right atrium.   6. Mildly enlarged right ventricle. Severely reduced RV systolic   function.   7. Moderate mitral valve regurgitation.   8. Mild aortic regurgitation.   9. Mild tricuspid regurgitation.  10. Estimated pulmonary artery systolic pressure is 51 mmHg -moderate   pulmonary hypertension.  11. There is no evidence of pericardial effusion.    < end of copied text >    < from: US Duplex Venous Lower Ext Complete, Bilateral (10.26.23 @ 12:13) >  IMPRESSION:  No evidence of deep venous thrombosis in either lower extremity.    < end of copied text >    Imaging Personally Reviewed:  [X] YES  [ ] NO  Consultant(s) Notes Reviewed:  [X] YES  [ ] NO  Care Discussed with Consultants/Other Providers [X] YES  [ ] NO  Plan of Care discussed with House Staff: [X]YES [ ] NO Patient is a 78y old  Male who presents with a chief complaint of acute CHF exacerbation.    BRIEF HOSPITAL COURSE: 78M who presented with increased dyspnea, productive cough despite antibiotics, and lower extremity edema admitted for acute CHF exacerbation. Given travel hx, LE doppler negative for thrombus. Echo significant for EF < 20%. Diuresis and management    INTERVAL HPI/OVERNIGHT EVENTS:  No acute events overnight.    TODAY: Patient reports that he had good sleep last night, no SOB, was able to sleep laying down. Reports improvement in his swelling bilaterally. There is some redness and warmth of both feet but that is already improved from yesterday after benadryl.     An episode of hypotension to the 70s systolic with dizziness. Patient responded to commands and conversed with staff while we placed him in Trendelenburg. He was able to walk to the bathroom independently a few minutes later and his BP trended up back to 100s.    MEDICATIONS  (STANDING):  aspirin enteric coated 81 milliGRAM(s) Oral daily  atorvastatin 40 milliGRAM(s) Oral at bedtime  azithromycin  IVPB 500 milliGRAM(s) IV Intermittent every 24 hours  azithromycin  IVPB      cefTRIAXone Injectable.      cefTRIAXone Injectable. 1000 milliGRAM(s) IV Push every 24 hours  dextrose 5%. 1000 milliLiter(s) (50 mL/Hr) IV Continuous <Continuous>  dextrose 5%. 1000 milliLiter(s) (100 mL/Hr) IV Continuous <Continuous>  dextrose 50% Injectable 25 Gram(s) IV Push once  dextrose 50% Injectable 25 Gram(s) IV Push once  dextrose 50% Injectable 12.5 Gram(s) IV Push once  furosemide   Injectable 60 milliGRAM(s) IV Push every 12 hours  glucagon  Injectable 1 milliGRAM(s) IntraMuscular once  heparin   Injectable 5000 Unit(s) SubCutaneous every 12 hours  insulin lispro (ADMELOG) corrective regimen sliding scale   SubCutaneous three times a day before meals  levothyroxine 75 MICROGram(s) Oral daily  montelukast 10 milliGRAM(s) Oral daily  pantoprazole    Tablet 40 milliGRAM(s) Oral before breakfast  ranolazine 500 milliGRAM(s) Oral two times a day  ticagrelor 90 milliGRAM(s) Oral every 12 hours  valsartan 40 milliGRAM(s) Oral daily    MEDICATIONS  (PRN):  dextrose Oral Gel 15 Gram(s) Oral once PRN Blood Glucose LESS THAN 70 milliGRAM(s)/deciliter  guaiFENesin Oral Liquid (Sugar-Free) 100 milliGRAM(s) Oral every 6 hours PRN Cough      Allergies  Allergy Status Unknown  Intolerances  DOPamine (Hypotension)    REVIEW OF SYSTEMS:  CONSTITUTIONAL: No fever, weight loss, or fatigue  RESPIRATORY: No cough, wheezing, chills or hemoptysis; No shortness of breath  CARDIOVASCULAR: No chest pain, palpitations, dizziness, + leg swelling  GASTROINTESTINAL: No abdominal or epigastric pain. No nausea, vomiting, or hematemesis  NEUROLOGICAL: No headaches, memory loss, loss of strength, numbness, or tremors  MUSCULOSKELETAL: No joint pain or swelling;     Vital Signs Last 24 Hrs  T(C): 36.8 (28 Oct 2023 05:37), Max: 36.8 (28 Oct 2023 05:37)  T(F): 98.2 (28 Oct 2023 05:37), Max: 98.2 (28 Oct 2023 05:37)  HR: 104 (28 Oct 2023 05:37) (84 - 104)  BP: 120/74 (28 Oct 2023 05:37) (98/64 - 128/79)  BP(mean): --  RR: 18 (28 Oct 2023 05:37) (18 - 18)  SpO2: 98% (28 Oct 2023 05:37) (96% - 99%)    Parameters below as of 28 Oct 2023 05:37  Patient On (Oxygen Delivery Method): room air    PHYSICAL EXAM:  GENERAL:  A&Ox3, No acute distress, sitting comfortably in bed  HEAD:  Atraumatic, Normocephalic  EYES: EOMI, PERRLA, conjunctiva and sclera clear  NERVOUS SYSTEM:  Alert & Oriented, No gross focal deficits,   CHEST/LUNG: Clear to auscultation bilaterally; No rales, rhonchi, wheezing, or rubs  HEART: Regular rate and rhythm; No murmurs, rubs, or gallops  ABDOMEN: Soft, Nontender, Nondistended;   EXTREMITIES:  No clubbing, cyanosis, + 1+ edema in bilateral feet with trace edema up to knees. Both feet are erythematous without clear demarcation or pain with reported improvement from yesterday. 2+ pulses in dorsalis pedis and tibialis anterior bilaterally  SKIN: No rashes or lesions    LABS:             13.9   10.73 )-----------( 266      ( 27 Oct 2023 03:25 )             43.4   10-27    139  |  96  |  36.3<H>  ----------------------------<  130<H>  4.3   |  30.0<H>  |  1.79<H>    Ca    9.3      27 Oct 2023 03:25  Mg     2.7     10-26    TPro  7.1  /  Alb  3.5  /  TBili  0.7  /  DBili  x   /  AST  67<H>  /  ALT  39  /  AlkPhos  124<H>  10-26    Urinalysis Basic - ( 27 Oct 2023 03:25 )    Color: x / Appearance: x / SG: x / pH: x  Gluc: 130 mg/dL / Ketone: x  / Bili: x / Urobili: x   Blood: x / Protein: x / Nitrite: x   Leuk Esterase: x / RBC: x / WBC x   Sq Epi: x / Non Sq Epi: x / Bacteria: x    CAPILLARY BLOOD GLUCOSE    POCT Blood Glucose.: 154 mg/dL (27 Oct 2023 17:59)  POCT Blood Glucose.: 101 mg/dL (27 Oct 2023 12:11)  POCT Blood Glucose.: 290 mg/dL (27 Oct 2023 08:30)      RADIOLOGY & ADDITIONAL TESTS:  < from: CT Chest No Cont (10.26.23 @ 22:23) >  FINDINGS:    AIRWAYS AND LUNGS: Scattered bronchial secretions.  A few patchy right   upper lobe opacities are new from the prior study. Bilateral visual   nodules measuring up to 4 mm are similar to the prior study.   Redemonstrated right upper lobe thick linear opacities and architectural   distortion.    MEDIASTINUM AND PLEURA: Calcified mediastinal lymph nodes. The visualized   portion of the thyroid gland is heterogeneous. There are small bilateral   pleural effusions.  There is no pneumothorax.    HEART AND VESSELS: There is cardiomegaly.  There are atherosclerotic   calcifications of the aorta and coronary arteries.  There is no   pericardial effusion.  Sternotomy and CABG.    UPPER ABDOMEN: Images of the upper abdomen demonstrate no abnormality.    BONES AND SOFT TISSUES: The bones are unremarkable.  The soft tissues are   unremarkable.    IMPRESSION:  A few right upper lobe patchy opacities may be infectious or inflammatory.    < end of copied text >    < from: TTE Echo Complete w/ Contrast w/ Doppler (10.26.23 @ 12:30) >  Summary:   1. Endocardial visualization was enhanced with intravenous echo contrast.   2. Moderately enlarged left atrium.   3. Left ventricular ejection fraction, by visual estimation, is <20%.   4. Grade III diastolic dysfunction. Elevated mean left atrial pressure.   5. Mildly enlarged right atrium.   6. Mildly enlarged right ventricle. Severely reduced RV systolic   function.   7. Moderate mitral valve regurgitation.   8. Mild aortic regurgitation.   9. Mild tricuspid regurgitation.  10. Estimated pulmonary artery systolic pressure is 51 mmHg -moderate   pulmonary hypertension.  11. There is no evidence of pericardial effusion.    < end of copied text >    < from: US Duplex Venous Lower Ext Complete, Bilateral (10.26.23 @ 12:13) >  IMPRESSION:  No evidence of deep venous thrombosis in either lower extremity.    < end of copied text >    Imaging Personally Reviewed:  [X] YES  [ ] NO  Consultant(s) Notes Reviewed:  [X] YES  [ ] NO  Care Discussed with Consultants/Other Providers [X] YES  [ ] NO  Plan of Care discussed with House Staff: [X]YES [ ] NO

## 2023-10-28 NOTE — PROGRESS NOTE ADULT - ASSESSMENT
78M with a history of coronary artery disease, heart failure, hypertension, gastroesophageal reflux, and hypothyroidism who presented with increased lower extremity edema, orthopnea, and persistent cough for the past few weeks.    PLAN:  Acute on chronic systolic heart failure  - On intravenous furosemide for diuresis  - To monitor intake and output, to monitor weights  - On valsartan  - Echo 10/26 EF 20%,  Severely reduced RV systolic function.    Coronary artery disease  - Continue on aspirin and ticagrelor  - Continue on atorvastatin  - Troponin level was not elevated and EKG was without acute ischemic changes    Cough / Leukocytosis  - Noted to have been previously treated with multiple courses of antibiotics  - No hypoxia on examination  - Afebrile  - D-dimer was not elevated and lower extremity doppler was without deep vein thrombosis  - Chest Xray was without obvious infiltrates  - For CT of the chest to further assess    Gastroesophageal reflux  - On pantoprazole    Acute kidney injury  - Meloxicam to be held for now  - Continue to monitor while on diuretics  - To continue on valsartan for now, no hyperkalemia    Hypothyroidism  - On levothyroxine    Diabetes  - Insulin coverage  - Close monitoring of blood glucose levels    Discussed with the patient and his son at the bedside. 78M with a history of coronary artery disease, heart failure, hypertension, gastroesophageal reflux, and hypothyroidism who presented with increased lower extremity edema, orthopnea, and persistent cough for the past few weeks.    Acute on chronic diastolic and systolic heart failure  - On intravenous furosemide for diuresis  - To monitor intake and output, to monitor weights  - Echocardiogram noted ejection fraction of <20%, Severe reduced RV systolic function  - On valsartan    Coronary artery disease  - Troponin level was not elevated and EKG was without acute ischemic changes  - Continue on aspirin and ticagrelor  - Continue on atorvastatin  - Possible ischemic evaluation when more euvolemic    Pneumonia  - Noted to have been previously treated with multiple courses of antibiotics  - No hypoxia on examination  - Afebrile with improved leukocytosis  - D-dimer was not elevated and lower extremity doppler was without deep vein thrombosis  - CT of the chest noted right upper lobe opacities  - On ceftriaxone and azithromycin    Gastroesophageal reflux  - On pantoprazole    Acute kidney injury  - Meloxicam was held  - Continue to monitor renal function while on diuretics  - To continue on valsartan for now, no hyperkalemia    Hypothyroidism  - On levothyroxine    Diabetes  - Insulin coverage  - Close monitoring of blood glucose levels   78M with a history of coronary artery disease, heart failure, hypertension, gastroesophageal reflux, and hypothyroidism who presented with increased lower extremity edema, orthopnea, and persistent cough for the past few weeks admitted for CHF exacerbation.     Acute on chronic diastolic and systolic heart failure  - On intravenous furosemide for diuresis  - To monitor intake and output, to monitor weights  - Echocardiogram noted ejection fraction of <20%, Severe reduced RV systolic function  - On valsartan  - cardio recs appreciated    Coronary artery disease  - Troponin level was not elevated and EKG was without acute ischemic changes  - Continue on aspirin and ticagrelor  - Continue on atorvastatin  - Possible ischemic evaluation when more euvolemic    Pneumonia  - Noted to have been previously treated with multiple courses of antibiotics  - No hypoxia on examination  - Afebrile with improved leukocytosis  - D-dimer was not elevated and lower extremity doppler was without deep vein thrombosis  - CT of the chest noted right upper lobe opacities  - On ceftriaxone and azithromycin    Gastroesophageal reflux  - On pantoprazole    Acute kidney injury  - Meloxicam was held  - Continue to monitor renal function while on diuretics  - To continue on valsartan for now, no hyperkalemia    Hypothyroidism  - On levothyroxine    Diabetes  - Insulin coverage  - Close monitoring of blood glucose levels   78M with a history of coronary artery disease, heart failure, hypertension, gastroesophageal reflux, and hypothyroidism who presented with increased lower extremity edema, orthopnea, and persistent cough for the past few weeks admitted for CHF exacerbation.     Acute on chronic diastolic and systolic heart failure  - On intravenous furosemide for diuresis  - To monitor intake and output, to monitor weights  - Echocardiogram noted ejection fraction of <20%, Severe reduced RV systolic function  - On valsartan  - cardio recs appreciated    Coronary artery disease  - Troponin level was not elevated and EKG was without acute ischemic changes  - Continue on aspirin and ticagrelor  - Continue on atorvastatin  - Possible ischemic evaluation when more euvolemic    Pneumonia  - Noted to have been previously treated with multiple courses of antibiotics  - No hypoxia on examination  - Afebrile with improved leukocytosis  - D-dimer was not elevated, PESI score 96 and Wells criteria <4 and lower extremity doppler was without deep vein thrombosis, PE less likely  - CT of the chest noted right upper lobe opacities, like community acquired  -RVP negative  - On ceftriaxone and azithromycin for 5 days    Gastroesophageal reflux  - On pantoprazole    Acute kidney injury  - Meloxicam was held  - Continue to monitor renal function while on diuretics  - To continue on valsartan for now, no hyperkalemia    Hypothyroidism  - On levothyroxine    Diabetes  - Insulin coverage  - Close monitoring of blood glucose levels   78M with a history of coronary artery disease, heart failure, hypertension, gastroesophageal reflux, and hypothyroidism who presented with increased lower extremity edema, orthopnea, and persistent cough for the past few weeks admitted for CHF exacerbation.     Acute on chronic diastolic and systolic heart failure  - On intravenous furosemide for diuresis, decrease to 40 mg  - will consider adding home medications as we wean furosemide  - To monitor intake and output, to monitor weights  - Echocardiogram noted ejection fraction of <20%, Severe reduced RV systolic function  - On valsartan  - cardio recs appreciated  - mobilize, sit in chair    Coronary artery disease  - Troponin level was not elevated and EKG was without acute ischemic changes  - Continue on aspirin and ticagrelor  - Continue on atorvastatin  - Possible ischemic evaluation when more euvolemic    Pneumonia, likely community acquired  - sob on presentation  - CT of the chest noted right upper lobe opacities, like community acquired  - Noted to have been previously treated with multiple courses of antibiotics  - No hypoxia on examination  - Afebrile with improved leukocytosis  - D-dimer was not elevated, PESI score 96 and Wells criteria <4 and lower extremity doppler was without deep vein thrombosis, PE less likely  -RVP negative  - On ceftriaxone and azithromycin for 5 days    Gastroesophageal reflux  - On pantoprazole    RAS on Stage IV CKD  - stage Estimated GFR by 2021 CKD-EPI Creatinine  - likely from diuresis will decrease furosemide while weaning back on home medications  - Meloxicam was held, educate patient on keeping this medication off  - Continue to monitor renal function while on diuretics, watch and reassess while weaning on home medications  - metabolic alkalosis noted 10/28, likely secondary ot diuresis. permissive for now will closely monitor  - To continue on valsartan for now, no hyperkalemia    Hypothyroidism  - On levothyroxine    Diabetes  - Insulin coverage  - adjusted insulin regimen; added lantus 10 u and lispro in addition to sliding scale  - Close monitoring of blood glucose levels

## 2023-10-28 NOTE — PROGRESS NOTE ADULT - ATTENDING COMMENTS
soft BP- hypotension on aggressive diuresis  but volume status improving  reduce Lasix to Po and bid  monitor HDS 24 hrs if stable add BB home dose towards GDMT    metabolic changes permissible for now  close monitoring    when HDS stable and back on BB plan d/c    discussed GoC with pt and family including HCP appointment    Patient is on medications/ condition that requires close monitoring- diuretics, hypotension  I have discussed and obtained more details from the family. Discussed test findings, assessment and treatment plan with consultant dr Hartman  I have reviewed o/p records on HIE; ordered essential and required tests; reviewed the results of labs, radiology, imaging, myself.  Total time spent in this encounter assessing, diagnosing, analyzing and medical decision making is>50 mins.

## 2023-10-28 NOTE — PROGRESS NOTE ADULT - ASSESSMENT
A/P: Patient is a 78 y/o M with a PMHx of CAD s/p CABG x 3 (patent LIMA-LAD, known occluded SVG-RCA) with multiple PCI's post surgery to native OM1 and SVG-OM (last CODI x 2 to SVG-OM1 ISR 11/2020), ischemic CM HFrEF (EF 36% 05/22), DMII, HTN, HLD, and GERD who presented to the ED with worsening orthopnea and leg swelling. Patient went to [facility] and returned 3 weeks ago, and states he has been experiencing some leg swelling and productive cough ever since. Patient states that he also has been unable to sleep and lie down flat due to shortness of breath. Patient has been on antibiotics and had his diuretics increased as well. Symptoms continued to worsen, so he was advised to go the ER for further workup. Patient's pBNP noted to be 8960 with last pBNP as an outpatient around 1K. Patient also with leukocytosis as well as elevated ESR/CRP. Will also need to evaluate for pulmonary embolism in setting of recent long flight. Patient denies any active fevers, chills, syncope, chest pain, abdominal pain, N/V/D, headache, or dizziness.   Troponin negative x 1  pBNP 8960

## 2023-10-28 NOTE — GOALS OF CARE CONVERSATION - ADVANCED CARE PLANNING - CONVERSATION DETAILS
We discussed with patient with family (youngest son) and wife at  bedside about initiating conversation regarding goals of care. We explained the purpose of initiating this conversation in the event that the patient is unable to communicate decisions on his own. We described that in the event that patient's heart stops, the measures that are typically taken including CPR, intubation, and defibrillation as part of the resuscitation. Patient's son shares that there are 9 siblings, of which he is the youngest, and that all the siblings make decisions together. We talked about what a health care proxy is and how the surrogacy would work if no proxy is in place.    Patient's son demonstrated understanding and said he will discuss with his family.

## 2023-10-28 NOTE — PROGRESS NOTE ADULT - SUBJECTIVE AND OBJECTIVE BOX
[facility]                                                         CARDIOLOGY AT [facility]                                                                  [address], [place] [address]                                                         Telephone: [phone]. Fax:[phone]                                                                             PROGRESS NOTE    Reason for follow up: heart failure   Overnight Events: improving       Review of symptoms:   Cardiac:  No chest pain. No dyspnea. No palpitations.  Respiratory: no cough. No dyspnea  Gastrointestinal: No diarrhea. No abdominal pain. No bleeding.   Neuro: No focal neuro complaints.    Vitals:  T(C): 36.9 (10-28-23 @ 10:30), Max: 36.9 (10-28-23 @ 10:30)  HR: 97 (10-28-23 @ 10:30) (84 - 104)  BP: 86/56 (10-28-23 @ 10:40) (74/42 - 120/74)  RR: 17 (10-28-23 @ 10:30) (17 - 18)  SpO2: 95% (10-28-23 @ 10:30) (94% - 99%)  Wt(kg): --  I&O's Summary    Weight (kg): 70 (10-27 @ 21:00)    PHYSICAL EXAM:  Appearance: Comfortable. No acute distress  HEENT:  Atraumatic. Normocephalic.  Normal oral mucosa  Neurologic: A & O x 3, no gross focal deficits.  Cardiovascular: RRR S1 S2, No murmur, no rubs/gallops. No JVD  Respiratory: Lungs clear to auscultation, unlabored   Gastrointestinal:  Soft, Non-tender, + BS  Lower Extremities: 2+ Peripheral Pulses, No clubbing, cyanosis, or edema  Psychiatry: Patient is calm. No agitation.   Skin: warm and dry.    CURRENT CARDIAC MEDICATIONS:  furosemide   Injectable 60 milliGRAM(s) IV Push every 12 hours  ranolazine 500 milliGRAM(s) Oral two times a day  valsartan 40 milliGRAM(s) Oral daily      CURRENT OTHER MEDICATIONS:  guaiFENesin Oral Liquid (Sugar-Free) 100 milliGRAM(s) Oral every 6 hours PRN Cough  montelukast 10 milliGRAM(s) Oral daily  azithromycin  IVPB 500 milliGRAM(s) IV Intermittent every 24 hours  azithromycin  IVPB      cefTRIAXone Injectable.      cefTRIAXone Injectable. 1000 milliGRAM(s) IV Push every 24 hours  pantoprazole    Tablet 40 milliGRAM(s) Oral before breakfast  atorvastatin 40 milliGRAM(s) Oral at bedtime  dextrose 50% Injectable 25 Gram(s) IV Push once, Stop order after: 1 Doses  dextrose 50% Injectable 12.5 Gram(s) IV Push once, Stop order after: 1 Doses  dextrose 50% Injectable 25 Gram(s) IV Push once, Stop order after: 1 Doses  dextrose Oral Gel 15 Gram(s) Oral once, Stop order after: 1 Doses PRN Blood Glucose LESS THAN 70 milliGRAM(s)/deciliter  glucagon  Injectable 1 milliGRAM(s) IntraMuscular once, Stop order after: 1 Doses  insulin lispro (ADMELOG) corrective regimen sliding scale   SubCutaneous three times a day before meals  levothyroxine 75 MICROGram(s) Oral daily  aspirin enteric coated 81 milliGRAM(s) Oral daily  dextrose 5%. 1000 milliLiter(s) (100 mL/Hr) IV Continuous <Continuous>  dextrose 5%. 1000 milliLiter(s) (50 mL/Hr) IV Continuous <Continuous>  heparin   Injectable 5000 Unit(s) SubCutaneous every 12 hours  ticagrelor 90 milliGRAM(s) Oral every 12 hours    LABS:	 	  ( 27 Oct 2023 10:54 )  Troponin T  0.05 ,  CPK  X    , CKMB  X    , BNP X        , ( 26 Oct 2023 09:50 )  Troponin T  0.05 ,  CPK  X    , CKMB  X    , BNP X                            13.8   8.60  )-----------( 280      ( 28 Oct 2023 06:52 )             41.3     10-28    134<L>  |  90<L>  |  40.6<H>  ----------------------------<  223<H>  3.8   |  34.0<H>  |  2.45<H>    Ca    9.1      28 Oct 2023 06:52        Lipid Profile:   HgA1c:   TSH:

## 2023-10-28 NOTE — PROGRESS NOTE ADULT - PROBLEM SELECTOR PLAN 2
- Patient with extensive history of CAD with CABG  x 3 with patent LIMA-LAD, occluded SVG-RCA, and most recently CODI x 2 to SVG-OM1 for ISR.   - Continue aspirin and brilinta at this time due to multilayered stents and extensive CAD.   - Continue toprol XL, valsartan, and lipitor.   - Echocardiogram as above.  - likely outpatient ischemic eval

## 2023-10-29 ENCOUNTER — APPOINTMENT (OUTPATIENT)
Dept: CT IMAGING | Facility: CLINIC | Age: 78
End: 2023-10-29

## 2023-10-29 LAB
GLUCOSE BLDC GLUCOMTR-MCNC: 184 MG/DL — HIGH (ref 70–99)
GLUCOSE BLDC GLUCOMTR-MCNC: 184 MG/DL — HIGH (ref 70–99)
GLUCOSE BLDC GLUCOMTR-MCNC: 227 MG/DL — HIGH (ref 70–99)
GLUCOSE BLDC GLUCOMTR-MCNC: 227 MG/DL — HIGH (ref 70–99)
GLUCOSE BLDC GLUCOMTR-MCNC: 232 MG/DL — HIGH (ref 70–99)
GLUCOSE BLDC GLUCOMTR-MCNC: 232 MG/DL — HIGH (ref 70–99)
GLUCOSE BLDC GLUCOMTR-MCNC: 291 MG/DL — HIGH (ref 70–99)
GLUCOSE BLDC GLUCOMTR-MCNC: 291 MG/DL — HIGH (ref 70–99)
TSH SERPL-MCNC: 8.25 UIU/ML — HIGH (ref 0.27–4.2)
TSH SERPL-MCNC: 8.25 UIU/ML — HIGH (ref 0.27–4.2)

## 2023-10-29 PROCEDURE — 93308 TTE F-UP OR LMTD: CPT | Mod: 26

## 2023-10-29 PROCEDURE — 99233 SBSQ HOSP IP/OBS HIGH 50: CPT

## 2023-10-29 PROCEDURE — 99291 CRITICAL CARE FIRST HOUR: CPT

## 2023-10-29 RX ORDER — SODIUM CHLORIDE 9 MG/ML
250 INJECTION, SOLUTION INTRAVENOUS ONCE
Refills: 0 | Status: COMPLETED | OUTPATIENT
Start: 2023-10-29 | End: 2023-10-29

## 2023-10-29 RX ORDER — VALSARTAN 80 MG/1
40 TABLET ORAL
Refills: 0 | Status: DISCONTINUED | OUTPATIENT
Start: 2023-10-29 | End: 2023-10-30

## 2023-10-29 RX ORDER — INSULIN GLARGINE 100 [IU]/ML
15 INJECTION, SOLUTION SUBCUTANEOUS AT BEDTIME
Refills: 0 | Status: DISCONTINUED | OUTPATIENT
Start: 2023-10-29 | End: 2023-10-30

## 2023-10-29 RX ORDER — SIMETHICONE 80 MG/1
80 TABLET, CHEWABLE ORAL ONCE
Refills: 0 | Status: COMPLETED | OUTPATIENT
Start: 2023-10-29 | End: 2023-10-29

## 2023-10-29 RX ORDER — ALBUMIN HUMAN 25 %
100 VIAL (ML) INTRAVENOUS ONCE
Refills: 0 | Status: COMPLETED | OUTPATIENT
Start: 2023-10-29 | End: 2023-10-29

## 2023-10-29 RX ORDER — AZITHROMYCIN 500 MG/1
500 TABLET, FILM COATED ORAL DAILY
Refills: 0 | Status: DISCONTINUED | OUTPATIENT
Start: 2023-10-30 | End: 2023-10-30

## 2023-10-29 RX ORDER — INSULIN LISPRO 100/ML
3 VIAL (ML) SUBCUTANEOUS
Refills: 0 | Status: DISCONTINUED | OUTPATIENT
Start: 2023-10-29 | End: 2023-10-30

## 2023-10-29 RX ADMIN — AZITHROMYCIN 255 MILLIGRAM(S): 500 TABLET, FILM COATED ORAL at 09:00

## 2023-10-29 RX ADMIN — HEPARIN SODIUM 5000 UNIT(S): 5000 INJECTION INTRAVENOUS; SUBCUTANEOUS at 05:16

## 2023-10-29 RX ADMIN — Medication 40 MILLIGRAM(S): at 05:14

## 2023-10-29 RX ADMIN — CEFTRIAXONE 1000 MILLIGRAM(S): 500 INJECTION, POWDER, FOR SOLUTION INTRAMUSCULAR; INTRAVENOUS at 08:14

## 2023-10-29 RX ADMIN — Medication 40 MILLIGRAM(S): at 17:56

## 2023-10-29 RX ADMIN — Medication 3 UNIT(S): at 12:03

## 2023-10-29 RX ADMIN — Medication 4: at 08:14

## 2023-10-29 RX ADMIN — SIMETHICONE 80 MILLIGRAM(S): 80 TABLET, CHEWABLE ORAL at 00:49

## 2023-10-29 RX ADMIN — SODIUM CHLORIDE 500 MILLILITER(S): 9 INJECTION, SOLUTION INTRAVENOUS at 21:23

## 2023-10-29 RX ADMIN — Medication 6: at 17:57

## 2023-10-29 RX ADMIN — RANOLAZINE 500 MILLIGRAM(S): 500 TABLET, FILM COATED, EXTENDED RELEASE ORAL at 17:56

## 2023-10-29 RX ADMIN — ATORVASTATIN CALCIUM 40 MILLIGRAM(S): 80 TABLET, FILM COATED ORAL at 22:43

## 2023-10-29 RX ADMIN — HEPARIN SODIUM 5000 UNIT(S): 5000 INJECTION INTRAVENOUS; SUBCUTANEOUS at 17:56

## 2023-10-29 RX ADMIN — Medication 4: at 12:02

## 2023-10-29 RX ADMIN — INSULIN GLARGINE 15 UNIT(S): 100 INJECTION, SOLUTION SUBCUTANEOUS at 22:42

## 2023-10-29 RX ADMIN — PANTOPRAZOLE SODIUM 40 MILLIGRAM(S): 20 TABLET, DELAYED RELEASE ORAL at 05:15

## 2023-10-29 RX ADMIN — MONTELUKAST 10 MILLIGRAM(S): 4 TABLET, CHEWABLE ORAL at 12:03

## 2023-10-29 RX ADMIN — TICAGRELOR 90 MILLIGRAM(S): 90 TABLET ORAL at 17:56

## 2023-10-29 RX ADMIN — Medication 81 MILLIGRAM(S): at 12:03

## 2023-10-29 RX ADMIN — Medication 50 MILLILITER(S): at 21:22

## 2023-10-29 RX ADMIN — Medication 1 UNIT(S): at 08:15

## 2023-10-29 RX ADMIN — Medication 100 MILLIGRAM(S): at 05:16

## 2023-10-29 RX ADMIN — Medication 100 MILLIGRAM(S): at 12:04

## 2023-10-29 RX ADMIN — RANOLAZINE 500 MILLIGRAM(S): 500 TABLET, FILM COATED, EXTENDED RELEASE ORAL at 05:14

## 2023-10-29 RX ADMIN — VALSARTAN 40 MILLIGRAM(S): 80 TABLET ORAL at 05:14

## 2023-10-29 RX ADMIN — Medication 75 MICROGRAM(S): at 05:15

## 2023-10-29 RX ADMIN — Medication 3 UNIT(S): at 17:57

## 2023-10-29 RX ADMIN — Medication 100 MILLIGRAM(S): at 22:43

## 2023-10-29 RX ADMIN — TICAGRELOR 90 MILLIGRAM(S): 90 TABLET ORAL at 05:13

## 2023-10-29 NOTE — PROGRESS NOTE ADULT - SUBJECTIVE AND OBJECTIVE BOX
[facility]                                                         CARDIOLOGY AT [facility]                                                                  [address], [place] [address]                                                         Telephone: [phone]. Fax:[phone]                                                                             PROGRESS NOTE    Reason for follow up: heart failure   Overnight Events: improving     Review of symptoms:   Cardiac:  No chest pain. No dyspnea. No palpitations.  Respiratory: no cough. No dyspnea  Gastrointestinal: No diarrhea. No abdominal pain. No bleeding.   Neuro: No focal neuro complaints.    Vitals:  T(C): 36.6 (10-29-23 @ 11:07), Max: 37.1 (10-28-23 @ 21:23)  HR: 90 (10-29-23 @ 11:07) (90 - 100)  BP: 86/55 (10-29-23 @ 11:07) (78/50 - 113/73)  RR: 18 (10-29-23 @ 11:07) (17 - 18)  SpO2: 94% (10-29-23 @ 11:07) (94% - 99%)  Wt(kg): --  I&O's Summary    Weight (kg): 70 (10-27 @ 21:00)    PHYSICAL EXAM:  Appearance: Comfortable. No acute distress  HEENT:  Atraumatic. Normocephalic.  Normal oral mucosa  Neurologic: A & O x 3, no gross focal deficits.  Cardiovascular: RRR S1 S2, No murmur, no rubs/gallops. No JVD  Respiratory: Lungs clear to auscultation, unlabored   Gastrointestinal:  Soft, Non-tender, + BS  Lower Extremities: 2+ Peripheral Pulses, No clubbing, cyanosis, or edema  Psychiatry: Patient is calm. No agitation.   Skin: warm and dry.    CURRENT CARDIAC MEDICATIONS:  furosemide    Tablet 40 milliGRAM(s) Oral every 12 hours  ranolazine 500 milliGRAM(s) Oral two times a day  valsartan 40 milliGRAM(s) Oral daily      CURRENT OTHER MEDICATIONS:  benzonatate 100 milliGRAM(s) Oral three times a day  guaiFENesin Oral Liquid (Sugar-Free) 100 milliGRAM(s) Oral every 6 hours PRN Cough  montelukast 10 milliGRAM(s) Oral daily  azithromycin  IVPB 500 milliGRAM(s) IV Intermittent every 24 hours  azithromycin  IVPB      cefTRIAXone Injectable.      cefTRIAXone Injectable. 1000 milliGRAM(s) IV Push every 24 hours  pantoprazole    Tablet 40 milliGRAM(s) Oral before breakfast  atorvastatin 40 milliGRAM(s) Oral at bedtime  dextrose 50% Injectable 12.5 Gram(s) IV Push once, Stop order after: 1 Doses  dextrose 50% Injectable 25 Gram(s) IV Push once, Stop order after: 1 Doses  dextrose 50% Injectable 25 Gram(s) IV Push once, Stop order after: 1 Doses  dextrose Oral Gel 15 Gram(s) Oral once, Stop order after: 1 Doses PRN Blood Glucose LESS THAN 70 milliGRAM(s)/deciliter  glucagon  Injectable 1 milliGRAM(s) IntraMuscular once, Stop order after: 1 Doses  insulin glargine Injectable (LANTUS) 15 Unit(s) SubCutaneous at bedtime  insulin lispro (ADMELOG) corrective regimen sliding scale   SubCutaneous three times a day before meals  insulin lispro Injectable (ADMELOG) 3 Unit(s) SubCutaneous three times a day before meals  levothyroxine 75 MICROGram(s) Oral daily  aspirin enteric coated 81 milliGRAM(s) Oral daily  dextrose 5%. 1000 milliLiter(s) (50 mL/Hr) IV Continuous <Continuous>  dextrose 5%. 1000 milliLiter(s) (100 mL/Hr) IV Continuous <Continuous>  heparin   Injectable 5000 Unit(s) SubCutaneous every 12 hours  ticagrelor 90 milliGRAM(s) Oral every 12 hours      LABS:	 	  ( 27 Oct 2023 10:54 )  Troponin T  0.05 ,  CPK  X    , CKMB  X    , BNP X        , ( 26 Oct 2023 09:50 )  Troponin T  0.05 ,  CPK  X    , CKMB  X    , BNP X                   13.8   8.60  )-----------( 280      ( 28 Oct 2023 06:52 )             41.3     10-28    134<L>  |  90<L>  |  40.6<H>  ----------------------------<  223<H>  3.8   |  34.0<H>  |  2.45<H>    Ca    9.1      28 Oct 2023 06:52        Lipid Profile:   HgA1c:   TSH: Thyroid Stimulating Hormone, Serum: 8.25 uIU/mL

## 2023-10-29 NOTE — CHART NOTE - NSCHARTNOTEFT_GEN_A_CORE
RN called patient noted to be hypotensive    78 y/o M with a PMHx of CAD s/p CABG x 3 (patent LIMA-LAD, known occluded SVG-RCA) with multiple PCI's post surgery to native OM1 and SVG-OM (last CODI x 2 to SVG-OM1 ISR 11/2020), ischemic CM HFrEF (EF 36% 05/22), DMII, HTN, HLD, and GERD who presented to the ED with worsening orthopnea and leg swelling. Patient went to [facility] and returned 3 weeks ago, and states he has been experiencing some leg swelling and productive cough ever since. Patient has been on antibiotics and had his diuretics increased as well. Pt denies SOB, dyspnea, CP, palpitations, N/V/D, abdominal pain, dizziness, blurry vision, HA or other complaints at this time     T(C): 36.7   HR: 95   BP: 88/48   RR: 18  SpO2: 98%     CONSTITUTIONAL: Well groomed, no apparent distress  EYES: PERRLA and symmetric, EOMI, No conjunctival or scleral injection, non-icteric  ENMT: Oral mucosa with moist membranes. Normal dentition; no pharyngeal injection or exudates  RESP: No respiratory distress, no use of accessory muscles; CTA b/l, no WRR  CV: RRR, +S1S2, no MRG; no JVD; no peripheral edema  SKIN: No rashes or ulcers noted  NEURO: CN II-XII intact; normal reflexes in upper and lower extremities, sensation intact in upper and lower extremities b/l to light touch   PSYCH: Appropriate insight/judgment; A+O x 3, mood and affect appropriate, recent/remote memory intact    Assessment:    78 y/o M with a PMHx of CAD s/p CABG x 3 (patent LIMA-LAD, known occluded SVG-RCA) with multiple PCI's post surgery to native OM1 and SVG-OM (last CODI x 2 to SVG-OM1 ISR 11/2020), ischemic CM HFrEF (EF 36% 05/22), DMII, HTN, HLD, and GERD being evaluated for hypotension     Plan:   Hypotension   -Pt noted to have soft BPs  -pt asymptomatic and mentating well   -250 cc LR bolus x 1 due to decreased EF  -albumin 25% x1 ordered  -consider midodrine if continues to be hypotensive, MAP <65   -pt would want to continue with pressors if needed RN called patient noted to be hypotensive    78 y/o M with a PMHx of CAD s/p CABG x 3 (patent LIMA-LAD, known occluded SVG-RCA) with multiple PCI's post surgery to native OM1 and SVG-OM (last CODI x 2 to SVG-OM1 ISR 11/2020), ischemic CM HFrEF (EF 36% 05/22), DMII, HTN, HLD, and GERD who presented to the ED with worsening orthopnea and leg swelling. Patient went to [facility] and returned 3 weeks ago, and states he has been experiencing some leg swelling and productive cough ever since. Patient has been on antibiotics and had his diuretics increased as well. Pt denies SOB, dyspnea, CP, palpitations, N/V/D, abdominal pain, dizziness, blurry vision, HA or other complaints at this time     T(C): 36.7   HR: 95   BP: 88/48   RR: 18  SpO2: 98%     CONSTITUTIONAL: Well groomed, no apparent distress  EYES: PERRLA and symmetric, EOMI, No conjunctival or scleral injection, non-icteric  ENMT: Oral mucosa with moist membranes. Normal dentition; no pharyngeal injection or exudates  RESP: No respiratory distress, no use of accessory muscles; CTA b/l, no WRR  CV: RRR, +S1S2, no MRG; no JVD; no peripheral edema  SKIN: No rashes or ulcers noted  NEURO: CN II-XII intact; normal reflexes in upper and lower extremities, sensation intact in upper and lower extremities b/l to light touch   PSYCH: Appropriate insight/judgment; A+O x 3, mood and affect appropriate, recent/remote memory intact    Assessment:    78 y/o M with a PMHx of CAD s/p CABG x 3 (patent LIMA-LAD, known occluded SVG-RCA) with multiple PCI's post surgery to native OM1 and SVG-OM (last CODI x 2 to SVG-OM1 ISR 11/2020), ischemic CM HFrEF (EF 36% 05/22), DMII, HTN, HLD, and GERD being evaluated for hypotension     Plan:   Hypotension   -Pt noted to have soft BPs  -pt asymptomatic and mentating well   -250 cc LR bolus x 1 due to decreased EF  -albumin 25% x1 ordered  -consider midodrine if continues to be hypotensive, MAP <65   -pt would want to continue with pressors if needed      Addendum @ 20:31  -BP improved s/p bolus now 108/70, albumin currently running   -Monitor BP

## 2023-10-29 NOTE — PROGRESS NOTE ADULT - PROBLEM SELECTOR PLAN 1
- Patient with worsening orthopnea, cough, and leg swelling.   - pBNP 8960 with last office pBNP around 1K.  - However likely multifactorial due to elevated ESR/CRP and leukocytosis.   - Repeat echocardiogram with EF now <20%, grade II DD, elevated LA pressure, mod MR, mild AI, mild TR, and moderate pulmonary HTN.   - plan for repeat echo on Monday to assess for EF improvement now that the pt is diuresed   - continue torsemide 20mg BID   - Patient also with long international flight 2 weeks ago.   - D-Dimer is negative.   - CT chest with Rt upper lobe opacities which may be infectious or inflammatory.  - If D-Dimer is negative, would check non-contrast CT to evaluate for any pneumonia.   - GDMT for HFrEF - continue toprol, valsartan, torsemide

## 2023-10-29 NOTE — PROGRESS NOTE ADULT - SUBJECTIVE AND OBJECTIVE BOX
HEALTH ISSUES - PROBLEM Dx:    CHF  hypotension    INTERVAL HPI/ OVERNIGHT EVENTS:    comfortable in bed  occ dizzy episodes spontaneously resolved  leg edema improved    REVIEW OF SYSTEMS:    as above    Vital Signs Last 24 Hrs  T(C): 36.7 (29 Oct 2023 08:59), Max: 37.1 (28 Oct 2023 21:23)  T(F): 98 (29 Oct 2023 08:59), Max: 98.8 (28 Oct 2023 21:23)  HR: 98 (29 Oct 2023 08:59) (92 - 100)  BP: 90/58 (29 Oct 2023 08:59) (74/42 - 113/73)  BP(mean): --  RR: 18 (29 Oct 2023 08:59) (17 - 18)  SpO2: 99% (29 Oct 2023 08:59) (94% - 99%)    Parameters below as of 29 Oct 2023 08:59  Patient On (Oxygen Delivery Method): room air    PHYSICAL EXAM-  GENERAL:  A&Ox3, No acute distress, lying in bed  HEAD:  Atraumatic, Normocephalic  EYES: EOMI, PERRLA, conjunctiva and sclera clear  NERVOUS SYSTEM:  Alert & Oriented, No gross focal deficits,   CHEST/LUNG: Clear to auscultation bilaterally; No rales, rhonchi, wheezing, or rubs  HEART: Regular rate and rhythm; No murmurs, rubs, or gallops  ABDOMEN: Soft, Nontender, Nondistended;   EXTREMITIES:  No clubbing, cyanosis, + 1+ edema in bilateral feet none ankle and above  SKIN: No rashes or lesions    MEDICATIONS  (STANDING):  aspirin enteric coated 81 milliGRAM(s) Oral daily  atorvastatin 40 milliGRAM(s) Oral at bedtime  azithromycin  IVPB 500 milliGRAM(s) IV Intermittent every 24 hours  azithromycin  IVPB      benzonatate 100 milliGRAM(s) Oral three times a day  cefTRIAXone Injectable.      cefTRIAXone Injectable. 1000 milliGRAM(s) IV Push every 24 hours  dextrose 5%. 1000 milliLiter(s) (100 mL/Hr) IV Continuous <Continuous>  dextrose 5%. 1000 milliLiter(s) (50 mL/Hr) IV Continuous <Continuous>  dextrose 50% Injectable 25 Gram(s) IV Push once  dextrose 50% Injectable 12.5 Gram(s) IV Push once  dextrose 50% Injectable 25 Gram(s) IV Push once  furosemide    Tablet 40 milliGRAM(s) Oral every 12 hours  glucagon  Injectable 1 milliGRAM(s) IntraMuscular once  heparin   Injectable 5000 Unit(s) SubCutaneous every 12 hours  insulin glargine Injectable (LANTUS) 10 Unit(s) SubCutaneous at bedtime  insulin lispro (ADMELOG) corrective regimen sliding scale   SubCutaneous three times a day before meals  insulin lispro Injectable (ADMELOG) 1 Unit(s) SubCutaneous three times a day before meals  levothyroxine 75 MICROGram(s) Oral daily  montelukast 10 milliGRAM(s) Oral daily  pantoprazole    Tablet 40 milliGRAM(s) Oral before breakfast  ranolazine 500 milliGRAM(s) Oral two times a day  ticagrelor 90 milliGRAM(s) Oral every 12 hours  valsartan 40 milliGRAM(s) Oral daily    MEDICATIONS  (PRN):  dextrose Oral Gel 15 Gram(s) Oral once PRN Blood Glucose LESS THAN 70 milliGRAM(s)/deciliter  guaiFENesin Oral Liquid (Sugar-Free) 100 milliGRAM(s) Oral every 6 hours PRN Cough      LABS:                        13.8   8.60  )-----------( 280      ( 28 Oct 2023 06:52 )             41.3     10-28    134<L>  |  90<L>  |  40.6<H>  ----------------------------<  223<H>  3.8   |  34.0<H>  |  2.45<H>    Ca    9.1      28 Oct 2023 06:52        Urinalysis Basic - ( 28 Oct 2023 06:52 )    Color: x / Appearance: x / SG: x / pH: x  Gluc: 223 mg/dL / Ketone: x  / Bili: x / Urobili: x   Blood: x / Protein: x / Nitrite: x   Leuk Esterase: x / RBC: x / WBC x   Sq Epi: x / Non Sq Epi: x / Bacteria: x     HEALTH ISSUES - PROBLEM Dx:    CHF  hypotension    INTERVAL HPI/ OVERNIGHT EVENTS:    comfortable in bed  occ dizzy episodes spontaneously resolved  leg edema improved    dizzy episode was associated with zithromax infusion doisng time both yest and today. however he had received lasix and losartan AM doses as well  will change to Po zithromax for remaining doses    REVIEW OF SYSTEMS:    as above    Vital Signs Last 24 Hrs  T(C): 36.7 (29 Oct 2023 08:59), Max: 37.1 (28 Oct 2023 21:23)  T(F): 98 (29 Oct 2023 08:59), Max: 98.8 (28 Oct 2023 21:23)  HR: 98 (29 Oct 2023 08:59) (92 - 100)  BP: 90/58 (29 Oct 2023 08:59) (74/42 - 113/73)  BP(mean): --  RR: 18 (29 Oct 2023 08:59) (17 - 18)  SpO2: 99% (29 Oct 2023 08:59) (94% - 99%)    Parameters below as of 29 Oct 2023 08:59  Patient On (Oxygen Delivery Method): room air    PHYSICAL EXAM-  GENERAL:  A&Ox3, No acute distress, lying in bed  HEAD:  Atraumatic, Normocephalic  EYES: EOMI, PERRLA, conjunctiva and sclera clear  NERVOUS SYSTEM:  Alert & Oriented, No gross focal deficits,   CHEST/LUNG: Clear to auscultation bilaterally; No rales, rhonchi, wheezing, or rubs  HEART: Regular rate and rhythm; No murmurs, rubs, or gallops  ABDOMEN: Soft, Nontender, Nondistended;   EXTREMITIES:  No clubbing, cyanosis, + 1+ edema in bilateral feet none ankle and above  SKIN: No rashes or lesions    MEDICATIONS  (STANDING):  aspirin enteric coated 81 milliGRAM(s) Oral daily  atorvastatin 40 milliGRAM(s) Oral at bedtime  azithromycin  IVPB 500 milliGRAM(s) IV Intermittent every 24 hours  azithromycin  IVPB      benzonatate 100 milliGRAM(s) Oral three times a day  cefTRIAXone Injectable.      cefTRIAXone Injectable. 1000 milliGRAM(s) IV Push every 24 hours  dextrose 5%. 1000 milliLiter(s) (100 mL/Hr) IV Continuous <Continuous>  dextrose 5%. 1000 milliLiter(s) (50 mL/Hr) IV Continuous <Continuous>  dextrose 50% Injectable 25 Gram(s) IV Push once  dextrose 50% Injectable 12.5 Gram(s) IV Push once  dextrose 50% Injectable 25 Gram(s) IV Push once  furosemide    Tablet 40 milliGRAM(s) Oral every 12 hours  glucagon  Injectable 1 milliGRAM(s) IntraMuscular once  heparin   Injectable 5000 Unit(s) SubCutaneous every 12 hours  insulin glargine Injectable (LANTUS) 10 Unit(s) SubCutaneous at bedtime  insulin lispro (ADMELOG) corrective regimen sliding scale   SubCutaneous three times a day before meals  insulin lispro Injectable (ADMELOG) 1 Unit(s) SubCutaneous three times a day before meals  levothyroxine 75 MICROGram(s) Oral daily  montelukast 10 milliGRAM(s) Oral daily  pantoprazole    Tablet 40 milliGRAM(s) Oral before breakfast  ranolazine 500 milliGRAM(s) Oral two times a day  ticagrelor 90 milliGRAM(s) Oral every 12 hours  valsartan 40 milliGRAM(s) Oral daily    MEDICATIONS  (PRN):  dextrose Oral Gel 15 Gram(s) Oral once PRN Blood Glucose LESS THAN 70 milliGRAM(s)/deciliter  guaiFENesin Oral Liquid (Sugar-Free) 100 milliGRAM(s) Oral every 6 hours PRN Cough    TELEMETRY:  reviewed by me. PVCs occ noted. otherwise uneventful    LABS:                        13.8   8.60  )-----------( 280      ( 28 Oct 2023 06:52 )             41.3     10-28    134<L>  |  90<L>  |  40.6<H>  ----------------------------<  223<H>  3.8   |  34.0<H>  |  2.45<H>    Ca    9.1      28 Oct 2023 06:52        Urinalysis Basic - ( 28 Oct 2023 06:52 )    Color: x / Appearance: x / SG: x / pH: x  Gluc: 223 mg/dL / Ketone: x  / Bili: x / Urobili: x   Blood: x / Protein: x / Nitrite: x   Leuk Esterase: x / RBC: x / WBC x   Sq Epi: x / Non Sq Epi: x / Bacteria: x

## 2023-10-29 NOTE — PROGRESS NOTE ADULT - ASSESSMENT
A/P: Patient is a 78 y/o M with a PMHx of CAD s/p CABG x 3 (patent LIMA-LAD, known occluded SVG-RCA) with multiple PCI's post surgery to native OM1 and SVG-OM (last CODI x 2 to SVG-OM1 ISR 11/2020), ischemic CM HFrEF (EF 36% 05/22), DMII, HTN, HLD, and GERD who presented to the ED with worsening orthopnea and leg swelling. Patient went to [facility] and returned 3 weeks ago, and states he has been experiencing some leg swelling and productive cough ever since. Patient states that he also has been unable to sleep and lie down flat due to shortness of breath. Patient has been on antibiotics and had his diuretics increased as well. Symptoms continued to worsen, so he was advised to go the ER for further workup. Patient's pBNP noted to be 8960 with last pBNP as an outpatient around 1K. Patient also with leukocytosis as well as elevated ESR/CRP. Will also need to evaluate for pulmonary embolism in setting of recent long flight. Patient denies any active fevers, chills, syncope, chest pain, abdominal pain, N/V/D, headache, or dizziness.   Troponin negative x 1  pBNP 8960

## 2023-10-29 NOTE — PROGRESS NOTE ADULT - ASSESSMENT
78M with a history of coronary artery disease, heart failure, hypertension, gastroesophageal reflux, and hypothyroidism who presented with increased lower extremity edema, orthopnea, and persistent cough for the past few weeks admitted for CHF exacerbation.     Acute on chronic combined diastolic and systolic heart failure  Hypotension symptomatic  - On PO Lasix bid, 1 episode of soft BP since. tolerating dose  - To monitor intake and output, to monitor weights  - On valsartan  - when BP stable will consider resuming BB over spironolactone (elevated Crt)  - plan for repeat echo on monday to assess for EF improvement now that the pt is diuresed   - more euvolemic, transition to PO torsemide 20mg BID for home    Coronary artery disease  - Continue on aspirin and ticagrelor  - Continue on atorvastatin  - Possible ischemic evaluation when more euvolemic as o/p    RUL Pneumonia, likely community acquired  - CT of the chest noted right upper lobe opacities, like community acquired  - Noted to have been previously treated with multiple courses of antibiotics o/p  - D-dimer was not elevated, PESI score 96 and Wells criteria <4 and lower extremity doppler was without deep vein thrombosis, PE less likely  - On ceftriaxone and azithromycin for 5 days    RAS on Stage 3 CKD  - likely from diuresis will decrease furosemide which is permissive and acceptable  - Meloxicam was held, educate patient on keeping this medication off  - Continue to monitor renal function while on diuretics, watch and reassess while weaning on home medications  - metabolic alkalosis noted 10/28, likely secondary to diuresis. permissive for now will closely monitor  - To continue on valsartan for now, no hyperkalemia    Hypothyroidism  - On levothyroxine    Diabetes uncontrolled  - Insulin coverage  - adjusted insulin regimen; increased doses  - Close monitoring of blood glucose levels    Heparin    Patient is on medications/ condition that requires close monitoring diuretics,  adjustment of insulin and HTN medications.   Discussed test findings, assessment and treatment plan with consultant cards Dr genao  ordered essential and required tests; reviewed the results of labs myself.  Total time spent in this encounter assessing, diagnosing, analyzing and medical decision making is>50 mins.       78M with a history of coronary artery disease, heart failure, hypertension, gastroesophageal reflux, and hypothyroidism who presented with increased lower extremity edema, orthopnea, and persistent cough for the past few weeks admitted for CHF exacerbation.     Acute on chronic combined diastolic and systolic heart failure  Hypotension symptomatic  - On PO Lasix bid, 1 episode of soft BP since. tolerating dose  - To monitor intake and output, to monitor weights  - On valsartan  - when BP stable will consider resuming BB over spironolactone (elevated Crt)  - plan for repeat echo on monday to assess for EF improvement now that the pt is diuresed   - more euvolemic, transition to PO torsemide 20mg BID for home    Coronary artery disease  - Continue on aspirin and ticagrelor  - Continue on atorvastatin  - Possible ischemic evaluation when more euvolemic as o/p    RUL Pneumonia, likely community acquired  - CT of the chest noted right upper lobe opacities, like community acquired  - Noted to have been previously treated with multiple courses of antibiotics o/p  - D-dimer was not elevated, PESI score 96 and Wells criteria <4 and lower extremity doppler was without deep vein thrombosis, PE less likely  - On ceftriaxone and azithromycin for 5 days    RAS on Stage 3 CKD  - likely from diuresis will decrease furosemide which is permissive and acceptable  - Meloxicam was held, educate patient on keeping this medication off  - Continue to monitor renal function while on diuretics, watch and reassess while weaning on home medications  - metabolic alkalosis noted 10/28, likely secondary to diuresis. permissive for now will closely monitor  - To continue on valsartan for now, no hyperkalemia    Hypothyroidism  - On levothyroxine  - TSH 8 range likely reactive to stressor    Diabetes uncontrolled  - Insulin coverage  - adjusted insulin regimen; increased doses  - Close monitoring of blood glucose levels    Heparin    GoC talks- pt with several children- 9 sons; spouse not too involved in decision making; pt not interested in making decisions now. explained and recommended nominating HCP and having advanced care plan even if it with his children only    Patient is on medications/ condition that requires close monitoring diuretics,  adjustment of insulin and HTN medications.   Discussed test findings, assessment and treatment plan with consultant cards Dr genao  ordered essential and required tests; reviewed the results of labs myself.  Total time spent in this encounter assessing, diagnosing, analyzing and medical decision making is>50 mins.       78M with a history of coronary artery disease, heart failure, hypertension, gastroesophageal reflux, and hypothyroidism who presented with increased lower extremity edema, orthopnea, and persistent cough for the past few weeks admitted for CHF exacerbation.     Acute on chronic combined diastolic and systolic heart failure  Hypotension symptomatic episodes  - On PO Lasix bid, 1 episode of soft BP since. symptomatic  - To monitor intake and output, to monitor weights  - On valsartan- change time to midday toa void morning hypotension pisodes  - when BP stable will consider resuming BB over spironolactone (elevated Crt)  - plan for repeat echo on monday to assess for EF improvement now that the pt is diuresed   - more euvolemic, transition to PO torsemide 20mg BID for home    Coronary artery disease  - Continue on aspirin and ticagrelor  - Continue on atorvastatin  - Possible ischemic evaluation when more euvolemic as o/p    RUL Pneumonia, likely community acquired  - CT of the chest noted right upper lobe opacities, like community acquired  - Noted to have been previously treated with multiple courses of antibiotics o/p  - D-dimer was not elevated, PESI score 96 and Wells criteria <4 and lower extremity doppler was without deep vein thrombosis, PE less likely  - On ceftriaxone and azithromycin for 5 days    RAS on Stage 3 CKD  - likely from diuresis will decrease furosemide which is permissive and acceptable  - Meloxicam was held, educate patient on keeping this medication off  - Continue to monitor renal function while on diuretics, watch and reassess while weaning on home medications  - metabolic alkalosis noted 10/28, likely secondary to diuresis. permissive for now will closely monitor  - To continue on valsartan for now, no hyperkalemia    Hypothyroidism  - On levothyroxine  - TSH 8 range likely reactive to stressor    Diabetes uncontrolled  - Insulin coverage  - adjusted insulin regimen; increased doses  - Close monitoring of blood glucose levels    Heparin    GoC talks- pt with several children- 9 sons; spouse not too involved in decision making; pt not interested in making decisions now. explained and recommended nominating HCP and having advanced care plan even if it with his children only    Patient is on medications/ condition that requires close monitoring diuretics, hypotension adjustment of insulin and HTN medications.   Discussed test findings, assessment and treatment plan with consultant cards Dr genao  ordered essential and required tests; reviewed the results of labs myself.  Total time spent in this encounter assessing, diagnosing, analyzing and medical decision making is>50 mins.

## 2023-10-30 ENCOUNTER — APPOINTMENT (OUTPATIENT)
Dept: CARDIOLOGY | Facility: CLINIC | Age: 78
End: 2023-10-30

## 2023-10-30 ENCOUNTER — TRANSCRIPTION ENCOUNTER (OUTPATIENT)
Age: 78
End: 2023-10-30

## 2023-10-30 VITALS
SYSTOLIC BLOOD PRESSURE: 98 MMHG | HEART RATE: 96 BPM | DIASTOLIC BLOOD PRESSURE: 65 MMHG | RESPIRATION RATE: 18 BRPM | TEMPERATURE: 98 F | OXYGEN SATURATION: 99 %

## 2023-10-30 LAB
ANION GAP SERPL CALC-SCNC: 11 MMOL/L — SIGNIFICANT CHANGE UP (ref 5–17)
ANION GAP SERPL CALC-SCNC: 11 MMOL/L — SIGNIFICANT CHANGE UP (ref 5–17)
BUN SERPL-MCNC: 40.7 MG/DL — HIGH (ref 8–20)
BUN SERPL-MCNC: 40.7 MG/DL — HIGH (ref 8–20)
CALCIUM SERPL-MCNC: 9 MG/DL — SIGNIFICANT CHANGE UP (ref 8.4–10.5)
CALCIUM SERPL-MCNC: 9 MG/DL — SIGNIFICANT CHANGE UP (ref 8.4–10.5)
CHLORIDE SERPL-SCNC: 94 MMOL/L — LOW (ref 96–108)
CHLORIDE SERPL-SCNC: 94 MMOL/L — LOW (ref 96–108)
CO2 SERPL-SCNC: 34 MMOL/L — HIGH (ref 22–29)
CO2 SERPL-SCNC: 34 MMOL/L — HIGH (ref 22–29)
CREAT SERPL-MCNC: 1.7 MG/DL — HIGH (ref 0.5–1.3)
CREAT SERPL-MCNC: 1.7 MG/DL — HIGH (ref 0.5–1.3)
EGFR: 41 ML/MIN/1.73M2 — LOW
EGFR: 41 ML/MIN/1.73M2 — LOW
GLUCOSE BLDC GLUCOMTR-MCNC: 114 MG/DL — HIGH (ref 70–99)
GLUCOSE BLDC GLUCOMTR-MCNC: 114 MG/DL — HIGH (ref 70–99)
GLUCOSE BLDC GLUCOMTR-MCNC: 155 MG/DL — HIGH (ref 70–99)
GLUCOSE BLDC GLUCOMTR-MCNC: 155 MG/DL — HIGH (ref 70–99)
GLUCOSE SERPL-MCNC: 133 MG/DL — HIGH (ref 70–99)
GLUCOSE SERPL-MCNC: 133 MG/DL — HIGH (ref 70–99)
POTASSIUM SERPL-MCNC: 4 MMOL/L — SIGNIFICANT CHANGE UP (ref 3.5–5.3)
POTASSIUM SERPL-MCNC: 4 MMOL/L — SIGNIFICANT CHANGE UP (ref 3.5–5.3)
POTASSIUM SERPL-SCNC: 4 MMOL/L — SIGNIFICANT CHANGE UP (ref 3.5–5.3)
POTASSIUM SERPL-SCNC: 4 MMOL/L — SIGNIFICANT CHANGE UP (ref 3.5–5.3)
SODIUM SERPL-SCNC: 139 MMOL/L — SIGNIFICANT CHANGE UP (ref 135–145)
SODIUM SERPL-SCNC: 139 MMOL/L — SIGNIFICANT CHANGE UP (ref 135–145)

## 2023-10-30 PROCEDURE — 99233 SBSQ HOSP IP/OBS HIGH 50: CPT

## 2023-10-30 PROCEDURE — 99239 HOSP IP/OBS DSCHRG MGMT >30: CPT | Mod: GC

## 2023-10-30 RX ORDER — SPIRONOLACTONE 25 MG/1
1 TABLET, FILM COATED ORAL
Refills: 0 | DISCHARGE

## 2023-10-30 RX ORDER — DAPAGLIFLOZIN 10 MG/1
1 TABLET, FILM COATED ORAL
Qty: 0 | Refills: 0 | DISCHARGE

## 2023-10-30 RX ORDER — MELOXICAM 15 MG/1
1 TABLET ORAL
Refills: 0 | DISCHARGE

## 2023-10-30 RX ORDER — FUROSEMIDE 40 MG
1 TABLET ORAL
Refills: 0 | DISCHARGE

## 2023-10-30 RX ORDER — VALSARTAN 80 MG/1
20 TABLET ORAL
Refills: 0 | Status: DISCONTINUED | OUTPATIENT
Start: 2023-10-30 | End: 2023-10-30

## 2023-10-30 RX ADMIN — CEFTRIAXONE 1000 MILLIGRAM(S): 500 INJECTION, POWDER, FOR SOLUTION INTRAMUSCULAR; INTRAVENOUS at 07:42

## 2023-10-30 RX ADMIN — Medication 100 MILLIGRAM(S): at 11:54

## 2023-10-30 RX ADMIN — AZITHROMYCIN 500 MILLIGRAM(S): 500 TABLET, FILM COATED ORAL at 11:53

## 2023-10-30 RX ADMIN — RANOLAZINE 500 MILLIGRAM(S): 500 TABLET, FILM COATED, EXTENDED RELEASE ORAL at 06:25

## 2023-10-30 RX ADMIN — MONTELUKAST 10 MILLIGRAM(S): 4 TABLET, CHEWABLE ORAL at 11:54

## 2023-10-30 RX ADMIN — TICAGRELOR 90 MILLIGRAM(S): 90 TABLET ORAL at 06:25

## 2023-10-30 RX ADMIN — Medication 3 UNIT(S): at 07:42

## 2023-10-30 RX ADMIN — HEPARIN SODIUM 5000 UNIT(S): 5000 INJECTION INTRAVENOUS; SUBCUTANEOUS at 06:25

## 2023-10-30 RX ADMIN — Medication 81 MILLIGRAM(S): at 11:54

## 2023-10-30 RX ADMIN — Medication 3 UNIT(S): at 11:55

## 2023-10-30 RX ADMIN — Medication 100 MILLIGRAM(S): at 11:53

## 2023-10-30 RX ADMIN — Medication 20 MILLIGRAM(S): at 11:53

## 2023-10-30 RX ADMIN — PANTOPRAZOLE SODIUM 40 MILLIGRAM(S): 20 TABLET, DELAYED RELEASE ORAL at 06:25

## 2023-10-30 RX ADMIN — Medication 2: at 07:42

## 2023-10-30 RX ADMIN — Medication 75 MICROGRAM(S): at 06:25

## 2023-10-30 RX ADMIN — Medication 100 MILLIGRAM(S): at 06:25

## 2023-10-30 NOTE — PROGRESS NOTE ADULT - NS ATTEND AMEND GEN_ALL_CORE FT
Admitted with black colour sputum greenish yellow sputum. non recovering pneuomna and then with leg edema.    found to be worasneing EF and CHF . given Abx.    IV diuresois  now cough is thick whitish. with little yellow. no more black jesusita sputum.  change to PO torsemide.  [facility]  will sign off. please call for further questions
79 y/o M admitted with acute HFrEF   -Transition to PO torsemide   -Continue GDMT with Toprol, valsartan.   -Add MRA and farxiga as BP and renal function improve   -Continue DAPT, statin   -Plan for eventual ischemic workup with outpatient C
79 y/o M admitted with acute HFrEF   -Continue lasix 60mg IV bid. Received metolazone today   -Continue GDMT with Toprol, valsartan. Add MRA and farxiga as BP and renal function improve   -Continue DAPT, statin   -Plan for eventual ischemic workup with outpatient C   -RLE swelling improving, LLE persists. US negative for DVT. ?Component of cellulitis. Continue to monitor and adjust antibiotic coverage if needed
79 y/o M admitted with acute HFrEF   -Now with worsening renal function  -Transition from IV diuretics to torsemide 20mg daily   -Continue GDMT with Toprol, valsartan (decrease valsartan to 1/2 current dose, which is patient's home med)   -Add MRA and farxiga as BP and renal function improve   -Continue DAPT, statin   -Plan for eventual ischemic workup with outpatient [facility]

## 2023-10-30 NOTE — PROGRESS NOTE ADULT - PROBLEM SELECTOR PLAN 1
.  - EF now <20%, grade II DD, elevated LA pressure, mod MR, mild AI, mild TR, and moderate pulmonary HTN.   - euvolemic on exam   - continue torsemide 20mg BID   - continue metoprolol  - will discuss with Dr. Valenzuela valsartan dosage for discharge .  - EF now <20%, grade II DD, elevated LA pressure, mod MR, mild AI, mild TR, and moderate pulmonary HTN.   - euvolemic on exam   - continue torsemide 20mg BID   - continue metoprolol  - change valsartan to 20mg PO every other day

## 2023-10-30 NOTE — DISCHARGE NOTE NURSING/CASE MANAGEMENT/SOCIAL WORK - NSDCVIVACCINE_GEN_ALL_CORE_FT
Tdap; 02-Nov-2017 16:11; Mehdi Tsai ([id number]); [employer]; [id number]; IntraMuscular; Deltoid Left.; 0.5 milliLiter(s); VIS (VIS Published: 09-May-2013, VIS Presented: 02-Nov-2017);

## 2023-10-30 NOTE — DISCHARGE NOTE PROVIDER - NSDCCPCAREPLAN_GEN_ALL_CORE_FT
PRINCIPAL DISCHARGE DIAGNOSIS  Diagnosis: CHF exacerbation  Assessment and Plan of Treatment:      PRINCIPAL DISCHARGE DIAGNOSIS  Diagnosis: Acute HFrEF (heart failure with reduced ejection fraction)  Assessment and Plan of Treatment:       SECONDARY DISCHARGE DIAGNOSES  Diagnosis: Hypertension  Assessment and Plan of Treatment:     Diagnosis: CAD (coronary artery disease)  Assessment and Plan of Treatment:

## 2023-10-30 NOTE — PROGRESS NOTE ADULT - ASSESSMENT
78M with a history of coronary artery disease, heart failure, hypertension, gastroesophageal reflux, and hypothyroidism who presented with increased lower extremity edema, orthopnea, and persistent cough for the past few weeks admitted for CHF exacerbation.     Acute on chronic combined diastolic and systolic heart failure  Hypotension symptomatic episodes  - On PO Lasix bid, 1 episode of soft BP since. symptomatic  - To monitor intake and output, to monitor weights  - On valsartan- per cardio rec, 20 mg every other day due to hypotensive episodes  - when BP stable will consider resuming BB over spironolactone (elevated Crt)  - Repeat echo showed EF < 20% and normal right ventricular function which is improved from intial echo showing severely reduced right ventricular systolic function  - more euvolemic, transition to PO torsemide 20mg BID for home    Coronary artery disease  - Continue on aspirin and ticagrelor  - Continue on atorvastatin  - Possible ischemic evaluation when more euvolemic as o/p    RUL Pneumonia, likely community acquired  - CT of the chest noted right upper lobe opacities, like community acquired  - Noted to have been previously treated with multiple courses of antibiotics o/p  - D-dimer was not elevated, PESI score 96 and Wells criteria <4 and lower extremity doppler was without deep vein thrombosis, PE less likely  - On ceftriaxone and azithromycin for 5 days  - patient wanted to be discharged today, will discharge on augmentin for 4 days and f/u with PCP    RAS on Stage 3 CKD  - likely from diuresis will decrease furosemide which is permissive and acceptable  -switched to turosemide per cardio recs  - Meloxicam was held, educate patient on keeping this medication off  - Continue to monitor renal function while on diuretics, watch and reassess while weaning on home medications  - metabolic alkalosis noted 10/28, likely secondary to diuresis. permissive for now will closely monitor  - To continue on valsartan for now, no hyperkalemia    Hypothyroidism  - On levothyroxine  - TSH 8 range likely reactive to stressor    Diabetes uncontrolled  - Insulin coverage  - adjusted insulin regimen; increased doses  - Close monitoring of blood glucose levels    DVT PPX: Heparin  Diet: DASH/TLC  Dispo: home with home care today

## 2023-10-30 NOTE — PROGRESS NOTE ADULT - ASSESSMENT
76 y/o M with a PMHx of CAD s/p CABG x 3 (patent LIMA-LAD, known occluded SVG-RCA) with multiple PCI's post surgery to native OM1 and SVG-OM (last CODI x 2 to SVG-OM1 ISR 11/2020), ischemic CM HFrEF (EF 36% 05/22), DMII, HTN, HLD, and GERD who presented to the ED with worsening orthopnea and leg swelling. Patient went to [facility] and returned 3 weeks ago, and states he has been experiencing some leg swelling and productive cough ever since. Patient states that he also has been unable to sleep and lie down flat due to shortness of breath. Patient has been on antibiotics and had his diuretics increased as well. Symptoms continued to worsen, so he was advised to go the ER for further workup. Patient's pBNP noted to be 8960 with last pBNP as an outpatient around 1K. Patient also with leukocytosis as well as elevated ESR/CRP

## 2023-10-30 NOTE — PROGRESS NOTE ADULT - SUBJECTIVE AND OBJECTIVE BOX
[facility]                                                         CARDIOLOGY AT [facility]                                                                  [address], [place] [address]                                                         Telephone: [phone]. Fax:[phone]                                                                             PROGRESS NOTE    Reason for follow up: Heart failure  Update: Sitting up in chair, fully dressed. States feeling better  denes chest pain shortness of breath      Review of symptoms:   Cardiac:  No chest pain. No dyspnea. No palpitations.  Respiratory: no cough. No dyspnea  Gastrointestinal: No diarrhea. No abdominal pain. No bleeding.   Neuro: No focal neuro complaints.      Vitals:  T(C): 36.6 (10-30-23 @ 05:14), Max: 36.7 (10-29-23 @ 15:13)  HR: 88 (10-30-23 @ 05:14) (75 - 95)  BP: 94/58 (10-30-23 @ 05:14) (78/50 - 108/70)  RR: 18 (10-30-23 @ 05:14) (18 - 19)  SpO2: 96% (10-30-23 @ 05:14) (93% - 98%)        I&O's Summary    29 Oct 2023 07:01  -  30 Oct 2023 07:00  --------------------------------------------------------  IN: 950 mL / OUT: 0 mL / NET: 950 mL        Weight (kg): 70 (10-27 @ 21:00)        PHYSICAL EXAM:  Appearance: Comfortable. No acute distress  HEENT:  Atraumatic. Normocephalic.  Normal oral mucosa  Neurologic: A & O x 3, no gross focal deficits.  Cardiovascular: RRR S1 S2, No murmur, no rubs/gallops. No JVD  Respiratory: Lungs clear to auscultation, unlabored   Gastrointestinal:  Soft, Non-tender, + BS  Lower Extremities: No edema  Psychiatry: Patient is calm. No agitation.   Skin: warm and dry.        CURRENT CARDIAC MEDICATIONS:  ranolazine 500 milliGRAM(s) Oral two times a day  torsemide 20 milliGRAM(s) Oral daily        CURRENT OTHER MEDICATIONS:  benzonatate 100 milliGRAM(s) Oral three times a day  guaiFENesin Oral Liquid (Sugar-Free) 100 milliGRAM(s) Oral every 6 hours PRN Cough  montelukast 10 milliGRAM(s) Oral daily  azithromycin   Tablet 500 milliGRAM(s) Oral daily  cefTRIAXone Injectable.      cefTRIAXone Injectable. 1000 milliGRAM(s) IV Push every 24 hours  pantoprazole    Tablet 40 milliGRAM(s) Oral before breakfast  atorvastatin 40 milliGRAM(s) Oral at bedtime  dextrose Oral Gel 15 Gram(s) Oral once, Stop order after: 1 Doses PRN Blood Glucose LESS THAN 70 milliGRAM(s)/deciliter  glucagon  Injectable 1 milliGRAM(s) IntraMuscular once, Stop order after: 1 Doses  insulin glargine Injectable (LANTUS) 15 Unit(s) SubCutaneous at bedtime  insulin lispro (ADMELOG) corrective regimen sliding scale   SubCutaneous three times a day before meals  insulin lispro Injectable (ADMELOG) 3 Unit(s) SubCutaneous three times a day before meals  levothyroxine 75 MICROGram(s) Oral daily  aspirin enteric coated 81 milliGRAM(s) Oral daily  heparin   Injectable 5000 Unit(s) SubCutaneous every 12 hours  ticagrelor 90 milliGRAM(s) Oral every 12 hours        LABS:	 	  ( 27 Oct 2023 10:54 )  Troponin T  0.05 ,  CPK  X    , CKMB  X    , BNP X      ( 26 Oct 2023 09:50 )  Troponin T  0.05 ,  CPK  X    , CKMB  X    , BNP X            10-30    139  |  94<L>  |  40.7<H>  ----------------------------<  133<H>  4.0   |  34.0<H>  |  1.70<H>    Ca    9.0      30 Oct 2023 06:40        TSH: Thyroid Stimulating Hormone, Serum: 8.25 uIU/mL      TELEMETRY: SR couplets      DIAGNOSTIC TESTING:  [ ] Echocardiogram:   < from: TTE Echo Limited or F/U (10.29.23 @ 15:25) >    PHYSICIAN INTERPRETATION:  Left Ventricle: Endocardial visualization was enhanced with intravenous   echo contrast. The left ventricular internal cavity size is normal.  Global LV systolic function was severely decreased. Left ventricular   ejection fraction, by visual estimation, is <20%.  Right Ventricle: Normal right ventricular size and function.  Pericardium: There is no evidence of pericardial effusion.      Summary:   1. Left ventricular ejection fraction, by visual estimation, is <20%.   2. Severely decreased global left ventricular systolic function.   3. There is no evidence of pericardial effusion.   4. Endocardial visualization was enhanced with intravenous echo contrast.    MD Dyana Electronically signed on 10/29/2023 at 5:47:50 PM      < end of copied text >

## 2023-10-30 NOTE — PROGRESS NOTE ADULT - PROBLEM SELECTOR PLAN 2
.  - Patient with extensive history of CAD with CABG  x 3 with patent LIMA-LAD, occluded SVG-RCA, and most recently CODI x 2 to SVG-OM1 for ISR.   - Continue aspirin and brilinta at this time due to multilayered stents and extensive CAD.   - Continue toprol XL, valsartan, and lipitor.   - Echocardiogram as above. .  - Patient with extensive history of CAD with CABG  x 3 with patent LIMA-LAD, occluded SVG-RCA, and most recently CODI x 2 to SVG-OM1 for ISR.   - Continue aspirin and brilinta at this time due to multilayered stents and extensive CAD.   - Continue toprol XL, valsartan, and lipitor.   - Echocardiogram as above.  - will need ischemic work, will arrange as out patient

## 2023-10-30 NOTE — DISCHARGE NOTE PROVIDER - ATTENDING DISCHARGE PHYSICAL EXAMINATION:
ICU Vital Signs Last 24 Hrs  T(C): 36.6 (30 Oct 2023 11:32), Max: 36.7 (29 Oct 2023 15:13)  T(F): 97.9 (30 Oct 2023 11:32), Max: 98.1 (29 Oct 2023 15:13)  HR: 89 (30 Oct 2023 11:32) (75 - 95)  BP: 108/67 (30 Oct 2023 11:32) (88/48 - 108/70)  BP(mean): --  ABP: --  ABP(mean): --  RR: 18 (30 Oct 2023 11:32) (18 - 19)  SpO2: 98% (30 Oct 2023 11:32) (93% - 98%)    O2 Parameters below as of 30 Oct 2023 11:32  Patient On (Oxygen Delivery Method): room air    GENERAL:  A&Ox3, No acute distress, sitting comfortably in bed  HEAD:  Atraumatic, Normocephalic  EYES: EOMI, PERRLA, conjunctiva and sclera clear  NECK: Supple, No JVD, Normal thyroid  NERVOUS SYSTEM:  Alert & Oriented, No gross focal deficits,   CHEST/LUNG: Clear to auscultation bilaterally; No rales, rhonchi, wheezing, or rubs  HEART: Regular rate and rhythm; + murmur  ABDOMEN: Soft, Nontender, Nondistended  EXTREMITIES:  No clubbing, cyanosis, or edema  SKIN: No rashes or lesions

## 2023-10-30 NOTE — DISCHARGE NOTE PROVIDER - NSDCMRMEDTOKEN_GEN_ALL_CORE_FT
amoxicillin-clavulanate 875 mg-125 mg oral tablet: 875 milligram(s) orally 2 times a day  aspirin 81 mg oral tablet, chewable: 1 tab(s) orally once a day  atorvastatin 20 mg oral tablet: 1 tab(s) orally once a day  Basaglar KwikPen 100 units/mL subcutaneous solution: 15 unit(s) subcutaneous once a day (at bedtime)   Farxiga 10 mg oral tablet: 1 tab(s) orally once a day  levothyroxine 75 mcg (0.075 mg) oral tablet: 1 tab(s) orally once a day  metoprolol succinate 50 mg oral tablet, extended release: 1 tab(s) orally once a day  montelukast 10 mg oral tablet: 1 tab(s) orally once a day  NexIUM 40 mg oral delayed release capsule: 1 cap(s) orally once a day (at bedtime)  NovoLOG 100 units/mL injectable solution: 12, 12, 16 unit(s) injectable 3 times a day (before meals)  ranolazine 500 mg oral tablet, extended release: 1 tab(s) orally 2 times a day  Soaanz 20 mg oral tablet: 1 tab(s) orally once a day  spironolactone 25 mg oral tablet: 1 tab(s) orally once a day  ticagrelor 90 mg oral tablet: 1 tab(s) orally every 12 hours  valsartan 40 mg oral tablet: 1 tab(s) orally once a day (at bedtime)   amoxicillin-clavulanate 875 mg-125 mg oral tablet: 875 milligram(s) orally 2 times a day  aspirin 81 mg oral tablet, chewable: 1 tab(s) orally once a day  atorvastatin 20 mg oral tablet: 1 tab(s) orally once a day  Basaglar KwikPen 100 units/mL subcutaneous solution: 15 unit(s) subcutaneous once a day (at bedtime)   guaiFENesin 100 mg/5 mL oral liquid: 5 milliliter(s) orally every 6 hours as needed for  cough  levothyroxine 75 mcg (0.075 mg) oral tablet: 1 tab(s) orally once a day  montelukast 10 mg oral tablet: 1 tab(s) orally once a day  NexIUM 40 mg oral delayed release capsule: 1 cap(s) orally once a day (at bedtime)  NovoLOG 100 units/mL injectable solution: 12, 12, 16 unit(s) injectable 3 times a day (before meals)  ranolazine 500 mg oral tablet, extended release: 1 tab(s) orally 2 times a day  Soaanz 20 mg oral tablet: 1 tab(s) orally once a day  ticagrelor 90 mg oral tablet: 1 tab(s) orally every 12 hours  valsartan 40 mg oral tablet: 0.5 tab(s) orally every other day (at bedtime)

## 2023-10-30 NOTE — DISCHARGE NOTE PROVIDER - CARE PROVIDER_API CALL
Sydney Valenzuela  Cardiology  [address], [address] [place] [id number]  Phone: [phone]  Fax: [phone]  Follow Up Time:

## 2023-10-30 NOTE — DISCHARGE NOTE PROVIDER - NSDCFUSCHEDAPPT_GEN_ALL_CORE_FT
Adelaide Oneil  [facility]  PULMMED [address]  Scheduled Appointment: 11/08/2023    [facility]  CARDIOLOGY [address]  Scheduled Appointment: 11/28/2023    Ramona Mendez  [facility]  ENDOCRIN [address]  Scheduled Appointment: 12/19/2023    Sydney Valenzuela  [facility]  CARDIOLOGY [address]  Scheduled Appointment: 12/19/2023

## 2023-10-30 NOTE — DISCHARGE NOTE NURSING/CASE MANAGEMENT/SOCIAL WORK - PATIENT PORTAL LINK FT
You can access the FollowMyHealth Patient Portal offered by [facility] by registering at the following website: [email or web address]. By joining [employer]’s FollowMyHealth portal, you will also be able to view your health information using other applications (apps) compatible with our system.

## 2023-10-30 NOTE — PROGRESS NOTE ADULT - SUBJECTIVE AND OBJECTIVE BOX
Patient is a 78y old  Male who presents with a chief complaint of acute CHF exacerbation.    BRIEF HOSPITAL COURSE: 78M who presented with increased dyspnea, productive cough despite antibiotics, and lower extremity edema admitted for acute CHF exacerbation. Given travel hx, LE doppler negative for thrombus. Echo significant for EF < 20%. Diuresis and management. Hypotensive episodes after valsartan given.     INTERVAL HPI/OVERNIGHT EVENTS:  An episode of hypotension with dizziness, resolved with Trendelenburg and 250 ml bolus.    TODAY: Patient says that echo was performed yesterday and aside from the episodes of hypotension, there were no any other issues. He notes that swelling has much improved as is the pneumonia, except for a few bouts of dry intermittent cough.    MEDICATIONS  (STANDING):  aspirin enteric coated 81 milliGRAM(s) Oral daily  atorvastatin 40 milliGRAM(s) Oral at bedtime  azithromycin   Tablet 500 milliGRAM(s) Oral daily  benzonatate 100 milliGRAM(s) Oral three times a day  cefTRIAXone Injectable.      cefTRIAXone Injectable. 1000 milliGRAM(s) IV Push every 24 hours  dextrose 5%. 1000 milliLiter(s) (100 mL/Hr) IV Continuous <Continuous>  dextrose 5%. 1000 milliLiter(s) (50 mL/Hr) IV Continuous <Continuous>  dextrose 50% Injectable 25 Gram(s) IV Push once  dextrose 50% Injectable 25 Gram(s) IV Push once  dextrose 50% Injectable 12.5 Gram(s) IV Push once  glucagon  Injectable 1 milliGRAM(s) IntraMuscular once  heparin   Injectable 5000 Unit(s) SubCutaneous every 12 hours  insulin glargine Injectable (LANTUS) 15 Unit(s) SubCutaneous at bedtime  insulin lispro (ADMELOG) corrective regimen sliding scale   SubCutaneous three times a day before meals  insulin lispro Injectable (ADMELOG) 3 Unit(s) SubCutaneous three times a day before meals  levothyroxine 75 MICROGram(s) Oral daily  montelukast 10 milliGRAM(s) Oral daily  pantoprazole    Tablet 40 milliGRAM(s) Oral before breakfast  ranolazine 500 milliGRAM(s) Oral two times a day  ticagrelor 90 milliGRAM(s) Oral every 12 hours  torsemide 20 milliGRAM(s) Oral daily  valsartan 20 milliGRAM(s) Oral <User Schedule>    MEDICATIONS  (PRN):  dextrose Oral Gel 15 Gram(s) Oral once PRN Blood Glucose LESS THAN 70 milliGRAM(s)/deciliter  guaiFENesin Oral Liquid (Sugar-Free) 100 milliGRAM(s) Oral every 6 hours PRN Cough      Allergies  Allergy Status Unknown    Intolerances  DOPamine (Hypotension)    REVIEW OF SYSTEMS:  CONSTITUTIONAL: No fever, weight loss, or fatigue  RESPIRATORY: + cough, no wheezing, chills or hemoptysis; No shortness of breath  CARDIOVASCULAR: No chest pain, palpitations, dizziness, or leg swelling  GASTROINTESTINAL: No abdominal or epigastric pain. No nausea, vomiting, No diarrhea or constipation.   NEUROLOGICAL: No headaches, memory loss, loss of strength, numbness, or tremors  MUSCULOSKELETAL: No joint pain or swelling; No muscle, back, or extremity pain    Vital Signs Last 24 Hrs  T(C): 36.6 (30 Oct 2023 05:14), Max: 36.7 (29 Oct 2023 08:59)  T(F): 97.8 (30 Oct 2023 05:14), Max: 98.1 (29 Oct 2023 15:13)  HR: 88 (30 Oct 2023 05:14) (75 - 98)  BP: 94/58 (30 Oct 2023 05:14) (78/50 - 108/70)  BP(mean): --  RR: 18 (30 Oct 2023 05:14) (18 - 19)  SpO2: 96% (30 Oct 2023 05:14) (93% - 99%)    Parameters below as of 30 Oct 2023 05:14  Patient On (Oxygen Delivery Method): room air    PHYSICAL EXAM:  GENERAL:  A&Ox3, No acute distress, sitting comfortably in bed  HEAD:  Atraumatic, Normocephalic  EYES: EOMI, PERRLA, conjunctiva and sclera clear  NECK: Supple, No JVD, Normal thyroid  NERVOUS SYSTEM:  Alert & Oriented, No gross focal deficits,   CHEST/LUNG: Clear to auscultation bilaterally; No rales, rhonchi, wheezing, or rubs  HEART: Regular rate and rhythm; + murmur  ABDOMEN: Soft, Nontender, Nondistended  EXTREMITIES:  No clubbing, cyanosis, or edema  SKIN: No rashes or lesions    LABS:    10-30    139  |  94<L>  |  40.7<H>  ----------------------------<  133<H>  4.0   |  34.0<H>  |  1.70<H>    Ca    9.0      30 Oct 2023 06:40    Urinalysis Basic - ( 30 Oct 2023 06:40 )    Color: x / Appearance: x / SG: x / pH: x  Gluc: 133 mg/dL / Ketone: x  / Bili: x / Urobili: x   Blood: x / Protein: x / Nitrite: x   Leuk Esterase: x / RBC: x / WBC x   Sq Epi: x / Non Sq Epi: x / Bacteria: x    CAPILLARY BLOOD GLUCOSE    POCT Blood Glucose.: 155 mg/dL (30 Oct 2023 07:40)  POCT Blood Glucose.: 184 mg/dL (29 Oct 2023 22:39)  POCT Blood Glucose.: 291 mg/dL (29 Oct 2023 17:55)  POCT Blood Glucose.: 227 mg/dL (29 Oct 2023 11:59)      RADIOLOGY & ADDITIONAL TESTS:  < from: TTE Echo Limited or F/U (10.29.23 @ 15:25) >  PHYSICIAN INTERPRETATION:  Left Ventricle: Endocardial visualization was enhanced with intravenous   echo contrast. The left ventricular internal cavity size is normal.  Global LV systolic function was severely decreased. Left ventricular   ejection fraction, by visual estimation, is <20%.  Right Ventricle: Normal right ventricular size and function.  Pericardium: There is no evidence of pericardial effusion.      Summary:   1. Left ventricular ejection fraction, by visual estimation, is <20%.   2. Severely decreased global left ventricular systolic function.   3. There is no evidence of pericardial effusion.   4. Endocardial visualization was enhanced with intravenous echo contrast.    < end of copied text >      Imaging Personally Reviewed:  [X] YES  [ ] NO  Consultant(s) Notes Reviewed:  [X] YES  [ ] NO  Care Discussed with Consultants/Other Providers [X] YES  [ ] NO  Plan of Care discussed with House Staff: [X]YES [ ] NO

## 2023-10-30 NOTE — DISCHARGE NOTE PROVIDER - HOSPITAL COURSE
78M with past significant medical history of CAD s/p CABG x 3 (patent LIMA-LAD, known occluded SVG-RCA) with multiple PCI's post surgery to native OM1 and SVG-OM (last CODI x 2 to SVG-OM1 ISR 11/2020), ischemic CM HFrEF (EF 36% 05/22), DMII, HTN, HLD, and GERD presented with increased dyspnea, productive cough despite antibiotics, and lower extremity edema admitted for acute CHF exacerbation found to also have pneumonia. Given travel hx, LE doppler negative for thrombus and d-dimer negative. Trops x2 was 0.05.  Echo significant for EF < 20% and reduced RV systolic function. CT chest showed few opacities in the right upper lobe. Diuresis with furosemide with tapering and switched to turosemide for outpatient f/u. Hypotensive episodes after valsartan given, so valsartan was changed to midday dosage and halved. With diuresis, swelling was reduced, kidney function improved demonstrated by improvement of Cr, improved LFTs, and repeat echo showed normal RV systolic function, improved from initial echo. Patient's cough was improved and became residual, dry and intermittent. Patient's swelling has improved drastically, and clinically stable. He will be discharged with Augmentin with followup wit cardiology in 7-10 days for catheterization and outpatient follow up. 78M with past significant medical history of CAD s/p CABG x 3 (patent LIMA-LAD, known occluded SVG-RCA) with multiple PCI's post surgery to native OM1 and SVG-OM (last CODI x 2 to SVG-OM1 ISR 11/2020), ischemic CM HFrEF (EF 36% 05/22), DMII, HTN, HLD, and GERD presented with increased dyspnea, productive cough despite antibiotics, and lower extremity edema admitted for acute CHF exacerbation found to also have pneumonia. PE ruled out. Diuresis with furosemide with tapering and switched to torsemide for outpatient f/u. Hypotensive episodes during diuresis, so valsartan was changed to midday dosage and halved. With diuresis, swelling was reduced, kidney function improved demonstrated by improvement of Cr, improved LFTs, and repeat echo showed normal RV systolic function, improved from initial echo. Patient's cough was improved and became residual, dry and intermittent. Patient's swelling has improved drastically, and clinically stable. He will be discharged with Augmentin with followup with cardiology in 7-10 days.  Medically stable and agreeable with discharge and follow up plan. Patient was advised to return to ED if any symptoms occur or worsen.  TIME 43 mins

## 2023-10-30 NOTE — DISCHARGE NOTE NURSING/CASE MANAGEMENT/SOCIAL WORK - NSDCPEFALRISK_GEN_ALL_CORE
For information on Fall & Injury Prevention, visit: [email or web address] OR  [email or web address] OR  https://www.cdc.gov/steadi/patient.html

## 2023-10-30 NOTE — PROGRESS NOTE ADULT - PROVIDER SPECIALTY LIST ADULT
Hospitalist
Hospitalist
Internal Medicine
Cardiology
Internal Medicine
Cardiology

## 2023-11-06 ENCOUNTER — APPOINTMENT (OUTPATIENT)
Dept: PULMONOLOGY | Facility: CLINIC | Age: 78
End: 2023-11-06

## 2023-11-06 LAB
GLUCOSE BLDC GLUCOMTR-MCNC: 88 MG/DL — SIGNIFICANT CHANGE UP (ref 70–99)
GLUCOSE BLDC GLUCOMTR-MCNC: 88 MG/DL — SIGNIFICANT CHANGE UP (ref 70–99)

## 2023-11-07 ENCOUNTER — APPOINTMENT (OUTPATIENT)
Dept: CARDIOLOGY | Facility: CLINIC | Age: 78
End: 2023-11-07
Payer: MEDICARE

## 2023-11-07 VITALS
DIASTOLIC BLOOD PRESSURE: 62 MMHG | WEIGHT: 148 LBS | TEMPERATURE: 98.9 F | BODY MASS INDEX: 22.43 KG/M2 | OXYGEN SATURATION: 99 % | HEART RATE: 81 BPM | SYSTOLIC BLOOD PRESSURE: 112 MMHG | HEIGHT: 68 IN

## 2023-11-07 PROCEDURE — 99215 OFFICE O/P EST HI 40 MIN: CPT | Mod: 25

## 2023-11-07 PROCEDURE — 93000 ELECTROCARDIOGRAM COMPLETE: CPT

## 2023-11-07 RX ORDER — DAPAGLIFLOZIN 10 MG/1
10 TABLET, FILM COATED ORAL DAILY
Qty: 90 | Refills: 1 | Status: DISCONTINUED | COMMUNITY
Start: 2020-09-25 | End: 2023-11-07

## 2023-11-08 ENCOUNTER — APPOINTMENT (OUTPATIENT)
Dept: PULMONOLOGY | Facility: CLINIC | Age: 78
End: 2023-11-08
Payer: MEDICARE

## 2023-11-08 ENCOUNTER — TRANSCRIPTION ENCOUNTER (OUTPATIENT)
Age: 78
End: 2023-11-08

## 2023-11-08 VITALS
BODY MASS INDEX: 24.33 KG/M2 | DIASTOLIC BLOOD PRESSURE: 60 MMHG | WEIGHT: 155 LBS | HEIGHT: 67 IN | SYSTOLIC BLOOD PRESSURE: 100 MMHG | OXYGEN SATURATION: 98 % | HEART RATE: 80 BPM

## 2023-11-08 DIAGNOSIS — R93.89 ABNORMAL FINDINGS ON DIAGNOSTIC IMAGING OF OTHER SPECIFIED BODY STRUCTURES: ICD-10-CM

## 2023-11-08 DIAGNOSIS — Z86.11 PERSONAL HISTORY OF TUBERCULOSIS: ICD-10-CM

## 2023-11-08 DIAGNOSIS — J47.0 BRONCHIECTASIS WITH ACUTE LOWER RESPIRATORY INFECTION: ICD-10-CM

## 2023-11-08 PROCEDURE — 99214 OFFICE O/P EST MOD 30 MIN: CPT

## 2023-11-09 LAB
ANION GAP SERPL CALC-SCNC: 12 MMOL/L
BUN SERPL-MCNC: 43 MG/DL
CALCIUM SERPL-MCNC: 9.3 MG/DL
CHLORIDE SERPL-SCNC: 91 MMOL/L
CO2 SERPL-SCNC: 28 MMOL/L
CREAT SERPL-MCNC: 1.84 MG/DL
EGFR: 38 ML/MIN/1.73M2
GLUCOSE SERPL-MCNC: 414 MG/DL
NT-PROBNP SERPL-MCNC: 9566 PG/ML
POTASSIUM SERPL-SCNC: 4.9 MMOL/L
SODIUM SERPL-SCNC: 131 MMOL/L

## 2023-11-13 PROCEDURE — 84443 ASSAY THYROID STIM HORMONE: CPT

## 2023-11-13 PROCEDURE — 83735 ASSAY OF MAGNESIUM: CPT

## 2023-11-13 PROCEDURE — 93308 TTE F-UP OR LMTD: CPT

## 2023-11-13 PROCEDURE — 85027 COMPLETE CBC AUTOMATED: CPT

## 2023-11-13 PROCEDURE — 85379 FIBRIN DEGRADATION QUANT: CPT

## 2023-11-13 PROCEDURE — 96374 THER/PROPH/DIAG INJ IV PUSH: CPT

## 2023-11-13 PROCEDURE — 93005 ELECTROCARDIOGRAM TRACING: CPT

## 2023-11-13 PROCEDURE — 84484 ASSAY OF TROPONIN QUANT: CPT

## 2023-11-13 PROCEDURE — 36415 COLL VENOUS BLD VENIPUNCTURE: CPT

## 2023-11-13 PROCEDURE — P9047: CPT

## 2023-11-13 PROCEDURE — 99285 EMERGENCY DEPT VISIT HI MDM: CPT | Mod: 25

## 2023-11-13 PROCEDURE — 83880 ASSAY OF NATRIURETIC PEPTIDE: CPT

## 2023-11-13 PROCEDURE — 71045 X-RAY EXAM CHEST 1 VIEW: CPT

## 2023-11-13 PROCEDURE — 93970 EXTREMITY STUDY: CPT

## 2023-11-13 PROCEDURE — 85025 COMPLETE CBC W/AUTO DIFF WBC: CPT

## 2023-11-13 PROCEDURE — C8929: CPT

## 2023-11-13 PROCEDURE — 80053 COMPREHEN METABOLIC PANEL: CPT

## 2023-11-13 PROCEDURE — 0225U NFCT DS DNA&RNA 21 SARSCOV2: CPT

## 2023-11-13 PROCEDURE — 71250 CT THORAX DX C-: CPT

## 2023-11-13 PROCEDURE — 86140 C-REACTIVE PROTEIN: CPT

## 2023-11-13 PROCEDURE — 85652 RBC SED RATE AUTOMATED: CPT

## 2023-11-13 PROCEDURE — 80048 BASIC METABOLIC PNL TOTAL CA: CPT

## 2023-11-13 PROCEDURE — 83036 HEMOGLOBIN GLYCOSYLATED A1C: CPT

## 2023-11-13 PROCEDURE — 82962 GLUCOSE BLOOD TEST: CPT

## 2023-11-20 ENCOUNTER — INPATIENT (INPATIENT)
Facility: HOSPITAL | Age: 78
LOS: 1 days | Discharge: HOME | DRG: 286 | End: 2023-11-22
Attending: STUDENT IN AN ORGANIZED HEALTH CARE EDUCATION/TRAINING PROGRAM | Admitting: STUDENT IN AN ORGANIZED HEALTH CARE EDUCATION/TRAINING PROGRAM
Payer: MEDICARE

## 2023-11-20 VITALS
TEMPERATURE: 98 F | SYSTOLIC BLOOD PRESSURE: 134 MMHG | HEART RATE: 92 BPM | DIASTOLIC BLOOD PRESSURE: 76 MMHG | OXYGEN SATURATION: 98 % | RESPIRATION RATE: 15 BRPM

## 2023-11-20 DIAGNOSIS — N18.32 CHRONIC KIDNEY DISEASE, STAGE 3B: ICD-10-CM

## 2023-11-20 DIAGNOSIS — I50.23 ACUTE ON CHRONIC SYSTOLIC (CONGESTIVE) HEART FAILURE: ICD-10-CM

## 2023-11-20 DIAGNOSIS — I25.5 ISCHEMIC CARDIOMYOPATHY: ICD-10-CM

## 2023-11-20 DIAGNOSIS — Z92.3 PERSONAL HISTORY OF IRRADIATION: Chronic | ICD-10-CM

## 2023-11-20 DIAGNOSIS — E78.00 PURE HYPERCHOLESTEROLEMIA, UNSPECIFIED: ICD-10-CM

## 2023-11-20 DIAGNOSIS — Z96.7 PRESENCE OF OTHER BONE AND TENDON IMPLANTS: Chronic | ICD-10-CM

## 2023-11-20 DIAGNOSIS — E11.59 TYPE 2 DIABETES MELLITUS WITH OTHER CIRCULATORY COMPLICATIONS: ICD-10-CM

## 2023-11-20 DIAGNOSIS — I50.20 UNSPECIFIED SYSTOLIC (CONGESTIVE) HEART FAILURE: ICD-10-CM

## 2023-11-20 DIAGNOSIS — Z95.1 PRESENCE OF AORTOCORONARY BYPASS GRAFT: Chronic | ICD-10-CM

## 2023-11-20 DIAGNOSIS — I25.110 ATHEROSCLEROTIC HEART DISEASE OF NATIVE CORONARY ARTERY WITH UNSTABLE ANGINA PECTORIS: ICD-10-CM

## 2023-11-20 DIAGNOSIS — I10 ESSENTIAL (PRIMARY) HYPERTENSION: ICD-10-CM

## 2023-11-20 LAB
A1C WITH ESTIMATED AVERAGE GLUCOSE RESULT: 6.9 % — HIGH (ref 4–5.6)
A1C WITH ESTIMATED AVERAGE GLUCOSE RESULT: 6.9 % — HIGH (ref 4–5.6)
ANION GAP SERPL CALC-SCNC: 12 MMOL/L — SIGNIFICANT CHANGE UP (ref 5–17)
ANION GAP SERPL CALC-SCNC: 12 MMOL/L — SIGNIFICANT CHANGE UP (ref 5–17)
BASOPHILS # BLD AUTO: 0.05 K/UL — SIGNIFICANT CHANGE UP (ref 0–0.2)
BASOPHILS # BLD AUTO: 0.05 K/UL — SIGNIFICANT CHANGE UP (ref 0–0.2)
BASOPHILS NFR BLD AUTO: 0.3 % — SIGNIFICANT CHANGE UP (ref 0–2)
BASOPHILS NFR BLD AUTO: 0.3 % — SIGNIFICANT CHANGE UP (ref 0–2)
BUN SERPL-MCNC: 33.8 MG/DL — HIGH (ref 8–20)
BUN SERPL-MCNC: 33.8 MG/DL — HIGH (ref 8–20)
CALCIUM SERPL-MCNC: 8.8 MG/DL — SIGNIFICANT CHANGE UP (ref 8.4–10.5)
CALCIUM SERPL-MCNC: 8.8 MG/DL — SIGNIFICANT CHANGE UP (ref 8.4–10.5)
CHLORIDE SERPL-SCNC: 95 MMOL/L — LOW (ref 96–108)
CHLORIDE SERPL-SCNC: 95 MMOL/L — LOW (ref 96–108)
CHOLEST SERPL-MCNC: 180 MG/DL — SIGNIFICANT CHANGE UP
CHOLEST SERPL-MCNC: 180 MG/DL — SIGNIFICANT CHANGE UP
CO2 SERPL-SCNC: 29 MMOL/L — SIGNIFICANT CHANGE UP (ref 22–29)
CO2 SERPL-SCNC: 29 MMOL/L — SIGNIFICANT CHANGE UP (ref 22–29)
CREAT SERPL-MCNC: 1.86 MG/DL — HIGH (ref 0.5–1.3)
CREAT SERPL-MCNC: 1.86 MG/DL — HIGH (ref 0.5–1.3)
EGFR: 37 ML/MIN/1.73M2 — LOW
EGFR: 37 ML/MIN/1.73M2 — LOW
EOSINOPHIL # BLD AUTO: 0.19 K/UL — SIGNIFICANT CHANGE UP (ref 0–0.5)
EOSINOPHIL # BLD AUTO: 0.19 K/UL — SIGNIFICANT CHANGE UP (ref 0–0.5)
EOSINOPHIL NFR BLD AUTO: 1.3 % — SIGNIFICANT CHANGE UP (ref 0–6)
EOSINOPHIL NFR BLD AUTO: 1.3 % — SIGNIFICANT CHANGE UP (ref 0–6)
ESTIMATED AVERAGE GLUCOSE: 151 MG/DL — HIGH (ref 68–114)
ESTIMATED AVERAGE GLUCOSE: 151 MG/DL — HIGH (ref 68–114)
GLUCOSE BLDC GLUCOMTR-MCNC: 148 MG/DL — HIGH (ref 70–99)
GLUCOSE BLDC GLUCOMTR-MCNC: 148 MG/DL — HIGH (ref 70–99)
GLUCOSE BLDC GLUCOMTR-MCNC: 90 MG/DL — SIGNIFICANT CHANGE UP (ref 70–99)
GLUCOSE BLDC GLUCOMTR-MCNC: 90 MG/DL — SIGNIFICANT CHANGE UP (ref 70–99)
GLUCOSE SERPL-MCNC: 174 MG/DL — HIGH (ref 70–99)
GLUCOSE SERPL-MCNC: 174 MG/DL — HIGH (ref 70–99)
HCT VFR BLD CALC: 41.6 % — SIGNIFICANT CHANGE UP (ref 39–50)
HCT VFR BLD CALC: 41.6 % — SIGNIFICANT CHANGE UP (ref 39–50)
HDLC SERPL-MCNC: 62 MG/DL — SIGNIFICANT CHANGE UP
HDLC SERPL-MCNC: 62 MG/DL — SIGNIFICANT CHANGE UP
HGB BLD-MCNC: 13.5 G/DL — SIGNIFICANT CHANGE UP (ref 13–17)
HGB BLD-MCNC: 13.5 G/DL — SIGNIFICANT CHANGE UP (ref 13–17)
IMM GRANULOCYTES NFR BLD AUTO: 0.7 % — SIGNIFICANT CHANGE UP (ref 0–0.9)
IMM GRANULOCYTES NFR BLD AUTO: 0.7 % — SIGNIFICANT CHANGE UP (ref 0–0.9)
LIPID PNL WITH DIRECT LDL SERPL: 100 MG/DL — HIGH
LIPID PNL WITH DIRECT LDL SERPL: 100 MG/DL — HIGH
LYMPHOCYTES # BLD AUTO: 1.63 K/UL — SIGNIFICANT CHANGE UP (ref 1–3.3)
LYMPHOCYTES # BLD AUTO: 1.63 K/UL — SIGNIFICANT CHANGE UP (ref 1–3.3)
LYMPHOCYTES # BLD AUTO: 11.4 % — LOW (ref 13–44)
LYMPHOCYTES # BLD AUTO: 11.4 % — LOW (ref 13–44)
MAGNESIUM SERPL-MCNC: 2.6 MG/DL — SIGNIFICANT CHANGE UP (ref 1.6–2.6)
MAGNESIUM SERPL-MCNC: 2.6 MG/DL — SIGNIFICANT CHANGE UP (ref 1.6–2.6)
MCHC RBC-ENTMCNC: 29.5 PG — SIGNIFICANT CHANGE UP (ref 27–34)
MCHC RBC-ENTMCNC: 29.5 PG — SIGNIFICANT CHANGE UP (ref 27–34)
MCHC RBC-ENTMCNC: 32.5 GM/DL — SIGNIFICANT CHANGE UP (ref 32–36)
MCHC RBC-ENTMCNC: 32.5 GM/DL — SIGNIFICANT CHANGE UP (ref 32–36)
MCV RBC AUTO: 91 FL — SIGNIFICANT CHANGE UP (ref 80–100)
MCV RBC AUTO: 91 FL — SIGNIFICANT CHANGE UP (ref 80–100)
MONOCYTES # BLD AUTO: 1.18 K/UL — HIGH (ref 0–0.9)
MONOCYTES # BLD AUTO: 1.18 K/UL — HIGH (ref 0–0.9)
MONOCYTES NFR BLD AUTO: 8.2 % — SIGNIFICANT CHANGE UP (ref 2–14)
MONOCYTES NFR BLD AUTO: 8.2 % — SIGNIFICANT CHANGE UP (ref 2–14)
NEUTROPHILS # BLD AUTO: 11.18 K/UL — HIGH (ref 1.8–7.4)
NEUTROPHILS # BLD AUTO: 11.18 K/UL — HIGH (ref 1.8–7.4)
NEUTROPHILS NFR BLD AUTO: 78.1 % — HIGH (ref 43–77)
NEUTROPHILS NFR BLD AUTO: 78.1 % — HIGH (ref 43–77)
NON HDL CHOLESTEROL: 118 MG/DL — SIGNIFICANT CHANGE UP
NON HDL CHOLESTEROL: 118 MG/DL — SIGNIFICANT CHANGE UP
PLATELET # BLD AUTO: 316 K/UL — SIGNIFICANT CHANGE UP (ref 150–400)
PLATELET # BLD AUTO: 316 K/UL — SIGNIFICANT CHANGE UP (ref 150–400)
POTASSIUM SERPL-MCNC: 5.1 MMOL/L — SIGNIFICANT CHANGE UP (ref 3.5–5.3)
POTASSIUM SERPL-MCNC: 5.1 MMOL/L — SIGNIFICANT CHANGE UP (ref 3.5–5.3)
POTASSIUM SERPL-SCNC: 5.1 MMOL/L — SIGNIFICANT CHANGE UP (ref 3.5–5.3)
POTASSIUM SERPL-SCNC: 5.1 MMOL/L — SIGNIFICANT CHANGE UP (ref 3.5–5.3)
RBC # BLD: 4.57 M/UL — SIGNIFICANT CHANGE UP (ref 4.2–5.8)
RBC # BLD: 4.57 M/UL — SIGNIFICANT CHANGE UP (ref 4.2–5.8)
RBC # FLD: 13 % — SIGNIFICANT CHANGE UP (ref 10.3–14.5)
RBC # FLD: 13 % — SIGNIFICANT CHANGE UP (ref 10.3–14.5)
SODIUM SERPL-SCNC: 136 MMOL/L — SIGNIFICANT CHANGE UP (ref 135–145)
SODIUM SERPL-SCNC: 136 MMOL/L — SIGNIFICANT CHANGE UP (ref 135–145)
TRIGL SERPL-MCNC: 91 MG/DL — SIGNIFICANT CHANGE UP
TRIGL SERPL-MCNC: 91 MG/DL — SIGNIFICANT CHANGE UP
WBC # BLD: 14.33 K/UL — HIGH (ref 3.8–10.5)
WBC # BLD: 14.33 K/UL — HIGH (ref 3.8–10.5)
WBC # FLD AUTO: 14.33 K/UL — HIGH (ref 3.8–10.5)
WBC # FLD AUTO: 14.33 K/UL — HIGH (ref 3.8–10.5)

## 2023-11-20 PROCEDURE — 93459 L HRT ART/GRFT ANGIO: CPT | Mod: 26

## 2023-11-20 PROCEDURE — 99152 MOD SED SAME PHYS/QHP 5/>YRS: CPT | Mod: 59

## 2023-11-20 PROCEDURE — 99223 1ST HOSP IP/OBS HIGH 75: CPT | Mod: 25

## 2023-11-20 PROCEDURE — 93567 NJX CAR CTH SPRVLV AORTGRPHY: CPT | Mod: 59

## 2023-11-20 PROCEDURE — 99223 1ST HOSP IP/OBS HIGH 75: CPT

## 2023-11-20 PROCEDURE — 99222 1ST HOSP IP/OBS MODERATE 55: CPT

## 2023-11-20 RX ORDER — INSULIN LISPRO 100/ML
10 VIAL (ML) SUBCUTANEOUS
Refills: 0 | Status: DISCONTINUED | OUTPATIENT
Start: 2023-11-21 | End: 2023-11-21

## 2023-11-20 RX ORDER — LEVOTHYROXINE SODIUM 125 MCG
75 TABLET ORAL DAILY
Refills: 0 | Status: DISCONTINUED | OUTPATIENT
Start: 2023-11-20 | End: 2023-11-22

## 2023-11-20 RX ORDER — INSULIN GLARGINE 100 [IU]/ML
15 INJECTION, SOLUTION SUBCUTANEOUS AT BEDTIME
Refills: 0 | Status: DISCONTINUED | OUTPATIENT
Start: 2023-11-20 | End: 2023-11-21

## 2023-11-20 RX ORDER — PANTOPRAZOLE SODIUM 20 MG/1
40 TABLET, DELAYED RELEASE ORAL
Refills: 0 | Status: DISCONTINUED | OUTPATIENT
Start: 2023-11-20 | End: 2023-11-22

## 2023-11-20 RX ORDER — DEXTROSE 50 % IN WATER 50 %
25 SYRINGE (ML) INTRAVENOUS ONCE
Refills: 0 | Status: DISCONTINUED | OUTPATIENT
Start: 2023-11-20 | End: 2023-11-22

## 2023-11-20 RX ORDER — SACUBITRIL AND VALSARTAN 24; 26 MG/1; MG/1
1 TABLET, FILM COATED ORAL
Refills: 0 | Status: DISCONTINUED | OUTPATIENT
Start: 2023-11-20 | End: 2023-11-22

## 2023-11-20 RX ORDER — GLUCAGON INJECTION, SOLUTION 0.5 MG/.1ML
1 INJECTION, SOLUTION SUBCUTANEOUS ONCE
Refills: 0 | Status: DISCONTINUED | OUTPATIENT
Start: 2023-11-20 | End: 2023-11-22

## 2023-11-20 RX ORDER — METOPROLOL TARTRATE 50 MG
50 TABLET ORAL DAILY
Refills: 0 | Status: DISCONTINUED | OUTPATIENT
Start: 2023-11-20 | End: 2023-11-22

## 2023-11-20 RX ORDER — INSULIN LISPRO 100/ML
10 VIAL (ML) SUBCUTANEOUS
Refills: 0 | Status: DISCONTINUED | OUTPATIENT
Start: 2023-11-20 | End: 2023-11-21

## 2023-11-20 RX ORDER — DAPAGLIFLOZIN 10 MG/1
10 TABLET, FILM COATED ORAL EVERY 24 HOURS
Refills: 0 | Status: DISCONTINUED | OUTPATIENT
Start: 2023-11-20 | End: 2023-11-22

## 2023-11-20 RX ORDER — DEXTROSE 50 % IN WATER 50 %
15 SYRINGE (ML) INTRAVENOUS ONCE
Refills: 0 | Status: DISCONTINUED | OUTPATIENT
Start: 2023-11-20 | End: 2023-11-22

## 2023-11-20 RX ORDER — MONTELUKAST 4 MG/1
10 TABLET, CHEWABLE ORAL DAILY
Refills: 0 | Status: DISCONTINUED | OUTPATIENT
Start: 2023-11-20 | End: 2023-11-22

## 2023-11-20 RX ORDER — VALSARTAN 80 MG/1
20 TABLET ORAL DAILY
Refills: 0 | Status: DISCONTINUED | OUTPATIENT
Start: 2023-11-20 | End: 2023-11-20

## 2023-11-20 RX ORDER — INSULIN LISPRO 100/ML
VIAL (ML) SUBCUTANEOUS
Refills: 0 | Status: DISCONTINUED | OUTPATIENT
Start: 2023-11-20 | End: 2023-11-22

## 2023-11-20 RX ORDER — TICAGRELOR 90 MG/1
90 TABLET ORAL EVERY 12 HOURS
Refills: 0 | Status: DISCONTINUED | OUTPATIENT
Start: 2023-11-20 | End: 2023-11-22

## 2023-11-20 RX ORDER — SODIUM CHLORIDE 9 MG/ML
1000 INJECTION, SOLUTION INTRAVENOUS
Refills: 0 | Status: DISCONTINUED | OUTPATIENT
Start: 2023-11-20 | End: 2023-11-22

## 2023-11-20 RX ORDER — DEXTROSE 50 % IN WATER 50 %
12.5 SYRINGE (ML) INTRAVENOUS ONCE
Refills: 0 | Status: DISCONTINUED | OUTPATIENT
Start: 2023-11-20 | End: 2023-11-22

## 2023-11-20 RX ORDER — ATORVASTATIN CALCIUM 80 MG/1
40 TABLET, FILM COATED ORAL AT BEDTIME
Refills: 0 | Status: DISCONTINUED | OUTPATIENT
Start: 2023-11-20 | End: 2023-11-22

## 2023-11-20 RX ORDER — RANOLAZINE 500 MG/1
500 TABLET, FILM COATED, EXTENDED RELEASE ORAL
Refills: 0 | Status: DISCONTINUED | OUTPATIENT
Start: 2023-11-20 | End: 2023-11-22

## 2023-11-20 RX ORDER — SPIRONOLACTONE 25 MG/1
25 TABLET, FILM COATED ORAL DAILY
Refills: 0 | Status: DISCONTINUED | OUTPATIENT
Start: 2023-11-20 | End: 2023-11-21

## 2023-11-20 RX ORDER — ASPIRIN/CALCIUM CARB/MAGNESIUM 324 MG
81 TABLET ORAL DAILY
Refills: 0 | Status: DISCONTINUED | OUTPATIENT
Start: 2023-11-20 | End: 2023-11-22

## 2023-11-20 RX ORDER — HEPARIN SODIUM 5000 [USP'U]/ML
5000 INJECTION INTRAVENOUS; SUBCUTANEOUS EVERY 8 HOURS
Refills: 0 | Status: DISCONTINUED | OUTPATIENT
Start: 2023-11-20 | End: 2023-11-22

## 2023-11-20 RX ORDER — FUROSEMIDE 40 MG
40 TABLET ORAL
Refills: 0 | Status: DISCONTINUED | OUTPATIENT
Start: 2023-11-20 | End: 2023-11-21

## 2023-11-20 RX ADMIN — RANOLAZINE 500 MILLIGRAM(S): 500 TABLET, FILM COATED, EXTENDED RELEASE ORAL at 17:35

## 2023-11-20 RX ADMIN — Medication 40 MILLIGRAM(S): at 17:34

## 2023-11-20 RX ADMIN — Medication 50 MILLIGRAM(S): at 17:35

## 2023-11-20 RX ADMIN — SACUBITRIL AND VALSARTAN 1 TABLET(S): 24; 26 TABLET, FILM COATED ORAL at 17:35

## 2023-11-20 RX ADMIN — TICAGRELOR 90 MILLIGRAM(S): 90 TABLET ORAL at 17:35

## 2023-11-20 RX ADMIN — HEPARIN SODIUM 5000 UNIT(S): 5000 INJECTION INTRAVENOUS; SUBCUTANEOUS at 21:21

## 2023-11-20 RX ADMIN — SPIRONOLACTONE 25 MILLIGRAM(S): 25 TABLET, FILM COATED ORAL at 17:35

## 2023-11-20 RX ADMIN — ATORVASTATIN CALCIUM 40 MILLIGRAM(S): 80 TABLET, FILM COATED ORAL at 21:21

## 2023-11-20 RX ADMIN — Medication 10 UNIT(S): at 15:54

## 2023-11-20 RX ADMIN — INSULIN GLARGINE 15 UNIT(S): 100 INJECTION, SOLUTION SUBCUTANEOUS at 22:00

## 2023-11-20 NOTE — CONSULT NOTE ADULT - ASSESSMENT
Outpatient cardiologist: Dr. Valenzuela  Initial HF c/s: Dr. Giraldo    79 y/o M with a PMH of ICM/HFrEF (LVEF <20%, LVIDd 5.3cm), CAD, s/p 4V CABG (1993 at [facility]) and multiple PCI's of the SVG-OM with stents and brachytherapy, HTN, DM2 (A1c 6.9%), and CKD stage 3, who presented today for elective LHC.    Since 2015 he has had a decline in his LVEF from 40% to <20%. He was recently admitted for ADHF from 10/26-10/30. He reports feeling very fatigued lately with JOINER. His outpatient diuretics were increased by his cardiologist, Dr. Vlaenzuela, but the patient developed dizziness and near syncope, so Bumex and metolazone were discontinued.     Cardiac Studies:  11/20/23 LHC: Patent LIMA-LAD, all other grafts occluded. LVEDP 36.    10/26/23 TTE: LVIDd 5.3cm, LVEF <20%, grade III DD, mild RVE with severe RV dysfunction, mod LAE, mild ESTRELLA, mod MR, mild TR, mild AI, est PASP 50.5 mmHg.

## 2023-11-20 NOTE — H&P PST ADULT - ASSESSMENT
Assessment:     ASA: 3  Mall: 2  ABR:   GFR:   Creatinine:     Problem List:   1. Known CAD with worsening HF symptoms  ·	LHC and possible intervention. Consent obtained.  ·	Procedure explained and questions answered.   ·	GDMT:   ·	     DAPT: Will continue DAPT with Brilinta 90 mg BID and aspirin 81 mg daily.  ·	     Statin: Will continue Lipitor 40 mg daily  ·	     Beta Blocker: Will continue Toprol 50 mg daily  ·	     Other Antianginals: N/A  ·	     Aggressive cardiovascular risk reduction and lifestyle modification.  ·	IV: Bolus deferred until assessment of LVEDP.  ·	Aspirin/Brilinta if not taken today.    2. HFrEF  Assessment of LVEDP with LHC.  ·	GDMT  ·	     Beta Blocker: Will continue Toprol 50 mg daily  ·	     RAAS Inhibitor: Will continue   ·	     MRA:   ·	     Diuretic: Will continue Lasix 40 mg BID and Bumex 0.5 mg daily  ·	     SGLT2i:   ·	     Other:   ·	Strict I&O's  ·	Daily standing weights (if able)    3. 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  ·	Post-procedure hydration: Deferred until assessment of LVEDP.  ·	BMP in AM if staying overnight.  ·	BMP in 3 days with results to go to PMD.    4. HLD  ·	Lipo (a) 473.7 in October 2022  ·	Goal Non-HDL < 80, LDL < 55  ·	Lipid Panel:   ·	Statin:   ·	Other:     Discharge Planning:   ·	Discharge today if no PCI performed.  ·	Discharge in AM if PCI performed. Assessment: 79y/o male never smoker with history of CAD, STEMI, NSTEMI, 4V CABG, multiple PCI's of the SVG to the OM with stents and brachytherapy, cardiogenic shock, HFrEF with admission for decompensated HF with multi organ system failure, DM2 on long term insulin, 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, HTN, HLD. The patient c/o fatigue, JOINER (SOB with 10-12 stairs), orthopnea, PND, and BLE edema. These symptoms improved when Dr. Valenzuela increased diuresis but the patient developed dizziness and near syncope, so Bumex and metolazone were discontinued. He also admits to occasional palpitations but denies chest pain.    Echo's in October show EF of < 20% with grade 3 LVDD and moderate pulmonary hypertension, echo in May 2022 EF was 30-35 with grade 2 LVDD.    ASA: 3  Mall: 2  ABR: 4.2%  GFR: 37  Creatinine: 1.86    Problem List:   1. Known CAD with worsening HF symptoms  ·	LHC and possible intervention. Consent obtained.  ·	Procedure explained and questions answered.   ·	GDMT:   ·	     DAPT: Will continue DAPT with Brilinta 90 mg BID and aspirin 81 mg daily.  ·	     Statin: Will continue Lipitor 20 mg daily  ·	     Beta Blocker: Will continue Toprol 50 mg daily  ·	     Other Antianginals: Will continue Ranexa 500 mg BID  ·	     Aggressive cardiovascular risk reduction and lifestyle modification.  ·	IV: Bolus deferred until assessment of LVEDP.  ·	Aspirin/Brilinta if not taken today.    2. HFrEF  Assessment of LVEDP with LHC.  ·	GDMT  ·	     Beta Blocker: Will continue Toprol 50 mg daily  ·	     RAAS Inhibitor: Will continue valsartan 20 mg daily  ·	     MRA: Will continue Aldactone 25 mg daily  ·	     Diuretic: Will continue Lasix 40 mg BID and Bumex 0.5 mg daily  ·	     SGLT2i: Will continue Farxiga 10 mg daily  ·	     Other: N/A  ·	Strict I&O's  ·	Daily standing weights (if able)    3. 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  ·	Post-procedure hydration: Deferred until assessment of LVEDP.  ·	BMP in AM if staying overnight.  ·	BMP in 3 days with results to go to PMD.    4. HLD  ·	Lipo (a) 473.7 in October 2022  ·	Goal Non-HDL < 80, LDL < 55  ·	Lipid Panel: ***  ·	Statin: Will continue Lipitor 20 mg daily  ·	Other: N/A    5. DM2  GDMT:        Diabetic diet.       FS Q AC and HS with coverage       HgbA1C: ***       Basal Insulin: Will resume Basaglar 15 units Q HS       Nutritional Insulin: N/A       Oral Antihyperglycemics: N/A       SGLT2i/GLP1-RA: Will continue Farxiga 10 mg daily    Discharge Planning:   ·	Discharge today if no PCI performed.  ·	Discharge in AM if PCI performed. Assessment: 77y/o male never smoker with history of CAD, STEMI, NSTEMI, 4V CABG, multiple PCI's of the SVG to the OM with stents and brachytherapy, cardiogenic shock, HFrEF with admission for decompensated HF with multi organ system failure, DM2 on long term insulin, 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, HTN, HLD. The patient c/o fatigue, JOINER (SOB with 10-12 stairs), orthopnea, PND, and BLE edema. These symptoms improved when Dr. Valenzuela increased diuresis but the patient developed dizziness and near syncope, so Bumex and metolazone were discontinued. He also admits to occasional palpitations but denies chest pain.    Echo's in October show EF of < 20% with grade 3 LVDD and moderate pulmonary hypertension, echo in May 2022 EF was 30-35 with grade 2 LVDD.    ASA: 3  Mall: 2  ABR: 4.2%  GFR: 37  Creatinine: 1.86    Problem List:   1. Known CAD with worsening HF symptoms  ·	LHC and possible intervention. Consent obtained.  ·	Procedure explained and questions answered.   ·	GDMT:   ·	     DAPT: Will continue DAPT with Brilinta 90 mg BID and aspirin 81 mg daily.  ·	     Statin: Will continue Lipitor 20 mg daily  ·	     Beta Blocker: Will continue Toprol 50 mg daily  ·	     Other Antianginals: Will continue Ranexa 500 mg BID  ·	     Aggressive cardiovascular risk reduction and lifestyle modification.  ·	IV: Bolus deferred until assessment of LVEDP.  ·	Aspirin/Brilinta if not taken today.    2. HFrEF  Assessment of LVEDP with LHC.  ·	GDMT  ·	     Beta Blocker: Will continue Toprol 50 mg daily  ·	     RAAS Inhibitor: Will continue valsartan 20 mg daily  ·	     MRA: Will continue Aldactone 25 mg daily  ·	     Diuretic: Will continue Lasix 40 mg BID and Bumex 0.5 mg daily  ·	     SGLT2i: Will continue Farxiga 10 mg daily  ·	     Other: N/A  ·	Strict I&O's  ·	Daily standing weights (if able)    3. 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  ·	Post-procedure hydration: Deferred until assessment of LVEDP.  ·	BMP in AM if staying overnight.  ·	BMP in 3 days with results to go to PMD.    4. HLD  ·	Lipo (a) 473.7 in October 2022  ·	Goal Non-HDL < 80, LDL < 55  ·	Lipid Panel: Not at goal  ·	Statin: May increase Lipitor to 40 mg daily  ·	Other: N/A    5. DM2  GDMT:        Diabetic diet.       FS Q AC and HS with coverage       HgbA1C: 6.9%       Basal Insulin: Will resume Basaglar 15 units Q HS       Nutritional Insulin: N/A       Oral Antihyperglycemics: N/A       SGLT2i/GLP1-RA: Will continue Farxiga 10 mg daily    Discharge Planning:   ·	Discharge today if no PCI performed.  ·	Discharge in AM if PCI performed.

## 2023-11-20 NOTE — CONSULT NOTE ADULT - SUBJECTIVE AND OBJECTIVE BOX
[facility]  [facility]   Office: [address] [address]  Telephone: [phone] Fax:[phone]      HPI:  79yo male with PMH of CAD, STEMI, NSTEMI, 4V CABG, multiple PCI's of the SVG to the OM with stents and brachytherapy, cardiogenic shock, HFrEF with admissions for decompensated HF with multi organ system failure, DM2 on long term insulin, 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, HTN, LBBB, and HLD. The patient c/o fatigue, JOINER (SOB with 10-12 stairs), orthopnea, PND, and BLE edema. These symptoms improved when Dr. Valenzuela increased diuresis but the patient developed dizziness and near syncope, so Bumex and metolazone were discontinued. He also admits to occasional palpitations but denies chest pain. Recent echo in October revealed LVEF of < 20%, echo in May 2022 revealed LVEF of 30-35%. He presented electively today for a [facility] which revealed a patent LIMA with all other grafts closed, LVEDP 36. EP consulted for primary prevention ICD.     Cardiology summary:  EK2023; SR @ 93bpm, LBBB    Echo: 10/29/2023; The left ventricular internal cavity size is normal. LVEF <20%.             May 2022; LVEF 36%. Normal RV, mild MR.                 2021; LVEF 37% Moderate MR. Severe pulm HTN.                2018; Segmental wall motion in LAD, severe LV dysfunction, no pulmonary hypertension, enlarged LA size, mild mitral regurgitation LVEF 30%.      Remote/Ambulatory Rhythm Monitoring: 2022 PVCs: no significant arrhythmias        Cardiac Cath: 2018, Occluded Lcx vein graft. CODI with thrombectomy    Stent: 2018, CODI Albert 3.0 15 mm SVg to OM1 distal 1/3 rd    C 2020  VENTRICLES: No LV gram was performed; however, a recent echocardiogram  demonstrated an EF of 25 %.  CORONARY VESSELS: The coronary circulation is right dominant.  LM:   --  LM: Diffusely diseased 70-80% diseased vessel. Proximal  circumflex 100%.  LAD:   --  Proximal LAD: There was a 100 % stenosis.  CX:   --  Proximal circumflex: There was a 100 % stenosis.  RCA:   --  Ostial RCA: There was a 100 % stenosis.  GRAFTS:   --  Graft to the LAD: The graft was a LIMA. Graft angiography  showed no evidence of disease.  --  Graft to the 1st obtuse marginal: The graft was a saphenous vein graft  from the aorta. There was a 90 % stenosis in the middle third of the  graft. There was a 95 % stenosis in the distal third of the graft.  COMPLICATIONS: No complications occurred during the cath lab visit.  DIAGNOSTIC IMPRESSIONS: Normal LVEDP.  Patent NOGUERA to LAD, Occluded known SVG to RCA. Recurrent instent  re-stenosis of SVG to OM - 2 separate sites.  PCI performed with 2 CODI, instent re-stenosis.  (guideliner and wiggle wire used)  DIAGNOSTIC RECOMMENDATIONS: Aspirin and Brilinta.  INTERVENTIONAL IMPRESSIONS: Normal LVEDP.  Patent NOGUERA to LAD, Occluded known SVG to RCA. Recurrent instent  re-stenosis of SVG to OM - 2 separate sites.  PCI performed with 2 CODI, instent re-stenosis.  (guideliner and wiggle wire used)  INTERVENTIONAL RECOMMENDATIONS: Aspirin and Brilinta.      PAST MEDICAL & SURGICAL HISTORY:  GERD (gastroesophageal reflux disease)  High cholesterol  Coronary artery disease involving coronary bypass graft of native heart with unstable angina pectoris  Type 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, without long-term current use of insul  Essential hypertension  HFrEF (heart failure with reduced ejection fraction)  Cardiogenic shock  Stage 4 chronic kidney disease  DM  Multiple organ failure with heart failure  LBBB (left bundle branch block)  Facial nerve palsy  ST elevation myocardial infarction (STEMI), unspecified artery  S/P CABG (coronary artery bypass graft)  4V  [place]  Status post open reduction with internal fixation of fracture  History of insertion of stent into coronary artery bypass graft  History of brachytherapy        REVIEW OF SYSTEMS:    CONSTITUTIONAL: No fever, weight loss, + fatigue  EYES: No visual disturbances  NECK: No pain or stiffness  RESPIRATORY: No cough, wheezing, chills or hemoptysis; No shortness of breath  CARDIOVASCULAR: No chest pain. + JOINER. Occasional palpitations. + orthopnea. + PND. + edema + near syncope  GASTROINTESTINAL: No abdominal or epigastric pain. No nausea, vomiting, or hematemesis; No diarrhea or constipation. No melena or hematochezia.  NEUROLOGICAL: No headaches, memory loss, loss of strength, numbness, or tremors  SKIN: No itching, burning, rashes, or lesions   LYMPH NODES: No enlarged glands  ENDOCRINE: No heat or cold intolerance; No hair loss  PSYCHIATRIC: No depression, anxiety, mood swings, or difficulty sleeping  HEME/LYMPH: No easy bruising, or bleeding gums      Allergies  No Known Allergies    Intolerances  DOPamine (Hypotension)    SOCIAL HISTORY:  Tobacco: Never smoker  ETOH: Denies         FAMILY HISTORY:      Vital Signs Last 24 Hrs  T(C): 36.9 (2023 10:26), Max: 36.9 (2023 10:26)  T(F): 98.4 (2023 10:26), Max: 98.4 (2023 10:26)  HR: 88 (2023 13:55) (85 - 92)  BP: 119/58 (2023 13:55) (118/61 - 171/74)  RR: 16 (2023 13:55) (15 - 16)  SpO2: 100% (2023 13:55) (98% - 100%)    Parameters below as of 2023 13:55  Patient On (Oxygen Delivery Method): room air        Physical Exam:  Constitutional: AAOx3, NAD  Neck: supple, No JVD  Cardiovascular: +S1S2 RRR, no murmurs, rubs, gallops   Pulmonary: CTA b/l, unlabored, no wheezes, rales. rhonci  Abdomen: +BS, soft NTND  Extremities: no edema b/l, +distal pulses b/l  Neuro: non focal, speech clear, GRAVES x 4    LABS:                        13.5   14.33 )-----------( 316      ( 2023 10:07 )             41.6   11-20    136  |  95<L>  |  33.8<H>  ----------------------------<  174<H>  5.1   |  29.0  |  1.86<H>    Ca    8.8      2023 10:07  Mg     2.6     11-20          Urinalysis Basic - ( 2023 10:07 )    Color: x / Appearance: x / SG: x / pH: x  Gluc: 174 mg/dL / Ketone: x  / Bili: x / Urobili: x   Blood: x / Protein: x / Nitrite: x   Leuk Esterase: x / RBC: x / WBC x   Sq Epi: x / Non Sq Epi: x / Bacteria: x        RADIOLOGY & ADDITIONAL STUDIES:   [facility]  [facility]   Office: [address] [address]  Telephone: [phone] Fax:[phone]      HPI:  79yo male with PMH of CAD, STEMI, NSTEMI, 4V CABG, multiple PCI's, cardiogenic shock, HFrEF with admissions for decompensated HF with multi organ system failure, DM2 (on insulin), 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, HTN, LBBB (), and HLD. The patient c/o fatigue, JOINER (SOB with 10-12 stairs), orthopnea, PND, and BLE edema. His symptoms improved when Dr. Valenzuela increased diuresis but the patient developed dizziness and near syncope, so Bumex and metolazone were discontinued. He also admits to occasional palpitations but denies chest pain. Recent echo in October revealed LVEF of < 20%, echo in May 2022 revealed LVEF of 36%. He presented electively today for a LHC which revealed a patent LIMA with all other grafts closed, LVEDP 36. EP consulted for primary prevention ICD.     Cardiology summary:  EK2023; SR @ 93bpm, LBBB  Echo: 10/29/2023; The left ventricular internal cavity size is normal. LVEF <20%.                 2022; LVEF 36%. Normal RV, mild MR.                 2021; LVEF 37%. Moderate MR. Severe pulm HTN.                2018; LVEF 30%. Segmental wall motion in LAD, severe LV dysfunction, enlarged LA size, mild MR.  Remote/Ambulatory Rhythm Monitoring: 2022 PVCs: no significant arrhythmias        Cardiac Cath: 2018; occluded Lcx vein graft; successful stent. EF 35%. New LBBB                      2019; severe OM w/ instent restenosis s/p laser arthrectomy and POBA                  2020; EF 25 %. Patent NOGUERA to LAD, Occluded known SVG to RCA. Recurrent instent re-stenosis of SVG to OM - 2 separate sites. PCI performed with 2 COID, instent re-stenosis.      PAST MEDICAL & SURGICAL HISTORY:  GERD (gastroesophageal reflux disease)  High cholesterol  Coronary artery disease involving coronary bypass graft of native heart with unstable angina pectoris  Type 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, without long-term current use of insul  Essential hypertension  HFrEF (heart failure with reduced ejection fraction)  Cardiogenic shock  Stage 4 chronic kidney disease  DM  Multiple organ failure with heart failure  LBBB (left bundle branch block)  Facial nerve palsy  ST elevation myocardial infarction (STEMI), unspecified artery  S/P CABG (coronary artery bypass graft)  4V  [place]  Status post open reduction with internal fixation of fracture  History of insertion of stent into coronary artery bypass graft  History of brachytherapy      REVIEW OF SYSTEMS:    CONSTITUTIONAL: No fever, weight loss, + fatigue  EYES: No visual disturbances  NECK: No pain or stiffness  RESPIRATORY: No cough, wheezing, chills or hemoptysis; No shortness of breath  CARDIOVASCULAR: No chest pain. + JOINER. Occasional palpitations. + orthopnea. + PND. + edema + near syncope  GASTROINTESTINAL: No abdominal or epigastric pain. No nausea, vomiting, or hematemesis; No diarrhea or constipation. No melena or hematochezia.  NEUROLOGICAL: No headaches, memory loss, loss of strength, numbness, or tremors  SKIN: No itching, burning, rashes, or lesions   LYMPH NODES: No enlarged glands  ENDOCRINE: No heat or cold intolerance; No hair loss  PSYCHIATRIC: No depression, anxiety, mood swings, or difficulty sleeping  HEME/LYMPH: No easy bruising, or bleeding gums      Allergies  No Known Allergies    Intolerances  DOPamine (Hypotension)    SOCIAL HISTORY:  Tobacco: Never smoker  ETOH: Denies         FAMILY HISTORY:      Vital Signs Last 24 Hrs  T(C): 36.9 (2023 10:26), Max: 36.9 (2023 10:26)  T(F): 98.4 (2023 10:26), Max: 98.4 (2023 10:26)  HR: 88 (2023 13:55) (85 - 92)  BP: 119/58 (2023 13:55) (118/61 - 171/74)  RR: 16 (2023 13:55) (15 - 16)  SpO2: 100% (2023 13:55) (98% - 100%)    Parameters below as of 2023 13:55  Patient On (Oxygen Delivery Method): room air        Physical Exam:  Constitutional: AAOx3, NAD  Neck: supple, No JVD  Cardiovascular: +S1S2 RRR, no murmurs, rubs, gallops   Pulmonary: CTA b/l, unlabored, no wheezes, rales. rhonci  Abdomen: +BS, soft NTND  Extremities: no edema b/l, +distal pulses b/l  Neuro: non focal, speech clear, GRAVES x 4    LABS:                        13.5   14.33 )-----------( 316      ( 2023 10:07 )             41.6   11-20    136  |  95<L>  |  33.8<H>  ----------------------------<  174<H>  5.1   |  29.0  |  1.86<H>    Ca    8.8      2023 10:07  Mg     2.6     1120          Urinalysis Basic - ( 2023 10:07 )    Color: x / Appearance: x / SG: x / pH: x  Gluc: 174 mg/dL / Ketone: x  / Bili: x / Urobili: x   Blood: x / Protein: x / Nitrite: x   Leuk Esterase: x / RBC: x / WBC x   Sq Epi: x / Non Sq Epi: x / Bacteria: x        RADIOLOGY & ADDITIONAL STUDIES:  TTE 10/26/2023;  Summary:   1. Endocardial visualization was enhanced with intravenous echo contrast.   2. Moderately enlarged left atrium.   3. Left ventricular ejection fraction, by visual estimation, is <20%.   4. Grade III diastolic dysfunction. Elevated mean left atrial pressure.   5. Mildly enlarged right atrium.   6. Mildly enlarged right ventricle. Severely reduced RV systolic   function.   7. Moderate mitral valve regurgitation.   8. Mild aortic regurgitation.   9. Mild tricuspid regurgitation.  10. Estimated pulmonary artery systolic pressure is 51 mmHg -moderate   pulmonary hypertension.  11. There is no evidence of pericardial effusion.         [facility]  [facility]   Office: [address] [address]  Telephone: [phone] Fax:[phone]      HPI:  77yo male with PMH of CAD, STEMI, NSTEMI, 4V CABG, multiple PCI's, cardiogenic shock, HFrEF with admissions for decompensated HF with multi organ system failure, DM2 (on insulin), 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, HTN, LBBB (), and HLD. The patient c/o fatigue, JOINER (SOB with 10-12 stairs), orthopnea, PND, and BLE edema. His symptoms improved when Dr. Valenzuela increased diuresis but the patient developed dizziness and near syncope, so Bumex and metolazone were discontinued. He also admits to occasional palpitations but denies chest pain. Recent echo in October revealed LVEF of < 20%, echo in May 2022 revealed LVEF of 36%. He presented electively today for a LHC which revealed a patent LIMA with all other grafts closed, LVEDP 36. EP consulted for primary prevention ICD.     Cardiology summary:  EK2023; SR @ 93bpm, LBBB  Echo: 10/29/2023; The left ventricular internal cavity size is normal. LVEF <20%.                 2022; LVEF 36%. Normal RV, mild MR.                 2021; LVEF 37%. Moderate MR. Severe pulm HTN.                2018; LVEF 30%. Segmental wall motion in LAD, severe LV dysfunction, enlarged LA size, mild MR.  Remote/Ambulatory Rhythm Monitoring: 2022 PVCs: no significant arrhythmias        Cardiac Cath: 2018; occluded Lcx vein graft; successful stent. EF 35%. New LBBB                      2019; severe OM w/ instent restenosis s/p laser arthrectomy and POBA                  2020; EF 25 %. Patent NOGUERA to LAD, Occluded known SVG to RCA. Recurrent instent re-stenosis of SVG to OM - 2 separate sites. PCI performed with 2 CODI, instent re-stenosis.      PAST MEDICAL & SURGICAL HISTORY:  GERD (gastroesophageal reflux disease)  High cholesterol  Coronary artery disease involving coronary bypass graft of native heart with unstable angina pectoris  Type 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, without long-term current use of insul  Essential hypertension  HFrEF (heart failure with reduced ejection fraction)  Cardiogenic shock  Stage 4 chronic kidney disease  DM  Multiple organ failure with heart failure  LBBB (left bundle branch block)  Facial nerve palsy  ST elevation myocardial infarction (STEMI), unspecified artery  S/P CABG (coronary artery bypass graft)  4V  [place]  Status post open reduction with internal fixation of fracture  History of insertion of stent into coronary artery bypass graft  History of brachytherapy      REVIEW OF SYSTEMS:    CONSTITUTIONAL: No fever, weight loss, + fatigue  EYES: No visual disturbances  NECK: No pain or stiffness  RESPIRATORY: No cough, wheezing, chills or hemoptysis; No shortness of breath  CARDIOVASCULAR: No chest pain. + JOINER. Occasional palpitations. + orthopnea. + PND. + edema + near syncope  GASTROINTESTINAL: No abdominal or epigastric pain. No nausea, vomiting, or hematemesis; No diarrhea or constipation. No melena or hematochezia.  NEUROLOGICAL: No headaches, memory loss, loss of strength, numbness, or tremors  SKIN: No itching, burning, rashes, or lesions   ENDOCRINE: No heat or cold intolerance; No hair loss  PSYCHIATRIC: No depression, anxiety, mood swings, or difficulty sleeping      Allergies  No Known Allergies    Intolerances  DOPamine (Hypotension)    SOCIAL HISTORY:  Denies tobacco, etoh, drug use          FAMILY HISTORY:      Vital Signs Last 24 Hrs  T(C): 36.9 (2023 10:26), Max: 36.9 (2023 10:26)  T(F): 98.4 (2023 10:26), Max: 98.4 (2023 10:26)  HR: 88 (2023 13:55) (85 - 92)  BP: 119/58 (2023 13:55) (118/61 - 171/74)  RR: 16 (2023 13:55) (15 - 16)  SpO2: 100% (2023 13:55) (98% - 100%)    Parameters below as of 2023 13:55  Patient On (Oxygen Delivery Method): room air        Physical Exam:  Constitutional: AAOx3, NAD  Neck: supple, No JVD  Cardiovascular: +S1S2 Regular   Pulmonary: Diminished BD  Abdomen: +BS, soft NTND  Extremities: 1+ LE edema b/l  Neuro: non focal, speech clear, GRAVES x 4    LABS:                        13.5   14.33 )-----------( 316      ( 2023 10:07 )             41.6   11-    136  |  95<L>  |  33.8<H>  ----------------------------<  174<H>  5.1   |  29.0  |  1.86<H>    Ca    8.8      2023 10:07  Mg     2.6     1120          Urinalysis Basic - ( 2023 10:07 )    Color: x / Appearance: x / SG: x / pH: x  Gluc: 174 mg/dL / Ketone: x  / Bili: x / Urobili: x   Blood: x / Protein: x / Nitrite: x   Leuk Esterase: x / RBC: x / WBC x   Sq Epi: x / Non Sq Epi: x / Bacteria: x        RADIOLOGY & ADDITIONAL STUDIES:  TTE 10/26/2023;  Summary:   1. Endocardial visualization was enhanced with intravenous echo contrast.   2. Moderately enlarged left atrium.   3. Left ventricular ejection fraction, by visual estimation, is <20%.   4. Grade III diastolic dysfunction. Elevated mean left atrial pressure.   5. Mildly enlarged right atrium.   6. Mildly enlarged right ventricle. Severely reduced RV systolic   function.   7. Moderate mitral valve regurgitation.   8. Mild aortic regurgitation.   9. Mild tricuspid regurgitation.  10. Estimated pulmonary artery systolic pressure is 51 mmHg -moderate   pulmonary hypertension.  11. There is no evidence of pericardial effusion.

## 2023-11-20 NOTE — CONSULT NOTE ADULT - ASSESSMENT
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 The patient c/o fatigue, JOINER (SOB with 10-12 stairs), orthopnea, PND, and BLE edema. His symptoms improved when Dr. Valenzuela increased diuresis but the patient developed dizziness and near syncope, so Bumex and metolazone were discontinued. He also admits to occasional palpitations but denies chest pain. Recent echo in October revealed LVEF of < 20%, echo in May 2022 revealed LVEF of 36%. He presented electively today for a LHC which revealed a patent LIMA with all other grafts closed, LVEDP 36. EP consulted for primary prevention ICD.     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 The patient c/o fatigue, JOINER (SOB with 10-12 stairs), orthopnea, PND, and BLE edema. His symptoms improved when Dr. Valenzuela increased diuresis but the patient developed dizziness and near syncope, so Bumex and metolazone were discontinued. He also admits to occasional palpitations but denies chest pain. Recent echo in October revealed LVEF of < 20%, echo in May 2022 revealed LVEF of 36%. He presented electively today for a LHC which revealed a patent LIMA with all other grafts closed, LVEDP 36. EP consulted for primary prevention ICD.     Recommendations:   - Patient with ischemic cardiomyopathy with depressed LV function despite GDMT > 3months.   - LHC performed today with no intervention.   - Patient meets criteria for primary prevention ICD. Given LBBB would benefit form resynchronization therapy. Will consider in-patient CRTD implant if scheduling permits. Patient is agreeable.   - Advanced heart failure consulted  - Continue with GDMT (Toprol + Valsartan)  - Diuresis per general cardiology/HF team   - Maintain Mg > 2 and K >4  - Full recommendations to follow

## 2023-11-20 NOTE — PROGRESS NOTE ADULT - PROBLEM SELECTOR PLAN 5
BP Range: 116-134/58-76  Beta Blocker: Will continue Toprol 50 mg daily  RAASi: Will continue valsartan 20 mg daily  CCB: N/A  Other: Will start Lasix 40 mg IV BID

## 2023-11-20 NOTE — PROGRESS NOTE ADULT - PROBLEM SELECTOR PLAN 6
Stage: 3a  GFR: 37  Pre-procedure hydration: Patient fluid overloaded  Post-procedure hydration: Patient fluid overloaded  BMP in AM if staying overnight.  BMP in 3 days with results to go to PMD.

## 2023-11-20 NOTE — H&P ADULT - NSHPLABSRESULTS_GEN_ALL_CORE
LABS:                        13.5   14.33 )-----------( 316      ( 20 Nov 2023 10:07 )             41.6     11-20    136  |  95<L>  |  33.8<H>  ----------------------------<  174<H>  5.1   |  29.0  |  1.86<H>    Ca    8.8      20 Nov 2023 10:07  Mg     2.6     11-20            Urinalysis Basic - ( 20 Nov 2023 10:07 )    Color: x / Appearance: x / SG: x / pH: x  Gluc: 174 mg/dL / Ketone: x  / Bili: x / Urobili: x   Blood: x / Protein: x / Nitrite: x   Leuk Esterase: x / RBC: x / WBC x   Sq Epi: x / Non Sq Epi: x / Bacteria: x        CAPILLARY BLOOD GLUCOSE      POCT Blood Glucose.: 148 mg/dL (20 Nov 2023 15:44)        RADIOLOGY & ADDITIONAL TESTS:  Results Reviewed:   Imaging Personally Reviewed:  Electrocardiogram Personally Reviewed:

## 2023-11-20 NOTE — CONSULT NOTE ADULT - PROBLEM SELECTOR RECOMMENDATION 2
- LHC today showed only patent LIMA-LAD and all other grafts occluded  - Continue DAPT, statin, and BB

## 2023-11-20 NOTE — H&P PST ADULT - OTHER CARE PROVIDERS
Sydney Valenzuela ([facility], [address], [place] [address], Phone: [phone], Fax: [phone])

## 2023-11-20 NOTE — CONSULT NOTE ADULT - SUBJECTIVE AND OBJECTIVE BOX
ADVANCED HEART FAILURE - CONSULT NOTE  [address] [place] [address]  Office Phone: [phone]/Fax: [phone]  Service/On Call Phone [phone]  _______________________________________________________________________________________________________    HPI:  79 y/o M with a PMH of ICM/HFrEF (LVEF <20%, LVIDd 5.3cm), CAD, s/p 4V CABG (1993 at [facility]) and multiple PCI's of the SVG-OM with stents and brachytherapy, HTN, DM2 (A1c 6.9%), and CKD stage 3, who presented today for elective LHC.    Since 2015 he has had a decline in his LVEF from 40% to <20%. He was recently admitted for ADHF from 10/26-10/30. He reports feeling very fatigued lately with JOINER. His outpatient diuretics were increased by his cardiologist, Dr. Valenzuela, but the patient developed dizziness and near syncope, so Bumex and metolazone were discontinued.       PAST MEDICAL & SURGICAL HISTORY:  GERD (gastroesophageal reflux disease)      High cholesterol      Coronary artery disease involving coronary bypass graft of native heart with unstable angina pectoris      Coronary artery disease involving native coronary artery of native heart, angina presence unspecifie      Type 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, without long-term current use of insul      Essential hypertension      HFrEF (heart failure with reduced ejection fraction)      Cardiogenic shock      Stage 4 chronic kidney disease      DKA (diabetic ketoacidosis)      Multiple organ failure with heart failure      Bronchiectasis with acute lower respiratory infection      LBBB (left bundle branch block)      Facial nerve palsy      ST elevation myocardial infarction (STEMI), unspecified artery      S/P CABG (coronary artery bypass graft)  4V 1983 [place]      Status post open reduction with internal fixation of fracture      History of insertion of stent into coronary artery bypass graft      History of brachytherapy        REVIEW OF SYSTEMS:  14 point ROS negative in detail apart from as documented in HPI.    MEDICATIONS  (STANDING):  aspirin  chewable 81 milliGRAM(s) Oral daily  atorvastatin 40 milliGRAM(s) Oral at bedtime  dapagliflozin 10 milliGRAM(s) Oral every 24 hours  dextrose 5%. 1000 milliLiter(s) (50 mL/Hr) IV Continuous <Continuous>  dextrose 5%. 1000 milliLiter(s) (100 mL/Hr) IV Continuous <Continuous>  dextrose 50% Injectable 25 Gram(s) IV Push once  dextrose 50% Injectable 25 Gram(s) IV Push once  dextrose 50% Injectable 12.5 Gram(s) IV Push once  furosemide   Injectable 40 milliGRAM(s) IV Push two times a day  glucagon  Injectable 1 milliGRAM(s) IntraMuscular once  insulin glargine Injectable (LANTUS) 15 Unit(s) SubCutaneous at bedtime  insulin lispro (ADMELOG) corrective regimen sliding scale   SubCutaneous three times a day before meals  insulin lispro Injectable (ADMELOG) 10 Unit(s) SubCutaneous before dinner  levothyroxine 75 MICROGram(s) Oral daily  metoprolol succinate ER 50 milliGRAM(s) Oral daily  montelukast 10 milliGRAM(s) Oral daily  pantoprazole    Tablet 40 milliGRAM(s) Oral before breakfast  ranolazine 500 milliGRAM(s) Oral two times a day  spironolactone 25 milliGRAM(s) Oral daily  ticagrelor 90 milliGRAM(s) Oral every 12 hours  valsartan 20 milliGRAM(s) Oral daily    MEDICATIONS  (PRN):  dextrose Oral Gel 15 Gram(s) Oral once PRN Blood Glucose LESS THAN 70 milliGRAM(s)/deciliter    Home Medications:  atorvastatin 20 mg oral tablet: 1 tab(s) orally once a day (20 Nov 2023 09:53)  Farxiga 10 mg oral tablet: 1 tab(s) orally once a day (20 Nov 2023 09:51)  furosemide 40 mg oral tablet: 1 tab(s) orally 2 times a day (20 Nov 2023 09:50)  levothyroxine 75 mcg (0.075 mg) oral tablet: 1 tab(s) orally once a day (20 Nov 2023 09:53)  Metoprolol Succinate ER 50 mg oral tablet, extended release: 1 tab(s) orally once a day (20 Nov 2023 09:49)  montelukast 10 mg oral tablet: 1 tab(s) orally once a day (20 Nov 2023 09:53)  NexIUM 40 mg oral delayed release capsule: 1 cap(s) orally once a day (at bedtime) (20 Nov 2023 09:53)  NovoLOG 100 units/mL injectable solution: 10,10,15 unit(s) injectable 3 times a day (before meals) (20 Nov 2023 09:53)  ranolazine 500 mg oral tablet, extended release: 1 tab(s) orally 2 times a day (20 Nov 2023 09:53)  spironolactone 25 mg oral tablet: 1 tab(s) orally once a day (20 Nov 2023 09:51)  valsartan 40 mg oral tablet: 0.5 tab(s) orally every other day (at bedtime) (20 Nov 2023 09:53)    Allergies    No Known Allergies    Intolerances    DOPamine (Hypotension)    Social History:    FAMILY HISTORY:      ICU Vital Signs Last 24 Hrs  T(C): 36.9, Max: 36.9 (11-20 @ 10:26)  HR: 84 (84 - 92)  BP: 116/65 (116/65 - 171/74)  BP(mean): --  ABP: --  ABP(mean): --  RR: 16 (15 - 16)  SpO2: 100% (98% - 100%)        I&O's Summary Last 24 Hrs      Tele: SR 80s    Physical Exam:    General: No distress. Comfortable.  Neck: JVP elevated.  Respiratory: Clear to auscultation bilaterally  CV: RRR. Normal S1 and S2. No murmurs, rub, or gallops. Radial pulses normal.  Abdomen: Soft, non-distended, non-tender  Extremities: Warm, trace BLE edema  Neurology: Non-focal, alert and oriented times three.   Psych: Affect normal    Labs:    ( 11-20-23 @ 10:07 )               13.5   14.33 )--------( 316                  41.6     ( 11-20-23 @ 10:07 )     136  |  95  |  33.8  ---------------------<  174  5.1  |  29.0  |  1.86    Ca 8.8  Phos x   Mg 2.6

## 2023-11-20 NOTE — H&P ADULT - NSHPPHYSICALEXAM_GEN_ALL_CORE
CONSTITUTIONAL: No apparent distress  HEENT: Normocephalic, Atraumatic. Trachea midline.  RESPIRATORY:  lungs are clear to auscultation bilaterally, breath sounds equal bilaterally. No crackles, rhonchi, wheezes  CARDIOVASCULAR: Regular rate and rhythm, normal S1 and S2, no murmur/rub/gallop; No lower extremity edema; Peripheral pulses are 2+ bilaterally  ABDOMEN: Soft, non-distended, nontender to palpation, +BS  MUSCLOSKELETAL: Normal gait; moving all 4 extremities spontaneously  PSYCH: thoughts linear, affect appropriate  NEUROLOGY: Alert, Oriented x3, CN 2-12 grossly intact  SKIN: No rashes

## 2023-11-20 NOTE — CONSULT NOTE ADULT - PROBLEM SELECTOR RECOMMENDATION 9
- Etiology: ICM  - Clinically hypervolemic w/ cool extremities.   - GDMT: Will switch valsartan to Entresto 24-26 mg BID. Continue Toprol XL 50 mg daily, spironolactone 25 mg daily, and Farxiga 10 mg daily  - Diuretics: Continue Lasix 40 mg IV BID  - Please document strict I&Os and daily standing weight  - Device: EP consulted for possible CRT-D given LBBB  - He would benefit from CardioMEMS device to prevent further HF hospitalizations which he seemed agreeable to. Will plan for RHC/MEMS implant prior to discharge (possibly on Friday).  - Advanced therapies: Not a transplant candidate given age and would also not be an LVAD candidate given CKD3 and severe RV dysfunction. Will continue to optimize his GDMT and EP is considering CRT-D implant.

## 2023-11-20 NOTE — H&P ADULT - HISTORY OF PRESENT ILLNESS
78yoM w/ PMHx of CAD, STEMI, NSTEMI, 4V CABG, multiple PCI's, s/p multiple therapies for graph closure (brachytherapy, etc), HFrEF, DM2 on long term insulin, CKD 3-4, HTN, HLD having cath for worsening JOINER, fatigue. LHC showing EF further reduced from 30-35 to <20%, EDP 36, 3rd graft failure.      78yoM w/ PMHx of CAD w/ STEMI and NSTEMI s/p 4V CABG w/ 2 of 4 graft failure, multiple PCI's, s/p multiple therapies for graft closure (brachytherapy, etc), HFrEF, DM2 on long term insulin, CKD 3-4, HTN, HLD having cath for worsening JOINER, fatigue. LHC showing EF further reduced from 30-35 to <20%, EDP 36, 3rd graft failure, only LIMA to LAD patent. Patient reports ongoing SOB, unable to ambulate 2/2 JOINER, denies orthopnea, PND, making good urine. Reports some LE edema. Denies any fever, chills, CP, abd pain, n/v, dysuria.

## 2023-11-20 NOTE — PROGRESS NOTE ADULT - ASSESSMENT
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 The patient c/o fatigue, JOINER (SOB with 10-12 stairs), orthopnea, PND, and BLE edema. These symptoms improved when Dr. Valenzuela increased diuresis but the patient developed dizziness and near syncope, so Bumex and metolazone were discontinued. He also admits to occasional palpitations but denies chest pain.

## 2023-11-20 NOTE — H&P PST ADULT - HISTORY OF PRESENT ILLNESS
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     Echo's in October show EF of < 20% with grade 3 LVDD and moderate pulmonary hypertension, echo in May 2022 EF was 30-35 with grade 2 LVDD.    Symptoms:        Angina (Class):        Ischemic Symptoms:     Heart Failure: Chronic ACC/AHA stage C, NYHA functional class 3 HFrEF.    Assessment of LVEF:       EF: < 20%       Assessed by: Echo       Date: 10/29/2023    Prior Cardiac Interventions:       PCI's: 11/16/2020  VENTRICLES: No LV gram was performed; however, a recent echocardiogram demonstrated an EF of 25 %.  CORONARY VESSELS: The coronary circulation is right dominant.  LM:     --  LM: Diffusely diseased 70-80% diseased vessel.   LAD:     --  Proximal LAD: There was a 100 % stenosis.  CX:     --  Proximal circumflex: There was a 100 % stenosis.  RCA:     --  Ostial RCA: There was a 100 % stenosis.  GRAFTS:     --  Graft to the LAD: The graft was a LIMA. Graft angiography showed no evidence of disease.  --  Graft to the 1st obtuse marginal: The graft was a saphenous vein graft from the aorta. There was a 90 % stenosis in the middle third of the graft. It was treated with a SYNERGY 3.00MM X 20MM drug-eluting stent. There was a 95 % stenosis in the distal third of the graft. It was treated with a SYNERGY 2.50MM X24MM drug-eluting stent.  --  Graft to the RCA: Occluded on prior cath.         CABG: CABG X 4    Noninvasive Testing:   Stress Test: N/A    Echo: 10/29/2023  Left Ventricle: Endocardial visualization was enhanced with intravenous echo contrast. The left ventricular internal cavity size is normal. Global LV systolic function was severely decreased. Left ventricular ejection fraction, by visual estimation, is <20%.  Right Ventricle: Normal right ventricular size and function.  Pericardium: There is no evidence of pericardial effusion.    Echo: 10/26/2023  Left Ventricle: Endocardial visualization was enhanced with intravenous echo contrast. The left ventricular internal cavity size is normal. Global LV systolic function was severely decreased. Left ventricular ejection fraction, by visual estimation, is <20%. Spectral Doppler shows restrictive pattern of left ventricular myocardial filling (Grade III diastolic dysfunction). Elevated mean left atrial pressure.  Right Ventricle: The right ventricular size is mildly enlarged. RV   systolic function is severely reduced.  Left Atrium: Moderately enlarged left atrium.  Right Atrium: Mildly enlarged right atrium.  Pericardium: There is no evidence of pericardial effusion.  Mitral Valve: The mitral valve leaflets are tethered which is due to reduced systolic function and elevated LVDP. There is mild mitral annular calcification. Moderate mitral valve regurgitation is seen.  Tricuspid Valve: Mild tricuspid regurgitation is visualized. Estimated pulmonary artery systolic pressure is 50.5 mmHg assuming a right atrial pressure of 8 mmHg, which is consistent with moderate pulmonary hypertension.  Aortic Valve: The aortic valve is trileaflet. Sclerotic aortic valve with decreased opening. Mild aortic valve regurgitation is seen.  Pulmonic Valve: Trace pulmonic valve regurgitation.  Aorta: The aortic root is normal in size and structure.  Pulmonary Artery: The main pulmonary artery is normal in size.  Venous: The inferior vena cava was normal sized, with respiratory size variation less than 50%.    Antianginal Therapies:        Beta Blockers: Toprol 50 mg daily       Calcium Channel Blockers: N/A       Long Acting Nitrates: N/A       Ranexa: N/A    Associated Risk Factors:        Frailty: N/A       Cerebrovascular Disease: N/A       Chronic Lung Disease: N/A       Peripheral Arterial Disease: N/A       Chronic Kidney Disease (if yes, what is GFR): N/A       Uncontrolled Diabetes (if yes, what is HgbA1C or FBS): N/A       Poorly Controlled Hypertension (if yes, what is SBP): N/A       Morbid Obesity (if yes, what is BMI): N/A       History of Recent Ventricular Arrhythmia: N/A       Inability to Ambulate Safely: N/A       Need for Therapeutic Anticoagulation: N/A       Antiplatelet or Contrast Allergy: N/A    Social History:        Marital:        Tobacco:        ETOH:        Caffeine:     ROS:   General: No fevers/chills. No fatigue  HEENT: No hearing loss. No visual disturbances. No headaches. No epistaxis.  Pulmonary: No dyspnea. No wheeze. No cough.  CV: No chest pain. No JOINER. No palpitations. No orthopnea. No PND. No edema.  GI: No BRBPR. No melena. No nausea.  : No hematuria.  Neuro: No weakness. No paresthesia. No syncope.    T(C): --  HR: --  BP: --  RR: --  SpO2: --  Physical Exam:   General: Awake, alert, speech clear, no acute distress.  Neck: No bruit, no JVD.  Chest: S1, S2. No murmur. CTA.  Abdomen: Soft. Nondistended.  Extremities: No edema. 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 The patient c/o fatigue, JOINER (SOB with 10-12 stairs), orthopnea, PND, and BLE edema. These symptoms improved when Dr. Valenzuela increased diuresis but the patient developed dizziness and near syncope, so Bumex and metolazone were discontinued. He also admits to occasional palpitations but denies chest pain.    Echo's in October show EF of < 20% with grade 3 LVDD and moderate pulmonary hypertension, echo in May 2022 EF was 30-35 with grade 2 LVDD.    Symptoms:        Angina (Class): N/A       Ischemic Symptoms: JOINER (CCS class anginal equivalent)    Heart Failure: Chronic ACC/AHA stage C, NYHA functional class 3 HFrEF.    Assessment of LVEF:       EF: < 20%       Assessed by: Echo       Date: 10/29/2023    Prior Cardiac Interventions:       PCI's: 11/16/2020  VENTRICLES: No LV gram was performed; however, a recent echocardiogram demonstrated an EF of 25 %.  CORONARY VESSELS: The coronary circulation is right dominant.  LM:     --  LM: Diffusely diseased 70-80% diseased vessel.   LAD:     --  Proximal LAD: There was a 100 % stenosis.  CX:     --  Proximal circumflex: There was a 100 % stenosis.  RCA:     --  Ostial RCA: There was a 100 % stenosis.  GRAFTS:     --  Graft to the LAD: The graft was a LIMA. Graft angiography showed no evidence of disease.  --  Graft to the 1st obtuse marginal: The graft was a saphenous vein graft from the aorta. There was a 90 % stenosis in the middle third of the graft. It was treated with a SYNERGY 3.00MM X 20MM drug-eluting stent. There was a 95 % stenosis in the distal third of the graft. It was treated with a SYNERGY 2.50MM X24MM drug-eluting stent.  --  Graft to the RCA: Occluded on prior cath.         CABG: CABG X 4    Noninvasive Testing:   Stress Test: N/A    Echo: 10/29/2023  Left Ventricle: Endocardial visualization was enhanced with intravenous echo contrast. The left ventricular internal cavity size is normal. Global LV systolic function was severely decreased. Left ventricular ejection fraction, by visual estimation, is <20%.  Right Ventricle: Normal right ventricular size and function.  Pericardium: There is no evidence of pericardial effusion.    Echo: 10/26/2023  Left Ventricle: Endocardial visualization was enhanced with intravenous echo contrast. The left ventricular internal cavity size is normal. Global LV systolic function was severely decreased. Left ventricular ejection fraction, by visual estimation, is <20%. Spectral Doppler shows restrictive pattern of left ventricular myocardial filling (Grade III diastolic dysfunction). Elevated mean left atrial pressure.  Right Ventricle: The right ventricular size is mildly enlarged. RV   systolic function is severely reduced.  Left Atrium: Moderately enlarged left atrium.  Right Atrium: Mildly enlarged right atrium.  Pericardium: There is no evidence of pericardial effusion.  Mitral Valve: The mitral valve leaflets are tethered which is due to reduced systolic function and elevated LVDP. There is mild mitral annular calcification. Moderate mitral valve regurgitation is seen.  Tricuspid Valve: Mild tricuspid regurgitation is visualized. Estimated pulmonary artery systolic pressure is 50.5 mmHg assuming a right atrial pressure of 8 mmHg, which is consistent with moderate pulmonary hypertension.  Aortic Valve: The aortic valve is trileaflet. Sclerotic aortic valve with decreased opening. Mild aortic valve regurgitation is seen.  Pulmonic Valve: Trace pulmonic valve regurgitation.  Aorta: The aortic root is normal in size and structure.  Pulmonary Artery: The main pulmonary artery is normal in size.  Venous: The inferior vena cava was normal sized, with respiratory size variation less than 50%.    Antianginal Therapies:        Beta Blockers: Toprol 50 mg daily       Calcium Channel Blockers: N/A       Long Acting Nitrates: N/A       Ranexa: N/A    Associated Risk Factors:        Frailty: N/A       Cerebrovascular Disease: N/A       Chronic Lung Disease: N/A       Peripheral Arterial Disease: N/A       Chronic Kidney Disease (if yes, what is GFR): N/A       Uncontrolled Diabetes (if yes, what is HgbA1C or FBS): N/A       Poorly Controlled Hypertension (if yes, what is SBP): N/A       Morbid Obesity (if yes, what is BMI): N/A       History of Recent Ventricular Arrhythmia: N/A       Inability to Ambulate Safely: N/A       Need for Therapeutic Anticoagulation: N/A       Antiplatelet or Contrast Allergy: N/A    Social History:        Marital: , lives with wife.       Tobacco: Never smoker       ETOH: Denies       Caffeine: Denies    ROS:   General: No fevers/chills. + fatigue  HEENT: No hearing loss. No visual disturbances. No headaches. No epistaxis.  Pulmonary: No dyspnea. No wheeze. No cough.  CV: No chest pain. + JOINER. Occasional palpitations. + orthopnea. + PND. + edema.  GI: No BRBPR. No melena. No nausea.  : No hematuria.  Neuro: No weakness. No paresthesia. + near syncope.    T(C): 36.9 (11-20-23 @ 10:26), Max: 36.9 (11-20-23 @ 10:26)  HR: 92 (11-20-23 @ 10:26)  BP: 134/76 (11-20-23 @ 10:26)  RR: 15 (11-20-23 @ 10:26)  SpO2: 98% (11-20-23 @ 10:26)  Physical Exam:   General: Awake, alert, speech clear, no acute distress.  Neck: No bruit, no JVD.  Chest: S1, S2. No murmur. CTA.  Abdomen: Soft. Nondistended.  Extremities: No edema. Pulses: DP: Right: 1+, Left: 1+, Radial: Right: 1+, Left: 1+

## 2023-11-20 NOTE — CONSULT NOTE ADULT - PROBLEM SELECTOR RECOMMENDATION 3
- Baseline Cr earlier this year was 1.4-1.5 but more recently has been 1.6-1.7  - Will monitor renal function closely with diuresis

## 2023-11-20 NOTE — H&P ADULT - ASSESSMENT
78yoM w/ PMHx of CAD w/ STEMI and NSTEMI s/p 4V CABG w/ 2 of 4 graft failure, multiple PCI's, s/p multiple therapies for graft closure (brachytherapy, etc), HFrEF, DM2 on long term insulin, CKD 3-4, HTN, HLD admitted after [facility] for CHF.     #HF exacerbation  #CAD  - LHC showing worsening EF with EDP 36; volume overloaded on exam  - HF team consulted, recs appreciated  - c/w IV lasix 40mg bid  - strict I&O  - daily weight  - c/w statin, asa, brilinta   - changed valsartan to entrestro 24/26 bid, c/w metoprolol, c/w spironolactone, c/w farxiga   - plan for RHC/MEMS implant possibly friday  - EP consulted: will consider in-patient CRTD implant if scheduling permits. Patient is agreeable.     #leukocytosis  - afebrile, no infectious symptoms  - continue to monitor  - if worsening white count, febrile, plan to do full infectious w/up and start abx   #GERD  - change home nexium to pantoprazole    #CKD stage 3  - appears to be around baseline  - Continue to monitor renal function while on diuretics  - c/w entrestro for now  - if worsening kidney function, plan hold entrestro, farxiga     #Hypothyroidism  - c/w home levothyroxine    #DM  - c/w ISS w/ lispro   - c/w glargine   - Close monitoring of blood glucose levels

## 2023-11-20 NOTE — PROGRESS NOTE ADULT - PROBLEM SELECTOR PLAN 4
Lipo (a) 473.7 in October 2022  Goal Non-HDL < 80, LDL < 55  Lipid Panel: Not at goal  Statin: Will increase Lipitor to 40 mg daily  Other: N/A

## 2023-11-20 NOTE — CONSULT NOTE ADULT - NS ATTEND AMEND GEN_ALL_CORE FT
Patient with severe LV dysfunction (EF <20%) and LBBB. [facility] today with 2/3 grafts occluded (open LIMA). Previous EF has been in the 30s. LVEF unlikely to recover significantly given severity of CAD. Filling pressures high and now admitted for diuresis. Candidate for CRT-D - either as inpatient later in the week once adequately diuresed or as outpatient.
Patient was seen and examined at bedside with the advanced care provider.  I agree with the above with the following exceptions:    Briefly, Mr. Castellon is a 77 yo M with longstanding HFrEF 2/2 ICM s/p CABG in 1993 (at [facility]) with subsequent progressive CAD (multiple revascularization attempts to SVG to OM1 last in 2020) and chronic LBBB presented for planned LHC for increase dyspnea on exertion.  He had failed trials of metolazone and bumex as an outpatient.  LHC found to have chronic occlusion of SVG to RCA and reoclusion of SVG to OM1 despite prior attempts.  No further revascularization is feasible.  EP consulted for CRT-D.  On exam he is very volume overloaded with JVP up to 14cm.  LVEDP on cath was 25.  He has 1+ pitting edema and is cold on exam.  SBP at bedside 126.  Will change valsartan to Entresto 24-26 mg BID.  Continue on lasix 40mg IV BID.  As he has had multiple admissions for HFH, he would be a good candidate for cardioMEMs. Will plan for RHC + cardioMEMS during this admission - can target Friday based on clinical progress.

## 2023-11-21 LAB
A1C WITH ESTIMATED AVERAGE GLUCOSE RESULT: 6.8 % — HIGH (ref 4–5.6)
A1C WITH ESTIMATED AVERAGE GLUCOSE RESULT: 6.8 % — HIGH (ref 4–5.6)
ANION GAP SERPL CALC-SCNC: 10 MMOL/L — SIGNIFICANT CHANGE UP (ref 5–17)
ANION GAP SERPL CALC-SCNC: 10 MMOL/L — SIGNIFICANT CHANGE UP (ref 5–17)
BUN SERPL-MCNC: 36.3 MG/DL — HIGH (ref 8–20)
BUN SERPL-MCNC: 36.3 MG/DL — HIGH (ref 8–20)
CALCIUM SERPL-MCNC: 9.1 MG/DL — SIGNIFICANT CHANGE UP (ref 8.4–10.5)
CALCIUM SERPL-MCNC: 9.1 MG/DL — SIGNIFICANT CHANGE UP (ref 8.4–10.5)
CHLORIDE SERPL-SCNC: 97 MMOL/L — SIGNIFICANT CHANGE UP (ref 96–108)
CHLORIDE SERPL-SCNC: 97 MMOL/L — SIGNIFICANT CHANGE UP (ref 96–108)
CHOLEST SERPL-MCNC: 170 MG/DL — SIGNIFICANT CHANGE UP
CHOLEST SERPL-MCNC: 170 MG/DL — SIGNIFICANT CHANGE UP
CO2 SERPL-SCNC: 32 MMOL/L — HIGH (ref 22–29)
CO2 SERPL-SCNC: 32 MMOL/L — HIGH (ref 22–29)
CREAT SERPL-MCNC: 2 MG/DL — HIGH (ref 0.5–1.3)
CREAT SERPL-MCNC: 2 MG/DL — HIGH (ref 0.5–1.3)
EGFR: 34 ML/MIN/1.73M2 — LOW
EGFR: 34 ML/MIN/1.73M2 — LOW
ESTIMATED AVERAGE GLUCOSE: 148 MG/DL — HIGH (ref 68–114)
ESTIMATED AVERAGE GLUCOSE: 148 MG/DL — HIGH (ref 68–114)
GLUCOSE BLDC GLUCOMTR-MCNC: 108 MG/DL — HIGH (ref 70–99)
GLUCOSE BLDC GLUCOMTR-MCNC: 108 MG/DL — HIGH (ref 70–99)
GLUCOSE BLDC GLUCOMTR-MCNC: 155 MG/DL — HIGH (ref 70–99)
GLUCOSE BLDC GLUCOMTR-MCNC: 155 MG/DL — HIGH (ref 70–99)
GLUCOSE BLDC GLUCOMTR-MCNC: 160 MG/DL — HIGH (ref 70–99)
GLUCOSE BLDC GLUCOMTR-MCNC: 160 MG/DL — HIGH (ref 70–99)
GLUCOSE BLDC GLUCOMTR-MCNC: 208 MG/DL — HIGH (ref 70–99)
GLUCOSE BLDC GLUCOMTR-MCNC: 208 MG/DL — HIGH (ref 70–99)
GLUCOSE BLDC GLUCOMTR-MCNC: 248 MG/DL — HIGH (ref 70–99)
GLUCOSE BLDC GLUCOMTR-MCNC: 248 MG/DL — HIGH (ref 70–99)
GLUCOSE SERPL-MCNC: 95 MG/DL — SIGNIFICANT CHANGE UP (ref 70–99)
GLUCOSE SERPL-MCNC: 95 MG/DL — SIGNIFICANT CHANGE UP (ref 70–99)
HCT VFR BLD CALC: 43.1 % — SIGNIFICANT CHANGE UP (ref 39–50)
HCT VFR BLD CALC: 43.1 % — SIGNIFICANT CHANGE UP (ref 39–50)
HDLC SERPL-MCNC: 53 MG/DL — SIGNIFICANT CHANGE UP
HDLC SERPL-MCNC: 53 MG/DL — SIGNIFICANT CHANGE UP
HGB BLD-MCNC: 14.2 G/DL — SIGNIFICANT CHANGE UP (ref 13–17)
HGB BLD-MCNC: 14.2 G/DL — SIGNIFICANT CHANGE UP (ref 13–17)
LIPID PNL WITH DIRECT LDL SERPL: 104 MG/DL — HIGH
LIPID PNL WITH DIRECT LDL SERPL: 104 MG/DL — HIGH
MAGNESIUM SERPL-MCNC: 2.5 MG/DL — SIGNIFICANT CHANGE UP (ref 1.6–2.6)
MAGNESIUM SERPL-MCNC: 2.5 MG/DL — SIGNIFICANT CHANGE UP (ref 1.6–2.6)
MCHC RBC-ENTMCNC: 30.6 PG — SIGNIFICANT CHANGE UP (ref 27–34)
MCHC RBC-ENTMCNC: 30.6 PG — SIGNIFICANT CHANGE UP (ref 27–34)
MCHC RBC-ENTMCNC: 32.9 GM/DL — SIGNIFICANT CHANGE UP (ref 32–36)
MCHC RBC-ENTMCNC: 32.9 GM/DL — SIGNIFICANT CHANGE UP (ref 32–36)
MCV RBC AUTO: 92.9 FL — SIGNIFICANT CHANGE UP (ref 80–100)
MCV RBC AUTO: 92.9 FL — SIGNIFICANT CHANGE UP (ref 80–100)
NON HDL CHOLESTEROL: 117 MG/DL — SIGNIFICANT CHANGE UP
NON HDL CHOLESTEROL: 117 MG/DL — SIGNIFICANT CHANGE UP
PLATELET # BLD AUTO: 282 K/UL — SIGNIFICANT CHANGE UP (ref 150–400)
PLATELET # BLD AUTO: 282 K/UL — SIGNIFICANT CHANGE UP (ref 150–400)
POTASSIUM SERPL-MCNC: 4.6 MMOL/L — SIGNIFICANT CHANGE UP (ref 3.5–5.3)
POTASSIUM SERPL-MCNC: 4.6 MMOL/L — SIGNIFICANT CHANGE UP (ref 3.5–5.3)
POTASSIUM SERPL-SCNC: 4.6 MMOL/L — SIGNIFICANT CHANGE UP (ref 3.5–5.3)
POTASSIUM SERPL-SCNC: 4.6 MMOL/L — SIGNIFICANT CHANGE UP (ref 3.5–5.3)
RBC # BLD: 4.64 M/UL — SIGNIFICANT CHANGE UP (ref 4.2–5.8)
RBC # BLD: 4.64 M/UL — SIGNIFICANT CHANGE UP (ref 4.2–5.8)
RBC # FLD: 13.2 % — SIGNIFICANT CHANGE UP (ref 10.3–14.5)
RBC # FLD: 13.2 % — SIGNIFICANT CHANGE UP (ref 10.3–14.5)
SODIUM SERPL-SCNC: 139 MMOL/L — SIGNIFICANT CHANGE UP (ref 135–145)
SODIUM SERPL-SCNC: 139 MMOL/L — SIGNIFICANT CHANGE UP (ref 135–145)
TRIGL SERPL-MCNC: 66 MG/DL — SIGNIFICANT CHANGE UP
TRIGL SERPL-MCNC: 66 MG/DL — SIGNIFICANT CHANGE UP
TSH SERPL-MCNC: 11.79 UIU/ML — HIGH (ref 0.27–4.2)
TSH SERPL-MCNC: 11.79 UIU/ML — HIGH (ref 0.27–4.2)
WBC # BLD: 9.19 K/UL — SIGNIFICANT CHANGE UP (ref 3.8–10.5)
WBC # BLD: 9.19 K/UL — SIGNIFICANT CHANGE UP (ref 3.8–10.5)
WBC # FLD AUTO: 9.19 K/UL — SIGNIFICANT CHANGE UP (ref 3.8–10.5)
WBC # FLD AUTO: 9.19 K/UL — SIGNIFICANT CHANGE UP (ref 3.8–10.5)

## 2023-11-21 PROCEDURE — 99233 SBSQ HOSP IP/OBS HIGH 50: CPT

## 2023-11-21 PROCEDURE — 99232 SBSQ HOSP IP/OBS MODERATE 35: CPT

## 2023-11-21 RX ORDER — LANOLIN ALCOHOL/MO/W.PET/CERES
3 CREAM (GRAM) TOPICAL AT BEDTIME
Refills: 0 | Status: DISCONTINUED | OUTPATIENT
Start: 2023-11-21 | End: 2023-11-22

## 2023-11-21 RX ORDER — INSULIN GLARGINE 100 [IU]/ML
10 INJECTION, SOLUTION SUBCUTANEOUS AT BEDTIME
Refills: 0 | Status: DISCONTINUED | OUTPATIENT
Start: 2023-11-21 | End: 2023-11-22

## 2023-11-21 RX ORDER — SPIRONOLACTONE 25 MG/1
25 TABLET, FILM COATED ORAL AT BEDTIME
Refills: 0 | Status: DISCONTINUED | OUTPATIENT
Start: 2023-11-22 | End: 2023-11-22

## 2023-11-21 RX ORDER — FUROSEMIDE 40 MG
40 TABLET ORAL DAILY
Refills: 0 | Status: DISCONTINUED | OUTPATIENT
Start: 2023-11-22 | End: 2023-11-22

## 2023-11-21 RX ORDER — METOPROLOL TARTRATE 50 MG
50 TABLET ORAL DAILY
Refills: 0 | Status: DISCONTINUED | OUTPATIENT
Start: 2023-11-21 | End: 2023-11-22

## 2023-11-21 RX ORDER — METOPROLOL TARTRATE 50 MG
50 TABLET ORAL DAILY
Refills: 0 | Status: DISCONTINUED | OUTPATIENT
Start: 2023-11-22 | End: 2023-11-22

## 2023-11-21 RX ADMIN — Medication 200 MILLIGRAM(S): at 10:06

## 2023-11-21 RX ADMIN — SPIRONOLACTONE 25 MILLIGRAM(S): 25 TABLET, FILM COATED ORAL at 05:55

## 2023-11-21 RX ADMIN — Medication 2: at 18:07

## 2023-11-21 RX ADMIN — HEPARIN SODIUM 5000 UNIT(S): 5000 INJECTION INTRAVENOUS; SUBCUTANEOUS at 13:40

## 2023-11-21 RX ADMIN — RANOLAZINE 500 MILLIGRAM(S): 500 TABLET, FILM COATED, EXTENDED RELEASE ORAL at 06:00

## 2023-11-21 RX ADMIN — TICAGRELOR 90 MILLIGRAM(S): 90 TABLET ORAL at 05:56

## 2023-11-21 RX ADMIN — Medication 200 MILLIGRAM(S): at 21:58

## 2023-11-21 RX ADMIN — PANTOPRAZOLE SODIUM 40 MILLIGRAM(S): 20 TABLET, DELAYED RELEASE ORAL at 05:57

## 2023-11-21 RX ADMIN — DAPAGLIFLOZIN 10 MILLIGRAM(S): 10 TABLET, FILM COATED ORAL at 05:56

## 2023-11-21 RX ADMIN — Medication 3 MILLIGRAM(S): at 23:10

## 2023-11-21 RX ADMIN — MONTELUKAST 10 MILLIGRAM(S): 4 TABLET, CHEWABLE ORAL at 13:39

## 2023-11-21 RX ADMIN — Medication 4: at 13:40

## 2023-11-21 RX ADMIN — INSULIN GLARGINE 10 UNIT(S): 100 INJECTION, SOLUTION SUBCUTANEOUS at 21:58

## 2023-11-21 RX ADMIN — Medication 81 MILLIGRAM(S): at 13:40

## 2023-11-21 RX ADMIN — HEPARIN SODIUM 5000 UNIT(S): 5000 INJECTION INTRAVENOUS; SUBCUTANEOUS at 21:58

## 2023-11-21 RX ADMIN — RANOLAZINE 500 MILLIGRAM(S): 500 TABLET, FILM COATED, EXTENDED RELEASE ORAL at 18:12

## 2023-11-21 RX ADMIN — ATORVASTATIN CALCIUM 40 MILLIGRAM(S): 80 TABLET, FILM COATED ORAL at 21:57

## 2023-11-21 RX ADMIN — TICAGRELOR 90 MILLIGRAM(S): 90 TABLET ORAL at 18:07

## 2023-11-21 RX ADMIN — HEPARIN SODIUM 5000 UNIT(S): 5000 INJECTION INTRAVENOUS; SUBCUTANEOUS at 05:57

## 2023-11-21 RX ADMIN — Medication 75 MICROGRAM(S): at 05:59

## 2023-11-21 NOTE — PROGRESS NOTE ADULT - ASSESSMENT
78yoM w/ PMHx of CAD w/ STEMI and NSTEMI s/p 4V CABG w/ 2 of 4 graft failure, multiple PCI's, s/p multiple therapies for graft closure (brachytherapy, etc), HFrEF, DM2 on long term insulin, CKD 3-4, HTN, HLD admitted after [facility] for CHF.     #HF exacerbation  #CAD  - [facility] reviewed  - Tele monitoring  - strict I&O  - daily weight  - HF team consulted, recs appreciated, EP recs appreciated, Cardiology recs appreciated  - Lasix 40 IV Daily  - c/w statin, asa, brilinta   - changed valsartan to entrestro 24/26 bid, c/w metoprolol, c/w spironolactone, c/w farxiga   - plan for RHC/MEMS implant Wednesday vs Friday   - Potential plan for CRT-D     #leukocytosis  - afebrile, no infectious symptoms  - continue to monitor  - if worsening white count, febrile, plan to do full infectious w/up and start abx   #GERD  - change home nexium to pantoprazole    #CKD stage 3  - appears to be around baseline  - Continue to monitor renal function while on diuretics  - c/w entrestro for now     #Hypothyroidism  - c/w home levothyroxine    #DM  - c/w ISS w/ lispro   - c/w glargine   - Close monitoring of blood glucose levels    DVT prophylaxis: Heparin    Spoke with son at bedside.        78yoM w/ PMHx of CAD w/ STEMI and NSTEMI s/p 4V CABG w/ 2 of 4 graft failure, multiple PCI's, s/p multiple therapies for graft closure (brachytherapy, etc), HFrEF, DM2 on long term insulin, CKD 3-4, HTN, HLD admitted after [facility] for CHF.     #HF exacerbation  #CAD  - [facility] reviewed  - Tele monitoring  - strict I&O  - daily weight  - HF team consulted, recs appreciated, EP recs appreciated, Cardiology recs appreciated  - Lasix 40 IV Daily  - c/w statin, asa, brilinta   - changed valsartan to entrestro 24/26 bid, c/w metoprolol, c/w spironolactone, c/w farxiga   - plan for RHC/MEMS implant Wednesday vs Friday   - Potential plan for CRT-D     #leukocytosis  - afebrile, no infectious symptoms  - continue to monitor  - if worsening white count, febrile, plan to do full infectious w/up and start abx     #GERD  - change home nexium to pantoprazole    #CKD stage 3  - appears to be around baseline  - Continue to monitor renal function while on diuretics  - c/w entrestro for now     #Hypothyroidism  - c/w home levothyroxine    #DM  - c/w ISS w/ lispro   - c/w glargine   - Close monitoring of blood glucose levels    DVT prophylaxis: Heparin    Spoke with son at bedside.        78yoM w/ PMHx of CAD w/ STEMI and NSTEMI s/p 4V CABG w/ 2 of 4 graft failure, multiple PCI's, s/p multiple therapies for graft closure (brachytherapy, etc), HFrEF, DM2 on long term insulin, CKD 3-4, HTN, HLD admitted after [facility] for CHF.     #HF exacerbation  #CAD  - [facility] reviewed  - Tele monitoring  - strict I&O  - daily weight  - HF team consulted, recs appreciated, EP recs appreciated, Cardiology recs appreciated  - Lasix 40 IV Daily  - c/w statin, asa, brilinta   - changed valsartan to entrestro 24/26 bid, c/w metoprolol, c/w spironolactone, c/w farxiga   - plan for RHC/MEMS implant Wednesday v s Friday   - Potential plan for CRT-D      #leukocytosis  - afebrile, no infectious symptoms  - continue to monitor  - if worsening white count, febrile, plan to do full infectious w/up and start abx     #GERD  - change home nexium to pantoprazole    #CKD stage 3  - appears to be around baseline  - Continue to monitor renal function while on diuretics  - c/w entrestro for now     #Hypothyroidism  - c/w home levothyroxine    #DM  - c/w ISS w/ lispro   - c/w glargine   - Close monitoring of blood glucose levels    DVT prophylaxis: Heparin    Spoke with son at bedside.

## 2023-11-21 NOTE — PROGRESS NOTE ADULT - ASSESSMENT
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 The patient c/o fatigue, JOINER (SOB with 10-12 stairs), orthopnea, PND, and BLE edema. These symptoms improved when Dr. Valenzuela increased diuresis but the patient developed dizziness and near syncope, so Bumex and metolazone were discontinued. He also admits to occasional palpitations but denies chest pain. s/p Cath with occulded grafts except [place] to LAD  Medical therapy

## 2023-11-21 NOTE — PROGRESS NOTE ADULT - ASSESSMENT
2022    Blood pressure 120/72, pulse 85, height 5' 6\" (1.676 m), weight 141 lb (64 kg), last menstrual period 2022, SpO2 98 %, not currently breastfeeding. Juan Rodriguez (:  1992) is a 34 y.o. female, here for evaluation of the following medical concerns:    Chief Complaint   Patient presents with    Follow-up     fertility/napro f/u     F/u primary infertility. Since 2020. [de-identified].   is jamila. 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 Had a son short term, they are not intending to adopt but are not closed to the idea. Records reviewed from last visit. Repeat SFA done 2022: improved count and motitility, near normal, but morphology still 1% normals. On proxeed. Stage 3 endo; surgically removed 2022 by Dr Oscar Mayer. Birdie Venegas says the experience was good. Has healed up well. Postoperative change in symptoms? No.    Still has dysmenorrhea, PMS (maybe to lesser degree, nicki for PMS)    First cycle after surgery was . Was able to use luteal estrace/prometrium  LMP 3/27. Her mucus is improved using prednisone 5 mg cycle day 9 though peak plus 2. Good lubrication, if not copious amounts of mucus. Cycle length usually <32 days. Still on metformin for occasional long cycles. Still has some acne that is mild. Thinks the DIM has helped acne more than metformin maybe. Acne tends to flare premenstrually. Androgen levels (DHEA, androstenedione, T) normal.     Prolactin normal x2. Recent TSH was 0.96.    femara dose 25 mg, cycle day 2. Luteal progesterone dose 200mg, estrace 2 mg (peak plus 3-12)    Metformin dose is 1000 mg/d    Has not restarted naltrexone. Is making her own concoction using tabs and distilled water. She was at 5 mg/d previously and was thinking of restart that soon. She is unsure if it made a difference in any notable way however. Dr Jacob Antonio did not recommend any supplements or vitamins. She does use  PNV.     She and her  have received counseling about the infertility process and struggles. Sleep good. Works full time as a . Stress levels are not high currently. Plans to start training for a half marathon. Running has not led to wt loss in the past.    Last pap 2020 dr hanna    Patient Active Problem List   Diagnosis    Other ovarian dysfunction (luteal defect, 2020)    Secondary oligomenorrhea (sporadic long cycles with acne/hirsutism, likely PCOS). 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 Wt Readings from Last 3 Encounters:   22 141 lb (64 kg)   21 146 lb (66.2 kg)   20 144 lb (65.3 kg)       BP Readings from Last 3 Encounters:   22 120/72   21 122/80   20 120/76       No Known Allergies    Prior to Visit Medications    Medication Sig Taking? Authorizing Provider   estradiol (ESTRACE) 1 MG tablet TAKE 2 TABLETS BY MOUTH DAILY(PEAK PLUS 3-12) Yes Danae Najera MD   predniSONE (DELTASONE) 5 MG tablet 1 tab po daily, from cycle day 9 through peak plus 2 each cycle Yes Danae Najera MD   letrozole [facility]) 2.5 MG tablet 10 tabs po once per day on cycle day 2 (after a negative urine pregnancy test)/ Yes Danae Najera MD   progesterone (PROMETRIUM) 200 MG CAPS capsule INSERT 1 CAPSULE INTRAVAGINALLY EVERY NIGHT AS DIRECTED( PEAK PLUS 3-12) Yes Daane Najera MD   metFORMIN (GLUCOPHAGE-XR) 500 MG extended release tablet TAKE 3 TABLETS BY MOUTH DAILY WITH BREAKFAST Yes Danae Najera MD   naltrexone (DEPADE) 50 MG tablet 1/2 tab po nightly.  Yes Danae Najera MD   Prenatal Multivit-Min-Fe-FA (PRE-TA FORMULA) TABS Take by mouth daily Yes Historical Provider, MD        Social History     Tobacco Use    Smoking status: Never Smoker    Smokeless tobacco: Never Used   Vaping Use    Vaping Use: Never used   Substance Use Topics    Alcohol use: Yes     Alcohol/week: 1.0 standard drink     Types: 1 Glasses of wine per week     Comment: ocassional make have liquor    Drug use: Never       Review of Systems As above     Physical Exam  Vitals and nursing note reviewed. Constitutional:       Appearance: Normal appearance. She is well-developed. Neck:      Thyroid: No thyromegaly. Cardiovascular:      Rate and Rhythm: Normal rate and regular rhythm. Pulses: Normal pulses. Heart sounds: Normal heart sounds. No murmur heard. Pulmonary:      Effort: Pulmonary effort is normal. No respiratory distress. Breath sounds: Normal breath sounds. No wheezing or rales. Musculoskeletal:         General: Normal range of motion. Cervical back: Normal range of motion. Skin:     General: Skin is warm and dry. Comments: Minimal acne comedos face   Neurological:      General: No focal deficit present. Mental Status: She is alert and oriented to person, place, and time. Mental status is at baseline. Psychiatric:         Mood and Affect: Mood normal.         Behavior: Behavior normal.         Thought Content: Thought content normal.         Judgment: Judgment normal.         ASSESSMENT/PLAN:    1. Endometriosis determined by laparoscopy- stage 3, dr Laurent Isaac, removed 2/2022  - now surgically addressed via laparoscopic excision. So now is the best time for conception and we will resume ovarian stim with femara (25 mg cycle day 2) and luteal support with estrace/prometrium (continue to monitor peak plus 7 hormone levels). 2. Other ovarian dysfunction (luteal defect, 7/2020)  - As above. Resuming prior treatments postoperatively. 3. 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 4. Secondary oligomenorrhea (sporadic long cycles with acne/hirsutism, likely PCOS).   - stable cycle length; continue metformin (DIM 200mg/d helping acne)    5. PMS (premenstrual syndrome)  - she does not believe LDN has helped this and will stop. It has improved postoperatively however. 6. Dysmenorrhea  - discussed that it can take up to 6 months post-op endo excision for pain to improve. Return in about 4 months (around 8/21/2022) for follow up fertility, OK for Virtual Video Visit. An  Noblignature was used to authenticate this note.     --Irena Sheikh MD on 4/21/2022 at 10:24 AM Outpatient cardiologist: Dr. Valenzuela  Initial HF c/s: Dr. Giraldo    77 y/o M with a PMH of ICM/HFrEF (LVEF <20%, LVIDd 5.3cm), CAD, s/p 4V CABG (1993 at [facility]) and multiple PCI's of the SVG-OM with stents and brachytherapy, HTN, DM2 (A1c 6.9%), and CKD stage 3, who presented 11/20/23 for elective LHC.  LHC found to have chronic occlusion of SVG to RCA and reoclusion of SVG to OM1 despite prior attempts.  No further revascularization is feasible.  EP consulted for CRT-D.  LVEDP on cath was 36.  He was started on Entresto 24-26 mg BID along with IV diuresis.  As he has had multiple admissions for HFH, he would be a good candidate for cardioMEMs during his stay.    Since 2015 he has had a decline in his LVEF from 40% to <20%. He was recently admitted for ADHF from 10/26-10/30. He reports feeling very fatigued lately with JOINER. His outpatient diuretics were increased by his cardiologist, Dr. Valenzuela, but the patient developed dizziness and near syncope, so Bumex and metolazone were discontinued.     Cardiac Studies:  11/20/23 LHC: Patent LIMA-LAD, all other grafts occluded. LVEDP 36.    10/26/23 TTE: LVIDd 5.3cm, LVEF <20%, grade III DD, mild RVE with severe RV dysfunction, mod LAE, mild ESTRELLA, mod MR, mild TR, mild AI, est PASP 50.5 mmHg.

## 2023-11-22 ENCOUNTER — TRANSCRIPTION ENCOUNTER (OUTPATIENT)
Age: 78
End: 2023-11-22

## 2023-11-22 VITALS
TEMPERATURE: 98 F | DIASTOLIC BLOOD PRESSURE: 61 MMHG | SYSTOLIC BLOOD PRESSURE: 98 MMHG | OXYGEN SATURATION: 100 % | RESPIRATION RATE: 18 BRPM | HEART RATE: 82 BPM

## 2023-11-22 DIAGNOSIS — I44.7 LEFT BUNDLE-BRANCH BLOCK, UNSPECIFIED: ICD-10-CM

## 2023-11-22 LAB
ALBUMIN SERPL ELPH-MCNC: 2.9 G/DL — LOW (ref 3.3–5.2)
ALBUMIN SERPL ELPH-MCNC: 2.9 G/DL — LOW (ref 3.3–5.2)
ALP SERPL-CCNC: 85 U/L — SIGNIFICANT CHANGE UP (ref 40–120)
ALP SERPL-CCNC: 85 U/L — SIGNIFICANT CHANGE UP (ref 40–120)
ALT FLD-CCNC: 15 U/L — SIGNIFICANT CHANGE UP
ALT FLD-CCNC: 15 U/L — SIGNIFICANT CHANGE UP
ANION GAP SERPL CALC-SCNC: 10 MMOL/L — SIGNIFICANT CHANGE UP (ref 5–17)
ANION GAP SERPL CALC-SCNC: 10 MMOL/L — SIGNIFICANT CHANGE UP (ref 5–17)
AST SERPL-CCNC: 25 U/L — SIGNIFICANT CHANGE UP
AST SERPL-CCNC: 25 U/L — SIGNIFICANT CHANGE UP
BASOPHILS # BLD AUTO: 0.05 K/UL — SIGNIFICANT CHANGE UP (ref 0–0.2)
BASOPHILS # BLD AUTO: 0.05 K/UL — SIGNIFICANT CHANGE UP (ref 0–0.2)
BASOPHILS NFR BLD AUTO: 0.6 % — SIGNIFICANT CHANGE UP (ref 0–2)
BASOPHILS NFR BLD AUTO: 0.6 % — SIGNIFICANT CHANGE UP (ref 0–2)
BILIRUB DIRECT SERPL-MCNC: 0.1 MG/DL — SIGNIFICANT CHANGE UP (ref 0–0.3)
BILIRUB DIRECT SERPL-MCNC: 0.1 MG/DL — SIGNIFICANT CHANGE UP (ref 0–0.3)
BILIRUB INDIRECT FLD-MCNC: 0.3 MG/DL — SIGNIFICANT CHANGE UP (ref 0.2–1)
BILIRUB INDIRECT FLD-MCNC: 0.3 MG/DL — SIGNIFICANT CHANGE UP (ref 0.2–1)
BILIRUB SERPL-MCNC: 0.4 MG/DL — SIGNIFICANT CHANGE UP (ref 0.4–2)
BILIRUB SERPL-MCNC: 0.4 MG/DL — SIGNIFICANT CHANGE UP (ref 0.4–2)
BUN SERPL-MCNC: 31.1 MG/DL — HIGH (ref 8–20)
BUN SERPL-MCNC: 31.1 MG/DL — HIGH (ref 8–20)
CALCIUM SERPL-MCNC: 8.7 MG/DL — SIGNIFICANT CHANGE UP (ref 8.4–10.5)
CALCIUM SERPL-MCNC: 8.7 MG/DL — SIGNIFICANT CHANGE UP (ref 8.4–10.5)
CHLORIDE SERPL-SCNC: 102 MMOL/L — SIGNIFICANT CHANGE UP (ref 96–108)
CHLORIDE SERPL-SCNC: 102 MMOL/L — SIGNIFICANT CHANGE UP (ref 96–108)
CO2 SERPL-SCNC: 27 MMOL/L — SIGNIFICANT CHANGE UP (ref 22–29)
CO2 SERPL-SCNC: 27 MMOL/L — SIGNIFICANT CHANGE UP (ref 22–29)
CREAT SERPL-MCNC: 1.63 MG/DL — HIGH (ref 0.5–1.3)
CREAT SERPL-MCNC: 1.63 MG/DL — HIGH (ref 0.5–1.3)
EGFR: 43 ML/MIN/1.73M2 — LOW
EGFR: 43 ML/MIN/1.73M2 — LOW
EOSINOPHIL # BLD AUTO: 0.37 K/UL — SIGNIFICANT CHANGE UP (ref 0–0.5)
EOSINOPHIL # BLD AUTO: 0.37 K/UL — SIGNIFICANT CHANGE UP (ref 0–0.5)
EOSINOPHIL NFR BLD AUTO: 4.2 % — SIGNIFICANT CHANGE UP (ref 0–6)
EOSINOPHIL NFR BLD AUTO: 4.2 % — SIGNIFICANT CHANGE UP (ref 0–6)
GLUCOSE BLDC GLUCOMTR-MCNC: 190 MG/DL — HIGH (ref 70–99)
GLUCOSE BLDC GLUCOMTR-MCNC: 190 MG/DL — HIGH (ref 70–99)
GLUCOSE BLDC GLUCOMTR-MCNC: 98 MG/DL — SIGNIFICANT CHANGE UP (ref 70–99)
GLUCOSE BLDC GLUCOMTR-MCNC: 98 MG/DL — SIGNIFICANT CHANGE UP (ref 70–99)
GLUCOSE SERPL-MCNC: 96 MG/DL — SIGNIFICANT CHANGE UP (ref 70–99)
GLUCOSE SERPL-MCNC: 96 MG/DL — SIGNIFICANT CHANGE UP (ref 70–99)
HCT VFR BLD CALC: 43.3 % — SIGNIFICANT CHANGE UP (ref 39–50)
HCT VFR BLD CALC: 43.3 % — SIGNIFICANT CHANGE UP (ref 39–50)
HGB BLD-MCNC: 13.7 G/DL — SIGNIFICANT CHANGE UP (ref 13–17)
HGB BLD-MCNC: 13.7 G/DL — SIGNIFICANT CHANGE UP (ref 13–17)
IMM GRANULOCYTES NFR BLD AUTO: 1.1 % — HIGH (ref 0–0.9)
IMM GRANULOCYTES NFR BLD AUTO: 1.1 % — HIGH (ref 0–0.9)
LYMPHOCYTES # BLD AUTO: 2.11 K/UL — SIGNIFICANT CHANGE UP (ref 1–3.3)
LYMPHOCYTES # BLD AUTO: 2.11 K/UL — SIGNIFICANT CHANGE UP (ref 1–3.3)
LYMPHOCYTES # BLD AUTO: 23.7 % — SIGNIFICANT CHANGE UP (ref 13–44)
LYMPHOCYTES # BLD AUTO: 23.7 % — SIGNIFICANT CHANGE UP (ref 13–44)
MCHC RBC-ENTMCNC: 29.5 PG — SIGNIFICANT CHANGE UP (ref 27–34)
MCHC RBC-ENTMCNC: 29.5 PG — SIGNIFICANT CHANGE UP (ref 27–34)
MCHC RBC-ENTMCNC: 31.6 GM/DL — LOW (ref 32–36)
MCHC RBC-ENTMCNC: 31.6 GM/DL — LOW (ref 32–36)
MCV RBC AUTO: 93.3 FL — SIGNIFICANT CHANGE UP (ref 80–100)
MCV RBC AUTO: 93.3 FL — SIGNIFICANT CHANGE UP (ref 80–100)
MONOCYTES # BLD AUTO: 1.08 K/UL — HIGH (ref 0–0.9)
MONOCYTES # BLD AUTO: 1.08 K/UL — HIGH (ref 0–0.9)
MONOCYTES NFR BLD AUTO: 12.1 % — SIGNIFICANT CHANGE UP (ref 2–14)
MONOCYTES NFR BLD AUTO: 12.1 % — SIGNIFICANT CHANGE UP (ref 2–14)
NEUTROPHILS # BLD AUTO: 5.2 K/UL — SIGNIFICANT CHANGE UP (ref 1.8–7.4)
NEUTROPHILS # BLD AUTO: 5.2 K/UL — SIGNIFICANT CHANGE UP (ref 1.8–7.4)
NEUTROPHILS NFR BLD AUTO: 58.3 % — SIGNIFICANT CHANGE UP (ref 43–77)
NEUTROPHILS NFR BLD AUTO: 58.3 % — SIGNIFICANT CHANGE UP (ref 43–77)
NT-PROBNP SERPL-SCNC: 3440 PG/ML — HIGH (ref 0–300)
NT-PROBNP SERPL-SCNC: 3440 PG/ML — HIGH (ref 0–300)
PLATELET # BLD AUTO: 294 K/UL — SIGNIFICANT CHANGE UP (ref 150–400)
PLATELET # BLD AUTO: 294 K/UL — SIGNIFICANT CHANGE UP (ref 150–400)
POTASSIUM SERPL-MCNC: 5.1 MMOL/L — SIGNIFICANT CHANGE UP (ref 3.5–5.3)
POTASSIUM SERPL-MCNC: 5.1 MMOL/L — SIGNIFICANT CHANGE UP (ref 3.5–5.3)
POTASSIUM SERPL-SCNC: 5.1 MMOL/L — SIGNIFICANT CHANGE UP (ref 3.5–5.3)
POTASSIUM SERPL-SCNC: 5.1 MMOL/L — SIGNIFICANT CHANGE UP (ref 3.5–5.3)
PROT SERPL-MCNC: 6.7 G/DL — SIGNIFICANT CHANGE UP (ref 6.6–8.7)
PROT SERPL-MCNC: 6.7 G/DL — SIGNIFICANT CHANGE UP (ref 6.6–8.7)
RBC # BLD: 4.64 M/UL — SIGNIFICANT CHANGE UP (ref 4.2–5.8)
RBC # BLD: 4.64 M/UL — SIGNIFICANT CHANGE UP (ref 4.2–5.8)
RBC # FLD: 13.1 % — SIGNIFICANT CHANGE UP (ref 10.3–14.5)
RBC # FLD: 13.1 % — SIGNIFICANT CHANGE UP (ref 10.3–14.5)
SODIUM SERPL-SCNC: 139 MMOL/L — SIGNIFICANT CHANGE UP (ref 135–145)
SODIUM SERPL-SCNC: 139 MMOL/L — SIGNIFICANT CHANGE UP (ref 135–145)
WBC # BLD: 8.91 K/UL — SIGNIFICANT CHANGE UP (ref 3.8–10.5)
WBC # BLD: 8.91 K/UL — SIGNIFICANT CHANGE UP (ref 3.8–10.5)
WBC # FLD AUTO: 8.91 K/UL — SIGNIFICANT CHANGE UP (ref 3.8–10.5)
WBC # FLD AUTO: 8.91 K/UL — SIGNIFICANT CHANGE UP (ref 3.8–10.5)

## 2023-11-22 PROCEDURE — 93459 L HRT ART/GRFT ANGIO: CPT

## 2023-11-22 PROCEDURE — 84443 ASSAY THYROID STIM HORMONE: CPT

## 2023-11-22 PROCEDURE — 93005 ELECTROCARDIOGRAM TRACING: CPT

## 2023-11-22 PROCEDURE — 82962 GLUCOSE BLOOD TEST: CPT

## 2023-11-22 PROCEDURE — 93567 NJX CAR CTH SPRVLV AORTGRPHY: CPT

## 2023-11-22 PROCEDURE — 80048 BASIC METABOLIC PNL TOTAL CA: CPT

## 2023-11-22 PROCEDURE — 83735 ASSAY OF MAGNESIUM: CPT

## 2023-11-22 PROCEDURE — C1769: CPT

## 2023-11-22 PROCEDURE — 99232 SBSQ HOSP IP/OBS MODERATE 35: CPT

## 2023-11-22 PROCEDURE — C1887: CPT

## 2023-11-22 PROCEDURE — 85027 COMPLETE CBC AUTOMATED: CPT

## 2023-11-22 PROCEDURE — 83036 HEMOGLOBIN GLYCOSYLATED A1C: CPT

## 2023-11-22 PROCEDURE — C1760: CPT

## 2023-11-22 PROCEDURE — 83880 ASSAY OF NATRIURETIC PEPTIDE: CPT

## 2023-11-22 PROCEDURE — 80061 LIPID PANEL: CPT

## 2023-11-22 PROCEDURE — 99239 HOSP IP/OBS DSCHRG MGMT >30: CPT

## 2023-11-22 PROCEDURE — 36415 COLL VENOUS BLD VENIPUNCTURE: CPT

## 2023-11-22 PROCEDURE — 85025 COMPLETE CBC W/AUTO DIFF WBC: CPT

## 2023-11-22 PROCEDURE — 99233 SBSQ HOSP IP/OBS HIGH 50: CPT

## 2023-11-22 PROCEDURE — C1894: CPT

## 2023-11-22 PROCEDURE — 80076 HEPATIC FUNCTION PANEL: CPT

## 2023-11-22 RX ORDER — SACUBITRIL AND VALSARTAN 24; 26 MG/1; MG/1
1 TABLET, FILM COATED ORAL
Qty: 60 | Refills: 0
Start: 2023-11-22 | End: 2023-12-21

## 2023-11-22 RX ORDER — ATORVASTATIN CALCIUM 80 MG/1
1 TABLET, FILM COATED ORAL
Qty: 0 | Refills: 0 | DISCHARGE

## 2023-11-22 RX ORDER — ATORVASTATIN CALCIUM 80 MG/1
1 TABLET, FILM COATED ORAL
Qty: 30 | Refills: 0
Start: 2023-11-22 | End: 2023-12-21

## 2023-11-22 RX ORDER — FUROSEMIDE 40 MG
1 TABLET ORAL
Refills: 0 | DISCHARGE

## 2023-11-22 RX ORDER — VALSARTAN 80 MG/1
0.5 TABLET ORAL
Qty: 0 | Refills: 0 | DISCHARGE

## 2023-11-22 RX ADMIN — HEPARIN SODIUM 5000 UNIT(S): 5000 INJECTION INTRAVENOUS; SUBCUTANEOUS at 06:07

## 2023-11-22 RX ADMIN — MONTELUKAST 10 MILLIGRAM(S): 4 TABLET, CHEWABLE ORAL at 12:35

## 2023-11-22 RX ADMIN — PANTOPRAZOLE SODIUM 40 MILLIGRAM(S): 20 TABLET, DELAYED RELEASE ORAL at 06:09

## 2023-11-22 RX ADMIN — Medication 40 MILLIGRAM(S): at 06:06

## 2023-11-22 RX ADMIN — Medication 2: at 12:34

## 2023-11-22 RX ADMIN — Medication 81 MILLIGRAM(S): at 12:35

## 2023-11-22 RX ADMIN — TICAGRELOR 90 MILLIGRAM(S): 90 TABLET ORAL at 06:12

## 2023-11-22 RX ADMIN — Medication 75 MICROGRAM(S): at 06:08

## 2023-11-22 RX ADMIN — SACUBITRIL AND VALSARTAN 1 TABLET(S): 24; 26 TABLET, FILM COATED ORAL at 06:09

## 2023-11-22 RX ADMIN — RANOLAZINE 500 MILLIGRAM(S): 500 TABLET, FILM COATED, EXTENDED RELEASE ORAL at 06:10

## 2023-11-22 RX ADMIN — Medication 200 MILLIGRAM(S): at 09:17

## 2023-11-22 RX ADMIN — Medication 50 MILLIGRAM(S): at 06:08

## 2023-11-22 RX ADMIN — DAPAGLIFLOZIN 10 MILLIGRAM(S): 10 TABLET, FILM COATED ORAL at 06:09

## 2023-11-22 RX ADMIN — HEPARIN SODIUM 5000 UNIT(S): 5000 INJECTION INTRAVENOUS; SUBCUTANEOUS at 13:08

## 2023-11-22 NOTE — DISCHARGE NOTE NURSING/CASE MANAGEMENT/SOCIAL WORK - PATIENT PORTAL LINK FT
You can access the FollowMyHealth Patient Portal offered by [facility] by registering at the following website: [email or web address]. By joining [facility]’s FollowMyHealth portal, you will also be able to view your health information using other applications (apps) compatible with our system.

## 2023-11-22 NOTE — PROGRESS NOTE ADULT - ASSESSMENT
Outpatient cardiologist: Dr. Valenzuela  Initial HF c/s: Dr. Giraldo    77 y/o M with a PMH of ICM/HFrEF (LVEF <20%, LVIDd 5.3cm), CAD, s/p 4V CABG (1993 at [facility]) and multiple PCI's of the SVG-OM with stents and brachytherapy, HTN, DM2 (A1c 6.9%), and CKD stage 3, who presented 11/20/23 for elective LHC.  LHC found to have chronic occlusion of SVG to RCA and reoclusion of SVG to OM1 despite prior attempts.  No further revascularization is feasible.  EP consulted for CRT-D.  LVEDP on cath was 36.  He was started on Entresto 24-26 mg BID along with IV diuresis.  As he has had multiple admissions for HFH, he would be a good candidate for cardioMEMs during his stay.  Patient and family received cardioMEMS education.  However, patient and family deferred this to the outpatient setting after he gets his CRT-D.    Since 2015 he has had a decline in his LVEF from 40% to <20%. He was recently admitted for ADHF from 10/26-10/30. He reports feeling very fatigued lately with JOINER. His outpatient diuretics were increased by his cardiologist, Dr. Valenzuela, but the patient developed dizziness and near syncope, so Bumex and metolazone were discontinued.     Cardiac Studies:  11/20/23 LHC: Patent LIMA-LAD, all other grafts occluded. LVEDP 36.    10/26/23 TTE: LVIDd 5.3cm, LVEF <20%, grade III DD, mild RVE with severe RV dysfunction, mod LAE, mild ESTRELLA, mod MR, mild TR, mild AI, est PASP 50.5 mmHg.

## 2023-11-22 NOTE — PROGRESS NOTE ADULT - PROBLEM SELECTOR PLAN 1
Patient educated to the importance of taking duel antiplatelet agent  Groin instruction given to patient  c/w asa Brilinta metoprolol, Ranexa Atorvastatin
- Etiology: ICM  - improving volume status bus JVP still elevated at 10cm.     - GDMT:   - Please use holding parameters of SBP < 90 for all meds and stagger medications.  - Continue on Entresto 24-26 mg BID. Continue Toprol XL 50 mg daily in AM, spironolactone 25 mg daily in   PM, and Farxiga 10 mg daily in AM.    - Diuretics: decrease to Lasix 40 mg IV daily.  To be given at noon today.  - Please document strict I&Os and daily standing weight  - will check pro BNP in AM.  Please obtain CXR.    - Device: planned for CRT-D as an outpatient given LBBB    - He would benefit from CardioMEMS device to prevent further HF hospitalizations which he seemed agreeable to. Will plan for RHC/MEMS implant prior to discharge (possibly tomorrow or Friday).    - Advanced therapies: Not a transplant candidate given age and would also not be an LVAD candidate given CKD3 and severe RV dysfunction. Will continue to optimize his GDMT and EP is considering CRT-D implant.
Bedrest for 2 hours.  Groin precautions explained  GDMT:        DAPT: Will continue Brilinta 90 mg BID and aspirin 81 mg daily       Statin: Will increase Lipitor to 40 mg daily       Beta Blocker: Will continue Toprol 50 mg daily       Other Antianginals: Will continue Ranexa 500 mg BID       Aggressive cardiovascular risk reduction and lifestyle modification.
- Etiology: ICM  - now euvolemic on exam.    - GDMT:   - Continue on Entresto 24-26 mg BID, Toprol XL 50 mg daily in AM, spironolactone 25 mg daily in   PM, and Farxiga 10 mg daily in AM.    - Diuretics: change to oral torsemide 20mg daily.  - Please document strict I&Os and daily standing weight  - will check pro BNP in AM.  Please obtain CXR.    - Device: planned for CRT-D as an outpatient given LBBB.    - He would benefit from CardioMEMS device to prevent further HF hospitalizations which family and patients are greeable to.  However, they would like to defer this until after his CRT-D implant.      - Advanced therapies: Not a transplant candidate given age and would also not be an LVAD candidate given CKD3 and severe RV dysfunction. Will continue to optimize his GDMT and device therapy as above.    - Stable for discharge from a heart failure perspective.  Will arrange for follow up in the HF office in 1-2 weeks.

## 2023-11-22 NOTE — PROGRESS NOTE ADULT - PROBLEM SELECTOR PLAN 3
- Baseline Cr earlier this year was 1.4-1.5 but more recently has been 1.6-1.7  - Will monitor renal function closely with diuresis.
- Baseline Cr earlier this year was 1.4-1.5 but more recently has been 1.6-1.7  - Will monitor renal function closely with diuresis.  - renal function now back to baseline of 1.63.
GDMT:        Diabetic diet.       FS Q AC and HS with coverage       HgbA1C: 6.9%       Basal Insulin: will continue Basaglar 15 units Q HS       Nutritional Insulin: Will give Admelog 10 units TID with meals.       Oral Antihyperglycemics: N/A       SGLT2i/GLP1-RA: Will continue Farxiga 10 mg daily

## 2023-11-22 NOTE — PROGRESS NOTE ADULT - PROVIDER SPECIALTY LIST ADULT
Electrophysiology
Electrophysiology
Hospitalist
Intervent Cardiology
Cardiology
Heart Failure
Heart Failure

## 2023-11-22 NOTE — DISCHARGE NOTE PROVIDER - CARE PROVIDERS DIRECT ADDRESSES
[email or web address],DirectAddress_Unknown,DirectAddress_Unknown

## 2023-11-22 NOTE — DISCHARGE NOTE PROVIDER - NSDCCPCAREPLAN_GEN_ALL_CORE_FT
PRINCIPAL DISCHARGE DIAGNOSIS  Diagnosis: Acute on chronic systolic congestive heart failure  Assessment and Plan of Treatment: You were admitted for acute heart failure exacerbation.  Per EP service - You will need a CRT-D to be placed and should follow up with EP outpatient for device implantation.  Per HF service - You will need a cardiac mems and RHC.   Please follow up with cardiology/heart failure and EP.      SECONDARY DISCHARGE DIAGNOSES  Diagnosis: CAD (coronary artery disease)  Assessment and Plan of Treatment: Please take medications as prescribed. Please return to hospital if worsening symptoms.

## 2023-11-22 NOTE — PROGRESS NOTE ADULT - PROBLEM SELECTOR PROBLEM 2
Coronary artery disease involving native coronary artery of native heart with unstable angina pector
HFrEF (heart failure with reduced ejection fraction)
Left bundle branch block (LBBB)
Coronary artery disease involving native coronary artery of native heart with unstable angina pector
HFrEF (heart failure with reduced ejection fraction)

## 2023-11-22 NOTE — PROGRESS NOTE ADULT - PROBLEM SELECTOR PROBLEM 3
Stage 3b chronic kidney disease
Stage 3b chronic kidney disease
Type 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, with long-term current use of insulin

## 2023-11-22 NOTE — DISCHARGE NOTE PROVIDER - CARE PROVIDER_API CALL
Sydney Valenzuela  Cardiology  [address], [address]  [place] [address]  Phone: [phone]  Fax: [phone]  Follow Up Time: 1-3 days    Ekta Giraldo  Cardiovascular Disease  [address] [place] [address]  Phone: [phone]  Fax: [phone]  Follow Up Time: 1 week    Skip Turpin  Cardiac Electrophysiology  [address], [address]  [place] [address]  Phone: [phone]  Fax: [phone]  Follow Up Time: 1 week

## 2023-11-22 NOTE — PROGRESS NOTE ADULT - SUBJECTIVE AND OBJECTIVE BOX
Subjective: No acute events documented overnight. Pt resting comfortably in hospital bed at time of assessment. Pt reports mild non productive cough. No other complaints. Denies chest pain, SOB, palpitations, dizziness.      TELE: Sinus rhythm with intact conduction. PVCs.     MEDICATIONS  (STANDING):  aspirin  chewable 81 milliGRAM(s) Oral daily  atorvastatin 40 milliGRAM(s) Oral at bedtime  dapagliflozin 10 milliGRAM(s) Oral every 24 hours  dextrose 5%. 1000 milliLiter(s) (100 mL/Hr) IV Continuous <Continuous>  dextrose 5%. 1000 milliLiter(s) (50 mL/Hr) IV Continuous <Continuous>  dextrose 50% Injectable 25 Gram(s) IV Push once  dextrose 50% Injectable 12.5 Gram(s) IV Push once  dextrose 50% Injectable 25 Gram(s) IV Push once  furosemide   Injectable 40 milliGRAM(s) IV Push two times a day  glucagon  Injectable 1 milliGRAM(s) IntraMuscular once  heparin   Injectable 5000 Unit(s) SubCutaneous every 8 hours  insulin glargine Injectable (LANTUS) 15 Unit(s) SubCutaneous at bedtime  insulin lispro (ADMELOG) corrective regimen sliding scale   SubCutaneous three times a day before meals  insulin lispro Injectable (ADMELOG) 10 Unit(s) SubCutaneous before lunch  insulin lispro Injectable (ADMELOG) 10 Unit(s) SubCutaneous before breakfast  insulin lispro Injectable (ADMELOG) 10 Unit(s) SubCutaneous before dinner  levothyroxine 75 MICROGram(s) Oral daily  metoprolol succinate ER 50 milliGRAM(s) Oral daily  montelukast 10 milliGRAM(s) Oral daily  pantoprazole    Tablet 40 milliGRAM(s) Oral before breakfast  ranolazine 500 milliGRAM(s) Oral two times a day  sacubitril 24 mG/valsartan 26 mG 1 Tablet(s) Oral two times a day  spironolactone 25 milliGRAM(s) Oral daily  ticagrelor 90 milliGRAM(s) Oral every 12 hours    MEDICATIONS  (PRN):  dextrose Oral Gel 15 Gram(s) Oral once PRN Blood Glucose LESS THAN 70 milliGRAM(s)/deciliter  guaiFENesin Oral Liquid (Sugar-Free) 200 milliGRAM(s) Oral every 6 hours PRN Cough      Allergies    No Known Allergies    Intolerances    DOPamine (Hypotension)      Vital Signs Last 24 Hrs  T(C): 36.4 (21 Nov 2023 08:20), Max: 36.9 (20 Nov 2023 10:26)  T(F): 97.6 (21 Nov 2023 08:20), Max: 98.4 (20 Nov 2023 10:26)  HR: 66 (21 Nov 2023 08:20) (66 - 92)  BP: 92/56 (21 Nov 2023 08:20) (91/57 - 171/74)  BP(mean): --  RR: 18 (21 Nov 2023 08:20) (15 - 28)  SpO2: 95% (21 Nov 2023 08:20) (95% - 100%)    Parameters below as of 21 Nov 2023 08:20  Patient On (Oxygen Delivery Method): room air        Physical Exam:  Constitutional: AAOx3, NAD  Cardiovascular: +S1S2 Regular rate and rhythm  Pulmonary: decreased b/l  Abdomen: +BS, soft NTND  Extremities: + 1 pedal edema b/l  Neuro: non focal, speech clear, GRAVES x 4      LABS:                        14.2   9.19  )-----------( 282      ( 21 Nov 2023 04:55 )             43.1     11-21    139  |  97  |  36.3<H>  ----------------------------<  95  4.6   |  32.0<H>  |  2.00<H>    Ca    9.1      21 Nov 2023 04:55  Mg     2.5     11-21        Urinalysis Basic - ( 21 Nov 2023 04:55 )    Color: x / Appearance: x / SG: x / pH: x  Gluc: 95 mg/dL / Ketone: x  / Bili: x / Urobili: x   Blood: x / Protein: x / Nitrite: x   Leuk Esterase: x / RBC: x / WBC x   Sq Epi: x / Non Sq Epi: x / Bacteria: x        RADIOLOGY & ADDITIONAL TESTS:    RADIOLOGY & ADDITIONAL STUDIES:  TTE 10/26/2023;  Summary:   1. Endocardial visualization was enhanced with intravenous echo contrast.   2. Moderately enlarged left atrium.   3. Left ventricular ejection fraction, by visual estimation, is <20%.   4. Grade III diastolic dysfunction. Elevated mean left atrial pressure.   5. Mildly enlarged right atrium.   6. Mildly enlarged right ventricle. Severely reduced RV systolic   function.   7. Moderate mitral valve regurgitation.   8. Mild aortic regurgitation.   9. Mild tricuspid regurgitation.  10. Estimated pulmonary artery systolic pressure is 51 mmHg -moderate   pulmonary hypertension.  11. There is no evidence of pericardial effusion.        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 who initially presented to [facility] on 11/20/23 for LHC which revealed a patent LIMA with all other grafts closed, LVEDP 36. EP service was initially consulted CRT-D evaluation.     Patient currently being managed by HF team and is receiving aggressive diureses. Pt is tentatively planned for possible RHC / Cardiomems implant ( ? Friday). At time of assessment this morning pt reports mild non productive cough. No other complaints. Denies chest pain, SOB, palpitations, dizziness.      Recommendations:   - Patient with ischemic cardiomyopathy with depressed LV function despite GDMT > 3months.   - s/p LHC 11/20/23  (Patent LIMA-LAD, all other grafts occluded. LVEDP 36.) No intervention performed  - Patient meets criteria for primary prevention ICD. Given LBBB would benefit form resynchronization therapy. Which pt is agreeable to.  - Pt still requiring IV diuresis and is not currently optimized for CRT-D implant.  - Can consider inpatient CRT-D implant pending clinical course, although pt and family favoring discharge with plan to arrange for implant on outpatient follow up.  - Advanced heart failure consult appreciated  - GDMT as per HF team    - Maintain Mg > 2 and K >4    Will discuss with Dr. Bazzi, further recommendations to follow    
Subjective: No acute events documented overnight. Pt resting comfortably in hospital bed at time of assessment. Telemetry review reveals sinus rhythm with intact conduction with intermittent PVCs in a pattern of bigeminy / trigeminy. Currently denies chest pain, SOB, palpitations, dizziness.      MEDICATIONS  (STANDING):  aspirin  chewable 81 milliGRAM(s) Oral daily  atorvastatin 40 milliGRAM(s) Oral at bedtime  dapagliflozin 10 milliGRAM(s) Oral every 24 hours  dextrose 5%. 1000 milliLiter(s) (50 mL/Hr) IV Continuous <Continuous>  dextrose 5%. 1000 milliLiter(s) (100 mL/Hr) IV Continuous <Continuous>  dextrose 50% Injectable 12.5 Gram(s) IV Push once  dextrose 50% Injectable 25 Gram(s) IV Push once  dextrose 50% Injectable 25 Gram(s) IV Push once  furosemide   Injectable 40 milliGRAM(s) IV Push daily  glucagon  Injectable 1 milliGRAM(s) IntraMuscular once  heparin   Injectable 5000 Unit(s) SubCutaneous every 8 hours  insulin glargine Injectable (LANTUS) 10 Unit(s) SubCutaneous at bedtime  insulin lispro (ADMELOG) corrective regimen sliding scale   SubCutaneous three times a day before meals  levothyroxine 75 MICROGram(s) Oral daily  melatonin 3 milliGRAM(s) Oral at bedtime  metoprolol succinate ER 50 milliGRAM(s) Oral daily  montelukast 10 milliGRAM(s) Oral daily  pantoprazole    Tablet 40 milliGRAM(s) Oral before breakfast  ranolazine 500 milliGRAM(s) Oral two times a day  sacubitril 24 mG/valsartan 26 mG 1 Tablet(s) Oral two times a day  spironolactone 25 milliGRAM(s) Oral at bedtime  ticagrelor 90 milliGRAM(s) Oral every 12 hours    MEDICATIONS  (PRN):  dextrose Oral Gel 15 Gram(s) Oral once PRN Blood Glucose LESS THAN 70 milliGRAM(s)/deciliter  guaiFENesin Oral Liquid (Sugar-Free) 200 milliGRAM(s) Oral every 6 hours PRN Cough      Allergies    No Known Allergies    Intolerances    DOPamine (Hypotension)      Vital Signs Last 24 Hrs  T(C): 36.4 (22 Nov 2023 08:14), Max: 36.6 (22 Nov 2023 05:11)  T(F): 97.6 (22 Nov 2023 08:14), Max: 97.9 (22 Nov 2023 05:11)  HR: 72 (22 Nov 2023 08:14) (72 - 101)  BP: 100/66 (22 Nov 2023 08:14) (93/56 - 130/62)  BP(mean): 85 (21 Nov 2023 16:56) (85 - 85)  RR: 18 (22 Nov 2023 08:14) (18 - 18)  SpO2: 96% (22 Nov 2023 08:14) (95% - 99%)    Parameters below as of 22 Nov 2023 08:14  Patient On (Oxygen Delivery Method): room air        Physical Exam:  Constitutional: AAOx3, NAD  Cardiovascular: +S1S2 Regular rate and rhythm  Pulmonary: decreased b/l  Abdomen: +BS, soft NTND  Extremities: trace pedal edema b/l  Neuro: non focal, speech clear, GRAVES x 4    LABS:                        13.7   8.91  )-----------( 294      ( 22 Nov 2023 04:32 )             43.3     11-22    139  |  102  |  31.1<H>  ----------------------------<  96  5.1   |  27.0  |  1.63<H>    Ca    8.7      22 Nov 2023 04:32  Mg     2.5     11-21    TPro  6.7  /  Alb  2.9<L>  /  TBili  0.4  /  DBili  0.1  /  AST  25  /  ALT  15  /  AlkPhos  85  11-22      Urinalysis Basic - ( 22 Nov 2023 04:32 )    Color: x / Appearance: x / SG: x / pH: x  Gluc: 96 mg/dL / Ketone: x  / Bili: x / Urobili: x   Blood: x / Protein: x / Nitrite: x   Leuk Esterase: x / RBC: x / WBC x   Sq Epi: x / Non Sq Epi: x / Bacteria: x        RADIOLOGY & ADDITIONAL TESTS:    RADIOLOGY & ADDITIONAL STUDIES:  TTE 10/26/2023;  Summary:   1. Endocardial visualization was enhanced with intravenous echo contrast.   2. Moderately enlarged left atrium.   3. Left ventricular ejection fraction, by visual estimation, is <20%.   4. Grade III diastolic dysfunction. Elevated mean left atrial pressure.   5. Mildly enlarged right atrium.   6. Mildly enlarged right ventricle. Severely reduced RV systolic   function.   7. Moderate mitral valve regurgitation.   8. Mild aortic regurgitation.   9. Mild tricuspid regurgitation.  10. Estimated pulmonary artery systolic pressure is 51 mmHg -moderate   pulmonary hypertension.  11. There is no evidence of pericardial effusion.        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 who initially presented to [facility] on 11/20/23 for LHC which revealed a patent LIMA with all other grafts closed, LVEDP 36. EP service was initially consulted CRT-D evaluation.     Patient being managed by HF team and was aggressively diuresed. Pt euvolemic at time of assessment this morning.  Pt is planned for RHC / Cardiomems implant but would like to defer this until after he receives his CRT-D. Pt resting comfortably in hospital bed at time of assessment. Telemetry review reveals sinus rhythm with intact conduction with intermittent PVCs in a pattern of bigeminy / trigeminy. Currently denies chest pain, SOB, palpitations, dizziness.      Recommendations:   - Patient with ischemic cardiomyopathy with depressed LV function despite GDMT > 3months.   - s/p LHC 11/20/23  (Patent LIMA-LAD, all other grafts occluded. LVEDP 36.) No intervention performed  - Patient meets criteria for primary prevention ICD. Given LBBB would benefit form resynchronization therapy. Plan to arrange implant timing at time of outpatient follow up.   - Transitioned to PO lasix today by HF team.  - GDMT as per HF team    - Maintain Mg > 2 and K >4  - Pt to follow up with Dr. Bazzi in 2 weeks  - No further EP intervention required at this time      Discussed with Dr. Turpin
Hospitalist Daily Progress Note    Chief Complaint:  Patient is a 78y old  Male who presents with a chief complaint of HF, fluid overload (21 Nov 2023 11:23)      SUBJECTIVE / OVERNIGHT EVENTS:  Patient was seen and examined at bedside. States to have a cough.   Patient denies chest pain, SOB, abd pain, N/V, fever, chills, dysuria or any other complaints. All remainder ROS negative.     MEDICATIONS  (STANDING):  aspirin  chewable 81 milliGRAM(s) Oral daily  atorvastatin 40 milliGRAM(s) Oral at bedtime  dapagliflozin 10 milliGRAM(s) Oral every 24 hours  dextrose 5%. 1000 milliLiter(s) (100 mL/Hr) IV Continuous <Continuous>  dextrose 5%. 1000 milliLiter(s) (50 mL/Hr) IV Continuous <Continuous>  dextrose 50% Injectable 25 Gram(s) IV Push once  dextrose 50% Injectable 25 Gram(s) IV Push once  dextrose 50% Injectable 12.5 Gram(s) IV Push once  glucagon  Injectable 1 milliGRAM(s) IntraMuscular once  heparin   Injectable 5000 Unit(s) SubCutaneous every 8 hours  insulin glargine Injectable (LANTUS) 10 Unit(s) SubCutaneous at bedtime  insulin lispro (ADMELOG) corrective regimen sliding scale   SubCutaneous three times a day before meals  levothyroxine 75 MICROGram(s) Oral daily  metoprolol succinate ER 50 milliGRAM(s) Oral daily  metoprolol succinate ER 50 milliGRAM(s) Oral daily  montelukast 10 milliGRAM(s) Oral daily  pantoprazole    Tablet 40 milliGRAM(s) Oral before breakfast  ranolazine 500 milliGRAM(s) Oral two times a day  sacubitril 24 mG/valsartan 26 mG 1 Tablet(s) Oral two times a day  ticagrelor 90 milliGRAM(s) Oral every 12 hours    MEDICATIONS  (PRN):  dextrose Oral Gel 15 Gram(s) Oral once PRN Blood Glucose LESS THAN 70 milliGRAM(s)/deciliter  guaiFENesin Oral Liquid (Sugar-Free) 200 milliGRAM(s) Oral every 6 hours PRN Cough        I&O's Summary    20 Nov 2023 07:01  -  21 Nov 2023 07:00  --------------------------------------------------------  IN: 100 mL / OUT: 850 mL / NET: -750 mL        PHYSICAL EXAM:  Vital Signs Last 24 Hrs  T(C): 36.4 (21 Nov 2023 08:20), Max: 36.8 (20 Nov 2023 18:47)  T(F): 97.6 (21 Nov 2023 08:20), Max: 98.3 (20 Nov 2023 18:47)  HR: 66 (21 Nov 2023 08:20) (66 - 88)  BP: 92/56 (21 Nov 2023 08:20) (91/57 - 132/68)  BP(mean): --  RR: 18 (21 Nov 2023 08:20) (15 - 28)  SpO2: 95% (21 Nov 2023 08:20) (95% - 100%)    Parameters below as of 21 Nov 2023 08:20  Patient On (Oxygen Delivery Method): room air          Constitutional: NAD, Resting  ENT: Supple, No JVD  Lungs: CTA B/L, Non-labored breathing  Cardio: RRR, S1/S2, No murmur  Abdomen: Soft, Nontender, Nondistended; Bowel sounds present  Extremities: No calf tenderness, B/L LE edema  Musculoskeletal:   No joint swelling  Psych: Calm, cooperative affect appropriate  Neuro: Awake and alert, oriented x4  Skin: No rashes; no palpable lesions    LABS:                        14.2   9.19  )-----------( 282      ( 21 Nov 2023 04:55 )             43.1     11-21    139  |  97  |  36.3<H>  ----------------------------<  95  4.6   |  32.0<H>  |  2.00<H>    Ca    9.1      21 Nov 2023 04:55  Mg     2.5     11-21            Urinalysis Basic - ( 21 Nov 2023 04:55 )    Color: x / Appearance: x / SG: x / pH: x  Gluc: 95 mg/dL / Ketone: x  / Bili: x / Urobili: x   Blood: x / Protein: x / Nitrite: x   Leuk Esterase: x / RBC: x / WBC x   Sq Epi: x / Non Sq Epi: x / Bacteria: x        CAPILLARY BLOOD GLUCOSE      POCT Blood Glucose.: 208 mg/dL (21 Nov 2023 13:36)  POCT Blood Glucose.: 160 mg/dL (21 Nov 2023 12:01)  POCT Blood Glucose.: 108 mg/dL (21 Nov 2023 08:00)  POCT Blood Glucose.: 90 mg/dL (20 Nov 2023 21:20)  POCT Blood Glucose.: 148 mg/dL (20 Nov 2023 15:44)        RADIOLOGY REVIEWED  
[facility] Advanced Heart Failure  Office: [address] [place] [address]  Telephone: [phone]/Fax: [phone]    Advanced Heart Failure - FOLLOW UP NOTE    Interval History:  Feels well.  Was able to lay flat without orthopnea however, did not sleep well due to pain from an IV.  Did not get any AM meds.    Medications:  aspirin  chewable 81 milliGRAM(s) Oral daily  atorvastatin 40 milliGRAM(s) Oral at bedtime  dapagliflozin 10 milliGRAM(s) Oral every 24 hours  dextrose 5%. 1000 milliLiter(s) IV Continuous <Continuous>  dextrose 5%. 1000 milliLiter(s) IV Continuous <Continuous>  dextrose 50% Injectable 25 Gram(s) IV Push once  dextrose 50% Injectable 12.5 Gram(s) IV Push once  dextrose 50% Injectable 25 Gram(s) IV Push once  dextrose Oral Gel 15 Gram(s) Oral once PRN  furosemide   Injectable 40 milliGRAM(s) IV Push two times a day  glucagon  Injectable 1 milliGRAM(s) IntraMuscular once  guaiFENesin Oral Liquid (Sugar-Free) 200 milliGRAM(s) Oral every 6 hours PRN  heparin   Injectable 5000 Unit(s) SubCutaneous every 8 hours  insulin glargine Injectable (LANTUS) 15 Unit(s) SubCutaneous at bedtime  insulin lispro (ADMELOG) corrective regimen sliding scale   SubCutaneous three times a day before meals  insulin lispro Injectable (ADMELOG) 10 Unit(s) SubCutaneous before dinner  levothyroxine 75 MICROGram(s) Oral daily  metoprolol succinate ER 50 milliGRAM(s) Oral daily  montelukast 10 milliGRAM(s) Oral daily  pantoprazole    Tablet 40 milliGRAM(s) Oral before breakfast  ranolazine 500 milliGRAM(s) Oral two times a day  sacubitril 24 mG/valsartan 26 mG 1 Tablet(s) Oral two times a day  spironolactone 25 milliGRAM(s) Oral daily  ticagrelor 90 milliGRAM(s) Oral every 12 hours      Vitals:  T(C): 36.4 (23 @ 08:20), Max: 36.8 (23 @ 18:47)  HR: 66 (23 @ 08:20) (66 - 91)  BP: 92/56 (23 @ 08:20) (91/57 - 171/74)  BP(mean): --  RR: 18 (23 @ 08:20) (15 - 28)  SpO2: 95% (23 @ 08:20) (95% - 100%)    Daily     Daily Weight in k.8 (2023 05:50)    Weight (kg): 67.132 ( @ 10:12)    I&O's Summary    2023 07:01  -  2023 07:00  --------------------------------------------------------  IN: 100 mL / OUT: 850 mL / NET: -750 mL        Physical Exam:  Appearance: No Acute Distress  Neck: JVP around 10cm.  Cardiovascular: Normal S1 S2, No murmurs/rubs/gallops  Respiratory: Clear to auscultation bilaterally  Gastrointestinal: Soft, Non-tender	  Skin: No cyanosis	  Neurologic: Non-focal  Extremities: trace edema.  Psychiatry: alert      Labs:                        14.2   9.19  )-----------( 282      ( 2023 04:55 )             43.1         139  |  97  |  36.3<H>  ----------------------------<  95  4.6   |  32.0<H>  |  2.00<H>    Ca    9.1      2023 04:55  Mg     2.5               TELEMETRY: NSR with PVCs and underlying LBBB  
[facility] Advanced Heart Failure  Office: [address] [place] [address]  Telephone: [phone]/Fax: [phone]    Advanced Heart Failure - FOLLOW UP NOTE    Interval History:  No complaints this AM.  Feels well.  No orthopnea.  Planned for CRT-D upgrade as an outpatient.    Medications:  aspirin  chewable 81 milliGRAM(s) Oral daily  atorvastatin 40 milliGRAM(s) Oral at bedtime  dapagliflozin 10 milliGRAM(s) Oral every 24 hours  dextrose 5%. 1000 milliLiter(s) IV Continuous <Continuous>  dextrose 5%. 1000 milliLiter(s) IV Continuous <Continuous>  dextrose 50% Injectable 25 Gram(s) IV Push once  dextrose 50% Injectable 12.5 Gram(s) IV Push once  dextrose 50% Injectable 25 Gram(s) IV Push once  dextrose Oral Gel 15 Gram(s) Oral once PRN  furosemide   Injectable 40 milliGRAM(s) IV Push two times a day  glucagon  Injectable 1 milliGRAM(s) IntraMuscular once  guaiFENesin Oral Liquid (Sugar-Free) 200 milliGRAM(s) Oral every 6 hours PRN  heparin   Injectable 5000 Unit(s) SubCutaneous every 8 hours  insulin glargine Injectable (LANTUS) 15 Unit(s) SubCutaneous at bedtime  insulin lispro (ADMELOG) corrective regimen sliding scale   SubCutaneous three times a day before meals  insulin lispro Injectable (ADMELOG) 10 Unit(s) SubCutaneous before dinner  levothyroxine 75 MICROGram(s) Oral daily  metoprolol succinate ER 50 milliGRAM(s) Oral daily  montelukast 10 milliGRAM(s) Oral daily  pantoprazole    Tablet 40 milliGRAM(s) Oral before breakfast  ranolazine 500 milliGRAM(s) Oral two times a day  sacubitril 24 mG/valsartan 26 mG 1 Tablet(s) Oral two times a day  spironolactone 25 milliGRAM(s) Oral daily  ticagrelor 90 milliGRAM(s) Oral every 12 hours      Vitals:  T(C): 36.4 (23 @ 08:20), Max: 36.8 (23 @ 18:47)  HR: 66 (23 @ 08:20) (66 - 91)  BP: 92/56 (23 @ 08:20) (91/57 - 171/74)  BP(mean): --  RR: 18 (23 @ 08:20) (15 - 28)  SpO2: 95% (23 @ 08:20) (95% - 100%)    Daily     Daily Weight in k.8 (2023 05:50)    Weight (kg): 67.132 ( @ 10:12)    I&O's Summary    2023 07:01  -  2023 07:00  --------------------------------------------------------  IN: 100 mL / OUT: 850 mL / NET: -750 mL        Physical Exam:  Appearance: No Acute Distress  Neck: JVP 8cm.  Cardiovascular: Normal S1 S2, No murmurs/rubs/gallops  Respiratory: Clear to auscultation bilaterally  Gastrointestinal: Soft, Non-tender	  Skin: No cyanosis	  Neurologic: Non-focal  Extremities: no edema, warm.  Psychiatry: alert      Labs:                        14.2   9.19  )-----------( 282      ( 2023 04:55 )             43.1         139  |  97  |  36.3<H>  ----------------------------<  95  4.6   |  32.0<H>  |  2.00<H>    Ca    9.1      2023 04:55  Mg     2.5               TELEMETRY: NSR with PVCs/short NSVT runs and underlying LBBB  
                                                         [facility]                                                         CARDIOLOGY AT [facility]                                                                  [address] [place] [address]                                                         Telephone: [phone]. Fax:[phone]                                                                             PROGRESS NOTE    Reason for follow up: CAD & CHF  Update: s/p cath grafts down except Pulido to  LAD Lvedp 36  + edema mostly in left foot  IN: 100 mL / OUT: 850 mL / NET: -750 mL  creat trending upward  co2 trending upward  Tsh 11.7    Review of symptoms:   Cardiac:  No chest pain. No dyspnea. No palpitations.  Respiratory: no cough. No dyspnea  Gastrointestinal: No diarrhea. No abdominal pain. No bleeding.   Neuro: No focal neuro complaints.      Vitals:  T(C): 36.4 (11-21-23 @ 08:20), Max: 36.8 (11-20-23 @ 18:47)  HR: 66 (11-21-23 @ 08:20) (66 - 91)  BP: 92/56 (11-21-23 @ 08:20) (91/57 - 171/74)  RR: 18 (11-21-23 @ 08:20) (15 - 28)  SpO2: 95% (11-21-23 @ 08:20) (95% - 100%)  Wt(kg): --  I&O's Summary    20 Nov 2023 07:01  -  21 Nov 2023 07:00  --------------------------------------------------------  IN: 100 mL / OUT: 850 mL / NET: -750 mL  Weight (kg): 67.132 (11-20 @ 10:12)      PHYSICAL EXAM:  Appearance: Comfortable. No acute distress  HEENT:  Atraumatic. Normocephalic.  Normal oral mucosa  Neurologic: A & O x 3, no gross focal deficits.  Cardiovascular: RRR S1 S2 regualr    Respiratory: Lungs clear to auscultation, unlabored   Gastrointestinal:  Soft, Non-tender, + BS  Right groin  good pulse no hematoma  Lower Extremities: No edema  Psychiatry: Patient is calm. No agitation.   Skin: warm and dry.      CURRENT MEDICATIONS:  MEDICATIONS  (STANDING):  aspirin  chewable 81 milliGRAM(s) Oral daily  atorvastatin 40 milliGRAM(s) Oral at bedtime  dapagliflozin 10 milliGRAM(s) Oral every 24 hours  furosemide   Injectable 40 milliGRAM(s) IV Push two times a day  glucagon  Injectable 1 milliGRAM(s) IntraMuscular once  heparin   Injectable 5000 Unit(s) SubCutaneous every 8 hours  insulin glargine Injectable (LANTUS) 10 Unit(s) SubCutaneous at bedtime  insulin lispro (ADMELOG) corrective regimen sliding scale   SubCutaneous three times a day before meals  levothyroxine 75 MICROGram(s) Oral daily  metoprolol succinate ER 50 milliGRAM(s) Oral daily  montelukast 10 milliGRAM(s) Oral daily  pantoprazole    Tablet 40 milliGRAM(s) Oral before breakfast  ranolazine 500 milliGRAM(s) Oral two times a day  sacubitril 24 mG/valsartan 26 mG 1 Tablet(s) Oral two times a day  spironolactone 25 milliGRAM(s) Oral daily  ticagrelor 90 milliGRAM(s) Oral every 12 hours      LABS:	 	                            14.2   9.19  )-----------( 282      ( 21 Nov 2023 04:55 )             43.1     11-21    139  |  97  |  36.3<H>  ----------------------------<  95  4.6   |  32.0<H>  |  2.00<H>    Ca    9.1      21 Nov 2023 04:55  Mg     2.5     11-21      proBNP:   Lipid Profile: Date: 11-21 @ 04:55  Total cholesterol 170; Direct LDL: --; HDL: 53; Triglycerides:66  Date: 11-20 @ 10:07  Total cholesterol 180; Direct LDL: --; HDL: 62; Triglycerides:91    HgA1c:   TSH: Thyroid Stimulating Hormone, Serum: 11.79 uIU/mL  TELEMETRY: NSR  ECG:  	      DIAGNOSTIC TESTING:  [ ] Echocardiogram: < from: TTE Echo Limited or F/U (10.29.23 @ 15:25) >   1. Left ventricular ejection fraction, by visual estimation, is <20%.   2. Severely decreased global left ventricular systolic function.   3. There is no evidence of pericardial effusion.   4. Endocardial visualization was enhanced with intravenous echo contrast.    < from: Cardiac Catheterization (11.20.23 @ 12:07) >  LIMA to LAD with mild disease. Retrograde filling of RCA, circumflex  from distal LAD.  Aortogram performed with catheter in ascending aorta- No other graft  noted. Ostial occlusion of SVG to OM.  LVEDP Recommendations:        	      
                                                                         Department of Cardiology                                                                  [facility]/[place] [address] [address] [facility]                                                            Telephone: [phone]. Fax:[phone]                                                     INTERVENTIONAL CARDIOLOGY CATHETERIZATION NOTE       Subjective:  78y  Male who had a left heart catheterization which showed:       LM: Severe CAD       LAD: Occluded proximally       LCX: Occluded proximally       RCA: Occluded proximally       LIMA to the LAD: Patent       All other Grafts: Occluded         Access: Right femoral artery       Hemostasis: Mynx       Total Contrast: 52 mL Omnipaque       Total Heparin: N/A       Antiplatelet Given: N/A    PAST MEDICAL & SURGICAL HISTORY:  GERD (gastroesophageal reflux disease)  High cholesterol  Coronary artery disease involving coronary bypass graft of native heart with unstable angina pectoris  Coronary artery disease involving native coronary artery of native heart, angina presence unspecifie  Type 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, without long-term current use of insul  Essential hypertension  HFrEF (heart failure with reduced ejection fraction)  Cardiogenic shock  Stage 4 chronic kidney disease  DKA (diabetic ketoacidosis)  Multiple organ failure with heart failure  Bronchiectasis with acute lower respiratory infection  LBBB (left bundle branch block)  Facial nerve palsy  ST elevation myocardial infarction (STEMI), unspecified artery  S/P CABG (coronary artery bypass graft): 4V 1983 [place]  Status post open reduction with internal fixation of fracture  History of insertion of stent into coronary artery bypass graft  History of brachytherapy    Home Medications:  atorvastatin 20 mg oral tablet: 1 tab(s) orally once a day (20 Nov 2023 09:53)  Farxiga 10 mg oral tablet: 1 tab(s) orally once a day (20 Nov 2023 09:51)  furosemide 40 mg oral tablet: 1 tab(s) orally 2 times a day (20 Nov 2023 09:50)  levothyroxine 75 mcg (0.075 mg) oral tablet: 1 tab(s) orally once a day (20 Nov 2023 09:53)  Metoprolol Succinate ER 50 mg oral tablet, extended release: 1 tab(s) orally once a day (20 Nov 2023 09:49)  montelukast 10 mg oral tablet: 1 tab(s) orally once a day (20 Nov 2023 09:53)  NexIUM 40 mg oral delayed release capsule: 1 cap(s) orally once a day (at bedtime) (20 Nov 2023 09:53)  NovoLOG 100 units/mL injectable solution: 10,10,15 unit(s) injectable 3 times a day (before meals) (20 Nov 2023 09:53)  ranolazine 500 mg oral tablet, extended release: 1 tab(s) orally 2 times a day (20 Nov 2023 09:53)  spironolactone 25 mg oral tablet: 1 tab(s) orally once a day (20 Nov 2023 09:51)  valsartan 40 mg oral tablet: 0.5 tab(s) orally every other day (at bedtime) (20 Nov 2023 09:53)    HPI: 79y/o male never smoker with history of CAD, STEMI, NSTEMI, 4V CABG, multiple PCI's of the SVG to the OM with stents and brachytherapy, cardiogenic shock, HFrEF with admission for decompensated HF with multi organ system failure, DM2 on long term insulin, 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, HTN, HLD. The patient c/o fatigue, JOINER (SOB with 10-12 stairs), orthopnea, PND, and BLE edema. These symptoms improved when Dr. Valenzuela increased diuresis but the patient developed dizziness and near syncope, so Bumex and metolazone were discontinued. He also admits to occasional palpitations but denies chest pain.    Echo's in October show EF of < 20% with grade 3 LVDD and moderate pulmonary hypertension, echo in May 2022 EF was 30-35 with grade 2 LVDD.    General: No fatigue, no fevers/chills  Respiratory: No dyspnea, no cough, no wheeze  CV: No chest pain, no palpitations, + orthopnea.  Abd: No nausea  Neuro: No headache, no dizziness    Allergies:  ·	DOPamine (Hypotension)  ·	No Known Allergies    Objective:  Vital Signs Last 24 Hrs  T(C): 36.9 (20 Nov 2023 10:26), Max: 36.9 (20 Nov 2023 10:26)  T(F): 98.4 (20 Nov 2023 10:26), Max: 98.4 (20 Nov 2023 10:26)  HR: 88 (20 Nov 2023 13:55) (85 - 92)  BP: 119/58 (20 Nov 2023 13:55) (118/61 - 171/74)  RR: 16 (20 Nov 2023 13:55) (15 - 16)  SpO2: 100% (20 Nov 2023 13:55) (98% - 100%)    Parameters below as of 20 Nov 2023 13:55  Patient On (Oxygen Delivery Method): room air    General: Awake, alert, speech clear, in no acute distress.  Chest: CTA, S1, S2, no murmurs.  Abdomen, Soft, nondistended  Right Groin: Soft, no bleeding, no hematoma.  Extremities: Pulses: DP: Right: 1+, Left: 1+, Radial: Right: 1+, Left: 1+                            13.5   14.33 )-----------( 316      ( 20 Nov 2023 10:07 )             41.6     11-20    136  |  95    |  33.8  ----------------------------<  174  5.1   |  29.0  |  1.86    Ca    8.8      20 Nov 2023 10:07  Mg     2.6     11-20    Lipids       Total Cholesterol: 180       Triglycerides: 91       HDL: 62       Non-HDL: 118       LDL: 100    HgbA1C: 6.9%

## 2023-11-22 NOTE — PROGRESS NOTE ADULT - NS ATTEND AMEND GEN_ALL_CORE FT
Oupatient follow-up to plan CRT-D implant for ICM with EF<35% and LBBB with QRS>150 msec.
.  .  OK for discharge. Plan for outpatient CRT-D implantation and RHC + CardioMEMS.    Thank you for this consultation. EP will sign off. Please contact EP team with any questions.    Skip Turpin MD  Clinical Cardiac Electrophysiology
Patient seen and examined by me. Seen in presence of: friend. 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 The patient c/o fatigue, JOINER (SOB with 10-12 stairs), orthopnea, PND, and BLE edema. These symptoms improved when Dr. Valenzuela increased diuresis but the patient developed dizziness and near syncope, so Bumex and metolazone were discontinued. He also admits to occasional palpitations but denies chest pain. s/p Cath with occulded grafts except [place] to LAD  Medical therapy    T(C): 36.4 (11-21-23 @ 08:20), Max: 36.8 (11-20-23 @ 18:47)  HR: 66 (11-21-23 @ 08:20) (66 - 88)  BP: 92/56 (11-21-23 @ 08:20) (91/57 - 132/68)  RR: 18 (11-21-23 @ 08:20) (15 - 28)  SpO2: 95% (11-21-23 @ 08:20) (95% - 100%)  Patient alert and awake.  Chest: Bilateral Clear BS  Cardiac: S1 and S2  Abdomen: Soft    Noted cardiac cath results.  Needs diuresis.  optimization of GDMT (advanced heart failure following).  Lifestyle modifications have been discussed with him.  EP consideration of CRT-D  Cardiomems evaluation per Advanced heart failure      Patient was seen and evaluated. Available pertinent records, imaging reports and lab results were reviewed by the Heart team and mentioned as appropriate. Plan of care was discussed with the patient and any available family members (with patient consent), with questions answered and treatment plan agreement.     I have discussed my recommendation with the ACP which are outlined above. Thank you for allowing me to participate in the care of this patient.

## 2023-11-22 NOTE — PROGRESS NOTE ADULT - REASON FOR ADMISSION
HF, fluid overload

## 2023-11-22 NOTE — DISCHARGE NOTE PROVIDER - NSDCMRMEDTOKEN_GEN_ALL_CORE_FT
aspirin 81 mg oral tablet, chewable: 1 tab(s) orally once a day  atorvastatin 40 mg oral tablet: 1 tab(s) orally once a day (at bedtime)  Basaglar KwikPen 100 units/mL subcutaneous solution: 15 unit(s) subcutaneous once a day (at bedtime)   Farxiga 10 mg oral tablet: 1 tab(s) orally once a day  guaiFENesin 100 mg/5 mL oral liquid: 5 milliliter(s) orally every 6 hours as needed for  cough  levothyroxine 75 mcg (0.075 mg) oral tablet: 1 tab(s) orally once a day  Metoprolol Succinate ER 50 mg oral tablet, extended release: 1 tab(s) orally once a day  montelukast 10 mg oral tablet: 1 tab(s) orally once a day  NexIUM 40 mg oral delayed release capsule: 1 cap(s) orally once a day (at bedtime)  NovoLOG 100 units/mL injectable solution: 10,10,15 unit(s) injectable 3 times a day (before meals)  ranolazine 500 mg oral tablet, extended release: 1 tab(s) orally 2 times a day  sacubitril-valsartan 24 mg-26 mg oral tablet: 1 tab(s) orally 2 times a day  spironolactone 25 mg oral tablet: 1 tab(s) orally once a day  ticagrelor 90 mg oral tablet: 1 tab(s) orally every 12 hours  torsemide 20 mg oral tablet: 1 tab(s) orally once a day

## 2023-11-22 NOTE — DISCHARGE NOTE NURSING/CASE MANAGEMENT/SOCIAL WORK - NSDCVIVACCINE_GEN_ALL_CORE_FT
Tdap; 02-Nov-2017 16:11; Mehdi Tsai (АЛЕКСАНДР); [employer]; [id number]; IntraMuscular; Deltoid Left.; 0.5 milliLiter(s); VIS (VIS Published: 09-May-2013, VIS Presented: 02-Nov-2017);

## 2023-11-22 NOTE — DISCHARGE NOTE PROVIDER - PROVIDER TOKENS
PROVIDER:[TOKEN:[[id number]],FOLLOWUP:[1-3 days]],PROVIDER:[TOKEN:[[id number]],FOLLOWUP:[1 week]],PROVIDER:[TOKEN:[[id number]],FOLLOWUP:[1 week]]

## 2023-11-22 NOTE — PROGRESS NOTE ADULT - PROBLEM SELECTOR PLAN 2
- LHC on 11/20/23 showed only patent LIMA-LAD and all other grafts occluded.  No plan for future revascularization attempts.  - Continue DAPT, statin, and BB.
Advanced heart failure consulted.  EP consulted for CRT-D  GDMT       Beta Blocker: Will continue Toprol 50 mg daily       RAAS Inhibitor: Will continue valsartan 20 mg daily       MRA: Will continue Aldactone 25 mg daily       Diuretic: Will start Lasix 40 mg IV BID       SGLT2i: Will continue Farxiga 10 mg daily       Other: N/A  Strict I&O's  Daily standing weights (if able)
GDMT:  Diuretic:  furosemide   Injectable 40 milliGRAM(s) IV Push two times a day  Beta blocker Metoprolol  ARNI  Sacubitril 24 mG/valsartan 26 mG 1 Tablet(s) Oral two times a day  sglt2:  dapagliflozin 10 milliGRAM(s) Oral every 24 hours  MRA  spirolactone  Creat rising  recheck tomorrow and adjust meds if necessary
- LHC on 11/20/23 showed only patent LIMA-LAD and all other grafts occluded.  No plan for future revascularization attempts.  - Continue DAPT, statin, and BB.

## 2023-11-22 NOTE — DISCHARGE NOTE NURSING/CASE MANAGEMENT/SOCIAL WORK - NSDCPEFALRISK_GEN_ALL_CORE
For information on Fall & Injury Prevention, visit: [email or web address] OR  [email or web address] OR  https://www.cdc.gov/steadi/patient.html

## 2023-11-22 NOTE — DISCHARGE NOTE PROVIDER - NSDCFUSCHEDAPPT_GEN_ALL_CORE_FT
[facility]  CARDIOLOGY [address]  Scheduled Appointment: 11/28/2023    Ramona Mendez  [facility]  ENDOCRIN [address]  Scheduled Appointment: 12/19/2023    Sydney Valenzuela  [facility]  CARDIOLOGY [address]  Scheduled Appointment: 12/19/2023    Skip Turpin  [facility]  ELECTROPH [address]  Scheduled Appointment: 02/13/2024

## 2023-11-22 NOTE — DISCHARGE NOTE PROVIDER - ATTENDING DISCHARGE PHYSICAL EXAMINATION:
PHYSICAL EXAM:  Vital Signs Last 24 Hrs  T(F): 97.6 (22 Nov 2023 08:14), Max: 97.9 (22 Nov 2023 05:11)  HR: 72 (22 Nov 2023 08:14) (72 - 101)  BP: 100/66 (22 Nov 2023 08:14) (93/56 - 130/62)  RR: 18 (22 Nov 2023 08:14) (18 - 18)  SpO2: 96% (22 Nov 2023 08:14) (95% - 99%)    GENERAL: NAD, Resting in bed  Eyes: EOMI, PERRLA  ENMT: Conjunctiva and sclera clear; supple neck, No JVD  Cardiovascular: S1,S2, RRR, No murmur  Respiratory: CTA B/L, Non-labored breathing  GI: Bowel sounds present; Soft, Nontender, Nondistended. No hepatomegaly  Genitourinary: Deferred  Skin:  no breakdowns, ulcers or discharge, No rashes or lesions  Neurology: Alert & Oriented X3, non-focal and spontaneous movements of all extremities, CN 2 to 12 grossly intact   Psych: Appropriate mood and affect, calm, pleasant, Responds appropriately to questions

## 2023-11-22 NOTE — PROGRESS NOTE ADULT - PROBLEM SELECTOR PROBLEM 1
Acute on chronic systolic heart failure
HFrEF (heart failure with reduced ejection fraction)
Coronary artery disease involving native coronary artery of native heart with unstable angina pector
Coronary artery disease involving native coronary artery of native heart with unstable angina pector
Acute on chronic systolic heart failure

## 2023-11-24 PROBLEM — I44.7 LEFT BUNDLE-BRANCH BLOCK, UNSPECIFIED: Chronic | Status: ACTIVE | Noted: 2023-11-20

## 2023-11-24 PROBLEM — I50.20 UNSPECIFIED SYSTOLIC (CONGESTIVE) HEART FAILURE: Chronic | Status: ACTIVE | Noted: 2023-11-20

## 2023-11-24 PROBLEM — G51.0 BELL'S PALSY: Chronic | Status: ACTIVE | Noted: 2023-11-20

## 2023-11-24 PROBLEM — N18.4 CHRONIC KIDNEY DISEASE, STAGE 4 (SEVERE): Chronic | Status: ACTIVE | Noted: 2023-11-20

## 2023-11-28 ENCOUNTER — APPOINTMENT (OUTPATIENT)
Dept: CARDIOLOGY | Facility: CLINIC | Age: 78
End: 2023-11-28

## 2023-11-29 ENCOUNTER — APPOINTMENT (OUTPATIENT)
Dept: HEART FAILURE | Facility: CLINIC | Age: 78
End: 2023-11-29

## 2023-11-29 ENCOUNTER — APPOINTMENT (OUTPATIENT)
Dept: CARDIOLOGY | Facility: CLINIC | Age: 78
End: 2023-11-29
Payer: MEDICARE

## 2023-11-29 VITALS
HEIGHT: 67 IN | WEIGHT: 145 LBS | BODY MASS INDEX: 22.76 KG/M2 | OXYGEN SATURATION: 100 % | HEART RATE: 77 BPM | SYSTOLIC BLOOD PRESSURE: 102 MMHG | DIASTOLIC BLOOD PRESSURE: 68 MMHG

## 2023-11-29 DIAGNOSIS — R60.0 LOCALIZED EDEMA: ICD-10-CM

## 2023-11-29 DIAGNOSIS — Z09 ENCOUNTER FOR FOLLOW-UP EXAMINATION AFTER COMPLETED TREATMENT FOR CONDITIONS OTHER THAN MALIGNANT NEOPLASM: ICD-10-CM

## 2023-11-29 PROCEDURE — 99215 OFFICE O/P EST HI 40 MIN: CPT | Mod: 25

## 2023-11-29 PROCEDURE — 93000 ELECTROCARDIOGRAM COMPLETE: CPT

## 2023-11-29 RX ORDER — VALSARTAN 40 MG/1
40 TABLET, COATED ORAL DAILY
Refills: 0 | Status: DISCONTINUED | COMMUNITY
End: 2023-11-29

## 2023-11-29 RX ORDER — RANOLAZINE 500 MG/1
500 TABLET, EXTENDED RELEASE ORAL
Refills: 0 | Status: ACTIVE | COMMUNITY

## 2023-11-29 RX ORDER — FUROSEMIDE 40 MG/1
40 TABLET ORAL DAILY
Qty: 30 | Refills: 1 | Status: DISCONTINUED | COMMUNITY
End: 2023-11-29

## 2023-11-29 RX ORDER — FUROSEMIDE 40 MG/1
40 TABLET ORAL TWICE DAILY
Qty: 120 | Refills: 2 | Status: DISCONTINUED | COMMUNITY
Start: 2023-11-07 | End: 2023-11-29

## 2023-11-29 RX ORDER — BUMETANIDE 0.5 MG/1
0.5 TABLET ORAL EVERY OTHER DAY
Qty: 90 | Refills: 1 | Status: DISCONTINUED | COMMUNITY
Start: 2023-11-07 | End: 2023-11-29

## 2023-11-29 RX ORDER — MONTELUKAST 10 MG/1
10 TABLET, FILM COATED ORAL DAILY
Refills: 0 | Status: ACTIVE | COMMUNITY

## 2023-11-29 RX ORDER — DAPAGLIFLOZIN 10 MG/1
10 TABLET, FILM COATED ORAL DAILY
Refills: 0 | Status: ACTIVE | COMMUNITY

## 2023-11-29 RX ORDER — SACUBITRIL AND VALSARTAN 24; 26 MG/1; MG/1
24-26 TABLET, FILM COATED ORAL TWICE DAILY
Refills: 0 | Status: ACTIVE | COMMUNITY

## 2023-12-02 ENCOUNTER — RX RENEWAL (OUTPATIENT)
Age: 78
End: 2023-12-02

## 2023-12-05 ENCOUNTER — OUTPATIENT (OUTPATIENT)
Dept: OUTPATIENT SERVICES | Facility: HOSPITAL | Age: 78
LOS: 1 days | End: 2023-12-05
Payer: MEDICARE

## 2023-12-05 VITALS
HEIGHT: 68 IN | RESPIRATION RATE: 20 BRPM | HEART RATE: 80 BPM | TEMPERATURE: 98 F | DIASTOLIC BLOOD PRESSURE: 70 MMHG | SYSTOLIC BLOOD PRESSURE: 120 MMHG | OXYGEN SATURATION: 98 % | WEIGHT: 147.93 LBS

## 2023-12-05 DIAGNOSIS — Z92.3 PERSONAL HISTORY OF IRRADIATION: Chronic | ICD-10-CM

## 2023-12-05 DIAGNOSIS — Z96.7 PRESENCE OF OTHER BONE AND TENDON IMPLANTS: Chronic | ICD-10-CM

## 2023-12-05 DIAGNOSIS — Z95.1 PRESENCE OF AORTOCORONARY BYPASS GRAFT: Chronic | ICD-10-CM

## 2023-12-05 DIAGNOSIS — I50.1 LEFT VENTRICULAR FAILURE, UNSPECIFIED: ICD-10-CM

## 2023-12-05 DIAGNOSIS — Z01.818 ENCOUNTER FOR OTHER PREPROCEDURAL EXAMINATION: ICD-10-CM

## 2023-12-05 LAB
ALBUMIN SERPL ELPH-MCNC: 3.5 G/DL — SIGNIFICANT CHANGE UP (ref 3.3–5.2)
ALBUMIN SERPL ELPH-MCNC: 3.5 G/DL — SIGNIFICANT CHANGE UP (ref 3.3–5.2)
ALP SERPL-CCNC: 86 U/L — SIGNIFICANT CHANGE UP (ref 40–120)
ALP SERPL-CCNC: 86 U/L — SIGNIFICANT CHANGE UP (ref 40–120)
ALT FLD-CCNC: 9 U/L — SIGNIFICANT CHANGE UP
ALT FLD-CCNC: 9 U/L — SIGNIFICANT CHANGE UP
ANION GAP SERPL CALC-SCNC: 10 MMOL/L — SIGNIFICANT CHANGE UP (ref 5–17)
ANION GAP SERPL CALC-SCNC: 10 MMOL/L — SIGNIFICANT CHANGE UP (ref 5–17)
APTT BLD: 31.3 SEC — SIGNIFICANT CHANGE UP (ref 24.5–35.6)
APTT BLD: 31.3 SEC — SIGNIFICANT CHANGE UP (ref 24.5–35.6)
AST SERPL-CCNC: 25 U/L — SIGNIFICANT CHANGE UP
AST SERPL-CCNC: 25 U/L — SIGNIFICANT CHANGE UP
BASOPHILS # BLD AUTO: 0.05 K/UL — SIGNIFICANT CHANGE UP (ref 0–0.2)
BASOPHILS # BLD AUTO: 0.05 K/UL — SIGNIFICANT CHANGE UP (ref 0–0.2)
BASOPHILS NFR BLD AUTO: 0.6 % — SIGNIFICANT CHANGE UP (ref 0–2)
BASOPHILS NFR BLD AUTO: 0.6 % — SIGNIFICANT CHANGE UP (ref 0–2)
BILIRUB SERPL-MCNC: 0.3 MG/DL — LOW (ref 0.4–2)
BILIRUB SERPL-MCNC: 0.3 MG/DL — LOW (ref 0.4–2)
BLD GP AB SCN SERPL QL: SIGNIFICANT CHANGE UP
BLD GP AB SCN SERPL QL: SIGNIFICANT CHANGE UP
BUN SERPL-MCNC: 49.4 MG/DL — HIGH (ref 8–20)
BUN SERPL-MCNC: 49.4 MG/DL — HIGH (ref 8–20)
CALCIUM SERPL-MCNC: 8.8 MG/DL — SIGNIFICANT CHANGE UP (ref 8.4–10.5)
CALCIUM SERPL-MCNC: 8.8 MG/DL — SIGNIFICANT CHANGE UP (ref 8.4–10.5)
CHLORIDE SERPL-SCNC: 96 MMOL/L — SIGNIFICANT CHANGE UP (ref 96–108)
CHLORIDE SERPL-SCNC: 96 MMOL/L — SIGNIFICANT CHANGE UP (ref 96–108)
CHOLEST SERPL-MCNC: 163 MG/DL — SIGNIFICANT CHANGE UP
CHOLEST SERPL-MCNC: 163 MG/DL — SIGNIFICANT CHANGE UP
CO2 SERPL-SCNC: 27 MMOL/L — SIGNIFICANT CHANGE UP (ref 22–29)
CO2 SERPL-SCNC: 27 MMOL/L — SIGNIFICANT CHANGE UP (ref 22–29)
CREAT SERPL-MCNC: 2.02 MG/DL — HIGH (ref 0.5–1.3)
CREAT SERPL-MCNC: 2.02 MG/DL — HIGH (ref 0.5–1.3)
EGFR: 34 ML/MIN/1.73M2 — LOW
EGFR: 34 ML/MIN/1.73M2 — LOW
EOSINOPHIL # BLD AUTO: 0.26 K/UL — SIGNIFICANT CHANGE UP (ref 0–0.5)
EOSINOPHIL # BLD AUTO: 0.26 K/UL — SIGNIFICANT CHANGE UP (ref 0–0.5)
EOSINOPHIL NFR BLD AUTO: 3.1 % — SIGNIFICANT CHANGE UP (ref 0–6)
EOSINOPHIL NFR BLD AUTO: 3.1 % — SIGNIFICANT CHANGE UP (ref 0–6)
GLUCOSE SERPL-MCNC: 148 MG/DL — HIGH (ref 70–99)
GLUCOSE SERPL-MCNC: 148 MG/DL — HIGH (ref 70–99)
HCT VFR BLD CALC: 42.9 % — SIGNIFICANT CHANGE UP (ref 39–50)
HCT VFR BLD CALC: 42.9 % — SIGNIFICANT CHANGE UP (ref 39–50)
HDLC SERPL-MCNC: 60 MG/DL — SIGNIFICANT CHANGE UP
HDLC SERPL-MCNC: 60 MG/DL — SIGNIFICANT CHANGE UP
HGB BLD-MCNC: 13.8 G/DL — SIGNIFICANT CHANGE UP (ref 13–17)
HGB BLD-MCNC: 13.8 G/DL — SIGNIFICANT CHANGE UP (ref 13–17)
IMM GRANULOCYTES NFR BLD AUTO: 0.6 % — SIGNIFICANT CHANGE UP (ref 0–0.9)
IMM GRANULOCYTES NFR BLD AUTO: 0.6 % — SIGNIFICANT CHANGE UP (ref 0–0.9)
INR BLD: 1.02 RATIO — SIGNIFICANT CHANGE UP (ref 0.85–1.18)
INR BLD: 1.02 RATIO — SIGNIFICANT CHANGE UP (ref 0.85–1.18)
LIPID PNL WITH DIRECT LDL SERPL: 86 MG/DL — SIGNIFICANT CHANGE UP
LIPID PNL WITH DIRECT LDL SERPL: 86 MG/DL — SIGNIFICANT CHANGE UP
LYMPHOCYTES # BLD AUTO: 1.83 K/UL — SIGNIFICANT CHANGE UP (ref 1–3.3)
LYMPHOCYTES # BLD AUTO: 1.83 K/UL — SIGNIFICANT CHANGE UP (ref 1–3.3)
LYMPHOCYTES # BLD AUTO: 21.8 % — SIGNIFICANT CHANGE UP (ref 13–44)
LYMPHOCYTES # BLD AUTO: 21.8 % — SIGNIFICANT CHANGE UP (ref 13–44)
MAGNESIUM SERPL-MCNC: 2.6 MG/DL — SIGNIFICANT CHANGE UP (ref 1.6–2.6)
MAGNESIUM SERPL-MCNC: 2.6 MG/DL — SIGNIFICANT CHANGE UP (ref 1.6–2.6)
MCHC RBC-ENTMCNC: 30.6 PG — SIGNIFICANT CHANGE UP (ref 27–34)
MCHC RBC-ENTMCNC: 30.6 PG — SIGNIFICANT CHANGE UP (ref 27–34)
MCHC RBC-ENTMCNC: 32.2 GM/DL — SIGNIFICANT CHANGE UP (ref 32–36)
MCHC RBC-ENTMCNC: 32.2 GM/DL — SIGNIFICANT CHANGE UP (ref 32–36)
MCV RBC AUTO: 95.1 FL — SIGNIFICANT CHANGE UP (ref 80–100)
MCV RBC AUTO: 95.1 FL — SIGNIFICANT CHANGE UP (ref 80–100)
MONOCYTES # BLD AUTO: 0.96 K/UL — HIGH (ref 0–0.9)
MONOCYTES # BLD AUTO: 0.96 K/UL — HIGH (ref 0–0.9)
MONOCYTES NFR BLD AUTO: 11.4 % — SIGNIFICANT CHANGE UP (ref 2–14)
MONOCYTES NFR BLD AUTO: 11.4 % — SIGNIFICANT CHANGE UP (ref 2–14)
NEUTROPHILS # BLD AUTO: 5.24 K/UL — SIGNIFICANT CHANGE UP (ref 1.8–7.4)
NEUTROPHILS # BLD AUTO: 5.24 K/UL — SIGNIFICANT CHANGE UP (ref 1.8–7.4)
NEUTROPHILS NFR BLD AUTO: 62.5 % — SIGNIFICANT CHANGE UP (ref 43–77)
NEUTROPHILS NFR BLD AUTO: 62.5 % — SIGNIFICANT CHANGE UP (ref 43–77)
NON HDL CHOLESTEROL: 103 MG/DL — SIGNIFICANT CHANGE UP
NON HDL CHOLESTEROL: 103 MG/DL — SIGNIFICANT CHANGE UP
PLATELET # BLD AUTO: 315 K/UL — SIGNIFICANT CHANGE UP (ref 150–400)
PLATELET # BLD AUTO: 315 K/UL — SIGNIFICANT CHANGE UP (ref 150–400)
POTASSIUM SERPL-MCNC: 5.8 MMOL/L — HIGH (ref 3.5–5.3)
POTASSIUM SERPL-MCNC: 5.8 MMOL/L — HIGH (ref 3.5–5.3)
POTASSIUM SERPL-SCNC: 5.8 MMOL/L — HIGH (ref 3.5–5.3)
POTASSIUM SERPL-SCNC: 5.8 MMOL/L — HIGH (ref 3.5–5.3)
PROT SERPL-MCNC: 7.1 G/DL — SIGNIFICANT CHANGE UP (ref 6.6–8.7)
PROT SERPL-MCNC: 7.1 G/DL — SIGNIFICANT CHANGE UP (ref 6.6–8.7)
PROTHROM AB SERPL-ACNC: 11.3 SEC — SIGNIFICANT CHANGE UP (ref 9.5–13)
PROTHROM AB SERPL-ACNC: 11.3 SEC — SIGNIFICANT CHANGE UP (ref 9.5–13)
RBC # BLD: 4.51 M/UL — SIGNIFICANT CHANGE UP (ref 4.2–5.8)
RBC # BLD: 4.51 M/UL — SIGNIFICANT CHANGE UP (ref 4.2–5.8)
RBC # FLD: 13.1 % — SIGNIFICANT CHANGE UP (ref 10.3–14.5)
RBC # FLD: 13.1 % — SIGNIFICANT CHANGE UP (ref 10.3–14.5)
SODIUM SERPL-SCNC: 133 MMOL/L — LOW (ref 135–145)
SODIUM SERPL-SCNC: 133 MMOL/L — LOW (ref 135–145)
TRIGL SERPL-MCNC: 86 MG/DL — SIGNIFICANT CHANGE UP
TRIGL SERPL-MCNC: 86 MG/DL — SIGNIFICANT CHANGE UP
WBC # BLD: 8.39 K/UL — SIGNIFICANT CHANGE UP (ref 3.8–10.5)
WBC # BLD: 8.39 K/UL — SIGNIFICANT CHANGE UP (ref 3.8–10.5)
WBC # FLD AUTO: 8.39 K/UL — SIGNIFICANT CHANGE UP (ref 3.8–10.5)
WBC # FLD AUTO: 8.39 K/UL — SIGNIFICANT CHANGE UP (ref 3.8–10.5)

## 2023-12-05 PROCEDURE — G0463: CPT

## 2023-12-05 PROCEDURE — 93005 ELECTROCARDIOGRAM TRACING: CPT

## 2023-12-05 PROCEDURE — 93010 ELECTROCARDIOGRAM REPORT: CPT

## 2023-12-05 RX ORDER — METFORMIN HYDROCHLORIDE 850 MG/1
0 TABLET ORAL
Qty: 0 | Refills: 0 | DISCHARGE

## 2023-12-05 RX ORDER — SITAGLIPTIN 50 MG/1
1 TABLET, FILM COATED ORAL
Qty: 0 | Refills: 0 | DISCHARGE

## 2023-12-05 RX ORDER — METOPROLOL TARTRATE 50 MG
1 TABLET ORAL
Qty: 0 | Refills: 0 | DISCHARGE

## 2023-12-05 RX ORDER — TRAMADOL HYDROCHLORIDE 50 MG/1
1 TABLET ORAL
Qty: 0 | Refills: 0 | DISCHARGE

## 2023-12-05 RX ORDER — CLOPIDOGREL BISULFATE 75 MG/1
1 TABLET, FILM COATED ORAL
Qty: 0 | Refills: 0 | DISCHARGE

## 2023-12-05 RX ORDER — ERGOCALCIFEROL 1.25 MG/1
1 CAPSULE ORAL
Qty: 0 | Refills: 0 | DISCHARGE

## 2023-12-05 NOTE — H&P PST ADULT - NSICDXPASTSURGICALHX_GEN_ALL_CORE_FT
PAST SURGICAL HISTORY:  History of brachytherapy     History of insertion of stent into coronary artery bypass graft     S/P CABG (coronary artery bypass graft) 4V 1983 [place]    Status post open reduction with internal fixation of fracture      PAST SURGICAL HISTORY:  History of brachytherapy     History of insertion of stent into coronary artery bypass graft     S/P CABG (coronary artery bypass graft) 4V 1983 [place]    Status post open reduction with internal fixation of fracture

## 2023-12-05 NOTE — H&P PST ADULT - HISTORY OF PRESENT ILLNESS
Department of Cardiology                                                                  [facility]/[place] [address]  [place]                                                            Telephone: [phone]. 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 on 11/20/23 for LHC which revealed a patent LIMA with all other grafts closed, LVEDP 36. Pt was hospitalized and diuresed, EP was consulted for ICD, patient with ischemic cardiomyopathy with depressed LV function despite GDMT > 3months.  Patient meets criteria for primary prevention ICD. Given LBBB would benefit form resynchronization therapy. Now presents to presurgical testing for upcoming ICD BIV implant on 12/21/2023 with Dr. Bazzi.     Heart Failure: YES        Systolic/Diastolic/Combined: Systolic        NYHA Class (within 2 weeks): III-IV    Noninvasive Testing:   < from: TTE Echo Complete w/ Contrast w/ Doppler (10.26.23 @ 12:30) >  Summary:   1. Endocardial visualization was enhanced with intravenous echo contrast.   2. Moderately enlarged left atrium.   3. Left ventricular ejection fraction, by visual estimation, is <20%.   4. Grade III diastolic dysfunction. Elevated mean left atrial pressure.   5. Mildly enlarged right atrium.   6. Mildly enlarged right ventricle. Severely reduced RV systolic   function.   7. Moderate mitral valve regurgitation.   8. Mild aortic regurgitation.   9. Mild tricuspid regurgitation.  10. Estimated pulmonary artery systolic pressure is 51 mmHg -moderate   pulmonary hypertension.  11. There is no evidence of pericardial effusion.      < from: Cardiac Catheterization (11.20.23 @ 12:07) >  Diagnostic Conclusions:     LIMA to LAD with mild disease. Retrograde filling of RCA, circumflex  from distal LAD.  Aortogram performed with catheter in ascending aorta- No other graft  noted. Ostial occlusion of SVG to OM.  LVEDP Recommendations:     Aggressive medical management.    Admission for EP/Advanced CHF team management.        Prior EP Procedures: none   Prior CTS:    s/p 4V CABG (1993 at [facility])      Antianginal Therapies:        Beta Blockers:         Calcium Channel Blockers:        Long Acting Nitrates:        Ranexa: RAnexa 500mg BID     Associated Risk Factors:        Cerebrovascular Disease: N/A       Chronic Lung Disease: N/A       Peripheral Arterial Disease: N/A       Chronic Kidney Disease (if yes, what is GFR): N/A       Uncontrolled Diabetes (if yes, what is HgbA1C or FBS): N/A       Poorly Controlled Hypertension (if yes, what is SBP): N/A       Morbid Obesity (if yes, what is BMI): N/A       History of Recent Ventricular Arrhythmia: N/A       Inability to Ambulate Safely: N/A       Need for Therapeutic Anticoagulation: N/A       Antiplatelet or Contrast Allergy: N/A    MEDICATIONS:                  ROS: as stated above, otherwise negative    PHYSICAL EXAM:  Constitutional: A & O x 3  HEENT:   Normal oral mucosa, PERRL, EOMI	  Cardiovascular: Normal S1 S2, No JVD, *****No murmurs  Respiratory: Lungs clear to auscultation	****  Gastrointestinal:  Soft, Non-tender, + BS	  Skin: No rashes, No ecchymoses, No cyanosis  Neurologic: Non-focal  Extremities: Normal range of motion, no edema****  Vascular: Peripheral pulses palpable + bilaterally ****    ECG:  	    LABS:	 	                                                                                         Department of Cardiology                                                                  [facility]/[place] [address]  [place]                                                            Telephone: [phone]. 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 on 11/20/23 for LHC which revealed a patent LIMA with all other grafts closed, LVEDP 36. Pt was hospitalized and diuresed, EP was consulted for ICD, patient with ischemic cardiomyopathy with depressed LV function despite GDMT > 3months.  Patient meets criteria for primary prevention ICD. Given LBBB would benefit form resynchronization therapy. Now presents to presurgical testing for upcoming ICD BIV implant on 12/21/2023 with Dr. Bazzi.     Heart Failure: YES        Systolic/Diastolic/Combined: Systolic        NYHA Class (within 2 weeks): III-IV    Noninvasive Testing:   < from: TTE Echo Complete w/ Contrast w/ Doppler (10.26.23 @ 12:30) >  Summary:   1. Endocardial visualization was enhanced with intravenous echo contrast.   2. Moderately enlarged left atrium.   3. Left ventricular ejection fraction, by visual estimation, is <20%.   4. Grade III diastolic dysfunction. Elevated mean left atrial pressure.   5. Mildly enlarged right atrium.   6. Mildly enlarged right ventricle. Severely reduced RV systolic   function.   7. Moderate mitral valve regurgitation.   8. Mild aortic regurgitation.   9. Mild tricuspid regurgitation.  10. Estimated pulmonary artery systolic pressure is 51 mmHg -moderate   pulmonary hypertension.  11. There is no evidence of pericardial effusion.      < from: Cardiac Catheterization (11.20.23 @ 12:07) >  Diagnostic Conclusions:     LIMA to LAD with mild disease. Retrograde filling of RCA, circumflex  from distal LAD.  Aortogram performed with catheter in ascending aorta- No other graft  noted. Ostial occlusion of SVG to OM.  LVEDP Recommendations:     Aggressive medical management.    Admission for EP/Advanced CHF team management.        Prior EP Procedures: none   Prior CTS:    s/p 4V CABG (1993 at [facility])      Antianginal Therapies:        Beta Blockers:         Calcium Channel Blockers:        Long Acting Nitrates:        Ranexa: RAnexa 500mg BID     Associated Risk Factors:        Cerebrovascular Disease: N/A       Chronic Lung Disease: N/A       Peripheral Arterial Disease: N/A       Chronic Kidney Disease (if yes, what is GFR): N/A       Uncontrolled Diabetes (if yes, what is HgbA1C or FBS): N/A       Poorly Controlled Hypertension (if yes, what is SBP): N/A       Morbid Obesity (if yes, what is BMI): N/A       History of Recent Ventricular Arrhythmia: N/A       Inability to Ambulate Safely: N/A       Need for Therapeutic Anticoagulation: N/A       Antiplatelet or Contrast Allergy: N/A    MEDICATIONS:                  ROS: as stated above, otherwise negative    PHYSICAL EXAM:  Constitutional: A & O x 3  HEENT:   Normal oral mucosa, PERRL, EOMI	  Cardiovascular: Normal S1 S2, No JVD, *****No murmurs  Respiratory: Lungs clear to auscultation	****  Gastrointestinal:  Soft, Non-tender, + BS	  Skin: No rashes, No ecchymoses, No cyanosis  Neurologic: Non-focal  Extremities: Normal range of motion, no edema****  Vascular: Peripheral pulses palpable + bilaterally ****    ECG:  	    LABS:	 	                                                                                         Department of Cardiology                                                                  [facility]/[place] [address] [place]                                                            Telephone: [phone]. 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 Pt was hospitalized and diuresed, EP was consulted for ICD, patient with ischemic cardiomyopathy with depressed LV function despite GDMT > 3months.  Patient meets criteria for primary prevention ICD. Given LBBB would benefit form resynchronization therapy. Now presents to presurgical testing for upcoming ICD BIV implant on 12/21/2023 with Dr. Bazzi. Denies CP, palpitations, dizziness, and LE edema. Endorses intermittent JOINER and fatigue     Heart Failure: YES        Systolic/Diastolic/Combined: Combined         NYHA Class (within 2 weeks): III-IV    Noninvasive Testing:   < from: TTE Echo Complete w/ Contrast w/ Doppler (10.26.23 @ 12:30) >  Summary:   1. Endocardial visualization was enhanced with intravenous echo contrast.   2. Moderately enlarged left atrium.   3. Left ventricular ejection fraction, by visual estimation, is <20%.   4. Grade III diastolic dysfunction. Elevated mean left atrial pressure.   5. Mildly enlarged right atrium.   6. Mildly enlarged right ventricle. Severely reduced RV systolic   function.   7. Moderate mitral valve regurgitation.   8. Mild aortic regurgitation.   9. Mild tricuspid regurgitation.  10. Estimated pulmonary artery systolic pressure is 51 mmHg -moderate   pulmonary hypertension.  11. There is no evidence of pericardial effusion.      < from: Cardiac Catheterization (11.20.23 @ 12:07) >  Diagnostic Conclusions:     LIMA to LAD with mild disease. Retrograde filling of RCA, circumflex  from distal LAD.  Aortogram performed with catheter in ascending aorta- No other graft  noted. Ostial occlusion of SVG to OM.  LVEDP Recommendations:     Aggressive medical management.    Admission for EP/Advanced CHF team management.        Prior EP Procedures: none   Prior CTS:    s/p 4V CABG (1993 at [facility])      Antianginal Therapies:        Beta Blockers:         Calcium Channel Blockers:        Long Acting Nitrates:        Ranexa: RAnexa 500mg BID     Associated Risk Factors:        Cerebrovascular Disease: N/A       Chronic Lung Disease: N/A       Peripheral Arterial Disease: N/A       Chronic Kidney Disease (if yes, what is GFR): N/A       Uncontrolled Diabetes (if yes, what is HgbA1C or FBS): N/A       Poorly Controlled Hypertension (if yes, what is SBP): N/A       Morbid Obesity (if yes, what is BMI): N/A       History of Recent Ventricular Arrhythmia: N/A       Inability to Ambulate Safely: N/A       Need for Therapeutic Anticoagulation: N/A       Antiplatelet or Contrast Allergy: N/A    MEDICATIONS:  Home Medications:  Farxiga 10 mg oral tablet: 1 tab(s) orally once a day (05 Dec 2023 14:28)  levothyroxine 75 mcg (0.075 mg) oral tablet: 1 tab(s) orally once a day (05 Dec 2023 14:28)  Metoprolol Succinate ER 50 mg oral tablet, extended release: 1 tab(s) orally once a day (05 Dec 2023 14:28)  montelukast 10 mg oral tablet: 1 tab(s) orally once a day (05 Dec 2023 14:28)  NexIUM 40 mg oral delayed release capsule: 1 cap(s) orally once a day (at bedtime) (05 Dec 2023 14:28)  NovoLOG 100 units/mL injectable solution: 10,10,15 unit(s) injectable 3 times a day (before meals) (05 Dec 2023 14:28)  ranolazine 500 mg oral tablet, extended release: 1 tab(s) orally 2 times a day (05 Dec 2023 14:28)  spironolactone 25 mg oral tablet: 1 tab(s) orally once a day (05 Dec 2023 14:28)          ROS: as stated above, otherwise negative    PHYSICAL EXAM:  Constitutional: A & O x 3  HEENT:   Normal oral mucosa, PERRL, EOMI	  Cardiovascular: Normal S1 S2, No JVD, No murmurs  Respiratory: Lungs clear to auscultation	  Gastrointestinal:  Soft, Non-tender, + BS	  Skin: No rashes, No ecchymoses, No cyanosis  Neurologic: Non-focal  Extremities: Normal range of motion, no edema      ECG:  	    LABS:	 	                                                                                         Department of Cardiology                                                                  [facility]/[place] [address]                                                            Telephone: [phone]. 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 Pt was hospitalized and diuresed, EP was consulted for ICD, patient with ischemic cardiomyopathy with depressed LV function despite GDMT > 3months.  Patient meets criteria for primary prevention ICD. Given LBBB would benefit form resynchronization therapy. Now presents to presurgical testing for upcoming ICD BIV implant on 12/21/2023 with Dr. Bazzi. Denies CP, palpitations, dizziness, and LE edema. Endorses intermittent JOINER and fatigue     Heart Failure: YES        Systolic/Diastolic/Combined: Combined         NYHA Class (within 2 weeks): III-IV    Noninvasive Testing:   < from: TTE Echo Complete w/ Contrast w/ Doppler (10.26.23 @ 12:30) >  Summary:   1. Endocardial visualization was enhanced with intravenous echo contrast.   2. Moderately enlarged left atrium.   3. Left ventricular ejection fraction, by visual estimation, is <20%.   4. Grade III diastolic dysfunction. Elevated mean left atrial pressure.   5. Mildly enlarged right atrium.   6. Mildly enlarged right ventricle. Severely reduced RV systolic   function.   7. Moderate mitral valve regurgitation.   8. Mild aortic regurgitation.   9. Mild tricuspid regurgitation.  10. Estimated pulmonary artery systolic pressure is 51 mmHg -moderate   pulmonary hypertension.  11. There is no evidence of pericardial effusion.      < from: Cardiac Catheterization (11.20.23 @ 12:07) >  Diagnostic Conclusions:     LIMA to LAD with mild disease. Retrograde filling of RCA, circumflex  from distal LAD.  Aortogram performed with catheter in ascending aorta- No other graft  noted. Ostial occlusion of SVG to OM.  LVEDP Recommendations:     Aggressive medical management.    Admission for EP/Advanced CHF team management.        Prior EP Procedures: none   Prior CTS:    s/p 4V CABG (1993 at [facility])      Antianginal Therapies:        Beta Blockers:         Calcium Channel Blockers:        Long Acting Nitrates:        Ranexa: RAnexa 500mg BID     Associated Risk Factors:        Cerebrovascular Disease: N/A       Chronic Lung Disease: N/A       Peripheral Arterial Disease: N/A       Chronic Kidney Disease (if yes, what is GFR): N/A       Uncontrolled Diabetes (if yes, what is HgbA1C or FBS): N/A       Poorly Controlled Hypertension (if yes, what is SBP): N/A       Morbid Obesity (if yes, what is BMI): N/A       History of Recent Ventricular Arrhythmia: N/A       Inability to Ambulate Safely: N/A       Need for Therapeutic Anticoagulation: N/A       Antiplatelet or Contrast Allergy: N/A    MEDICATIONS:  Home Medications:  Farxiga 10 mg oral tablet: 1 tab(s) orally once a day (05 Dec 2023 14:28)  levothyroxine 75 mcg (0.075 mg) oral tablet: 1 tab(s) orally once a day (05 Dec 2023 14:28)  Metoprolol Succinate ER 50 mg oral tablet, extended release: 1 tab(s) orally once a day (05 Dec 2023 14:28)  montelukast 10 mg oral tablet: 1 tab(s) orally once a day (05 Dec 2023 14:28)  NexIUM 40 mg oral delayed release capsule: 1 cap(s) orally once a day (at bedtime) (05 Dec 2023 14:28)  NovoLOG 100 units/mL injectable solution: 10,10,15 unit(s) injectable 3 times a day (before meals) (05 Dec 2023 14:28)  ranolazine 500 mg oral tablet, extended release: 1 tab(s) orally 2 times a day (05 Dec 2023 14:28)  spironolactone 25 mg oral tablet: 1 tab(s) orally once a day (05 Dec 2023 14:28)          ROS: as stated above, otherwise negative    PHYSICAL EXAM:  Constitutional: A & O x 3  HEENT:   Normal oral mucosa, PERRL, EOMI	  Cardiovascular: Normal S1 S2, No JVD, No murmurs  Respiratory: Lungs clear to auscultation	  Gastrointestinal:  Soft, Non-tender, + BS	  Skin: No rashes, No ecchymoses, No cyanosis  Neurologic: Non-focal  Extremities: Normal range of motion, no edema      ECG:  	    LABS:	 	                                                                                         Department of Cardiology                                                                  [facility]/[place] [address]                                                            Telephone: [phone]. Fax:[phone]                                                                                    PST H & P       HPI: 77 y/o male, PMHx significant for CAD, STEMI, NSTEMI, 4V CABG, multiple PCI's, cardiogenic shock, HFrEF (EF < 20%) with several admissions for decompensated HF with multi organ system failure, DM2 (on insulin), 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, HTN, LBBB (2018), and HLD who initially presented to [facility] on 11/20/23 for LHC which revealed a patent LIMA with all other grafts closed, LVEDP 36. Pt was hospitalized and diuresed, EP was consulted for ICD, patient with ischemic cardiomyopathy with depressed LV function despite GDMT > 3months.  Patient meets criteria for primary prevention ICD. Given LBBB would benefit form resynchronization therapy. Now presents to presurgical testing for upcoming ICD BIV implant on 12/21/2023 with Dr. Bazzi. Denies CP, palpitations, dizziness, and LE edema. Endorses intermittent JOINER and fatigue     Heart Failure: YES        Systolic/Diastolic/Combined: Combined         NYHA Class (within 2 weeks): III-IV    Noninvasive Testing:   < from: TTE Echo Complete w/ Contrast w/ Doppler (10.26.23 @ 12:30) >  Summary:   1. Endocardial visualization was enhanced with intravenous echo contrast.   2. Moderately enlarged left atrium.   3. Left ventricular ejection fraction, by visual estimation, is <20%.   4. Grade III diastolic dysfunction. Elevated mean left atrial pressure.   5. Mildly enlarged right atrium.   6. Mildly enlarged right ventricle. Severely reduced RV systolic   function.   7. Moderate mitral valve regurgitation.   8. Mild aortic regurgitation.   9. Mild tricuspid regurgitation.  10. Estimated pulmonary artery systolic pressure is 51 mmHg -moderate   pulmonary hypertension.  11. There is no evidence of pericardial effusion.      < from: Cardiac Catheterization (11.20.23 @ 12:07) >  Diagnostic Conclusions:     LIMA to LAD with mild disease. Retrograde filling of RCA, circumflex  from distal LAD.  Aortogram performed with catheter in ascending aorta- No other graft  noted. Ostial occlusion of SVG to OM.  LVEDP Recommendations:     Aggressive medical management.    Admission for EP/Advanced CHF team management.        Prior EP Procedures: none   Prior CTS:    s/p 4V CABG (1993 at [facility])      Antianginal Therapies:        Beta Blockers:         Calcium Channel Blockers:        Long Acting Nitrates:        Ranexa: RAnexa 500mg BID     Associated Risk Factors:        Cerebrovascular Disease: N/A       Chronic Lung Disease: N/A       Peripheral Arterial Disease: N/A       Chronic Kidney Disease (if yes, what is GFR): N/A       Uncontrolled Diabetes (if yes, what is HgbA1C or FBS): N/A       Poorly Controlled Hypertension (if yes, what is SBP): N/A       Morbid Obesity (if yes, what is BMI): N/A       History of Recent Ventricular Arrhythmia: N/A       Inability to Ambulate Safely: N/A       Need for Therapeutic Anticoagulation: N/A       Antiplatelet or Contrast Allergy: N/A    MEDICATIONS:  Home Medications:  Farxiga 10 mg oral tablet: 1 tab(s) orally once a day (05 Dec 2023 14:28)  levothyroxine 75 mcg (0.075 mg) oral tablet: 1 tab(s) orally once a day (05 Dec 2023 14:28)  Metoprolol Succinate ER 50 mg oral tablet, extended release: 1 tab(s) orally once a day (05 Dec 2023 14:28)  montelukast 10 mg oral tablet: 1 tab(s) orally once a day (05 Dec 2023 14:28)  NexIUM 40 mg oral delayed release capsule: 1 cap(s) orally once a day (at bedtime) (05 Dec 2023 14:28)  NovoLOG 100 units/mL injectable solution: 10,10,15 unit(s) injectable 3 times a day (before meals) (05 Dec 2023 14:28)  ranolazine 500 mg oral tablet, extended release: 1 tab(s) orally 2 times a day (05 Dec 2023 14:28)  spironolactone 25 mg oral tablet: 1 tab(s) orally once a day (05 Dec 2023 14:28)          ROS: as stated above, otherwise negative    PHYSICAL EXAM:  Constitutional: A & O x 3  HEENT:   Normal oral mucosa, PERRL, EOMI	  Cardiovascular: Normal S1 S2, No JVD, No murmurs  Respiratory: Lungs clear to auscultation	  Gastrointestinal:  Soft, Non-tender, + BS	  Skin: No rashes, No ecchymoses, No cyanosis  Neurologic: Non-focal  Extremities: Normal range of motion, no edema      ECG:  	  NSR 77bpm LBBB     LABS:	 	                                                                                         Department of Cardiology                                                                  [facility]/[place] [address]                                                            Telephone: [phone]. 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 Pt was hospitalized and diuresed, EP was consulted for ICD, patient with ischemic cardiomyopathy with depressed LV function despite GDMT > 3months.  Patient meets criteria for primary prevention ICD. Given LBBB would benefit form resynchronization therapy. Now presents to presurgical testing for upcoming ICD BIV implant on 12/21/2023 with Dr. Bazzi. Denies CP, palpitations, dizziness, and LE edema. Endorses intermittent JOINER and fatigue     Heart Failure: YES        Systolic/Diastolic/Combined: Combined         NYHA Class (within 2 weeks): III-IV    Noninvasive Testing:   < from: TTE Echo Complete w/ Contrast w/ Doppler (10.26.23 @ 12:30) >  Summary:   1. Endocardial visualization was enhanced with intravenous echo contrast.   2. Moderately enlarged left atrium.   3. Left ventricular ejection fraction, by visual estimation, is <20%.   4. Grade III diastolic dysfunction. Elevated mean left atrial pressure.   5. Mildly enlarged right atrium.   6. Mildly enlarged right ventricle. Severely reduced RV systolic   function.   7. Moderate mitral valve regurgitation.   8. Mild aortic regurgitation.   9. Mild tricuspid regurgitation.  10. Estimated pulmonary artery systolic pressure is 51 mmHg -moderate   pulmonary hypertension.  11. There is no evidence of pericardial effusion.      < from: Cardiac Catheterization (11.20.23 @ 12:07) >  Diagnostic Conclusions:     LIMA to LAD with mild disease. Retrograde filling of RCA, circumflex  from distal LAD.  Aortogram performed with catheter in ascending aorta- No other graft  noted. Ostial occlusion of SVG to OM.  LVEDP Recommendations:     Aggressive medical management.    Admission for EP/Advanced CHF team management.        Prior EP Procedures: none   Prior CTS:    s/p 4V CABG (1993 at [facility])      Antianginal Therapies:        Beta Blockers:         Calcium Channel Blockers:        Long Acting Nitrates:        Ranexa: RAnexa 500mg BID     Associated Risk Factors:        Cerebrovascular Disease: N/A       Chronic Lung Disease: N/A       Peripheral Arterial Disease: N/A       Chronic Kidney Disease (if yes, what is GFR): N/A       Uncontrolled Diabetes (if yes, what is HgbA1C or FBS): N/A       Poorly Controlled Hypertension (if yes, what is SBP): N/A       Morbid Obesity (if yes, what is BMI): N/A       History of Recent Ventricular Arrhythmia: N/A       Inability to Ambulate Safely: N/A       Need for Therapeutic Anticoagulation: N/A       Antiplatelet or Contrast Allergy: N/A    MEDICATIONS:  Home Medications:  Farxiga 10 mg oral tablet: 1 tab(s) orally once a day (05 Dec 2023 14:28)  levothyroxine 75 mcg (0.075 mg) oral tablet: 1 tab(s) orally once a day (05 Dec 2023 14:28)  Metoprolol Succinate ER 50 mg oral tablet, extended release: 1 tab(s) orally once a day (05 Dec 2023 14:28)  montelukast 10 mg oral tablet: 1 tab(s) orally once a day (05 Dec 2023 14:28)  NexIUM 40 mg oral delayed release capsule: 1 cap(s) orally once a day (at bedtime) (05 Dec 2023 14:28)  NovoLOG 100 units/mL injectable solution: 10,10,15 unit(s) injectable 3 times a day (before meals) (05 Dec 2023 14:28)  ranolazine 500 mg oral tablet, extended release: 1 tab(s) orally 2 times a day (05 Dec 2023 14:28)  spironolactone 25 mg oral tablet: 1 tab(s) orally once a day (05 Dec 2023 14:28)          ROS: as stated above, otherwise negative    PHYSICAL EXAM:  Constitutional: A & O x 3  HEENT:   Normal oral mucosa, PERRL, EOMI	  Cardiovascular: Normal S1 S2, No JVD, No murmurs  Respiratory: Lungs clear to auscultation	  Gastrointestinal:  Soft, Non-tender, + BS	  Skin: No rashes, No ecchymoses, No cyanosis  Neurologic: Non-focal  Extremities: Normal range of motion, no edema      ECG:  	  NSR 77bpm LBBB     LABS:	 	                                                                                         Department of Cardiology                                                                  [facility]/[place] [address]                                                            Telephone: [phone]. 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 Pt was hospitalized and diuresed, EP was consulted for ICD, patient with ischemic cardiomyopathy with depressed LV function despite GDMT > 3months.  Patient meets criteria for primary prevention ICD. Given LBBB would benefit form resynchronization therapy. Now presents to presurgical testing for upcoming ICD BIV implant on 12/21/2023 with Dr. Bazzi. Denies CP, palpitations, dizziness, and LE edema. Endorses intermittent JOINER and fatigue     Heart Failure: YES        Systolic/Diastolic/Combined: Combined         NYHA Class (within 2 weeks): III-IV    Noninvasive Testing:   < from: TTE Echo Complete w/ Contrast w/ Doppler (10.26.23 @ 12:30) >  Summary:   1. Endocardial visualization was enhanced with intravenous echo contrast.   2. Moderately enlarged left atrium.   3. Left ventricular ejection fraction, by visual estimation, is <20%.   4. Grade III diastolic dysfunction. Elevated mean left atrial pressure.   5. Mildly enlarged right atrium.   6. Mildly enlarged right ventricle. Severely reduced RV systolic   function.   7. Moderate mitral valve regurgitation.   8. Mild aortic regurgitation.   9. Mild tricuspid regurgitation.  10. Estimated pulmonary artery systolic pressure is 51 mmHg -moderate   pulmonary hypertension.  11. There is no evidence of pericardial effusion.      < from: Cardiac Catheterization (11.20.23 @ 12:07) >  Diagnostic Conclusions:     LIMA to LAD with mild disease. Retrograde filling of RCA, circumflex  from distal LAD.  Aortogram performed with catheter in ascending aorta- No other graft  noted. Ostial occlusion of SVG to OM.  LVEDP Recommendations:     Aggressive medical management.    Admission for EP/Advanced CHF team management.        Prior EP Procedures: none   Prior CTS:    s/p 4V CABG (1993 at [facility])      Antianginal Therapies:        Beta Blockers:         Calcium Channel Blockers:        Long Acting Nitrates:        Ranexa: RAnexa 500mg BID     Associated Risk Factors:        Cerebrovascular Disease: N/A       Chronic Lung Disease: N/A       Peripheral Arterial Disease: N/A       Chronic Kidney Disease (if yes, what is GFR): N/A       Uncontrolled Diabetes (if yes, what is HgbA1C or FBS): N/A       Poorly Controlled Hypertension (if yes, what is SBP): N/A       Morbid Obesity (if yes, what is BMI): N/A       History of Recent Ventricular Arrhythmia: N/A       Inability to Ambulate Safely: N/A       Need for Therapeutic Anticoagulation: N/A       Antiplatelet or Contrast Allergy: N/A    MEDICATIONS:  Home Medications:  Farxiga 10 mg oral tablet: 1 tab(s) orally once a day (05 Dec 2023 14:28)  levothyroxine 75 mcg (0.075 mg) oral tablet: 1 tab(s) orally once a day (05 Dec 2023 14:28)  Metoprolol Succinate ER 50 mg oral tablet, extended release: 1 tab(s) orally once a day (05 Dec 2023 14:28)  montelukast 10 mg oral tablet: 1 tab(s) orally once a day (05 Dec 2023 14:28)  NexIUM 40 mg oral delayed release capsule: 1 cap(s) orally once a day (at bedtime) (05 Dec 2023 14:28)  NovoLOG 100 units/mL injectable solution: 10,10,15 unit(s) injectable 3 times a day (before meals) (05 Dec 2023 14:28)  ranolazine 500 mg oral tablet, extended release: 1 tab(s) orally 2 times a day (05 Dec 2023 14:28)  spironolactone 25 mg oral tablet: 1 tab(s) orally once a day (05 Dec 2023 14:28)  Entresto 24/26mg oral twice daily           ROS: as stated above, otherwise negative    PHYSICAL EXAM:  Constitutional: A & O x 3  HEENT:   Normal oral mucosa, PERRL, EOMI	  Cardiovascular: Normal S1 S2, No JVD, No murmurs  Respiratory: Lungs clear to auscultation	  Gastrointestinal:  Soft, Non-tender, + BS	  Skin: No rashes, No ecchymoses, No cyanosis  Neurologic: Non-focal  Extremities: Normal range of motion, no edema      ECG:  	  NSR 77bpm LBBB     LABS:	 	                          13.8   8.39  )-----------( 315      ( 05 Dec 2023 14:35 )             42.9   12-05    133<L>  |  96  |  49.4<H>  ----------------------------<  148<H>  5.8<H>   |  27.0  |  2.02<H>    Ca    8.8      05 Dec 2023 14:35  Mg     2.6     12-05    TPro  7.1  /  Alb  3.5  /  TBili  0.3<L>  /  DBili  x   /  AST  25  /  ALT  9   /  AlkPhos  86  12-05    PT/INR - ( 05 Dec 2023 14:35 )   PT: 11.3 sec;   INR: 1.02 ratio         PTT - ( 05 Dec 2023 14:35 )  PTT:31.3 sec                                                                                   Department of Cardiology                                                                  [facility]/[place] [address]                                                            Telephone: [phone]. 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 Pt was hospitalized and diuresed, EP was consulted for ICD, patient with ischemic cardiomyopathy with depressed LV function despite GDMT > 3months.  Patient meets criteria for primary prevention ICD. Given LBBB would benefit form resynchronization therapy. Now presents to presurgical testing for upcoming ICD BIV implant on 12/21/2023 with Dr. Bazzi. Denies CP, palpitations, dizziness, and LE edema. Endorses intermittent JOINER and fatigue     Heart Failure: YES        Systolic/Diastolic/Combined: Combined         NYHA Class (within 2 weeks): III-IV    Noninvasive Testing:   < from: TTE Echo Complete w/ Contrast w/ Doppler (10.26.23 @ 12:30) >  Summary:   1. Endocardial visualization was enhanced with intravenous echo contrast.   2. Moderately enlarged left atrium.   3. Left ventricular ejection fraction, by visual estimation, is <20%.   4. Grade III diastolic dysfunction. Elevated mean left atrial pressure.   5. Mildly enlarged right atrium.   6. Mildly enlarged right ventricle. Severely reduced RV systolic   function.   7. Moderate mitral valve regurgitation.   8. Mild aortic regurgitation.   9. Mild tricuspid regurgitation.  10. Estimated pulmonary artery systolic pressure is 51 mmHg -moderate   pulmonary hypertension.  11. There is no evidence of pericardial effusion.      < from: Cardiac Catheterization (11.20.23 @ 12:07) >  Diagnostic Conclusions:     LIMA to LAD with mild disease. Retrograde filling of RCA, circumflex  from distal LAD.  Aortogram performed with catheter in ascending aorta- No other graft  noted. Ostial occlusion of SVG to OM.  LVEDP Recommendations:     Aggressive medical management.    Admission for EP/Advanced CHF team management.        Prior EP Procedures: none   Prior CTS:    s/p 4V CABG (1993 at [facility])      Antianginal Therapies:        Beta Blockers:         Calcium Channel Blockers:        Long Acting Nitrates:        Ranexa: RAnexa 500mg BID     Associated Risk Factors:        Cerebrovascular Disease: N/A       Chronic Lung Disease: N/A       Peripheral Arterial Disease: N/A       Chronic Kidney Disease (if yes, what is GFR): N/A       Uncontrolled Diabetes (if yes, what is HgbA1C or FBS): N/A       Poorly Controlled Hypertension (if yes, what is SBP): N/A       Morbid Obesity (if yes, what is BMI): N/A       History of Recent Ventricular Arrhythmia: N/A       Inability to Ambulate Safely: N/A       Need for Therapeutic Anticoagulation: N/A       Antiplatelet or Contrast Allergy: N/A    MEDICATIONS:  Home Medications:  Farxiga 10 mg oral tablet: 1 tab(s) orally once a day (05 Dec 2023 14:28)  levothyroxine 75 mcg (0.075 mg) oral tablet: 1 tab(s) orally once a day (05 Dec 2023 14:28)  Metoprolol Succinate ER 50 mg oral tablet, extended release: 1 tab(s) orally once a day (05 Dec 2023 14:28)  montelukast 10 mg oral tablet: 1 tab(s) orally once a day (05 Dec 2023 14:28)  NexIUM 40 mg oral delayed release capsule: 1 cap(s) orally once a day (at bedtime) (05 Dec 2023 14:28)  NovoLOG 100 units/mL injectable solution: 10,10,15 unit(s) injectable 3 times a day (before meals) (05 Dec 2023 14:28)  ranolazine 500 mg oral tablet, extended release: 1 tab(s) orally 2 times a day (05 Dec 2023 14:28)  spironolactone 25 mg oral tablet: 1 tab(s) orally once a day (05 Dec 2023 14:28)  Entresto 24/26mg oral twice daily           ROS: as stated above, otherwise negative    PHYSICAL EXAM:  Constitutional: A & O x 3  HEENT:   Normal oral mucosa, PERRL, EOMI	  Cardiovascular: Normal S1 S2, No JVD, No murmurs  Respiratory: Lungs clear to auscultation	  Gastrointestinal:  Soft, Non-tender, + BS	  Skin: No rashes, No ecchymoses, No cyanosis  Neurologic: Non-focal  Extremities: Normal range of motion, no edema      ECG:  	  NSR 77bpm LBBB     LABS:	 	                          13.8   8.39  )-----------( 315      ( 05 Dec 2023 14:35 )             42.9   12-05    133<L>  |  96  |  49.4<H>  ----------------------------<  148<H>  5.8<H>   |  27.0  |  2.02<H>    Ca    8.8      05 Dec 2023 14:35  Mg     2.6     12-05    TPro  7.1  /  Alb  3.5  /  TBili  0.3<L>  /  DBili  x   /  AST  25  /  ALT  9   /  AlkPhos  86  12-05    PT/INR - ( 05 Dec 2023 14:35 )   PT: 11.3 sec;   INR: 1.02 ratio         PTT - ( 05 Dec 2023 14:35 )  PTT:31.3 sec

## 2023-12-05 NOTE — H&P PST ADULT - ASSESSMENT
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 Pt was hospitalized and diuresed, EP was consulted for ICD, patient with ischemic cardiomyopathy with depressed LV function despite GDMT > 3months.  Patient meets criteria for primary prevention ICD. Given LBBB would benefit form resynchronization therapy. Now presents to presurgical testing for upcoming ICD BIV implant on 12/21/2023 with Dr. Bazzi.     ASA:  MALLAMPATI:      Plan/Recommendations:   -patient seen and examined  -ECG and Labs reviewed  -NPO after midnight prior with exception of sip of water with morning medications  -Continue/Hold antiarrhythmics ................................  -Hold ***  -START ****  -Hold Jardiance, Farxiga, Synjardy, Steglatro, Xigduo XR, Irvokana for 4  days pre procedure  - Gold GLP-1 medication 1 week prior if on oral hold the day of.  -Coumadin may be continued as long as inr is 2-3 and check the am of procedure.   -Pre-procedure instructions provided (verbal & written)   -Supplies and verbal/written Instructions for pre-surgical chlorhexadine shower provided*****  -Consent to be obtained by attending electrophysiologist on the scheduled procedure date  - Watchman implant Hold DOAC/NOAC 48 hrs prior to date of procedure.   -PPM hold DOAC/NOAC 48 hrs prior to implant.   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 Pt was hospitalized and diuresed, EP was consulted for ICD, patient with ischemic cardiomyopathy with depressed LV function despite GDMT > 3months.  Patient meets criteria for primary prevention ICD. Given LBBB would benefit form resynchronization therapy. Now presents to presurgical testing for upcoming ICD BIV implant on 12/21/2023 with Dr. Bazzi.     ASA:  MALLAMPATI:      Plan/Recommendations:   -patient seen and examined  -ECG and Labs reviewed  -NPO after midnight prior with exception of sip of water with morning medications  -Continue/Hold antiarrhythmics ................................  -Hold ***  -START ****  -Hold Jardiance, Farxiga, Synjardy, Steglatro, Xigduo XR, Irvokana for 4  days pre procedure  - Gold GLP-1 medication 1 week prior if on oral hold the day of.  -Coumadin may be continued as long as inr is 2-3 and check the am of procedure.   -Pre-procedure instructions provided (verbal & written)   -Supplies and verbal/written Instructions for pre-surgical chlorhexadine shower provided*****  -Consent to be obtained by attending electrophysiologist on the scheduled procedure date  - Watchman implant Hold DOAC/NOAC 48 hrs prior to date of procedure.   -PPM hold DOAC/NOAC 48 hrs prior to implant.   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 Pt was hospitalized and diuresed, EP was consulted for ICD, patient with ischemic cardiomyopathy with depressed LV function despite GDMT > 3months.  Patient meets criteria for primary prevention ICD. Given LBBB would benefit form resynchronization therapy. Now presents to presurgical testing for upcoming ICD BIV implant on 12/21/2023 with Dr. Bazzi.     ASA: 4  MALLAMPATI: 2      Plan/Recommendations:   -patient seen and examined  -ECG and Labs reviewed  -NPO after midnight prior with exception of sip of water with morning medications  -Hold Jardiance x 4 days prior last dose will be 12/17/2023  -Take half dose of Basaglar insulin (7units SQ) evening before procedure  -hold Novolog insulin day of procedure  -given h/o epistaxis, no recent stent advised pt to hold ASA/Brilinta morning of procedure     -Pre-procedure instructions provided (verbal & written)   -Supplies and verbal/written Instructions for pre-surgical chlorhexadine shower provided  -Consent to be obtained by attending electrophysiologist on the scheduled procedure date   77 y/o male, PMHx significant for CAD, STEMI, NSTEMI, 4V CABG, multiple PCI's, cardiogenic shock, HFrEF (EF < 20%) with several admissions for decompensated HF with multi organ system failure, DM2 (on insulin), 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 on 11/20/23 for LHC which revealed a patent LIMA with all other grafts closed, LVEDP 36. Pt was hospitalized and diuresed, EP was consulted for ICD, patient with ischemic cardiomyopathy with depressed LV function despite GDMT > 3months.  Patient meets criteria for primary prevention ICD. Given LBBB would benefit form resynchronization therapy. Now presents to presurgical testing for upcoming ICD BIV implant on 12/21/2023 with Dr. Bazzi.     ASA: 4  MALLAMPATI: 2      Plan/Recommendations:   -patient seen and examined  -ECG and Labs reviewed  -NPO after midnight prior with exception of sip of water with morning medications  -Hold Jardiance x 4 days prior last dose will be 12/17/2023  -Take half dose of Basaglar insulin (7units SQ) evening before procedure  -hold Novolog insulin day of procedure  -given h/o epistaxis, no recent stent advised pt to hold ASA/Brilinta morning of procedure     -Pre-procedure instructions provided (verbal & written)   -Supplies and verbal/written Instructions for pre-surgical chlorhexadine shower provided  -Consent to be obtained by attending electrophysiologist on the scheduled procedure date   79 y/o male, PMHx significant for CAD, STEMI, NSTEMI, 4V CABG, multiple PCI's, cardiogenic shock, HFrEF (EF < 20%) with several admissions for decompensated HF with multi organ system failure, DM2 (on insulin), 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 Pt was hospitalized and diuresed, EP was consulted for ICD, patient with ischemic cardiomyopathy with depressed LV function despite GDMT > 3months.  Patient meets criteria for primary prevention ICD. Given LBBB would benefit form resynchronization therapy. Now presents to presurgical testing for upcoming ICD BIV implant on 12/21/2023 with Dr. Bazzi.     ASA: 4  MALLAMPATI: 2      Plan/Recommendations:   -patient seen and examined  -ECG and Labs reviewed: pt with elevated K 5.8 and BUN 49.5, discussed with Dr. Valenzuela and patient please hold Aldactone, avoid K rich foods. REPEAT BMP morning of procedure(creat 2.02 close to baseline)  -NPO after midnight prior with exception of sip of water with morning medications  -Hold Jardiance x 4 days prior last dose will be 12/17/2023  -Take half dose of Basaglar insulin (7units SQ) evening before procedure  -hold Novolog insulin day of procedure  -given h/o epistaxis, no recent stent advised pt to hold ASA/Brilinta morning of procedure     -Pre-procedure instructions provided (verbal & written)   -Supplies and verbal/written Instructions for pre-surgical chlorhexadine shower provided  -Consent to be obtained by attending electrophysiologist on the scheduled procedure date   79 y/o male, PMHx significant for CAD, STEMI, NSTEMI, 4V CABG, multiple PCI's, cardiogenic shock, HFrEF (EF < 20%) with several admissions for decompensated HF with multi organ system failure, DM2 (on insulin), 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 on 11/20/23 for LHC which revealed a patent LIMA with all other grafts closed, LVEDP 36. Pt was hospitalized and diuresed, EP was consulted for ICD, patient with ischemic cardiomyopathy with depressed LV function despite GDMT > 3months.  Patient meets criteria for primary prevention ICD. Given LBBB would benefit form resynchronization therapy. Now presents to presurgical testing for upcoming ICD BIV implant on 12/21/2023 with Dr. Bazzi.     ASA: 4  MALLAMPATI: 2      Plan/Recommendations:   -patient seen and examined  -ECG and Labs reviewed: pt with elevated K 5.8 and BUN 49.5, discussed with Dr. Valenzuela and patient please hold Aldactone, avoid K rich foods. REPEAT BMP morning of procedure(creat 2.02 close to baseline)  -NPO after midnight prior with exception of sip of water with morning medications  -Hold Jardiance x 4 days prior last dose will be 12/17/2023  -Take half dose of Basaglar insulin (7units SQ) evening before procedure  -hold Novolog insulin day of procedure  -given h/o epistaxis, no recent stent advised pt to hold ASA/Brilinta morning of procedure     -Pre-procedure instructions provided (verbal & written)   -Supplies and verbal/written Instructions for pre-surgical chlorhexadine shower provided  -Consent to be obtained by attending electrophysiologist on the scheduled procedure date

## 2023-12-19 ENCOUNTER — APPOINTMENT (OUTPATIENT)
Dept: ENDOCRINOLOGY | Facility: CLINIC | Age: 78
End: 2023-12-19

## 2023-12-19 ENCOUNTER — APPOINTMENT (OUTPATIENT)
Dept: ELECTROPHYSIOLOGY | Facility: CLINIC | Age: 78
End: 2023-12-19
Payer: MEDICARE

## 2023-12-19 ENCOUNTER — APPOINTMENT (OUTPATIENT)
Dept: CARDIOLOGY | Facility: CLINIC | Age: 78
End: 2023-12-19
Payer: MEDICARE

## 2023-12-19 VITALS
WEIGHT: 149 LBS | HEIGHT: 67 IN | SYSTOLIC BLOOD PRESSURE: 98 MMHG | OXYGEN SATURATION: 96 % | BODY MASS INDEX: 23.39 KG/M2 | HEART RATE: 93 BPM | DIASTOLIC BLOOD PRESSURE: 58 MMHG

## 2023-12-19 PROBLEM — E03.9 HYPOTHYROIDISM, UNSPECIFIED: Chronic | Status: ACTIVE | Noted: 2023-12-05

## 2023-12-19 PROCEDURE — 93000 ELECTROCARDIOGRAM COMPLETE: CPT

## 2023-12-19 PROCEDURE — 99215 OFFICE O/P EST HI 40 MIN: CPT | Mod: 25

## 2023-12-19 PROCEDURE — 99214 OFFICE O/P EST MOD 30 MIN: CPT | Mod: 25

## 2023-12-19 RX ORDER — SPIRONOLACTONE 25 MG/1
25 TABLET ORAL DAILY
Refills: 0 | Status: DISCONTINUED | COMMUNITY
End: 2023-12-19

## 2023-12-19 NOTE — DISCUSSION/SUMMARY
[Patient] : the patient [Risks] : risks [Benefits] : benefits [Alternatives] : alternatives [With Me] : with me [___ Month(s)] : in [unfilled] month(s) [EKG obtained to assist in diagnosis and management of assessed problem(s)] : EKG obtained to assist in diagnosis and management of assessed problem(s) [FreeTextEntry1] : This is a 77 year old male with history HTN, DM, HLD, CAD, h/o MI,  s/p CABG, s/p PCIx4, recent unstable angina s/p PCI with CODI to SVG to LCX,     1) CHF:  stable optimized. restart  farxiga after procedure.  take extra diuretics today.  holding brillinta.  2)  Angina pectoris:  stable coronary artery disease  PCI and brachy therapy recent PCI to SVG.  nov 2020 ct Dual antiplatelet therapy .aspirin. brillinta.  ct lipitor 40 mg daily .  ranexa 500 mg Q12   aspirin and  ct brillinta      2) CAD, s/p CABG, s/p PCI: ASA, c ct Brilinta ,   3) HTN:  CHF meds.   4) ICM: EF    .  toprol 50 mg daily .    farxiga daily .  torsemide 20 mg.  metolazone  2.5 every  5 days.   plan for CRT-D  off spironolactone. reevaluate next visit. holding farxiga for procedure.  7) hypothyroidism:  and Diabetes Mellitus : endocrine follows up.  8) Elevated lipoprotein a  : on statins.    LDL goal < 70

## 2023-12-19 NOTE — HISTORY OF PRESENT ILLNESS
[FreeTextEntry1] : f/u  For: HTN, HLD, CAD s/p CABg x4, Angina, recent PCI   HPI for today: :  he is scheduled for CRT- D dec 21.   he had stopped farxiga for yesterday.  and he started noticing leg edmea.  and soem chest pain. he  took ntg. and he felt better.   he is holding nbrillinta.       old note: lorenzo has leg edema.  metolazone 2 doses did not work.   also did not like metoazloen.  he was takign lasix .  he is not able to sleep.   he is scheduled for [facility] heart cath.   old note: has cough with producitve sputum.   he has been taking abx for a long time now 1 mth.  the symptosm are not going away.   no leg edema. no dyspnea on exertion .  no dizizness.  ECg : Sinus Rhythm -frequent ectopic ventricular beats  # VECs = 2 -Left bundle branch block. ABNORMAL  old note:  feels good. no chest pain . no dyspnea.  no dizziness. no syncope. complaitnw ith emed.s  he gets cough with draiy products.  also his thyroid is abnormal and started on meds.  old note:  feels good. no new symptoms. a1C : 6.6 .   no leg jose,a.  can ambulate  and he goes around [place] and [employer].  no dyspnea on exertion .  no leg edema. one pillow use.   old note:  : feels good. no chestp ain. no headaches. no dyspnea on exertion . no LE edema. compliant with meds. Patient has been having left ear pain and left parotid swelling and enlargement. he had an infection. he is currently on abx. 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 he had gone to [facility]. after the proedure patient felt that his sugar was low.   he had transient LOC and was intubated. Had ? cardiac arrest bradycardia. She ahd eleavted lactate and eleavted trops. he was intuabted for few hours and erxtrubated the same night. the next day lactate improved. LVEf in the hspital was 40-45%.  I recommened patient to be dishcarged.  no cardiac cath or intervention. Patient was sdischarged the next day feels good now. No headhcaes. no diziznes.s . no chest pain   old note: HPI for today: feels good. got Brachytherapy done at [facility].  had angina for 3 days after.  continues to take Nitrates.  did not make the appt for diabetes doctor yet. Hb A 1 C still elevated. Strongly recommended endocrinologist for aggressive sugar control Complaitn with meds. BP has been increasing now.   OLD NOTE: 6/12/2019 c/c: chest pain  Patient had an episode of chest pain, substernal with epigastric and abdominal pain.  2 days ago, it releived by NTG.  he had another epsiode today, when he was going back from the [employer]. 6/10, subternal.also with abdominal pain. he has been taking mylanta too for acid reflux . also , he has been recnetly started on azithromycin for bronchitis,. He states the second episode happened today and it got relived with NTG. he has been taking IMDUR every day.    old note: no chest pain.no dyspnea,. no headaches. no dizziness.  difficulty walking foot problem. seein neurologist. NO Peripheral vascular disease . patient got US at a [facility] doctor.

## 2023-12-19 NOTE — CARDIOLOGY SUMMARY
[___] : [unfilled] [LVEF ___%] : LVEF [unfilled]% [Severe] : severe LV dysfunction [None] : no pulmonary hypertension [Enlarged] : enlarged LA size [Mild] : mild mitral regurgitation [___] : [unfilled] [de-identified] : 12/19 2023:  11 7 2023 : Sinus Rhythm  -Left bundle branch block. -Left atrial enlargement.  ABNORMAL 3 29 2022  Sinus  Rhythm  -Left bundle branch block.   ABNORMAL    10 20 2021: Sinus  Rhythm  -Left bundle branch block and left axis.   -Left atrial enlargement.   ABNORMAL    6/15/2021 Sinus  Rhythm  -Left bundle branch block.   -Left atrial enlargement.   ABNORMAL   [de-identified] : aug 2022  PVCs: no signifciant arrhythmias  [de-identified] : may 2022:  MUGA : 38%  [de-identified] : may 2022:   LVEF 36%.  Normal Rv. ,mild MR.  \par  july 2021:  LVEF 37%  Moderte MR.  Severe pulm HTN.  [de-identified] : MUGA scan : june 2021:  LVEF 39%.

## 2023-12-21 ENCOUNTER — INPATIENT (INPATIENT)
Facility: HOSPITAL | Age: 78
LOS: 0 days | Discharge: HOME | DRG: 277 | End: 2023-12-22
Attending: INTERNAL MEDICINE | Admitting: INTERNAL MEDICINE
Payer: MEDICARE

## 2023-12-21 ENCOUNTER — TRANSCRIPTION ENCOUNTER (OUTPATIENT)
Age: 78
End: 2023-12-21

## 2023-12-21 ENCOUNTER — RESULT REVIEW (OUTPATIENT)
Age: 78
End: 2023-12-21

## 2023-12-21 VITALS
TEMPERATURE: 98 F | SYSTOLIC BLOOD PRESSURE: 115 MMHG | HEART RATE: 91 BPM | OXYGEN SATURATION: 99 % | DIASTOLIC BLOOD PRESSURE: 63 MMHG | RESPIRATION RATE: 15 BRPM

## 2023-12-21 DIAGNOSIS — Z95.1 PRESENCE OF AORTOCORONARY BYPASS GRAFT: Chronic | ICD-10-CM

## 2023-12-21 DIAGNOSIS — Z92.3 PERSONAL HISTORY OF IRRADIATION: Chronic | ICD-10-CM

## 2023-12-21 DIAGNOSIS — Z96.7 PRESENCE OF OTHER BONE AND TENDON IMPLANTS: Chronic | ICD-10-CM

## 2023-12-21 DIAGNOSIS — I50.1 LEFT VENTRICULAR FAILURE, UNSPECIFIED: ICD-10-CM

## 2023-12-21 LAB
ANION GAP SERPL CALC-SCNC: 12 MMOL/L — SIGNIFICANT CHANGE UP (ref 5–17)
ANION GAP SERPL CALC-SCNC: 12 MMOL/L — SIGNIFICANT CHANGE UP (ref 5–17)
BUN SERPL-MCNC: 44.5 MG/DL — HIGH (ref 8–20)
BUN SERPL-MCNC: 44.5 MG/DL — HIGH (ref 8–20)
CALCIUM SERPL-MCNC: 9 MG/DL — SIGNIFICANT CHANGE UP (ref 8.4–10.5)
CALCIUM SERPL-MCNC: 9 MG/DL — SIGNIFICANT CHANGE UP (ref 8.4–10.5)
CHLORIDE SERPL-SCNC: 95 MMOL/L — LOW (ref 96–108)
CHLORIDE SERPL-SCNC: 95 MMOL/L — LOW (ref 96–108)
CO2 SERPL-SCNC: 30 MMOL/L — HIGH (ref 22–29)
CO2 SERPL-SCNC: 30 MMOL/L — HIGH (ref 22–29)
CREAT SERPL-MCNC: 1.94 MG/DL — HIGH (ref 0.5–1.3)
CREAT SERPL-MCNC: 1.94 MG/DL — HIGH (ref 0.5–1.3)
EGFR: 35 ML/MIN/1.73M2 — LOW
EGFR: 35 ML/MIN/1.73M2 — LOW
GLUCOSE BLDC GLUCOMTR-MCNC: 150 MG/DL — HIGH (ref 70–99)
GLUCOSE BLDC GLUCOMTR-MCNC: 150 MG/DL — HIGH (ref 70–99)
GLUCOSE BLDC GLUCOMTR-MCNC: 171 MG/DL — HIGH (ref 70–99)
GLUCOSE BLDC GLUCOMTR-MCNC: 171 MG/DL — HIGH (ref 70–99)
GLUCOSE SERPL-MCNC: 193 MG/DL — HIGH (ref 70–99)
GLUCOSE SERPL-MCNC: 193 MG/DL — HIGH (ref 70–99)
POTASSIUM SERPL-MCNC: 5.2 MMOL/L — SIGNIFICANT CHANGE UP (ref 3.5–5.3)
POTASSIUM SERPL-MCNC: 5.2 MMOL/L — SIGNIFICANT CHANGE UP (ref 3.5–5.3)
POTASSIUM SERPL-SCNC: 5.2 MMOL/L — SIGNIFICANT CHANGE UP (ref 3.5–5.3)
POTASSIUM SERPL-SCNC: 5.2 MMOL/L — SIGNIFICANT CHANGE UP (ref 3.5–5.3)
SODIUM SERPL-SCNC: 137 MMOL/L — SIGNIFICANT CHANGE UP (ref 135–145)
SODIUM SERPL-SCNC: 137 MMOL/L — SIGNIFICANT CHANGE UP (ref 135–145)

## 2023-12-21 PROCEDURE — 93010 ELECTROCARDIOGRAM REPORT: CPT

## 2023-12-21 PROCEDURE — 71045 X-RAY EXAM CHEST 1 VIEW: CPT | Mod: 26

## 2023-12-21 PROCEDURE — 33249 INSJ/RPLCMT DEFIB W/LEAD(S): CPT | Mod: 59

## 2023-12-21 PROCEDURE — 33225 L VENTRIC PACING LEAD ADD-ON: CPT

## 2023-12-21 RX ORDER — DAPAGLIFLOZIN 10 MG/1
10 TABLET, FILM COATED ORAL EVERY 24 HOURS
Refills: 0 | Status: DISCONTINUED | OUTPATIENT
Start: 2023-12-21 | End: 2023-12-22

## 2023-12-21 RX ORDER — METOPROLOL TARTRATE 50 MG
50 TABLET ORAL DAILY
Refills: 0 | Status: DISCONTINUED | OUTPATIENT
Start: 2023-12-21 | End: 2023-12-22

## 2023-12-21 RX ORDER — SPIRONOLACTONE 25 MG/1
1 TABLET, FILM COATED ORAL
Refills: 0 | DISCHARGE

## 2023-12-21 RX ORDER — SACUBITRIL AND VALSARTAN 24; 26 MG/1; MG/1
1 TABLET, FILM COATED ORAL
Refills: 0 | Status: DISCONTINUED | OUTPATIENT
Start: 2023-12-21 | End: 2023-12-22

## 2023-12-21 RX ORDER — TICAGRELOR 90 MG/1
90 TABLET ORAL EVERY 12 HOURS
Refills: 0 | Status: DISCONTINUED | OUTPATIENT
Start: 2023-12-21 | End: 2023-12-22

## 2023-12-21 RX ORDER — ATORVASTATIN CALCIUM 80 MG/1
40 TABLET, FILM COATED ORAL AT BEDTIME
Refills: 0 | Status: DISCONTINUED | OUTPATIENT
Start: 2023-12-21 | End: 2023-12-22

## 2023-12-21 RX ORDER — DEXTROSE 50 % IN WATER 50 %
25 SYRINGE (ML) INTRAVENOUS ONCE
Refills: 0 | Status: DISCONTINUED | OUTPATIENT
Start: 2023-12-21 | End: 2023-12-22

## 2023-12-21 RX ORDER — RANOLAZINE 500 MG/1
500 TABLET, FILM COATED, EXTENDED RELEASE ORAL
Refills: 0 | Status: DISCONTINUED | OUTPATIENT
Start: 2023-12-21 | End: 2023-12-22

## 2023-12-21 RX ORDER — SODIUM CHLORIDE 9 MG/ML
1000 INJECTION, SOLUTION INTRAVENOUS
Refills: 0 | Status: DISCONTINUED | OUTPATIENT
Start: 2023-12-21 | End: 2023-12-22

## 2023-12-21 RX ORDER — ACETAMINOPHEN 500 MG
650 TABLET ORAL EVERY 6 HOURS
Refills: 0 | Status: DISCONTINUED | OUTPATIENT
Start: 2023-12-21 | End: 2023-12-22

## 2023-12-21 RX ORDER — DEXTROSE 50 % IN WATER 50 %
12.5 SYRINGE (ML) INTRAVENOUS ONCE
Refills: 0 | Status: DISCONTINUED | OUTPATIENT
Start: 2023-12-21 | End: 2023-12-22

## 2023-12-21 RX ORDER — INSULIN ASPART 100 [IU]/ML
15 INJECTION, SOLUTION SUBCUTANEOUS
Refills: 0 | Status: DISCONTINUED | OUTPATIENT
Start: 2023-12-21 | End: 2023-12-22

## 2023-12-21 RX ORDER — MONTELUKAST 4 MG/1
10 TABLET, CHEWABLE ORAL DAILY
Refills: 0 | Status: DISCONTINUED | OUTPATIENT
Start: 2023-12-21 | End: 2023-12-22

## 2023-12-21 RX ORDER — LEVOTHYROXINE SODIUM 125 MCG
75 TABLET ORAL DAILY
Refills: 0 | Status: DISCONTINUED | OUTPATIENT
Start: 2023-12-21 | End: 2023-12-22

## 2023-12-21 RX ORDER — DEXTROSE 50 % IN WATER 50 %
15 SYRINGE (ML) INTRAVENOUS ONCE
Refills: 0 | Status: DISCONTINUED | OUTPATIENT
Start: 2023-12-21 | End: 2023-12-22

## 2023-12-21 RX ORDER — ALPRAZOLAM 0.25 MG
0.25 TABLET ORAL EVERY 8 HOURS
Refills: 0 | Status: DISCONTINUED | OUTPATIENT
Start: 2023-12-21 | End: 2023-12-22

## 2023-12-21 RX ORDER — ASPIRIN/CALCIUM CARB/MAGNESIUM 324 MG
81 TABLET ORAL DAILY
Refills: 0 | Status: DISCONTINUED | OUTPATIENT
Start: 2023-12-21 | End: 2023-12-22

## 2023-12-21 RX ORDER — OXYCODONE HYDROCHLORIDE 5 MG/1
5 TABLET ORAL EVERY 6 HOURS
Refills: 0 | Status: DISCONTINUED | OUTPATIENT
Start: 2023-12-21 | End: 2023-12-22

## 2023-12-21 RX ORDER — INSULIN LISPRO 100/ML
VIAL (ML) SUBCUTANEOUS
Refills: 0 | Status: DISCONTINUED | OUTPATIENT
Start: 2023-12-21 | End: 2023-12-22

## 2023-12-21 RX ORDER — CEFAZOLIN SODIUM 1 G
2000 VIAL (EA) INJECTION EVERY 8 HOURS
Refills: 0 | Status: COMPLETED | OUTPATIENT
Start: 2023-12-21 | End: 2023-12-22

## 2023-12-21 RX ORDER — INSULIN GLARGINE 100 [IU]/ML
15 INJECTION, SOLUTION SUBCUTANEOUS AT BEDTIME
Refills: 0 | Status: DISCONTINUED | OUTPATIENT
Start: 2023-12-21 | End: 2023-12-22

## 2023-12-21 RX ORDER — CEFAZOLIN SODIUM 1 G
2000 VIAL (EA) INJECTION EVERY 8 HOURS
Refills: 0 | Status: DISCONTINUED | OUTPATIENT
Start: 2023-12-21 | End: 2023-12-21

## 2023-12-21 RX ORDER — PANTOPRAZOLE SODIUM 20 MG/1
40 TABLET, DELAYED RELEASE ORAL
Refills: 0 | Status: DISCONTINUED | OUTPATIENT
Start: 2023-12-21 | End: 2023-12-22

## 2023-12-21 RX ORDER — GLUCAGON INJECTION, SOLUTION 0.5 MG/.1ML
1 INJECTION, SOLUTION SUBCUTANEOUS ONCE
Refills: 0 | Status: DISCONTINUED | OUTPATIENT
Start: 2023-12-21 | End: 2023-12-22

## 2023-12-21 RX ORDER — BENZOCAINE AND MENTHOL 5; 1 G/100ML; G/100ML
1 LIQUID ORAL
Refills: 0 | Status: DISCONTINUED | OUTPATIENT
Start: 2023-12-21 | End: 2023-12-22

## 2023-12-21 RX ORDER — ONDANSETRON 8 MG/1
4 TABLET, FILM COATED ORAL EVERY 6 HOURS
Refills: 0 | Status: DISCONTINUED | OUTPATIENT
Start: 2023-12-21 | End: 2023-12-22

## 2023-12-21 RX ADMIN — Medication 2000 MILLIGRAM(S): at 22:15

## 2023-12-21 RX ADMIN — TICAGRELOR 90 MILLIGRAM(S): 90 TABLET ORAL at 18:29

## 2023-12-21 RX ADMIN — ATORVASTATIN CALCIUM 40 MILLIGRAM(S): 80 TABLET, FILM COATED ORAL at 22:15

## 2023-12-21 RX ADMIN — DAPAGLIFLOZIN 10 MILLIGRAM(S): 10 TABLET, FILM COATED ORAL at 22:15

## 2023-12-21 RX ADMIN — RANOLAZINE 500 MILLIGRAM(S): 500 TABLET, FILM COATED, EXTENDED RELEASE ORAL at 18:29

## 2023-12-21 RX ADMIN — INSULIN GLARGINE 15 UNIT(S): 100 INJECTION, SOLUTION SUBCUTANEOUS at 22:15

## 2023-12-21 NOTE — ASU PATIENT PROFILE, ADULT - FALL HARM RISK - HARM RISK INTERVENTIONS
Communicate Risk of Fall with Harm to all staff/Reinforce activity limits and safety measures with patient and family/Tailored Fall Risk Interventions/Visual Cue: Yellow wristband and red socks/Bed in lowest position, wheels locked, appropriate side rails in place/Call bell, personal items and telephone in reach/Instruct patient to call for assistance before getting out of bed or chair/Non-slip footwear when patient is out of bed/[place] to call system/Physically safe environment - no spills, clutter or unnecessary equipment/Purposeful Proactive Rounding/Room/bathroom lighting operational, light cord in reach Communicate Risk of Fall with Harm to all staff/Reinforce activity limits and safety measures with patient and family/Tailored Fall Risk Interventions/Visual Cue: Yellow wristband and red socks/Bed in lowest position, wheels locked, appropriate side rails in place/Call bell, personal items and telephone in reach/Instruct patient to call for assistance before getting out of bed or chair/Non-slip footwear when patient is out of bed/[place] to call system/Physically safe environment - no spills, clutter or unnecessary equipment/Purposeful Proactive Rounding/Room/bathroom lighting operational, light cord in reach

## 2023-12-21 NOTE — DISCHARGE NOTE PROVIDER - HOSPITAL COURSE
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  Pt with recent admission to [facility] on 11/20/23-11/22/23 for decompensated HF with multi-organ system failure. During hospitalization patient had LHC which revealed a patent LIMA with all other grafts closed, LVEDP 36.  No intervention was performed.  EP was consulted during hospitalization for primary prevention ICD given pt's ischemic cardiomyopathy with depressed LV function despite GDMT > 3months.  Patient meets criteria for primary prevention ICD and since he has a baseline LBBB would benefit form resynchronization therapy.      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  Pt with recent admission to [facility] on 11/20/23-11/22/23 for decompensated HF with multi-organ system failure. During hospitalization patient had LHC which revealed a patent LIMA with all other grafts closed, LVEDP 36.  No intervention was performed.  EP was consulted during hospitalization for primary prevention ICD given pt's ischemic cardiomyopathy with depressed LV function despite GDMT > 3months.  Patient meets criteria for primary prevention ICD and since he has a baseline LBBB would benefit form resynchronization therapy.      Pt was medically optimized and now is now status post uncomplicated BiV ICD implant with Dr Bazzi

## 2023-12-21 NOTE — DISCHARGE NOTE PROVIDER - NSDCCPTREATMENT_GEN_ALL_CORE_FT
PRINCIPAL PROCEDURE  Procedure: Insertion, cardiac pacemaker, biventricular, with ICD insertion  Findings and Treatment: Cardiac Device Implant Post Operative Instructions  - Do not touch the incision until it is completely healed.   - There are Steristrips (white strips of tape) on your incision, which will start to peel off on their own over the next 2-3 weeks. Do not pick at or peel off the Steristrips.   - Bruising around the implant site or over the chest, side or arm near the incision is normal, and will take a few weeks to resolve.  -Do not lift the affected arm higher than 90 degrees (shoulder height) in any direction for 4 weeks.   - Do not push, pull or lift anything heavier than 10 lbs (about a gallon of milk) with the affected arm for 4 weeks.     - Do not apply soaps, creams, lotions, ointments or powders to the incision until it is completely healed.  - You may take a shower in 24 hours, and allow the water to run over the incision. However, do not submerge the incision in water: do not swim or soak in bath tubs, hot tubs, swimming pools, etc.   You should call the doctor if:   - You notice redness, drainage, swelling, increased tenderness, hot sensation around the incision, bleeding or incision edges pulling apart.  - Your temperature is greater than 100 degrees F for more than 24 hours.  - You notice swelling or bulging at the incision or around the device that was not there when you left the hospital or is increasing in size.  - You experience increased difficulty breathing.  - You notice new/worsening swelling in your legs and ankles.  - You faint or have dizzy spells.  - You have any questions or concerns regarding your device or the procedure.

## 2023-12-21 NOTE — DISCHARGE NOTE PROVIDER - NSDCFUSCHEDAPPT_GEN_ALL_CORE_FT
[facility]  CARDIOLOGY [address]  Scheduled Appointment: 01/18/2024    [facility]  CARDIOLOGY [address]  Scheduled Appointment: 01/18/2024    Sydney Valenzuela  [facility]  CARDIOLOGY [address]  Scheduled Appointment: 01/30/2024     [facility]  CARDIOLOGY [address]  Scheduled Appointment: 01/18/2024    [facility]  CARDIOLOGY [address]  Scheduled Appointment: 01/18/2024    Sydney Valenzuela  [facility]  CARDIOLOGY [address]  Scheduled Appointment: 01/30/2024

## 2023-12-21 NOTE — PROGRESS NOTE ADULT - SUBJECTIVE AND OBJECTIVE BOX
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  Pt with recent admission to [facility] on 23-23 for decompensated HF with multi organ system failure.  Pt was managed by Dr Valenzuela for his HF and symptoms had improved when his diuretic regiment was increased but the patient developed dizziness and near syncope, so Bumex and metolazone were discontinued. During hospitalization patient had LHC which revealed a patent LIMA with all other grafts closed, LVEDP 36.  EP was consulted during hospitalization for primary prevention ICD given patient has ischemic cardiomyopathy with depressed LV function despite GDMT > 3months.  Patient meets criteria for primary prevention ICD and since he has a baseline LBBB would benefit form resynchronization therapy.        Cardiology summary:  EK2023; SR @ 93bpm, LBBB  Echo: 10/29/2023; The left ventricular internal cavity size is normal. LVEF <20%.                 2022; LVEF 36%. Normal RV, mild MR.                 2021; LVEF 37%. Moderate MR. Severe pulm HTN.                2018; LVEF 30%. Segmental wall motion in LAD, severe LV dysfunction, enlarged LA size, mild MR.  Remote/Ambulatory Rhythm Monitoring: 2022 PVCs: no significant arrhythmias        Cardiac Cath: 2018; occluded Lcx vein graft; successful stent. EF 35%. New LBBB                      2019; severe OM w/ instent restenosis s/p laser arthrectomy and POBA                  2020; EF 25 %. Patent NOGUERA to LAD, Occluded known SVG to RCA. Recurrent instent re-stenosis of SVG to OM - 2 separate sites. PCI performed with 2 CODI, instent re-stenosis. 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  Pt with recent admission to [facility] on 23-23 for decompensated HF with multi organ system failure.  Pt was managed by Dr Valenzuela for his HF and symptoms had improved when his diuretic regiment was increased but the patient developed dizziness and near syncope, so Bumex and metolazone were discontinued. During hospitalization patient had LHC which revealed a patent LIMA with all other grafts closed, LVEDP 36.  EP was consulted during hospitalization for primary prevention ICD given patient has ischemic cardiomyopathy with depressed LV function despite GDMT > 3months.  Patient meets criteria for primary prevention ICD and since he has a baseline LBBB would benefit form resynchronization therapy.        Cardiology summary:  EK2023; SR @ 93bpm, LBBB  Echo: 10/29/2023; The left ventricular internal cavity size is normal. LVEF <20%.                 2022; LVEF 36%. Normal RV, mild MR.                 2021; LVEF 37%. Moderate MR. Severe pulm HTN.                2018; LVEF 30%. Segmental wall motion in LAD, severe LV dysfunction, enlarged LA size, mild MR.  Remote/Ambulatory Rhythm Monitoring: 2022 PVCs: no significant arrhythmias        Cardiac Cath: 2018; occluded Lcx vein graft; successful stent. EF 35%. New LBBB                      2019; severe OM w/ instent restenosis s/p laser arthrectomy and POBA                  2020; EF 25 %. Patent NOGUERA to LAD, Occluded known SVG to RCA. Recurrent instent re-stenosis of SVG to OM - 2 separate sites. PCI performed with 2 CODI, instent re-stenosis. 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  Pt with recent admission to [facility] on 23-23 for decompensated HF with multi-organ system failure.  As an outpt, pt was managed by Dr Valenzuela for his HF and symptoms and had improved when his diuretic regiment was increased but the patient developed dizziness and near syncope, so Bumex and metolazone were discontinued. During hospitalization patient had LHC which revealed a patent LIMA with all other grafts closed, LVEDP 36.  No intervention was performed.  EP was consulted during hospitalization for primary prevention ICD given pt's ischemic cardiomyopathy with depressed LV function despite GDMT > 3months.  Patient meets criteria for primary prevention ICD and since he has a baseline LBBB would benefit form resynchronization therapy.      Pt was medically optimized and now presents for CRT-D implant.  PST performed on .  No interval changes.  Pt's farxiga was held for 4 days in preparation of today's procedure.  Pt's brilinta was held this morning in preparation of today's procedure.  NPO confirmed.      Cardiology summary:  EK2023; SR @ 93bpm, LBBB  Echo: 10/29/2023; The left ventricular internal cavity size is normal. LVEF <20%.                 2022; LVEF 36%. Normal RV, mild MR.                 2021; LVEF 37%. Moderate MR. Severe pulm HTN.                2018; LVEF 30%. Segmental wall motion in LAD, severe LV dysfunction, enlarged LA size, mild MR.  Remote/Ambulatory Rhythm Monitoring: 2022 PVCs: no significant arrhythmias        Cardiac Cath: 2018; occluded Lcx vein graft; successful stent. EF 35%. New LBBB                      2019; severe OM w/ instent restenosis s/p laser arthrectomy and POBA                  2020; EF 25 %. Patent NOGUERA to LAD, Occluded known SVG to RCA. Recurrent instent re-stenosis of SVG to OM - 2 separate sites. PCI performed with 2 CODI, instent re-stenosis. 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  Pt with recent admission to [facility] on 23-23 for decompensated HF with multi-organ system failure.  As an outpt, pt was managed by Dr Valenzuela for his HF and symptoms and had improved when his diuretic regiment was increased but the patient developed dizziness and near syncope, so Bumex and metolazone were discontinued. During hospitalization patient had LHC which revealed a patent LIMA with all other grafts closed, LVEDP 36.  No intervention was performed.  EP was consulted during hospitalization for primary prevention ICD given pt's ischemic cardiomyopathy with depressed LV function despite GDMT > 3months.  Patient meets criteria for primary prevention ICD and since he has a baseline LBBB would benefit form resynchronization therapy.      Pt was medically optimized and now presents for CRT-D implant.  PST performed on .  No interval changes.  Pt's farxiga was held for 4 days in preparation of today's procedure.  Pt's brilinta was held this morning in preparation of today's procedure.  NPO confirmed.      Cardiology summary:  EK2023; SR @ 93bpm, LBBB  Echo: 10/29/2023; The left ventricular internal cavity size is normal. LVEF <20%.                 2022; LVEF 36%. Normal RV, mild MR.                 2021; LVEF 37%. Moderate MR. Severe pulm HTN.                2018; LVEF 30%. Segmental wall motion in LAD, severe LV dysfunction, enlarged LA size, mild MR.  Remote/Ambulatory Rhythm Monitoring: 2022 PVCs: no significant arrhythmias        Cardiac Cath: 2018; occluded Lcx vein graft; successful stent. EF 35%. New LBBB                      2019; severe OM w/ instent restenosis s/p laser arthrectomy and POBA                  2020; EF 25 %. Patent NOGUERA to LAD, Occluded known SVG to RCA. Recurrent instent re-stenosis of SVG to OM - 2 separate sites. PCI performed with 2 CODI, instent re-stenosis.

## 2023-12-21 NOTE — DISCHARGE NOTE PROVIDER - NSDCFUADDINST_GEN_ALL_CORE_FT
Follow up with Dr. Bazzi in two weeks time. The office will call you with an appointment in 3-5 days.

## 2023-12-21 NOTE — DISCHARGE NOTE PROVIDER - CARE PROVIDER_API CALL
Tad Bazzi  Cardiac Electrophysiology  [address], [address]  [place] [address]  Phone: [phone]  Fax: [phone]  Established Patient  Follow Up Time: 2 weeks   Tad Bazzi  Cardiac Electrophysiology  [address], [address]  [place] [address]  Phone: [phone]  Fax: [phone]  Established Patient  Follow Up Time: 2 weeks

## 2023-12-21 NOTE — DISCHARGE NOTE PROVIDER - NSDCMRMEDTOKEN_GEN_ALL_CORE_FT
aspirin 81 mg oral tablet, chewable: 1 tab(s) orally once a day  atorvastatin 40 mg oral tablet: 1 tab(s) orally once a day (at bedtime)  Basaglar KwikPen 100 units/mL subcutaneous solution: 15 unit(s) subcutaneous once a day (at bedtime)   Farxiga 10 mg oral tablet: 1 tab(s) orally once a day  levothyroxine 75 mcg (0.075 mg) oral tablet: 1 tab(s) orally once a day  Metoprolol Succinate ER 50 mg oral tablet, extended release: 1 tab(s) orally once a day  montelukast 10 mg oral tablet: 1 tab(s) orally once a day  NexIUM 40 mg oral delayed release capsule: 1 cap(s) orally once a day (at bedtime)  NovoLOG 100 units/mL injectable solution: 10,10,15 unit(s) injectable 3 times a day (before meals)  ranolazine 500 mg oral tablet, extended release: 1 tab(s) orally 2 times a day  sacubitril-valsartan 24 mg-26 mg oral tablet: 1 tab(s) orally 2 times a day  ticagrelor 90 mg oral tablet: 1 tab(s) orally every 12 hours  torsemide 20 mg oral tablet: 1 tab(s) orally once a day

## 2023-12-21 NOTE — PROGRESS NOTE ADULT - SUBJECTIVE AND OBJECTIVE BOX
PROCEDURE(S): BiV ICD Implant (MDT)    ELECTRPHYSIOLOGIST(S): MD Rose Mary    COMPLICATIONS:  none          DISPOSITION:  Observation Unit           CONDITION: Stable    EBL: <15mL    Pt doing well s/p BiV ICD implant (MDT) with access obtained via left cephalic stick and incision closed with Dermabond and Steri Stripes and Pressure dressing in place. Denies complaint    Exam:   T(C): 36.4 (12-21-23 @ 10:33), Max: 36.4 (12-21-23 @ 10:33)  HR: 90 (12-21-23 @ 18:00) (90 - 91)  BP: 94/54 (12-21-23 @ 18:00) (94/54 - 115/63)  RR: 16 (12-21-23 @ 18:00) (15 - 16)  SpO2: 96% (12-21-23 @ 18:00) (96% - 99%)  Neuro: A&O x 3, GRAVES, FC  Incision: Dressing C/D/I; no bleeding, hematoma, erythema or edema  Card: S1/S2, RRR, no m/g/r  Resp: lungs CTA b/l  Abd: S/NT/ND  Ext: no edema, distal pulses intact    EKG: Pending    Assessment:   Pt is a 79 y/o male, PMHx significant for CAD s/p 4V CABG and multiple PCI's, cardiogenic shock, HFrEF (EF < 20%),  DM2 (on insulin), 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  Pt with recent admission to [facility] on 11/20/23-11/22/23 for decompensated HF with multi-organ system failure.  As an outpt, pt was managed by Dr Valenzuela for his HF and symptoms and had improved when his diuretic regiment was increased but the patient developed dizziness and near syncope, so Bumex and metolazone were discontinued. During hospitalization patient had LHC which revealed a patent LIMA with all other grafts closed, LVEDP 36.  No intervention was performed.  EP was consulted during hospitalization for primary prevention ICD given pt's ischemic cardiomyopathy with depressed LV function despite GDMT > 3months.  Patient meets criteria for primary prevention ICD and since he has a baseline LBBB would benefit form resynchronization therapy.      Pt was medically optimized and now is now status post uncomplicated BiV ICD implant with Dr Bazzi      Plan:   Port CXR now - r/o PTX or effusion, verify lead locations.   Bedrest x 4 hours post implant, then OOB w/ assist & progress as tolerated.    Continued observation on telemetry overnight.   Cont Ancef 2gm IV q 8 hours x 2 additional doses to complete 24 hour course.   Pain control with PO analgesia PRN.   NO HEPARIN OR LOVENOX, INCLUDING PROPHYLACTIC/SUBCUT DOSING, UNTIL OTHERWISE ADVISED BY EP.   Resume home medications.   PA/Lat CXR and device check in AM.   Pending status overnight, anticipate d/c home tomorrow with outpt f/up in 1-2 weeks.     PROCEDURE(S): BiV ICD Implant (MDT)    ELECTRPHYSIOLOGIST(S): MD Rose Mary    COMPLICATIONS:  none          DISPOSITION:  Observation Unit           CONDITION: Stable    EBL: <15mL    Pt doing well s/p BiV ICD implant (MDT) with access obtained via left cephalic stick and incision closed with Dermabond and Steri Stripes and Pressure dressing in place. Denies complaint    Exam:   T(C): 36.4 (12-21-23 @ 10:33), Max: 36.4 (12-21-23 @ 10:33)  HR: 90 (12-21-23 @ 18:00) (90 - 91)  BP: 94/54 (12-21-23 @ 18:00) (94/54 - 115/63)  RR: 16 (12-21-23 @ 18:00) (15 - 16)  SpO2: 96% (12-21-23 @ 18:00) (96% - 99%)  Neuro: A&O x 3, GRAVES, FC  Incision: Dressing C/D/I; no bleeding, hematoma, erythema or edema  Card: S1/S2, RRR, no m/g/r  Resp: lungs CTA b/l  Abd: S/NT/ND  Ext: no edema, distal pulses intact    EKG: Pending    Assessment:   Pt is a 79 y/o male, PMHx significant for CAD s/p 4V CABG and multiple PCI's, cardiogenic shock, HFrEF (EF < 20%),  DM2 (on insulin), 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  Pt with recent admission to [facility] on 11/20/23-11/22/23 for decompensated HF with multi-organ system failure.  As an outpt, pt was managed by Dr Valenzuela for his HF and symptoms and had improved when his diuretic regiment was increased but the patient developed dizziness and near syncope, so Bumex and metolazone were discontinued. During hospitalization patient had LHC which revealed a patent LIMA with all other grafts closed, LVEDP 36.  No intervention was performed.  EP was consulted during hospitalization for primary prevention ICD given pt's ischemic cardiomyopathy with depressed LV function despite GDMT > 3months.  Patient meets criteria for primary prevention ICD and since he has a baseline LBBB would benefit form resynchronization therapy.      Pt was medically optimized and now is now status post uncomplicated BiV ICD implant with Dr Bazzi      Plan:   Port CXR now - r/o PTX or effusion, verify lead locations.   Bedrest x 4 hours post implant, then OOB w/ assist & progress as tolerated.    Continued observation on telemetry overnight.   Cont Ancef 2gm IV q 8 hours x 2 additional doses to complete 24 hour course.   Pain control with PO analgesia PRN.   NO HEPARIN OR LOVENOX, INCLUDING PROPHYLACTIC/SUBCUT DOSING, UNTIL OTHERWISE ADVISED BY EP.   Resume home medications.   PA/Lat CXR and device check in AM.   Pending status overnight, anticipate d/c home tomorrow with outpt f/up in 1-2 weeks.     PROCEDURE(S): BiV ICD Implant (MDT)    ELECTRPHYSIOLOGIST(S): MD Rose Mary    COMPLICATIONS:  none          DISPOSITION:  Observation Unit           CONDITION: Stable    EBL: <15mL    Pt doing well s/p BiV ICD implant (MDT) with access obtained via left cephalic stick and incision closed with Dermabond and Steri Stripes and Pressure dressing in place. Denies complaint    Exam:   T(C): 36.4 (12-21-23 @ 10:33), Max: 36.4 (12-21-23 @ 10:33)  HR: 90 (12-21-23 @ 18:00) (90 - 91)  BP: 94/54 (12-21-23 @ 18:00) (94/54 - 115/63)  RR: 16 (12-21-23 @ 18:00) (15 - 16)  SpO2: 96% (12-21-23 @ 18:00) (96% - 99%)    Neuro: A&O x 3, GRAVES, FC  Incision: Dressing C/D/I; no bleeding, hematoma, erythema or edema  Card: S1/S2, RRR, no m/g/r  Resp: lungs CTA b/l  Abd: S/NT/ND  Ext: no edema, distal pulses intact    Post-op VS  HR 90  /61  SpO2 98% on RA    EKG: Atrial sensed, Venticular paced @ 88bpm, FARHAD 120ms, QRSD 154ms    Assessment:   Pt is a 79 y/o male, PMHx significant for CAD s/p 4V CABG and multiple PCI's, cardiogenic shock, HFrEF (EF < 20%),  DM2 (on insulin), 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  Pt with recent admission to [facility] on 11/20/23-11/22/23 for decompensated HF with multi-organ system failure.  As an outpt, pt was managed by Dr Valenzuela for his HF and symptoms and had improved when his diuretic regiment was increased but the patient developed dizziness and near syncope, so Bumex and metolazone were discontinued. During hospitalization patient had LHC which revealed a patent LIMA with all other grafts closed, LVEDP 36.  No intervention was performed.  EP was consulted during hospitalization for primary prevention ICD given pt's ischemic cardiomyopathy with depressed LV function despite GDMT > 3months.  Patient meets criteria for primary prevention ICD and since he has a baseline LBBB would benefit form resynchronization therapy.      Pt was medically optimized and now is now status post uncomplicated BiV ICD implant with Dr Bazzi      Plan:   Port CXR now - r/o PTX or effusion, verify lead locations.   Bedrest x 4 hours post implant, then OOB w/ assist & progress as tolerated.    Continued observation on telemetry overnight.   Cont Ancef 2gm IV q 8 hours x 2 additional doses to complete 24 hour course.   Pain control with PO analgesia PRN.   NO HEPARIN OR LOVENOX, INCLUDING PROPHYLACTIC/SUBCUT DOSING, UNTIL OTHERWISE ADVISED BY EP.   Resume home medications.   PA/Lat CXR and device check in AM.   Pending status overnight, anticipate d/c home tomorrow with outpt f/up in 1-2 weeks.     PROCEDURE(S): BiV ICD Implant (MDT)    ELECTRPHYSIOLOGIST(S): MD Rose Mary    COMPLICATIONS:  none          DISPOSITION:  Observation Unit           CONDITION: Stable    EBL: <15mL    Pt doing well s/p BiV ICD implant (MDT) with access obtained via left cephalic stick and incision closed with Dermabond and Steri Stripes and Pressure dressing in place. Denies complaint    Exam:   T(C): 36.4 (12-21-23 @ 10:33), Max: 36.4 (12-21-23 @ 10:33)  HR: 90 (12-21-23 @ 18:00) (90 - 91)  BP: 94/54 (12-21-23 @ 18:00) (94/54 - 115/63)  RR: 16 (12-21-23 @ 18:00) (15 - 16)  SpO2: 96% (12-21-23 @ 18:00) (96% - 99%)    Neuro: A&O x 3, GRAVES, FC  Incision: Dressing C/D/I; no bleeding, hematoma, erythema or edema  Card: S1/S2, RRR, no m/g/r  Resp: lungs CTA b/l  Abd: S/NT/ND  Ext: no edema, distal pulses intact    Post-op VS  HR 90  /61  SpO2 98% on RA    EKG: Atrial sensed, Venticular paced @ 88bpm, FARHAD 120ms, QRSD 154ms    Assessment:   Pt is a 77 y/o male, PMHx significant for CAD s/p 4V CABG and multiple PCI's, cardiogenic shock, HFrEF (EF < 20%),  DM2 (on insulin), 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  Pt with recent admission to [facility] on 11/20/23-11/22/23 for decompensated HF with multi-organ system failure.  As an outpt, pt was managed by Dr Valenzuela for his HF and symptoms and had improved when his diuretic regiment was increased but the patient developed dizziness and near syncope, so Bumex and metolazone were discontinued. During hospitalization patient had LHC which revealed a patent LIMA with all other grafts closed, LVEDP 36.  No intervention was performed.  EP was consulted during hospitalization for primary prevention ICD given pt's ischemic cardiomyopathy with depressed LV function despite GDMT > 3months.  Patient meets criteria for primary prevention ICD and since he has a baseline LBBB would benefit form resynchronization therapy.      Pt was medically optimized and now is now status post uncomplicated BiV ICD implant with Dr Bazzi      Plan:   Port CXR now - r/o PTX or effusion, verify lead locations.   Bedrest x 4 hours post implant, then OOB w/ assist & progress as tolerated.    Continued observation on telemetry overnight.   Cont Ancef 2gm IV q 8 hours x 2 additional doses to complete 24 hour course.   Pain control with PO analgesia PRN.   NO HEPARIN OR LOVENOX, INCLUDING PROPHYLACTIC/SUBCUT DOSING, UNTIL OTHERWISE ADVISED BY EP.   Resume home medications.   PA/Lat CXR and device check in AM.   Pending status overnight, anticipate d/c home tomorrow with outpt f/up in 1-2 weeks.     PROCEDURE(S): BiV ICD Implant (MDT)    ELECTRPHYSIOLOGIST(S): MD Rose Mary    COMPLICATIONS:  none          DISPOSITION:  Observation Unit           CONDITION: Stable    EBL: <15mL    Pt doing well s/p BiV ICD implant (MDT) with access obtained via left cephalic stick and incision closed with Dermabond and Steri Stripes and Pressure dressing in place. Denies complaint    Exam:   T(C): 36.4 (12-21-23 @ 10:33), Max: 36.4 (12-21-23 @ 10:33)  HR: 90 (12-21-23 @ 18:00) (90 - 91)  BP: 94/54 (12-21-23 @ 18:00) (94/54 - 115/63)  RR: 16 (12-21-23 @ 18:00) (15 - 16)  SpO2: 96% (12-21-23 @ 18:00) (96% - 99%)    Neuro: A&O x 3, GRAVES, FC  Incision: Dressing C/D/I; no bleeding, hematoma, erythema or edema  Card: S1/S2, RRR, no m/g/r  Resp: lungs CTA b/l  Abd: S/NT/ND  Ext: no edema, distal pulses intact    Post-op VS  HR 90  /61  SpO2 98% on RA    EKG: Atrial sensed, Venticular paced @ 88bpm, FARHAD 120ms, QRSD 154ms    Assessment:   Pt is a 77 y/o male, PMHx significant for CAD s/p 4V CABG and multiple PCI's, cardiogenic shock, HFrEF (EF < 20%),  DM2 (on insulin), 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  Pt with recent admission to [facility] on 11/20/23-11/22/23 for decompensated HF with multi-organ system failure.  During hospitalization patient had LHC which revealed a patent LIMA with all other grafts closed, LVEDP 36.  No intervention was performed.  EP was consulted during hospitalization for primary prevention ICD given pt's ischemic cardiomyopathy with depressed LV function despite GDMT > 3months.  Patient meets criteria for primary prevention ICD and since he has a baseline LBBB would benefit form resynchronization therapy.      Pt was medically optimized and now is now status post uncomplicated BiV ICD implant with Dr Bazzi      Plan:   Port CXR now - r/o PTX or effusion, verify lead locations.   Bedrest x 4 hours post implant, then OOB w/ assist & progress as tolerated.    Continued observation on telemetry overnight.   Cont Ancef 2gm IV q 8 hours x 2 additional doses to complete 24 hour course.   Pain control with PO analgesia PRN.   NO HEPARIN OR LOVENOX, INCLUDING PROPHYLACTIC/SUBCUT DOSING, UNTIL OTHERWISE ADVISED BY EP.   Resume home medications.   PA/Lat CXR and device check in AM.   Pending status overnight, anticipate d/c home tomorrow with outpt f/up in 1-2 weeks.     PROCEDURE(S): BiV ICD Implant (MDT)    ELECTRPHYSIOLOGIST(S): MD Rose Mary    COMPLICATIONS:  none          DISPOSITION:  Observation Unit           CONDITION: Stable    EBL: <15mL    Pt doing well s/p BiV ICD implant (MDT) with access obtained via left cephalic stick and incision closed with Dermabond and Steri Stripes and Pressure dressing in place. Denies complaint    Exam:   T(C): 36.4 (12-21-23 @ 10:33), Max: 36.4 (12-21-23 @ 10:33)  HR: 90 (12-21-23 @ 18:00) (90 - 91)  BP: 94/54 (12-21-23 @ 18:00) (94/54 - 115/63)  RR: 16 (12-21-23 @ 18:00) (15 - 16)  SpO2: 96% (12-21-23 @ 18:00) (96% - 99%)    Neuro: A&O x 3, GRAVES, FC  Incision: Dressing C/D/I; no bleeding, hematoma, erythema or edema  Card: S1/S2, RRR, no m/g/r  Resp: lungs CTA b/l  Abd: S/NT/ND  Ext: no edema, distal pulses intact    Post-op VS  HR 90  /61  SpO2 98% on RA    EKG: Atrial sensed, Venticular paced @ 88bpm, FARHAD 120ms, QRSD 154ms    Assessment:   Pt is a 77 y/o male, PMHx significant for CAD s/p 4V CABG and multiple PCI's, cardiogenic shock, HFrEF (EF < 20%),  DM2 (on insulin), 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  Pt with recent admission to [facility] on 11/20/23-11/22/23 for decompensated HF with multi-organ system failure.  During hospitalization patient had LHC which revealed a patent LIMA with all other grafts closed, LVEDP 36.  No intervention was performed.  EP was consulted during hospitalization for primary prevention ICD given pt's ischemic cardiomyopathy with depressed LV function despite GDMT > 3months.  Patient meets criteria for primary prevention ICD and since he has a baseline LBBB would benefit form resynchronization therapy.      Pt was medically optimized and now is now status post uncomplicated BiV ICD implant with Dr Bazzi      Plan:   Port CXR now - r/o PTX or effusion, verify lead locations.   Bedrest x 4 hours post implant, then OOB w/ assist & progress as tolerated.    Continued observation on telemetry overnight.   Cont Ancef 2gm IV q 8 hours x 2 additional doses to complete 24 hour course.   Pain control with PO analgesia PRN.   NO HEPARIN OR LOVENOX, INCLUDING PROPHYLACTIC/SUBCUT DOSING, UNTIL OTHERWISE ADVISED BY EP.   Resume home medications.   PA/Lat CXR and device check in AM.   Pending status overnight, anticipate d/c home tomorrow with outpt f/up in 1-2 weeks.

## 2023-12-21 NOTE — DISCHARGE NOTE PROVIDER - PROVIDER TOKENS
PROVIDER:[TOKEN:[[id number]],FOLLOWUP:[2 weeks],ESTABLISHEDPATIENT:[T]] PROVIDER:[TOKEN:[[id number]],FOLLOWUP:[2 weeks],ESTABLISHEDPATIENT:[T]]

## 2023-12-22 ENCOUNTER — TRANSCRIPTION ENCOUNTER (OUTPATIENT)
Age: 78
End: 2023-12-22

## 2023-12-22 VITALS
SYSTOLIC BLOOD PRESSURE: 109 MMHG | HEART RATE: 84 BPM | DIASTOLIC BLOOD PRESSURE: 59 MMHG | RESPIRATION RATE: 17 BRPM | OXYGEN SATURATION: 96 %

## 2023-12-22 LAB
ANION GAP SERPL CALC-SCNC: 12 MMOL/L — SIGNIFICANT CHANGE UP (ref 5–17)
ANION GAP SERPL CALC-SCNC: 12 MMOL/L — SIGNIFICANT CHANGE UP (ref 5–17)
BUN SERPL-MCNC: 46 MG/DL — HIGH (ref 8–20)
BUN SERPL-MCNC: 46 MG/DL — HIGH (ref 8–20)
CALCIUM SERPL-MCNC: 8.6 MG/DL — SIGNIFICANT CHANGE UP (ref 8.4–10.5)
CALCIUM SERPL-MCNC: 8.6 MG/DL — SIGNIFICANT CHANGE UP (ref 8.4–10.5)
CHLORIDE SERPL-SCNC: 97 MMOL/L — SIGNIFICANT CHANGE UP (ref 96–108)
CHLORIDE SERPL-SCNC: 97 MMOL/L — SIGNIFICANT CHANGE UP (ref 96–108)
CO2 SERPL-SCNC: 29 MMOL/L — SIGNIFICANT CHANGE UP (ref 22–29)
CO2 SERPL-SCNC: 29 MMOL/L — SIGNIFICANT CHANGE UP (ref 22–29)
CREAT SERPL-MCNC: 1.8 MG/DL — HIGH (ref 0.5–1.3)
CREAT SERPL-MCNC: 1.8 MG/DL — HIGH (ref 0.5–1.3)
EGFR: 39 ML/MIN/1.73M2 — LOW
EGFR: 39 ML/MIN/1.73M2 — LOW
GLUCOSE SERPL-MCNC: 167 MG/DL — HIGH (ref 70–99)
GLUCOSE SERPL-MCNC: 167 MG/DL — HIGH (ref 70–99)
HCT VFR BLD CALC: 35.2 % — LOW (ref 39–50)
HCT VFR BLD CALC: 35.2 % — LOW (ref 39–50)
HGB BLD-MCNC: 12 G/DL — LOW (ref 13–17)
HGB BLD-MCNC: 12 G/DL — LOW (ref 13–17)
MAGNESIUM SERPL-MCNC: 2.6 MG/DL — SIGNIFICANT CHANGE UP (ref 1.6–2.6)
MAGNESIUM SERPL-MCNC: 2.6 MG/DL — SIGNIFICANT CHANGE UP (ref 1.6–2.6)
MCHC RBC-ENTMCNC: 31.7 PG — SIGNIFICANT CHANGE UP (ref 27–34)
MCHC RBC-ENTMCNC: 31.7 PG — SIGNIFICANT CHANGE UP (ref 27–34)
MCHC RBC-ENTMCNC: 34.1 GM/DL — SIGNIFICANT CHANGE UP (ref 32–36)
MCHC RBC-ENTMCNC: 34.1 GM/DL — SIGNIFICANT CHANGE UP (ref 32–36)
MCV RBC AUTO: 92.9 FL — SIGNIFICANT CHANGE UP (ref 80–100)
MCV RBC AUTO: 92.9 FL — SIGNIFICANT CHANGE UP (ref 80–100)
PLATELET # BLD AUTO: 182 K/UL — SIGNIFICANT CHANGE UP (ref 150–400)
PLATELET # BLD AUTO: 182 K/UL — SIGNIFICANT CHANGE UP (ref 150–400)
POTASSIUM SERPL-MCNC: 4.3 MMOL/L — SIGNIFICANT CHANGE UP (ref 3.5–5.3)
POTASSIUM SERPL-MCNC: 4.3 MMOL/L — SIGNIFICANT CHANGE UP (ref 3.5–5.3)
POTASSIUM SERPL-SCNC: 4.3 MMOL/L — SIGNIFICANT CHANGE UP (ref 3.5–5.3)
POTASSIUM SERPL-SCNC: 4.3 MMOL/L — SIGNIFICANT CHANGE UP (ref 3.5–5.3)
RBC # BLD: 3.79 M/UL — LOW (ref 4.2–5.8)
RBC # BLD: 3.79 M/UL — LOW (ref 4.2–5.8)
RBC # FLD: 13.3 % — SIGNIFICANT CHANGE UP (ref 10.3–14.5)
RBC # FLD: 13.3 % — SIGNIFICANT CHANGE UP (ref 10.3–14.5)
SODIUM SERPL-SCNC: 138 MMOL/L — SIGNIFICANT CHANGE UP (ref 135–145)
SODIUM SERPL-SCNC: 138 MMOL/L — SIGNIFICANT CHANGE UP (ref 135–145)
WBC # BLD: 10.22 K/UL — SIGNIFICANT CHANGE UP (ref 3.8–10.5)
WBC # BLD: 10.22 K/UL — SIGNIFICANT CHANGE UP (ref 3.8–10.5)
WBC # FLD AUTO: 10.22 K/UL — SIGNIFICANT CHANGE UP (ref 3.8–10.5)
WBC # FLD AUTO: 10.22 K/UL — SIGNIFICANT CHANGE UP (ref 3.8–10.5)

## 2023-12-22 PROCEDURE — 80048 BASIC METABOLIC PNL TOTAL CA: CPT

## 2023-12-22 PROCEDURE — C1725: CPT

## 2023-12-22 PROCEDURE — 71046 X-RAY EXAM CHEST 2 VIEWS: CPT | Mod: 26

## 2023-12-22 PROCEDURE — 82962 GLUCOSE BLOOD TEST: CPT

## 2023-12-22 PROCEDURE — 71045 X-RAY EXAM CHEST 1 VIEW: CPT

## 2023-12-22 PROCEDURE — C1777: CPT

## 2023-12-22 PROCEDURE — 93010 ELECTROCARDIOGRAM REPORT: CPT

## 2023-12-22 PROCEDURE — 85027 COMPLETE CBC AUTOMATED: CPT

## 2023-12-22 PROCEDURE — C1887: CPT

## 2023-12-22 PROCEDURE — C1892: CPT

## 2023-12-22 PROCEDURE — 83735 ASSAY OF MAGNESIUM: CPT

## 2023-12-22 PROCEDURE — C1900: CPT

## 2023-12-22 PROCEDURE — 36415 COLL VENOUS BLD VENIPUNCTURE: CPT

## 2023-12-22 PROCEDURE — 93005 ELECTROCARDIOGRAM TRACING: CPT

## 2023-12-22 PROCEDURE — C1898: CPT

## 2023-12-22 PROCEDURE — C1882: CPT

## 2023-12-22 PROCEDURE — C1894: CPT

## 2023-12-22 PROCEDURE — 71046 X-RAY EXAM CHEST 2 VIEWS: CPT

## 2023-12-22 PROCEDURE — 33249 INSJ/RPLCMT DEFIB W/LEAD(S): CPT

## 2023-12-22 PROCEDURE — C1730: CPT

## 2023-12-22 PROCEDURE — C1769: CPT

## 2023-12-22 PROCEDURE — 33225 L VENTRIC PACING LEAD ADD-ON: CPT

## 2023-12-22 RX ORDER — INSULIN ASPART 100 [IU]/ML
10 INJECTION, SOLUTION SUBCUTANEOUS
Qty: 0 | Refills: 0 | DISCHARGE
Start: 2023-12-22

## 2023-12-22 RX ADMIN — Medication 75 MICROGRAM(S): at 05:59

## 2023-12-22 RX ADMIN — Medication 2000 MILLIGRAM(S): at 05:59

## 2023-12-22 RX ADMIN — PANTOPRAZOLE SODIUM 40 MILLIGRAM(S): 20 TABLET, DELAYED RELEASE ORAL at 06:00

## 2023-12-22 RX ADMIN — SACUBITRIL AND VALSARTAN 1 TABLET(S): 24; 26 TABLET, FILM COATED ORAL at 06:00

## 2023-12-22 RX ADMIN — TICAGRELOR 90 MILLIGRAM(S): 90 TABLET ORAL at 06:00

## 2023-12-22 RX ADMIN — INSULIN ASPART 15 UNIT(S): 100 INJECTION, SOLUTION SUBCUTANEOUS at 07:20

## 2023-12-22 RX ADMIN — Medication 1: at 06:08

## 2023-12-22 RX ADMIN — RANOLAZINE 500 MILLIGRAM(S): 500 TABLET, FILM COATED, EXTENDED RELEASE ORAL at 06:00

## 2023-12-22 RX ADMIN — Medication 20 MILLIGRAM(S): at 05:59

## 2023-12-22 RX ADMIN — Medication 50 MILLIGRAM(S): at 06:00

## 2023-12-22 NOTE — DISCHARGE NOTE NURSING/CASE MANAGEMENT/SOCIAL WORK - PATIENT PORTAL LINK FT
You can access the FollowMyHealth Patient Portal offered by [facility] by registering at the following website: [email or web address]. By joining [facility]’s FollowMyHealth portal, you will also be able to view your health information using other applications (apps) compatible with our system. You can access the FollowMyHealth Patient Portal offered by [facility] by registering at the following website: [email or web address]. By joining [facility]’s FollowMyHealth portal, you will also be able to view your health information using other applications (apps) compatible with our system.

## 2023-12-22 NOTE — PROGRESS NOTE ADULT - SUBJECTIVE AND OBJECTIVE BOX
Patient seen today in bed. Pressure dressing removed from left anterior chest wall without complication. No overnight events.     EKG: pending  TELE:     MEDICATIONS  (STANDING):  aspirin  chewable 81 milliGRAM(s) Oral daily  atorvastatin 40 milliGRAM(s) Oral at bedtime  dapagliflozin 10 milliGRAM(s) Oral every 24 hours  dextrose 5%. 1000 milliLiter(s) (50 mL/Hr) IV Continuous <Continuous>  dextrose 5%. 1000 milliLiter(s) (100 mL/Hr) IV Continuous <Continuous>  dextrose 50% Injectable 25 Gram(s) IV Push once  dextrose 50% Injectable 12.5 Gram(s) IV Push once  dextrose 50% Injectable 25 Gram(s) IV Push once  glucagon  Injectable 1 milliGRAM(s) IntraMuscular once  insulin aspart Injectable (NovoLOG) 15 Unit(s) SubCutaneous three times a day before meals  insulin glargine Injectable (LANTUS) 15 Unit(s) SubCutaneous at bedtime  insulin lispro (ADMELOG) corrective regimen sliding scale   SubCutaneous three times a day before meals  levothyroxine 75 MICROGram(s) Oral daily  metoprolol succinate ER 50 milliGRAM(s) Oral daily  montelukast 10 milliGRAM(s) Oral daily  pantoprazole    Tablet 40 milliGRAM(s) Oral before breakfast  ranolazine 500 milliGRAM(s) Oral two times a day  sacubitril 24 mG/valsartan 26 mG 1 Tablet(s) Oral two times a day  ticagrelor 90 milliGRAM(s) Oral every 12 hours  torsemide 20 milliGRAM(s) Oral daily    MEDICATIONS  (PRN):  acetaminophen     Tablet .. 650 milliGRAM(s) Oral every 6 hours PRN Temp greater or equal to 38.5C (101.3F), Mild Pain (1 - 3)  ALPRAZolam 0.25 milliGRAM(s) Oral every 8 hours PRN anxiety  aluminum hydroxide/magnesium hydroxide/simethicone Suspension 30 milliLiter(s) Oral every 6 hours PRN Dyspepsia  benzocaine/menthol Lozenge 1 Lozenge Oral every 2 hours PRN Sore Throat  dextrose Oral Gel 15 Gram(s) Oral once PRN Blood Glucose LESS THAN 70 milliGRAM(s)/deciliter  ondansetron Injectable 4 milliGRAM(s) IV Push every 6 hours PRN Nausea and/or Vomiting  oxyCODONE    IR 5 milliGRAM(s) Oral every 6 hours PRN Moderate Pain (4 - 6)    Allergies  No Known Allergies    Intolerances  DOPamine (Hypotension)    PAST MEDICAL & SURGICAL HISTORY:  GERD (gastroesophageal reflux disease)  High cholesterol  Coronary artery disease involving coronary bypass graft of native heart with unstable angina pectoris  Coronary artery disease involving native coronary artery of native heart, angina presence unspecifie  Type 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, without long-term current use of insul  Essential hypertension  HFrEF (heart failure with reduced ejection fraction)  Stage 4 chronic kidney disease  LBBB (left bundle branch block)  Facial nerve palsy  Hypothyroidism  S/P CABG (coronary artery bypass graft)  4V 1983 [place]  Status post open reduction with internal fixation of fracture  History of insertion of stent into coronary artery bypass graft  History of brachytherapy    Vital Signs Last 24 Hrs  T(C): 36.4 (21 Dec 2023 10:33), Max: 36.4 (21 Dec 2023 10:33)  T(F): 97.6 (21 Dec 2023 10:33), Max: 97.6 (21 Dec 2023 10:33)  HR: 88 (22 Dec 2023 04:50) (81 - 93)  BP: 104/59 (22 Dec 2023 04:50) (87/55 - 115/63)  RR: 18 (22 Dec 2023 04:50) (15 - 18)  SpO2: 97% (22 Dec 2023 04:50) (95% - 99%)    Physical Exam:  Constitutional: NAD, AAOx3  Cardiovascular: +S1S2 RRR  LACW: Steri-strips CDI; no evidence of pocket hematoma   Pulmonary: CTA b/l, unlabored  GI: soft NTND +BS  Extremities: no pedal edema,   Neuro: non focal, GRAVES x4    LABS:                        12.0   10.22 )-----------( 182      ( 22 Dec 2023 04:30 )             35.2     138  |  97  |  46.0<H>  ----------------------------<  167<H>  4.3   |  29.0  |  1.80<H>  Ca    8.6      22 Dec 2023 04:30  Mg     2.6     12-22    A/P  78 year old male patient with a history of CAD s/p 4V CABG and multiple PCI's, cardiogenic shock, HFrEF (EF < 20%), DM2 (on insulin), 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, HTN, LBBB (2018), and HLD who is now POD#1 s/p Medtronic CRT-D implantation.     Device check performed which reveals excellent CRT-D pacing and sensing     - CXR pending  - Discharge home today Patient seen today in bed. Pressure dressing removed from left anterior chest wall without complication. No overnight events.     EKG: pending  TELE:     MEDICATIONS  (STANDING):  aspirin  chewable 81 milliGRAM(s) Oral daily  atorvastatin 40 milliGRAM(s) Oral at bedtime  dapagliflozin 10 milliGRAM(s) Oral every 24 hours  dextrose 5%. 1000 milliLiter(s) (50 mL/Hr) IV Continuous <Continuous>  dextrose 5%. 1000 milliLiter(s) (100 mL/Hr) IV Continuous <Continuous>  dextrose 50% Injectable 25 Gram(s) IV Push once  dextrose 50% Injectable 12.5 Gram(s) IV Push once  dextrose 50% Injectable 25 Gram(s) IV Push once  glucagon  Injectable 1 milliGRAM(s) IntraMuscular once  insulin aspart Injectable (NovoLOG) 15 Unit(s) SubCutaneous three times a day before meals  insulin glargine Injectable (LANTUS) 15 Unit(s) SubCutaneous at bedtime  insulin lispro (ADMELOG) corrective regimen sliding scale   SubCutaneous three times a day before meals  levothyroxine 75 MICROGram(s) Oral daily  metoprolol succinate ER 50 milliGRAM(s) Oral daily  montelukast 10 milliGRAM(s) Oral daily  pantoprazole    Tablet 40 milliGRAM(s) Oral before breakfast  ranolazine 500 milliGRAM(s) Oral two times a day  sacubitril 24 mG/valsartan 26 mG 1 Tablet(s) Oral two times a day  ticagrelor 90 milliGRAM(s) Oral every 12 hours  torsemide 20 milliGRAM(s) Oral daily    MEDICATIONS  (PRN):  acetaminophen     Tablet .. 650 milliGRAM(s) Oral every 6 hours PRN Temp greater or equal to 38.5C (101.3F), Mild Pain (1 - 3)  ALPRAZolam 0.25 milliGRAM(s) Oral every 8 hours PRN anxiety  aluminum hydroxide/magnesium hydroxide/simethicone Suspension 30 milliLiter(s) Oral every 6 hours PRN Dyspepsia  benzocaine/menthol Lozenge 1 Lozenge Oral every 2 hours PRN Sore Throat  dextrose Oral Gel 15 Gram(s) Oral once PRN Blood Glucose LESS THAN 70 milliGRAM(s)/deciliter  ondansetron Injectable 4 milliGRAM(s) IV Push every 6 hours PRN Nausea and/or Vomiting  oxyCODONE    IR 5 milliGRAM(s) Oral every 6 hours PRN Moderate Pain (4 - 6)    Allergies  No Known Allergies    Intolerances  DOPamine (Hypotension)    PAST MEDICAL & SURGICAL HISTORY:  GERD (gastroesophageal reflux disease)  High cholesterol  Coronary artery disease involving coronary bypass graft of native heart with unstable angina pectoris  Coronary artery disease involving native coronary artery of native heart, angina presence unspecifie  Type 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, without long-term current use of insul  Essential hypertension  HFrEF (heart failure with reduced ejection fraction)  Stage 4 chronic kidney disease  LBBB (left bundle branch block)  Facial nerve palsy  Hypothyroidism  S/P CABG (coronary artery bypass graft)  4V 1983 [place]  Status post open reduction with internal fixation of fracture  History of insertion of stent into coronary artery bypass graft  History of brachytherapy    Vital Signs Last 24 Hrs  T(C): 36.4 (21 Dec 2023 10:33), Max: 36.4 (21 Dec 2023 10:33)  T(F): 97.6 (21 Dec 2023 10:33), Max: 97.6 (21 Dec 2023 10:33)  HR: 88 (22 Dec 2023 04:50) (81 - 93)  BP: 104/59 (22 Dec 2023 04:50) (87/55 - 115/63)  RR: 18 (22 Dec 2023 04:50) (15 - 18)  SpO2: 97% (22 Dec 2023 04:50) (95% - 99%)    Physical Exam:  Constitutional: NAD, AAOx3  Cardiovascular: +S1S2 RRR  LACW: Steri-strips CDI; no evidence of pocket hematoma   Pulmonary: CTA b/l, unlabored  GI: soft NTND +BS  Extremities: no pedal edema,   Neuro: non focal, GRAVES x4    LABS:                        12.0   10.22 )-----------( 182      ( 22 Dec 2023 04:30 )             35.2     138  |  97  |  46.0<H>  ----------------------------<  167<H>  4.3   |  29.0  |  1.80<H>  Ca    8.6      22 Dec 2023 04:30  Mg     2.6     12-22    A/P  78 year old male patient with a history of CAD s/p 4V CABG and multiple PCI's, cardiogenic shock, HFrEF (EF < 20%), DM2 (on insulin), 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, HTN, LBBB (2018), and HLD who is now POD#1 s/p Medtronic CRT-D implantation.     Device check performed which reveals excellent CRT-D pacing and sensing     - CXR pending  - Discharge home today

## 2023-12-22 NOTE — DISCHARGE NOTE NURSING/CASE MANAGEMENT/SOCIAL WORK - NSDCPEFALRISK_GEN_ALL_CORE
For information on Fall & Injury Prevention, visit: [email or web address] OR  [email or web address]/[email or web address]-[email or web address]-injury OR  https://www.cdc.gov/steadi/patient.html For information on Fall & Injury Prevention, visit: [email or web address].[email or web address]/[email or web address]-[email or web address]-mobility OR  [email or web address]://[email or web address].[email or web address]/[email or web address]-[email or web address]-injury OR  https://www.cdc.gov/steadi/patient.html

## 2023-12-22 NOTE — DISCHARGE NOTE NURSING/CASE MANAGEMENT/SOCIAL WORK - NSDCVIVACCINE_GEN_ALL_CORE_FT
Tdap; 02-Nov-2017 16:11; Mehdi Tsai (АЛЕКСАНДР); [employer]; [id number]; IntraMuscular; Deltoid Left.; 0.5 milliLiter(s); VIS (VIS Published: 09-May-2013, VIS Presented: 02-Nov-2017);    Tdap; 02-Nov-2017 16:11; Mehdi Tsai (АЛЕКСАНДР); [employer] Paste[employer]; [id number]; IntraMuscular; Deltoid Left.; 0.5 milliLiter(s); VIS (VIS Published: 09-May-2013, VIS Presented: 02-Nov-2017);

## 2023-12-26 ENCOUNTER — RX CHANGE (OUTPATIENT)
Age: 78
End: 2023-12-26

## 2023-12-26 DIAGNOSIS — I73.9 PERIPHERAL VASCULAR DISEASE, UNSPECIFIED: ICD-10-CM

## 2023-12-26 LAB
GLUCOSE BLDC GLUCOMTR-MCNC: 194 MG/DL — HIGH (ref 70–99)
GLUCOSE BLDC GLUCOMTR-MCNC: 194 MG/DL — HIGH (ref 70–99)

## 2023-12-28 ENCOUNTER — NON-APPOINTMENT (OUTPATIENT)
Age: 78
End: 2023-12-28

## 2024-01-08 ENCOUNTER — NON-APPOINTMENT (OUTPATIENT)
Age: 79
End: 2024-01-08

## 2024-01-08 ENCOUNTER — APPOINTMENT (OUTPATIENT)
Dept: ELECTROPHYSIOLOGY | Facility: CLINIC | Age: 79
End: 2024-01-08
Payer: MEDICARE

## 2024-01-08 VITALS
OXYGEN SATURATION: 97 % | DIASTOLIC BLOOD PRESSURE: 64 MMHG | SYSTOLIC BLOOD PRESSURE: 100 MMHG | WEIGHT: 150 LBS | HEART RATE: 84 BPM | HEIGHT: 67 IN | BODY MASS INDEX: 23.54 KG/M2

## 2024-01-08 PROCEDURE — 93000 ELECTROCARDIOGRAM COMPLETE: CPT | Mod: 59

## 2024-01-08 PROCEDURE — 93284 PRGRMG EVAL IMPLANTABLE DFB: CPT

## 2024-01-08 PROCEDURE — 99024 POSTOP FOLLOW-UP VISIT: CPT

## 2024-01-11 ENCOUNTER — NON-APPOINTMENT (OUTPATIENT)
Age: 79
End: 2024-01-11

## 2024-01-18 ENCOUNTER — APPOINTMENT (OUTPATIENT)
Dept: CARDIOLOGY | Facility: CLINIC | Age: 79
End: 2024-01-18
Payer: MEDICARE

## 2024-01-18 PROCEDURE — 93925 LOWER EXTREMITY STUDY: CPT

## 2024-01-18 PROCEDURE — 93923 UPR/LXTR ART STDY 3+ LVLS: CPT

## 2024-01-26 NOTE — PHYSICAL EXAM
[] : no respiratory distress [Left Infraclavicular] : left infraclavicular area [Clean] : clean [Dry] : dry [Healing Well] : healing well [FreeTextEntry1] : +1 B/l LE edema

## 2024-01-26 NOTE — HISTORY OF PRESENT ILLNESS
[de-identified] : The patient is a 78 year old male with history of CAD s/p 4V CABG and multiple PCI's, chronic systolic heart failure secondary to ischemic cardiomyopathy (EF < 20%), DM, CKD, and left bundle branch block. Pt with recent admission to [facility] on 11/20/23-11/22/23 for decompensated HF with multi-organ system failure. He has been on optimal GDMT for CHF but continues to have declinining LVEF. Recent LHC revealed a patent LIMA with all other grafts closed. No intervention was performed. Patient meets criteria for primary prevention ICD and since he has a baseline LBBB is now s/p Medtronic CRT-D implant by Dr. Bazzi on 12/21/23.   Today, reports he has been doing well from device standpoint since discharge. Denies pain, stim. Still with LE edema and fatigue. BMP, BNP repeated saturday with results pending, diuretic therapy being managed by Dr. Valenzuela. Optivol initializing.  CRT-D interrogation reveals normal function. A, RV and LV with adequate sense and pace thresholds. Effective CRT 97.1%. No events in arrhythmia log.   Left infraclavicular incision well approximated without erythema, edema, or exudate. Pocket without hematoma.

## 2024-01-26 NOTE — DISCUSSION/SUMMARY
[FreeTextEntry1] : The patient is a 78 year old male with history of CAD s/p 4V CABG and multiple PCI's, chronic systolic heart failure secondary to ischemic cardiomyopathy (EF < 20%), DM, CKD, and left bundle branch block. Pt with recent admission to [facility] on 11/20/23-11/22/23 for decompensated HF with multi-organ system failure. He has been on optimal GDMT for CHF but continues to have declinining LVEF. Recent LHC revealed a patent LIMA with all other grafts closed. No intervention was performed. Patient meets criteria for primary prevention ICD and since he has a baseline LBBB is now s/p Medtronic CRT-D implant by Dr. Bazzi on 12/21/23.   Today, reports he has been doing well from device standpoint since discharge. Denies pain, stim. Still with LE edema and fatigue. BMP, BNP repeated saturday with results pending, diuretic therapy being managed by Dr. Valenzuela. Optivol initializing.  CRT-D interrogation reveals normal function. A, RV and LV with adequate sense and pace thresholds. Effective CRT 97.1%. No events in arrhythmia log.   Left infraclavicular incision well approximated without erythema, edema, or exudate. Pocket without hematoma.   Recommendation:   -Site care, post op arm restrictions and remote follow up reviewed. -To repeat echo when > 3 months s/p CRT implant. -Discussed benefits of enrolling in remote optivol monitoring. Wishes to enroll. Still initializing.  -CHF management per Dr. Valenzuela, advised will f/u once lab results available. -EP follow up in 3months or sooner PRN.  Afsaneh Vo ANP-C

## 2024-01-30 ENCOUNTER — APPOINTMENT (OUTPATIENT)
Dept: CARDIOLOGY | Facility: CLINIC | Age: 79
End: 2024-01-30
Payer: MEDICARE

## 2024-01-30 VITALS
HEIGHT: 67 IN | WEIGHT: 148 LBS | TEMPERATURE: 97.5 F | BODY MASS INDEX: 23.23 KG/M2 | SYSTOLIC BLOOD PRESSURE: 100 MMHG | HEART RATE: 72 BPM | DIASTOLIC BLOOD PRESSURE: 61 MMHG | OXYGEN SATURATION: 99 %

## 2024-01-30 PROCEDURE — G2211 COMPLEX E/M VISIT ADD ON: CPT

## 2024-01-30 PROCEDURE — 99215 OFFICE O/P EST HI 40 MIN: CPT

## 2024-01-30 RX ORDER — LEVOTHYROXINE SODIUM 0.07 MG/1
75 TABLET ORAL DAILY
Refills: 0 | Status: DISCONTINUED | COMMUNITY
End: 2024-01-30

## 2024-01-30 RX ORDER — TORSEMIDE 20 MG/1
20 TABLET ORAL
Qty: 120 | Refills: 2 | Status: ACTIVE | COMMUNITY
Start: 1900-01-01 | End: 1900-01-01

## 2024-01-31 ENCOUNTER — LABORATORY RESULT (OUTPATIENT)
Age: 79
End: 2024-01-31

## 2024-01-31 NOTE — DISCUSSION/SUMMARY
[Patient] : the patient [Risks] : risks [Benefits] : benefits [Alternatives] : alternatives [With Me] : with me [___ Week(s)] : in [unfilled] week(s) [FreeTextEntry1] : This is a 77 year old male with history HTN, DM, HLD, CAD, h/o MI,  s/p CABG, s/p PCIx4, recent unstable angina s/p PCI with CODI to SVG to LCX,     1) CHF:  stable optimized.    s/p CRT-D.  BMP and BNP check.  Mag cehck. ct entresot. ct torsmeide 20 BID.  ct metolazione 5 daily.  and ct farxiga 10.  crt coreg.  if Elevated cr. then change metolazone to 2.5 daily.  will check Optivol.  repeat TTE  off aldactone due to hyperkalemia  ct irone tablets.  Check OPptivol today for baseline  2)  Angina pectoris:  stable coronary artery disease  PCI and brachy therapy recent PCI to SVG.  nov 2020 ct Dual antiplatelet therapy .aspirin. brillinta.  ct lipitor 40 mg daily .  ranexa 500 mg Q12   aspirin and  ct brillinta      2) CAD, s/p CABG, s/p PCI: ASA, c ct Brilinta ,   3) HTN:  CHF meds.   7) hypothyroidism:  and Diabetes Mellitus : endocrine follows up.  8) Elevated lipoprotein a  : on statins.    LDL goal < 70  increas elipitor 60 mg   LDL goal < 70   TSH eleavted. repeat tsh. f/u with PCP

## 2024-01-31 NOTE — ADDENDUM
[FreeTextEntry1] : 1 31 2024:  Pre-operative cardiovascular risk evaluation and management: dental appt. cleaning  patietn is optimizsed.  he is on Dual antiplatelet therapy .  can hold brillinta for 2 days if needed if bleeding gums and deeep cleaning planned.  ct aspirin p[roceed for dental visit as indicated.

## 2024-01-31 NOTE — HISTORY OF PRESENT ILLNESS
[FreeTextEntry1] : f/u  For: HTN, HLD, CAD s/p CABg x4, Angina, recent PCI   HPI for today: : he is taking metolazone 5 mg daily.  and also torsemide was increased to  20 BID.     he did not get blood work done.  no leg edmea.   slepet well in the lat 1-2 weeks we had icnreased and adjuted his diuretic regimen. naomi is takign entresto and farxiga too no dizziness   old note:   he is scheduled for CRT- D dec 21.   he had stopped farxiga for yesterday.  and he started noticing leg edmea.  and soem chest pain. he  took ntg. and he felt better.   he is holding nbrillinta.       old note: lorenzo has leg edema.  metolazone 2 doses did not work.   also did not like metoazloen.  he was takign lasix .  he is not able to sleep.   he is scheduled for [facility] heart cath.   old note: has cough with producitve sputum.   he has been taking abx for a long time now 1 mth.  the symptosm are not going away.   no leg edema. no dyspnea on exertion .  no dizizness.  ECg : Sinus Rhythm -frequent ectopic ventricular beats  # VECs = 2 -Left bundle branch block. ABNORMAL  old note:  feels good. no chest pain . no dyspnea.  no dizziness. no syncope. complaitnw ith emed.s  he gets cough with draiy products.  also his thyroid is abnormal and started on meds.  old note:  feels good. no new symptoms. a1C : 6.6 .   no leg jose,a.  can ambulate  and he goes around [employer] and [employer].  no dyspnea on exertion .  no leg edema. one pillow use.   old note:  : feels good. no chestp ain. no headaches. no dyspnea on exertion . no LE edema. compliant with meds. Patient has been having left ear pain and left parotid swelling and enlargement. he had an infection. he is currently on abx. 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 he had gone to [facility]. after the proedure patient felt that his sugar was low.   he had transient LOC and was intubated. Had ? cardiac arrest bradycardia. She ahd eleavted lactate and eleavted trops. he was intuabted for few hours and erxtrubated the same night. the next day lactate improved. LVEf in the hspital was 40-45%.  I recommened patient to be dishcarged.  no cardiac cath or intervention. Patient was sdischarged the next day feels good now. No headhcaes. no diziznes.s . no chest pain   old note: HPI for today: feels good. got Brachytherapy done at [place].  had angina for 3 days after.  continues to take Nitrates.  did not make the appt for diabetes doctor yet. Hb A 1 C still elevated. Strongly recommended endocrinologist for aggressive sugar control Complaitn with meds. BP has been increasing now.   OLD NOTE: 6/12/2019 c/c: chest pain  Patient had an episode of chest pain, substernal with epigastric and abdominal pain.  2 days ago, it releived by NTG.  he had another epsiode today, when he was going back from the [employer]. 6/10, subternal.also with abdominal pain. he has been taking mylanta too for acid reflux . also , he has been recnetly started on azithromycin for bronchitis,. He states the second episode happened today and it got relived with NTG. he has been taking IMDUR every day.    old note: no chest pain.no dyspnea,. no headaches. no dizziness.  difficulty walking foot problem. seein neurologist. NO Peripheral vascular disease . patient got US at a [facility] doctor.

## 2024-01-31 NOTE — CARDIOLOGY SUMMARY
[___] : [unfilled] [LVEF ___%] : LVEF [unfilled]% [Severe] : severe LV dysfunction [None] : no pulmonary hypertension [Enlarged] : enlarged LA size [Mild] : mild mitral regurgitation [___] : [unfilled] [de-identified] : jan 8 2023:  BI v paced  11 7 2023 : Sinus Rhythm  -Left bundle branch block. -Left atrial enlargement.  ABNORMAL 3 29 2022  Sinus  Rhythm  -Left bundle branch block.   ABNORMAL    10 20 2021: Sinus  Rhythm  -Left bundle branch block and left axis.   -Left atrial enlargement.   ABNORMAL    6/15/2021 Sinus  Rhythm  -Left bundle branch block.   -Left atrial enlargement.   ABNORMAL   [de-identified] : aug 2022  PVCs: no signifciant arrhythmias  [de-identified] : may 2022:  MUGA : 38%  [de-identified] : may 2022:   LVEF 36%.  Normal Rv. ,mild MR.  \par  july 2021:  LVEF 37%  Moderte MR.  Severe pulm HTN.  [de-identified] : MUGA scan : june 2021:  LVEF 39%.

## 2024-02-03 LAB
ANION GAP SERPL CALC-SCNC: 12 MMOL/L
BUN SERPL-MCNC: 76 MG/DL
CALCIUM SERPL-MCNC: 8.6 MG/DL
CHLORIDE SERPL-SCNC: 92 MMOL/L
CO2 SERPL-SCNC: 35 MMOL/L
CREAT SERPL-MCNC: 2.56 MG/DL
EGFR: 25 ML/MIN/1.73M2
GLUCOSE SERPL-MCNC: 129 MG/DL
MAGNESIUM SERPL-MCNC: 2.7 MG/DL
NT-PROBNP SERPL-MCNC: 2693 PG/ML
POTASSIUM SERPL-SCNC: 3.9 MMOL/L
SODIUM SERPL-SCNC: 138 MMOL/L
TSH SERPL-ACNC: 14.5 UIU/ML

## 2024-02-03 RX ORDER — METOLAZONE 5 MG/1
5 TABLET ORAL
Qty: 30 | Refills: 0 | Status: DISCONTINUED | COMMUNITY
Start: 2023-12-03 | End: 2024-02-03

## 2024-02-06 ENCOUNTER — NON-APPOINTMENT (OUTPATIENT)
Age: 79
End: 2024-02-06

## 2024-02-19 RX ORDER — METOLAZONE 2.5 MG/1
2.5 TABLET ORAL
Qty: 30 | Refills: 2 | Status: ACTIVE | COMMUNITY
Start: 2023-11-04 | End: 1900-01-01

## 2024-02-27 ENCOUNTER — APPOINTMENT (OUTPATIENT)
Dept: CARDIOLOGY | Facility: CLINIC | Age: 79
End: 2024-02-27
Payer: MEDICARE

## 2024-02-27 VITALS
DIASTOLIC BLOOD PRESSURE: 60 MMHG | TEMPERATURE: 97.5 F | BODY MASS INDEX: 23.39 KG/M2 | HEART RATE: 81 BPM | OXYGEN SATURATION: 95 % | WEIGHT: 149 LBS | HEIGHT: 67 IN | SYSTOLIC BLOOD PRESSURE: 103 MMHG

## 2024-02-27 DIAGNOSIS — I25.5 ISCHEMIC CARDIOMYOPATHY: ICD-10-CM

## 2024-02-27 DIAGNOSIS — E78.00 PURE HYPERCHOLESTEROLEMIA, UNSPECIFIED: ICD-10-CM

## 2024-02-27 DIAGNOSIS — Z00.00 ENCOUNTER FOR GENERAL ADULT MEDICAL EXAMINATION W/OUT ABNORMAL FINDINGS: ICD-10-CM

## 2024-02-27 DIAGNOSIS — N18.9 CHRONIC KIDNEY DISEASE, UNSPECIFIED: ICD-10-CM

## 2024-02-27 DIAGNOSIS — Z95.5 PRESENCE OF CORONARY ANGIOPLASTY IMPLANT AND GRAFT: ICD-10-CM

## 2024-02-27 DIAGNOSIS — R09.89 OTHER SPECIFIED SYMPTOMS AND SIGNS INVOLVING THE CIRCULATORY AND RESPIRATORY SYSTEMS: ICD-10-CM

## 2024-02-27 DIAGNOSIS — I10 ESSENTIAL (PRIMARY) HYPERTENSION: ICD-10-CM

## 2024-02-27 PROCEDURE — 99215 OFFICE O/P EST HI 40 MIN: CPT

## 2024-02-27 PROCEDURE — 99213 OFFICE O/P EST LOW 20 MIN: CPT | Mod: 24

## 2024-02-27 PROCEDURE — G2211 COMPLEX E/M VISIT ADD ON: CPT

## 2024-02-27 NOTE — HISTORY OF PRESENT ILLNESS
[FreeTextEntry1] : f/u  For: HTN, HLD, CAD s/p CABg x4, Angina, recent PCI   HPI for today: he is takign torsemdie 20 mgf BID . adn torsemdie 2.5 mg every other day.  no chest pain . no dyspnea on exertion . he recently had eleavted fluid status and we increased the diuretic dose takign brilltina.  aspirin. ranexa.  entrestor.   farxiga.  10 mg.  metoprolol 50 mg dauily. ranexa    old note:  : he is taking metolazone 5 mg daily.  and also torsemide was increased to  20 BID.     he did not get blood work done.  no leg edmea.   slepet well in the lat 1-2 weeks we had icnreased and adjuted his diuretic regimen. naomi is takign entresto and farxiga too no dizziness   old note:   he is scheduled for CRT- D dec 21.   he had stopped farxiga for yesterday.  and he started noticing leg edmea.  and soem chest pain. he  took ntg. and he felt better.   he is holding nbrillinta.       old note: lorenzo has leg edema.  metolazone 2 doses did not work.   also did not like metoazloen.  he was takign lasix .  he is not able to sleep.   he is scheduled for [facility] heart cath.   old note: has cough with producitve sputum.   he has been taking abx for a long time now 1 mth.  the symptosm are not going away.   no leg edema. no dyspnea on exertion .  no dizizness.  ECg : Sinus Rhythm -frequent ectopic ventricular beats  # VECs = 2 -Left bundle branch block. ABNORMAL  old note:  feels good. no chest pain . no dyspnea.  no dizziness. no syncope. complaitnw ith emed.s  he gets cough with draiy products.  also his thyroid is abnormal and started on meds.  old note:  feels good. no new symptoms. a1C : 6.6 .   no leg jose,a.  can ambulate  and he goes around [employer] and [email or web address].  no dyspnea on exertion .  no leg edema. one pillow use.   old note:  : feels good. no chestp ain. no headaches. no dyspnea on exertion . no LE edema. compliant with meds. Patient has been having left ear pain and left parotid swelling and enlargement. he had an infection. he is currently on abx. 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 he had gone to [facility]. after the proedure patient felt that his sugar was low.   he had transient LOC and was intubated. Had ? cardiac arrest bradycardia. She ahd eleavted lactate and eleavted trops. he was intuabted for few hours and erxtrubated the same night. the next day lactate improved. LVEf in the hspital was 40-45%.  I recommened patient to be dishcarged.  no cardiac cath or intervention. Patient was sdischarged the next day feels good now. No headhcaes. no diziznes.s . no chest pain   old note: HPI for today: feels good. got Brachytherapy done at [place].  had angina for 3 days after.  continues to take Nitrates.  did not make the appt for diabetes doctor yet. Hb A 1 C still elevated. Strongly recommended endocrinologist for aggressive sugar control Complaitn with meds. BP has been increasing now.   OLD NOTE: 6/12/2019 c/c: chest pain  Patient had an episode of chest pain, substernal with epigastric and abdominal pain.  2 days ago, it releived by NTG.  he had another epsiode today, when he was going back from the [place]. 6/10, subternal.also with abdominal pain. he has been taking mylanta too for acid reflux . also , he has been recnetly started on azithromycin for bronchitis,. He states the second episode happened today and it got relived with NTG. he has been taking IMDUR every day.    old note: no chest pain.no dyspnea,. no headaches. no dizziness.  difficulty walking foot problem. seein neurologist. NO Peripheral vascular disease . patient got US at a [facility] doctor.

## 2024-02-27 NOTE — DISCUSSION/SUMMARY
[Patient] : the patient [Risks] : risks [With Me] : with me [Benefits] : benefits [Alternatives] : alternatives [___ Month(s)] : in [unfilled] month(s) [FreeTextEntry1] : This is a 78 year old male with history HTN, DM, HLD, CAD, h/o MI,  s/p CABG, s/p PCIx4, recent unstable angina s/p PCI with CODI to SVG to LCX,     1) CHF:  stable optimized.    s/p CRT-D.  BMP and BNP check.  Mag cehck. ct entresot. ct torsmeide 20 BID.   .  and ct farxiga 10.  crt coreg.  metolazone to 2.5 every other   will check Optivol.  off aldactone due to hyperkalemia  ct irone tablets.  Check OPptivol   avoid salty food.  2)  Angina pectoris:  stable coronary artery disease  PCI and brachy therapy recent PCI to SVG.  nov 2020 ct Dual antiplatelet therapy .aspirin. brillinta.  ct lipitor 40 mg daily .  ranexa 500 mg Q12   aspirin and  ct brillinta      2) CAD, s/p CABG, s/p PCI: ASA, ct Brilinta ,   3) HTN:  CHF meds.   7) hypothyroidism:  and Diabetes Mellitus : endocrine follows up.  8) Elevated lipoprotein a  : on statins.    LDL goal < 70  increas elipitor 60 mg   LDL goal < 70   TSH eleavted. repeat tsh. f/u with PCP

## 2024-02-27 NOTE — CARDIOLOGY SUMMARY
[___] : [unfilled] [LVEF ___%] : LVEF [unfilled]% [Severe] : severe LV dysfunction [None] : no pulmonary hypertension [Mild] : mild mitral regurgitation [Enlarged] : enlarged LA size [de-identified] : jan 8 2023:  BI v paced  11 7 2023 : Sinus Rhythm  -Left bundle branch block. -Left atrial enlargement.  ABNORMAL 3 29 2022  Sinus  Rhythm  -Left bundle branch block.   ABNORMAL    10 20 2021: Sinus  Rhythm  -Left bundle branch block and left axis.   -Left atrial enlargement.   ABNORMAL    6/15/2021 Sinus  Rhythm  -Left bundle branch block.   -Left atrial enlargement.   ABNORMAL   [de-identified] : aug 2022  PVCs: no signifciant arrhythmias  [de-identified] : may 2022:  MUGA : 38%  [de-identified] : may 2022:   LVEF 36%.  Normal Rv. ,mild MR.  \par  july 2021:  LVEF 37%  Moderte MR.  Severe pulm HTN.  [de-identified] : MUGA scan : june 2021:  LVEF 39%.

## 2024-02-27 NOTE — PHYSICAL EXAM
[Well Nourished] : well nourished [Well Developed] : well developed [No Acute Distress] : no acute distress [Normal Venous Pressure] : normal venous pressure [No Carotid Bruit] : no carotid bruit [Normal S1, S2] : normal S1, S2 [No Murmur] : no murmur [No Rub] : no rub [No Gallop] : no gallop [Clear Lung Fields] : clear lung fields [Good Air Entry] : good air entry [No Respiratory Distress] : no respiratory distress  [Non Tender] : non-tender [Soft] : abdomen soft [No Masses/organomegaly] : no masses/organomegaly [Normal Bowel Sounds] : normal bowel sounds [Normal Gait] : normal gait [No Edema] : no edema [No Cyanosis] : no cyanosis [No Varicosities] : no varicosities [No Clubbing] : no clubbing [No Rash] : no rash [No Skin Lesions] : no skin lesions [Moves all extremities] : moves all extremities [No Focal Deficits] : no focal deficits [Normal Speech] : normal speech [Alert and Oriented] : alert and oriented [Normal memory] : normal memory [Normal Appearance] : normal appearance [General Appearance - Well Developed] : well developed [Well Groomed] : well groomed [No Deformities] : no deformities [General Appearance - Well Nourished] : well nourished [General Appearance - In No Acute Distress] : no acute distress [Eyelids - No Xanthelasma] : the eyelids demonstrated no xanthelasmas [Normal Conjunctiva] : the conjunctiva exhibited no abnormalities [Normal Oral Mucosa] : normal oral mucosa [No Oral Cyanosis] : no oral cyanosis [No Oral Pallor] : no oral pallor [Normal Jugular Venous A Waves Present] : normal jugular venous A waves present [Normal Jugular Venous V Waves Present] : normal jugular venous V waves present [No Jugular Venous Castorena A Waves] : no jugular venous castorena A waves [Respiration, Rhythm And Depth] : normal respiratory rhythm and effort [Exaggerated Use Of Accessory Muscles For Inspiration] : no accessory muscle use [Auscultation Breath Sounds / Voice Sounds] : lungs were clear to auscultation bilaterally [Heart Rate And Rhythm] : heart rate and rhythm were normal [Heart Sounds] : normal S1 and S2 [Murmurs] : no murmurs present [Abdomen Tenderness] : non-tender [Abdomen Soft] : soft [Abnormal Walk] : normal gait [Abdomen Mass (___ Cm)] : no abdominal mass palpated [Nail Clubbing] : no clubbing of the fingernails [Cyanosis, Localized] : no localized cyanosis [Petechial Hemorrhages (___cm)] : no petechial hemorrhages [Skin Color & Pigmentation] : normal skin color and pigmentation [] : no rash [No Venous Stasis] : no venous stasis [Skin Lesions] : no skin lesions [No Xanthoma] : no  xanthoma was observed [No Skin Ulcers] : no skin ulcer [Oriented To Time, Place, And Person] : oriented to person, place, and time [Affect] : the affect was normal [Mood] : the mood was normal [No Anxiety] : not feeling anxious

## 2024-02-28 NOTE — PHYSICAL THERAPY INITIAL EVALUATION ADULT - ACTIVE RANGE OF MOTION EXAMINATION, REHAB EVAL
bilateral  lower extremity Active ROM was WFL (within functional limits)/bilateral upper extremity Active ROM was WFL (within functional limits)
Parent(s)

## 2024-03-02 LAB
ANION GAP SERPL CALC-SCNC: 13 MMOL/L
BUN SERPL-MCNC: 49 MG/DL
CALCIUM SERPL-MCNC: 9.5 MG/DL
CHLORIDE SERPL-SCNC: 93 MMOL/L
CO2 SERPL-SCNC: 35 MMOL/L
CREAT SERPL-MCNC: 2.37 MG/DL
EGFR: 28 ML/MIN/1.73M2
GLUCOSE SERPL-MCNC: 107 MG/DL
MAGNESIUM SERPL-MCNC: 2.5 MG/DL
NT-PROBNP SERPL-MCNC: 2998 PG/ML
POTASSIUM SERPL-SCNC: 4 MMOL/L
SODIUM SERPL-SCNC: 140 MMOL/L

## 2024-03-04 ENCOUNTER — NON-APPOINTMENT (OUTPATIENT)
Age: 79
End: 2024-03-04

## 2024-03-04 NOTE — HISTORY OF PRESENT ILLNESS
[FreeTextEntry1] : The patient is a 78-year-old male presenting for follow up today. The patient has a history of CAD s/p CABG, chronic systolic heart failure secondary to ischemic cardiomyopathy, and left bundle branch block. He was recently hospitalized for decompensated heart failure. He has been on GDMT for CHF with EFs ranging from 30-40% in the past. Most recently, however, the EF has declined to <20% despite GDMT and no additional requirement for revascularization. He was seen in the hospital by the EP team and felt to be a candidate for a CRT-D device - he is already scheduled. The patient experiences shortness of breath with activity.

## 2024-03-04 NOTE — DISCUSSION/SUMMARY
[FreeTextEntry1] : In summary, the patient is a 78-year-old male with a history of CAD s/p CABG, chronic systolic heart failure secondary to ischemic cardiomyopathy, and left bundle branch block. LVEF is <35% despited GDMT. The patient is a candidate for CRT-D implant. We discussed the details of the procedure including potential complications which include but are not limited to bleeding, infection, pneumothorax, cardiac perforation, damage to blood vessels, stroke and DVT. The patient expressed understanding and wishes to proceed. [EKG obtained to assist in diagnosis and management of assessed problem(s)] : EKG obtained to assist in diagnosis and management of assessed problem(s)

## 2024-03-05 ENCOUNTER — APPOINTMENT (OUTPATIENT)
Dept: OTOLARYNGOLOGY | Facility: CLINIC | Age: 79
End: 2024-03-05
Payer: MEDICARE

## 2024-03-05 DIAGNOSIS — H66.93 OTITIS MEDIA, UNSPECIFIED, BILATERAL: ICD-10-CM

## 2024-03-05 DIAGNOSIS — H70.11 CHRONIC MASTOIDITIS, RIGHT EAR: ICD-10-CM

## 2024-03-05 DIAGNOSIS — H90.3 SENSORINEURAL HEARING LOSS, BILATERAL: ICD-10-CM

## 2024-03-05 DIAGNOSIS — H72.92 UNSPECIFIED PERFORATION OF TYMPANIC MEMBRANE, LEFT EAR: ICD-10-CM

## 2024-03-05 PROCEDURE — 99213 OFFICE O/P EST LOW 20 MIN: CPT | Mod: 25

## 2024-03-05 PROCEDURE — 92504 EAR MICROSCOPY EXAMINATION: CPT

## 2024-03-05 PROCEDURE — 69220 CLEAN OUT MASTOID CAVITY: CPT | Mod: RT

## 2024-03-05 NOTE — PHYSICAL EXAM
[Nasal Endoscopy Performed] : nasal endoscopy was performed, see procedure section for findings [Binocular Microscopic Exam] : Binocular microscopic exam was performed [Hearing Loss Left Only] : diminished [Hearing Loss Right Only] : diminished [Rinne Test Air Conduction Persists > Bone Conduction Right] : air conduction greater than bone conduction on the right [Hearing Bone Test (Tuning Fork On Forehead)] : no lateralization of tone [Rinne Test Air Conduction Persists > Bone Conduction Left] : air conduction greater than bone conduction on the left [Normal] : gait was normal [de-identified] : HB 2-3/6, good tone, mobility over all branches [Nystagmus] : ~T no ~M nystagmus was seen [Fukuda Step Test] : Fukuda Step Test was Negative [Romberg's Sign] : Romberg's sign was absent [Fistula Sign] : Fistula Sign: Negative [Past-Pointing] : Past-Pointing: Negative [Chato-Hallmalike] : Webb-Hallpike: Negative [FreeTextEntry8] : CWD cavity dry [de-identified] : remnant [de-identified] : posterior perforation, no polyps, clear mucous scant, powder instilled; cerumen covering TM defect

## 2024-03-05 NOTE — HISTORY OF PRESENT ILLNESS
[de-identified] : 78 year old man presents for follow-up History of chronic bilateral otitis media, Right mastoiditis, left facial palsy Used to wear hearing aids, but stopped due to improved hearing Last audio 06/15/2022 No

## 2024-03-19 ENCOUNTER — APPOINTMENT (OUTPATIENT)
Dept: CARDIOLOGY | Facility: CLINIC | Age: 79
End: 2024-03-19
Payer: MEDICARE

## 2024-03-19 PROCEDURE — 93297 REM INTERROG DEV EVAL ICPMS: CPT

## 2024-03-25 ENCOUNTER — RESULT REVIEW (OUTPATIENT)
Age: 79
End: 2024-03-25
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
## 2024-03-26 ENCOUNTER — OUTPATIENT (OUTPATIENT)
Dept: OUTPATIENT SERVICES | Facility: HOSPITAL | Age: 79
LOS: 1 days | End: 2024-03-26
Payer: MEDICARE

## 2024-03-26 DIAGNOSIS — Z96.7 PRESENCE OF OTHER BONE AND TENDON IMPLANTS: Chronic | ICD-10-CM

## 2024-03-26 DIAGNOSIS — Z95.1 PRESENCE OF AORTOCORONARY BYPASS GRAFT: Chronic | ICD-10-CM

## 2024-03-26 DIAGNOSIS — Z92.3 PERSONAL HISTORY OF IRRADIATION: Chronic | ICD-10-CM
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
## 2024-03-26 PROCEDURE — 73522 X-RAY EXAM HIPS BI 3-4 VIEWS: CPT | Mod: 26

## 2024-03-27 ENCOUNTER — EMERGENCY (EMERGENCY)
Facility: HOSPITAL | Age: 79
LOS: 1 days | Discharge: HOME | End: 2024-03-27
Attending: EMERGENCY MEDICINE
Payer: MEDICARE

## 2024-03-27 VITALS
TEMPERATURE: 98 F | DIASTOLIC BLOOD PRESSURE: 63 MMHG | HEIGHT: 68 IN | HEART RATE: 80 BPM | RESPIRATION RATE: 20 BRPM | SYSTOLIC BLOOD PRESSURE: 117 MMHG | OXYGEN SATURATION: 98 % | WEIGHT: 144.18 LBS

## 2024-03-27 DIAGNOSIS — Z95.1 PRESENCE OF AORTOCORONARY BYPASS GRAFT: Chronic | ICD-10-CM

## 2024-03-27 DIAGNOSIS — Z92.3 PERSONAL HISTORY OF IRRADIATION: Chronic | ICD-10-CM

## 2024-03-27 DIAGNOSIS — Z96.7 PRESENCE OF OTHER BONE AND TENDON IMPLANTS: Chronic | ICD-10-CM

## 2024-03-27 LAB
ALBUMIN SERPL ELPH-MCNC: 3.4 G/DL — SIGNIFICANT CHANGE UP (ref 3.3–5.2)
ALP SERPL-CCNC: 74 U/L — SIGNIFICANT CHANGE UP (ref 40–120)
ALT FLD-CCNC: 17 U/L — SIGNIFICANT CHANGE UP
ANION GAP SERPL CALC-SCNC: 11 MMOL/L — SIGNIFICANT CHANGE UP (ref 5–17)
ANION GAP SERPL CALC-SCNC: 11 MMOL/L — SIGNIFICANT CHANGE UP (ref 5–17)
AST SERPL-CCNC: 48 U/L — HIGH
BASOPHILS # BLD AUTO: 0.03 K/UL — SIGNIFICANT CHANGE UP (ref 0–0.2)
BASOPHILS NFR BLD AUTO: 0.3 % — SIGNIFICANT CHANGE UP (ref 0–2)
BILIRUB SERPL-MCNC: 0.5 MG/DL — SIGNIFICANT CHANGE UP (ref 0.4–2)
BUN SERPL-MCNC: 33.5 MG/DL — HIGH (ref 8–20)
BUN SERPL-MCNC: 36.7 MG/DL — HIGH (ref 8–20)
CALCIUM SERPL-MCNC: 7.6 MG/DL — LOW (ref 8.4–10.5)
CALCIUM SERPL-MCNC: 8.4 MG/DL — SIGNIFICANT CHANGE UP (ref 8.4–10.5)
CHLORIDE SERPL-SCNC: 92 MMOL/L — LOW (ref 96–108)
CHLORIDE SERPL-SCNC: 97 MMOL/L — SIGNIFICANT CHANGE UP (ref 96–108)
CO2 SERPL-SCNC: 23 MMOL/L — SIGNIFICANT CHANGE UP (ref 22–29)
CO2 SERPL-SCNC: 26 MMOL/L — SIGNIFICANT CHANGE UP (ref 22–29)
CREAT SERPL-MCNC: 1.42 MG/DL — HIGH (ref 0.5–1.3)
CREAT SERPL-MCNC: 1.72 MG/DL — HIGH (ref 0.5–1.3)
EGFR: 41 ML/MIN/1.73M2 — LOW
EGFR: 51 ML/MIN/1.73M2 — LOW
EOSINOPHIL # BLD AUTO: 0.27 K/UL — SIGNIFICANT CHANGE UP (ref 0–0.5)
EOSINOPHIL NFR BLD AUTO: 3 % — SIGNIFICANT CHANGE UP (ref 0–6)
GLUCOSE SERPL-MCNC: 207 MG/DL — HIGH (ref 70–99)
GLUCOSE SERPL-MCNC: 251 MG/DL — HIGH (ref 70–99)
HCT VFR BLD CALC: 38.2 % — LOW (ref 39–50)
HGB BLD-MCNC: 13 G/DL — SIGNIFICANT CHANGE UP (ref 13–17)
IMM GRANULOCYTES NFR BLD AUTO: 0.3 % — SIGNIFICANT CHANGE UP (ref 0–0.9)
LYMPHOCYTES # BLD AUTO: 0.93 K/UL — LOW (ref 1–3.3)
LYMPHOCYTES # BLD AUTO: 10.2 % — LOW (ref 13–44)
MCHC RBC-ENTMCNC: 29.1 PG — SIGNIFICANT CHANGE UP (ref 27–34)
MCHC RBC-ENTMCNC: 34 GM/DL — SIGNIFICANT CHANGE UP (ref 32–36)
MCV RBC AUTO: 85.7 FL — SIGNIFICANT CHANGE UP (ref 80–100)
MONOCYTES # BLD AUTO: 1.06 K/UL — HIGH (ref 0–0.9)
MONOCYTES NFR BLD AUTO: 11.6 % — SIGNIFICANT CHANGE UP (ref 2–14)
NEUTROPHILS # BLD AUTO: 6.79 K/UL — SIGNIFICANT CHANGE UP (ref 1.8–7.4)
NEUTROPHILS NFR BLD AUTO: 74.6 % — SIGNIFICANT CHANGE UP (ref 43–77)
PLATELET # BLD AUTO: 209 K/UL — SIGNIFICANT CHANGE UP (ref 150–400)
POTASSIUM SERPL-MCNC: 4.7 MMOL/L — SIGNIFICANT CHANGE UP (ref 3.5–5.3)
POTASSIUM SERPL-MCNC: 5.6 MMOL/L — HIGH (ref 3.5–5.3)
POTASSIUM SERPL-SCNC: 4.7 MMOL/L — SIGNIFICANT CHANGE UP (ref 3.5–5.3)
POTASSIUM SERPL-SCNC: 5.6 MMOL/L — HIGH (ref 3.5–5.3)
PROT SERPL-MCNC: 6.9 G/DL — SIGNIFICANT CHANGE UP (ref 6.6–8.7)
RBC # BLD: 4.46 M/UL — SIGNIFICANT CHANGE UP (ref 4.2–5.8)
RBC # FLD: 13.9 % — SIGNIFICANT CHANGE UP (ref 10.3–14.5)
SODIUM SERPL-SCNC: 129 MMOL/L — LOW (ref 135–145)
SODIUM SERPL-SCNC: 131 MMOL/L — LOW (ref 135–145)
TROPONIN T, HIGH SENSITIVITY RESULT: 35 NG/L — SIGNIFICANT CHANGE UP (ref 0–51)
TROPONIN T, HIGH SENSITIVITY RESULT: 39 NG/L — SIGNIFICANT CHANGE UP (ref 0–51)
WBC # BLD: 9.11 K/UL — SIGNIFICANT CHANGE UP (ref 3.8–10.5)
WBC # FLD AUTO: 9.11 K/UL — SIGNIFICANT CHANGE UP (ref 3.8–10.5)

## 2024-03-27 PROCEDURE — 84484 ASSAY OF TROPONIN QUANT: CPT

## 2024-03-27 PROCEDURE — 72131 CT LUMBAR SPINE W/O DYE: CPT | Mod: MC

## 2024-03-27 PROCEDURE — 99285 EMERGENCY DEPT VISIT HI MDM: CPT | Mod: 25

## 2024-03-27 PROCEDURE — 72131 CT LUMBAR SPINE W/O DYE: CPT | Mod: 26,MC

## 2024-03-27 PROCEDURE — 99285 EMERGENCY DEPT VISIT HI MDM: CPT

## 2024-03-27 PROCEDURE — 93010 ELECTROCARDIOGRAM REPORT: CPT

## 2024-03-27 PROCEDURE — 93005 ELECTROCARDIOGRAM TRACING: CPT

## 2024-03-27 PROCEDURE — 80053 COMPREHEN METABOLIC PANEL: CPT

## 2024-03-27 PROCEDURE — 96374 THER/PROPH/DIAG INJ IV PUSH: CPT

## 2024-03-27 PROCEDURE — 85025 COMPLETE CBC W/AUTO DIFF WBC: CPT

## 2024-03-27 PROCEDURE — 99282 EMERGENCY DEPT VISIT SF MDM: CPT

## 2024-03-27 PROCEDURE — 80048 BASIC METABOLIC PNL TOTAL CA: CPT

## 2024-03-27 PROCEDURE — 36415 COLL VENOUS BLD VENIPUNCTURE: CPT

## 2024-03-27 RX ORDER — SODIUM CHLORIDE 9 MG/ML
1000 INJECTION INTRAMUSCULAR; INTRAVENOUS; SUBCUTANEOUS ONCE
Refills: 0 | Status: DISCONTINUED | OUTPATIENT
Start: 2024-03-27 | End: 2024-03-27

## 2024-03-27 RX ORDER — ACETAMINOPHEN 500 MG
650 TABLET ORAL ONCE
Refills: 0 | Status: COMPLETED | OUTPATIENT
Start: 2024-03-27 | End: 2024-03-27

## 2024-03-27 RX ORDER — MORPHINE SULFATE 50 MG/1
2 CAPSULE, EXTENDED RELEASE ORAL ONCE
Refills: 0 | Status: DISCONTINUED | OUTPATIENT
Start: 2024-03-27 | End: 2024-03-27

## 2024-03-27 RX ORDER — SODIUM CHLORIDE 9 MG/ML
250 INJECTION INTRAMUSCULAR; INTRAVENOUS; SUBCUTANEOUS ONCE
Refills: 0 | Status: COMPLETED | OUTPATIENT
Start: 2024-03-27 | End: 2024-03-27

## 2024-03-27 RX ADMIN — MORPHINE SULFATE 2 MILLIGRAM(S): 50 CAPSULE, EXTENDED RELEASE ORAL at 12:21

## 2024-03-27 RX ADMIN — SODIUM CHLORIDE 62.5 MILLILITER(S): 9 INJECTION INTRAMUSCULAR; INTRAVENOUS; SUBCUTANEOUS at 12:20

## 2024-03-27 RX ADMIN — Medication 650 MILLIGRAM(S): at 12:21

## 2024-03-27 RX ADMIN — SODIUM CHLORIDE 125 MILLILITER(S): 9 INJECTION INTRAMUSCULAR; INTRAVENOUS; SUBCUTANEOUS at 14:11

## 2024-03-27 NOTE — ED ADULT NURSE NOTE - CHPI ED NUR QUALITY2
Bilateral mastectomy w/bilateral sentinel lymph node biopsy. Frozen section negative for malignancy.
aching

## 2024-03-27 NOTE — ED ADULT NURSE NOTE - NSICDXPASTSURGICALHX_GEN_ALL_CORE_FT
PAST SURGICAL HISTORY:  History of brachytherapy     History of insertion of stent into coronary artery bypass graft     S/P CABG (coronary artery bypass graft) 4V 1983 [place]    Status post open reduction with internal fixation of fracture

## 2024-03-27 NOTE — CONSULT NOTE ADULT - ASSESSMENT
This is a 78 ym presents with extensive past medical hx for CAD  Pt fell 2 days earlier while fasting he became lightheaded and fell onto buttocks,  PT denies having any sensory changes or pain into the lower extrem    ct of the lumbar spine demonstrate multi level disc disease with stenosis on the l4-5 region on the left and l2-3 right and left on the l3-4  Family examined and spoken to with son at bedside.   Plan  -pt has improved with pain meds will continue to provide meds for pain  -muscular relaxants recomm to the patient and ed provider-  -pt recommended to limits repetitive activities at this time.   -family initially insisted that they would like to obtain and MRI; After discussing the recomm pt family decided to follow up as outpatient as needed for mri of the lumbar spine if sxs persist.  -Films and exam discussed with Dr Nation f/u as needed  This is a 78 ym presents with extensive past medical hx for CAD  Pt fell 2 days earlier while fasting he became lightheaded and fell onto buttocks,  PT denies having any sensory changes or pain into the lower extrem    ct of the lumbar spine demonstrate multi level disc disease with stenosis on the l4-5 region on the left and l2-3 right and left on the l3-4  Family examined and spoken to with son at bedside.   Plan  -pt has improved with pain meds will continue to provide meds for pain  -muscular relaxants recomm to the patient and ed provider-  -pt recommended to limits repetitive activities at this time.   -family initially insisted that they would like to obtain and MRI; After discussing the recomm pt family decided to follow up as outpatient as needed for mri of the lumbar spine if sxs persist.  -Films and exam discussed with Dr Nation f/tucker as needed as outpatient discussed with fam and patient.

## 2024-03-27 NOTE — ED PROVIDER NOTE - ATTENDING APP SHARED VISIT CONTRIBUTION OF CARE
I, Sunny Ramos MD, performed the initial face to face bedside interview with this patient regarding history of present illness, review of symptoms and relevant past medical, social and family history.  I completed an independent physical examination.  I was the initial provider who evaluated this patient. I have signed out the follow up of any pending tests (i.e. labs, radiological studies) to the ACP.  I have communicated the patient’s plan of care and disposition with the ACP.

## 2024-03-27 NOTE — ED PROVIDER NOTE - PROVIDER TOKENS
PROVIDER:[TOKEN:[[id number]]]
No, the patient is not being discharged from [facility]

## 2024-03-27 NOTE — ED ADULT TRIAGE NOTE - CHIEF COMPLAINT QUOTE
3 days ago pt was fasting for Ramadan and felt dehydrated and fell in bathroom. middle lower back pain

## 2024-03-27 NOTE — ED PROVIDER NOTE - PATIENT PORTAL LINK FT
You can access the FollowMyHealth Patient Portal offered by [facility] by registering at the following website: [email or web address]. By joining [employer]’s FollowMyHealth portal, you will also be able to view your health information using other applications (apps) compatible with our system.

## 2024-03-27 NOTE — CONSULT NOTE ADULT - SUBJECTIVE AND OBJECTIVE BOX
HPI:  This 78ym presents with back pain in the lower and mid back for the past 2 days.  According to the patient he fell 2 days ago after being lightheaded from fasting. PT stated that he was dehydrated. Pt denies having any chest pain during the event. PT does have PPM. PT denies having any numbness or tingling of the lower extrem. He denies having any bowel or bladder issues.   Pmhx CAD with bypass graft 4 vessels,  HTN, facial nerve palsy, GERD, HLD, Chronic kidney disease, DM, LBBB, hypothyroidism. Upon evaluating the patient he did not have any pain pt states that he had received meds in the ED and was asymp. PT does give hx of having diff with ambulating. Pt states during prayers he was standing, sitting and kneeling.      PAST MEDICAL & SURGICAL HISTORY:  GERD (gastroesophageal reflux disease)  High cholesterol  Coronary artery disease involving coronary bypass graft of native heart with unstable angina pectoris  Coronary artery disease involving native coronary artery of native heart, angina presence unspecifie  Type 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, without long-term current use of insul  Essential hypertension  HFrEF (heart failure with reduced ejection fraction)  Stage 4 chronic kidney disease  LBBB (left bundle branch block)  Facial nerve palsy  Hypothyroidism  S/P CABG (coronary artery bypass graft)  4V 1983 [place]    Surg:  Status post open reduction with internal fixation of fracture  History of insertion of stent into coronary artery bypass graft  History of brachytherapy      REVIEW OF SYSTEMS:  CONSTITUTIONAL: No fever, weight loss, or fatigue  EYES: No eye pain, visual disturbances, or discharge  ENMT:  No difficulty hearing, tinnitus, vertigo; No sinus or throat pain  NECK: No pain or stiffness  BREASTS: No pain, masses, or nipple discharge  RESPIRATORY: No cough, wheezing, chills or hemoptysis; No shortness of breath  CARDIOVASCULAR: No chest pain, palpitations, dizziness, or leg swelling  GASTROINTESTINAL: No abdominal or epigastric pain. No nausea, vomiting, or hematemesis; No diarrhea or constipation. No melena or hematochezia.  GENITOURINARY: No dysuria, frequency, hematuria, or incontinence  NEUROLOGICAL: No headaches, memory loss, loss of strength, numbness, or tremors  SKIN: No itching, burning, rashes, or lesions   LYMPH NODES: No enlarged glands  ENDOCRINE: No heat or cold intolerance; No hair loss  MUSCULOSKELETAL: No joint pain or swelling; No muscle, back, or extremity pain  PSYCHIATRIC: No depression, anxiety, mood swings, or difficulty sleeping  HEME/LYMPH: No easy bruising, or bleeding gums  ALLERY AND IMMUNOLOGIC: No hives or eczema    Allergies  No Known Allergies  Intolerances  DOPamine (Hypotension)  MEDICATIONS  (STANDING):  MEDICATIONS  (PRN):    SOCIAL HISTORY:  FAMILY HISTORY:    Vital Signs Last 24 Hrs  T(C): 36.7 (27 Mar 2024 11:26), Max: 36.7 (27 Mar 2024 11:26)  T(F): 98 (27 Mar 2024 11:26), Max: 98 (27 Mar 2024 11:26)  HR: 80 (27 Mar 2024 11:26) (80 - 80)  BP: 117/63 (27 Mar 2024 11:26) (117/63 - 117/63)  BP(mean): --  RR: 20 (27 Mar 2024 11:26) (20 - 20)  SpO2: 98% (27 Mar 2024 11:26) (98% - 98%)    Parameters below as of 27 Mar 2024 11:26  Patient On (Oxygen Delivery Method): room air        PHYSICAL EXAM:    GENERAL: NAD,   HEAD:  Atraumatic, Normocephalic  EYES: EOMI, PERRLA, conjunctiva and sclera clear  ENMT: No tonsillar erythema, exudates, or enlargement; Moist mucous membranes,   NECK: Supple,  NERVOUS SYSTEM:  Alert & Oriented X3, Good concentration; Motor Strength 5/5 B/L upper and lower extremities lower extrem hp5/5 bilat, kp5/5 pf 5/5 bilat df5/5 bilat,; DTRs 2+ intact and symmetric  CHEST/LUNG: Clear  bilat  HEART: Regular rate and rhythm; No murmurs, rubs, or gallops  ABDOMEN: Soft, Nontender, Nondistended; Bowel sounds present  EXTREMITIES:  No edema  Sensory grossly intact bilat upper and lower extrem   muscular: neg tenderness with palp of the cervical, thoracic and lumbar spine. pos tenderness with the lateral paraspinal musc in the lumbar region    LABS:                        13.0   9.11  )-----------( 209      ( 27 Mar 2024 12:30 )             38.2     03-27    131<L>  |  97  |  33.5<H>  ----------------------------<  207<H>  4.7   |  23.0  |  1.42<H>    Ca    7.6<L>      27 Mar 2024 15:10    TPro  6.9  /  Alb  3.4  /  TBili  0.5  /  DBili  x   /  AST  48<H>  /  ALT  17  /  AlkPhos  74  03-27      Urinalysis Basic - ( 27 Mar 2024 15:10 )    Color: x / Appearance: x / SG: x / pH: x  Gluc: 207 mg/dL / Ketone: x  / Bili: x / Urobili: x   Blood: x / Protein: x / Nitrite: x   Leuk Esterase: x / RBC: x / WBC x   Sq Epi: x / Non Sq Epi: x / Bacteria: x    RADIOLOGY & ADDITIONAL STUDIES:  ~~~~~~~~~~~~~~~~~~~~~~~~~~~~~~  < from: CT Lumbar Spine No Cont (03.27.24 @ 16:15) >  PROCEDURE  IMPRESSION:  1. Multilevel disc disease and degenerative changes with moderate to   severe stenosis at L4-5, with severe left recess and foraminal   impingement at this level. Also there is severe foraminal encroachment on   the right at L2-3 and on the left at L3-4.  2. Severe atrophic changes of the psoas musculature on the right.  3. No acute fracture suggested. Underlying thoracolumbar scoliosis.  Follow-up MR imaging of the spine recommended for further assessment.  --- End of Report ---          < end of copied text >   HPI:  This 78ym presents with back pain in the lower and mid back for the past 2 days.  According to the patient he fell 2 days ago after being lightheaded from fasting. PT stated that he was dehydrated. Pt denies having any chest pain during the event. PT does have PPM. PT denies having any numbness or tingling of the lower extrem. He denies having any bowel or bladder issues.   Pmhx CAD with bypass graft 4 vessels,  HTN, facial nerve palsy, GERD, HLD, Chronic kidney disease, DM, LBBB, hypothyroidism. Upon evaluating the patient he did not have any pain pt states that he had received meds in the ED and was asymp. PT does give hx of having diff with ambulating. Pt states during prayers he was standing, sitting and kneeling.      PAST MEDICAL & SURGICAL HISTORY:  GERD (gastroesophageal reflux disease)  High cholesterol  Coronary artery disease involving coronary bypass graft of native heart with unstable angina pectoris  Coronary artery disease involving native coronary artery of native heart, angina presence unspecifie  Type 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, without long-term current use of insul  Essential hypertension  HFrEF (heart failure with reduced ejection fraction)  Stage 4 chronic kidney disease  LBBB (left bundle branch block)  Facial nerve palsy  Hypothyroidism  S/P CABG (coronary artery bypass graft)  4V 1983 [place]    Surg:  Status post open reduction with internal fixation of fracture  History of insertion of stent into coronary artery bypass graft  History of brachytherapy      REVIEW OF SYSTEMS:  CONSTITUTIONAL: No fever, weight loss, or fatigue  EYES: No eye pain, visual disturbances, or discharge  ENMT:  No difficulty hearing, tinnitus, vertigo; No sinus or throat pain  NECK: No pain or stiffness  BREASTS: No pain, masses, or nipple discharge  RESPIRATORY: No cough, wheezing, chills or hemoptysis; No shortness of breath  CARDIOVASCULAR: No chest pain, palpitations, dizziness, or leg swelling  GASTROINTESTINAL: No abdominal or epigastric pain. No nausea, vomiting, or hematemesis; No diarrhea or constipation. No melena or hematochezia.  GENITOURINARY: No dysuria, frequency, hematuria, or incontinence  NEUROLOGICAL: No headaches, memory loss, loss of strength, numbness, or tremors  SKIN: No itching, burning, rashes, or lesions   LYMPH NODES: No enlarged glands  ENDOCRINE: No heat or cold intolerance; No hair loss  MUSCULOSKELETAL: No joint pain or swelling; No muscle, back, or extremity pain  PSYCHIATRIC: No depression, anxiety, mood swings, or difficulty sleeping  HEME/LYMPH: No easy bruising, or bleeding gums  ALLERY AND IMMUNOLOGIC: No hives or eczema    Allergies  No Known Allergies  Intolerances  DOPamine (Hypotension)  MEDICATIONS  (STANDING):  MEDICATIONS  (PRN):    SOCIAL HISTORY:  FAMILY HISTORY:    Vital Signs Last 24 Hrs  T(C): 36.7 (27 Mar 2024 11:26), Max: 36.7 (27 Mar 2024 11:26)  T(F): 98 (27 Mar 2024 11:26), Max: 98 (27 Mar 2024 11:26)  HR: 80 (27 Mar 2024 11:26) (80 - 80)  BP: 117/63 (27 Mar 2024 11:26) (117/63 - 117/63)  BP(mean): --  RR: 20 (27 Mar 2024 11:26) (20 - 20)  SpO2: 98% (27 Mar 2024 11:26) (98% - 98%)    Parameters below as of 27 Mar 2024 11:26  Patient On (Oxygen Delivery Method): room air    Home Medications:  Farxiga 10 mg oral tablet: 1 tab(s) orally once a day (21 Dec 2023 10:28)  insulin aspart 100 units/mL injectable solution: 10 injectable 3 times a day (22 Dec 2023 07:46)  levothyroxine 75 mcg (0.075 mg) oral tablet: 1 tab(s) orally once a day (21 Dec 2023 10:28)  Metoprolol Succinate ER 50 mg oral tablet, extended release: 1 tab(s) orally once a day (21 Dec 2023 10:28)  montelukast 10 mg oral tablet: 1 tab(s) orally once a day (21 Dec 2023 10:28)  NexIUM 40 mg oral delayed release capsule: 1 cap(s) orally once a day (at bedtime) (21 Dec 2023 10:28)  ranolazine 500 mg oral tablet, extended release: 1 tab(s) orally 2 times a day (21 Dec 2023 10:28)      PHYSICAL EXAM:  GENERAL: NAD,   HEAD:  Atraumatic, Normocephalic  EYES: EOMI, PERRLA, conjunctiva and sclera clear  ENMT: No tonsillar erythema, exudates, or enlargement; Moist mucous membranes,   NECK: Supple,  NERVOUS SYSTEM:  Alert & Oriented X3, Good concentration; Motor Strength 5/5 B/L upper and lower extremities lower extrem hp5/5 bilat, kp5/5 pf 5/5 bilat df5/5 bilat,; DTRs 2+ intact and symmetric  CHEST/LUNG: Clear  bilat  HEART: Regular rate and rhythm; No murmurs, rubs, or gallops  ABDOMEN: Soft, Nontender, Nondistended; Bowel sounds present  EXTREMITIES:  No edema  Sensory grossly intact bilat upper and lower extrem   muscular: neg tenderness with palp of the cervical, thoracic and lumbar spine. pos tenderness with the lateral paraspinal musc in the lumbar region    LABS:                        13.0   9.11  )-----------( 209      ( 27 Mar 2024 12:30 )             38.2     03-27    131<L>  |  97  |  33.5<H>  ----------------------------<  207<H>  4.7   |  23.0  |  1.42<H>    Ca    7.6<L>      27 Mar 2024 15:10    TPro  6.9  /  Alb  3.4  /  TBili  0.5  /  DBili  x   /  AST  48<H>  /  ALT  17  /  AlkPhos  74  03-27      Urinalysis Basic - ( 27 Mar 2024 15:10 )    Color: x / Appearance: x / SG: x / pH: x  Gluc: 207 mg/dL / Ketone: x  / Bili: x / Urobili: x   Blood: x / Protein: x / Nitrite: x   Leuk Esterase: x / RBC: x / WBC x   Sq Epi: x / Non Sq Epi: x / Bacteria: x    RADIOLOGY & ADDITIONAL STUDIES:  ~~~~~~~~~~~~~~~~~~~~~~~~~~~~~~  < from: CT Lumbar Spine No Cont (03.27.24 @ 16:15) >  PROCEDURE  IMPRESSION:  1. Multilevel disc disease and degenerative changes with moderate to   severe stenosis at L4-5, with severe left recess and foraminal   impingement at this level. Also there is severe foraminal encroachment on   the right at L2-3 and on the left at L3-4.  2. Severe atrophic changes of the psoas musculature on the right.  3. No acute fracture suggested. Underlying thoracolumbar scoliosis.  Follow-up MR imaging of the spine recommended for further assessment.  --- End of Report ---  < end of copied text >

## 2024-03-27 NOTE — ED PROVIDER NOTE - NSICDXPASTSURGICALHX_GEN_ALL_CORE_FT
PAST SURGICAL HISTORY:  History of brachytherapy     History of insertion of stent into coronary artery bypass graft     S/P CABG (coronary artery bypass graft) 4V 1983 [place]    Status post open reduction with internal fixation of fracture

## 2024-03-27 NOTE — ED ADULT NURSE NOTE - NSICDXPASTMEDICALHX_GEN_ALL_CORE_FT
PAST MEDICAL HISTORY:  Coronary artery disease involving coronary bypass graft of native heart with unstable angina pectoris     Coronary artery disease involving native coronary artery of native heart, angina presence unspecifie     Essential hypertension     Facial nerve palsy     GERD (gastroesophageal reflux disease)     HFrEF (heart failure with reduced ejection fraction)     High cholesterol     Hypothyroidism     LBBB (left bundle branch block)     Stage 4 chronic kidney disease     Type 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, without long-term current use of insul

## 2024-03-27 NOTE — ED PROVIDER NOTE - CLINICAL SUMMARY MEDICAL DECISION MAKING FREE TEXT BOX
77 y/o M c/o with low back pain s/p syncope 2 days ago - labs, ekg, CT lumbar, pain meds 79 y/o M c/o with low back pain s/p syncope 2 days ago - labs, ekg, CT lumbar, pain meds    neurosurgery assessment noted; patient feels comfortable with discharge and medical plan; PMD or clinic follow up recommended for reassessment. Patient is aware of signs/symptoms to return to the emergency department.

## 2024-03-27 NOTE — ED PROVIDER NOTE - CARE PROVIDER_API CALL
Mary Kay Nation  Neurosurgery  [address] [place] [id number]  Phone: [phone]  Fax: [phone]  Follow Up Time:

## 2024-03-27 NOTE — ED PROVIDER NOTE - OBJECTIVE STATEMENT
77 y/o M c/o pain in low middle back x 2 days.  Pain started when he fell 2 days ago after becoming lightheaded from fasting.  Denies chest pain, shortness of breath, focal weaknesses, nausea/vomiting.

## 2024-04-01 ENCOUNTER — APPOINTMENT (OUTPATIENT)
Dept: NEUROSURGERY | Facility: CLINIC | Age: 79
End: 2024-04-01

## 2024-04-03 ENCOUNTER — APPOINTMENT (OUTPATIENT)
Dept: PHYSICAL MEDICINE AND REHAB | Facility: CLINIC | Age: 79
End: 2024-04-03

## 2024-04-03 NOTE — HISTORY OF PRESENT ILLNESS
[FreeTextEntry1] : Mr. ADRIANNA SHANKS is a 78 year male who presents with back pain  PMH of HTN, HLD, cardiomyopathy, CAD s/p CABG x4, s/p multiple PCI on ASA, brillinta, CKD, T2DM on insulin (HA1C 6.8 9/2023), hx of L chronic otitis media, R mastoiditis, R IT fx s/p IMN (11/26/2017)      HPI  04/03/2024 Location:  Onset: Provocation/Palliative: Quality: Radiation: Severity:  /10   Denies any associated numbness. Denies any associated leg weakness. Denies any loss of bowel/bladder control or any groin numbness.   Current pain medications:   Previous medications trialed:   Previous procedures relevant to complaint: Injections: Surgery:   Conservative therapy tried: Physical Therapy: HEP: Chiropractor: Acupuncture:    Diagnostic studies reviewed by me: XR MRI

## 2024-04-03 NOTE — ASSESSMENT
[FreeTextEntry1] : Mr. ADRIANNA SHANKS is a 78 year male who presents with    Impression:   Plan:  - Cards, Pulm, Endo, NSGY, ENT notes reviewed  - XR hip/pelvis reviewed    The patient expressed verbal understanding and is in agreement with the plan of care. All of the patient's questions and concerns were addressed during today's visit.

## 2024-04-03 NOTE — DATA REVIEWED
[FreeTextEntry1] : ACC: [id number] EXAM: CT LUMBAR SPINE ORDERED BY: SHONDA KING  PROCEDURE DATE: 03/27/2024    INTERPRETATION: INDICATION: Back pain with radiculopathy TECHNIQUE: A thin section CT study of the lumbar spine was conducted, with axial imaging from L1 to S1. coronal and sagittal reformations were generated from the axial data. 3-D imaging was performed.  COMPARISON EXAMINATION:No prior  FINDINGS: ALIGNMENT: A thoracolumbar scoliosis is noted that. VERTEBRAL BODIES: No acute fracture or destructive lesion is identified. However there is a a chronic compression deformity of the inferior T12 vertebral body with Schmorl's node formation and endplate invagination. SACRUM/PELVIS: The sacrum and visualized bony pelvis appear to be intact. DISC SPACES: Advanced degenerative changes are noted at L1-L2 3 and L4-5 in the lumbar region, and at T12-L1 at the thoracolumbar junction. Lower thoracic : A disc bulge is noted along with Schmorl's node formation anteriorly at the T12-L1 level. L1-2: Mild bulging noted with underlying vacuum degenerative changes of the disc L2-3: A broad-based herniation is noted with peridiscal calcification a chronic sclerotic endplate degenerative changes that. Extensive vacuum degenerative changes noted of the disc space. Right greater than left foraminal narrowing.. L3-4: A left posterolateral herniation is noted with left recess and foraminal encroachment/impingement along with bulging of the annulus. L4-5: A a broad-based herniation is noted with hypertrophic degenerative changes and moderate to severe stenosis. There is severe left recess and foraminal impingement. L5-S1: A bulge is noted. POSTERIOR ELEMENTS: Hypertrophic changes are noted the most prominent at L4-5. CANAL AND FORAMINA: Central and foraminal stenosis at L4-5, with severe left-sided impingement. Foraminal encroachment/impingement on the left at L3-4 and on the right at L2-3. MISCELLANEOUS: Prevertebral soft tissues are unremarkable. There is severe atrophic changes of the right psoas musculature.  IMPRESSION:  1. Multilevel disc disease and degenerative changes with moderate to severe stenosis at L4-5, with severe left recess and foraminal impingement at this level. Also there is severe foraminal encroachment on the right at L2-3 and on the left at L3-4. 2. Severe atrophic changes of the psoas musculature on the right. 3. No acute fracture suggested. Underlying thoracolumbar scoliosis.  Follow-up MR imaging of the spine recommended for further assessment.  --- End of Report ---      GEREMIAS ENGLISH MD; Attending Radiologist This document has been electronically signed. Mar 27 2024 4:24PM  EXAM: [id number] - XR HIPS BI WITH PELV 3-4V  - ORDERED BY: BOBBI OCHOA   PROCEDURE DATE:  03/26/2024    INTERPRETATION:  CLINICAL INDICATION: fall; pelvic and hip pain; prior right femur ORIF  EXAM: AP views of the pelvis with hips in neutral and frog lateral positions from 3/26/2024 at 1212. Compared prior study from 11/26/2017.  IMPRESSION: Intact partially visualized upper end of a previously seen right femoral IM angela/with proximal compression screw; underlying anatomic alignment maintained. No dislocations or acute appearing fractures in remaining imaged regions.  Intact pelvic and obturator rings and symmetrically aligned spaced SI joints and pubic symphysis.  Lower lumbar spine degenerative change apparent. Preserved bilateral hip joint spaces and no gross radiographic evidence for AVN.  Generalized osteopenia otherwise no discrete suspicious lytic or blastic lesions.  Bilateral proximal femoral vascular calcifications.  --- End of Report ---       SHEELA MOROCHO MD; Attending Radiologist This document has been electronically signed. Mar 27 2024  4:49PM

## 2024-04-07 ENCOUNTER — NON-APPOINTMENT (OUTPATIENT)
Age: 79
End: 2024-04-07

## 2024-04-08 ENCOUNTER — APPOINTMENT (OUTPATIENT)
Dept: ELECTROPHYSIOLOGY | Facility: CLINIC | Age: 79
End: 2024-04-08
Payer: MEDICARE

## 2024-04-08 PROCEDURE — 93296 REM INTERROG EVL PM/IDS: CPT

## 2024-04-08 PROCEDURE — 93295 DEV INTERROG REMOTE 1/2/MLT: CPT

## 2024-04-10 NOTE — ED ADULT TRIAGE NOTE - NS ED NURSE DIRECT TO ROOM YN
[Dull/Aching] : dull/aching [Localized] : localized [Household chores] : household chores [Leisure] : leisure [Nothing helps with pain getting better] : Nothing helps with pain getting better [Full time] : Work status: full time [de-identified] : Date of Injury/Onset:. 04/10/2024 :JANETT FLORENCE , a 63 year old male, presents today for right achilles pain, 3 year onset was seen in [facility] had treatment there but none since that time. pain with standing or walking long periods of time. Has relief only at resting position, takes Meloxicam as well. Pain: At Rest: 0/10 With Activity:4/10 Mechanism of injury: none This is NOT a Work Related Injury being treated under Worker's Compensation.NA This is NOT an athletic injury occurring associated with an interscholastic or organized sports team.NA Quality of symptoms: Improves with: nothing at this time but rest Worse with: walking standing Prior treatment: none at this time Prior Imaging: none Reports Available For Review Today: Out of work/sport: working  School/Sport/Position/Occupation: [employer] Personal goal: Additional Information: History of a prior Achilles tendon injury that he states was treated with dry needling.  Thinks he felt a pop however he has been able to walk no significant swelling while playing soccer approximately 2 weeks ago.  He has not been seen or evaluated by other doctors no formal treatment therapy injections or surgery.  Here for further evaluation management. [] : no Yes

## 2024-04-15 ENCOUNTER — APPOINTMENT (OUTPATIENT)
Dept: CARDIOLOGY | Facility: CLINIC | Age: 79
End: 2024-04-15
Payer: MEDICARE

## 2024-04-15 PROCEDURE — 93306 TTE W/DOPPLER COMPLETE: CPT

## 2024-04-15 PROCEDURE — 76376 3D RENDER W/INTRP POSTPROCES: CPT

## 2024-04-15 PROCEDURE — 93356 MYOCRD STRAIN IMG SPCKL TRCK: CPT

## 2024-04-17 ENCOUNTER — APPOINTMENT (OUTPATIENT)
Dept: ELECTROPHYSIOLOGY | Facility: CLINIC | Age: 79
End: 2024-04-17
Payer: MEDICARE

## 2024-04-17 VITALS
DIASTOLIC BLOOD PRESSURE: 64 MMHG | BODY MASS INDEX: 22.91 KG/M2 | HEIGHT: 67 IN | WEIGHT: 146 LBS | HEART RATE: 61 BPM | SYSTOLIC BLOOD PRESSURE: 100 MMHG | OXYGEN SATURATION: 99 %

## 2024-04-17 DIAGNOSIS — I44.7 LEFT BUNDLE-BRANCH BLOCK, UNSPECIFIED: ICD-10-CM

## 2024-04-17 DIAGNOSIS — Z95.810 PRESENCE OF AUTOMATIC (IMPLANTABLE) CARDIAC DEFIBRILLATOR: ICD-10-CM

## 2024-04-17 PROCEDURE — 93284 PRGRMG EVAL IMPLANTABLE DFB: CPT

## 2024-04-17 PROCEDURE — 93000 ELECTROCARDIOGRAM COMPLETE: CPT | Mod: 59

## 2024-04-17 PROCEDURE — 99214 OFFICE O/P EST MOD 30 MIN: CPT | Mod: 25

## 2024-04-22 LAB
25(OH)D3 SERPL-MCNC: 51.2 NG/ML
ALBUMIN SERPL ELPH-MCNC: 4 G/DL
ALP BLD-CCNC: 113 U/L
ALT SERPL-CCNC: 10 U/L
ANION GAP SERPL CALC-SCNC: 14 MMOL/L
AST SERPL-CCNC: 22 U/L
BASOPHILS # BLD AUTO: 0.04 K/UL
BASOPHILS NFR BLD AUTO: 0.4 %
BILIRUB SERPL-MCNC: 0.5 MG/DL
BUN SERPL-MCNC: 48 MG/DL
CALCIUM SERPL-MCNC: 9.2 MG/DL
CHLORIDE SERPL-SCNC: 98 MMOL/L
CHOLEST SERPL-MCNC: 184 MG/DL
CO2 SERPL-SCNC: 30 MMOL/L
CREAT SERPL-MCNC: 2.19 MG/DL
CREAT SPEC-SCNC: 88 MG/DL
EGFR: 30 ML/MIN/1.73M2
EOSINOPHIL # BLD AUTO: 0.56 K/UL
EOSINOPHIL NFR BLD AUTO: 5.1 %
GLUCOSE SERPL-MCNC: 128 MG/DL
HCT VFR BLD CALC: 43.2 %
HDLC SERPL-MCNC: 59 MG/DL
HGB BLD-MCNC: 13.9 G/DL
IMM GRANULOCYTES NFR BLD AUTO: 0.5 %
LDLC SERPL CALC-MCNC: 109 MG/DL
LYMPHOCYTES # BLD AUTO: 2 K/UL
LYMPHOCYTES NFR BLD AUTO: 18.3 %
MAN DIFF?: NORMAL
MCHC RBC-ENTMCNC: 28.7 PG
MCHC RBC-ENTMCNC: 32.2 GM/DL
MCV RBC AUTO: 89.1 FL
MICROALBUMIN 24H UR DL<=1MG/L-MCNC: <1.2 MG/DL
MICROALBUMIN/CREAT 24H UR-RTO: NORMAL MG/G
MONOCYTES # BLD AUTO: 1.11 K/UL
MONOCYTES NFR BLD AUTO: 10.1 %
NEUTROPHILS # BLD AUTO: 7.19 K/UL
NEUTROPHILS NFR BLD AUTO: 65.6 %
NONHDLC SERPL-MCNC: 125 MG/DL
PLATELET # BLD AUTO: 222 K/UL
POTASSIUM SERPL-SCNC: 4 MMOL/L
PROT SERPL-MCNC: 7.3 G/DL
RBC # BLD: 4.85 M/UL
RBC # FLD: 15.9 %
SODIUM SERPL-SCNC: 142 MMOL/L
T3FREE SERPL-MCNC: 2.96 PG/ML
T4 FREE SERPL-MCNC: 1.3 NG/DL
TRIGL SERPL-MCNC: 90 MG/DL
TSH SERPL-ACNC: 21 UIU/ML
VIT B12 SERPL-MCNC: 917 PG/ML
WBC # FLD AUTO: 10.95 K/UL

## 2024-04-23 ENCOUNTER — NON-APPOINTMENT (OUTPATIENT)
Age: 79
End: 2024-04-23

## 2024-04-23 LAB
ESTIMATED AVERAGE GLUCOSE: 163 MG/DL
HBA1C MFR BLD HPLC: 7.3 %

## 2024-04-23 RX ORDER — LEVOTHYROXINE SODIUM 0.09 MG/1
88 TABLET ORAL DAILY
Qty: 90 | Refills: 3 | Status: ACTIVE | COMMUNITY
Start: 2024-02-03 | End: 1900-01-01

## 2024-04-25 ENCOUNTER — APPOINTMENT (OUTPATIENT)
Dept: ENDOCRINOLOGY | Facility: CLINIC | Age: 79
End: 2024-04-25
Payer: MEDICARE

## 2024-04-25 VITALS
HEIGHT: 67 IN | SYSTOLIC BLOOD PRESSURE: 98 MMHG | DIASTOLIC BLOOD PRESSURE: 68 MMHG | WEIGHT: 149 LBS | HEART RATE: 88 BPM | OXYGEN SATURATION: 100 % | BODY MASS INDEX: 23.39 KG/M2

## 2024-04-25 DIAGNOSIS — E03.9 HYPOTHYROIDISM, UNSPECIFIED: ICD-10-CM

## 2024-04-25 DIAGNOSIS — E11.65 TYPE 2 DIABETES MELLITUS WITH HYPERGLYCEMIA: ICD-10-CM

## 2024-04-25 DIAGNOSIS — R94.6 ABNORMAL RESULTS OF THYROID FUNCTION STUDIES: ICD-10-CM

## 2024-04-25 PROCEDURE — G2211 COMPLEX E/M VISIT ADD ON: CPT

## 2024-04-25 PROCEDURE — 99214 OFFICE O/P EST MOD 30 MIN: CPT

## 2024-04-25 NOTE — ASSESSMENT
[FreeTextEntry1] : 76 year old male with long standing T2DM and associated CAD s/p 4V CABG, also with hypothyroidism,  hypertension, hyperlipidemia, and vitamin D deficiency.     1. T2DM  : 7.3    continue basaglar  15 u HS  cont   novolog  10-15-15 u TID w meals.   cont farxiga  10 mg qd  bgs 4x day  diabetic nephropathy CKD stage 3. continue ARB and ASA   2. HypoT : start Lt4 88 mcg qd    3. Hypertension-  bp at target on meds. Continue current management.  4. Hyperlipidemia: continue statin .low fat/low cholesterol diet .   5. Vitamin D deficiency: cont MVI   all lab results reviewed and discussed with patient with extensive discussion. All questions answered Laboratory tests ordered today-see list below Continue other current medications

## 2024-04-26 NOTE — END OF VISIT
[FreeTextEntry3] : I, Dr. Bazzi, personally performed the evaluation and management (E/M) services for this new patient. That E/M includes conducting the clinically appropriate initial history &/or exam, assessing all conditions, and establishing the plan of care. Today, my assistant, Tasneem Vo NP, was here to observe my evaluation and management service for this patient & follow plan of care established by me going forward.

## 2024-04-26 NOTE — DISCUSSION/SUMMARY
[EKG obtained to assist in diagnosis and management of assessed problem(s)] : EKG obtained to assist in diagnosis and management of assessed problem(s) [FreeTextEntry1] :   The patient is a 78 year old male with history of CAD s/p 4V CABG and multiple PCI's, chronic systolic heart failure secondary to ischemic cardiomyopathy (EF < 20%), DM, CKD, and left bundle branch block. Pt with recent admission to [facility] on 11/20/23-11/22/23 for decompensated HF with multi-organ system failure. He has been on optimal GDMT for CHF but continues to have declinining LVEF. Recent LHC revealed a patent LIMA with all other grafts closed. No intervention was performed. Patient meets criteria for primary prevention ICD and since he has a baseline LBBB is now s/p Medtronic CRT-D implant by Dr. Bazzi on 12/21/23.  Today, Mr. Castellon reports he has been feeling well since last follow up. Denies JOINER, LE edema, orthopnea.   CRT-D interrogation reveals normal function. Effective CRT 94.4%. LV pace threshold 2.25 V with fixed output programmed at 2.5 V likely leading to intermittent fusion. No events in arrhythmia log.   Recommendation:   -CRT-D interrogation reveals normal function. Previous LV vector programmed LV 3 to RV coil with diaphragmatic stim at 5V. Reprogrammed to LV 4 to RV coil with no stim at high outputs. Continue remote monitoring. In person ICD follow up annually or sooner PRN. -Continue remote optivol monitoring with Dr. Valenzuela.   Afsaneh Vo ANP_C

## 2024-04-26 NOTE — REVIEW OF SYSTEMS
[SOB] : no shortness of breath [Dyspnea on exertion] : not dyspnea during exertion [Palpitations] : no palpitations

## 2024-04-26 NOTE — HISTORY OF PRESENT ILLNESS
[FreeTextEntry1] :  The patient is a 78 year old male with history of CAD s/p 4V CABG and multiple PCI's, chronic systolic heart failure secondary to ischemic cardiomyopathy (EF < 20%), DM, CKD, and left bundle branch block. Pt with recent admission to [facility] on 11/20/23-11/22/23 for decompensated HF with multi-organ system failure. He has been on optimal GDMT for CHF but continues to have declinining LVEF. Recent LHC revealed a patent LIMA with all other grafts closed. No intervention was performed. Patient meets criteria for primary prevention ICD and since he has a baseline LBBB is now s/p Medtronic CRT-D implant by Dr. Bazzi on 12/21/23.  Today, Mr. Castellon reports he has been feeling well since last follow up. Denies JOINER, LE edema, orthopnea.   CRT-D interrogation reveals normal function. Effective CRT 94.4%. LV pace threshold 2.25 V with fixed output programmed at 2.5 V likely leading to intermittent fusion. No events in arrhythmia log.

## 2024-04-30 ENCOUNTER — APPOINTMENT (OUTPATIENT)
Dept: CARDIOLOGY | Facility: CLINIC | Age: 79
End: 2024-04-30
Payer: MEDICARE

## 2024-04-30 VITALS
OXYGEN SATURATION: 97 % | BODY MASS INDEX: 22.44 KG/M2 | SYSTOLIC BLOOD PRESSURE: 102 MMHG | HEART RATE: 85 BPM | WEIGHT: 143 LBS | HEIGHT: 67 IN | DIASTOLIC BLOOD PRESSURE: 58 MMHG

## 2024-04-30 DIAGNOSIS — I25.5 ISCHEMIC CARDIOMYOPATHY: ICD-10-CM

## 2024-04-30 DIAGNOSIS — Z95.1 PRESENCE OF AORTOCORONARY BYPASS GRAFT: ICD-10-CM

## 2024-04-30 DIAGNOSIS — I25.10 ATHEROSCLEROTIC HEART DISEASE OF NATIVE CORONARY ARTERY W/OUT ANGINA PECTORIS: ICD-10-CM

## 2024-04-30 DIAGNOSIS — E78.5 HYPERLIPIDEMIA, UNSPECIFIED: ICD-10-CM

## 2024-04-30 DIAGNOSIS — I10 ESSENTIAL (PRIMARY) HYPERTENSION: ICD-10-CM

## 2024-04-30 PROCEDURE — 93297 REM INTERROG DEV EVAL ICPMS: CPT

## 2024-04-30 PROCEDURE — 99214 OFFICE O/P EST MOD 30 MIN: CPT

## 2024-05-19 ENCOUNTER — NON-APPOINTMENT (OUTPATIENT)
Age: 79
End: 2024-05-19

## 2024-05-20 ENCOUNTER — APPOINTMENT (OUTPATIENT)
Dept: NEUROSURGERY | Facility: CLINIC | Age: 79
End: 2024-05-20
Payer: MEDICARE

## 2024-05-20 VITALS
SYSTOLIC BLOOD PRESSURE: 108 MMHG | HEART RATE: 70 BPM | BODY MASS INDEX: 22.73 KG/M2 | DIASTOLIC BLOOD PRESSURE: 63 MMHG | OXYGEN SATURATION: 99 % | TEMPERATURE: 97.5 F | WEIGHT: 150 LBS | HEIGHT: 68 IN

## 2024-05-20 PROCEDURE — 99203 OFFICE O/P NEW LOW 30 MIN: CPT

## 2024-05-20 NOTE — HISTORY OF PRESENT ILLNESS
[de-identified] : Mr. Castellon is a very pleasant 78-year-old gentleman with past medical history significant for coronary artery disease status post four-vessel CABG, multiple PCI's, ischemic cardiomyopathy with heart failure, diabetes, CKD, left bundle branch block who presents now for 6-week history of mid to lower back pain with some associated left hip and buttock pain without any radiation of pain down bilateral lower extremities.  He reports that he was in his usual state of health until approximately 6 weeks ago when he was sitting and had a ground-level fall.  He reports that after the fall he has had aching stiff pain in his mid to lower back as well as left buttock area which seems to be worsened with walking and standing.  He reports that he is undergoing physical therapy and has undergone physical therapy for multiple weeks with very mild to moderate benefit but reports that he is still having difficulty with walking due to low back pain.  He denies any specific shooting pain down the legs or any weakness or numbness in the legs and denies any bowel or bladder incontinence or retention or any saddle anesthesia.

## 2024-05-20 NOTE — DATA REVIEWED
[de-identified] : CT L-spine without contrast demonstrates multilevel degenerative changes within the lumbar spine with evidence of prior T12 vertebral body compression fracture and multilevel Modic changes with some concern for possible central, lateral recess, and foraminal stenosis from L2-L5.

## 2024-05-20 NOTE — ASSESSMENT
[FreeTextEntry1] : Mr. Castellon is a very pleasant 78-year-old gentleman with a very complicated cardiac history as well as diabetes and hypertension who presents now with 6-week history of mid to low back pain and pain in the left buttock region after a mechanical fall.  He notably does not have any radiculopathy and on exam is 5/5 in bilateral lower extremities without any long tract findings or hyperreflexia and has a negative straight leg raise.  We reviewed his CT L-spine without contrast which demonstrates some chronic changes including T12 compression fracture as well as evidence of possible spondylotic changes in the lumbar spine causing stenosis both centrally and peripherally.  We discussed given that his symptoms seem to be primarily axial back pain without any radiculopathy or weakness in the legs, his symptoms are unlikely to be secondary to the lumbar stenosis seen on CT.  We discussed that from a back pain perspective, he would likely benefit from continued physical therapy and may consider evaluation by pain management for symptomatic pain treatment.  All questions were answered.  Plan: - Continue physical therapy - Referral to pain management - Follow-up as needed

## 2024-05-20 NOTE — REASON FOR VISIT
[New Patient Visit] : a new patient visit [Family Member] : family member [FreeTextEntry1] : back pain

## 2024-05-20 NOTE — PHYSICAL EXAM
[de-identified] : awake, alert interactive, appropriate RLE 5/5 HF/KE/KF/DF/PF/EHL LLE 5/5 HF/KE/KF/DF/PF/EHL SILT negative clonus bilaterally negative straight leg raise bilaterally narrow-based gait wnl reflexes 2+

## 2024-05-21 ENCOUNTER — NON-APPOINTMENT (OUTPATIENT)
Age: 79
End: 2024-05-21

## 2024-05-21 ENCOUNTER — EMERGENCY (EMERGENCY)
Facility: HOSPITAL | Age: 79
LOS: 1 days | Discharge: HOME | End: 2024-05-21
Attending: EMERGENCY MEDICINE
Payer: COMMERCIAL

## 2024-05-21 ENCOUNTER — APPOINTMENT (OUTPATIENT)
Dept: ELECTROPHYSIOLOGY | Facility: CLINIC | Age: 79
End: 2024-05-21
Payer: MEDICARE

## 2024-05-21 VITALS
WEIGHT: 149.91 LBS | SYSTOLIC BLOOD PRESSURE: 123 MMHG | HEART RATE: 86 BPM | RESPIRATION RATE: 18 BRPM | DIASTOLIC BLOOD PRESSURE: 76 MMHG | HEIGHT: 68 IN | TEMPERATURE: 98 F | OXYGEN SATURATION: 94 %

## 2024-05-21 VITALS
WEIGHT: 146 LBS | BODY MASS INDEX: 22.13 KG/M2 | HEART RATE: 99 BPM | OXYGEN SATURATION: 97 % | SYSTOLIC BLOOD PRESSURE: 106 MMHG | DIASTOLIC BLOOD PRESSURE: 72 MMHG | HEIGHT: 68 IN | RESPIRATION RATE: 16 BRPM

## 2024-05-21 VITALS — DIASTOLIC BLOOD PRESSURE: 63 MMHG | SYSTOLIC BLOOD PRESSURE: 99 MMHG

## 2024-05-21 DIAGNOSIS — Z92.3 PERSONAL HISTORY OF IRRADIATION: Chronic | ICD-10-CM

## 2024-05-21 DIAGNOSIS — Z96.7 PRESENCE OF OTHER BONE AND TENDON IMPLANTS: Chronic | ICD-10-CM

## 2024-05-21 DIAGNOSIS — Z95.1 PRESENCE OF AORTOCORONARY BYPASS GRAFT: Chronic | ICD-10-CM

## 2024-05-21 DIAGNOSIS — I49.3 VENTRICULAR PREMATURE DEPOLARIZATION: ICD-10-CM

## 2024-05-21 PROCEDURE — 93000 ELECTROCARDIOGRAM COMPLETE: CPT | Mod: 59

## 2024-05-21 PROCEDURE — 99215 OFFICE O/P EST HI 40 MIN: CPT | Mod: 25

## 2024-05-21 PROCEDURE — 93010 ELECTROCARDIOGRAM REPORT: CPT

## 2024-05-21 PROCEDURE — 99284 EMERGENCY DEPT VISIT MOD MDM: CPT

## 2024-05-21 PROCEDURE — 93281 PM DEVICE PROGR EVAL MULTI: CPT

## 2024-05-21 PROCEDURE — 93005 ELECTROCARDIOGRAM TRACING: CPT

## 2024-05-21 PROCEDURE — 73502 X-RAY EXAM HIP UNI 2-3 VIEWS: CPT | Mod: 26,LT

## 2024-05-21 PROCEDURE — 93242 EXT ECG>48HR<7D RECORDING: CPT

## 2024-05-21 PROCEDURE — 73502 X-RAY EXAM HIP UNI 2-3 VIEWS: CPT

## 2024-05-21 PROCEDURE — 96372 THER/PROPH/DIAG INJ SC/IM: CPT

## 2024-05-21 PROCEDURE — 99283 EMERGENCY DEPT VISIT LOW MDM: CPT | Mod: 25

## 2024-05-21 RX ORDER — METHOCARBAMOL 500 MG/1
2 TABLET, FILM COATED ORAL
Qty: 28 | Refills: 0
Start: 2024-05-21 | End: 2024-05-27

## 2024-05-21 RX ORDER — METHOCARBAMOL 500 MG/1
1500 TABLET, FILM COATED ORAL ONCE
Refills: 0 | Status: COMPLETED | OUTPATIENT
Start: 2024-05-21 | End: 2024-05-21

## 2024-05-21 RX ORDER — KETOROLAC TROMETHAMINE 30 MG/ML
15 SYRINGE (ML) INJECTION ONCE
Refills: 0 | Status: DISCONTINUED | OUTPATIENT
Start: 2024-05-21 | End: 2024-05-21

## 2024-05-21 RX ORDER — DEXAMETHASONE 0.5 MG/5ML
10 ELIXIR ORAL ONCE
Refills: 0 | Status: COMPLETED | OUTPATIENT
Start: 2024-05-21 | End: 2024-05-21

## 2024-05-21 RX ADMIN — METHOCARBAMOL 1500 MILLIGRAM(S): 500 TABLET, FILM COATED ORAL at 03:12

## 2024-05-21 RX ADMIN — Medication 10 MILLIGRAM(S): at 04:25

## 2024-05-21 RX ADMIN — Medication 15 MILLIGRAM(S): at 03:11

## 2024-05-21 NOTE — ED PROVIDER NOTE - NSICDXPASTMEDICALHX_GEN_ALL_CORE_FT
Quality 431: Preventive Care And Screening: Unhealthy Alcohol Use - Screening: Patient screened for unhealthy alcohol use using a single question and scores 2 or greater episodes per year and brief intervention occurred Quality 130: Documentation Of Current Medications In The Medical Record: Current Medications Documented Detail Level: Detailed Quality 226: Preventive Care And Screening: Tobacco Use: Screening And Cessation Intervention: Patient screened for tobacco use and is an ex/non-smoker PAST MEDICAL HISTORY:  Coronary artery disease involving coronary bypass graft of native heart with unstable angina pectoris     Coronary artery disease involving native coronary artery of native heart, angina presence unspecifie     Essential hypertension     Facial nerve palsy     GERD (gastroesophageal reflux disease)     HFrEF (heart failure with reduced ejection fraction)     High cholesterol     Hypothyroidism     LBBB (left bundle branch block)     Stage 4 chronic kidney disease     Type 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, without long-term current use of insul

## 2024-05-21 NOTE — ED PROVIDER NOTE - PATIENT PORTAL LINK FT
You can access the FollowMyHealth Patient Portal offered by [facility] by registering at the following website: [email or web address]. By joining [facility] FollowMyHealth portal, you will also be able to view your health information using other applications (apps) compatible with our system.

## 2024-05-21 NOTE — ED PROVIDER NOTE - ATTENDING CONTRIBUTION TO CARE
Rebecca: I performed a face to face bedside interview with patient regarding history of present illness, review of symptoms and past medical history. I completed an independent physical exam.  I have discussed patient's plan of care with resident.   I agree with note as stated above including HISTORY OF PRESENT ILLNESS, HIV, PAST MEDICAL/SURGICAL/FAMILY/SOCIAL HISTORY, ALLERGIES AND HOME MEDICATIONS, REVIEW OF SYSTEMS, PHYSICAL EXAM, MEDICAL DECISION MAKING and any PROGRESS NOTES during the time I functioned as the attending physician for this patient unless otherwise noted. My brief assessment is as follows: Patient is a 77yo M with PMHx of CAD w/ STEMI and NSTEMI s/p 4V CABG w/ 2 of 4 graft failure, multiple PCIs, HFrEF, DM2 on long term insulin, CKD 3-4, HTN, HLD who presents to the ED complaining of sudden onset atraumatic L hip pain.  Full range of motion, positive tenderness patient near SI joint.  Concern for inflammation/possible arthritis. Will treat pain and obtain Xrays.Patient currently in the process of arranging appointment with pain management doctor.

## 2024-05-21 NOTE — PHYSICAL EXAM
[No Acute Distress] : no acute distress [No Respiratory Distress] : no respiratory distress  [Normal Gait] : normal gait [Moves all extremities] : moves all extremities [Alert and Oriented] : alert and oriented [Normal Venous Pressure] : normal venous pressure [Normal S1, S2] : normal S1, S2 [Clear Lung Fields] : clear lung fields [No Edema] : no edema

## 2024-05-21 NOTE — ED PROVIDER NOTE - OBJECTIVE STATEMENT
Patient is a 79yo M with PMHx of CAD w/ STEMI and NSTEMI s/p 4V CABG w/ 2 of 4 graft failure, multiple PCIs, HFrEF, DM2 on long term insulin, CKD 3-4, HTN, HLD who presents to the ED complaining of sudden onset atraumatic L hip pain. (Not back pain, contrary to triage note). Patient was seen here previously for back pain after a fall, followed up with Dr. Nation in the office yesterday, back pain has been at baseline, controlled on tramadol. Denies any new trauma or falls. 10/10 L hip pain. Able to walk and move his legs. Denies fever, chills, skin changes, numbness/weakness in the leg.

## 2024-05-21 NOTE — ED ADULT NURSE NOTE - OBJECTIVE STATEMENT
pt is a 78y male aox4, c/o back pain.  pt is ambulatory, denies numbness, weakness, tingling.  pt had episode of hypotension and nausea, but states he has been nauseous from the pain.  pt did not vomit.  dr. tillman made aware, EKG ordered.  pt denies cp, palpitations, lightheadedness, dizziness.

## 2024-05-21 NOTE — ED PROVIDER NOTE - NSICDXPASTSURGICALHX_GEN_ALL_CORE_FT
PAST SURGICAL HISTORY:  History of brachytherapy     History of insertion of stent into coronary artery bypass graft     S/P CABG (coronary artery bypass graft) 4V 1983 [place]    Status post open reduction with internal fixation of fracture

## 2024-05-21 NOTE — ED ADULT NURSE NOTE - NSFALLUNIVINTERV_ED_ALL_ED
Bed/Stretcher in lowest position, wheels locked, appropriate side rails in place/Call bell, personal items and telephone in reach/Instruct patient to call for assistance before getting out of bed/chair/stretcher/Non-slip footwear applied when patient is off stretcher/[place] to call system/Physically safe environment - no spills, clutter or unnecessary equipment/Purposeful proactive rounding/Room/bathroom lighting operational, light cord in reach

## 2024-05-21 NOTE — ED PROVIDER NOTE - NSFOLLOWUPINSTRUCTIONS_ED_ALL_ED_FT
- Watch your sugars for the next few days and adjust your insulin as necessary as it will be higher due to the steroids we gave you.   - Take 1-2 tabs of robaxin once to twice a day as needed for muscle spasm/pain.   - Follow up with pain management.   - Return to the ED for recurrent or worsening symptoms.

## 2024-05-21 NOTE — ED PROVIDER NOTE - NS ED ROS FT
General: No fever, chills.  Respiratory: No SOB  Cardiac: No chest pain  GI: No abdominal pain, nausea, vomiting  Neuro: No headache  MSK: +L hip pain  Psych: No known mental health issues.  Endocrine: No heat/cold intolerance, no polyuria/polydipsia.  Heme: No easy bruising or bleeding.  Allergic: No pruritis, dermatitis, or environmental allergies.

## 2024-05-21 NOTE — ED PROVIDER NOTE - PROGRESS NOTE DETAILS
MD Mateus: Pain feels better. Xray unremarkable. Will discharge. Patient states his blood pressure is normally 95/60 at home.

## 2024-05-21 NOTE — ED ADULT NURSE NOTE - NSICDXPASTSURGICALHX_GEN_ALL_CORE_FT
PAST SURGICAL HISTORY:  History of brachytherapy     History of insertion of stent into coronary artery bypass graft     S/P CABG (coronary artery bypass graft) 4V 1983 [place]    Status post open reduction with internal fixation of fracture

## 2024-05-21 NOTE — ED PROVIDER NOTE - CLINICAL SUMMARY MEDICAL DECISION MAKING FREE TEXT BOX
Patient is a 77yo M with PMHx of CAD w/ STEMI and NSTEMI s/p 4V CABG w/ 2 of 4 graft failure, multiple PCIs, HFrEF, DM2 on long term insulin, CKD 3-4, HTN, HLD who presents to the ED complaining of sudden onset atraumatic L hip pain. Will treat pain and obtain Xrays. Patient is a 77yo M with PMHx of CAD w/ STEMI and NSTEMI s/p 4V CABG w/ 2 of 4 graft failure, multiple PCIs, HFrEF, DM2 on long term insulin, CKD 3-4, HTN, HLD who presents to the ED complaining of sudden onset atraumatic L hip pain.  Full range of motion, positive tenderness patient near SI joint.  Concern for inflammation/possible arthritis. Will treat pain and obtain Xrays.

## 2024-05-21 NOTE — ED PROVIDER NOTE - PHYSICAL EXAMINATION
Gen: AAOx3, NAD, well nourished  HEENT: Normocephalic atraumatic. EOMI. No scleral icterus. Moist mucus membranes.  CV: RRR. Audible S1 and S2. No murmurs. 2+ radial and PT pulses   Pulm: Clear to auscultation bilaterally. No wheezes, rales, or rhonchi. No accessory muscle use or respiratory distress.  Abdomen: soft, normoactive BS, non distended, nontender, no rebound, no guarding  Musculoskeletal:  Moving all extremities equally. No gross deformity. No tenderness to palpation. Full AROM at L hip and knee. Sensation intact. No overlying skin changes.   Skin: No rashes or lesions. Warm.  Neurologic: No focal neurological deficits. CN II-XII grossly intact.  Psych: Appropriate mood and affect. Cooperative.

## 2024-05-22 ENCOUNTER — APPOINTMENT (OUTPATIENT)
Dept: PHYSICAL MEDICINE AND REHAB | Facility: CLINIC | Age: 79
End: 2024-05-22
Payer: MEDICARE

## 2024-05-22 ENCOUNTER — NON-APPOINTMENT (OUTPATIENT)
Age: 79
End: 2024-05-22

## 2024-05-22 VITALS — BODY MASS INDEX: 22.13 KG/M2 | HEIGHT: 68 IN | WEIGHT: 146 LBS

## 2024-05-22 DIAGNOSIS — M79.18 MYALGIA, OTHER SITE: ICD-10-CM

## 2024-05-22 DIAGNOSIS — M54.50 LOW BACK PAIN, UNSPECIFIED: ICD-10-CM

## 2024-05-22 PROCEDURE — 20552 NJX 1/MLT TRIGGER POINT 1/2: CPT

## 2024-05-22 PROCEDURE — 99204 OFFICE O/P NEW MOD 45 MIN: CPT | Mod: 25

## 2024-05-22 RX ORDER — METHOCARBAMOL 500 MG/1
500 TABLET, FILM COATED ORAL
Qty: 120 | Refills: 0 | Status: ACTIVE | COMMUNITY
Start: 2024-05-22 | End: 1900-01-01

## 2024-05-22 NOTE — DATA REVIEWED
[Plain X-Rays] : plain X-Rays [CT Scan] : CT Scan [FreeTextEntry1] : ACC: [id number] EXAM: XR HIP WITH PELV 2-3V LT ORDERED BY: WINSTON BRYANT  PROCEDURE DATE: 05/21/2024    INTERPRETATION: Pelvis and left hip  HISTORY: Left hip pain without trauma  COMPARISON: 3/26/2024  Frontal view of the pelvis shows no evidence of fracture nor destructive change of the intrinsic bones of the pelvis. Right femoral hardware is again noted.  2 views of the left hip show no evidence of fracture or dislocation.  IMPRESSION: No acute bony pathology.   Thank you for this referral.  --- End of Report ---      CABRERA HERNANDEZ MD; Attending Interventional Radiologist This document has been electronically signed. May 21 2024 2:29PM  ACC: [id number] EXAM: CT LUMBAR SPINE ORDERED BY: SHONDA KING  PROCEDURE DATE: 03/27/2024    INTERPRETATION: INDICATION: Back pain with radiculopathy TECHNIQUE: A thin section CT study of the lumbar spine was conducted, with axial imaging from L1 to S1. coronal and sagittal reformations were generated from the axial data. 3-D imaging was performed.  COMPARISON EXAMINATION:No prior  FINDINGS: ALIGNMENT: A thoracolumbar scoliosis is noted that. VERTEBRAL BODIES: No acute fracture or destructive lesion is identified. However there is a a chronic compression deformity of the inferior T12 vertebral body with Schmorl's node formation and endplate invagination. SACRUM/PELVIS: The sacrum and visualized bony pelvis appear to be intact. DISC SPACES: Advanced degenerative changes are noted at L1-L2 3 and L4-5 in the lumbar region, and at T12-L1 at the thoracolumbar junction. Lower thoracic : A disc bulge is noted along with Schmorl's node formation anteriorly at the T12-L1 level. L1-2: Mild bulging noted with underlying vacuum degenerative changes of the disc L2-3: A broad-based herniation is noted with peridiscal calcification a chronic sclerotic endplate degenerative changes that. Extensive vacuum degenerative changes noted of the disc space. Right greater than left foraminal narrowing.. L3-4: A left posterolateral herniation is noted with left recess and foraminal encroachment/impingement along with bulging of the annulus. L4-5: A a broad-based herniation is noted with hypertrophic degenerative changes and moderate to severe stenosis. There is severe left recess and foraminal impingement. L5-S1: A bulge is noted. POSTERIOR ELEMENTS: Hypertrophic changes are noted the most prominent at L4-5. CANAL AND FORAMINA: Central and foraminal stenosis at L4-5, with severe left-sided impingement. Foraminal encroachment/impingement on the left at L3-4 and on the right at L2-3. MISCELLANEOUS: Prevertebral soft tissues are unremarkable. There is severe atrophic changes of the right psoas musculature.  IMPRESSION:  1. Multilevel disc disease and degenerative changes with moderate to severe stenosis at L4-5, with severe left recess and foraminal impingement at this level. Also there is severe foraminal encroachment on the right at L2-3 and on the left at L3-4. 2. Severe atrophic changes of the psoas musculature on the right. 3. No acute fracture suggested. Underlying thoracolumbar scoliosis.  Follow-up MR imaging of the spine recommended for further assessment.  --- End of Report ---      GEREMIAS ENGLISH MD; Attending Radiologist This document has been electronically signed. Mar 27 2024 4:24PM

## 2024-05-22 NOTE — PHYSICAL EXAM
[FreeTextEntry1] : Constitutional: In NAD, calm and cooperative Heart: well perfused Lungs: nonlabored breathing MSK (Back) Inspection: no gross swelling identified, no scars Palpation: Tenderness of the left lower lumbar paraspinals, QL and upper gluteals,  focal areas of hyperirritable, palpable, taut bands of muscles that reproduce pain referral pattern and twitch response with palpation ROM: Pain at end lumbar extension, rotation Strength: 5/5 strength in bilateral lower extremities Reflexes: 2+ Patella reflex bilaterally, 2+ Achilles reflex bilaterally, negative clonus bilaterally Sensation: Intact to light touch in bilateral lower extremities Special tests: SLR: negative BL ISRA: negative BL FADIR: negative BL Thigh Thrust: negative BL Arnaldo's Finger:  negative BL Facet loading: negative BL

## 2024-05-22 NOTE — PROCEDURE
[de-identified] : Procedure: Trigger point injection Preoperative Diagnosis: Myofascial Pain Post Procedure Diagnosis: Myofascial Pain Findings: Radiating trigger points Complications: None Anesthetic: Lidocaine 1% 1 ml per site Indication:  hx of myofascial pain.  Informed consent and Time Out performed prior to initiation of procedure. Technical details: Sterilization with alcohol to skin sites. Injection into the trigger point with 1.25 inch 27G needle using needling technique to elicit twitch response. Muscle groups: left lumbar paraspinals, left QL Total injections: 5 Pre-procedure pain: "12"/10  Post-procedure pain: 7/10 Dispo: The patient tolerated the procedure well. Instructed to follow up in 3 months

## 2024-05-22 NOTE — HISTORY OF PRESENT ILLNESS
[FreeTextEntry1] : Mr. ADRIANNA SHANKS is a 78 year male who presents with low back pain  PMH CAD s/p 4V CABG and multiple PCI's--on ASA and Brillinta, chronic systolic heart failure secondary to ischemic cardiomyopathy (EF < 37%), DM, CKD, and left bundle branch block s/p ICD     HPI  05/22/2024 Location: low back, bilateral buttocks. Onset: Apr 2024, patient was fasting and felt dizzy, then fell onto his buttocks. Pain has persisted despite PT, he went to [facility] ER yesterday, was treated with dexamethasone 10mg PO, ketorolac 15mg IM x1, methocarbamol 1500mg x1 and percocet 5-325mg x1. He was discharged with percocet 5-325mg  Provocation/Palliative: worse with laying flat, standing, twisting, bending.  Quality: constant, sharp, stabbing. Radiation: nonradiating.  Severity:  12/10   Denies any associated numbness. Denies any associated leg weakness. Denies any loss of bowel/bladder control or any groin numbness.   Current pain medications: oxycodone-APAp 5-325mg     Previous medications trialed: tramadol 50mg BID - by PCP voltaren gel    Previous procedures relevant to complaint: Injections: none for spine or joints Surgery: R femur ORIF    Conservative therapy tried: Physical Therapy: + not helpful      Diagnostic studies reviewed by me: XR B hips - no fracture or dislocation of L hip. R hip/femur hardware noted.  CT L spine - chronic compression deformity of T12, multilevel degenerative changes.

## 2024-05-22 NOTE — ASSESSMENT
[FreeTextEntry1] : Mr. ADRIANNA SHANKS is a 78 year male who presents with left sided back/buttock pain after a fall in early Apr 2024. pain has persisted despite adherence to PT. Patient was seen in the ER yesterday due to persistent pain. On exam, there are no focal neurologic deficits, negative SLR. He has taut bands of muscles along his left lumbar paraspinals, QLs and upper glutes that reproduce pain and pain referral pattern on palpation. Discussed that the likely etiology of his pain is due to myofascial pain, with a possible component of lumbar spondylosis.     Impression: low back pain  myofascial pain   Plan:  - nsgy and cardiology notes reviewed  - cont methocarbamol 500-1000mg BID PRN - I stop reviewed Reference #: [id number] - advised caution with prolonged opioid use, has short course of percocet 5-325mg prescribed by ER.  - advised against NSAID use due to extensive cardiac hx.  - TPI performed, procedure note above.  - if pain persists, could obtain MRI L spine, however would advise caution with any spinal injections as he would need cardiac clearance to hold ASA and Brillinta. Patient has extensive cardiac hx. Would maximize conservative care.  - acupuncture referral provided  - RTC in 3 months   The patient expressed verbal understanding and is in agreement with the plan of care. All of the patient's questions and concerns were addressed during today's visit.

## 2024-05-23 ENCOUNTER — INPATIENT (INPATIENT)
Facility: HOSPITAL | Age: 79
LOS: 19 days | Discharge: HOSPICE | DRG: 282 | End: 2024-06-12
Attending: INTERNAL MEDICINE | Admitting: HOSPITALIST
Payer: MEDICARE

## 2024-05-23 ENCOUNTER — RESULT REVIEW (OUTPATIENT)
Age: 79
End: 2024-05-23

## 2024-05-23 VITALS
WEIGHT: 146.61 LBS | HEIGHT: 68 IN | TEMPERATURE: 98 F | OXYGEN SATURATION: 97 % | HEART RATE: 119 BPM | RESPIRATION RATE: 20 BRPM | SYSTOLIC BLOOD PRESSURE: 92 MMHG | DIASTOLIC BLOOD PRESSURE: 59 MMHG

## 2024-05-23 DIAGNOSIS — R07.9 CHEST PAIN, UNSPECIFIED: ICD-10-CM

## 2024-05-23 DIAGNOSIS — I21.4 NON-ST ELEVATION (NSTEMI) MYOCARDIAL INFARCTION: ICD-10-CM

## 2024-05-23 DIAGNOSIS — Z95.1 PRESENCE OF AORTOCORONARY BYPASS GRAFT: Chronic | ICD-10-CM

## 2024-05-23 DIAGNOSIS — Z96.7 PRESENCE OF OTHER BONE AND TENDON IMPLANTS: Chronic | ICD-10-CM

## 2024-05-23 DIAGNOSIS — Z92.3 PERSONAL HISTORY OF IRRADIATION: Chronic | ICD-10-CM

## 2024-05-23 LAB
ALBUMIN SERPL ELPH-MCNC: 3.8 G/DL — SIGNIFICANT CHANGE UP (ref 3.3–5.2)
ALP SERPL-CCNC: 103 U/L — SIGNIFICANT CHANGE UP (ref 40–120)
ALT FLD-CCNC: 22 U/L — SIGNIFICANT CHANGE UP
ANION GAP SERPL CALC-SCNC: 14 MMOL/L — SIGNIFICANT CHANGE UP (ref 5–17)
APTT BLD: 19.2 SEC — LOW (ref 24.5–35.6)
APTT BLD: 51.6 SEC — HIGH (ref 24.5–35.6)
AST SERPL-CCNC: 46 U/L — HIGH
BASOPHILS # BLD AUTO: 0.05 K/UL — SIGNIFICANT CHANGE UP (ref 0–0.2)
BASOPHILS NFR BLD AUTO: 0.4 % — SIGNIFICANT CHANGE UP (ref 0–2)
BILIRUB SERPL-MCNC: 0.5 MG/DL — SIGNIFICANT CHANGE UP (ref 0.4–2)
BUN SERPL-MCNC: 83 MG/DL — HIGH (ref 8–20)
CALCIUM SERPL-MCNC: 9.1 MG/DL — SIGNIFICANT CHANGE UP (ref 8.4–10.5)
CHLORIDE SERPL-SCNC: 88 MMOL/L — LOW (ref 96–108)
CO2 SERPL-SCNC: 32 MMOL/L — HIGH (ref 22–29)
CREAT SERPL-MCNC: 4.26 MG/DL — HIGH (ref 0.5–1.3)
EGFR: 14 ML/MIN/1.73M2 — LOW
EOSINOPHIL # BLD AUTO: 0.22 K/UL — SIGNIFICANT CHANGE UP (ref 0–0.5)
EOSINOPHIL NFR BLD AUTO: 1.6 % — SIGNIFICANT CHANGE UP (ref 0–6)
GLUCOSE BLDC GLUCOMTR-MCNC: 166 MG/DL — HIGH (ref 70–99)
GLUCOSE BLDC GLUCOMTR-MCNC: 193 MG/DL — HIGH (ref 70–99)
GLUCOSE SERPL-MCNC: 46 MG/DL — CRITICAL LOW (ref 70–99)
HCT VFR BLD CALC: 40.6 % — SIGNIFICANT CHANGE UP (ref 39–50)
HGB BLD-MCNC: 13.7 G/DL — SIGNIFICANT CHANGE UP (ref 13–17)
IMM GRANULOCYTES NFR BLD AUTO: 0.7 % — SIGNIFICANT CHANGE UP (ref 0–0.9)
INR BLD: 0.94 RATIO — SIGNIFICANT CHANGE UP (ref 0.85–1.18)
LYMPHOCYTES # BLD AUTO: 1.66 K/UL — SIGNIFICANT CHANGE UP (ref 1–3.3)
LYMPHOCYTES # BLD AUTO: 12.4 % — LOW (ref 13–44)
MAGNESIUM SERPL-MCNC: 2.8 MG/DL — HIGH (ref 1.6–2.6)
MCHC RBC-ENTMCNC: 29.8 PG — SIGNIFICANT CHANGE UP (ref 27–34)
MCHC RBC-ENTMCNC: 33.7 GM/DL — SIGNIFICANT CHANGE UP (ref 32–36)
MCV RBC AUTO: 88.3 FL — SIGNIFICANT CHANGE UP (ref 80–100)
MONOCYTES # BLD AUTO: 1.61 K/UL — HIGH (ref 0–0.9)
MONOCYTES NFR BLD AUTO: 12 % — SIGNIFICANT CHANGE UP (ref 2–14)
NEUTROPHILS # BLD AUTO: 9.78 K/UL — HIGH (ref 1.8–7.4)
NEUTROPHILS NFR BLD AUTO: 72.9 % — SIGNIFICANT CHANGE UP (ref 43–77)
NT-PROBNP SERPL-SCNC: HIGH PG/ML (ref 0–300)
PLATELET # BLD AUTO: 241 K/UL — SIGNIFICANT CHANGE UP (ref 150–400)
POTASSIUM SERPL-MCNC: 3.3 MMOL/L — LOW (ref 3.5–5.3)
POTASSIUM SERPL-SCNC: 3.3 MMOL/L — LOW (ref 3.5–5.3)
PROT SERPL-MCNC: 7.7 G/DL — SIGNIFICANT CHANGE UP (ref 6.6–8.7)
PROTHROM AB SERPL-ACNC: 10.4 SEC — SIGNIFICANT CHANGE UP (ref 9.5–13)
RBC # BLD: 4.6 M/UL — SIGNIFICANT CHANGE UP (ref 4.2–5.8)
RBC # FLD: 14.9 % — HIGH (ref 10.3–14.5)
SODIUM SERPL-SCNC: 134 MMOL/L — LOW (ref 135–145)
TROPONIN T, HIGH SENSITIVITY RESULT: 228 NG/L — HIGH (ref 0–51)
WBC # BLD: 13.41 K/UL — HIGH (ref 3.8–10.5)
WBC # FLD AUTO: 13.41 K/UL — HIGH (ref 3.8–10.5)

## 2024-05-23 PROCEDURE — 71045 X-RAY EXAM CHEST 1 VIEW: CPT | Mod: 26

## 2024-05-23 PROCEDURE — 99233 SBSQ HOSP IP/OBS HIGH 50: CPT

## 2024-05-23 PROCEDURE — 99223 1ST HOSP IP/OBS HIGH 75: CPT

## 2024-05-23 PROCEDURE — 93325 DOPPLER ECHO COLOR FLOW MAPG: CPT | Mod: 26

## 2024-05-23 PROCEDURE — 93321 DOPPLER ECHO F-UP/LMTD STD: CPT | Mod: 26

## 2024-05-23 PROCEDURE — 99285 EMERGENCY DEPT VISIT HI MDM: CPT

## 2024-05-23 PROCEDURE — 93308 TTE F-UP OR LMTD: CPT | Mod: 26

## 2024-05-23 PROCEDURE — 93010 ELECTROCARDIOGRAM REPORT: CPT

## 2024-05-23 RX ORDER — DEXTROSE 50 % IN WATER 50 %
25 SYRINGE (ML) INTRAVENOUS ONCE
Refills: 0 | Status: DISCONTINUED | OUTPATIENT
Start: 2024-05-23 | End: 2024-06-12

## 2024-05-23 RX ORDER — HEPARIN SODIUM 5000 [USP'U]/ML
4100 INJECTION INTRAVENOUS; SUBCUTANEOUS EVERY 6 HOURS
Refills: 0 | Status: DISCONTINUED | OUTPATIENT
Start: 2024-05-23 | End: 2024-05-23

## 2024-05-23 RX ORDER — HEPARIN SODIUM 5000 [USP'U]/ML
4100 INJECTION INTRAVENOUS; SUBCUTANEOUS ONCE
Refills: 0 | Status: DISCONTINUED | OUTPATIENT
Start: 2024-05-23 | End: 2024-05-23

## 2024-05-23 RX ORDER — DEXTROSE 50 % IN WATER 50 %
25 SYRINGE (ML) INTRAVENOUS ONCE
Refills: 0 | Status: DISCONTINUED | OUTPATIENT
Start: 2024-05-23 | End: 2024-05-27

## 2024-05-23 RX ORDER — HEPARIN SODIUM 5000 [USP'U]/ML
4100 INJECTION INTRAVENOUS; SUBCUTANEOUS ONCE
Refills: 0 | Status: COMPLETED | OUTPATIENT
Start: 2024-05-23 | End: 2024-05-23

## 2024-05-23 RX ORDER — HEPARIN SODIUM 5000 [USP'U]/ML
4100 INJECTION INTRAVENOUS; SUBCUTANEOUS EVERY 6 HOURS
Refills: 0 | Status: DISCONTINUED | OUTPATIENT
Start: 2024-05-23 | End: 2024-05-25

## 2024-05-23 RX ORDER — HEPARIN SODIUM 5000 [USP'U]/ML
INJECTION INTRAVENOUS; SUBCUTANEOUS
Qty: 25000 | Refills: 0 | Status: DISCONTINUED | OUTPATIENT
Start: 2024-05-23 | End: 2024-05-23

## 2024-05-23 RX ORDER — SODIUM CHLORIDE 9 MG/ML
1000 INJECTION INTRAMUSCULAR; INTRAVENOUS; SUBCUTANEOUS
Refills: 0 | Status: COMPLETED | OUTPATIENT
Start: 2024-05-23 | End: 2024-05-23

## 2024-05-23 RX ORDER — HEPARIN SODIUM 5000 [USP'U]/ML
INJECTION INTRAVENOUS; SUBCUTANEOUS
Qty: 25000 | Refills: 0 | Status: DISCONTINUED | OUTPATIENT
Start: 2024-05-23 | End: 2024-05-25

## 2024-05-23 RX ORDER — GLUCAGON INJECTION, SOLUTION 0.5 MG/.1ML
1 INJECTION, SOLUTION SUBCUTANEOUS ONCE
Refills: 0 | Status: DISCONTINUED | OUTPATIENT
Start: 2024-05-23 | End: 2024-05-27

## 2024-05-23 RX ORDER — POTASSIUM CHLORIDE 20 MEQ
40 PACKET (EA) ORAL ONCE
Refills: 0 | Status: COMPLETED | OUTPATIENT
Start: 2024-05-23 | End: 2024-05-23

## 2024-05-23 RX ORDER — DEXTROSE 10 % IN WATER 10 %
125 INTRAVENOUS SOLUTION INTRAVENOUS ONCE
Refills: 0 | Status: DISCONTINUED | OUTPATIENT
Start: 2024-05-23 | End: 2024-05-27

## 2024-05-23 RX ORDER — DEXTROSE 50 % IN WATER 50 %
50 SYRINGE (ML) INTRAVENOUS ONCE
Refills: 0 | Status: COMPLETED | OUTPATIENT
Start: 2024-05-23 | End: 2024-05-23

## 2024-05-23 RX ORDER — SODIUM CHLORIDE 9 MG/ML
1000 INJECTION, SOLUTION INTRAVENOUS
Refills: 0 | Status: DISCONTINUED | OUTPATIENT
Start: 2024-05-23 | End: 2024-05-27

## 2024-05-23 RX ORDER — ACETAMINOPHEN 500 MG
1000 TABLET ORAL ONCE
Refills: 0 | Status: DISCONTINUED | OUTPATIENT
Start: 2024-05-23 | End: 2024-05-23

## 2024-05-23 RX ORDER — ASPIRIN/CALCIUM CARB/MAGNESIUM 324 MG
324 TABLET ORAL ONCE
Refills: 0 | Status: COMPLETED | OUTPATIENT
Start: 2024-05-23 | End: 2024-05-23

## 2024-05-23 RX ORDER — LEVOTHYROXINE SODIUM 125 MCG
1 TABLET ORAL
Refills: 0 | DISCHARGE

## 2024-05-23 RX ORDER — DEXTROSE 50 % IN WATER 50 %
15 SYRINGE (ML) INTRAVENOUS ONCE
Refills: 0 | Status: DISCONTINUED | OUTPATIENT
Start: 2024-05-23 | End: 2024-05-27

## 2024-05-23 RX ORDER — DEXTROSE 50 % IN WATER 50 %
12.5 SYRINGE (ML) INTRAVENOUS ONCE
Refills: 0 | Status: DISCONTINUED | OUTPATIENT
Start: 2024-05-23 | End: 2024-05-27

## 2024-05-23 RX ADMIN — Medication 40 MILLIEQUIVALENT(S): at 17:12

## 2024-05-23 RX ADMIN — HEPARIN SODIUM 4100 UNIT(S): 5000 INJECTION INTRAVENOUS; SUBCUTANEOUS at 15:19

## 2024-05-23 RX ADMIN — SODIUM CHLORIDE 70 MILLILITER(S): 9 INJECTION INTRAMUSCULAR; INTRAVENOUS; SUBCUTANEOUS at 17:12

## 2024-05-23 RX ADMIN — HEPARIN SODIUM 800 UNIT(S)/HR: 5000 INJECTION INTRAVENOUS; SUBCUTANEOUS at 15:19

## 2024-05-23 RX ADMIN — HEPARIN SODIUM 950 UNIT(S)/HR: 5000 INJECTION INTRAVENOUS; SUBCUTANEOUS at 23:06

## 2024-05-23 RX ADMIN — Medication 50 MILLILITER(S): at 16:20

## 2024-05-23 RX ADMIN — Medication 324 MILLIGRAM(S): at 15:19

## 2024-05-23 NOTE — ED PROVIDER NOTE - OBJECTIVE STATEMENT
Patient is a 77yo M with PMHx of CAD w/ STEMI and NSTEMI s/p 4V CABG w/ 2 of 4 graft failure, multiple PCIs, HFrEF, DM2 on long term insulin, CKD 3-4, HTN, HLD who presents to the ED complaining of chest tightness since 10am this morning. Patient states he had sudden onset chest tightness, shortness of breath, dizziness, diaphoresis at 10am this morning while sitting on the couch. Now feels a little bit better. Had a holter monitor placed on Tuesday to assess PVC burden. Called his cardiologist who sent him in for admission.

## 2024-05-23 NOTE — ED ADULT NURSE NOTE - NSICDXPASTSURGICALHX_GEN_ALL_CORE_FT
PAST SURGICAL HISTORY:  History of brachytherapy     History of insertion of stent into coronary artery bypass graft     S/P CABG (coronary artery bypass graft) 4V 1983 [place]    Status post open reduction with internal fixation of fracture

## 2024-05-23 NOTE — ED PROVIDER NOTE - ATTENDING APP SHARED VISIT CONTRIBUTION OF CARE
Patient is a 77yo M with PMHx of CAD w/ STEMI and NSTEMI s/p 4V CABG w/ 2 of 4 graft failure, multiple PCIs, HFrEF, DM2 on long term insulin, CKD 3-4, HTN, HLD who presents to the ED complaining of chest tightness since 10am this morning. Patient states he had sudden onset chest tightness, shortness of breath, dizziness, diaphoresis at 10am this morning while sitting on the couch. Now feels a little bit better. Had a holter monitor placed on Tuesday to assess PVC burden. Called his cardiologist who sent him in for admission.    In ED patient feeling improved.  Unremarkable examination.  Given significant risk factors, ACS workup initiated, aspirin given cardiology consulted.  Per cardiology heparin ordered and will admit for further cardiac evaluation.  Patient stable at time of admission

## 2024-05-23 NOTE — CONSULT NOTE ADULT - ASSESSMENT
79 y/o male never smoker with history of CAD, STEMI, NSTEMI, 4V CABG, multiple PCI's of the SVG to the OM with stents and brachytherapy, cardiogenic shock, HFrEF with admission for decompensated HF with multi organ system failure, DM2 on long term insulin, 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, HTN, HLD who presents to [facility] ED with chest discomfort since Tuesday after Holter monitor placement for PVC burden which has worsened today with radiation to left arm, SOB and dizziness/lightheadedness. He called the office and was told to come in for further evaluation. In ED, patient weak appearing, and pending labs. Denies back pain, headache, SOB, JOINER, diaphoresis, syncope or N/V.

## 2024-05-23 NOTE — CONSULT NOTE ADULT - SUBJECTIVE AND OBJECTIVE BOX
[facility]                                              CARDIOLOGY AT [place] [address]                                             Telephone: [phone]. Fax:[phone]                                                       CARDIOLOGY CONSULTATION NOTE                                                                                             History obtained by: Patient and medical record  Community Cardiologist: Dr. Valenzuela   obtained: Yes [  ] No [ x ]  Reason for Consultation: Chest Pain  Available out pt records reviewed: Yes [ x ] No [  ]    Chief complaint:    Patient is a 78y old  Male who presents with a chief complaint of     HPI:  79 y/o male never smoker with history of CAD, STEMI, NSTEMI, 4V CABG, multiple PCI's of the SVG to the OM with stents and brachytherapy, cardiogenic shock, HFrEF with admission for decompensated HF with multi organ system failure, DM2 on long term insulin, 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, HTN, HLD who presents to [facility] ED with chest discomfort since Tuesday after Holter monitor placement for PVC burden which has worsened today with radiation to left arm, SOB and dizziness/lightheadedness. He called the office and was told to come in for further evaluation. In ED, patient weak appearing, and pending labs. Denies back pain, headache, SOB, JOINER, diaphoresis, syncope or N/V.      CARDIAC TESTING   ECHO:TRANSTHORACIC ECHOCARDIOGRAM REPORT  ________________________________________________________________________________  _______      Pt. Name:       ADRIANNA SHANKS Study Date:    4/15/2024  MRN:            [id number]        YOB: 1945  Accession #:    [id number]    Age:           78 years  Account#:       [id number]           Gender:        M  Visit ID#  Heart Rate:     67 bpm             Height:        67.00 in (170.18 cm)  Rhythm:                            Weight:        149.00 lb (67.59 kg)  Blood Pressure: 93/57 mmHg         BSA/BMI:       1.78 m² / 23.34 kg/m²  ________________________________________________________________________________________  Referring Physician:    [id number] BELLA Rogel MD  Interpreting Physician: BELLA Rogel MD  Primary Sonographer:    Marzena Arceo RDCS, RVT    Indication(s):      Ischemic cardiomyopathy - I25.5  Procedure:          Transthoracic echocardiogram with 2-D, M-mode and complete  spectral and color flow Doppler. Strain imaging performed  for evaluation of regional and global myocardial shape and  dimensions. Real time and full volume 3-dimensional imaging  performed at the echo machine.  Ordering Location:  [facility]  Admission Status:   Outpatient  Contrast Injection: Verbal consent was obtained for injection of Ultrasonic  Enhancing Agent following a discussion of risks and  benefits.  Endocardial visualization enhanced with 2 ml of Definity  Ultrasound enhancing agent (Lot#:[id number] Exp.Date:1/31/2025).  UEA Reaction:       Patient had no adverse reaction after injection of  Ultrasound Enhancing Agent.  Study Information:  Image quality for this study is adequate.    _______________________________________________________________________________________    CONCLUSIONS:    1. The left atrium is severely dilated.  2. Segmental wall motion abnormaliteis as seen with coronary artery disease.  3. Left ventricular systolic function is severely decreased with an ejection fraction of 37 % by 3D.  4. Left ventricular global longitudinal strain is -7.6 % is abnormal (> -16%).  5. The right atrium is normal in size.  6. Mildly enlarged right ventricular cavity size and normal systolic function.  7. Moderate mitral regurgitation.  8. Estimated pulmonary artery systolic pressure is 53 mmHg, consistent with moderate pulmonary hypertension.  9. No pericardial effusion seen.  10. Compared to the transthoracic echocardiogram performed on 10/29/2023, EF has slightly improved (<20% --> 30-35%).      STRESS:    CATH:     ELECTROPHYSIOLOGY: ICD interrogation 5/21: less than 90%     PAST MEDICAL HISTORY  CAD (coronary artery disease)  DM (diabetes mellitus)  HTN (hypertension)  HLD (hyperlipidemia)  GERD (gastroesophageal reflux disease)  Hypertension  Diabetes mellitus  High cholesterol  Coronary artery disease involving coronary bypass graft of native heart with unstable angina pectoris  Coronary artery disease involving native coronary artery of native heart, angina presence unspecifie  Type 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, without long-term current use of insul  Essential hypertension  HFrEF (heart failure with reduced ejection fraction)  Heart failure  HFrEF (heart failure with reduced ejection fraction)  Cardiogenic shock  Stage 4 chronic kidney disease  DKA (diabetic ketoacidosis)  Multiple organ failure with heart failure  Bronchiectasis with acute lower respiratory infection  LBBB (left bundle branch block)  Facial nerve palsy  ST elevation myocardial infarction (STEMI), unspecified artery  Hypothyroidism      PAST SURGICAL HISTORY  S/P CABG (coronary artery bypass graft)  CAD S/P percutaneous coronary angioplasty  S/P CABG (coronary artery bypass graft)  S/P primary angioplasty with coronary stent  Status post open reduction with internal fixation of fracture  History of insertion of stent into coronary artery bypass graft  History of brachytherapy  HFrEF (heart failure with reduced ejection fraction)      SOCIAL HISTORY:  Denies smoking/alcohol/drugs  CIGARETTES:     ALCOHOL:  DRUGS:    FAMILY HISTORY:    Family History of Cardiovascular Disease:  Yes [ x ] No [  ]  Coronary Artery Disease in first degree relative: Yes [x  ] No [  ]  Sudden Cardiac Death in First degree relative: Yes [ x ] No [  ]    HOME MEDICATIONS:  Farxiga 10 mg oral tablet: 1 tab(s) orally once a day (21 Dec 2023 10:28)  insulin aspart 100 units/mL injectable solution: 10 injectable 3 times a day (22 Dec 2023 07:46)  levothyroxine 75 mcg (0.075 mg) oral tablet: 1 tab(s) orally once a day (21 Dec 2023 10:28)  Metoprolol Succinate ER 50 mg oral tablet, extended release: 1 tab(s) orally once a day (21 Dec 2023 10:28)  montelukast 10 mg oral tablet: 1 tab(s) orally once a day (21 Dec 2023 10:28)  NexIUM 40 mg oral delayed release capsule: 1 cap(s) orally once a day (at bedtime) (21 Dec 2023 10:28)  ranolazine 500 mg oral tablet, extended release: 1 tab(s) orally 2 times a day (21 Dec 2023 10:28)      CURRENT CARDIAC MEDICATIONS:      CURRENT OTHER MEDICATIONS:  aspirin  chewable 324 milliGRAM(s) Oral once, Stop order after: 1 Doses  heparin   Injectable 4100 Unit(s) IV Push once, Stop order after: 1 Doses  heparin   Injectable 4100 Unit(s) IV Push every 6 hours PRN For aPTT less than 40  heparin  Infusion.  Unit(s)/Hr (8 mL/Hr) IV Continuous <Continuous>      ALLERGIES:   No Known Allergies  DOPamine (Hypotension)      REVIEW OF SYMPTOMS:   CONSTITUTIONAL: No fever, no chills, no weight loss, no weight gain, no fatigue   ENMT:  No vertigo; No sinus or throat pain  NECK: No pain or stiffness  CARDIOVASCULAR: No chest pain, no dyspnea, no syncope/presyncope, no palpitations, no dizziness, no Orthopnea, no Paroxsymal nocturnal dyspnea  RESPIRATORY: no Shortness of breath, no cough, no wheezing  : No dysuria, no hematuria   GI: No dark color stool, no nausea, no diarrhea, no constipation, no abdominal pain   NEURO: No headache, no slurred speech   MUSCULOSKELETAL: No joint pain or swelling; No muscle, back, or extremity pain  PSYCH: No agitation, no anxiety.    ALL OTHER REVIEW OF SYSTEMS ARE NEGATIVE.    VITAL SIGNS:  T(C): 36.4 (05-23-24 @ 13:26), Max: 36.4 (05-23-24 @ 13:26)  T(F): 97.5 (05-23-24 @ 13:26), Max: 97.5 (05-23-24 @ 13:26)  HR: 119 (05-23-24 @ 13:26) (119 - 119)  BP: 92/59 (05-23-24 @ 13:26) (92/59 - 92/59)  RR: 20 (05-23-24 @ 13:26) (20 - 20)  SpO2: 97% (05-23-24 @ 13:26) (97% - 97%)    INTAKE AND OUTPUT:       PHYSICAL EXAM:  Constitutional: Comfortable . No acute distress.   HEENT: Atraumatic and normocephalic , neck is supple . no JVD. No carotid bruit.  CNS: A&Ox3. No focal deficits.   Respiratory: CTAB, unlabored   Cardiovascular: RRR normal s1 s2. No murmur. No rubs or gallop.  Gastrointestinal: Soft, non-tender. +Bowel sounds.   Extremities: 2+ Peripheral Pulses, No clubbing, cyanosis, or edema  Psychiatric: Calm . no agitation.   Skin: Warm and dry, no ulcers on extremities     LABS:            INTERPRETATION OF TELEMETRY:     ECG:   Prior ECG: Yes [  ] No [  ]    RADIOLOGY & ADDITIONAL STUDIES:    X-ray:    CT scan:   MRI:   US:

## 2024-05-23 NOTE — ED ADULT NURSE REASSESSMENT NOTE - NS ED NURSE REASSESS COMMENT FT1
A 2 RN skin check has been performed on admission to ed with RN witness _Екатерина Silver.  A pressure injury was not identified.

## 2024-05-23 NOTE — ED PROVIDER NOTE - CARE PLAN
Principal Discharge DX:	NSTEMI (non-ST elevation myocardial infarction)  Secondary Diagnosis:	Acute kidney injury superimposed on CKD   1

## 2024-05-23 NOTE — H&P ADULT - ASSESSMENT
79 y/o male never smoker with history of CAD, STEMI, NSTEMI, 4V CABG, multiple PCI's of the SVG to the OM with stents and brachytherapy,, HFrEF ,  DM2 on long term insulin, 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, HTN, HLD who presents to [facility] ED with chest discomfort since Tuesday after Holter monitor placement for PVC burden which has worsened today with radiation to left arm  and dizziness/lightheadedness. He called the office and was told to come in for further evaluation. In ED, patient weak appearing, labs with psitive troponin cr 4 RSA and hypoglycemia noted          1- Chest pain   in setting of severe CAD several PCI , CABG   positive troponin   cardiology consult appreciated   [facility] however cr is high 4   cont Iv heparin infusion ACS protocol   , asa ,   Monitor on Tele for acute arrhythmia monitoring  - TTE performed 4/2024 ef 37%, improved from previous, with known RWMA from CAD    2- ARF on CKD stage 3   cr 1.4 - 1.8   BP is soft   poor oral intake   will give IVF 1 liter slow   renal DR Turpin consulted   avoid nephrotoxic meds   stop entresto and torsemide   hold farxiga     3- Systolic heart failure , cronic with AICD   hold diuretics ,entresto   prerenal ras     4-DM type 2   low BG   accucheck   hold lantus and ISS for now   monitor for hypoglycemia closely     5-Hypothroidsm   cont levothroxine   check TSH     6-Lower back pain   fell at home recently   ED visit may 21 Xray no fracture   tylenol as needed     DVt prophylaxis   on iv heparin

## 2024-05-23 NOTE — CONSULT NOTE ADULT - SUBJECTIVE AND OBJECTIVE BOX
Assessment/plan:      Patient seen and examined  CODE STATUS:  Plan of care discussed with:    Assessment:  78-year-old Russian male with history of coronary artery disease s/p CABG s/p brachytherapy s/p multiple PCI's heart failure with reduced EF diabetes mellitus essential hypertension CKD stage III-IV dyslipidemia hypothyroidism Bell's palsy presented with chest pain being admitted for non-ST elevation MI acute kidney injury on CKD    ====================== NEUROLOGY=====================   no acute issue, every 4 neurochecks  ==================== RESPIRATORY======================   oxygenating ventilating fine on room air  ====================CARDIOVASCULAR==================   recommend aspirin statin heparin infusion  Trend troponin    As needed sublingual nitro/Nitropaste for chest pain versus morphine as needed  EP evaluation for Holter monitor (externally  placed device)   needs to restrict salt intake   agree with holding off of Entresto/diuretics for now from home dosing and close monitoring of renal function  Further recommendation as per cardiology team  ===================HEMATOLOGIC/ONC ===================  heparin   Injectable 4100 Unit(s) IV Push every 6 hours PRN For aPTT less than 40  heparin  Infusion.  Unit(s)/Hr (8 mL/Hr) IV Continuous <Continuous>    ===================== RENAL =========================  Continue monitoring urine output  Avoid Nephrotoxic agents  Adjust medications for renal  function  Close urinary output monitoring  UA  Nephrology consultation  Hold off on home dose of diuretics as well as Entresto for now as patient appears euvolemic  ==================== GASTROINTESTINAL===================  dextrose 10% Bolus 125 milliLiter(s) IV Bolus once  dextrose 5%. 1000 milliLiter(s) (100 mL/Hr) IV Continuous <Continuous>  dextrose 5%. 1000 milliLiter(s) (50 mL/Hr) IV Continuous <Continuous>   TLC diet with low-salt with fluid restriction  =======================    ENDOCRINE  =====================  dextrose 50% Injectable 25 Gram(s) IV Push once  dextrose 50% Injectable 25 Gram(s) IV Push once  dextrose 50% Injectable 12.5 Gram(s) IV Push once  dextrose Oral Gel 15 Gram(s) Oral once  glucagon  Injectable 1 milliGRAM(s) IntraMuscular once   recommend insulin sliding scale with Lantus 10 units daily except to hold while n.p.o.  ========================INFECTIOUS DISEASE================   no active issues        Care plan discussed with ICU care team.      _________________________________    Chief complaint:     chest tightness/pain    HPI/Hospital course: 78-year-old Russian male with significant past medical history as below who was in his usual state of health up until 3 to 4 days ago when he started having nausea vomiting with excessive diuresis when he changed his Entresto to once a day and was taking excessive amount of salt with excessive diuresis as well as low blood pressure for last few days denied any difficulty breathing fever chills dysuria diarrhea abdominal pain rash loss of consciousness blurring of vision slurring of speech palpitations.  Being admitted to medicine for acute kidney injury on CKD with non-ST elevation MI.  Medical ICU consulted for evaluation.    Review of systems:  All systems reviewed with symptoms mentions as above.     Past medical/surgical history: coronary artery disease s/p CABG s/p PCI s/p brachytherapy, essential hypertension dyslipidemia diabetes acid reflux stage IV CKD left bundle branch block ICD/pacemaker hypothyroidism facial of mostly;  history of fracture with open reduction internal fixation    Family history: family history of coronary artery disease sudden cardiac death first-degree relative present      Social history:  Lives with family, ADL performance without issue, denies any history of tobacco abuse alcohol abuse or recreational substance abuse, works as an Imam    Allergies:  NKDA    Home Medications:  Farxiga 10 mg oral tablet: 1 tab(s) orally once a day (23 May 2024 16:52)  insulin aspart 100 units/mL injectable solution: 10 injectable 3 times a day (23 May 2024 16:52)  levothyroxine 88 mcg (0.088 mg) oral tablet: 1 tab(s) orally once a day (23 May 2024 16:58)  Metoprolol Succinate ER 50 mg oral tablet, extended release: 1 tab(s) orally once a day (23 May 2024 16:58)  montelukast 10 mg oral tablet: 1 tab(s) orally once a day (23 May 2024 16:58)  NexIUM 40 mg oral delayed release capsule: 1 cap(s) orally once a day (at bedtime) (23 May 2024 16:58)  ranolazine 500 mg oral tablet, extended release: 1 tab(s) orally 2 times a day (23 May 2024 16:58)      Vital Signs Last 24 Hrs  T(C): 36.4 (23 May 2024 15:42), Max: 36.4 (23 May 2024 13:26)  T(F): 97.6 (23 May 2024 15:42), Max: 97.6 (23 May 2024 15:42)  HR: 121 (23 May 2024 15:42) (119 - 121)  BP: 104/73 (23 May 2024 15:42) (92/59 - 104/73)  RR: 22 (23 May 2024 15:42) (20 - 22)  SpO2: 100% (23 May 2024 15:42) (97% - 100%)    Parameters below as of 23 May 2024 15:42  Patient On (Oxygen Delivery Method): room air    Physical exam:  General: No acute distress while lying in bed    Neuro: AAO*4, No obvious acute motor deficit, chronic left facial palsy related changes  HEENT: Pupils equal, reactive to light, Oral mucosa moist  PULM: Clear to auscultation bilaterally, no significant adventitious breath sounds   CVS: Regular rhythm and controlled rate  ABD: Soft, nondistended, nontender, normoactive bowel sounds, no CVA tenderness  EXT:  trace ankle b/l LE edema, nontender with pedal pulse palpable   SKIN: Warm and well perfused, no acute rashes   NO obvious palpable lymphadenopathy       Labs:   Reviewed  Imaging: reviewed  Cultures/pathology: reviewed          Plan of care discussed with patient and primary team  Thank you for your consult.   Please call us back with any worsening clinical status or with concerns & questions.   We will Sign Off for now.     Real Akers MD   Division of Critical Care Medicine  Department of Medicine   [facility]   Cell [phone]     I have personally provided 70 minutes of critical care time excluding time spent on separate procedures

## 2024-05-23 NOTE — ED ADULT TRIAGE NOTE - CHIEF COMPLAINT QUOTE
Pt A&Ox4, NAD. Pt presents to the ED with complaints of chest pain radiating to left arm since this morning. Hx of 15 stents. On Brilinta.

## 2024-05-23 NOTE — ED PROVIDER NOTE - CLINICAL SUMMARY MEDICAL DECISION MAKING FREE TEXT BOX
Patient is a 79yo M with PMHx of CAD w/ STEMI and NSTEMI s/p 4V CABG w/ 2 of 4 graft failure, multiple PCIs, HFrEF, DM2 on long term insulin, CKD 3-4, HTN, HLD who presents to the ED complaining of chest tightness since 10am this morning. Will do cardiac work up. Per cardiology, start heparin drip and admit.

## 2024-05-23 NOTE — CONSULT NOTE ADULT - PROBLEM SELECTOR RECOMMENDATION 9
.  - chest pain since Tuesday in the setting of CAD with several PCI and brachytherapy most recent in Nov 2023  - EKG NSR with known LBBB  - concerned for ACS  - start heparin GTT ACS protocol  - Monitor on Tele for acute arrhythmia monitoring  - Check labs including CBC, BMP, trend CE x3, ECG and CXR,  - TTE performed 4/2024 ef 37%, improved from previous, with known RWMA from CAD  - will likely need C to assess stent patency .  - chest pain since Tuesday in the setting of CAD with several PCI and brachytherapy most recent in Nov 2023  - EKG NSR with known LBBB, Paced rhythm  - concerned for ACS  - start heparin GTT ACS protocol  - Monitor on Tele for acute arrhythmia monitoring  - Check labs including CBC, BMP, trend CE x3, ECG and CXR,  - TTE performed 4/2024 ef 37%, improved from previous, with known RWMA from CAD, repeat limited pending  - will likely need LHC to assess stent patency  - Cr elevated, unable to perform LHC today. give gentle hydration NS 50mL/HR overnight and reassess renal function in AM for possibly LHC  - will need ICU consult and consider integrillin GTT  - can use 1 inch Nitropaste for anginal symptoms  - .  - chest pain since Tuesday in the setting of CAD with several PCI and brachytherapy most recent in Nov 2023  - EKG NSR with known LBBB, Paced rhythm  - concerned for ACS  - start heparin GTT ACS protocol  - Monitor on Tele for acute arrhythmia monitoring  - Check labs including CBC, BMP, trend CE x3, ECG and CXR,  - TTE performed 4/2024 ef 37%, improved from previous, with known RWMA from CAD, repeat limited pending  - will likely need LHC to assess stent patency  - Cr elevated, unable to perform LHC today. give gentle hydration NS 50mL/HR overnight and reassess renal function in AM for possibly LHC  - will need ICU consult   - can use 1 inch Nitropaste for anginal symptoms

## 2024-05-23 NOTE — H&P ADULT - NSHPPHYSICALEXAM_GEN_ALL_CORE
Vital Signs Last 24 Hrs  T(C): 36.4 (23 May 2024 15:42), Max: 36.4 (23 May 2024 13:26)  T(F): 97.6 (23 May 2024 15:42), Max: 97.6 (23 May 2024 15:42)  HR: 121 (23 May 2024 15:42) (119 - 121)  BP: 104/73 (23 May 2024 15:42) (92/59 - 104/73)  BP(mean): --  RR: 22 (23 May 2024 15:42) (20 - 22)  SpO2: 100% (23 May 2024 15:42) (97% - 100%)    Parameters below as of 23 May 2024 15:42  Patient On (Oxygen Delivery Method): room air

## 2024-05-23 NOTE — H&P ADULT - NSICDXPASTSURGICALHX_GEN_ALL_CORE_FT
PAST SURGICAL HISTORY:  History of brachytherapy     History of insertion of stent into coronary artery bypass graft     S/P CABG (coronary artery bypass graft) 4V 1983 [place]    Status post open reduction with internal fixation of fracture

## 2024-05-23 NOTE — ED PROVIDER NOTE - NSICDXPASTSURGICALHX_GEN_ALL_CORE_FT
PAST SURGICAL HISTORY:  History of brachytherapy     History of insertion of stent into coronary artery bypass graft     S/P CABG (coronary artery bypass graft) 4V 1983 [place]    Status post open reduction with internal fixation of fracture

## 2024-05-23 NOTE — H&P ADULT - NSHPLABSRESULTS_GEN_ALL_CORE
13.7   13.41 )-----------( 241      ( 23 May 2024 15:03 )             40.6       05-23    134<L>  |  88<L>  |  83.0<H>  ----------------------------<  46<LL>  3.3<L>   |  32.0<H>  |  4.26<H>    Ca    9.1      23 May 2024 15:03  Mg     2.8     05-23    TPro  7.7  /  Alb  3.8  /  TBili  0.5  /  DBili  x   /  AST  46<H>  /  ALT  22  /  AlkPhos  103  05-23      Basic Metabolic Panel (03.27.24 @ 15:10)   Sodium: 131 mmol/L  Potassium: 4.7 mmol/L  Chloride: 97: Chloride reference range changed from ..10/26/2022   . mmol/L  Carbon Dioxide: 23.0 mmol/L  Anion Gap: 11 mmol/L  Blood Urea Nitrogen: 33.5 mg/dL  Creatinine: 1.42 mg/dL  Glucose: 207 mg/dL  Calcium: 7.6 mg/dL      CONCLUSIONS:    1. The left atrium is severely dilated.  2. Segmental wall motion abnormaliteis as seen with coronary artery disease.  3. Left ventricular systolic function is severely decreased with an ejection fraction of 37 % by 3D.  4. Left ventricular global longitudinal strain is -7.6 % is abnormal (> -16%).  5. The right atrium is normal in size.  6. Mildly enlarged right ventricular cavity size and normal systolic function.  7. Moderate mitral regurgitation.  8. Estimated pulmonary artery systolic pressure is 53 mmHg, consistent with moderate pulmonary hypertension.  9. No pericardial effusion seen.  10. Compared to the transthoracic echocardiogram performed on 10/29/2023, EF has slightly improved (<20% --> 30-35%).

## 2024-05-24 DIAGNOSIS — I21.4 NON-ST ELEVATION (NSTEMI) MYOCARDIAL INFARCTION: ICD-10-CM

## 2024-05-24 LAB
A1C WITH ESTIMATED AVERAGE GLUCOSE RESULT: 6.9 % — HIGH (ref 4–5.6)
ANION GAP SERPL CALC-SCNC: 15 MMOL/L — SIGNIFICANT CHANGE UP (ref 5–17)
APTT BLD: 67 SEC — HIGH (ref 24.5–35.6)
APTT BLD: 72.3 SEC — HIGH (ref 24.5–35.6)
BUN SERPL-MCNC: 79 MG/DL — HIGH (ref 8–20)
CALCIUM SERPL-MCNC: 8.6 MG/DL — SIGNIFICANT CHANGE UP (ref 8.4–10.5)
CHLORIDE SERPL-SCNC: 92 MMOL/L — LOW (ref 96–108)
CO2 SERPL-SCNC: 28 MMOL/L — SIGNIFICANT CHANGE UP (ref 22–29)
CREAT SERPL-MCNC: 3.46 MG/DL — HIGH (ref 0.5–1.3)
EGFR: 18 ML/MIN/1.73M2 — LOW
ESTIMATED AVERAGE GLUCOSE: 151 MG/DL — HIGH (ref 68–114)
GLUCOSE BLDC GLUCOMTR-MCNC: 199 MG/DL — HIGH (ref 70–99)
GLUCOSE BLDC GLUCOMTR-MCNC: 203 MG/DL — HIGH (ref 70–99)
GLUCOSE BLDC GLUCOMTR-MCNC: 212 MG/DL — HIGH (ref 70–99)
GLUCOSE SERPL-MCNC: 202 MG/DL — HIGH (ref 70–99)
HCT VFR BLD CALC: 36.6 % — LOW (ref 39–50)
HCT VFR BLD CALC: 38.3 % — LOW (ref 39–50)
HGB BLD-MCNC: 12.3 G/DL — LOW (ref 13–17)
HGB BLD-MCNC: 12.6 G/DL — LOW (ref 13–17)
MCHC RBC-ENTMCNC: 29.8 PG — SIGNIFICANT CHANGE UP (ref 27–34)
MCHC RBC-ENTMCNC: 29.9 PG — SIGNIFICANT CHANGE UP (ref 27–34)
MCHC RBC-ENTMCNC: 32.9 GM/DL — SIGNIFICANT CHANGE UP (ref 32–36)
MCHC RBC-ENTMCNC: 33.6 GM/DL — SIGNIFICANT CHANGE UP (ref 32–36)
MCV RBC AUTO: 88.8 FL — SIGNIFICANT CHANGE UP (ref 80–100)
MCV RBC AUTO: 90.5 FL — SIGNIFICANT CHANGE UP (ref 80–100)
PLATELET # BLD AUTO: 182 K/UL — SIGNIFICANT CHANGE UP (ref 150–400)
PLATELET # BLD AUTO: 184 K/UL — SIGNIFICANT CHANGE UP (ref 150–400)
POTASSIUM SERPL-MCNC: 3.3 MMOL/L — LOW (ref 3.5–5.3)
POTASSIUM SERPL-SCNC: 3.3 MMOL/L — LOW (ref 3.5–5.3)
RBC # BLD: 4.12 M/UL — LOW (ref 4.2–5.8)
RBC # BLD: 4.23 M/UL — SIGNIFICANT CHANGE UP (ref 4.2–5.8)
RBC # FLD: 14.8 % — HIGH (ref 10.3–14.5)
RBC # FLD: 14.9 % — HIGH (ref 10.3–14.5)
SODIUM SERPL-SCNC: 135 MMOL/L — SIGNIFICANT CHANGE UP (ref 135–145)
WBC # BLD: 10.04 K/UL — SIGNIFICANT CHANGE UP (ref 3.8–10.5)
WBC # BLD: 9.74 K/UL — SIGNIFICANT CHANGE UP (ref 3.8–10.5)
WBC # FLD AUTO: 10.04 K/UL — SIGNIFICANT CHANGE UP (ref 3.8–10.5)
WBC # FLD AUTO: 9.74 K/UL — SIGNIFICANT CHANGE UP (ref 3.8–10.5)

## 2024-05-24 PROCEDURE — 99232 SBSQ HOSP IP/OBS MODERATE 35: CPT

## 2024-05-24 PROCEDURE — 99233 SBSQ HOSP IP/OBS HIGH 50: CPT

## 2024-05-24 RX ORDER — ISOSORBIDE MONONITRATE 60 MG/1
30 TABLET, EXTENDED RELEASE ORAL DAILY
Refills: 0 | Status: DISCONTINUED | OUTPATIENT
Start: 2024-05-24 | End: 2024-05-25

## 2024-05-24 RX ORDER — ASPIRIN/CALCIUM CARB/MAGNESIUM 324 MG
81 TABLET ORAL DAILY
Refills: 0 | Status: DISCONTINUED | OUTPATIENT
Start: 2024-05-24 | End: 2024-06-05

## 2024-05-24 RX ORDER — CLOPIDOGREL BISULFATE 75 MG/1
75 TABLET, FILM COATED ORAL DAILY
Refills: 0 | Status: DISCONTINUED | OUTPATIENT
Start: 2024-05-25 | End: 2024-06-12

## 2024-05-24 RX ORDER — INSULIN LISPRO 100/ML
VIAL (ML) SUBCUTANEOUS
Refills: 0 | Status: DISCONTINUED | OUTPATIENT
Start: 2024-05-24 | End: 2024-05-25

## 2024-05-24 RX ORDER — CLOPIDOGREL BISULFATE 75 MG/1
300 TABLET, FILM COATED ORAL ONCE
Refills: 0 | Status: COMPLETED | OUTPATIENT
Start: 2024-05-24 | End: 2024-05-24

## 2024-05-24 RX ORDER — POTASSIUM CHLORIDE 20 MEQ
40 PACKET (EA) ORAL ONCE
Refills: 0 | Status: COMPLETED | OUTPATIENT
Start: 2024-05-24 | End: 2024-05-24

## 2024-05-24 RX ORDER — RANOLAZINE 500 MG/1
500 TABLET, FILM COATED, EXTENDED RELEASE ORAL
Refills: 0 | Status: DISCONTINUED | OUTPATIENT
Start: 2024-05-24 | End: 2024-06-05

## 2024-05-24 RX ORDER — ATORVASTATIN CALCIUM 80 MG/1
80 TABLET, FILM COATED ORAL AT BEDTIME
Refills: 0 | Status: DISCONTINUED | OUTPATIENT
Start: 2024-05-24 | End: 2024-06-05

## 2024-05-24 RX ORDER — LEVOTHYROXINE SODIUM 125 MCG
75 TABLET ORAL DAILY
Refills: 0 | Status: DISCONTINUED | OUTPATIENT
Start: 2024-05-24 | End: 2024-05-26

## 2024-05-24 RX ADMIN — HEPARIN SODIUM 950 UNIT(S)/HR: 5000 INJECTION INTRAVENOUS; SUBCUTANEOUS at 05:32

## 2024-05-24 RX ADMIN — Medication 1: at 12:27

## 2024-05-24 RX ADMIN — ATORVASTATIN CALCIUM 80 MILLIGRAM(S): 80 TABLET, FILM COATED ORAL at 21:37

## 2024-05-24 RX ADMIN — ISOSORBIDE MONONITRATE 30 MILLIGRAM(S): 60 TABLET, EXTENDED RELEASE ORAL at 21:37

## 2024-05-24 RX ADMIN — HEPARIN SODIUM 950 UNIT(S)/HR: 5000 INJECTION INTRAVENOUS; SUBCUTANEOUS at 20:06

## 2024-05-24 RX ADMIN — RANOLAZINE 500 MILLIGRAM(S): 500 TABLET, FILM COATED, EXTENDED RELEASE ORAL at 18:12

## 2024-05-24 RX ADMIN — HEPARIN SODIUM 950 UNIT(S)/HR: 5000 INJECTION INTRAVENOUS; SUBCUTANEOUS at 12:29

## 2024-05-24 RX ADMIN — Medication 2: at 18:10

## 2024-05-24 RX ADMIN — CLOPIDOGREL BISULFATE 300 MILLIGRAM(S): 75 TABLET, FILM COATED ORAL at 12:01

## 2024-05-24 RX ADMIN — Medication 40 MILLIEQUIVALENT(S): at 11:57

## 2024-05-24 RX ADMIN — Medication 81 MILLIGRAM(S): at 12:01

## 2024-05-24 NOTE — CONSULT NOTE ADULT - SUBJECTIVE AND OBJECTIVE BOX
HPI:  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 no SOB , radiates to the shoulder   Holter monitor placement for PVC burden which has worsened today with radiation to left arm, and  dizziness/lightheadedness. He called the office and was told to come in for further evaluation. In ED, patient weak appearing,He is with lower back hip pain was in [facility] ED 2 days ago , hip xray neg for fracture . Pt denies headache, SOB, JOINER, diaphoresis, syncope or N/V   Currently pain free   Pt's also c/o poor oral intake , BG is 46 dextrose ordered 1 amp by ED        (23 May 2024 16:13)   Hx renal stones x1 not aware of type, no other renal  problems; elevated creatinine on admission.    PAST MEDICAL & SURGICAL HISTORY:  GERD (gastroesophageal reflux disease)      High cholesterol      Coronary artery disease involving coronary bypass graft of native heart with unstable angina pectoris      Coronary artery disease involving native coronary artery of native heart, angina presence unspecifie      Type 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, without long-term current use of insul      Essential hypertension      HFrEF (heart failure with reduced ejection fraction)      Stage 4 chronic kidney disease      LBBB (left bundle branch block)      Facial nerve palsy      Hypothyroidism      S/P CABG (coronary artery bypass graft)  4V 1983 [place]      Status post open reduction with internal fixation of fracture      History of insertion of stent into coronary artery bypass graft      History of brachytherapy    FAMILY HISTORY:  NC    Social History:Non smoker    MEDICATIONS  (STANDING):  aspirin enteric coated 81 milliGRAM(s) Oral daily  atorvastatin 80 milliGRAM(s) Oral at bedtime  clopidogrel Tablet 300 milliGRAM(s) Oral once  dextrose 10% Bolus 125 milliLiter(s) IV Bolus once  dextrose 5%. 1000 milliLiter(s) (100 mL/Hr) IV Continuous <Continuous>  dextrose 5%. 1000 milliLiter(s) (50 mL/Hr) IV Continuous <Continuous>  dextrose 50% Injectable 25 Gram(s) IV Push once  dextrose 50% Injectable 25 Gram(s) IV Push once  dextrose 50% Injectable 12.5 Gram(s) IV Push once  dextrose Oral Gel 15 Gram(s) Oral once  glucagon  Injectable 1 milliGRAM(s) IntraMuscular once  heparin  Infusion.  Unit(s)/Hr (8 mL/Hr) IV Continuous <Continuous>  insulin lispro (ADMELOG) corrective regimen sliding scale   SubCutaneous three times a day before meals  isosorbide   mononitrate ER Tablet (IMDUR) 30 milliGRAM(s) Oral daily  levothyroxine 75 MICROGram(s) Oral daily  potassium chloride    Tablet ER 40 milliEquivalent(s) Oral once  ranolazine 500 milliGRAM(s) Oral two times a day    MEDICATIONS  (PRN):  heparin   Injectable 4100 Unit(s) IV Push every 6 hours PRN For aPTT less than 40   Meds reviewed    Allergies    No Known Allergies    Intolerances    DOPamine (Hypotension)      Vital Signs Last 24 Hrs  T(C): 36.8 (24 May 2024 07:17), Max: 36.8 (23 May 2024 19:25)  T(F): 98.3 (24 May 2024 07:17), Max: 98.3 (24 May 2024 07:17)  HR: 98 (24 May 2024 07:17) (75 - 121)  BP: 122/78 (24 May 2024 07:17) (92/59 - 122/78)  BP(mean): --  RR: 20 (24 May 2024 07:17) (20 - 22)  SpO2: 96% (24 May 2024 07:17) (96% - 100%)    Parameters below as of 24 May 2024 07:17  Patient On (Oxygen Delivery Method): room air      Daily Height in cm: 172.72 (23 May 2024 13:26)    Daily     PHYSICAL EXAM:    GENERAL: appears chronically ill  HEAD:  Atraumatic, Normocephalic  EYES: EOMI  NECK: Supple  NERVOUS SYSTEM:  Alert & Oriented X3  CHEST/LUNG: Clear to percussion bilaterally  HEART: Regular rate and rhythm  ABDOMEN: Soft, Nontender, Nondistended; +BS  EXTREMITIES:  edema    LABS:                        12.6   10.04 )-----------( 184      ( 24 May 2024 04:40 )             38.3     05-24    135  |  92<L>  |  79.0<H>  ----------------------------<  202<H>  3.3<L>   |  28.0  |  3.46<H>    Ca    8.6      24 May 2024 08:33  Mg     2.8     05-23    TPro  7.7  /  Alb  3.8  /  TBili  0.5  /  DBili  x   /  AST  46<H>  /  ALT  22  /  AlkPhos  103  05-23    PT/INR - ( 23 May 2024 15:03 )   PT: 10.4 sec;   INR: 0.94 ratio         PTT - ( 24 May 2024 04:40 )  PTT:67.0 sec  Urinalysis Basic - ( 24 May 2024 08:33 )    Color: x / Appearance: x / SG: x / pH: x  Gluc: 202 mg/dL / Ketone: x  / Bili: x / Urobili: x   Blood: x / Protein: x / Nitrite: x   Leuk Esterase: x / RBC: x / WBC x   Sq Epi: x / Non Sq Epi: x / Bacteria: x      Magnesium: 2.8 mg/dL (05-23 @ 15:03)          RADIOLOGY & ADDITIONAL TESTS:     HPI:  79 y/o male  with history of CAD, MI's ,  4V CABG, multiple PCI's of the SVG to the OM with stents , HFrEF, DM2 on long term insulin, 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 no SOB , radiates to the shoulder   Holter monitor placement for PVC burden which has worsened today with radiation to left arm, and  dizziness/lightheadedness. He called the office and was told to come in for further evaluation. In ED, patient weak appearing,He is with lower back hip pain was in [facility] ED 2 days ago , hip xray neg for fracture . Pt denies headache, SOB, JOINER, diaphoresis, syncope or N/V   Currently pain free, family at bedside      PAST MEDICAL & SURGICAL HISTORY:  GERD (gastroesophageal reflux disease)      High cholesterol      Coronary artery disease involving coronary bypass graft of native heart with unstable angina pectoris      Coronary artery disease involving native coronary artery of native heart, angina presence unspecifie      Type 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, without long-term current use of insul      Essential hypertension      HFrEF (heart failure with reduced ejection fraction)      Stage 4 chronic kidney disease      LBBB (left bundle branch block)      Facial nerve palsy      Hypothyroidism      S/P CABG (coronary artery bypass graft)  4V 1983 [place]      Status post open reduction with internal fixation of fracture      History of insertion of stent into coronary artery bypass graft      History of brachytherapy    FAMILY HISTORY:  NC    Social History:Non smoker    MEDICATIONS  (STANDING):  aspirin enteric coated 81 milliGRAM(s) Oral daily  atorvastatin 80 milliGRAM(s) Oral at bedtime  clopidogrel Tablet 300 milliGRAM(s) Oral once  dextrose 10% Bolus 125 milliLiter(s) IV Bolus once  dextrose 5%. 1000 milliLiter(s) (100 mL/Hr) IV Continuous <Continuous>  dextrose 5%. 1000 milliLiter(s) (50 mL/Hr) IV Continuous <Continuous>  dextrose 50% Injectable 25 Gram(s) IV Push once  dextrose 50% Injectable 25 Gram(s) IV Push once  dextrose 50% Injectable 12.5 Gram(s) IV Push once  dextrose Oral Gel 15 Gram(s) Oral once  glucagon  Injectable 1 milliGRAM(s) IntraMuscular once  heparin  Infusion.  Unit(s)/Hr (8 mL/Hr) IV Continuous <Continuous>  insulin lispro (ADMELOG) corrective regimen sliding scale   SubCutaneous three times a day before meals  isosorbide   mononitrate ER Tablet (IMDUR) 30 milliGRAM(s) Oral daily  levothyroxine 75 MICROGram(s) Oral daily  potassium chloride    Tablet ER 40 milliEquivalent(s) Oral once  ranolazine 500 milliGRAM(s) Oral two times a day    MEDICATIONS  (PRN):  heparin   Injectable 4100 Unit(s) IV Push every 6 hours PRN For aPTT less than 40   Meds reviewed    Allergies    No Known Allergies    Intolerances    DOPamine (Hypotension)      Vital Signs Last 24 Hrs  T(C): 36.8 (24 May 2024 07:17), Max: 36.8 (23 May 2024 19:25)  T(F): 98.3 (24 May 2024 07:17), Max: 98.3 (24 May 2024 07:17)  HR: 98 (24 May 2024 07:17) (75 - 121)  BP: 122/78 (24 May 2024 07:17) (92/59 - 122/78)  BP(mean): --  RR: 20 (24 May 2024 07:17) (20 - 22)  SpO2: 96% (24 May 2024 07:17) (96% - 100%)    Parameters below as of 24 May 2024 07:17  Patient On (Oxygen Delivery Method): room air      Daily Height in cm: 172.72 (23 May 2024 13:26)    Daily     PHYSICAL EXAM:    GENERAL: appears chronically ill  HEAD:  Atraumatic, Normocephalic  EYES: EOMI  NECK: Supple  NERVOUS SYSTEM:  Alert & Oriented X3  CHEST/LUNG: Clear to percussion bilaterally  HEART: Regular rate and rhythm  ABDOMEN: Soft, Nontender, Nondistended; +BS  EXTREMITIES: no edema    LABS:                        12.6   10.04 )-----------( 184      ( 24 May 2024 04:40 )             38.3     05-24    135  |  92<L>  |  79.0<H>  ----------------------------<  202<H>  3.3<L>   |  28.0  |  3.46<H>    Ca    8.6      24 May 2024 08:33  Mg     2.8     05-23    TPro  7.7  /  Alb  3.8  /  TBili  0.5  /  DBili  x   /  AST  46<H>  /  ALT  22  /  AlkPhos  103  05-23    PT/INR - ( 23 May 2024 15:03 )   PT: 10.4 sec;   INR: 0.94 ratio         PTT - ( 24 May 2024 04:40 )  PTT:67.0 sec  Urinalysis Basic - ( 24 May 2024 08:33 )    Color: x / Appearance: x / SG: x / pH: x  Gluc: 202 mg/dL / Ketone: x  / Bili: x / Urobili: x   Blood: x / Protein: x / Nitrite: x   Leuk Esterase: x / RBC: x / WBC x   Sq Epi: x / Non Sq Epi: x / Bacteria: x      Magnesium: 2.8 mg/dL (05-23 @ 15:03)          RADIOLOGY & ADDITIONAL TESTS:

## 2024-05-24 NOTE — PROGRESS NOTE ADULT - SUBJECTIVE AND OBJECTIVE BOX
Chief complaint: Chest pain    Patient seen and examined at bedside. No acute overnight events reported. No fever, chills, cough, nausea or vomiting.     Vital Signs Last 24 Hrs  T(F): 98.3 (24 May 2024 07:17), Max: 98.3 (24 May 2024 07:17)  HR: 98 (24 May 2024 07:17) (75 - 121)  BP: 122/78 (24 May 2024 07:17) (92/59 - 122/78)  RR: 20 (24 May 2024 07:17) (20 - 22)  SpO2: 96% (24 May 2024 07:17) (96% - 100%)    Physical Exam:  Constitutional: alert and oriented, in no acute distress   Neck: Soft and supple  Respiratory: Clear to auscultation bilaterally, no wheezes or crackles  Cardiovascular: Regular rate and rhyhtm, no murmurs, gallops, rubs  Gastrointestinal: Soft, non-tender to palpation, +bs  Vascular: 2+ peripheral pulses  Neurological: A/O x 3, no focal neurological deficits  Musculoskeletal: 5/5 strength b/l upper and lower extremities, no lower extremity edema bilaterally    Labs:                        12.6   10.04 )-----------( 184      ( 24 May 2024 04:40 )             38.3   05-24    135  |  92<L>  |  79.0<H>  ----------------------------<  202<H>  3.3<L>   |  28.0  |  3.46<H>    Ca    8.6      24 May 2024 08:33  Mg     2.8     05-23    TPro  7.7  /  Alb  3.8  /  TBili  0.5  /  DBili  x   /  AST  46<H>  /  ALT  22  /  AlkPhos  103  05-23

## 2024-05-24 NOTE — PROGRESS NOTE ADULT - SUBJECTIVE AND OBJECTIVE BOX
[facility]                                                         CARDIOLOGY AT [facility]                                                                  [address], [place] [address]                                                         Telephone: [phone]. Fax:[phone]                                                                             PROGRESS NOTE    Chief Complaint upon presentation to hospital: NSTEMI   Initial reason for Consult: NSTEMI   Reason for follow up: NSTEMI     Review of symptoms:   Cardiac:  No chest pain. No dyspnea. No palpitations.  Respiratory: no cough. No dyspnea  Gastrointestinal: No diarrhea. No abdominal pain. No bleeding.   Neuro: No focal neuro complaints.    Vitals:  T(C): 36.9 (05-24-24 @ 11:02), Max: 36.9 (05-24-24 @ 11:02)  HR: 110 (05-24-24 @ 11:02) (75 - 121)  BP: 115/72 (05-24-24 @ 11:02) (92/59 - 122/78)  RR: 20 (05-24-24 @ 11:02) (20 - 22)  SpO2: 95% (05-24-24 @ 11:02) (95% - 100%)  Wt(kg): --  I&O's Summary    Weight (kg): 65.5 (05-23 @ 15:04), 68 (05-21 @ 01:49)    PHYSICAL EXAM:  Appearance: Comfortable. No acute distress  HEENT:  Atraumatic. Normocephalic.  Normal oral mucosa  Neurologic: A & O x 3, no gross focal deficits.  Cardiovascular: RRR S1 S2, No murmur, no rubs/gallops. No JVD  Respiratory: Lungs clear to auscultation, unlabored   Gastrointestinal:  Soft, Non-tender, + BS  Lower Extremities: 2+ Peripheral Pulses, No clubbing, cyanosis, or edema  Psychiatry: Patient is calm. No agitation.   Skin: warm and dry.    CURRENT CARDIAC MEDICATIONS:  isosorbide   mononitrate ER Tablet (IMDUR) 30 milliGRAM(s) Oral daily  ranolazine 500 milliGRAM(s) Oral two times a day    CURRENT OTHER MEDICATIONS:  atorvastatin 80 milliGRAM(s) Oral at bedtime  dextrose 50% Injectable 25 Gram(s) IV Push once, Stop order after: 1 Doses  dextrose 50% Injectable 25 Gram(s) IV Push once, Stop order after: 1 Doses  dextrose 50% Injectable 12.5 Gram(s) IV Push once, Stop order after: 1 Doses  dextrose Oral Gel 15 Gram(s) Oral once, Stop order after: 1 Doses  glucagon  Injectable 1 milliGRAM(s) IntraMuscular once, Stop order after: 1 Doses  insulin lispro (ADMELOG) corrective regimen sliding scale   SubCutaneous three times a day before meals  levothyroxine 75 MICROGram(s) Oral daily  aspirin enteric coated 81 milliGRAM(s) Oral daily  clopidogrel Tablet 300 milliGRAM(s) Oral once, Stop order after: 1 Doses  dextrose 10% Bolus 125 milliLiter(s) IV Bolus once, Stop order after: 1 Doses  dextrose 5%. 1000 milliLiter(s) (100 mL/Hr) IV Continuous <Continuous>  dextrose 5%. 1000 milliLiter(s) (50 mL/Hr) IV Continuous <Continuous>  heparin   Injectable 4100 Unit(s) IV Push every 6 hours PRN For aPTT less than 40  heparin  Infusion.  Unit(s)/Hr (8 mL/Hr) IV Continuous <Continuous>  potassium chloride    Tablet ER 40 milliEquivalent(s) Oral once, Stop order after: 1 Doses      LABS:	 	                            12.6   10.04 )-----------( 184      ( 24 May 2024 04:40 )             38.3     05-24    135  |  92<L>  |  79.0<H>  ----------------------------<  202<H>  3.3<L>   |  28.0  |  3.46<H>    Ca    8.6      24 May 2024 08:33  Mg     2.8     05-23    TPro  7.7  /  Alb  3.8  /  TBili  0.5  /  DBili  x   /  AST  46<H>  /  ALT  22  /  AlkPhos  103  05-23    PT/INR/PTT ( 24 May 2024 04:40 )                       :                       :      X            :       67.0                  .        .                   .              .           .       X           .                                       Lipid Profile:   HgA1c:   TSH:

## 2024-05-24 NOTE — PROGRESS NOTE ADULT - NS ATTEND AMEND GEN_ALL_CORE FT
Patient seen and examined with sons at the bedside Patient seen and examined with sons at the bedside, presents with chest pressure unstable angina with elevated trops and currently on Hep ggt   No active chest pain yesterday with no associated shortness of breath or lower extremity edema   Presented with RAS on CKD possibly secondary to diuresis and NSAID use (due to hip pain was on Toradol, Meloxicam) - currently all diuretics and NSAID held and interval improvement in Cr, will continue to monitor   HStrops elevated and will continue to trend, continue Hep ggt until [facility] on Tuesday, with hopes that Cr is at baseline, Cr 1.4-1.7.    Ev Kapoor D.O. [facility]  Cardiology/Vascular Cardiology -[facility]iology   Telephone # [phone]

## 2024-05-24 NOTE — PROGRESS NOTE ADULT - ASSESSMENT
77 y/o male never smoker with history of CAD, STEMI, NSTEMI, 4V CABG, multiple PCI's of the SVG to the OM with stents and brachytherapy, cardiogenic shock, HFrEF with admission for decompensated HF with multi organ system failure, DM2 on long term insulin, 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, HTN, HLD who presents to [facility] ED with chest discomfort since Tuesday after Holter monitor placement for PVC burden which has worsened today with radiation to left arm, SOB and dizziness/lightheadedness. He called the office and was told to come in for further evaluation. In ED, patient weak appearing, and pending labs. Denies back pain, headache, SOB, JOINER, diaphoresis, syncope or N/V.

## 2024-05-24 NOTE — CONSULT NOTE ADULT - ASSESSMENT
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 presented to the ED with c/o chest tightness    RAS on CKD suspect stage III  Some improvement in serum Cr 4.2--> 3.4  Meka Cr seems to be 1pprox 1.4- 1.8  Entresto, Torsemide and Farxiga on hold  Will have to restart diuretics soon  s/p gentle hydration   TTE 5/23 noted LVEF < 20%  BNP 21 K  HYpoKalemia - will supplement  Cards eval noted plans for [facility] when renal function better    Renally dose meds  Avoid nephrotoxic agents  Monitor labs will follow

## 2024-05-24 NOTE — PROGRESS NOTE ADULT - ASSESSMENT
77 y/o male never smoker with history of CAD, STEMI, NSTEMI, 4V CABG, multiple PCI's of the SVG to the OM with stents and brachytherapy,, HFrEF ,  DM2 on long term insulin, 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, HTN, HLD who presents to [facility] ED with chest discomfort since Tuesday after Holter monitor placement for PVC burden which has worsened today with radiation to left arm  and dizziness/lightheadedness. He called the office and was told to come in for further evaluation. In ED, patient weak appearing, labs with psitive troponin cr 4 RAS and hypoglycemia noted     Chest Pain  - Telemetry monitoring  - Cardiology recs appreciated  - Continue Heparin drip  - Continue Aspirin 81mg daily  - Continue Plavix 75mg daily  - TTE noted  - Continue Lipitor 80mg nightly  - Continue Imdur 30mg daily  - Continue Ranexa 500mg BID    Hypokalemia  - K 3.3  - Repleted   - Monitor BMP    ARF on CKD stage 3   - Cr 3.46  - Monitor BMP  - Entresto and Torsemide held  - Farxiga held  - Nephrology consult pending     Chronic Systolic heart failure  - Diuretics and Entresto held for RAS    DM  - FS with ISS    Hypothyroidism  - Synthroid 75mcg daily    Lower back pain   - s/p recent fall at home    DVT ppx  - Heparin SQ 79 y/o male never smoker with history of CAD, STEMI, NSTEMI, 4V CABG, multiple PCI's of the SVG to the OM with stents and brachytherapy,, HFrEF ,  DM2 on long term insulin, 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, HTN, HLD who presents to [facility] ED with chest discomfort since Tuesday after Holter monitor placement for PVC burden which has worsened today with radiation to left arm  and dizziness/lightheadedness. He called the office and was told to come in for further evaluation. In ED, patient weak appearing, labs with psitive troponin cr 4 RAS and hypoglycemia noted     Chest Pain  - Telemetry monitoring  - Cardiology recs appreciated  - Continue Heparin drip  - Continue Aspirin 81mg daily  - Continue Plavix 75mg daily  - TTE noted  - Continue Lipitor 80mg nightly  - Continue Imdur 30mg daily  - Continue Ranexa 500mg BID    Hypokalemia  - K 3.3  - Repleted   - Monitor BMP    ARF on CKD stage 3   - Cr 3.46  - Monitor BMP  - Entresto and Torsemide held  - Farxiga held  - Nephrology consult pending     Chronic Systolic heart failure  - Diuretics and Entresto held for RAS    DM  - FS with ISS    Hypothyroidism  - Synthroid 75mcg daily    Lower back pain   - s/p recent fall at home    DVT ppx  - Heparin SQ    Plan of care discussed with patient's family at bedside.

## 2024-05-24 NOTE — PROGRESS NOTE ADULT - PROBLEM SELECTOR PLAN 1
- Type 1 NSTEMI   - chest pain since Tuesday in the setting of CAD with several PCI and brachytherapy most recent in Nov 2023  - needs triple therapy, will use plavix for now due to risk of bleeding.   - start ASA, plavix, and heparin infusion   - plan for [facility] Tuesday, will need medical optimization and nephrology clearance for cath  - hold nephrotoxic agents  - TTE performed 4/2024 ef 37%, improved from previous, with known RWMA from CAD  - repeat limited echo shows worsening EF <20% with global hypokinesis, mod AS  - GDMT limited due to renal function, but will need PRN diuresis. Avoid IV hydration.

## 2024-05-25 DIAGNOSIS — I47.20 VENTRICULAR TACHYCARDIA, UNSPECIFIED: ICD-10-CM

## 2024-05-25 DIAGNOSIS — I50.23 ACUTE ON CHRONIC SYSTOLIC (CONGESTIVE) HEART FAILURE: ICD-10-CM

## 2024-05-25 DIAGNOSIS — Z95.810 PRESENCE OF AUTOMATIC (IMPLANTABLE) CARDIAC DEFIBRILLATOR: ICD-10-CM

## 2024-05-25 DIAGNOSIS — I48.0 PAROXYSMAL ATRIAL FIBRILLATION: ICD-10-CM

## 2024-05-25 DIAGNOSIS — I50.20 UNSPECIFIED SYSTOLIC (CONGESTIVE) HEART FAILURE: ICD-10-CM

## 2024-05-25 DIAGNOSIS — I46.9 CARDIAC ARREST, CAUSE UNSPECIFIED: ICD-10-CM

## 2024-05-25 LAB
ALBUMIN SERPL ELPH-MCNC: 2.8 G/DL — LOW (ref 3.3–5.2)
ALBUMIN SERPL ELPH-MCNC: 3.1 G/DL — LOW (ref 3.3–5.2)
ALP SERPL-CCNC: 88 U/L — SIGNIFICANT CHANGE UP (ref 40–120)
ALP SERPL-CCNC: 90 U/L — SIGNIFICANT CHANGE UP (ref 40–120)
ALT FLD-CCNC: 152 U/L — HIGH
ALT FLD-CCNC: 47 U/L — HIGH
ANION GAP SERPL CALC-SCNC: 11 MMOL/L — SIGNIFICANT CHANGE UP (ref 5–17)
ANION GAP SERPL CALC-SCNC: 12 MMOL/L — SIGNIFICANT CHANGE UP (ref 5–17)
ANION GAP SERPL CALC-SCNC: 14 MMOL/L — SIGNIFICANT CHANGE UP (ref 5–17)
APTT BLD: 76.8 SEC — HIGH (ref 24.5–35.6)
APTT BLD: 98.5 SEC — HIGH (ref 24.5–35.6)
AST SERPL-CCNC: 192 U/L — HIGH
AST SERPL-CCNC: 72 U/L — HIGH
BASOPHILS # BLD AUTO: 0.07 K/UL — SIGNIFICANT CHANGE UP (ref 0–0.2)
BASOPHILS # BLD AUTO: 0.13 K/UL — SIGNIFICANT CHANGE UP (ref 0–0.2)
BASOPHILS NFR BLD AUTO: 0.7 % — SIGNIFICANT CHANGE UP (ref 0–2)
BASOPHILS NFR BLD AUTO: 0.9 % — SIGNIFICANT CHANGE UP (ref 0–2)
BILIRUB SERPL-MCNC: 0.6 MG/DL — SIGNIFICANT CHANGE UP (ref 0.4–2)
BILIRUB SERPL-MCNC: 0.7 MG/DL — SIGNIFICANT CHANGE UP (ref 0.4–2)
BUN SERPL-MCNC: 62.7 MG/DL — HIGH (ref 8–20)
BUN SERPL-MCNC: 64.1 MG/DL — HIGH (ref 8–20)
BUN SERPL-MCNC: 66.4 MG/DL — HIGH (ref 8–20)
BURR CELLS BLD QL SMEAR: PRESENT — SIGNIFICANT CHANGE UP
CALCIUM SERPL-MCNC: 8 MG/DL — LOW (ref 8.4–10.5)
CALCIUM SERPL-MCNC: 8.6 MG/DL — SIGNIFICANT CHANGE UP (ref 8.4–10.5)
CALCIUM SERPL-MCNC: 9.2 MG/DL — SIGNIFICANT CHANGE UP (ref 8.4–10.5)
CHLORIDE SERPL-SCNC: 96 MMOL/L — SIGNIFICANT CHANGE UP (ref 96–108)
CHLORIDE SERPL-SCNC: 98 MMOL/L — SIGNIFICANT CHANGE UP (ref 96–108)
CHLORIDE SERPL-SCNC: 98 MMOL/L — SIGNIFICANT CHANGE UP (ref 96–108)
CK SERPL-CCNC: 69 U/L — SIGNIFICANT CHANGE UP (ref 30–200)
CO2 SERPL-SCNC: 28 MMOL/L — SIGNIFICANT CHANGE UP (ref 22–29)
CO2 SERPL-SCNC: 28 MMOL/L — SIGNIFICANT CHANGE UP (ref 22–29)
CO2 SERPL-SCNC: 30 MMOL/L — HIGH (ref 22–29)
CREAT SERPL-MCNC: 2.08 MG/DL — HIGH (ref 0.5–1.3)
CREAT SERPL-MCNC: 2.3 MG/DL — HIGH (ref 0.5–1.3)
CREAT SERPL-MCNC: 2.37 MG/DL — HIGH (ref 0.5–1.3)
EGFR: 28 ML/MIN/1.73M2 — LOW
EGFR: 29 ML/MIN/1.73M2 — LOW
EGFR: 32 ML/MIN/1.73M2 — LOW
EOSINOPHIL # BLD AUTO: 0.26 K/UL — SIGNIFICANT CHANGE UP (ref 0–0.5)
EOSINOPHIL # BLD AUTO: 0.49 K/UL — SIGNIFICANT CHANGE UP (ref 0–0.5)
EOSINOPHIL NFR BLD AUTO: 1.8 % — SIGNIFICANT CHANGE UP (ref 0–6)
EOSINOPHIL NFR BLD AUTO: 4.7 % — SIGNIFICANT CHANGE UP (ref 0–6)
GAS PNL BLDA: SIGNIFICANT CHANGE UP
GIANT PLATELETS BLD QL SMEAR: PRESENT — SIGNIFICANT CHANGE UP
GLUCOSE BLDC GLUCOMTR-MCNC: 246 MG/DL — HIGH (ref 70–99)
GLUCOSE BLDC GLUCOMTR-MCNC: 344 MG/DL — HIGH (ref 70–99)
GLUCOSE SERPL-MCNC: 248 MG/DL — HIGH (ref 70–99)
GLUCOSE SERPL-MCNC: 288 MG/DL — HIGH (ref 70–99)
GLUCOSE SERPL-MCNC: 291 MG/DL — HIGH (ref 70–99)
HCT VFR BLD CALC: 32.3 % — LOW (ref 39–50)
HCT VFR BLD CALC: 38.3 % — LOW (ref 39–50)
HCT VFR BLD CALC: 38.9 % — LOW (ref 39–50)
HGB BLD-MCNC: 10.9 G/DL — LOW (ref 13–17)
HGB BLD-MCNC: 12.6 G/DL — LOW (ref 13–17)
HGB BLD-MCNC: 12.8 G/DL — LOW (ref 13–17)
IMM GRANULOCYTES NFR BLD AUTO: 1 % — HIGH (ref 0–0.9)
LACTATE SERPL-SCNC: 3.9 MMOL/L — HIGH (ref 0.5–2)
LYMPHOCYTES # BLD AUTO: 1.99 K/UL — SIGNIFICANT CHANGE UP (ref 1–3.3)
LYMPHOCYTES # BLD AUTO: 19.2 % — SIGNIFICANT CHANGE UP (ref 13–44)
LYMPHOCYTES # BLD AUTO: 25.7 % — SIGNIFICANT CHANGE UP (ref 13–44)
LYMPHOCYTES # BLD AUTO: 3.77 K/UL — HIGH (ref 1–3.3)
MAGNESIUM SERPL-MCNC: 2.5 MG/DL — SIGNIFICANT CHANGE UP (ref 1.6–2.6)
MAGNESIUM SERPL-MCNC: 2.8 MG/DL — HIGH (ref 1.6–2.6)
MANUAL SMEAR VERIFICATION: SIGNIFICANT CHANGE UP
MCHC RBC-ENTMCNC: 29.6 PG — SIGNIFICANT CHANGE UP (ref 27–34)
MCHC RBC-ENTMCNC: 29.6 PG — SIGNIFICANT CHANGE UP (ref 27–34)
MCHC RBC-ENTMCNC: 29.8 PG — SIGNIFICANT CHANGE UP (ref 27–34)
MCHC RBC-ENTMCNC: 32.9 GM/DL — SIGNIFICANT CHANGE UP (ref 32–36)
MCHC RBC-ENTMCNC: 32.9 GM/DL — SIGNIFICANT CHANGE UP (ref 32–36)
MCHC RBC-ENTMCNC: 33.7 GM/DL — SIGNIFICANT CHANGE UP (ref 32–36)
MCV RBC AUTO: 87.8 FL — SIGNIFICANT CHANGE UP (ref 80–100)
MCV RBC AUTO: 89.8 FL — SIGNIFICANT CHANGE UP (ref 80–100)
MCV RBC AUTO: 90.5 FL — SIGNIFICANT CHANGE UP (ref 80–100)
MONOCYTES # BLD AUTO: 0.65 K/UL — SIGNIFICANT CHANGE UP (ref 0–0.9)
MONOCYTES # BLD AUTO: 1.5 K/UL — HIGH (ref 0–0.9)
MONOCYTES NFR BLD AUTO: 14.5 % — HIGH (ref 2–14)
MONOCYTES NFR BLD AUTO: 4.4 % — SIGNIFICANT CHANGE UP (ref 2–14)
NEUTROPHILS # BLD AUTO: 6.23 K/UL — SIGNIFICANT CHANGE UP (ref 1.8–7.4)
NEUTROPHILS # BLD AUTO: 9.34 K/UL — HIGH (ref 1.8–7.4)
NEUTROPHILS NFR BLD AUTO: 59.9 % — SIGNIFICANT CHANGE UP (ref 43–77)
NEUTROPHILS NFR BLD AUTO: 63.7 % — SIGNIFICANT CHANGE UP (ref 43–77)
OVALOCYTES BLD QL SMEAR: SLIGHT — SIGNIFICANT CHANGE UP
PHOSPHATE SERPL-MCNC: 1.7 MG/DL — LOW (ref 2.4–4.7)
PHOSPHATE SERPL-MCNC: 2.6 MG/DL — SIGNIFICANT CHANGE UP (ref 2.4–4.7)
PLAT MORPH BLD: NORMAL — SIGNIFICANT CHANGE UP
PLATELET # BLD AUTO: 182 K/UL — SIGNIFICANT CHANGE UP (ref 150–400)
PLATELET # BLD AUTO: 206 K/UL — SIGNIFICANT CHANGE UP (ref 150–400)
PLATELET # BLD AUTO: 211 K/UL — SIGNIFICANT CHANGE UP (ref 150–400)
POIKILOCYTOSIS BLD QL AUTO: SLIGHT — SIGNIFICANT CHANGE UP
POLYCHROMASIA BLD QL SMEAR: SLIGHT — SIGNIFICANT CHANGE UP
POTASSIUM SERPL-MCNC: 3.7 MMOL/L — SIGNIFICANT CHANGE UP (ref 3.5–5.3)
POTASSIUM SERPL-MCNC: 3.8 MMOL/L — SIGNIFICANT CHANGE UP (ref 3.5–5.3)
POTASSIUM SERPL-MCNC: 4.8 MMOL/L — SIGNIFICANT CHANGE UP (ref 3.5–5.3)
POTASSIUM SERPL-SCNC: 3.7 MMOL/L — SIGNIFICANT CHANGE UP (ref 3.5–5.3)
POTASSIUM SERPL-SCNC: 3.8 MMOL/L — SIGNIFICANT CHANGE UP (ref 3.5–5.3)
POTASSIUM SERPL-SCNC: 4.8 MMOL/L — SIGNIFICANT CHANGE UP (ref 3.5–5.3)
PROT SERPL-MCNC: 5.7 G/DL — LOW (ref 6.6–8.7)
PROT SERPL-MCNC: 6.5 G/DL — LOW (ref 6.6–8.7)
RBC # BLD: 3.68 M/UL — LOW (ref 4.2–5.8)
RBC # BLD: 4.23 M/UL — SIGNIFICANT CHANGE UP (ref 4.2–5.8)
RBC # BLD: 4.33 M/UL — SIGNIFICANT CHANGE UP (ref 4.2–5.8)
RBC # FLD: 14.7 % — HIGH (ref 10.3–14.5)
RBC # FLD: 14.8 % — HIGH (ref 10.3–14.5)
RBC # FLD: 14.9 % — HIGH (ref 10.3–14.5)
RBC BLD AUTO: ABNORMAL
SMUDGE CELLS # BLD: PRESENT — SIGNIFICANT CHANGE UP
SODIUM SERPL-SCNC: 137 MMOL/L — SIGNIFICANT CHANGE UP (ref 135–145)
SODIUM SERPL-SCNC: 138 MMOL/L — SIGNIFICANT CHANGE UP (ref 135–145)
SODIUM SERPL-SCNC: 140 MMOL/L — SIGNIFICANT CHANGE UP (ref 135–145)
TROPONIN T, HIGH SENSITIVITY RESULT: 379 NG/L — HIGH (ref 0–51)
TROPONIN T, HIGH SENSITIVITY RESULT: 427 NG/L — HIGH (ref 0–51)
VARIANT LYMPHS # BLD: 3.5 % — SIGNIFICANT CHANGE UP (ref 0–6)
WBC # BLD: 10.38 K/UL — SIGNIFICANT CHANGE UP (ref 3.8–10.5)
WBC # BLD: 14.67 K/UL — HIGH (ref 3.8–10.5)
WBC # BLD: 9.43 K/UL — SIGNIFICANT CHANGE UP (ref 3.8–10.5)
WBC # FLD AUTO: 10.38 K/UL — SIGNIFICANT CHANGE UP (ref 3.8–10.5)
WBC # FLD AUTO: 14.67 K/UL — HIGH (ref 3.8–10.5)
WBC # FLD AUTO: 9.43 K/UL — SIGNIFICANT CHANGE UP (ref 3.8–10.5)

## 2024-05-25 PROCEDURE — 71045 X-RAY EXAM CHEST 1 VIEW: CPT | Mod: 26

## 2024-05-25 PROCEDURE — 99291 CRITICAL CARE FIRST HOUR: CPT

## 2024-05-25 PROCEDURE — 31500 INSERT EMERGENCY AIRWAY: CPT

## 2024-05-25 PROCEDURE — 93010 ELECTROCARDIOGRAM REPORT: CPT

## 2024-05-25 PROCEDURE — 93284 PRGRMG EVAL IMPLANTABLE DFB: CPT | Mod: 26

## 2024-05-25 PROCEDURE — 99223 1ST HOSP IP/OBS HIGH 75: CPT

## 2024-05-25 RX ORDER — AMIODARONE HYDROCHLORIDE 400 MG/1
1 TABLET ORAL
Qty: 450 | Refills: 0 | Status: DISCONTINUED | OUTPATIENT
Start: 2024-05-25 | End: 2024-05-25

## 2024-05-25 RX ORDER — PHENYLEPHRINE HYDROCHLORIDE 10 MG/ML
3 INJECTION INTRAVENOUS
Qty: 40 | Refills: 0 | Status: DISCONTINUED | OUTPATIENT
Start: 2024-05-25 | End: 2024-05-26

## 2024-05-25 RX ORDER — POTASSIUM PHOSPHATE, MONOBASIC POTASSIUM PHOSPHATE, DIBASIC 236; 224 MG/ML; MG/ML
15 INJECTION, SOLUTION INTRAVENOUS ONCE
Refills: 0 | Status: COMPLETED | OUTPATIENT
Start: 2024-05-25 | End: 2024-05-25

## 2024-05-25 RX ORDER — CHLORHEXIDINE GLUCONATE 213 G/1000ML
1 SOLUTION TOPICAL
Refills: 0 | Status: DISCONTINUED | OUTPATIENT
Start: 2024-05-25 | End: 2024-05-26

## 2024-05-25 RX ORDER — POTASSIUM CHLORIDE 20 MEQ
20 PACKET (EA) ORAL
Refills: 0 | Status: DISCONTINUED | OUTPATIENT
Start: 2024-05-25 | End: 2024-05-25

## 2024-05-25 RX ORDER — PIPERACILLIN AND TAZOBACTAM 4; .5 G/20ML; G/20ML
3.38 INJECTION, POWDER, LYOPHILIZED, FOR SOLUTION INTRAVENOUS EVERY 8 HOURS
Refills: 0 | Status: COMPLETED | OUTPATIENT
Start: 2024-05-25 | End: 2024-06-01

## 2024-05-25 RX ORDER — SODIUM CHLORIDE 9 MG/ML
10 INJECTION INTRAMUSCULAR; INTRAVENOUS; SUBCUTANEOUS
Refills: 0 | Status: DISCONTINUED | OUTPATIENT
Start: 2024-05-25 | End: 2024-06-12

## 2024-05-25 RX ORDER — SODIUM CHLORIDE 9 MG/ML
250 INJECTION, SOLUTION INTRAVENOUS ONCE
Refills: 0 | Status: COMPLETED | OUTPATIENT
Start: 2024-05-25 | End: 2024-05-25

## 2024-05-25 RX ORDER — AMIODARONE HYDROCHLORIDE 400 MG/1
0.5 TABLET ORAL
Qty: 450 | Refills: 0 | Status: DISCONTINUED | OUTPATIENT
Start: 2024-05-25 | End: 2024-05-26

## 2024-05-25 RX ORDER — FENTANYL CITRATE 50 UG/ML
50 INJECTION INTRAVENOUS
Refills: 0 | Status: DISCONTINUED | OUTPATIENT
Start: 2024-05-25 | End: 2024-05-26

## 2024-05-25 RX ORDER — NOREPINEPHRINE BITARTRATE/D5W 8 MG/250ML
0.05 PLASTIC BAG, INJECTION (ML) INTRAVENOUS
Qty: 8 | Refills: 0 | Status: DISCONTINUED | OUTPATIENT
Start: 2024-05-25 | End: 2024-05-25

## 2024-05-25 RX ORDER — PANTOPRAZOLE SODIUM 20 MG/1
40 TABLET, DELAYED RELEASE ORAL DAILY
Refills: 0 | Status: DISCONTINUED | OUTPATIENT
Start: 2024-05-25 | End: 2024-05-30

## 2024-05-25 RX ORDER — INSULIN LISPRO 100/ML
VIAL (ML) SUBCUTANEOUS EVERY 6 HOURS
Refills: 0 | Status: DISCONTINUED | OUTPATIENT
Start: 2024-05-25 | End: 2024-05-29

## 2024-05-25 RX ORDER — LIDOCAINE HCL 20 MG/ML
1 VIAL (ML) INJECTION
Qty: 2 | Refills: 0 | Status: DISCONTINUED | OUTPATIENT
Start: 2024-05-25 | End: 2024-05-25

## 2024-05-25 RX ORDER — FENTANYL CITRATE 50 UG/ML
0.5 INJECTION INTRAVENOUS
Qty: 2500 | Refills: 0 | Status: DISCONTINUED | OUTPATIENT
Start: 2024-05-25 | End: 2024-05-26

## 2024-05-25 RX ORDER — FENTANYL CITRATE 50 UG/ML
100 INJECTION INTRAVENOUS ONCE
Refills: 0 | Status: DISCONTINUED | OUTPATIENT
Start: 2024-05-25 | End: 2024-05-25

## 2024-05-25 RX ORDER — CHLORHEXIDINE GLUCONATE 213 G/1000ML
15 SOLUTION TOPICAL EVERY 12 HOURS
Refills: 0 | Status: DISCONTINUED | OUTPATIENT
Start: 2024-05-25 | End: 2024-05-26

## 2024-05-25 RX ORDER — PROPOFOL 10 MG/ML
20 INJECTION, EMULSION INTRAVENOUS
Qty: 1000 | Refills: 0 | Status: DISCONTINUED | OUTPATIENT
Start: 2024-05-25 | End: 2024-05-26

## 2024-05-25 RX ORDER — CHLORHEXIDINE GLUCONATE 213 G/1000ML
1 SOLUTION TOPICAL
Refills: 0 | Status: DISCONTINUED | OUTPATIENT
Start: 2024-05-25 | End: 2024-06-12

## 2024-05-25 RX ADMIN — POTASSIUM PHOSPHATE, MONOBASIC POTASSIUM PHOSPHATE, DIBASIC 62.5 MILLIMOLE(S): 236; 224 INJECTION, SOLUTION INTRAVENOUS at 22:37

## 2024-05-25 RX ADMIN — Medication 81 MILLIGRAM(S): at 09:50

## 2024-05-25 RX ADMIN — CHLORHEXIDINE GLUCONATE 15 MILLILITER(S): 213 SOLUTION TOPICAL at 18:02

## 2024-05-25 RX ADMIN — PANTOPRAZOLE SODIUM 40 MILLIGRAM(S): 20 TABLET, DELAYED RELEASE ORAL at 14:53

## 2024-05-25 RX ADMIN — Medication 4: at 18:02

## 2024-05-25 RX ADMIN — Medication 75 MICROGRAM(S): at 05:46

## 2024-05-25 RX ADMIN — RANOLAZINE 500 MILLIGRAM(S): 500 TABLET, FILM COATED, EXTENDED RELEASE ORAL at 05:46

## 2024-05-25 RX ADMIN — CLOPIDOGREL BISULFATE 75 MILLIGRAM(S): 75 TABLET, FILM COATED ORAL at 09:50

## 2024-05-25 RX ADMIN — Medication 6.14 MICROGRAM(S)/KG/MIN: at 21:01

## 2024-05-25 RX ADMIN — ISOSORBIDE MONONITRATE 30 MILLIGRAM(S): 60 TABLET, EXTENDED RELEASE ORAL at 09:50

## 2024-05-25 RX ADMIN — PIPERACILLIN AND TAZOBACTAM 25 GRAM(S): 4; .5 INJECTION, POWDER, LYOPHILIZED, FOR SOLUTION INTRAVENOUS at 14:52

## 2024-05-25 RX ADMIN — PROPOFOL 7.86 MICROGRAM(S)/KG/MIN: 10 INJECTION, EMULSION INTRAVENOUS at 12:53

## 2024-05-25 RX ADMIN — PROPOFOL 7.86 MICROGRAM(S)/KG/MIN: 10 INJECTION, EMULSION INTRAVENOUS at 22:44

## 2024-05-25 RX ADMIN — AMIODARONE HYDROCHLORIDE 16.7 MG/MIN: 400 TABLET ORAL at 18:43

## 2024-05-25 RX ADMIN — HEPARIN SODIUM 950 UNIT(S)/HR: 5000 INJECTION INTRAVENOUS; SUBCUTANEOUS at 07:25

## 2024-05-25 RX ADMIN — FENTANYL CITRATE 3.28 MICROGRAM(S)/KG/HR: 50 INJECTION INTRAVENOUS at 12:58

## 2024-05-25 RX ADMIN — PIPERACILLIN AND TAZOBACTAM 25 GRAM(S): 4; .5 INJECTION, POWDER, LYOPHILIZED, FOR SOLUTION INTRAVENOUS at 22:37

## 2024-05-25 RX ADMIN — Medication 2: at 06:13

## 2024-05-25 RX ADMIN — PHENYLEPHRINE HYDROCHLORIDE 73.7 MICROGRAM(S)/KG/MIN: 10 INJECTION INTRAVENOUS at 12:54

## 2024-05-25 RX ADMIN — Medication 100 MILLIEQUIVALENT(S): at 14:52

## 2024-05-25 NOTE — CONSULT NOTE ADULT - SUBJECTIVE AND OBJECTIVE BOX
[place] CARDIAC ELECTROPHYSIOLOGY  [facility]   Office: [address] [place] [address]  Telephone: [phone] Fax:[phone]      HPI:  79yo male with history of HTN, HLD, DM, CKD, CAD s/p 4V CABG and multiple PCI's, ischemic cardiomyopathy (EF < 20%), and LBBB s/p MDT CRTD. Pt with recent admission to [facility] on 11/20/23-11/22/23 for decompensated HF with multi-organ system failure.  LHC at that time revealed a patent LIMA with all other grafts closed. No intervention was performed. He underwent a Medtronic CRT-D implant with Dr. Bazzi in 12/2023. Recent device interrogation revealed normal function but effective CRT only 87.5%. A 1 week Holter was placed to assess PVC burden.     He now presented to [facility] ED with a two day history of chest discomfort with radiation to left arm and associated dizziness, lightheadedness. In ED, ruled in for NSTEMI with positive troponin (228, 427, 379), RAS on CKD, and hypoglycemia. A TTE revealed acute on chronic systolic heart failure with EF <20%. He was admitted to telemetry for treatment of ACS on DAPT and heparin gtt, planned for LHC on Tuesday allowing for RAS to improve prior. CODE BLUE activated for VF cardiac arrest on 5/25. LHC urgently performed and revealed  % stenosis,  % stenosis,  %,  % stenosis. SVG graft to Circumflex: occluded.  LIMA graft to LAD visually normal in size and structure. LCx/RCA fills via collaterals. Pt upgraded to MICU - on Lido and Amiodarone infusion.     Device interrogation revealed episode started with AF with RVR which then degenerated to MMVT with a cycle length of 300ms. The device initially identified the rhythm as AF, then sinus tach (inappropriately) and then SVT (inappropriately) based on wavelet. Eventually it failed to match wavelet and did provide appropriate ATP. Device was reprogrammed to allow for better VT discrimination: Wavelet turned off. SVT V limit changed from 260ms to 280ms and stability turned on (40ms).       Cardiologist: Dr. Valenzuela  EP: Dr. Bazzi       PAST MEDICAL & SURGICAL HISTORY:  GERD (gastroesophageal reflux disease)  High cholesterol  Coronary artery disease involving coronary bypass graft of native heart with unstable angina pectoris  Coronary artery disease involving native coronary artery of native heart, angina presence unspecifie  Type 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, without long-term current use of insul  Essential hypertension  HFrEF (heart failure with reduced ejection fraction)  Stage 4 chronic kidney disease  LBBB (left bundle branch block)  Hypothyroidism  S/P CABG (coronary artery bypass graft)  4V 1983 [place]  Status post open reduction with internal fixation of fracture  History of insertion of stent into coronary artery bypass graft  History of brachytherapy      REVIEW OF SYSTEMS:  Unable to obtain     MEDICATIONS  (STANDING):  aMIOdarone Infusion 0.5 mG/Min (16.7 mL/Hr) IV Continuous <Continuous>  aMIOdarone Infusion 1 mG/Min (33.3 mL/Hr) IV Continuous <Continuous>  aspirin enteric coated 81 milliGRAM(s) Oral daily  atorvastatin 80 milliGRAM(s) Oral at bedtime  chlorhexidine 0.12% Liquid 15 milliLiter(s) Oral Mucosa every 12 hours  chlorhexidine 2% Cloths 1 Application(s) Topical <User Schedule>  chlorhexidine 4% Liquid 1 Application(s) Topical <User Schedule>  clopidogrel Tablet 75 milliGRAM(s) Oral daily  dextrose 10% Bolus 125 milliLiter(s) IV Bolus once  dextrose 5%. 1000 milliLiter(s) (100 mL/Hr) IV Continuous <Continuous>  dextrose 5%. 1000 milliLiter(s) (50 mL/Hr) IV Continuous <Continuous>  dextrose 50% Injectable 25 Gram(s) IV Push once  dextrose 50% Injectable 25 Gram(s) IV Push once  dextrose 50% Injectable 12.5 Gram(s) IV Push once  dextrose Oral Gel 15 Gram(s) Oral once  fentaNYL   Infusion. 0.5 MICROgram(s)/kG/Hr (3.28 mL/Hr) IV Continuous <Continuous>  glucagon  Injectable 1 milliGRAM(s) IntraMuscular once  insulin lispro (ADMELOG) corrective regimen sliding scale   SubCutaneous every 6 hours  levothyroxine 75 MICROGram(s) Oral daily  pantoprazole  Injectable 40 milliGRAM(s) IV Push daily  phenylephrine    Infusion 3 MICROgram(s)/kG/Min (73.7 mL/Hr) IV Continuous <Continuous>  piperacillin/tazobactam IVPB.. 3.375 Gram(s) IV Intermittent every 8 hours  potassium chloride  20 mEq/100 mL IVPB 20 milliEquivalent(s) IV Intermittent every 2 hours  propofol Infusion 20 MICROgram(s)/kG/Min (7.86 mL/Hr) IV Continuous <Continuous>  ranolazine 500 milliGRAM(s) Oral two times a day    MEDICATIONS  (PRN):  fentaNYL    Injectable 50 MICROGram(s) IV Push every 2 hours PRN Moderate Pain or Vent synchrony  sodium chloride 0.9% lock flush 10 milliLiter(s) IV Push every 1 hour PRN Pre/post blood products, medications, blood draw, and to maintain line patency      Allergies  No Known Allergies    Intolerances  DOPamine (Hypotension)      Vital Signs Last 24 Hrs  T(C): 36.6 (25 May 2024 09:11), Max: 36.9 (24 May 2024 16:21)  T(F): 97.9 (25 May 2024 09:11), Max: 98.4 (24 May 2024 16:21)  HR: 78 (25 May 2024 12:47) (78 - 111)  BP: 128/76 (25 May 2024 09:44) (107/72 - 128/85)  BP(mean): 99 (24 May 2024 16:21) (99 - 99)  RR: 16 (25 May 2024 09:44) (16 - 19)  SpO2: 99% (25 May 2024 12:47) (96% - 99%)    Parameters below as of 25 May 2024 09:44  Patient On (Oxygen Delivery Method): room air      Physical Exam:  Constitutional: Intubated, sedated   Cardiovascular: +S1S2 Regular   Pulmonary: CTA b/l   Abdomen: soft NTTP  Extremities: no edema     LABS:                        12.6   14.67 )-----------( 182      ( 25 May 2024 10:21 )             38.3   05-25    138  |  96  |  62.7<H>  ----------------------------<  288<H>  3.8   |  28.0  |  2.08<H>    Ca    8.6      25 May 2024 10:21  Phos  2.6     05-25  Mg     2.8     05-25    TPro  6.5<L>  /  Alb  3.1<L>  /  TBili  0.6  /  DBili  x   /  AST  72<H>  /  ALT  47<H>  /  AlkPhos  90  05-25  LIVER FUNCTIONS - ( 25 May 2024 10:21 )  Alb: 3.1 g/dL / Pro: 6.5 g/dL / ALK PHOS: 90 U/L / ALT: 47 U/L / AST: 72 U/L / GGT: x           PT/INR - ( 23 May 2024 15:03 )   PT: 10.4 sec;   INR: 0.94 ratio         PTT - ( 25 May 2024 10:21 )  PTT:76.8 secCARDIAC MARKERS ( 25 May 2024 10:21 )  x     / x     / 69 U/L / x     / x          Urinalysis Basic - ( 25 May 2024 10:21 )    Color: x / Appearance: x / SG: x / pH: x  Gluc: 288 mg/dL / Ketone: x  / Bili: x / Urobili: x   Blood: x / Protein: x / Nitrite: x   Leuk Esterase: x / RBC: x / WBC x   Sq Epi: x / Non Sq Epi: x / Bacteria: x        RADIOLOGY & ADDITIONAL STUDIES:  TTE 5/23/2024  CONCLUSIONS:   1. Left ventricular systolic function is severely decreased with an ejection fraction visually estimated at <20 %. Global left ventricular hypokinesis.   2. Elevated filling pressure.   3. Mildly reduced right ventricular systolic function.   4. Mild to moderate mitral regurgitation.   5. Mild to moderate aortic stenosis.   6. Mild pulmonic regurgitation.   7. Mild tricuspid regurgitation.   8. Estimated pulmonary artery systolic pressure is 75 mmHg, consistent with elevated pulmonary artery pressure.   9. No prior echocardiogram is available for comparison.    [facility] 5/25/2024  Diagnostic Findings:   Coronary Angiography   The coronary circulation is right dominant.    LM   Left main artery: There is a 100 % stenosis.    LAD   Left anterior descending artery: There is a 100 % stenosis.    CX   Circumflex: There is a 100 % stenosis.    RCA   Right coronary artery: There is a 100 % stenosis.    Graft Angiography   SVG graft to Circumflex: occluded.    LIMA graft to Mid left anterior descending: The segment is visually  normal in size and structure. fills lcx/rca via collaterals.    [facility] 11/2023  Diagnostic Conclusions:   LIMA to LAD with mild disease. Retrograde filling of RCA, circumflex  from distal LAD.  Aortogram performed with catheter in ascending aorta- No other graft  noted. Ostial occlusion of SVG to OM.  LVEDP Recommendations:   Aggressive medical management.    Admission for EP/Advanced CHF team management.   [place] CARDIAC ELECTROPHYSIOLOGY  [facility]   Office: [address] [place] [address]  Telephone: [phone] Fax:[phone]      HPI:  77yo male with history of HTN, HLD, DM, CKD, CAD s/p 4V CABG and multiple PCI's, ischemic cardiomyopathy (EF < 20%), and LBBB s/p MDT CRTD. Pt with recent admission to [facility] on 11/20/23-11/22/23 for decompensated HF with multi-organ system failure.  LHC at that time revealed a patent LIMA with all other grafts closed. No intervention was performed. He underwent a Medtronic CRT-D implant with Dr. Bazzi in 12/2023. Recent device interrogation revealed normal function but effective CRT only 87.5%. A 1 week Holter was placed to assess PVC burden.     He now presented to [facility] ED with a two day history of chest discomfort with radiation to left arm and associated dizziness, lightheadedness. In ED, ruled in for NSTEMI with positive troponin (228, 427, 379), RAS on CKD, and hypoglycemia. A TTE revealed acute on chronic systolic heart failure with EF <20%. He was admitted to telemetry for treatment of ACS on DAPT and heparin gtt, planned for LHC on Tuesday allowing for RAS to improve prior. CODE BLUE activated for VF cardiac arrest on 5/25. LHC urgently performed and revealed  % stenosis,  % stenosis,  %,  % stenosis. SVG graft to Circumflex: occluded.  LIMA graft to LAD visually normal in size and structure. LCx/RCA fills via collaterals. Pt upgraded to MICU - on Lido and Amiodarone infusion.     Device interrogation revealed episode started with AF with RVR which then degenerated to MMVT with a cycle length of 300ms. The device initially identified the rhythm as AF, then sinus tach (inappropriately) and then SVT (inappropriately) based on wavelet. Eventually it failed to match wavelet and did provide appropriate ATP which terminated VT. Device was reprogrammed to allow for better VT discrimination: Wavelet turned off. SVT V limit changed from 260ms to 280ms and stability turned on (40ms).       Cardiologist: Dr. Valenzuela  EP: Dr. Bazzi       PAST MEDICAL & SURGICAL HISTORY:  GERD (gastroesophageal reflux disease)  High cholesterol  Coronary artery disease involving coronary bypass graft of native heart with unstable angina pectoris  Coronary artery disease involving native coronary artery of native heart, angina presence unspecifie  Type 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, without long-term current use of insul  Essential hypertension  HFrEF (heart failure with reduced ejection fraction)  Stage 4 chronic kidney disease  LBBB (left bundle branch block)  Hypothyroidism  S/P CABG (coronary artery bypass graft)  4V 1983 [place]  Status post open reduction with internal fixation of fracture  History of insertion of stent into coronary artery bypass graft  History of brachytherapy      REVIEW OF SYSTEMS:  Unable to obtain     MEDICATIONS  (STANDING):  aMIOdarone Infusion 0.5 mG/Min (16.7 mL/Hr) IV Continuous <Continuous>  aMIOdarone Infusion 1 mG/Min (33.3 mL/Hr) IV Continuous <Continuous>  aspirin enteric coated 81 milliGRAM(s) Oral daily  atorvastatin 80 milliGRAM(s) Oral at bedtime  chlorhexidine 0.12% Liquid 15 milliLiter(s) Oral Mucosa every 12 hours  chlorhexidine 2% Cloths 1 Application(s) Topical <User Schedule>  chlorhexidine 4% Liquid 1 Application(s) Topical <User Schedule>  clopidogrel Tablet 75 milliGRAM(s) Oral daily  dextrose 10% Bolus 125 milliLiter(s) IV Bolus once  dextrose 5%. 1000 milliLiter(s) (100 mL/Hr) IV Continuous <Continuous>  dextrose 5%. 1000 milliLiter(s) (50 mL/Hr) IV Continuous <Continuous>  dextrose 50% Injectable 25 Gram(s) IV Push once  dextrose 50% Injectable 25 Gram(s) IV Push once  dextrose 50% Injectable 12.5 Gram(s) IV Push once  dextrose Oral Gel 15 Gram(s) Oral once  fentaNYL   Infusion. 0.5 MICROgram(s)/kG/Hr (3.28 mL/Hr) IV Continuous <Continuous>  glucagon  Injectable 1 milliGRAM(s) IntraMuscular once  insulin lispro (ADMELOG) corrective regimen sliding scale   SubCutaneous every 6 hours  levothyroxine 75 MICROGram(s) Oral daily  pantoprazole  Injectable 40 milliGRAM(s) IV Push daily  phenylephrine    Infusion 3 MICROgram(s)/kG/Min (73.7 mL/Hr) IV Continuous <Continuous>  piperacillin/tazobactam IVPB.. 3.375 Gram(s) IV Intermittent every 8 hours  potassium chloride  20 mEq/100 mL IVPB 20 milliEquivalent(s) IV Intermittent every 2 hours  propofol Infusion 20 MICROgram(s)/kG/Min (7.86 mL/Hr) IV Continuous <Continuous>  ranolazine 500 milliGRAM(s) Oral two times a day    MEDICATIONS  (PRN):  fentaNYL    Injectable 50 MICROGram(s) IV Push every 2 hours PRN Moderate Pain or Vent synchrony  sodium chloride 0.9% lock flush 10 milliLiter(s) IV Push every 1 hour PRN Pre/post blood products, medications, blood draw, and to maintain line patency      Allergies  No Known Allergies    Intolerances  DOPamine (Hypotension)      Vital Signs Last 24 Hrs  T(C): 36.6 (25 May 2024 09:11), Max: 36.9 (24 May 2024 16:21)  T(F): 97.9 (25 May 2024 09:11), Max: 98.4 (24 May 2024 16:21)  HR: 78 (25 May 2024 12:47) (78 - 111)  BP: 128/76 (25 May 2024 09:44) (107/72 - 128/85)  BP(mean): 99 (24 May 2024 16:21) (99 - 99)  RR: 16 (25 May 2024 09:44) (16 - 19)  SpO2: 99% (25 May 2024 12:47) (96% - 99%)    Parameters below as of 25 May 2024 09:44  Patient On (Oxygen Delivery Method): room air      Physical Exam:  Constitutional: Intubated, sedated   Cardiovascular: +S1S2 Regular   Pulmonary: CTA b/l   Abdomen: soft NTTP  Extremities: no edema     LABS:                        12.6   14.67 )-----------( 182      ( 25 May 2024 10:21 )             38.3   05-25    138  |  96  |  62.7<H>  ----------------------------<  288<H>  3.8   |  28.0  |  2.08<H>    Ca    8.6      25 May 2024 10:21  Phos  2.6     05-25  Mg     2.8     05-25    TPro  6.5<L>  /  Alb  3.1<L>  /  TBili  0.6  /  DBili  x   /  AST  72<H>  /  ALT  47<H>  /  AlkPhos  90  05-25  LIVER FUNCTIONS - ( 25 May 2024 10:21 )  Alb: 3.1 g/dL / Pro: 6.5 g/dL / ALK PHOS: 90 U/L / ALT: 47 U/L / AST: 72 U/L / GGT: x           PT/INR - ( 23 May 2024 15:03 )   PT: 10.4 sec;   INR: 0.94 ratio         PTT - ( 25 May 2024 10:21 )  PTT:76.8 secCARDIAC MARKERS ( 25 May 2024 10:21 )  x     / x     / 69 U/L / x     / x          Urinalysis Basic - ( 25 May 2024 10:21 )    Color: x / Appearance: x / SG: x / pH: x  Gluc: 288 mg/dL / Ketone: x  / Bili: x / Urobili: x   Blood: x / Protein: x / Nitrite: x   Leuk Esterase: x / RBC: x / WBC x   Sq Epi: x / Non Sq Epi: x / Bacteria: x        RADIOLOGY & ADDITIONAL STUDIES:  TTE 5/23/2024  CONCLUSIONS:   1. Left ventricular systolic function is severely decreased with an ejection fraction visually estimated at <20 %. Global left ventricular hypokinesis.   2. Elevated filling pressure.   3. Mildly reduced right ventricular systolic function.   4. Mild to moderate mitral regurgitation.   5. Mild to moderate aortic stenosis.   6. Mild pulmonic regurgitation.   7. Mild tricuspid regurgitation.   8. Estimated pulmonary artery systolic pressure is 75 mmHg, consistent with elevated pulmonary artery pressure.   9. No prior echocardiogram is available for comparison.    [facility] 5/25/2024  Diagnostic Findings:   Coronary Angiography   The coronary circulation is right dominant.    LM   Left main artery: There is a 100 % stenosis.    LAD   Left anterior descending artery: There is a 100 % stenosis.    CX   Circumflex: There is a 100 % stenosis.    RCA   Right coronary artery: There is a 100 % stenosis.    Graft Angiography   SVG graft to Circumflex: occluded.    LIMA graft to Mid left anterior descending: The segment is visually  normal in size and structure. fills lcx/rca via collaterals.    [facility] 11/2023  Diagnostic Conclusions:   LIMA to LAD with mild disease. Retrograde filling of RCA, circumflex  from distal LAD.  Aortogram performed with catheter in ascending aorta- No other graft  noted. Ostial occlusion of SVG to OM.  LVEDP Recommendations:   Aggressive medical management.    Admission for EP/Advanced CHF team management.

## 2024-05-25 NOTE — CHART NOTE - NSCHARTNOTEFT_GEN_A_CORE
Code Blue was activated on pt: Medicine team arrived at room, active CPR in progress with multiple providers and staff  Dismissed from active code due to having enough providers at bedside.

## 2024-05-25 NOTE — PROGRESS NOTE ADULT - SUBJECTIVE AND OBJECTIVE BOX
NEPHROLOGY INTERVAL HPI/OVERNIGHT EVENTS:    Examined earlier  Feels fine no complaints  Son at bedside  denies HA CP no SOB    MEDICATIONS  (STANDING):  aMIOdarone Infusion 0.5 mG/Min (16.7 mL/Hr) IV Continuous <Continuous>  aMIOdarone Infusion 1 mG/Min (33.3 mL/Hr) IV Continuous <Continuous>  aspirin enteric coated 81 milliGRAM(s) Oral daily  atorvastatin 80 milliGRAM(s) Oral at bedtime  clopidogrel Tablet 75 milliGRAM(s) Oral daily  dextrose 10% Bolus 125 milliLiter(s) IV Bolus once  dextrose 5%. 1000 milliLiter(s) (100 mL/Hr) IV Continuous <Continuous>  dextrose 5%. 1000 milliLiter(s) (50 mL/Hr) IV Continuous <Continuous>  dextrose 50% Injectable 25 Gram(s) IV Push once  dextrose 50% Injectable 25 Gram(s) IV Push once  dextrose 50% Injectable 12.5 Gram(s) IV Push once  dextrose Oral Gel 15 Gram(s) Oral once  fentaNYL    Injectable 100 MICROGram(s) IV Push once  glucagon  Injectable 1 milliGRAM(s) IntraMuscular once  heparin  Infusion.  Unit(s)/Hr (8 mL/Hr) IV Continuous <Continuous>  insulin lispro (ADMELOG) corrective regimen sliding scale   SubCutaneous three times a day before meals  isosorbide   mononitrate ER Tablet (IMDUR) 30 milliGRAM(s) Oral daily  levothyroxine 75 MICROGram(s) Oral daily  propofol Infusion 20 MICROgram(s)/kG/Min (7.86 mL/Hr) IV Continuous <Continuous>  ranolazine 500 milliGRAM(s) Oral two times a day    MEDICATIONS  (PRN):  heparin   Injectable 4100 Unit(s) IV Push every 6 hours PRN For aPTT less than 40      Allergies    No Known Allergies    Intolerances    DOPamine (Hypotension)      Vital Signs Last 24 Hrs  T(C): 36.6 (25 May 2024 09:11), Max: 36.9 (24 May 2024 11:02)  T(F): 97.9 (25 May 2024 09:11), Max: 98.4 (24 May 2024 11:02)  HR: 110 (25 May 2024 09:44) (100 - 111)  BP: 128/76 (25 May 2024 09:44) (107/72 - 128/85)  BP(mean): 99 (24 May 2024 16:21) (99 - 99)  RR: 16 (25 May 2024 09:44) (16 - 20)  SpO2: 99% (25 May 2024 09:44) (95% - 99%)    Parameters below as of 25 May 2024 09:44  Patient On (Oxygen Delivery Method): room air      Daily     Daily Weight in k.8 (25 May 2024 04:38)    PHYSICAL EXAM:  GENERAL: appears chronically ill  HEAD:  Atraumatic, Normocephalic  EYES: EOMI  NECK: Supple  NERVOUS SYSTEM:  Alert & Oriented X3  CHEST/LUNG: Clear to percussion bilaterally  HEART: Regular rate and rhythm  ABDOMEN: Soft, Nontender, Nondistended; +BS  EXTREMITIES: no edema    LABS:                        12.6   14.67 )-----------( 182      ( 25 May 2024 10:21 )             38.3     05-    140  |  98  |  66.4<H>  ----------------------------<  248<H>  4.8   |  30.0<H>  |  2.30<H>    Ca    9.2      25 May 2024 05:15  Mg     2.8     05-23    TPro  7.7  /  Alb  3.8  /  TBili  0.5  /  DBili  x   /  AST  46<H>  /  ALT  22  /  AlkPhos  103  05-23    PT/INR - ( 23 May 2024 15:03 )   PT: 10.4 sec;   INR: 0.94 ratio         PTT - ( 25 May 2024 05:15 )  PTT:98.5 sec  Urinalysis Basic - ( 25 May 2024 05:15 )    Color: x / Appearance: x / SG: x / pH: x  Gluc: 248 mg/dL / Ketone: x  / Bili: x / Urobili: x   Blood: x / Protein: x / Nitrite: x   Leuk Esterase: x / RBC: x / WBC x   Sq Epi: x / Non Sq Epi: x / Bacteria: x        ABG - ( 25 May 2024 10:20 )  pH, Arterial: 7.390 pH, Blood: x     /  pCO2: 40    /  pO2: 410   / HCO3: 24    / Base Excess: -0.8  /  SaO2: 97.7                  RADIOLOGY & ADDITIONAL TESTS:

## 2024-05-25 NOTE — PROCEDURE NOTE - ADDITIONAL PROCEDURE DETAILS
OptiVol 2.0 fluid rising since the end of April     Device interrogation revealed episode started with AF with RVR which then degenerated to MMVT with a cycle length of 300ms. The device initially identified the rhythm as AF, then sinus tach (inappropriately) and then SVT (inappropriately) based on wavelet. Eventually it failed to match wavelet and did provide appropriate ATP which terminated VT. Device was reprogrammed to allow for better VT discrimination: Wavelet turned off. SVT V limit changed from 260ms to 280ms and stability turned on (40ms). OptiVol 2.0 fluid rising since the end of April     6% AT/AF burden     Device interrogation revealed episode started with AF with RVR which then degenerated to MMVT with a cycle length of 300ms. The device initially identified the rhythm as AF, then sinus tach (inappropriately) and then SVT (inappropriately) based on wavelet. Eventually it failed to match wavelet and did provide appropriate ATP which terminated VT. Device inappropriately binned VT as SVT. Device was reprogrammed to allow for better VT discrimination: MA Logic changes - sinus tach off, wavelet off, SVT limit increased to 280 msec, stability turned on.

## 2024-05-25 NOTE — PROGRESS NOTE ADULT - NS ATTEND AMEND GEN_ALL_CORE FT
Patient went into VT arrest this morning after 3 minutes of ACLS (Epi and Amiodarone) ROSC was achieved and taken STAT to cath lab and found to have occluded SVG graft to the OM with patent LIMA to the LAD   s/p intubation and sedation   On the telemetry patient was in VT, with ATP delivered but no shocks, BiV CRT-D was interrogated by EP  AF with RVR which then degenerated to MMVT with a cycle length of 300ms. The device initially identified the rhythm as AF, then sinus tach (inappropriately) and then SVT (inappropriately) based on wavelet. Eventually it failed to match wavelet and did provide appropriate ATP which terminated VT. Device was reprogrammed to allow for better VT discrimination: Wavelet turned off. SVT V limit changed from 260ms to 280ms and stability turned on (40ms).     77yo male with history of HTN, HLD, DM, CKD, CAD s/p 4V CABG and multiple PCI's, ischemic cardiomyopathy (EF < 20%), and LBBB s/p MDT CRTD. Pt with recent admission to [facility] on 11/20/23-11/22/23 for decompensated HF with multi-organ system failure.  LHC at that time revealed a patent LIMA with all other grafts closed. No intervention was performed. He underwent a Medtronic CRT-D implant with Dr. Bazzi in 12/2023. Recent device interrogation revealed normal function but effective CRT only 87.5%. A 1 week Holter was placed to assess PVC burden.     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 Strong revealing patent LIMA-LAD, but occluded SVG-Circ, all other grafts/vessels noted to be occluded: %, %, Circ 100%, %. [place]-LAD is filling LCX/RCA through collateral circulation; no targets for intervention   - on Lido ggt nad Amiodarone, if continued VT may consider ablation   - follow up with EP     New onset AF, paroxsymal -  6% AT/AF burden on device, all occurring recently  - CHADS2-VASc of 6 (CHF, HTN, DM, Agex2, CAD) - start anticoagulation whenever acceptable    Ischemic cardiomyopathy / CAD  LIMA-LAD, but occluded SVG-Circ, all other grafts/vessels noted to be occluded: %, %, Circ 100%, %. [place]-LAD is filling LCX/RCA through collateral circulation  - continue with ASA and Plavix   - continue with Ranexa 500mg po BID, as patient is weaned off of pressor support would start BB   - repeat limited echo shows worsening EF <20% with global hypokinesis, mod AS  - GDMT limited due to renal function, but will need PRN diuresis. Avoid IV hydration.    Ev Kapoor D.O. [facility]  Cardiology/Vascular Cardiology -[facility]   Telephone # [phone]

## 2024-05-25 NOTE — PROGRESS NOTE ADULT - NS ATTEND AMEND GEN_ALL_CORE FT
Attestation completed in Progress note cardiology    Ev Kapoor D.O. [facility]  Cardiology/Vascular Cardiology -[facility]   Telephone # [phone]

## 2024-05-25 NOTE — PROGRESS NOTE ADULT - PROBLEM SELECTOR PLAN 1
- Type 1 NSTEMI   - chest pain since Tuesday in the setting of CAD with several PCI and brachytherapy most recent in Nov 2023  - needs triple therapy, will use plavix for now due to risk of bleeding.   - start ASA, plavix, and heparin infusion   - plan for [facility] Tuesday, will need medical optimization and nephrology clearance for cath  - hold nephrotoxic agents  - TTE performed 4/2024 ef 37%, improved from previous, with known RWMA from CAD  - repeat limited echo shows worsening EF <20% with global hypokinesis, mod AS  - GDMT limited due to renal function, but will need PRN diuresis. Avoid IV hydration. - pt had witnessed cardiac arrest   - pt developed VT rate of 200 on telemetry prompting evaluation by RNs and nearby medicine attending   - When cardiology arrived, pt was still talking and appeared to briefly have his HR return to 105. Upon the second run of VT, pt lost consciousness and code blue was called   - Despite initial VT, when defibrillator pads were placed on pt for shock, no shock was advised and VT was no longer noted. Code was ran as PEA arrest.   - Pt received epinephrine, sodium bicarb, amiodarone, lidocaine. Also was intubated and placed on ventilator.   - Pt was emergently taken to cath lab and cath was performed by Dr. Strong revealing patent LIMA-LAD, but occluded SVG-Circ, all other grafts/vessels noted to be occluded: %, %, Circ 100%, %. Lima-LAD is fillng LCX/RCA through collateral circulation  - Due to the extensive disease present, there was no intervention performed and recommendation from interventional attending was for medical therapy   - continue ASA, plavix, ranexa, lipitor, amiodarone   - GDMT is limited by hypotension/ CKD

## 2024-05-25 NOTE — PROCEDURE NOTE - ADDITIONAL PROCEDURE DETAILS
Emergent intubation during cardiac arrest, #7.5ETT inserted with #4 Mac blade, tube directly visualized to be between vocal cords, +color change, +tube frost, +B/L breath sounds, secured at 25cm at the lip.      Andrew Willoughby MD, [facility]  , Pulmonary & Critical Care Medicine  [facility]  Pulmonary and Sleep Medicine at [place]  [address]  [place], [place] [address]  T: [phone]  F: [phone]

## 2024-05-25 NOTE — PROGRESS NOTE ADULT - ASSESSMENT
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

## 2024-05-25 NOTE — PROGRESS NOTE ADULT - SUBJECTIVE AND OBJECTIVE BOX
Nurse Practitioner Progress note: Pt sp Vfib arrest and STEMI called  Now s/p C by Dr Strong revealing all SVGs are occluded LIMA to LAD is patent.    < from: Cardiac Catheterization (05.25.24 @ 11:11) >  Indications:                Ischemic heart disease   Lab Visit Indication:      resuscitated cardiac arrest     Diagnostic Conclusions:   3v cad/patent partida to lad.   medical therapy. no overt targets for pci       < end of copied text >        Patient intubated and sedated.    Levophed discontinued in Lab, pressures stable. EF <20%.  Heparin discontinued in Lab to reduce risk of bleeding.  Lidocaine discontinued secondary to impaired renal function and increased risk for toxicity.    Left groin procedure site CDI.  No bleeding, no hematoma, site soft, non tender, positive pedal pulses bilaterally        T(C): 35.9 (05-25-24 @ 13:40), Max: 36.9 (05-24-24 @ 16:21)  HR: 71 (05-25-24 @ 13:40) (70 - 111)  BP: 92/64 (05-25-24 @ 13:40) (71/54 - 128/85)  RR: 23 (05-25-24 @ 13:35) (11 - 23)  SpO2: 100% (05-25-24 @ 13:40) (96% - 100%)    CBC Full  -  ( 25 May 2024 10:21 )  WBC Count : 14.67 K/uL  RBC Count : 4.23 M/uL  Hemoglobin : 12.6 g/dL  Hematocrit : 38.3 %  Platelet Count - Automated : 182 K/uL  Mean Cell Volume : 90.5 fl  Mean Cell Hemoglobin : 29.8 pg  Mean Cell Hemoglobin Concentration : 32.9 gm/dL  Auto Neutrophil # : 9.34 K/uL  Auto Lymphocyte # : 3.77 K/uL  Auto Monocyte # : 0.65 K/uL  Auto Eosinophil # : 0.26 K/uL  Auto Basophil # : 0.13 K/uL  Auto Neutrophil % : 63.7 %  Auto Lymphocyte % : 25.7 %  Auto Monocyte % : 4.4 %  Auto Eosinophil % : 1.8 %  Auto Basophil % : 0.9 %    05-25    138  |  96  |  62.7<H>  ----------------------------<  288<H>  3.8   |  28.0  |  2.08<H>    Ca    8.6      25 May 2024 10:21  Phos  2.6     05-25  Mg     2.8     05-25    TPro  6.5<L>  /  Alb  3.1<L>  /  TBili  0.6  /  DBili  x   /  AST  72<H>  /  ALT  47<H>  /  AlkPhos  90  05-25    CARDIAC MARKERS ( 25 May 2024 10:21 )  x     / x     / 69 U/L / x     / x              MEDICATIONS  (STANDING):  aMIOdarone Infusion 0.5 mG/Min (16.7 mL/Hr) IV Continuous <Continuous>  aMIOdarone Infusion 1 mG/Min (33.3 mL/Hr) IV Continuous <Continuous>  aspirin enteric coated 81 milliGRAM(s) Oral daily  atorvastatin 80 milliGRAM(s) Oral at bedtime  chlorhexidine 0.12% Liquid 15 milliLiter(s) Oral Mucosa every 12 hours  chlorhexidine 2% Cloths 1 Application(s) Topical <User Schedule>  chlorhexidine 4% Liquid 1 Application(s) Topical <User Schedule>  clopidogrel Tablet 75 milliGRAM(s) Oral daily  dextrose 10% Bolus 125 milliLiter(s) IV Bolus once  dextrose 5%. 1000 milliLiter(s) (100 mL/Hr) IV Continuous <Continuous>  dextrose 5%. 1000 milliLiter(s) (50 mL/Hr) IV Continuous <Continuous>  dextrose 50% Injectable 25 Gram(s) IV Push once  dextrose 50% Injectable 25 Gram(s) IV Push once  dextrose 50% Injectable 12.5 Gram(s) IV Push once  dextrose Oral Gel 15 Gram(s) Oral once  fentaNYL   Infusion. 0.5 MICROgram(s)/kG/Hr (3.28 mL/Hr) IV Continuous <Continuous>  glucagon  Injectable 1 milliGRAM(s) IntraMuscular once  insulin lispro (ADMELOG) corrective regimen sliding scale   SubCutaneous every 6 hours  levothyroxine 75 MICROGram(s) Oral daily  pantoprazole  Injectable 40 milliGRAM(s) IV Push daily  phenylephrine    Infusion 3 MICROgram(s)/kG/Min (73.7 mL/Hr) IV Continuous <Continuous>  piperacillin/tazobactam IVPB.. 3.375 Gram(s) IV Intermittent every 8 hours  potassium chloride  20 mEq/100 mL IVPB 20 milliEquivalent(s) IV Intermittent every 2 hours  propofol Infusion 20 MICROgram(s)/kG/Min (7.86 mL/Hr) IV Continuous <Continuous>  ranolazine 500 milliGRAM(s) Oral two times a day    MEDICATIONS  (PRN):  fentaNYL    Injectable 50 MICROGram(s) IV Push every 2 hours PRN Moderate Pain or Vent synchrony  sodium chloride 0.9% lock flush 10 milliLiter(s) IV Push every 1 hour PRN Pre/post blood products, medications, blood draw, and to maintain line patency        HPI:  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 no SOB , radiates to the shoulder   Holter monitor placement for PVC burden which has worsened today with radiation to left arm, and  dizziness/lightheadedness. He called the office and was told to come in for further evaluation. In ED, patient weak appearing,He is with lower back hip pain was in [facility] ED 2 days ago , hip xray neg for fracture . Pt denies headache, SOB, JOINER, diaphoresis, syncope or N/V   Currently pain free ,tele - 120's   Pt's also c/o poor oral intake , BG is 46 dextrose ordered 1 amp by ED    (23 May 2024 16:13)    now s/p C no intervention applicable to patients coronary anatomy.     ASSESSMENT/PLAN:    Coronary artery disease  -Admit to   -VS, labs, diet, activity as per MICU team  -No IV hydration ordered secondary to EF <20%  - Continue Aspirin 81 mg PO daily   -wean off Saravanan as tolerated  -Wean off Ventilator as tolerated  -Plan of care discussed with patient, family and MD  -Cardiology consult service to continue to follow patient  -EP cardiology to continue following as well - EP note appreciated

## 2024-05-25 NOTE — CONSULT NOTE ADULT - ASSESSMENT
77yo male with history of HTN, HLD, DM, CKD, CAD s/p 4V CABG and multiple PCI's, ischemic cardiomyopathy (EF < 20%), and LBBB s/p MDT CRTD. Pt with recent admission to [facility] on 11/20/23-11/22/23 for decompensated HF with multi-organ system failure.  LHC at that time revealed a patent LIMA with all other grafts closed. No intervention was performed. He underwent a Medtronic CRT-D implant with Dr. Bazzi in 12/2023. Recent device interrogation revealed normal function but effective CRT only 87.5%. A 1 week Holter was placed to assess PVC burden.     He now presented to [facility] ED with a two day history of chest discomfort with radiation to left arm and associated dizziness, lightheadedness. In ED, ruled in for NSTEMI with positive troponin (228, 427, 379), RAS on CKD, and hypoglycemia. A TTE revealed acute on chronic systolic heart failure with EF <20%. He was admitted to telemetry for treatment of ACS on DAPT and heparin gtt, planned for LHC on Tuesday allowing for RAS to improve prior. CODE BLUE activated for VF cardiac arrest on 5/25. LHC urgently performed and revealed  % stenosis,  % stenosis,  %,  % stenosis. SVG graft to Circumflex: occluded.  LIMA graft to LAD visually normal in size and structure. LCx/RCA fills via collaterals. Pt upgraded to MICU - on Lido and Amiodarone infusion.     Device interrogation revealed episode started with AF with RVR which then degenerated to MMVT with a cycle length of 300ms. The device initially identified the rhythm as AF, then sinus tach (inappropriately) and then SVT (inappropriately) based on wavelet. Eventually it failed to match wavelet and did provide appropriate ATP. Device was reprogrammed to allow for better VT discrimination: Wavelet turned off. SVT V limit changed from 260ms to 280ms and stability turned on (40ms).     Recommendations: 79yo male with history of HTN, HLD, DM, CKD, CAD s/p 4V CABG and multiple PCI's, ischemic cardiomyopathy (EF < 20%), and LBBB s/p MDT CRTD. Pt with recent admission to [facility] on 11/20/23-11/22/23 for decompensated HF with multi-organ system failure.  LHC at that time revealed a patent LIMA with all other grafts closed. No intervention was performed. He underwent a Medtronic CRT-D implant with Dr. Bazzi in 12/2023. Recent device interrogation revealed normal function but effective CRT only 87.5%. A 1 week Holter was placed to assess PVC burden.     He now presented to [facility] ED with a two day history of chest discomfort with radiation to left arm and associated dizziness, lightheadedness. In ED, ruled in for NSTEMI with positive troponin (228, 427, 379), RAS on CKD, and hypoglycemia. A TTE revealed acute on chronic systolic heart failure with EF <20%. He was admitted to telemetry for treatment of ACS on DAPT and heparin gtt, planned for LHC on Tuesday allowing for RAS to improve prior. CODE BLUE activated for VF cardiac arrest on 5/25. LHC urgently performed and revealed  % stenosis,  % stenosis,  %,  % stenosis. SVG graft to Circumflex: occluded.  LIMA graft to LAD visually normal in size and structure. LCx/RCA fills via collaterals. Pt upgraded to MICU - on Lido and Amiodarone infusion.     Device interrogation revealed episode started with AF with RVR which then degenerated to MMVT with a cycle length of 300ms. The device initially identified the rhythm as AF, then sinus tach (inappropriately) and then SVT (inappropriately) based on wavelet. Eventually it failed to match wavelet and did provide appropriate ATP. Device was reprogrammed to allow for better VT discrimination: Wavelet turned off. SVT V limit changed from 260ms to 280ms and stability turned on (40ms).     #Monomorphic VT:  -  msec, slower PVCs show RBLS axis V6 transition (suggesting basal-mid inferoseptal exit)  - Continue Amiodarone 1mg/min x6 hrs then 0.5mg/min  - Monitor LFTs  - Resume Lidocaine gtt if patient has recurrent VT  - Cath with no targets for intervention  - Likely scar-mediated VT from ischemic CMY  - Would be ideal to temporize patient on oral medications but if he has ongoing storm, may have to consider ablation  - Continue GDMT of HFrEF    #HFrEF:  - Continue GDMT  - OptiVol elevation for past month suggestive of progressive CHF    #Paroxysmal AF/AFL:  - 6% AT/AF burden on device, all occurring recently  - CHADS2-VASc of 6 (CHF, HTN, DM, Agex2, CAD) - start anticoagulation whenever acceptable    #ICD:  - Device inappropriately binned VT as SVT  - Device reprogrammed with AR Logic changes - sinus tach off, wavelet off, SVT limit increased to 280 msec, stability turned on  - Effective BiV pacing only 83%, will try to optimize -- suspect improvement on AADs to suppress ventricular arrhythmias

## 2024-05-25 NOTE — CONSULT NOTE ADULT - SUBJECTIVE AND OBJECTIVE BOX
BRIEF HOSPITAL COURSE: 79 y/o male never smoker with history of CAD, STEMI, NSTEMI, 4V CABG, multiple PCIs with reportedly 19 prior stents and brachytherapy 11/2023, HFrEF EF 37% s/p ICD, DM2 on insulin, 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, HTN, HLD who presents to [facility] ED with a two day history of chest discomfort with radiation to left arm and associated dizziness, lightheadedness. Recently had Holter monitor placement for PVC burden which was noted to have worsen the day of presentation. In ED, patient weak appearing, ruled in for NSTEMI with positive troponin, and labs remarkable for RAS on CKD as well as hypoglycemia. A TTE revealed acute on chronic systolic heart failure with EF <20%. He was admitted to telemetry for treatment of ACS on DAPT and heparin gtt, planned for [facility] on Tuesday allowing for RAS to improve prior.    EVENTS:   CODE BLUE activated for VF cardiac arrest  Per RN patient was doing well just prior to the cardiac arrest, suddenly became unresponsive and was noted to be in VF  CPR was initiated by primary team, given 1 epi and was not defibrillated as no shock was advised on AED  On arrival at bedside, pulse check revealed PEA and CPR was resumed  Patient was given Amiodarone 300mg IV   Next pulse check ROSC was achieved, noted to have frequent NSVT and Amiodarone 150mg IV was given for a total of 450mg IV and infusion was ordered  Patient became agitated with purposeful movements and was sedated and intubated on first-pass attempt by Dr. Willoughby. ETT was noted to be high on CXR and was advanced to 25 cm.  He was in shock post-arrest, given Phenylephrine 1000mcg IV as well as 1/2 amp of epinephrine, and was started on initially on Phenylephrine gtt due to VT.  He had rapidly escalating Phenylephrine requirements up to 5mcg/kg/min. Central access was obtained; given additional 1000mcg Phenylephrine IV push, 1/2 amp epinephrine and 1 amp sodium bicarbonate, and Norepinephrine gtt was added.    Cardiology team Dr. Kapoor/Erickson NP were present at bedside during cardiac arrest, called and activated STEMI team for emergent cardiac cath  EP Dr. Anuj Turpin interrogated patient's ICD post-arrest, as ICD did not fire during sustained VT, and recommended initiation of Amiodarone infusion and Lidocaine infusions for arrhythmia suppression.  Continued on Heparin gtt for ACS    Repeat labs show lactic acidosis 7.10, transaminitis, and an improvement in renal indices.  K 3.8, which was supplemented    Patient's family was present at the time of cardiac arrest and were updated.        CRITICAL CARE TIME SPENT: 74 minutes assessing presenting problems of acute critical illness, which pose high probability of life threatening deterioration or end organ damage/dysfunction, requiring frequent bedside reassessments and adjustments to plan of care, including medical decision making, initiating plan of care, reviewing data, reviewing radiologic exams, discussing with multidisciplinary team, discussing goals of care. Critical Care time is non-inclusive of independent time spent on procedures performed. Date of note entry represents date of services rendered.

## 2024-05-25 NOTE — PROGRESS NOTE ADULT - SUBJECTIVE AND OBJECTIVE BOX
[facility]                                                         CARDIOLOGY AT [facility] [place]                                                                  [address], [place] [address]                                                         Telephone: [phone]. Fax:[phone]                                                                             PROGRESS NOTE    Chief Complaint upon presentation to hospital: NSTEMI   Initial reason for Consult: NSTEMI   Reason for follow up: VTACH/VFIB Arrest, CODE STEMI    Review of symptoms:   cannot obtain, pt intubated/sedated     Vitals:  T(C): 36.6 (05-25-24 @ 09:11), Max: 36.9 (05-24-24 @ 16:21)  HR: 78 (05-25-24 @ 12:47) (78 - 111)  BP: 128/76 (05-25-24 @ 09:44) (107/72 - 128/85)  RR: 16 (05-25-24 @ 09:44) (16 - 19)  SpO2: 99% (05-25-24 @ 12:47) (96% - 99%)  Wt(kg): --  I&O's Summary    24 May 2024 07:01  -  25 May 2024 07:00  --------------------------------------------------------  IN: 344.5 mL / OUT: 0 mL / NET: 344.5 mL    Weight (kg): 65.5 (05-23 @ 15:04), 68 (05-21 @ 01:49)    PHYSICAL EXAM:  Appearance: Comfortable. No acute distress  HEENT:  Atraumatic. Normocephalic.  Normal oral mucosa  Neurologic: A & O x 3, no gross focal deficits.  Cardiovascular: RRR S1 S2, No murmur, no rubs/gallops. No JVD  Respiratory: Lungs clear to auscultation, unlabored   Gastrointestinal:  Soft, Non-tender, + BS  Lower Extremities: 2+ Peripheral Pulses, No clubbing, cyanosis, or edema  Psychiatry: Patient is calm. No agitation.   Skin: warm and dry.    CURRENT CARDIAC MEDICATIONS:  aMIOdarone Infusion 0.5 mG/Min IV Continuous <Continuous>  aMIOdarone Infusion 1 mG/Min IV Continuous <Continuous>  phenylephrine    Infusion 3 MICROgram(s)/kG/Min IV Continuous <Continuous>  ranolazine 500 milliGRAM(s) Oral two times a day    CURRENT OTHER MEDICATIONS:  piperacillin/tazobactam IVPB.. 3.375 Gram(s) IV Intermittent every 8 hours  fentaNYL    Injectable 50 MICROGram(s) IV Push every 2 hours PRN Moderate Pain or Vent synchrony  fentaNYL   Infusion. 0.5 MICROgram(s)/kG/Hr (3.28 mL/Hr) IV Continuous <Continuous>  propofol Infusion 20 MICROgram(s)/kG/Min (7.86 mL/Hr) IV Continuous <Continuous>  pantoprazole  Injectable 40 milliGRAM(s) IV Push daily  atorvastatin 80 milliGRAM(s) Oral at bedtime  dextrose 50% Injectable 12.5 Gram(s) IV Push once, Stop order after: 1 Doses  dextrose 50% Injectable 25 Gram(s) IV Push once, Stop order after: 1 Doses  dextrose 50% Injectable 25 Gram(s) IV Push once, Stop order after: 1 Doses  dextrose Oral Gel 15 Gram(s) Oral once, Stop order after: 1 Doses  glucagon  Injectable 1 milliGRAM(s) IntraMuscular once, Stop order after: 1 Doses  insulin lispro (ADMELOG) corrective regimen sliding scale   SubCutaneous every 6 hours  levothyroxine 75 MICROGram(s) Oral daily  aspirin enteric coated 81 milliGRAM(s) Oral daily  chlorhexidine 0.12% Liquid 15 milliLiter(s) Oral Mucosa every 12 hours  chlorhexidine 2% Cloths 1 Application(s) Topical <User Schedule>  chlorhexidine 4% Liquid 1 Application(s) Topical <User Schedule>  clopidogrel Tablet 75 milliGRAM(s) Oral daily  dextrose 10% Bolus 125 milliLiter(s) IV Bolus once, Stop order after: 1 Doses  dextrose 5%. 1000 milliLiter(s) (100 mL/Hr) IV Continuous <Continuous>  dextrose 5%. 1000 milliLiter(s) (50 mL/Hr) IV Continuous <Continuous>  potassium chloride  20 mEq/100 mL IVPB 20 milliEquivalent(s) IV Intermittent every 2 hours, Stop order after: 2 Doses  sodium chloride 0.9% lock flush 10 milliLiter(s) IV Push every 1 hour PRN Pre/post blood products, medications, blood draw, and to maintain line patency    LABS:	 	  ( 25 May 2024 10:21 )  Troponin T  X    ,  CPK  69   , CKMB  X    , BNP X                   12.6   14.67 )-----------( 182      ( 25 May 2024 10:21 )             38.3     05-25    138  |  96  |  62.7<H>  ----------------------------<  288<H>  3.8   |  28.0  |  2.08<H>    Ca    8.6      25 May 2024 10:21  Phos  2.6     05-25  Mg     2.8     05-25    TPro  6.5<L>  /  Alb  3.1<L>  /  TBili  0.6  /  DBili  x   /  AST  72<H>  /  ALT  47<H>  /  AlkPhos  90  05-25    PT/INR/PTT ( 25 May 2024 10:21 )                       :                       :       X            :       76.8                  .        .                   .              .           .       X           .                                       Lipid Profile:   HgA1c:   TSH:

## 2024-05-25 NOTE — PROGRESS NOTE ADULT - ASSESSMENT
79 y/o male never smoker with history of CAD, STEMI, NSTEMI, 4V CABG, multiple PCI's of the SVG to the OM with stents and brachytherapy, cardiogenic shock, HFrEF with admission for decompensated HF with multi organ system failure, DM2 on long term insulin, 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, HTN, HLD who presents to [facility] ED with chest discomfort since Tuesday after Holter monitor placement for PVC burden which has worsened today with radiation to left arm, SOB and dizziness/lightheadedness. He called the office and was told to come in for further evaluation. In ED, patient weak appearing, and pending labs. Denies back pain, headache, SOB, JOINER, diaphoresis, syncope or N/V.

## 2024-05-25 NOTE — CONSULT NOTE ADULT - NS ATTEND AMEND GEN_ALL_CORE FT
as above, nstemi with RAS/ACRS type 1. Will cont ACS protocol, optimize his renal fx and cath once stable.
.  .  Agree with above.    A total of 90 minutes were spent on this patient encounter.    Discussed with Cardiology & MICU teams.    Thank you for this consult. We will follow with you.    Skip Turpin MD  Clinical Cardiac Electrophysiology

## 2024-05-26 LAB
ALBUMIN SERPL ELPH-MCNC: 2.9 G/DL — LOW (ref 3.3–5.2)
ALBUMIN SERPL ELPH-MCNC: 3.1 G/DL — LOW (ref 3.3–5.2)
ALBUMIN SERPL ELPH-MCNC: 3.2 G/DL — LOW (ref 3.3–5.2)
ALP SERPL-CCNC: 86 U/L — SIGNIFICANT CHANGE UP (ref 40–120)
ALP SERPL-CCNC: 94 U/L — SIGNIFICANT CHANGE UP (ref 40–120)
ALP SERPL-CCNC: 95 U/L — SIGNIFICANT CHANGE UP (ref 40–120)
ALT FLD-CCNC: 120 U/L — HIGH
ALT FLD-CCNC: 130 U/L — HIGH
ALT FLD-CCNC: 139 U/L — HIGH
ANION GAP SERPL CALC-SCNC: 15 MMOL/L — SIGNIFICANT CHANGE UP (ref 5–17)
ANION GAP SERPL CALC-SCNC: 16 MMOL/L — SIGNIFICANT CHANGE UP (ref 5–17)
ANION GAP SERPL CALC-SCNC: 9 MMOL/L — SIGNIFICANT CHANGE UP (ref 5–17)
APTT BLD: 189.3 SEC — CRITICAL HIGH (ref 24.5–35.6)
APTT BLD: 28.4 SEC — SIGNIFICANT CHANGE UP (ref 24.5–35.6)
AST SERPL-CCNC: 100 U/L — HIGH
AST SERPL-CCNC: 135 U/L — HIGH
AST SERPL-CCNC: 90 U/L — HIGH
BASE EXCESS BLDV CALC-SCNC: 5.2 MMOL/L — HIGH (ref -2–3)
BASOPHILS # BLD AUTO: 0 K/UL — SIGNIFICANT CHANGE UP (ref 0–0.2)
BASOPHILS NFR BLD AUTO: 0 % — SIGNIFICANT CHANGE UP (ref 0–2)
BILIRUB SERPL-MCNC: 0.5 MG/DL — SIGNIFICANT CHANGE UP (ref 0.4–2)
BILIRUB SERPL-MCNC: 0.7 MG/DL — SIGNIFICANT CHANGE UP (ref 0.4–2)
BILIRUB SERPL-MCNC: 0.7 MG/DL — SIGNIFICANT CHANGE UP (ref 0.4–2)
BUN SERPL-MCNC: 56.4 MG/DL — HIGH (ref 8–20)
BUN SERPL-MCNC: 57.9 MG/DL — HIGH (ref 8–20)
BUN SERPL-MCNC: 61.8 MG/DL — HIGH (ref 8–20)
BURR CELLS BLD QL SMEAR: PRESENT — SIGNIFICANT CHANGE UP
CA-I SERPL-SCNC: 1.07 MMOL/L — LOW (ref 1.15–1.33)
CALCIUM SERPL-MCNC: 8 MG/DL — LOW (ref 8.4–10.5)
CALCIUM SERPL-MCNC: 8.2 MG/DL — LOW (ref 8.4–10.5)
CALCIUM SERPL-MCNC: 8.3 MG/DL — LOW (ref 8.4–10.5)
CHLORIDE BLDV-SCNC: 98 MMOL/L — SIGNIFICANT CHANGE UP (ref 96–108)
CHLORIDE SERPL-SCNC: 94 MMOL/L — LOW (ref 96–108)
CHLORIDE SERPL-SCNC: 94 MMOL/L — LOW (ref 96–108)
CHLORIDE SERPL-SCNC: 99 MMOL/L — SIGNIFICANT CHANGE UP (ref 96–108)
CO2 SERPL-SCNC: 24 MMOL/L — SIGNIFICANT CHANGE UP (ref 22–29)
CO2 SERPL-SCNC: 26 MMOL/L — SIGNIFICANT CHANGE UP (ref 22–29)
CO2 SERPL-SCNC: 30 MMOL/L — HIGH (ref 22–29)
CREAT SERPL-MCNC: 2.26 MG/DL — HIGH (ref 0.5–1.3)
CREAT SERPL-MCNC: 2.29 MG/DL — HIGH (ref 0.5–1.3)
CREAT SERPL-MCNC: 2.31 MG/DL — HIGH (ref 0.5–1.3)
EGFR: 29 ML/MIN/1.73M2 — LOW
EOSINOPHIL # BLD AUTO: 0 K/UL — SIGNIFICANT CHANGE UP (ref 0–0.5)
EOSINOPHIL NFR BLD AUTO: 0 % — SIGNIFICANT CHANGE UP (ref 0–6)
GAS PNL BLDA: SIGNIFICANT CHANGE UP
GAS PNL BLDV: 131 MMOL/L — LOW (ref 136–145)
GAS PNL BLDV: SIGNIFICANT CHANGE UP
GAS PNL BLDV: SIGNIFICANT CHANGE UP
GIANT PLATELETS BLD QL SMEAR: PRESENT — SIGNIFICANT CHANGE UP
GLUCOSE BLDC GLUCOMTR-MCNC: 167 MG/DL — HIGH (ref 70–99)
GLUCOSE BLDC GLUCOMTR-MCNC: 227 MG/DL — HIGH (ref 70–99)
GLUCOSE BLDC GLUCOMTR-MCNC: 260 MG/DL — HIGH (ref 70–99)
GLUCOSE BLDC GLUCOMTR-MCNC: 308 MG/DL — HIGH (ref 70–99)
GLUCOSE BLDV-MCNC: 267 MG/DL — HIGH (ref 70–99)
GLUCOSE SERPL-MCNC: 169 MG/DL — HIGH (ref 70–99)
GLUCOSE SERPL-MCNC: 252 MG/DL — HIGH (ref 70–99)
GLUCOSE SERPL-MCNC: 256 MG/DL — HIGH (ref 70–99)
HCO3 BLDV-SCNC: 30 MMOL/L — HIGH (ref 22–29)
HCT VFR BLD CALC: 33.9 % — LOW (ref 39–50)
HCT VFR BLD CALC: 38 % — LOW (ref 39–50)
HCT VFR BLDA CALC: 38 % — SIGNIFICANT CHANGE UP
HGB BLD CALC-MCNC: 12.6 G/DL — SIGNIFICANT CHANGE UP (ref 12.6–17.4)
HGB BLD-MCNC: 11.3 G/DL — LOW (ref 13–17)
HGB BLD-MCNC: 12.6 G/DL — LOW (ref 13–17)
INR BLD: 1.13 RATIO — SIGNIFICANT CHANGE UP (ref 0.85–1.18)
LACTATE BLDV-MCNC: 2 MMOL/L — SIGNIFICANT CHANGE UP (ref 0.5–2)
LACTATE SERPL-SCNC: 1.3 MMOL/L — SIGNIFICANT CHANGE UP (ref 0.5–2)
LYMPHOCYTES # BLD AUTO: 12.2 % — LOW (ref 13–44)
LYMPHOCYTES # BLD AUTO: 2.22 K/UL — SIGNIFICANT CHANGE UP (ref 1–3.3)
MAGNESIUM SERPL-MCNC: 2.5 MG/DL — SIGNIFICANT CHANGE UP (ref 1.6–2.6)
MAGNESIUM SERPL-MCNC: 2.6 MG/DL — SIGNIFICANT CHANGE UP (ref 1.6–2.6)
MAGNESIUM SERPL-MCNC: 2.6 MG/DL — SIGNIFICANT CHANGE UP (ref 1.6–2.6)
MANUAL SMEAR VERIFICATION: SIGNIFICANT CHANGE UP
MCHC RBC-ENTMCNC: 30 PG — SIGNIFICANT CHANGE UP (ref 27–34)
MCHC RBC-ENTMCNC: 30 PG — SIGNIFICANT CHANGE UP (ref 27–34)
MCHC RBC-ENTMCNC: 33.2 GM/DL — SIGNIFICANT CHANGE UP (ref 32–36)
MCHC RBC-ENTMCNC: 33.3 GM/DL — SIGNIFICANT CHANGE UP (ref 32–36)
MCV RBC AUTO: 89.9 FL — SIGNIFICANT CHANGE UP (ref 80–100)
MCV RBC AUTO: 90.5 FL — SIGNIFICANT CHANGE UP (ref 80–100)
MONOCYTES # BLD AUTO: 1.75 K/UL — HIGH (ref 0–0.9)
MONOCYTES NFR BLD AUTO: 9.6 % — SIGNIFICANT CHANGE UP (ref 2–14)
MRSA PCR RESULT.: SIGNIFICANT CHANGE UP
NEUTROPHILS # BLD AUTO: 14.23 K/UL — HIGH (ref 1.8–7.4)
NEUTROPHILS NFR BLD AUTO: 78.2 % — HIGH (ref 43–77)
PCO2 BLDV: 47 MMHG — SIGNIFICANT CHANGE UP (ref 42–55)
PH BLDV: 7.41 — SIGNIFICANT CHANGE UP (ref 7.32–7.43)
PHOSPHATE SERPL-MCNC: 3.8 MG/DL — SIGNIFICANT CHANGE UP (ref 2.4–4.7)
PHOSPHATE SERPL-MCNC: 4.3 MG/DL — SIGNIFICANT CHANGE UP (ref 2.4–4.7)
PHOSPHATE SERPL-MCNC: 4.3 MG/DL — SIGNIFICANT CHANGE UP (ref 2.4–4.7)
PLAT MORPH BLD: NORMAL — SIGNIFICANT CHANGE UP
PLATELET # BLD AUTO: 189 K/UL — SIGNIFICANT CHANGE UP (ref 150–400)
PLATELET # BLD AUTO: 203 K/UL — SIGNIFICANT CHANGE UP (ref 150–400)
PO2 BLDV: <42 MMHG — SIGNIFICANT CHANGE UP (ref 25–45)
POIKILOCYTOSIS BLD QL AUTO: SIGNIFICANT CHANGE UP
POLYCHROMASIA BLD QL SMEAR: SLIGHT — SIGNIFICANT CHANGE UP
POTASSIUM BLDV-SCNC: 4.1 MMOL/L — SIGNIFICANT CHANGE UP (ref 3.5–5.1)
POTASSIUM SERPL-MCNC: 4 MMOL/L — SIGNIFICANT CHANGE UP (ref 3.5–5.3)
POTASSIUM SERPL-MCNC: 4.1 MMOL/L — SIGNIFICANT CHANGE UP (ref 3.5–5.3)
POTASSIUM SERPL-MCNC: 4.1 MMOL/L — SIGNIFICANT CHANGE UP (ref 3.5–5.3)
POTASSIUM SERPL-SCNC: 4 MMOL/L — SIGNIFICANT CHANGE UP (ref 3.5–5.3)
POTASSIUM SERPL-SCNC: 4.1 MMOL/L — SIGNIFICANT CHANGE UP (ref 3.5–5.3)
POTASSIUM SERPL-SCNC: 4.1 MMOL/L — SIGNIFICANT CHANGE UP (ref 3.5–5.3)
PROT SERPL-MCNC: 5.8 G/DL — LOW (ref 6.6–8.7)
PROT SERPL-MCNC: 6.4 G/DL — LOW (ref 6.6–8.7)
PROT SERPL-MCNC: 6.5 G/DL — LOW (ref 6.6–8.7)
PROTHROM AB SERPL-ACNC: 12.5 SEC — SIGNIFICANT CHANGE UP (ref 9.5–13)
RBC # BLD: 3.77 M/UL — LOW (ref 4.2–5.8)
RBC # BLD: 4.2 M/UL — SIGNIFICANT CHANGE UP (ref 4.2–5.8)
RBC # FLD: 15.2 % — HIGH (ref 10.3–14.5)
RBC # FLD: 15.7 % — HIGH (ref 10.3–14.5)
RBC BLD AUTO: ABNORMAL
S AUREUS DNA NOSE QL NAA+PROBE: DETECTED
SAO2 % BLDV: 54 % — SIGNIFICANT CHANGE UP
SMUDGE CELLS # BLD: PRESENT — SIGNIFICANT CHANGE UP
SODIUM SERPL-SCNC: 134 MMOL/L — LOW (ref 135–145)
SODIUM SERPL-SCNC: 135 MMOL/L — SIGNIFICANT CHANGE UP (ref 135–145)
SODIUM SERPL-SCNC: 138 MMOL/L — SIGNIFICANT CHANGE UP (ref 135–145)
WBC # BLD: 11.86 K/UL — HIGH (ref 3.8–10.5)
WBC # BLD: 18.2 K/UL — HIGH (ref 3.8–10.5)
WBC # FLD AUTO: 11.86 K/UL — HIGH (ref 3.8–10.5)
WBC # FLD AUTO: 18.2 K/UL — HIGH (ref 3.8–10.5)

## 2024-05-26 PROCEDURE — 71045 X-RAY EXAM CHEST 1 VIEW: CPT | Mod: 26

## 2024-05-26 PROCEDURE — 31500 INSERT EMERGENCY AIRWAY: CPT

## 2024-05-26 PROCEDURE — 99291 CRITICAL CARE FIRST HOUR: CPT

## 2024-05-26 PROCEDURE — 99233 SBSQ HOSP IP/OBS HIGH 50: CPT

## 2024-05-26 PROCEDURE — 93010 ELECTROCARDIOGRAM REPORT: CPT

## 2024-05-26 RX ORDER — DEXTROSE 10 % IN WATER 10 %
125 INTRAVENOUS SOLUTION INTRAVENOUS ONCE
Refills: 0 | Status: DISCONTINUED | OUTPATIENT
Start: 2024-05-26 | End: 2024-05-27

## 2024-05-26 RX ORDER — AMIODARONE HYDROCHLORIDE 400 MG/1
150 TABLET ORAL ONCE
Refills: 0 | Status: COMPLETED | OUTPATIENT
Start: 2024-05-26 | End: 2024-05-26

## 2024-05-26 RX ORDER — LIDOCAINE HCL 20 MG/ML
1 VIAL (ML) INJECTION
Qty: 2 | Refills: 0 | Status: DISCONTINUED | OUTPATIENT
Start: 2024-05-26 | End: 2024-05-27

## 2024-05-26 RX ORDER — FUROSEMIDE 40 MG
80 TABLET ORAL ONCE
Refills: 0 | Status: COMPLETED | OUTPATIENT
Start: 2024-05-26 | End: 2024-05-26

## 2024-05-26 RX ORDER — ACETAZOLAMIDE 250 MG/1
250 TABLET ORAL EVERY 8 HOURS
Refills: 0 | Status: COMPLETED | OUTPATIENT
Start: 2024-05-25 | End: 2024-05-26

## 2024-05-26 RX ORDER — MIDAZOLAM HYDROCHLORIDE 1 MG/ML
2 INJECTION, SOLUTION INTRAMUSCULAR; INTRAVENOUS ONCE
Refills: 0 | Status: DISCONTINUED | OUTPATIENT
Start: 2024-05-26 | End: 2024-05-26

## 2024-05-26 RX ORDER — DEXTROSE 50 % IN WATER 50 %
15 SYRINGE (ML) INTRAVENOUS ONCE
Refills: 0 | Status: DISCONTINUED | OUTPATIENT
Start: 2024-05-26 | End: 2024-05-27

## 2024-05-26 RX ORDER — HEPARIN SODIUM 5000 [USP'U]/ML
2500 INJECTION INTRAVENOUS; SUBCUTANEOUS EVERY 6 HOURS
Refills: 0 | Status: DISCONTINUED | OUTPATIENT
Start: 2024-05-26 | End: 2024-05-30

## 2024-05-26 RX ORDER — ACETAMINOPHEN 500 MG
1000 TABLET ORAL ONCE
Refills: 0 | Status: COMPLETED | OUTPATIENT
Start: 2024-05-26 | End: 2024-05-26

## 2024-05-26 RX ORDER — SODIUM CHLORIDE 9 MG/ML
1000 INJECTION, SOLUTION INTRAVENOUS
Refills: 0 | Status: DISCONTINUED | OUTPATIENT
Start: 2024-05-26 | End: 2024-05-27

## 2024-05-26 RX ORDER — NOREPINEPHRINE BITARTRATE/D5W 8 MG/250ML
0.05 PLASTIC BAG, INJECTION (ML) INTRAVENOUS
Qty: 8 | Refills: 0 | Status: DISCONTINUED | OUTPATIENT
Start: 2024-05-26 | End: 2024-05-26

## 2024-05-26 RX ORDER — LIDOCAINE HCL 20 MG/ML
100 VIAL (ML) INJECTION ONCE
Refills: 0 | Status: COMPLETED | OUTPATIENT
Start: 2024-05-26 | End: 2024-05-26

## 2024-05-26 RX ORDER — AMIODARONE HYDROCHLORIDE 400 MG/1
1 TABLET ORAL
Qty: 450 | Refills: 0 | Status: DISCONTINUED | OUTPATIENT
Start: 2024-05-26 | End: 2024-05-27

## 2024-05-26 RX ORDER — DEXTROSE 50 % IN WATER 50 %
25 SYRINGE (ML) INTRAVENOUS ONCE
Refills: 0 | Status: DISCONTINUED | OUTPATIENT
Start: 2024-05-26 | End: 2024-05-27

## 2024-05-26 RX ORDER — HEPARIN SODIUM 5000 [USP'U]/ML
INJECTION INTRAVENOUS; SUBCUTANEOUS
Qty: 25000 | Refills: 0 | Status: DISCONTINUED | OUTPATIENT
Start: 2024-05-26 | End: 2024-05-30

## 2024-05-26 RX ORDER — INSULIN LISPRO 100/ML
3 VIAL (ML) SUBCUTANEOUS
Refills: 0 | Status: DISCONTINUED | OUTPATIENT
Start: 2024-05-26 | End: 2024-06-06

## 2024-05-26 RX ORDER — INSULIN GLARGINE 100 [IU]/ML
9 INJECTION, SOLUTION SUBCUTANEOUS AT BEDTIME
Refills: 0 | Status: DISCONTINUED | OUTPATIENT
Start: 2024-05-26 | End: 2024-06-12

## 2024-05-26 RX ORDER — HEPARIN SODIUM 5000 [USP'U]/ML
5500 INJECTION INTRAVENOUS; SUBCUTANEOUS EVERY 6 HOURS
Refills: 0 | Status: DISCONTINUED | OUTPATIENT
Start: 2024-05-26 | End: 2024-05-30

## 2024-05-26 RX ORDER — LEVOTHYROXINE SODIUM 125 MCG
36 TABLET ORAL AT BEDTIME
Refills: 0 | Status: DISCONTINUED | OUTPATIENT
Start: 2024-05-26 | End: 2024-05-30

## 2024-05-26 RX ORDER — DEXTROSE 50 % IN WATER 50 %
12.5 SYRINGE (ML) INTRAVENOUS ONCE
Refills: 0 | Status: DISCONTINUED | OUTPATIENT
Start: 2024-05-26 | End: 2024-05-27

## 2024-05-26 RX ORDER — PROPOFOL 10 MG/ML
20 INJECTION, EMULSION INTRAVENOUS
Qty: 1000 | Refills: 0 | Status: DISCONTINUED | OUTPATIENT
Start: 2024-05-26 | End: 2024-05-27

## 2024-05-26 RX ORDER — METOPROLOL TARTRATE 50 MG
5 TABLET ORAL ONCE
Refills: 0 | Status: COMPLETED | OUTPATIENT
Start: 2024-05-26 | End: 2024-05-26

## 2024-05-26 RX ORDER — GLUCAGON INJECTION, SOLUTION 0.5 MG/.1ML
1 INJECTION, SOLUTION SUBCUTANEOUS ONCE
Refills: 0 | Status: DISCONTINUED | OUTPATIENT
Start: 2024-05-26 | End: 2024-06-01

## 2024-05-26 RX ORDER — CHLORHEXIDINE GLUCONATE 213 G/1000ML
15 SOLUTION TOPICAL EVERY 12 HOURS
Refills: 0 | Status: DISCONTINUED | OUTPATIENT
Start: 2024-05-26 | End: 2024-05-27

## 2024-05-26 RX ORDER — NOREPINEPHRINE BITARTRATE/D5W 8 MG/250ML
0.45 PLASTIC BAG, INJECTION (ML) INTRAVENOUS
Qty: 8 | Refills: 0 | Status: DISCONTINUED | OUTPATIENT
Start: 2024-05-26 | End: 2024-05-28

## 2024-05-26 RX ADMIN — PANTOPRAZOLE SODIUM 40 MILLIGRAM(S): 20 TABLET, DELAYED RELEASE ORAL at 12:09

## 2024-05-26 RX ADMIN — CHLORHEXIDINE GLUCONATE 1 APPLICATION(S): 213 SOLUTION TOPICAL at 05:52

## 2024-05-26 RX ADMIN — Medication 80 MILLIGRAM(S): at 17:58

## 2024-05-26 RX ADMIN — SODIUM CHLORIDE 500 MILLILITER(S): 9 INJECTION, SOLUTION INTRAVENOUS at 00:04

## 2024-05-26 RX ADMIN — AMIODARONE HYDROCHLORIDE 618 MILLIGRAM(S): 400 TABLET ORAL at 18:00

## 2024-05-26 RX ADMIN — AMIODARONE HYDROCHLORIDE 618 MILLIGRAM(S): 400 TABLET ORAL at 17:47

## 2024-05-26 RX ADMIN — Medication 100 MILLIGRAM(S): at 16:42

## 2024-05-26 RX ADMIN — MIDAZOLAM HYDROCHLORIDE 2 MILLIGRAM(S): 1 INJECTION, SOLUTION INTRAMUSCULAR; INTRAVENOUS at 17:59

## 2024-05-26 RX ADMIN — Medication 3: at 00:02

## 2024-05-26 RX ADMIN — CLOPIDOGREL BISULFATE 75 MILLIGRAM(S): 75 TABLET, FILM COATED ORAL at 12:19

## 2024-05-26 RX ADMIN — INSULIN GLARGINE 9 UNIT(S): 100 INJECTION, SOLUTION SUBCUTANEOUS at 21:39

## 2024-05-26 RX ADMIN — HEPARIN SODIUM 1200 UNIT(S)/HR: 5000 INJECTION INTRAVENOUS; SUBCUTANEOUS at 11:01

## 2024-05-26 RX ADMIN — CHLORHEXIDINE GLUCONATE 15 MILLILITER(S): 213 SOLUTION TOPICAL at 19:41

## 2024-05-26 RX ADMIN — Medication 81 MILLIGRAM(S): at 12:20

## 2024-05-26 RX ADMIN — HEPARIN SODIUM 1000 UNIT(S)/HR: 5000 INJECTION INTRAVENOUS; SUBCUTANEOUS at 19:40

## 2024-05-26 RX ADMIN — Medication 36 MICROGRAM(S): at 21:38

## 2024-05-26 RX ADMIN — ACETAZOLAMIDE 105 MILLIGRAM(S): 250 TABLET ORAL at 00:36

## 2024-05-26 RX ADMIN — Medication 2: at 12:09

## 2024-05-26 RX ADMIN — PROPOFOL 7.86 MICROGRAM(S)/KG/MIN: 10 INJECTION, EMULSION INTRAVENOUS at 19:34

## 2024-05-26 RX ADMIN — PIPERACILLIN AND TAZOBACTAM 25 GRAM(S): 4; .5 INJECTION, POWDER, LYOPHILIZED, FOR SOLUTION INTRAVENOUS at 13:40

## 2024-05-26 RX ADMIN — Medication 5 MILLIGRAM(S): at 17:58

## 2024-05-26 RX ADMIN — HEPARIN SODIUM 0 UNIT(S)/HR: 5000 INJECTION INTRAVENOUS; SUBCUTANEOUS at 17:41

## 2024-05-26 RX ADMIN — HEPARIN SODIUM 1000 UNIT(S)/HR: 5000 INJECTION INTRAVENOUS; SUBCUTANEOUS at 18:44

## 2024-05-26 RX ADMIN — ACETAZOLAMIDE 105 MILLIGRAM(S): 250 TABLET ORAL at 10:37

## 2024-05-26 RX ADMIN — PIPERACILLIN AND TAZOBACTAM 25 GRAM(S): 4; .5 INJECTION, POWDER, LYOPHILIZED, FOR SOLUTION INTRAVENOUS at 21:38

## 2024-05-26 RX ADMIN — PIPERACILLIN AND TAZOBACTAM 25 GRAM(S): 4; .5 INJECTION, POWDER, LYOPHILIZED, FOR SOLUTION INTRAVENOUS at 05:53

## 2024-05-26 RX ADMIN — Medication 1: at 05:53

## 2024-05-26 NOTE — PROCEDURE NOTE - NSTRACHPOSTINTU_RESP_A_CORE
Appropriate capnography/Breath sounds bilateral/Breath sounds equal/Chest excursion noted/Positive end tidal Co2 noted
Appropriate capnography/Breath sounds bilateral/Breath sounds equal/Positive end tidal Co2 noted

## 2024-05-26 NOTE — PROGRESS NOTE ADULT - SUBJECTIVE AND OBJECTIVE BOX
Patient is a 78y old  Male who presents with a chief complaint of Chest Pain / SOB (25 May 2024 14:04)      BRIEF HOSPITAL COURSE: 79 y/o M with a h/o CAD (PCI/CODI x s/p 19, brachytherapy 11/2023), 4V CABG, ICM, HFrEF EF 37%, s/p ICD, DM2 on insulin, 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 Recently had Holter monitor placement for PVC burden which was noted to have worsened the day of presentation. In ED, patient weak appearing, ruled in for NSTEMI with positive troponin, and labs remarkable for RAS on CKD as well as hypoglycemia. A TTE revealed acute on chronic systolic heart failure with EF <20%. He was admitted to telemetry for treatment of ACS on DAPT and heparin gtt, planned for LHC.      Events last 24 hours: Patient suffered VF/VT cardiac arrest yesterday and was taken for emergent LHC where he was found to have 3/4 occluded grafts, only LIMA to LAD was patent. No intervention performed. He remains dependent on IV vasopressor support. ICD was interrogated and device inappropriately binned VT as SVT and did not shock, now reprogrammed by EP. Extubated successfully this morning.        PAST MEDICAL & SURGICAL HISTORY:  GERD (gastroesophageal reflux disease)      High cholesterol      Coronary artery disease involving coronary bypass graft of native heart with unstable angina pectoris      Coronary artery disease involving native coronary artery of native heart, angina presence unspecifie      Type 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, without long-term current use of insul      Essential hypertension      HFrEF (heart failure with reduced ejection fraction)      Stage 4 chronic kidney disease      LBBB (left bundle branch block)      Facial nerve palsy      Hypothyroidism      S/P CABG (coronary artery bypass graft)  4V 1983 [place]      Status post open reduction with internal fixation of fracture      History of insertion of stent into coronary artery bypass graft      History of brachytherapy          Review of Systems:  CONSTITUTIONAL: No fever, chills, or fatigue  EYES: No eye pain, visual disturbances, or discharge  ENMT:  No difficulty hearing, tinnitus, vertigo; No sinus or throat pain  NECK: No pain or stiffness  RESPIRATORY: No cough, wheezing, chills or hemoptysis; No shortness of breath  CARDIOVASCULAR: No chest pain, palpitations, dizziness, or leg swelling  GASTROINTESTINAL: No abdominal or epigastric pain. No nausea, vomiting, or hematemesis; No diarrhea or constipation. No melena or hematochezia.  GENITOURINARY: No dysuria, frequency, hematuria, or incontinence  NEUROLOGICAL: No headaches, memory loss, loss of strength, numbness, or tremors  SKIN: No itching, burning, rashes, or lesions   MUSCULOSKELETAL: No joint pain or swelling; No muscle, back, or extremity pain  PSYCHIATRIC: No depression, anxiety, mood swings, or difficulty sleeping      Medications:  piperacillin/tazobactam IVPB.. 3.375 Gram(s) IV Intermittent every 8 hours    acetaZOLAMIDE  IVPB 250 milliGRAM(s) IV Intermittent every 8 hours  aMIOdarone Infusion 0.5 mG/Min IV Continuous <Continuous>  phenylephrine    Infusion 3 MICROgram(s)/kG/Min IV Continuous <Continuous>  ranolazine 500 milliGRAM(s) Oral two times a day      fentaNYL    Injectable 50 MICROGram(s) IV Push every 2 hours PRN  fentaNYL   Infusion. 0.5 MICROgram(s)/kG/Hr IV Continuous <Continuous>  propofol Infusion 20 MICROgram(s)/kG/Min IV Continuous <Continuous>      aspirin enteric coated 81 milliGRAM(s) Oral daily  clopidogrel Tablet 75 milliGRAM(s) Oral daily    pantoprazole  Injectable 40 milliGRAM(s) IV Push daily      atorvastatin 80 milliGRAM(s) Oral at bedtime  dextrose 50% Injectable 25 Gram(s) IV Push once  dextrose 50% Injectable 25 Gram(s) IV Push once  dextrose 50% Injectable 12.5 Gram(s) IV Push once  dextrose Oral Gel 15 Gram(s) Oral once  glucagon  Injectable 1 milliGRAM(s) IntraMuscular once  insulin lispro (ADMELOG) corrective regimen sliding scale   SubCutaneous every 6 hours  levothyroxine Injectable 36 MICROGram(s) IV Push at bedtime    dextrose 10% Bolus 125 milliLiter(s) IV Bolus once  dextrose 5%. 1000 milliLiter(s) IV Continuous <Continuous>  dextrose 5%. 1000 milliLiter(s) IV Continuous <Continuous>  sodium chloride 0.9% lock flush 10 milliLiter(s) IV Push every 1 hour PRN      chlorhexidine 0.12% Liquid 15 milliLiter(s) Oral Mucosa every 12 hours  chlorhexidine 2% Cloths 1 Application(s) Topical <User Schedule>  chlorhexidine 4% Liquid 1 Application(s) Topical <User Schedule>        Mode: CPAP with PS  FiO2: 35  PEEP: 6  PS: 6  MAP: 10  PIP: 18      ICU Vital Signs Last 24 Hrs  T(C): 37 (26 May 2024 05:00), Max: 37.3 (25 May 2024 19:15)  T(F): 98.6 (26 May 2024 05:00), Max: 99.1 (25 May 2024 19:15)  HR: 74 (26 May 2024 05:00) (69 - 110)  BP: 103/59 (26 May 2024 05:00) (71/54 - 128/76)  BP(mean): 74 (26 May 2024 05:00) (60 - 95)  ABP: --  ABP(mean): --  RR: 24 (26 May 2024 05:00) (11 - 24)  SpO2: 100% (26 May 2024 05:00) (97% - 100%)    O2 Parameters below as of 26 May 2024 04:37    O2 Flow (L/min): 8  O2 Concentration (%): 40        ABG - ( 25 May 2024 23:49 )  pH, Arterial: 7.580 pH, Blood: x     /  pCO2: 35    /  pO2: 175   / HCO3: 33    / Base Excess: 10.9  /  SaO2: 97.3                I&O's Detail    24 May 2024 07:01  -  25 May 2024 07:00  --------------------------------------------------------  IN:    Heparin Infusion: 104.5 mL    Oral Fluid: 240 mL  Total IN: 344.5 mL    OUT:  Total OUT: 0 mL    Total NET: 344.5 mL      25 May 2024 07:01  -  26 May 2024 05:21  --------------------------------------------------------  IN:    Amiodarone: 199.8 mL    Amiodarone: 167 mL    FentaNYL: 46.2 mL    IV PiggyBack: 100 mL    IV PiggyBack: 50 mL    IV PiggyBack: 250 mL    Lactated Ringers Bolus: 250 mL    Norepinephrine: 12 mL    Phenylephrine: 651.7 mL    Propofol: 200 mL  Total IN: 1926.7 mL    OUT:    Indwelling Catheter - Urethral (mL): 585 mL  Total OUT: 585 mL    Total NET: 1341.7 mL            LABS:                        11.3   11.86 )-----------( 203      ( 26 May 2024 03:30 )             33.9     05-26    138  |  99  |  61.8<H>  ----------------------------<  169<H>  4.0   |  30.0<H>  |  2.26<H>    Ca    8.0<L>      26 May 2024 03:30  Phos  3.8     05-26  Mg     2.6     05-26    TPro  5.8<L>  /  Alb  2.9<L>  /  TBili  0.5  /  DBili  x   /  AST  135<H>  /  ALT  139<H>  /  AlkPhos  86  05-26      CARDIAC MARKERS ( 25 May 2024 10:21 )  x     / x     / 69 U/L / x     / x          CAPILLARY BLOOD GLUCOSE      POCT Blood Glucose.: 344 mg/dL (25 May 2024 17:51)    PT/INR - ( 26 May 2024 03:30 )   PT: 12.5 sec;   INR: 1.13 ratio         PTT - ( 25 May 2024 10:21 )  PTT:76.8 sec  Urinalysis Basic - ( 26 May 2024 03:30 )    Color: x / Appearance: x / SG: x / pH: x  Gluc: 169 mg/dL / Ketone: x  / Bili: x / Urobili: x   Blood: x / Protein: x / Nitrite: x   Leuk Esterase: x / RBC: x / WBC x   Sq Epi: x / Non Sq Epi: x / Bacteria: x      CULTURES:        Physical Examination:    General: No acute distress.  Alert, oriented, interactive, nonfocal    HEENT: Pupils equal, reactive to light.  Symmetric.    PULM: Clear to auscultation bilaterally, no significant sputum production    CVS: Regular rate and rhythm, no murmurs, rubs, or gallops    ABD: Soft, nondistended, nontender, normoactive bowel sounds, no masses    EXT: No edema, nontender    SKIN: Warm and well perfused, no rashes noted.    NEURO: A&Ox3, strength 5/5 all extremities, cranial nerves grossly intact, no focal deficits        RADIOLOGY:     < from: Xray Chest 1 View- PORTABLE-Urgent (Xray Chest 1 View- PORTABLE-Urgent .) (05.25.24 @ 11:06) >  Heart size normal. Sternotomy, left-sided defibrillator, and left   coronary stent again noted.    There is an infiltrate emanating off the right upper hilum and a smaller   left perihilar infiltrate. Infiltrates are new since May 23. In addition   an endotracheal tube is been inserted.    IMPRESSION: Intubation. Developing perihilar infiltrates right greater   than left. Stable cardiac findings.    < end of copied text >          CRITICAL CARE TIME SPENT: 45 mins  Time spent evaluating/treating patient with medical issues that pose a high risk for life threatening deterioration and/or end-organ damage, reviewing data/labs/imaging, discussing case with multidisciplinary team, discussing plan/goals of care with patient/family. Non-inclusive of procedure time. The date of entry of this note reflects the date of services rendered.

## 2024-05-26 NOTE — PROGRESS NOTE ADULT - PROBLEM SELECTOR PLAN 1
- pt had witnessed cardiac arrest   - pt developed VT rate of 200 on telemetry prompting evaluation by RNs and nearby medicine attending   - When cardiology arrived, pt was still talking and appeared to briefly have his HR return to 105. Upon the second run of VT, pt lost consciousness and code blue was called   - Despite initial VT, when defibrillator pads were placed on pt for shock, no shock was advised and VT was no longer noted. Code was ran as PEA arrest.   - Pt received epinephrine, sodium bicarb, amiodarone, lidocaine. Also was intubated and placed on ventilator.   - Pt was emergently taken to cath lab and cath was performed by Dr. Strong revealing patent LIMA-LAD, but occluded SVG-Circ, all other grafts/vessels noted to be occluded: %, %, Circ 100%, %. [place]-LAD is fillng LCX/RCA through collateral circulation  - Due to the extensive disease present, there was no intervention performed and recommendation from interventional attending was for medical therapy   - continue ASA, plavix, ranexa, lipitor, amiodarone   - GDMT is limited by hypotension/ CKD

## 2024-05-26 NOTE — PROCEDURE NOTE - ADDITIONAL PROCEDURE DETAILS
#7.5 ETT placed with Glidescope assistance, #4 curved blade. First pass success achieved, ETT visualized directly between vocal cords. Appropriate capnography appreciated, with B/L breath sounds.      Andrew Willoughby MD, [facility]  , Pulmonary & Critical Care Medicine  [facility]  Pulmonary and Sleep Medicine at [place]  [address]  [place], [place] [address]  T: [phone]  F: [phone]

## 2024-05-26 NOTE — PROGRESS NOTE ADULT - SUBJECTIVE AND OBJECTIVE BOX
[facility]                                                         CARDIOLOGY AT [place] [address]                                                         Telephone: [phone]. Fax:[phone]                                                                             PROGRESS NOTE    Chief Complaint upon presentation to hospital: NSTEMI   Initial reason for Consult: NSTEMI   Reason for follow up: VTACH Arrest, CODE STEMI 5/25/24, now critically ill in ICU but improving.     Review of symptoms:   Cardiac:  No chest pain. No dyspnea. No palpitations.  Respiratory: no cough. No dyspnea  Gastrointestinal: No diarrhea. No abdominal pain. No bleeding.   Neuro: No focal neuro complaints.    Vitals:  T(C): 37.7 (05-26-24 @ 12:45), Max: 37.7 (05-26-24 @ 12:00)  HR: 87 (05-26-24 @ 12:45) (69 - 89)  BP: 106/62 (05-26-24 @ 12:30) (81/58 - 113/65)  RR: 24 (05-26-24 @ 12:45) (12 - 27)  SpO2: 100% (05-26-24 @ 12:45) (96% - 100%)  Wt(kg): --  I&O's Summary    25 May 2024 07:01  -  26 May 2024 07:00  --------------------------------------------------------  IN: 2034.1 mL / OUT: 680 mL / NET: 1354.1 mL    26 May 2024 07:01  -  26 May 2024 14:32  --------------------------------------------------------  IN: 284.2 mL / OUT: 190 mL / NET: 94.2 mL      Weight (kg): 65.5 (05-23 @ 15:04)    PHYSICAL EXAM:  Appearance: Comfortable. No acute distress  HEENT:  Atraumatic. Normocephalic.  Normal oral mucosa  Neurologic: A & O x 3, no gross focal deficits.  Cardiovascular: RRR S1 S2, No murmur, no rubs/gallops. No JVD  Respiratory: Lungs clear to auscultation, unlabored   Gastrointestinal:  Soft, Non-tender, + BS  Lower Extremities: 2+ Peripheral Pulses, No clubbing, cyanosis, or edema  Psychiatry: Patient is calm. No agitation.   Skin: warm and dry.    CURRENT CARDIAC MEDICATIONS:  aMIOdarone Infusion 0.5 mG/Min IV Continuous <Continuous>  phenylephrine    Infusion 3 MICROgram(s)/kG/Min IV Continuous <Continuous>  ranolazine 500 milliGRAM(s) Oral two times a day      CURRENT OTHER MEDICATIONS:  piperacillin/tazobactam IVPB.. 3.375 Gram(s) IV Intermittent every 8 hours  pantoprazole  Injectable 40 milliGRAM(s) IV Push daily  atorvastatin 80 milliGRAM(s) Oral at bedtime  dextrose 50% Injectable 25 Gram(s) IV Push once, Stop order after: 1 Doses  dextrose 50% Injectable 25 Gram(s) IV Push once, Stop order after: 1 Doses  dextrose 50% Injectable 12.5 Gram(s) IV Push once, Stop order after: 1 Doses  dextrose 50% Injectable 12.5 Gram(s) IV Push once, Stop order after: 1 Doses  dextrose 50% Injectable 25 Gram(s) IV Push once, Stop order after: 1 Doses  dextrose Oral Gel 15 Gram(s) Oral once, Stop order after: 1 Doses  dextrose Oral Gel 15 Gram(s) Oral once, Stop order after: 1 Doses PRN Blood Glucose LESS THAN 70 milliGRAM(s)/deciliter  glucagon  Injectable 1 milliGRAM(s) IntraMuscular once, Stop order after: 1 Doses  glucagon  Injectable 1 milliGRAM(s) IntraMuscular once, Stop order after: 1 Doses  insulin glargine Injectable (LANTUS) 9 Unit(s) SubCutaneous at bedtime  insulin lispro (ADMELOG) corrective regimen sliding scale   SubCutaneous every 6 hours  insulin lispro Injectable (ADMELOG) 3 Unit(s) SubCutaneous three times a day before meals  levothyroxine Injectable 36 MICROGram(s) IV Push at bedtime  aspirin enteric coated 81 milliGRAM(s) Oral daily  chlorhexidine 2% Cloths 1 Application(s) Topical <User Schedule>  clopidogrel Tablet 75 milliGRAM(s) Oral daily  dextrose 10% Bolus 125 milliLiter(s) IV Bolus once, Stop order after: 1 Doses  dextrose 10% Bolus 125 milliLiter(s) IV Bolus once, Stop order after: 1 Doses  dextrose 5%. 1000 milliLiter(s) (100 mL/Hr) IV Continuous <Continuous>  dextrose 5%. 1000 milliLiter(s) (50 mL/Hr) IV Continuous <Continuous>  dextrose 5%. 1000 milliLiter(s) (50 mL/Hr) IV Continuous <Continuous>  dextrose 5%. 1000 milliLiter(s) (100 mL/Hr) IV Continuous <Continuous>  heparin   Injectable 5500 Unit(s) IV Push every 6 hours PRN For aPTT less than 40  heparin   Injectable 2500 Unit(s) IV Push every 6 hours PRN For aPTT between 40 - 57  heparin  Infusion.  Unit(s)/Hr (12 mL/Hr) IV Continuous <Continuous>  sodium chloride 0.9% lock flush 10 milliLiter(s) IV Push every 1 hour PRN Pre/post blood products, medications, blood draw, and to maintain line patency      LABS:	 	  ( 25 May 2024 10:21 )  Troponin T  X    ,  CPK  69   , CKMB  X    , BNP X                                  11.3   11.86 )-----------( 203      ( 26 May 2024 03:30 )             33.9     05-26    135  |  94<L>  |  57.9<H>  ----------------------------<  256<H>  4.1   |  26.0  |  2.31<H>    Ca    8.2<L>      26 May 2024 12:08  Phos  4.3     05-26  Mg     2.5     05-26    TPro  6.4<L>  /  Alb  3.2<L>  /  TBili  0.7  /  DBili  x   /  AST  100<H>  /  ALT  130<H>  /  AlkPhos  95  05-26    PT/INR/PTT ( 26 May 2024 03:30 )                       :                       :      12.5         :       28.4                  .        .                   .              .           .       1.13        .                                       Lipid Profile:   HgA1c:   TSH:

## 2024-05-26 NOTE — PROGRESS NOTE ADULT - ASSESSMENT
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 presented to the ED with c/o chest tightness    RAS on CKD suspect stage III  Some improvement in serum Cr 4.2--> 3.4 --> 2.3 --> 2.2  Base Cr seems to be approx 1.4- 1.8  Entresto, Torsemide and Farxiga on hold  Will have to restart diuretics soon  s/p gentle hydration   TTE 5/23 noted LVEF < 20%  BNP 21 K  HYpoKalemia - better  s/p emergent L HC renal functio remains stable post cath    Renally dose meds  Avoid nephrotoxic agents  Monitor labs will follow

## 2024-05-26 NOTE — PROGRESS NOTE ADULT - ASSESSMENT
77 y/o male never smoker with history of CAD, STEMI, NSTEMI, 4V CABG, multiple PCI's of the SVG to the OM with stents and brachytherapy, cardiogenic shock, HFrEF with admission for decompensated HF with multi organ system failure, DM2 on long term insulin, 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, HTN, HLD who presents to [facility] ED with chest discomfort since Tuesday after Holter monitor placement for PVC burden which has worsened today with radiation to left arm, SOB and dizziness/lightheadedness. He called the office and was told to come in for further evaluation. In ED, patient weak appearing, and pending labs. Denies back pain, headache, SOB, JOINER, diaphoresis, syncope or N/V.

## 2024-05-26 NOTE — PROGRESS NOTE ADULT - ASSESSMENT
79 y/o M with a h/o CAD (PCI/CODI x s/p 19, brachytherapy 11/2023), 4V CABG, ICM, HFrEF EF 37%, s/p ICD, DM2 on insulin, 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, HTN, HLD, with:    # VF/VT cardiac arrest  # Mixed distributive/cardiogenic shock  # Acute systolic heart failure  # NSTEMI  # New onset AF with RVR  # RAS on CKD    - s/p emergent LHC which revealed 3/4 occluded grafts, only LIMA --> LAD was patent, no intervention performed  - ICD interrogated and reprogrammed by EP as VT was incorrectly sensed as SVT thus delivering no shock, also noted to have new onset AF  - maintain amiodarone infusion @ 0.5 mg/min for now to prevent arrhythmia recurrence  - check aPTT now and restart heparin infusion  - TTE showed reduction in LVEF < 20% prior to cardiac arrest, expect some degree of post-arrest myocardial dysfunction  - actively titrating phenylephrine infusion to maintain a MAP > 65, low threshold to switch to norepi for added inotropy  - started on Diamox and RR/tidal volume reduced for mixed resp/metabolic alkalosis  - successfully extubated this morning after passing spontaneous breathing trial  - RAS on CKD likely cardiorenal in nature, optimize end-organ perfusion as above  - trend BUN/Cr, electrolytes, acid-base balance, monitor hourly UOP (nonoliguric)  - no indication for urgent RRT at this time    Case discussed with the patient's family at the bedside. Diagnosis, prognosis, and management plan outlined. All questions answered and concerns addressed.    Case discussed with MICU physician, Dr. Barreto.

## 2024-05-26 NOTE — PROGRESS NOTE ADULT - SUBJECTIVE AND OBJECTIVE BOX
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 Recently had Holter monitor placement for PVC burden which was noted to have worsen the day of presentation. In ED, patient weak appearing, ruled in for NSTEMI with positive troponin, and labs remarkable for RAS on CKD as well as hypoglycemia. A TTE revealed acute on chronic systolic heart failure with EF <20%. He was admitted to telemetry for treatment of ACS on DAPT and heparin gtt, planned for LHC on Tuesday allowing for RAS to improve prior.   Patient suffered a witnessed VT cardiac arrest, ROSC achieved and patient intubated at that time. CODE STEMI was called and patient was taken for emergent cardiac catheterization, all vasculature was occluded with the exception of patent LIMA-LAD graft with collateral flow to RCA and LCx. Post procedurally transferred back to ICU intubated, in cardiogenic shock on Norepinephrine and Phenylephrine, on Amiodarone infusion for arrhythmia suppression.    EVENTS:   Patient was extubated early morning. Remained in shock with improving vasopressor requirements.  Called to bedside by RN for evaluation of tachycardia HR initially 120's. Upon arrival at bedside, patient in sustained monomorphic VT at 180 bpm despite amiodarone, no shocks delivered by CRT-D.   Given Amiodarone 150mg IVPB load, amiodarone infusion increased to 1mg/min, given Lidocaine 100mg IV push bolus and started on Lidocaine infusion  Heart rate slowed to 120-140's and rhythm changed to what appeared to be AFib RVR with aberrancy with periods of NSVT  EP Dr. Turpin called to bedside, interrogated and manipulated CRT-D. On assessment appeared that driving rhythm was SVT, therefore decision was made to give Metoprolol 5mg IV push  Labs were repeated, VBG was obtained with normal pH and lactate.     He had worsening shock and Phenylephrine was changed to Norepinephrine for inotropic support and to decrease afterload  Patient had persistently worsening shock, pulse became thready, and was given 1 amp epinephrine  Patient then developed an acute change in mental status, agonal respirations, and lost pulse. CODE BLUE was called and CPR was initiated.   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 Recently had Holter monitor placement for PVC burden which was noted to have worsen the day of presentation. In ED, patient weak appearing, ruled in for NSTEMI with positive troponin, and labs remarkable for RAS on CKD as well as hypoglycemia. A TTE revealed acute on chronic systolic heart failure with EF <20%. He was admitted to telemetry for treatment of ACS on DAPT and heparin gtt, planned for LHC on Tuesday allowing for RAS to improve prior.   Patient suffered a witnessed VT cardiac arrest, ROSC achieved and patient intubated at that time. CODE STEMI was called and patient was taken for emergent cardiac catheterization, all vasculature was occluded with the exception of patent LIMA-LAD graft with collateral flow to RCA and LCx. Post procedurally transferred back to ICU intubated, in cardiogenic shock on Norepinephrine and Phenylephrine, on Amiodarone infusion for arrhythmia suppression.    EVENTS:   Patient was extubated early morning. Remained in shock with improving vasopressor requirements.  Called to bedside by RN for evaluation of tachycardia HR initially 120's. Upon arrival at bedside, patient in sustained monomorphic VT at 180 bpm despite amiodarone, no shocks delivered by CRT-D.   Given Amiodarone 150mg IVPB load, amiodarone infusion increased to 1mg/min, given Lidocaine 100mg IV push bolus and started on Lidocaine infusion  Heart rate slowed to 120-140's and rhythm changed to what appeared to be AFib RVR with aberrancy with periods of NSVT  EP Dr. Turpin called to bedside, interrogated and reprogrammed CRT-D. On assessment appeared that driving rhythm was SVT, therefore decision was made to give Metoprolol 5mg IV push  Labs were repeated, VBG was obtained with normal pH and lactate.     He had worsening shock and Phenylephrine was changed to Norepinephrine for inotropic support and to decrease afterload  Patient had persistently worsening shock, pulse became thready, and was given 1 amp epinephrine  Patient then developed an acute change in mental status, agonal respirations, and lost pulse. CODE BLUE was called and CPR was initiated.   ROSC was achieved with ~1 minute of CPR  Patient was responsive post-arrest, answering questions but lethargic and with significant shock, therefore given additional epinephrine push and intubated for airway protection    PLAN:   Suspected acute decompensated systolic heart failure, cardiogenic shock with acute pulmonary edema  Worsening metabolic lactic acidosis post-arrest    Intubated on full vent support, significantly hypoxemic on PEEP +16 and 100% Fio2  Diuresing with Lasix 80mg IV   Post-intubation ABG repeated, titrated down PEEP to +8  Hemodynamic support with Norepinephrine gtt, actively titrating to targeted MAP goal >65  Monitoring dynamic end points of perfusion  Continue arrhythmia suppression with Amiodarone infusion at 1mg/min and Lidocaine at 1mg/min       HPI: 77 y/o male never smoker with history of CAD, STEMI, NSTEMI, 4V CABG, multiple PCIs with reportedly 19 prior stents and brachytherapy 11/2023, HFrEF EF 37% s/p ICD, DM2 on insulin, 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 Recently had Holter monitor placement for PVC burden which was noted to have worsen the day of presentation. In ED, patient weak appearing, ruled in for NSTEMI with positive troponin, and labs remarkable for RAS on CKD as well as hypoglycemia. A TTE revealed acute on chronic systolic heart failure with EF <20%. He was admitted to telemetry for treatment of ACS on DAPT and heparin gtt, planned for LHC on Tuesday allowing for RAS to improve prior.   Patient suffered a witnessed VT cardiac arrest, ROSC achieved and patient intubated at that time. CODE STEMI was called and patient was taken for emergent cardiac catheterization, all vasculature was occluded with the exception of patent LIMA-LAD graft with collateral flow to RCA and LCx. Post procedurally transferred back to ICU intubated, in cardiogenic shock on Norepinephrine and Phenylephrine, on Amiodarone infusion for arrhythmia suppression.    EVENTS:   Patient was extubated early morning. Remained in shock with improving vasopressor requirements.  Called to bedside by RN for evaluation of tachycardia HR initially 120's. Upon arrival at bedside, patient in sustained monomorphic VT at 180 bpm despite amiodarone, no shocks delivered by CRT-D.   Given Amiodarone 150mg IVPB load, amiodarone infusion increased to 1mg/min, given Lidocaine 100mg IV push bolus and started on Lidocaine infusion  Heart rate slowed to 120-140's and rhythm changed to what appeared to be AFib RVR with aberrancy with periods of NSVT  EP Dr. Turpin called to bedside, interrogated and reprogrammed CRT-D. On assessment appeared that driving rhythm was SVT, therefore decision was made to give Metoprolol 5mg IV push  Labs were repeated, VBG was obtained with normal pH and lactate.     He had worsening shock and Phenylephrine was changed to Norepinephrine for inotropic support and to decrease afterload  Patient had persistently worsening shock, pulse became thready, and was given 1 amp epinephrine  Patient then developed an acute change in mental status, agonal respirations, and lost pulse. CODE BLUE was called and CPR was initiated.   ROSC was achieved with ~1 minute of CPR  Patient was responsive post-arrest, answering questions but lethargic and with significant shock, therefore given additional epinephrine push and intubated for airway protection    PLAN:   Suspected acute decompensated systolic heart failure, cardiogenic shock with acute pulmonary edema  Worsening metabolic lactic acidosis post-arrest    Intubated on full vent support, significantly hypoxemic on PEEP +16 and 100% Fio2  Diuresing with Lasix 80mg IV   Post-intubation ABG repeated, titrated down PEEP to +8  Hemodynamic support with Norepinephrine gtt, actively titrating to targeted MAP goal >65  Monitoring dynamic end points of perfusion  Continue arrhythmia suppression with Amiodarone infusion at 1mg/min and Lidocaine at 1mg/min    Goals of care addressed with many of patient's family members including his sons by myself and ICU Attending Dr. Willoughby. We discussed acute critical illness, recurrent cardiac arrhythmias leading to second cardiac arrest despite vasopressors and antiarrhythmics and ICD, now again intubated. Unfortunately, patient suffers from end-stage ischemic cardiomyopathy leading to recurrent malignant arrhythmias leading to recurrent cardiac death in the setting of severe multivessel CAD, acute on chronic systolic heart failure with severely reduced EF, and ventricular scar from prior MI. Prognosis is poor. Family would like time to deliberate and discuss amongst themselves.      CRITICAL CARE TIME SPENT: additional 98 minutes assessing presenting problems of acute critical illness, which pose high probability of life threatening deterioration or end organ damage/dysfunction, requiring frequent bedside reassessments and adjustments to plan of care, including medical decision making, initiating plan of care, reviewing data, reviewing radiologic exams, discussing with multidisciplinary team, discussing goals of care. Critical Care time is non-inclusive of independent time spent on procedures performed. Date of note entry represents date of services rendered.

## 2024-05-26 NOTE — PROGRESS NOTE ADULT - SUBJECTIVE AND OBJECTIVE BOX
NEPHROLOGY INTERVAL HPI/OVERNIGHT EVENTS:    Examined earlier  Family at bedside  in ICU now --> cardiac arrest yesterday and was taken for emergent LHC  denies HA CP no SOB    MEDICATIONS  (STANDING):  acetaZOLAMIDE  IVPB 250 milliGRAM(s) IV Intermittent every 8 hours  aMIOdarone Infusion 0.5 mG/Min (16.7 mL/Hr) IV Continuous <Continuous>  aspirin enteric coated 81 milliGRAM(s) Oral daily  atorvastatin 80 milliGRAM(s) Oral at bedtime  chlorhexidine 2% Cloths 1 Application(s) Topical <User Schedule>  clopidogrel Tablet 75 milliGRAM(s) Oral daily  dextrose 10% Bolus 125 milliLiter(s) IV Bolus once  dextrose 5%. 1000 milliLiter(s) (100 mL/Hr) IV Continuous <Continuous>  dextrose 5%. 1000 milliLiter(s) (50 mL/Hr) IV Continuous <Continuous>  dextrose 50% Injectable 25 Gram(s) IV Push once  dextrose 50% Injectable 25 Gram(s) IV Push once  dextrose 50% Injectable 12.5 Gram(s) IV Push once  dextrose Oral Gel 15 Gram(s) Oral once  glucagon  Injectable 1 milliGRAM(s) IntraMuscular once  insulin lispro (ADMELOG) corrective regimen sliding scale   SubCutaneous every 6 hours  levothyroxine Injectable 36 MICROGram(s) IV Push at bedtime  pantoprazole  Injectable 40 milliGRAM(s) IV Push daily  phenylephrine    Infusion 3 MICROgram(s)/kG/Min (73.7 mL/Hr) IV Continuous <Continuous>  piperacillin/tazobactam IVPB.. 3.375 Gram(s) IV Intermittent every 8 hours  ranolazine 500 milliGRAM(s) Oral two times a day    MEDICATIONS  (PRN):  sodium chloride 0.9% lock flush 10 milliLiter(s) IV Push every 1 hour PRN Pre/post blood products, medications, blood draw, and to maintain line patency      Allergies    No Known Allergies    Intolerances    DOPamine (Hypotension)      Vital Signs Last 24 Hrs  T(C): 37.4 (26 May 2024 09:45), Max: 37.4 (26 May 2024 09:45)  T(F): 99.3 (26 May 2024 09:45), Max: 99.3 (26 May 2024 09:45)  HR: 85 (26 May 2024 09:45) (69 - 85)  BP: 103/57 (26 May 2024 09:45) (71/54 - 113/65)  BP(mean): 72 (26 May 2024 09:45) (60 - 95)  RR: 24 (26 May 2024 09:45) (11 - 27)  SpO2: 99% (26 May 2024 09:45) (96% - 100%)    Parameters below as of 26 May 2024 04:37    O2 Flow (L/min): 8  O2 Concentration (%): 40  Daily     Daily     PHYSICAL EXAM:  GENERAL: appears chronically ill  HEAD:  Atraumatic, Normocephalic  EYES: EOMI  NECK: Supple  NERVOUS SYSTEM:  Alert & Oriented X3  CHEST/LUNG: Clear to percussion bilaterally  HEART: Regular rate and rhythm  ABDOMEN: Soft, Nontender, Nondistended; +BS  EXTREMITIES: no edema      LABS:                        11.3   11.86 )-----------( 203      ( 26 May 2024 03:30 )             33.9     05-26    138  |  99  |  61.8<H>  ----------------------------<  169<H>  4.0   |  30.0<H>  |  2.26<H>    Ca    8.0<L>      26 May 2024 03:30  Phos  3.8     05-26  Mg     2.6     05-26    TPro  5.8<L>  /  Alb  2.9<L>  /  TBili  0.5  /  DBili  x   /  AST  135<H>  /  ALT  139<H>  /  AlkPhos  86  05-26    PT/INR - ( 26 May 2024 03:30 )   PT: 12.5 sec;   INR: 1.13 ratio         PTT - ( 26 May 2024 03:30 )  PTT:28.4 sec  Urinalysis Basic - ( 26 May 2024 03:30 )    Color: x / Appearance: x / SG: x / pH: x  Gluc: 169 mg/dL / Ketone: x  / Bili: x / Urobili: x   Blood: x / Protein: x / Nitrite: x   Leuk Esterase: x / RBC: x / WBC x   Sq Epi: x / Non Sq Epi: x / Bacteria: x      Magnesium: 2.6 mg/dL (05-26 @ 03:30)  Phosphorus: 3.8 mg/dL (05-26 @ 03:30)  Magnesium: 2.5 mg/dL (05-25 @ 20:25)  Phosphorus: 1.7 mg/dL (05-25 @ 20:25)  Magnesium: 2.8 mg/dL (05-25 @ 10:21)  Phosphorus: 2.6 mg/dL (05-25 @ 10:21)    ABG - ( 25 May 2024 23:49 )  pH, Arterial: 7.580 pH, Blood: x     /  pCO2: 35    /  pO2: 175   / HCO3: 33    / Base Excess: 10.9  /  SaO2: 97.3                  RADIOLOGY & ADDITIONAL TESTS:

## 2024-05-26 NOTE — PROCEDURE NOTE - ADDITIONAL PROCEDURE DETAILS
indications: cardiogenic shock, cardiac arrest, acute on chronic systolic heart failure, ventricular tachycardia, SVT    procedural time is independent of separately documented critical care time.

## 2024-05-26 NOTE — PROGRESS NOTE ADULT - SUBJECTIVE AND OBJECTIVE BOX
Electrophysiology Attending Follow Up Note    Subjective: This morning felt well, no significant arrhythmias. At 3:30 PM patient went into WCT, at times with regularization. Device interrogation showed TCL of 340 msec and ATP appeared to have terminated the arrhythmia. Arrhythmia started with atrial arrhythmias and was irregular but then organized. Eventually patient developed AMS and hypotension with loss of pulses, c/w PEA, required brief CPR but then had ROSC. Given Epi and intubated for airway support.    TELE: AS-, at ~3:30 PM patient went into WCT, at times irregular.    MEDICATIONS  (STANDING):  acetaminophen   IVPB .. 1000 milliGRAM(s) IV Intermittent once  aMIOdarone Infusion 1 mG/Min (33.3 mL/Hr) IV Continuous <Continuous>  aMIOdarone IVPB 150 milliGRAM(s) IV Intermittent once  aspirin enteric coated 81 milliGRAM(s) Oral daily  atorvastatin 80 milliGRAM(s) Oral at bedtime  chlorhexidine 0.12% Liquid 15 milliLiter(s) Oral Mucosa every 12 hours  chlorhexidine 2% Cloths 1 Application(s) Topical <User Schedule>  clopidogrel Tablet 75 milliGRAM(s) Oral daily  dextrose 10% Bolus 125 milliLiter(s) IV Bolus once  dextrose 10% Bolus 125 milliLiter(s) IV Bolus once  dextrose 5%. 1000 milliLiter(s) (100 mL/Hr) IV Continuous <Continuous>  dextrose 5%. 1000 milliLiter(s) (50 mL/Hr) IV Continuous <Continuous>  dextrose 5%. 1000 milliLiter(s) (50 mL/Hr) IV Continuous <Continuous>  dextrose 5%. 1000 milliLiter(s) (100 mL/Hr) IV Continuous <Continuous>  dextrose 50% Injectable 12.5 Gram(s) IV Push once  dextrose 50% Injectable 25 Gram(s) IV Push once  dextrose 50% Injectable 25 Gram(s) IV Push once  dextrose 50% Injectable 25 Gram(s) IV Push once  dextrose 50% Injectable 12.5 Gram(s) IV Push once  dextrose Oral Gel 15 Gram(s) Oral once  furosemide   Injectable 80 milliGRAM(s) IV Push once  glucagon  Injectable 1 milliGRAM(s) IntraMuscular once  glucagon  Injectable 1 milliGRAM(s) IntraMuscular once  heparin  Infusion.  Unit(s)/Hr (12 mL/Hr) IV Continuous <Continuous>  insulin glargine Injectable (LANTUS) 9 Unit(s) SubCutaneous at bedtime  insulin lispro (ADMELOG) corrective regimen sliding scale   SubCutaneous every 6 hours  insulin lispro Injectable (ADMELOG) 3 Unit(s) SubCutaneous three times a day before meals  levothyroxine Injectable 36 MICROGram(s) IV Push at bedtime  lidocaine   Infusion 1 mG/Min (7.5 mL/Hr) IV Continuous <Continuous>  lidocaine 2% Syringe 100 milliGRAM(s) IV Push once  norepinephrine Infusion 0.05 MICROgram(s)/kG/Min (6.14 mL/Hr) IV Continuous <Continuous>  norepinephrine Infusion 0.45 MICROgram(s)/kG/Min (55.3 mL/Hr) IV Continuous <Continuous>  pantoprazole  Injectable 40 milliGRAM(s) IV Push daily  phenylephrine    Infusion 3 MICROgram(s)/kG/Min (73.7 mL/Hr) IV Continuous <Continuous>  piperacillin/tazobactam IVPB.. 3.375 Gram(s) IV Intermittent every 8 hours  ranolazine 500 milliGRAM(s) Oral two times a day    MEDICATIONS  (PRN):  dextrose Oral Gel 15 Gram(s) Oral once PRN Blood Glucose LESS THAN 70 milliGRAM(s)/deciliter  heparin   Injectable 5500 Unit(s) IV Push every 6 hours PRN For aPTT less than 40  heparin   Injectable 2500 Unit(s) IV Push every 6 hours PRN For aPTT between 40 - 57  sodium chloride 0.9% lock flush 10 milliLiter(s) IV Push every 1 hour PRN Pre/post blood products, medications, blood draw, and to maintain line patency      Allergies    No Known Allergies    Intolerances    DOPamine (Hypotension)      Vital Signs Last 24 Hrs  T(C): 37.9 (26 May 2024 15:45), Max: 37.9 (26 May 2024 15:45)  T(F): 100.2 (26 May 2024 15:45), Max: 100.2 (26 May 2024 15:45)  HR: 70 (26 May 2024 17:30) (69 - 89)  BP: 114/62 (26 May 2024 15:45) (82/55 - 114/62)  BP(mean): 78 (26 May 2024 15:45) (64 - 88)  RR: 29 (26 May 2024 15:45) (12 - 31)  SpO2: 89% (26 May 2024 17:30) (89% - 100%)    Parameters below as of 26 May 2024 04:37    O2 Flow (L/min): 8  O2 Concentration (%): 40    Physical Exam:  Constitutional: Thin, in no acute distress  Head: normocephalic atraumatic   Eyes:  extraocular movements intact, sclera anicteric  ENT: mucous membranes moist, pharynx no erythema or swelling  Chest wall: normal in appearance, nontender to palpation  Resp: effort normal, breath sounds clear to auscultation bilaterally  Cardiac: Heart regular rate and rhythm, no murmurs, rubs, or gallops.  No edema.  Abdomen: soft, nondistended, bowel sounds active, nontender to palpation in all four quadrants    Musculoskeletal: full range of motion all extremities with no pain, tenderness, swelling, or erythema    Neuro: Alert and oriented x 3, motor & sensation grossly in tact  Skin: color normal, no rashes or injury  Psych: mood calm    LABS:                        12.6   18.20 )-----------( 189      ( 26 May 2024 16:50 )             38.0     05-26    134<L>  |  94<L>  |  56.4<H>  ----------------------------<  252<H>  4.1   |  24.0  |  2.29<H>    Ca    8.3<L>      26 May 2024 16:52  Phos  4.3     05-26  Mg     2.6     05-26    TPro  6.5<L>  /  Alb  3.1<L>  /  TBili  0.7  /  DBili  x   /  AST  90<H>  /  ALT  120<H>  /  AlkPhos  94  05-26    PT/INR - ( 26 May 2024 03:30 )   PT: 12.5 sec;   INR: 1.13 ratio         PTT - ( 26 May 2024 16:50 )  PTT:189.3 sec  Urinalysis Basic - ( 26 May 2024 16:52 )    Color: x / Appearance: x / SG: x / pH: x  Gluc: 252 mg/dL / Ketone: x  / Bili: x / Urobili: x   Blood: x / Protein: x / Nitrite: x   Leuk Esterase: x / RBC: x / WBC x   Sq Epi: x / Non Sq Epi: x / Bacteria: x        RADIOLOGY & ADDITIONAL TESTS:

## 2024-05-26 NOTE — PROGRESS NOTE ADULT - ASSESSMENT
77yo male with history of HTN, HLD, DM, CKD, CAD s/p 4V CABG and multiple PCI's, ischemic cardiomyopathy (EF < 20%), and LBBB s/p MDT CRTD. Pt with recent admission to [facility] on 11/20/23-11/22/23 for decompensated HF with multi-organ system failure.  LHC at that time revealed a patent LIMA with all other grafts closed. No intervention was performed. He underwent a [employer] CRT-D implant with Dr. Bazzi in 12/2023. Recent device interrogation revealed normal function but effective CRT only 87.5%. A 1 week Holter was placed to assess PVC burden.     He now presented to [facility] ED with a two day history of chest discomfort with radiation to left arm and associated dizziness, lightheadedness. In ED, ruled in for NSTEMI with positive troponin (228, 427, 379), RAS on CKD, and hypoglycemia. A TTE revealed acute on chronic systolic heart failure with EF <20%. He was admitted to telemetry for treatment of ACS on DAPT and heparin gtt, planned for LHC on Tuesday allowing for RAS to improve prior. CODE BLUE activated for VF cardiac arrest on 5/25. LHC urgently performed and revealed  % stenosis,  % stenosis,  %,  % stenosis. SVG graft to Circumflex: occluded.  LIMA graft to LAD visually normal in size and structure. LCx/RCA fills via collaterals. Pt upgraded to MICU - on Lido and Amiodarone infusion.     Device interrogation revealed episode started with AF with RVR which then degenerated to MMVT with a cycle length of 300ms. The device initially identified the rhythm as AF, then sinus tach (inappropriately) and then SVT (inappropriately) based on wavelet. Eventually it failed to match wavelet and did provide appropriate ATP. Device was reprogrammed to allow for better VT discrimination: Wavelet turned off. SVT V limit changed from 260ms to 280ms and stability turned on (40ms).     #Monomorphic VT:  -  msec, slower PVCs show RBLS axis V6 transition (suggesting basal-mid inferoseptal exit); on AADs,  msec (no clear ECG of VT seen)  - Continue Amiodarone 1mg/min   - Continue Lidocaine 1mg/min  - Monitor LFTs  - Cath with no targets for intervention  - Likely scar-mediated VT from ischemic CMY precipitated by AT/AF with RVR  - Would be ideal to temporize patient on oral medications but if he has ongoing storm, may have to consider ablation  - Continue GDMT of HFrEF    #HFrEF:  - Continue GDMT  - OptiVol elevation for past month suggestive of progressive CHF    #Paroxysmal AF/AFL/AT  - 6% AT/AF burden on device, all occurring recently  - CHADS2-VASc of 6 (CHF, HTN, DM, Agex2, CAD) - start anticoagulation whenever acceptable  - AT at varying TCL appears to precipitate VT, continue Amiodarone 1mg/min IV to help maintain NSR    #ICD:  - Device inappropriately binned VT as SVT  - Device reprogrammed with OR Logic changes - sinus tach off, wavelet off, SVT limit increased to 280 msec, stability turned on  - Effective BiV pacing only 83%, will try to optimize -- suspect improvement on AADs to suppress ventricular arrhythmias  - VT zone lowered to 164 bpm    Discussed with ICU team.    Skip Turpin MD  Clinical Cardiac Electrophysiology

## 2024-05-26 NOTE — PROGRESS NOTE ADULT - NS ATTEND AMEND GEN_ALL_CORE FT
Patient was seen this morning was awake alert and extubated no chest pain or shortness of breath   On 5/25/2024: Patient went into VT arrest this morning after 3 minutes of ACLS (Epi and Amiodarone) ROSC was achieved and taken STAT to cath lab and found to have occluded SVG graft to the OM with patent LIMA to the LAD   s/p intubation and sedation   On the telemetry patient was in VT, with ATP delivered but no shocks, BiV CRT-D was interrogated by EP  AF with RVR which then degenerated to MMVT with a cycle length of 300ms. The device initially identified the rhythm as AF, then sinus tach (inappropriately) and then SVT (inappropriately) based on wavelet. Eventually it failed to match wavelet and did provide appropriate ATP which terminated VT. Device was reprogrammed to allow for better VT discrimination: Wavelet turned off. SVT V limit changed from 260ms to 280ms and stability turned on (40ms).     79yo male with history of HTN, HLD, DM, CKD, CAD s/p 4V CABG and multiple PCI's, ischemic cardiomyopathy (EF < 20%), and LBBB s/p MDT CRTD. Pt with recent admission to [facility] on 11/20/23-11/22/23 for decompensated HF with multi-organ system failure.  LHC at that time revealed a patent LIMA with all other grafts closed. No intervention was performed. He underwent a Medtronic CRT-D implant with Dr. Bazzi in 12/2023. Recent device interrogation revealed normal function but effective CRT only 87.5%. A 1 week Holter was placed to assess PVC burden.     Monomorphic VT possibly scar mediated   - s/p cardiac arrest- intubated and now extubated   -- Pt was emergently taken to cath lab and cath was performed by Dr. Strong revealing patent LIMA-LAD, but occluded SVG-Circ, all other grafts/vessels noted to be occluded: %, %, Circ 100%, %. Lima-LAD is filling LCX/RCA through collateral circulation; no targets for intervention   - was weaned off of Lidocane and currently on Amiodarone   - follow up with EP     New onset AF, paroxsymal -  6% AT/AF burden on device, all occurring recently  - CHADS2-VASc of 6 (CHF, HTN, DM, Agex2, CAD) - start anticoagulation whenever acceptable    Ischemic cardiomyopathy / CAD  LIMA-LAD, but occluded SVG-Circ, all other grafts/vessels noted to be occluded: %, %, Circ 100%, %. Lima-LAD is filling LCX/RCA through collateral circulation  - continue with ASA and Plavix   - continue with Ranexa 500mg po BID, as patient is weaned off of pressor support would start BB   - repeat limited echo shows worsening EF <20% with global hypokinesis, mod AS  - GDMT limited due to renal function, but will need PRN diuresis. Avoid IV hydration.    Ev Kapoor D.O. [facility]  Cardiology/Vascular Cardiology -[facility] Cardiology   Telephone # [phone].

## 2024-05-27 LAB
ALBUMIN SERPL ELPH-MCNC: 2.8 G/DL — LOW (ref 3.3–5.2)
ALP SERPL-CCNC: 102 U/L — SIGNIFICANT CHANGE UP (ref 40–120)
ALT FLD-CCNC: 440 U/L — HIGH
ANION GAP SERPL CALC-SCNC: 17 MMOL/L — SIGNIFICANT CHANGE UP (ref 5–17)
APTT BLD: 65.9 SEC — HIGH (ref 24.5–35.6)
APTT BLD: 74 SEC — HIGH (ref 24.5–35.6)
AST SERPL-CCNC: 502 U/L — HIGH
BASOPHILS # BLD AUTO: 0.04 K/UL — SIGNIFICANT CHANGE UP (ref 0–0.2)
BASOPHILS NFR BLD AUTO: 0.3 % — SIGNIFICANT CHANGE UP (ref 0–2)
BILIRUB SERPL-MCNC: 0.8 MG/DL — SIGNIFICANT CHANGE UP (ref 0.4–2)
BUN SERPL-MCNC: 59.8 MG/DL — HIGH (ref 8–20)
CALCIUM SERPL-MCNC: 8.1 MG/DL — LOW (ref 8.4–10.5)
CHLORIDE SERPL-SCNC: 95 MMOL/L — LOW (ref 96–108)
CO2 SERPL-SCNC: 23 MMOL/L — SIGNIFICANT CHANGE UP (ref 22–29)
CREAT SERPL-MCNC: 2.41 MG/DL — HIGH (ref 0.5–1.3)
EGFR: 27 ML/MIN/1.73M2 — LOW
EOSINOPHIL # BLD AUTO: 0.27 K/UL — SIGNIFICANT CHANGE UP (ref 0–0.5)
EOSINOPHIL NFR BLD AUTO: 2 % — SIGNIFICANT CHANGE UP (ref 0–6)
GLUCOSE BLDC GLUCOMTR-MCNC: 200 MG/DL — HIGH (ref 70–99)
GLUCOSE BLDC GLUCOMTR-MCNC: 203 MG/DL — HIGH (ref 70–99)
GLUCOSE BLDC GLUCOMTR-MCNC: 204 MG/DL — HIGH (ref 70–99)
GLUCOSE BLDC GLUCOMTR-MCNC: 216 MG/DL — HIGH (ref 70–99)
GLUCOSE BLDC GLUCOMTR-MCNC: 348 MG/DL — HIGH (ref 70–99)
GLUCOSE BLDC GLUCOMTR-MCNC: 367 MG/DL — HIGH (ref 70–99)
GLUCOSE BLDC GLUCOMTR-MCNC: 405 MG/DL — HIGH (ref 70–99)
GLUCOSE SERPL-MCNC: 319 MG/DL — HIGH (ref 70–99)
HCT VFR BLD CALC: 34.6 % — LOW (ref 39–50)
HGB BLD-MCNC: 11.6 G/DL — LOW (ref 13–17)
IMM GRANULOCYTES NFR BLD AUTO: 0.7 % — SIGNIFICANT CHANGE UP (ref 0–0.9)
LACTATE SERPL-SCNC: 2.9 MMOL/L — HIGH (ref 0.5–2)
LYMPHOCYTES # BLD AUTO: 1.35 K/UL — SIGNIFICANT CHANGE UP (ref 1–3.3)
LYMPHOCYTES # BLD AUTO: 9.8 % — LOW (ref 13–44)
MAGNESIUM SERPL-MCNC: 2.3 MG/DL — SIGNIFICANT CHANGE UP (ref 1.6–2.6)
MCHC RBC-ENTMCNC: 29.4 PG — SIGNIFICANT CHANGE UP (ref 27–34)
MCHC RBC-ENTMCNC: 33.5 GM/DL — SIGNIFICANT CHANGE UP (ref 32–36)
MCV RBC AUTO: 87.8 FL — SIGNIFICANT CHANGE UP (ref 80–100)
MONOCYTES # BLD AUTO: 0.2 K/UL — SIGNIFICANT CHANGE UP (ref 0–0.9)
MONOCYTES NFR BLD AUTO: 1.5 % — LOW (ref 2–14)
NEUTROPHILS # BLD AUTO: 11.8 K/UL — HIGH (ref 1.8–7.4)
NEUTROPHILS NFR BLD AUTO: 85.7 % — HIGH (ref 43–77)
PHOSPHATE SERPL-MCNC: 4.1 MG/DL — SIGNIFICANT CHANGE UP (ref 2.4–4.7)
PLATELET # BLD AUTO: 173 K/UL — SIGNIFICANT CHANGE UP (ref 150–400)
POTASSIUM SERPL-MCNC: 3.1 MMOL/L — LOW (ref 3.5–5.3)
POTASSIUM SERPL-SCNC: 3.1 MMOL/L — LOW (ref 3.5–5.3)
PROT SERPL-MCNC: 6 G/DL — LOW (ref 6.6–8.7)
RBC # BLD: 3.94 M/UL — LOW (ref 4.2–5.8)
RBC # FLD: 15.4 % — HIGH (ref 10.3–14.5)
SODIUM SERPL-SCNC: 135 MMOL/L — SIGNIFICANT CHANGE UP (ref 135–145)
WBC # BLD: 13.76 K/UL — HIGH (ref 3.8–10.5)
WBC # FLD AUTO: 13.76 K/UL — HIGH (ref 3.8–10.5)

## 2024-05-27 PROCEDURE — 99291 CRITICAL CARE FIRST HOUR: CPT

## 2024-05-27 PROCEDURE — 99233 SBSQ HOSP IP/OBS HIGH 50: CPT

## 2024-05-27 RX ORDER — INSULIN HUMAN 100 [IU]/ML
6 INJECTION, SOLUTION SUBCUTANEOUS ONCE
Refills: 0 | Status: COMPLETED | OUTPATIENT
Start: 2024-05-27 | End: 2024-05-27

## 2024-05-27 RX ORDER — FUROSEMIDE 40 MG
20 TABLET ORAL ONCE
Refills: 0 | Status: COMPLETED | OUTPATIENT
Start: 2024-05-27 | End: 2024-05-27

## 2024-05-27 RX ORDER — AMIODARONE HYDROCHLORIDE 400 MG/1
0.5 TABLET ORAL
Qty: 450 | Refills: 0 | Status: DISCONTINUED | OUTPATIENT
Start: 2024-05-27 | End: 2024-05-30

## 2024-05-27 RX ORDER — POTASSIUM CHLORIDE 20 MEQ
20 PACKET (EA) ORAL ONCE
Refills: 0 | Status: COMPLETED | OUTPATIENT
Start: 2024-05-27 | End: 2024-05-27

## 2024-05-27 RX ADMIN — AMIODARONE HYDROCHLORIDE 33.3 MG/MIN: 400 TABLET ORAL at 05:15

## 2024-05-27 RX ADMIN — HEPARIN SODIUM 1000 UNIT(S)/HR: 5000 INJECTION INTRAVENOUS; SUBCUTANEOUS at 07:56

## 2024-05-27 RX ADMIN — INSULIN HUMAN 6 UNIT(S): 100 INJECTION, SOLUTION SUBCUTANEOUS at 03:04

## 2024-05-27 RX ADMIN — PROPOFOL 7.86 MICROGRAM(S)/KG/MIN: 10 INJECTION, EMULSION INTRAVENOUS at 02:52

## 2024-05-27 RX ADMIN — CHLORHEXIDINE GLUCONATE 15 MILLILITER(S): 213 SOLUTION TOPICAL at 05:07

## 2024-05-27 RX ADMIN — CHLORHEXIDINE GLUCONATE 1 APPLICATION(S): 213 SOLUTION TOPICAL at 05:07

## 2024-05-27 RX ADMIN — PIPERACILLIN AND TAZOBACTAM 25 GRAM(S): 4; .5 INJECTION, POWDER, LYOPHILIZED, FOR SOLUTION INTRAVENOUS at 22:02

## 2024-05-27 RX ADMIN — PIPERACILLIN AND TAZOBACTAM 25 GRAM(S): 4; .5 INJECTION, POWDER, LYOPHILIZED, FOR SOLUTION INTRAVENOUS at 13:48

## 2024-05-27 RX ADMIN — HEPARIN SODIUM 1000 UNIT(S)/HR: 5000 INJECTION INTRAVENOUS; SUBCUTANEOUS at 01:26

## 2024-05-27 RX ADMIN — INSULIN GLARGINE 9 UNIT(S): 100 INJECTION, SOLUTION SUBCUTANEOUS at 22:02

## 2024-05-27 RX ADMIN — Medication 100 MILLIEQUIVALENT(S): at 09:31

## 2024-05-27 RX ADMIN — Medication 55.3 MICROGRAM(S)/KG/MIN: at 05:15

## 2024-05-27 RX ADMIN — Medication 2: at 05:16

## 2024-05-27 RX ADMIN — Medication 20 MILLIGRAM(S): at 13:48

## 2024-05-27 RX ADMIN — Medication 36 MICROGRAM(S): at 22:03

## 2024-05-27 RX ADMIN — Medication 3 UNIT(S): at 06:00

## 2024-05-27 RX ADMIN — PANTOPRAZOLE SODIUM 40 MILLIGRAM(S): 20 TABLET, DELAYED RELEASE ORAL at 12:32

## 2024-05-27 RX ADMIN — Medication 1: at 12:32

## 2024-05-27 RX ADMIN — PIPERACILLIN AND TAZOBACTAM 25 GRAM(S): 4; .5 INJECTION, POWDER, LYOPHILIZED, FOR SOLUTION INTRAVENOUS at 05:07

## 2024-05-27 RX ADMIN — Medication 2: at 18:52

## 2024-05-27 RX ADMIN — Medication 5: at 01:11

## 2024-05-27 NOTE — PROGRESS NOTE ADULT - PROBLEM SELECTOR PLAN 1
- Pt had witnessed cardiac arrest 05/25/24  - pt developed VT rate of 200 on telemetry prompting evaluation by RNs and nearby medicine attending   - When cardiology arrived, pt was still talking and appeared to briefly have his HR return to 105. Upon the second run of VT, pt lost consciousness and code blue was called   - Despite initial VT, when defibrillator pads were placed on pt for shock, no shock was advised and VT was no longer noted. Code was ran as PEA arrest.   - Pt received epinephrine, sodium bicarb, amiodarone, lidocaine. Also was intubated and placed on ventilator.   - Pt was emergently taken to cath lab and cath was performed by Dr. Strong revealing patent LIMA-LAD, but occluded SVG-Circ, all other grafts/vessels noted to be occluded: %, %, Circ 100%, %. Lima-LAD is fillng LCX/RCA through collateral circulation  - Due to the extensive disease present, there was no intervention performed and recommendation from interventional attending was for medical therapy.   - Patient again with PEA arrest yesterday with ROSC achieved after 1 minute of CPR.   - Patient with worsening cardiogenic shock requiring levophed.   - Consider additing milrinone for inotropic support, and weaning off levophed as tolerated.   - Continue ASA, plavix, heparin, ranexa, lipitor, amiodarone, and lidocaine.  - GDMT is limited by hypotension/ CKD. - Pt had witnessed cardiac arrest 05/25/24  - pt developed VT rate of 200 on telemetry prompting evaluation by RNs and nearby medicine attending   - When cardiology arrived, pt was still talking and appeared to briefly have his HR return to 105. Upon the second run of VT, pt lost consciousness and code blue was called   - Despite initial VT, when defibrillator pads were placed on pt for shock, no shock was advised and VT was no longer noted. Code was ran as PEA arrest.   - Pt received epinephrine, sodium bicarb, amiodarone, lidocaine. Also was intubated and placed on ventilator.   - Pt was emergently taken to cath lab and cath was performed by Dr. Strong revealing patent LIMA-LAD, but occluded SVG-Circ, all other grafts/vessels noted to be occluded: %, %, Circ 100%, %. Lima-LAD is fillng LCX/RCA through collateral circulation  - Due to the extensive disease present, there was no intervention performed and recommendation from interventional attending was for medical therapy.   - Patient again with PEA arrest yesterday with ROSC achieved after 1 minute of CPR.   - Patient with worsening cardiogenic shock requiring levophed.   - Consider adding milrinone for inotropic support, and weaning off levophed as tolerated.   - Continue ASA, plavix, heparin, ranexa, lipitor, amiodarone, and lidocaine.  - GDMT is limited by hypotension/ CKD.

## 2024-05-27 NOTE — PROGRESS NOTE ADULT - ASSESSMENT
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  - s/p emergent LHC on 5/25 which revealed 3/4 occluded grafts, only LIMA --> LAD was patent, no intervention performed  - ICD interrogated and reprogrammed by EP on 5/25 as VT was incorrectly sensed as SVT thus delivering no shock, also noted to have new onset AF  - continue heparin infusion  - TTE showed reduction in LVEF < 20% prior to cardiac arrest, expect to have worsened now with probable post-arrest myocardial dysfunction  - actively titrating norepinephrine infusion to maintain a MAP > 65  - actively titrating ventilator settings to maintain SpO2 > 92%  - ABG is indicative of adequate minute ventilation  - RAS on CKD likely cardiorenal in nature, optimize end-organ perfusion as above  - trend BUN/Cr, electrolytes, acid-base balance, monitor hourly UOP (nonoliguric)  - no indication for urgent RRT at this time  - sedate with propofol and fentanyl infusions to promote vent synchrony and comfort  - uncontrolled hyperglycemia, received Lantus, will give low dose SC bedtime coverage and add 6u IV insulin, if no improvement may require insulin infusion    Case discussed with the patient's family at the bedside. Diagnosis, prognosis, and management plan outlined. He has 13 children and his son, Yung, has assumed the role of the point of contact. He has discussed with his family members and family friend, Dr. Jag Campbell (chief of surgery at [facility]), and has elected to instate DNR/DNI advanced directives as they believe Mr. Castellon would not wish to continue undergoing cardiopulmonary resuscitation if his heart were to stop for a third time despite aggressive medical therapies and would also not want to undergo a third intubation (if he is able to liberate from the ventilator this time around).  All questions answered and concerns addressed.    Case discussed with MICU physician, Dr. Willoughby.

## 2024-05-27 NOTE — PROGRESS NOTE ADULT - ASSESSMENT
79 y/o male  with history of CAD, MI's ,  4V CABG, multiple PCI's of the SVG to the OM with stents , HFrEF, DM2 on long term insulin, CKD stage stage 3-4 presented to the ED with c/o chest tightness    Cardiac arrest shock on pressors    RAS on CKD suspect stage III  Some improvement in serum Cr 4.2--> 3.4 --> 2.3 --> 2.2 --> 2.4  Base Cr seems to be approx 1.4- 1.8  Entresto, Torsemide and Farxiga on hold  Will have to restart diuretics soon  TTE 5/23 noted LVEF < 20%  BNP 21 K  HYpoKalemia - supplement  s/p emergent L HC renal functio remains stable post cath    Renally dose meds  Avoid nephrotoxic agents  Monitor labs will follow

## 2024-05-27 NOTE — PROGRESS NOTE ADULT - ASSESSMENT
77yo male with HTN, HLD, DM, CKD, CAD s/p 4V CABG and multiple PCI's, ischemic cardiomyopathy (EF < 20%), and LBBB s/p MDT CRTD. Pt with recent admission to [facility] on 11/20/23-11/22/23 for decompensated HF with multi-organ system failure.  LHC at that time revealed a patent LIMA with all other grafts closed. No intervention was performed. He underwent a Medtronic CRT-D implant with Dr. Bazzi in 12/2023. Recent device interrogation revealed normal function but effective CRT only 87.5%. A 1 week Holter was placed to assess PVC burden.     He now presented to [facility] ED with a two day history of chest discomfort with radiation to left arm and associated dizziness, lightheadedness. In ED, ruled in for NSTEMI with positive troponin (228, 427, 379), RAS on CKD, and hypoglycemia. A TTE revealed acute on chronic systolic heart failure with EF <20%. He was admitted to telemetry for treatment of ACS on DAPT and heparin gtt but developed sustained VT on 5/25 which resolved with Amiodarone and IV Lidocaine. VT was binned as SVT by ICD so was not completely treated. LHC urgently performed and revealed  % stenosis,  % stenosis,  %,  % stenosis. SVG graft to Circumflex: occluded.  LIMA graft to LAD visually normal in size and structure. LCx/RCA fills via collaterals. Pt upgraded to MICU. Initially did well and was extubated. On 5/26 evening again went into rapid wide complex tachcyardia which appeared to be AT with RVR precipitating at times a regular MMVT which responded to ATP therapy. Patient was given additional boluses of Amiodarone, Lidocaine and Metoprolol and eventually had improvement in HR. During arrhythmia patient had hypotension and after arrhythmia resolved had altered mental status requiring intubation. He has remained on IV Lidocaine at 1 mg/min and Amiodarone at 1mg/min.    After arrest on 5/25 ICD reprogrammed to turn off ST & Wavelet and reduce tachy detections to 171. VT on 5/26 occurred at TCL of 340 msec so VT zone reduced to 164 bpm on 5/26.    #Monomorphic VT:  - Initial  msec on 5/25, on 5/26 VT slower at 340 msec, responsive to ATP  - Appears to be precipitated by atrial tachycardia which results in AT with RVR and variable conduction -- adequate AT control should result in adequate VT control  - Continue Amiodarone 1mg/min, lower to 0.5 mg/min on 5/28  - Discontinue Lidocaine 1mg/min  - Monitor LFTs  - Cath with no targets for intervention  - Likely scar-mediated VT from ischemic CMY precipitated by AT/AF with RVR  - Would be ideal to temporize patient on oral medications but if he has ongoing storm, may have to consider ablation  - Continue GDMT of HFrEF    #HFrEF:  - Continue GDMT  - OptiVol elevation for past month suggestive of progressive CHF    #Paroxysmal AF/AFL/AT  - 6% AT/AF burden on device, all occurring recently  - CHADS2-VASc of 6 (CHF, HTN, DM, Agex2, CAD) - start anticoagulation whenever acceptable  - AT at varying TCL appears to precipitate VT, continue Amiodarone 1mg/min IV to help maintain NSR; hopefully with adequate amiodarone load AT burden will reduce  - Increase BB as tolerated by BP  - If AT/AF cannot be controlled by amiodarone, consider AVN ablation    #ICD:  - Device inappropriately binned VT as SVT  - Device reprogrammed with ID Logic changes - sinus tach off, wavelet off, SVT limit increased to 280 msec, stability turned on  - Effective BiV pacing only 83%, will try to optimize -- suspect improvement on AADs to suppress ventricular arrhythmias  - VT zone lowered to 164 bpm on 5/26    Discussed with ICU team.    Skip Turpin MD  Clinical Cardiac Electrophysiology

## 2024-05-27 NOTE — DIETITIAN INITIAL EVALUATION ADULT - OTHER INFO
79 y/o M with a h/o CAD (PCI/CODI x s/p 19, brachytherapy 11/2023), 4V CABG, ICM, HFrEF EF 37%, s/p ICD, DM2 on insulin, 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, HTN, HLD, with:    # VT cardiac arrest x 2  # Mixed distributive/cardiogenic shock  # Acute systolic heart failure  # NSTEMI  # New onset AF with RVR  # RAS on CKD  # Hyperglycemia

## 2024-05-27 NOTE — PROGRESS NOTE ADULT - SUBJECTIVE AND OBJECTIVE BOX
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 Recently had Holter monitor placement for PVC burden which was noted to have worsened the day of presentation. In ED, patient weak appearing, ruled in for NSTEMI with positive troponin, and labs remarkable for RAS on CKD as well as hypoglycemia. A TTE revealed acute on chronic systolic heart failure with EF <20%. He was admitted to telemetry for treatment of ACS on DAPT and heparin gtt, planned for LHC. Patient suffered VF/VT cardiac arrest on 5/25 without ICD shock (subsequently reprogrammed by EP) and was taken for emergent LHC where he was found to have 3/4 occluded grafts, only LIMA to LAD was patent. No intervention performed. Extubated on 5/26, however suffered another VT cardiac arrest later that day (again without ICD shock) with 1 minute downtime prior to ROSC.      Events last 24 hours: Dependent on IV vasopressor support. No further arrhythmias on amiodarone and lidocaine infusions. Family has instated DNR/DNI advanced directives.            PAST MEDICAL & SURGICAL HISTORY:  GERD (gastroesophageal reflux disease)      High cholesterol      Coronary artery disease involving coronary bypass graft of native heart with unstable angina pectoris      Coronary artery disease involving native coronary artery of native heart, angina presence unspecifie      Type 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, without long-term current use of insul      Essential hypertension      HFrEF (heart failure with reduced ejection fraction)      Stage 4 chronic kidney disease      LBBB (left bundle branch block)      Facial nerve palsy      Hypothyroidism      S/P CABG (coronary artery bypass graft)  4V 1983 [place]      Status post open reduction with internal fixation of fracture      History of insertion of stent into coronary artery bypass graft      History of brachytherapy          Review of Systems:  Unable to obtain secondary to sedation/intubation.        Medications:  piperacillin/tazobactam IVPB.. 3.375 Gram(s) IV Intermittent every 8 hours    aMIOdarone Infusion 1 mG/Min IV Continuous <Continuous>  lidocaine   Infusion 1 mG/Min IV Continuous <Continuous>  norepinephrine Infusion 0.45 MICROgram(s)/kG/Min IV Continuous <Continuous>  ranolazine 500 milliGRAM(s) Oral two times a day      propofol Infusion 20 MICROgram(s)/kG/Min IV Continuous <Continuous>      aspirin enteric coated 81 milliGRAM(s) Oral daily  clopidogrel Tablet 75 milliGRAM(s) Oral daily  heparin   Injectable 5500 Unit(s) IV Push every 6 hours PRN  heparin   Injectable 2500 Unit(s) IV Push every 6 hours PRN  heparin  Infusion.  Unit(s)/Hr IV Continuous <Continuous>    pantoprazole  Injectable 40 milliGRAM(s) IV Push daily      atorvastatin 80 milliGRAM(s) Oral at bedtime  dextrose 50% Injectable 25 Gram(s) IV Push once  dextrose 50% Injectable 25 Gram(s) IV Push once  dextrose 50% Injectable 12.5 Gram(s) IV Push once  dextrose 50% Injectable 12.5 Gram(s) IV Push once  dextrose 50% Injectable 25 Gram(s) IV Push once  dextrose Oral Gel 15 Gram(s) Oral once  dextrose Oral Gel 15 Gram(s) Oral once PRN  glucagon  Injectable 1 milliGRAM(s) IntraMuscular once  glucagon  Injectable 1 milliGRAM(s) IntraMuscular once  insulin glargine Injectable (LANTUS) 9 Unit(s) SubCutaneous at bedtime  insulin lispro (ADMELOG) corrective regimen sliding scale   SubCutaneous every 6 hours  insulin lispro Injectable (ADMELOG) 3 Unit(s) SubCutaneous three times a day before meals  levothyroxine Injectable 36 MICROGram(s) IV Push at bedtime    dextrose 10% Bolus 125 milliLiter(s) IV Bolus once  dextrose 10% Bolus 125 milliLiter(s) IV Bolus once  dextrose 5%. 1000 milliLiter(s) IV Continuous <Continuous>  dextrose 5%. 1000 milliLiter(s) IV Continuous <Continuous>  dextrose 5%. 1000 milliLiter(s) IV Continuous <Continuous>  dextrose 5%. 1000 milliLiter(s) IV Continuous <Continuous>  sodium chloride 0.9% lock flush 10 milliLiter(s) IV Push every 1 hour PRN      chlorhexidine 0.12% Liquid 15 milliLiter(s) Oral Mucosa every 12 hours  chlorhexidine 2% Cloths 1 Application(s) Topical <User Schedule>        Mode: AC/ CMV (Assist Control/ Continuous Mandatory Ventilation)  RR (machine): 16  TV (machine): 500  FiO2: 70  PEEP: 8  ITime: 1  MAP: 13  PIP: 24      ICU Vital Signs Last 24 Hrs  T(C): 37.4 (27 May 2024 02:15), Max: 37.9 (26 May 2024 15:45)  T(F): 99.3 (27 May 2024 02:15), Max: 100.2 (26 May 2024 15:45)  HR: 60 (27 May 2024 02:15) (59 - 121)  BP: 103/67 (27 May 2024 02:00) (63/30 - 128/67)  BP(mean): 79 (27 May 2024 02:00) (43 - 94)  ABP: 107/40 (27 May 2024 02:15) (102/50 - 143/63)  ABP(mean): 61 (27 May 2024 02:15) (61 - 88)  RR: 16 (27 May 2024 02:15) (14 - 31)  SpO2: 100% (27 May 2024 01:30) (89% - 100%)    O2 Parameters below as of 27 May 2024 00:00  Patient On (Oxygen Delivery Method): ventilator            ABG - ( 26 May 2024 17:49 )  pH, Arterial: 7.310 pH, Blood: x     /  pCO2: 37    /  pO2: >496  / HCO3: 19    / Base Excess: -7.7  /  SaO2: 98.4                I&O's Detail    25 May 2024 07:01  -  26 May 2024 07:00  --------------------------------------------------------  IN:    Amiodarone: 199.8 mL    Amiodarone: 200.4 mL    FentaNYL: 46.2 mL    IV PiggyBack: 50 mL    IV PiggyBack: 250 mL    IV PiggyBack: 150 mL    Lactated Ringers Bolus: 250 mL    Norepinephrine: 12 mL    Phenylephrine: 675.7 mL    Propofol: 200 mL  Total IN: 2034.1 mL    OUT:    Indwelling Catheter - Urethral (mL): 680 mL  Total OUT: 680 mL    Total NET: 1354.1 mL      26 May 2024 07:01  -  27 May 2024 02:37  --------------------------------------------------------  IN:    Amiodarone: 183.6 mL    Amiodarone: 299.7 mL    Heparin Infusion: 162 mL    IV PiggyBack: 50 mL    IV PiggyBack: 150 mL    Lidocaine: 55.5 mL    Norepinephrine: 95.6 mL    Phenylephrine: 69 mL    Propofol: 76.2 mL  Total IN: 1141.6 mL    OUT:    Indwelling Catheter - Urethral (mL): 1405 mL  Total OUT: 1405 mL    Total NET: -263.4 mL            LABS:                        12.6   18.20 )-----------( 189      ( 26 May 2024 16:50 )             38.0     05-26    134<L>  |  94<L>  |  56.4<H>  ----------------------------<  252<H>  4.1   |  24.0  |  2.29<H>    Ca    8.3<L>      26 May 2024 16:52  Phos  4.3     05-26  Mg     2.6     05-26    TPro  6.5<L>  /  Alb  3.1<L>  /  TBili  0.7  /  DBili  x   /  AST  90<H>  /  ALT  120<H>  /  AlkPhos  94  05-26      CARDIAC MARKERS ( 25 May 2024 10:21 )  x     / x     / 69 U/L / x     / x          CAPILLARY BLOOD GLUCOSE      POCT Blood Glucose.: 367 mg/dL (27 May 2024 00:25)    PT/INR - ( 26 May 2024 03:30 )   PT: 12.5 sec;   INR: 1.13 ratio         PTT - ( 27 May 2024 00:22 )  PTT:65.9 sec  Urinalysis Basic - ( 26 May 2024 16:52 )    Color: x / Appearance: x / SG: x / pH: x  Gluc: 252 mg/dL / Ketone: x  / Bili: x / Urobili: x   Blood: x / Protein: x / Nitrite: x   Leuk Esterase: x / RBC: x / WBC x   Sq Epi: x / Non Sq Epi: x / Bacteria: x      CULTURES:        Physical Examination:    General: No acute distress.  sedated, intubated    HEENT: Pupils equal, reactive to light.  Symmetric.    PULM: Clear to auscultation bilaterally, no significant sputum production    CVS: Regular rate and rhythm, no murmurs, rubs, or gallops    ABD: Soft, nondistended, nontender, normoactive bowel sounds, no masses    EXT: No edema, nontender    SKIN: Warm and well perfused, no rashes noted.    NEURO: sedated, moves all extremities        RADIOLOGY:     < from: Xray Chest 1 View- PORTABLE-Urgent (Xray Chest 1 View- PORTABLE-Urgent .) (05.25.24 @ 11:06) >    Heart size normal. Sternotomy, left-sided defibrillator, and left   coronary stent again noted.    There is an infiltrate emanating off the right upper hilum and a smaller   left perihilar infiltrate. Infiltrates are new since May 23. In addition   an endotracheal tube is been inserted.    IMPRESSION: Intubation. Developing perihilar infiltrates right greater   than left. Stable cardiac findings.    < end of copied text >          CRITICAL CARE TIME SPENT: 47 mins  Time spent evaluating/treating patient with medical issues that pose a high risk for life threatening deterioration and/or end-organ damage, reviewing data/labs/imaging, discussing case with multidisciplinary team, discussing plan/goals of care with patient/family. Non-inclusive of procedure time. The date of entry of this note reflects the date of services rendered.

## 2024-05-27 NOTE — PROGRESS NOTE ADULT - ASSESSMENT
A/P: 79 y/o male never smoker with history of CAD, STEMI, NSTEMI, 4V CABG, multiple PCI's of the SVG to the OM with stents and brachytherapy, cardiogenic shock, HFrEF with admission for decompensated HF with multi organ system failure, DM2 on long term insulin, 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, HTN, HLD who presents to [facility] ED with chest discomfort since Tuesday after Holter monitor placement for PVC burden which has worsened today with radiation to left arm, SOB and dizziness/lightheadedness. He called the office and was told to come in for further evaluation. In ED, patient weak appearing, and pending labs. Denies back pain, headache, SOB, JOINER, diaphoresis, syncope or N/V.    5/27: Patient with another episode of sustained monomorphic VT yesterday despite being on amiodarone gtt, no shocks delivered by CRT-D. Patient given additional Amio bolus and started on lidocaine gtt. Patient also with Afib with RVR vs. SVT, with worsening shock requiring levophed. Patient then with worsening shock with cardiac arrest again requiring CPR with ROSC achieved after 1 minute. Patient intubated and sedated at this time. Still on levophed for shock with worsening transaminitis.

## 2024-05-27 NOTE — DIETITIAN INITIAL EVALUATION ADULT - NS FNS DIET ORDER
Diet, DASH/TLC:   Sodium & Cholesterol Restricted     Special Instructions for Nursing:  Sodium & Cholesterol Restricted (05-25-24 @ 12:15)

## 2024-05-27 NOTE — PROGRESS NOTE ADULT - SUBJECTIVE AND OBJECTIVE BOX
[facility]                                                         CARDIOLOGY AT [place] [address]                                                         Telephone: [phone]. Fax:[phone]                                                                             PROGRESS NOTE    Reason for follow up: STEMI, Cardiac Arrest, Cardiogenic shock  Update: Patient with another episode of sustained monomorphic VT yesterday despite being on amiodarone gtt, no shocks delivered by CRT-D. Patient given additional Amio bolus and started on lidocaine gtt. Patient also with Afib with RVR vs. SVT, with worsening shock requiring levophed. Patient then with worsening shock with cardiac arrest again requiring CPR with ROSC achieved after 1 minute. Patient intubated and sedated at this time. Still on levophed for shock with worsening transaminitis.       Review of symptoms: Unable to obtain      Vitals:  T(C): 37.4 (05-27-24 @ 10:00), Max: 37.9 (05-26-24 @ 15:45)  HR: 66 (05-27-24 @ 10:00) (59 - 121)  BP: 122/65 (05-27-24 @ 09:00) (63/30 - 128/67)  RR: 16 (05-27-24 @ 10:00) (16 - 31)  SpO2: 100% (05-27-24 @ 10:00) (89% - 100%)  Wt(kg): --  I&O's Summary    26 May 2024 07:01  -  27 May 2024 07:00  --------------------------------------------------------  IN: 1885.8 mL / OUT: 1645 mL / NET: 240.8 mL    27 May 2024 07:01  -  27 May 2024 10:53  --------------------------------------------------------  IN: 325.7 mL / OUT: 100 mL / NET: 225.7 mL      Weight (kg): 65.5 (05-23 @ 15:04)    PHYSICAL EXAM:  Appearance: Comfortable. Sedated  HEENT:  Atraumatic. Normocephalic.  Normal oral mucosa  Neurologic: Intubated, sedated  Cardiovascular: RRR S1 S2, No murmur, no rubs/gallops. No JVD  Respiratory: Lungs clear to auscultation, unlabored   Gastrointestinal:  Soft, Non-tender, + BS  Lower Extremities: No clubbing, cyanosis, or edema, extremities cool to the touch  Psychiatry: Patient is calm. No agitation.   Skin: warm and dry.    CURRENT CARDIAC MEDICATIONS:  aMIOdarone Infusion 1 mG/Min IV Continuous <Continuous>  lidocaine   Infusion 1 mG/Min IV Continuous <Continuous>  norepinephrine Infusion 0.45 MICROgram(s)/kG/Min IV Continuous <Continuous>  ranolazine 500 milliGRAM(s) Oral two times a day      CURRENT OTHER MEDICATIONS:  piperacillin/tazobactam IVPB.. 3.375 Gram(s) IV Intermittent every 8 hours  propofol Infusion 20 MICROgram(s)/kG/Min (7.86 mL/Hr) IV Continuous <Continuous>  pantoprazole  Injectable 40 milliGRAM(s) IV Push daily  atorvastatin 80 milliGRAM(s) Oral at bedtime  dextrose 50% Injectable 25 Gram(s) IV Push once, Stop order after: 1 Doses  dextrose 50% Injectable 25 Gram(s) IV Push once, Stop order after: 1 Doses  dextrose 50% Injectable 25 Gram(s) IV Push once, Stop order after: 1 Doses  dextrose 50% Injectable 12.5 Gram(s) IV Push once, Stop order after: 1 Doses  dextrose 50% Injectable 12.5 Gram(s) IV Push once, Stop order after: 1 Doses  dextrose Oral Gel 15 Gram(s) Oral once, Stop order after: 1 Doses  dextrose Oral Gel 15 Gram(s) Oral once, Stop order after: 1 Doses PRN Blood Glucose LESS THAN 70 milliGRAM(s)/deciliter  glucagon  Injectable 1 milliGRAM(s) IntraMuscular once, Stop order after: 1 Doses  glucagon  Injectable 1 milliGRAM(s) IntraMuscular once, Stop order after: 1 Doses  insulin glargine Injectable (LANTUS) 9 Unit(s) SubCutaneous at bedtime  insulin lispro (ADMELOG) corrective regimen sliding scale   SubCutaneous every 6 hours  insulin lispro Injectable (ADMELOG) 3 Unit(s) SubCutaneous three times a day before meals  levothyroxine Injectable 36 MICROGram(s) IV Push at bedtime  aspirin enteric coated 81 milliGRAM(s) Oral daily  chlorhexidine 0.12% Liquid 15 milliLiter(s) Oral Mucosa every 12 hours  chlorhexidine 2% Cloths 1 Application(s) Topical <User Schedule>  clopidogrel Tablet 75 milliGRAM(s) Oral daily  dextrose 10% Bolus 125 milliLiter(s) IV Bolus once, Stop order after: 1 Doses  dextrose 10% Bolus 125 milliLiter(s) IV Bolus once, Stop order after: 1 Doses  dextrose 5%. 1000 milliLiter(s) (50 mL/Hr) IV Continuous <Continuous>  dextrose 5%. 1000 milliLiter(s) (100 mL/Hr) IV Continuous <Continuous>  dextrose 5%. 1000 milliLiter(s) (50 mL/Hr) IV Continuous <Continuous>  dextrose 5%. 1000 milliLiter(s) (100 mL/Hr) IV Continuous <Continuous>  heparin   Injectable 5500 Unit(s) IV Push every 6 hours PRN For aPTT less than 40  heparin   Injectable 2500 Unit(s) IV Push every 6 hours PRN For aPTT between 40 - 57  heparin  Infusion.  Unit(s)/Hr (12 mL/Hr) IV Continuous <Continuous>  sodium chloride 0.9% lock flush 10 milliLiter(s) IV Push every 1 hour PRN Pre/post blood products, medications, blood draw, and to maintain line patency      LABS:	 	  ( 25 May 2024 10:21 )  Troponin T  X    ,  CPK  69   , CKMB  X    , BNP X                                  11.6   13.76 )-----------( 173      ( 27 May 2024 03:15 )             34.6     05-27    135  |  95<L>  |  59.8<H>  ----------------------------<  319<H>  3.1<L>   |  23.0  |  2.41<H>    Ca    8.1<L>      27 May 2024 03:15  Phos  4.1     05-27  Mg     2.3     05-27    TPro  6.0<L>  /  Alb  2.8<L>  /  TBili  0.8  /  DBili  x   /  AST  502<H>  /  ALT  440<H>  /  AlkPhos  102  05-27    PT/INR/PTT ( 27 May 2024 06:58 )                       :                       :      X            :       74.0                  .        .                   .              .           .       X           .                                       Lipid Profile:   HgA1c:   TSH:     TELEMETRY: Yesterday sustained VT, currently AV paced  ECG:    DIAGNOSTIC TESTING:  [ ] Echocardiogram:   < from: TTE Limited W or WO Ultrasound Enhancing Agent (05.23.24 @ 19:54) >  CONCLUSIONS:      1. Left ventricular systolic function is severely decreased with an ejection fraction visually estimated at <20 %. Global left ventricular hypokinesis.   2. Elevated filling pressure.   3. Mildly reduced right ventricular systolic function.   4. Mild to moderate mitral regurgitation.   5. Mild to moderate aortic stenosis.   6. Mild pulmonic regurgitation.   7. Mild tricuspid regurgitation.   8. Estimated pulmonary artery systolic pressure is 75 mmHg, consistent with elevated pulmonary artery pressure.   9. No prior echocardiogram is available for comparison.      < end of copied text >    [ ]  Catheterization:  < from: Cardiac Catheterization (05.25.24 @ 11:11) >  Diagnostic Findings:     Coronary Angiography   The coronary circulation is right dominant.      LM   Left main artery: There is a 100 % stenosis.      LAD   Left anterior descending artery: There is a 100 % stenosis.      CX   Circumflex: There is a 100 % stenosis.      RCA     Patient: ADRIANNA SHANKS              MRN: [id number]  Study Date: 05/25/2024   11:11 AM      Page 1 of 3          Right coronary artery: There is a 100 % stenosis.      Graft Angiography   SVG graft to Circumflex: occluded.    LIMA graft to Mid left anterior descending: The segment is visually  normal in size and structure. fills lcx/rca via collaterals.    Left Heart Cath   LHC performed: Aortic valve crossed and left ventricular pressures  were obtained.      < end of copied text >    [ ] Stress Test:    OTHER:

## 2024-05-27 NOTE — DIETITIAN INITIAL EVALUATION ADULT - NSICDXPASTSURGICALHX_GEN_ALL_CORE_FT
PAST SURGICAL HISTORY:  History of brachytherapy     History of insertion of stent into coronary artery bypass graft     S/P CABG (coronary artery bypass graft) 4V 1983 [place]    Status post open reduction with internal fixation of fracture

## 2024-05-27 NOTE — PROGRESS NOTE ADULT - SUBJECTIVE AND OBJECTIVE BOX
Interval HPI:  no further VT on monitor  awakens and follows commands when sedation lifted    Exam:  intubated  reg rhythm  bilat air entry  abd soft  trace edema    Radiology: no large effusion, +right perihilar atelectasis    On Admission  05-23-24 (4d)  HPI:  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 no SOB , radiates to the shoulder   Holter monitor placement for PVC burden which has worsened today with radiation to left arm, and  dizziness/lightheadedness. He called the office and was told to come in for further evaluation. In ED, patient weak appearing,He is with lower back hip pain was in [facility] ED 2 days ago , hip xray neg for fracture . Pt denies headache, SOB, JOINER, diaphoresis, syncope or N/V   Currently pain free ,tele - 120's   Pt's also c/o poor oral intake , BG is 46 dextrose ordered 1 amp by ED        (23 May 2024 16:13)    PAST MEDICAL & SURGICAL HISTORY:  GERD (gastroesophageal reflux disease)      High cholesterol      Coronary artery disease involving coronary bypass graft of native heart with unstable angina pectoris      Coronary artery disease involving native coronary artery of native heart, angina presence unspecifie      Type 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, without long-term current use of insul      Essential hypertension      HFrEF (heart failure with reduced ejection fraction)      Stage 4 chronic kidney disease      LBBB (left bundle branch block)      Facial nerve palsy      Hypothyroidism      S/P CABG (coronary artery bypass graft)  4V 1983 [place]      Status post open reduction with internal fixation of fracture      History of insertion of stent into coronary artery bypass graft      History of brachytherapy          Antimicrobial:  piperacillin/tazobactam IVPB.. 3.375 Gram(s) IV Intermittent every 8 hours    Cardiovascular:  aMIOdarone Infusion 1 mG/Min IV Continuous <Continuous>  lidocaine   Infusion 1 mG/Min IV Continuous <Continuous>  norepinephrine Infusion 0.45 MICROgram(s)/kG/Min IV Continuous <Continuous>  ranolazine 500 milliGRAM(s) Oral two times a day    Pulmonary:    Hematalogic:  aspirin enteric coated 81 milliGRAM(s) Oral daily  clopidogrel Tablet 75 milliGRAM(s) Oral daily  heparin   Injectable 5500 Unit(s) IV Push every 6 hours PRN  heparin   Injectable 2500 Unit(s) IV Push every 6 hours PRN  heparin  Infusion.  Unit(s)/Hr IV Continuous <Continuous>    Other:  atorvastatin 80 milliGRAM(s) Oral at bedtime  chlorhexidine 0.12% Liquid 15 milliLiter(s) Oral Mucosa every 12 hours  chlorhexidine 2% Cloths 1 Application(s) Topical <User Schedule>  dextrose 10% Bolus 125 milliLiter(s) IV Bolus once  dextrose 10% Bolus 125 milliLiter(s) IV Bolus once  dextrose 5%. 1000 milliLiter(s) IV Continuous <Continuous>  dextrose 5%. 1000 milliLiter(s) IV Continuous <Continuous>  dextrose 5%. 1000 milliLiter(s) IV Continuous <Continuous>  dextrose 5%. 1000 milliLiter(s) IV Continuous <Continuous>  dextrose 50% Injectable 12.5 Gram(s) IV Push once  dextrose 50% Injectable 25 Gram(s) IV Push once  dextrose 50% Injectable 25 Gram(s) IV Push once  dextrose 50% Injectable 12.5 Gram(s) IV Push once  dextrose 50% Injectable 25 Gram(s) IV Push once  dextrose Oral Gel 15 Gram(s) Oral once PRN  dextrose Oral Gel 15 Gram(s) Oral once  glucagon  Injectable 1 milliGRAM(s) IntraMuscular once  glucagon  Injectable 1 milliGRAM(s) IntraMuscular once  insulin glargine Injectable (LANTUS) 9 Unit(s) SubCutaneous at bedtime  insulin lispro (ADMELOG) corrective regimen sliding scale   SubCutaneous every 6 hours  insulin lispro Injectable (ADMELOG) 3 Unit(s) SubCutaneous three times a day before meals  levothyroxine Injectable 36 MICROGram(s) IV Push at bedtime  pantoprazole  Injectable 40 milliGRAM(s) IV Push daily  propofol Infusion 20 MICROgram(s)/kG/Min IV Continuous <Continuous>  sodium chloride 0.9% lock flush 10 milliLiter(s) IV Push every 1 hour PRN      Drug Dosing Weight  Height (cm): 172.7 (23 May 2024 13:26)  Weight (kg): 65.5 (23 May 2024 15:04)  BMI (kg/m2): 22 (23 May 2024 15:04)  BSA (m2): 1.78 (23 May 2024 15:04)    T(C): 37.6 (05-27-24 @ 13:30), Max: 37.9 (05-26-24 @ 15:45)  HR: 68 (05-27-24 @ 13:30)  BP: 122/63 (05-27-24 @ 13:00)  BP(mean): 81 (05-27-24 @ 13:00)  ABP: 130/49 (05-27-24 @ 13:30)  ABP(mean): 76 (05-27-24 @ 13:30)  RR: 25 (05-27-24 @ 13:30)  SpO2: 100% (05-27-24 @ 13:30)    ABG - ( 26 May 2024 17:49 )  pH, Arterial: 7.310 pH, Blood: x     /  pCO2: 37    /  pO2: >496  / HCO3: 19    / Base Excess: -7.7  /  SaO2: 98.4                  05-26 @ 07:01  -  05-27 @ 07:00  --------------------------------------------------------  IN: 1885.8 mL / OUT: 1645 mL / NET: 240.8 mL        Mode: CPAP with PS  FiO2: 40  PEEP: 8  PS: 6  MAP: 12        LABS:  CBC Full  -  ( 27 May 2024 03:15 )  WBC Count : 13.76 K/uL  RBC Count : 3.94 M/uL  Hemoglobin : 11.6 g/dL  Hematocrit : 34.6 %  Platelet Count - Automated : 173 K/uL  Mean Cell Volume : 87.8 fl  Mean Cell Hemoglobin : 29.4 pg  Mean Cell Hemoglobin Concentration : 33.5 gm/dL  Auto Neutrophil # : 11.80 K/uL  Auto Lymphocyte # : 1.35 K/uL  Auto Monocyte # : 0.20 K/uL  Auto Eosinophil # : 0.27 K/uL  Auto Basophil # : 0.04 K/uL  Auto Neutrophil % : 85.7 %  Auto Lymphocyte % : 9.8 %  Auto Monocyte % : 1.5 %  Auto Eosinophil % : 2.0 %  Auto Basophil % : 0.3 %    05-27    135  |  95<L>  |  59.8<H>  ----------------------------<  319<H>  3.1<L>   |  23.0  |  2.41<H>    Ca    8.1<L>      27 May 2024 03:15  Phos  4.1     05-27  Mg     2.3     05-27    TPro  6.0<L>  /  Alb  2.8<L>  /  TBili  0.8  /  DBili  x   /  AST  502<H>  /  ALT  440<H>  /  AlkPhos  102  05-27    PT/INR - ( 26 May 2024 03:30 )   PT: 12.5 sec;   INR: 1.13 ratio         PTT - ( 27 May 2024 06:58 )  PTT:74.0 sec  Urinalysis Basic - ( 27 May 2024 03:15 )    Color: x / Appearance: x / SG: x / pH: x  Gluc: 319 mg/dL / Ketone: x  / Bili: x / Urobili: x   Blood: x / Protein: x / Nitrite: x   Leuk Esterase: x / RBC: x / WBC x   Sq Epi: x / Non Sq Epi: x / Bacteria: x        ____________________________________________________________________________________________________

## 2024-05-27 NOTE — PROGRESS NOTE ADULT - SUBJECTIVE AND OBJECTIVE BOX
Electrophysiology Attending Follow Up Note    Subjective: Intubated. No further arrhythmias.    TELE: AP-    MEDICATIONS  (STANDING):  aMIOdarone Infusion 1 mG/Min (33.3 mL/Hr) IV Continuous <Continuous>  aspirin enteric coated 81 milliGRAM(s) Oral daily  atorvastatin 80 milliGRAM(s) Oral at bedtime  chlorhexidine 0.12% Liquid 15 milliLiter(s) Oral Mucosa every 12 hours  chlorhexidine 2% Cloths 1 Application(s) Topical <User Schedule>  clopidogrel Tablet 75 milliGRAM(s) Oral daily  dextrose 10% Bolus 125 milliLiter(s) IV Bolus once  dextrose 10% Bolus 125 milliLiter(s) IV Bolus once  dextrose 5%. 1000 milliLiter(s) (100 mL/Hr) IV Continuous <Continuous>  dextrose 5%. 1000 milliLiter(s) (50 mL/Hr) IV Continuous <Continuous>  dextrose 5%. 1000 milliLiter(s) (50 mL/Hr) IV Continuous <Continuous>  dextrose 5%. 1000 milliLiter(s) (100 mL/Hr) IV Continuous <Continuous>  dextrose 50% Injectable 25 Gram(s) IV Push once  dextrose 50% Injectable 25 Gram(s) IV Push once  dextrose 50% Injectable 12.5 Gram(s) IV Push once  dextrose 50% Injectable 25 Gram(s) IV Push once  dextrose 50% Injectable 12.5 Gram(s) IV Push once  dextrose Oral Gel 15 Gram(s) Oral once  glucagon  Injectable 1 milliGRAM(s) IntraMuscular once  glucagon  Injectable 1 milliGRAM(s) IntraMuscular once  heparin  Infusion.  Unit(s)/Hr (12 mL/Hr) IV Continuous <Continuous>  insulin glargine Injectable (LANTUS) 9 Unit(s) SubCutaneous at bedtime  insulin lispro (ADMELOG) corrective regimen sliding scale   SubCutaneous every 6 hours  insulin lispro Injectable (ADMELOG) 3 Unit(s) SubCutaneous three times a day before meals  levothyroxine Injectable 36 MICROGram(s) IV Push at bedtime  lidocaine   Infusion 1 mG/Min (7.5 mL/Hr) IV Continuous <Continuous>  norepinephrine Infusion 0.45 MICROgram(s)/kG/Min (55.3 mL/Hr) IV Continuous <Continuous>  pantoprazole  Injectable 40 milliGRAM(s) IV Push daily  piperacillin/tazobactam IVPB.. 3.375 Gram(s) IV Intermittent every 8 hours  propofol Infusion 20 MICROgram(s)/kG/Min (7.86 mL/Hr) IV Continuous <Continuous>  ranolazine 500 milliGRAM(s) Oral two times a day    MEDICATIONS  (PRN):  dextrose Oral Gel 15 Gram(s) Oral once PRN Blood Glucose LESS THAN 70 milliGRAM(s)/deciliter  heparin   Injectable 5500 Unit(s) IV Push every 6 hours PRN For aPTT less than 40  heparin   Injectable 2500 Unit(s) IV Push every 6 hours PRN For aPTT between 40 - 57  sodium chloride 0.9% lock flush 10 milliLiter(s) IV Push every 1 hour PRN Pre/post blood products, medications, blood draw, and to maintain line patency    Allergies  No Known Allergies    Intolerances  DOPamine (Hypotension)    Vital Signs Last 24 Hrs  T(C): 37.6 (27 May 2024 12:00), Max: 37.9 (26 May 2024 15:45)  T(F): 99.7 (27 May 2024 12:00), Max: 100.2 (26 May 2024 15:45)  HR: 73 (27 May 2024 12:00) (59 - 121)  BP: 120/64 (27 May 2024 12:00) (63/30 - 128/67)  BP(mean): 81 (27 May 2024 12:00) (43 - 94)  RR: 23 (27 May 2024 12:00) (14 - 31)  SpO2: 100% (27 May 2024 12:00) (89% - 100%)    Parameters below as of 27 May 2024 04:00  Patient On (Oxygen Delivery Method): ventilator    Physical Exam:  Constitutional: Thin, in no acute distress  Head: normocephalic atraumatic   ENT: mucous membranes moist, intubated  Chest wall: normal in appearance, nontender to palpation  Resp: breath sounds clear to auscultation bilaterally  Cardiac: Heart regular rate and rhythm, no murmurs, rubs, or gallops.  No edema.  Abdomen: soft, nondistended, bowel sounds active, nontender to palpation in all four quadrants    Musculoskeletal: Cool extremities  Neuro: Intubated & sedated  Skin: color normal, no rashes or injury    LABS:                        11.6   13.76 )-----------( 173      ( 27 May 2024 03:15 )             34.6     05-27    135  |  95<L>  |  59.8<H>  ----------------------------<  319<H>  3.1<L>   |  23.0  |  2.41<H>    Ca    8.1<L>      27 May 2024 03:15  Phos  4.1     05-27  Mg     2.3     05-27    TPro  6.0<L>  /  Alb  2.8<L>  /  TBili  0.8  /  DBili  x   /  AST  502<H>  /  ALT  440<H>  /  AlkPhos  102  05-27    PT/INR - ( 26 May 2024 03:30 )   PT: 12.5 sec;   INR: 1.13 ratio         PTT - ( 27 May 2024 06:58 )  PTT:74.0 sec  Urinalysis Basic - ( 27 May 2024 03:15 )    Color: x / Appearance: x / SG: x / pH: x  Gluc: 319 mg/dL / Ketone: x  / Bili: x / Urobili: x   Blood: x / Protein: x / Nitrite: x   Leuk Esterase: x / RBC: x / WBC x   Sq Epi: x / Non Sq Epi: x / Bacteria: x    RADIOLOGY & ADDITIONAL TESTS:  < from: Xray Chest 1 View- PORTABLE-Urgent (Xray Chest 1 View- PORTABLE-Urgent .) (05.26.24 @ 18:08) >    IMPRESSION:  Residual RIGHT perihilar linear airspace disease/atelectasis.  ET tube tip remains above trachea bifurcation.    < end of copied text >    < from: TTE Limited W or WO Ultrasound Enhancing Agent (05.23.24 @ 19:54) >   1. Left ventricular systolic function is severely decreased with an ejection fraction visually estimated at <20 %. Global left ventricular hypokinesis.   2. Elevated filling pressure.   3. Mildly reduced right ventricular systolic function.   4. Mild to moderate mitral regurgitation.   5. Mild to moderate aortic stenosis.   6. Mild pulmonic regurgitation.   7. Mild tricuspid regurgitation.   8. Estimated pulmonary artery systolic pressure is 75 mmHg, consistent with elevated pulmonary artery pressure.   9. No prior echocardiogram is available for comparison.

## 2024-05-27 NOTE — DIETITIAN INITIAL EVALUATION ADULT - ORAL INTAKE PTA/DIET HISTORY
Nutrition assessment completed. Pt intubated/sedated. DASH/TLC diet ordered; pt NPO. HgA1c 6.9%. Per flowsheet, pt received 134.7 ml propofol over the last 24 hrs (provides ~148 kcal); currently infusing at 11.7 ml/hr. RD to follow up as feasible.

## 2024-05-27 NOTE — DIETITIAN INITIAL EVALUATION ADULT - PERTINENT LABORATORY DATA
05-27    135  |  95<L>  |  59.8<H>  ----------------------------<  319<H>  3.1<L>   |  23.0  |  2.41<H>    Ca    8.1<L>      27 May 2024 03:15  Phos  4.1     05-27  Mg     2.3     05-27    TPro  6.0<L>  /  Alb  2.8<L>  /  TBili  0.8  /  DBili  x   /  AST  502<H>  /  ALT  440<H>  /  AlkPhos  102  05-27  POCT Blood Glucose.: 204 mg/dL (05-27-24 @ 05:09)  A1C with Estimated Average Glucose Result: 6.9 % (05-24-24 @ 04:40)  A1C with Estimated Average Glucose Result: 6.8 % (11-21-23 @ 04:55)  A1C with Estimated Average Glucose Result: 6.9 % (11-20-23 @ 10:07)

## 2024-05-27 NOTE — PROGRESS NOTE ADULT - SUBJECTIVE AND OBJECTIVE BOX
NEPHROLOGY INTERVAL HPI/OVERNIGHT EVENTS:    Examined  Remains intubated  On Levophed  Events noted  Son Visiting    MEDICATIONS  (STANDING):  aMIOdarone Infusion 1 mG/Min (33.3 mL/Hr) IV Continuous <Continuous>  aspirin enteric coated 81 milliGRAM(s) Oral daily  atorvastatin 80 milliGRAM(s) Oral at bedtime  chlorhexidine 0.12% Liquid 15 milliLiter(s) Oral Mucosa every 12 hours  chlorhexidine 2% Cloths 1 Application(s) Topical <User Schedule>  clopidogrel Tablet 75 milliGRAM(s) Oral daily  dextrose 10% Bolus 125 milliLiter(s) IV Bolus once  dextrose 10% Bolus 125 milliLiter(s) IV Bolus once  dextrose 5%. 1000 milliLiter(s) (100 mL/Hr) IV Continuous <Continuous>  dextrose 5%. 1000 milliLiter(s) (50 mL/Hr) IV Continuous <Continuous>  dextrose 5%. 1000 milliLiter(s) (50 mL/Hr) IV Continuous <Continuous>  dextrose 5%. 1000 milliLiter(s) (100 mL/Hr) IV Continuous <Continuous>  dextrose 50% Injectable 25 Gram(s) IV Push once  dextrose 50% Injectable 25 Gram(s) IV Push once  dextrose 50% Injectable 12.5 Gram(s) IV Push once  dextrose 50% Injectable 25 Gram(s) IV Push once  dextrose 50% Injectable 12.5 Gram(s) IV Push once  dextrose Oral Gel 15 Gram(s) Oral once  glucagon  Injectable 1 milliGRAM(s) IntraMuscular once  glucagon  Injectable 1 milliGRAM(s) IntraMuscular once  heparin  Infusion.  Unit(s)/Hr (12 mL/Hr) IV Continuous <Continuous>  insulin glargine Injectable (LANTUS) 9 Unit(s) SubCutaneous at bedtime  insulin lispro (ADMELOG) corrective regimen sliding scale   SubCutaneous every 6 hours  insulin lispro Injectable (ADMELOG) 3 Unit(s) SubCutaneous three times a day before meals  levothyroxine Injectable 36 MICROGram(s) IV Push at bedtime  lidocaine   Infusion 1 mG/Min (7.5 mL/Hr) IV Continuous <Continuous>  norepinephrine Infusion 0.45 MICROgram(s)/kG/Min (55.3 mL/Hr) IV Continuous <Continuous>  pantoprazole  Injectable 40 milliGRAM(s) IV Push daily  piperacillin/tazobactam IVPB.. 3.375 Gram(s) IV Intermittent every 8 hours  propofol Infusion 20 MICROgram(s)/kG/Min (7.86 mL/Hr) IV Continuous <Continuous>  ranolazine 500 milliGRAM(s) Oral two times a day    MEDICATIONS  (PRN):  dextrose Oral Gel 15 Gram(s) Oral once PRN Blood Glucose LESS THAN 70 milliGRAM(s)/deciliter  heparin   Injectable 5500 Unit(s) IV Push every 6 hours PRN For aPTT less than 40  heparin   Injectable 2500 Unit(s) IV Push every 6 hours PRN For aPTT between 40 - 57  sodium chloride 0.9% lock flush 10 milliLiter(s) IV Push every 1 hour PRN Pre/post blood products, medications, blood draw, and to maintain line patency      Allergies    No Known Allergies    Intolerances    DOPamine (Hypotension)      Vital Signs Last 24 Hrs  T(C): 37.4 (27 May 2024 10:00), Max: 37.9 (26 May 2024 15:45)  T(F): 99.3 (27 May 2024 10:00), Max: 100.2 (26 May 2024 15:45)  HR: 66 (27 May 2024 10:00) (59 - 121)  BP: 122/65 (27 May 2024 09:00) (63/30 - 128/67)  BP(mean): 83 (27 May 2024 09:00) (43 - 94)  RR: 16 (27 May 2024 10:00) (16 - 31)  SpO2: 100% (27 May 2024 10:00) (89% - 100%)    Parameters below as of 27 May 2024 04:00  Patient On (Oxygen Delivery Method): ventilator    PHYSICAL EXAM:  GENERAL: appears acutely and chronically ill  HEAD:  Intubated  NERVOUS SYSTEM:  sedated  CHEST/LUNG: dec bs anteriorly  HEART: Regular rate and rhythm  ABDOMEN: Soft,  +BS  EXTREMITIES: no edema    LABS:                        11.6   13.76 )-----------( 173      ( 27 May 2024 03:15 )             34.6     05-27    135  |  95<L>  |  59.8<H>  ----------------------------<  319<H>  3.1<L>   |  23.0  |  2.41<H>    Ca    8.1<L>      27 May 2024 03:15  Phos  4.1     05-27  Mg     2.3     05-27    TPro  6.0<L>  /  Alb  2.8<L>  /  TBili  0.8  /  DBili  x   /  AST  502<H>  /  ALT  440<H>  /  AlkPhos  102  05-27    PT/INR - ( 26 May 2024 03:30 )   PT: 12.5 sec;   INR: 1.13 ratio         PTT - ( 27 May 2024 06:58 )  PTT:74.0 sec  Urinalysis Basic - ( 27 May 2024 03:15 )    Color: x / Appearance: x / SG: x / pH: x  Gluc: 319 mg/dL / Ketone: x  / Bili: x / Urobili: x   Blood: x / Protein: x / Nitrite: x   Leuk Esterase: x / RBC: x / WBC x   Sq Epi: x / Non Sq Epi: x / Bacteria: x      Magnesium: 2.3 mg/dL (05-27 @ 03:15)  Phosphorus: 4.1 mg/dL (05-27 @ 03:15)  Magnesium: 2.6 mg/dL (05-26 @ 16:52)  Phosphorus: 4.3 mg/dL (05-26 @ 16:52)  Magnesium: 2.5 mg/dL (05-26 @ 12:08)  Phosphorus: 4.3 mg/dL (05-26 @ 12:08)    ABG - ( 26 May 2024 17:49 )  pH, Arterial: 7.310 pH, Blood: x     /  pCO2: 37    /  pO2: >496  / HCO3: 19    / Base Excess: -7.7  /  SaO2: 98.4                  RADIOLOGY & ADDITIONAL TESTS:

## 2024-05-27 NOTE — PROGRESS NOTE ADULT - ASSESSMENT
77 yo male with CAD, CABG, PCI, chronic HFrEF, DM, CKD, presented with shortness of breath, NSTEMI.  Hospital course complicated by VT cardiac arrest on 5/25.  Patient successfully extubated on 5/26 but required re-intubation that day due to recurrent monomorphic VT/ SVT, worsening shock, and recurrent cardiac arrest lasting ~1 min.  Family at bedside today, would like to extubate.  Doing well on SBT.  Now DNR/ No re-intubation.    Plan    Recurrent VT/ SVT  - continue amiodarone  - lidocaine off per EP (suspect this is mostly driven by SVT episodes)  - once can tolerate PO then will start beta blockers    Chronic HFrEF (EF <20%)  - keep negative fluid balance  - GDMT as tolerated    DM  - lantus and lispro    Acute respiratory failure  - will attempt extubation when family present  - no reintubation

## 2024-05-27 NOTE — DIETITIAN INITIAL EVALUATION ADULT - PERTINENT MEDS FT
MEDICATIONS  (STANDING):  aMIOdarone Infusion 1 mG/Min (33.3 mL/Hr) IV Continuous <Continuous>  aspirin enteric coated 81 milliGRAM(s) Oral daily  atorvastatin 80 milliGRAM(s) Oral at bedtime  chlorhexidine 0.12% Liquid 15 milliLiter(s) Oral Mucosa every 12 hours  chlorhexidine 2% Cloths 1 Application(s) Topical <User Schedule>  clopidogrel Tablet 75 milliGRAM(s) Oral daily  dextrose 10% Bolus 125 milliLiter(s) IV Bolus once  dextrose 10% Bolus 125 milliLiter(s) IV Bolus once  dextrose 5%. 1000 milliLiter(s) (100 mL/Hr) IV Continuous <Continuous>  dextrose 5%. 1000 milliLiter(s) (50 mL/Hr) IV Continuous <Continuous>  dextrose 5%. 1000 milliLiter(s) (50 mL/Hr) IV Continuous <Continuous>  dextrose 5%. 1000 milliLiter(s) (100 mL/Hr) IV Continuous <Continuous>  dextrose 50% Injectable 25 Gram(s) IV Push once  dextrose 50% Injectable 25 Gram(s) IV Push once  dextrose 50% Injectable 12.5 Gram(s) IV Push once  dextrose 50% Injectable 25 Gram(s) IV Push once  dextrose 50% Injectable 12.5 Gram(s) IV Push once  dextrose Oral Gel 15 Gram(s) Oral once  glucagon  Injectable 1 milliGRAM(s) IntraMuscular once  glucagon  Injectable 1 milliGRAM(s) IntraMuscular once  heparin  Infusion.  Unit(s)/Hr (12 mL/Hr) IV Continuous <Continuous>  insulin glargine Injectable (LANTUS) 9 Unit(s) SubCutaneous at bedtime  insulin lispro (ADMELOG) corrective regimen sliding scale   SubCutaneous every 6 hours  insulin lispro Injectable (ADMELOG) 3 Unit(s) SubCutaneous three times a day before meals  levothyroxine Injectable 36 MICROGram(s) IV Push at bedtime  lidocaine   Infusion 1 mG/Min (7.5 mL/Hr) IV Continuous <Continuous>  norepinephrine Infusion 0.45 MICROgram(s)/kG/Min (55.3 mL/Hr) IV Continuous <Continuous>  pantoprazole  Injectable 40 milliGRAM(s) IV Push daily  piperacillin/tazobactam IVPB.. 3.375 Gram(s) IV Intermittent every 8 hours  potassium chloride  20 mEq/100 mL IVPB 20 milliEquivalent(s) IV Intermittent once  propofol Infusion 20 MICROgram(s)/kG/Min (7.86 mL/Hr) IV Continuous <Continuous>  ranolazine 500 milliGRAM(s) Oral two times a day    MEDICATIONS  (PRN):  dextrose Oral Gel 15 Gram(s) Oral once PRN Blood Glucose LESS THAN 70 milliGRAM(s)/deciliter  heparin   Injectable 5500 Unit(s) IV Push every 6 hours PRN For aPTT less than 40  heparin   Injectable 2500 Unit(s) IV Push every 6 hours PRN For aPTT between 40 - 57  sodium chloride 0.9% lock flush 10 milliLiter(s) IV Push every 1 hour PRN Pre/post blood products, medications, blood draw, and to maintain line patency   MEDICATIONS  (STANDING):  aMIOdarone Infusion 1 mG/Min (33.3 mL/Hr) IV Continuous <Continuous>  atorvastatin 80 milliGRAM(s) Oral at bedtime  dextrose 5%. 1000 milliLiter(s) (100 mL/Hr) IV Continuous <Continuous>  dextrose 5%. 1000 milliLiter(s) (50 mL/Hr) IV Continuous <Continuous>  dextrose 5%. 1000 milliLiter(s) (50 mL/Hr) IV Continuous <Continuous>  dextrose 5%. 1000 milliLiter(s) (100 mL/Hr) IV Continuous <Continuous>  insulin glargine Injectable (LANTUS) 9 Unit(s) SubCutaneous at bedtime  insulin lispro (ADMELOG) corrective regimen sliding scale   SubCutaneous every 6 hours  insulin lispro Injectable (ADMELOG) 3 Unit(s) SubCutaneous three times a day before meals  levothyroxine Injectable 36 MICROGram(s) IV Push at bedtime  norepinephrine Infusion 0.45 MICROgram(s)/kG/Min (55.3 mL/Hr) IV Continuous <Continuous>  pantoprazole  Injectable 40 milliGRAM(s) IV Push daily  piperacillin/tazobactam IVPB.. 3.375 Gram(s) IV Intermittent every 8 hours  potassium chloride  20 mEq/100 mL IVPB 20 milliEquivalent(s) IV Intermittent once  propofol Infusion 20 MICROgram(s)/kG/Min (7.86 mL/Hr) IV Continuous <Continuous>  ranolazine 500 milliGRAM(s) Oral two times a day

## 2024-05-27 NOTE — PROGRESS NOTE ADULT - NS ATTEND AMEND GEN_ALL_CORE FT
Patient seen and examined and notes to be intubated, went into VT arrest again last evening and plans for extubation today   Patient was seen this morning was awake alert and extubated no chest pain or shortness of breath   On 5/25/2024: Patient went into VT arrest this morning after 3 minutes of ACLS (Epi and Amiodarone) ROSC was achieved and taken STAT to cath lab and found to have occluded SVG graft to the OM with patent LIMA to the LAD   s/p intubation and sedation   On the telemetry patient was in VT, with ATP delivered but no shocks, BiV CRT-D was interrogated by EP  AF with RVR which then degenerated to MMVT with a cycle length of 300ms. The device initially identified the rhythm as AF, then sinus tach (inappropriately) and then SVT (inappropriately) based on wavelet. Eventually it failed to match wavelet and did provide appropriate ATP which terminated VT. Device was reprogrammed to allow for better VT discrimination: Wavelet turned off. SVT V limit changed from 260ms to 280ms and stability turned on (40ms).     77yo male with history of HTN, HLD, DM, CKD, CAD s/p 4V CABG and multiple PCI's, ischemic cardiomyopathy (EF < 20%), and LBBB s/p MDT CRTD. Pt with recent admission to [facility] on 11/20/23-11/22/23 for decompensated HF with multi-organ system failure.  LHC at that time revealed a patent LIMA with all other grafts closed. No intervention was performed. He underwent a [employer] CRT-D implant with Dr. Bazzi in 12/2023. Recent device interrogation revealed normal function but effective CRT only 87.5%. A 1 week Holter was placed to assess PVC burden.     Monomorphic VT possibly scar mediated   - s/p cardiac arrest- intubated and now extubated   -- Pt was emergently taken to cath lab and cath was performed by Dr. Strong revealing patent LIMA-LAD, but occluded SVG-Circ, all other grafts/vessels noted to be occluded: %, %, Circ 100%, %. [place]-LAD is filling LCX/RCA through collateral circulation; no targets for intervention   - was weaned off of Lidocane and currently on Amiodarone; CRT-D adjusted per Dr. Turpin.  - follow up with EP     New onset AF, paroxsymal -  6% AT/AF burden on device, all occurring recently  - CHADS2-VASc of 6 (CHF, HTN, DM, Agex2, CAD) - start anticoagulation whenever acceptable    Ischemic cardiomyopathy, LVEF < 20% / CAD  LIMA-LAD, but occluded SVG-Circ, all other grafts/vessels noted to be occluded: %, %, Circ 100%, %. [place]-LAD is filling LCX/RCA through collateral circulation  - now appears to be in cardiogenic shock, recommend to repeat lactate and MVO2   - continue with ASA and Plavix   - continue with Ranexa 500mg po BID,   - repeat limited echo shows worsening EF <20% with global hypokinesis, mod AS  - will recommend to start Milrinone, for inotropic support- loading of 50 mcg/kg over 10 minutes and then based on CrCl 24- 0.28 mcg / kg/ min - concern for arrythmia but the benefit seems to outweigh risk   - will refer for RHC and swan placement tomorrow will discuss with [facility] and interventional team     Ev Kapoor D.O. [facility]  Cardiology/Vascular Cardiology -[facility]   Telephone # [phone].

## 2024-05-27 NOTE — DIETITIAN INITIAL EVALUATION ADULT - ENTERAL
As medically feasible/tolerable, initiate Glucerna 1.5 @ 20 ml/hr and advance 10 ml/hr q4 hrs until goal rate of 60 ml/hr x 18 hrs to provide 1080 ml, 1620 kcal, 89g protein, 819 ml free water, and >100% of RDIs for vitamins/minerals. Additional free water per MD discretion.

## 2024-05-27 NOTE — GOALS OF CARE CONVERSATION - ADVANCED CARE PLANNING - CONVERSATION DETAILS
Family approached me requesting to rescind the DNR.  They have witnessed him get extubated and see that he is now talking and coherent.

## 2024-05-28 DIAGNOSIS — R57.0 CARDIOGENIC SHOCK: ICD-10-CM

## 2024-05-28 LAB
ALBUMIN SERPL ELPH-MCNC: 2.7 G/DL — LOW (ref 3.3–5.2)
ALBUMIN SERPL ELPH-MCNC: 3 G/DL — LOW (ref 3.3–5.2)
ALP SERPL-CCNC: 125 U/L — HIGH (ref 40–120)
ALP SERPL-CCNC: 153 U/L — HIGH (ref 40–120)
ALT FLD-CCNC: 289 U/L — HIGH
ALT FLD-CCNC: 334 U/L — HIGH
ANION GAP SERPL CALC-SCNC: 13 MMOL/L — SIGNIFICANT CHANGE UP (ref 5–17)
ANION GAP SERPL CALC-SCNC: 17 MMOL/L — SIGNIFICANT CHANGE UP (ref 5–17)
APTT BLD: 61.4 SEC — HIGH (ref 24.5–35.6)
AST SERPL-CCNC: 141 U/L — HIGH
AST SERPL-CCNC: 162 U/L — HIGH
BASE EXCESS BLDV CALC-SCNC: -2.1 MMOL/L — LOW (ref -2–3)
BASE EXCESS BLDV CALC-SCNC: -2.2 MMOL/L — LOW (ref -2–3)
BASOPHILS # BLD AUTO: 0.06 K/UL — SIGNIFICANT CHANGE UP (ref 0–0.2)
BASOPHILS NFR BLD AUTO: 0.3 % — SIGNIFICANT CHANGE UP (ref 0–2)
BILIRUB SERPL-MCNC: 0.8 MG/DL — SIGNIFICANT CHANGE UP (ref 0.4–2)
BILIRUB SERPL-MCNC: 1 MG/DL — SIGNIFICANT CHANGE UP (ref 0.4–2)
BUN SERPL-MCNC: 53.2 MG/DL — HIGH (ref 8–20)
BUN SERPL-MCNC: 55.4 MG/DL — HIGH (ref 8–20)
CA-I SERPL-SCNC: 1.11 MMOL/L — LOW (ref 1.15–1.33)
CA-I SERPL-SCNC: 1.13 MMOL/L — LOW (ref 1.15–1.33)
CALCIUM SERPL-MCNC: 8.4 MG/DL — SIGNIFICANT CHANGE UP (ref 8.4–10.5)
CALCIUM SERPL-MCNC: 8.6 MG/DL — SIGNIFICANT CHANGE UP (ref 8.4–10.5)
CHLORIDE BLDV-SCNC: 95 MMOL/L — LOW (ref 96–108)
CHLORIDE BLDV-SCNC: 96 MMOL/L — SIGNIFICANT CHANGE UP (ref 96–108)
CHLORIDE SERPL-SCNC: 93 MMOL/L — LOW (ref 96–108)
CHLORIDE SERPL-SCNC: 95 MMOL/L — LOW (ref 96–108)
CO2 SERPL-SCNC: 22 MMOL/L — SIGNIFICANT CHANGE UP (ref 22–29)
CO2 SERPL-SCNC: 25 MMOL/L — SIGNIFICANT CHANGE UP (ref 22–29)
CREAT SERPL-MCNC: 2.42 MG/DL — HIGH (ref 0.5–1.3)
CREAT SERPL-MCNC: 2.52 MG/DL — HIGH (ref 0.5–1.3)
EGFR: 26 ML/MIN/1.73M2 — LOW
EGFR: 27 ML/MIN/1.73M2 — LOW
EOSINOPHIL # BLD AUTO: 0.44 K/UL — SIGNIFICANT CHANGE UP (ref 0–0.5)
EOSINOPHIL NFR BLD AUTO: 2.4 % — SIGNIFICANT CHANGE UP (ref 0–6)
GAS PNL BLDV: 128 MMOL/L — LOW (ref 136–145)
GAS PNL BLDV: 130 MMOL/L — LOW (ref 136–145)
GAS PNL BLDV: SIGNIFICANT CHANGE UP
GAS PNL BLDV: SIGNIFICANT CHANGE UP
GLUCOSE BLDC GLUCOMTR-MCNC: 164 MG/DL — HIGH (ref 70–99)
GLUCOSE BLDC GLUCOMTR-MCNC: 179 MG/DL — HIGH (ref 70–99)
GLUCOSE BLDC GLUCOMTR-MCNC: 193 MG/DL — HIGH (ref 70–99)
GLUCOSE BLDC GLUCOMTR-MCNC: 212 MG/DL — HIGH (ref 70–99)
GLUCOSE BLDC GLUCOMTR-MCNC: 212 MG/DL — HIGH (ref 70–99)
GLUCOSE BLDV-MCNC: 210 MG/DL — HIGH (ref 70–99)
GLUCOSE BLDV-MCNC: 217 MG/DL — HIGH (ref 70–99)
GLUCOSE SERPL-MCNC: 179 MG/DL — HIGH (ref 70–99)
GLUCOSE SERPL-MCNC: 201 MG/DL — HIGH (ref 70–99)
HCO3 BLDV-SCNC: 24 MMOL/L — SIGNIFICANT CHANGE UP (ref 22–29)
HCO3 BLDV-SCNC: 24 MMOL/L — SIGNIFICANT CHANGE UP (ref 22–29)
HCT VFR BLD CALC: 36.4 % — LOW (ref 39–50)
HCT VFR BLDA CALC: 35 % — SIGNIFICANT CHANGE UP
HCT VFR BLDA CALC: 37 % — SIGNIFICANT CHANGE UP
HGB BLD CALC-MCNC: 11.8 G/DL — LOW (ref 12.6–17.4)
HGB BLD CALC-MCNC: 12.2 G/DL — LOW (ref 12.6–17.4)
HGB BLD-MCNC: 12.3 G/DL — LOW (ref 13–17)
IMM GRANULOCYTES NFR BLD AUTO: 1 % — HIGH (ref 0–0.9)
LACTATE BLDV-MCNC: 1.7 MMOL/L — SIGNIFICANT CHANGE UP (ref 0.5–2)
LACTATE BLDV-MCNC: 1.8 MMOL/L — SIGNIFICANT CHANGE UP (ref 0.5–2)
LACTATE SERPL-SCNC: 1.5 MMOL/L — SIGNIFICANT CHANGE UP (ref 0.5–2)
LACTATE SERPL-SCNC: 1.7 MMOL/L — SIGNIFICANT CHANGE UP (ref 0.5–2)
LYMPHOCYTES # BLD AUTO: 1.78 K/UL — SIGNIFICANT CHANGE UP (ref 1–3.3)
LYMPHOCYTES # BLD AUTO: 9.6 % — LOW (ref 13–44)
MAGNESIUM SERPL-MCNC: 2.4 MG/DL — SIGNIFICANT CHANGE UP (ref 1.6–2.6)
MAGNESIUM SERPL-MCNC: 2.6 MG/DL — SIGNIFICANT CHANGE UP (ref 1.6–2.6)
MCHC RBC-ENTMCNC: 30.1 PG — SIGNIFICANT CHANGE UP (ref 27–34)
MCHC RBC-ENTMCNC: 33.8 GM/DL — SIGNIFICANT CHANGE UP (ref 32–36)
MCV RBC AUTO: 89 FL — SIGNIFICANT CHANGE UP (ref 80–100)
MONOCYTES # BLD AUTO: 1.97 K/UL — HIGH (ref 0–0.9)
MONOCYTES NFR BLD AUTO: 10.6 % — SIGNIFICANT CHANGE UP (ref 2–14)
NEUTROPHILS # BLD AUTO: 14.06 K/UL — HIGH (ref 1.8–7.4)
NEUTROPHILS NFR BLD AUTO: 76.1 % — SIGNIFICANT CHANGE UP (ref 43–77)
PCO2 BLDV: 44 MMHG — SIGNIFICANT CHANGE UP (ref 42–55)
PCO2 BLDV: 45 MMHG — SIGNIFICANT CHANGE UP (ref 42–55)
PH BLDV: 7.33 — SIGNIFICANT CHANGE UP (ref 7.32–7.43)
PH BLDV: 7.34 — SIGNIFICANT CHANGE UP (ref 7.32–7.43)
PHOSPHATE SERPL-MCNC: 4.9 MG/DL — HIGH (ref 2.4–4.7)
PHOSPHATE SERPL-MCNC: 5 MG/DL — HIGH (ref 2.4–4.7)
PLATELET # BLD AUTO: 204 K/UL — SIGNIFICANT CHANGE UP (ref 150–400)
PO2 BLDV: 42 MMHG — SIGNIFICANT CHANGE UP (ref 25–45)
PO2 BLDV: <42 MMHG — SIGNIFICANT CHANGE UP (ref 25–45)
POTASSIUM BLDV-SCNC: 3.9 MMOL/L — SIGNIFICANT CHANGE UP (ref 3.5–5.1)
POTASSIUM BLDV-SCNC: 4 MMOL/L — SIGNIFICANT CHANGE UP (ref 3.5–5.1)
POTASSIUM SERPL-MCNC: 3.6 MMOL/L — SIGNIFICANT CHANGE UP (ref 3.5–5.3)
POTASSIUM SERPL-MCNC: 3.9 MMOL/L — SIGNIFICANT CHANGE UP (ref 3.5–5.3)
POTASSIUM SERPL-SCNC: 3.6 MMOL/L — SIGNIFICANT CHANGE UP (ref 3.5–5.3)
POTASSIUM SERPL-SCNC: 3.9 MMOL/L — SIGNIFICANT CHANGE UP (ref 3.5–5.3)
PROT SERPL-MCNC: 6.2 G/DL — LOW (ref 6.6–8.7)
PROT SERPL-MCNC: 6.7 G/DL — SIGNIFICANT CHANGE UP (ref 6.6–8.7)
RBC # BLD: 4.09 M/UL — LOW (ref 4.2–5.8)
RBC # FLD: 15.5 % — HIGH (ref 10.3–14.5)
SAO2 % BLDV: 54.8 % — SIGNIFICANT CHANGE UP
SAO2 % BLDV: 63.6 % — SIGNIFICANT CHANGE UP
SODIUM SERPL-SCNC: 132 MMOL/L — LOW (ref 135–145)
SODIUM SERPL-SCNC: 133 MMOL/L — LOW (ref 135–145)
WBC # BLD: 18.5 K/UL — HIGH (ref 3.8–10.5)
WBC # FLD AUTO: 18.5 K/UL — HIGH (ref 3.8–10.5)

## 2024-05-28 PROCEDURE — 93010 ELECTROCARDIOGRAM REPORT: CPT

## 2024-05-28 PROCEDURE — 99233 SBSQ HOSP IP/OBS HIGH 50: CPT

## 2024-05-28 PROCEDURE — 71045 X-RAY EXAM CHEST 1 VIEW: CPT | Mod: 26

## 2024-05-28 PROCEDURE — 99291 CRITICAL CARE FIRST HOUR: CPT | Mod: GC

## 2024-05-28 PROCEDURE — 71045 X-RAY EXAM CHEST 1 VIEW: CPT | Mod: 26,77

## 2024-05-28 PROCEDURE — 93451 RIGHT HEART CATH: CPT | Mod: 26

## 2024-05-28 PROCEDURE — 99223 1ST HOSP IP/OBS HIGH 75: CPT

## 2024-05-28 RX ORDER — MILRINONE LACTATE 1 MG/ML
0.12 INJECTION, SOLUTION INTRAVENOUS
Qty: 20 | Refills: 0 | Status: DISCONTINUED | OUTPATIENT
Start: 2024-05-28 | End: 2024-05-31

## 2024-05-28 RX ORDER — FUROSEMIDE 40 MG
40 TABLET ORAL ONCE
Refills: 0 | Status: DISCONTINUED | OUTPATIENT
Start: 2024-05-28 | End: 2024-05-28

## 2024-05-28 RX ORDER — BUMETANIDE 0.25 MG/ML
4 INJECTION INTRAMUSCULAR; INTRAVENOUS ONCE
Refills: 0 | Status: DISCONTINUED | OUTPATIENT
Start: 2024-05-28 | End: 2024-05-28

## 2024-05-28 RX ORDER — FUROSEMIDE 40 MG
40 TABLET ORAL ONCE
Refills: 0 | Status: COMPLETED | OUTPATIENT
Start: 2024-05-28 | End: 2024-05-28

## 2024-05-28 RX ORDER — NOREPINEPHRINE BITARTRATE/D5W 8 MG/250ML
0.45 PLASTIC BAG, INJECTION (ML) INTRAVENOUS
Qty: 8 | Refills: 0 | Status: DISCONTINUED | OUTPATIENT
Start: 2024-05-28 | End: 2024-05-29

## 2024-05-28 RX ORDER — MEXILETINE HYDROCHLORIDE 150 MG/1
150 CAPSULE ORAL EVERY 8 HOURS
Refills: 0 | Status: DISCONTINUED | OUTPATIENT
Start: 2024-05-28 | End: 2024-06-05

## 2024-05-28 RX ORDER — POTASSIUM CHLORIDE 20 MEQ
10 PACKET (EA) ORAL
Refills: 0 | Status: COMPLETED | OUTPATIENT
Start: 2024-05-28 | End: 2024-05-28

## 2024-05-28 RX ORDER — BUMETANIDE 0.25 MG/ML
2 INJECTION INTRAMUSCULAR; INTRAVENOUS EVERY 12 HOURS
Refills: 0 | Status: DISCONTINUED | OUTPATIENT
Start: 2024-05-28 | End: 2024-05-31

## 2024-05-28 RX ADMIN — CLOPIDOGREL BISULFATE 75 MILLIGRAM(S): 75 TABLET, FILM COATED ORAL at 11:41

## 2024-05-28 RX ADMIN — INSULIN GLARGINE 9 UNIT(S): 100 INJECTION, SOLUTION SUBCUTANEOUS at 21:47

## 2024-05-28 RX ADMIN — Medication 1: at 06:25

## 2024-05-28 RX ADMIN — Medication 100 MILLIEQUIVALENT(S): at 07:37

## 2024-05-28 RX ADMIN — Medication 1: at 11:43

## 2024-05-28 RX ADMIN — Medication 55.3 MICROGRAM(S)/KG/MIN: at 20:07

## 2024-05-28 RX ADMIN — MILRINONE LACTATE 2.46 MICROGRAM(S)/KG/MIN: 1 INJECTION, SOLUTION INTRAVENOUS at 18:48

## 2024-05-28 RX ADMIN — AMIODARONE HYDROCHLORIDE 33.3 MG/MIN: 400 TABLET ORAL at 13:20

## 2024-05-28 RX ADMIN — RANOLAZINE 500 MILLIGRAM(S): 500 TABLET, FILM COATED, EXTENDED RELEASE ORAL at 18:07

## 2024-05-28 RX ADMIN — HEPARIN SODIUM 1000 UNIT(S)/HR: 5000 INJECTION INTRAVENOUS; SUBCUTANEOUS at 04:32

## 2024-05-28 RX ADMIN — MEXILETINE HYDROCHLORIDE 150 MILLIGRAM(S): 150 CAPSULE ORAL at 17:25

## 2024-05-28 RX ADMIN — PANTOPRAZOLE SODIUM 40 MILLIGRAM(S): 20 TABLET, DELAYED RELEASE ORAL at 11:41

## 2024-05-28 RX ADMIN — Medication 1: at 18:06

## 2024-05-28 RX ADMIN — Medication 81 MILLIGRAM(S): at 11:43

## 2024-05-28 RX ADMIN — Medication 36 MICROGRAM(S): at 21:48

## 2024-05-28 RX ADMIN — Medication 40 MILLIGRAM(S): at 15:38

## 2024-05-28 RX ADMIN — Medication 100 MILLIEQUIVALENT(S): at 06:24

## 2024-05-28 RX ADMIN — PIPERACILLIN AND TAZOBACTAM 25 GRAM(S): 4; .5 INJECTION, POWDER, LYOPHILIZED, FOR SOLUTION INTRAVENOUS at 15:38

## 2024-05-28 RX ADMIN — PIPERACILLIN AND TAZOBACTAM 25 GRAM(S): 4; .5 INJECTION, POWDER, LYOPHILIZED, FOR SOLUTION INTRAVENOUS at 21:47

## 2024-05-28 RX ADMIN — CHLORHEXIDINE GLUCONATE 1 APPLICATION(S): 213 SOLUTION TOPICAL at 06:26

## 2024-05-28 RX ADMIN — Medication 100 MILLIEQUIVALENT(S): at 06:56

## 2024-05-28 RX ADMIN — PIPERACILLIN AND TAZOBACTAM 25 GRAM(S): 4; .5 INJECTION, POWDER, LYOPHILIZED, FOR SOLUTION INTRAVENOUS at 06:28

## 2024-05-28 RX ADMIN — Medication 2: at 01:28

## 2024-05-28 NOTE — CONSULT NOTE ADULT - PROBLEM SELECTOR RECOMMENDATION 9
Etiology is likely driven by arrythmia  - Clinically hypervolemic w/ lukewarm extremities.  - Lactate has cleared  - tMCS: none required at this time  - Inotrope/pressors: Currently on levophed 0.11. Wean pressors as tolerated to maintain MAP >65  - Monitor perfusion markers every 4 hrs   - Diuretics: Goal of net even to -500  - Strict I&Os  - Will plan on [place] insertion today to help guide further therapies  - Hemodynamic goals: MAP 65, CVP 8-10, MVO2 >65, PCWP 12, CI >2.2, Lactate <2.2.

## 2024-05-28 NOTE — PROGRESS NOTE ADULT - SUBJECTIVE AND OBJECTIVE BOX
Patient extubated yesterday (5/27). Remains on levophed. Weaned off lidocaine gtt yesterday.  Noted to transian episodes of WCT this AM which correlated with AT with variable conduction and LBBB on device interrogation.  NO recurrent VT noted.   Currently V-paced.  Planned for tentative RHC with [place] placement today.    EKG:    TELE:      MEDICATIONS  (STANDING):  aMIOdarone Infusion 1 mG/Min (33.3 mL/Hr) IV Continuous <Continuous>  aspirin enteric coated 81 milliGRAM(s) Oral daily  atorvastatin 80 milliGRAM(s) Oral at bedtime  chlorhexidine 2% Cloths 1 Application(s) Topical <User Schedule>  clopidogrel Tablet 75 milliGRAM(s) Oral daily  dextrose 50% Injectable 25 Gram(s) IV Push once  glucagon  Injectable 1 milliGRAM(s) IntraMuscular once  heparin  Infusion.  Unit(s)/Hr (12 mL/Hr) IV Continuous <Continuous>  insulin glargine Injectable (LANTUS) 9 Unit(s) SubCutaneous at bedtime  insulin lispro (ADMELOG) corrective regimen sliding scale   SubCutaneous every 6 hours  insulin lispro Injectable (ADMELOG) 3 Unit(s) SubCutaneous three times a day before meals  levothyroxine Injectable 36 MICROGram(s) IV Push at bedtime  norepinephrine Infusion 0.45 MICROgram(s)/kG/Min (55.3 mL/Hr) IV Continuous <Continuous>  pantoprazole  Injectable 40 milliGRAM(s) IV Push daily  piperacillin/tazobactam IVPB.. 3.375 Gram(s) IV Intermittent every 8 hours  ranolazine 500 milliGRAM(s) Oral two times a day    MEDICATIONS  (PRN):  heparin   Injectable 5500 Unit(s) IV Push every 6 hours PRN For aPTT less than 40  heparin   Injectable 2500 Unit(s) IV Push every 6 hours PRN For aPTT between 40 - 57  sodium chloride 0.9% lock flush 10 milliLiter(s) IV Push every 1 hour PRN Pre/post blood products, medications, blood draw, and to maintain line patency      Allergies    No Known Allergies    Intolerances    DOPamine (Hypotension)      Vital Signs Last 24 Hrs  T(C): 37.2 (28 May 2024 13:00), Max: 38 (28 May 2024 00:15)  T(F): 99 (28 May 2024 13:00), Max: 100.4 (28 May 2024 00:15)  HR: 79 (28 May 2024 13:00) (64 - 109)  BP: 110/51 (28 May 2024 12:00) (103/59 - 128/79)  BP(mean): 70 (28 May 2024 12:00) (70 - 94)  RR: 22 (28 May 2024 13:00) (20 - 34)  SpO2: 100% (28 May 2024 13:00) (94% - 100%)    Parameters below as of 28 May 2024 04:00  Patient On (Oxygen Delivery Method): room air        Physical Exam:  Constitutional: NAD, AAOx3  Cardiovascular: +S1S2 RRR  Pulmonary: CTA b/l, unlabored  GI: soft NTND +BS  Extremities: no pedal edema, +distal pulses b/l  Neuro: non focal, GRAVES x4    LABS:                        12.3   18.50 )-----------( 204      ( 28 May 2024 03:45 )             36.4     05-28    133<L>  |  95<L>  |  55.4<H>  ----------------------------<  179<H>  3.6   |  25.0  |  2.52<H>    Ca    8.6      28 May 2024 03:45  Phos  4.9     05-28  Mg     2.6     05-28    TPro  6.7  /  Alb  3.0<L>  /  TBili  0.8  /  DBili  x   /  AST  162<H>  /  ALT  334<H>  /  AlkPhos  125<H>  05-28    PTT - ( 28 May 2024 03:45 )  PTT:61.4 sec  Urinalysis Basic - ( 28 May 2024 03:45 )    Color: x / Appearance: x / SG: x / pH: x  Gluc: 179 mg/dL / Ketone: x  / Bili: x / Urobili: x   Blood: x / Protein: x / Nitrite: x   Leuk Esterase: x / RBC: x / WBC x   Sq Epi: x / Non Sq Epi: x / Bacteria: x        RADIOLOGY & ADDITIONAL TESTS:   Patient extubated yesterday (5/27). Remains on levophed. Weaned off lidocaine gtt yesterday.  Transiant episodes of WCT this AM which - correlating with AT with variable conduction and LBBB on device interrogation.  NO recurrent VT noted.   Currently SR with AS-V-paced.  Planned for tentative RHC with [place] placement today.    TELE:  Currently AS-BIVP.  Several episode of WCT this AM, correlate with PAT (Atrial rate 180-200bpm) with variable conduction on underlying LBBB.     MEDICATIONS  (STANDING):  aMIOdarone Infusion 1 mG/Min (33.3 mL/Hr) IV Continuous <Continuous>  aspirin enteric coated 81 milliGRAM(s) Oral daily  atorvastatin 80 milliGRAM(s) Oral at bedtime  chlorhexidine 2% Cloths 1 Application(s) Topical <User Schedule>  clopidogrel Tablet 75 milliGRAM(s) Oral daily  dextrose 50% Injectable 25 Gram(s) IV Push once  glucagon  Injectable 1 milliGRAM(s) IntraMuscular once  heparin  Infusion.  Unit(s)/Hr (12 mL/Hr) IV Continuous <Continuous>  insulin glargine Injectable (LANTUS) 9 Unit(s) SubCutaneous at bedtime  insulin lispro (ADMELOG) corrective regimen sliding scale   SubCutaneous every 6 hours  insulin lispro Injectable (ADMELOG) 3 Unit(s) SubCutaneous three times a day before meals  levothyroxine Injectable 36 MICROGram(s) IV Push at bedtime  norepinephrine Infusion 0.45 MICROgram(s)/kG/Min (55.3 mL/Hr) IV Continuous <Continuous>  pantoprazole  Injectable 40 milliGRAM(s) IV Push daily  piperacillin/tazobactam IVPB.. 3.375 Gram(s) IV Intermittent every 8 hours  ranolazine 500 milliGRAM(s) Oral two times a day    MEDICATIONS  (PRN):  heparin   Injectable 5500 Unit(s) IV Push every 6 hours PRN For aPTT less than 40  heparin   Injectable 2500 Unit(s) IV Push every 6 hours PRN For aPTT between 40 - 57  sodium chloride 0.9% lock flush 10 milliLiter(s) IV Push every 1 hour PRN Pre/post blood products, medications, blood draw, and to maintain line patency      Allergies    No Known Allergies    Intolerances    DOPamine (Hypotension)      Vital Signs Last 24 Hrs  T(C): 37.2 (28 May 2024 13:00), Max: 38 (28 May 2024 00:15)  T(F): 99 (28 May 2024 13:00), Max: 100.4 (28 May 2024 00:15)  HR: 79 (28 May 2024 13:00) (64 - 109)  BP: 110/51 (28 May 2024 12:00) (103/59 - 128/79)  BP(mean): 70 (28 May 2024 12:00) (70 - 94)  RR: 22 (28 May 2024 13:00) (20 - 34)  SpO2: 100% (28 May 2024 13:00) (94% - 100%)    Parameters below as of 28 May 2024 04:00  Patient On (Oxygen Delivery Method): room air        Physical Exam:  Constitutional: NAD, AAOx3  Cardiovascular: +S1S2 RRR  Pulmonary: CTA b/l, unlabored  GI: soft NTND +BS  Extremities: no pedal edema, +distal pulses b/l  Neuro: non focal, GRAVES x4    LABS:                        12.3   18.50 )-----------( 204      ( 28 May 2024 03:45 )             36.4     05-28    133<L>  |  95<L>  |  55.4<H>  ----------------------------<  179<H>  3.6   |  25.0  |  2.52<H>    Ca    8.6      28 May 2024 03:45  Phos  4.9     05-28  Mg     2.6     05-28    TPro  6.7  /  Alb  3.0<L>  /  TBili  0.8  /  DBili  x   /  AST  162<H>  /  ALT  334<H>  /  AlkPhos  125<H>  05-28    PTT - ( 28 May 2024 03:45 )  PTT:61.4 sec  Urinalysis Basic - ( 28 May 2024 03:45 )    Color: x / Appearance: x / SG: x / pH: x  Gluc: 179 mg/dL / Ketone: x  / Bili: x / Urobili: x   Blood: x / Protein: x / Nitrite: x   Leuk Esterase: x / RBC: x / WBC x   Sq Epi: x / Non Sq Epi: x / Bacteria: x        RADIOLOGY & ADDITIONAL TESTS:   Patient extubated yesterday (5/27). Remains on levophed. Weaned off lidocaine gtt yesterday.  Transiant episodes of WCT this AM which - correlating with AT with variable conduction and LBBB on device interrogation.  NO recurrent VT noted.   Currently SR with AS-V-paced.  Planned for tentative RHC with [place] placement today.    TELE:  Currently AS-BIVP.  Several episode of WCT this AM, correlate with PAT (Atrial rate 180-200bpm) with variable conduction on underlying LBBB.     MEDICATIONS  (STANDING):  aMIOdarone Infusion 1 mG/Min (33.3 mL/Hr) IV Continuous <Continuous>  aspirin enteric coated 81 milliGRAM(s) Oral daily  atorvastatin 80 milliGRAM(s) Oral at bedtime  chlorhexidine 2% Cloths 1 Application(s) Topical <User Schedule>  clopidogrel Tablet 75 milliGRAM(s) Oral daily  dextrose 50% Injectable 25 Gram(s) IV Push once  glucagon  Injectable 1 milliGRAM(s) IntraMuscular once  heparin  Infusion.  Unit(s)/Hr (12 mL/Hr) IV Continuous <Continuous>  insulin glargine Injectable (LANTUS) 9 Unit(s) SubCutaneous at bedtime  insulin lispro (ADMELOG) corrective regimen sliding scale   SubCutaneous every 6 hours  insulin lispro Injectable (ADMELOG) 3 Unit(s) SubCutaneous three times a day before meals  levothyroxine Injectable 36 MICROGram(s) IV Push at bedtime  norepinephrine Infusion 0.45 MICROgram(s)/kG/Min (55.3 mL/Hr) IV Continuous <Continuous>  pantoprazole  Injectable 40 milliGRAM(s) IV Push daily  piperacillin/tazobactam IVPB.. 3.375 Gram(s) IV Intermittent every 8 hours  ranolazine 500 milliGRAM(s) Oral two times a day    MEDICATIONS  (PRN):  heparin   Injectable 5500 Unit(s) IV Push every 6 hours PRN For aPTT less than 40  heparin   Injectable 2500 Unit(s) IV Push every 6 hours PRN For aPTT between 40 - 57  sodium chloride 0.9% lock flush 10 milliLiter(s) IV Push every 1 hour PRN Pre/post blood products, medications, blood draw, and to maintain line patency      Allergies    No Known Allergies    Intolerances    DOPamine (Hypotension)      Vital Signs Last 24 Hrs  T(C): 37.2 (28 May 2024 13:00), Max: 38 (28 May 2024 00:15)  T(F): 99 (28 May 2024 13:00), Max: 100.4 (28 May 2024 00:15)  HR: 79 (28 May 2024 13:00) (64 - 109)  BP: 110/51 (28 May 2024 12:00) (103/59 - 128/79)  BP(mean): 70 (28 May 2024 12:00) (70 - 94)  RR: 22 (28 May 2024 13:00) (20 - 34)  SpO2: 100% (28 May 2024 13:00) (94% - 100%)    Parameters below as of 28 May 2024 04:00  Patient On (Oxygen Delivery Method): room air        Physical Exam:  Constitutional: NAD, AAOx3  Cardiovascular: +S1S2 RRR  Pulmonary: CTA b/l, unlabored  GI: soft NTND +BS  Extremities: no pedal edema, +distal pulses b/l  Neuro: non focal, GRAVES x4    LABS:                        12.3   18.50 )-----------( 204      ( 28 May 2024 03:45 )             36.4     05-28    133<L>  |  95<L>  |  55.4<H>  ----------------------------<  179<H>  3.6   |  25.0  |  2.52<H>    Ca    8.6      28 May 2024 03:45  Phos  4.9     05-28  Mg     2.6     05-28    TPro  6.7  /  Alb  3.0<L>  /  TBili  0.8  /  DBili  x   /  AST  162<H>  /  ALT  334<H>  /  AlkPhos  125<H>  05-28    PTT - ( 28 May 2024 03:45 )  PTT:61.4 sec  Urinalysis Basic - ( 28 May 2024 03:45 )    Color: x / Appearance: x / SG: x / pH: x  Gluc: 179 mg/dL / Ketone: x  / Bili: x / Urobili: x   Blood: x / Protein: x / Nitrite: x   Leuk Esterase: x / RBC: x / WBC x   Sq Epi: x / Non Sq Epi: x / Bacteria: x      ASSESSMENT/ PLAN:    79yo male with HTN, HLD, DM, CKD, CAD s/p 4V CABG and multiple PCI's, ischemic cardiomyopathy (EF < 20%), and LBBB s/p MDT CRTD. Pt with recent admission to [facility] on 11/20/23-11/22/23 for decompensated HF with multi-organ system failure.  LHC at that time revealed a patent LIMA with all other grafts closed. No intervention was performed. He underwent a Medtronic CRT-D implant with Dr. Bazzi in 12/2023. Recent device interrogation revealed normal function but effective CRT only 87.5%. A 1 week Holter was placed to assess PVC burden.     He now presented to [facility] ED with a two day history of chest discomfort with radiation to left arm and associated dizziness, lightheadedness. In ED, ruled in for NSTEMI with positive troponin (228, 427, 379), RAS on CKD, and hypoglycemia. A TTE revealed acute on chronic systolic heart failure with EF <20%. He was admitted to telemetry for treatment of ACS on DAPT and heparin gtt but developed sustained VT on 5/25 which resolved with Amiodarone and IV Lidocaine. VT was binned as SVT by ICD so was not completely treated. LHC urgently performed and revealed  % stenosis,  % stenosis,  %,  % stenosis. SVG graft to Circumflex: occluded.  LIMA graft to LAD visually normal in size and structure. LCx/RCA fills via collaterals. Pt upgraded to MICU. Initially did well and was extubated. On 5/26 evening again went into rapid wide complex tachcyardia which appeared to be AT with RVR precipitating at times a regular MMVT which responded to ATP therapy. Patient was given additional boluses of Amiodarone, Lidocaine and Metoprolol and eventually had improvement in HR. During arrhythmia patient had hypotension and after arrhythmia resolved had altered mental status requiring intubation.   Remains on Amiodarone at 1mg/min. Weaned off lidocaine gtt and exbuated yesterday     After arrest on 5/25 ICD reprogrammed to turn off ST & Wavelet and reduce tachy detections to 171. VT on 5/26 occurred at TCL of 340 msec so VT zone reduced to 164 bpm on 5/26.   Transient episodes of WCT this morning, c/w AT with variable conduction and LBBB (confirmed on device interrogation).  NO recurrent VT noted.     #Monomorphic VT:  - Initial  msec on 5/25, on 5/26 VT slower at 340 msec, responsive to ATP.   - Brief episodes AT with variable conduction this morning.  NO recurrent VT since 5/26  - Prior VT Appears to be precipitated by and atrial tachycardia and adequate AT control should help control VT in adequate VT control  - Remains on Amiodarone 1mg/min. Decrease to 0.5mg/min  - Monitor LFTs  - Cath with no targets for intervention  - Will consider starting Mexilitine.   - Likely scar-mediated VT from ischemic CMY precipitated by AT/AF with RVR  - Would be ideal to temporize patient on oral medications but if he has ongoing storm, may have to consider ablation  - Continue GDMT of HFrEF    #HFrEF:  - Continue GDMT  - OptiVol elevation for past month suggestive of progressive CHF    #Paroxysmal AF/AFL/AT  - 6% AT/AF burden on device, all occurring recently  - CHADS2-VASc of 6 (CHF, HTN, DM, Agex2, CAD) - start anticoagulation whenever acceptable  - AT at varying TCL appears to precipitate VT, continue Amiodarone 1mg/min IV to help maintain NSR; hopefully with adequate amiodarone load AT burden will reduce  - Increase BB as tolerated by BP  - If AT/AF cannot be controlled by amiodarone, consider AVN ablation    #ICD:  - Device inappropriately binned VT as SVT  - Device reprogrammed with [place] Logic changes - sinus tach off, wavelet off, SVT limit increased to 280 msec, stability turned on  - Effective BiV pacing only 83%, will try to optimize -- suspect improvement on AADs to suppress ventricular arrhythmias  - VT zone lowered to 164 bpm on 5/26     Patient extubated yesterday (5/27). Remains on levophed. Weaned off lidocaine gtt yesterday.  Transiant episodes of WCT this AM which - correlating with AT with variable conduction, underlying LBBB and intermittent BIVP.  NO recurrent VT noted.   Currently SR with AS-V-paced.  Planned for tentative RHC with [place] placement today.    TELE:  Currently AS-BIVP.  Several episode of WCT this AM, correlate with PAT (Atrial rate 180-200bpm) with variable conduction on underlying LBBB.     MEDICATIONS  (STANDING):  aMIOdarone Infusion 1 mG/Min (33.3 mL/Hr) IV Continuous <Continuous>  aspirin enteric coated 81 milliGRAM(s) Oral daily  atorvastatin 80 milliGRAM(s) Oral at bedtime  chlorhexidine 2% Cloths 1 Application(s) Topical <User Schedule>  clopidogrel Tablet 75 milliGRAM(s) Oral daily  dextrose 50% Injectable 25 Gram(s) IV Push once  glucagon  Injectable 1 milliGRAM(s) IntraMuscular once  heparin  Infusion.  Unit(s)/Hr (12 mL/Hr) IV Continuous <Continuous>  insulin glargine Injectable (LANTUS) 9 Unit(s) SubCutaneous at bedtime  insulin lispro (ADMELOG) corrective regimen sliding scale   SubCutaneous every 6 hours  insulin lispro Injectable (ADMELOG) 3 Unit(s) SubCutaneous three times a day before meals  levothyroxine Injectable 36 MICROGram(s) IV Push at bedtime  norepinephrine Infusion 0.45 MICROgram(s)/kG/Min (55.3 mL/Hr) IV Continuous <Continuous>  pantoprazole  Injectable 40 milliGRAM(s) IV Push daily  piperacillin/tazobactam IVPB.. 3.375 Gram(s) IV Intermittent every 8 hours  ranolazine 500 milliGRAM(s) Oral two times a day    MEDICATIONS  (PRN):  heparin   Injectable 5500 Unit(s) IV Push every 6 hours PRN For aPTT less than 40  heparin   Injectable 2500 Unit(s) IV Push every 6 hours PRN For aPTT between 40 - 57  sodium chloride 0.9% lock flush 10 milliLiter(s) IV Push every 1 hour PRN Pre/post blood products, medications, blood draw, and to maintain line patency      Allergies    No Known Allergies    Intolerances    DOPamine (Hypotension)      Vital Signs Last 24 Hrs  T(C): 37.2 (28 May 2024 13:00), Max: 38 (28 May 2024 00:15)  T(F): 99 (28 May 2024 13:00), Max: 100.4 (28 May 2024 00:15)  HR: 79 (28 May 2024 13:00) (64 - 109)  BP: 110/51 (28 May 2024 12:00) (103/59 - 128/79)  BP(mean): 70 (28 May 2024 12:00) (70 - 94)  RR: 22 (28 May 2024 13:00) (20 - 34)  SpO2: 100% (28 May 2024 13:00) (94% - 100%)    Parameters below as of 28 May 2024 04:00  Patient On (Oxygen Delivery Method): room air        Physical Exam:  Constitutional: NAD, AAOx3  Cardiovascular: +S1S2 RRR  Pulmonary: CTA b/l, unlabored  GI: soft NTND +BS  Extremities: no pedal edema, +distal pulses b/l  Neuro: non focal, GRAVES x4    LABS:                        12.3   18.50 )-----------( 204      ( 28 May 2024 03:45 )             36.4     05-28    133<L>  |  95<L>  |  55.4<H>  ----------------------------<  179<H>  3.6   |  25.0  |  2.52<H>    Ca    8.6      28 May 2024 03:45  Phos  4.9     05-28  Mg     2.6     05-28    TPro  6.7  /  Alb  3.0<L>  /  TBili  0.8  /  DBili  x   /  AST  162<H>  /  ALT  334<H>  /  AlkPhos  125<H>  05-28    PTT - ( 28 May 2024 03:45 )  PTT:61.4 sec  Urinalysis Basic - ( 28 May 2024 03:45 )    Color: x / Appearance: x / SG: x / pH: x  Gluc: 179 mg/dL / Ketone: x  / Bili: x / Urobili: x   Blood: x / Protein: x / Nitrite: x   Leuk Esterase: x / RBC: x / WBC x   Sq Epi: x / Non Sq Epi: x / Bacteria: x      ASSESSMENT/ PLAN:    79yo male with HTN, HLD, DM, CKD, CAD s/p 4V CABG and multiple PCI's, ischemic cardiomyopathy (EF < 20%), and LBBB s/p MDT CRTD. Pt with recent admission to [facility] on 11/20/23-11/22/23 for decompensated HF with multi-organ system failure.  LHC at that time revealed a patent LIMA with all other grafts closed. No intervention was performed. He underwent a Medtronic CRT-D implant with Dr. Bazzi in 12/2023. Recent device interrogation revealed normal function but effective CRT only 87.5%. A 1 week Holter was placed to assess PVC burden.     He now presented to [facility] ED with a two day history of chest discomfort with radiation to left arm and associated dizziness, lightheadedness. In ED, ruled in for NSTEMI with positive troponin (228, 427, 379), RAS on CKD, and hypoglycemia. A TTE revealed acute on chronic systolic heart failure with EF <20%. He was admitted to telemetry for treatment of ACS on DAPT and heparin gtt but developed sustained VT on 5/25 which resolved with Amiodarone and IV Lidocaine. VT was binned as SVT by ICD so was not completely treated. LHC urgently performed and revealed  % stenosis,  % stenosis,  %,  % stenosis. SVG graft to Circumflex: occluded.  LIMA graft to LAD visually normal in size and structure. LCx/RCA fills via collaterals. Pt upgraded to MICU. Initially did well and was extubated. On 5/26 evening again went into rapid wide complex tachcyardia which appeared to be AT with RVR precipitating at times a regular MMVT which responded to ATP therapy. Patient was given additional boluses of Amiodarone, Lidocaine and Metoprolol and eventually had improvement in HR. During arrhythmia patient had hypotension and after arrhythmia resolved had altered mental status requiring intubation.   Remains on Amiodarone at 1mg/min. Weaned off lidocaine gtt and exbuated yesterday     After arrest on 5/25 ICD reprogrammed to turn off ST & Wavelet and reduce tachy detections to 171. VT on 5/26 occurred at TCL of 340 msec so VT zone reduced to 164 bpm on 5/26.   Transient episodes of WCT this morning, c/w AT with variable conduction and LBBB (confirmed on device interrogation).  NO recurrent VT noted.     #Monomorphic VT:  - Initial  msec on 5/25, on 5/26 VT slower at 340 msec, responsive to ATP.   - PAT with variable conduction this morning.  NO recurrent VT since 5/26  - Continue Amiodarone 1mg/min.  Monitor LFTs  - Consider decreasing Amiodarone dose tomorrow  - Cath with no targets for intervention  - Start Mexiletine 150mg TID.  - Likely scar-mediated VT from ischemic CMY precipitated by AT/AF with RVR  - Will aim for arrhythmia suppression though oral AAT but may need to consider ablation if refractory arrhythmia  - Continue GDMT of HFrEF    #HFrEF:  - Continue GDMT  - OptiVol elevation for past month suggestive of progressive CHF    #Paroxysmal AF/AFL/AT  - 6% AT/AF burden on device, all occurring recently  - CHADS2-VASc of 6 (CHF, HTN, DM, Agex2, CAD) on Heparin gtt  - AT at varying TCL appears to precipitate VT, continue Amiodarone 1mg/min IV to help maintain NSR; hopefully with adequate amiodarone load AT burden will reduce  - Increase BB as tolerated by BP  - If AT/AF cannot be controlled by amiodarone, may consider AVN ablation    #ICD:  - Device inappropriately binned VT as SVT  - Device reprogrammed with [place] Logic changes - sinus tach off, wavelet off, SVT limit increased to 280 msec, stability turned on  - Effective BiV pacing only 83%, will try to optimize -- suspect improvement on AADs to suppress ventricular arrhythmias  - VT zone lowered to 164 bpm on 5/26

## 2024-05-28 NOTE — CONSULT NOTE ADULT - ASSESSMENT
Patient is a 78 year old male with history of HTN, HLD, DM, CKD, CAD s/p 4V CABG and multiple PCI's, ischemic cardiomyopathy (EF < 20%), and LBBB s/p MDT CRTD. He had an admission in November for ADHF. At that time a [facility] revelaed his grafts were occluded except for the LIMA to LAD. He also underwent a CRTD implant. On 5/25 he had a VT arrest with ROSC achieved after 3 mintues. Was sent to the cath lab which showed his LIMA is still patent.   Patient was extubated yesterday but remains on levophed for BP support

## 2024-05-28 NOTE — PROGRESS NOTE ADULT - ASSESSMENT
79 yo male with CAD, CABG, PCI, chronic HFrEF, DM, CKD, presented with shortness of breath, NSTEMI.  Hospital course complicated by VT cardiac arrest on 5/25.  Patient successfully extubated on 5/26 but required re-intubation that day due to recurrent monomorphic VT/ SVT, worsening shock, and recurrent cardiac arrest lasting ~1 min. Extubated yesterday, AAOx2-3.   S-G cath in place at [facility] today. Heart failure team, EP team following.     - Given Lasix 40mg this after noon. Started 2 mg Bumex BID.    SvO2 63.4% this evening. CI 2.0. Off Levo. Started Mirlinon 0.125 per HF team.  - This afternoon Pt has intermittent wide complex tach. Started Mexitil 150mg TID per EP team. C/w Amio 1mg/min.  - Dysphagia/ Speech PT ordered.  - Keep K>4, Mg>2.  - keep negative fluid balance  - GDMT as tolerated  DM  - lantus and lispro 79 yo male with CAD, CABG, PCI, chronic HFrEF, DM, CKD, presented with shortness of breath, NSTEMI.  Hospital course complicated by VT cardiac arrest on 5/25.  Patient successfully extubated on 5/26 but required re-intubation that day due to recurrent monomorphic VT/ SVT, worsening shock, and recurrent cardiac arrest lasting ~1 min. Extubated yesterday, AAOx2-3.   S-G cath in place at [facility] today. Heart failure team, EP team following.     - Given Lasix 40mg this after noon. Started 2 mg Bumex BID.    SvO2 63.4% this evening. CI 2.0. Off Levo. Started milrinone 0.125 per HF team.  - This afternoon Pt has intermittent wide complex tach. Started Mexitil 150mg TID per EP team. C/w Amio 1mg/min.  - Dysphagia/ Speech PT ordered.  - Keep K>4, Mg>2.  - keep negative fluid balance  - GDMT as tolerated  DM  - lantus and lispro

## 2024-05-28 NOTE — PROGRESS NOTE ADULT - NS ATTEND AMEND GEN_ALL_CORE FT
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 He called the office and was told to come in for further evaluation. In ED, patient weak appearing, and pending labs. Denies back pain, headache, SOB, JOINER, diaphoresis, syncope or N/V.    5/27: Patient with another episode of sustained monomorphic VT yesterday despite being on amiodarone gtt, no shocks delivered by CRT-D. Patient given additional Amio bolus and started on lidocaine gtt. Patient also with Afib with RVR vs. SVT, with worsening shock requiring levophed. Patient then with worsening shock with cardiac arrest again requiring CPR with ROSC achieved after 1 minute. Patient intubated and sedated at this time. Still on levophed for shock with worsening transaminitis.       complicated stay.    Monomorphic VT  cardiac arrest  hx of 4V CABG  brachytherapy history  cardiogenic shock  HFrEF with acute decompensation, multi-organ failure  diabetes mellitus type 2  hyperlipidemia        Levophed  awake and alert  plan for RHC today, multidisciplinary discussion to aid with cardiogenic shock  lidocaine + amiodarone  restart heparin gtt post [place], Central line insertion (requesting change of central line from femoral placement to RIJ)    advanced heart failure consultation requested    discussed with family, sons, extended family at bedside. critical

## 2024-05-28 NOTE — PROGRESS NOTE ADULT - SUBJECTIVE AND OBJECTIVE BOX
Patient is a 78y old  Male who presents with a chief complaint of Chest Pain / SOB (27 May 2024 13:30)      BRIEF HOSPITAL COURSE: 79 y/o M with a h/o CAD (PCI/CODI x s/p 19, brachytherapy 11/2023), 4V CABG, ICM, HFrEF EF 37%, s/p ICD, DM2 on insulin, 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 Recently had Holter monitor placement for PVC burden which was noted to have worsened the day of presentation. In ED, patient weak appearing, ruled in for NSTEMI with positive troponin, and labs remarkable for RAS on CKD as well as hypoglycemia. A TTE revealed acute on chronic systolic heart failure with EF <20%. He was admitted to telemetry for treatment of ACS on DAPT and heparin gtt, planned for LHC. Patient suffered VF/VT cardiac arrest on 5/25 without ICD shock (subsequently reprogrammed by EP) and was taken for emergent LHC where he was found to have 3/4 occluded grafts, only LIMA to LAD was patent. No intervention performed. Extubated on 5/26, however suffered another VT cardiac arrest later that day (again without ICD shock) with 1 minute downtime prior to ROSC.      Events last 24 hours: Extubated yesterday. Remains dependent on IV vasopressor support. No further arrhythmias on amiodarone infusion. Off lidocaine. Family has rescinded DNR/DNI advanced directives.          PAST MEDICAL & SURGICAL HISTORY:  GERD (gastroesophageal reflux disease)      High cholesterol      Coronary artery disease involving coronary bypass graft of native heart with unstable angina pectoris      Coronary artery disease involving native coronary artery of native heart, angina presence unspecifie      Type 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, without long-term current use of insul      Essential hypertension      HFrEF (heart failure with reduced ejection fraction)      Stage 4 chronic kidney disease      LBBB (left bundle branch block)      Facial nerve palsy      Hypothyroidism      S/P CABG (coronary artery bypass graft)  4V 1983 [place]      Status post open reduction with internal fixation of fracture      History of insertion of stent into coronary artery bypass graft      History of brachytherapy          Review of Systems:  CONSTITUTIONAL: No fever, chills, or fatigue  EYES: No eye pain, visual disturbances, or discharge  ENMT:  No difficulty hearing, tinnitus, vertigo; No sinus or throat pain  NECK: No pain or stiffness  RESPIRATORY: No cough, wheezing, chills or hemoptysis; No shortness of breath  CARDIOVASCULAR: No chest pain, palpitations, dizziness, or leg swelling  GASTROINTESTINAL: No abdominal or epigastric pain. No nausea, vomiting, or hematemesis; No diarrhea or constipation. No melena or hematochezia.  GENITOURINARY: No dysuria, frequency, hematuria, or incontinence  NEUROLOGICAL: No headaches, memory loss, loss of strength, numbness, or tremors  SKIN: No itching, burning, rashes, or lesions   MUSCULOSKELETAL: No joint pain or swelling; No muscle, back, or extremity pain  PSYCHIATRIC: No depression, anxiety, mood swings, or difficulty sleeping      Medications:  piperacillin/tazobactam IVPB.. 3.375 Gram(s) IV Intermittent every 8 hours    aMIOdarone Infusion 1 mG/Min IV Continuous <Continuous>  norepinephrine Infusion 0.45 MICROgram(s)/kG/Min IV Continuous <Continuous>  ranolazine 500 milliGRAM(s) Oral two times a day          aspirin enteric coated 81 milliGRAM(s) Oral daily  clopidogrel Tablet 75 milliGRAM(s) Oral daily  heparin   Injectable 5500 Unit(s) IV Push every 6 hours PRN  heparin   Injectable 2500 Unit(s) IV Push every 6 hours PRN  heparin  Infusion.  Unit(s)/Hr IV Continuous <Continuous>    pantoprazole  Injectable 40 milliGRAM(s) IV Push daily      atorvastatin 80 milliGRAM(s) Oral at bedtime  dextrose 50% Injectable 25 Gram(s) IV Push once  glucagon  Injectable 1 milliGRAM(s) IntraMuscular once  insulin glargine Injectable (LANTUS) 9 Unit(s) SubCutaneous at bedtime  insulin lispro (ADMELOG) corrective regimen sliding scale   SubCutaneous every 6 hours  insulin lispro Injectable (ADMELOG) 3 Unit(s) SubCutaneous three times a day before meals  levothyroxine Injectable 36 MICROGram(s) IV Push at bedtime    sodium chloride 0.9% lock flush 10 milliLiter(s) IV Push every 1 hour PRN      chlorhexidine 2% Cloths 1 Application(s) Topical <User Schedule>        Mode: CPAP with PS  FiO2: 40  PEEP: 8  PS: 6  MAP: 12      ICU Vital Signs Last 24 Hrs  T(C): 37.7 (28 May 2024 03:00), Max: 38 (28 May 2024 00:15)  T(F): 99.9 (28 May 2024 03:00), Max: 100.4 (28 May 2024 00:15)  HR: 71 (28 May 2024 03:00) (60 - 79)  BP: 124/79 (28 May 2024 03:00) (101/57 - 124/79)  BP(mean): 93 (28 May 2024 03:00) (71 - 93)  ABP: 127/46 (28 May 2024 03:00) (102/43 - 135/56)  ABP(mean): 73 (28 May 2024 03:00) (60 - 82)  RR: 28 (28 May 2024 03:00) (14 - 32)  SpO2: 100% (28 May 2024 03:00) (95% - 100%)    O2 Parameters below as of 28 May 2024 00:00  Patient On (Oxygen Delivery Method): room air            ABG - ( 26 May 2024 17:49 )  pH, Arterial: 7.310 pH, Blood: x     /  pCO2: 37    /  pO2: >496  / HCO3: 19    / Base Excess: -7.7  /  SaO2: 98.4                I&O's Detail    26 May 2024 07:01  -  27 May 2024 07:00  --------------------------------------------------------  IN:    Amiodarone: 183.6 mL    Amiodarone: 765.9 mL    Heparin Infusion: 212 mL    IV PiggyBack: 50 mL    IV PiggyBack: 200 mL    Lidocaine: 93 mL    Norepinephrine: 177.6 mL    Phenylephrine: 69 mL    Propofol: 134.7 mL  Total IN: 1885.8 mL    OUT:    Indwelling Catheter - Urethral (mL): 1645 mL  Total OUT: 1645 mL    Total NET: 240.8 mL      27 May 2024 07:01  -  28 May 2024 03:39  --------------------------------------------------------  IN:    Amiodarone: 499.5 mL    Amiodarone: 166.5 mL    Heparin Infusion: 160 mL    IV PiggyBack: 100 mL    IV PiggyBack: 200 mL    Lidocaine: 37.5 mL    Norepinephrine: 241.8 mL    Propofol: 25.7 mL  Total IN: 1431 mL    OUT:    Indwelling Catheter - Urethral (mL): 1225 mL  Total OUT: 1225 mL    Total NET: 206 mL            LABS:                        11.6   13.76 )-----------( 173      ( 27 May 2024 03:15 )             34.6     05-27    135  |  95<L>  |  59.8<H>  ----------------------------<  319<H>  3.1<L>   |  23.0  |  2.41<H>    Ca    8.1<L>      27 May 2024 03:15  Phos  4.1     05-27  Mg     2.3     05-27    TPro  6.0<L>  /  Alb  2.8<L>  /  TBili  0.8  /  DBili  x   /  AST  502<H>  /  ALT  440<H>  /  AlkPhos  102  05-27          CAPILLARY BLOOD GLUCOSE      POCT Blood Glucose.: 212 mg/dL (28 May 2024 01:26)    PTT - ( 27 May 2024 06:58 )  PTT:74.0 sec  Urinalysis Basic - ( 27 May 2024 03:15 )    Color: x / Appearance: x / SG: x / pH: x  Gluc: 319 mg/dL / Ketone: x  / Bili: x / Urobili: x   Blood: x / Protein: x / Nitrite: x   Leuk Esterase: x / RBC: x / WBC x   Sq Epi: x / Non Sq Epi: x / Bacteria: x      CULTURES:        Physical Examination:    General: No acute distress.  Alert, oriented, interactive, nonfocal    HEENT: Pupils equal, reactive to light.  Symmetric.    PULM: Clear to auscultation bilaterally, no significant sputum production    CVS: Regular rate and rhythm, no murmurs, rubs, or gallops    ABD: Soft, nondistended, nontender, normoactive bowel sounds, no masses    EXT: No edema, nontender    SKIN: Warm and well perfused, no rashes noted.    NEURO: A&Ox3, strength 5/5 all extremities, cranial nerves grossly intact, no focal deficits        RADIOLOGY: ***        CRITICAL CARE TIME SPENT: ***  Time spent evaluating/treating patient with medical issues that pose a high risk for life threatening deterioration and/or end-organ damage, reviewing data/labs/imaging, discussing case with multidisciplinary team, discussing plan/goals of care with patient/family. Non-inclusive of procedure time. The date of entry of this note reflects the date of services rendered.   Patient is a 78y old  Male who presents with a chief complaint of Chest Pain / SOB (27 May 2024 13:30)      BRIEF HOSPITAL COURSE: 79 y/o M with a h/o CAD (PCI/CODI x s/p 19, brachytherapy 11/2023), 4V CABG, ICM, HFrEF EF 37%, s/p ICD, DM2 on insulin, 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 Recently had Holter monitor placement for PVC burden which was noted to have worsened the day of presentation. In ED, patient weak appearing, ruled in for NSTEMI with positive troponin, and labs remarkable for RAS on CKD as well as hypoglycemia. A TTE revealed acute on chronic systolic heart failure with EF <20%. He was admitted to telemetry for treatment of ACS on DAPT and heparin gtt, planned for LHC. Patient suffered VF/VT cardiac arrest on 5/25 without ICD shock (subsequently reprogrammed by EP) and was taken for emergent LHC where he was found to have 3/4 occluded grafts, only LIMA to LAD was patent. No intervention performed. Extubated on 5/26, however suffered another VT cardiac arrest later that day (again without ICD shock) with 1 minute downtime prior to ROSC.      Events last 24 hours: Extubated yesterday. Remains dependent on IV vasopressor support. No further arrhythmias, occasional PVCs, on amiodarone infusion. Off lidocaine. Family has rescinded DNR/DNI advanced directives.          PAST MEDICAL & SURGICAL HISTORY:  GERD (gastroesophageal reflux disease)      High cholesterol      Coronary artery disease involving coronary bypass graft of native heart with unstable angina pectoris      Coronary artery disease involving native coronary artery of native heart, angina presence unspecifie      Type 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, without long-term current use of insul      Essential hypertension      HFrEF (heart failure with reduced ejection fraction)      Stage 4 chronic kidney disease      LBBB (left bundle branch block)      Facial nerve palsy      Hypothyroidism      S/P CABG (coronary artery bypass graft)  4V 1983 [place]      Status post open reduction with internal fixation of fracture      History of insertion of stent into coronary artery bypass graft      History of brachytherapy          Review of Systems:  CONSTITUTIONAL: No fever, chills, or fatigue  EYES: No eye pain, visual disturbances, or discharge  ENMT:  No difficulty hearing, tinnitus, vertigo; No sinus or throat pain  NECK: No pain or stiffness  RESPIRATORY: No cough, wheezing, chills or hemoptysis; No shortness of breath  CARDIOVASCULAR: No chest pain, palpitations, dizziness, or leg swelling  GASTROINTESTINAL: No abdominal or epigastric pain. No nausea, vomiting, or hematemesis; No diarrhea or constipation. No melena or hematochezia.  GENITOURINARY: No dysuria, frequency, hematuria, or incontinence  NEUROLOGICAL: No headaches, memory loss, loss of strength, numbness, or tremors  SKIN: No itching, burning, rashes, or lesions   MUSCULOSKELETAL: No joint pain or swelling; No muscle, back, or extremity pain  PSYCHIATRIC: No depression, anxiety, mood swings, or difficulty sleeping      Medications:  piperacillin/tazobactam IVPB.. 3.375 Gram(s) IV Intermittent every 8 hours    aMIOdarone Infusion 1 mG/Min IV Continuous <Continuous>  norepinephrine Infusion 0.45 MICROgram(s)/kG/Min IV Continuous <Continuous>  ranolazine 500 milliGRAM(s) Oral two times a day          aspirin enteric coated 81 milliGRAM(s) Oral daily  clopidogrel Tablet 75 milliGRAM(s) Oral daily  heparin   Injectable 5500 Unit(s) IV Push every 6 hours PRN  heparin   Injectable 2500 Unit(s) IV Push every 6 hours PRN  heparin  Infusion.  Unit(s)/Hr IV Continuous <Continuous>    pantoprazole  Injectable 40 milliGRAM(s) IV Push daily      atorvastatin 80 milliGRAM(s) Oral at bedtime  dextrose 50% Injectable 25 Gram(s) IV Push once  glucagon  Injectable 1 milliGRAM(s) IntraMuscular once  insulin glargine Injectable (LANTUS) 9 Unit(s) SubCutaneous at bedtime  insulin lispro (ADMELOG) corrective regimen sliding scale   SubCutaneous every 6 hours  insulin lispro Injectable (ADMELOG) 3 Unit(s) SubCutaneous three times a day before meals  levothyroxine Injectable 36 MICROGram(s) IV Push at bedtime    sodium chloride 0.9% lock flush 10 milliLiter(s) IV Push every 1 hour PRN      chlorhexidine 2% Cloths 1 Application(s) Topical <User Schedule>        Mode: CPAP with PS  FiO2: 40  PEEP: 8  PS: 6  MAP: 12      ICU Vital Signs Last 24 Hrs  T(C): 37.7 (28 May 2024 03:00), Max: 38 (28 May 2024 00:15)  T(F): 99.9 (28 May 2024 03:00), Max: 100.4 (28 May 2024 00:15)  HR: 71 (28 May 2024 03:00) (60 - 79)  BP: 124/79 (28 May 2024 03:00) (101/57 - 124/79)  BP(mean): 93 (28 May 2024 03:00) (71 - 93)  ABP: 127/46 (28 May 2024 03:00) (102/43 - 135/56)  ABP(mean): 73 (28 May 2024 03:00) (60 - 82)  RR: 28 (28 May 2024 03:00) (14 - 32)  SpO2: 100% (28 May 2024 03:00) (95% - 100%)    O2 Parameters below as of 28 May 2024 00:00  Patient On (Oxygen Delivery Method): room air            ABG - ( 26 May 2024 17:49 )  pH, Arterial: 7.310 pH, Blood: x     /  pCO2: 37    /  pO2: >496  / HCO3: 19    / Base Excess: -7.7  /  SaO2: 98.4                I&O's Detail    26 May 2024 07:01  -  27 May 2024 07:00  --------------------------------------------------------  IN:    Amiodarone: 183.6 mL    Amiodarone: 765.9 mL    Heparin Infusion: 212 mL    IV PiggyBack: 50 mL    IV PiggyBack: 200 mL    Lidocaine: 93 mL    Norepinephrine: 177.6 mL    Phenylephrine: 69 mL    Propofol: 134.7 mL  Total IN: 1885.8 mL    OUT:    Indwelling Catheter - Urethral (mL): 1645 mL  Total OUT: 1645 mL    Total NET: 240.8 mL      27 May 2024 07:01  -  28 May 2024 03:39  --------------------------------------------------------  IN:    Amiodarone: 499.5 mL    Amiodarone: 166.5 mL    Heparin Infusion: 160 mL    IV PiggyBack: 100 mL    IV PiggyBack: 200 mL    Lidocaine: 37.5 mL    Norepinephrine: 241.8 mL    Propofol: 25.7 mL  Total IN: 1431 mL    OUT:    Indwelling Catheter - Urethral (mL): 1225 mL  Total OUT: 1225 mL    Total NET: 206 mL            LABS:                        11.6   13.76 )-----------( 173      ( 27 May 2024 03:15 )             34.6     05-27    135  |  95<L>  |  59.8<H>  ----------------------------<  319<H>  3.1<L>   |  23.0  |  2.41<H>    Ca    8.1<L>      27 May 2024 03:15  Phos  4.1     05-27  Mg     2.3     05-27    TPro  6.0<L>  /  Alb  2.8<L>  /  TBili  0.8  /  DBili  x   /  AST  502<H>  /  ALT  440<H>  /  AlkPhos  102  05-27          CAPILLARY BLOOD GLUCOSE      POCT Blood Glucose.: 212 mg/dL (28 May 2024 01:26)    PTT - ( 27 May 2024 06:58 )  PTT:74.0 sec  Urinalysis Basic - ( 27 May 2024 03:15 )    Color: x / Appearance: x / SG: x / pH: x  Gluc: 319 mg/dL / Ketone: x  / Bili: x / Urobili: x   Blood: x / Protein: x / Nitrite: x   Leuk Esterase: x / RBC: x / WBC x   Sq Epi: x / Non Sq Epi: x / Bacteria: x      CULTURES:        Physical Examination:    General: No acute distress.  lethargic, interactive    HEENT: Pupils equal, reactive to light.  Symmetric.    PULM: Clear to auscultation bilaterally, no significant sputum production    CVS: Regular rate and rhythm, no murmurs, rubs, or gallops    ABD: Soft, nondistended, nontender, normoactive bowel sounds, no masses    EXT: No edema, nontender    SKIN: Warm and well perfused, no rashes noted.    NEURO: lethargic, follows commands and answers questions appropriately, moves all extremities        RADIOLOGY:    < from: Xray Chest 1 View- PORTABLE-Urgent (Xray Chest 1 View- PORTABLE-Urgent .) (05.26.24 @ 18:08) >  FINDINGS:  CATHETERS AND TUBES: ET tube tip above tracheal bifurcation.      PULMONARY:  RIGHT perihilar linear airspace disease/atelectasis. Remaining lung   parenchyma clear...  No pleural effusion or pneumothorax.    HEART/VASCULAR: The heart size and mediastinum configuration are within   the limits of normal. Right atrium and biventricular cardiac wire leads   in place.    BONES: The visualized osseous thorax is intact.    IMPRESSION:  Residual RIGHT perihilar linear airspace disease/atelectasis.  ET tube tip remains above trachea bifurcation.    < end of copied text >          CRITICAL CARE TIME SPENT: 36 mins  Time spent evaluating/treating patient with medical issues that pose a high risk for life threatening deterioration and/or end-organ damage, reviewing data/labs/imaging, discussing case with multidisciplinary team, discussing plan/goals of care with patient/family. Non-inclusive of procedure time. The date of entry of this note reflects the date of services rendered.

## 2024-05-28 NOTE — PROGRESS NOTE ADULT - PROBLEM SELECTOR PLAN 1
- Pt had witnessed cardiac arrest 05/25/24  - pt developed VT rate of 200 on telemetry prompting evaluation by RNs and nearby medicine attending   - When cardiology arrived, pt was still talking and appeared to briefly have his HR return to 105. Upon the second run of VT, pt lost consciousness and code blue was called   - Despite initial VT, when defibrillator pads were placed on pt for shock, no shock was advised and VT was no longer noted. Code was ran as PEA arrest.   - Pt received epinephrine, sodium bicarb, amiodarone, lidocaine. Also was intubated and placed on ventilator.   - Pt was emergently taken to cath lab and cath was performed by Dr. Strong revealing patent LIMA-LAD, but occluded SVG-Circ, all other grafts/vessels noted to be occluded: %, %, Circ 100%, %. [place]-LAD is fillng LCX/RCA through collateral circulation  - Due to the extensive disease present, there was no intervention performed and recommendation from interventional attending was for medical therapy.   - Patient again with PEA arrest 5/26 with ROSC achieved after 1 minute of CPR.   - Patient with worsening cardiogenic shock requiring levophed.   - Consider adding milrinone for inotropic support, and weaning off levophed as tolerated.   - Advanced Heart failure consult placed.   - RHC cancelled due to concern for dislodging PPM.  - Continue ASA, plavix, heparin, ranexa, lipitor, amiodarone, and lidocaine.  - GDMT is limited by hypotension/ CKD.

## 2024-05-28 NOTE — PROGRESS NOTE ADULT - SUBJECTIVE AND OBJECTIVE BOX
NEPHROLOGY INTERVAL HPI/OVERNIGHT EVENTS:    Examined  Now extubated  Awake alert  Feeling better  Denies HA CP no SOB  Family visiting    MEDICATIONS  (STANDING):  aMIOdarone Infusion 1 mG/Min (33.3 mL/Hr) IV Continuous <Continuous>  aspirin enteric coated 81 milliGRAM(s) Oral daily  atorvastatin 80 milliGRAM(s) Oral at bedtime  chlorhexidine 2% Cloths 1 Application(s) Topical <User Schedule>  clopidogrel Tablet 75 milliGRAM(s) Oral daily  dextrose 50% Injectable 25 Gram(s) IV Push once  furosemide   Injectable 40 milliGRAM(s) IV Push once  glucagon  Injectable 1 milliGRAM(s) IntraMuscular once  heparin  Infusion.  Unit(s)/Hr (12 mL/Hr) IV Continuous <Continuous>  insulin glargine Injectable (LANTUS) 9 Unit(s) SubCutaneous at bedtime  insulin lispro (ADMELOG) corrective regimen sliding scale   SubCutaneous every 6 hours  insulin lispro Injectable (ADMELOG) 3 Unit(s) SubCutaneous three times a day before meals  levothyroxine Injectable 36 MICROGram(s) IV Push at bedtime  norepinephrine Infusion 0.45 MICROgram(s)/kG/Min (55.3 mL/Hr) IV Continuous <Continuous>  pantoprazole  Injectable 40 milliGRAM(s) IV Push daily  piperacillin/tazobactam IVPB.. 3.375 Gram(s) IV Intermittent every 8 hours  ranolazine 500 milliGRAM(s) Oral two times a day    MEDICATIONS  (PRN):  heparin   Injectable 5500 Unit(s) IV Push every 6 hours PRN For aPTT less than 40  heparin   Injectable 2500 Unit(s) IV Push every 6 hours PRN For aPTT between 40 - 57  sodium chloride 0.9% lock flush 10 milliLiter(s) IV Push every 1 hour PRN Pre/post blood products, medications, blood draw, and to maintain line patency      Allergies    No Known Allergies    Intolerances    DOPamine (Hypotension)      Vital Signs Last 24 Hrs  T(C): 37.1 (28 May 2024 13:30), Max: 38 (28 May 2024 00:15)  T(F): 98.8 (28 May 2024 13:30), Max: 100.4 (28 May 2024 00:15)  HR: 73 (28 May 2024 13:30) (64 - 109)  BP: 110/51 (28 May 2024 12:00) (107/66 - 128/79)  BP(mean): 70 (28 May 2024 12:00) (70 - 94)  RR: 22 (28 May 2024 13:30) (20 - 34)  SpO2: 100% (28 May 2024 13:30) (94% - 100%)    Parameters below as of 28 May 2024 04:00  Patient On (Oxygen Delivery Method): room air    PHYSICAL EXAM:  GENERAL: NAD  HEAD:  NCAT  NERVOUS SYSTEM:  Awake alert  CHEST/LUNG: dec bs anteriorly  HEART: Regular rate and rhythm  ABDOMEN: Soft,  +BS  EXTREMITIES: no edema    LABS:                        12.3   18.50 )-----------( 204      ( 28 May 2024 03:45 )             36.4     05-28    133<L>  |  95<L>  |  55.4<H>  ----------------------------<  179<H>  3.6   |  25.0  |  2.52<H>    Ca    8.6      28 May 2024 03:45  Phos  4.9     05-28  Mg     2.6     05-28    TPro  6.7  /  Alb  3.0<L>  /  TBili  0.8  /  DBili  x   /  AST  162<H>  /  ALT  334<H>  /  AlkPhos  125<H>  05-28    PTT - ( 28 May 2024 03:45 )  PTT:61.4 sec  Urinalysis Basic - ( 28 May 2024 03:45 )    Color: x / Appearance: x / SG: x / pH: x  Gluc: 179 mg/dL / Ketone: x  / Bili: x / Urobili: x   Blood: x / Protein: x / Nitrite: x   Leuk Esterase: x / RBC: x / WBC x   Sq Epi: x / Non Sq Epi: x / Bacteria: x      Magnesium: 2.6 mg/dL (05-28 @ 03:45)  Phosphorus: 4.9 mg/dL (05-28 @ 03:45)    ABG - ( 26 May 2024 17:49 )  pH, Arterial: 7.310 pH, Blood: x     /  pCO2: 37    /  pO2: >496  / HCO3: 19    / Base Excess: -7.7  /  SaO2: 98.4                  RADIOLOGY & ADDITIONAL TESTS:

## 2024-05-28 NOTE — PROGRESS NOTE ADULT - SUBJECTIVE AND OBJECTIVE BOX
[facility]                                                         CARDIOLOGY AT [place] [address]                                                         Telephone: [phone]. Fax:[phone]                                                                             PROGRESS NOTE    Reason for follow up: STEMI, Cardiac Arrest, Cardiogenic Shock  Update: Patient extubated yesterday, still on levophed for support. Patient weaned off lidocaine gtt yesterday, then with recurrent VT this morning. Currently V-paced. Patient was planned for RHC with [place] placement today, but cancelled due to EP concern regarding dislodging PPM leads.       Review of symptoms:   Cardiac:  No chest pain. No dyspnea. No palpitations.  Respiratory: no cough. No dyspnea  Gastrointestinal: No diarrhea. No abdominal pain. No bleeding.   Neuro: No focal neuro complaints.    Vitals:  T(C): 37.3 (05-28-24 @ 10:00), Max: 38 (05-28-24 @ 00:15)  HR: 77 (05-28-24 @ 10:00) (64 - 109)  BP: 116/57 (05-28-24 @ 10:00) (103/59 - 128/79)  RR: 29 (05-28-24 @ 10:00) (20 - 34)  SpO2: 94% (05-28-24 @ 10:00) (94% - 100%)  Wt(kg): --  I&O's Summary    27 May 2024 07:01  -  28 May 2024 07:00  --------------------------------------------------------  IN: 1640.2 mL / OUT: 1395 mL / NET: 245.2 mL    28 May 2024 07:01  -  28 May 2024 11:08  --------------------------------------------------------  IN: 139.2 mL / OUT: 125 mL / NET: 14.2 mL      Weight (kg): 65.5 (05-23 @ 15:04)    PHYSICAL EXAM:  Appearance: Comfortable. No acute distress  HEENT:  Atraumatic. Normocephalic.  Normal oral mucosa  Neurologic: A & O x 3, no gross focal deficits.  Cardiovascular: RRR S1 S2, No murmur, no rubs/gallops. No JVD  Respiratory: Lungs clear to auscultation, unlabored   Gastrointestinal:  Soft, Non-tender, + BS  Lower Extremities: No clubbing, cyanosis, or edema, extremities cool to the touch  Psychiatry: Patient is calm. No agitation.   Skin: warm and dry.    CURRENT CARDIAC MEDICATIONS:  aMIOdarone Infusion 1 mG/Min IV Continuous <Continuous>  norepinephrine Infusion 0.45 MICROgram(s)/kG/Min IV Continuous <Continuous>  ranolazine 500 milliGRAM(s) Oral two times a day      CURRENT OTHER MEDICATIONS:  piperacillin/tazobactam IVPB.. 3.375 Gram(s) IV Intermittent every 8 hours  pantoprazole  Injectable 40 milliGRAM(s) IV Push daily  atorvastatin 80 milliGRAM(s) Oral at bedtime  dextrose 50% Injectable 25 Gram(s) IV Push once, Stop order after: 1 Doses  glucagon  Injectable 1 milliGRAM(s) IntraMuscular once, Stop order after: 1 Doses  insulin glargine Injectable (LANTUS) 9 Unit(s) SubCutaneous at bedtime  insulin lispro (ADMELOG) corrective regimen sliding scale   SubCutaneous every 6 hours  insulin lispro Injectable (ADMELOG) 3 Unit(s) SubCutaneous three times a day before meals  levothyroxine Injectable 36 MICROGram(s) IV Push at bedtime  aspirin enteric coated 81 milliGRAM(s) Oral daily  chlorhexidine 2% Cloths 1 Application(s) Topical <User Schedule>  clopidogrel Tablet 75 milliGRAM(s) Oral daily  heparin   Injectable 5500 Unit(s) IV Push every 6 hours PRN For aPTT less than 40  heparin   Injectable 2500 Unit(s) IV Push every 6 hours PRN For aPTT between 40 - 57  heparin  Infusion.  Unit(s)/Hr (12 mL/Hr) IV Continuous <Continuous>  sodium chloride 0.9% lock flush 10 milliLiter(s) IV Push every 1 hour PRN Pre/post blood products, medications, blood draw, and to maintain line patency      LABS:	 	  ( 25 May 2024 10:21 )  Troponin T  X    ,  CPK  69   , CKMB  X    , BNP X                                  12.3   18.50 )-----------( 204      ( 28 May 2024 03:45 )             36.4     05-28    133<L>  |  95<L>  |  55.4<H>  ----------------------------<  179<H>  3.6   |  25.0  |  2.52<H>    Ca    8.6      28 May 2024 03:45  Phos  4.9     05-28  Mg     2.6     05-28    TPro  6.7  /  Alb  3.0<L>  /  TBili  0.8  /  DBili  x   /  AST  162<H>  /  ALT  334<H>  /  AlkPhos  125<H>  05-28    PT/INR/PTT ( 28 May 2024 03:45 )                       :                       :      X            :       61.4                  .        .                   .              .           .       X           .                                       Lipid Profile:   HgA1c:   TSH:     TELEMETRY: V-paced, VT, PVCs  ECG:    DIAGNOSTIC TESTING:  [ ] Echocardiogram:     < from: TTE Limited W or WO Ultrasound Enhancing Agent (05.23.24 @ 19:54) >  CONCLUSIONS:      1. Left ventricular systolic function is severely decreased with an ejection fraction visually estimated at <20 %. Global left ventricular hypokinesis.   2. Elevated filling pressure.   3. Mildly reduced right ventricular systolic function.   4. Mild to moderate mitral regurgitation.   5. Mild to moderate aortic stenosis.   6. Mild pulmonic regurgitation.   7. Mild tricuspid regurgitation.   8. Estimated pulmonary artery systolic pressure is 75 mmHg, consistent with elevated pulmonary artery pressure.   9. No prior echocardiogram is available for comparison.    < end of copied text >  [ ]  Catheterization:  [ ]  Catheterization:  < from: Cardiac Catheterization (05.25.24 @ 11:11) >  Diagnostic Findings:     Coronary Angiography   The coronary circulation is right dominant.      LM   Left main artery: There is a 100 % stenosis.      LAD   Left anterior descending artery: There is a 100 % stenosis.      CX   Circumflex: There is a 100 % stenosis.      RCA     Patient: ADRIANNA SHANKS              MRN: [id number]  Study Date: 05/25/2024   11:11 AM      Page 1 of 3          Right coronary artery: There is a 100 % stenosis.      Graft Angiography   SVG graft to Circumflex: occluded.    LIMA graft to Mid left anterior descending: The segment is visually  normal in size and structure. fills lcx/rca via collaterals.    Left Heart Cath   LHC performed: Aortic valve crossed and left ventricular pressures  were obtained.      < end of copied text >    [ ] Stress Test:    OTHER:

## 2024-05-28 NOTE — PROGRESS NOTE ADULT - NS ATTEND AMEND GEN_ALL_CORE FT
Will start on mexiletine as above. Continue IV amio at 1mg/min with plans to decrease tomorrow if no further VT.

## 2024-05-28 NOTE — PROGRESS NOTE ADULT - ASSESSMENT
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 presented to the ED with c/o chest tightness    Cardiac arrest shock on pressors    RAS on CKD suspect stage III  Some improvement in serum Cr 4.2--> 3.4 --> 2.3 --> 2.2 --> 2.4 --> 2.5  Base Cr seems to be approx 1.4- 1.8  Entresto, Torsemide and Farxiga on hold  Lasix 40 mg IV x 1 given today  TTE 5/23 noted LVEF < 20%  BNP 21 K  HYpoKalemia - supplemented  s/p emergent L HC renal functio remains stable post cath    Renally dose meds  Avoid nephrotoxic agents  Monitor labs will follow

## 2024-05-28 NOTE — PROGRESS NOTE ADULT - ASSESSMENT
79 y/o M with a h/o CAD (PCI/CODI x s/p 19, brachytherapy 11/2023), 4V CABG, ICM, HFrEF EF 37%, s/p ICD, DM2 on insulin, 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, HTN, HLD, with:    # VT cardiac arrest x 2  # Mixed distributive/cardiogenic shock  # Acute systolic heart failure  # NSTEMI  # New onset AF with RVR  # RAS on CKD    - second cardiac arrest was brief- approx 1 minute downtime, extubated yesterday  - patient's family has rescinded DNR/DNI advanced directives  - no further WCT, lidocaine infusion discontinued  - reduce amiodarone infusion to 0.5 mg/min  - EP input appreciated, eventual VT ablation?  - s/p emergent LHC on 5/25 which revealed 3/4 occluded grafts, only LIMA --> LAD was patent, no intervention performed  - ICD interrogated and reprogrammed by EP on 5/25 as VT was incorrectly sensed as SVT thus delivering no shock, also noted to have new onset AF  - restart heparin infusion as bleeding from around CVC has much improved  - TTE showed reduction in LVEF < 20% prior to cardiac arrest, expect to have worsened now with probable post-arrest myocardial dysfunction  - actively titrating norepinephrine infusion to maintain a MAP > 65  - lactate 2.9 --> 1.5  - RAS on CKD likely cardiorenal in nature, optimize end-organ perfusion as above  - trend BUN/Cr, electrolytes, acid-base balance, monitor hourly UOP (nonoliguric)  - no indication for urgent RRT at this time

## 2024-05-28 NOTE — CONSULT NOTE ADULT - SUBJECTIVE AND OBJECTIVE BOX
Patient is a 78y old  Male who presents with a chief complaint of Chest Pain / SOB (28 May 2024 11:08)      HPI:  Patient is a 78 year old male with history of HTN, HLD, DM, CKD, CAD s/p 4V CABG and multiple PCI's, ischemic cardiomyopathy (EF < 20%), and LBBB s/p MDT CRTD. He had an admission in November for ADHF. At that time a [facility] revelaed his grafts were occluded except for the LIMA to LAD. He also underwent a CRTD implant. On 5/25 he had a VT arrest with ROSC achieved after 3 mintues. Was sent to the cath lab which showed his LIMA is still patent.   Patient was extubated yesterday but remains on levophed for BP support    PAST MEDICAL & SURGICAL HISTORY:  GERD (gastroesophageal reflux disease)      High cholesterol      Coronary artery disease involving coronary bypass graft of native heart with unstable angina pectoris      Coronary artery disease involving native coronary artery of native heart, angina presence unspecifie      Type 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, without long-term current use of insul      Essential hypertension      HFrEF (heart failure with reduced ejection fraction)      Stage 4 chronic kidney disease      LBBB (left bundle branch block)      Facial nerve palsy      Hypothyroidism      S/P CABG (coronary artery bypass graft)  4V 1983 [place]      Status post open reduction with internal fixation of fracture      History of insertion of stent into coronary artery bypass graft      History of brachytherapy          FAMILY HISTORY:      Home Medications:  Farxiga 10 mg oral tablet: 1 tab(s) orally once a day (23 May 2024 16:52)  insulin aspart 100 units/mL injectable solution: 10 injectable 3 times a day (23 May 2024 16:52)  levothyroxine 88 mcg (0.088 mg) oral tablet: 1 tab(s) orally once a day (23 May 2024 16:58)  Metoprolol Succinate ER 50 mg oral tablet, extended release: 1 tab(s) orally once a day (23 May 2024 16:58)  montelukast 10 mg oral tablet: 1 tab(s) orally once a day (23 May 2024 16:58)  NexIUM 40 mg oral delayed release capsule: 1 cap(s) orally once a day (at bedtime) (23 May 2024 16:58)  ranolazine 500 mg oral tablet, extended release: 1 tab(s) orally 2 times a day (23 May 2024 16:58)      Medications:  aMIOdarone Infusion 1 mG/Min IV Continuous <Continuous>  aspirin enteric coated 81 milliGRAM(s) Oral daily  atorvastatin 80 milliGRAM(s) Oral at bedtime  chlorhexidine 2% Cloths 1 Application(s) Topical <User Schedule>  clopidogrel Tablet 75 milliGRAM(s) Oral daily  dextrose 50% Injectable 25 Gram(s) IV Push once  glucagon  Injectable 1 milliGRAM(s) IntraMuscular once  heparin   Injectable 5500 Unit(s) IV Push every 6 hours PRN  heparin   Injectable 2500 Unit(s) IV Push every 6 hours PRN  heparin  Infusion.  Unit(s)/Hr IV Continuous <Continuous>  insulin glargine Injectable (LANTUS) 9 Unit(s) SubCutaneous at bedtime  insulin lispro (ADMELOG) corrective regimen sliding scale   SubCutaneous every 6 hours  insulin lispro Injectable (ADMELOG) 3 Unit(s) SubCutaneous three times a day before meals  levothyroxine Injectable 36 MICROGram(s) IV Push at bedtime  norepinephrine Infusion 0.45 MICROgram(s)/kG/Min IV Continuous <Continuous>  pantoprazole  Injectable 40 milliGRAM(s) IV Push daily  piperacillin/tazobactam IVPB.. 3.375 Gram(s) IV Intermittent every 8 hours  ranolazine 500 milliGRAM(s) Oral two times a day  sodium chloride 0.9% lock flush 10 milliLiter(s) IV Push every 1 hour PRN      Review of systems:  10 point review of systems completed and are negative unless noted in HPI    Vitals:  T(C): 37.3 (05-28-24 @ 10:00), Max: 38 (05-28-24 @ 00:15)  HR: 77 (05-28-24 @ 10:00) (64 - 109)  BP: 116/57 (05-28-24 @ 10:00) (103/59 - 128/79)  BP(mean): 76 (05-28-24 @ 10:00) (71 - 94)  RR: 29 (05-28-24 @ 10:00) (20 - 34)  SpO2: 94% (05-28-24 @ 10:00) (94% - 100%)    Daily     Daily         I&O's Summary    27 May 2024 07:01  -  28 May 2024 07:00  --------------------------------------------------------  IN: 1640.2 mL / OUT: 1395 mL / NET: 245.2 mL    28 May 2024 07:01  -  28 May 2024 12:12  --------------------------------------------------------  IN: 139.2 mL / OUT: 125 mL / NET: 14.2 mL        Physical Exam:  Appearance: No Acute Distress  HEENT: PERRL  Neck:   Cardiovascular: Normal S1 S2, No murmurs/rubs/gallops  Respiratory: Clear to auscultation bilaterally  Gastrointestinal: Soft, Non-tender	  Skin: No cyanosis	  Neurologic: Non-focal  Extremities: No LE edema  Psychiatry: A & O x 3, Mood & affect appropriate    Labs:                        12.3   18.50 )-----------( 204      ( 28 May 2024 03:45 )             36.4     05-28    133<L>  |  95<L>  |  55.4<H>  ----------------------------<  179<H>  3.6   |  25.0  |  2.52<H>    Ca    8.6      28 May 2024 03:45  Phos  4.9     05-28  Mg     2.6     05-28    TPro  6.7  /  Alb  3.0<L>  /  TBili  0.8  /  DBili  x   /  AST  162<H>  /  ALT  334<H>  /  AlkPhos  125<H>  05-28    PTT - ( 28 May 2024 03:45 )  PTT:61.4 sec

## 2024-05-28 NOTE — PROGRESS NOTE ADULT - SUBJECTIVE AND OBJECTIVE BOX
Patient is a 78y old  Male who presents with a chief complaint of Chest Pain / SOB (28 May 2024 03:39)    BRIEF HOSPITAL COURSE: 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 Recently had Holter monitor placement for PVC burden which was noted to have worsened the day of presentation. In ED, patient weak appearing, ruled in for NSTEMI with positive troponin, and labs remarkable for RAS on CKD as well as hypoglycemia. A TTE revealed acute on chronic systolic heart failure with EF <20%. He was admitted to telemetry for treatment of ACS on DAPT and heparin gtt, planned for LHC. Patient suffered VF/VT cardiac arrest on 5/25 without ICD shock (subsequently reprogrammed by EP) and was taken for emergent LHC where he was found to have 3/4 occluded grafts, only LIMA to LAD was patent. No intervention performed. Extubated on 5/26, however suffered another VT cardiac arrest later that day (again without ICD shock) with 1 minute downtime prior to ROSC.      Events last 24 hours: Extubated yesterday. Remains dependent on IV vasopressor support. No further arrhythmias, occasional PVCs, on amiodarone infusion. Off lidocaine. Family has rescinded DNR/DNI advanced directives.      PAST MEDICAL & SURGICAL HISTORY:  GERD (gastroesophageal reflux disease)      High cholesterol      Coronary artery disease involving coronary bypass graft of native heart with unstable angina pectoris      Coronary artery disease involving native coronary artery of native heart, angina presence unspecifie      Type 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, without long-term current use of insul      Essential hypertension      HFrEF (heart failure with reduced ejection fraction)      Stage 4 chronic kidney disease      LBBB (left bundle branch block)      Facial nerve palsy      Hypothyroidism      S/P CABG (coronary artery bypass graft)  4V 1983 [place]      Status post open reduction with internal fixation of fracture      History of insertion of stent into coronary artery bypass graft      History of brachytherapy            Medications:  piperacillin/tazobactam IVPB.. 3.375 Gram(s) IV Intermittent every 8 hours    aMIOdarone Infusion 1 mG/Min IV Continuous <Continuous>  norepinephrine Infusion 0.45 MICROgram(s)/kG/Min IV Continuous <Continuous>  ranolazine 500 milliGRAM(s) Oral two times a day          aspirin enteric coated 81 milliGRAM(s) Oral daily  clopidogrel Tablet 75 milliGRAM(s) Oral daily  heparin   Injectable 2500 Unit(s) IV Push every 6 hours PRN  heparin   Injectable 5500 Unit(s) IV Push every 6 hours PRN  heparin  Infusion.  Unit(s)/Hr IV Continuous <Continuous>    pantoprazole  Injectable 40 milliGRAM(s) IV Push daily      atorvastatin 80 milliGRAM(s) Oral at bedtime  dextrose 50% Injectable 25 Gram(s) IV Push once  glucagon  Injectable 1 milliGRAM(s) IntraMuscular once  insulin glargine Injectable (LANTUS) 9 Unit(s) SubCutaneous at bedtime  insulin lispro (ADMELOG) corrective regimen sliding scale   SubCutaneous every 6 hours  insulin lispro Injectable (ADMELOG) 3 Unit(s) SubCutaneous three times a day before meals  levothyroxine Injectable 36 MICROGram(s) IV Push at bedtime    sodium chloride 0.9% lock flush 10 milliLiter(s) IV Push every 1 hour PRN      chlorhexidine 2% Cloths 1 Application(s) Topical <User Schedule>        Mode: CPAP with PS  FiO2: 40  PEEP: 8  PS: 6  MAP: 12      ICU Vital Signs Last 24 Hrs  T(C): 37.4 (28 May 2024 07:45), Max: 38 (28 May 2024 00:15)  T(F): 99.3 (28 May 2024 07:45), Max: 100.4 (28 May 2024 00:15)  HR: 64 (28 May 2024 07:45) (60 - 109)  BP: 128/79 (28 May 2024 06:00) (103/59 - 128/79)  BP(mean): 94 (28 May 2024 06:00) (71 - 94)  ABP: 118/43 (28 May 2024 07:45) (103/39 - 135/56)  ABP(mean): 67 (28 May 2024 07:45) (60 - 82)  RR: 26 (28 May 2024 07:45) (14 - 32)  SpO2: 100% (28 May 2024 07:45) (95% - 100%)    O2 Parameters below as of 28 May 2024 04:00  Patient On (Oxygen Delivery Method): room air            ABG - ( 26 May 2024 17:49 )  pH, Arterial: 7.310 pH, Blood: x     /  pCO2: 37    /  pO2: >496  / HCO3: 19    / Base Excess: -7.7  /  SaO2: 98.4                I&O's Detail    27 May 2024 07:01  -  28 May 2024 07:00  --------------------------------------------------------  IN:    Amiodarone: 499.5 mL    Amiodarone: 299.7 mL    Heparin Infusion: 170 mL    IV PiggyBack: 100 mL    IV PiggyBack: 200 mL    Lidocaine: 37.5 mL    Norepinephrine: 307.8 mL    Propofol: 25.7 mL  Total IN: 1640.2 mL    OUT:    Indwelling Catheter - Urethral (mL): 1395 mL  Total OUT: 1395 mL    Total NET: 245.2 mL            LABS:                        12.3   18.50 )-----------( 204      ( 28 May 2024 03:45 )             36.4     05-28    133<L>  |  95<L>  |  55.4<H>  ----------------------------<  179<H>  3.6   |  25.0  |  2.52<H>    Ca    8.6      28 May 2024 03:45  Phos  4.9     05-28  Mg     2.6     05-28    TPro  6.7  /  Alb  3.0<L>  /  TBili  0.8  /  DBili  x   /  AST  162<H>  /  ALT  334<H>  /  AlkPhos  125<H>  05-28          CAPILLARY BLOOD GLUCOSE      POCT Blood Glucose.: 164 mg/dL (28 May 2024 06:18)    PTT - ( 28 May 2024 03:45 )  PTT:61.4 sec  Urinalysis Basic - ( 28 May 2024 03:45 )    Color: x / Appearance: x / SG: x / pH: x  Gluc: 179 mg/dL / Ketone: x  / Bili: x / Urobili: x   Blood: x / Protein: x / Nitrite: x   Leuk Esterase: x / RBC: x / WBC x   Sq Epi: x / Non Sq Epi: x / Bacteria: x      CULTURES:      Physical Examination:    General: No acute distress.      HEENT: Pupils equal, reactive to light.  Symmetric.    PULM: Clear to auscultation bilaterally, no significant sputum production    NECK: Supple, no lymphadenopathy, trachea midline    CVS: Regular rate and rhythm, no murmurs, rubs, or gallops    ABD: Soft, nondistended, nontender, normoactive bowel sounds, no masses    EXT: No edema, nontender    SKIN: Warm and well perfused, no rashes noted.    NEURO: Alert, oriented, interactive, nonfocal    DEVICES:     RADIOLOGY: ***    CRITICAL CARE TIME SPENT: ***

## 2024-05-28 NOTE — CHART NOTE - NSCHARTNOTEFT_GEN_A_CORE
Patient received in the Cardiac Cathlab for a RHC and swan placement.     RHP:  mRAP 18  RV 92/2  RVEDP 18  PAP 95/28  mPAP 57    Pt remains on Levo at 0.08 mcg/kg/min and Amio 1 mg/min.  Pt returned to MICU.

## 2024-05-28 NOTE — PROGRESS NOTE ADULT - ASSESSMENT
A/P: 77 y/o male never smoker with history of CAD, STEMI, NSTEMI, 4V CABG, multiple PCI's of the SVG to the OM with stents and brachytherapy, cardiogenic shock, HFrEF with admission for decompensated HF with multi organ system failure, DM2 on long term insulin, 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, HTN, HLD who presents to [facility] ED with chest discomfort since Tuesday after Holter monitor placement for PVC burden which has worsened today with radiation to left arm, SOB and dizziness/lightheadedness. He called the office and was told to come in for further evaluation. In ED, patient weak appearing, and pending labs. Denies back pain, headache, SOB, JOINER, diaphoresis, syncope or N/V.    5/27: Patient with another episode of sustained monomorphic VT yesterday despite being on amiodarone gtt, no shocks delivered by CRT-D. Patient given additional Amio bolus and started on lidocaine gtt. Patient also with Afib with RVR vs. SVT, with worsening shock requiring levophed. Patient then with worsening shock with cardiac arrest again requiring CPR with ROSC achieved after 1 minute. Patient intubated and sedated at this time. Still on levophed for shock with worsening transaminitis.

## 2024-05-29 DIAGNOSIS — I25.10 ATHEROSCLEROTIC HEART DISEASE OF NATIVE CORONARY ARTERY WITHOUT ANGINA PECTORIS: ICD-10-CM

## 2024-05-29 LAB
ALBUMIN SERPL ELPH-MCNC: 2.6 G/DL — LOW (ref 3.3–5.2)
ALBUMIN SERPL ELPH-MCNC: 2.8 G/DL — LOW (ref 3.3–5.2)
ALBUMIN SERPL ELPH-MCNC: 2.8 G/DL — LOW (ref 3.3–5.2)
ALBUMIN SERPL ELPH-MCNC: 2.9 G/DL — LOW (ref 3.3–5.2)
ALBUMIN SERPL ELPH-MCNC: 2.9 G/DL — LOW (ref 3.3–5.2)
ALP SERPL-CCNC: 132 U/L — HIGH (ref 40–120)
ALP SERPL-CCNC: 134 U/L — HIGH (ref 40–120)
ALP SERPL-CCNC: 142 U/L — HIGH (ref 40–120)
ALP SERPL-CCNC: 153 U/L — HIGH (ref 40–120)
ALP SERPL-CCNC: 157 U/L — HIGH (ref 40–120)
ALT FLD-CCNC: 220 U/L — HIGH
ALT FLD-CCNC: 224 U/L — HIGH
ALT FLD-CCNC: 243 U/L — HIGH
ALT FLD-CCNC: 246 U/L — HIGH
ALT FLD-CCNC: 265 U/L — HIGH
ANION GAP SERPL CALC-SCNC: 13 MMOL/L — SIGNIFICANT CHANGE UP (ref 5–17)
ANION GAP SERPL CALC-SCNC: 14 MMOL/L — SIGNIFICANT CHANGE UP (ref 5–17)
ANION GAP SERPL CALC-SCNC: 14 MMOL/L — SIGNIFICANT CHANGE UP (ref 5–17)
ANION GAP SERPL CALC-SCNC: 15 MMOL/L — SIGNIFICANT CHANGE UP (ref 5–17)
ANION GAP SERPL CALC-SCNC: 15 MMOL/L — SIGNIFICANT CHANGE UP (ref 5–17)
APTT BLD: 64.5 SEC — HIGH (ref 24.5–35.6)
AST SERPL-CCNC: 102 U/L — HIGH
AST SERPL-CCNC: 110 U/L — HIGH
AST SERPL-CCNC: 115 U/L — HIGH
AST SERPL-CCNC: 127 U/L — HIGH
AST SERPL-CCNC: 98 U/L — HIGH
BASE EXCESS BLDV CALC-SCNC: 1 MMOL/L — SIGNIFICANT CHANGE UP (ref -2–3)
BASE EXCESS BLDV CALC-SCNC: 1.2 MMOL/L — SIGNIFICANT CHANGE UP (ref -2–3)
BASE EXCESS BLDV CALC-SCNC: 1.9 MMOL/L — SIGNIFICANT CHANGE UP (ref -2–3)
BASE EXCESS BLDV CALC-SCNC: 2.1 MMOL/L — SIGNIFICANT CHANGE UP (ref -2–3)
BASE EXCESS BLDV CALC-SCNC: 3.1 MMOL/L — HIGH (ref -2–3)
BASOPHILS # BLD AUTO: 0.05 K/UL — SIGNIFICANT CHANGE UP (ref 0–0.2)
BASOPHILS NFR BLD AUTO: 0.4 % — SIGNIFICANT CHANGE UP (ref 0–2)
BILIRUB SERPL-MCNC: 0.7 MG/DL — SIGNIFICANT CHANGE UP (ref 0.4–2)
BILIRUB SERPL-MCNC: 0.8 MG/DL — SIGNIFICANT CHANGE UP (ref 0.4–2)
BILIRUB SERPL-MCNC: 1 MG/DL — SIGNIFICANT CHANGE UP (ref 0.4–2)
BUN SERPL-MCNC: 55.1 MG/DL — HIGH (ref 8–20)
BUN SERPL-MCNC: 56.3 MG/DL — HIGH (ref 8–20)
BUN SERPL-MCNC: 58.3 MG/DL — HIGH (ref 8–20)
BUN SERPL-MCNC: 58.3 MG/DL — HIGH (ref 8–20)
BUN SERPL-MCNC: 58.4 MG/DL — HIGH (ref 8–20)
CA-I SERPL-SCNC: 1.1 MMOL/L — LOW (ref 1.15–1.33)
CA-I SERPL-SCNC: 1.11 MMOL/L — LOW (ref 1.15–1.33)
CA-I SERPL-SCNC: 1.14 MMOL/L — LOW (ref 1.15–1.33)
CA-I SERPL-SCNC: 1.15 MMOL/L — SIGNIFICANT CHANGE UP (ref 1.15–1.33)
CA-I SERPL-SCNC: 1.16 MMOL/L — SIGNIFICANT CHANGE UP (ref 1.15–1.33)
CALCIUM SERPL-MCNC: 8.1 MG/DL — LOW (ref 8.4–10.5)
CALCIUM SERPL-MCNC: 8.1 MG/DL — LOW (ref 8.4–10.5)
CALCIUM SERPL-MCNC: 8.3 MG/DL — LOW (ref 8.4–10.5)
CALCIUM SERPL-MCNC: 8.3 MG/DL — LOW (ref 8.4–10.5)
CALCIUM SERPL-MCNC: 8.4 MG/DL — SIGNIFICANT CHANGE UP (ref 8.4–10.5)
CHLORIDE BLDV-SCNC: 96 MMOL/L — SIGNIFICANT CHANGE UP (ref 96–108)
CHLORIDE BLDV-SCNC: 97 MMOL/L — SIGNIFICANT CHANGE UP (ref 96–108)
CHLORIDE BLDV-SCNC: 97 MMOL/L — SIGNIFICANT CHANGE UP (ref 96–108)
CHLORIDE BLDV-SCNC: 98 MMOL/L — SIGNIFICANT CHANGE UP (ref 96–108)
CHLORIDE BLDV-SCNC: 99 MMOL/L — SIGNIFICANT CHANGE UP (ref 96–108)
CHLORIDE SERPL-SCNC: 93 MMOL/L — LOW (ref 96–108)
CHLORIDE SERPL-SCNC: 93 MMOL/L — LOW (ref 96–108)
CHLORIDE SERPL-SCNC: 94 MMOL/L — LOW (ref 96–108)
CHLORIDE SERPL-SCNC: 95 MMOL/L — LOW (ref 96–108)
CHLORIDE SERPL-SCNC: 96 MMOL/L — SIGNIFICANT CHANGE UP (ref 96–108)
CO2 SERPL-SCNC: 23 MMOL/L — SIGNIFICANT CHANGE UP (ref 22–29)
CO2 SERPL-SCNC: 24 MMOL/L — SIGNIFICANT CHANGE UP (ref 22–29)
CO2 SERPL-SCNC: 25 MMOL/L — SIGNIFICANT CHANGE UP (ref 22–29)
CREAT SERPL-MCNC: 2.39 MG/DL — HIGH (ref 0.5–1.3)
CREAT SERPL-MCNC: 2.47 MG/DL — HIGH (ref 0.5–1.3)
CREAT SERPL-MCNC: 2.48 MG/DL — HIGH (ref 0.5–1.3)
CREAT SERPL-MCNC: 2.49 MG/DL — HIGH (ref 0.5–1.3)
CREAT SERPL-MCNC: 2.5 MG/DL — HIGH (ref 0.5–1.3)
EGFR: 26 ML/MIN/1.73M2 — LOW
EGFR: 27 ML/MIN/1.73M2 — LOW
EOSINOPHIL # BLD AUTO: 0.32 K/UL — SIGNIFICANT CHANGE UP (ref 0–0.5)
EOSINOPHIL NFR BLD AUTO: 2.3 % — SIGNIFICANT CHANGE UP (ref 0–6)
GAS PNL BLDV: 130 MMOL/L — LOW (ref 136–145)
GAS PNL BLDV: 130 MMOL/L — LOW (ref 136–145)
GAS PNL BLDV: 131 MMOL/L — LOW (ref 136–145)
GAS PNL BLDV: 131 MMOL/L — LOW (ref 136–145)
GAS PNL BLDV: 132 MMOL/L — LOW (ref 136–145)
GAS PNL BLDV: SIGNIFICANT CHANGE UP
GLUCOSE BLDC GLUCOMTR-MCNC: 136 MG/DL — HIGH (ref 70–99)
GLUCOSE BLDC GLUCOMTR-MCNC: 175 MG/DL — HIGH (ref 70–99)
GLUCOSE BLDC GLUCOMTR-MCNC: 197 MG/DL — HIGH (ref 70–99)
GLUCOSE BLDC GLUCOMTR-MCNC: 204 MG/DL — HIGH (ref 70–99)
GLUCOSE BLDV-MCNC: 135 MG/DL — HIGH (ref 70–99)
GLUCOSE BLDV-MCNC: 176 MG/DL — HIGH (ref 70–99)
GLUCOSE BLDV-MCNC: 202 MG/DL — HIGH (ref 70–99)
GLUCOSE BLDV-MCNC: 212 MG/DL — HIGH (ref 70–99)
GLUCOSE BLDV-MCNC: 243 MG/DL — HIGH (ref 70–99)
GLUCOSE SERPL-MCNC: 137 MG/DL — HIGH (ref 70–99)
GLUCOSE SERPL-MCNC: 174 MG/DL — HIGH (ref 70–99)
GLUCOSE SERPL-MCNC: 205 MG/DL — HIGH (ref 70–99)
GLUCOSE SERPL-MCNC: 210 MG/DL — HIGH (ref 70–99)
GLUCOSE SERPL-MCNC: 222 MG/DL — HIGH (ref 70–99)
HCO3 BLDV-SCNC: 26 MMOL/L — SIGNIFICANT CHANGE UP (ref 22–29)
HCO3 BLDV-SCNC: 27 MMOL/L — SIGNIFICANT CHANGE UP (ref 22–29)
HCO3 BLDV-SCNC: 28 MMOL/L — SIGNIFICANT CHANGE UP (ref 22–29)
HCT VFR BLD CALC: 31.7 % — LOW (ref 39–50)
HCT VFR BLD CALC: 32.6 % — LOW (ref 39–50)
HCT VFR BLD CALC: 32.6 % — LOW (ref 39–50)
HCT VFR BLDA CALC: 32 % — SIGNIFICANT CHANGE UP
HCT VFR BLDA CALC: 33 % — SIGNIFICANT CHANGE UP
HCT VFR BLDA CALC: 33 % — SIGNIFICANT CHANGE UP
HCT VFR BLDA CALC: 34 % — SIGNIFICANT CHANGE UP
HCT VFR BLDA CALC: 34 % — SIGNIFICANT CHANGE UP
HGB BLD CALC-MCNC: 10.7 G/DL — LOW (ref 12.6–17.4)
HGB BLD CALC-MCNC: 10.9 G/DL — LOW (ref 12.6–17.4)
HGB BLD CALC-MCNC: 11 G/DL — LOW (ref 12.6–17.4)
HGB BLD CALC-MCNC: 11.2 G/DL — LOW (ref 12.6–17.4)
HGB BLD CALC-MCNC: 11.3 G/DL — LOW (ref 12.6–17.4)
HGB BLD-MCNC: 10.6 G/DL — LOW (ref 13–17)
HGB BLD-MCNC: 10.7 G/DL — LOW (ref 13–17)
HGB BLD-MCNC: 11.1 G/DL — LOW (ref 13–17)
IMM GRANULOCYTES NFR BLD AUTO: 0.8 % — SIGNIFICANT CHANGE UP (ref 0–0.9)
LACTATE BLDV-MCNC: 0.8 MMOL/L — SIGNIFICANT CHANGE UP (ref 0.5–2)
LACTATE BLDV-MCNC: 0.9 MMOL/L — SIGNIFICANT CHANGE UP (ref 0.5–2)
LACTATE BLDV-MCNC: 1.1 MMOL/L — SIGNIFICANT CHANGE UP (ref 0.5–2)
LACTATE BLDV-MCNC: 1.1 MMOL/L — SIGNIFICANT CHANGE UP (ref 0.5–2)
LACTATE BLDV-MCNC: 1.5 MMOL/L — SIGNIFICANT CHANGE UP (ref 0.5–2)
LACTATE SERPL-SCNC: 0.9 MMOL/L — SIGNIFICANT CHANGE UP (ref 0.5–2)
LACTATE SERPL-SCNC: 1 MMOL/L — SIGNIFICANT CHANGE UP (ref 0.5–2)
LACTATE SERPL-SCNC: 1 MMOL/L — SIGNIFICANT CHANGE UP (ref 0.5–2)
LACTATE SERPL-SCNC: 1.6 MMOL/L — SIGNIFICANT CHANGE UP (ref 0.5–2)
LYMPHOCYTES # BLD AUTO: 1.03 K/UL — SIGNIFICANT CHANGE UP (ref 1–3.3)
LYMPHOCYTES # BLD AUTO: 7.4 % — LOW (ref 13–44)
MAGNESIUM SERPL-MCNC: 2.2 MG/DL — SIGNIFICANT CHANGE UP (ref 1.8–2.6)
MAGNESIUM SERPL-MCNC: 2.3 MG/DL — SIGNIFICANT CHANGE UP (ref 1.6–2.6)
MAGNESIUM SERPL-MCNC: 2.3 MG/DL — SIGNIFICANT CHANGE UP (ref 1.6–2.6)
MAGNESIUM SERPL-MCNC: 2.3 MG/DL — SIGNIFICANT CHANGE UP (ref 1.8–2.6)
MAGNESIUM SERPL-MCNC: 2.4 MG/DL — SIGNIFICANT CHANGE UP (ref 1.6–2.6)
MCHC RBC-ENTMCNC: 29.4 PG — SIGNIFICANT CHANGE UP (ref 27–34)
MCHC RBC-ENTMCNC: 29.5 PG — SIGNIFICANT CHANGE UP (ref 27–34)
MCHC RBC-ENTMCNC: 30.1 PG — SIGNIFICANT CHANGE UP (ref 27–34)
MCHC RBC-ENTMCNC: 32.8 GM/DL — SIGNIFICANT CHANGE UP (ref 32–36)
MCHC RBC-ENTMCNC: 33.4 GM/DL — SIGNIFICANT CHANGE UP (ref 32–36)
MCHC RBC-ENTMCNC: 34 GM/DL — SIGNIFICANT CHANGE UP (ref 32–36)
MCV RBC AUTO: 87.8 FL — SIGNIFICANT CHANGE UP (ref 80–100)
MCV RBC AUTO: 88.3 FL — SIGNIFICANT CHANGE UP (ref 80–100)
MCV RBC AUTO: 89.8 FL — SIGNIFICANT CHANGE UP (ref 80–100)
MONOCYTES # BLD AUTO: 1.6 K/UL — HIGH (ref 0–0.9)
MONOCYTES NFR BLD AUTO: 11.4 % — SIGNIFICANT CHANGE UP (ref 2–14)
NEUTROPHILS # BLD AUTO: 10.89 K/UL — HIGH (ref 1.8–7.4)
NEUTROPHILS NFR BLD AUTO: 77.7 % — HIGH (ref 43–77)
PCO2 BLDV: 43 MMHG — SIGNIFICANT CHANGE UP (ref 42–55)
PCO2 BLDV: 45 MMHG — SIGNIFICANT CHANGE UP (ref 42–55)
PCO2 BLDV: 46 MMHG — SIGNIFICANT CHANGE UP (ref 42–55)
PCO2 BLDV: 47 MMHG — SIGNIFICANT CHANGE UP (ref 42–55)
PCO2 BLDV: 47 MMHG — SIGNIFICANT CHANGE UP (ref 42–55)
PH BLDV: 7.36 — SIGNIFICANT CHANGE UP (ref 7.32–7.43)
PH BLDV: 7.37 — SIGNIFICANT CHANGE UP (ref 7.32–7.43)
PH BLDV: 7.38 — SIGNIFICANT CHANGE UP (ref 7.32–7.43)
PH BLDV: 7.39 — SIGNIFICANT CHANGE UP (ref 7.32–7.43)
PH BLDV: 7.4 — SIGNIFICANT CHANGE UP (ref 7.32–7.43)
PHOSPHATE SERPL-MCNC: 3 MG/DL — SIGNIFICANT CHANGE UP (ref 2.4–4.7)
PHOSPHATE SERPL-MCNC: 3.3 MG/DL — SIGNIFICANT CHANGE UP (ref 2.4–4.7)
PHOSPHATE SERPL-MCNC: 3.4 MG/DL — SIGNIFICANT CHANGE UP (ref 2.4–4.7)
PHOSPHATE SERPL-MCNC: 3.9 MG/DL — SIGNIFICANT CHANGE UP (ref 2.4–4.7)
PHOSPHATE SERPL-MCNC: 4.3 MG/DL — SIGNIFICANT CHANGE UP (ref 2.4–4.7)
PLATELET # BLD AUTO: 133 K/UL — LOW (ref 150–400)
PLATELET # BLD AUTO: 157 K/UL — SIGNIFICANT CHANGE UP (ref 150–400)
PLATELET # BLD AUTO: 161 K/UL — SIGNIFICANT CHANGE UP (ref 150–400)
PO2 BLDV: 47 MMHG — HIGH (ref 25–45)
PO2 BLDV: <42 MMHG — SIGNIFICANT CHANGE UP (ref 25–45)
POTASSIUM BLDV-SCNC: 3.3 MMOL/L — LOW (ref 3.5–5.1)
POTASSIUM BLDV-SCNC: 3.5 MMOL/L — SIGNIFICANT CHANGE UP (ref 3.5–5.1)
POTASSIUM BLDV-SCNC: 3.6 MMOL/L — SIGNIFICANT CHANGE UP (ref 3.5–5.1)
POTASSIUM BLDV-SCNC: 3.8 MMOL/L — SIGNIFICANT CHANGE UP (ref 3.5–5.1)
POTASSIUM BLDV-SCNC: 4.1 MMOL/L — SIGNIFICANT CHANGE UP (ref 3.5–5.1)
POTASSIUM SERPL-MCNC: 3.3 MMOL/L — LOW (ref 3.5–5.3)
POTASSIUM SERPL-MCNC: 3.5 MMOL/L — SIGNIFICANT CHANGE UP (ref 3.5–5.3)
POTASSIUM SERPL-MCNC: 3.6 MMOL/L — SIGNIFICANT CHANGE UP (ref 3.5–5.3)
POTASSIUM SERPL-MCNC: 3.7 MMOL/L — SIGNIFICANT CHANGE UP (ref 3.5–5.3)
POTASSIUM SERPL-MCNC: 4.1 MMOL/L — SIGNIFICANT CHANGE UP (ref 3.5–5.3)
POTASSIUM SERPL-SCNC: 3.3 MMOL/L — LOW (ref 3.5–5.3)
POTASSIUM SERPL-SCNC: 3.5 MMOL/L — SIGNIFICANT CHANGE UP (ref 3.5–5.3)
POTASSIUM SERPL-SCNC: 3.6 MMOL/L — SIGNIFICANT CHANGE UP (ref 3.5–5.3)
POTASSIUM SERPL-SCNC: 3.7 MMOL/L — SIGNIFICANT CHANGE UP (ref 3.5–5.3)
POTASSIUM SERPL-SCNC: 4.1 MMOL/L — SIGNIFICANT CHANGE UP (ref 3.5–5.3)
PROT SERPL-MCNC: 5.8 G/DL — LOW (ref 6.6–8.7)
PROT SERPL-MCNC: 5.9 G/DL — LOW (ref 6.6–8.7)
PROT SERPL-MCNC: 6 G/DL — LOW (ref 6.6–8.7)
PROT SERPL-MCNC: 6.1 G/DL — LOW (ref 6.6–8.7)
PROT SERPL-MCNC: 6.3 G/DL — LOW (ref 6.6–8.7)
RBC # BLD: 3.61 M/UL — LOW (ref 4.2–5.8)
RBC # BLD: 3.63 M/UL — LOW (ref 4.2–5.8)
RBC # BLD: 3.69 M/UL — LOW (ref 4.2–5.8)
RBC # FLD: 15.4 % — HIGH (ref 10.3–14.5)
RBC # FLD: 15.4 % — HIGH (ref 10.3–14.5)
RBC # FLD: 15.5 % — HIGH (ref 10.3–14.5)
SAO2 % BLDV: 61.1 % — SIGNIFICANT CHANGE UP
SAO2 % BLDV: 61.2 % — SIGNIFICANT CHANGE UP
SAO2 % BLDV: 65.3 % — SIGNIFICANT CHANGE UP
SAO2 % BLDV: 66.8 % — SIGNIFICANT CHANGE UP
SAO2 % BLDV: 76.4 % — SIGNIFICANT CHANGE UP
SODIUM SERPL-SCNC: 131 MMOL/L — LOW (ref 135–145)
SODIUM SERPL-SCNC: 131 MMOL/L — LOW (ref 135–145)
SODIUM SERPL-SCNC: 132 MMOL/L — LOW (ref 135–145)
SODIUM SERPL-SCNC: 132 MMOL/L — LOW (ref 135–145)
SODIUM SERPL-SCNC: 135 MMOL/L — SIGNIFICANT CHANGE UP (ref 135–145)
WBC # BLD: 13.5 K/UL — HIGH (ref 3.8–10.5)
WBC # BLD: 14 K/UL — HIGH (ref 3.8–10.5)
WBC # BLD: 14.17 K/UL — HIGH (ref 3.8–10.5)
WBC # FLD AUTO: 13.5 K/UL — HIGH (ref 3.8–10.5)
WBC # FLD AUTO: 14 K/UL — HIGH (ref 3.8–10.5)
WBC # FLD AUTO: 14.17 K/UL — HIGH (ref 3.8–10.5)

## 2024-05-29 PROCEDURE — 71045 X-RAY EXAM CHEST 1 VIEW: CPT | Mod: 26

## 2024-05-29 PROCEDURE — 99291 CRITICAL CARE FIRST HOUR: CPT | Mod: GC

## 2024-05-29 PROCEDURE — 99232 SBSQ HOSP IP/OBS MODERATE 35: CPT

## 2024-05-29 RX ORDER — POTASSIUM CHLORIDE 20 MEQ
40 PACKET (EA) ORAL ONCE
Refills: 0 | Status: COMPLETED | OUTPATIENT
Start: 2024-05-29 | End: 2024-05-29

## 2024-05-29 RX ORDER — DEXTROSE 50 % IN WATER 50 %
15 SYRINGE (ML) INTRAVENOUS ONCE
Refills: 0 | Status: DISCONTINUED | OUTPATIENT
Start: 2024-05-29 | End: 2024-06-01

## 2024-05-29 RX ORDER — INSULIN LISPRO 100/ML
VIAL (ML) SUBCUTANEOUS AT BEDTIME
Refills: 0 | Status: DISCONTINUED | OUTPATIENT
Start: 2024-05-29 | End: 2024-06-05

## 2024-05-29 RX ORDER — POTASSIUM CHLORIDE 20 MEQ
20 PACKET (EA) ORAL
Refills: 0 | Status: COMPLETED | OUTPATIENT
Start: 2024-05-29 | End: 2024-05-29

## 2024-05-29 RX ORDER — SODIUM CHLORIDE 9 MG/ML
1000 INJECTION, SOLUTION INTRAVENOUS
Refills: 0 | Status: DISCONTINUED | OUTPATIENT
Start: 2024-05-29 | End: 2024-06-01

## 2024-05-29 RX ORDER — DEXTROSE 10 % IN WATER 10 %
125 INTRAVENOUS SOLUTION INTRAVENOUS ONCE
Refills: 0 | Status: DISCONTINUED | OUTPATIENT
Start: 2024-05-29 | End: 2024-06-01

## 2024-05-29 RX ORDER — BUMETANIDE 0.25 MG/ML
2 INJECTION INTRAMUSCULAR; INTRAVENOUS ONCE
Refills: 0 | Status: COMPLETED | OUTPATIENT
Start: 2024-05-29 | End: 2024-05-29

## 2024-05-29 RX ORDER — INSULIN LISPRO 100/ML
VIAL (ML) SUBCUTANEOUS
Refills: 0 | Status: DISCONTINUED | OUTPATIENT
Start: 2024-05-29 | End: 2024-06-12

## 2024-05-29 RX ADMIN — INSULIN GLARGINE 9 UNIT(S): 100 INJECTION, SOLUTION SUBCUTANEOUS at 21:36

## 2024-05-29 RX ADMIN — PANTOPRAZOLE SODIUM 40 MILLIGRAM(S): 20 TABLET, DELAYED RELEASE ORAL at 11:25

## 2024-05-29 RX ADMIN — Medication 81 MILLIGRAM(S): at 11:25

## 2024-05-29 RX ADMIN — Medication 3 UNIT(S): at 12:07

## 2024-05-29 RX ADMIN — Medication 1: at 07:46

## 2024-05-29 RX ADMIN — Medication 40 MILLIEQUIVALENT(S): at 05:43

## 2024-05-29 RX ADMIN — Medication 100 MILLIEQUIVALENT(S): at 07:52

## 2024-05-29 RX ADMIN — RANOLAZINE 500 MILLIGRAM(S): 500 TABLET, FILM COATED, EXTENDED RELEASE ORAL at 18:08

## 2024-05-29 RX ADMIN — PIPERACILLIN AND TAZOBACTAM 25 GRAM(S): 4; .5 INJECTION, POWDER, LYOPHILIZED, FOR SOLUTION INTRAVENOUS at 05:40

## 2024-05-29 RX ADMIN — MEXILETINE HYDROCHLORIDE 150 MILLIGRAM(S): 150 CAPSULE ORAL at 01:13

## 2024-05-29 RX ADMIN — Medication 3 UNIT(S): at 07:46

## 2024-05-29 RX ADMIN — PIPERACILLIN AND TAZOBACTAM 25 GRAM(S): 4; .5 INJECTION, POWDER, LYOPHILIZED, FOR SOLUTION INTRAVENOUS at 18:08

## 2024-05-29 RX ADMIN — AMIODARONE HYDROCHLORIDE 33.3 MG/MIN: 400 TABLET ORAL at 10:35

## 2024-05-29 RX ADMIN — RANOLAZINE 500 MILLIGRAM(S): 500 TABLET, FILM COATED, EXTENDED RELEASE ORAL at 05:40

## 2024-05-29 RX ADMIN — CLOPIDOGREL BISULFATE 75 MILLIGRAM(S): 75 TABLET, FILM COATED ORAL at 11:25

## 2024-05-29 RX ADMIN — BUMETANIDE 2 MILLIGRAM(S): 0.25 INJECTION INTRAMUSCULAR; INTRAVENOUS at 18:07

## 2024-05-29 RX ADMIN — MEXILETINE HYDROCHLORIDE 150 MILLIGRAM(S): 150 CAPSULE ORAL at 18:08

## 2024-05-29 RX ADMIN — MEXILETINE HYDROCHLORIDE 150 MILLIGRAM(S): 150 CAPSULE ORAL at 09:14

## 2024-05-29 RX ADMIN — Medication 1: at 11:56

## 2024-05-29 RX ADMIN — HEPARIN SODIUM 1000 UNIT(S)/HR: 5000 INJECTION INTRAVENOUS; SUBCUTANEOUS at 06:13

## 2024-05-29 RX ADMIN — ATORVASTATIN CALCIUM 80 MILLIGRAM(S): 80 TABLET, FILM COATED ORAL at 21:36

## 2024-05-29 RX ADMIN — CHLORHEXIDINE GLUCONATE 1 APPLICATION(S): 213 SOLUTION TOPICAL at 05:40

## 2024-05-29 RX ADMIN — AMIODARONE HYDROCHLORIDE 16.7 MG/MIN: 400 TABLET ORAL at 12:43

## 2024-05-29 RX ADMIN — Medication 2: at 01:12

## 2024-05-29 RX ADMIN — BUMETANIDE 2 MILLIGRAM(S): 0.25 INJECTION INTRAMUSCULAR; INTRAVENOUS at 15:41

## 2024-05-29 RX ADMIN — Medication 100 MILLIEQUIVALENT(S): at 07:00

## 2024-05-29 RX ADMIN — Medication 36 MICROGRAM(S): at 21:36

## 2024-05-29 RX ADMIN — BUMETANIDE 2 MILLIGRAM(S): 0.25 INJECTION INTRAMUSCULAR; INTRAVENOUS at 05:40

## 2024-05-29 RX ADMIN — Medication 3 UNIT(S): at 17:54

## 2024-05-29 NOTE — CHART NOTE - NSCHARTNOTEFT_GEN_A_CORE
Now s/p RHC and [place] placement via RIJ with Dr. Duran on 5/29/24, pt tolerated procedure well. Pt RIJ access site stable no bleed/hematoma, no s/s infection DSD intact at site.

## 2024-05-29 NOTE — PROGRESS NOTE ADULT - SUBJECTIVE AND OBJECTIVE BOX
Currently doing well and denies complaint.  Remains on levophed and Amiodarone gtt. Started on Milrinone yesterday evening. Intermittent AT with variable conduction yesterday afternoon (430pm-7pm).  Since maintaining SR.  NO recurrent VT noted.      TELE: Currently AS-BIVP.  Frequent/sustained Episodes of PAT with variable conduction yesterday afternoon (430-7pm).  Underlying LBBB with intermittent BiVP    MEDICATIONS  (STANDING):  aMIOdarone Infusion 1 mG/Min (33.3 mL/Hr) IV Continuous <Continuous>  aspirin enteric coated 81 milliGRAM(s) Oral daily  atorvastatin 80 milliGRAM(s) Oral at bedtime  buMETAnide Injectable 2 milliGRAM(s) IV Push every 12 hours  chlorhexidine 2% Cloths 1 Application(s) Topical <User Schedule>  clopidogrel Tablet 75 milliGRAM(s) Oral daily  dextrose 50% Injectable 25 Gram(s) IV Push once  glucagon  Injectable 1 milliGRAM(s) IntraMuscular once  heparin  Infusion.  Unit(s)/Hr (12 mL/Hr) IV Continuous <Continuous>  insulin glargine Injectable (LANTUS) 9 Unit(s) SubCutaneous at bedtime  insulin lispro (ADMELOG) corrective regimen sliding scale   SubCutaneous every 6 hours  insulin lispro Injectable (ADMELOG) 3 Unit(s) SubCutaneous three times a day before meals  levothyroxine Injectable 36 MICROGram(s) IV Push at bedtime  mexiletine 150 milliGRAM(s) Oral every 8 hours  milrinone Infusion 0.125 MICROgram(s)/kG/Min (2.46 mL/Hr) IV Continuous <Continuous>  norepinephrine Infusion 0.45 MICROgram(s)/kG/Min (55.3 mL/Hr) IV Continuous <Continuous>  pantoprazole  Injectable 40 milliGRAM(s) IV Push daily  piperacillin/tazobactam IVPB.. 3.375 Gram(s) IV Intermittent every 12 hours  ranolazine 500 milliGRAM(s) Oral two times a day    MEDICATIONS  (PRN):  heparin   Injectable 5500 Unit(s) IV Push every 6 hours PRN For aPTT less than 40  heparin   Injectable 2500 Unit(s) IV Push every 6 hours PRN For aPTT between 40 - 57  sodium chloride 0.9% lock flush 10 milliLiter(s) IV Push every 1 hour PRN Pre/post blood products, medications, blood draw, and to maintain line patency      Allergies    No Known Allergies    Intolerances    DOPamine (Hypotension)      Vital Signs Last 24 Hrs  T(C): 37 (29 May 2024 10:45), Max: 37.3 (28 May 2024 11:15)  T(F): 98.6 (29 May 2024 10:45), Max: 99.1 (28 May 2024 11:15)  HR: 76 (29 May 2024 10:45) (71 - 114)  BP: 124/61 (28 May 2024 20:15) (93/62 - 124/61)  BP(mean): 81 (28 May 2024 20:15) (69 - 81)  RR: 22 (29 May 2024 10:45) (15 - 33)  SpO2: 99% (29 May 2024 10:45) (94% - 100%)    Parameters below as of 29 May 2024 08:00  Patient On (Oxygen Delivery Method): nasal cannula  O2 Flow (L/min): 2      Physical Exam:  Constitutional: NAD, AAOx3  Cardiovascular: +S1S2 RRR  Pulmonary: CTA b/l, unlabored  GI: soft NTND +BS  Extremities: no pedal edema, +distal pulses b/l  Neuro: non focal, GRAVES x4    LABS:                        10.7   14.00 )-----------( 157      ( 29 May 2024 05:00 )             32.6     05-29    132<L>  |  93<L>  |  56.3<H>  ----------------------------<  174<H>  3.3<L>   |  24.0  |  2.48<H>    Ca    8.3<L>      29 May 2024 05:00  Phos  3.9     05-29  Mg     2.3     05-29    TPro  5.8<L>  /  Alb  2.6<L>  /  TBili  0.8  /  DBili  x   /  AST  115<H>  /  ALT  243<H>  /  AlkPhos  142<H>  05-29    PTT - ( 29 May 2024 05:00 )  PTT:64.5 sec  Urinalysis Basic - ( 29 May 2024 05:00 )    Color: x / Appearance: x / SG: x / pH: x  Gluc: 174 mg/dL / Ketone: x  / Bili: x / Urobili: x   Blood: x / Protein: x / Nitrite: x   Leuk Esterase: x / RBC: x / WBC x   Sq Epi: x / Non Sq Epi: x / Bacteria: x      79yo male with HTN, HLD, DM, CKD, CAD s/p 4V CABG and multiple PCI's, ischemic cardiomyopathy (EF < 20%), and LBBB s/p MDT CRTD. Pt with recent admission to [facility] on 11/20/23-11/22/23 for decompensated HF with multi-organ system failure.  LHC at that time revealed a patent LIMA with all other grafts closed. No intervention was performed. He underwent a Medtronic CRT-D implant with Dr. Bazzi in 12/2023. Recent device interrogation revealed normal function but effective CRT only 87.5%. A 1 week Holter was placed to assess PVC burden.     He now presented to [facility] ED with a two day history of chest discomfort with radiation to left arm and associated dizziness, lightheadedness. In ED, ruled in for NSTEMI with positive troponin (228, 427, 379), RAS on CKD, and hypoglycemia. A TTE revealed acute on chronic systolic heart failure with EF <20%. He was admitted to telemetry for treatment of ACS on DAPT and heparin gtt but developed sustained VT on 5/25 which resolved with Amiodarone and IV Lidocaine. VT was binned as SVT by ICD so was not completely treated. LHC urgently performed and revealed  % stenosis,  % stenosis,  %,  % stenosis. SVG graft to Circumflex: occluded.  LIMA graft to LAD visually normal in size and structure. LCx/RCA fills via collaterals. Pt upgraded to MICU. Initially did well and was extubated. On 5/26 evening again went into rapid wide complex tachcyardia which appeared to be AT with RVR precipitating at times a regular MMVT which responded to ATP therapy. Patient was given additional boluses of Amiodarone, Lidocaine and Metoprolol and eventually had improvement in HR. During arrhythmia patient had hypotension and after arrhythmia resolved had altered mental status requiring intubation.   Remains on Amiodarone at 1mg/min. Weaned off lidocaine gtt and extubated again on 5/27.      After arrest on 5/25 ICD reprogrammed to turn off ST & Wavelet and reduce tachy detections to 171. VT on 5/26 occurred at TCL of 340 msec so VT zone reduced to 164 bpm on 5/26.   Episodes of PAT yesterday ([id number]), c/w AT with variable conduction and LBBB (confirmed on device interrogation).  NO recurrent VT noted.     #Monomorphic VT:  - Initial  msec on 5/25, on 5/26 VT slower at 340 msec, responsive to ATP.   - Intermittent PAT with variable conduction. NO recurrent VT since 5/26  - On Amiodarone 1mg/min.  Decrease to 0.5mg/min  - Monitor LFTs  - Cath with no targets for intervention  - Continue Mexiletine 150mg TID.  - Likely scar-mediated VT from ischemic CMY precipitated by AT/AF with RVR  - Will aim for arrhythmia suppression though oral AAT but may need to consider ablation if refractory arrhythmia  - Continue GDMT of HFrEF    #HFrEF:  - Continue GDMT per HF  - Currently on Milrinone on Levo  - OptiVol elevation for past month suggestive of progressive CHF    #Paroxysmal AF/AFL/AT  - 6% AT/AF burden on device, all occurring recently  - CHADS2-VASc of 6 (CHF, HTN, DM, Agex2, CAD) on Heparin gtt  - AT at varying TCL appears to precipitate VT   - continue Amiodarone gtt IV to help maintain NSR; Decrease to 0.5mg /min.  - Increase BB as tolerated by BP  - If AT/AF cannot be controlled by amiodarone, may consider AVN ablation    #ICD:  - Device inappropriately binned VT as SVT  - Device reprogrammed with NE Logic changes - sinus tach off, wavelet off, SVT limit increased to 280 msec, stability turned on  - Effective BiV pacing only 83%, will try to optimize -- suspect improvement on AADs to suppress ventricular arrhythmias  - VT zone lowered to 164 bpm on 5/26   Currently doing well and denies complaint.  Remains on levophed and Amiodarone gtt. Started on Milrinone yesterday evening. Intermittent AT with variable conduction yesterday afternoon (430pm-7pm).  Since maintaining SR.  NO recurrent VT noted.      TELE: Currently AS-BIVP.  Frequent/sustained Episodes of PAT with variable conduction yesterday afternoon (430-7pm).  Underlying LBBB with intermittent BiVP    MEDICATIONS  (STANDING):  aMIOdarone Infusion 1 mG/Min (33.3 mL/Hr) IV Continuous <Continuous>  aspirin enteric coated 81 milliGRAM(s) Oral daily  atorvastatin 80 milliGRAM(s) Oral at bedtime  buMETAnide Injectable 2 milliGRAM(s) IV Push every 12 hours  chlorhexidine 2% Cloths 1 Application(s) Topical <User Schedule>  clopidogrel Tablet 75 milliGRAM(s) Oral daily  dextrose 50% Injectable 25 Gram(s) IV Push once  glucagon  Injectable 1 milliGRAM(s) IntraMuscular once  heparin  Infusion.  Unit(s)/Hr (12 mL/Hr) IV Continuous <Continuous>  insulin glargine Injectable (LANTUS) 9 Unit(s) SubCutaneous at bedtime  insulin lispro (ADMELOG) corrective regimen sliding scale   SubCutaneous every 6 hours  insulin lispro Injectable (ADMELOG) 3 Unit(s) SubCutaneous three times a day before meals  levothyroxine Injectable 36 MICROGram(s) IV Push at bedtime  mexiletine 150 milliGRAM(s) Oral every 8 hours  milrinone Infusion 0.125 MICROgram(s)/kG/Min (2.46 mL/Hr) IV Continuous <Continuous>  norepinephrine Infusion 0.45 MICROgram(s)/kG/Min (55.3 mL/Hr) IV Continuous <Continuous>  pantoprazole  Injectable 40 milliGRAM(s) IV Push daily  piperacillin/tazobactam IVPB.. 3.375 Gram(s) IV Intermittent every 12 hours  ranolazine 500 milliGRAM(s) Oral two times a day    MEDICATIONS  (PRN):  heparin   Injectable 5500 Unit(s) IV Push every 6 hours PRN For aPTT less than 40  heparin   Injectable 2500 Unit(s) IV Push every 6 hours PRN For aPTT between 40 - 57  sodium chloride 0.9% lock flush 10 milliLiter(s) IV Push every 1 hour PRN Pre/post blood products, medications, blood draw, and to maintain line patency      Allergies    No Known Allergies    Intolerances    DOPamine (Hypotension)      Vital Signs Last 24 Hrs  T(C): 37 (29 May 2024 10:45), Max: 37.3 (28 May 2024 11:15)  T(F): 98.6 (29 May 2024 10:45), Max: 99.1 (28 May 2024 11:15)  HR: 76 (29 May 2024 10:45) (71 - 114)  BP: 124/61 (28 May 2024 20:15) (93/62 - 124/61)  BP(mean): 81 (28 May 2024 20:15) (69 - 81)  RR: 22 (29 May 2024 10:45) (15 - 33)  SpO2: 99% (29 May 2024 10:45) (94% - 100%)    Parameters below as of 29 May 2024 08:00  Patient On (Oxygen Delivery Method): nasal cannula  O2 Flow (L/min): 2      Physical Exam:  Constitutional: NAD, AAOx3  Cardiovascular: +S1S2 RRR  Pulmonary: CTA b/l, unlabored  GI: soft NTND +BS  Extremities: no pedal edema, +distal pulses b/l  Neuro: non focal, GRAVES x4    LABS:                        10.7   14.00 )-----------( 157      ( 29 May 2024 05:00 )             32.6     05-29    132<L>  |  93<L>  |  56.3<H>  ----------------------------<  174<H>  3.3<L>   |  24.0  |  2.48<H>    Ca    8.3<L>      29 May 2024 05:00  Phos  3.9     05-29  Mg     2.3     05-29    TPro  5.8<L>  /  Alb  2.6<L>  /  TBili  0.8  /  DBili  x   /  AST  115<H>  /  ALT  243<H>  /  AlkPhos  142<H>  05-29    PTT - ( 29 May 2024 05:00 )  PTT:64.5 sec  Urinalysis Basic - ( 29 May 2024 05:00 )    Color: x / Appearance: x / SG: x / pH: x  Gluc: 174 mg/dL / Ketone: x  / Bili: x / Urobili: x   Blood: x / Protein: x / Nitrite: x   Leuk Esterase: x / RBC: x / WBC x   Sq Epi: x / Non Sq Epi: x / Bacteria: x      77yo male with HTN, HLD, DM, CKD, CAD s/p 4V CABG and multiple PCI's, ischemic cardiomyopathy (EF < 20%), and LBBB s/p MDT CRTD. Pt with recent admission to [facility] on 11/20/23-11/22/23 for decompensated HF with multi-organ system failure.  LHC at that time revealed a patent LIMA with all other grafts closed. No intervention was performed. He underwent a Medtronic CRT-D implant with Dr. Bazzi in 12/2023. Recent device interrogation revealed normal function but effective CRT only 87.5%. A 1 week Holter was placed to assess PVC burden.     He now presented to [facility] ED with a two day history of chest discomfort with radiation to left arm and associated dizziness, lightheadedness. In ED, ruled in for NSTEMI with positive troponin (228, 427, 379), RAS on CKD, and hypoglycemia. A TTE revealed acute on chronic systolic heart failure with EF <20%. He was admitted to telemetry for treatment of ACS on DAPT and heparin gtt but developed sustained VT on 5/25 which resolved with Amiodarone and IV Lidocaine. VT was binned as SVT by ICD so was not completely treated. LHC urgently performed and revealed  % stenosis,  % stenosis,  %,  % stenosis. SVG graft to Circumflex: occluded.  LIMA graft to LAD visually normal in size and structure. LCx/RCA fills via collaterals. Pt upgraded to MICU. Initially did well and was extubated. On 5/26 evening again went into rapid wide complex tachcyardia which appeared to be AT with RVR precipitating at times a regular MMVT which responded to ATP therapy. Patient was given additional boluses of Amiodarone, Lidocaine and Metoprolol and eventually had improvement in HR. During arrhythmia patient had hypotension and after arrhythmia resolved had altered mental status requiring intubation.   Remains on Amiodarone at 1mg/min. Weaned off lidocaine gtt and extubated again on 5/27.      After arrest on 5/25 ICD reprogrammed to turn off ST & Wavelet and reduce tachy detections to 171. VT on 5/26 occurred at TCL of 340 msec so VT zone reduced to 164 bpm on 5/26.   Primarily maintaining SR with episodes of PAT yesterday 5/28 ([id number]), c/w AT with variable conduction and LBBB (confirmed on device interrogation).  NO recurrent VT noted.     #Monomorphic VT:  - Initial  msec on 5/25, on 5/26 VT slower at 340 msec, responsive to ATP.   - Intermittent PAT with variable conduction. NO recurrent VT since 5/26  - On Amiodarone 1mg/min.  Decrease to 0.5mg/min  - Monitor LFTs  - Cath with no targets for intervention  - Continue Mexiletine 150mg TID.  - Likely scar-mediated VT from ischemic CMY precipitated by AT/AF with RVR  - Will aim for arrhythmia suppression though oral AAT but may need to consider ablation if refractory arrhythmia  - Continue GDMT of HFrEF    #HFrEF:  - Continue GDMT per HF  - Currently on Milrinone and Levo  - OptiVol elevation for past month suggestive of progressive CHF    #Paroxysmal AF/AFL/AT  - 6% AT/AF burden on device, all occurring recently  - CHADS2-VASc of 6 (CHF, HTN, DM, Agex2, CAD) on Heparin gtt  - AT at varying TCL appears to precipitate VT   - continue Amiodarone gtt to help maintain NSR; Decrease to 0.5mg /min.  - Increase BB as tolerated by BP  - If AT/AF cannot be controlled by amiodarone, may consider AVN ablation    #ICD:  - Device inappropriately binned VT as SVT  - Device reprogrammed with ID Logic changes - sinus tach off, wavelet off, SVT limit increased to 280 msec, stability turned on  - Effective BiV pacing only 83% at initial presentation.  Now Effective BIVP 93% over the past 24 hours while on AAT  - VT zone lowered to 164 bpm on 5/26

## 2024-05-29 NOTE — PROGRESS NOTE ADULT - NS ATTEND AMEND GEN_ALL_CORE FT
.  .  Remains arrhythmia free on Amiodarone and mexiletine. Transition to PO tomorrow. Continue CHF management as per advanced CHF team.    Skip Turpin MD  Clinical Cardiac Electrophysiology    A total of 38 minutes were spent on this patient encounter.

## 2024-05-29 NOTE — PROGRESS NOTE ADULT - PROBLEM SELECTOR PLAN 1
Etiology is likely driven by arrythmia  - Clinically close to euvolemic w/ lukewarm extremities.  - Lactate has cleared  - tMCS: none required at this time  - Inotrope/pressors: Continue milrinone 0.125 mcg/kg/min for now. Currently on levophed 0.04. Wean pressors as tolerated to maintain SBP >90 (would not follow MAP given wide pulse pressure on arterial line)  - Monitor perfusion markers daily  - Diuretics: Continue Bumex 2 mg IV BID.   - Strict I&Os  - Hemodynamic goals: MAP 65, CVP 8-10, MVO2 >65, PCWP 12, CI >2.2, Lactate <2.2.

## 2024-05-29 NOTE — PROGRESS NOTE ADULT - ASSESSMENT
Patient is a 78 year old male with history of HTN, HLD, DM, CKD, CAD s/p 4V CABG and multiple PCI's, ischemic cardiomyopathy (EF < 20%), and LBBB s/p MDT CRT-D. He had an admission in November for ADHF. At that time a LHC revealed his grafts were occluded except for the LIMA to LAD. He also underwent a CRTD implant. On 5/25 he had a VT arrest with ROSC achieved after 3 minutes was sent to the cath lab which showed his LIMA is still patent. Patient was extubated on 5/27 but remains on levophed for BP support.    Cardiac Studies:  5/28/24 RHC (report pending): RA 18, RV 92/18, PA 95/28/57    5/25/24 LHC: All grafts occluded except LIMA-LAD. LVEDP 29 mmHg.    5/23/24 TTE: LVEF <20%, LVIDd 5.8cm, mild RVE with mild RV dysfunction, TAPSE 0.7cm, mild-mod AS, mild-mod MR, mild TR, mild VT, est PASP 75 mmHg.

## 2024-05-29 NOTE — PROGRESS NOTE ADULT - ASSESSMENT
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

## 2024-05-29 NOTE — PROGRESS NOTE ADULT - SUBJECTIVE AND OBJECTIVE BOX
ADVANCED HEART FAILURE - PROGRESS NOTE  [address] [place] [address]  Office Phone: [phone]/Fax: [phone]  Service/On Call Phone [phone]  _______________________________________________________________________________________________________    Subjective:  - NAEO  - Resting comfortably in bed with no complaints    Medications:  aMIOdarone Infusion 1 mG/Min IV Continuous <Continuous>  aspirin enteric coated 81 milliGRAM(s) Oral daily  atorvastatin 80 milliGRAM(s) Oral at bedtime  buMETAnide Injectable 2 milliGRAM(s) IV Push every 12 hours  chlorhexidine 2% Cloths 1 Application(s) Topical <User Schedule>  clopidogrel Tablet 75 milliGRAM(s) Oral daily  dextrose 50% Injectable 25 Gram(s) IV Push once  glucagon  Injectable 1 milliGRAM(s) IntraMuscular once  heparin   Injectable 5500 Unit(s) IV Push every 6 hours PRN  heparin   Injectable 2500 Unit(s) IV Push every 6 hours PRN  heparin  Infusion.  Unit(s)/Hr IV Continuous <Continuous>  insulin glargine Injectable (LANTUS) 9 Unit(s) SubCutaneous at bedtime  insulin lispro (ADMELOG) corrective regimen sliding scale   SubCutaneous every 6 hours  insulin lispro Injectable (ADMELOG) 3 Unit(s) SubCutaneous three times a day before meals  levothyroxine Injectable 36 MICROGram(s) IV Push at bedtime  mexiletine 150 milliGRAM(s) Oral every 8 hours  milrinone Infusion 0.125 MICROgram(s)/kG/Min IV Continuous <Continuous>  norepinephrine Infusion 0.45 MICROgram(s)/kG/Min IV Continuous <Continuous>  pantoprazole  Injectable 40 milliGRAM(s) IV Push daily  piperacillin/tazobactam IVPB.. 3.375 Gram(s) IV Intermittent every 12 hours  ranolazine 500 milliGRAM(s) Oral two times a day  sodium chloride 0.9% lock flush 10 milliLiter(s) IV Push every 1 hour PRN      ICU Vital Signs Last 24 Hrs  T(C): 37, Max: 37.3 ( @ 11:00)  HR: 76 (71 - 114)  BP: 124/61 (93/62 - 124/61)  BP(mean): 81 (69 - 81)  ABP: 116/48 (91/40 - 146/52)  ABP(mean): 72 (54 - 81)  RR: 22 (15 - 33)  SpO2: 99% (94% - 100%)    Weight in k.4 (24)  Weight in k.8 (24)      I&O's Summary Last 24 Hrs    IN: 1437.9 mL / OUT: 1630 mL / NET: -192.1 mL      Tele: Paced in the 70s    Physical Exam:    General: No distress. Comfortable.  Neck: JVP not elevated.  Respiratory: Clear to auscultation bilaterally  CV: Normal S1 and S2. No murmurs, rub, or gallops. Radial pulses normal.  Abdomen: Soft, non-distended, non-tender  Extremities: Warm, no edema  Neurology: Non-focal, alert and oriented times three.   Psych: Affect normal    Labs:    ( 24 @ 05:00 )               10.7   14.00 )--------( 157                  32.6     ( 24 @ 05:00 )     132  |  93  |  56.3  ---------------------<  174  3.3  |  24.0  |  2.48    Ca 8.3  Phos 3.9  Mg 2.3    ( 24 @ 05:00 )  TPro  5.8  /  Alb  2.6  /  TBili  0.8  /  DBili  x   /  AST  115  /  ALT  243  /  AlkPhos  142  ( 24 @ 00:16 )  TPro  5.9  /  Alb  2.8  /  TBili  1.0  /  DBili  x   /  AST  127  /  ALT  265  /  AlkPhos  153    PTT/PT/INR - ( 24 @ 05:00 )  PTT: 64.5 sec / PT: x     / INR: x        ( 24 @ 10:21 )  TropHS 379   / CK 69    / CKMB x      ( 24 @ 05:15 )  TropHS 427   / CK x     / CKMB x        VBG - ( 24 @ 05:00 )  pH: 7.400 / pCO2: 45    / pO2: <42   / Oxygen saturation: 66.8    VBG - ( 24 @ 00:16 )  pH: 7.390 / pCO2: 43    / pO2: <42   / Oxygen saturation: 61.2    VBG - ( 24 @ 18:29 )  pH: 7.340 / pCO2: 44    / pO2: <42   / Oxygen saturation: 54.8    VBG - ( 24 @ 17:30 )  pH: 7.330 / pCO2: 45    / pO2: 42    / Oxygen saturation: 63.6      Blood Gas Venous - Lactate: 1.10 (24 @ 05:00)  Lactate, Blood: 1.6 (24 @ 00:16)

## 2024-05-29 NOTE — PROGRESS NOTE ADULT - ASSESSMENT
79 y/o M with a h/o CAD (PCI/CODI x s/p 19, brachytherapy 11/2023), 4V CABG, ICM, HFrEF EF 37%, s/p ICD, DM2 on insulin, 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, HTN, HLD, with:    # VT cardiac arrest x 2  # Mixed distributive/cardiogenic shock  # Acute systolic heart failure  # NSTEMI  # New onset AF with RVR  # RAS on CKD    - Strict I&Os  - Net balance at least negative 1000ml per HF team.  - Lactate cleared  - Continue milrinone 0.125 mcg/kg/min for now. Currently off levophed.  - c/w Bumex 2mg IV. But if net balance cannot reach the goal, add stat Bumex.   - on DASH diet.   - Heparin full anticoagulation  - BMPq6  - Keep K>4, Mg>2.   79 y/o M with a h/o CAD (PCI/CODI x s/p 19, brachytherapy 11/2023), 4V CABG, ICM, HFrEF EF 37%, s/p ICD, DM2 on insulin, 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, HTN, HLD, with:    # VT cardiac arrest x 2  # Mixed distributive/cardiogenic shock  # Acute systolic heart failure  # NSTEMI  # New onset AF with RVR  # RAS on CKD    - Strict I&Os  - Net balance at least negative 1000ml per HF team.  - Lactate cleared  - Continue milrinone 0.125 mcg/kg/min for now. Currently off levophed.  - c/w Bumex 2mg IV q12. But if net balance cannot reach the goal, add stat Bumex.   - on DASH diet.   - Heparin full anticoagulation  - BMPq6  - Keep K>4, Mg>2.

## 2024-05-29 NOTE — PROGRESS NOTE ADULT - SUBJECTIVE AND OBJECTIVE BOX
NEPHROLOGY INTERVAL HPI/OVERNIGHT EVENTS:    Examined  Awake alert  Feeling better  Denies HA CP no SOB  Family visiting    MEDICATIONS  (STANDING):  aMIOdarone Infusion 0.5 mG/Min (16.7 mL/Hr) IV Continuous <Continuous>  aspirin enteric coated 81 milliGRAM(s) Oral daily  atorvastatin 80 milliGRAM(s) Oral at bedtime  buMETAnide Injectable 2 milliGRAM(s) IV Push every 12 hours  chlorhexidine 2% Cloths 1 Application(s) Topical <User Schedule>  clopidogrel Tablet 75 milliGRAM(s) Oral daily  dextrose 10% Bolus 125 milliLiter(s) IV Bolus once  dextrose 5%. 1000 milliLiter(s) (50 mL/Hr) IV Continuous <Continuous>  dextrose 5%. 1000 milliLiter(s) (100 mL/Hr) IV Continuous <Continuous>  dextrose 50% Injectable 25 Gram(s) IV Push once  glucagon  Injectable 1 milliGRAM(s) IntraMuscular once  heparin  Infusion.  Unit(s)/Hr (12 mL/Hr) IV Continuous <Continuous>  insulin glargine Injectable (LANTUS) 9 Unit(s) SubCutaneous at bedtime  insulin lispro (ADMELOG) corrective regimen sliding scale   SubCutaneous three times a day before meals  insulin lispro (ADMELOG) corrective regimen sliding scale   SubCutaneous at bedtime  insulin lispro Injectable (ADMELOG) 3 Unit(s) SubCutaneous three times a day before meals  levothyroxine Injectable 36 MICROGram(s) IV Push at bedtime  mexiletine 150 milliGRAM(s) Oral every 8 hours  milrinone Infusion 0.125 MICROgram(s)/kG/Min (2.46 mL/Hr) IV Continuous <Continuous>  norepinephrine Infusion 0.45 MICROgram(s)/kG/Min (55.3 mL/Hr) IV Continuous <Continuous>  pantoprazole  Injectable 40 milliGRAM(s) IV Push daily  piperacillin/tazobactam IVPB.. 3.375 Gram(s) IV Intermittent every 12 hours  ranolazine 500 milliGRAM(s) Oral two times a day    MEDICATIONS  (PRN):  dextrose Oral Gel 15 Gram(s) Oral once PRN Blood Glucose LESS THAN 70 milliGRAM(s)/deciliter  heparin   Injectable 5500 Unit(s) IV Push every 6 hours PRN For aPTT less than 40  heparin   Injectable 2500 Unit(s) IV Push every 6 hours PRN For aPTT between 40 - 57  sodium chloride 0.9% lock flush 10 milliLiter(s) IV Push every 1 hour PRN Pre/post blood products, medications, blood draw, and to maintain line patency      Allergies    No Known Allergies    Intolerances    DOPamine (Hypotension)      Vital Signs Last 24 Hrs  T(C): 36.8 (29 May 2024 13:00), Max: 37.3 (28 May 2024 22:30)  T(F): 98.2 (29 May 2024 13:00), Max: 99.1 (28 May 2024 22:30)  HR: 69 (29 May 2024 14:00) (69 - 114)  BP: 124/61 (28 May 2024 20:15) (93/62 - 124/61)  BP(mean): 81 (28 May 2024 20:15) (69 - 81)  RR: 25 (29 May 2024 14:00) (15 - 29)  SpO2: 100% (29 May 2024 13:45) (94% - 100%)    Parameters below as of 29 May 2024 08:00  Patient On (Oxygen Delivery Method): nasal cannula  O2 Flow (L/min): 2    Daily     Daily Weight in k.4 (29 May 2024 00:22)    PHYSICAL EXAM:  GENERAL: NAD  HEAD:  NCAT  NERVOUS SYSTEM:  Awake alert  CHEST/LUNG: dec bs anteriorly  HEART: Regular rate and rhythm  ABDOMEN: Soft,  +BS  EXTREMITIES: no edema      LABS:                        11.1   14.17 )-----------( 161      ( 29 May 2024 10:56 )             32.6     05-    131<L>  |  95<L>  |  58.3<H>  ----------------------------<  210<H>  4.1   |  24.0  |  2.49<H>    Ca    8.4      29 May 2024 10:56  Phos  3.4       Mg     2.4         TPro  6.3<L>  /  Alb  2.9<L>  /  TBili  0.8  /  DBili  x   /  AST  110<H>  /  ALT  246<H>  /  AlkPhos  157<H>      PTT - ( 29 May 2024 05:00 )  PTT:64.5 sec  Urinalysis Basic - ( 29 May 2024 10:56 )    Color: x / Appearance: x / SG: x / pH: x  Gluc: 210 mg/dL / Ketone: x  / Bili: x / Urobili: x   Blood: x / Protein: x / Nitrite: x   Leuk Esterase: x / RBC: x / WBC x   Sq Epi: x / Non Sq Epi: x / Bacteria: x      Magnesium: 2.4 mg/dL ( @ 10:56)  Phosphorus: 3.4 mg/dL ( @ 10:56)  Magnesium: 2.3 mg/dL ( @ 05:00)  Phosphorus: 3.9 mg/dL ( @ 05:00)  Magnesium: 2.2 mg/dL ( @ 00:16)  Phosphorus: 4.3 mg/dL ( @ 00:16)  Magnesium: 2.4 mg/dL ( @ 17:30)  Phosphorus: 5.0 mg/dL ( @ 17:30)          RADIOLOGY & ADDITIONAL TESTS:

## 2024-05-29 NOTE — PROGRESS NOTE ADULT - NS ATTEND AMEND GEN_ALL_CORE FT
Patient seen and examined at bedside.   Based on his hemodynamics, he was initiated on milrinone  Patient was resumed on levophed overnight  Today his BP is stable, please wean off levophed  Repeat hemodynamics after levophed weaned off  Continue with diuretics IV for now

## 2024-05-29 NOTE — SWALLOW BEDSIDE ASSESSMENT ADULT - SLP GENERAL OBSERVATIONS
recd awake/upright in bed, A&A Ox2, reduced cognition, 0/10 pain pre/post, tolerating RA NAD, son present for encounter

## 2024-05-29 NOTE — SWALLOW BEDSIDE ASSESSMENT ADULT - COMMENTS
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 Recently had Holter monitor placement for PVC burden which was noted to have worsened the day of presentation. In ED, patient weak appearing, ruled in for NSTEMI with positive troponin, and labs remarkable for RAS on CKD as well as hypoglycemia. A TTE revealed acute on chronic systolic heart failure with EF <20%. He was admitted to telemetry for treatment of ACS on DAPT and heparin gtt, planned for LHC. Patient suffered VF/VT cardiac arrest on 5/25 without ICD shock (subsequently reprogrammed by EP) and was taken for emergent LHC where he was found to have 3/4 occluded grafts, only LIMA to LAD was patent. No intervention performed. Extubated on 5/26, however suffered another VT cardiac arrest later that day (again without ICD shock) with 1 minute downtime prior to ROSC.

## 2024-05-30 LAB
ALBUMIN SERPL ELPH-MCNC: 2.7 G/DL — LOW (ref 3.3–5.2)
ALBUMIN SERPL ELPH-MCNC: 2.8 G/DL — LOW (ref 3.3–5.2)
ALP SERPL-CCNC: 120 U/L — SIGNIFICANT CHANGE UP (ref 40–120)
ALP SERPL-CCNC: 122 U/L — HIGH (ref 40–120)
ALT FLD-CCNC: 195 U/L — HIGH
ALT FLD-CCNC: 203 U/L — HIGH
ANION GAP SERPL CALC-SCNC: 10 MMOL/L — SIGNIFICANT CHANGE UP (ref 5–17)
ANION GAP SERPL CALC-SCNC: 12 MMOL/L — SIGNIFICANT CHANGE UP (ref 5–17)
ANION GAP SERPL CALC-SCNC: 13 MMOL/L — SIGNIFICANT CHANGE UP (ref 5–17)
APTT BLD: 29.4 SEC — SIGNIFICANT CHANGE UP (ref 24.5–35.6)
APTT BLD: 46.5 SEC — HIGH (ref 24.5–35.6)
APTT BLD: 59.6 SEC — HIGH (ref 24.5–35.6)
AST SERPL-CCNC: 89 U/L — HIGH
AST SERPL-CCNC: 95 U/L — HIGH
BASE EXCESS BLDV CALC-SCNC: 2.1 MMOL/L — SIGNIFICANT CHANGE UP (ref -2–3)
BASE EXCESS BLDV CALC-SCNC: 3.5 MMOL/L — HIGH (ref -2–3)
BASE EXCESS BLDV CALC-SCNC: 4.3 MMOL/L — HIGH (ref -2–3)
BASOPHILS # BLD AUTO: 0 K/UL — SIGNIFICANT CHANGE UP (ref 0–0.2)
BASOPHILS NFR BLD AUTO: 0 % — SIGNIFICANT CHANGE UP (ref 0–2)
BILIRUB SERPL-MCNC: 0.7 MG/DL — SIGNIFICANT CHANGE UP (ref 0.4–2)
BILIRUB SERPL-MCNC: 0.8 MG/DL — SIGNIFICANT CHANGE UP (ref 0.4–2)
BUN SERPL-MCNC: 53.7 MG/DL — HIGH (ref 8–20)
BUN SERPL-MCNC: 54.8 MG/DL — HIGH (ref 8–20)
BUN SERPL-MCNC: 58.1 MG/DL — HIGH (ref 8–20)
CA-I SERPL-SCNC: 1.12 MMOL/L — LOW (ref 1.15–1.33)
CA-I SERPL-SCNC: 1.13 MMOL/L — LOW (ref 1.15–1.33)
CA-I SERPL-SCNC: 1.15 MMOL/L — SIGNIFICANT CHANGE UP (ref 1.15–1.33)
CALCIUM SERPL-MCNC: 8.1 MG/DL — LOW (ref 8.4–10.5)
CALCIUM SERPL-MCNC: 8.2 MG/DL — LOW (ref 8.4–10.5)
CALCIUM SERPL-MCNC: 8.2 MG/DL — LOW (ref 8.4–10.5)
CHLORIDE BLDV-SCNC: 95 MMOL/L — LOW (ref 96–108)
CHLORIDE BLDV-SCNC: 98 MMOL/L — SIGNIFICANT CHANGE UP (ref 96–108)
CHLORIDE BLDV-SCNC: 98 MMOL/L — SIGNIFICANT CHANGE UP (ref 96–108)
CHLORIDE SERPL-SCNC: 96 MMOL/L — SIGNIFICANT CHANGE UP (ref 96–108)
CHLORIDE SERPL-SCNC: 96 MMOL/L — SIGNIFICANT CHANGE UP (ref 96–108)
CHLORIDE SERPL-SCNC: 97 MMOL/L — SIGNIFICANT CHANGE UP (ref 96–108)
CO2 SERPL-SCNC: 25 MMOL/L — SIGNIFICANT CHANGE UP (ref 22–29)
CO2 SERPL-SCNC: 25 MMOL/L — SIGNIFICANT CHANGE UP (ref 22–29)
CO2 SERPL-SCNC: 27 MMOL/L — SIGNIFICANT CHANGE UP (ref 22–29)
CREAT SERPL-MCNC: 2.44 MG/DL — HIGH (ref 0.5–1.3)
CREAT SERPL-MCNC: 2.45 MG/DL — HIGH (ref 0.5–1.3)
CREAT SERPL-MCNC: 2.45 MG/DL — HIGH (ref 0.5–1.3)
DACRYOCYTES BLD QL SMEAR: SLIGHT — SIGNIFICANT CHANGE UP
EGFR: 27 ML/MIN/1.73M2 — LOW
EOSINOPHIL # BLD AUTO: 0.84 K/UL — HIGH (ref 0–0.5)
EOSINOPHIL NFR BLD AUTO: 8.8 % — HIGH (ref 0–6)
GAS PNL BLDV: 129 MMOL/L — LOW (ref 136–145)
GAS PNL BLDV: 131 MMOL/L — LOW (ref 136–145)
GAS PNL BLDV: 132 MMOL/L — LOW (ref 136–145)
GAS PNL BLDV: SIGNIFICANT CHANGE UP
GIANT PLATELETS BLD QL SMEAR: PRESENT — SIGNIFICANT CHANGE UP
GLUCOSE BLDC GLUCOMTR-MCNC: 166 MG/DL — HIGH (ref 70–99)
GLUCOSE BLDC GLUCOMTR-MCNC: 167 MG/DL — HIGH (ref 70–99)
GLUCOSE BLDC GLUCOMTR-MCNC: 169 MG/DL — HIGH (ref 70–99)
GLUCOSE BLDC GLUCOMTR-MCNC: 177 MG/DL — HIGH (ref 70–99)
GLUCOSE BLDV-MCNC: 162 MG/DL — HIGH (ref 70–99)
GLUCOSE BLDV-MCNC: 195 MG/DL — HIGH (ref 70–99)
GLUCOSE BLDV-MCNC: 197 MG/DL — HIGH (ref 70–99)
GLUCOSE SERPL-MCNC: 162 MG/DL — HIGH (ref 70–99)
GLUCOSE SERPL-MCNC: 184 MG/DL — HIGH (ref 70–99)
GLUCOSE SERPL-MCNC: 189 MG/DL — HIGH (ref 70–99)
HCO3 BLDV-SCNC: 27 MMOL/L — SIGNIFICANT CHANGE UP (ref 22–29)
HCO3 BLDV-SCNC: 28 MMOL/L — SIGNIFICANT CHANGE UP (ref 22–29)
HCO3 BLDV-SCNC: 29 MMOL/L — SIGNIFICANT CHANGE UP (ref 22–29)
HCT VFR BLD CALC: 29.9 % — LOW (ref 39–50)
HCT VFR BLD CALC: 31.4 % — LOW (ref 39–50)
HCT VFR BLD CALC: 32.1 % — LOW (ref 39–50)
HCT VFR BLDA CALC: 31 % — SIGNIFICANT CHANGE UP
HCT VFR BLDA CALC: 32 % — SIGNIFICANT CHANGE UP
HCT VFR BLDA CALC: 39 % — SIGNIFICANT CHANGE UP
HGB BLD CALC-MCNC: 10.4 G/DL — LOW (ref 12.6–17.4)
HGB BLD CALC-MCNC: 10.5 G/DL — LOW (ref 12.6–17.4)
HGB BLD CALC-MCNC: 12.9 G/DL — SIGNIFICANT CHANGE UP (ref 12.6–17.4)
HGB BLD-MCNC: 10 G/DL — LOW (ref 13–17)
HGB BLD-MCNC: 10.6 G/DL — LOW (ref 13–17)
HGB BLD-MCNC: 10.6 G/DL — LOW (ref 13–17)
LACTATE BLDV-MCNC: 0.8 MMOL/L — SIGNIFICANT CHANGE UP (ref 0.5–2)
LACTATE BLDV-MCNC: 0.9 MMOL/L — SIGNIFICANT CHANGE UP (ref 0.5–2)
LACTATE BLDV-MCNC: 1 MMOL/L — SIGNIFICANT CHANGE UP (ref 0.5–2)
LACTATE SERPL-SCNC: 0.8 MMOL/L — SIGNIFICANT CHANGE UP (ref 0.5–2)
LACTATE SERPL-SCNC: 0.8 MMOL/L — SIGNIFICANT CHANGE UP (ref 0.5–2)
LYMPHOCYTES # BLD AUTO: 0.92 K/UL — LOW (ref 1–3.3)
LYMPHOCYTES # BLD AUTO: 9.6 % — LOW (ref 13–44)
MAGNESIUM SERPL-MCNC: 2.1 MG/DL — SIGNIFICANT CHANGE UP (ref 1.6–2.6)
MAGNESIUM SERPL-MCNC: 2.1 MG/DL — SIGNIFICANT CHANGE UP (ref 1.8–2.6)
MAGNESIUM SERPL-MCNC: 2.3 MG/DL — SIGNIFICANT CHANGE UP (ref 1.8–2.6)
MANUAL SMEAR VERIFICATION: SIGNIFICANT CHANGE UP
MCHC RBC-ENTMCNC: 29.5 PG — SIGNIFICANT CHANGE UP (ref 27–34)
MCHC RBC-ENTMCNC: 29.5 PG — SIGNIFICANT CHANGE UP (ref 27–34)
MCHC RBC-ENTMCNC: 29.6 PG — SIGNIFICANT CHANGE UP (ref 27–34)
MCHC RBC-ENTMCNC: 33 GM/DL — SIGNIFICANT CHANGE UP (ref 32–36)
MCHC RBC-ENTMCNC: 33.4 GM/DL — SIGNIFICANT CHANGE UP (ref 32–36)
MCHC RBC-ENTMCNC: 33.8 GM/DL — SIGNIFICANT CHANGE UP (ref 32–36)
MCV RBC AUTO: 87.5 FL — SIGNIFICANT CHANGE UP (ref 80–100)
MCV RBC AUTO: 88.5 FL — SIGNIFICANT CHANGE UP (ref 80–100)
MCV RBC AUTO: 89.4 FL — SIGNIFICANT CHANGE UP (ref 80–100)
MONOCYTES # BLD AUTO: 0.58 K/UL — SIGNIFICANT CHANGE UP (ref 0–0.9)
MONOCYTES NFR BLD AUTO: 6.1 % — SIGNIFICANT CHANGE UP (ref 2–14)
NEUTROPHILS # BLD AUTO: 7.14 K/UL — SIGNIFICANT CHANGE UP (ref 1.8–7.4)
NEUTROPHILS NFR BLD AUTO: 74.6 % — SIGNIFICANT CHANGE UP (ref 43–77)
OVALOCYTES BLD QL SMEAR: SLIGHT — SIGNIFICANT CHANGE UP
PCO2 BLDV: 46 MMHG — SIGNIFICANT CHANGE UP (ref 42–55)
PCO2 BLDV: 47 MMHG — SIGNIFICANT CHANGE UP (ref 42–55)
PCO2 BLDV: 47 MMHG — SIGNIFICANT CHANGE UP (ref 42–55)
PH BLDV: 7.38 — SIGNIFICANT CHANGE UP (ref 7.32–7.43)
PH BLDV: 7.39 — SIGNIFICANT CHANGE UP (ref 7.32–7.43)
PH BLDV: 7.4 — SIGNIFICANT CHANGE UP (ref 7.32–7.43)
PHOSPHATE SERPL-MCNC: 2.6 MG/DL — SIGNIFICANT CHANGE UP (ref 2.4–4.7)
PHOSPHATE SERPL-MCNC: 3.1 MG/DL — SIGNIFICANT CHANGE UP (ref 2.4–4.7)
PHOSPHATE SERPL-MCNC: 3.3 MG/DL — SIGNIFICANT CHANGE UP (ref 2.4–4.7)
PLAT MORPH BLD: NORMAL — SIGNIFICANT CHANGE UP
PLATELET # BLD AUTO: 139 K/UL — LOW (ref 150–400)
PLATELET # BLD AUTO: 140 K/UL — LOW (ref 150–400)
PLATELET # BLD AUTO: 141 K/UL — LOW (ref 150–400)
PO2 BLDV: 42 MMHG — SIGNIFICANT CHANGE UP (ref 25–45)
PO2 BLDV: 46 MMHG — HIGH (ref 25–45)
PO2 BLDV: <42 MMHG — SIGNIFICANT CHANGE UP (ref 25–45)
POLYCHROMASIA BLD QL SMEAR: SIGNIFICANT CHANGE UP
POTASSIUM BLDV-SCNC: 3.4 MMOL/L — LOW (ref 3.5–5.1)
POTASSIUM BLDV-SCNC: 3.4 MMOL/L — LOW (ref 3.5–5.1)
POTASSIUM BLDV-SCNC: 4 MMOL/L — SIGNIFICANT CHANGE UP (ref 3.5–5.1)
POTASSIUM SERPL-MCNC: 3.4 MMOL/L — LOW (ref 3.5–5.3)
POTASSIUM SERPL-MCNC: 3.4 MMOL/L — LOW (ref 3.5–5.3)
POTASSIUM SERPL-MCNC: 4.1 MMOL/L — SIGNIFICANT CHANGE UP (ref 3.5–5.3)
POTASSIUM SERPL-SCNC: 3.4 MMOL/L — LOW (ref 3.5–5.3)
POTASSIUM SERPL-SCNC: 3.4 MMOL/L — LOW (ref 3.5–5.3)
POTASSIUM SERPL-SCNC: 4.1 MMOL/L — SIGNIFICANT CHANGE UP (ref 3.5–5.3)
PROT SERPL-MCNC: 5.9 G/DL — LOW (ref 6.6–8.7)
PROT SERPL-MCNC: 6 G/DL — LOW (ref 6.6–8.7)
RBC # BLD: 3.38 M/UL — LOW (ref 4.2–5.8)
RBC # BLD: 3.59 M/UL — LOW (ref 4.2–5.8)
RBC # BLD: 3.59 M/UL — LOW (ref 4.2–5.8)
RBC # FLD: 15.4 % — HIGH (ref 10.3–14.5)
RBC # FLD: 15.4 % — HIGH (ref 10.3–14.5)
RBC # FLD: 15.5 % — HIGH (ref 10.3–14.5)
RBC BLD AUTO: ABNORMAL
SAO2 % BLDV: 65.8 % — SIGNIFICANT CHANGE UP
SAO2 % BLDV: 72.8 % — SIGNIFICANT CHANGE UP
SAO2 % BLDV: 77.9 % — SIGNIFICANT CHANGE UP
SODIUM SERPL-SCNC: 133 MMOL/L — LOW (ref 135–145)
SODIUM SERPL-SCNC: 133 MMOL/L — LOW (ref 135–145)
SODIUM SERPL-SCNC: 135 MMOL/L — SIGNIFICANT CHANGE UP (ref 135–145)
VARIANT LYMPHS # BLD: 0.9 % — SIGNIFICANT CHANGE UP (ref 0–6)
WBC # BLD: 11.04 K/UL — HIGH (ref 3.8–10.5)
WBC # BLD: 11.26 K/UL — HIGH (ref 3.8–10.5)
WBC # BLD: 9.57 K/UL — SIGNIFICANT CHANGE UP (ref 3.8–10.5)
WBC # FLD AUTO: 11.04 K/UL — HIGH (ref 3.8–10.5)
WBC # FLD AUTO: 11.26 K/UL — HIGH (ref 3.8–10.5)
WBC # FLD AUTO: 9.57 K/UL — SIGNIFICANT CHANGE UP (ref 3.8–10.5)

## 2024-05-30 PROCEDURE — 99291 CRITICAL CARE FIRST HOUR: CPT

## 2024-05-30 PROCEDURE — 99232 SBSQ HOSP IP/OBS MODERATE 35: CPT

## 2024-05-30 PROCEDURE — 99233 SBSQ HOSP IP/OBS HIGH 50: CPT

## 2024-05-30 PROCEDURE — 71045 X-RAY EXAM CHEST 1 VIEW: CPT | Mod: 26

## 2024-05-30 RX ORDER — BUMETANIDE 0.25 MG/ML
2 INJECTION INTRAMUSCULAR; INTRAVENOUS ONCE
Refills: 0 | Status: COMPLETED | OUTPATIENT
Start: 2024-05-30 | End: 2024-05-30

## 2024-05-30 RX ORDER — ONDANSETRON 8 MG/1
4 TABLET, FILM COATED ORAL EVERY 8 HOURS
Refills: 0 | Status: DISCONTINUED | OUTPATIENT
Start: 2024-05-30 | End: 2024-06-12

## 2024-05-30 RX ORDER — LEVOTHYROXINE SODIUM 125 MCG
88 TABLET ORAL DAILY
Refills: 0 | Status: DISCONTINUED | OUTPATIENT
Start: 2024-05-30 | End: 2024-06-05

## 2024-05-30 RX ORDER — AMIODARONE HYDROCHLORIDE 400 MG/1
400 TABLET ORAL EVERY 8 HOURS
Refills: 0 | Status: COMPLETED | OUTPATIENT
Start: 2024-05-30 | End: 2024-06-03

## 2024-05-30 RX ORDER — PANTOPRAZOLE SODIUM 20 MG/1
40 TABLET, DELAYED RELEASE ORAL
Refills: 0 | Status: DISCONTINUED | OUTPATIENT
Start: 2024-05-30 | End: 2024-06-05

## 2024-05-30 RX ORDER — AMIODARONE HYDROCHLORIDE 400 MG/1
200 TABLET ORAL DAILY
Refills: 0 | Status: DISCONTINUED | OUTPATIENT
Start: 2024-06-04 | End: 2024-06-05

## 2024-05-30 RX ORDER — HEPARIN SODIUM 5000 [USP'U]/ML
1000 INJECTION INTRAVENOUS; SUBCUTANEOUS
Qty: 25000 | Refills: 0 | Status: DISCONTINUED | OUTPATIENT
Start: 2024-05-30 | End: 2024-06-01

## 2024-05-30 RX ORDER — MAGNESIUM SULFATE 500 MG/ML
2 VIAL (ML) INJECTION ONCE
Refills: 0 | Status: COMPLETED | OUTPATIENT
Start: 2024-05-30 | End: 2024-05-30

## 2024-05-30 RX ORDER — HYDRALAZINE HCL 50 MG
20 TABLET ORAL THREE TIMES A DAY
Refills: 0 | Status: DISCONTINUED | OUTPATIENT
Start: 2024-05-30 | End: 2024-06-03

## 2024-05-30 RX ORDER — HEPARIN SODIUM 5000 [USP'U]/ML
2500 INJECTION INTRAVENOUS; SUBCUTANEOUS EVERY 6 HOURS
Refills: 0 | Status: DISCONTINUED | OUTPATIENT
Start: 2024-05-30 | End: 2024-05-30

## 2024-05-30 RX ORDER — HEPARIN SODIUM 5000 [USP'U]/ML
1000 INJECTION INTRAVENOUS; SUBCUTANEOUS
Qty: 25000 | Refills: 0 | Status: DISCONTINUED | OUTPATIENT
Start: 2024-05-30 | End: 2024-05-30

## 2024-05-30 RX ORDER — HEPARIN SODIUM 5000 [USP'U]/ML
5500 INJECTION INTRAVENOUS; SUBCUTANEOUS EVERY 6 HOURS
Refills: 0 | Status: DISCONTINUED | OUTPATIENT
Start: 2024-05-30 | End: 2024-05-30

## 2024-05-30 RX ORDER — HEPARIN SODIUM 5000 [USP'U]/ML
2500 INJECTION INTRAVENOUS; SUBCUTANEOUS EVERY 6 HOURS
Refills: 0 | Status: DISCONTINUED | OUTPATIENT
Start: 2024-05-30 | End: 2024-06-01

## 2024-05-30 RX ORDER — SENNA PLUS 8.6 MG/1
2 TABLET ORAL AT BEDTIME
Refills: 0 | Status: DISCONTINUED | OUTPATIENT
Start: 2024-05-30 | End: 2024-06-11

## 2024-05-30 RX ORDER — HEPARIN SODIUM 5000 [USP'U]/ML
5500 INJECTION INTRAVENOUS; SUBCUTANEOUS EVERY 6 HOURS
Refills: 0 | Status: DISCONTINUED | OUTPATIENT
Start: 2024-05-30 | End: 2024-06-01

## 2024-05-30 RX ORDER — HYDRALAZINE HCL 50 MG
20 TABLET ORAL THREE TIMES A DAY
Refills: 0 | Status: DISCONTINUED | OUTPATIENT
Start: 2024-05-30 | End: 2024-05-30

## 2024-05-30 RX ORDER — POTASSIUM CHLORIDE 20 MEQ
20 PACKET (EA) ORAL ONCE
Refills: 0 | Status: COMPLETED | OUTPATIENT
Start: 2024-05-30 | End: 2024-05-30

## 2024-05-30 RX ORDER — POTASSIUM CHLORIDE 20 MEQ
40 PACKET (EA) ORAL EVERY 4 HOURS
Refills: 0 | Status: COMPLETED | OUTPATIENT
Start: 2024-05-30 | End: 2024-05-30

## 2024-05-30 RX ADMIN — ONDANSETRON 4 MILLIGRAM(S): 8 TABLET, FILM COATED ORAL at 21:17

## 2024-05-30 RX ADMIN — BUMETANIDE 2 MILLIGRAM(S): 0.25 INJECTION INTRAMUSCULAR; INTRAVENOUS at 05:39

## 2024-05-30 RX ADMIN — INSULIN GLARGINE 9 UNIT(S): 100 INJECTION, SOLUTION SUBCUTANEOUS at 21:03

## 2024-05-30 RX ADMIN — PIPERACILLIN AND TAZOBACTAM 25 GRAM(S): 4; .5 INJECTION, POWDER, LYOPHILIZED, FOR SOLUTION INTRAVENOUS at 17:01

## 2024-05-30 RX ADMIN — MEXILETINE HYDROCHLORIDE 150 MILLIGRAM(S): 150 CAPSULE ORAL at 17:01

## 2024-05-30 RX ADMIN — Medication 40 MILLIEQUIVALENT(S): at 11:56

## 2024-05-30 RX ADMIN — SENNA PLUS 2 TABLET(S): 8.6 TABLET ORAL at 23:06

## 2024-05-30 RX ADMIN — HEPARIN SODIUM 1100 UNIT(S)/HR: 5000 INJECTION INTRAVENOUS; SUBCUTANEOUS at 11:21

## 2024-05-30 RX ADMIN — Medication 100 MILLIEQUIVALENT(S): at 16:31

## 2024-05-30 RX ADMIN — RANOLAZINE 500 MILLIGRAM(S): 500 TABLET, FILM COATED, EXTENDED RELEASE ORAL at 17:01

## 2024-05-30 RX ADMIN — AMIODARONE HYDROCHLORIDE 400 MILLIGRAM(S): 400 TABLET ORAL at 21:03

## 2024-05-30 RX ADMIN — HEPARIN SODIUM 1000 UNIT(S)/HR: 5000 INJECTION INTRAVENOUS; SUBCUTANEOUS at 06:21

## 2024-05-30 RX ADMIN — HEPARIN SODIUM 1000 UNIT(S)/HR: 5000 INJECTION INTRAVENOUS; SUBCUTANEOUS at 19:00

## 2024-05-30 RX ADMIN — Medication 3 UNIT(S): at 08:40

## 2024-05-30 RX ADMIN — MEXILETINE HYDROCHLORIDE 150 MILLIGRAM(S): 150 CAPSULE ORAL at 00:05

## 2024-05-30 RX ADMIN — Medication 100 MILLIEQUIVALENT(S): at 10:28

## 2024-05-30 RX ADMIN — AMIODARONE HYDROCHLORIDE 400 MILLIGRAM(S): 400 TABLET ORAL at 11:55

## 2024-05-30 RX ADMIN — RANOLAZINE 500 MILLIGRAM(S): 500 TABLET, FILM COATED, EXTENDED RELEASE ORAL at 05:39

## 2024-05-30 RX ADMIN — Medication 1: at 08:40

## 2024-05-30 RX ADMIN — Medication 3 UNIT(S): at 13:16

## 2024-05-30 RX ADMIN — HEPARIN SODIUM 1000 UNIT(S)/HR: 5000 INJECTION INTRAVENOUS; SUBCUTANEOUS at 13:26

## 2024-05-30 RX ADMIN — MEXILETINE HYDROCHLORIDE 150 MILLIGRAM(S): 150 CAPSULE ORAL at 08:09

## 2024-05-30 RX ADMIN — Medication 20 MILLIGRAM(S): at 13:15

## 2024-05-30 RX ADMIN — Medication 20 MILLIGRAM(S): at 21:02

## 2024-05-30 RX ADMIN — BUMETANIDE 2 MILLIGRAM(S): 0.25 INJECTION INTRAMUSCULAR; INTRAVENOUS at 01:49

## 2024-05-30 RX ADMIN — BUMETANIDE 2 MILLIGRAM(S): 0.25 INJECTION INTRAMUSCULAR; INTRAVENOUS at 17:01

## 2024-05-30 RX ADMIN — Medication 40 MILLIEQUIVALENT(S): at 08:15

## 2024-05-30 RX ADMIN — CLOPIDOGREL BISULFATE 75 MILLIGRAM(S): 75 TABLET, FILM COATED ORAL at 11:56

## 2024-05-30 RX ADMIN — Medication 81 MILLIGRAM(S): at 11:56

## 2024-05-30 RX ADMIN — CHLORHEXIDINE GLUCONATE 1 APPLICATION(S): 213 SOLUTION TOPICAL at 05:40

## 2024-05-30 RX ADMIN — SENNA PLUS 2 TABLET(S): 8.6 TABLET ORAL at 01:49

## 2024-05-30 RX ADMIN — Medication 3 UNIT(S): at 17:38

## 2024-05-30 RX ADMIN — Medication 1: at 17:38

## 2024-05-30 RX ADMIN — MILRINONE LACTATE 2.46 MICROGRAM(S)/KG/MIN: 1 INJECTION, SOLUTION INTRAVENOUS at 21:02

## 2024-05-30 RX ADMIN — Medication 1: at 13:16

## 2024-05-30 RX ADMIN — ATORVASTATIN CALCIUM 80 MILLIGRAM(S): 80 TABLET, FILM COATED ORAL at 21:02

## 2024-05-30 RX ADMIN — PIPERACILLIN AND TAZOBACTAM 25 GRAM(S): 4; .5 INJECTION, POWDER, LYOPHILIZED, FOR SOLUTION INTRAVENOUS at 05:40

## 2024-05-30 NOTE — PROGRESS NOTE ADULT - SUBJECTIVE AND OBJECTIVE BOX
Subjective: No acute events documented overnight. Pt resting comfortably at time of assessment. No recurrent VT / SVT appreciated overnight or this AM.       TELE: AS-BIV paced rhythm.    MEDICATIONS  (STANDING):  aMIOdarone    Tablet 400 milliGRAM(s) Oral every 8 hours  aspirin enteric coated 81 milliGRAM(s) Oral daily  atorvastatin 80 milliGRAM(s) Oral at bedtime  buMETAnide Injectable 2 milliGRAM(s) IV Push every 12 hours  chlorhexidine 2% Cloths 1 Application(s) Topical <User Schedule>  clopidogrel Tablet 75 milliGRAM(s) Oral daily  dextrose 10% Bolus 125 milliLiter(s) IV Bolus once  dextrose 5%. 1000 milliLiter(s) (50 mL/Hr) IV Continuous <Continuous>  dextrose 5%. 1000 milliLiter(s) (100 mL/Hr) IV Continuous <Continuous>  dextrose 50% Injectable 25 Gram(s) IV Push once  glucagon  Injectable 1 milliGRAM(s) IntraMuscular once  heparin  Infusion. 1000 Unit(s)/Hr (10 mL/Hr) IV Continuous <Continuous>  hydrALAZINE 20 milliGRAM(s) Oral three times a day  insulin glargine Injectable (LANTUS) 9 Unit(s) SubCutaneous at bedtime  insulin lispro (ADMELOG) corrective regimen sliding scale   SubCutaneous three times a day before meals  insulin lispro (ADMELOG) corrective regimen sliding scale   SubCutaneous at bedtime  insulin lispro Injectable (ADMELOG) 3 Unit(s) SubCutaneous three times a day before meals  levothyroxine Injectable 36 MICROGram(s) IV Push at bedtime  mexiletine 150 milliGRAM(s) Oral every 8 hours  milrinone Infusion 0.125 MICROgram(s)/kG/Min (2.46 mL/Hr) IV Continuous <Continuous>  pantoprazole  Injectable 40 milliGRAM(s) IV Push daily  piperacillin/tazobactam IVPB.. 3.375 Gram(s) IV Intermittent every 12 hours  potassium chloride    Tablet ER 40 milliEquivalent(s) Oral every 4 hours  ranolazine 500 milliGRAM(s) Oral two times a day    MEDICATIONS  (PRN):  dextrose Oral Gel 15 Gram(s) Oral once PRN Blood Glucose LESS THAN 70 milliGRAM(s)/deciliter  heparin   Injectable 5500 Unit(s) IV Push every 6 hours PRN For aPTT less than 40  heparin   Injectable 2500 Unit(s) IV Push every 6 hours PRN For aPTT between 40 - 57  senna 2 Tablet(s) Oral at bedtime PRN Constipation  sodium chloride 0.9% lock flush 10 milliLiter(s) IV Push every 1 hour PRN Pre/post blood products, medications, blood draw, and to maintain line patency      Allergies    No Known Allergies    Intolerances    DOPamine (Hypotension)      Vital Signs Last 24 Hrs  T(C): 36.5 (30 May 2024 07:56), Max: 37 (29 May 2024 11:30)  T(F): 97.7 (30 May 2024 07:56), Max: 98.6 (29 May 2024 11:30)  HR: 75 (30 May 2024 11:00) (69 - 86)  BP: --  BP(mean): --  RR: 19 (30 May 2024 11:00) (17 - 29)  SpO2: 100% (30 May 2024 11:00) (95% - 100%)    Parameters below as of 30 May 2024 08:00  Patient On (Oxygen Delivery Method): room air        Physical Exam:  Constitutional: NAD, AAOx3  Cardiovascular: +S1S2 RRR  Pulmonary: CTA b/l, unlabored  Extremities: no pedal edema, +distal pulses b/l  Neuro: non focal, GRAVES x4    LABS:                        10.0   9.57  )-----------( 139      ( 30 May 2024 04:23 )             29.9     05-30    135  |  97  |  58.1<H>  ----------------------------<  162<H>  3.4<L>   |  25.0  |  2.45<H>    Ca    8.2<L>      30 May 2024 04:23  Phos  3.3     05-30  Mg     2.3     05-30    TPro  5.9<L>  /  Alb  2.7<L>  /  TBili  0.8  /  DBili  x   /  AST  89<H>  /  ALT  203<H>  /  AlkPhos  122<H>  05-30    PTT - ( 30 May 2024 10:18 )  PTT:46.5 sec  Urinalysis Basic - ( 30 May 2024 04:23 )    Color: x / Appearance: x / SG: x / pH: x  Gluc: 162 mg/dL / Ketone: x  / Bili: x / Urobili: x   Blood: x / Protein: x / Nitrite: x   Leuk Esterase: x / RBC: x / WBC x   Sq Epi: x / Non Sq Epi: x / Bacteria: x        RADIOLOGY & ADDITIONAL TESTS:  < from: TTE Limited W or WO Ultrasound Enhancing Agent (05.23.24 @ 19:54) >   1. Left ventricular systolic function is severely decreased with an ejection fraction visually estimated at <20 %. Global left ventricular hypokinesis.   2. Elevated filling pressure.   3. Mildly reduced right ventricular systolic function.   4. Mild to moderate mitral regurgitation.   5. Mild to moderate aortic stenosis.   6. Mild pulmonic regurgitation.   7. Mild tricuspid regurgitation.   8. Estimated pulmonary artery systolic pressure is 75 mmHg, consistent with elevated pulmonary artery pressure.   9. No prior echocardiogram is available for comparison.          Assessment:    77yo male with HTN, HLD, DM, CKD, CAD s/p 4V CABG and multiple PCI's, ischemic cardiomyopathy (EF < 20%), and LBBB s/p MDT CRTD (Dr. Bazzi in 12/2023). Pt with recent admission to [facility] on 11/20/23-11/22/23 for decompensated HF with multi-organ system failure. LHC at that time revealed a patent LIMA with all other grafts closed. No intervention was performed at that time.    Pt now admitted with NSTEMI with positive troponin (228, 427, 379), RAS on CKD, and hypoglycemia. A TTE revealed acute on chronic systolic heart failure with EF <20%. He was admitted to telemetry for treatment of ACS on DAPT and heparin gtt but developed sustained VT on 5/25 which resolved with Amiodarone and IV Lidocaine. VT was binned as SVT by ICD so was not completely treated. LHC urgently performed and revealed  % stenosis,  % stenosis,  %,  % stenosis. SVG graft to Circumflex: occluded.  LIMA graft to LAD visually normal in size and structure. LCx/RCA fills via collaterals. Pt upgraded to MICU. Initially did well and was extubated. On 5/26 evening again went into rapid wide complex tachcyardia which appeared to be AT with RVR precipitating at times a regular MMVT which responded to ATP therapy. Patient was given additional boluses of Amiodarone, Lidocaine and Metoprolol and eventually had improvement in HR. After arrest on 5/25 ICD reprogrammed to turn off ST & Wavelet and reduce tachy detections to 171. VT on 5/26 occurred at TCL of 340 msec so VT zone reduced to 164 bpm on 5/26.     No recurrent SVT appreciated overnight, currently AS BI-V paced rhythm. No further ventricular tachycardia appreciated.         #Monomorphic VT:  - Initial  msec on 5/25, on 5/26 VT slower at 340 msec, responsive to ATP.   - Intermittent PAT with variable conduction. NO recurrent VT since 5/26  - Stop amiodarone .5mg/min  - Start Amiodarone 400mg TID x 4 days, followed by 200mg QD  - TFTs & LFTs should be monitored q 3-6 months while on amiodarone therapy. Anticipate <1 year of amiodarone therapy for this patient; however if > 1 year, patient will need annual fundoscopic exam and PFTs. Patient is advised of associated risks and need for monitoring; all questions answered to pt's expressed understanding.   - Cath with no targets for intervention  - Continue Mexiletine 150mg TID.  - Likely scar-mediated VT from ischemic CMY precipitated by AT/AF with RVR  - Will aim for arrhythmia suppression though oral AAT but may need to consider ablation if refractory arrhythmia      #HFrEF:  - GDMT per HF  - Currently on Milrinone  - OptiVol elevation for past month suggestive of progressive CHF    #Paroxysmal AF/AFL/AT  - No further SVT overnight  - 6% AT/AF burden on device, all occurring recently  - CHADS2-VASc of 6 (CHF, HTN, DM, Agex2, CAD) on Heparin gtt  - AT at varying TCL appears to precipitate VT   - Amiodarone as above   - Increase BB as tolerated by BP  - If AT/AF cannot be controlled by amiodarone, may consider AVN ablation    #ICD:  - Device inappropriately binned VT as SVT  - Device reprogrammed with OR Logic changes - sinus tach off, wavelet off, SVT limit increased to 280 msec, stability turned on  - Effective BiV pacing only 83% at initial presentation.  Now Effective BIVP 93% over the past 24 hours while on AAT  - VT zone lowered to 164 bpm on 5/26      Discussed with Dr. Bazzi, further recommendations to follow

## 2024-05-30 NOTE — PROGRESS NOTE ADULT - ASSESSMENT
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 presented to the ED with c/o chest tightness    Cardiac arrest shock on pressors    RAS on CKD suspect stage III  Some improvement in serum Cr 4.2--> 3.4 --> 2.3 --> 2.2 --> 2.4 --> 2.5 --> 2.4  Base Cr seems to be approx 1.4- 1.8  Entresto, Torsemide and Farxiga on hold  Agree w Bumex 2mg IV Q 12, will need to accept azotemia  TTE 5/23 noted LVEF < 20%  BNP 21 K  HYpoKalemia - supplemented  s/p emergent L HC renal functio remains stable post cath  Results--> cath showed all grafts occluded except LIMA-LAD  plans are to medical management with DAPT, Ranexa, and statin    Renally dose meds  Avoid nephrotoxic agents  Monitor labs will follow

## 2024-05-30 NOTE — CARDIOLOGY SUMMARY
[de-identified] : 5/21/24: Paced 98bpm with underlying LBBB [de-identified] : 4/15/24:  1. The left atrium is severely dilated. 2. Segmental wall motion abnormalities as seen with coronary artery disease. 3. Left ventricular systolic function is severely decreased with an ejection fraction of 37 % by 3D. 4. Left ventricular global longitudinal strain is -7.6 % is abnormal (> -16%). 5. The right atrium is normal in size. 6. Mildly enlarged right ventricular cavity size and normal systolic function. 7. Moderate mitral regurgitation. 8. Estimated pulmonary artery systolic pressure is 53 mmHg, consistent with moderate pulmonary hypertension. 9. No pericardial effusion seen. 10. Compared to the transthoracic echocardiogram performed on 10/29/2023, EF has slightly improved (<20% --> 30-35%). [de-identified] : 5/21/24: CRT-D interrogation reveals normal function. Effective CRT 87.5%, no ectopy noted with pacing, LV threshold is ok, but safety margin increased to ensure ongoing capture, no events in arrhythmia log. Will plan for 1-week Holter for accurate evaluation of PVC burden.

## 2024-05-30 NOTE — PROGRESS NOTE ADULT - SUBJECTIVE AND OBJECTIVE BOX
[facility] Advanced Heart Failure - Progress Note  [address] [place] [address]  Office Phone: [phone]/Fax: [phone]  Service/On Call Phone [phone]    Subjective/Objective: NAEO. Pt. resting comfortably, son at bedside. End organ function continuing to improve.     Medications:  aMIOdarone Infusion 0.5 mG/Min IV Continuous <Continuous>  aspirin enteric coated 81 milliGRAM(s) Oral daily  atorvastatin 80 milliGRAM(s) Oral at bedtime  buMETAnide Injectable 2 milliGRAM(s) IV Push every 12 hours  chlorhexidine 2% Cloths 1 Application(s) Topical <User Schedule>  clopidogrel Tablet 75 milliGRAM(s) Oral daily  dextrose 10% Bolus 125 milliLiter(s) IV Bolus once  dextrose 5%. 1000 milliLiter(s) IV Continuous <Continuous>  dextrose 5%. 1000 milliLiter(s) IV Continuous <Continuous>  dextrose 50% Injectable 25 Gram(s) IV Push once  dextrose Oral Gel 15 Gram(s) Oral once PRN  glucagon  Injectable 1 milliGRAM(s) IntraMuscular once  heparin   Injectable 5500 Unit(s) IV Push every 6 hours PRN  heparin   Injectable 2500 Unit(s) IV Push every 6 hours PRN  heparin  Infusion. 1000 Unit(s)/Hr IV Continuous <Continuous>  hydrALAZINE 20 milliGRAM(s) Oral three times a day  insulin glargine Injectable (LANTUS) 9 Unit(s) SubCutaneous at bedtime  insulin lispro (ADMELOG) corrective regimen sliding scale   SubCutaneous three times a day before meals  insulin lispro (ADMELOG) corrective regimen sliding scale   SubCutaneous at bedtime  insulin lispro Injectable (ADMELOG) 3 Unit(s) SubCutaneous three times a day before meals  levothyroxine Injectable 36 MICROGram(s) IV Push at bedtime  mexiletine 150 milliGRAM(s) Oral every 8 hours  milrinone Infusion 0.125 MICROgram(s)/kG/Min IV Continuous <Continuous>  pantoprazole  Injectable 40 milliGRAM(s) IV Push daily  piperacillin/tazobactam IVPB.. 3.375 Gram(s) IV Intermittent every 12 hours  potassium chloride    Tablet ER 40 milliEquivalent(s) Oral every 4 hours  ranolazine 500 milliGRAM(s) Oral two times a day  senna 2 Tablet(s) Oral at bedtime PRN  sodium chloride 0.9% lock flush 10 milliLiter(s) IV Push every 1 hour PRN    Vital Signs Last 24 Hours  T(C): 36.5 (24 @ 07:56), Max: 37 (24 @ 10:45)  HR: 76 (24 @ 10:00) (69 - 86)  BP: --  RR: 20 (24 @ 10:00) (17 - 29)  SpO2: 100% (24 @ 10:00) (95% - 100%)    Weight in k ( @ 06:30)    I&O's Summary  29 May 2024 07:  -  30 May 2024 07:00  --------------------------------------------------------  IN: 2488.9 mL / OUT: 2565 mL / NET: -76.1 mL    30 May 2024 07:  -  30 May 2024 10:38  --------------------------------------------------------  IN: 567.6 mL / OUT: 365 mL / NET: 202.6 mL    Tele:     Physical Exam:  General: In no distress.   HEENT: EOMs intact.  Neck: Neck supple. JVP difficult to assess, [facility] PA cath DSD.  Chest: Anterior CTA.  CV: RRR. Normal S1 and S2. No murmurs, rub, or gallops. Warm peripherally.  PV: No LE edema noted. Pulses full/equal in all four extremities.  Abdomen: Soft, non-distended.  Skin: warm, dry.  Neurology: Alert and oriented times three. Sensation intact.  Psych: Appropriate affect.    Labs:                        10.0   9.57  )-----------( 139      ( 30 May 2024 04:23 )             29.9         135  |  97  |  58.1<H>  ----------------------------<  162<H>  3.4<L>   |  25.0  |  2.45<H>    Ca    8.2<L>      30 May 2024 04:23  Phos  3.3       Mg     2.3         TPro  5.9<L>  /  Alb  2.7<L>  /  TBili  0.8  /  DBili  x   /  AST  89<H>  /  ALT  203<H>  /  AlkPhos  122<H>      PTT - ( 30 May 2024 04:23 )  PTT:29.4 sec      Lactate, Blood: 0.8 mmol/L ( @ 04:23)  Lactate, Blood: 0.9 mmol/L ( @ 22:40)  Lactate, Blood: 1.0 mmol/L ( @ 17:09)  Lactate, Blood: 1.0 mmol/L ( @ 10:56)  Lactate, Blood: 1.6 mmol/L ( @ 00:16)  Lactate, Blood: 1.7 mmol/L ( @ 17:30)  Lactate, Blood: 1.5 mmol/L ( @ 03:45)

## 2024-05-30 NOTE — PROGRESS NOTE ADULT - ASSESSMENT
77 yo male with CAD, CABG, PCI, chronic HFrEF, DM, CKD, presented with shortness of breath, NSTEMI.  Hospital course complicated by VT cardiac arrest on 5/25.  Patient successfully extubated on 5/26 but required re-intubation that day due to recurrent monomorphic VT/ SVT, worsening shock, and recurrent cardiac arrest lasting ~1 min.  Family at bedside today, would like to extubate.  Doing well on SBT.  Now DNR/ No re-intubation.    Plan    Recurrent VT/ SVT  - continue amiodarone, may be able to switch to PO  - continue mexiletine   - BB once off inotrope    Chronic HFrEF (EF <20%)/ cardiogenic shock   - on low dose milrinone  - filling pressures still high, will continue diuresis  - CI 2.1 based on thermodilution  - GDMT once off inotrope    DM  - lantus and lispro    Acute respiratory failure  - now off oxygen  - still with significant pleural effusions on CXR, may need thora    Afib  - heparin drip    OOB when fem art line out  Diet ordered  Family updated

## 2024-05-30 NOTE — PROGRESS NOTE ADULT - SUBJECTIVE AND OBJECTIVE BOX
Date of entry of this note is equal to the date of services rendered    Patient is a 78y old  Male who presents with a chief complaint of Chest Pain / SOB (30 May 2024 14:18)        Events last 24 hours: Amio transitioned from IV to PO. Continue diuresis.     PAST MEDICAL & SURGICAL HISTORY:  GERD (gastroesophageal reflux disease)      High cholesterol      Coronary artery disease involving coronary bypass graft of native heart with unstable angina pectoris      Coronary artery disease involving native coronary artery of native heart, angina presence unspecifie      Type 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, without long-term current use of insul      Essential hypertension      HFrEF (heart failure with reduced ejection fraction)      Stage 4 chronic kidney disease      LBBB (left bundle branch block)      Facial nerve palsy      Hypothyroidism      S/P CABG (coronary artery bypass graft)  4V 1983 [place]      Status post open reduction with internal fixation of fracture      History of insertion of stent into coronary artery bypass graft      History of brachytherapy          Review of Systems:  Negative unless stated      Medications:  piperacillin/tazobactam IVPB.. 3.375 Gram(s) IV Intermittent every 12 hours    aMIOdarone    Tablet 400 milliGRAM(s) Oral every 8 hours  buMETAnide Injectable 2 milliGRAM(s) IV Push every 12 hours  hydrALAZINE 20 milliGRAM(s) Oral three times a day  mexiletine 150 milliGRAM(s) Oral every 8 hours  milrinone Infusion 0.125 MICROgram(s)/kG/Min IV Continuous <Continuous>  ranolazine 500 milliGRAM(s) Oral two times a day      ondansetron Injectable 4 milliGRAM(s) IV Push every 8 hours PRN      aspirin enteric coated 81 milliGRAM(s) Oral daily  clopidogrel Tablet 75 milliGRAM(s) Oral daily  heparin   Injectable 2500 Unit(s) IV Push every 6 hours PRN  heparin   Injectable 5500 Unit(s) IV Push every 6 hours PRN  heparin  Infusion. 1000 Unit(s)/Hr IV Continuous <Continuous>    pantoprazole    Tablet 40 milliGRAM(s) Oral before breakfast  senna 2 Tablet(s) Oral at bedtime PRN      atorvastatin 80 milliGRAM(s) Oral at bedtime  dextrose 50% Injectable 25 Gram(s) IV Push once  dextrose Oral Gel 15 Gram(s) Oral once PRN  glucagon  Injectable 1 milliGRAM(s) IntraMuscular once  insulin glargine Injectable (LANTUS) 9 Unit(s) SubCutaneous at bedtime  insulin lispro (ADMELOG) corrective regimen sliding scale   SubCutaneous three times a day before meals  insulin lispro (ADMELOG) corrective regimen sliding scale   SubCutaneous at bedtime  insulin lispro Injectable (ADMELOG) 3 Unit(s) SubCutaneous three times a day before meals  levothyroxine 88 MICROGram(s) Oral daily    dextrose 10% Bolus 125 milliLiter(s) IV Bolus once  dextrose 5%. 1000 milliLiter(s) IV Continuous <Continuous>  dextrose 5%. 1000 milliLiter(s) IV Continuous <Continuous>  magnesium sulfate  IVPB 2 Gram(s) IV Intermittent once  sodium chloride 0.9% lock flush 10 milliLiter(s) IV Push every 1 hour PRN      chlorhexidine 2% Cloths 1 Application(s) Topical <User Schedule>            ICU Vital Signs Last 24 Hrs  T(C): 36.7 (30 May 2024 20:00), Max: 36.7 (30 May 2024 04:00)  T(F): 98.1 (30 May 2024 20:00), Max: 98.1 (30 May 2024 04:00)  HR: 81 (30 May 2024 23:30) (75 - 89)  BP: 118/57 (30 May 2024 23:30) (92/54 - 118/57)  BP(mean): 75 (30 May 2024 23:30) (64 - 80)  ABP: 129/58 (30 May 2024 12:30) (105/43 - 131/60)  ABP(mean): 83 (30 May 2024 12:30) (21 - 86)  RR: 20 (30 May 2024 23:30) (12 - 29)  SpO2: 100% (30 May 2024 23:30) (95% - 100%)    O2 Parameters below as of 30 May 2024 20:00  Patient On (Oxygen Delivery Method): room air                I&O's Detail    29 May 2024 07:01  -  30 May 2024 07:00  --------------------------------------------------------  IN:    Amiodarone: 199.8 mL    Amiodarone: 300.6 mL    Heparin Infusion: 190 mL    Heparin Infusion: 10 mL    IV PiggyBack: 100 mL    IV PiggyBack: 175 mL    Milrinone: 58.8 mL    Norepinephrine: 14.7 mL    Oral Fluid: 1440 mL  Total IN: 2488.9 mL    OUT:    Indwelling Catheter - Urethral (mL): 2565 mL  Total OUT: 2565 mL    Total NET: -76.1 mL      30 May 2024 07:01  -  30 May 2024 23:56  --------------------------------------------------------  IN:    Amiodarone: 66.8 mL    Heparin Infusion: 40 mL    Heparin Infusion: 110 mL    IV PiggyBack: 200 mL    IV PiggyBack: 100 mL    Milrinone: 42.5 mL    Oral Fluid: 1490 mL  Total IN: 2049.3 mL    OUT:    Indwelling Catheter - Urethral (mL): 1560 mL  Total OUT: 1560 mL    Total NET: 489.3 mL            LABS:                        10.6   11.04 )-----------( 141      ( 30 May 2024 18:53 )             32.1     05-30    133<L>  |  96  |  53.7<H>  ----------------------------<  189<H>  4.1   |  25.0  |  2.45<H>    Ca    8.2<L>      30 May 2024 18:53  Phos  2.6     05-30  Mg     2.1     05-30    TPro  6.0<L>  /  Alb  2.8<L>  /  TBili  0.7  /  DBili  x   /  AST  95<H>  /  ALT  195<H>  /  AlkPhos  120  05-30          CAPILLARY BLOOD GLUCOSE      POCT Blood Glucose.: 177 mg/dL (30 May 2024 21:02)    PTT - ( 30 May 2024 18:53 )  PTT:59.6 sec  Urinalysis Basic - ( 30 May 2024 18:53 )    Color: x / Appearance: x / SG: x / pH: x  Gluc: 189 mg/dL / Ketone: x  / Bili: x / Urobili: x   Blood: x / Protein: x / Nitrite: x   Leuk Esterase: x / RBC: x / WBC x   Sq Epi: x / Non Sq Epi: x / Bacteria: x      CULTURES:      Physical Examination:    General: No acute distress.      HEENT: Pupils equal, reactive to light.    PULM: Clear to auscultation bilaterall    CVS: Regular rate and rhythm    ABD: Soft, nondistended, nontender    EXT: + edema    SKIN: Warm     NEURO: Alert, oriented, interactive, nonfocal

## 2024-05-30 NOTE — PROGRESS NOTE ADULT - NS ATTEND AMEND GEN_ALL_CORE FT
Janeth seen and examined at bedside  Lower extremity edema is improved with diuresis, continue with bumex  Please start hydralazine 20mg TID and if hemodynamics are stable will d/c milrinone  If cuff pressures and claribel pressures correlate, please remove A line

## 2024-05-30 NOTE — REASON FOR VISIT
[Arrhythmia/ECG Abnorrmalities] : arrhythmia/ECG abnormalities [FreeTextEntry1] : INCOMPLETE NOTE [FreeTextEntry3] :

## 2024-05-30 NOTE — DISCUSSION/SUMMARY
[EKG obtained to assist in diagnosis and management of assessed problem(s)] : EKG obtained to assist in diagnosis and management of assessed problem(s) [FreeTextEntry1] : The patient is a 78 year old male with history of CAD s/p 4V CABG and multiple PCI's, chronic systolic heart failure secondary to ischemic cardiomyopathy (EF < 20%), DM, CKD, and left bundle branch block. Pt with recent admission to [facility] on 11/20/23-11/22/23 for decompensated HF with multi-organ system failure. He has been on optimal GDMT for CHF but continues to have declinining LVEF. Recent LHC revealed a patent LIMA with all other grafts closed. No intervention was performed. Patient meets criteria for primary prevention ICD and since he has a baseline LBBB is now s/p Med[employer] CRT-D implant by Dr. Bazzi on 12/21/23. Repeat TTE with improved LVEF <20%>>37%. Now presents for follow-up and reports doing well without arrhythmia symptomatology. Appears euvolemic. CRT-D interrogation reveals normal function. Effective CRT 87.5%, no ectopy noted with pacing, LV threshold is ok, but safety margin increased to ensure ongoing capture, no events in arrhythmia log. Will plan for 1-week Holter for accurate evaluation of PVC burden.   Recommendations:  -Continue remote monitoring via MDT CRT-D -Continue remote optivol monitoring with Dr. Valenzuela -Continue Toprol 50mg po daily -Continue GDMT for HFrEF  -1-week Holter to evaluate PVC burden placed in the office today -Continue to follow with cardiologist Dr. Valenzuela -Follow-up with EP in 3-months or sooner if indicated

## 2024-05-30 NOTE — HISTORY OF PRESENT ILLNESS
[FreeTextEntry1] : The patient is a 78 year old male with history of CAD s/p 4V CABG and multiple PCI's, chronic systolic heart failure secondary to ischemic cardiomyopathy (EF < 20%), DM, CKD, and left bundle branch block. Pt with recent admission to [facility] on 11/20/23-11/22/23 for decompensated HF with multi-organ system failure. He has been on optimal GDMT for CHF but continues to have declining LVEF. Recent LHC revealed a patent LIMA with all other grafts closed. No intervention was performed. Patient meets criteria for primary prevention ICD and since he has a baseline LBBB is now s/p Medtronic CRT-D implant by Dr. Bazzi on 12/21/23.  Repeat TTE 4/15/24 with LVEF improved from <20% to 37%  Presents today for follow-up and reports doing well, denies palpitations, dizziness or chest pain, reports dyspnea is improved. He appears euvolemic on exam. OptiVol level declining.   Currently taking Toprol 50mg po daily and GDMT for HFrEF   CRT-D interrogation reveals normal function. Effective CRT 87.5%, no ectopy noted with pacing, LV threshold is ok, but safety margin increased to ensure ongoing capture, no events in arrhythmia log. Will plan for 1-week Holter for accurate evaluation of PVC burden.   ECG today Paced 98bpm with underlying LBBB

## 2024-05-30 NOTE — CHART NOTE - NSCHARTNOTEFT_GEN_A_CORE
Called to remove R groin TLC. Heparin held for 3 hours prior to procedure. Dressing intact with noted dried blood. Pt placed in Trendelenburg, catheter removed without difficulty and pressure held until hemostasis achieved, approx 13 mins. Hemostasis achieved. No hematoma formation, extravagation of blood or any other complications. Art line remained in placed, cleaned thoroughly. Dressing applied. Pt aware to call for assistance immediately if notes signs of bleeding or any other issues. Continue to observe.

## 2024-05-30 NOTE — PROGRESS NOTE ADULT - PROBLEM SELECTOR PLAN 1
Etiology is likely driven by arrythmia  - Clinically close to euvolemic w/ warm extremities  - Lactate has cleared.   - tMCS: none required at this time  - Inotrope/pressors: Continue milrinone 0.125 mcg/kg/min for now. Weaned off levo. Will plan to add HDZN 20 mg TID today (hold for SBP >90) and wean off milrinone later this afternoon if hemodynamics remain stable.  - Remove femoral Arterial line  - Monitor perfusion markers daily  - Diuretics: Continue Bumex 2 mg IV BID.   - Strict I&Os  - Hemodynamic goals: MAP 65, CVP 8-10, MVO2 >65, PCWP 12, CI >2.2, Lactate <2.2.

## 2024-05-30 NOTE — END OF VISIT
[Time Spent: ___ minutes] : I have spent [unfilled] minutes of time on the encounter. [FreeTextEntry3] : I, Dr. Bazzi, personally performed the evaluation and management (E/M) services for this new patient. That E/M includes conducting the clinically appropriate initial history &/or exam, assessing all conditions, and establishing the plan of care. Today, my assistant, Tasneem Reyes NP, was here to observe my evaluation and management service for this patient & follow plan of care established by me going forward.

## 2024-05-30 NOTE — PROGRESS NOTE ADULT - ASSESSMENT
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 Via elvira CI 2.3.   - Making ample urine with IV Bumix BID, add Diamox if CO2 rises. Continue to monitor Wedge and PAP  - SVR remains elevated to 1428 but unable to up titrate afterload reducer   - Will need GDMT advanced when hemodynamics permit, appreciate HF reccs  - Suspect mild elevation in Renal function with aggressive diuresis but studies show improved mortality with this   - Nephrology following     Discussed with Dr. Olivier

## 2024-05-30 NOTE — PROGRESS NOTE ADULT - SUBJECTIVE AND OBJECTIVE BOX
Interval HPI:  feels better  son says less swollen today  good urine output with bumex    Exam:  awake and alert, nad  off oxygen  bilat air entry  abd soft  trace edema   skin warm and dry    Radiology: cxr 5/29 - bilat effusions, cardiomegaly, pulmonary edema    On Admission  05-23-24 (7d)  HPI:  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 no SOB , radiates to the shoulder   Holter monitor placement for PVC burden which has worsened today with radiation to left arm, and  dizziness/lightheadedness. He called the office and was told to come in for further evaluation. In ED, patient weak appearing,He is with lower back hip pain was in [facility] ED 2 days ago , hip xray neg for fracture . Pt denies headache, SOB, JOINER, diaphoresis, syncope or N/V   Currently pain free ,tele - 120's   Pt's also c/o poor oral intake , BG is 46 dextrose ordered 1 amp by ED        (23 May 2024 16:13)    PAST MEDICAL & SURGICAL HISTORY:  GERD (gastroesophageal reflux disease)      High cholesterol      Coronary artery disease involving coronary bypass graft of native heart with unstable angina pectoris      Coronary artery disease involving native coronary artery of native heart, angina presence unspecifie      Type 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, without long-term current use of insul      Essential hypertension      HFrEF (heart failure with reduced ejection fraction)      Stage 4 chronic kidney disease      LBBB (left bundle branch block)      Facial nerve palsy      Hypothyroidism      S/P CABG (coronary artery bypass graft)  4V 1983 [place]      Status post open reduction with internal fixation of fracture      History of insertion of stent into coronary artery bypass graft      History of brachytherapy          Antimicrobial:  piperacillin/tazobactam IVPB.. 3.375 Gram(s) IV Intermittent every 12 hours    Cardiovascular:  aMIOdarone Infusion 0.5 mG/Min IV Continuous <Continuous>  buMETAnide Injectable 2 milliGRAM(s) IV Push every 12 hours  mexiletine 150 milliGRAM(s) Oral every 8 hours  milrinone Infusion 0.125 MICROgram(s)/kG/Min IV Continuous <Continuous>  ranolazine 500 milliGRAM(s) Oral two times a day    Pulmonary:    Hematalogic:  aspirin enteric coated 81 milliGRAM(s) Oral daily  clopidogrel Tablet 75 milliGRAM(s) Oral daily  heparin   Injectable 5500 Unit(s) IV Push every 6 hours PRN  heparin   Injectable 2500 Unit(s) IV Push every 6 hours PRN  heparin  Infusion. 1000 Unit(s)/Hr IV Continuous <Continuous>    Other:  atorvastatin 80 milliGRAM(s) Oral at bedtime  chlorhexidine 2% Cloths 1 Application(s) Topical <User Schedule>  dextrose 10% Bolus 125 milliLiter(s) IV Bolus once  dextrose 5%. 1000 milliLiter(s) IV Continuous <Continuous>  dextrose 5%. 1000 milliLiter(s) IV Continuous <Continuous>  dextrose 50% Injectable 25 Gram(s) IV Push once  dextrose Oral Gel 15 Gram(s) Oral once PRN  glucagon  Injectable 1 milliGRAM(s) IntraMuscular once  insulin glargine Injectable (LANTUS) 9 Unit(s) SubCutaneous at bedtime  insulin lispro (ADMELOG) corrective regimen sliding scale   SubCutaneous three times a day before meals  insulin lispro (ADMELOG) corrective regimen sliding scale   SubCutaneous at bedtime  insulin lispro Injectable (ADMELOG) 3 Unit(s) SubCutaneous three times a day before meals  levothyroxine Injectable 36 MICROGram(s) IV Push at bedtime  pantoprazole  Injectable 40 milliGRAM(s) IV Push daily  potassium chloride    Tablet ER 40 milliEquivalent(s) Oral every 4 hours  potassium chloride  20 mEq/100 mL IVPB 20 milliEquivalent(s) IV Intermittent once  senna 2 Tablet(s) Oral at bedtime PRN  sodium chloride 0.9% lock flush 10 milliLiter(s) IV Push every 1 hour PRN      Drug Dosing Weight  Height (cm): 172.7 (23 May 2024 13:26)  Weight (kg): 65.5 (23 May 2024 15:04)  BMI (kg/m2): 22 (23 May 2024 15:04)  BSA (m2): 1.78 (23 May 2024 15:04)    T(C): 36.5 (05-30-24 @ 07:56), Max: 37 (05-29-24 @ 08:45)  HR: 83 (05-30-24 @ 08:00)  BP: --  BP(mean): --  ABP: 129/57 (05-30-24 @ 08:00)  ABP(mean): 84 (05-30-24 @ 08:00)  RR: 25 (05-30-24 @ 08:00)  SpO2: 99% (05-30-24 @ 08:00)          05-29 @ 07:01  -  05-30 @ 07:00  --------------------------------------------------------  IN: 2488.9 mL / OUT: 2565 mL / NET: -76.1 mL              LABS:  CBC Full  -  ( 30 May 2024 04:23 )  WBC Count : 9.57 K/uL  RBC Count : 3.38 M/uL  Hemoglobin : 10.0 g/dL  Hematocrit : 29.9 %  Platelet Count - Automated : 139 K/uL  Mean Cell Volume : 88.5 fl  Mean Cell Hemoglobin : 29.6 pg  Mean Cell Hemoglobin Concentration : 33.4 gm/dL  Auto Neutrophil # : 7.14 K/uL  Auto Lymphocyte # : 0.92 K/uL  Auto Monocyte # : 0.58 K/uL  Auto Eosinophil # : 0.84 K/uL  Auto Basophil # : 0.00 K/uL  Auto Neutrophil % : 74.6 %  Auto Lymphocyte % : 9.6 %  Auto Monocyte % : 6.1 %  Auto Eosinophil % : 8.8 %  Auto Basophil % : 0.0 %    05-30    135  |  97  |  58.1<H>  ----------------------------<  162<H>  3.4<L>   |  25.0  |  2.45<H>    Ca    8.2<L>      30 May 2024 04:23  Phos  3.3     05-30  Mg     2.3     05-30    TPro  5.9<L>  /  Alb  2.7<L>  /  TBili  0.8  /  DBili  x   /  AST  89<H>  /  ALT  203<H>  /  AlkPhos  122<H>  05-30    PTT - ( 30 May 2024 04:23 )  PTT:29.4 sec  Urinalysis Basic - ( 30 May 2024 04:23 )    Color: x / Appearance: x / SG: x / pH: x  Gluc: 162 mg/dL / Ketone: x  / Bili: x / Urobili: x   Blood: x / Protein: x / Nitrite: x   Leuk Esterase: x / RBC: x / WBC x   Sq Epi: x / Non Sq Epi: x / Bacteria: x        ____________________________________________________________________________________________________

## 2024-05-30 NOTE — PROGRESS NOTE ADULT - ASSESSMENT
Patient is a 78 year old male with history of HTN, HLD, DM, CKD, CAD s/p 4V CABG and multiple PCI's, ischemic cardiomyopathy (EF < 20%), and LBBB s/p MDT CRT-D. He had an admission in November for ADHF. At that time a LHC revealed his grafts were occluded except for the LIMA to LAD. He also underwent a CRTD implant. On 5/25 he had a VT arrest with ROSC achieved after 3 minutes was sent to the cath lab which showed his LIMA is still patent. Patient was extubated on 5/27. Levophed weaned off, remains on low dose inotrope.    Cardiac Studies:  5/28/24 RHC (report pending): RA 18, RV 92/18, PA 95/28/57    5/25/24 LHC: All grafts occluded except LIMA-LAD. LVEDP 29 mmHg.    5/23/24 TTE: LVEF <20%, LVIDd 5.8cm, mild RVE with mild RV dysfunction, TAPSE 0.7cm, mild-mod AS, mild-mod MR, mild TR, mild MI, est PASP 75 mmHg.

## 2024-05-30 NOTE — PROGRESS NOTE ADULT - SUBJECTIVE AND OBJECTIVE BOX
NEPHROLOGY INTERVAL HPI/OVERNIGHT EVENTS:    Examined  Awake alert  Feeling better  Denies HA CP no SOB  Family visiting    MEDICATIONS  (STANDING):  aMIOdarone    Tablet 400 milliGRAM(s) Oral every 8 hours  aspirin enteric coated 81 milliGRAM(s) Oral daily  atorvastatin 80 milliGRAM(s) Oral at bedtime  buMETAnide Injectable 2 milliGRAM(s) IV Push every 12 hours  chlorhexidine 2% Cloths 1 Application(s) Topical <User Schedule>  clopidogrel Tablet 75 milliGRAM(s) Oral daily  dextrose 10% Bolus 125 milliLiter(s) IV Bolus once  dextrose 5%. 1000 milliLiter(s) (50 mL/Hr) IV Continuous <Continuous>  dextrose 5%. 1000 milliLiter(s) (100 mL/Hr) IV Continuous <Continuous>  dextrose 50% Injectable 25 Gram(s) IV Push once  glucagon  Injectable 1 milliGRAM(s) IntraMuscular once  heparin  Infusion. 1000 Unit(s)/Hr (10 mL/Hr) IV Continuous <Continuous>  hydrALAZINE 20 milliGRAM(s) Oral three times a day  insulin glargine Injectable (LANTUS) 9 Unit(s) SubCutaneous at bedtime  insulin lispro (ADMELOG) corrective regimen sliding scale   SubCutaneous at bedtime  insulin lispro (ADMELOG) corrective regimen sliding scale   SubCutaneous three times a day before meals  insulin lispro Injectable (ADMELOG) 3 Unit(s) SubCutaneous three times a day before meals  levothyroxine 88 MICROGram(s) Oral daily  mexiletine 150 milliGRAM(s) Oral every 8 hours  milrinone Infusion 0.125 MICROgram(s)/kG/Min (2.46 mL/Hr) IV Continuous <Continuous>  pantoprazole    Tablet 40 milliGRAM(s) Oral before breakfast  piperacillin/tazobactam IVPB.. 3.375 Gram(s) IV Intermittent every 12 hours  ranolazine 500 milliGRAM(s) Oral two times a day    MEDICATIONS  (PRN):  dextrose Oral Gel 15 Gram(s) Oral once PRN Blood Glucose LESS THAN 70 milliGRAM(s)/deciliter  heparin   Injectable 2500 Unit(s) IV Push every 6 hours PRN For aPTT between 40 - 57  heparin   Injectable 5500 Unit(s) IV Push every 6 hours PRN For aPTT less than 40  senna 2 Tablet(s) Oral at bedtime PRN Constipation  sodium chloride 0.9% lock flush 10 milliLiter(s) IV Push every 1 hour PRN Pre/post blood products, medications, blood draw, and to maintain line patency      Allergies    No Known Allergies    Intolerances    DOPamine (Hypotension)      Vital Signs Last 24 Hrs  T(C): 36.4 (30 May 2024 12:00), Max: 36.7 (30 May 2024 04:00)  T(F): 97.5 (30 May 2024 12:00), Max: 98.1 (30 May 2024 04:00)  HR: 81 (30 May 2024 13:00) (70 - 86)  BP: 112/59 (30 May 2024 13:00) (112/59 - 112/59)  BP(mean): 76 (30 May 2024 13:00) (76 - 76)  RR: 21 (30 May 2024 13:00) (17 - 29)  SpO2: 100% (30 May 2024 13:00) (95% - 100%)    Parameters below as of 30 May 2024 12:00  Patient On (Oxygen Delivery Method): room air      Daily     Daily Weight in k (30 May 2024 06:30)    PHYSICAL EXAM:  GENERAL: NAD  HEAD:  NCAT  NERVOUS SYSTEM:  Awake alert  CHEST/LUNG: dec bs anteriorly  HEART: Regular rate and rhythm  ABDOMEN: Soft,  +BS  EXTREMITIES: no edema    LABS:                        10.6   11.26 )-----------( 140      ( 30 May 2024 12:28 )             31.4     05-30    133<L>  |  96  |  54.8<H>  ----------------------------<  184<H>  3.4<L>   |  27.0  |  2.44<H>    Ca    8.1<L>      30 May 2024 10:22  Phos  3.1     05-30  Mg     2.1     05-30    TPro  6.0<L>  /  Alb  2.8<L>  /  TBili  0.7  /  DBili  x   /  AST  95<H>  /  ALT  195<H>  /  AlkPhos  120  05-30    PTT - ( 30 May 2024 10:18 )  PTT:46.5 sec  Urinalysis Basic - ( 30 May 2024 10:22 )    Color: x / Appearance: x / SG: x / pH: x  Gluc: 184 mg/dL / Ketone: x  / Bili: x / Urobili: x   Blood: x / Protein: x / Nitrite: x   Leuk Esterase: x / RBC: x / WBC x   Sq Epi: x / Non Sq Epi: x / Bacteria: x      Magnesium: 2.1 mg/dL ( @ 10:22)  Phosphorus: 3.1 mg/dL ( @ 10:22)  Phosphorus: 3.3 mg/dL ( @ 04:23)  Magnesium: 2.3 mg/dL ( @ 04:23)  Magnesium: 2.3 mg/dL ( @ 22:40)  Phosphorus: 3.0 mg/dL ( @ 22:40)  Magnesium: 2.3 mg/dL ( @ 17:09)  Phosphorus: 3.3 mg/dL ( @ 17:09)          RADIOLOGY & ADDITIONAL TESTS:

## 2024-05-31 DIAGNOSIS — N17.9 ACUTE KIDNEY FAILURE, UNSPECIFIED: ICD-10-CM

## 2024-05-31 DIAGNOSIS — R79.89 OTHER SPECIFIED ABNORMAL FINDINGS OF BLOOD CHEMISTRY: ICD-10-CM

## 2024-05-31 LAB
ALBUMIN SERPL ELPH-MCNC: 2.8 G/DL — LOW (ref 3.3–5.2)
ALP SERPL-CCNC: 102 U/L — SIGNIFICANT CHANGE UP (ref 40–120)
ALP SERPL-CCNC: 103 U/L — SIGNIFICANT CHANGE UP (ref 40–120)
ALP SERPL-CCNC: 107 U/L — SIGNIFICANT CHANGE UP (ref 40–120)
ALP SERPL-CCNC: 115 U/L — SIGNIFICANT CHANGE UP (ref 40–120)
ALT FLD-CCNC: 171 U/L — HIGH
ALT FLD-CCNC: 185 U/L — HIGH
ALT FLD-CCNC: 193 U/L — HIGH
ALT FLD-CCNC: 196 U/L — HIGH
ANION GAP SERPL CALC-SCNC: 11 MMOL/L — SIGNIFICANT CHANGE UP (ref 5–17)
ANION GAP SERPL CALC-SCNC: 13 MMOL/L — SIGNIFICANT CHANGE UP (ref 5–17)
ANION GAP SERPL CALC-SCNC: 15 MMOL/L — SIGNIFICANT CHANGE UP (ref 5–17)
ANION GAP SERPL CALC-SCNC: 16 MMOL/L — SIGNIFICANT CHANGE UP (ref 5–17)
APTT BLD: 66.4 SEC — HIGH (ref 24.5–35.6)
AST SERPL-CCNC: 102 U/L — HIGH
AST SERPL-CCNC: 103 U/L — HIGH
AST SERPL-CCNC: 81 U/L — HIGH
AST SERPL-CCNC: 90 U/L — HIGH
BASE EXCESS BLDV CALC-SCNC: 0.1 MMOL/L — SIGNIFICANT CHANGE UP (ref -2–3)
BASE EXCESS BLDV CALC-SCNC: 0.9 MMOL/L — SIGNIFICANT CHANGE UP (ref -2–3)
BASE EXCESS BLDV CALC-SCNC: 2.7 MMOL/L — SIGNIFICANT CHANGE UP (ref -2–3)
BASE EXCESS BLDV CALC-SCNC: 4.9 MMOL/L — HIGH (ref -2–3)
BILIRUB SERPL-MCNC: 0.5 MG/DL — SIGNIFICANT CHANGE UP (ref 0.4–2)
BILIRUB SERPL-MCNC: 0.5 MG/DL — SIGNIFICANT CHANGE UP (ref 0.4–2)
BILIRUB SERPL-MCNC: 0.7 MG/DL — SIGNIFICANT CHANGE UP (ref 0.4–2)
BILIRUB SERPL-MCNC: 0.7 MG/DL — SIGNIFICANT CHANGE UP (ref 0.4–2)
BUN SERPL-MCNC: 52 MG/DL — HIGH (ref 8–20)
BUN SERPL-MCNC: 53.3 MG/DL — HIGH (ref 8–20)
BUN SERPL-MCNC: 54.2 MG/DL — HIGH (ref 8–20)
BUN SERPL-MCNC: 54.9 MG/DL — HIGH (ref 8–20)
CA-I SERPL-SCNC: 1.14 MMOL/L — LOW (ref 1.15–1.33)
CA-I SERPL-SCNC: 1.15 MMOL/L — SIGNIFICANT CHANGE UP (ref 1.15–1.33)
CA-I SERPL-SCNC: 1.18 MMOL/L — SIGNIFICANT CHANGE UP (ref 1.15–1.33)
CA-I SERPL-SCNC: 1.2 MMOL/L — SIGNIFICANT CHANGE UP (ref 1.15–1.33)
CALCIUM SERPL-MCNC: 8.1 MG/DL — LOW (ref 8.4–10.5)
CALCIUM SERPL-MCNC: 8.2 MG/DL — LOW (ref 8.4–10.5)
CALCIUM SERPL-MCNC: 8.3 MG/DL — LOW (ref 8.4–10.5)
CALCIUM SERPL-MCNC: 8.3 MG/DL — LOW (ref 8.4–10.5)
CHLORIDE BLDV-SCNC: 100 MMOL/L — SIGNIFICANT CHANGE UP (ref 96–108)
CHLORIDE BLDV-SCNC: 100 MMOL/L — SIGNIFICANT CHANGE UP (ref 96–108)
CHLORIDE BLDV-SCNC: 97 MMOL/L — SIGNIFICANT CHANGE UP (ref 96–108)
CHLORIDE BLDV-SCNC: 99 MMOL/L — SIGNIFICANT CHANGE UP (ref 96–108)
CHLORIDE SERPL-SCNC: 96 MMOL/L — SIGNIFICANT CHANGE UP (ref 96–108)
CHLORIDE SERPL-SCNC: 97 MMOL/L — SIGNIFICANT CHANGE UP (ref 96–108)
CO2 SERPL-SCNC: 20 MMOL/L — LOW (ref 22–29)
CO2 SERPL-SCNC: 24 MMOL/L — SIGNIFICANT CHANGE UP (ref 22–29)
CO2 SERPL-SCNC: 24 MMOL/L — SIGNIFICANT CHANGE UP (ref 22–29)
CO2 SERPL-SCNC: 25 MMOL/L — SIGNIFICANT CHANGE UP (ref 22–29)
CREAT SERPL-MCNC: 2.38 MG/DL — HIGH (ref 0.5–1.3)
CREAT SERPL-MCNC: 2.44 MG/DL — HIGH (ref 0.5–1.3)
CREAT SERPL-MCNC: 2.67 MG/DL — HIGH (ref 0.5–1.3)
CREAT SERPL-MCNC: 2.82 MG/DL — HIGH (ref 0.5–1.3)
EGFR: 22 ML/MIN/1.73M2 — LOW
EGFR: 24 ML/MIN/1.73M2 — LOW
EGFR: 27 ML/MIN/1.73M2 — LOW
EGFR: 28 ML/MIN/1.73M2 — LOW
GAS PNL BLDV: 131 MMOL/L — LOW (ref 136–145)
GAS PNL BLDV: 131 MMOL/L — LOW (ref 136–145)
GAS PNL BLDV: 132 MMOL/L — LOW (ref 136–145)
GAS PNL BLDV: 133 MMOL/L — LOW (ref 136–145)
GAS PNL BLDV: SIGNIFICANT CHANGE UP
GLUCOSE BLDC GLUCOMTR-MCNC: 150 MG/DL — HIGH (ref 70–99)
GLUCOSE BLDC GLUCOMTR-MCNC: 164 MG/DL — HIGH (ref 70–99)
GLUCOSE BLDC GLUCOMTR-MCNC: 173 MG/DL — HIGH (ref 70–99)
GLUCOSE BLDC GLUCOMTR-MCNC: 178 MG/DL — HIGH (ref 70–99)
GLUCOSE BLDV-MCNC: 146 MG/DL — HIGH (ref 70–99)
GLUCOSE BLDV-MCNC: 178 MG/DL — HIGH (ref 70–99)
GLUCOSE BLDV-MCNC: 187 MG/DL — HIGH (ref 70–99)
GLUCOSE BLDV-MCNC: 202 MG/DL — HIGH (ref 70–99)
GLUCOSE SERPL-MCNC: 153 MG/DL — HIGH (ref 70–99)
GLUCOSE SERPL-MCNC: 170 MG/DL — HIGH (ref 70–99)
GLUCOSE SERPL-MCNC: 182 MG/DL — HIGH (ref 70–99)
GLUCOSE SERPL-MCNC: 191 MG/DL — HIGH (ref 70–99)
HCO3 BLDV-SCNC: 25 MMOL/L — SIGNIFICANT CHANGE UP (ref 22–29)
HCO3 BLDV-SCNC: 26 MMOL/L — SIGNIFICANT CHANGE UP (ref 22–29)
HCO3 BLDV-SCNC: 27 MMOL/L — SIGNIFICANT CHANGE UP (ref 22–29)
HCO3 BLDV-SCNC: 29 MMOL/L — SIGNIFICANT CHANGE UP (ref 22–29)
HCT VFR BLD CALC: 31.3 % — LOW (ref 39–50)
HCT VFR BLDA CALC: 32 % — SIGNIFICANT CHANGE UP
HCT VFR BLDA CALC: 32 % — SIGNIFICANT CHANGE UP
HCT VFR BLDA CALC: 33 % — SIGNIFICANT CHANGE UP
HCT VFR BLDA CALC: 37 % — SIGNIFICANT CHANGE UP
HGB BLD CALC-MCNC: 10.7 G/DL — LOW (ref 12.6–17.4)
HGB BLD CALC-MCNC: 10.8 G/DL — LOW (ref 12.6–17.4)
HGB BLD CALC-MCNC: 11 G/DL — LOW (ref 12.6–17.4)
HGB BLD CALC-MCNC: 12.4 G/DL — LOW (ref 12.6–17.4)
HGB BLD-MCNC: 10.7 G/DL — LOW (ref 13–17)
INR BLD: 1.19 RATIO — HIGH (ref 0.85–1.18)
LACTATE BLDV-MCNC: 0.8 MMOL/L — SIGNIFICANT CHANGE UP (ref 0.5–2)
LACTATE BLDV-MCNC: 0.8 MMOL/L — SIGNIFICANT CHANGE UP (ref 0.5–2)
LACTATE BLDV-MCNC: 0.9 MMOL/L — SIGNIFICANT CHANGE UP (ref 0.5–2)
LACTATE BLDV-MCNC: 1.1 MMOL/L — SIGNIFICANT CHANGE UP (ref 0.5–2)
LACTATE SERPL-SCNC: 0.7 MMOL/L — SIGNIFICANT CHANGE UP (ref 0.5–2)
LACTATE SERPL-SCNC: 0.8 MMOL/L — SIGNIFICANT CHANGE UP (ref 0.5–2)
MAGNESIUM SERPL-MCNC: 2.5 MG/DL — SIGNIFICANT CHANGE UP (ref 1.6–2.6)
MCHC RBC-ENTMCNC: 30.1 PG — SIGNIFICANT CHANGE UP (ref 27–34)
MCHC RBC-ENTMCNC: 34.2 GM/DL — SIGNIFICANT CHANGE UP (ref 32–36)
MCV RBC AUTO: 88.2 FL — SIGNIFICANT CHANGE UP (ref 80–100)
PCO2 BLDV: 42 MMHG — SIGNIFICANT CHANGE UP (ref 42–55)
PCO2 BLDV: 44 MMHG — SIGNIFICANT CHANGE UP (ref 42–55)
PH BLDV: 7.37 — SIGNIFICANT CHANGE UP (ref 7.32–7.43)
PH BLDV: 7.38 — SIGNIFICANT CHANGE UP (ref 7.32–7.43)
PH BLDV: 7.42 — SIGNIFICANT CHANGE UP (ref 7.32–7.43)
PH BLDV: 7.43 — SIGNIFICANT CHANGE UP (ref 7.32–7.43)
PHOSPHATE SERPL-MCNC: 2.7 MG/DL — SIGNIFICANT CHANGE UP (ref 2.4–4.7)
PHOSPHATE SERPL-MCNC: 3.9 MG/DL — SIGNIFICANT CHANGE UP (ref 2.4–4.7)
PHOSPHATE SERPL-MCNC: 4.2 MG/DL — SIGNIFICANT CHANGE UP (ref 2.4–4.7)
PLATELET # BLD AUTO: 143 K/UL — LOW (ref 150–400)
PO2 BLDV: 44 MMHG — SIGNIFICANT CHANGE UP (ref 25–45)
PO2 BLDV: 45 MMHG — SIGNIFICANT CHANGE UP (ref 25–45)
PO2 BLDV: 45 MMHG — SIGNIFICANT CHANGE UP (ref 25–45)
PO2 BLDV: <42 MMHG — SIGNIFICANT CHANGE UP (ref 25–45)
POTASSIUM BLDV-SCNC: 3.7 MMOL/L — SIGNIFICANT CHANGE UP (ref 3.5–5.1)
POTASSIUM BLDV-SCNC: 3.7 MMOL/L — SIGNIFICANT CHANGE UP (ref 3.5–5.1)
POTASSIUM BLDV-SCNC: 3.8 MMOL/L — SIGNIFICANT CHANGE UP (ref 3.5–5.1)
POTASSIUM BLDV-SCNC: 4 MMOL/L — SIGNIFICANT CHANGE UP (ref 3.5–5.1)
POTASSIUM SERPL-MCNC: 3.9 MMOL/L — SIGNIFICANT CHANGE UP (ref 3.5–5.3)
POTASSIUM SERPL-MCNC: 3.9 MMOL/L — SIGNIFICANT CHANGE UP (ref 3.5–5.3)
POTASSIUM SERPL-MCNC: 4 MMOL/L — SIGNIFICANT CHANGE UP (ref 3.5–5.3)
POTASSIUM SERPL-MCNC: 4 MMOL/L — SIGNIFICANT CHANGE UP (ref 3.5–5.3)
POTASSIUM SERPL-SCNC: 3.9 MMOL/L — SIGNIFICANT CHANGE UP (ref 3.5–5.3)
POTASSIUM SERPL-SCNC: 3.9 MMOL/L — SIGNIFICANT CHANGE UP (ref 3.5–5.3)
POTASSIUM SERPL-SCNC: 4 MMOL/L — SIGNIFICANT CHANGE UP (ref 3.5–5.3)
POTASSIUM SERPL-SCNC: 4 MMOL/L — SIGNIFICANT CHANGE UP (ref 3.5–5.3)
PROT SERPL-MCNC: 5.9 G/DL — LOW (ref 6.6–8.7)
PROT SERPL-MCNC: 6 G/DL — LOW (ref 6.6–8.7)
PROT SERPL-MCNC: 6.1 G/DL — LOW (ref 6.6–8.7)
PROT SERPL-MCNC: 6.1 G/DL — LOW (ref 6.6–8.7)
PROTHROM AB SERPL-ACNC: 13.1 SEC — HIGH (ref 9.5–13)
RBC # BLD: 3.55 M/UL — LOW (ref 4.2–5.8)
RBC # FLD: 15.6 % — HIGH (ref 10.3–14.5)
SAO2 % BLDV: 62.8 % — SIGNIFICANT CHANGE UP
SAO2 % BLDV: 67.4 % — SIGNIFICANT CHANGE UP
SAO2 % BLDV: 70.7 % — SIGNIFICANT CHANGE UP
SAO2 % BLDV: 72.4 % — SIGNIFICANT CHANGE UP
SODIUM SERPL-SCNC: 133 MMOL/L — LOW (ref 135–145)
SODIUM SERPL-SCNC: 136 MMOL/L — SIGNIFICANT CHANGE UP (ref 135–145)
WBC # BLD: 10.49 K/UL — SIGNIFICANT CHANGE UP (ref 3.8–10.5)
WBC # FLD AUTO: 10.49 K/UL — SIGNIFICANT CHANGE UP (ref 3.8–10.5)

## 2024-05-31 PROCEDURE — 99232 SBSQ HOSP IP/OBS MODERATE 35: CPT

## 2024-05-31 PROCEDURE — 99233 SBSQ HOSP IP/OBS HIGH 50: CPT

## 2024-05-31 PROCEDURE — 99291 CRITICAL CARE FIRST HOUR: CPT | Mod: GC

## 2024-05-31 RX ORDER — BUMETANIDE 0.25 MG/ML
3 INJECTION INTRAMUSCULAR; INTRAVENOUS EVERY 12 HOURS
Refills: 0 | Status: DISCONTINUED | OUTPATIENT
Start: 2024-05-31 | End: 2024-06-02

## 2024-05-31 RX ORDER — BUMETANIDE 0.25 MG/ML
3 INJECTION INTRAMUSCULAR; INTRAVENOUS EVERY 12 HOURS
Refills: 0 | Status: DISCONTINUED | OUTPATIENT
Start: 2024-05-31 | End: 2024-05-31

## 2024-05-31 RX ORDER — BUMETANIDE 0.25 MG/ML
2 INJECTION INTRAMUSCULAR; INTRAVENOUS ONCE
Refills: 0 | Status: COMPLETED | OUTPATIENT
Start: 2024-05-31 | End: 2024-05-31

## 2024-05-31 RX ORDER — POTASSIUM PHOSPHATE, MONOBASIC POTASSIUM PHOSPHATE, DIBASIC 236; 224 MG/ML; MG/ML
15 INJECTION, SOLUTION INTRAVENOUS ONCE
Refills: 0 | Status: COMPLETED | OUTPATIENT
Start: 2024-05-31 | End: 2024-05-31

## 2024-05-31 RX ADMIN — PIPERACILLIN AND TAZOBACTAM 25 GRAM(S): 4; .5 INJECTION, POWDER, LYOPHILIZED, FOR SOLUTION INTRAVENOUS at 05:22

## 2024-05-31 RX ADMIN — Medication 20 MILLIGRAM(S): at 14:21

## 2024-05-31 RX ADMIN — POTASSIUM PHOSPHATE, MONOBASIC POTASSIUM PHOSPHATE, DIBASIC 62.5 MILLIMOLE(S): 236; 224 INJECTION, SOLUTION INTRAVENOUS at 04:36

## 2024-05-31 RX ADMIN — ONDANSETRON 4 MILLIGRAM(S): 8 TABLET, FILM COATED ORAL at 11:35

## 2024-05-31 RX ADMIN — MEXILETINE HYDROCHLORIDE 150 MILLIGRAM(S): 150 CAPSULE ORAL at 00:26

## 2024-05-31 RX ADMIN — RANOLAZINE 500 MILLIGRAM(S): 500 TABLET, FILM COATED, EXTENDED RELEASE ORAL at 05:24

## 2024-05-31 RX ADMIN — Medication 20 MILLIGRAM(S): at 05:23

## 2024-05-31 RX ADMIN — PIPERACILLIN AND TAZOBACTAM 25 GRAM(S): 4; .5 INJECTION, POWDER, LYOPHILIZED, FOR SOLUTION INTRAVENOUS at 17:42

## 2024-05-31 RX ADMIN — MEXILETINE HYDROCHLORIDE 150 MILLIGRAM(S): 150 CAPSULE ORAL at 17:42

## 2024-05-31 RX ADMIN — Medication 81 MILLIGRAM(S): at 11:28

## 2024-05-31 RX ADMIN — BUMETANIDE 124 MILLIGRAM(S): 0.25 INJECTION INTRAMUSCULAR; INTRAVENOUS at 17:42

## 2024-05-31 RX ADMIN — INSULIN GLARGINE 9 UNIT(S): 100 INJECTION, SOLUTION SUBCUTANEOUS at 22:24

## 2024-05-31 RX ADMIN — Medication 3 UNIT(S): at 12:22

## 2024-05-31 RX ADMIN — CLOPIDOGREL BISULFATE 75 MILLIGRAM(S): 75 TABLET, FILM COATED ORAL at 11:28

## 2024-05-31 RX ADMIN — Medication 25 GRAM(S): at 00:26

## 2024-05-31 RX ADMIN — MEXILETINE HYDROCHLORIDE 150 MILLIGRAM(S): 150 CAPSULE ORAL at 08:32

## 2024-05-31 RX ADMIN — ATORVASTATIN CALCIUM 80 MILLIGRAM(S): 80 TABLET, FILM COATED ORAL at 22:16

## 2024-05-31 RX ADMIN — AMIODARONE HYDROCHLORIDE 400 MILLIGRAM(S): 400 TABLET ORAL at 05:23

## 2024-05-31 RX ADMIN — HEPARIN SODIUM 1000 UNIT(S)/HR: 5000 INJECTION INTRAVENOUS; SUBCUTANEOUS at 04:04

## 2024-05-31 RX ADMIN — Medication 3 UNIT(S): at 08:05

## 2024-05-31 RX ADMIN — Medication 1: at 18:09

## 2024-05-31 RX ADMIN — Medication 20 MILLIGRAM(S): at 21:33

## 2024-05-31 RX ADMIN — BUMETANIDE 2 MILLIGRAM(S): 0.25 INJECTION INTRAMUSCULAR; INTRAVENOUS at 09:44

## 2024-05-31 RX ADMIN — PANTOPRAZOLE SODIUM 40 MILLIGRAM(S): 20 TABLET, DELAYED RELEASE ORAL at 05:24

## 2024-05-31 RX ADMIN — AMIODARONE HYDROCHLORIDE 400 MILLIGRAM(S): 400 TABLET ORAL at 21:33

## 2024-05-31 RX ADMIN — RANOLAZINE 500 MILLIGRAM(S): 500 TABLET, FILM COATED, EXTENDED RELEASE ORAL at 17:42

## 2024-05-31 RX ADMIN — Medication 88 MICROGRAM(S): at 05:24

## 2024-05-31 RX ADMIN — Medication 3 UNIT(S): at 18:08

## 2024-05-31 RX ADMIN — SENNA PLUS 2 TABLET(S): 8.6 TABLET ORAL at 21:33

## 2024-05-31 RX ADMIN — ONDANSETRON 4 MILLIGRAM(S): 8 TABLET, FILM COATED ORAL at 21:44

## 2024-05-31 RX ADMIN — BUMETANIDE 2 MILLIGRAM(S): 0.25 INJECTION INTRAMUSCULAR; INTRAVENOUS at 05:23

## 2024-05-31 RX ADMIN — Medication 1: at 08:05

## 2024-05-31 RX ADMIN — MEXILETINE HYDROCHLORIDE 150 MILLIGRAM(S): 150 CAPSULE ORAL at 23:49

## 2024-05-31 RX ADMIN — AMIODARONE HYDROCHLORIDE 400 MILLIGRAM(S): 400 TABLET ORAL at 11:28

## 2024-05-31 RX ADMIN — CHLORHEXIDINE GLUCONATE 1 APPLICATION(S): 213 SOLUTION TOPICAL at 05:24

## 2024-05-31 NOTE — PROGRESS NOTE ADULT - NS ATTEND AMEND GEN_ALL_CORE FT
Patient tolerated the milrinone being weaned off  Can repeat 1 more set of HD and then pull the swan  Over the weekend will attempt to increase his afterload reduction  Will hold off on adding BB until he is 48 hours off inotropes  Other GDMT addition is limited by renal dysfunction but will reevaluate int he coning days if it improves   PT/OT

## 2024-05-31 NOTE — PROGRESS NOTE ADULT - SUBJECTIVE AND OBJECTIVE BOX
Interval HPI:  feels ok today  CO/CI unchanged since starting hydralazine  Now undergoing trial of milrinone discontinuation     Exam:  awake and alert, nad, appears weak  still with swelling in LUE, otherwise swelling improving - will obtain LUE doppler   reg rhythm  bilat air entry  abd soft  skin warm and dry     On Admission  05-23-24 (8d)  HPI:  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 no SOB , radiates to the shoulder   Holter monitor placement for PVC burden which has worsened today with radiation to left arm, and  dizziness/lightheadedness. He called the office and was told to come in for further evaluation. In ED, patient weak appearing,He is with lower back hip pain was in [facility] ED 2 days ago , hip xray neg for fracture . Pt denies headache, SOB, JOINER, diaphoresis, syncope or N/V   Currently pain free ,tele - 120's   Pt's also c/o poor oral intake , BG is 46 dextrose ordered 1 amp by ED        (23 May 2024 16:13)    PAST MEDICAL & SURGICAL HISTORY:  GERD (gastroesophageal reflux disease)      High cholesterol      Coronary artery disease involving coronary bypass graft of native heart with unstable angina pectoris      Coronary artery disease involving native coronary artery of native heart, angina presence unspecifie      Type 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, without long-term current use of insul      Essential hypertension      HFrEF (heart failure with reduced ejection fraction)      Stage 4 chronic kidney disease      LBBB (left bundle branch block)      Facial nerve palsy      Hypothyroidism      S/P CABG (coronary artery bypass graft)  4V 1983 Alvin      Status post open reduction with internal fixation of fracture      History of insertion of stent into coronary artery bypass graft      History of brachytherapy          Antimicrobial:  piperacillin/tazobactam IVPB.. 3.375 Gram(s) IV Intermittent every 12 hours    Cardiovascular:  aMIOdarone    Tablet 400 milliGRAM(s) Oral every 8 hours  buMETAnide IVPB 3 milliGRAM(s) IV Intermittent every 12 hours  hydrALAZINE 20 milliGRAM(s) Oral three times a day  mexiletine 150 milliGRAM(s) Oral every 8 hours  ranolazine 500 milliGRAM(s) Oral two times a day    Pulmonary:    Hematalogic:  aspirin enteric coated 81 milliGRAM(s) Oral daily  clopidogrel Tablet 75 milliGRAM(s) Oral daily  heparin   Injectable 2500 Unit(s) IV Push every 6 hours PRN  heparin   Injectable 5500 Unit(s) IV Push every 6 hours PRN  heparin  Infusion. 1000 Unit(s)/Hr IV Continuous <Continuous>    Other:  atorvastatin 80 milliGRAM(s) Oral at bedtime  chlorhexidine 2% Cloths 1 Application(s) Topical <User Schedule>  dextrose 10% Bolus 125 milliLiter(s) IV Bolus once  dextrose 5%. 1000 milliLiter(s) IV Continuous <Continuous>  dextrose 5%. 1000 milliLiter(s) IV Continuous <Continuous>  dextrose 50% Injectable 25 Gram(s) IV Push once  dextrose Oral Gel 15 Gram(s) Oral once PRN  glucagon  Injectable 1 milliGRAM(s) IntraMuscular once  insulin glargine Injectable (LANTUS) 9 Unit(s) SubCutaneous at bedtime  insulin lispro (ADMELOG) corrective regimen sliding scale   SubCutaneous three times a day before meals  insulin lispro (ADMELOG) corrective regimen sliding scale   SubCutaneous at bedtime  insulin lispro Injectable (ADMELOG) 3 Unit(s) SubCutaneous three times a day before meals  levothyroxine 88 MICROGram(s) Oral daily  ondansetron Injectable 4 milliGRAM(s) IV Push every 8 hours PRN  pantoprazole    Tablet 40 milliGRAM(s) Oral before breakfast  senna 2 Tablet(s) Oral at bedtime PRN  sodium chloride 0.9% lock flush 10 milliLiter(s) IV Push every 1 hour PRN      Drug Dosing Weight  Height (cm): 172.7 (23 May 2024 13:26)  Weight (kg): 65.5 (23 May 2024 15:04)  BMI (kg/m2): 22 (23 May 2024 15:04)  BSA (m2): 1.78 (23 May 2024 15:04)    T(C): 36.8 (05-31-24 @ 08:00), Max: 36.8 (05-31-24 @ 00:00)  HR: 79 (05-31-24 @ 11:30)  BP: 115/69 (05-31-24 @ 11:30)  BP(mean): 82 (05-31-24 @ 11:30)  ABP: 129/58 (05-30-24 @ 12:30)  ABP(mean): 83 (05-30-24 @ 12:30)  RR: 19 (05-31-24 @ 11:30)  SpO2: 99% (05-31-24 @ 11:30)          05-30 @ 07:01  -  05-31 @ 07:00  --------------------------------------------------------  IN: 2631.8 mL / OUT: 1850 mL / NET: 781.8 mL              LABS:  CBC Full  -  ( 31 May 2024 02:35 )  WBC Count : 10.49 K/uL  RBC Count : 3.55 M/uL  Hemoglobin : 10.7 g/dL  Hematocrit : 31.3 %  Platelet Count - Automated : 143 K/uL  Mean Cell Volume : 88.2 fl  Mean Cell Hemoglobin : 30.1 pg  Mean Cell Hemoglobin Concentration : 34.2 gm/dL  Auto Neutrophil # : x  Auto Lymphocyte # : x  Auto Monocyte # : x  Auto Eosinophil # : x  Auto Basophil # : x  Auto Neutrophil % : x  Auto Lymphocyte % : x  Auto Monocyte % : x  Auto Eosinophil % : x  Auto Basophil % : x    05-31    136  |  97  |  52.0<H>  ----------------------------<  191<H>  4.0   |  24.0  |  2.44<H>    Ca    8.1<L>      31 May 2024 08:52  Phos  4.2     05-31  Mg     2.5     05-31    TPro  6.0<L>  /  Alb  2.8<L>  /  TBili  0.7  /  DBili  x   /  AST  102<H>  /  ALT  193<H>  /  AlkPhos  103  05-31    PT/INR - ( 31 May 2024 02:35 )   PT: 13.1 sec;   INR: 1.19 ratio         PTT - ( 31 May 2024 02:35 )  PTT:66.4 sec  Urinalysis Basic - ( 31 May 2024 08:52 )    Color: x / Appearance: x / SG: x / pH: x  Gluc: 191 mg/dL / Ketone: x  / Bili: x / Urobili: x   Blood: x / Protein: x / Nitrite: x   Leuk Esterase: x / RBC: x / WBC x   Sq Epi: x / Non Sq Epi: x / Bacteria: x        ____________________________________________________________________________________________________

## 2024-05-31 NOTE — PROGRESS NOTE ADULT - SUBJECTIVE AND OBJECTIVE BOX
ADVANCED HEART FAILURE - PROGRESS NOTE  [address] [place] [address]  Office Phone: [phone]/Fax: [phone]  Service/On Call Phone [phone]  _______________________________________________________________________________________________________    Subjective:  - NAEO  - Patient is resting comfortably in bed with no complaints    Medications:  aMIOdarone    Tablet 400 milliGRAM(s) Oral every 8 hours  aspirin enteric coated 81 milliGRAM(s) Oral daily  atorvastatin 80 milliGRAM(s) Oral at bedtime  buMETAnide IVPB 3 milliGRAM(s) IV Intermittent every 12 hours  chlorhexidine 2% Cloths 1 Application(s) Topical <User Schedule>  clopidogrel Tablet 75 milliGRAM(s) Oral daily  dextrose 10% Bolus 125 milliLiter(s) IV Bolus once  dextrose 5%. 1000 milliLiter(s) IV Continuous <Continuous>  dextrose 5%. 1000 milliLiter(s) IV Continuous <Continuous>  dextrose 50% Injectable 25 Gram(s) IV Push once  dextrose Oral Gel 15 Gram(s) Oral once PRN  glucagon  Injectable 1 milliGRAM(s) IntraMuscular once  heparin   Injectable 2500 Unit(s) IV Push every 6 hours PRN  heparin   Injectable 5500 Unit(s) IV Push every 6 hours PRN  heparin  Infusion. 1000 Unit(s)/Hr IV Continuous <Continuous>  hydrALAZINE 20 milliGRAM(s) Oral three times a day  insulin glargine Injectable (LANTUS) 9 Unit(s) SubCutaneous at bedtime  insulin lispro (ADMELOG) corrective regimen sliding scale   SubCutaneous three times a day before meals  insulin lispro (ADMELOG) corrective regimen sliding scale   SubCutaneous at bedtime  insulin lispro Injectable (ADMELOG) 3 Unit(s) SubCutaneous three times a day before meals  levothyroxine 88 MICROGram(s) Oral daily  mexiletine 150 milliGRAM(s) Oral every 8 hours  ondansetron Injectable 4 milliGRAM(s) IV Push every 8 hours PRN  pantoprazole    Tablet 40 milliGRAM(s) Oral before breakfast  piperacillin/tazobactam IVPB.. 3.375 Gram(s) IV Intermittent every 12 hours  ranolazine 500 milliGRAM(s) Oral two times a day  senna 2 Tablet(s) Oral at bedtime PRN  sodium chloride 0.9% lock flush 10 milliLiter(s) IV Push every 1 hour PRN      ICU Vital Signs Last 24 Hrs  T(C): 36.8, Max: 36.8 ( @ 00:00)  HR: 79 (75 - 89)  BP: 115/69 (92/54 - 124/80)  BP(mean): 82 (64 - 94)  ABP: 129/58 (123/57 - 129/58)  ABP(mean): 83 (80 - 83)  RR: 19 (12 - 28)  SpO2: 99% (96% - 100%)    Weight in k.6 (24)  Weight in k (24)      I&O's Summary Last 24 Hrs    IN: 2631.8 mL / OUT: 1850 mL / NET: 781.8 mL      Tele:  80s    Physical Exam:    General: No distress. Comfortable.  Neck: JVP not elevated.  Respiratory: Clear to auscultation bilaterally  CV: Normal S1 and S2. No murmurs, rub, or gallops. Radial pulses normal.  Abdomen: Soft, non-distended, non-tender  Extremities: Warm, no edema  Neurology: Non-focal, alert and oriented times three.   Psych: Affect normal    Labs:    ( 24 @ 02:35 )               10.7   10.49 )--------( 143                  31.3     ( 24 @ 08:52 )     136  |  97  |  52.0  ---------------------<  191  4.0  |  24.0  |  2.44    Ca 8.1  Phos 4.2  Mg 2.5    ( 24 @ 08:52 )  TPro  6.0  /  Alb  2.8  /  TBili  0.7  /  DBili  x   /  AST  102  /  ALT  193  /  AlkPhos  103  ( 24 @ 02:35 )  TPro  5.9  /  Alb  2.8  /  TBili  0.7  /  DBili  x   /  AST  103  /  ALT  196  /  AlkPhos  107    PTT/PT/INR - ( 24 @ 02:35 )  PTT: 66.4 sec / PT: 13.1 sec / INR: 1.19 ratio    ( 24 @ 10:21 )  TropHS 379   / CK 69    / CKMB x      ( 24 @ 05:15 )  TropHS 427   / CK x     / CKMB x        VBG - ( 24 @ 08:52 )  pH: 7.420 / pCO2: 42    / pO2: 45    / Oxygen saturation: 70.7    VBG - ( 24 @ 02:35 )  pH: 7.430 / pCO2: 44    / pO2: 44    / Oxygen saturation: 72.4    VBG - ( 24 @ 18:53 )  pH: 7.380 / pCO2: 46    / pO2: <42   / Oxygen saturation: 65.8      Lactate, Blood: 0.8 (24 @ 08:52)  Blood Gas Venous - Lactate: 0.80 (24 @ 08:52)

## 2024-05-31 NOTE — PROGRESS NOTE ADULT - CRITICAL CARE ATTENDING COMMENT
CC time spent titrating inotropic support, interpreting invasive hemodynamic measurements including PA pressures and cardiac output measurements, interpreting blood gasses and chest imaging, adjusting other life sustaining therapies in this critically ill patient at high risk of death
Critical care time spent titrating IV sedatives, vasoactive agents, IV antiarrhythmics, adjusting mechanical ventilator settings, interpreting blood gases and chest imaging.
titrating vasopressors, inotrope, assessing swan buster measurements
CC time spent titrating inotropic support, interpreting invasive hemodynamic measurements including PA pressures and cardiac output measurements, interpreting blood gasses and chest imaging, adjusting other life sustaining therapies in this critically ill patient at high risk of death
titrating inotropic support, interpreting swan buster measurements, interpreting arterial blood gases

## 2024-05-31 NOTE — PROGRESS NOTE ADULT - PROBLEM SELECTOR PLAN 1
- Improving, lactate has cleared.   - Inotrope/pressors: Weaned off levo 5/29 and milrinone off this morning.  - Monitor perfusion markers daily (lactate, LFTs)  - Hemodynamic goals: MAP 65, CVP 8-10, MVO2 >65, PCWP 12, CI >2.2, Lactate <2.2.

## 2024-05-31 NOTE — PROGRESS NOTE ADULT - NS ATTEND AMEND GEN_ALL_CORE FT
PATIENT CALLED TO ADVISE THE SURGERY SCHEDULER THAT THERE IS NO CHANGE TO HER INSURANCE COVERAGE, AND SHE ANTICIPATES NO CHANGE.    END OF MESSAGE   VT and atrial arrhythmias have resolved. Continue amio and mexiletine as above. Pleases call EP if recurrent VT.

## 2024-05-31 NOTE — PROGRESS NOTE ADULT - SUBJECTIVE AND OBJECTIVE BOX
Subjective: No acute events documented overnight. Pt reports feeling well this AM, only c/o nonproductive cough. Denies chest pain, SOB, palpitations, dizziness.    TELE: A-sensed, BI-V paced rhythm. No recurrent SVT / VT overnight.    MEDICATIONS  (STANDING):  aMIOdarone    Tablet 400 milliGRAM(s) Oral every 8 hours  aspirin enteric coated 81 milliGRAM(s) Oral daily  atorvastatin 80 milliGRAM(s) Oral at bedtime  buMETAnide IVPB 3 milliGRAM(s) IV Intermittent every 12 hours  chlorhexidine 2% Cloths 1 Application(s) Topical <User Schedule>  clopidogrel Tablet 75 milliGRAM(s) Oral daily  dextrose 10% Bolus 125 milliLiter(s) IV Bolus once  dextrose 5%. 1000 milliLiter(s) (100 mL/Hr) IV Continuous <Continuous>  dextrose 5%. 1000 milliLiter(s) (50 mL/Hr) IV Continuous <Continuous>  dextrose 50% Injectable 25 Gram(s) IV Push once  glucagon  Injectable 1 milliGRAM(s) IntraMuscular once  heparin  Infusion. 1000 Unit(s)/Hr (10 mL/Hr) IV Continuous <Continuous>  hydrALAZINE 20 milliGRAM(s) Oral three times a day  insulin glargine Injectable (LANTUS) 9 Unit(s) SubCutaneous at bedtime  insulin lispro (ADMELOG) corrective regimen sliding scale   SubCutaneous at bedtime  insulin lispro (ADMELOG) corrective regimen sliding scale   SubCutaneous three times a day before meals  insulin lispro Injectable (ADMELOG) 3 Unit(s) SubCutaneous three times a day before meals  levothyroxine 88 MICROGram(s) Oral daily  mexiletine 150 milliGRAM(s) Oral every 8 hours  milrinone Infusion 0.125 MICROgram(s)/kG/Min (2.46 mL/Hr) IV Continuous <Continuous>  pantoprazole    Tablet 40 milliGRAM(s) Oral before breakfast  piperacillin/tazobactam IVPB.. 3.375 Gram(s) IV Intermittent every 12 hours  ranolazine 500 milliGRAM(s) Oral two times a day    MEDICATIONS  (PRN):  dextrose Oral Gel 15 Gram(s) Oral once PRN Blood Glucose LESS THAN 70 milliGRAM(s)/deciliter  heparin   Injectable 5500 Unit(s) IV Push every 6 hours PRN For aPTT less than 40  heparin   Injectable 2500 Unit(s) IV Push every 6 hours PRN For aPTT between 40 - 57  ondansetron Injectable 4 milliGRAM(s) IV Push every 8 hours PRN Nausea and/or Vomiting  senna 2 Tablet(s) Oral at bedtime PRN Constipation  sodium chloride 0.9% lock flush 10 milliLiter(s) IV Push every 1 hour PRN Pre/post blood products, medications, blood draw, and to maintain line patency      Allergies    No Known Allergies    Intolerances    DOPamine (Hypotension)      Vital Signs Last 24 Hrs  T(C): 36.8 (31 May 2024 08:00), Max: 36.8 (31 May 2024 00:00)  T(F): 98.2 (31 May 2024 08:00), Max: 98.2 (31 May 2024 00:00)  HR: 78 (31 May 2024 09:30) (75 - 89)  BP: 107/59 (31 May 2024 09:30) (92/54 - 119/59)  BP(mean): 75 (31 May 2024 09:30) (64 - 87)  RR: 24 (31 May 2024 09:30) (12 - 28)  SpO2: 99% (31 May 2024 09:30) (96% - 100%)    Parameters below as of 31 May 2024 08:00  Patient On (Oxygen Delivery Method): room air        Physical Exam:  Constitutional: NAD, AAOx3  Cardiovascular: +S1S2 RRR  Pulmonary: CTA b/l, unlabored  Extremities: no pedal edema, +distal pulses b/l  Neuro: non focal, GRAVES x4    LABS:                        10.7   10.49 )-----------( 143      ( 31 May 2024 02:35 )             31.3     05-31    136  |  97  |  52.0<H>  ----------------------------<  191<H>  4.0   |  24.0  |  2.44<H>    Ca    8.1<L>      31 May 2024 08:52  Phos  4.2     05-31  Mg     2.5     05-31    TPro  6.0<L>  /  Alb  2.8<L>  /  TBili  0.7  /  DBili  x   /  AST  102<H>  /  ALT  193<H>  /  AlkPhos  103  05-31    PT/INR - ( 31 May 2024 02:35 )   PT: 13.1 sec;   INR: 1.19 ratio         PTT - ( 31 May 2024 02:35 )  PTT:66.4 sec  Urinalysis Basic - ( 31 May 2024 08:52 )    Color: x / Appearance: x / SG: x / pH: x  Gluc: 191 mg/dL / Ketone: x  / Bili: x / Urobili: x   Blood: x / Protein: x / Nitrite: x   Leuk Esterase: x / RBC: x / WBC x   Sq Epi: x / Non Sq Epi: x / Bacteria: x        RADIOLOGY & ADDITIONAL TESTS:      < from: TTE Limited W or WO Ultrasound Enhancing Agent (05.23.24 @ 19:54) >   1. Left ventricular systolic function is severely decreased with an ejection fraction visually estimated at <20 %. Global left ventricular hypokinesis.   2. Elevated filling pressure.   3. Mildly reduced right ventricular systolic function.   4. Mild to moderate mitral regurgitation.   5. Mild to moderate aortic stenosis.   6. Mild pulmonic regurgitation.   7. Mild tricuspid regurgitation.   8. Estimated pulmonary artery systolic pressure is 75 mmHg, consistent with elevated pulmonary artery pressure.   9. No prior echocardiogram is available for comparison.          Assessment:    77yo male with HTN, HLD, DM, CKD, CAD s/p 4V CABG and multiple PCI's, ischemic cardiomyopathy (EF < 20%), and LBBB s/p MDT CRTD (Dr. Bazzi in 12/2023). Pt with recent admission to [facility] on 11/20/23-11/22/23 for decompensated HF with multi-organ system failure. LHC at that time revealed a patent LIMA with all other grafts closed. No intervention was performed at that time.    Pt now admitted with NSTEMI with positive troponin (228, 427, 379), RAS on CKD, and hypoglycemia. A TTE revealed acute on chronic systolic heart failure with EF <20%. He was admitted to telemetry for treatment of ACS on DAPT and heparin gtt but developed sustained VT on 5/25 which resolved with Amiodarone and IV Lidocaine. VT was binned as SVT by ICD so was not completely treated. LHC urgently performed and revealed  % stenosis,  % stenosis,  %,  % stenosis. SVG graft to Circumflex: occluded.  LIMA graft to LAD visually normal in size and structure. LCx/RCA fills via collaterals. Pt upgraded to MICU. Initially did well and was extubated. On 5/26 evening again went into rapid wide complex tachcyardia which appeared to be AT with RVR precipitating at times a regular MMVT which responded to ATP therapy. Patient was given additional boluses of Amiodarone, Lidocaine and Metoprolol and eventually had improvement in HR. After arrest on 5/25 ICD reprogrammed to turn off ST & Wavelet and reduce tachy detections to 171. VT on 5/26 occurred at TCL of 340 msec so VT zone reduced to 164 bpm on 5/26.     Pt has since been transitioned to PO Amiodarone and Mexiletine w/o any recurrent SVT / VT. Pt reports feeling well this AM, only c/o nonproductive cough. Denies chest pain, SOB, palpitations, dizziness.        #Monomorphic VT:  - Initial  msec on 5/25, on 5/26 VT slower at 340 msec, responsive to ATP.   - Intermittent PAT with variable conduction. NO recurrent VT since 5/26  - c/w Amiodarone 400mg TID x 4 days, (day 2/4) followed by 200mg QD thereafter   - TFTs & LFTs should be monitored q 3-6 months while on amiodarone therapy. Anticipate <1 year of amiodarone therapy for this patient; however if > 1 year, patient will need annual fundoscopic exam and PFTs. Patient is advised of associated risks and need for monitoring; all questions answered to pt's expressed understanding.   - Cath with no targets for intervention  - Continue Mexiletine 150mg TID.  - Likely scar-mediated VT from ischemic CMY precipitated by AT/AF with RVR    #HFrEF:  - GDMT per HF  - Currently on Milrinone  - OptiVol elevation for past month suggestive of progressive CHF    #Paroxysmal AF/AFL/AT  - No further SVT overnight  - 6% AT/AF burden on device, all occurring recently  - CHADS2-VASc of 6 (CHF, HTN, DM, Agex2, CAD) on Heparin gtt  - AT at varying TCL appears to precipitate VT   - Amiodarone as above   - Increase BB as tolerated by BP  - If AT/AF cannot be controlled by amiodarone, may consider AVN ablation    #ICD:  - Device inappropriately binned VT as SVT  - Device reprogrammed with NM Logic changes - sinus tach off, wavelet off, SVT limit increased to 280 msec, stability turned on  - Effective BiV pacing only 83% at initial presentation.  Now Effective BIVP 93% over the past 24 hours while on AAT  - VT zone lowered to 164 bpm on 5/26      Above discussed with Dr. Bazzi, further recommendations to follow Subjective: No acute events documented overnight. Pt reports feeling well this AM, only c/o nonproductive cough. Denies chest pain, SOB, palpitations, dizziness.    TELE: A-sensed, BI-V paced rhythm. No recurrent SVT / VT overnight.    MEDICATIONS  (STANDING):  aMIOdarone    Tablet 400 milliGRAM(s) Oral every 8 hours  aspirin enteric coated 81 milliGRAM(s) Oral daily  atorvastatin 80 milliGRAM(s) Oral at bedtime  buMETAnide IVPB 3 milliGRAM(s) IV Intermittent every 12 hours  chlorhexidine 2% Cloths 1 Application(s) Topical <User Schedule>  clopidogrel Tablet 75 milliGRAM(s) Oral daily  dextrose 10% Bolus 125 milliLiter(s) IV Bolus once  dextrose 5%. 1000 milliLiter(s) (100 mL/Hr) IV Continuous <Continuous>  dextrose 5%. 1000 milliLiter(s) (50 mL/Hr) IV Continuous <Continuous>  dextrose 50% Injectable 25 Gram(s) IV Push once  glucagon  Injectable 1 milliGRAM(s) IntraMuscular once  heparin  Infusion. 1000 Unit(s)/Hr (10 mL/Hr) IV Continuous <Continuous>  hydrALAZINE 20 milliGRAM(s) Oral three times a day  insulin glargine Injectable (LANTUS) 9 Unit(s) SubCutaneous at bedtime  insulin lispro (ADMELOG) corrective regimen sliding scale   SubCutaneous at bedtime  insulin lispro (ADMELOG) corrective regimen sliding scale   SubCutaneous three times a day before meals  insulin lispro Injectable (ADMELOG) 3 Unit(s) SubCutaneous three times a day before meals  levothyroxine 88 MICROGram(s) Oral daily  mexiletine 150 milliGRAM(s) Oral every 8 hours  milrinone Infusion 0.125 MICROgram(s)/kG/Min (2.46 mL/Hr) IV Continuous <Continuous>  pantoprazole    Tablet 40 milliGRAM(s) Oral before breakfast  piperacillin/tazobactam IVPB.. 3.375 Gram(s) IV Intermittent every 12 hours  ranolazine 500 milliGRAM(s) Oral two times a day    MEDICATIONS  (PRN):  dextrose Oral Gel 15 Gram(s) Oral once PRN Blood Glucose LESS THAN 70 milliGRAM(s)/deciliter  heparin   Injectable 5500 Unit(s) IV Push every 6 hours PRN For aPTT less than 40  heparin   Injectable 2500 Unit(s) IV Push every 6 hours PRN For aPTT between 40 - 57  ondansetron Injectable 4 milliGRAM(s) IV Push every 8 hours PRN Nausea and/or Vomiting  senna 2 Tablet(s) Oral at bedtime PRN Constipation  sodium chloride 0.9% lock flush 10 milliLiter(s) IV Push every 1 hour PRN Pre/post blood products, medications, blood draw, and to maintain line patency      Allergies    No Known Allergies    Intolerances    DOPamine (Hypotension)      Vital Signs Last 24 Hrs  T(C): 36.8 (31 May 2024 08:00), Max: 36.8 (31 May 2024 00:00)  T(F): 98.2 (31 May 2024 08:00), Max: 98.2 (31 May 2024 00:00)  HR: 78 (31 May 2024 09:30) (75 - 89)  BP: 107/59 (31 May 2024 09:30) (92/54 - 119/59)  BP(mean): 75 (31 May 2024 09:30) (64 - 87)  RR: 24 (31 May 2024 09:30) (12 - 28)  SpO2: 99% (31 May 2024 09:30) (96% - 100%)    Parameters below as of 31 May 2024 08:00  Patient On (Oxygen Delivery Method): room air        Physical Exam:  Constitutional: NAD, AAOx3  Cardiovascular: +S1S2 RRR  Pulmonary: CTA b/l, unlabored  Extremities: no pedal edema, +distal pulses b/l  Neuro: non focal, GRAVES x4    LABS:                        10.7   10.49 )-----------( 143      ( 31 May 2024 02:35 )             31.3     05-31    136  |  97  |  52.0<H>  ----------------------------<  191<H>  4.0   |  24.0  |  2.44<H>    Ca    8.1<L>      31 May 2024 08:52  Phos  4.2     05-31  Mg     2.5     05-31    TPro  6.0<L>  /  Alb  2.8<L>  /  TBili  0.7  /  DBili  x   /  AST  102<H>  /  ALT  193<H>  /  AlkPhos  103  05-31    PT/INR - ( 31 May 2024 02:35 )   PT: 13.1 sec;   INR: 1.19 ratio         PTT - ( 31 May 2024 02:35 )  PTT:66.4 sec  Urinalysis Basic - ( 31 May 2024 08:52 )    Color: x / Appearance: x / SG: x / pH: x  Gluc: 191 mg/dL / Ketone: x  / Bili: x / Urobili: x   Blood: x / Protein: x / Nitrite: x   Leuk Esterase: x / RBC: x / WBC x   Sq Epi: x / Non Sq Epi: x / Bacteria: x        RADIOLOGY & ADDITIONAL TESTS:      < from: TTE Limited W or WO Ultrasound Enhancing Agent (05.23.24 @ 19:54) >   1. Left ventricular systolic function is severely decreased with an ejection fraction visually estimated at <20 %. Global left ventricular hypokinesis.   2. Elevated filling pressure.   3. Mildly reduced right ventricular systolic function.   4. Mild to moderate mitral regurgitation.   5. Mild to moderate aortic stenosis.   6. Mild pulmonic regurgitation.   7. Mild tricuspid regurgitation.   8. Estimated pulmonary artery systolic pressure is 75 mmHg, consistent with elevated pulmonary artery pressure.   9. No prior echocardiogram is available for comparison.          Assessment:    79yo male with HTN, HLD, DM, CKD, CAD s/p 4V CABG and multiple PCI's, ischemic cardiomyopathy (EF < 20%), and LBBB s/p MDT CRTD (Dr. Bazzi in 12/2023). Pt with recent admission to [facility] on 11/20/23-11/22/23 for decompensated HF with multi-organ system failure. LHC at that time revealed a patent LIMA with all other grafts closed. No intervention was performed at that time.    Pt now admitted with NSTEMI with positive troponin (228, 427, 379), RAS on CKD, and hypoglycemia. A TTE revealed acute on chronic systolic heart failure with EF <20%. He was admitted to telemetry for treatment of ACS on DAPT and heparin gtt but developed sustained VT on 5/25 which resolved with Amiodarone and IV Lidocaine. VT was binned as SVT by ICD so was not completely treated. LHC urgently performed and revealed  % stenosis,  % stenosis,  %,  % stenosis. SVG graft to Circumflex: occluded.  LIMA graft to LAD visually normal in size and structure. LCx/RCA fills via collaterals. Pt upgraded to MICU. Initially did well and was extubated. On 5/26 evening again went into rapid wide complex tachcyardia which appeared to be AT with RVR precipitating at times a regular MMVT which responded to ATP therapy. Patient was given additional boluses of Amiodarone, Lidocaine and Metoprolol and eventually had improvement in HR. After arrest on 5/25 ICD reprogrammed to turn off ST & Wavelet and reduce tachy detections to 171. VT on 5/26 occurred at TCL of 340 msec so VT zone reduced to 164 bpm on 5/26.     Pt has since been transitioned to PO Amiodarone and Mexiletine w/o any recurrent SVT / VT. Pt reports feeling well this AM, only c/o nonproductive cough. Denies chest pain, SOB, palpitations, dizziness.        #Monomorphic VT:  - Initial  msec on 5/25, on 5/26 VT slower at 340 msec, responsive to ATP.   - Intermittent PAT with variable conduction. NO recurrent VT since 5/26  - c/w Amiodarone 400mg TID x 4 days, (day 2/4) followed by 200mg QD thereafter   - TFTs & LFTs should be monitored q 3-6 months while on amiodarone therapy. Anticipate <1 year of amiodarone therapy for this patient; however if > 1 year, patient will need annual fundoscopic exam and PFTs. Patient is advised of associated risks and need for monitoring; all questions answered to pt's expressed understanding.   - Cath with no targets for intervention  - Continue Mexiletine 150mg TID.  - Likely scar-mediated VT from ischemic CMY precipitated by AT/AF with RVR    #HFrEF:  - GDMT per HF  - Currently on Milrinone  - OptiVol elevation for past month suggestive of progressive CHF    #Paroxysmal AF/AFL/AT  - No further SVT overnight  - 6% AT/AF burden on device, all occurring recently  - CHADS2-VASc of 6 (CHF, HTN, DM, Agex2, CAD) on Heparin gtt  - AT at varying TCL appears to precipitate VT   - Amiodarone as above   - Increase BB as tolerated by BP  - If AT/AF cannot be controlled by amiodarone, may consider AVN ablation    #ICD:  - Device inappropriately binned VT as SVT  - Device reprogrammed with VT Logic changes - sinus tach off, wavelet off, SVT limit increased to 280 msec, stability turned on  - Effective BiV pacing only 83% at initial presentation.  Now Effective BIVP 93% over the past 24 hours while on AAT  - VT zone lowered to 164 bpm on 5/26      Above discussed with Dr. Bazzi. EP to sign off, recall as needed

## 2024-05-31 NOTE — PROGRESS NOTE ADULT - ASSESSMENT
Patient is a 78 year old male with history of HTN, HLD, DM, CKD, CAD s/p 4V CABG and multiple PCI's, ischemic cardiomyopathy (EF < 20%), and LBBB s/p MDT CRT-D. He had an admission in November for ADHF. At that time a LHC revealed his grafts were occluded except for the LIMA to LAD. He also underwent a CRTD implant.     On 5/25 he had a VT arrest with ROSC achieved after 3 minutes was sent to the cath lab which showed his LIMA is still patent. Patient was extubated on 5/27. RHC on 5/28 showed severely elevated biv filling pressures, severe Cpc-PH, and low CO. He was started on low dose milrinone and diuresed well on IV Bumex. Weaned off pressors 5/29 and Milrinone was weaned off this morning.    Bedside Hemodynamics:  5/31 (mil 0.125): CVP 9-10, PA 72/24/43, PA sat 70.7%, Fauzia CO/CI 4.6/2.6, /56 (73), SVR 1096 dsc.    Cardiac Studies:  5/28/24 RHC: RA 18, RV 92/18, PA 95/38/57, PCW 31, PA sat 51.5%, Fauzia CO/CI 3.09/1.74, TPG 26, DPG 7, SVR 1889 dsc, PVR 8.41 MEYERS, Vidhi 3.2, /63 (91) on levo 0.08.    5/25/24 LHC: All grafts occluded except LIMA-LAD. LVEDP 29 mmHg.    5/23/24 TTE: LVEF <20%, LVIDd 5.8cm, mild RVE with mild RV dysfunction, TAPSE 0.7cm, mild-mod AS, mild-mod MR, mild TR, mild AR, est PASP 75 mmHg.

## 2024-05-31 NOTE — PROGRESS NOTE ADULT - SUBJECTIVE AND OBJECTIVE BOX
Patient seen and examined    I&O's Summary    30 May 2024 07:01  -  31 May 2024 07:00  --------------------------------------------------------  IN: 2631.8 mL / OUT: 1850 mL / NET: 781.8 mL    31 May 2024 07:01  -  31 May 2024 14:19  --------------------------------------------------------  IN: 885 mL / OUT: 380 mL / NET: 505 mL    Comfortable, son present    REVIEW OF SYSTEMS:    CONSTITUTIONAL: No F/C  RESPIRATORY: No cough,  No SOB  CARDIOVASCULAR: No CP/palpitations,    GASTROINTESTINAL: No abdominal pain  or NVD   GENITOURINARY: No UTI sx  NEUROLOGICAL: No headaches, NO wk/numbness  MUSCULOSKELETAL:   EXTREMITIES : no swelling,    Vital Signs Last 24 Hrs  T(C): 36.8 (31 May 2024 08:00), Max: 36.8 (31 May 2024 00:00)  T(F): 98.2 (31 May 2024 08:00), Max: 98.2 (31 May 2024 00:00)  HR: 75 (31 May 2024 13:30) (75 - 86)  BP: 102/49 (31 May 2024 13:30) (92/54 - 124/80)  BP(mean): 66 (31 May 2024 13:30) (64 - 94)  RR: 22 (31 May 2024 13:30) (17 - 28)  SpO2: 99% (31 May 2024 13:30) (96% - 100%)    Parameters below as of 31 May 2024 12:00  Patient On (Oxygen Delivery Method): room air        PHYSICAL EXAM:    GENERAL: NAD,   EYES:  conjunctiva and sclera clear  NECK: Supple, No JVD/Bruit  NERVOUS SYSTEM:  A/O x3,   CHEST:  No rales occ rhonchi  HEART:  RRR, No murmur  ABDOMEN: Soft, NT/ND BS+  EXTREMITIES:  No Edema;  SKIN: No rashes    LABS:                          10.7   10.49 )-----------( 143      ( 31 May 2024 02:35 )             31.3     05-31    136  |  97  |  52.0<H>  ----------------------------<  191<H>  4.0   |  24.0  |  2.44<H>    Ca    8.1<L>      31 May 2024 08:52  Phos  4.2     05-31  Mg     2.5     05-31    TPro  6.0<L>  /  Alb  2.8<L>  /  TBili  0.7  /  DBili  x   /  AST  102<H>  /  ALT  193<H>  /  AlkPhos  103  05-31      MEDICATIONS  (STANDING):  aMIOdarone    Tablet  aspirin enteric coated  atorvastatin  buMETAnide IVPB  chlorhexidine 2% Cloths  clopidogrel Tablet  dextrose 10% Bolus  dextrose 5%.  dextrose 5%.  dextrose 50% Injectable  dextrose Oral Gel PRN  glucagon  Injectable  heparin   Injectable PRN  heparin   Injectable PRN  heparin  Infusion.  hydrALAZINE  insulin glargine Injectable (LANTUS)  insulin lispro (ADMELOG) corrective regimen sliding scale  insulin lispro (ADMELOG) corrective regimen sliding scale  insulin lispro Injectable (ADMELOG)  levothyroxine  mexiletine  ondansetron Injectable PRN  pantoprazole    Tablet  piperacillin/tazobactam IVPB..  ranolazine  senna PRN  sodium chloride 0.9% lock flush PRN       Ftsg Text: The defect edges were debeveled with a #15 scalpel blade.  Given the location of the defect, shape of the defect and the proximity to free margins a full thickness skin graft was deemed most appropriate.  Using a sterile surgical marker, the primary defect shape was transferred to the donor site. The area thus outlined was incised deep to adipose tissue with a #15 scalpel blade.  The harvested graft was then trimmed of adipose tissue until only dermis and epidermis was left.  The skin margins of the secondary defect were undermined to an appropriate distance in all directions utilizing iris scissors.  The secondary defect was closed with interrupted buried subcutaneous sutures.  The skin edges were then re-apposed with running  sutures.  The skin graft was then placed in the primary defect and oriented appropriately.

## 2024-05-31 NOTE — PROGRESS NOTE ADULT - ASSESSMENT
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 presented to the ED with c/o chest tightness    Cardiac arrest >shock- reqd  pressors - off now    RAS on CKD suspect stage III  Some improvement in serum Cr 4.2--> 3.4 --> 2.3 --> 2.2 --> 2.4 --> 2.5 --> 2.4> 2.4  Base Cr seems to be approx 1.4- 1.8  Entresto, Torsemide and Farxiga on hold  Agree w Bumex 2mg IV Q 12, will need to accept azotemia - can possibly change to qd  TTE 5/23 noted LVEF < 20%  BNP 21 K  HYpoKalemia - supplemented, improved  s/p emergent L HC renal functio remains stable post cath  Results--> cath showed all grafts occluded except LIMA-LAD  plans are to medical management with DAPT, Ranexa, and statin    Renally dose meds  Avoid nephrotoxic agents  Monitor labs will follow; d/w son

## 2024-05-31 NOTE — PROGRESS NOTE ADULT - ASSESSMENT
79 yo male with CAD, CABG, PCI, chronic HFrEF, DM, CKD, presented with shortness of breath, NSTEMI.  Hospital course complicated by VT cardiac arrest on 5/25.  Patient successfully extubated on 5/26 but required re-intubation that day due to recurrent monomorphic VT/ SVT, worsening shock, and recurrent cardiac arrest lasting ~1 min.  Now successfully extubated.  Full code - DNR was rescinded when patient improved.    Plan    Recurrent VT/ SVT  - amio and mexiletine     Chronic HFrEF (EF <20%)/ cardiogenic shock   - on low dose milrinone - trying to wean off - will check CO/CI while off  - continue diuresis  - GDMT once off inotrope    DM  - lantus and lispro    Acute respiratory failure  - now off oxygen    Afib  - heparin drip  - eventual transition to eliquis when lines removed    OOB, PT  Diet ordered  Family updated

## 2024-06-01 LAB
ALBUMIN SERPL ELPH-MCNC: 2.8 G/DL — LOW (ref 3.3–5.2)
ALBUMIN SERPL ELPH-MCNC: 3 G/DL — LOW (ref 3.3–5.2)
ALP SERPL-CCNC: 95 U/L — SIGNIFICANT CHANGE UP (ref 40–120)
ALP SERPL-CCNC: 96 U/L — SIGNIFICANT CHANGE UP (ref 40–120)
ALT FLD-CCNC: 154 U/L — HIGH
ALT FLD-CCNC: 157 U/L — HIGH
ANION GAP SERPL CALC-SCNC: 17 MMOL/L — SIGNIFICANT CHANGE UP (ref 5–17)
ANION GAP SERPL CALC-SCNC: 18 MMOL/L — HIGH (ref 5–17)
APTT BLD: 161.4 SEC — CRITICAL HIGH (ref 24.5–35.6)
APTT BLD: 31.3 SEC — SIGNIFICANT CHANGE UP (ref 24.5–35.6)
APTT BLD: 46.5 SEC — HIGH (ref 24.5–35.6)
AST SERPL-CCNC: 64 U/L — HIGH
AST SERPL-CCNC: 69 U/L — HIGH
BASE EXCESS BLDV CALC-SCNC: 1.8 MMOL/L — SIGNIFICANT CHANGE UP (ref -2–3)
BASE EXCESS BLDV CALC-SCNC: 3.3 MMOL/L — HIGH (ref -2–3)
BILIRUB SERPL-MCNC: 0.6 MG/DL — SIGNIFICANT CHANGE UP (ref 0.4–2)
BILIRUB SERPL-MCNC: 0.6 MG/DL — SIGNIFICANT CHANGE UP (ref 0.4–2)
BUN SERPL-MCNC: 54.6 MG/DL — HIGH (ref 8–20)
BUN SERPL-MCNC: 56.9 MG/DL — HIGH (ref 8–20)
CA-I SERPL-SCNC: 1.18 MMOL/L — SIGNIFICANT CHANGE UP (ref 1.15–1.33)
CA-I SERPL-SCNC: 1.19 MMOL/L — SIGNIFICANT CHANGE UP (ref 1.15–1.33)
CALCIUM SERPL-MCNC: 8.3 MG/DL — LOW (ref 8.4–10.5)
CALCIUM SERPL-MCNC: 8.5 MG/DL — SIGNIFICANT CHANGE UP (ref 8.4–10.5)
CHLORIDE BLDV-SCNC: 98 MMOL/L — SIGNIFICANT CHANGE UP (ref 96–108)
CHLORIDE BLDV-SCNC: 99 MMOL/L — SIGNIFICANT CHANGE UP (ref 96–108)
CHLORIDE SERPL-SCNC: 94 MMOL/L — LOW (ref 96–108)
CHLORIDE SERPL-SCNC: 97 MMOL/L — SIGNIFICANT CHANGE UP (ref 96–108)
CO2 SERPL-SCNC: 21 MMOL/L — LOW (ref 22–29)
CO2 SERPL-SCNC: 21 MMOL/L — LOW (ref 22–29)
CREAT SERPL-MCNC: 2.78 MG/DL — HIGH (ref 0.5–1.3)
CREAT SERPL-MCNC: 2.89 MG/DL — HIGH (ref 0.5–1.3)
EGFR: 22 ML/MIN/1.73M2 — LOW
EGFR: 23 ML/MIN/1.73M2 — LOW
GAS PNL BLDV: 131 MMOL/L — LOW (ref 136–145)
GAS PNL BLDV: 132 MMOL/L — LOW (ref 136–145)
GAS PNL BLDV: SIGNIFICANT CHANGE UP
GAS PNL BLDV: SIGNIFICANT CHANGE UP
GLUCOSE BLDC GLUCOMTR-MCNC: 115 MG/DL — HIGH (ref 70–99)
GLUCOSE BLDC GLUCOMTR-MCNC: 146 MG/DL — HIGH (ref 70–99)
GLUCOSE BLDC GLUCOMTR-MCNC: 168 MG/DL — HIGH (ref 70–99)
GLUCOSE BLDC GLUCOMTR-MCNC: 182 MG/DL — HIGH (ref 70–99)
GLUCOSE BLDV-MCNC: 143 MG/DL — HIGH (ref 70–99)
GLUCOSE BLDV-MCNC: 179 MG/DL — HIGH (ref 70–99)
GLUCOSE SERPL-MCNC: 142 MG/DL — HIGH (ref 70–99)
GLUCOSE SERPL-MCNC: 164 MG/DL — HIGH (ref 70–99)
HCO3 BLDV-SCNC: 27 MMOL/L — SIGNIFICANT CHANGE UP (ref 22–29)
HCO3 BLDV-SCNC: 28 MMOL/L — SIGNIFICANT CHANGE UP (ref 22–29)
HCT VFR BLD CALC: 31.7 % — LOW (ref 39–50)
HCT VFR BLDA CALC: 32 % — SIGNIFICANT CHANGE UP
HCT VFR BLDA CALC: 34 % — SIGNIFICANT CHANGE UP
HGB BLD CALC-MCNC: 10.7 G/DL — LOW (ref 12.6–17.4)
HGB BLD CALC-MCNC: 11.4 G/DL — LOW (ref 12.6–17.4)
HGB BLD-MCNC: 10.5 G/DL — LOW (ref 13–17)
LACTATE BLDV-MCNC: 0.9 MMOL/L — SIGNIFICANT CHANGE UP (ref 0.5–2)
LACTATE BLDV-MCNC: 1 MMOL/L — SIGNIFICANT CHANGE UP (ref 0.5–2)
LACTATE SERPL-SCNC: 0.9 MMOL/L — SIGNIFICANT CHANGE UP (ref 0.5–2)
LACTATE SERPL-SCNC: 1 MMOL/L — SIGNIFICANT CHANGE UP (ref 0.5–2)
MAGNESIUM SERPL-MCNC: 2.4 MG/DL — SIGNIFICANT CHANGE UP (ref 1.6–2.6)
MAGNESIUM SERPL-MCNC: 2.4 MG/DL — SIGNIFICANT CHANGE UP (ref 1.6–2.6)
MCHC RBC-ENTMCNC: 30 PG — SIGNIFICANT CHANGE UP (ref 27–34)
MCHC RBC-ENTMCNC: 33.1 GM/DL — SIGNIFICANT CHANGE UP (ref 32–36)
MCV RBC AUTO: 90.6 FL — SIGNIFICANT CHANGE UP (ref 80–100)
PCO2 BLDV: 43 MMHG — SIGNIFICANT CHANGE UP (ref 42–55)
PCO2 BLDV: 44 MMHG — SIGNIFICANT CHANGE UP (ref 42–55)
PH BLDV: 7.4 — SIGNIFICANT CHANGE UP (ref 7.32–7.43)
PH BLDV: 7.41 — SIGNIFICANT CHANGE UP (ref 7.32–7.43)
PHOSPHATE SERPL-MCNC: 4.1 MG/DL — SIGNIFICANT CHANGE UP (ref 2.4–4.7)
PHOSPHATE SERPL-MCNC: 4.1 MG/DL — SIGNIFICANT CHANGE UP (ref 2.4–4.7)
PLATELET # BLD AUTO: 139 K/UL — LOW (ref 150–400)
PO2 BLDV: 43 MMHG — SIGNIFICANT CHANGE UP (ref 25–45)
PO2 BLDV: <42 MMHG — SIGNIFICANT CHANGE UP (ref 25–45)
POTASSIUM BLDV-SCNC: 3.6 MMOL/L — SIGNIFICANT CHANGE UP (ref 3.5–5.1)
POTASSIUM BLDV-SCNC: 3.7 MMOL/L — SIGNIFICANT CHANGE UP (ref 3.5–5.1)
POTASSIUM SERPL-MCNC: 3.7 MMOL/L — SIGNIFICANT CHANGE UP (ref 3.5–5.3)
POTASSIUM SERPL-MCNC: 3.8 MMOL/L — SIGNIFICANT CHANGE UP (ref 3.5–5.3)
POTASSIUM SERPL-SCNC: 3.7 MMOL/L — SIGNIFICANT CHANGE UP (ref 3.5–5.3)
POTASSIUM SERPL-SCNC: 3.8 MMOL/L — SIGNIFICANT CHANGE UP (ref 3.5–5.3)
PROT SERPL-MCNC: 6.1 G/DL — LOW (ref 6.6–8.7)
PROT SERPL-MCNC: 6.3 G/DL — LOW (ref 6.6–8.7)
RBC # BLD: 3.5 M/UL — LOW (ref 4.2–5.8)
RBC # FLD: 16.1 % — HIGH (ref 10.3–14.5)
SAO2 % BLDV: 59.2 % — SIGNIFICANT CHANGE UP
SAO2 % BLDV: 60.6 % — SIGNIFICANT CHANGE UP
SODIUM SERPL-SCNC: 133 MMOL/L — LOW (ref 135–145)
SODIUM SERPL-SCNC: 135 MMOL/L — SIGNIFICANT CHANGE UP (ref 135–145)
WBC # BLD: 9.57 K/UL — SIGNIFICANT CHANGE UP (ref 3.8–10.5)
WBC # FLD AUTO: 9.57 K/UL — SIGNIFICANT CHANGE UP (ref 3.8–10.5)

## 2024-06-01 PROCEDURE — 99233 SBSQ HOSP IP/OBS HIGH 50: CPT

## 2024-06-01 RX ORDER — HEPARIN SODIUM 5000 [USP'U]/ML
900 INJECTION INTRAVENOUS; SUBCUTANEOUS
Qty: 25000 | Refills: 0 | Status: DISCONTINUED | OUTPATIENT
Start: 2024-06-01 | End: 2024-06-02

## 2024-06-01 RX ORDER — IPRATROPIUM/ALBUTEROL SULFATE 18-103MCG
3 AEROSOL WITH ADAPTER (GRAM) INHALATION EVERY 6 HOURS
Refills: 0 | Status: DISCONTINUED | OUTPATIENT
Start: 2024-06-01 | End: 2024-06-12

## 2024-06-01 RX ORDER — MONTELUKAST 4 MG/1
10 TABLET, CHEWABLE ORAL DAILY
Refills: 0 | Status: DISCONTINUED | OUTPATIENT
Start: 2024-06-01 | End: 2024-06-12

## 2024-06-01 RX ADMIN — AMIODARONE HYDROCHLORIDE 400 MILLIGRAM(S): 400 TABLET ORAL at 10:51

## 2024-06-01 RX ADMIN — MEXILETINE HYDROCHLORIDE 150 MILLIGRAM(S): 150 CAPSULE ORAL at 18:27

## 2024-06-01 RX ADMIN — Medication 88 MICROGRAM(S): at 05:12

## 2024-06-01 RX ADMIN — Medication 1: at 06:28

## 2024-06-01 RX ADMIN — AMIODARONE HYDROCHLORIDE 400 MILLIGRAM(S): 400 TABLET ORAL at 21:02

## 2024-06-01 RX ADMIN — MONTELUKAST 10 MILLIGRAM(S): 4 TABLET, CHEWABLE ORAL at 14:24

## 2024-06-01 RX ADMIN — Medication 3 UNIT(S): at 16:00

## 2024-06-01 RX ADMIN — Medication 200 MILLIGRAM(S): at 00:37

## 2024-06-01 RX ADMIN — ONDANSETRON 4 MILLIGRAM(S): 8 TABLET, FILM COATED ORAL at 05:13

## 2024-06-01 RX ADMIN — CLOPIDOGREL BISULFATE 75 MILLIGRAM(S): 75 TABLET, FILM COATED ORAL at 10:52

## 2024-06-01 RX ADMIN — MEXILETINE HYDROCHLORIDE 150 MILLIGRAM(S): 150 CAPSULE ORAL at 09:44

## 2024-06-01 RX ADMIN — RANOLAZINE 500 MILLIGRAM(S): 500 TABLET, FILM COATED, EXTENDED RELEASE ORAL at 05:12

## 2024-06-01 RX ADMIN — Medication 20 MILLIGRAM(S): at 05:12

## 2024-06-01 RX ADMIN — CHLORHEXIDINE GLUCONATE 1 APPLICATION(S): 213 SOLUTION TOPICAL at 05:13

## 2024-06-01 RX ADMIN — Medication 20 MILLIGRAM(S): at 21:05

## 2024-06-01 RX ADMIN — HEPARIN SODIUM 9 UNIT(S)/HR: 5000 INJECTION INTRAVENOUS; SUBCUTANEOUS at 18:19

## 2024-06-01 RX ADMIN — ONDANSETRON 4 MILLIGRAM(S): 8 TABLET, FILM COATED ORAL at 21:12

## 2024-06-01 RX ADMIN — INSULIN GLARGINE 9 UNIT(S): 100 INJECTION, SOLUTION SUBCUTANEOUS at 21:12

## 2024-06-01 RX ADMIN — ATORVASTATIN CALCIUM 80 MILLIGRAM(S): 80 TABLET, FILM COATED ORAL at 21:03

## 2024-06-01 RX ADMIN — HEPARIN SODIUM 2500 UNIT(S): 5000 INJECTION INTRAVENOUS; SUBCUTANEOUS at 03:58

## 2024-06-01 RX ADMIN — HEPARIN SODIUM 9 UNIT(S)/HR: 5000 INJECTION INTRAVENOUS; SUBCUTANEOUS at 21:14

## 2024-06-01 RX ADMIN — Medication 81 MILLIGRAM(S): at 10:52

## 2024-06-01 RX ADMIN — Medication 3 MILLILITER(S): at 15:32

## 2024-06-01 RX ADMIN — Medication 200 MILLIGRAM(S): at 10:44

## 2024-06-01 RX ADMIN — Medication 3 UNIT(S): at 06:28

## 2024-06-01 RX ADMIN — HEPARIN SODIUM 1100 UNIT(S)/HR: 5000 INJECTION INTRAVENOUS; SUBCUTANEOUS at 03:53

## 2024-06-01 RX ADMIN — BUMETANIDE 124 MILLIGRAM(S): 0.25 INJECTION INTRAMUSCULAR; INTRAVENOUS at 05:12

## 2024-06-01 RX ADMIN — Medication 100 MILLIGRAM(S): at 21:02

## 2024-06-01 RX ADMIN — PANTOPRAZOLE SODIUM 40 MILLIGRAM(S): 20 TABLET, DELAYED RELEASE ORAL at 05:12

## 2024-06-01 RX ADMIN — PIPERACILLIN AND TAZOBACTAM 25 GRAM(S): 4; .5 INJECTION, POWDER, LYOPHILIZED, FOR SOLUTION INTRAVENOUS at 05:12

## 2024-06-01 RX ADMIN — Medication 20 MILLIGRAM(S): at 14:24

## 2024-06-01 RX ADMIN — BUMETANIDE 124 MILLIGRAM(S): 0.25 INJECTION INTRAMUSCULAR; INTRAVENOUS at 18:16

## 2024-06-01 RX ADMIN — AMIODARONE HYDROCHLORIDE 400 MILLIGRAM(S): 400 TABLET ORAL at 05:12

## 2024-06-01 RX ADMIN — RANOLAZINE 500 MILLIGRAM(S): 500 TABLET, FILM COATED, EXTENDED RELEASE ORAL at 18:27

## 2024-06-01 RX ADMIN — Medication 3 UNIT(S): at 10:44

## 2024-06-01 NOTE — PROGRESS NOTE ADULT - ASSESSMENT
78 year old male with history of HTN, HLD, DM, CKD, CAD s/p 4V CABG and multiple PCI's, ischemic cardiomyopathy (EF < 20%), and LBBB s/p MDT CRT-D. He had an admission in November for ADHF. At that time a LHC revealed his grafts were occluded except for the LIMA to LAD. He also underwent a CRTD implant.     On 5/25 he had a VT arrest with ROSC achieved after 3 minutes was sent to the cath lab which showed his LIMA is still patent. Patient was extubated on 5/27. RHC on 5/28 showed severely elevated biv filling pressures, severe Cpc-PH, and low CO. He was started on low dose milrinone and diuresed well on IV Bumex. Weaned off pressors 5/29 and Milrinone was weaned off this morning.    Bedside Hemodynamics:  5/31 (mil 0.125): CVP 9-10, PA 72/24/43, PA sat 70.7%, Fauzia CO/CI 4.6/2.6, /56 (73), SVR 1096 dsc.    Cardiac Studies:  5/28/24 RHC: RA 18, RV 92/18, PA 95/38/57, PCW 31, PA sat 51.5%, Fauzia CO/CI 3.09/1.74, TPG 26, DPG 7, SVR 1889 dsc, PVR 8.41 MEYERS, Vidhi 3.2, /63 (91) on levo 0.08.    5/25/24 LHC: All grafts occluded except LIMA-LAD. LVEDP 29 mmHg.    5/23/24 TTE: LVEF <20%, LVIDd 5.8cm, mild RVE with mild RV dysfunction, TAPSE 0.7cm, mild-mod AS, mild-mod MR, mild TR, mild MA, est PASP 75 mmHg.        Cardiogenic Shock, SCAI A at present  acute on chronic HFrEF, NYHA III, ACC C  Monomorphic VT, scar mediated  PAF 6% burden  RAS on CKD      family member bedside  CVP 5  >24 hours off of ionotropic or vasopressor support  Continue hydralazine  start bblocker tomorrow  discussed with micu, agree to remove swan later today if remains hemodynamically appropriate   Bumex 2 mg IV BID.   start mobilization    progress appropriate as of my evaluation today

## 2024-06-01 NOTE — PROGRESS NOTE ADULT - SUBJECTIVE AND OBJECTIVE BOX
[facility]                                                         CARDIOLOGY AT [place] [address]                                                         Telephone: [phone]. Fax:[phone]                                                                             PROGRESS NOTE    Reason for follow up: HF  Update: NAEO  - Patient is resting comfortably in bed with no complaints    Review of symptoms:   Cardiac:  No chest pain. No dyspnea. No palpitations.  Respiratory: no cough. No dyspnea  Gastrointestinal: No diarrhea. No abdominal pain. No bleeding.   Neuro: No focal neuro complaints.    Vitals:  T(C): 36.6 (06-01-24 @ 08:00), Max: 36.7 (05-31-24 @ 20:00)  HR: 74 (06-01-24 @ 12:00) (68 - 75)  BP: 119/60 (06-01-24 @ 12:00) (93/81 - 138/72)  RR: 24 (06-01-24 @ 12:00) (19 - 29)  SpO2: 99% (06-01-24 @ 12:00) (98% - 100%)  Wt(kg): --  I&O's Summary    31 May 2024 07:01  -  01 Jun 2024 07:00  --------------------------------------------------------  IN: 2365 mL / OUT: 1155 mL / NET: 1210 mL    01 Jun 2024 07:01  -  01 Jun 2024 13:18  --------------------------------------------------------  IN: 369 mL / OUT: 100 mL / NET: 269 mL          PHYSICAL EXAM:  Appearance: Comfortable. No acute distress  HEENT:  Atraumatic. Normocephalic.  Normal oral mucosa  Neurologic: A & O x 3, no gross focal deficits.  Cardiovascular: RRR S1 S2, No murmur, no rubs/gallops. No JVD  Respiratory: Lungs clear to auscultation, unlabored   Gastrointestinal:  Soft, Non-tender, + BS  Lower Extremities: 2+ Peripheral Pulses, No clubbing, cyanosis, or edema  Psychiatry: Patient is calm. No agitation.   Skin: warm and dry.    CURRENT CARDIAC MEDICATIONS:  aMIOdarone    Tablet 400 milliGRAM(s) Oral every 8 hours  buMETAnide IVPB 3 milliGRAM(s) IV Intermittent every 12 hours  hydrALAZINE 20 milliGRAM(s) Oral three times a day  mexiletine 150 milliGRAM(s) Oral every 8 hours  ranolazine 500 milliGRAM(s) Oral two times a day      CURRENT OTHER MEDICATIONS:  albuterol/ipratropium for Nebulization 3 milliLiter(s) Nebulizer every 6 hours PRN Shortness of Breath and/or Wheezing  guaiFENesin Oral Liquid (Sugar-Free) 200 milliGRAM(s) Oral every 6 hours PRN Cough  montelukast 10 milliGRAM(s) Oral daily  ondansetron Injectable 4 milliGRAM(s) IV Push every 8 hours PRN Nausea and/or Vomiting  pantoprazole    Tablet 40 milliGRAM(s) Oral before breakfast  senna 2 Tablet(s) Oral at bedtime PRN Constipation  atorvastatin 80 milliGRAM(s) Oral at bedtime  dextrose 50% Injectable 25 Gram(s) IV Push once, Stop order after: 1 Doses  dextrose Oral Gel 15 Gram(s) Oral once, Stop order after: 1 Doses PRN Blood Glucose LESS THAN 70 milliGRAM(s)/deciliter  glucagon  Injectable 1 milliGRAM(s) IntraMuscular once, Stop order after: 1 Doses  insulin glargine Injectable (LANTUS) 9 Unit(s) SubCutaneous at bedtime  insulin lispro (ADMELOG) corrective regimen sliding scale   SubCutaneous three times a day before meals  insulin lispro (ADMELOG) corrective regimen sliding scale   SubCutaneous at bedtime  insulin lispro Injectable (ADMELOG) 3 Unit(s) SubCutaneous three times a day before meals  levothyroxine 88 MICROGram(s) Oral daily  aspirin enteric coated 81 milliGRAM(s) Oral daily  chlorhexidine 2% Cloths 1 Application(s) Topical <User Schedule>  clopidogrel Tablet 75 milliGRAM(s) Oral daily  dextrose 10% Bolus 125 milliLiter(s) IV Bolus once, Stop order after: 1 Doses  dextrose 5%. 1000 milliLiter(s) (50 mL/Hr) IV Continuous <Continuous>  dextrose 5%. 1000 milliLiter(s) (100 mL/Hr) IV Continuous <Continuous>  heparin   Injectable 5500 Unit(s) IV Push every 6 hours PRN For aPTT less than 40  heparin   Injectable 2500 Unit(s) IV Push every 6 hours PRN For aPTT between 40 - 57  heparin  Infusion. 1000 Unit(s)/Hr (10 mL/Hr) IV Continuous <Continuous>  sodium chloride 0.9% lock flush 10 milliLiter(s) IV Push every 1 hour PRN Pre/post blood products, medications, blood draw, and to maintain line patency      LABS:	 	  ( 25 May 2024 10:21 )  Troponin T  X    ,  CPK  69   , CKMB  X    , BNP X                                  10.5   9.57  )-----------( 139      ( 01 Jun 2024 03:20 )             31.7     06-01    133<L>  |  94<L>  |  56.9<H>  ----------------------------<  142<H>  3.7   |  21.0<L>  |  2.89<H>    Ca    8.5      01 Jun 2024 09:37  Phos  4.1     06-01  Mg     2.4     06-01    TPro  6.3<L>  /  Alb  3.0<L>  /  TBili  0.6  /  DBili  x   /  AST  64<H>  /  ALT  154<H>  /  AlkPhos  96  06-01    PT/INR/PTT ( 01 Jun 2024 09:37 )                       :                       :      X            :       161.4                 .        .                   .              .           .       X           .                                       Lipid Profile:   HgA1c:   TSH:     TELEMETRY: sinus rhythm  ECG:    DIAGNOSTIC TESTING:  [ ] Echocardiogram:   [ ]  Catheterization:  [ ] Stress Test:    OTHER:

## 2024-06-01 NOTE — CHART NOTE - NSCHARTNOTEFT_GEN_A_CORE
Left CVC/[place] catheter removed at 13:30.    -Heparin drip stopped at 11:45  -Dressing removed  -Sutures removed  -Site cleaned  -Transducer removed first  -CVC removed  -Pressure held for approximately 20 minutes  -No bleeding noted by 14:00  -Instructed nurse to leave heparin drip off for additional 2 hours - can resume at 16:00

## 2024-06-01 NOTE — PROGRESS NOTE ADULT - ASSESSMENT
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, HTN, HLD, with:    # VT cardiac arrest x 2  # Mixed distributive/cardiogenic shock  # Acute systolic heart failure  # NSTEMI  # New onset AF with RVR  # RAS on CKD    - low dose milrinone stopped, most recent CO/CI 2.9/1.6, SVO2 60, lactate 1.00, CVP 11-15  - 500cc UOP over the past 12 hours, diuresing with IV bumetanide  - afterload reduction started with hydralazine, holding off on beta blockade until 48 hours post-inotrope  - no VT since 5/26, continue amiodarone and mexiletine  - EP input appreciated  - ICD interrogated and reprogrammed by EP on 5/25 as VT was incorrectly sensed as SVT thus delivering no shock, also noted to have new onset AF  - continue heparin infusion, consider transition to DOAC soon?  - s/p emergent LHC on 5/25 which revealed 3/4 occluded grafts, only LIMA --> LAD was patent, no intervention performed  - TTE showed reduction in LVEF < 20% prior to cardiac arrests, consider repeat study to assess for return of function prior to discharge  - continue DAPT and high intensity statin  - RAS on CKD likely cardiorenal in nature, optimize end-organ perfusion as above  - trend BUN/Cr, electrolytes, acid-base balance, monitor hourly UOP (nonoliguric)  - no indication for urgent RRT at this time    Case discussed with MICU physician, Dr. Motley.    56 minutes accounts for the total time spent on this patient encounter, which includes assessing and addressing the problems and their associated risks as mentioned above, reviewing the medical record/laboratory data/imaging studies, interviewing and physically examining the patient, as well as coordinating care with the multidisciplinary team. The date of entry of this note reflects the date of services rendered.

## 2024-06-01 NOTE — CHART NOTE - NSCHARTNOTEFT_GEN_A_CORE
Source: Patient [ ]  Family [ ]   other [x ]    Current Diet: Diet, DASH/TLC:   Sodium & Cholesterol Restricted  [place] (05-30-24 @ 16:00)      Patient reports [ ] nausea  [ ] vomiting [ ] diarrhea [ ] constipation  [ ]chewing problems [ ] swallowing issues  [ ] other:     PO intake:  < 50% [ ]   50-75%  [ x]   %  [ ]  other :    Current Weight:   (6/1) 142.1 lbs  (5/31) 142.4 lbs  (5/30) 156.5 lbs  (5/29) 157.4 lbs  (5/25) 142.8 lbs  (5/23) 144.4 lbs  ? accuracy of weights, noted with 1+ left foot and left hand edema, continue to trend and maintain strict Is&Os     Pertinent Medications: MEDICATIONS  (STANDING):  aMIOdarone    Tablet 400 milliGRAM(s) Oral every 8 hours  atorvastatin 80 milliGRAM(s) Oral at bedtime  buMETAnide IVPB 3 milliGRAM(s) IV Intermittent every 12 hours  clopidogrel Tablet 75 milliGRAM(s) Oral daily  dextrose 5%. 1000 milliLiter(s) (50 mL/Hr) IV Continuous <Continuous>  dextrose 5%. 1000 milliLiter(s) (100 mL/Hr) IV Continuous <Continuous>  hydrALAZINE 20 milliGRAM(s) Oral three times a day  insulin glargine Injectable (LANTUS) 9 Unit(s) SubCutaneous at bedtime  insulin lispro (ADMELOG) corrective regimen sliding scale   SubCutaneous three times a day before meals  insulin lispro (ADMELOG) corrective regimen sliding scale   SubCutaneous at bedtime  insulin lispro Injectable (ADMELOG) 3 Unit(s) SubCutaneous three times a day before meals  levothyroxine 88 MICROGram(s) Oral daily  mexiletine 150 milliGRAM(s) Oral every 8 hours  pantoprazole    Tablet 40 milliGRAM(s) Oral before breakfast  ranolazine 500 milliGRAM(s) Oral two times a day    MEDICATIONS  (PRN):  guaiFENesin Oral Liquid (Sugar-Free) 200 milliGRAM(s) Oral every 6 hours PRN Cough  ondansetron Injectable 4 milliGRAM(s) IV Push every 8 hours PRN Nausea and/or Vomiting  senna 2 Tablet(s) Oral at bedtime PRN Constipation    Pertinent Labs: CBC Full  -  ( 01 Jun 2024 03:20 )  WBC Count : 9.57 K/uL  RBC Count : 3.50 M/uL  Hemoglobin : 10.5 g/dL  Hematocrit : 31.7 %    06-01 Na135 mmol/L Glu 164 mg/dL<H> K+ 3.8 mmol/L Cr  2.78 mg/dL<H> BUN 54.6 mg/dL<H> Phos 4.1 mg/dL Alb 2.8 g/dL<L> PAB n/a       Skin: No pressure injuries documented  Perry: 17    Nutrition focused physical exam not conducted at this time- found signs of malnutrition [ ]absent [ ]present    Subcutaneous fat loss: [ ] Orbital fat pads region, [ ]Buccal fat region, [ ]Triceps region,  [ ]Ribs region    Muscle wasting: [ ]Temples region, [ ]Clavicle region, [ ]Shoulder region, [ ]Scapula region, [ ]Interosseous region,  [ ]thigh region, [ ]Calf region    Estimated Needs:   [x ] no change since previous assessment  [ ] recalculated:     Current Nutrition Diagnosis: Pt remains at nutrition risk secondary to increased nutrient needs related to increased physiological demand for nutrient as evidenced by cardiac arrest x 2, shock, HF, STEMI. Pt now extubated. AxOx2, lethargic at this time. Fair po intake per documentation. Aware seen by SLP 5/29 with recommendations for regular consistency with thin liquids, +strict aspiration precautions and 100% supervision at meals. Last BM 5/31 per documentation. RD to follow up as feasible.     Recommendations:   1) Continue diet as tolerated; add consistent carbohydrate to diet rx.  2) Add Glucerna BID (220 kcal, 10g protein per serving).  3) Rx: MVI daily.  4) Encourage po intake, monitor diet tolerance, and provide assistance at meals as needed.  5) Obtain daily weights to monitor trends.   6) Monitor blood sugars and correct PRN.    Monitoring and Evaluation:   [ x] PO intake [x ] Tolerance to diet prescription [X] Weights  [X] Follow up per protocol [X] Labs: chem 8, mg, phos, H/H

## 2024-06-01 NOTE — PROGRESS NOTE ADULT - SUBJECTIVE AND OBJECTIVE BOX
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 Recently had Holter monitor placement for PVC burden which was noted to have worsened the day of presentation. In ED, patient weak appearing, ruled in for NSTEMI with positive troponin, and labs remarkable for RAS on CKD as well as hypoglycemia. A TTE revealed acute on chronic systolic heart failure with EF <20%. He was admitted to telemetry for treatment of ACS on DAPT and heparin gtt, planned for LHC. Patient suffered VF/VT cardiac arrest on 5/25 without ICD shock (subsequently reprogrammed by EP) and was taken for emergent LHC where he was found to have 3/4 occluded grafts, only LIMA to LAD was patent. No intervention performed. Extubated on 5/26, however suffered another VT cardiac arrest later that day (again without ICD shock) with 1 minute downtime prior to ROSC. Hospital course further complicated by cardiogenic shock requiring IV vasopressor and inotrope support. Extubated on 5/27.      Events last 24 hours: Off milrinone. CO/CI and SVO2 slightly downtrending, but clinically unchanged. Hemodynamically intact.        PAST MEDICAL & SURGICAL HISTORY:  GERD (gastroesophageal reflux disease)      High cholesterol      Coronary artery disease involving coronary bypass graft of native heart with unstable angina pectoris      Coronary artery disease involving native coronary artery of native heart, angina presence unspecifie      Type 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, without long-term current use of insul      Essential hypertension      HFrEF (heart failure with reduced ejection fraction)      Stage 4 chronic kidney disease      LBBB (left bundle branch block)      Facial nerve palsy      Hypothyroidism      S/P CABG (coronary artery bypass graft)  4V 1983 [place]      Status post open reduction with internal fixation of fracture      History of insertion of stent into coronary artery bypass graft      History of brachytherapy          Review of Systems:  CONSTITUTIONAL: No fever, chills, or fatigue  EYES: No eye pain, visual disturbances, or discharge  ENMT:  No difficulty hearing, tinnitus, vertigo; No sinus or throat pain  NECK: No pain or stiffness  RESPIRATORY: (+) cough, no wheezing, chills or hemoptysis; No shortness of breath  CARDIOVASCULAR: No chest pain, palpitations, dizziness, or leg swelling  GASTROINTESTINAL: No abdominal or epigastric pain. (+) nausea, no vomiting, or hematemesis; No diarrhea or constipation. No melena or hematochezia.  GENITOURINARY: No dysuria, frequency, hematuria, or incontinence  NEUROLOGICAL: No headaches, memory loss, loss of strength, numbness, or tremors  SKIN: No itching, burning, rashes, or lesions   MUSCULOSKELETAL: No joint pain or swelling; No muscle, back, or extremity pain  PSYCHIATRIC: No depression, anxiety, mood swings, or difficulty sleeping      Medications:  piperacillin/tazobactam IVPB.. 3.375 Gram(s) IV Intermittent every 12 hours    aMIOdarone    Tablet 400 milliGRAM(s) Oral every 8 hours  buMETAnide IVPB 3 milliGRAM(s) IV Intermittent every 12 hours  hydrALAZINE 20 milliGRAM(s) Oral three times a day  mexiletine 150 milliGRAM(s) Oral every 8 hours  ranolazine 500 milliGRAM(s) Oral two times a day    guaiFENesin Oral Liquid (Sugar-Free) 200 milliGRAM(s) Oral every 6 hours PRN    ondansetron Injectable 4 milliGRAM(s) IV Push every 8 hours PRN      aspirin enteric coated 81 milliGRAM(s) Oral daily  clopidogrel Tablet 75 milliGRAM(s) Oral daily  heparin   Injectable 5500 Unit(s) IV Push every 6 hours PRN  heparin   Injectable 2500 Unit(s) IV Push every 6 hours PRN  heparin  Infusion. 1000 Unit(s)/Hr IV Continuous <Continuous>    pantoprazole    Tablet 40 milliGRAM(s) Oral before breakfast  senna 2 Tablet(s) Oral at bedtime PRN      atorvastatin 80 milliGRAM(s) Oral at bedtime  dextrose 50% Injectable 25 Gram(s) IV Push once  dextrose Oral Gel 15 Gram(s) Oral once PRN  glucagon  Injectable 1 milliGRAM(s) IntraMuscular once  insulin glargine Injectable (LANTUS) 9 Unit(s) SubCutaneous at bedtime  insulin lispro (ADMELOG) corrective regimen sliding scale   SubCutaneous three times a day before meals  insulin lispro (ADMELOG) corrective regimen sliding scale   SubCutaneous at bedtime  insulin lispro Injectable (ADMELOG) 3 Unit(s) SubCutaneous three times a day before meals  levothyroxine 88 MICROGram(s) Oral daily    dextrose 10% Bolus 125 milliLiter(s) IV Bolus once  dextrose 5%. 1000 milliLiter(s) IV Continuous <Continuous>  dextrose 5%. 1000 milliLiter(s) IV Continuous <Continuous>  sodium chloride 0.9% lock flush 10 milliLiter(s) IV Push every 1 hour PRN      chlorhexidine 2% Cloths 1 Application(s) Topical <User Schedule>            ICU Vital Signs Last 24 Hrs  T(C): 36.4 (01 Jun 2024 04:00), Max: 36.8 (31 May 2024 08:00)  T(F): 97.5 (01 Jun 2024 04:00), Max: 98.2 (31 May 2024 08:00)  HR: 71 (01 Jun 2024 04:00) (69 - 86)  BP: 117/67 (01 Jun 2024 04:00) (93/81 - 124/80)  BP(mean): 83 (01 Jun 2024 04:00) (63 - 94)  ABP: --  ABP(mean): --  RR: 26 (01 Jun 2024 04:00) (19 - 29)  SpO2: 98% (01 Jun 2024 04:00) (96% - 100%)    O2 Parameters below as of 01 Jun 2024 04:00  Patient On (Oxygen Delivery Method): room air                I&O's Detail    30 May 2024 07:01  -  31 May 2024 07:00  --------------------------------------------------------  IN:    Amiodarone: 66.8 mL    Heparin Infusion: 40 mL    Heparin Infusion: 180 mL    IV PiggyBack: 200 mL    IV PiggyBack: 50 mL    IV PiggyBack: 250 mL    IV PiggyBack: 175 mL    Milrinone: 60 mL    Oral Fluid: 1610 mL  Total IN: 2631.8 mL    OUT:    Indwelling Catheter - Urethral (mL): 1850 mL  Total OUT: 1850 mL    Total NET: 781.8 mL      31 May 2024 07:01  -  01 Jun 2024 05:10  --------------------------------------------------------  IN:    Heparin Infusion: 221 mL    IV PiggyBack: 62.5 mL    IV PiggyBack: 50 mL    IV PiggyBack: 125 mL    Milrinone: 7.5 mL    Oral Fluid: 1640 mL  Total IN: 2106 mL    OUT:    Indwelling Catheter - Urethral (mL): 455 mL    Voided (mL): 600 mL  Total OUT: 1055 mL    Total NET: 1051 mL            LABS:                        10.5   9.57  )-----------( 139      ( 01 Jun 2024 03:20 )             31.7     06-01    135  |  97  |  54.6<H>  ----------------------------<  164<H>  3.8   |  21.0<L>  |  2.78<H>    Ca    8.3<L>      01 Jun 2024 03:20  Phos  4.1     06-01  Mg     2.4     06-01    TPro  6.1<L>  /  Alb  2.8<L>  /  TBili  0.6  /  DBili  x   /  AST  69<H>  /  ALT  157<H>  /  AlkPhos  95  06-01          CAPILLARY BLOOD GLUCOSE      POCT Blood Glucose.: 178 mg/dL (31 May 2024 21:31)    PT/INR - ( 31 May 2024 02:35 )   PT: 13.1 sec;   INR: 1.19 ratio         PTT - ( 01 Jun 2024 03:20 )  PTT:46.5 sec  Urinalysis Basic - ( 01 Jun 2024 03:20 )    Color: x / Appearance: x / SG: x / pH: x  Gluc: 164 mg/dL / Ketone: x  / Bili: x / Urobili: x   Blood: x / Protein: x / Nitrite: x   Leuk Esterase: x / RBC: x / WBC x   Sq Epi: x / Non Sq Epi: x / Bacteria: x      CULTURES:        Physical Examination:    General: No acute distress.  Alert, oriented, interactive, nonfocal    HEENT: Pupils equal, reactive to light.  Symmetric.    PULM: Clear to auscultation bilaterally, no significant sputum production    CVS: Regular rate and rhythm, no murmurs, rubs, or gallops    ABD: Soft, nondistended, nontender, normoactive bowel sounds, no masses    EXT: No edema, nontender    SKIN: Warm and well perfused, no rashes noted.    NEURO: A&Ox3, strength 5/5 all extremities, cranial nerves grossly intact, no focal deficits        RADIOLOGY:     < from: Xray Chest 1 View- PORTABLE-Routine (Xray Chest 1 View- PORTABLE-Routine in AM.) (05.30.24 @ 10:10) >  FINDINGS:  CATHETERS AND TUBES: [place]-Cel[place] catheter tip in main pulmonary artery.    PULMONARY:  Bilateral lower zone moderate pleural effusions and/or airspace   consolidations obscuring diaphragm contours. These clear...  No pneumothorax.    HEART/VASCULAR: The  heart is enlarged in transverse diameter. Right   atrium and biventricular cardiac wire leads in place. .    BONES: The visualized osseous thorax is intact.    IMPRESSION:  [place]-C[place] catheter tip in main pulmonary artery..  Otherwise no interval change.    < end of copied text >

## 2024-06-01 NOTE — PROGRESS NOTE ADULT - ASSESSMENT
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 presented to the ED with c/o chest tightness    Cardiac arrest >shock- reqd  pressors - off now    RAS on CKD suspect stage III  Some improvement in serum Cr 4.2--> 3.4 --> 2.3 --> 2.2 --> 2.4 --> 2.5 --> 2.4> 2.4>2.89 -likely 2/2 diuretics  Base Cr seems to be approx 1.4- 1.8  Entresto, Torsemide and Farxiga on hold  On Bumex 2mg IV Q 12, will need to accept azotemia - can possibly change to qd as creat risisng - will d/w team  TTE 5/23 noted LVEF < 20%  BNP 21 K  HYpoKalemia - supplemented, improved  s/p emergent L HC renal functio remains stable post cath  Results--> cath showed all grafts occluded except LIMA-LAD  plans are to medical management with DAPT, Ranexa, and statin  Add Benzonatate    Renally dose meds  Avoid nephrotoxic agents  Monitor labs will follow; d/w merari

## 2024-06-01 NOTE — PROGRESS NOTE ADULT - SUBJECTIVE AND OBJECTIVE BOX
Patient seen and examined    I&O's Summary    31 May 2024 07:01  -  01 Jun 2024 07:00  --------------------------------------------------------  IN: 2365 mL / OUT: 1155 mL / NET: 1210 mL    01 Jun 2024 07:01  -  01 Jun 2024 17:48  --------------------------------------------------------  IN: 569 mL / OUT: 300 mL / NET: 269 mL        REVIEW OF SYSTEMS:    CONSTITUTIONAL: No F/C  RESPIRATORY: Still has cough with some expectorstion,  No SOB  CARDIOVASCULAR: No CP/palpitations,    GASTROINTESTINAL: No abdominal pain  or NVD   GENITOURINARY: No UTI sx  NEUROLOGICAL: No headaches, NO wk/numbness  MUSCULOSKELETAL:   EXTREMITIES : no swelling,    Vital Signs Last 24 Hrs  T(C): 36.6 (01 Jun 2024 16:00), Max: 36.7 (31 May 2024 20:00)  T(F): 97.9 (01 Jun 2024 16:00), Max: 98.1 (31 May 2024 20:00)  HR: 69 (01 Jun 2024 15:33) (64 - 75)  BP: 117/59 (01 Jun 2024 15:00) (93/81 - 138/72)  BP(mean): 77 (01 Jun 2024 15:00) (63 - 94)  RR: 19 (01 Jun 2024 15:00) (19 - 29)  SpO2: 100% (01 Jun 2024 15:33) (97% - 100%)    Parameters below as of 01 Jun 2024 16:00  Patient On (Oxygen Delivery Method): room air        PHYSICAL EXAM:    GENERAL: NAD,   EYES:  conjunctiva and sclera clear  NECK: Supple, No JVD/Bruit  NERVOUS SYSTEM:  A/O x3,   CHEST:  No rales occ rhonchi  HEART:  RRR, No murmur  ABDOMEN: Soft, NT/ND BS+  EXTREMITIES:  No Edema;  SKIN: No rashes    LABS:                          10.5   9.57  )-----------( 139      ( 01 Jun 2024 03:20 )             31.7     06-01    133<L>  |  94<L>  |  56.9<H>  ----------------------------<  142<H>  3.7   |  21.0<L>  |  2.89<H>    Ca    8.5      01 Jun 2024 09:37  Phos  4.1     06-01  Mg     2.4     06-01    TPro  6.3<L>  /  Alb  3.0<L>  /  TBili  0.6  /  DBili  x   /  AST  64<H>  /  ALT  154<H>  /  AlkPhos  96  06-01      MEDICATIONS  (STANDING):  albuterol/ipratropium for Nebulization PRN  aMIOdarone    Tablet  aspirin enteric coated  atorvastatin  buMETAnide IVPB  chlorhexidine 2% Cloths  clopidogrel Tablet  dextrose 50% Injectable  guaiFENesin Oral Liquid (Sugar-Free) PRN  heparin  Infusion  hydrALAZINE  insulin glargine Injectable (LANTUS)  insulin lispro (ADMELOG) corrective regimen sliding scale  insulin lispro (ADMELOG) corrective regimen sliding scale  insulin lispro Injectable (ADMELOG)  levothyroxine  mexiletine  montelukast  ondansetron Injectable PRN  pantoprazole    Tablet  ranolazine  senna PRN  sodium chloride 0.9% lock flush PRN

## 2024-06-02 LAB
ALBUMIN SERPL ELPH-MCNC: 2.9 G/DL — LOW (ref 3.3–5.2)
ALP SERPL-CCNC: 90 U/L — SIGNIFICANT CHANGE UP (ref 40–120)
ALT FLD-CCNC: 116 U/L — HIGH
ANION GAP SERPL CALC-SCNC: 15 MMOL/L — SIGNIFICANT CHANGE UP (ref 5–17)
APTT BLD: 68.4 SEC — HIGH (ref 24.5–35.6)
APTT BLD: 74.3 SEC — HIGH (ref 24.5–35.6)
APTT BLD: 75.9 SEC — HIGH (ref 24.5–35.6)
AST SERPL-CCNC: 42 U/L — HIGH
BILIRUB SERPL-MCNC: 0.6 MG/DL — SIGNIFICANT CHANGE UP (ref 0.4–2)
BUN SERPL-MCNC: 58.6 MG/DL — HIGH (ref 8–20)
CALCIUM SERPL-MCNC: 8.4 MG/DL — SIGNIFICANT CHANGE UP (ref 8.4–10.5)
CHLORIDE SERPL-SCNC: 96 MMOL/L — SIGNIFICANT CHANGE UP (ref 96–108)
CO2 SERPL-SCNC: 21 MMOL/L — LOW (ref 22–29)
CREAT SERPL-MCNC: 3.18 MG/DL — HIGH (ref 0.5–1.3)
EGFR: 19 ML/MIN/1.73M2 — LOW
GLUCOSE BLDC GLUCOMTR-MCNC: 101 MG/DL — HIGH (ref 70–99)
GLUCOSE BLDC GLUCOMTR-MCNC: 120 MG/DL — HIGH (ref 70–99)
GLUCOSE BLDC GLUCOMTR-MCNC: 149 MG/DL — HIGH (ref 70–99)
GLUCOSE BLDC GLUCOMTR-MCNC: 155 MG/DL — HIGH (ref 70–99)
GLUCOSE SERPL-MCNC: 124 MG/DL — HIGH (ref 70–99)
HCT VFR BLD CALC: 31.2 % — LOW (ref 39–50)
HGB BLD-MCNC: 10.5 G/DL — LOW (ref 13–17)
LACTATE SERPL-SCNC: 0.9 MMOL/L — SIGNIFICANT CHANGE UP (ref 0.5–2)
MAGNESIUM SERPL-MCNC: 2.4 MG/DL — SIGNIFICANT CHANGE UP (ref 1.6–2.6)
MCHC RBC-ENTMCNC: 30.1 PG — SIGNIFICANT CHANGE UP (ref 27–34)
MCHC RBC-ENTMCNC: 33.7 GM/DL — SIGNIFICANT CHANGE UP (ref 32–36)
MCV RBC AUTO: 89.4 FL — SIGNIFICANT CHANGE UP (ref 80–100)
PHOSPHATE SERPL-MCNC: 4.2 MG/DL — SIGNIFICANT CHANGE UP (ref 2.4–4.7)
PLATELET # BLD AUTO: 165 K/UL — SIGNIFICANT CHANGE UP (ref 150–400)
POTASSIUM SERPL-MCNC: 3.8 MMOL/L — SIGNIFICANT CHANGE UP (ref 3.5–5.3)
POTASSIUM SERPL-SCNC: 3.8 MMOL/L — SIGNIFICANT CHANGE UP (ref 3.5–5.3)
PROT SERPL-MCNC: 6.2 G/DL — LOW (ref 6.6–8.7)
RBC # BLD: 3.49 M/UL — LOW (ref 4.2–5.8)
RBC # FLD: 16.2 % — HIGH (ref 10.3–14.5)
SODIUM SERPL-SCNC: 132 MMOL/L — LOW (ref 135–145)
WBC # BLD: 9.15 K/UL — SIGNIFICANT CHANGE UP (ref 3.8–10.5)
WBC # FLD AUTO: 9.15 K/UL — SIGNIFICANT CHANGE UP (ref 3.8–10.5)

## 2024-06-02 PROCEDURE — 71045 X-RAY EXAM CHEST 1 VIEW: CPT | Mod: 26

## 2024-06-02 PROCEDURE — 99233 SBSQ HOSP IP/OBS HIGH 50: CPT

## 2024-06-02 RX ORDER — BUMETANIDE 0.25 MG/ML
3 INJECTION INTRAMUSCULAR; INTRAVENOUS DAILY
Refills: 0 | Status: DISCONTINUED | OUTPATIENT
Start: 2024-06-03 | End: 2024-06-03

## 2024-06-02 RX ORDER — HEPARIN SODIUM 5000 [USP'U]/ML
900 INJECTION INTRAVENOUS; SUBCUTANEOUS
Qty: 25000 | Refills: 0 | Status: DISCONTINUED | OUTPATIENT
Start: 2024-06-02 | End: 2024-06-04

## 2024-06-02 RX ORDER — HEPARIN SODIUM 5000 [USP'U]/ML
2500 INJECTION INTRAVENOUS; SUBCUTANEOUS EVERY 6 HOURS
Refills: 0 | Status: DISCONTINUED | OUTPATIENT
Start: 2024-06-02 | End: 2024-06-04

## 2024-06-02 RX ORDER — METOPROLOL TARTRATE 50 MG
12.5 TABLET ORAL EVERY 6 HOURS
Refills: 0 | Status: DISCONTINUED | OUTPATIENT
Start: 2024-06-02 | End: 2024-06-05

## 2024-06-02 RX ORDER — HEPARIN SODIUM 5000 [USP'U]/ML
5500 INJECTION INTRAVENOUS; SUBCUTANEOUS EVERY 6 HOURS
Refills: 0 | Status: DISCONTINUED | OUTPATIENT
Start: 2024-06-02 | End: 2024-06-04

## 2024-06-02 RX ADMIN — AMIODARONE HYDROCHLORIDE 400 MILLIGRAM(S): 400 TABLET ORAL at 05:18

## 2024-06-02 RX ADMIN — Medication 3 UNIT(S): at 06:11

## 2024-06-02 RX ADMIN — AMIODARONE HYDROCHLORIDE 400 MILLIGRAM(S): 400 TABLET ORAL at 11:59

## 2024-06-02 RX ADMIN — HEPARIN SODIUM 900 UNIT(S)/HR: 5000 INJECTION INTRAVENOUS; SUBCUTANEOUS at 06:26

## 2024-06-02 RX ADMIN — Medication 81 MILLIGRAM(S): at 12:00

## 2024-06-02 RX ADMIN — Medication 200 MILLIGRAM(S): at 11:59

## 2024-06-02 RX ADMIN — MONTELUKAST 10 MILLIGRAM(S): 4 TABLET, CHEWABLE ORAL at 12:00

## 2024-06-02 RX ADMIN — INSULIN GLARGINE 9 UNIT(S): 100 INJECTION, SOLUTION SUBCUTANEOUS at 21:31

## 2024-06-02 RX ADMIN — HEPARIN SODIUM 900 UNIT(S)/HR: 5000 INJECTION INTRAVENOUS; SUBCUTANEOUS at 22:22

## 2024-06-02 RX ADMIN — PANTOPRAZOLE SODIUM 40 MILLIGRAM(S): 20 TABLET, DELAYED RELEASE ORAL at 05:18

## 2024-06-02 RX ADMIN — MEXILETINE HYDROCHLORIDE 150 MILLIGRAM(S): 150 CAPSULE ORAL at 00:05

## 2024-06-02 RX ADMIN — Medication 12.5 MILLIGRAM(S): at 17:26

## 2024-06-02 RX ADMIN — Medication 20 MILLIGRAM(S): at 13:18

## 2024-06-02 RX ADMIN — Medication 3 UNIT(S): at 12:00

## 2024-06-02 RX ADMIN — HEPARIN SODIUM 900 UNIT(S)/HR: 5000 INJECTION INTRAVENOUS; SUBCUTANEOUS at 00:34

## 2024-06-02 RX ADMIN — Medication 3 UNIT(S): at 17:51

## 2024-06-02 RX ADMIN — Medication 100 MILLIGRAM(S): at 21:31

## 2024-06-02 RX ADMIN — HEPARIN SODIUM 900 UNIT(S)/HR: 5000 INJECTION INTRAVENOUS; SUBCUTANEOUS at 13:03

## 2024-06-02 RX ADMIN — Medication 200 MILLIGRAM(S): at 05:17

## 2024-06-02 RX ADMIN — MEXILETINE HYDROCHLORIDE 150 MILLIGRAM(S): 150 CAPSULE ORAL at 17:26

## 2024-06-02 RX ADMIN — CHLORHEXIDINE GLUCONATE 1 APPLICATION(S): 213 SOLUTION TOPICAL at 05:18

## 2024-06-02 RX ADMIN — AMIODARONE HYDROCHLORIDE 400 MILLIGRAM(S): 400 TABLET ORAL at 21:30

## 2024-06-02 RX ADMIN — ATORVASTATIN CALCIUM 80 MILLIGRAM(S): 80 TABLET, FILM COATED ORAL at 21:30

## 2024-06-02 RX ADMIN — CLOPIDOGREL BISULFATE 75 MILLIGRAM(S): 75 TABLET, FILM COATED ORAL at 11:59

## 2024-06-02 RX ADMIN — RANOLAZINE 500 MILLIGRAM(S): 500 TABLET, FILM COATED, EXTENDED RELEASE ORAL at 17:25

## 2024-06-02 RX ADMIN — RANOLAZINE 500 MILLIGRAM(S): 500 TABLET, FILM COATED, EXTENDED RELEASE ORAL at 05:17

## 2024-06-02 RX ADMIN — BUMETANIDE 124 MILLIGRAM(S): 0.25 INJECTION INTRAMUSCULAR; INTRAVENOUS at 05:18

## 2024-06-02 RX ADMIN — ONDANSETRON 4 MILLIGRAM(S): 8 TABLET, FILM COATED ORAL at 05:17

## 2024-06-02 RX ADMIN — Medication 100 MILLIGRAM(S): at 05:18

## 2024-06-02 RX ADMIN — MEXILETINE HYDROCHLORIDE 150 MILLIGRAM(S): 150 CAPSULE ORAL at 09:18

## 2024-06-02 RX ADMIN — Medication 100 MILLIGRAM(S): at 13:18

## 2024-06-02 RX ADMIN — Medication 20 MILLIGRAM(S): at 05:18

## 2024-06-02 RX ADMIN — Medication 88 MICROGRAM(S): at 05:18

## 2024-06-02 NOTE — PROGRESS NOTE ADULT - SUBJECTIVE AND OBJECTIVE BOX
[facility]                                                         CARDIOLOGY AT [place] [address]                                                         Telephone: [phone]. Fax:[phone]                                                                             PROGRESS NOTE    Reason for follow up: HF  Overall Plan: sitting up in chair, smiling   c/o dry cough, orthopnea  resume Lopressor today 12.5mg PO Q6, if patient tolerates change to 25mg BID tomorrow         Review of symptoms:   Cardiac:  No chest pain. No dyspnea. No palpitations.  Respiratory: no cough. No dyspnea  Gastrointestinal: No diarrhea. No abdominal pain. No bleeding.   Neuro: No focal neuro complaints.      Vitals:  T(C): 36.7 (06-02-24 @ 07:38), Max: 36.7 (06-02-24 @ 07:38)  HR: 68 (06-02-24 @ 13:00) (63 - 75)  BP: 103/60 (06-02-24 @ 13:00) (93/58 - 122/65)  RR: 22 (06-02-24 @ 13:00) (17 - 28)  SpO2: 100% (06-02-24 @ 13:00) (97% - 100%)        I&O's Summary    01 Jun 2024 07:01  -  02 Jun 2024 07:00  --------------------------------------------------------  IN: 1035 mL / OUT: 1000 mL / NET: 35 mL    02 Jun 2024 07:01  -  02 Jun 2024 13:38  --------------------------------------------------------  IN: 543 mL / OUT: 450 mL / NET: 93 mL        PHYSICAL EXAM:  Appearance: Comfortable. No acute distress  HEENT:  Atraumatic. Normocephalic.  Normal oral mucosa  Neurologic: A & O x 3, no gross focal deficits.  Cardiovascular: RRR S1 S2, No murmur, no rubs/gallops.  Respiratory: Lungs clear to auscultation, unlabored   Gastrointestinal:  Soft, Non-tender, + BS  Lower Extremities: 2+ Peripheral Pulses, No edema  Psychiatry: Patient is calm. No agitation.   Skin: warm and dry.        CURRENT CARDIAC MEDICATIONS:  aMIOdarone    Tablet 400 milliGRAM(s) Oral every 8 hours  buMETAnide IVPB 3 milliGRAM(s) IV Intermittent every 12 hours  hydrALAZINE 20 milliGRAM(s) Oral three times a day  mexiletine 150 milliGRAM(s) Oral every 8 hours  ranolazine 500 milliGRAM(s) Oral two times a day        CURRENT OTHER MEDICATIONS:  albuterol/ipratropium for Nebulization 3 milliLiter(s) Nebulizer every 6 hours PRN Shortness of Breath and/or Wheezing  benzonatate 100 milliGRAM(s) Oral three times a day  guaiFENesin Oral Liquid (Sugar-Free) 200 milliGRAM(s) Oral every 6 hours PRN Cough  montelukast 10 milliGRAM(s) Oral daily  ondansetron Injectable 4 milliGRAM(s) IV Push every 8 hours PRN Nausea and/or Vomiting  pantoprazole    Tablet 40 milliGRAM(s) Oral before breakfast  senna 2 Tablet(s) Oral at bedtime PRN Constipation  atorvastatin 80 milliGRAM(s) Oral at bedtime  dextrose 50% Injectable 25 Gram(s) IV Push once, Stop order after: 1 Doses  insulin glargine Injectable (LANTUS) 9 Unit(s) SubCutaneous at bedtime  insulin lispro (ADMELOG) corrective regimen sliding scale   SubCutaneous three times a day before meals  insulin lispro (ADMELOG) corrective regimen sliding scale   SubCutaneous at bedtime  insulin lispro Injectable (ADMELOG) 3 Unit(s) SubCutaneous three times a day before meals  levothyroxine 88 MICROGram(s) Oral daily  aspirin enteric coated 81 milliGRAM(s) Oral daily  chlorhexidine 2% Cloths 1 Application(s) Topical <User Schedule>  clopidogrel Tablet 75 milliGRAM(s) Oral daily  heparin   Injectable 5500 Unit(s) IV Push every 6 hours PRN For aPTT less than 40  heparin   Injectable 2500 Unit(s) IV Push every 6 hours PRN For aPTT between 40 - 57  heparin  Infusion. 900 Unit(s)/Hr (9 mL/Hr) IV Continuous <Continuous>  sodium chloride 0.9% lock flush 10 milliLiter(s) IV Push every 1 hour PRN Pre/post blood products, medications, blood draw, and to maintain line patency      LABS:	 	  ( 25 May 2024 10:21 )  Troponin T  X    ,  CPK  69   , CKMB  X    , BNP X                              10.5   9.15  )-----------( 165      ( 02 Jun 2024 03:00 )             31.2     06-02    132<L>  |  96  |  58.6<H>  ----------------------------<  124<H>  3.8   |  21.0<L>  |  3.18<H>    Ca    8.4      02 Jun 2024 03:00  Phos  4.2     06-02  Mg     2.4     06-02    TPro  6.2<L>  /  Alb  2.9<L>  /  TBili  0.6  /  DBili  x   /  AST  42<H>  /  ALT  116<H>  /  AlkPhos  90  06-02    PT/INR/PTT ( 02 Jun 2024 12:00 )                       :                       :      X            :       75.9                  .        .                   .              .           .       X           .                                         TELEMETRY:       DIAGNOSTIC TESTING:  [ ] Echocardiogram:   [ ]  Catheterization:  [ ] Stress Test:    OTHER: 	                                                                [facility]                                                         CARDIOLOGY AT [place] [address]                                                         Telephone: [phone]. Fax:[phone]                                                                             PROGRESS NOTE    Reason for follow up: HF  Overall Plan: sitting up in chair, smiling   c/o dry cough, orthopnea  resume Lopressor today 12.5mg PO Q6, if patient tolerates change to 25mg BID tomorrow         Review of symptoms:   Cardiac:  No chest pain. No dyspnea. No palpitations.  Respiratory: no cough. No dyspnea  Gastrointestinal: No diarrhea. No abdominal pain. No bleeding.   Neuro: No focal neuro complaints.      Vitals:  T(C): 36.7 (06-02-24 @ 07:38), Max: 36.7 (06-02-24 @ 07:38)  HR: 68 (06-02-24 @ 13:00) (63 - 75)  BP: 103/60 (06-02-24 @ 13:00) (93/58 - 122/65)  RR: 22 (06-02-24 @ 13:00) (17 - 28)  SpO2: 100% (06-02-24 @ 13:00) (97% - 100%)        I&O's Summary    01 Jun 2024 07:01  -  02 Jun 2024 07:00  --------------------------------------------------------  IN: 1035 mL / OUT: 1000 mL / NET: 35 mL    02 Jun 2024 07:01  -  02 Jun 2024 13:38  --------------------------------------------------------  IN: 543 mL / OUT: 450 mL / NET: 93 mL        PHYSICAL EXAM:  Appearance: Comfortable. No acute distress  HEENT:  Atraumatic. Normocephalic.  Normal oral mucosa  Neurologic: A & O x 3, no gross focal deficits.  Cardiovascular: RRR S1 S2, No murmur, no rubs/gallops.  Respiratory: Lungs clear to auscultation, unlabored   Gastrointestinal:  Soft, Non-tender, + BS  Lower Extremities: 2+ Peripheral Pulses, No edema  Psychiatry: Patient is calm. No agitation.   Skin: warm and dry.        CURRENT CARDIAC MEDICATIONS:  aMIOdarone    Tablet 400 milliGRAM(s) Oral every 8 hours  buMETAnide IVPB 3 milliGRAM(s) IV Intermittent every 12 hours  hydrALAZINE 20 milliGRAM(s) Oral three times a day  mexiletine 150 milliGRAM(s) Oral every 8 hours  ranolazine 500 milliGRAM(s) Oral two times a day        CURRENT OTHER MEDICATIONS:  albuterol/ipratropium for Nebulization 3 milliLiter(s) Nebulizer every 6 hours PRN Shortness of Breath and/or Wheezing  benzonatate 100 milliGRAM(s) Oral three times a day  guaiFENesin Oral Liquid (Sugar-Free) 200 milliGRAM(s) Oral every 6 hours PRN Cough  montelukast 10 milliGRAM(s) Oral daily  ondansetron Injectable 4 milliGRAM(s) IV Push every 8 hours PRN Nausea and/or Vomiting  pantoprazole    Tablet 40 milliGRAM(s) Oral before breakfast  senna 2 Tablet(s) Oral at bedtime PRN Constipation  atorvastatin 80 milliGRAM(s) Oral at bedtime  dextrose 50% Injectable 25 Gram(s) IV Push once, Stop order after: 1 Doses  insulin glargine Injectable (LANTUS) 9 Unit(s) SubCutaneous at bedtime  insulin lispro (ADMELOG) corrective regimen sliding scale   SubCutaneous three times a day before meals  insulin lispro (ADMELOG) corrective regimen sliding scale   SubCutaneous at bedtime  insulin lispro Injectable (ADMELOG) 3 Unit(s) SubCutaneous three times a day before meals  levothyroxine 88 MICROGram(s) Oral daily  aspirin enteric coated 81 milliGRAM(s) Oral daily  chlorhexidine 2% Cloths 1 Application(s) Topical <User Schedule>  clopidogrel Tablet 75 milliGRAM(s) Oral daily  heparin   Injectable 5500 Unit(s) IV Push every 6 hours PRN For aPTT less than 40  heparin   Injectable 2500 Unit(s) IV Push every 6 hours PRN For aPTT between 40 - 57  heparin  Infusion. 900 Unit(s)/Hr (9 mL/Hr) IV Continuous <Continuous>  sodium chloride 0.9% lock flush 10 milliLiter(s) IV Push every 1 hour PRN Pre/post blood products, medications, blood draw, and to maintain line patency      LABS:	 	  ( 25 May 2024 10:21 )  Troponin T  X    ,  CPK  69   , CKMB  X    , BNP X                              10.5   9.15  )-----------( 165      ( 02 Jun 2024 03:00 )             31.2     06-02    132<L>  |  96  |  58.6<H>  ----------------------------<  124<H>  3.8   |  21.0<L>  |  3.18<H>    Ca    8.4      02 Jun 2024 03:00  Phos  4.2     06-02  Mg     2.4     06-02    TPro  6.2<L>  /  Alb  2.9<L>  /  TBili  0.6  /  DBili  x   /  AST  42<H>  /  ALT  116<H>  /  AlkPhos  90  06-02    PT/INR/PTT ( 02 Jun 2024 12:00 )                       :                       :      X            :       75.9                  .        .                   .              .           .       X           .                                         TELEMETRY:       DIAGNOSTIC TESTING:  [ ] Echocardiogram:   < from: TTE Limited W or WO Ultrasound Enhancing Agent (05.23.24 @ 19:54) >      1. Left ventricular systolic function is severely decreased with an ejection fraction visually estimated at <20 %. Global left ventricular hypokinesis.   2. Elevated filling pressure.   3. Mildly reduced right ventricular systolic function.   4. Mild to moderate mitral regurgitation.   5. Mild to moderate aortic stenosis.   6. Mild pulmonic regurgitation.   7. Mild tricuspid regurgitation.   8. Estimated pulmonary artery systolic pressure is 75 mmHg, consistent with elevated pulmonary artery pressure.   9. No prior echocardiogram is available for comparison.    < end of copied text >        < from: Cardiac Catheterization (05.28.24 @ 14:03) >  Diagnostic Conclusions:     Successfully inserted and secured 7.5 Fr VIP [place] catheter placed  at a depth of 56 cm via a 9 Fr double-lumen MAC  introducer, and requires 1 cc to PCWP.      Hemodynamic measurements taken with norepi gtt @ 0.08 mcg/kg/min and  amio gtt @ 1 mg / min.    1. Reduced CO/CI: 3.09 L/min 2. Severely elevated biventricular filling pressures: mRAP 18 mmHg and  PCWP 31 mmHg.  3. 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 Elevated systemic vascular resistance of 1889 dynes vascular resistance of 8.41 MEYERS.  5. Reassuring Vidhi of 3.2      < end of copied text >

## 2024-06-02 NOTE — PROGRESS NOTE ADULT - SUBJECTIVE AND OBJECTIVE BOX
ICU Transfer Acceptance Note:  ICU Transfer Acceptance Note:     INTERVAL HPI/OVERNIGHT EVENTS: Patient seen and examined, laying comfortably in bed. Off oxygen. Denies chest pain sob or palpitations. Complaints of productive cough.       Vital Signs Last 24 Hrs  T(C): 36.5 (03 Jun 2024 04:50), Max: 36.7 (02 Jun 2024 07:38)  T(F): 97.7 (03 Jun 2024 04:50), Max: 98 (02 Jun 2024 07:38)  HR: 60 (03 Jun 2024 06:00) (60 - 73)  BP: 112/69 (03 Jun 2024 06:00) (92/69 - 122/65)  BP(mean): 83 (03 Jun 2024 06:00) (58 - 94)  RR: 19 (03 Jun 2024 06:00) (17 - 29)  SpO2: 98% (03 Jun 2024 06:00) (98% - 100%)    Parameters below as of 03 Jun 2024 06:00  Patient On (Oxygen Delivery Method): room air        PHYSICAL EXAM:    GENERAL: NAD, AOX4  HEAD:  Atraumatic, Normocephalic  EYES:  conjunctiva and sclera clear  ENMT: Moist mucous membranes  CHEST/LUNG: Clear to auscultation bilaterally; No rales, rhonchi, wheezing, or rubs  HEART: Regular rate and rhythm; No murmurs, rubs, or gallops  ABDOMEN: Soft, Nontender, Nondistended; Bowel sounds present  EXTREMITIES:  2+ Peripheral Pulses, No clubbing, cyanosis, or edema        MEDICATIONS  (STANDING):  aspirin enteric coated 81 milliGRAM(s) Oral daily  atorvastatin 80 milliGRAM(s) Oral at bedtime  benzonatate 100 milliGRAM(s) Oral three times a day  buMETAnide IVPB 3 milliGRAM(s) IV Intermittent daily  chlorhexidine 2% Cloths 1 Application(s) Topical <User Schedule>  clopidogrel Tablet 75 milliGRAM(s) Oral daily  dextrose 50% Injectable 25 Gram(s) IV Push once  heparin  Infusion. 900 Unit(s)/Hr (9 mL/Hr) IV Continuous <Continuous>  hydrALAZINE 20 milliGRAM(s) Oral three times a day  insulin glargine Injectable (LANTUS) 9 Unit(s) SubCutaneous at bedtime  insulin lispro (ADMELOG) corrective regimen sliding scale   SubCutaneous three times a day before meals  insulin lispro (ADMELOG) corrective regimen sliding scale   SubCutaneous at bedtime  insulin lispro Injectable (ADMELOG) 3 Unit(s) SubCutaneous three times a day before meals  levothyroxine 88 MICROGram(s) Oral daily  metoprolol tartrate 12.5 milliGRAM(s) Oral every 6 hours  mexiletine 150 milliGRAM(s) Oral every 8 hours  montelukast 10 milliGRAM(s) Oral daily  pantoprazole    Tablet 40 milliGRAM(s) Oral before breakfast  ranolazine 500 milliGRAM(s) Oral two times a day    MEDICATIONS  (PRN):  albuterol/ipratropium for Nebulization 3 milliLiter(s) Nebulizer every 6 hours PRN Shortness of Breath and/or Wheezing  guaiFENesin Oral Liquid (Sugar-Free) 200 milliGRAM(s) Oral every 6 hours PRN Cough  heparin   Injectable 5500 Unit(s) IV Push every 6 hours PRN For aPTT less than 40  heparin   Injectable 2500 Unit(s) IV Push every 6 hours PRN For aPTT between 40 - 57  ondansetron Injectable 4 milliGRAM(s) IV Push every 8 hours PRN Nausea and/or Vomiting  senna 2 Tablet(s) Oral at bedtime PRN Constipation  sodium chloride 0.9% lock flush 10 milliLiter(s) IV Push every 1 hour PRN Pre/post blood products, medications, blood draw, and to maintain line patency      Allergies    No Known Allergies    Intolerances    DOPamine (Hypotension)        LABS:                          10.5   9.15  )-----------( 165      ( 02 Jun 2024 03:00 )             31.2     06-02    132<L>  |  96  |  58.6<H>  ----------------------------<  124<H>  3.8   |  21.0<L>  |  3.18<H>    Ca    8.4      02 Jun 2024 03:00  Phos  4.2     06-02  Mg     2.4     06-02    TPro  6.2<L>  /  Alb  2.9<L>  /  TBili  0.6  /  DBili  x   /  AST  42<H>  /  ALT  116<H>  /  AlkPhos  90  06-02    PTT - ( 03 Jun 2024 06:00 )  PTT:51.9 sec  Urinalysis Basic - ( 02 Jun 2024 03:00 )    Color: x / Appearance: x / SG: x / pH: x  Gluc: 124 mg/dL / Ketone: x  / Bili: x / Urobili: x   Blood: x / Protein: x / Nitrite: x   Leuk Esterase: x / RBC: x / WBC x   Sq Epi: x / Non Sq Epi: x / Bacteria: x        RADIOLOGY & ADDITIONAL TESTS:

## 2024-06-02 NOTE — PROGRESS NOTE ADULT - ASSESSMENT
The patient is a 78 year old male with a past medical history of CAD status post CABG, multiple PCI, HFpEF, diabetes mellitus type 2, hypertension,CKD stage  3/4 and hyperlipidemia who presented to the ER with complaints of chest pain. Holter MOnitor noted increased PVC burden therefore patient was referred to the Er for  evaluation. Admitted for NSTEMI and RAS on CKD. Cardiology and nephrology were consulted. Initially duiretics held for RAS. TTE with EF < 20%. On 05/26 Cherrie Blue was called for VF arrest. Cardiology team  were present at bedside during cardiac arrest, called and activated STEMI team for emergent cardiac cath; EP interrogated patient's ICD post-arrest, as ICD did not fire during sustained VT, and recommended initiation of Amiodarone infusion and Lidocaine infusions   for arrhythmia suppression.Taken to cath lab and cath was performed by Dr. Strong revealing patent LIMA-LAD, but occluded SVG-Circ, all other grafts/vessels noted to be   occluded: %, %, Circ 100%, %. Lima-LAD is fillng LCX/RCA through collateral circulation. Due to the extensive disease present, there was no intervention performed and recommendation from interventional attending was for medical therapy. ACID was reprogrammed by EP. Patient was extubated on 5/26 remained in shock with improving vasopressor requirements. Patient then developed sustained monomorphic VT at 180 bpm despite amiodarone, no shocks delivered by CRT-D. Given Amiodarone 150mg IVPB load, amiodarone infusion increased to 1mg/min, given Lidocaine 100mg IV push bolus and started on Lidocaine infusion  Heart rate slowed to 120-140's and rhythm changed to what appeared to be AFib RVR with aberrancy with periods of NSVT  EP Dr. Turpin called to bedside, interrogated and reprogrammed CRT-D. Patient was intubated again in the setting of cardiogenic shock and cardiac arrest ROSC after 1 min. GOC were discussed initially DNR which was then rescinded. Heart failure was consulted, taken for RHC and [place] placement 5/28. Started on Diuresis with improvement. SWan removed on 06/01. Weaned off midodrine. Stable to be downgraded to medicine    Assessment/Plan:     The patient is a 78 year old male with a past medical history of CAD status post CABG, multiple PCI, HFpEF, diabetes mellitus type 2, hypertension, stage  3/4 and hyperlipidemia who presented to the ER with complaints of chest pain. Holter Monitor noted increased PVC burden therefore patient was referred to the Er for  evaluation. Admitted for NSTEMI and RAS on CKD. Cardiology and nephrology were consulted. Initially diuretics held for RAS. TTE with EF < 20%. On 05/26 Code Blue was called for VF arrest. Cardiology team  were present at bedside during cardiac arrest, called and activated STEMI team for emergent cardiac cath; EP interrogated patient's ICD post-arrest, as ICD did not fire during sustained VT, and recommended initiation of Amiodarone infusion and Lidocaine infusions   for arrhythmia suppression. Taken to cath lab and cath was performed by Dr. Strong revealing patent LIMA-LAD, but occluded SVG-Circ, all other grafts/vessels noted to be   occluded: %, %, Circ 100%, %. Lima-LAD is fillng  LCX/RCA through collateral circulation. Due to the extensive disease present, there was no intervention performed and recommendation from interventional attending was for medical therapy. ACID was reprogrammed by EP. Patient was extubated on 5/26 remained in shock with improving vasopressor requirements. Patient then developed sustained monomorphic VT at 180 bpm despite amiodarone, no shocks delivered by CRT-D. Given Amiodarone 150mg IVPB load, amiodarone infusion increased to 1mg/min, given Lidocaine 100mg IV push bolus and started on Lidocaine infusion  Heart rate slowed to 120-140's and rhythm changed to what appeared to be AFib RVR with aberrancy with periods of NSVT EP Dr. Turpin called to bedside, interrogated and reprogrammed CRT-D. Patient was intubated again in the setting of cardiogenic shock and cardiac arrest ROSC after 1 min. CRT was reprogrammed again by EP> GOC were discussed initially DNR which was then rescinded. Heart failure was consulted, taken for RHC and [place] placement 5/28. Started on Diuresis with improvement. SWan removed on 06/01. Weaned off midodrine. Stable to be downgraded to medicine    Assessment/Plan:     The patient is a 78 year old male with a past medical history of CAD status post CABG, multiple PCI, HFpEF, diabetes mellitus type 2, hypertension, stage  3/4 and hyperlipidemia who presented to the ER with complaints of chest pain. Holter Monitor noted increased PVC burden therefore patient was referred to the Er for  evaluation. Admitted for NSTEMI and RAS on CKD. Cardiology and nephrology were consulted. Initially diuretics held for RAS. TTE with EF < 20%. On 05/26 Code Blue was called for VF arrest. Cardiology team  were present at bedside during cardiac arrest, called and activated STEMI team for emergent cardiac cath; EP interrogated patient's ICD post-arrest, as ICD did not fire during sustained VT, and recommended initiation of Amiodarone infusion and Lidocaine infusions   for arrhythmia suppression. Taken to cath lab and cath was performed by Dr. Strong revealing patent LIMA-LAD, but occluded SVG-Circ, all other grafts/vessels noted to be   occluded: %, %, Circ 100%, %. Lima-LAD is fillng  LCX/RCA through collateral circulation. Due to the extensive disease present, there was no intervention performed and recommendation from interventional attending was for medical therapy. ACID was reprogrammed by EP. Patient was extubated on 5/26 remained in shock with improving vasopressor requirements. Patient then developed sustained monomorphic VT at 180 bpm despite amiodarone, no shocks delivered by CRT-D. Given Amiodarone 150mg IVPB load, amiodarone infusion increased to 1mg/min, given Lidocaine 100mg IV push bolus and started on Lidocaine infusion  Heart rate slowed to 120-140's and rhythm changed to what appeared to be AFib RVR with aberrancy with periods of NSVT EP Dr. Turpin called to bedside, interrogated and reprogrammed CRT-D. Patient was intubated again in the setting of cardiogenic shock and cardiac arrest ROSC after 1 min. CRT was reprogrammed again by EP> GOC were discussed initially DNR which was then rescinded. Heart failure was consulted, taken for RHC and [place] placement 5/28. Started on Diuresis with improvement. SWan removed on 06/01. Weaned off midodrine. Stable to be downgraded to medicine    Assessment/Plan:    1. Cardiogenic shock with acute hypoxic respiratory failure   Ischemic cardiomyopathy  Status post cardiac arrest x 2  Extubated x 2  - [place] removed on 6/1  - Weaned off Milrinone  - On Bumex 3mg IV OD  - Spo2 on RA at rest >92%  - Strict I&Os  - Daily weight  - HF and cardiology following    2. Monomorphic VT:  - Intermittent PAT with variable conduction. NO recurrent VT since 5/26  - SP  Amiodarone 400mg TID x 4 days, Now on 200mg PO OD  - TFTs & LFTs should be monitored q 3-6 months while on amiodarone therapy. Anticipate <1 year of amiodarone therapy for this patient; however if > 1 year, patient will need annual fundoscopic exam and PFTs  - Continue Mexiletine 150mg TID.  - Likely scar-mediated VT from ischemic CMY precipitated by AT/AF with RVR  - CHADS2-VASc of 6 (CHF, HTN, DM, Agex2, CAD) on Heparin gtt transition to NOAC on discharge  - Metoprolol 12.6mg Q6 hours-  Increase BB as tolerated by BP    3. AICD dysfunction   - Device inappropriately binned VT as SVT  - Device reprogrammed by EP     4. Acute on chronic systolic CHF  - IV Bumex 3mg OD  - No ACE/ARB in the setting of RAS/CKD  - Continue hydralazine and ranexa  - HF following  - Daily weight  - Strict I&OS  - Status post [place] removed on 6/1    5. RAS On CKD Stage 3/4  - Secondary to CHF  - Now stable  - Nephrology following    6. CAD  - Status post [facility] with no intervention  - Continue aspirin, plavix, metoprolol, statin    7. Diabetes mellitus type 2  - Lantus 9 units QHS  _ Admelog 3 units TID with meals  - Hbaic of 6.8  - Monitor BSL closely     8. Hypothyroidism  - On Synthroid     9. Suspected aspiration pneumonitis  - Completed course of IV Zosyn    VTE_ Heparin IV    Discharge disposition; Anticipate home with home care once medically stable      The patient is a 78 year old male with a past medical history of CAD status post CABG, multiple PCI, HFpEF, diabetes mellitus type 2, hypertension, stage  3/4 and hyperlipidemia who presented to the ER with complaints of chest pain. Holter Monitor noted increased PVC burden therefore patient was referred to the Er for  evaluation. Admitted for NSTEMI and RAS on CKD. Cardiology and nephrology were consulted. Initially diuretics held for RAS. TTE with EF < 20%. On 05/26 Code Blue was called for VF arrest. Cardiology team  were present at bedside during cardiac arrest, called and activated STEMI team for emergent cardiac cath; EP interrogated patient's ICD post-arrest, as ICD did not fire during sustained VT, and recommended initiation of Amiodarone infusion and Lidocaine infusions   for arrhythmia suppression. Taken to cath lab and cath was performed by Dr. Strong revealing patent LIMA-LAD, but occluded SVG-Circ, all other grafts/vessels noted to be   occluded: %, %, Circ 100%, %. Lima-LAD is fillng  LCX/RCA through collateral circulation. Due to the extensive disease present, there was no intervention performed and recommendation from interventional attending was for medical therapy. ACID was reprogrammed by EP. Patient was extubated on 5/26 remained in shock with improving vasopressor requirements. Patient then developed sustained monomorphic VT at 180 bpm despite amiodarone, no shocks delivered by CRT-D. Given Amiodarone 150mg IVPB load, amiodarone infusion increased to 1mg/min, given Lidocaine 100mg IV push bolus and started on Lidocaine infusion  Heart rate slowed to 120-140's and rhythm changed to what appeared to be AFib RVR with aberrancy with periods of NSVT EP Dr. Turpin called to bedside, interrogated and reprogrammed CRT-D. Patient was intubated again in the setting of cardiogenic shock and cardiac arrest ROSC after 1 min. CRT was reprogrammed again by EP> GOC were discussed initially DNR which was then rescinded. Heart failure was consulted, taken for RHC and [place] placement 5/28. Started on Diuresis with improvement. SWan removed on 06/01. Weaned off midodrine. Stable to be downgraded to medicine    Assessment/Plan:    1. Cardiogenic shock with acute hypoxic respiratory failure   Ischemic cardiomyopathy  Status post cardiac arrest x 2  Extubated x 2  - [place] removed on 6/1  - Weaned off Milrinone  - On Bumex 3mg IV Q12 changed to OD   - Spo2 on RA at rest >92%  - Strict I&Os  - Daily weight  - HF and cardiology following    2. Monomorphic VT:  - Intermittent PAT with variable conduction. NO recurrent VT since 5/26  - SP  Amiodarone 400mg TID x 4 days, Now on 200mg PO OD  - TFTs & LFTs should be monitored q 3-6 months while on amiodarone therapy. Anticipate <1 year of amiodarone therapy for this patient; however if > 1 year, patient will need annual fundoscopic exam and PFTs  - Continue Mexiletine 150mg TID.  - Likely scar-mediated VT from ischemic CMY precipitated by AT/AF with RVR  - CHADS2-VASc of 6 (CHF, HTN, DM, Agex2, CAD) on Heparin gtt transition to NOAC on discharge  - Metoprolol 12.6mg Q6 hours-  Increase BB as tolerated by BP    3. AICD dysfunction   - Device inappropriately binned VT as SVT  - Device reprogrammed by EP     4. Acute on chronic systolic CHF  - IV Bumex 3mg Q12 changed to OD   - No ACE/ARB in the setting of RAS/CKD  - Continue hydralazine and ranexa  - HF following  - Daily weight  - Strict I&OS  - Status post [place] removed on 6/1    5. RAS On CKD Stage 3/4  - Uptrending Crt, lowered dose of bumex to OD   - Secondary to CHF  - Now stable  - Nephrology following    6. CAD  - Status post [facility] with no intervention  - Continue aspirin, plavix, metoprolol, statin    7. Diabetes mellitus type 2  - Lantus 9 units QHS  _ Admelog 3 units TID with meals  - Hbaic of 6.8  - Monitor BSL closely     8. Hypothyroidism  - On Synthroid     9. Suspected aspiration pneumonitis  - Completed course of IV Zosyn    VTE_ Heparin IV    PT evaluation    Discharge disposition; Anticipate home with home care once medically stable

## 2024-06-02 NOTE — PROGRESS NOTE ADULT - ASSESSMENT
78 year old male with history of HTN, HLD, DM, CKD, CAD s/p 4V CABG and multiple PCI's, ischemic cardiomyopathy (EF < 20%), and LBBB s/p MDT CRT-D. He had an admission in November for ADHF. At that time a LHC revealed his grafts were occluded except for the LIMA to LAD. He also underwent a CRTD implant.     On 5/25 he had a VT arrest with ROSC achieved after 3 minutes was sent to the cath lab which showed his LIMA is still patent. Patient was extubated on 5/27. RHC on 5/28 showed severely elevated biv filling pressures, severe Cpc-PH, and low CO. He was started on low dose milrinone and diuresed well on IV Bumex. Weaned off pressors 5/29 and Milrinone was weaned off this morning.    Bedside Hemodynamics:  5/31 (mil 0.125): CVP 9-10, PA 72/24/43, PA sat 70.7%, Fauzia CO/CI 4.6/2.6, /56 (73), SVR 1096 dsc.    Cardiac Studies:  5/28/24 RHC: RA 18, RV 92/18, PA 95/38/57, PCW 31, PA sat 51.5%, Fauzia CO/CI 3.09/1.74, TPG 26, DPG 7, SVR 1889 dsc, PVR 8.41 MEYERS, Vidhi 3.2, /63 (91) on levo 0.08.    5/25/24 LHC: All grafts occluded except LIMA-LAD. LVEDP 29 mmHg.    5/23/24 TTE: LVEF <20%, LVIDd 5.8cm, mild RVE with mild RV dysfunction, TAPSE 0.7cm, mild-mod AS, mild-mod MR, mild TR, mild CA, est PASP 75 mmHg.

## 2024-06-02 NOTE — PROGRESS NOTE ADULT - SUBJECTIVE AND OBJECTIVE BOX
Patient seen and examined    I&O's Summary    01 Jun 2024 07:01  -  02 Jun 2024 07:00  --------------------------------------------------------  IN: 1035 mL / OUT: 1000 mL / NET: 35 mL    02 Jun 2024 07:01  -  02 Jun 2024 16:57  --------------------------------------------------------  IN: 561 mL / OUT: 450 mL / NET: 111 mL    still has cough  Looks much better per family  + cough,  Much less SOB  CARDIOVASCULAR: No CP/palpitations,    GASTROINTESTINAL: No abdominal pain  or NVD   GENITOURINARY: No UTI sx  NEUROLOGICAL: No headaches, NO wk/numbness  MUSCULOSKELETAL:   EXTREMITIES : no swelling,    Vital Signs Last 24 Hrs  T(C): 36.5 (02 Jun 2024 16:05), Max: 36.7 (02 Jun 2024 07:38)  T(F): 97.7 (02 Jun 2024 16:05), Max: 98 (02 Jun 2024 07:38)  HR: 66 (02 Jun 2024 16:00) (63 - 75)  BP: 120/70 (02 Jun 2024 16:00) (93/58 - 122/65)  BP(mean): 86 (02 Jun 2024 16:00) (70 - 86)  RR: 21 (02 Jun 2024 16:00) (17 - 28)  SpO2: 99% (02 Jun 2024 16:00) (97% - 100%)    Parameters below as of 02 Jun 2024 16:00  Patient On (Oxygen Delivery Method): room air        PHYSICAL EXAM:    GENERAL: NAD,   EYES:  conjunctiva and sclera clear  NECK: Supple, No JVD/Bruit  NERVOUS SYSTEM:  A/O x3,   CHEST:  few rales occ rhonchi  HEART:  RRR, No murmur  ABDOMEN: Soft, NT/ND BS+  EXTREMITIES:  No Edema;  SKIN: No rashes    LABS:                          10.5   9.15  )-----------( 165      ( 02 Jun 2024 03:00 )             31.2     06-02    132<L>  |  96  |  58.6<H>  ----------------------------<  124<H>  3.8   |  21.0<L>  |  3.18<H>    Ca    8.4      02 Jun 2024 03:00  Phos  4.2     06-02  Mg     2.4     06-02    TPro  6.2<L>  /  Alb  2.9<L>  /  TBili  0.6  /  DBili  x   /  AST  42<H>  /  ALT  116<H>  /  AlkPhos  90  06-02      MEDICATIONS  (STANDING):  albuterol/ipratropium for Nebulization PRN  aMIOdarone    Tablet  aspirin enteric coated  atorvastatin  benzonatate  chlorhexidine 2% Cloths  clopidogrel Tablet  dextrose 50% Injectable  guaiFENesin Oral Liquid (Sugar-Free) PRN  heparin   Injectable PRN  heparin   Injectable PRN  heparin  Infusion.  hydrALAZINE  insulin glargine Injectable (LANTUS)  insulin lispro (ADMELOG) corrective regimen sliding scale  insulin lispro (ADMELOG) corrective regimen sliding scale  insulin lispro Injectable (ADMELOG)  levothyroxine  metoprolol tartrate  mexiletine  montelukast  ondansetron Injectable PRN  pantoprazole    Tablet  ranolazine  senna PRN  sodium chloride 0.9% lock flush PRN

## 2024-06-02 NOTE — PROGRESS NOTE ADULT - ASSESSMENT
79 y/o male  with history of CAD, MI's ,  4V CABG, multiple PCI's of the SVG to the OM with stents , HFrEF, DM2 on long term insulin, CKD stage stage 3-4 presented to the ED with c/o chest tightness    Cardiac arrest >shock- reqd  pressors - off now    RAS on CKD suspect stage III  Some improvement in serum Cr 4.2--> 3.4 --> 2.3 --> 2.2 --> 2.4 --> 2.5 --> 2.4> 2.4>2.89>3.18 -likely 2/2 diuretics  Base Cr seems to be approx 1.4- 1.8  Entresto, Torsemide and Farxiga on hold  On Bumex 3mg IV Q 12, will need to accept azotemia - can possibly change to qd as creat risisng - will d/w team and changed to qd  TTE 5/23 noted LVEF < 20%  BNP 21 K  HYpoKalemia - supplemented, improved  s/p emergent L HC renal functio remains stable post cath  Results--> cath showed all grafts occluded except LIMA-LAD  plans are to medical management with DAPT, Ranexa, and statin  on Benzonatate    Renally dose meds  Avoid nephrotoxic agents  Monitor labs will follow; d/w merari

## 2024-06-02 NOTE — PROGRESS NOTE ADULT - NS ATTEND AMEND GEN_ALL_CORE FT
78 year old male with history of HTN, HLD, DM, CKD, CAD s/p 4V CABG and multiple PCI's, ischemic cardiomyopathy (EF < 20%), and LBBB s/p MDT CRT-D. He had an admission in November for ADHF. At that time a LHC revealed his grafts were occluded except for the LIMA to LAD. He also underwent a CRTD implant.     On 5/25 he had a VT arrest with ROSC achieved after 3 minutes was sent to the cath lab which showed his LIMA is still patent. Patient was extubated on 5/27. RHC on 5/28 showed severely elevated biv filling pressures, severe Cpc-PH, and low CO. He was started on low dose milrinone and diuresed well on IV Bumex. Weaned off pressors 5/29 and Milrinone was weaned off this morning.    Bedside Hemodynamics:  5/31 (mil 0.125): CVP 9-10, PA 72/24/43, PA sat 70.7%, Fauzia CO/CI 4.6/2.6, /56 (73), SVR 1096 dsc.    Cardiac Studies:  5/28/24 RHC: RA 18, RV 92/18, PA 95/38/57, PCW 31, PA sat 51.5%, Fauzia CO/CI 3.09/1.74, TPG 26, DPG 7, SVR 1889 dsc, PVR 8.41 MEYERS, Vidhi 3.2, /63 (91) on levo 0.08.    5/25/24 LHC: All grafts occluded except LIMA-LAD. LVEDP 29 mmHg.    5/23/24 TTE: LVEF <20%, LVIDd 5.8cm, mild RVE with mild RV dysfunction, TAPSE 0.7cm, mild-mod AS, mild-mod MR, mild TR, mild UT, est PASP 75 mmHg.      Cardiogenic Shock, SCAI A at present  acute on chronic HFrEF, NYHA III, ACC C  Monomorphic VT, scar mediated  PAF 6% burden  RAS on CKD      family member bedside  >48 hours off of ionotropic or vasopressor support  Continue hydralazine  start bbglo garza was removed yesterday 6/1  c/w bumex.     will hand back to advanced heart failure after weekend coverage

## 2024-06-02 NOTE — PROGRESS NOTE ADULT - PROBLEM SELECTOR PLAN 1
.  - CVP 5, yesterday, prior to swan removal   - cont hydralazine  - Started lopressor 12.5mg PO q6, if patient tolerates change to 25mg PO BID  - cont Bumex  - pt up in chair this morning

## 2024-06-02 NOTE — PROGRESS NOTE ADULT - SUBJECTIVE AND OBJECTIVE BOX
Events:    Complains of productive cough, finish antibiotics, alleviated by Robitussin.  Out of bed to chair.  Adding beta-blocker as per cardiology recommendation.  No other acute issues   Bedside generalized weakness    Physical Exam:  Vital Signs Last 24 Hrs  T(C): 36.7 (02 Jun 2024 07:38), Max: 36.7 (02 Jun 2024 07:38)  T(F): 98 (02 Jun 2024 07:38), Max: 98 (02 Jun 2024 07:38)  HR: 64 (02 Jun 2024 14:00) (63 - 75)  BP: 106/59 (02 Jun 2024 14:00) (93/58 - 122/65)  BP(mean): 74 (02 Jun 2024 14:00) (67 - 86)  RR: 24 (02 Jun 2024 14:00) (17 - 28)  SpO2: 100% (02 Jun 2024 14:00) (97% - 100%)    Parameters below as of 02 Jun 2024 14:00  Patient On (Oxygen Delivery Method): room air      General: No acute distress while Sitting in bed    Neuro: AAO*4, No obvious acute motor deficit  HEENT: Pupils equal, reactive to light, Oral mucosa moist  PULM: Clear to auscultation bilaterally except minimally decreased at the bases  CVS:  paced rhythm and controlled rate  ABD: Soft,  minimally distended, nontender, normoactive bowel sounds, no CVA tenderness  EXT:  trace b/l LE edema, nontender with pedal pulse palpable   SKIN: Warm and well perfused, no acute rashes   NO obvious palpable lymphadenopathy     Assessment:     78-year-old [place] male with history of coronary artery disease s/p CABG s/p multiple PCI, ischemic cardiomyopathy with heart failure with reduced EF s/p ICD for bundle branch blocks diabetes mellitus on insulin stage III-IV CKD essential hypertension dyslipidemia was admitted to medical ICU with  VT cardiac arrest x 2  Cardiogenic shock  Acute systolic heart failure exacerbation with non-ST elevation MI  New onset A-fib with RVR  RAS on CKD     Hospital course:  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 Recently had Holter monitor placement for PVC burden which was noted to have worsened the day of presentation. In ED, patient weak appearing, ruled in for NSTEMI with positive troponin, and labs remarkable for RAS on CKD as well as hypoglycemia. A TTE revealed acute on chronic systolic heart failure with EF <20%. He was admitted to telemetry for treatment of ACS on DAPT and heparin gtt, planned for LHC. Patient suffered VF/VT cardiac arrest on 5/25 without ICD shock (subsequently reprogrammed by EP) and was taken for emergent LHC where he was found to have 3/4 occluded grafts, only LIMA to LAD was patent. No intervention performed. Extubated on 5/26, however suffered another VT cardiac arrest later that day (again without ICD shock) with 1 minute downtime prior to ROSC. Hospital course further complicated by cardiogenic shock requiring IV vasopressor and inotrope support. Extubated on 5/27.   S/p inotrope support over 24 hours ago with weaning off of PA catheter and discontinuation on June 1.      ====================== NEUROLOGY=====================  ondansetron Injectable 4 milliGRAM(s) IV Push every 8 hours PRN Nausea and/or Vomiting   every 4 neurochecks  Physical therapy evaluation and management  ==================== RESPIRATORY======================   oxygenating ventilating fine on nasal cannula   albuterol/ipratropium for Nebulization 3 milliLiter(s) Nebulizer every 6 hours PRN Shortness of Breath and/or Wheezing  benzonatate 100 milliGRAM(s) Oral three times a day  guaiFENesin Oral Liquid (Sugar-Free) 200 milliGRAM(s) Oral every 6 hours PRN Cough  montelukast 10 milliGRAM(s) Oral daily   incentive spirometry  ====================CARDIOVASCULAR==================  aMIOdarone    Tablet 400 milliGRAM(s) Oral every 8 hours  hydrALAZINE 20 milliGRAM(s) Oral three times a day  metoprolol tartrate 12.5 milliGRAM(s) Oral every 6 hours  mexiletine 150 milliGRAM(s) Oral every 8 hours  ranolazine 500 milliGRAM(s) Oral two times a day  aspirin enteric coated 81 milliGRAM(s) Oral daily  clopidogrel Tablet 75 milliGRAM(s) Oral daily  S/p PA catheter removal on June 1, left heart cath on May 27 right heart cath on May 28 with severe CBC pH and low cardiac output with elevated BiV filling pressures requiring milrinone and aggressive diuresis with Bumex currently status post milrinone as well as Bumex infusion and currently on intermittent Bumex.  Beta-blocker added as above by cardiology team today  Slow uptitration of GDMT as tolerated    Medical management of coronary artery disease especially occluded grafts except NOGUERA to LAD  ===================HEMATOLOGIC/ONC ===================    heparin   Injectable 5500 Unit(s) IV Push every 6 hours PRN For aPTT less than 40  heparin   Injectable 2500 Unit(s) IV Push every 6 hours PRN For aPTT between 40 - 57  heparin  Infusion. 900 Unit(s)/Hr (9 mL/Hr) IV Continuous <Continuous>    ===================== RENAL =========================  Continue monitoring urine output   appreciate nephrology input  Transitioning Bumex from 3 mg twice daily to once daily   close monitoring of potassium, phosphorus, magnesium with target levels of more than 4, 3, 2 respectively   avoid nephrotoxic agents   closely monitor BUN/creatinine  ==================== GASTROINTESTINAL===================  pantoprazole    Tablet 40 milliGRAM(s) Oral before breakfast  senna 2 Tablet(s) Oral at bedtime PRN Constipation  sodium chloride 0.9% lock flush 10 milliLiter(s) IV Push every 1 hour PRN Pre/post blood products, medications, blood draw, and to maintain line patency  Halal sodium and cholesterol restricted TLC diet  =======================    ENDOCRINE  =====================  atorvastatin 80 milliGRAM(s) Oral at bedtime  dextrose 50% Injectable 25 Gram(s) IV Push once  insulin glargine Injectable (LANTUS) 9 Unit(s) SubCutaneous at bedtime  insulin lispro (ADMELOG) corrective regimen sliding scale   SubCutaneous three times a day before meals  insulin lispro (ADMELOG) corrective regimen sliding scale   SubCutaneous at bedtime  insulin lispro Injectable (ADMELOG) 3 Unit(s) SubCutaneous three times a day before meals  levothyroxine 88 MICROGram(s) Oral daily   blood sugar goal 100-200  Order for hemoglobin A1c and TSH for morning    ========================INFECTIOUS DISEASE================    Finished 7-day course of Zosyn on June 1  MRSA screen negative      Care plan discussed with ICU care team.

## 2024-06-03 LAB
A1C WITH ESTIMATED AVERAGE GLUCOSE RESULT: 6.8 % — HIGH (ref 4–5.6)
ALBUMIN SERPL ELPH-MCNC: 3.1 G/DL — LOW (ref 3.3–5.2)
ALP SERPL-CCNC: 97 U/L — SIGNIFICANT CHANGE UP (ref 40–120)
ALT FLD-CCNC: 87 U/L — HIGH
ANION GAP SERPL CALC-SCNC: 22 MMOL/L — HIGH (ref 5–17)
APTT BLD: 191.3 SEC — CRITICAL HIGH (ref 24.5–35.6)
APTT BLD: 195.5 SEC — CRITICAL HIGH (ref 24.5–35.6)
APTT BLD: 51.9 SEC — HIGH (ref 24.5–35.6)
AST SERPL-CCNC: 55 U/L — HIGH
BASE EXCESS BLDA CALC-SCNC: -2.5 MMOL/L — LOW (ref -2–3)
BILIRUB SERPL-MCNC: 0.8 MG/DL — SIGNIFICANT CHANGE UP (ref 0.4–2)
BLOOD GAS COMMENTS ARTERIAL: SIGNIFICANT CHANGE UP
BUN SERPL-MCNC: 64.5 MG/DL — HIGH (ref 8–20)
CALCIUM SERPL-MCNC: 8.8 MG/DL — SIGNIFICANT CHANGE UP (ref 8.4–10.5)
CHLORIDE SERPL-SCNC: 92 MMOL/L — LOW (ref 96–108)
CO2 SERPL-SCNC: 16 MMOL/L — LOW (ref 22–29)
CREAT SERPL-MCNC: 3.84 MG/DL — HIGH (ref 0.5–1.3)
EGFR: 16 ML/MIN/1.73M2 — LOW
ESTIMATED AVERAGE GLUCOSE: 148 MG/DL — HIGH (ref 68–114)
GAS PNL BLDA: SIGNIFICANT CHANGE UP
GLUCOSE BLDC GLUCOMTR-MCNC: 125 MG/DL — HIGH (ref 70–99)
GLUCOSE BLDC GLUCOMTR-MCNC: 149 MG/DL — HIGH (ref 70–99)
GLUCOSE BLDC GLUCOMTR-MCNC: 154 MG/DL — HIGH (ref 70–99)
GLUCOSE BLDC GLUCOMTR-MCNC: 155 MG/DL — HIGH (ref 70–99)
GLUCOSE BLDC GLUCOMTR-MCNC: 92 MG/DL — SIGNIFICANT CHANGE UP (ref 70–99)
GLUCOSE SERPL-MCNC: 160 MG/DL — HIGH (ref 70–99)
HCO3 BLDA-SCNC: 22 MMOL/L — SIGNIFICANT CHANGE UP (ref 21–28)
HCT VFR BLD CALC: 34 % — LOW (ref 39–50)
HGB BLD-MCNC: 11.1 G/DL — LOW (ref 13–17)
MAGNESIUM SERPL-MCNC: 2.6 MG/DL — SIGNIFICANT CHANGE UP (ref 1.6–2.6)
MCHC RBC-ENTMCNC: 30.3 PG — SIGNIFICANT CHANGE UP (ref 27–34)
MCHC RBC-ENTMCNC: 32.6 GM/DL — SIGNIFICANT CHANGE UP (ref 32–36)
MCV RBC AUTO: 92.9 FL — SIGNIFICANT CHANGE UP (ref 80–100)
PCO2 BLDA: 31 MMHG — LOW (ref 35–48)
PH BLDA: 7.45 — SIGNIFICANT CHANGE UP (ref 7.35–7.45)
PLATELET # BLD AUTO: 223 K/UL — SIGNIFICANT CHANGE UP (ref 150–400)
PO2 BLDA: 123 MMHG — HIGH (ref 83–108)
POTASSIUM SERPL-MCNC: 4.8 MMOL/L — SIGNIFICANT CHANGE UP (ref 3.5–5.3)
POTASSIUM SERPL-SCNC: 4.8 MMOL/L — SIGNIFICANT CHANGE UP (ref 3.5–5.3)
PROT SERPL-MCNC: 6.7 G/DL — SIGNIFICANT CHANGE UP (ref 6.6–8.7)
RBC # BLD: 3.66 M/UL — LOW (ref 4.2–5.8)
RBC # FLD: 16.5 % — HIGH (ref 10.3–14.5)
SAO2 % BLDA: 98 % — SIGNIFICANT CHANGE UP (ref 94–98)
SODIUM SERPL-SCNC: 130 MMOL/L — LOW (ref 135–145)
TSH SERPL-MCNC: 4.91 UIU/ML — HIGH (ref 0.27–4.2)
WBC # BLD: 13.76 K/UL — HIGH (ref 3.8–10.5)
WBC # FLD AUTO: 13.76 K/UL — HIGH (ref 3.8–10.5)

## 2024-06-03 PROCEDURE — 99232 SBSQ HOSP IP/OBS MODERATE 35: CPT

## 2024-06-03 PROCEDURE — 99233 SBSQ HOSP IP/OBS HIGH 50: CPT

## 2024-06-03 RX ORDER — HYDRALAZINE HCL 50 MG
25 TABLET ORAL THREE TIMES A DAY
Refills: 0 | Status: DISCONTINUED | OUTPATIENT
Start: 2024-06-03 | End: 2024-06-04

## 2024-06-03 RX ORDER — SODIUM BICARBONATE 1 MEQ/ML
650 SYRINGE (ML) INTRAVENOUS EVERY 12 HOURS
Refills: 0 | Status: DISCONTINUED | OUTPATIENT
Start: 2024-06-03 | End: 2024-06-05

## 2024-06-03 RX ORDER — BENZOCAINE AND MENTHOL 5; 1 G/100ML; G/100ML
1 LIQUID ORAL EVERY 8 HOURS
Refills: 0 | Status: DISCONTINUED | OUTPATIENT
Start: 2024-06-03 | End: 2024-06-05

## 2024-06-03 RX ADMIN — ATORVASTATIN CALCIUM 80 MILLIGRAM(S): 80 TABLET, FILM COATED ORAL at 21:43

## 2024-06-03 RX ADMIN — Medication 12.5 MILLIGRAM(S): at 06:42

## 2024-06-03 RX ADMIN — HEPARIN SODIUM 1000 UNIT(S)/HR: 5000 INJECTION INTRAVENOUS; SUBCUTANEOUS at 06:55

## 2024-06-03 RX ADMIN — Medication 100 MILLIGRAM(S): at 05:08

## 2024-06-03 RX ADMIN — Medication 81 MILLIGRAM(S): at 12:47

## 2024-06-03 RX ADMIN — Medication 100 MILLIGRAM(S): at 13:30

## 2024-06-03 RX ADMIN — AMIODARONE HYDROCHLORIDE 400 MILLIGRAM(S): 400 TABLET ORAL at 05:13

## 2024-06-03 RX ADMIN — Medication 3 UNIT(S): at 12:48

## 2024-06-03 RX ADMIN — MONTELUKAST 10 MILLIGRAM(S): 4 TABLET, CHEWABLE ORAL at 12:48

## 2024-06-03 RX ADMIN — HEPARIN SODIUM 0 UNIT(S)/HR: 5000 INJECTION INTRAVENOUS; SUBCUTANEOUS at 16:30

## 2024-06-03 RX ADMIN — HEPARIN SODIUM 2500 UNIT(S): 5000 INJECTION INTRAVENOUS; SUBCUTANEOUS at 06:58

## 2024-06-03 RX ADMIN — INSULIN GLARGINE 9 UNIT(S): 100 INJECTION, SOLUTION SUBCUTANEOUS at 21:43

## 2024-06-03 RX ADMIN — RANOLAZINE 500 MILLIGRAM(S): 500 TABLET, FILM COATED, EXTENDED RELEASE ORAL at 06:42

## 2024-06-03 RX ADMIN — Medication 88 MICROGRAM(S): at 05:09

## 2024-06-03 RX ADMIN — MEXILETINE HYDROCHLORIDE 150 MILLIGRAM(S): 150 CAPSULE ORAL at 08:57

## 2024-06-03 RX ADMIN — Medication 200 MILLIGRAM(S): at 02:22

## 2024-06-03 RX ADMIN — Medication 20 MILLIGRAM(S): at 06:42

## 2024-06-03 RX ADMIN — Medication 25 MILLIGRAM(S): at 21:43

## 2024-06-03 RX ADMIN — Medication 1: at 08:51

## 2024-06-03 RX ADMIN — PANTOPRAZOLE SODIUM 40 MILLIGRAM(S): 20 TABLET, DELAYED RELEASE ORAL at 05:08

## 2024-06-03 RX ADMIN — HEPARIN SODIUM 800 UNIT(S)/HR: 5000 INJECTION INTRAVENOUS; SUBCUTANEOUS at 17:32

## 2024-06-03 RX ADMIN — BENZOCAINE AND MENTHOL 1 LOZENGE: 5; 1 LIQUID ORAL at 21:43

## 2024-06-03 RX ADMIN — MEXILETINE HYDROCHLORIDE 150 MILLIGRAM(S): 150 CAPSULE ORAL at 00:08

## 2024-06-03 RX ADMIN — Medication 3 UNIT(S): at 08:51

## 2024-06-03 RX ADMIN — Medication 650 MILLIGRAM(S): at 17:27

## 2024-06-03 RX ADMIN — HEPARIN SODIUM 800 UNIT(S)/HR: 5000 INJECTION INTRAVENOUS; SUBCUTANEOUS at 19:08

## 2024-06-03 RX ADMIN — CHLORHEXIDINE GLUCONATE 1 APPLICATION(S): 213 SOLUTION TOPICAL at 05:14

## 2024-06-03 RX ADMIN — ONDANSETRON 4 MILLIGRAM(S): 8 TABLET, FILM COATED ORAL at 02:30

## 2024-06-03 RX ADMIN — Medication 25 MILLIGRAM(S): at 15:48

## 2024-06-03 RX ADMIN — HEPARIN SODIUM 900 UNIT(S)/HR: 5000 INJECTION INTRAVENOUS; SUBCUTANEOUS at 01:56

## 2024-06-03 RX ADMIN — BUMETANIDE 124 MILLIGRAM(S): 0.25 INJECTION INTRAMUSCULAR; INTRAVENOUS at 05:10

## 2024-06-03 RX ADMIN — HEPARIN SODIUM 1000 UNIT(S)/HR: 5000 INJECTION INTRAVENOUS; SUBCUTANEOUS at 07:27

## 2024-06-03 RX ADMIN — BENZOCAINE AND MENTHOL 1 LOZENGE: 5; 1 LIQUID ORAL at 13:30

## 2024-06-03 RX ADMIN — MEXILETINE HYDROCHLORIDE 150 MILLIGRAM(S): 150 CAPSULE ORAL at 17:27

## 2024-06-03 RX ADMIN — CLOPIDOGREL BISULFATE 75 MILLIGRAM(S): 75 TABLET, FILM COATED ORAL at 12:47

## 2024-06-03 RX ADMIN — Medication 3 UNIT(S): at 17:28

## 2024-06-03 RX ADMIN — RANOLAZINE 500 MILLIGRAM(S): 500 TABLET, FILM COATED, EXTENDED RELEASE ORAL at 17:27

## 2024-06-03 NOTE — PROGRESS NOTE ADULT - SUBJECTIVE AND OBJECTIVE BOX
ICU Transfer Acceptance Note:     INTERVAL HPI/OVERNIGHT EVENTS: Patient seen and examined,  Denies chest pain sob or palpitations. Complaints of productive cough.       Vital Signs Last 24 Hrs  T(C): 36.4 (03 Jun 2024 20:06), Max: 36.8 (03 Jun 2024 07:47)  T(F): 97.6 (03 Jun 2024 20:06), Max: 98.2 (03 Jun 2024 07:47)  HR: 60 (03 Jun 2024 22:00) (60 - 60)  BP: 94/53 (03 Jun 2024 22:00) (88/50 - 117/59)  BP(mean): 67 (03 Jun 2024 22:00) (58 - 87)  RR: 18 (03 Jun 2024 22:00) (18 - 23)  SpO2: 99% (03 Jun 2024 22:00) (98% - 100%)    Parameters below as of 03 Jun 2024 22:00  Patient On (Oxygen Delivery Method): room air        PHYSICAL EXAM:    GENERAL: NAD, AOX4  HEAD:  Atraumatic, Normocephalic  EYES:  conjunctiva and sclera clear  ENMT: Moist mucous membranes  CHEST/LUNG: occasional rhonchi at bases   HEART: Regular rate and rhythm; No murmurs, rubs, or gallops  ABDOMEN: Soft, Nontender, Nondistended; Bowel sounds present  EXTREMITIES: +1 pitting edema b/l         MEDICATIONS  (STANDING):  aspirin enteric coated 81 milliGRAM(s) Oral daily  atorvastatin 80 milliGRAM(s) Oral at bedtime  benzonatate 100 milliGRAM(s) Oral three times a day  buMETAnide IVPB 3 milliGRAM(s) IV Intermittent daily  chlorhexidine 2% Cloths 1 Application(s) Topical <User Schedule>  clopidogrel Tablet 75 milliGRAM(s) Oral daily  dextrose 50% Injectable 25 Gram(s) IV Push once  heparin  Infusion. 900 Unit(s)/Hr (9 mL/Hr) IV Continuous <Continuous>  hydrALAZINE 20 milliGRAM(s) Oral three times a day  insulin glargine Injectable (LANTUS) 9 Unit(s) SubCutaneous at bedtime  insulin lispro (ADMELOG) corrective regimen sliding scale   SubCutaneous three times a day before meals  insulin lispro (ADMELOG) corrective regimen sliding scale   SubCutaneous at bedtime  insulin lispro Injectable (ADMELOG) 3 Unit(s) SubCutaneous three times a day before meals  levothyroxine 88 MICROGram(s) Oral daily  metoprolol tartrate 12.5 milliGRAM(s) Oral every 6 hours  mexiletine 150 milliGRAM(s) Oral every 8 hours  montelukast 10 milliGRAM(s) Oral daily  pantoprazole    Tablet 40 milliGRAM(s) Oral before breakfast  ranolazine 500 milliGRAM(s) Oral two times a day    MEDICATIONS  (PRN):  albuterol/ipratropium for Nebulization 3 milliLiter(s) Nebulizer every 6 hours PRN Shortness of Breath and/or Wheezing  guaiFENesin Oral Liquid (Sugar-Free) 200 milliGRAM(s) Oral every 6 hours PRN Cough  heparin   Injectable 5500 Unit(s) IV Push every 6 hours PRN For aPTT less than 40  heparin   Injectable 2500 Unit(s) IV Push every 6 hours PRN For aPTT between 40 - 57  ondansetron Injectable 4 milliGRAM(s) IV Push every 8 hours PRN Nausea and/or Vomiting  senna 2 Tablet(s) Oral at bedtime PRN Constipation  sodium chloride 0.9% lock flush 10 milliLiter(s) IV Push every 1 hour PRN Pre/post blood products, medications, blood draw, and to maintain line patency      Allergies    No Known Allergies    Intolerances    DOPamine (Hypotension)        LABS:                          10.5   9.15  )-----------( 165      ( 02 Jun 2024 03:00 )             31.2     06-02    132<L>  |  96  |  58.6<H>  ----------------------------<  124<H>  3.8   |  21.0<L>  |  3.18<H>    Ca    8.4      02 Jun 2024 03:00  Phos  4.2     06-02  Mg     2.4     06-02    TPro  6.2<L>  /  Alb  2.9<L>  /  TBili  0.6  /  DBili  x   /  AST  42<H>  /  ALT  116<H>  /  AlkPhos  90  06-02    PTT - ( 03 Jun 2024 06:00 )  PTT:51.9 sec  Urinalysis Basic - ( 02 Jun 2024 03:00 )    Color: x / Appearance: x / SG: x / pH: x  Gluc: 124 mg/dL / Ketone: x  / Bili: x / Urobili: x   Blood: x / Protein: x / Nitrite: x   Leuk Esterase: x / RBC: x / WBC x   Sq Epi: x / Non Sq Epi: x / Bacteria: x        RADIOLOGY & ADDITIONAL TESTS:

## 2024-06-03 NOTE — PROGRESS NOTE ADULT - NS ATTEND AMEND GEN_ALL_CORE FT
Patient seen and examined at bedside. Patient feels well overall  Creatinine is noted to uptrend as well as BUN. I suspect overdiuresis. Will hold evening diuretic today and reassess tomorrow what diuretic to place the patient on  Increase hydralazine to 25mg TID

## 2024-06-03 NOTE — PHYSICAL THERAPY INITIAL EVALUATION ADULT - ADDITIONAL COMMENTS
Pt reports he lives with his wife in a private home. Pts home has 1 URSZULA with 0 HR and has all needs met on first floor. Pt reports that his wife is able to assist him with functional mobility if needed upon DC.     DME: pt owns a cane

## 2024-06-03 NOTE — PHYSICAL THERAPY INITIAL EVALUATION ADULT - IMPAIRMENTS CONTRIBUTING TO GAIT DEVIATIONS, PT EVAL
poor command following in standing, retropulsion, right lateral lean in standing/ataxic/impaired balance/cognition/impaired coordination/impaired motor control/impaired postural control/decreased ROM

## 2024-06-03 NOTE — PHYSICAL THERAPY INITIAL EVALUATION ADULT - IMPAIRMENTS FOUND, PT EVAL
motor control/cognitive impairment/ergonomics and body mechanics/gait, locomotion, and balance/gross motor/posture

## 2024-06-03 NOTE — PROGRESS NOTE ADULT - SUBJECTIVE AND OBJECTIVE BOX
[facility] Advanced Heart Failure - Progress Note  [address] [place] [address]  Office Phone: [phone]/Fax: [phone]  Service/On Call Phone [phone]    Subjective/Objective:  NAEO. New Leukocytosis, metabolic alkalosis noted on am labs. Pt. denies overt HF symptoms although he has not been OOB.     Medications:  albuterol/ipratropium for Nebulization 3 milliLiter(s) Nebulizer every 6 hours PRN  aspirin enteric coated 81 milliGRAM(s) Oral daily  atorvastatin 80 milliGRAM(s) Oral at bedtime  benzocaine/menthol Lozenge 1 Lozenge Oral every 8 hours  benzonatate 100 milliGRAM(s) Oral three times a day  chlorhexidine 2% Cloths 1 Application(s) Topical <User Schedule>  clopidogrel Tablet 75 milliGRAM(s) Oral daily  dextrose 50% Injectable 25 Gram(s) IV Push once  guaiFENesin Oral Liquid (Sugar-Free) 200 milliGRAM(s) Oral every 6 hours PRN  heparin   Injectable 5500 Unit(s) IV Push every 6 hours PRN  heparin   Injectable 2500 Unit(s) IV Push every 6 hours PRN  heparin  Infusion. 900 Unit(s)/Hr IV Continuous <Continuous>  hydrALAZINE 25 milliGRAM(s) Oral three times a day  insulin glargine Injectable (LANTUS) 9 Unit(s) SubCutaneous at bedtime  insulin lispro (ADMELOG) corrective regimen sliding scale   SubCutaneous three times a day before meals  insulin lispro (ADMELOG) corrective regimen sliding scale   SubCutaneous at bedtime  insulin lispro Injectable (ADMELOG) 3 Unit(s) SubCutaneous three times a day before meals  levothyroxine 88 MICROGram(s) Oral daily  metoprolol tartrate 12.5 milliGRAM(s) Oral every 6 hours  mexiletine 150 milliGRAM(s) Oral every 8 hours  montelukast 10 milliGRAM(s) Oral daily  ondansetron Injectable 4 milliGRAM(s) IV Push every 8 hours PRN  pantoprazole    Tablet 40 milliGRAM(s) Oral before breakfast  ranolazine 500 milliGRAM(s) Oral two times a day  senna 2 Tablet(s) Oral at bedtime PRN  sodium chloride 0.9% lock flush 10 milliLiter(s) IV Push every 1 hour PRN    Vital Signs Last 24 Hours  T(C): 36.8 (24 @ 07:47), Max: 36.8 (24 @ 07:47)  HR: 60 (24 @ 12:00) (60 - 73)  BP: 117/59 (24 @ 10:00) (92/69 - 120/70)  RR: 20 (24 @ 12:00) (17 - 29)  SpO2: 98% (24 @ 12:00) (98% - 100%)    Weight in k.6 ( @ 01:00)    I&O's Summary  2024 07:01  -  2024 07:00  --------------------------------------------------------  IN: 1237 mL / OUT: 800 mL / NET: 437 mL    Tele:  60    Physical Exam:  General: In no distress.   HEENT: EOMs intact.  Neck: Neck supple. JVP not elevated.   Chest: Clear to auscultation bilaterally.  CV: RRR. Normal S1 and S2. No murmurs, rub, or gallops. Lukewarm peripherally.  PV: No LE edema noted.  Abdomen: Soft, non-distended, non-tender.  Skin: warm, dry.  Neurology: Alert and oriented times three. Sensation intact.  Psych: Appropriate affect.    Labs:                        11.1   13.76 )-----------( 223      ( 2024 06:00 )             34.0     06-03    130<L>  |  92<L>  |  64.5<H>  ----------------------------<  160<H>  4.8   |  16.0<L>  |  3.84<H>    Ca    8.8      2024 06:00  Phos  4.2     06-02  Mg     2.6     06-03    TPro  6.7  /  Alb  3.1<L>  /  TBili  0.8  /  DBili  x   /  AST  55<H>  /  ALT  87<H>  /  AlkPhos  97      PTT - ( 2024 06:00 )  PTT:51.9 sec      Lactate, Blood: 0.9 mmol/L ( @ 03:00)  Lactate, Blood: 0.9 mmol/L ( @ 09:37)  Lactate, Blood: 1.0 mmol/L ( @ 03:20)  Lactate, Blood: 0.7 mmol/L ( @ 15:30)         [facility] Advanced Heart Failure - Progress Note  [address] [place] [address]  Office Phone: [phone]/Fax: [phone]  Service/On Call Phone [phone]    Subjective/Objective:  NAEO. New Leukocytosis noted on am labs, pt afebrile. Pt. denies overt HF symptoms although he has not been OOB.     Medications:  albuterol/ipratropium for Nebulization 3 milliLiter(s) Nebulizer every 6 hours PRN  aspirin enteric coated 81 milliGRAM(s) Oral daily  atorvastatin 80 milliGRAM(s) Oral at bedtime  benzocaine/menthol Lozenge 1 Lozenge Oral every 8 hours  benzonatate 100 milliGRAM(s) Oral three times a day  chlorhexidine 2% Cloths 1 Application(s) Topical <User Schedule>  clopidogrel Tablet 75 milliGRAM(s) Oral daily  dextrose 50% Injectable 25 Gram(s) IV Push once  guaiFENesin Oral Liquid (Sugar-Free) 200 milliGRAM(s) Oral every 6 hours PRN  heparin   Injectable 5500 Unit(s) IV Push every 6 hours PRN  heparin   Injectable 2500 Unit(s) IV Push every 6 hours PRN  heparin  Infusion. 900 Unit(s)/Hr IV Continuous <Continuous>  hydrALAZINE 25 milliGRAM(s) Oral three times a day  insulin glargine Injectable (LANTUS) 9 Unit(s) SubCutaneous at bedtime  insulin lispro (ADMELOG) corrective regimen sliding scale   SubCutaneous three times a day before meals  insulin lispro (ADMELOG) corrective regimen sliding scale   SubCutaneous at bedtime  insulin lispro Injectable (ADMELOG) 3 Unit(s) SubCutaneous three times a day before meals  levothyroxine 88 MICROGram(s) Oral daily  metoprolol tartrate 12.5 milliGRAM(s) Oral every 6 hours  mexiletine 150 milliGRAM(s) Oral every 8 hours  montelukast 10 milliGRAM(s) Oral daily  ondansetron Injectable 4 milliGRAM(s) IV Push every 8 hours PRN  pantoprazole    Tablet 40 milliGRAM(s) Oral before breakfast  ranolazine 500 milliGRAM(s) Oral two times a day  senna 2 Tablet(s) Oral at bedtime PRN  sodium chloride 0.9% lock flush 10 milliLiter(s) IV Push every 1 hour PRN    Vital Signs Last 24 Hours  T(C): 36.8 (24 @ 07:47), Max: 36.8 (24 @ 07:47)  HR: 60 (24 @ 12:00) (60 - 73)  BP: 117/59 (24 @ 10:00) (92/69 - 120/70)  RR: 20 (24 @ 12:00) (17 - 29)  SpO2: 98% (24 @ 12:00) (98% - 100%)    Weight in k.6 ( @ 01:00)    I&O's Summary  2024 07:01  -  2024 07:00  --------------------------------------------------------  IN: 1237 mL / OUT: 800 mL / NET: 437 mL    Tele:  60    Physical Exam:  General: In no distress.   HEENT: EOMs intact.  Neck: Neck supple. JVP not elevated.   Chest: Clear to auscultation bilaterally.  CV: RRR. Normal S1 and S2. No murmurs, rub, or gallops. Lukewarm peripherally.  PV: No LE edema noted.  Abdomen: Soft, non-distended, non-tender.  Skin: warm, dry.  Neurology: Alert and oriented times three. Sensation intact.  Psych: Appropriate affect.    Labs:                        11.1   13.76 )-----------( 223      ( 2024 06:00 )             34.0     06-03    130<L>  |  92<L>  |  64.5<H>  ----------------------------<  160<H>  4.8   |  16.0<L>  |  3.84<H>    Ca    8.8      2024 06:00  Phos  4.2     06-02  Mg     2.6     06-03    TPro  6.7  /  Alb  3.1<L>  /  TBili  0.8  /  DBili  x   /  AST  55<H>  /  ALT  87<H>  /  AlkPhos  97      PTT - ( 2024 06:00 )  PTT:51.9 sec      Lactate, Blood: 0.9 mmol/L ( @ 03:00)  Lactate, Blood: 0.9 mmol/L ( @ 09:37)  Lactate, Blood: 1.0 mmol/L ( @ 03:20)  Lactate, Blood: 0.7 mmol/L ( @ 15:30)

## 2024-06-03 NOTE — PHYSICAL THERAPY INITIAL EVALUATION ADULT - CRITERIA FOR SKILLED THERAPEUTIC INTERVENTIONS
ADELINA/impairments found/functional limitations in following categories/anticipated discharge recommendation

## 2024-06-03 NOTE — PROGRESS NOTE ADULT - ASSESSMENT
Patient is a 78 year old male with history of HTN, HLD, DM, CKD, CAD s/p 4V CABG and multiple PCI's, ischemic cardiomyopathy (EF < 20%), and LBBB s/p MDT CRT-D. He had an admission in November for ADHF. At that time a LHC revealed his grafts were occluded except for the LIMA to LAD. He also underwent a CRTD implant.     On 5/25 he had a VT arrest with ROSC achieved after 3 minutes was sent to the cath lab which showed his LIMA is still patent. Patient was extubated on 5/27. RHC on 5/28 showed severely elevated biv filling pressures, severe Cpc-PH, and low CO. He was started on low dose milrinone and diuresed well on IV Bumex. Weaned off pressors 5/29 and Milrinone was weaned off 5/31.    Bedside Hemodynamics:  5/31 (mil 0.125): CVP 9-10, PA 72/24/43, PA sat 70.7%, Fauzia CO/CI 4.6/2.6, /56 (73), SVR 1096 dsc.    Cardiac Studies:  5/28/24 RHC: RA 18, RV 92/18, PA 95/38/57, PCW 31, PA sat 51.5%, Fauzia CO/CI 3.09/1.74, TPG 26, DPG 7, SVR 1889 dsc, PVR 8.41 MEYERS, Vidhi 3.2, /63 (91) on levo 0.08.    5/25/24 LHC: All grafts occluded except LIMA-LAD. LVEDP 29 mmHg.    5/23/24 TTE: LVEF <20%, LVIDd 5.8cm, mild RVE with mild RV dysfunction, TAPSE 0.7cm, mild-mod AS, mild-mod MR, mild TR, mild WI, est PASP 75 mmHg.

## 2024-06-03 NOTE — PHYSICAL THERAPY INITIAL EVALUATION ADULT - TRANSFER SAFETY CONCERNS NOTED: SIT/STAND, REHAB EVAL
retropulsion in standing, right lateral lean in standing./decreased balance during turns/decreased safety awareness/losing balance/decreased weight-shifting ability

## 2024-06-03 NOTE — PHYSICAL THERAPY INITIAL EVALUATION ADULT - DIAGNOSIS, PT EVAL
Pt demonstrates functional limitations in transfers and ambulation due to decreased balance, coordination, and coordination.

## 2024-06-03 NOTE — PROGRESS NOTE ADULT - PROBLEM SELECTOR PLAN 1
- Improving, lactate has cleared.   - Inotrope/pressors: Weaned off levo 5/29 and milrinone 5/31.  - Renal function worsening, possible overdiuresis. Will hold diuretics for now w/ close monitoring.   - Continue to monitor perfusion markers daily (lactate, LFTs). If worsening may need to consider resuming low dose inotropics support.   - Hemodynamic goals: MAP 65, CVP 8-10, MVO2 >65, PCWP 12, CI >2.2, Lactate <2.2.

## 2024-06-03 NOTE — PROGRESS NOTE ADULT - ASSESSMENT
The patient is a 78 year old male with a past medical history of CAD status post CABG, multiple PCI, HFpEF, diabetes mellitus type 2, hypertension, stage  3/4 and hyperlipidemia who presented to the ER with complaints of chest pain. Holter Monitor noted increased PVC burden therefore patient was referred to the Er for  evaluation. Admitted for NSTEMI and RAS on CKD. Cardiology and nephrology were consulted. Initially diuretics held for RAS. TTE with EF < 20%. On 05/26 Code Blue was called for VF arrest. Cardiology team  were present at bedside during cardiac arrest, called and activated STEMI team for emergent cardiac cath; EP interrogated patient's ICD post-arrest, as ICD did not fire during sustained VT, and recommended initiation of Amiodarone infusion and Lidocaine infusions   for arrhythmia suppression. Taken to cath lab and cath was performed by Dr. Strong revealing patent LIMA-LAD, but occluded SVG-Circ, all other grafts/vessels noted to be occluded: %, %, Circ 100%, %. Lima-LAD is fillng  LCX/RCA through collateral circulation. Due to the extensive disease present, there was no intervention performed and recommendation from interventional attending was for medical therapy. ACID was reprogrammed by EP. Patient was extubated on 5/26 remained in shock with improving vasopressor requirements. Patient then developed sustained monomorphic VT at 180 bpm despite amiodarone, no shocks delivered by CRT-D. Given Amiodarone 150mg IVPB load, amiodarone infusion increased to 1mg/min, given Lidocaine 100mg IV push bolus and started on Lidocaine infusion  Heart rate slowed to 120-140's and rhythm changed to what appeared to be AFib RVR with aberrancy with periods of NSVT EP Dr. Turpin called to bedside, interrogated and reprogrammed CRT-D. Patient was intubated again in the setting of cardiogenic shock and cardiac arrest ROSC after 1 min. CRT was reprogrammed again by EP> GOC were discussed initially DNR which was then rescinded. Heart failure was consulted, taken for RHC and [place] placement 5/28. Started on Diuresis with improvement. SWan removed on 06/01. Weaned off midodrine. Stable to be downgraded to medicine    >Cardiogenic shock with acute hypoxic respiratory failure / Ischemic cardiomyopathy  - Status post cardiac arrest x 2/ Extubated x 2  - [place] removed on 6/1  - Weaned off Milrinone  -ongoing diuresis as per cardio/ hf team   - Spo2 on RA at rest >92%  - Strict I&Os  - Daily weight  - HF and cardiology following    > Monomorphic VT:  - Intermittent PAT with variable conduction. NO recurrent VT since 5/26  - SP  Amiodarone 400mg TID x 4 days, Now on 200mg PO OD  - TFTs & LFTs should be monitored q 3-6 months while on amiodarone therapy.   - Continue Mexiletine 150mg TID.  - Likely scar-mediated VT from ischemic CMY precipitated by AT/AF with RVR  - CHADS2-VASc of 6 (CHF, HTN, DM, Agex2, CAD) on Heparin gtt transition to NOAC on discharge  - Metoprolol 12.6mg Q6 hours-  Increase BB as tolerated by BP    >AICD dysfunction   - Device inappropriately binned VT as SVT  - Device reprogrammed by EP     > Acute on chronic systolic CHF  - IV Bumex 3mg Q12 changed to OD   - No ACE/ARB in the setting of RAS/CKD  - Continue hydralazine and ranexa  - HF following  - Daily weight  - Strict I&OS  - Status post [place]enzie removed on 6/1    > RAS On CKD Stage 3/4/ agap  MA   - Uptrend Crt, lowered dose of bumex to OD   - Secondary to CHF  - noted to be more acidotic today ,  started on po bicarb   - Nephrology following      > CAD  - Status post [facility] with no intervention  - Continue aspirin, plavix, metoprolol, statin    > Diabetes mellitus type 2  - Lantus , premeal with ssi   - Hbaic of 6.8  - Monitor fsg                        > Hypothyroidism  - On Synthroid     >Suspected aspiration pneumonitis  - Completed course of IV Zosyn    >VTE_ Heparin IV    >PT evaluation    >Discharge disposition; Anticipate home with home care vs [facility] when medically stable   plan of care discussed with patient , two grandsons at bedside

## 2024-06-03 NOTE — PHYSICAL THERAPY INITIAL EVALUATION ADULT - GENERAL OBSERVATIONS, REHAB EVAL
Pt received reclining in bed, bedrails x 4, bed alarm on, CBWR, tele, , BP cuff, IV intact. Pt agreeable to PT evaluation.

## 2024-06-03 NOTE — PROGRESS NOTE ADULT - ASSESSMENT
79 y/o male  with history of CAD, MI's ,  4V CABG, multiple PCI's of the SVG to the OM with stents , HFrEF, DM2 on long term insulin, CKD stage stage 3-4 presented to the ED with c/o chest tightness    Cardiac arrest >shock- reqd  pressors - off now    RAS on CKD suspect stage III  Some improvement in serum Cr 4.2--> 3.4 --> 2.3 --> 2.2 --> 2.4 --> 2.5 --> 2.4> 2.4>2.89>3.18>3.84 -likely 2/2 diuretics  Base Cr seems to be approx 1.4- 1.8  Entresto, Torsemide and Farxiga on hold  On Bumex 3mg IV Q 12, will need to accept azotemia - can possibly change to qd as creat riscl - will d/w team and changed to qd but now need to hold for 1-2 days and restart slowly  TTE 5/23 noted LVEF < 20%  BNP 21 K  HYpoKalemia - supplemented, improved  s/p emergent L HC renal functio remains stable post cath  Results--> cath showed all grafts occluded except LIMA-LAD  plans are to medical management with DAPT, Ranexa, and statin  on Benzonatate    Renally dose meds  Avoid nephrotoxic agents  Monitor labs will follow; d/w merari

## 2024-06-03 NOTE — PHYSICAL THERAPY INITIAL EVALUATION ADULT - BALANCE DISTURBANCE, IDENTIFIED IMPAIRMENT CONTRIBUTE, REHAB EVAL
ataxia, retropulsion and lateral lean in standing/impaired coordination/impaired motor control/impaired postural control

## 2024-06-03 NOTE — PHYSICAL THERAPY INITIAL EVALUATION ADULT - PERTINENT HX OF CURRENT PROBLEM, REHAB EVAL
Dx: Pt with NSTEMI and RAS on CKD. Pt francine extensive stay in ICU with LHC, intubation, swan fernanda (intubation and swan can since DC'd.)     PMH: CAD s/p CABG, multiple PCI, T2DM, CKD 3/4, HLD

## 2024-06-03 NOTE — PROGRESS NOTE ADULT - SUBJECTIVE AND OBJECTIVE BOX
Patient seen and examined    I&O's Summary    02 Jun 2024 07:01  -  03 Jun 2024 07:00  --------------------------------------------------------  IN: 1237 mL / OUT: 800 mL / NET: 437 mL    Out of MICU  Much better, ate lunch easily    REVIEW OF SYSTEMS:    CONSTITUTIONAL: No F/C  RESPIRATORY: No cough,  No SOB  CARDIOVASCULAR: No CP/palpitations,    GASTROINTESTINAL: No abdominal pain  or NVD   GENITOURINARY: No UTI sx  NEUROLOGICAL: No headaches, NO wk/numbness  MUSCULOSKELETAL:   EXTREMITIES : no swelling,    Vital Signs Last 24 Hrs  T(C): 36.8 (03 Jun 2024 07:47), Max: 36.8 (03 Jun 2024 07:47)  T(F): 98.2 (03 Jun 2024 07:47), Max: 98.2 (03 Jun 2024 07:47)  HR: 60 (03 Jun 2024 12:44) (60 - 73)  BP: 92/54 (03 Jun 2024 12:44) (92/54 - 120/70)  BP(mean): 66 (03 Jun 2024 12:44) (58 - 94)  RR: 20 (03 Jun 2024 12:00) (17 - 29)  SpO2: 99% (03 Jun 2024 12:00) (98% - 100%)    Parameters below as of 03 Jun 2024 12:00  Patient On (Oxygen Delivery Method): room air    PHYSICAL EXAM:    GENERAL: NAD,   EYES:  conjunctiva and sclera clear  NECK: Supple, No JVD/Bruit  NERVOUS SYSTEM:  A/O x3,   CHEST:  No rales occ rhonchi  HEART:  RRR, No murmur  ABDOMEN: Soft, NT/ND BS+  EXTREMITIES:  No Edema;  SKIN: No rashes    LABS:                          11.1   13.76 )-----------( 223      ( 03 Jun 2024 06:00 )             34.0     06-03    130<L>  |  92<L>  |  64.5<H>  ----------------------------<  160<H>  4.8   |  16.0<L>  |  3.84<H>    Ca    8.8      03 Jun 2024 06:00  Phos  4.2     06-02  Mg     2.6     06-03    TPro  6.7  /  Alb  3.1<L>  /  TBili  0.8  /  DBili  x   /  AST  55<H>  /  ALT  87<H>  /  AlkPhos  97  06-03    CXR 6/2/24 > stable bilat pl effusions R > L    MEDICATIONS  (STANDING):  albuterol/ipratropium for Nebulization PRN  aspirin enteric coated  atorvastatin  benzocaine/menthol Lozenge  benzonatate  chlorhexidine 2% Cloths  clopidogrel Tablet  dextrose 50% Injectable  guaiFENesin Oral Liquid (Sugar-Free) PRN  heparin   Injectable PRN  heparin   Injectable PRN  heparin  Infusion.  hydrALAZINE  insulin glargine Injectable (LANTUS)  insulin lispro (ADMELOG) corrective regimen sliding scale  insulin lispro (ADMELOG) corrective regimen sliding scale  insulin lispro Injectable (ADMELOG)  levothyroxine  metoprolol tartrate  mexiletine  montelukast  ondansetron Injectable PRN  pantoprazole    Tablet  ranolazine  senna PRN  sodium chloride 0.9% lock flush PRN

## 2024-06-04 ENCOUNTER — APPOINTMENT (OUTPATIENT)
Dept: CARDIOLOGY | Facility: CLINIC | Age: 79
End: 2024-06-04

## 2024-06-04 LAB
ALBUMIN SERPL ELPH-MCNC: 3 G/DL — LOW (ref 3.3–5.2)
ALP SERPL-CCNC: 119 U/L — SIGNIFICANT CHANGE UP (ref 40–120)
ALT FLD-CCNC: 79 U/L — HIGH
ANION GAP SERPL CALC-SCNC: 18 MMOL/L — HIGH (ref 5–17)
APTT BLD: 79.5 SEC — HIGH (ref 24.5–35.6)
APTT BLD: 81.4 SEC — HIGH (ref 24.5–35.6)
APTT BLD: 81.6 SEC — HIGH (ref 24.5–35.6)
AST SERPL-CCNC: 37 U/L — SIGNIFICANT CHANGE UP
BILIRUB SERPL-MCNC: 0.6 MG/DL — SIGNIFICANT CHANGE UP (ref 0.4–2)
BUN SERPL-MCNC: 76.1 MG/DL — HIGH (ref 8–20)
CALCIUM SERPL-MCNC: 8.5 MG/DL — SIGNIFICANT CHANGE UP (ref 8.4–10.5)
CHLORIDE SERPL-SCNC: 91 MMOL/L — LOW (ref 96–108)
CO2 SERPL-SCNC: 19 MMOL/L — LOW (ref 22–29)
CREAT SERPL-MCNC: 4.65 MG/DL — HIGH (ref 0.5–1.3)
EGFR: 12 ML/MIN/1.73M2 — LOW
GLUCOSE BLDC GLUCOMTR-MCNC: 115 MG/DL — HIGH (ref 70–99)
GLUCOSE BLDC GLUCOMTR-MCNC: 124 MG/DL — HIGH (ref 70–99)
GLUCOSE BLDC GLUCOMTR-MCNC: 130 MG/DL — HIGH (ref 70–99)
GLUCOSE BLDC GLUCOMTR-MCNC: 184 MG/DL — HIGH (ref 70–99)
GLUCOSE BLDC GLUCOMTR-MCNC: 187 MG/DL — HIGH (ref 70–99)
GLUCOSE BLDC GLUCOMTR-MCNC: 190 MG/DL — HIGH (ref 70–99)
GLUCOSE SERPL-MCNC: 172 MG/DL — HIGH (ref 70–99)
HCT VFR BLD CALC: 28 % — LOW (ref 39–50)
HCT VFR BLD CALC: 31.7 % — LOW (ref 39–50)
HGB BLD-MCNC: 10.5 G/DL — LOW (ref 13–17)
HGB BLD-MCNC: 9.5 G/DL — LOW (ref 13–17)
LACTATE SERPL-SCNC: 1.8 MMOL/L — SIGNIFICANT CHANGE UP (ref 0.5–2)
MAGNESIUM SERPL-MCNC: 2.4 MG/DL — SIGNIFICANT CHANGE UP (ref 1.6–2.6)
MCHC RBC-ENTMCNC: 30.3 PG — SIGNIFICANT CHANGE UP (ref 27–34)
MCHC RBC-ENTMCNC: 30.5 PG — SIGNIFICANT CHANGE UP (ref 27–34)
MCHC RBC-ENTMCNC: 33.1 GM/DL — SIGNIFICANT CHANGE UP (ref 32–36)
MCHC RBC-ENTMCNC: 33.9 GM/DL — SIGNIFICANT CHANGE UP (ref 32–36)
MCV RBC AUTO: 90 FL — SIGNIFICANT CHANGE UP (ref 80–100)
MCV RBC AUTO: 91.6 FL — SIGNIFICANT CHANGE UP (ref 80–100)
PLATELET # BLD AUTO: 247 K/UL — SIGNIFICANT CHANGE UP (ref 150–400)
PLATELET # BLD AUTO: 253 K/UL — SIGNIFICANT CHANGE UP (ref 150–400)
POTASSIUM SERPL-MCNC: 4.1 MMOL/L — SIGNIFICANT CHANGE UP (ref 3.5–5.3)
POTASSIUM SERPL-SCNC: 4.1 MMOL/L — SIGNIFICANT CHANGE UP (ref 3.5–5.3)
PROT SERPL-MCNC: 6.4 G/DL — LOW (ref 6.6–8.7)
RBC # BLD: 3.11 M/UL — LOW (ref 4.2–5.8)
RBC # BLD: 3.46 M/UL — LOW (ref 4.2–5.8)
RBC # FLD: 16.1 % — HIGH (ref 10.3–14.5)
RBC # FLD: 16.3 % — HIGH (ref 10.3–14.5)
SODIUM SERPL-SCNC: 128 MMOL/L — LOW (ref 135–145)
WBC # BLD: 12.58 K/UL — HIGH (ref 3.8–10.5)
WBC # BLD: 12.75 K/UL — HIGH (ref 3.8–10.5)
WBC # FLD AUTO: 12.58 K/UL — HIGH (ref 3.8–10.5)
WBC # FLD AUTO: 12.75 K/UL — HIGH (ref 3.8–10.5)

## 2024-06-04 PROCEDURE — 93451 RIGHT HEART CATH: CPT | Mod: 26

## 2024-06-04 PROCEDURE — 99152 MOD SED SAME PHYS/QHP 5/>YRS: CPT

## 2024-06-04 PROCEDURE — 99232 SBSQ HOSP IP/OBS MODERATE 35: CPT

## 2024-06-04 PROCEDURE — 99233 SBSQ HOSP IP/OBS HIGH 50: CPT

## 2024-06-04 RX ORDER — HEPARIN SODIUM 5000 [USP'U]/ML
900 INJECTION INTRAVENOUS; SUBCUTANEOUS
Qty: 25000 | Refills: 0 | Status: DISCONTINUED | OUTPATIENT
Start: 2024-06-04 | End: 2024-06-04

## 2024-06-04 RX ORDER — HEPARIN SODIUM 5000 [USP'U]/ML
800 INJECTION INTRAVENOUS; SUBCUTANEOUS
Qty: 25000 | Refills: 0 | Status: DISCONTINUED | OUTPATIENT
Start: 2024-06-04 | End: 2024-06-05

## 2024-06-04 RX ORDER — HEPARIN SODIUM 5000 [USP'U]/ML
2500 INJECTION INTRAVENOUS; SUBCUTANEOUS EVERY 6 HOURS
Refills: 0 | Status: DISCONTINUED | OUTPATIENT
Start: 2024-06-04 | End: 2024-06-05

## 2024-06-04 RX ORDER — MILRINONE LACTATE 1 MG/ML
0.25 INJECTION, SOLUTION INTRAVENOUS
Qty: 20 | Refills: 0 | Status: DISCONTINUED | OUTPATIENT
Start: 2024-06-04 | End: 2024-06-05

## 2024-06-04 RX ORDER — HEPARIN SODIUM 5000 [USP'U]/ML
5500 INJECTION INTRAVENOUS; SUBCUTANEOUS EVERY 6 HOURS
Refills: 0 | Status: DISCONTINUED | OUTPATIENT
Start: 2024-06-04 | End: 2024-06-05

## 2024-06-04 RX ORDER — BUMETANIDE 0.25 MG/ML
2 INJECTION INTRAMUSCULAR; INTRAVENOUS ONCE
Refills: 0 | Status: COMPLETED | OUTPATIENT
Start: 2024-06-04 | End: 2024-06-04

## 2024-06-04 RX ORDER — BUMETANIDE 0.25 MG/ML
1 INJECTION INTRAMUSCULAR; INTRAVENOUS
Qty: 20 | Refills: 0 | Status: DISCONTINUED | OUTPATIENT
Start: 2024-06-04 | End: 2024-06-05

## 2024-06-04 RX ORDER — ALBUMIN HUMAN 25 %
100 VIAL (ML) INTRAVENOUS ONCE
Refills: 0 | Status: COMPLETED | OUTPATIENT
Start: 2024-06-04 | End: 2024-06-04

## 2024-06-04 RX ADMIN — MONTELUKAST 10 MILLIGRAM(S): 4 TABLET, CHEWABLE ORAL at 16:40

## 2024-06-04 RX ADMIN — Medication 12.5 MILLIGRAM(S): at 22:27

## 2024-06-04 RX ADMIN — BENZOCAINE AND MENTHOL 1 LOZENGE: 5; 1 LIQUID ORAL at 06:04

## 2024-06-04 RX ADMIN — PANTOPRAZOLE SODIUM 40 MILLIGRAM(S): 20 TABLET, DELAYED RELEASE ORAL at 06:04

## 2024-06-04 RX ADMIN — CHLORHEXIDINE GLUCONATE 1 APPLICATION(S): 213 SOLUTION TOPICAL at 06:03

## 2024-06-04 RX ADMIN — Medication 1: at 19:32

## 2024-06-04 RX ADMIN — HEPARIN SODIUM 800 UNIT(S)/HR: 5000 INJECTION INTRAVENOUS; SUBCUTANEOUS at 22:31

## 2024-06-04 RX ADMIN — Medication 650 MILLIGRAM(S): at 19:31

## 2024-06-04 RX ADMIN — ONDANSETRON 4 MILLIGRAM(S): 8 TABLET, FILM COATED ORAL at 15:37

## 2024-06-04 RX ADMIN — MEXILETINE HYDROCHLORIDE 150 MILLIGRAM(S): 150 CAPSULE ORAL at 00:18

## 2024-06-04 RX ADMIN — ATORVASTATIN CALCIUM 80 MILLIGRAM(S): 80 TABLET, FILM COATED ORAL at 22:28

## 2024-06-04 RX ADMIN — HEPARIN SODIUM 800 UNIT(S)/HR: 5000 INJECTION INTRAVENOUS; SUBCUTANEOUS at 19:58

## 2024-06-04 RX ADMIN — Medication 25 MILLIGRAM(S): at 06:04

## 2024-06-04 RX ADMIN — Medication 3 UNIT(S): at 19:32

## 2024-06-04 RX ADMIN — HEPARIN SODIUM 800 UNIT(S)/HR: 5000 INJECTION INTRAVENOUS; SUBCUTANEOUS at 06:59

## 2024-06-04 RX ADMIN — BUMETANIDE 2 MILLIGRAM(S): 0.25 INJECTION INTRAMUSCULAR; INTRAVENOUS at 13:25

## 2024-06-04 RX ADMIN — Medication 81 MILLIGRAM(S): at 13:25

## 2024-06-04 RX ADMIN — CLOPIDOGREL BISULFATE 75 MILLIGRAM(S): 75 TABLET, FILM COATED ORAL at 13:25

## 2024-06-04 RX ADMIN — RANOLAZINE 500 MILLIGRAM(S): 500 TABLET, FILM COATED, EXTENDED RELEASE ORAL at 06:04

## 2024-06-04 RX ADMIN — Medication 3 UNIT(S): at 08:47

## 2024-06-04 RX ADMIN — INSULIN GLARGINE 9 UNIT(S): 100 INJECTION, SOLUTION SUBCUTANEOUS at 22:28

## 2024-06-04 RX ADMIN — Medication 88 MICROGRAM(S): at 06:04

## 2024-06-04 RX ADMIN — HEPARIN SODIUM 800 UNIT(S)/HR: 5000 INJECTION INTRAVENOUS; SUBCUTANEOUS at 07:31

## 2024-06-04 RX ADMIN — HEPARIN SODIUM 800 UNIT(S)/HR: 5000 INJECTION INTRAVENOUS; SUBCUTANEOUS at 15:19

## 2024-06-04 RX ADMIN — Medication 1: at 08:48

## 2024-06-04 RX ADMIN — AMIODARONE HYDROCHLORIDE 200 MILLIGRAM(S): 400 TABLET ORAL at 06:04

## 2024-06-04 RX ADMIN — BUMETANIDE 5 MG/HR: 0.25 INJECTION INTRAMUSCULAR; INTRAVENOUS at 14:11

## 2024-06-04 RX ADMIN — Medication 12.5 MILLIGRAM(S): at 00:18

## 2024-06-04 RX ADMIN — MEXILETINE HYDROCHLORIDE 150 MILLIGRAM(S): 150 CAPSULE ORAL at 22:28

## 2024-06-04 RX ADMIN — Medication 650 MILLIGRAM(S): at 06:03

## 2024-06-04 RX ADMIN — MILRINONE LACTATE 4.91 MICROGRAM(S)/KG/MIN: 1 INJECTION, SOLUTION INTRAVENOUS at 15:17

## 2024-06-04 RX ADMIN — Medication 50 MILLILITER(S): at 10:15

## 2024-06-04 RX ADMIN — MEXILETINE HYDROCHLORIDE 150 MILLIGRAM(S): 150 CAPSULE ORAL at 08:48

## 2024-06-04 RX ADMIN — HEPARIN SODIUM 800 UNIT(S)/HR: 5000 INJECTION INTRAVENOUS; SUBCUTANEOUS at 00:38

## 2024-06-04 RX ADMIN — MEXILETINE HYDROCHLORIDE 150 MILLIGRAM(S): 150 CAPSULE ORAL at 16:40

## 2024-06-04 RX ADMIN — RANOLAZINE 500 MILLIGRAM(S): 500 TABLET, FILM COATED, EXTENDED RELEASE ORAL at 19:31

## 2024-06-04 RX ADMIN — BENZOCAINE AND MENTHOL 1 LOZENGE: 5; 1 LIQUID ORAL at 22:28

## 2024-06-04 NOTE — PROGRESS NOTE ADULT - NS ATTEND AMEND GEN_ALL_CORE FT
Patient's creatinine jumped from 3 to 4 today  Underwent repeat RHC which showed severely elevated biventricular filling pressures with low cardiac output and index  Will resume bumex drip at 1mg/hr with a 2mg IV bolus  Willl resume milrinone 0.25  If patient is deemed inotrope dependent will assess his candidacy for LVAD however it hinges on his creatinine improving

## 2024-06-04 NOTE — PROGRESS NOTE ADULT - SUBJECTIVE AND OBJECTIVE BOX
ICU Transfer Acceptance Note:     INTERVAL HPI/OVERNIGHT EVENTS: Patient seen and examined,  Denies chest pain sob or palpitations.   son at bedside     Vital Signs Last 24 Hrs  T(C): 36.4 (04 Jun 2024 15:42), Max: 36.8 (04 Jun 2024 12:00)  T(F): 97.6 (04 Jun 2024 15:42), Max: 98.2 (04 Jun 2024 12:00)  HR: 61 (04 Jun 2024 20:00) (60 - 71)  BP: 89/50 (04 Jun 2024 20:00) (82/49 - 102/66)  BP(mean): 64 (04 Jun 2024 20:00) (61 - 74)  RR: 20 (04 Jun 2024 20:00) (16 - 21)  SpO2: 100% (04 Jun 2024 20:00) (95% - 100%)    Parameters below as of 04 Jun 2024 20:00  Patient On (Oxygen Delivery Method): room air          PHYSICAL EXAM:    GENERAL: NAD, AOX4  HEAD:  Atraumatic, Normocephalic  EYES:  conjunctiva and sclera clear  ENMT: Moist mucous membranes  CHEST/LUNG: occasional rhonchi at bases   HEART: Regular rate and rhythm; No murmurs, rubs, or gallops  ABDOMEN: Soft, Nontender, Nondistended; Bowel sounds present  EXTREMITIES: +1 pitting edema b/l         MEDICATIONS  (STANDING):  aspirin enteric coated 81 milliGRAM(s) Oral daily  atorvastatin 80 milliGRAM(s) Oral at bedtime  benzonatate 100 milliGRAM(s) Oral three times a day  buMETAnide IVPB 3 milliGRAM(s) IV Intermittent daily  chlorhexidine 2% Cloths 1 Application(s) Topical <User Schedule>  clopidogrel Tablet 75 milliGRAM(s) Oral daily  dextrose 50% Injectable 25 Gram(s) IV Push once  heparin  Infusion. 900 Unit(s)/Hr (9 mL/Hr) IV Continuous <Continuous>  hydrALAZINE 20 milliGRAM(s) Oral three times a day  insulin glargine Injectable (LANTUS) 9 Unit(s) SubCutaneous at bedtime  insulin lispro (ADMELOG) corrective regimen sliding scale   SubCutaneous three times a day before meals  insulin lispro (ADMELOG) corrective regimen sliding scale   SubCutaneous at bedtime  insulin lispro Injectable (ADMELOG) 3 Unit(s) SubCutaneous three times a day before meals  levothyroxine 88 MICROGram(s) Oral daily  metoprolol tartrate 12.5 milliGRAM(s) Oral every 6 hours  mexiletine 150 milliGRAM(s) Oral every 8 hours  montelukast 10 milliGRAM(s) Oral daily  pantoprazole    Tablet 40 milliGRAM(s) Oral before breakfast  ranolazine 500 milliGRAM(s) Oral two times a day    MEDICATIONS  (PRN):  albuterol/ipratropium for Nebulization 3 milliLiter(s) Nebulizer every 6 hours PRN Shortness of Breath and/or Wheezing  guaiFENesin Oral Liquid (Sugar-Free) 200 milliGRAM(s) Oral every 6 hours PRN Cough  heparin   Injectable 5500 Unit(s) IV Push every 6 hours PRN For aPTT less than 40  heparin   Injectable 2500 Unit(s) IV Push every 6 hours PRN For aPTT between 40 - 57  ondansetron Injectable 4 milliGRAM(s) IV Push every 8 hours PRN Nausea and/or Vomiting  senna 2 Tablet(s) Oral at bedtime PRN Constipation  sodium chloride 0.9% lock flush 10 milliLiter(s) IV Push every 1 hour PRN Pre/post blood products, medications, blood draw, and to maintain line patency      Allergies    No Known Allergies    Intolerances    DOPamine (Hypotension)        LABS:                          10.5   9.15  )-----------( 165      ( 02 Jun 2024 03:00 )             31.2     06-02    132<L>  |  96  |  58.6<H>  ----------------------------<  124<H>  3.8   |  21.0<L>  |  3.18<H>    Ca    8.4      02 Jun 2024 03:00  Phos  4.2     06-02  Mg     2.4     06-02    TPro  6.2<L>  /  Alb  2.9<L>  /  TBili  0.6  /  DBili  x   /  AST  42<H>  /  ALT  116<H>  /  AlkPhos  90  06-02    PTT - ( 03 Jun 2024 06:00 )  PTT:51.9 sec  Urinalysis Basic - ( 02 Jun 2024 03:00 )    Color: x / Appearance: x / SG: x / pH: x  Gluc: 124 mg/dL / Ketone: x  / Bili: x / Urobili: x   Blood: x / Protein: x / Nitrite: x   Leuk Esterase: x / RBC: x / WBC x   Sq Epi: x / Non Sq Epi: x / Bacteria: x        RADIOLOGY & ADDITIONAL TESTS:

## 2024-06-04 NOTE — PROGRESS NOTE ADULT - ASSESSMENT
Patient is a 78 year old male with history of HTN, HLD, DM, CKD, CAD s/p 4V CABG and multiple PCI's, ischemic cardiomyopathy (EF < 20%), and LBBB s/p MDT CRT-D. He had an admission in November for ADHF. At that time a LHC revealed his grafts were occluded except for the LIMA to LAD. He also underwent a CRT-D implant.     On 5/25 he had a VT arrest with ROSC achieved after 3 minutes was sent to the cath lab which showed his LIMA is still patent. Patient was extubated on 5/27. RHC on 5/28 showed severely elevated biv filling pressures, severe Cpc-PH, and low CO. He was started on low dose milrinone and diuresed well on IV Bumex. Weaned off pressors 5/29 and Milrinone was weaned off 5/31.    Renal function worsening (sCr 4.65) therefore RHC performed and revealed significantly elevated filling pressures w/ low CI.     Bedside Hemodynamics:  5/31 (mil 0.125): CVP 9-10, PA 72/24/43, PA sat 70.7%, Fauzia CO/CI 4.6/2.6, /56 (73), SVR 1096 dsc.    Cardiac Studies:  6/4/24 RHC: RA 29/26/24, RV 83/12/23, PA 81/31/47 PCWP 32, PA sat 42.4, Fauzia CO/CI 2.75/1.8.    5/28/24 RHC: RA 18, RV 92/18, PA 95/38/57, PCW 31, PA sat 51.5%, Fauzia CO/CI 3.09/1.74, TPG 26, DPG 7, SVR 1889 dsc, PVR 8.41 MEYERS, Vidhi 3.2, /63 (91) on levo 0.08.    5/25/24 LHC: All grafts occluded except LIMA-LAD. LVEDP 29 mmHg.    5/23/24 TTE: LVEF <20%, LVIDd 5.8cm, mild RVE with mild RV dysfunction, TAPSE 0.7cm, mild-mod AS, mild-mod MR, mild TR, mild KS, est PASP 75 mmHg.

## 2024-06-04 NOTE — PROGRESS NOTE ADULT - SUBJECTIVE AND OBJECTIVE BOX
Now s/p RHC via RIJ with Dr. Watts, tolerated procedure well. Pt arrived to recovery in NAD and HDS, access site stable, no bleed/hematoma. Right brachial venous sheath also placed (site was aborted), RUE distal pulse +.    Intraprocedurally:  Findings: PAP 81/31, PCWP 32, PA sat 42.4%, CI 1.55    Plan:  -Formal cath report pending  -Post procedure management/monitoring per protocol  -Access site precautions  -Brachial venous sheath & RIJ venous sheath to be removed at 1330, IV heparin to be restarted at 1430.  -Activity as prior to procedure  -Repeat ECG if any clinical indication or change on tele  -Continue current medical therapy  -Dual anti platelet therapy with aspirin/plavix  -Cont lopressor, hydralazine per HF team.  -Cont statin therapy with Lipitor 80mg po qHS  -Educated regarding strict adherence with DAPT   -Educated regarding post procedure management and care  -Discussed the importance of RF modification    -DISPO: As per cardiology team following

## 2024-06-04 NOTE — PROGRESS NOTE ADULT - ASSESSMENT
The patient is a 78 year old male with a past medical history of CAD status post CABG, multiple PCI, HFpEF, diabetes mellitus type 2, hypertension, stage  3/4 and hyperlipidemia who presented to the ER with complaints of chest pain. Holter Monitor noted increased PVC burden therefore patient was referred to the Er for  evaluation. Admitted for NSTEMI and RAS on CKD. Cardiology and nephrology were consulted. Initially diuretics held for RAS. TTE with EF < 20%. On 05/26 Code Blue was called for VF arrest. Cardiology team  were present at bedside during cardiac arrest, called and activated STEMI team for emergent cardiac cath; EP interrogated patient's ICD post-arrest, as ICD did not fire during sustained VT, and recommended initiation of Amiodarone infusion and Lidocaine infusions   for arrhythmia suppression. Taken to cath lab and cath was performed by Dr. Strong revealing patent LIMA-LAD, but occluded SVG-Circ, all other grafts/vessels noted to be occluded: %, %, Circ 100%, %. Lima-LAD is fillng  LCX/RCA through collateral circulation. Due to the extensive disease present, there was no intervention performed and recommendation from interventional attending was for medical therapy. ACID was reprogrammed by EP. Patient was extubated on 5/26 remained in shock with improving vasopressor requirements. Patient then developed sustained monomorphic VT at 180 bpm despite amiodarone, no shocks delivered by CRT-D. Given Amiodarone 150mg IVPB load, amiodarone infusion increased to 1mg/min, given Lidocaine 100mg IV push bolus and started on Lidocaine infusion  Heart rate slowed to 120-140's and rhythm changed to what appeared to be AFib RVR with aberrancy with periods of NSVT EP Dr. Turpin called to bedside, interrogated and reprogrammed CRT-D. Patient was intubated again in the setting of cardiogenic shock and cardiac arrest ROSC after 1 min. CRT was reprogrammed again by EP> GOC were discussed initially DNR which was then rescinded. Heart failure was consulted, taken for RHC and [place] placement 5/28. Started on Diuresis with improvement. SWan removed on 06/01. Weaned off midodrine. Stable to be downgraded to medicine    >Cardiogenic shock with acute hypoxic respiratory failure / Ischemic cardiomyopathy  - Status post cardiac arrest x 2/ Extubated x 2  - [place] removed on 6/1  - Weaned off Milrinone  -ongoing diuresis as per cardio/ hf team   - Spo2 on RA at rest >92%  - Strict I&Os  - Daily weight  - HF and cardiology following  - plan for rt heart cath today     > Monomorphic VT:  - Intermittent PAT with variable conduction.   - SP  Amiodarone 400mg TID x 4 days, Now on 200mg PO OD  - TFTs & LFTs should be monitored q 3-6 months while on amiodarone therapy.   - Continue Mexiletine 150mg TID.  - Likely scar-mediated VT from ischemic CMY precipitated by AT/AF with RVR  - CHADS2-VASc of 6 (CHF, HTN, DM, Agex2, CAD) on Heparin gtt transition to NOAC on discharge  - Metoprolol 12.6mg Q6 hours-  Increase BB as tolerated by BP    >AICD dysfunction   - Device inappropriately binned VT as SVT  - Device reprogrammed by EP     > Acute on chronic systolic CHF  - IV Bumex 3mg Q12 changed to OD   - No ACE/ARB in the setting of RAS/CKD  - Continue hydralazine and ranexa  - HF following  - Daily weight  - Strict I&OS  - Status post [place] removed on 6/1    > RAS On CKD Stage 3/4/ agap  MA / cr trending up /possible CR syndrome vs due to diuretics   - Uptrend Crt, lowered dose of bumex to OD   - Secondary to CHF  - noted to be more acidotic  ,  started on po bicarb   - Nephrology following  - cr trending up / monitor uop       > CAD  - Status post C with no intervention  - Continue aspirin, plavix, metoprolol, statin    > Diabetes mellitus type 2  - Lantus , premeal with ssi   - Hbaic of 6.8  - Monitor fsg                        > Hypothyroidism  - On Synthroid     >Suspected aspiration pneumonitis  - Completed course of IV Zosyn    >VTE_ Heparin IV    >PT evaluation    >Discharge disposition; Anticipate home with home care vs [facility] when medically stable   plan of care discussed with patient , two grandsons at bedside

## 2024-06-04 NOTE — PROGRESS NOTE ADULT - PROBLEM SELECTOR PLAN 1
- Weaned off levo 5/29, milrinone 5/31  - Lactate/LFT improved however renal function worsening over past 3 days  - RHC today revealed significantly elevated filling pressures and low CO  - Resume inotropic support w/ Milrinone 0.125 mcg/kg/min  - Resume Bumex gtt @ 1 mg/hr. Check BMP BID while on continuous infusion. Maintain K+ >4, Mg >2  - Document strict I&O and daily standing weights.  - Continue to monitor perfusion markers daily (lactate, LFTs).  - Hemodynamic goals: MAP 65, CVP 8-10, MVO2 >65, PCWP 12, CI >2.2, Lactate <2.2. - Weaned off levo 5/29, milrinone 5/31  - Lactate/LFT improved however renal function worsening over past 3 days  - RHC today revealed significantly elevated filling pressures and low CO  - Resume inotropic support w/ Milrinone 0.25 mcg/kg/min  - Resume Bumex gtt @ 1 mg/hr. Check BMP BID while on continuous infusion. Maintain K+ >4, Mg >2  - Document strict I&O and daily standing weights.  - Continue to monitor perfusion markers daily (lactate, LFTs).  - Hemodynamic goals: MAP 65, CVP 8-10, MVO2 >65, PCWP 12, CI >2.2, Lactate <2.2. - Weaned off levo 5/29, milrinone 5/31  - Lactate/LFT improved however renal function worsening over past 3 days  - RHC today revealed significantly elevated filling pressures and low CO  - Resume inotropic support w/ Milrinone 0.25 mcg/kg/min  - Resume Bumex gtt @ 1 mg/hr. Check BMP BID while on continuous infusion. Maintain K+ >4, Mg >2  - Document strict I&O and daily standing weights.  - Continue to monitor perfusion markers daily (lactate, LFTs).

## 2024-06-04 NOTE — PROGRESS NOTE ADULT - SUBJECTIVE AND OBJECTIVE BOX
"  Assessment & Plan     (G47.00) Insomnia, unspecified type  (primary encounter diagnosis)  Comment: History of insomnia symptoms have been more prevalent as of late..  Ever since using steroids and antibiotics that had exacerbation of symptoms.  Has trialed multiple over-the-counter medications.  Discussed with patient trial of trazodone over the next month as well as extensive discussions on sleep hygiene.  Patient was also provided contact information for sleep medicine as she does snore.  Possibility may be a sleep apnea component contributing to symptoms.  Follow-up in 30 days for recheck.    Plan: traZODone (DESYREL) 50 MG tablet, Adult Sleep         Eval & Management  Referral          (E78.00) Elevated LDL cholesterol level  Comment: Elevated LDL cholesterol previously.  Discussed with patient recheck of this.  Patient is fasting.  Patient is apprehensive to start a statin medication.  Plan: Lipid panel reflex to direct LDL Fasting                    BMI  Estimated body mass index is 25.06 kg/m  as calculated from the following:    Height as of this encounter: 1.64 m (5' 4.57\").    Weight as of this encounter: 67.4 kg (148 lb 9.6 oz).       Selin Parra is a 40 year old, presenting for the following health issues:  Insomnia (Since she was put on Antibiotic in December she has experienced Insomnia. )    History of Present Illness       Reason for visit:  Having problems sleeping  Symptom onset:  More than a month  Symptoms include:  Not sleeping  Symptom intensity:  Severe  Symptom progression:  Staying the same  Had these symptoms before:  No    She eats 4 or more servings of fruits and vegetables daily.She consumes 1 sweetened beverage(s) daily.She exercises with enough effort to increase her heart rate 60 or more minutes per day.  She exercises with enough effort to increase her heart rate 4 days per week.   She is taking medications regularly.     Patient with ongoing episodes of insomnia " "since December when started on prednisone as well as Augmentin.  Patient was also using Sudafed at that time.  She has been able to fall asleep but wakes at roughly 1:30 in the morning every day.  Has been trialing melatonin multiple over-the-counter medications recommended by her chiropractor.  Also trial Benadryl as well as NyQuil.  When she does wake up she usually watches television or read to the candle.  She has had intermittent sleep issues in the past.  Denies any alcohol use.  Is walking 8 miles per day.       Review of Systems  Constitutional, HEENT, cardiovascular, pulmonary, gi and gu systems are negative, except as otherwise noted.      Objective    /84   Pulse 71   Temp 98.1  F (36.7  C) (Tympanic)   Resp 16   Ht 1.64 m (5' 4.57\")   Wt 67.4 kg (148 lb 9.6 oz)   SpO2 96%   BMI 25.06 kg/m    Body mass index is 25.06 kg/m .  Physical Exam   GENERAL: alert and no distress  NECK: no adenopathy, no asymmetry, masses, or scars  RESP: lungs clear to auscultation - no rales, rhonchi or wheezes  CV: regular rate and rhythm, normal S1 S2, no S3 or S4, no murmur, click or rub, no peripheral edema  MS: no gross musculoskeletal defects noted, no edema  PSYCH: mentation appears normal, affect normal/bright            Signed Electronically by: Say Yougn PA-C    " [facility] Advanced Heart Failure - Progress Note  [address] [place] [address]  Office Phone: [phone]/Fax: [phone]  Service/On Call Phone [phone]    Subjective/Objective: Pt. denies overt HF symptoms.   Interval Events: Worsening renal function noted on am labs. RHC performed and revealed elevated filling pressures, low CI.    Medications:  albuterol/ipratropium for Nebulization 3 milliLiter(s) Nebulizer every 6 hours PRN  aMIOdarone    Tablet 200 milliGRAM(s) Oral daily  aspirin enteric coated 81 milliGRAM(s) Oral daily  atorvastatin 80 milliGRAM(s) Oral at bedtime  benzocaine/menthol Lozenge 1 Lozenge Oral every 8 hours  buMETAnide Infusion 1 mG/Hr IV Continuous <Continuous>  buMETAnide Injectable 2 milliGRAM(s) IV Push once  chlorhexidine 2% Cloths 1 Application(s) Topical <User Schedule>  clopidogrel Tablet 75 milliGRAM(s) Oral daily  dextrose 50% Injectable 25 Gram(s) IV Push once  guaiFENesin Oral Liquid (Sugar-Free) 200 milliGRAM(s) Oral every 6 hours PRN  heparin   Injectable 5500 Unit(s) IV Push every 6 hours PRN  heparin   Injectable 2500 Unit(s) IV Push every 6 hours PRN  heparin  Infusion. 900 Unit(s)/Hr IV Continuous <Continuous>  hydrALAZINE 25 milliGRAM(s) Oral three times a day  insulin glargine Injectable (LANTUS) 9 Unit(s) SubCutaneous at bedtime  insulin lispro (ADMELOG) corrective regimen sliding scale   SubCutaneous three times a day before meals  insulin lispro (ADMELOG) corrective regimen sliding scale   SubCutaneous at bedtime  insulin lispro Injectable (ADMELOG) 3 Unit(s) SubCutaneous three times a day before meals  levothyroxine 88 MICROGram(s) Oral daily  metoprolol tartrate 12.5 milliGRAM(s) Oral every 6 hours  mexiletine 150 milliGRAM(s) Oral every 8 hours  milrinone Infusion 0.25 MICROgram(s)/kG/Min IV Continuous <Continuous>  montelukast 10 milliGRAM(s) Oral daily  ondansetron Injectable 4 milliGRAM(s) IV Push every 8 hours PRN  pantoprazole    Tablet 40 milliGRAM(s) Oral before breakfast  ranolazine 500 milliGRAM(s) Oral two times a day  senna 2 Tablet(s) Oral at bedtime PRN  sodium bicarbonate 650 milliGRAM(s) Oral every 12 hours  sodium chloride 0.9% lock flush 10 milliLiter(s) IV Push every 1 hour PRN    Vital Signs Last 24 Hours  T(C): 36.8 (06-04-24 @ 12:00), Max: 36.8 (06-04-24 @ 12:00)  HR: 62 (06-04-24 @ 13:00) (60 - 71)  BP: 99/61 (06-04-24 @ 13:00) (82/49 - 102/66)  RR: 20 (06-04-24 @ 13:00) (16 - 21)  SpO2: 100% (06-04-24 @ 13:00) (95% - 100%)    I&O's Summary  03 Jun 2024 07:01  -  04 Jun 2024 07:00  --------------------------------------------------------  IN: 740 mL / OUT: 450 mL / NET: 290 mL    Tele:      Physical Exam:  General: In no distress.   HEENT: EOMs intact.  Neck: Neck supple.   Chest: Clear to auscultation bilaterally.  CV: RRR. Normal S1 and S2. No murmurs, rub, or gallops. Warm peripherally.  PV: No significant LE edema noted. Pulses full/equal in all four extremities.  Abdomen: Soft, non-distended, non-tender.  Skin: warm, dry.  Neurology: Alert and oriented times three. Sensation intact.  Psych: Appropriate affect.    Labs:                        10.5   12.58 )-----------( 247      ( 04 Jun 2024 06:00 )             31.7     06-04    128<L>  |  91<L>  |  76.1<H>  ----------------------------<  172<H>  4.1   |  19.0<L>  |  4.65<H>    Ca    8.5      04 Jun 2024 06:00  Mg     2.4     06-04    TPro  6.4<L>  /  Alb  3.0<L>  /  TBili  0.6  /  DBili  x   /  AST  37  /  ALT  79<H>  /  AlkPhos  119  06-04    PTT - ( 04 Jun 2024 06:00 )  PTT:81.4 sec    Lactate, Blood: 1.8 mmol/L (06-04 @ 09:33)  Lactate, Blood: 0.9 mmol/L (06-02 @ 03:00)

## 2024-06-04 NOTE — PROGRESS NOTE ADULT - SUBJECTIVE AND OBJECTIVE BOX
Patient seen and examined    I&O's Summary    03 Jun 2024 07:01  -  04 Jun 2024 07:00  --------------------------------------------------------  IN: 740 mL / OUT: 450 mL / NET: 290 mL    Seen when going for RHC    REVIEW OF SYSTEMS:    CONSTITUTIONAL: No F/C, feeling dry and tired  RESPIRATORY: No cough,  No SOB  CARDIOVASCULAR: No CP/palpitations,    GASTROINTESTINAL: No abdominal pain  or NVD   GENITOURINARY: No UTI sx  NEUROLOGICAL: No headaches, NO wk/numbness  MUSCULOSKELETAL:   EXTREMITIES : no swelling,    Vital Signs Last 24 Hrs  T(C): 36.4 (04 Jun 2024 15:42), Max: 36.8 (04 Jun 2024 12:00)  T(F): 97.6 (04 Jun 2024 15:42), Max: 98.2 (04 Jun 2024 12:00)  HR: 63 (04 Jun 2024 16:00) (60 - 71)  BP: 91/52 (04 Jun 2024 16:00) (82/49 - 102/66)  BP(mean): 65 (04 Jun 2024 16:00) (61 - 74)  RR: 16 (04 Jun 2024 16:00) (16 - 21)  SpO2: 97% (04 Jun 2024 16:00) (95% - 100%)    Parameters below as of 04 Jun 2024 16:00  Patient On (Oxygen Delivery Method): room air        PHYSICAL EXAM:    GENERAL: NAD,   EYES:  conjunctiva and sclera clear  NECK: Supple, No JVD/Bruit  NERVOUS SYSTEM:  A/O x3,   CHEST:  No rales or rhonchi  HEART:  gr 2 murmur  ABDOMEN: Soft, NT/ND BS+  EXTREMITIES:  No Edema;  SKIN: No rashes    LABS:                          10.5   12.58 )-----------( 247      ( 04 Jun 2024 06:00 )             31.7     06-04    128<L>  |  91<L>  |  76.1<H>  ----------------------------<  172<H>  4.1   |  19.0<L>  |  4.65<H>    Ca    8.5      04 Jun 2024 06:00  Mg     2.4     06-04    TPro  6.4<L>  /  Alb  3.0<L>  /  TBili  0.6  /  DBili  x   /  AST  37  /  ALT  79<H>  /  AlkPhos  119  06-04      MEDICATIONS  (STANDING):  albuterol/ipratropium for Nebulization PRN  aMIOdarone    Tablet  aspirin enteric coated  atorvastatin  benzocaine/menthol Lozenge  buMETAnide Infusion  chlorhexidine 2% Cloths  clopidogrel Tablet  dextrose 50% Injectable  guaiFENesin Oral Liquid (Sugar-Free) PRN  heparin   Injectable PRN  heparin   Injectable PRN  heparin  Infusion.  insulin glargine Injectable (LANTUS)  insulin lispro (ADMELOG) corrective regimen sliding scale  insulin lispro (ADMELOG) corrective regimen sliding scale  insulin lispro Injectable (ADMELOG)  levothyroxine  metoprolol tartrate  mexiletine  milrinone Infusion  montelukast  ondansetron Injectable PRN  pantoprazole    Tablet  ranolazine  senna PRN  sodium bicarbonate  sodium chloride 0.9% lock flush PRN

## 2024-06-04 NOTE — PROGRESS NOTE ADULT - SUBJECTIVE AND OBJECTIVE BOX
Department of Cardiology                                                                  [facility]/[place] [address] [address] [facility]                                                            Telephone: [phone]. Fax:[phone]                                                                                      Cardiac Cath NP Note       Patient is a 78y old  Male who presents with a chief complaint of Chest Pain / SOB (03 Jun 2024 16:10)    HPI:  Patient is a 78 year old male with history of HTN, HLD, DM, CKD, CAD s/p 4V CABG and multiple PCI's, ischemic cardiomyopathy (EF < 20%), and LBBB s/p MDT CRT-D. He had an admission in November for ADHF. At that time a LHC revealed his grafts were occluded except for the LIMA to LAD. He also underwent a CRTD implant.     On 5/25 he had a VT arrest with ROSC achieved after 3 minutes was sent to the cath lab which showed his LIMA is still patent. Patient was extubated on 5/27. RHC on 5/28 showed severely elevated biv filling pressures, severe Cpc-PH, and low CO. He was started on low dose milrinone and diuresed well on IV Bumex. Weaned off pressors 5/29 and Milrinone was weaned off 5/31.    Bedside Hemodynamics:  5/31 (mil 0.125): CVP 9-10, PA 72/24/43, PA sat 70.7%, Fauzia CO/CI 4.6/2.6, /56 (73), SVR 1096 dsc.    Cardiac Studies:  5/28/24 RHC: RA 18, RV 92/18, PA 95/38/57, PCW 31, PA sat 51.5%, Fauzia CO/CI 3.09/1.74, TPG 26, DPG 7, SVR 1889 dsc, PVR 8.41 MEYERS, Vidhi 3.2, /63 (91) on levo 0.08.    5/25/24 LHC: All grafts occluded except LIMA-LAD. LVEDP 29 mmHg.    5/23/24 TTE: LVEF <20%, LVIDd 5.8cm, mild RVE with mild RV dysfunction, TAPSE 0.7cm, mild-mod AS, mild-mod MR, mild TR, mild MT, est PASP 75 mmHg.    Now presents for RHC to reassess filling pressures in setting of worsening renal function.    PAST MEDICAL & SURGICAL HISTORY:  GERD (gastroesophageal reflux disease)      High cholesterol      Coronary artery disease involving coronary bypass graft of native heart with unstable angina pectoris      Coronary artery disease involving native coronary artery of native heart, angina presence unspecifie      Type 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, without long-term current use of insul      Essential hypertension      HFrEF (heart failure with reduced ejection fraction)      Stage 4 chronic kidney disease      LBBB (left bundle branch block)      Facial nerve palsy      Hypothyroidism      S/P CABG (coronary artery bypass graft)  4V 1983 [place]      Status post open reduction with internal fixation of fracture      History of insertion of stent into coronary artery bypass graft      History of brachytherapy    No Known Allergies    Intolerances    DOPamine (Hypotension)    MEDICATIONS  (STANDING):  aMIOdarone    Tablet 200 milliGRAM(s) Oral daily  aspirin enteric coated 81 milliGRAM(s) Oral daily  atorvastatin 80 milliGRAM(s) Oral at bedtime  benzocaine/menthol Lozenge 1 Lozenge Oral every 8 hours  chlorhexidine 2% Cloths 1 Application(s) Topical <User Schedule>  clopidogrel Tablet 75 milliGRAM(s) Oral daily  dextrose 50% Injectable 25 Gram(s) IV Push once  heparin  Infusion. 900 Unit(s)/Hr (9 mL/Hr) IV Continuous <Continuous>  hydrALAZINE 25 milliGRAM(s) Oral three times a day  insulin glargine Injectable (LANTUS) 9 Unit(s) SubCutaneous at bedtime  insulin lispro (ADMELOG) corrective regimen sliding scale   SubCutaneous three times a day before meals  insulin lispro (ADMELOG) corrective regimen sliding scale   SubCutaneous at bedtime  insulin lispro Injectable (ADMELOG) 3 Unit(s) SubCutaneous three times a day before meals  levothyroxine 88 MICROGram(s) Oral daily  metoprolol tartrate 12.5 milliGRAM(s) Oral every 6 hours  mexiletine 150 milliGRAM(s) Oral every 8 hours  montelukast 10 milliGRAM(s) Oral daily  pantoprazole    Tablet 40 milliGRAM(s) Oral before breakfast  ranolazine 500 milliGRAM(s) Oral two times a day  sodium bicarbonate 650 milliGRAM(s) Oral every 12 hours    MEDICATIONS  (PRN):  albuterol/ipratropium for Nebulization 3 milliLiter(s) Nebulizer every 6 hours PRN Shortness of Breath and/or Wheezing  guaiFENesin Oral Liquid (Sugar-Free) 200 milliGRAM(s) Oral every 6 hours PRN Cough  heparin   Injectable 5500 Unit(s) IV Push every 6 hours PRN For aPTT less than 40  heparin   Injectable 2500 Unit(s) IV Push every 6 hours PRN For aPTT between 40 - 57  ondansetron Injectable 4 milliGRAM(s) IV Push every 8 hours PRN Nausea and/or Vomiting  senna 2 Tablet(s) Oral at bedtime PRN Constipation  sodium chloride 0.9% lock flush 10 milliLiter(s) IV Push every 1 hour PRN Pre/post blood products, medications, blood draw, and to maintain line patency    REVIEW OF SYSTEMS:  General: No fevers/chills, or fatigue  HEENT: No visual disturbances, no hearing loss, no headaches, no epistaxis.  CV: No chest pain,no palpitations, no edema, no orthopnea, no PND, or JOINER  Respiratory: No dyspnea, no wheeze, no cough.  GI: no nausea/vomiting, no black/bloody stools.  : No hematuria  Musculoskeletal: No myalgias, no arthralgias, no back pain.    Vital Signs Last 24 Hrs  T(C): 36.4 (04 Jun 2024 08:00), Max: 36.6 (03 Jun 2024 15:30)  T(F): 97.6 (04 Jun 2024 08:00), Max: 97.8 (03 Jun 2024 15:30)  HR: 60 (04 Jun 2024 10:43) (60 - 71)  BP: 90/58 (04 Jun 2024 10:43) (82/49 - 101/56)  BP(mean): 69 (04 Jun 2024 10:43) (61 - 74)  RR: 18 (04 Jun 2024 10:00) (16 - 21)  SpO2: 95% (04 Jun 2024 10:00) (95% - 100%)    Parameters below as of 04 Jun 2024 10:00  Patient On (Oxygen Delivery Method): room air    I&O's Detail    03 Jun 2024 07:01  -  04 Jun 2024 07:00  --------------------------------------------------------  IN:    Heparin Infusion: 190 mL    Oral Fluid: 550 mL  Total IN: 740 mL    OUT:    Voided (mL): 450 mL  Total OUT: 450 mL    Total NET: 290 mL    PHYSICAL EXAM:   GENERAL: Pt lying comfortably, NAD.  ENMT: PERRL, +EOMI.  NECK: soft, Supple, No JVD,   CHEST/LUNG: Clear to auscultatation bilaterally; No wheezing.  HEART: S1S2+, Regular rate and rhythm; No murmurs.  ABDOMEN: Soft, Nontender, Nondistended  NEURO: AAOX3, nonfocal  EXT: Mild swelling to LUE, warm well-perfused. + b/l radial pulses, + b/l DP pulses palpated.    INTERPRETATION OF TELEMETRY:  62    LABS:                        10.5   12.58 )-----------( 247      ( 04 Jun 2024 06:00 )             31.7     128<L>  |  91<L>  |  76.1<H>  ----------------------------<  172<H>  4.1   |  19.0<L>  |  4.65<H>    Ca    8.5      04 Jun 2024 06:00  Mg     2.4     06-04    TPro  6.4<L>  /  Alb  3.0<L>  /  TBili  0.6  /  DBili  x   /  AST  37  /  ALT  79<H>  /  AlkPhos  119  06-04    PTT - ( 04 Jun 2024 06:00 )  PTT:81.4 sec  Urinalysis Basic - ( 04 Jun 2024 06:00 )    Color: x / Appearance: x / SG: x / pH: x  Gluc: 172 mg/dL / Ketone: x  / Bili: x / Urobili: x   Blood: x / Protein: x / Nitrite: x   Leuk Esterase: x / RBC: x / WBC x   Sq Epi: x / Non Sq Epi: x / Bacteria: x    I&O's Summary    03 Jun 2024 07:01  -  04 Jun 2024 07:00  --------------------------------------------------------  IN: 740 mL / OUT: 450 mL / NET: 290 mL    BNP 20996 (5/23)    Risk Assessments:  ASA: 4  Mallampati: 3  Creat: 4.65  GFR: 12  BRA: 4.6%    Indication: worsening renal function    Coronary Anatomy:   5/25/24 LHC: All grafts occluded except LIMA-LAD. LVEDP 29 mmHg.    Plan:    -plan for *** via ***  -preferred access:   -confirmed appropriate NPO duration  -ECG and Labs reviewed  -Aspirin 81mg po pre-cath  -NS 0.9% 250ml/hr x 1 bolus; pre procedure ZAINAB ppx   -procedure discussed with patient; risks and benefits explained, questions answered  -consent obtained by attending IC                                                                             Department of Cardiology                                                                  [facility]/[place] [address] [address] [facility]                                                            Telephone: [phone]. Fax:[phone]                                                                                      Cardiac Cath NP Note       Patient is a 78y old  Male who presents with a chief complaint of Chest Pain / SOB (03 Jun 2024 16:10)    HPI:  Patient is a 78 year old male with history of HTN, HLD, DM, CKD, CAD s/p 4V CABG and multiple PCI's, ischemic cardiomyopathy (EF < 20%), and LBBB s/p MDT CRT-D. He had an admission in November for ADHF. At that time a LHC revealed his grafts were occluded except for the LIMA to LAD. He also underwent a CRTD implant.     On 5/25 he had a VT arrest with ROSC achieved after 3 minutes was sent to the cath lab which showed his LIMA is still patent. Patient was extubated on 5/27. RHC on 5/28 showed severely elevated biv filling pressures, severe Cpc-PH, and low CO. He was started on low dose milrinone and diuresed well on IV Bumex. Weaned off pressors 5/29 and Milrinone was weaned off 5/31.    Bedside Hemodynamics:  5/31 (mil 0.125): CVP 9-10, PA 72/24/43, PA sat 70.7%, Fauzia CO/CI 4.6/2.6, /56 (73), SVR 1096 dsc.    Cardiac Studies:  5/28/24 RHC: RA 18, RV 92/18, PA 95/38/57, PCW 31, PA sat 51.5%, Fauzia CO/CI 3.09/1.74, TPG 26, DPG 7, SVR 1889 dsc, PVR 8.41 MEYERS, Vidhi 3.2, /63 (91) on levo 0.08.    5/25/24 LHC: All grafts occluded except LIMA-LAD. LVEDP 29 mmHg.    5/23/24 TTE: LVEF <20%, LVIDd 5.8cm, mild RVE with mild RV dysfunction, TAPSE 0.7cm, mild-mod AS, mild-mod MR, mild TR, mild UT, est PASP 75 mmHg.    Now presents for RHC to reassess filling pressures in setting of cardiorenal syndrome, concern for low flow state.    PAST MEDICAL & SURGICAL HISTORY:  GERD (gastroesophageal reflux disease)  High cholesterol  Coronary artery disease involving coronary bypass graft of native heart with unstable angina pectoris  Coronary artery disease involving native coronary artery of native heart, angina presence unspecifie  Type 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, without long-term current use of insul  Essential hypertension  HFrEF (heart failure with reduced ejection fraction)  Stage 4 chronic kidney disease  LBBB (left bundle branch block)  Facial nerve palsy  Hypothyroidism  S/P CABG (coronary artery bypass graft)  4V 1983 [place]  Status post open reduction with internal fixation of fracture  History of insertion of stent into coronary artery bypass graft  History of brachytherapy    No Known Allergies    Intolerances    DOPamine (Hypotension)    MEDICATIONS  (STANDING):  aMIOdarone    Tablet 200 milliGRAM(s) Oral daily  aspirin enteric coated 81 milliGRAM(s) Oral daily  atorvastatin 80 milliGRAM(s) Oral at bedtime  benzocaine/menthol Lozenge 1 Lozenge Oral every 8 hours  chlorhexidine 2% Cloths 1 Application(s) Topical <User Schedule>  clopidogrel Tablet 75 milliGRAM(s) Oral daily  dextrose 50% Injectable 25 Gram(s) IV Push once  heparin  Infusion. 900 Unit(s)/Hr (9 mL/Hr) IV Continuous <Continuous>  hydrALAZINE 25 milliGRAM(s) Oral three times a day  insulin glargine Injectable (LANTUS) 9 Unit(s) SubCutaneous at bedtime  insulin lispro (ADMELOG) corrective regimen sliding scale   SubCutaneous three times a day before meals  insulin lispro (ADMELOG) corrective regimen sliding scale   SubCutaneous at bedtime  insulin lispro Injectable (ADMELOG) 3 Unit(s) SubCutaneous three times a day before meals  levothyroxine 88 MICROGram(s) Oral daily  metoprolol tartrate 12.5 milliGRAM(s) Oral every 6 hours  mexiletine 150 milliGRAM(s) Oral every 8 hours  montelukast 10 milliGRAM(s) Oral daily  pantoprazole    Tablet 40 milliGRAM(s) Oral before breakfast  ranolazine 500 milliGRAM(s) Oral two times a day  sodium bicarbonate 650 milliGRAM(s) Oral every 12 hours    MEDICATIONS  (PRN):  albuterol/ipratropium for Nebulization 3 milliLiter(s) Nebulizer every 6 hours PRN Shortness of Breath and/or Wheezing  guaiFENesin Oral Liquid (Sugar-Free) 200 milliGRAM(s) Oral every 6 hours PRN Cough  heparin   Injectable 5500 Unit(s) IV Push every 6 hours PRN For aPTT less than 40  heparin   Injectable 2500 Unit(s) IV Push every 6 hours PRN For aPTT between 40 - 57  ondansetron Injectable 4 milliGRAM(s) IV Push every 8 hours PRN Nausea and/or Vomiting  senna 2 Tablet(s) Oral at bedtime PRN Constipation  sodium chloride 0.9% lock flush 10 milliLiter(s) IV Push every 1 hour PRN Pre/post blood products, medications, blood draw, and to maintain line patency    REVIEW OF SYSTEMS:  General: No fevers/chills, or fatigue  HEENT: No visual disturbances, no hearing loss, no headaches, no epistaxis.  CV: No chest pain,no palpitations, no edema, no orthopnea, no PND, or JOINER  Respiratory: No dyspnea, no wheeze, no cough.  GI: no nausea/vomiting, no black/bloody stools.  : No hematuria  Musculoskeletal: No myalgias, no arthralgias, no back pain.    Vital Signs Last 24 Hrs  T(C): 36.4 (04 Jun 2024 08:00), Max: 36.6 (03 Jun 2024 15:30)  T(F): 97.6 (04 Jun 2024 08:00), Max: 97.8 (03 Jun 2024 15:30)  HR: 60 (04 Jun 2024 10:43) (60 - 71)  BP: 90/58 (04 Jun 2024 10:43) (82/49 - 101/56)  BP(mean): 69 (04 Jun 2024 10:43) (61 - 74)  RR: 18 (04 Jun 2024 10:00) (16 - 21)  SpO2: 95% (04 Jun 2024 10:00) (95% - 100%)    Parameters below as of 04 Jun 2024 10:00  Patient On (Oxygen Delivery Method): room air    I&O's Detail    03 Jun 2024 07:01  -  04 Jun 2024 07:00  --------------------------------------------------------  IN:    Heparin Infusion: 190 mL    Oral Fluid: 550 mL  Total IN: 740 mL    OUT:    Voided (mL): 450 mL  Total OUT: 450 mL    Total NET: 290 mL    PHYSICAL EXAM:   GENERAL: Pt lying comfortably, NAD.  ENMT: PERRL, +EOMI.  NECK: soft, Supple, No JVD,   CHEST/LUNG: Clear to auscultatation bilaterally; No wheezing.  HEART: S1S2+, Regular rate and rhythm; No murmurs.  ABDOMEN: Soft, Nontender, Nondistended  NEURO: AAOX3, nonfocal  EXT: Mild swelling to LUE, warm well-perfused. + b/l radial pulses, + b/l DP pulses palpated.    INTERPRETATION OF TELEMETRY:  62    LABS:                        10.5   12.58 )-----------( 247      ( 04 Jun 2024 06:00 )             31.7     128<L>  |  91<L>  |  76.1<H>  ----------------------------<  172<H>  4.1   |  19.0<L>  |  4.65<H>    Ca    8.5      04 Jun 2024 06:00  Mg     2.4     06-04    TPro  6.4<L>  /  Alb  3.0<L>  /  TBili  0.6  /  DBili  x   /  AST  37  /  ALT  79<H>  /  AlkPhos  119  06-04    PTT - ( 04 Jun 2024 06:00 )  PTT:81.4 sec  Urinalysis Basic - ( 04 Jun 2024 06:00 )    Color: x / Appearance: x / SG: x / pH: x  Gluc: 172 mg/dL / Ketone: x  / Bili: x / Urobili: x   Blood: x / Protein: x / Nitrite: x   Leuk Esterase: x / RBC: x / WBC x   Sq Epi: x / Non Sq Epi: x / Bacteria: x    I&O's Summary    03 Jun 2024 07:01  -  04 Jun 2024 07:00  --------------------------------------------------------  IN: 740 mL / OUT: 450 mL / NET: 290 mL    BNP 97682 (5/23)                                                                                 Department of Cardiology                                                                  [facility]/[place] [address] [address] [facility]                                                            Telephone: [phone]. Fax:[phone]                                                                                      Cardiac Cath Pre/Post-Cath PA Note       Patient is a 78y old  Male who presents with a chief complaint of Chest Pain / SOB (03 Jun 2024 16:10)    HPI:  Patient is a 78 year old male with history of HTN, HLD, DM, CKD, CAD s/p 4V CABG and multiple PCI's, ischemic cardiomyopathy (EF < 20%), and LBBB s/p MDT CRT-D. He had an admission in November for ADHF. At that time a LHC revealed his grafts were occluded except for the LIMA to LAD. He also underwent a CRTD implant.     On 5/25 he had a VT arrest with ROSC achieved after 3 minutes was sent to the cath lab which showed his LIMA is still patent. Patient was extubated on 5/27. RHC on 5/28 showed severely elevated biv filling pressures, severe Cpc-PH, and low CO. He was started on low dose milrinone and diuresed well on IV Bumex. Weaned off pressors 5/29 and Milrinone was weaned off 5/31.    Bedside Hemodynamics:  5/31 (mil 0.125): CVP 9-10, PA 72/24/43, PA sat 70.7%, Fauzia CO/CI 4.6/2.6, /56 (73), SVR 1096 dsc.    Cardiac Studies:  5/28/24 RHC: RA 18, RV 92/18, PA 95/38/57, PCW 31, PA sat 51.5%, Fauzia CO/CI 3.09/1.74, TPG 26, DPG 7, SVR 1889 dsc, PVR 8.41 MEYERS, Vidhi 3.2, /63 (91) on levo 0.08.    5/25/24 LHC: All grafts occluded except LIMA-LAD. LVEDP 29 mmHg.    5/23/24 TTE: LVEF <20%, LVIDd 5.8cm, mild RVE with mild RV dysfunction, TAPSE 0.7cm, mild-mod AS, mild-mod MR, mild TR, mild IN, est PASP 75 mmHg.    Now presents for RHC to reassess filling pressures in setting of cardiorenal syndrome, concern for low flow state.    PAST MEDICAL & SURGICAL HISTORY:  GERD (gastroesophageal reflux disease)  High cholesterol  Coronary artery disease involving coronary bypass graft of native heart with unstable angina pectoris  Coronary artery disease involving native coronary artery of native heart, angina presence unspecifie  Type 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, without long-term current use of insul  Essential hypertension  HFrEF (heart failure with reduced ejection fraction)  Stage 4 chronic kidney disease  LBBB (left bundle branch block)  Facial nerve palsy  Hypothyroidism  S/P CABG (coronary artery bypass graft)  4V 1983 [place]  Status post open reduction with internal fixation of fracture  History of insertion of stent into coronary artery bypass graft  History of brachytherapy    No Known Allergies    Intolerances    DOPamine (Hypotension)    MEDICATIONS  (STANDING):  aMIOdarone    Tablet 200 milliGRAM(s) Oral daily  aspirin enteric coated 81 milliGRAM(s) Oral daily  atorvastatin 80 milliGRAM(s) Oral at bedtime  benzocaine/menthol Lozenge 1 Lozenge Oral every 8 hours  chlorhexidine 2% Cloths 1 Application(s) Topical <User Schedule>  clopidogrel Tablet 75 milliGRAM(s) Oral daily  dextrose 50% Injectable 25 Gram(s) IV Push once  heparin  Infusion. 900 Unit(s)/Hr (9 mL/Hr) IV Continuous <Continuous>  hydrALAZINE 25 milliGRAM(s) Oral three times a day  insulin glargine Injectable (LANTUS) 9 Unit(s) SubCutaneous at bedtime  insulin lispro (ADMELOG) corrective regimen sliding scale   SubCutaneous three times a day before meals  insulin lispro (ADMELOG) corrective regimen sliding scale   SubCutaneous at bedtime  insulin lispro Injectable (ADMELOG) 3 Unit(s) SubCutaneous three times a day before meals  levothyroxine 88 MICROGram(s) Oral daily  metoprolol tartrate 12.5 milliGRAM(s) Oral every 6 hours  mexiletine 150 milliGRAM(s) Oral every 8 hours  montelukast 10 milliGRAM(s) Oral daily  pantoprazole    Tablet 40 milliGRAM(s) Oral before breakfast  ranolazine 500 milliGRAM(s) Oral two times a day  sodium bicarbonate 650 milliGRAM(s) Oral every 12 hours    MEDICATIONS  (PRN):  albuterol/ipratropium for Nebulization 3 milliLiter(s) Nebulizer every 6 hours PRN Shortness of Breath and/or Wheezing  guaiFENesin Oral Liquid (Sugar-Free) 200 milliGRAM(s) Oral every 6 hours PRN Cough  heparin   Injectable 5500 Unit(s) IV Push every 6 hours PRN For aPTT less than 40  heparin   Injectable 2500 Unit(s) IV Push every 6 hours PRN For aPTT between 40 - 57  ondansetron Injectable 4 milliGRAM(s) IV Push every 8 hours PRN Nausea and/or Vomiting  senna 2 Tablet(s) Oral at bedtime PRN Constipation  sodium chloride 0.9% lock flush 10 milliLiter(s) IV Push every 1 hour PRN Pre/post blood products, medications, blood draw, and to maintain line patency    REVIEW OF SYSTEMS:  General: No fevers/chills, or fatigue  HEENT: No visual disturbances, no hearing loss, no headaches, no epistaxis.  CV: No chest pain,no palpitations, no edema, no orthopnea, no PND, or JOINER  Respiratory: No dyspnea, no wheeze, no cough.  GI: no nausea/vomiting, no black/bloody stools.  : No hematuria  Musculoskeletal: No myalgias, no arthralgias, no back pain.    Vital Signs Last 24 Hrs  T(C): 36.4 (04 Jun 2024 08:00), Max: 36.6 (03 Jun 2024 15:30)  T(F): 97.6 (04 Jun 2024 08:00), Max: 97.8 (03 Jun 2024 15:30)  HR: 60 (04 Jun 2024 10:43) (60 - 71)  BP: 90/58 (04 Jun 2024 10:43) (82/49 - 101/56)  BP(mean): 69 (04 Jun 2024 10:43) (61 - 74)  RR: 18 (04 Jun 2024 10:00) (16 - 21)  SpO2: 95% (04 Jun 2024 10:00) (95% - 100%)    Parameters below as of 04 Jun 2024 10:00  Patient On (Oxygen Delivery Method): room air    I&O's Detail    03 Jun 2024 07:01  -  04 Jun 2024 07:00  --------------------------------------------------------  IN:    Heparin Infusion: 190 mL    Oral Fluid: 550 mL  Total IN: 740 mL    OUT:    Voided (mL): 450 mL  Total OUT: 450 mL    Total NET: 290 mL    PHYSICAL EXAM:   GENERAL: Pt lying comfortably, NAD.  ENMT: PERRL, +EOMI.  NECK: soft, Supple, No JVD,   CHEST/LUNG: Clear to auscultatation bilaterally; No wheezing.  HEART: S1S2+, Regular rate and rhythm; No murmurs.  ABDOMEN: Soft, Nontender, Nondistended  NEURO: AAOX3, nonfocal  EXT: Mild swelling to LUE, warm well-perfused. + b/l radial pulses, + b/l DP pulses palpated.    INTERPRETATION OF TELEMETRY:  62    LABS:                        10.5   12.58 )-----------( 247      ( 04 Jun 2024 06:00 )             31.7     128<L>  |  91<L>  |  76.1<H>  ----------------------------<  172<H>  4.1   |  19.0<L>  |  4.65<H>    Ca    8.5      04 Jun 2024 06:00  Mg     2.4     06-04    TPro  6.4<L>  /  Alb  3.0<L>  /  TBili  0.6  /  DBili  x   /  AST  37  /  ALT  79<H>  /  AlkPhos  119  06-04    PTT - ( 04 Jun 2024 06:00 )  PTT:81.4 sec  Urinalysis Basic - ( 04 Jun 2024 06:00 )    Color: x / Appearance: x / SG: x / pH: x  Gluc: 172 mg/dL / Ketone: x  / Bili: x / Urobili: x   Blood: x / Protein: x / Nitrite: x   Leuk Esterase: x / RBC: x / WBC x   Sq Epi: x / Non Sq Epi: x / Bacteria: x    I&O's Summary    03 Jun 2024 07:01  -  04 Jun 2024 07:00  --------------------------------------------------------  IN: 740 mL / OUT: 450 mL / NET: 290 mL    BNP 35483 (5/23)                                                                                 Department of Cardiology                                                                  [facility]/[place] [address] [address] [facility]                                                            Telephone: [phone]. Fax:[phone]                                                                                      Cardiac Cath Pre-Cath PA Note       Patient is a 78y old  Male who presents with a chief complaint of Chest Pain / SOB (03 Jun 2024 16:10)    HPI:  Patient is a 78 year old male with history of HTN, HLD, DM, CKD, CAD s/p 4V CABG and multiple PCI's, ischemic cardiomyopathy (EF < 20%), and LBBB s/p MDT CRT-D. He had an admission in November for ADHF. At that time a LHC revealed his grafts were occluded except for the LIMA to LAD. He also underwent a CRTD implant.     On 5/25 he had a VT arrest with ROSC achieved after 3 minutes was sent to the cath lab which showed his LIMA is still patent. Patient was extubated on 5/27. RHC on 5/28 showed severely elevated biv filling pressures, severe Cpc-PH, and low CO. He was started on low dose milrinone and diuresed well on IV Bumex. Weaned off pressors 5/29 and Milrinone was weaned off 5/31.    Bedside Hemodynamics:  5/31 (mil 0.125): CVP 9-10, PA 72/24/43, PA sat 70.7%, Fauzia CO/CI 4.6/2.6, /56 (73), SVR 1096 dsc.    Cardiac Studies:  5/28/24 RHC: RA 18, RV 92/18, PA 95/38/57, PCW 31, PA sat 51.5%, Fauzia CO/CI 3.09/1.74, TPG 26, DPG 7, SVR 1889 dsc, PVR 8.41 MEYERS, Vidhi 3.2, /63 (91) on levo 0.08.    5/25/24 LHC: All grafts occluded except LIMA-LAD. LVEDP 29 mmHg.    5/23/24 TTE: LVEF <20%, LVIDd 5.8cm, mild RVE with mild RV dysfunction, TAPSE 0.7cm, mild-mod AS, mild-mod MR, mild TR, mild GA, est PASP 75 mmHg.    Now presents for RHC to reassess filling pressures in setting of cardiorenal syndrome, concern for low flow state.    PAST MEDICAL & SURGICAL HISTORY:  GERD (gastroesophageal reflux disease)  High cholesterol  Coronary artery disease involving coronary bypass graft of native heart with unstable angina pectoris  Coronary artery disease involving native coronary artery of native heart, angina presence unspecifie  Type 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, without long-term current use of insul  Essential hypertension  HFrEF (heart failure with reduced ejection fraction)  Stage 4 chronic kidney disease  LBBB (left bundle branch block)  Facial nerve palsy  Hypothyroidism  S/P CABG (coronary artery bypass graft)  4V 1983 [place]  Status post open reduction with internal fixation of fracture  History of insertion of stent into coronary artery bypass graft  History of brachytherapy    No Known Allergies    Intolerances    DOPamine (Hypotension)    MEDICATIONS  (STANDING):  aMIOdarone    Tablet 200 milliGRAM(s) Oral daily  aspirin enteric coated 81 milliGRAM(s) Oral daily  atorvastatin 80 milliGRAM(s) Oral at bedtime  benzocaine/menthol Lozenge 1 Lozenge Oral every 8 hours  chlorhexidine 2% Cloths 1 Application(s) Topical <User Schedule>  clopidogrel Tablet 75 milliGRAM(s) Oral daily  dextrose 50% Injectable 25 Gram(s) IV Push once  heparin  Infusion. 900 Unit(s)/Hr (9 mL/Hr) IV Continuous <Continuous>  hydrALAZINE 25 milliGRAM(s) Oral three times a day  insulin glargine Injectable (LANTUS) 9 Unit(s) SubCutaneous at bedtime  insulin lispro (ADMELOG) corrective regimen sliding scale   SubCutaneous three times a day before meals  insulin lispro (ADMELOG) corrective regimen sliding scale   SubCutaneous at bedtime  insulin lispro Injectable (ADMELOG) 3 Unit(s) SubCutaneous three times a day before meals  levothyroxine 88 MICROGram(s) Oral daily  metoprolol tartrate 12.5 milliGRAM(s) Oral every 6 hours  mexiletine 150 milliGRAM(s) Oral every 8 hours  montelukast 10 milliGRAM(s) Oral daily  pantoprazole    Tablet 40 milliGRAM(s) Oral before breakfast  ranolazine 500 milliGRAM(s) Oral two times a day  sodium bicarbonate 650 milliGRAM(s) Oral every 12 hours    MEDICATIONS  (PRN):  albuterol/ipratropium for Nebulization 3 milliLiter(s) Nebulizer every 6 hours PRN Shortness of Breath and/or Wheezing  guaiFENesin Oral Liquid (Sugar-Free) 200 milliGRAM(s) Oral every 6 hours PRN Cough  heparin   Injectable 5500 Unit(s) IV Push every 6 hours PRN For aPTT less than 40  heparin   Injectable 2500 Unit(s) IV Push every 6 hours PRN For aPTT between 40 - 57  ondansetron Injectable 4 milliGRAM(s) IV Push every 8 hours PRN Nausea and/or Vomiting  senna 2 Tablet(s) Oral at bedtime PRN Constipation  sodium chloride 0.9% lock flush 10 milliLiter(s) IV Push every 1 hour PRN Pre/post blood products, medications, blood draw, and to maintain line patency    REVIEW OF SYSTEMS:  General: No fevers/chills, or fatigue  HEENT: No visual disturbances, no hearing loss, no headaches, no epistaxis.  CV: No chest pain,no palpitations, no edema, no orthopnea, no PND, or JOINER  Respiratory: No dyspnea, no wheeze, no cough.  GI: no nausea/vomiting, no black/bloody stools.  : No hematuria  Musculoskeletal: No myalgias, no arthralgias, no back pain.    Vital Signs Last 24 Hrs  T(C): 36.4 (04 Jun 2024 08:00), Max: 36.6 (03 Jun 2024 15:30)  T(F): 97.6 (04 Jun 2024 08:00), Max: 97.8 (03 Jun 2024 15:30)  HR: 60 (04 Jun 2024 10:43) (60 - 71)  BP: 90/58 (04 Jun 2024 10:43) (82/49 - 101/56)  BP(mean): 69 (04 Jun 2024 10:43) (61 - 74)  RR: 18 (04 Jun 2024 10:00) (16 - 21)  SpO2: 95% (04 Jun 2024 10:00) (95% - 100%)    Parameters below as of 04 Jun 2024 10:00  Patient On (Oxygen Delivery Method): room air    I&O's Detail    03 Jun 2024 07:01  -  04 Jun 2024 07:00  --------------------------------------------------------  IN:    Heparin Infusion: 190 mL    Oral Fluid: 550 mL  Total IN: 740 mL    OUT:    Voided (mL): 450 mL  Total OUT: 450 mL    Total NET: 290 mL    PHYSICAL EXAM:   GENERAL: Pt lying comfortably, NAD.  ENMT: PERRL, +EOMI.  NECK: soft, Supple, No JVD,   CHEST/LUNG: Clear to auscultatation bilaterally; No wheezing.  HEART: S1S2+, Regular rate and rhythm; No murmurs.  ABDOMEN: Soft, Nontender, Nondistended  NEURO: AAOX3, nonfocal  EXT: Mild swelling to LUE, warm well-perfused. + b/l radial pulses, + b/l DP pulses palpated.    INTERPRETATION OF TELEMETRY:  62    LABS:                        10.5   12.58 )-----------( 247      ( 04 Jun 2024 06:00 )             31.7     128<L>  |  91<L>  |  76.1<H>  ----------------------------<  172<H>  4.1   |  19.0<L>  |  4.65<H>    Ca    8.5      04 Jun 2024 06:00  Mg     2.4     06-04    TPro  6.4<L>  /  Alb  3.0<L>  /  TBili  0.6  /  DBili  x   /  AST  37  /  ALT  79<H>  /  AlkPhos  119  06-04    PTT - ( 04 Jun 2024 06:00 )  PTT:81.4 sec  Urinalysis Basic - ( 04 Jun 2024 06:00 )    Color: x / Appearance: x / SG: x / pH: x  Gluc: 172 mg/dL / Ketone: x  / Bili: x / Urobili: x   Blood: x / Protein: x / Nitrite: x   Leuk Esterase: x / RBC: x / WBC x   Sq Epi: x / Non Sq Epi: x / Bacteria: x    I&O's Summary    03 Jun 2024 07:01  -  04 Jun 2024 07:00  --------------------------------------------------------  IN: 740 mL / OUT: 450 mL / NET: 290 mL    BNP [id number] (5/23)

## 2024-06-04 NOTE — PROGRESS NOTE ADULT - ASSESSMENT
Patient is a 78 year old male with history of HTN, HLD, DM, CKD, CAD s/p 4V CABG and multiple PCI's, ischemic cardiomyopathy (EF < 20%), and LBBB s/p MDT CRT-D. He had an admission in November for ADHF. At that time a LHC revealed his grafts were occluded except for the LIMA to LAD. He also underwent a CRTD implant. On 5/25 he had a VT arrest with ROSC achieved after 3 minutes was sent to the cath lab which showed his LIMA is still patent. Patient was extubated on 5/27. RHC on 5/28 showed severely elevated biv filling pressures, severe Cpc-PH, and low CO. He was started on low dose milrinone and diuresed well on IV Bumex. Weaned off pressors 5/29 and Milrinone was weaned off 5/31. Now presents for RHC to reassess filling pressures in setting of cardiorenal syndrome, concern for low flow state.    Risk Assessments:  ASA: 4  Mallampati: 3  Creat: 4.65  GFR: 12  BRA: 4.6%    Coronary Anatomy:   5/25/24 LHC: All grafts occluded except LIMA-LAD. LVEDP 29 mmHg.    Plan:  -Continue Heparin to cath lab  -plan for RHC via RBV  -confirmed appropriate NPO duration  -ECG and Labs reviewed  -Aspirin 81mg po pre-cath  -procedure discussed with patient; risks and benefits explained, questions answered  -consent obtained by attending IC   Patient is a 78 year old male with history of HTN, HLD, DM, CKD, CAD s/p 4V CABG and multiple PCI's, ischemic cardiomyopathy (EF < 20%), and LBBB s/p MDT CRT-D. He had an admission in November for ADHF. At that time a LHC revealed his grafts were occluded except for the LIMA to LAD. He also underwent a CRTD implant. On 5/25 he had a VT arrest with ROSC achieved after 3 minutes was sent to the cath lab which showed his LIMA is still patent. Patient was extubated on 5/27. RHC on 5/28 showed severely elevated biv filling pressures, severe Cpc-PH, and low CO. He was started on low dose milrinone and diuresed well on IV Bumex. Weaned off pressors 5/29 and Milrinone was weaned off 5/31. Now presents for RHC to reassess filling pressures in setting of cardiorenal syndrome, concern for low flow state.    Risk Assessments:  ASA: 4  Mallampati: 3  Creat: 4.65  GFR: 12  BRA: 4.6%    Coronary Anatomy:   5/25/24 LHC: All grafts occluded except LIMA-LAD. LVEDP 29 mmHg.    Plan:  -Continue Heparin to cath lab  -plan for RHC via RBV  -confirmed appropriate NPO duration  -ECG and Labs reviewed  -procedure discussed with patient; risks and benefits explained, questions answered  -consent obtained by attending IC

## 2024-06-04 NOTE — PROGRESS NOTE ADULT - ASSESSMENT
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 presented to the ED with c/o chest tightness    Cardiac arrest >shock- reqd  pressors - off now    RAS on CKD suspect stage III  Some improvement in serum Cr 4.2--> 3.4 --> 2.3 --> 2.2 --> 2.4 --> 2.5 --> 2.4> 2.4>2.89>3.18>3.84 > 4.65 -likely 2/2 diuretics/CHF - need to watch   Base Cr seems to be approx 1.4- 1.8  Entresto, Torsemide and Farxiga on hold  On Bumex 3mg IV Q 12, will need to accept azotemia - can possibly change to qd as creat ristankng - will d/w team and changed to qd but now need to hold for 1-2 days and restart slowly  but now RHC showed high PCWP and filing pressures  and low CO  d/w Dr. Leavitt - to start Bumex drip and Milrinone + GDMT  TTE 5/23 noted LVEF < 20%  BNP 21 K  HYpoKalemia - supplemented, improved  s/p emergent L HC renal functio remains stable post cath  Results--> cath showed all grafts occluded except LIMA-LAD  plans are to medical management with DAPT, Ranexa, and statin  on Benzonatate    Renally dose meds  Avoid nephrotoxic agents  Monitor labs will follow; d/w merari

## 2024-06-05 ENCOUNTER — RESULT REVIEW (OUTPATIENT)
Age: 79
End: 2024-06-05

## 2024-06-05 DIAGNOSIS — N17.9 ACUTE KIDNEY FAILURE, UNSPECIFIED: ICD-10-CM

## 2024-06-05 DIAGNOSIS — Z51.5 ENCOUNTER FOR PALLIATIVE CARE: ICD-10-CM

## 2024-06-05 DIAGNOSIS — J96.01 ACUTE RESPIRATORY FAILURE WITH HYPOXIA: ICD-10-CM

## 2024-06-05 LAB
ALBUMIN SERPL ELPH-MCNC: 2.8 G/DL — LOW (ref 3.3–5.2)
ALBUMIN SERPL ELPH-MCNC: 3 G/DL — LOW (ref 3.3–5.2)
ALBUMIN SERPL ELPH-MCNC: 3.1 G/DL — LOW (ref 3.3–5.2)
ALBUMIN SERPL ELPH-MCNC: 3.3 G/DL — SIGNIFICANT CHANGE UP (ref 3.3–5.2)
ALP SERPL-CCNC: 128 U/L — HIGH (ref 40–120)
ALP SERPL-CCNC: 129 U/L — HIGH (ref 40–120)
ALP SERPL-CCNC: 134 U/L — HIGH (ref 40–120)
ALP SERPL-CCNC: 137 U/L — HIGH (ref 40–120)
ALT FLD-CCNC: 82 U/L — HIGH
ALT FLD-CCNC: 89 U/L — HIGH
ALT FLD-CCNC: 89 U/L — HIGH
ALT FLD-CCNC: 97 U/L — HIGH
ANION GAP SERPL CALC-SCNC: 18 MMOL/L — HIGH (ref 5–17)
ANION GAP SERPL CALC-SCNC: 18 MMOL/L — HIGH (ref 5–17)
ANION GAP SERPL CALC-SCNC: 20 MMOL/L — HIGH (ref 5–17)
ANION GAP SERPL CALC-SCNC: 22 MMOL/L — HIGH (ref 5–17)
ANISOCYTOSIS BLD QL: SLIGHT — SIGNIFICANT CHANGE UP
APTT BLD: 30.6 SEC — SIGNIFICANT CHANGE UP (ref 24.5–35.6)
APTT BLD: 71.6 SEC — HIGH (ref 24.5–35.6)
APTT BLD: 92.9 SEC — HIGH (ref 24.5–35.6)
AST SERPL-CCNC: 84 U/L — HIGH
AST SERPL-CCNC: 90 U/L — HIGH
AST SERPL-CCNC: 93 U/L — HIGH
AST SERPL-CCNC: 98 U/L — HIGH
BASE EXCESS BLDV CALC-SCNC: -1.1 MMOL/L — SIGNIFICANT CHANGE UP (ref -2–3)
BASE EXCESS BLDV CALC-SCNC: -2.4 MMOL/L — LOW (ref -2–3)
BASE EXCESS BLDV CALC-SCNC: -5.3 MMOL/L — LOW (ref -2–3)
BASOPHILS # BLD AUTO: 0 K/UL — SIGNIFICANT CHANGE UP (ref 0–0.2)
BASOPHILS NFR BLD AUTO: 0 % — SIGNIFICANT CHANGE UP (ref 0–2)
BILIRUB SERPL-MCNC: 0.7 MG/DL — SIGNIFICANT CHANGE UP (ref 0.4–2)
BILIRUB SERPL-MCNC: 0.8 MG/DL — SIGNIFICANT CHANGE UP (ref 0.4–2)
BLOOD GAS COMMENTS, VENOUS: SIGNIFICANT CHANGE UP
BLOOD GAS COMMENTS, VENOUS: SIGNIFICANT CHANGE UP
BUN SERPL-MCNC: 85.4 MG/DL — HIGH (ref 8–20)
BUN SERPL-MCNC: 85.5 MG/DL — HIGH (ref 8–20)
BUN SERPL-MCNC: 86.4 MG/DL — HIGH (ref 8–20)
BUN SERPL-MCNC: 87.6 MG/DL — HIGH (ref 8–20)
BURR CELLS BLD QL SMEAR: PRESENT — SIGNIFICANT CHANGE UP
CA-I SERPL-SCNC: 1.05 MMOL/L — LOW (ref 1.15–1.33)
CA-I SERPL-SCNC: 1.09 MMOL/L — LOW (ref 1.15–1.33)
CA-I SERPL-SCNC: 1.11 MMOL/L — LOW (ref 1.15–1.33)
CALCIUM SERPL-MCNC: 7.9 MG/DL — LOW (ref 8.4–10.5)
CALCIUM SERPL-MCNC: 8.1 MG/DL — LOW (ref 8.4–10.5)
CALCIUM SERPL-MCNC: 8.1 MG/DL — LOW (ref 8.4–10.5)
CALCIUM SERPL-MCNC: 8.5 MG/DL — SIGNIFICANT CHANGE UP (ref 8.4–10.5)
CHLORIDE BLDV-SCNC: 93 MMOL/L — LOW (ref 96–108)
CHLORIDE BLDV-SCNC: 94 MMOL/L — LOW (ref 96–108)
CHLORIDE BLDV-SCNC: 97 MMOL/L — SIGNIFICANT CHANGE UP (ref 96–108)
CHLORIDE SERPL-SCNC: 89 MMOL/L — LOW (ref 96–108)
CHLORIDE SERPL-SCNC: 90 MMOL/L — LOW (ref 96–108)
CHLORIDE SERPL-SCNC: 91 MMOL/L — LOW (ref 96–108)
CHLORIDE SERPL-SCNC: 93 MMOL/L — LOW (ref 96–108)
CO2 SERPL-SCNC: 19 MMOL/L — LOW (ref 22–29)
CO2 SERPL-SCNC: 19 MMOL/L — LOW (ref 22–29)
CO2 SERPL-SCNC: 20 MMOL/L — LOW (ref 22–29)
CO2 SERPL-SCNC: 20 MMOL/L — LOW (ref 22–29)
CREAT SERPL-MCNC: 5.02 MG/DL — HIGH (ref 0.5–1.3)
CREAT SERPL-MCNC: 5.17 MG/DL — HIGH (ref 0.5–1.3)
CREAT SERPL-MCNC: 5.3 MG/DL — HIGH (ref 0.5–1.3)
CREAT SERPL-MCNC: 5.58 MG/DL — HIGH (ref 0.5–1.3)
EGFR: 10 ML/MIN/1.73M2 — LOW
EGFR: 11 ML/MIN/1.73M2 — LOW
ELLIPTOCYTES BLD QL SMEAR: SLIGHT — SIGNIFICANT CHANGE UP
EOSINOPHIL # BLD AUTO: 0 K/UL — SIGNIFICANT CHANGE UP (ref 0–0.5)
EOSINOPHIL NFR BLD AUTO: 0 % — SIGNIFICANT CHANGE UP (ref 0–6)
GAS PNL BLDA: SIGNIFICANT CHANGE UP
GAS PNL BLDV: 126 MMOL/L — LOW (ref 136–145)
GAS PNL BLDV: 127 MMOL/L — LOW (ref 136–145)
GAS PNL BLDV: 128 MMOL/L — LOW (ref 136–145)
GAS PNL BLDV: SIGNIFICANT CHANGE UP
GIANT PLATELETS BLD QL SMEAR: PRESENT — SIGNIFICANT CHANGE UP
GLUCOSE BLDC GLUCOMTR-MCNC: 134 MG/DL — HIGH (ref 70–99)
GLUCOSE BLDC GLUCOMTR-MCNC: 142 MG/DL — HIGH (ref 70–99)
GLUCOSE BLDC GLUCOMTR-MCNC: 163 MG/DL — HIGH (ref 70–99)
GLUCOSE BLDC GLUCOMTR-MCNC: 179 MG/DL — HIGH (ref 70–99)
GLUCOSE BLDC GLUCOMTR-MCNC: 179 MG/DL — HIGH (ref 70–99)
GLUCOSE BLDV-MCNC: 147 MG/DL — HIGH (ref 70–99)
GLUCOSE BLDV-MCNC: 152 MG/DL — HIGH (ref 70–99)
GLUCOSE BLDV-MCNC: 158 MG/DL — HIGH (ref 70–99)
GLUCOSE SERPL-MCNC: 145 MG/DL — HIGH (ref 70–99)
GLUCOSE SERPL-MCNC: 158 MG/DL — HIGH (ref 70–99)
GLUCOSE SERPL-MCNC: 164 MG/DL — HIGH (ref 70–99)
GLUCOSE SERPL-MCNC: 181 MG/DL — HIGH (ref 70–99)
HCO3 BLDV-SCNC: 20 MMOL/L — LOW (ref 22–29)
HCO3 BLDV-SCNC: 23 MMOL/L — SIGNIFICANT CHANGE UP (ref 22–29)
HCO3 BLDV-SCNC: 23 MMOL/L — SIGNIFICANT CHANGE UP (ref 22–29)
HCT VFR BLD CALC: 32.4 % — LOW (ref 39–50)
HCT VFR BLD CALC: 33.4 % — LOW (ref 39–50)
HCT VFR BLDA CALC: 33 % — SIGNIFICANT CHANGE UP
HCT VFR BLDA CALC: 33 % — SIGNIFICANT CHANGE UP
HCT VFR BLDA CALC: 36 % — SIGNIFICANT CHANGE UP
HGB BLD CALC-MCNC: 11.1 G/DL — LOW (ref 12.6–17.4)
HGB BLD CALC-MCNC: 11.1 G/DL — LOW (ref 12.6–17.4)
HGB BLD CALC-MCNC: 11.9 G/DL — LOW (ref 12.6–17.4)
HGB BLD-MCNC: 11.2 G/DL — LOW (ref 13–17)
HGB BLD-MCNC: 11.5 G/DL — LOW (ref 13–17)
HOROWITZ INDEX BLDV+IHG-RTO: 0.7 — SIGNIFICANT CHANGE UP
HOROWITZ INDEX BLDV+IHG-RTO: 40 — SIGNIFICANT CHANGE UP
LACTATE BLDV-MCNC: 1.2 MMOL/L — SIGNIFICANT CHANGE UP (ref 0.5–2)
LACTATE BLDV-MCNC: 1.2 MMOL/L — SIGNIFICANT CHANGE UP (ref 0.5–2)
LACTATE BLDV-MCNC: 1.5 MMOL/L — SIGNIFICANT CHANGE UP (ref 0.5–2)
LACTATE SERPL-SCNC: 1.6 MMOL/L — SIGNIFICANT CHANGE UP (ref 0.5–2)
LYMPHOCYTES # BLD AUTO: 0.68 K/UL — LOW (ref 1–3.3)
LYMPHOCYTES # BLD AUTO: 3.5 % — LOW (ref 13–44)
MAGNESIUM SERPL-MCNC: 2.1 MG/DL — SIGNIFICANT CHANGE UP (ref 1.6–2.6)
MAGNESIUM SERPL-MCNC: 2.3 MG/DL — SIGNIFICANT CHANGE UP (ref 1.6–2.6)
MAGNESIUM SERPL-MCNC: 2.4 MG/DL — SIGNIFICANT CHANGE UP (ref 1.6–2.6)
MAGNESIUM SERPL-MCNC: 2.4 MG/DL — SIGNIFICANT CHANGE UP (ref 1.8–2.6)
MANUAL SMEAR VERIFICATION: SIGNIFICANT CHANGE UP
MCHC RBC-ENTMCNC: 29.6 PG — SIGNIFICANT CHANGE UP (ref 27–34)
MCHC RBC-ENTMCNC: 29.9 PG — SIGNIFICANT CHANGE UP (ref 27–34)
MCHC RBC-ENTMCNC: 34.4 GM/DL — SIGNIFICANT CHANGE UP (ref 32–36)
MCHC RBC-ENTMCNC: 34.6 GM/DL — SIGNIFICANT CHANGE UP (ref 32–36)
MCV RBC AUTO: 86.1 FL — SIGNIFICANT CHANGE UP (ref 80–100)
MCV RBC AUTO: 86.6 FL — SIGNIFICANT CHANGE UP (ref 80–100)
METAMYELOCYTES # FLD: 0.9 % — HIGH (ref 0–0)
MONOCYTES # BLD AUTO: 1.18 K/UL — HIGH (ref 0–0.9)
MONOCYTES NFR BLD AUTO: 6.1 % — SIGNIFICANT CHANGE UP (ref 2–14)
NEUTROPHILS # BLD AUTO: 17.36 K/UL — HIGH (ref 1.8–7.4)
NEUTROPHILS NFR BLD AUTO: 88.7 % — HIGH (ref 43–77)
NEUTS BAND # BLD: 0.8 % — SIGNIFICANT CHANGE UP (ref 0–8)
NT-PROBNP SERPL-SCNC: HIGH PG/ML (ref 0–300)
OVALOCYTES BLD QL SMEAR: SLIGHT — SIGNIFICANT CHANGE UP
PCO2 BLDV: 34 MMHG — LOW (ref 42–55)
PCO2 BLDV: 38 MMHG — LOW (ref 42–55)
PCO2 BLDV: 42 MMHG — SIGNIFICANT CHANGE UP (ref 42–55)
PH BLDV: 7.34 — SIGNIFICANT CHANGE UP (ref 7.32–7.43)
PH BLDV: 7.35 — SIGNIFICANT CHANGE UP (ref 7.32–7.43)
PH BLDV: 7.44 — HIGH (ref 7.32–7.43)
PHOSPHATE SERPL-MCNC: 5.4 MG/DL — HIGH (ref 2.4–4.7)
PHOSPHATE SERPL-MCNC: 5.6 MG/DL — HIGH (ref 2.4–4.7)
PHOSPHATE SERPL-MCNC: 6 MG/DL — HIGH (ref 2.4–4.7)
PHOSPHATE SERPL-MCNC: 6.1 MG/DL — HIGH (ref 2.4–4.7)
PLAT MORPH BLD: NORMAL — SIGNIFICANT CHANGE UP
PLATELET # BLD AUTO: 326 K/UL — SIGNIFICANT CHANGE UP (ref 150–400)
PLATELET # BLD AUTO: 348 K/UL — SIGNIFICANT CHANGE UP (ref 150–400)
PO2 BLDV: 42 MMHG — SIGNIFICANT CHANGE UP (ref 25–45)
PO2 BLDV: 47 MMHG — HIGH (ref 25–45)
PO2 BLDV: <42 MMHG — SIGNIFICANT CHANGE UP (ref 25–45)
POIKILOCYTOSIS BLD QL AUTO: SIGNIFICANT CHANGE UP
POLYCHROMASIA BLD QL SMEAR: SLIGHT — SIGNIFICANT CHANGE UP
POTASSIUM BLDV-SCNC: 3.4 MMOL/L — LOW (ref 3.5–5.1)
POTASSIUM BLDV-SCNC: 3.8 MMOL/L — SIGNIFICANT CHANGE UP (ref 3.5–5.1)
POTASSIUM BLDV-SCNC: 4.2 MMOL/L — SIGNIFICANT CHANGE UP (ref 3.5–5.1)
POTASSIUM SERPL-MCNC: 3.3 MMOL/L — LOW (ref 3.5–5.3)
POTASSIUM SERPL-MCNC: 3.5 MMOL/L — SIGNIFICANT CHANGE UP (ref 3.5–5.3)
POTASSIUM SERPL-MCNC: 4.2 MMOL/L — SIGNIFICANT CHANGE UP (ref 3.5–5.3)
POTASSIUM SERPL-MCNC: 4.3 MMOL/L — SIGNIFICANT CHANGE UP (ref 3.5–5.3)
POTASSIUM SERPL-SCNC: 3.3 MMOL/L — LOW (ref 3.5–5.3)
POTASSIUM SERPL-SCNC: 3.5 MMOL/L — SIGNIFICANT CHANGE UP (ref 3.5–5.3)
POTASSIUM SERPL-SCNC: 4.2 MMOL/L — SIGNIFICANT CHANGE UP (ref 3.5–5.3)
POTASSIUM SERPL-SCNC: 4.3 MMOL/L — SIGNIFICANT CHANGE UP (ref 3.5–5.3)
PROT SERPL-MCNC: 5.9 G/DL — LOW (ref 6.6–8.7)
PROT SERPL-MCNC: 6.1 G/DL — LOW (ref 6.6–8.7)
PROT SERPL-MCNC: 6.2 G/DL — LOW (ref 6.6–8.7)
PROT SERPL-MCNC: 6.3 G/DL — LOW (ref 6.6–8.7)
RBC # BLD: 3.74 M/UL — LOW (ref 4.2–5.8)
RBC # BLD: 3.88 M/UL — LOW (ref 4.2–5.8)
RBC # FLD: 15.7 % — HIGH (ref 10.3–14.5)
RBC # FLD: 15.9 % — HIGH (ref 10.3–14.5)
RBC BLD AUTO: ABNORMAL
SAO2 % BLDV: 64.6 % — SIGNIFICANT CHANGE UP
SAO2 % BLDV: 68 % — SIGNIFICANT CHANGE UP
SAO2 % BLDV: 71.3 % — SIGNIFICANT CHANGE UP
SCHISTOCYTES BLD QL AUTO: SLIGHT — SIGNIFICANT CHANGE UP
SODIUM SERPL-SCNC: 128 MMOL/L — LOW (ref 135–145)
SODIUM SERPL-SCNC: 130 MMOL/L — LOW (ref 135–145)
SODIUM SERPL-SCNC: 130 MMOL/L — LOW (ref 135–145)
SODIUM SERPL-SCNC: 131 MMOL/L — LOW (ref 135–145)
TROPONIN T, HIGH SENSITIVITY RESULT: 202 NG/L — HIGH (ref 0–51)
WBC # BLD: 19.4 K/UL — HIGH (ref 3.8–10.5)
WBC # BLD: 24.18 K/UL — HIGH (ref 3.8–10.5)
WBC # FLD AUTO: 19.4 K/UL — HIGH (ref 3.8–10.5)
WBC # FLD AUTO: 24.18 K/UL — HIGH (ref 3.8–10.5)

## 2024-06-05 PROCEDURE — 99152 MOD SED SAME PHYS/QHP 5/>YRS: CPT

## 2024-06-05 PROCEDURE — 99223 1ST HOSP IP/OBS HIGH 75: CPT

## 2024-06-05 PROCEDURE — 99291 CRITICAL CARE FIRST HOUR: CPT | Mod: GC

## 2024-06-05 PROCEDURE — 93930 UPPER EXTREMITY STUDY: CPT | Mod: 26

## 2024-06-05 PROCEDURE — 71045 X-RAY EXAM CHEST 1 VIEW: CPT | Mod: 26

## 2024-06-05 PROCEDURE — 99232 SBSQ HOSP IP/OBS MODERATE 35: CPT

## 2024-06-05 PROCEDURE — 93306 TTE W/DOPPLER COMPLETE: CPT | Mod: 26

## 2024-06-05 PROCEDURE — 70450 CT HEAD/BRAIN W/O DYE: CPT | Mod: 26

## 2024-06-05 PROCEDURE — 93925 LOWER EXTREMITY STUDY: CPT | Mod: 26

## 2024-06-05 PROCEDURE — 93010 ELECTROCARDIOGRAM REPORT: CPT

## 2024-06-05 PROCEDURE — 33967 INSERT I-AORT PERCUT DEVICE: CPT | Mod: 59

## 2024-06-05 RX ORDER — PIPERACILLIN AND TAZOBACTAM 4; .5 G/20ML; G/20ML
3.38 INJECTION, POWDER, LYOPHILIZED, FOR SOLUTION INTRAVENOUS EVERY 12 HOURS
Refills: 0 | Status: DISCONTINUED | OUTPATIENT
Start: 2024-06-06 | End: 2024-06-11

## 2024-06-05 RX ORDER — LEVOTHYROXINE SODIUM 125 MCG
44 TABLET ORAL AT BEDTIME
Refills: 0 | Status: DISCONTINUED | OUTPATIENT
Start: 2024-06-05 | End: 2024-06-10

## 2024-06-05 RX ORDER — VANCOMYCIN HCL 1 G
1000 VIAL (EA) INTRAVENOUS ONCE
Refills: 0 | Status: COMPLETED | OUTPATIENT
Start: 2024-06-05 | End: 2024-06-05

## 2024-06-05 RX ORDER — FENTANYL CITRATE 50 UG/ML
2 INJECTION INTRAVENOUS
Qty: 2500 | Refills: 0 | Status: DISCONTINUED | OUTPATIENT
Start: 2024-06-05 | End: 2024-06-05

## 2024-06-05 RX ORDER — PIPERACILLIN AND TAZOBACTAM 4; .5 G/20ML; G/20ML
3.38 INJECTION, POWDER, LYOPHILIZED, FOR SOLUTION INTRAVENOUS ONCE
Refills: 0 | Status: COMPLETED | OUTPATIENT
Start: 2024-06-05 | End: 2024-06-05

## 2024-06-05 RX ORDER — RANOLAZINE 500 MG/1
500 TABLET, FILM COATED, EXTENDED RELEASE ORAL
Refills: 0 | Status: DISCONTINUED | OUTPATIENT
Start: 2024-06-05 | End: 2024-06-05

## 2024-06-05 RX ORDER — BUMETANIDE 0.25 MG/ML
1.5 INJECTION INTRAMUSCULAR; INTRAVENOUS
Qty: 20 | Refills: 0 | Status: DISCONTINUED | OUTPATIENT
Start: 2024-06-05 | End: 2024-06-06

## 2024-06-05 RX ORDER — NOREPINEPHRINE BITARTRATE/D5W 8 MG/250ML
0.05 PLASTIC BAG, INJECTION (ML) INTRAVENOUS
Qty: 16 | Refills: 0 | Status: DISCONTINUED | OUTPATIENT
Start: 2024-06-05 | End: 2024-06-10

## 2024-06-05 RX ORDER — VASOPRESSIN 20 [USP'U]/ML
0.04 INJECTION INTRAVENOUS
Qty: 40 | Refills: 0 | Status: DISCONTINUED | OUTPATIENT
Start: 2024-06-05 | End: 2024-06-10

## 2024-06-05 RX ORDER — POTASSIUM CHLORIDE 20 MEQ
20 PACKET (EA) ORAL
Refills: 0 | Status: COMPLETED | OUTPATIENT
Start: 2024-06-05 | End: 2024-06-05

## 2024-06-05 RX ORDER — HEPARIN SODIUM 5000 [USP'U]/ML
5500 INJECTION INTRAVENOUS; SUBCUTANEOUS EVERY 6 HOURS
Refills: 0 | Status: DISCONTINUED | OUTPATIENT
Start: 2024-06-05 | End: 2024-06-09

## 2024-06-05 RX ORDER — HEPARIN SODIUM 5000 [USP'U]/ML
800 INJECTION INTRAVENOUS; SUBCUTANEOUS
Qty: 25000 | Refills: 0 | Status: DISCONTINUED | OUTPATIENT
Start: 2024-06-05 | End: 2024-06-09

## 2024-06-05 RX ORDER — BUMETANIDE 0.25 MG/ML
2 INJECTION INTRAMUSCULAR; INTRAVENOUS ONCE
Refills: 0 | Status: COMPLETED | OUTPATIENT
Start: 2024-06-05 | End: 2024-06-05

## 2024-06-05 RX ORDER — SODIUM BICARBONATE 1 MEQ/ML
650 SYRINGE (ML) INTRAVENOUS EVERY 12 HOURS
Refills: 0 | Status: DISCONTINUED | OUTPATIENT
Start: 2024-06-05 | End: 2024-06-10

## 2024-06-05 RX ORDER — ASPIRIN/CALCIUM CARB/MAGNESIUM 324 MG
81 TABLET ORAL DAILY
Refills: 0 | Status: DISCONTINUED | OUTPATIENT
Start: 2024-06-05 | End: 2024-06-12

## 2024-06-05 RX ORDER — MEXILETINE HYDROCHLORIDE 150 MG/1
150 CAPSULE ORAL EVERY 8 HOURS
Refills: 0 | Status: DISCONTINUED | OUTPATIENT
Start: 2024-06-05 | End: 2024-06-05

## 2024-06-05 RX ORDER — MIDAZOLAM HYDROCHLORIDE 1 MG/ML
2 INJECTION, SOLUTION INTRAMUSCULAR; INTRAVENOUS ONCE
Refills: 0 | Status: DISCONTINUED | OUTPATIENT
Start: 2024-06-05 | End: 2024-06-05

## 2024-06-05 RX ORDER — FENTANYL CITRATE 50 UG/ML
50 INJECTION INTRAVENOUS ONCE
Refills: 0 | Status: DISCONTINUED | OUTPATIENT
Start: 2024-06-05 | End: 2024-06-05

## 2024-06-05 RX ORDER — DOBUTAMINE HCL 250MG/20ML
7.5 VIAL (ML) INTRAVENOUS
Qty: 500 | Refills: 0 | Status: DISCONTINUED | OUTPATIENT
Start: 2024-06-05 | End: 2024-06-11

## 2024-06-05 RX ORDER — PANTOPRAZOLE SODIUM 20 MG/1
40 TABLET, DELAYED RELEASE ORAL DAILY
Refills: 0 | Status: DISCONTINUED | OUTPATIENT
Start: 2024-06-05 | End: 2024-06-10

## 2024-06-05 RX ORDER — DOBUTAMINE HCL 250MG/20ML
5 VIAL (ML) INTRAVENOUS
Qty: 500 | Refills: 0 | Status: DISCONTINUED | OUTPATIENT
Start: 2024-06-05 | End: 2024-06-05

## 2024-06-05 RX ORDER — AMIODARONE HYDROCHLORIDE 400 MG/1
200 TABLET ORAL DAILY
Refills: 0 | Status: DISCONTINUED | OUTPATIENT
Start: 2024-06-05 | End: 2024-06-10

## 2024-06-05 RX ORDER — PROPOFOL 10 MG/ML
20 INJECTION, EMULSION INTRAVENOUS
Qty: 1000 | Refills: 0 | Status: DISCONTINUED | OUTPATIENT
Start: 2024-06-05 | End: 2024-06-09

## 2024-06-05 RX ORDER — ATORVASTATIN CALCIUM 80 MG/1
80 TABLET, FILM COATED ORAL AT BEDTIME
Refills: 0 | Status: DISCONTINUED | OUTPATIENT
Start: 2024-06-05 | End: 2024-06-12

## 2024-06-05 RX ORDER — HEPARIN SODIUM 5000 [USP'U]/ML
2500 INJECTION INTRAVENOUS; SUBCUTANEOUS EVERY 6 HOURS
Refills: 0 | Status: DISCONTINUED | OUTPATIENT
Start: 2024-06-05 | End: 2024-06-09

## 2024-06-05 RX ORDER — NOREPINEPHRINE BITARTRATE/D5W 8 MG/250ML
0.5 PLASTIC BAG, INJECTION (ML) INTRAVENOUS
Qty: 8 | Refills: 0 | Status: DISCONTINUED | OUTPATIENT
Start: 2024-06-05 | End: 2024-06-05

## 2024-06-05 RX ADMIN — PROPOFOL 7.86 MICROGRAM(S)/KG/MIN: 10 INJECTION, EMULSION INTRAVENOUS at 17:41

## 2024-06-05 RX ADMIN — PROPOFOL 7.86 MICROGRAM(S)/KG/MIN: 10 INJECTION, EMULSION INTRAVENOUS at 09:57

## 2024-06-05 RX ADMIN — Medication 1: at 17:56

## 2024-06-05 RX ADMIN — BUMETANIDE 7.5 MG/HR: 0.25 INJECTION INTRAMUSCULAR; INTRAVENOUS at 23:02

## 2024-06-05 RX ADMIN — PROPOFOL 7.86 MICROGRAM(S)/KG/MIN: 10 INJECTION, EMULSION INTRAVENOUS at 23:19

## 2024-06-05 RX ADMIN — PROPOFOL 7.86 MICROGRAM(S)/KG/MIN: 10 INJECTION, EMULSION INTRAVENOUS at 03:36

## 2024-06-05 RX ADMIN — MILRINONE LACTATE 4.91 MICROGRAM(S)/KG/MIN: 1 INJECTION, SOLUTION INTRAVENOUS at 03:41

## 2024-06-05 RX ADMIN — INSULIN GLARGINE 9 UNIT(S): 100 INJECTION, SOLUTION SUBCUTANEOUS at 21:53

## 2024-06-05 RX ADMIN — FENTANYL CITRATE 50 MICROGRAM(S): 50 INJECTION INTRAVENOUS at 06:40

## 2024-06-05 RX ADMIN — MIDAZOLAM HYDROCHLORIDE 2 MILLIGRAM(S): 1 INJECTION, SOLUTION INTRAMUSCULAR; INTRAVENOUS at 03:52

## 2024-06-05 RX ADMIN — PIPERACILLIN AND TAZOBACTAM 25 GRAM(S): 4; .5 INJECTION, POWDER, LYOPHILIZED, FOR SOLUTION INTRAVENOUS at 13:57

## 2024-06-05 RX ADMIN — Medication 1: at 14:38

## 2024-06-05 RX ADMIN — HEPARIN SODIUM 800 UNIT(S)/HR: 5000 INJECTION INTRAVENOUS; SUBCUTANEOUS at 14:22

## 2024-06-05 RX ADMIN — FENTANYL CITRATE 50 MICROGRAM(S): 50 INJECTION INTRAVENOUS at 03:52

## 2024-06-05 RX ADMIN — Medication 250 MILLIGRAM(S): at 06:26

## 2024-06-05 RX ADMIN — Medication 3.27 MICROGRAM(S)/KG/MIN: at 05:44

## 2024-06-05 RX ADMIN — CHLORHEXIDINE GLUCONATE 1 APPLICATION(S): 213 SOLUTION TOPICAL at 07:24

## 2024-06-05 RX ADMIN — Medication 650 MILLIGRAM(S): at 17:43

## 2024-06-05 RX ADMIN — Medication 44 MICROGRAM(S): at 21:56

## 2024-06-05 RX ADMIN — PIPERACILLIN AND TAZOBACTAM 200 GRAM(S): 4; .5 INJECTION, POWDER, LYOPHILIZED, FOR SOLUTION INTRAVENOUS at 06:25

## 2024-06-05 RX ADMIN — BUMETANIDE 2 MILLIGRAM(S): 0.25 INJECTION INTRAMUSCULAR; INTRAVENOUS at 07:26

## 2024-06-05 RX ADMIN — Medication 650 MILLIGRAM(S): at 07:28

## 2024-06-05 RX ADMIN — AMIODARONE HYDROCHLORIDE 200 MILLIGRAM(S): 400 TABLET ORAL at 07:28

## 2024-06-05 RX ADMIN — MONTELUKAST 10 MILLIGRAM(S): 4 TABLET, CHEWABLE ORAL at 13:51

## 2024-06-05 RX ADMIN — ATORVASTATIN CALCIUM 80 MILLIGRAM(S): 80 TABLET, FILM COATED ORAL at 21:20

## 2024-06-05 RX ADMIN — Medication 81 MILLIGRAM(S): at 13:51

## 2024-06-05 RX ADMIN — CLOPIDOGREL BISULFATE 75 MILLIGRAM(S): 75 TABLET, FILM COATED ORAL at 13:52

## 2024-06-05 RX ADMIN — Medication 50 MILLIEQUIVALENT(S): at 08:23

## 2024-06-05 RX ADMIN — PIPERACILLIN AND TAZOBACTAM 25 GRAM(S): 4; .5 INJECTION, POWDER, LYOPHILIZED, FOR SOLUTION INTRAVENOUS at 23:20

## 2024-06-05 RX ADMIN — Medication 10.5 MICROGRAM(S)/KG/MIN: at 11:17

## 2024-06-05 RX ADMIN — Medication 50 MILLIEQUIVALENT(S): at 10:48

## 2024-06-05 RX ADMIN — FENTANYL CITRATE 50 MICROGRAM(S): 50 INJECTION INTRAVENOUS at 04:15

## 2024-06-05 RX ADMIN — PANTOPRAZOLE SODIUM 40 MILLIGRAM(S): 20 TABLET, DELAYED RELEASE ORAL at 13:51

## 2024-06-05 RX ADMIN — Medication 50 MILLIEQUIVALENT(S): at 13:51

## 2024-06-05 RX ADMIN — VASOPRESSIN 6 UNIT(S)/MIN: 20 INJECTION INTRAVENOUS at 22:48

## 2024-06-05 RX ADMIN — HEPARIN SODIUM 800 UNIT(S)/HR: 5000 INJECTION INTRAVENOUS; SUBCUTANEOUS at 21:10

## 2024-06-05 RX ADMIN — PROPOFOL 7.86 MICROGRAM(S)/KG/MIN: 10 INJECTION, EMULSION INTRAVENOUS at 06:18

## 2024-06-05 NOTE — CONSULT NOTE ADULT - ASSESSMENT
78 yr old male with hx of multiple STEMI/ NSTEMI, CAD s/p 4V CABG with multiple stents and brachytherapy, HFrEF, IDDM2, CKD3-4,, HTN, HLD admittd on 5/23 for atypical chest pain after recent holter monitor placement, found also with NSTEMI and RAS on CKD complicated further by cardiac arrest x 3, s/p LHC with occlusion of all SVGs except LIMA to LAD with no target point of PCI, acute respiratory failure currently intubated/sedated, cardiogenic shock on vasopressors, worsening renal function with possible need for RRT in the near future, overall guarded prognosis at best. Palliative consulted for support and to assist with ongoing goals of care.     Post Cardiac Arrest   - s/p cardiac arrest x 3 this admission (5/25 ~3 min, 5/27 ~1 min, 6/4 ~8min)  - awaiting results of CTH today to determine if any evidence of anoxic brain injury  - mgnt per ICU    Cardiogenic Shock  Acute Decompensated HFrEF, CAD  - on triple vasopressor support, bumex gtt  - s/p LHC 5/25 with occlusion of all SVGs except LIMA to LAD with no target point of PCI  - s/p RHC 6/4 with elevated filling pressures, low CI  - cardio and CHF team following, inputs appreciated; GDMT limited due to worsening renal dysfunction    Acute Respiratory Failure  - remains intubated on vent support, sedated for vent dyssynchrony, wean as tolerated    RAS/CKD  - worsening renal indices  - nephrology input appreciated, likely need for RRT if renal function continues to worsen  - avoid nephrotoxic agents    Multiorgan Failure  - multiple organ dysfunction, poor prognostic factor    Palliative Care Encounter  - FULL CODE, prior DNR/DNI status rescinded after pt recovered from second cardiac arrest  - Per chart and staff, total of 13 children --> confirmed by son at bedside and that HCP is his brother David    Discussed with son at bedside, Introduced role and scope of palliative care team as supportive in the setting of complex comorbidities/serious illnesses, to assist and navigate with complex medical decision making and symptoms during their hospital stay here. Son was guarded to further [facility], states his HCP brother David will be in shortly and that family is awaiting to speak with Cardiology. Reassurance provided and emotional support provided. All questions answered. Case discussed with MICU intesivisit Dr Willoughby this morning and again this afternoon. Family updated earlier today with cardiology and CHF team both present. GOC at present remains to continue of all medical interventions without limitations; full code. Pt continues to be medically optimized, awaiting CTH to rule out evidence of anoxic brain injury. Overall very guarded prognosis at best. Palliative team will support and follow along.

## 2024-06-05 NOTE — PROGRESS NOTE ADULT - ASSESSMENT
RAS: ATN due to cardiogenic shock--> progressive renal decline  CKD(III)  - cont pressors, inotropes, IVF  - Bumex infusion  - monitor labs--> if current trend continues will need to consider RRT

## 2024-06-05 NOTE — CONSULT NOTE ADULT - ASSESSMENT
79 y/o M with a h/o CAD (PCI/CODI x s/p 19, brachytherapy 11/2023), 4V CABG, ICM, HFrEF EF 37%, s/p ICD, DM2 on insulin, 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, HTN, HLD, with:    # Cardiac arrest x 3  # Cardiogenic shock  # Acute systolic heart failure  # RAS on CKD    Unclear as to exactly what precipitated the sudden cardiac arrest. I would assume this is related to his underlying advanced heart failure. RHC from yesterday revealed severely elevated filling pressures and low cardiac output. Telemetry review from this morning shows steady paced rhythm @ 60bpm prior to initiation of CPR/ACLS. Repeat CXR shows improvement in bilateral pleural effusions without any significant new pathology. ROSC achieved after 8 minutes of high quality CPR/ACLS (no shockable rhythms, see code sheet for specifics).    - start norepinephrine infusion to maintain a MAP > 65  - continue milrinone @ 0.25 mcg/kg/min, will check ScVO2 after jugular central venous access is obtained  - 500cc UOP over the past 12 hours, diuresing with IV bumetanide  - afterload reduction started with hydralazine, holding off on beta blockade until 48 hours post-inotrope  - no VT since 5/26, continue amiodarone and mexiletine  - EP input appreciated  - ICD interrogated and reprogrammed by EP on 5/25 as VT was incorrectly sensed as SVT thus delivering no shock, also noted to have new onset AF  - continue heparin infusion, consider transition to DOAC soon?  - s/p emergent LHC on 5/25 which revealed 3/4 occluded grafts, only LIMA --> LAD was patent, no intervention performed  - TTE showed reduction in LVEF < 20% prior to cardiac arrests, consider repeat study to assess for return of function prior to discharge  - continue DAPT and high intensity statin  - RAS on CKD likely cardiorenal in nature, optimize end-organ perfusion as above  - trend BUN/Cr, electrolytes, acid-base balance, monitor hourly UOP (nonoliguric)  - no indication for urgent RRT at this time    Case discussed with MICU physician, Dr. Motley. 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, HTN, HLD, with:    # Cardiac arrest x 3  # Cardiogenic shock  # Acute systolic heart failure  # RAS on CKD    Unclear as to exactly what precipitated the sudden cardiac arrest. I would assume this is related to his underlying advanced heart failure. RHC from yesterday revealed severely elevated filling pressures and low cardiac output. Telemetry review from this morning shows steady paced rhythm @ 60bpm prior to initiation of CPR/ACLS. Repeat CXR shows improvement in bilateral pleural effusions without any significant new pathology. ROSC achieved after approx 8 minutes of high quality CPR/ACLS (no shockable rhythms, see code sheet for specifics).    - start norepinephrine infusion to maintain a MAP > 65  - continue milrinone @ 0.25 mcg/kg/min, plan for PA catheter placement now to help guide management  - trend CO/CI, SVO2, lactate, CVP  - continue aggressive diuresis with bumetanide infusion  - continue amiodarone and mexiletine for VT suppression  - advanced heart failure team input appreciated, documentation from yesterday mentioned assessing for LVAD candidacy if inotrope dependent  - continue heparin infusion  - continue DAPT and high intensity statin  - RAS on CKD likely cardiorenal in nature +/- heavy diuresis, optimize end-organ perfusion as above  - trend BUN/Cr, electrolytes, acid-base balance, monitor hourly UOP  - no indication for urgent RRT at this time  - palliative care consult placed    Case discussed with the patient's extended family in the waiting room. Diagnosis, prognosis, and management plan outlined. All questions answered and concerns addressed. They will discuss goals of care amongst themselves now that circumstances have yet again changed.    Case discussed with MICU physician, Dr. Motley.   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, HTN, HLD, with:    # Cardiac arrest x 3  # Cardiogenic shock  # Acute systolic heart failure  # RAS on CKD    Unclear as to what exactly precipitated the sudden cardiac arrest. I would assume this is related to his underlying advanced heart failure. RHC from yesterday revealed severely elevated filling pressures and low cardiac output. Telemetry review from this morning shows steady paced rhythm @ 60bpm prior to initiation of CPR/ACLS. Repeat CXR shows improvement in bilateral pleural effusions without any significant new pathology. ROSC achieved after approx 8 minutes of high quality CPR/ACLS (no shockable rhythms, see code sheet for specifics).    - start norepinephrine infusion to maintain a MAP > 65  - continue milrinone @ 0.25 mcg/kg/min, plan for PA catheter placement now to help guide management  - trend CO/CI, SVO2, lactate, CVP  - continue aggressive diuresis with bumetanide infusion  - continue amiodarone and mexiletine for VT suppression  - advanced heart failure team input appreciated, documentation from yesterday mentioned assessing for LVAD candidacy if inotrope dependent  - continue heparin infusion  - continue DAPT and high intensity statin  - RAS on CKD likely cardiorenal in nature +/- heavy diuresis, optimize end-organ perfusion as above  - trend BUN/Cr, electrolytes, acid-base balance, monitor hourly UOP  - no indication for urgent RRT at this time  - palliative care consult placed    Case discussed with the patient's extended family in the waiting room. Diagnosis, prognosis, and management plan outlined. All questions answered and concerns addressed. They will discuss goals of care amongst themselves now that circumstances have yet again changed.    Case discussed with MICU physician, Dr. Motley.

## 2024-06-05 NOTE — CONSULT NOTE ADULT - SUBJECTIVE AND OBJECTIVE BOX
[facility] PALLIATIVE MEDICINE CONSULT    CC: Patient is a 78y old  Male who presents with a chief complaint of Chest Pain / SOB (05 Jun 2024 03:26)      HPI:  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 no SOB , radiates to the shoulder   Holter monitor placement for PVC burden which has worsened today with radiation to left arm, and  dizziness/lightheadedness. He called the office and was told to come in for further evaluation. In ED, patient weak appearing,He is with lower back hip pain was in [facility] ED 2 days ago , hip xray neg for fracture . Pt denies headache, SOB, JOINER, diaphoresis, syncope or N/V. Currently pain free ,tele - 120's. Pt's also c/o poor oral intake , BG is 46 dextrose ordered 1 amp by ED (23 May 2024 16:13)    Mr. Castellon is a 78 year old male with PMHx noted above admitted 5/23 with atypical chest pain after holter monitor placement 2 days prior to admission, found with NSTEMI and RAS on CKD. Protracted hospital course including but not limited to VT cardiac arrest x2 (5/25 for ~3 mins, 5/27 ~1  min) s/p CPR and eventual ROSC, cardiogenic shock requiring vasopressors, s/p LHC 5/25 revealing all SVGs are occluded except NOGUERA to LAD being patent and no target point for PCI, acute respiratory failure s/p extubation x 2, s/p RHC 5/28 and swan catheter placement, eventually downgraded to medicine service but developed another cardiac arrest 6/5 with ROSC achieved ~8 mins, currently intubated and on IV vasopressor support in MICU. Palliative consulted for support and to assist with ongoing goals of care.     FULL CONSULT PENDING      Present Symptoms:     Dyspnea: Mild Moderate Severe  Nausea/Vomiting: Yes No  Anxiety:  Yes No  Depression: Yes No  Fatigue: Yes No  Loss of appetite: Yes No  Constipation: Yes No    Pain:             Character-            Duration-            Effect-            Factors-            Frequency-            Location-            Severity-    Pain AD Score:  [email or web address] (press ctrl + left click to view)    Review of Systems: Reviewed                     Negative:                     Positive:  All others negative    Unable to obtain due to poor mentation     PERTINENT PMH REVIEWED: Yes      PAST MEDICAL & SURGICAL HISTORY:  GERD (gastroesophageal reflux disease)      High cholesterol      Coronary artery disease involving coronary bypass graft of native heart with unstable angina pectoris      Coronary artery disease involving native coronary artery of native heart, angina presence unspecifie      Type 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, without long-term current use of insul      Essential hypertension      HFrEF (heart failure with reduced ejection fraction)      Stage 4 chronic kidney disease      LBBB (left bundle branch block)      Facial nerve palsy      Hypothyroidism      S/P CABG (coronary artery bypass graft)  4V 1983 [place]      Status post open reduction with internal fixation of fracture      History of insertion of stent into coronary artery bypass graft      History of brachytherapy            FAMILY HISTORY:      Allergies    No Known Allergies    Intolerances    DOPamine (Hypotension)      SOCIAL HISTORY:                      Substance history:                    Admitted from:  home  SNF  [facility]                     Children:                     Yazidism/spirituality:                    Cultural concerns:       DECISION MAKER(s):[] Health Care Proxy(s)  [] Surrogate(s)  [] Guardian               ADVANCE DIRECTIVES/TREATMENT PREFERENCES:  DNR YES NO  Completed on:                     MOLST  YES NO   Completed on:  Living Will  YES NO   Completed on:    Karnofsky/Palliative Performance Status Version 2:  %  http://npcrc.org/files/news/palliative_performance_scale_ppsv2.pdf    Baseline ADLs (prior to admission): Independent/ Dependent       MEDICATIONS  (STANDING):  aMIOdarone    Tablet 200 milliGRAM(s) Oral daily  aspirin enteric coated 81 milliGRAM(s) Oral daily  atorvastatin 80 milliGRAM(s) Oral at bedtime  benzocaine/menthol Lozenge 1 Lozenge Oral every 8 hours  buMETAnide Infusion 1 mG/Hr (5 mL/Hr) IV Continuous <Continuous>  chlorhexidine 2% Cloths 1 Application(s) Topical <User Schedule>  clopidogrel Tablet 75 milliGRAM(s) Oral daily  dextrose 50% Injectable 25 Gram(s) IV Push once  fentaNYL   Infusion 2 MICROgram(s)/kG/Hr (14 mL/Hr) IV Continuous <Continuous>  heparin  Infusion. 800 Unit(s)/Hr (8 mL/Hr) IV Continuous <Continuous>  insulin glargine Injectable (LANTUS) 9 Unit(s) SubCutaneous at bedtime  insulin lispro (ADMELOG) corrective regimen sliding scale   SubCutaneous three times a day before meals  insulin lispro (ADMELOG) corrective regimen sliding scale   SubCutaneous at bedtime  insulin lispro Injectable (ADMELOG) 3 Unit(s) SubCutaneous three times a day before meals  levothyroxine Injectable 44 MICROGram(s) IV Push at bedtime  mexiletine 150 milliGRAM(s) Oral every 8 hours  milrinone Infusion 0.25 MICROgram(s)/kG/Min (4.91 mL/Hr) IV Continuous <Continuous>  montelukast 10 milliGRAM(s) Oral daily  norepinephrine Infusion 0.05 MICROgram(s)/kG/Min (3.27 mL/Hr) IV Continuous <Continuous>  pantoprazole  Injectable 40 milliGRAM(s) IV Push daily  piperacillin/tazobactam IVPB.- 3.375 Gram(s) IV Intermittent once  potassium chloride  20 mEq/100 mL IVPB 20 milliEquivalent(s) IV Intermittent every 2 hours  propofol Infusion 20 MICROgram(s)/kG/Min (7.86 mL/Hr) IV Continuous <Continuous>  sodium bicarbonate 650 milliGRAM(s) Oral every 12 hours    MEDICATIONS  (PRN):  albuterol/ipratropium for Nebulization 3 milliLiter(s) Nebulizer every 6 hours PRN Shortness of Breath and/or Wheezing  guaiFENesin Oral Liquid (Sugar-Free) 200 milliGRAM(s) Oral every 6 hours PRN Cough  heparin   Injectable 5500 Unit(s) IV Push every 6 hours PRN For aPTT less than 40  heparin   Injectable 2500 Unit(s) IV Push every 6 hours PRN For aPTT between 40 - 57  ondansetron Injectable 4 milliGRAM(s) IV Push every 8 hours PRN Nausea and/or Vomiting  senna 2 Tablet(s) Oral at bedtime PRN Constipation  sodium chloride 0.9% lock flush 10 milliLiter(s) IV Push every 1 hour PRN Pre/post blood products, medications, blood draw, and to maintain line patency      PHYSICAL EXAM:    Vital Signs Last 24 Hrs  T(C): 35.6 (05 Jun 2024 08:00), Max: 36.8 (04 Jun 2024 12:00)  T(F): 96.1 (05 Jun 2024 08:00), Max: 98.2 (04 Jun 2024 12:00)  HR: 75 (05 Jun 2024 09:30) (60 - 75)  BP: 134/62 (05 Jun 2024 06:45) (82/49 - 134/62)  BP(mean): 85 (05 Jun 2024 06:45) (61 - 87)  RR: 22 (05 Jun 2024 08:00) (16 - 22)  SpO2: 100% (05 Jun 2024 09:30) (95% - 100%)    Parameters below as of 05 Jun 2024 08:00  Patient On (Oxygen Delivery Method): ventilator    O2 Concentration (%): 70    General: resting comfortably and no acute distress.     HEENT: mucous membrane moist dry  ET tube/trach    Lungs: comfortable nonlabored labored excessive secretions    CV: S1/S2. Regular rate and rhythm    GI: +BS abdomen soft, nondistended, nontender, distended  tender  no BS               PEG/NG/OG tube  constipation  last BM:     : normal  incontinent  oliguria/anuria  fournier    MSK: moves all 4 extremities, no cyanosis or edema weakness               ambulatory  bedbound/wheelchair bound    Neuro: nonfocal. alert  oriented x ____ no focal deficits, poor insight    Skin: warm and dry. no rash. ecchymosis. wounds.    LABS:                        11.2   19.40 )-----------( 348      ( 05 Jun 2024 05:40 )             32.4     06-05    130<L>  |  89<L>  |  87.6<H>  ----------------------------<  181<H>  3.3<L>   |  19.0<L>  |  5.30<H>    Ca    8.1<L>      05 Jun 2024 08:16  Phos  5.4     06-05  Mg     2.3     06-05    TPro  6.1<L>  /  Alb  3.0<L>  /  TBili  0.8  /  DBili  x   /  AST  93<H>  /  ALT  89<H>  /  AlkPhos  129<H>  06-05    PTT - ( 05 Jun 2024 05:40 )  PTT:30.6 sec  Urinalysis Basic - ( 05 Jun 2024 08:16 )    Color: x / Appearance: x / SG: x / pH: x  Gluc: 181 mg/dL / Ketone: x  / Bili: x / Urobili: x   Blood: x / Protein: x / Nitrite: x   Leuk Esterase: x / RBC: x / WBC x   Sq Epi: x / Non Sq Epi: x / Bacteria: x      I&O's Summary    04 Jun 2024 07:01  -  05 Jun 2024 07:00  --------------------------------------------------------  IN: 859.9 mL / OUT: 305 mL / NET: 554.9 mL    05 Jun 2024 07:01  -  05 Jun 2024 09:39  --------------------------------------------------------  IN: 336.1 mL / OUT: 30 mL / NET: 306.1 mL        RADIOLOGY & ADDITIONAL STUDIES:    NEUROLOGICAL MEDICATIONS/OPIOIDS/BENZODIAZEPINE OVER PAST 24 HOURS    fentaNYL    Injectable   50 MICROGram(s) IV Push (06-05-24 @ 03:52)    fentaNYL    Injectable   50 MICROGram(s) IV Push (06-05-24 @ 04:15)    midazolam Injectable   2 milliGRAM(s) IV Push (06-05-24 @ 03:52)    ondansetron Injectable   4 milliGRAM(s) IV Push (06-04-24 @ 15:37)    propofol Infusion   7.86 mL/Hr IV Continuous (06-05-24 @ 03:37)   7.86 mL/Hr IV Continuous (06-05-24 @ 03:32)     [facility] PALLIATIVE MEDICINE CONSULT    CC: Patient is a 78y old  Male who presents with a chief complaint of Chest Pain / SOB (05 Jun 2024 03:26)      HPI:  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 no SOB , radiates to the shoulder   Holter monitor placement for PVC burden which has worsened today with radiation to left arm, and  dizziness/lightheadedness. He called the office and was told to come in for further evaluation. In ED, patient weak appearing,He is with lower back hip pain was in [facility] ED 2 days ago , hip xray neg for fracture . Pt denies headache, SOB, JOINER, diaphoresis, syncope or N/V. Currently pain free ,tele - 120's. Pt's also c/o poor oral intake , BG is 46 dextrose ordered 1 amp by ED (23 May 2024 16:13)    Mr. Castellon is a 78 year old male with PMHx noted above admitted 5/23 with atypical chest pain after holter monitor placement 2 days prior to admission, found with NSTEMI and RAS on CKD. Protracted hospital course including but not limited to VT cardiac arrest x2 (5/25 for ~3 mins, 5/27 ~1  min) s/p CPR and eventual ROSC, cardiogenic shock requiring vasopressors, s/p LHC 5/25 revealing all SVGs are occluded except NOGUERA to LAD being patent and no target point for PCI, acute respiratory failure s/p extubation x 2, s/p RHC 5/28 and swan catheter placement, eventually downgraded to medicine service but developed another cardiac arrest 6/5 with ROSC achieved ~8 mins, currently intubated and on IV vasopressor support in MICU and with progressive worsening renal function. Palliative consulted for support and to assist with ongoing goals of care.     Present Symptoms:     Dyspnea: intubated on vent support  Nausea/Vomiting:  No  Anxiety:   No  Depression: unable to obtain   Fatigue: Yes    Loss of appetite: NPO  Constipation:  No    Pain: No overt sign of pain            Character-            Duration-            Effect-            Factors-            Frequency-            Location-            Severity-    Pain AD Score:  [email or web address] (press ctrl + left click to view)    Review of Systems: Unable to obtain due to poor mentation     PERTINENT PMH REVIEWED: Yes      PAST MEDICAL & SURGICAL HISTORY:  GERD (gastroesophageal reflux disease)      High cholesterol      Coronary artery disease involving coronary bypass graft of native heart with unstable angina pectoris      Coronary artery disease involving native coronary artery of native heart, angina presence unspecifie      Type 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, without long-term current use of insul      Essential hypertension      HFrEF (heart failure with reduced ejection fraction)      Stage 4 chronic kidney disease      LBBB (left bundle branch block)      Facial nerve palsy      Hypothyroidism      S/P CABG (coronary artery bypass graft)  4V 1983 [place]      Status post open reduction with internal fixation of fracture      History of insertion of stent into coronary artery bypass graft      History of brachytherapy      FAMILY HISTORY: Unable to obtain due to mentation      Allergies    No Known Allergies    Intolerances    DOPamine (Hypotension)      SOCIAL HISTORY:                      Substance history:                    Admitted from:  home                       Children: 13                    Bahai/spirituality:                    Cultural concerns:       DECISION MAKER(s):[] Health Care Proxy(s)  [] Surrogate(s)  [] Guardian           - HCP is merari Hernandez    ADVANCE DIRECTIVES/TREATMENT PREFERENCES: FULL CODE  DNR YES NO  Completed on:                     MOLST  YES NO   Completed on:  Living Will  YES NO   Completed on:    Karnofsky/Palliative Performance Status Version 2: 30 %  http://npcrc.org/files/news/palliative_performance_scale_ppsv2.pdf    Baseline ADLs (prior to admission): Independent       MEDICATIONS  (STANDING):  aMIOdarone    Tablet 200 milliGRAM(s) Oral daily  aspirin enteric coated 81 milliGRAM(s) Oral daily  atorvastatin 80 milliGRAM(s) Oral at bedtime  benzocaine/menthol Lozenge 1 Lozenge Oral every 8 hours  buMETAnide Infusion 1 mG/Hr (5 mL/Hr) IV Continuous <Continuous>  chlorhexidine 2% Cloths 1 Application(s) Topical <User Schedule>  clopidogrel Tablet 75 milliGRAM(s) Oral daily  dextrose 50% Injectable 25 Gram(s) IV Push once  fentaNYL   Infusion 2 MICROgram(s)/kG/Hr (14 mL/Hr) IV Continuous <Continuous>  heparin  Infusion. 800 Unit(s)/Hr (8 mL/Hr) IV Continuous <Continuous>  insulin glargine Injectable (LANTUS) 9 Unit(s) SubCutaneous at bedtime  insulin lispro (ADMELOG) corrective regimen sliding scale   SubCutaneous three times a day before meals  insulin lispro (ADMELOG) corrective regimen sliding scale   SubCutaneous at bedtime  insulin lispro Injectable (ADMELOG) 3 Unit(s) SubCutaneous three times a day before meals  levothyroxine Injectable 44 MICROGram(s) IV Push at bedtime  mexiletine 150 milliGRAM(s) Oral every 8 hours  milrinone Infusion 0.25 MICROgram(s)/kG/Min (4.91 mL/Hr) IV Continuous <Continuous>  montelukast 10 milliGRAM(s) Oral daily  norepinephrine Infusion 0.05 MICROgram(s)/kG/Min (3.27 mL/Hr) IV Continuous <Continuous>  pantoprazole  Injectable 40 milliGRAM(s) IV Push daily  piperacillin/tazobactam IVPB.- 3.375 Gram(s) IV Intermittent once  potassium chloride  20 mEq/100 mL IVPB 20 milliEquivalent(s) IV Intermittent every 2 hours  propofol Infusion 20 MICROgram(s)/kG/Min (7.86 mL/Hr) IV Continuous <Continuous>  sodium bicarbonate 650 milliGRAM(s) Oral every 12 hours    MEDICATIONS  (PRN):  albuterol/ipratropium for Nebulization 3 milliLiter(s) Nebulizer every 6 hours PRN Shortness of Breath and/or Wheezing  guaiFENesin Oral Liquid (Sugar-Free) 200 milliGRAM(s) Oral every 6 hours PRN Cough  heparin   Injectable 5500 Unit(s) IV Push every 6 hours PRN For aPTT less than 40  heparin   Injectable 2500 Unit(s) IV Push every 6 hours PRN For aPTT between 40 - 57  ondansetron Injectable 4 milliGRAM(s) IV Push every 8 hours PRN Nausea and/or Vomiting  senna 2 Tablet(s) Oral at bedtime PRN Constipation  sodium chloride 0.9% lock flush 10 milliLiter(s) IV Push every 1 hour PRN Pre/post blood products, medications, blood draw, and to maintain line patency      PHYSICAL EXAM:    Vital Signs Last 24 Hrs  T(C): 35.6 (05 Jun 2024 08:00), Max: 36.8 (04 Jun 2024 12:00)  T(F): 96.1 (05 Jun 2024 08:00), Max: 98.2 (04 Jun 2024 12:00)  HR: 75 (05 Jun 2024 09:30) (60 - 75)  BP: 134/62 (05 Jun 2024 06:45) (82/49 - 134/62)  BP(mean): 85 (05 Jun 2024 06:45) (61 - 87)  RR: 22 (05 Jun 2024 08:00) (16 - 22)  SpO2: 100% (05 Jun 2024 09:30) (95% - 100%)    Parameters below as of 05 Jun 2024 08:00  Patient On (Oxygen Delivery Method): ventilator    O2 Concentration (%): 70    General: frail. resting comfortably and no acute distress.     HEENT: mucous membrane moist     Lungs: comfortable nonlabored     CV: S1/S2. Regular rate and rhythm    GI: +BS abdomen soft, nondistended, nontender      MSK:  no cyanosis or edema +bedbound    Neuro: nonfocal. nonverbal. sedated    Skin: warm and dry.     LABS:                        11.2   19.40 )-----------( 348      ( 05 Jun 2024 05:40 )             32.4     06-05    130<L>  |  89<L>  |  87.6<H>  ----------------------------<  181<H>  3.3<L>   |  19.0<L>  |  5.30<H>    Ca    8.1<L>      05 Jun 2024 08:16  Phos  5.4     06-05  Mg     2.3     06-05    TPro  6.1<L>  /  Alb  3.0<L>  /  TBili  0.8  /  DBili  x   /  AST  93<H>  /  ALT  89<H>  /  AlkPhos  129<H>  06-05    PTT - ( 05 Jun 2024 05:40 )  PTT:30.6 sec  Urinalysis Basic - ( 05 Jun 2024 08:16 )    Color: x / Appearance: x / SG: x / pH: x  Gluc: 181 mg/dL / Ketone: x  / Bili: x / Urobili: x   Blood: x / Protein: x / Nitrite: x   Leuk Esterase: x / RBC: x / WBC x   Sq Epi: x / Non Sq Epi: x / Bacteria: x      I&O's Summary    04 Jun 2024 07:01  -  05 Jun 2024 07:00  --------------------------------------------------------  IN: 859.9 mL / OUT: 305 mL / NET: 554.9 mL    05 Jun 2024 07:01  -  05 Jun 2024 09:39  --------------------------------------------------------  IN: 336.1 mL / OUT: 30 mL / NET: 306.1 mL    RADIOLOGY & ADDITIONAL STUDIES:  CXR    ACC: [id number] EXAM:  XR CHEST PORTABLE ROUTINE 1V   ORDERED BY: VALENCIA ORELLANA     PROCEDURE DATE:  06/02/2024          INTERPRETATION:  History: Follow-up pleural effusions.    Technique: Single frontal view of the chest    Comparison: 5/30/2024    Findings:    Left-sided pacer AICD is unchanged in configuration. Right IJ Dixon has   been removed.    Lungs: Right greater than left pleural effusions appear stable. There is   new right apical opacity which may be related to slightly different   technique.    Heart/Mediastinum: Cardiomegaly with coronary stent, stable in appearance.    Osseous structures: Sternotomy wires.    Impression:    Stable bilateral pleural effusions.    --- End of Report ---    TAWANNA DAVIDSON MD; Attending Radiologist  This document has been electronically signed. Jun 2 2024  2:34PM    NEUROLOGICAL MEDICATIONS/OPIOIDS/BENZODIAZEPINE OVER PAST 24 HOURS    fentaNYL    Injectable   50 MICROGram(s) IV Push (06-05-24 @ 03:52)    fentaNYL    Injectable   50 MICROGram(s) IV Push (06-05-24 @ 04:15)    midazolam Injectable   2 milliGRAM(s) IV Push (06-05-24 @ 03:52)    ondansetron Injectable   4 milliGRAM(s) IV Push (06-04-24 @ 15:37)    propofol Infusion   7.86 mL/Hr IV Continuous (06-05-24 @ 03:37)   7.86 mL/Hr IV Continuous (06-05-24 @ 03:32)     [facility] PALLIATIVE MEDICINE CONSULT    CC: Patient is a 78y old  Male who presents with a chief complaint of Chest Pain / SOB (05 Jun 2024 03:26)      HPI:  79 y/o male  with history of CAD, MI's ,  4V CABG, multiple PCI's of the SVG to the OM with stents , HFrEF, DM2 on long term insulin, 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 no SOB , radiates to the shoulder   Holter monitor placement for PVC burden which has worsened today with radiation to left arm, and  dizziness/lightheadedness. He called the office and was told to come in for further evaluation. In ED, patient weak appearing,He is with lower back hip pain was in [facility] ED 2 days ago , hip xray neg for fracture . Pt denies headache, SOB, JOINER, diaphoresis, syncope or N/V. Currently pain free ,tele - 120's. Pt's also c/o poor oral intake , BG is 46 dextrose ordered 1 amp by ED (23 May 2024 16:13)    Mr. Castellon is a 78 year old male with PMHx noted above admitted 5/23 with atypical chest pain after holter monitor placement 2 days prior to admission, found with NSTEMI and RAS on CKD. Protracted hospital course including but not limited to VT cardiac arrest x2 (5/25 for ~3 mins, 5/27 ~1  min) s/p CPR and eventual ROSC, cardiogenic shock requiring vasopressors, s/p LHC 5/25 revealing all SVGs are occluded except NOGUERA to LAD being patent and no target point for PCI, acute respiratory failure s/p extubation x 2, s/p RHC 5/28 and swan catheter placement, eventually downgraded to medicine service but developed another cardiac arrest 6/5 with ROSC achieved ~8 mins, currently intubated and on IV vasopressor support in MICU and with progressive worsening renal function. Palliative consulted for support and to assist with ongoing goals of care.     Present Symptoms:     Dyspnea: intubated on vent support  Nausea/Vomiting:  No  Anxiety:   No  Depression: unable to obtain   Fatigue: Yes    Loss of appetite: NPO  Constipation:  No    Pain: No overt sign of pain            Character-            Duration-            Effect-            Factors-            Frequency-            Location-            Severity-    Pain AD Score:  [email or web address] (press ctrl + left click to view)    Review of Systems: Unable to obtain due to poor mentation     PERTINENT PMH REVIEWED: Yes      PAST MEDICAL & SURGICAL HISTORY:  GERD (gastroesophageal reflux disease)      High cholesterol      Coronary artery disease involving coronary bypass graft of native heart with unstable angina pectoris      Coronary artery disease involving native coronary artery of native heart, angina presence unspecifie      Type 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, without long-term current use of insul      Essential hypertension      HFrEF (heart failure with reduced ejection fraction)      Stage 4 chronic kidney disease      LBBB (left bundle branch block)      Facial nerve palsy      Hypothyroidism      S/P CABG (coronary artery bypass graft)  4V 1983 [place]      Status post open reduction with internal fixation of fracture      History of insertion of stent into coronary artery bypass graft      History of brachytherapy      FAMILY HISTORY: Unable to obtain due to mentation      Allergies    No Known Allergies    Intolerances    DOPamine (Hypotension)      SOCIAL HISTORY:                      Substance history:                    Admitted from:  home                       Children: 13                    Shinto/spirituality:                    Cultural concerns:       DECISION MAKER(s):[] Health Care Proxy(s)  [] Surrogate(s)  [] Guardian           - HCP is merari Hernandez    ADVANCE DIRECTIVES/TREATMENT PREFERENCES: FULL CODE  DNR YES NO  Completed on:                     MOLST  YES NO   Completed on:  Living Will  YES NO   Completed on:    Karnofsky/Palliative Performance Status Version 2: 30 %  http://npcrc.org/files/news/palliative_performance_scale_[email or web address].pdf    Baseline ADLs (prior to admission): n/a      MEDICATIONS  (STANDING):  aMIOdarone    Tablet 200 milliGRAM(s) Oral daily  aspirin enteric coated 81 milliGRAM(s) Oral daily  atorvastatin 80 milliGRAM(s) Oral at bedtime  benzocaine/menthol Lozenge 1 Lozenge Oral every 8 hours  buMETAnide Infusion 1 mG/Hr (5 mL/Hr) IV Continuous <Continuous>  chlorhexidine 2% Cloths 1 Application(s) Topical <User Schedule>  clopidogrel Tablet 75 milliGRAM(s) Oral daily  dextrose 50% Injectable 25 Gram(s) IV Push once  fentaNYL   Infusion 2 MICROgram(s)/kG/Hr (14 mL/Hr) IV Continuous <Continuous>  heparin  Infusion. 800 Unit(s)/Hr (8 mL/Hr) IV Continuous <Continuous>  insulin glargine Injectable (LANTUS) 9 Unit(s) SubCutaneous at bedtime  insulin lispro (ADMELOG) corrective regimen sliding scale   SubCutaneous three times a day before meals  insulin lispro (ADMELOG) corrective regimen sliding scale   SubCutaneous at bedtime  insulin lispro Injectable (ADMELOG) 3 Unit(s) SubCutaneous three times a day before meals  levothyroxine Injectable 44 MICROGram(s) IV Push at bedtime  mexiletine 150 milliGRAM(s) Oral every 8 hours  milrinone Infusion 0.25 MICROgram(s)/kG/Min (4.91 mL/Hr) IV Continuous <Continuous>  montelukast 10 milliGRAM(s) Oral daily  norepinephrine Infusion 0.05 MICROgram(s)/kG/Min (3.27 mL/Hr) IV Continuous <Continuous>  pantoprazole  Injectable 40 milliGRAM(s) IV Push daily  piperacillin/tazobactam IVPB.- 3.375 Gram(s) IV Intermittent once  potassium chloride  20 mEq/100 mL IVPB 20 milliEquivalent(s) IV Intermittent every 2 hours  propofol Infusion 20 MICROgram(s)/kG/Min (7.86 mL/Hr) IV Continuous <Continuous>  sodium bicarbonate 650 milliGRAM(s) Oral every 12 hours    MEDICATIONS  (PRN):  albuterol/ipratropium for Nebulization 3 milliLiter(s) Nebulizer every 6 hours PRN Shortness of Breath and/or Wheezing  guaiFENesin Oral Liquid (Sugar-Free) 200 milliGRAM(s) Oral every 6 hours PRN Cough  heparin   Injectable 5500 Unit(s) IV Push every 6 hours PRN For aPTT less than 40  heparin   Injectable 2500 Unit(s) IV Push every 6 hours PRN For aPTT between 40 - 57  ondansetron Injectable 4 milliGRAM(s) IV Push every 8 hours PRN Nausea and/or Vomiting  senna 2 Tablet(s) Oral at bedtime PRN Constipation  sodium chloride 0.9% lock flush 10 milliLiter(s) IV Push every 1 hour PRN Pre/post blood products, medications, blood draw, and to maintain line patency      PHYSICAL EXAM:    Vital Signs Last 24 Hrs  T(C): 35.6 (05 Jun 2024 08:00), Max: 36.8 (04 Jun 2024 12:00)  T(F): 96.1 (05 Jun 2024 08:00), Max: 98.2 (04 Jun 2024 12:00)  HR: 75 (05 Jun 2024 09:30) (60 - 75)  BP: 134/62 (05 Jun 2024 06:45) (82/49 - 134/62)  BP(mean): 85 (05 Jun 2024 06:45) (61 - 87)  RR: 22 (05 Jun 2024 08:00) (16 - 22)  SpO2: 100% (05 Jun 2024 09:30) (95% - 100%)    Parameters below as of 05 Jun 2024 08:00  Patient On (Oxygen Delivery Method): ventilator    O2 Concentration (%): 70    General: frail. resting comfortably and no acute distress.     HEENT: mucous membrane moist     Lungs: comfortable nonlabored     CV: S1/S2. Regular rate and rhythm    GI: +BS abdomen soft, nondistended, nontender      MSK:  no cyanosis or edema +bedbound    Neuro: nonfocal. nonverbal. sedated    Skin: warm and dry.     LABS:                        11.2   19.40 )-----------( 348      ( 05 Jun 2024 05:40 )             32.4     06-05    130<L>  |  89<L>  |  87.6<H>  ----------------------------<  181<H>  3.3<L>   |  19.0<L>  |  5.30<H>    Ca    8.1<L>      05 Jun 2024 08:16  Phos  5.4     06-05  Mg     2.3     06-05    TPro  6.1<L>  /  Alb  3.0<L>  /  TBili  0.8  /  DBili  x   /  AST  93<H>  /  ALT  89<H>  /  AlkPhos  129<H>  06-05    PTT - ( 05 Jun 2024 05:40 )  PTT:30.6 sec  Urinalysis Basic - ( 05 Jun 2024 08:16 )    Color: x / Appearance: x / SG: x / pH: x  Gluc: 181 mg/dL / Ketone: x  / Bili: x / Urobili: x   Blood: x / Protein: x / Nitrite: x   Leuk Esterase: x / RBC: x / WBC x   Sq Epi: x / Non Sq Epi: x / Bacteria: x      I&O's Summary    04 Jun 2024 07:01  -  05 Jun 2024 07:00  --------------------------------------------------------  IN: 859.9 mL / OUT: 305 mL / NET: 554.9 mL    05 Jun 2024 07:01  -  05 Jun 2024 09:39  --------------------------------------------------------  IN: 336.1 mL / OUT: 30 mL / NET: 306.1 mL    RADIOLOGY & ADDITIONAL STUDIES:  CXR    ACC: [id number] EXAM:  XR CHEST PORTABLE ROUTINE 1V   ORDERED BY: VALENCIA ORELLANA     PROCEDURE DATE:  06/02/2024          INTERPRETATION:  History: Follow-up pleural effusions.    Technique: Single frontal view of the chest    Comparison: 5/30/2024    Findings:    Left-sided pacer AICD is unchanged in configuration. Right IJ Lamoure has   been removed.    Lungs: Right greater than left pleural effusions appear stable. There is   new right apical opacity which may be related to slightly different   technique.    Heart/Mediastinum: Cardiomegaly with coronary stent, stable in appearance.    Osseous structures: Sternotomy wires.    Impression:    Stable bilateral pleural effusions.    --- End of Report ---    TAWANNA DAVIDSON MD; Attending Radiologist  This document has been electronically signed. Jun 2 2024  2:34PM    NEUROLOGICAL MEDICATIONS/OPIOIDS/BENZODIAZEPINE OVER PAST 24 HOURS    fentaNYL    Injectable   50 MICROGram(s) IV Push (06-05-24 @ 03:52)    fentaNYL    Injectable   50 MICROGram(s) IV Push (06-05-24 @ 04:15)    midazolam Injectable   2 milliGRAM(s) IV Push (06-05-24 @ 03:52)    ondansetron Injectable   4 milliGRAM(s) IV Push (06-04-24 @ 15:37)    propofol Infusion   7.86 mL/Hr IV Continuous (06-05-24 @ 03:37)   7.86 mL/Hr IV Continuous (06-05-24 @ 03:32)

## 2024-06-05 NOTE — CONSULT NOTE ADULT - PROBLEM SELECTOR PROBLEM 4
Acute respiratory failure with hypoxia
Acute on chronic HFrEF (heart failure with reduced ejection fraction)

## 2024-06-05 NOTE — CONSULT NOTE ADULT - PROBLEM SELECTOR PROBLEM 1
Chest pain
Cardiac arrest with successful resuscitation
Cardiogenic shock
VT (ventricular tachycardia)

## 2024-06-05 NOTE — PROCEDURE NOTE - ADDITIONAL PROCEDURE DETAILS
# Cardiac arrest  # Cardiogenic shock  # Acute systolic heart failure  # Acute hypoxemic respiratory failure    Procedure performed independently of critical care time.

## 2024-06-05 NOTE — CONSULT NOTE ADULT - SUBJECTIVE AND OBJECTIVE BOX
Interventional Cardiology PA Consult Note:    HPI:  Patient is a 78 year old male with history of HTN, HLD, DM, CKD, CAD s/p 4V CABG and multiple PCI's, ischemic cardiomyopathy (EF < 20%), and LBBB s/p MDT CRT-D. He had an admission in November for ADHF. At that time a LHC revealed his grafts were occluded except for the LIMA to LAD. He also underwent a CRTD implant.     On 5/25 he had a VT arrest with ROSC achieved after 3 minutes was sent to the cath lab which showed his LIMA is still patent. Patient was extubated on 5/27. RHC on 5/28 showed severely elevated biv filling pressures, severe Cpc-PH, and low CO. He was started on low dose milrinone and diuresed well on IV Bumex. Weaned off pressors 5/29 and Milrinone was weaned off 5/31.    Bedside Hemodynamics:  5/31 (mil 0.125): CVP 9-10, PA 72/24/43, PA sat 70.7%, Fauzia CO/CI 4.6/2.6, /56 (73), SVR 1096 dsc.    Cardiac Studies:  5/28/24 RHC: RA 18, RV 92/18, PA 95/38/57, PCW 31, PA sat 51.5%, Fauzia CO/CI 3.09/1.74, TPG 26, DPG 7, SVR 1889 dsc, PVR 8.41 MEYERS, Vidhi 3.2, /63 (91) on levo 0.08.    5/25/24 LHC: All grafts occluded except LIMA-LAD. LVEDP 29 mmHg.    5/23/24 TTE: LVEF <20%, LVIDd 5.8cm, mild RVE with mild RV dysfunction, TAPSE 0.7cm, mild-mod AS, mild-mod MR, mild TR, mild VA, est PASP 75 mmHg.    Now presents for RHC to reassess filling pressures in setting of cardiorenal syndrome, concern for low flow state.    PAST MEDICAL & SURGICAL HISTORY:  GERD (gastroesophageal reflux disease)  High cholesterol  Coronary artery disease involving coronary bypass graft of native heart with unstable angina pectoris  Coronary artery disease involving native coronary artery of native heart, angina presence unspecifie  Type 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, without long-term current use of insul  Essential hypertension  HFrEF (heart failure with reduced ejection fraction)  Stage 4 chronic kidney disease  LBBB (left bundle branch block)  Facial nerve palsy  Hypothyroidism  S/P CABG (coronary artery bypass graft)  4V 1983 [place]  Status post open reduction with internal fixation of fracture  History of insertion of stent into coronary artery bypass graft  History of brachytherapy    No Known Allergies    Intolerances    DOPamine (Hypotension)    MEDICATIONS  (STANDING):  aMIOdarone    Tablet 200 milliGRAM(s) Oral daily  aspirin enteric coated 81 milliGRAM(s) Oral daily  atorvastatin 80 milliGRAM(s) Oral at bedtime  benzocaine/menthol Lozenge 1 Lozenge Oral every 8 hours  chlorhexidine 2% Cloths 1 Application(s) Topical <User Schedule>  clopidogrel Tablet 75 milliGRAM(s) Oral daily  dextrose 50% Injectable 25 Gram(s) IV Push once  heparin  Infusion. 900 Unit(s)/Hr (9 mL/Hr) IV Continuous <Continuous>  hydrALAZINE 25 milliGRAM(s) Oral three times a day  insulin glargine Injectable (LANTUS) 9 Unit(s) SubCutaneous at bedtime  insulin lispro (ADMELOG) corrective regimen sliding scale   SubCutaneous three times a day before meals  insulin lispro (ADMELOG) corrective regimen sliding scale   SubCutaneous at bedtime  insulin lispro Injectable (ADMELOG) 3 Unit(s) SubCutaneous three times a day before meals  levothyroxine 88 MICROGram(s) Oral daily  metoprolol tartrate 12.5 milliGRAM(s) Oral every 6 hours  mexiletine 150 milliGRAM(s) Oral every 8 hours  montelukast 10 milliGRAM(s) Oral daily  pantoprazole    Tablet 40 milliGRAM(s) Oral before breakfast  ranolazine 500 milliGRAM(s) Oral two times a day  sodium bicarbonate 650 milliGRAM(s) Oral every 12 hours    MEDICATIONS  (PRN):  albuterol/ipratropium for Nebulization 3 milliLiter(s) Nebulizer every 6 hours PRN Shortness of Breath and/or Wheezing  guaiFENesin Oral Liquid (Sugar-Free) 200 milliGRAM(s) Oral every 6 hours PRN Cough  heparin   Injectable 5500 Unit(s) IV Push every 6 hours PRN For aPTT less than 40  heparin   Injectable 2500 Unit(s) IV Push every 6 hours PRN For aPTT between 40 - 57  ondansetron Injectable 4 milliGRAM(s) IV Push every 8 hours PRN Nausea and/or Vomiting  senna 2 Tablet(s) Oral at bedtime PRN Constipation  sodium chloride 0.9% lock flush 10 milliLiter(s) IV Push every 1 hour PRN Pre/post blood products, medications, blood draw, and to maintain line patency    REVIEW OF SYSTEMS:  General: No fevers/chills, or fatigue  HEENT: No visual disturbances, no hearing loss, no headaches, no epistaxis.  CV: No chest pain,no palpitations, no edema, no orthopnea, no PND, or JOINER  Respiratory: No dyspnea, no wheeze, no cough.  GI: no nausea/vomiting, no black/bloody stools.  : No hematuria  Musculoskeletal: No myalgias, no arthralgias, no back pain.    Vital Signs Last 24 Hrs  ICU Vital Signs Last 24 Hrs  T(C): 36.4 (05 Jun 2024 15:00), Max: 36.7 (05 Jun 2024 12:00)  T(F): 97.5 (05 Jun 2024 15:00), Max: 98.1 (05 Jun 2024 12:00)  HR: 70 (05 Jun 2024 15:00) (60 - 79)  BP: 134/62 (05 Jun 2024 06:45) (89/50 - 134/62)  BP(mean): 85 (05 Jun 2024 06:45) (64 - 87)  ABP: 127/47 (05 Jun 2024 15:00) (89/32 - 155/54)  ABP(mean): 72 (05 Jun 2024 15:00) (51 - 80)  RR: 16 (05 Jun 2024 15:00) (16 - 22)  SpO2: 97% (05 Jun 2024 15:00) (96% - 100%)    O2 Parameters below as of 05 Jun 2024 12:00    O2 Concentration (%): 40    Parameters below as of 04 Jun 2024 10:00  Patient On (Oxygen Delivery Method): room air    I&O's Detail    03 Jun 2024 07:01  -  04 Jun 2024 07:00  --------------------------------------------------------  IN:    Heparin Infusion: 190 mL    Oral Fluid: 550 mL  Total IN: 740 mL    OUT:    Voided (mL): 450 mL  Total OUT: 450 mL    Total NET: 290 mL    PHYSICAL EXAM:   GENERAL: Intubated, sedated  ENMT: PERRL  NECK: soft, Supple, No JVD,   CHEST/LUNG: Clear to auscultatation bilaterally; No wheezing.  HEART: S1S2+, Regular rate and rhythm; No murmurs.  ABDOMEN: Soft, Nontender, Nondistended  NEURO: Sedated  EXT: Mild swelling to LUE, warm well-perfused. + b/l radial pulses, + b/l DP pulses palpated.    INTERPRETATION OF TELEMETRY:  62    LABS:                                   11.5   24.18 )-----------( 326      ( 05 Jun 2024 14:30 )             33.4     06-05    128<L>  |  91<L>  |  85.4<H>  ----------------------------<  158<H>  4.3   |  19.0<L>  |  5.17<H>    Ca    8.1<L>      05 Jun 2024 14:30  Phos  5.6     06-05  Mg     2.4     06-05    TPro  6.2<L>  /  Alb  3.1<L>  /  TBili  0.8  /  DBili  x   /  AST  90<H>  /  ALT  89<H>  /  AlkPhos  134<H>  06-05    PTT - ( 05 Jun 2024 13:30 )  PTT:71.6 sec      I&O's Summary    04 Jun 2024 07:01  -  05 Jun 2024 07:00  --------------------------------------------------------  IN: 859.9 mL / OUT: 305 mL / NET: 554.9 mL    05 Jun 2024 07:01  -  05 Jun 2024 15:32  --------------------------------------------------------  IN: 876.7 mL / OUT: 140 mL / NET: 736.7 mL    BNP 40091 (5/23)    Assessment and Plan:   · Assessment	  Patient is a 78 year old male with history of HTN, HLD, DM, CKD, CAD s/p 4V CABG and multiple PCI's, ischemic cardiomyopathy (EF < 20%), and LBBB s/p MDT CRT-D. He had an admission in November for ADHF. At that time a LHC revealed his grafts were occluded except for the LIMA to LAD. He also underwent a CRTD implant. On 5/25 he had a VT arrest with ROSC achieved after 3 minutes was sent to the cath lab which showed his LIMA is still patent. Patient was extubated on 5/27. RHC on 5/28 showed severely elevated biv filling pressures, severe Cpc-PH, and low CO. He was started on low dose milrinone and diuresed well on IV Bumex. Weaned off pressors 5/29 and Milrinone was weaned off 5/31. Yesterday underwent RHC for c/f low flow state, revealed elevated filling pressures, low cardiac output and index. This AM, patient went into cardiac arrest, was intubated, now on dobutamine, propofol, levo, vaso, bumex infusions.    Risk Assessments:  ASA: 4  Mallampati: N/A (intubated)  Creat: 5.3  GFR: 12  BRA: 4.6%    Coronary Anatomy:   5/25/24 LHC: All grafts occluded except LIMA-LAD. LVEDP 29 mmHg.    Plan:  -Continue Heparin to cath lab  -plan for RHC via RBV  -confirmed appropriate NPO duration  -ECG and Labs reviewed  -procedure discussed with patient; risks and benefits explained, questions answered  -consent obtained by attending IC Interventional Cardiology PA Consult Note:    HPI:  Patient is a 78 year old male with history of HTN, HLD, DM, CKD, CAD s/p 4V CABG and multiple PCI's, ischemic cardiomyopathy (EF < 20%), and LBBB s/p MDT CRT-D. He had an admission in November for ADHF. At that time a LHC revealed his grafts were occluded except for the LIMA to LAD. He also underwent a CRTD implant.     On 5/25 he had a VT arrest with ROSC achieved after 3 minutes was sent to the cath lab which showed his LIMA is still patent. Patient was extubated on 5/27. RHC on 5/28 showed severely elevated biv filling pressures, severe Cpc-PH, and low CO. He was started on low dose milrinone and diuresed well on IV Bumex. Weaned off pressors 5/29 and Milrinone was weaned off 5/31.    Bedside Hemodynamics:  5/31 (mil 0.125): CVP 9-10, PA 72/24/43, PA sat 70.7%, Fauzia CO/CI 4.6/2.6, /56 (73), SVR 1096 dsc.    Cardiac Studies:  5/28/24 RHC: RA 18, RV 92/18, PA 95/38/57, PCW 31, PA sat 51.5%, Fauzia CO/CI 3.09/1.74, TPG 26, DPG 7, SVR 1889 dsc, PVR 8.41 MEYERS, Vidhi 3.2, /63 (91) on levo 0.08.    5/25/24 LHC: All grafts occluded except LIMA-LAD. LVEDP 29 mmHg.    5/23/24 TTE: LVEF <20%, LVIDd 5.8cm, mild RVE with mild RV dysfunction, TAPSE 0.7cm, mild-mod AS, mild-mod MR, mild TR, mild ND, est PASP 75 mmHg.    RHC on 5/28 showed severely elevated biv filling pressures, severe Cpc-PH, and low CO. He was started on low dose milrinone and diuresed well on IV Bumex. Weaned off pressors 5/29 and Milrinone was weaned off 5/31. Yesterday underwent RHC for c/f low flow state, revealed elevated filling pressures, low cardiac output and index. This AM, patient went into cardiac arrest, ROSC obtained after 8 minutes, was intubated, lines placed with swan buster, now on dobutamine, propofol, levo, vaso, bumex infusions. Consult for impella placement in setting of cardiogenic shock.    PAST MEDICAL & SURGICAL HISTORY:  GERD (gastroesophageal reflux disease)  High cholesterol  Coronary artery disease involving coronary bypass graft of native heart with unstable angina pectoris  Coronary artery disease involving native coronary artery of native heart, angina presence unspecifie  Type 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, without long-term current use of insul  Essential hypertension  HFrEF (heart failure with reduced ejection fraction)  Stage 4 chronic kidney disease  LBBB (left bundle branch block)  Facial nerve palsy  Hypothyroidism  S/P CABG (coronary artery bypass graft)  4V 1983 [place]  Status post open reduction with internal fixation of fracture  History of insertion of stent into coronary artery bypass graft  History of brachytherapy    No Known Allergies    Intolerances    DOPamine (Hypotension)    MEDICATIONS  (STANDING):  aMIOdarone    Tablet 200 milliGRAM(s) Oral daily  aspirin enteric coated 81 milliGRAM(s) Oral daily  atorvastatin 80 milliGRAM(s) Oral at bedtime  benzocaine/menthol Lozenge 1 Lozenge Oral every 8 hours  chlorhexidine 2% Cloths 1 Application(s) Topical <User Schedule>  clopidogrel Tablet 75 milliGRAM(s) Oral daily  dextrose 50% Injectable 25 Gram(s) IV Push once  heparin  Infusion. 900 Unit(s)/Hr (9 mL/Hr) IV Continuous <Continuous>  hydrALAZINE 25 milliGRAM(s) Oral three times a day  insulin glargine Injectable (LANTUS) 9 Unit(s) SubCutaneous at bedtime  insulin lispro (ADMELOG) corrective regimen sliding scale   SubCutaneous three times a day before meals  insulin lispro (ADMELOG) corrective regimen sliding scale   SubCutaneous at bedtime  insulin lispro Injectable (ADMELOG) 3 Unit(s) SubCutaneous three times a day before meals  levothyroxine 88 MICROGram(s) Oral daily  metoprolol tartrate 12.5 milliGRAM(s) Oral every 6 hours  mexiletine 150 milliGRAM(s) Oral every 8 hours  montelukast 10 milliGRAM(s) Oral daily  pantoprazole    Tablet 40 milliGRAM(s) Oral before breakfast  ranolazine 500 milliGRAM(s) Oral two times a day  sodium bicarbonate 650 milliGRAM(s) Oral every 12 hours    MEDICATIONS  (PRN):  albuterol/ipratropium for Nebulization 3 milliLiter(s) Nebulizer every 6 hours PRN Shortness of Breath and/or Wheezing  guaiFENesin Oral Liquid (Sugar-Free) 200 milliGRAM(s) Oral every 6 hours PRN Cough  heparin   Injectable 5500 Unit(s) IV Push every 6 hours PRN For aPTT less than 40  heparin   Injectable 2500 Unit(s) IV Push every 6 hours PRN For aPTT between 40 - 57  ondansetron Injectable 4 milliGRAM(s) IV Push every 8 hours PRN Nausea and/or Vomiting  senna 2 Tablet(s) Oral at bedtime PRN Constipation  sodium chloride 0.9% lock flush 10 milliLiter(s) IV Push every 1 hour PRN Pre/post blood products, medications, blood draw, and to maintain line patency    REVIEW OF SYSTEMS:  UTO (intubated)    Vital Signs Last 24 Hrs  ICU Vital Signs Last 24 Hrs  T(C): 36.4 (05 Jun 2024 15:00), Max: 36.7 (05 Jun 2024 12:00)  T(F): 97.5 (05 Jun 2024 15:00), Max: 98.1 (05 Jun 2024 12:00)  HR: 70 (05 Jun 2024 15:00) (60 - 79)  BP: 134/62 (05 Jun 2024 06:45) (89/50 - 134/62)  BP(mean): 85 (05 Jun 2024 06:45) (64 - 87)  ABP: 127/47 (05 Jun 2024 15:00) (89/32 - 155/54)  ABP(mean): 72 (05 Jun 2024 15:00) (51 - 80)  RR: 16 (05 Jun 2024 15:00) (16 - 22)  SpO2: 97% (05 Jun 2024 15:00) (96% - 100%)    O2 Parameters below as of 05 Jun 2024 12:00    O2 Concentration (%): 40    Parameters below as of 04 Jun 2024 10:00  Patient On (Oxygen Delivery Method): room air    I&O's Detail    03 Jun 2024 07:01  -  04 Jun 2024 07:00  --------------------------------------------------------  IN:    Heparin Infusion: 190 mL    Oral Fluid: 550 mL  Total IN: 740 mL    OUT:    Voided (mL): 450 mL  Total OUT: 450 mL    Total NET: 290 mL    PHYSICAL EXAM:   GENERAL: Intubated, sedated  ENMT: PERRL  NECK: soft, Supple, No JVD,   CHEST/LUNG: Clear to auscultatation bilaterally; No wheezing.  HEART: S1S2+, Regular rate and rhythm; No murmurs.  ABDOMEN: Soft, Nontender, Nondistended  NEURO: Sedated  EXT: Mild swelling to LUE, warm well-perfused. + b/l radial pulses, + b/l DP pulses palpated.    INTERPRETATION OF TELEMETRY:  62    LABS:                                   11.5   24.18 )-----------( 326      ( 05 Jun 2024 14:30 )             33.4     06-05    128<L>  |  91<L>  |  85.4<H>  ----------------------------<  158<H>  4.3   |  19.0<L>  |  5.17<H>    Ca    8.1<L>      05 Jun 2024 14:30  Phos  5.6     06-05  Mg     2.4     06-05    TPro  6.2<L>  /  Alb  3.1<L>  /  TBili  0.8  /  DBili  x   /  AST  90<H>  /  ALT  89<H>  /  AlkPhos  134<H>  06-05    PTT - ( 05 Jun 2024 13:30 )  PTT:71.6 sec      I&O's Summary    04 Jun 2024 07:01  -  05 Jun 2024 07:00  --------------------------------------------------------  IN: 859.9 mL / OUT: 305 mL / NET: 554.9 mL    05 Jun 2024 07:01  -  05 Jun 2024 15:32  --------------------------------------------------------  IN: 876.7 mL / OUT: 140 mL / NET: 736.7 mL    BNP 78770 (5/23)    Assessment and Plan:   · Assessment	  Patient is a 78 year old male with history of HTN, HLD, DM, CKD, CAD s/p 4V CABG and multiple PCI's, ischemic cardiomyopathy (EF < 20%), and LBBB s/p MDT CRT-D. He had an admission in November for ADHF. At that time a LHC revealed his grafts were occluded except for the LIMA to LAD. He also underwent a CRTD implant. On 5/25 he had a VT arrest with ROSC achieved after 3 minutes was sent to the cath lab which showed his LIMA is still patent. Patient was extubated on 5/27. RHC on 5/28 showed severely elevated biv filling pressures, severe Cpc-PH, and low CO. He was started on low dose milrinone and diuresed well on IV Bumex. Weaned off pressors 5/29 and Milrinone was weaned off 5/31. Yesterday underwent RHC for c/f low flow state, revealed elevated filling pressures, low cardiac output and index. This AM, patient went into cardiac arrest, ROSC obtained after 8 minutes, was intubated, lines placed with swan buster, now on dobutamine, propofol, levo, vaso, bumex infusions. Consult for impella placement in setting of cardiogenic shock.    Risk Assessments:  ASA: 4  Mallampati: N/A (intubated)  Creat: 5.17  GFR: 11  BRA: 37%    Coronary Anatomy:   5/25/24 LHC: All grafts occluded except LIMA-LAD. LVEDP 29 mmHg.    Plan:  -Continue Heparin to cath lab  -plan for LHC via RFA  -confirmed appropriate NPO duration  -procedure discussed with patient's sons; risks and benefits explained, questions answered  -consent obtained by attending IC Interventional Cardiology PA Consult Note:    HPI:  Patient is a 78 year old male with history of HTN, HLD, DM, CKD, CAD s/p 4V CABG and multiple PCI's, ischemic cardiomyopathy (EF < 20%), and LBBB s/p MDT CRT-D. He had an admission in November for ADHF. At that time a LHC revealed his grafts were occluded except for the LIMA to LAD. He also underwent a CRTD implant.     On 5/25 he had a VT arrest with ROSC achieved after 3 minutes was sent to the cath lab which showed his LIMA is still patent. Patient was extubated on 5/27. RHC on 5/28 showed severely elevated biv filling pressures, severe Cpc-PH, and low CO. He was started on low dose milrinone and diuresed well on IV Bumex. Weaned off pressors 5/29 and Milrinone was weaned off 5/31.    Bedside Hemodynamics:  5/31 (mil 0.125): CVP 9-10, PA 72/24/43, PA sat 70.7%, Fauzia CO/CI 4.6/2.6, /56 (73), SVR 1096 dsc.    Cardiac Studies:  5/28/24 RHC: RA 18, RV 92/18, PA 95/38/57, PCW 31, PA sat 51.5%, Fauzia CO/CI 3.09/1.74, TPG 26, DPG 7, SVR 1889 dsc, PVR 8.41 MEYERS, Vidhi 3.2, /63 (91) on levo 0.08.    5/25/24 LHC: All grafts occluded except LIMA-LAD. LVEDP 29 mmHg.    5/23/24 TTE: LVEF <20%, LVIDd 5.8cm, mild RVE with mild RV dysfunction, TAPSE 0.7cm, mild-mod AS, mild-mod MR, mild TR, mild WI, est PASP 75 mmHg.    RHC on 5/28 showed severely elevated biv filling pressures, severe Cpc-PH, and low CO. He was started on low dose milrinone and diuresed well on IV Bumex. Weaned off pressors 5/29 and Milrinone was weaned off 5/31. Yesterday underwent RHC for c/f low flow state, revealed elevated filling pressures, low cardiac output and index. This AM, patient went into cardiac arrest, ROSC obtained after 8 minutes, was intubated, lines placed with swan buster, now on dobutamine, propofol, levo, vaso, bumex infusions. Consult for IABP vs. impella placement in setting of cardiogenic shock.    PAST MEDICAL & SURGICAL HISTORY:  GERD (gastroesophageal reflux disease)  High cholesterol  Coronary artery disease involving coronary bypass graft of native heart with unstable angina pectoris  Coronary artery disease involving native coronary artery of native heart, angina presence unspecifie  Type 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, without long-term current use of insul  Essential hypertension  HFrEF (heart failure with reduced ejection fraction)  Stage 4 chronic kidney disease  LBBB (left bundle branch block)  Facial nerve palsy  Hypothyroidism  S/P CABG (coronary artery bypass graft)  4V 1983 [place]  Status post open reduction with internal fixation of fracture  History of insertion of stent into coronary artery bypass graft  History of brachytherapy    No Known Allergies    Intolerances    DOPamine (Hypotension)    MEDICATIONS  (STANDING):  aMIOdarone    Tablet 200 milliGRAM(s) Oral daily  aspirin enteric coated 81 milliGRAM(s) Oral daily  atorvastatin 80 milliGRAM(s) Oral at bedtime  benzocaine/menthol Lozenge 1 Lozenge Oral every 8 hours  chlorhexidine 2% Cloths 1 Application(s) Topical <User Schedule>  clopidogrel Tablet 75 milliGRAM(s) Oral daily  dextrose 50% Injectable 25 Gram(s) IV Push once  heparin  Infusion. 900 Unit(s)/Hr (9 mL/Hr) IV Continuous <Continuous>  hydrALAZINE 25 milliGRAM(s) Oral three times a day  insulin glargine Injectable (LANTUS) 9 Unit(s) SubCutaneous at bedtime  insulin lispro (ADMELOG) corrective regimen sliding scale   SubCutaneous three times a day before meals  insulin lispro (ADMELOG) corrective regimen sliding scale   SubCutaneous at bedtime  insulin lispro Injectable (ADMELOG) 3 Unit(s) SubCutaneous three times a day before meals  levothyroxine 88 MICROGram(s) Oral daily  metoprolol tartrate 12.5 milliGRAM(s) Oral every 6 hours  mexiletine 150 milliGRAM(s) Oral every 8 hours  montelukast 10 milliGRAM(s) Oral daily  pantoprazole    Tablet 40 milliGRAM(s) Oral before breakfast  ranolazine 500 milliGRAM(s) Oral two times a day  sodium bicarbonate 650 milliGRAM(s) Oral every 12 hours    MEDICATIONS  (PRN):  albuterol/ipratropium for Nebulization 3 milliLiter(s) Nebulizer every 6 hours PRN Shortness of Breath and/or Wheezing  guaiFENesin Oral Liquid (Sugar-Free) 200 milliGRAM(s) Oral every 6 hours PRN Cough  heparin   Injectable 5500 Unit(s) IV Push every 6 hours PRN For aPTT less than 40  heparin   Injectable 2500 Unit(s) IV Push every 6 hours PRN For aPTT between 40 - 57  ondansetron Injectable 4 milliGRAM(s) IV Push every 8 hours PRN Nausea and/or Vomiting  senna 2 Tablet(s) Oral at bedtime PRN Constipation  sodium chloride 0.9% lock flush 10 milliLiter(s) IV Push every 1 hour PRN Pre/post blood products, medications, blood draw, and to maintain line patency    REVIEW OF SYSTEMS:  UTO (intubated)    Vital Signs Last 24 Hrs  ICU Vital Signs Last 24 Hrs  T(C): 36.4 (05 Jun 2024 15:00), Max: 36.7 (05 Jun 2024 12:00)  T(F): 97.5 (05 Jun 2024 15:00), Max: 98.1 (05 Jun 2024 12:00)  HR: 70 (05 Jun 2024 15:00) (60 - 79)  BP: 134/62 (05 Jun 2024 06:45) (89/50 - 134/62)  BP(mean): 85 (05 Jun 2024 06:45) (64 - 87)  ABP: 127/47 (05 Jun 2024 15:00) (89/32 - 155/54)  ABP(mean): 72 (05 Jun 2024 15:00) (51 - 80)  RR: 16 (05 Jun 2024 15:00) (16 - 22)  SpO2: 97% (05 Jun 2024 15:00) (96% - 100%)    O2 Parameters below as of 05 Jun 2024 12:00    O2 Concentration (%): 40    Parameters below as of 04 Jun 2024 10:00  Patient On (Oxygen Delivery Method): room air    I&O's Detail    03 Jun 2024 07:01  -  04 Jun 2024 07:00  --------------------------------------------------------  IN:    Heparin Infusion: 190 mL    Oral Fluid: 550 mL  Total IN: 740 mL    OUT:    Voided (mL): 450 mL  Total OUT: 450 mL    Total NET: 290 mL    PHYSICAL EXAM:   GENERAL: Intubated, sedated  ENMT: PERRL  NECK: soft, Supple, No JVD,   CHEST/LUNG: Clear to auscultatation bilaterally; No wheezing.  HEART: S1S2+, Regular rate and rhythm; No murmurs.  ABDOMEN: Soft, Nontender, Nondistended  NEURO: Sedated  EXT: Mild swelling to LUE, warm well-perfused. + b/l radial pulses, + b/l DP pulses palpated.    INTERPRETATION OF TELEMETRY:  62    LABS:                                   11.5   24.18 )-----------( 326      ( 05 Jun 2024 14:30 )             33.4     06-05    128<L>  |  91<L>  |  85.4<H>  ----------------------------<  158<H>  4.3   |  19.0<L>  |  5.17<H>    Ca    8.1<L>      05 Jun 2024 14:30  Phos  5.6     06-05  Mg     2.4     06-05    TPro  6.2<L>  /  Alb  3.1<L>  /  TBili  0.8  /  DBili  x   /  AST  90<H>  /  ALT  89<H>  /  AlkPhos  134<H>  06-05    PTT - ( 05 Jun 2024 13:30 )  PTT:71.6 sec      I&O's Summary    04 Jun 2024 07:01  -  05 Jun 2024 07:00  --------------------------------------------------------  IN: 859.9 mL / OUT: 305 mL / NET: 554.9 mL    05 Jun 2024 07:01  -  05 Jun 2024 15:32  --------------------------------------------------------  IN: 876.7 mL / OUT: 140 mL / NET: 736.7 mL    BNP 22127 (5/23)    Assessment and Plan:   · Assessment	  Patient is a 78 year old male with history of HTN, HLD, DM, CKD, CAD s/p 4V CABG and multiple PCI's, ischemic cardiomyopathy (EF < 20%), and LBBB s/p MDT CRT-D. He had an admission in November for ADHF. At that time a LHC revealed his grafts were occluded except for the LIMA to LAD. He also underwent a CRTD implant. On 5/25 he had a VT arrest with ROSC achieved after 3 minutes was sent to the cath lab which showed his LIMA is still patent. Patient was extubated on 5/27. RHC on 5/28 showed severely elevated biv filling pressures, severe Cpc-PH, and low CO. He was started on low dose milrinone and diuresed well on IV Bumex. Weaned off pressors 5/29 and Milrinone was weaned off 5/31. Yesterday underwent RHC for c/f low flow state, revealed elevated filling pressures, low cardiac output and index. This AM, patient went into cardiac arrest, ROSC obtained after 8 minutes, was intubated, lines placed with swan buster, now on dobutamine, propofol, levo, vaso, bumex infusions. Consult for IABP vs. impella placement in setting of cardiogenic shock.    Risk Assessments:  ASA: 4  Mallampati: N/A (intubated)  Creat: 5.17  GFR: 11  BRA: 37%    Coronary Anatomy:   5/25/24 LHC: All grafts occluded except LIMA-LAD. LVEDP 29 mmHg.    Plan:  -Continue Heparin to cath lab  -plan for IABP vs. impella insertion via RFA under fluoro  -confirmed appropriate NPO duration  -procedure discussed with patient's sons; risks and benefits explained, questions answered  -consent obtained by attending IC

## 2024-06-05 NOTE — CONSULT NOTE ADULT - TIME BILLING
- Generation of cardiovascular treatment plan.  - Coordination of care with primary team.
D/W merari RN, MICU Dr Willoughby    Total time also includes discussion during interdisciplinary team rounds, chart review including but limited to prior admissions/ GOC discussions, review of established ACP documentations ( ex. living will, HCP, MOLST form),  review of medications/ labs/ imaging, examination, care coordination with other health care professionals, documentation EXCLUDING current goals of care or advance care planning discussions.

## 2024-06-05 NOTE — PROCEDURE NOTE - ADDITIONAL PROCEDURE DETAILS
# Cardiac arrest  # Cardiogenic shock  # Acute hypoxemic respiratory failure    Procedure performed independently of critical care time.

## 2024-06-05 NOTE — CONSULT NOTE ADULT - PROVIDER SPECIALTY LIST ADULT
Critical Care
Palliative Care
Critical Care
Intervent Cardiology
MICU
Nephrology
Heart Failure
Electrophysiology
Cardiology

## 2024-06-05 NOTE — PROCEDURE NOTE - ADDITIONAL PROCEDURE DETAILS
Initial attempt at right femoral cannulation aborted as 18G catheter would not advance through subcutaneous tissue into vessel. Second attempt at right radial artery cannulation with angiocath was successful.    # Cardiac arrest  # Cardiogenic shock  # Acute systolic heart failure  # Acute hypoxemic respiratory failure    Procedure performed independently of critical care time.

## 2024-06-05 NOTE — CONSULT NOTE ADULT - NS PANP COMMENT GEN_ALL_CORE FT
78M with hx of CAD s/p PCI/CODI x s/p 19, brachytherapy 11/2023/CABG, ICM (EF 37%) s/p ICD, DM, HTN, HLD presented 5/23 with chest pain/sob/dizziness and increased PVC burden on Holter found to have RAS/NSTEMI with Echo showing interval worsening EF <20 with global hypokinesis and was planned for cath when he developed episode of Afib RVR that degenerated into VT 5/25 inappropriately sensed as SVT by device with cardiac arrest x 3 minutes with ROSC and taken emergently to cath lab for LHC with all SVGs occluded with LIMA to LAD patent with no targets for PCI. Pt subsequently extubated 5/26. Pt subsequently developed recurrent WCT 5/27 PM despite amio with worsening mentation and hypotension with brief cardiac arrest with ROSC. Pt subsequently extubated 5/27 and mentating well. Family had initially opted for DNR/DNI after second cardiac arrest but subsequently rescinded. Amiodarone transitioned to PO and mexiletine added without any further significant ectopy. Pt underwent swan placement 5/28 with severe pre and post pulm HTN with mPAP 57 with PCWP 31 and CO 3 and started on milrinone assisted diuresis and subsequently weaned off 5/31. Pt completed course of empiric zosyn for suspected aspiration PNA. Pt downgraded to medicine 6/2. Pt developed worsening renal failure with Cr uptrending to 4 and underwent repeat RHC 6/4 with CO 2.75L/min, mPAP 45, PCWP 30 and was resumed on bumex gtt and milrinone. Pt with poor urine output and relative hypotension but was at baseline mentation without any acute complaints when he was noted to be poorly responsive by family and subsequently found pulseless by nursing with code blue called. No preceding arrythmia or hypoxia noted per telemetry or nursing. ACLS protocol initiated with PEA noted with ROSC obtained after 8 mins. FS wnl. No major electrolye abnormalities on AM labs. Received multiple amps of epi/bicarb. Post arrest was in shock requiring escalating levophed. Emergent A line/central line placed with swan buster placed after consent obtained from family. Post arrest pt was awake and able to follow commands and was started on sedation. Mozelle placed with PCWP ~30//CO/CI pending. Will continue with milrinone and bumex infusion for now with additional bumex push given for goal net negative 1L as tolerated. HF/cardio f/u in AM. Will trend CO/CI q4h with q6h CMP/mixed venous/lactate. Levophed requirements downtrending and will continue weaning as tolerated. Started on sedation given vent desynchrony and still requiring high FiO2 requirements. Empiric abx resumed given likely aspiration during episode. Will need ICD interrogation but telemetry review without any arrythmia. Will c/w amiodarone and mexiletine. Remains on heparin gtt for Afib. Cr continues to uptrend and UOP has so far been suboptimal with initial diuresis. Will obtain palliative care consult given recurrent cardiac arrest/advanced heart failure and now renal failure. GOC discussed with family post arrest and for now they wish to maintain full code status and were advised that if renal function continues to worsen that dialysis will soon be needed. They will discuss further goals of care amongst themselves but for now wish to pursue all treatments. Prognosis remains very guarded.

## 2024-06-05 NOTE — CONSULT NOTE ADULT - SUBJECTIVE AND OBJECTIVE BOX
Patient is a 78y old  Male who presents with a chief complaint of Chest Pain / SOB (04 Jun 2024 17:51)      BRIEF HOSPITAL COURSE: BRIEF HOSPITAL COURSE: 79 y/o M with a h/o CAD (PCI/CODI x s/p 19, brachytherapy 11/2023), 4V CABG, ICM, HFrEF EF 37%, s/p ICD, DM2 on insulin, 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 Recently had Holter monitor placement for PVC burden which was noted to have worsened the day of presentation. In ED, patient weak appearing, ruled in for NSTEMI with positive troponin, and labs remarkable for RAS on CKD as well as hypoglycemia. A TTE revealed acute on chronic systolic heart failure with EF <20%. He was admitted to telemetry for treatment of ACS on DAPT and heparin gtt, planned for LHC. Patient suffered VF/VT cardiac arrest on 5/25 without ICD shock (subsequently reprogrammed by EP) and was taken for emergent LHC where he was found to have 3/4 occluded grafts, only LIMA to LAD was patent. No intervention performed. Extubated on 5/26, however suffered another VT cardiac arrest later that day (again without ICD shock) with 1 minute downtime prior to ROSC. Hospital course further complicated by cardiogenic shock requiring IV vasopressor and inotrope support. Extubated on 5/27. Progressive RAS over the past few days.      Events last 24 hours: Responded to Code Blue as patient suddenly became unresponsive and pulseless. Telemetry review shows steady paced rhythm @ 60bpm prior to initiation of CPR/ACLS. ROSC achieved after 8 minutes of downtime. RN reports patient was not complaining of anything and had been asymptomatic prior to this. Of note, he underwent RHC yesterday which revealed severely elevated filling pressures and low cardiac output- subsequently started on milrinone and bumetanide infusions.        PAST MEDICAL & SURGICAL HISTORY:  GERD (gastroesophageal reflux disease)      High cholesterol      Coronary artery disease involving coronary bypass graft of native heart with unstable angina pectoris      Coronary artery disease involving native coronary artery of native heart, angina presence unspecifie      Type 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, without long-term current use of insul      Essential hypertension      HFrEF (heart failure with reduced ejection fraction)      Stage 4 chronic kidney disease      LBBB (left bundle branch block)      Facial nerve palsy      Hypothyroidism      S/P CABG (coronary artery bypass graft)  4V 1983 [place]      Status post open reduction with internal fixation of fracture      History of insertion of stent into coronary artery bypass graft      History of brachytherapy          Review of Systems:  Unable to obtain secondary to cardiac arrest.        Medications:    aMIOdarone    Tablet 200 milliGRAM(s) Oral daily  buMETAnide Infusion 1 mG/Hr IV Continuous <Continuous>  metoprolol tartrate 12.5 milliGRAM(s) Oral every 6 hours  mexiletine 150 milliGRAM(s) Oral every 8 hours  milrinone Infusion 0.25 MICROgram(s)/kG/Min IV Continuous <Continuous>  ranolazine 500 milliGRAM(s) Oral two times a day    albuterol/ipratropium for Nebulization 3 milliLiter(s) Nebulizer every 6 hours PRN  guaiFENesin Oral Liquid (Sugar-Free) 200 milliGRAM(s) Oral every 6 hours PRN  montelukast 10 milliGRAM(s) Oral daily    ondansetron Injectable 4 milliGRAM(s) IV Push every 8 hours PRN      aspirin enteric coated 81 milliGRAM(s) Oral daily  clopidogrel Tablet 75 milliGRAM(s) Oral daily  heparin   Injectable 5500 Unit(s) IV Push every 6 hours PRN  heparin   Injectable 2500 Unit(s) IV Push every 6 hours PRN  heparin  Infusion. 800 Unit(s)/Hr IV Continuous <Continuous>    pantoprazole    Tablet 40 milliGRAM(s) Oral before breakfast  senna 2 Tablet(s) Oral at bedtime PRN      atorvastatin 80 milliGRAM(s) Oral at bedtime  dextrose 50% Injectable 25 Gram(s) IV Push once  insulin glargine Injectable (LANTUS) 9 Unit(s) SubCutaneous at bedtime  insulin lispro (ADMELOG) corrective regimen sliding scale   SubCutaneous three times a day before meals  insulin lispro (ADMELOG) corrective regimen sliding scale   SubCutaneous at bedtime  insulin lispro Injectable (ADMELOG) 3 Unit(s) SubCutaneous three times a day before meals  levothyroxine 88 MICROGram(s) Oral daily    sodium bicarbonate 650 milliGRAM(s) Oral every 12 hours  sodium chloride 0.9% lock flush 10 milliLiter(s) IV Push every 1 hour PRN      benzocaine/menthol Lozenge 1 Lozenge Oral every 8 hours  chlorhexidine 2% Cloths 1 Application(s) Topical <User Schedule>            ICU Vital Signs Last 24 Hrs  T(C): 36.4 (04 Jun 2024 23:00), Max: 36.8 (04 Jun 2024 12:00)  T(F): 97.6 (04 Jun 2024 23:00), Max: 98.2 (04 Jun 2024 12:00)  HR: 60 (05 Jun 2024 02:00) (60 - 71)  BP: 96/54 (05 Jun 2024 02:00) (82/49 - 104/53)  BP(mean): 68 (05 Jun 2024 02:00) (61 - 84)  ABP: --  ABP(mean): --  RR: 20 (05 Jun 2024 02:00) (16 - 21)  SpO2: 99% (05 Jun 2024 02:00) (95% - 100%)    O2 Parameters below as of 05 Jun 2024 02:00  Patient On (Oxygen Delivery Method): room air            ABG - ( 05 Jun 2024 03:08 )  pH, Arterial: 7.320 pH, Blood: x     /  pCO2: 39    /  pO2: 102   / HCO3: 20    / Base Excess: -6.0  /  SaO2: 97.4                I&O's Detail    03 Jun 2024 07:01  -  04 Jun 2024 07:00  --------------------------------------------------------  IN:    Heparin Infusion: 190 mL    Oral Fluid: 550 mL  Total IN: 740 mL    OUT:    Voided (mL): 450 mL  Total OUT: 450 mL    Total NET: 290 mL      04 Jun 2024 07:01  -  05 Jun 2024 03:27  --------------------------------------------------------  IN:    Bumetanide: 55 mL    Heparin Infusion: 88 mL    Milrinone: 53.9 mL    Oral Fluid: 250 mL  Total IN: 446.9 mL    OUT:    Voided (mL): 300 mL  Total OUT: 300 mL    Total NET: 146.9 mL            LABS:                        9.5    12.75 )-----------( 253      ( 04 Jun 2024 20:53 )             28.0     06-04    128<L>  |  91<L>  |  76.1<H>  ----------------------------<  172<H>  4.1   |  19.0<L>  |  4.65<H>    Ca    8.5      04 Jun 2024 06:00  Mg     2.4     06-04    TPro  6.4<L>  /  Alb  3.0<L>  /  TBili  0.6  /  DBili  x   /  AST  37  /  ALT  79<H>  /  AlkPhos  119  06-04          CAPILLARY BLOOD GLUCOSE      POCT Blood Glucose.: 134 mg/dL (05 Jun 2024 02:43)    PTT - ( 04 Jun 2024 21:16 )  PTT:81.6 sec  Urinalysis Basic - ( 04 Jun 2024 06:00 )    Color: x / Appearance: x / SG: x / pH: x  Gluc: 172 mg/dL / Ketone: x  / Bili: x / Urobili: x   Blood: x / Protein: x / Nitrite: x   Leuk Esterase: x / RBC: x / WBC x   Sq Epi: x / Non Sq Epi: x / Bacteria: x      CULTURES:        Physical Examination:    General: No acute distress.  now intubated    HEENT: Pupils equal, reactive to light.  Symmetric.    PULM: Clear to auscultation bilaterally, no significant sputum production    CVS: Regular rate and rhythm, no murmurs, rubs, or gallops    ABD: Soft, nondistended, nontender, normoactive bowel sounds, no masses    EXT: No edema, nontender    SKIN: Warm and well perfused, no rashes noted.    NEURO: unresponsive, no purposeful movements        RADIOLOGY:     < from: Xray Chest 1 View- PORTABLE-Routine (Xray Chest 1 View- PORTABLE-Routine in AM.) (06.02.24 @ 04:16) >  Findings:    Left-sided pacer AICD is unchanged in configuration. Right IJ [place] has   been removed.    Lungs: Right greater than left pleural effusions appear stable. There is   new right apical opacity which may be related to slightly different   technique.    Heart/Mediastinum: Cardiomegaly with coronary stent, stable in appearance.    Osseous structures: Sternotomy wires.    Impression:    Stable bilateral pleural effusions.    < end of copied text >          CRITICAL CARE TIME SPENT: 90 mins  Time spent evaluating/treating patient with medical issues that pose a high risk for life threatening deterioration and/or end-organ damage, reviewing data/labs/imaging, discussing case with multidisciplinary team, discussing plan/goals of care with patient/family. Non-inclusive of procedure time. The date of entry of this note reflects the date of services rendered.   Patient is a 78y old  Male who presents with a chief complaint of Chest Pain / SOB (04 Jun 2024 17:51)      BRIEF HOSPITAL COURSE: BRIEF HOSPITAL COURSE: 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 Recently had Holter monitor placement for PVC burden which was noted to have worsened the day of presentation. In ED, patient weak appearing, ruled in for NSTEMI with positive troponin, and labs remarkable for RAS on CKD as well as hypoglycemia. A TTE revealed acute on chronic systolic heart failure with EF <20%. He was admitted to telemetry for treatment of ACS on DAPT and heparin gtt, planned for LHC. Patient suffered VF/VT cardiac arrest on 5/25 without ICD shock (subsequently reprogrammed by EP) and was taken for emergent LHC where he was found to have 3/4 occluded grafts, only LIMA to LAD was patent. No target for intervention. Extubated on 5/26, however suffered another VT cardiac arrest later that day (again without ICD shock) with 1 minute downtime prior to ROSC. Hospital course further complicated by cardiogenic shock requiring IV vasopressor and inotrope support. Extubated on 5/27. Progressive RAS over the past few days.      Events last 24 hours: Responded to Code Blue as patient suddenly became unresponsive and pulseless. Telemetry review shows steady paced rhythm @ 60bpm prior to initiation of CPR/ACLS. ROSC achieved after 8 minutes of downtime. RN reports patient was not complaining of anything and had been asymptomatic prior to this. Of note, he underwent RHC yesterday which revealed severely elevated filling pressures and low cardiac output- subsequently started on milrinone and bumetanide infusions.        PAST MEDICAL & SURGICAL HISTORY:  GERD (gastroesophageal reflux disease)      High cholesterol      Coronary artery disease involving coronary bypass graft of native heart with unstable angina pectoris      Coronary artery disease involving native coronary artery of native heart, angina presence unspecifie      Type 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, without long-term current use of insul      Essential hypertension      HFrEF (heart failure with reduced ejection fraction)      Stage 4 chronic kidney disease      LBBB (left bundle branch block)      Facial nerve palsy      Hypothyroidism      S/P CABG (coronary artery bypass graft)  4V 1983 [place]      Status post open reduction with internal fixation of fracture      History of insertion of stent into coronary artery bypass graft      History of brachytherapy          Review of Systems:  Unable to obtain secondary to cardiac arrest.        Medications:    aMIOdarone    Tablet 200 milliGRAM(s) Oral daily  buMETAnide Infusion 1 mG/Hr IV Continuous <Continuous>  metoprolol tartrate 12.5 milliGRAM(s) Oral every 6 hours  mexiletine 150 milliGRAM(s) Oral every 8 hours  milrinone Infusion 0.25 MICROgram(s)/kG/Min IV Continuous <Continuous>  ranolazine 500 milliGRAM(s) Oral two times a day    albuterol/ipratropium for Nebulization 3 milliLiter(s) Nebulizer every 6 hours PRN  guaiFENesin Oral Liquid (Sugar-Free) 200 milliGRAM(s) Oral every 6 hours PRN  montelukast 10 milliGRAM(s) Oral daily    ondansetron Injectable 4 milliGRAM(s) IV Push every 8 hours PRN      aspirin enteric coated 81 milliGRAM(s) Oral daily  clopidogrel Tablet 75 milliGRAM(s) Oral daily  heparin   Injectable 5500 Unit(s) IV Push every 6 hours PRN  heparin   Injectable 2500 Unit(s) IV Push every 6 hours PRN  heparin  Infusion. 800 Unit(s)/Hr IV Continuous <Continuous>    pantoprazole    Tablet 40 milliGRAM(s) Oral before breakfast  senna 2 Tablet(s) Oral at bedtime PRN      atorvastatin 80 milliGRAM(s) Oral at bedtime  dextrose 50% Injectable 25 Gram(s) IV Push once  insulin glargine Injectable (LANTUS) 9 Unit(s) SubCutaneous at bedtime  insulin lispro (ADMELOG) corrective regimen sliding scale   SubCutaneous three times a day before meals  insulin lispro (ADMELOG) corrective regimen sliding scale   SubCutaneous at bedtime  insulin lispro Injectable (ADMELOG) 3 Unit(s) SubCutaneous three times a day before meals  levothyroxine 88 MICROGram(s) Oral daily    sodium bicarbonate 650 milliGRAM(s) Oral every 12 hours  sodium chloride 0.9% lock flush 10 milliLiter(s) IV Push every 1 hour PRN      benzocaine/menthol Lozenge 1 Lozenge Oral every 8 hours  chlorhexidine 2% Cloths 1 Application(s) Topical <User Schedule>            ICU Vital Signs Last 24 Hrs  T(C): 36.4 (04 Jun 2024 23:00), Max: 36.8 (04 Jun 2024 12:00)  T(F): 97.6 (04 Jun 2024 23:00), Max: 98.2 (04 Jun 2024 12:00)  HR: 60 (05 Jun 2024 02:00) (60 - 71)  BP: 96/54 (05 Jun 2024 02:00) (82/49 - 104/53)  BP(mean): 68 (05 Jun 2024 02:00) (61 - 84)  ABP: --  ABP(mean): --  RR: 20 (05 Jun 2024 02:00) (16 - 21)  SpO2: 99% (05 Jun 2024 02:00) (95% - 100%)    O2 Parameters below as of 05 Jun 2024 02:00  Patient On (Oxygen Delivery Method): room air            ABG - ( 05 Jun 2024 03:08 )  pH, Arterial: 7.320 pH, Blood: x     /  pCO2: 39    /  pO2: 102   / HCO3: 20    / Base Excess: -6.0  /  SaO2: 97.4                I&O's Detail    03 Jun 2024 07:01  -  04 Jun 2024 07:00  --------------------------------------------------------  IN:    Heparin Infusion: 190 mL    Oral Fluid: 550 mL  Total IN: 740 mL    OUT:    Voided (mL): 450 mL  Total OUT: 450 mL    Total NET: 290 mL      04 Jun 2024 07:01  -  05 Jun 2024 03:27  --------------------------------------------------------  IN:    Bumetanide: 55 mL    Heparin Infusion: 88 mL    Milrinone: 53.9 mL    Oral Fluid: 250 mL  Total IN: 446.9 mL    OUT:    Voided (mL): 300 mL  Total OUT: 300 mL    Total NET: 146.9 mL            LABS:                        9.5    12.75 )-----------( 253      ( 04 Jun 2024 20:53 )             28.0     06-04    128<L>  |  91<L>  |  76.1<H>  ----------------------------<  172<H>  4.1   |  19.0<L>  |  4.65<H>    Ca    8.5      04 Jun 2024 06:00  Mg     2.4     06-04    TPro  6.4<L>  /  Alb  3.0<L>  /  TBili  0.6  /  DBili  x   /  AST  37  /  ALT  79<H>  /  AlkPhos  119  06-04          CAPILLARY BLOOD GLUCOSE      POCT Blood Glucose.: 134 mg/dL (05 Jun 2024 02:43)    PTT - ( 04 Jun 2024 21:16 )  PTT:81.6 sec  Urinalysis Basic - ( 04 Jun 2024 06:00 )    Color: x / Appearance: x / SG: x / pH: x  Gluc: 172 mg/dL / Ketone: x  / Bili: x / Urobili: x   Blood: x / Protein: x / Nitrite: x   Leuk Esterase: x / RBC: x / WBC x   Sq Epi: x / Non Sq Epi: x / Bacteria: x      CULTURES:        Physical Examination:    General: No acute distress.  now intubated    HEENT: Pupils equal, reactive to light.  Symmetric.    PULM: Clear to auscultation bilaterally, no significant sputum production    CVS: Regular rate and rhythm, no murmurs, rubs, or gallops    ABD: Soft, nondistended, nontender, normoactive bowel sounds, no masses    EXT: No edema, nontender    SKIN: Warm and well perfused, no rashes noted.    NEURO: unresponsive, no purposeful movements        RADIOLOGY:     < from: Xray Chest 1 View- PORTABLE-Routine (Xray Chest 1 View- PORTABLE-Routine in AM.) (06.02.24 @ 04:16) >  Findings:    Left-sided pacer AICD is unchanged in configuration. Right IJ [place] has   been removed.    Lungs: Right greater than left pleural effusions appear stable. There is   new right apical opacity which may be related to slightly different   technique.    Heart/Mediastinum: Cardiomegaly with coronary stent, stable in appearance.    Osseous structures: Sternotomy wires.    Impression:    Stable bilateral pleural effusions.    < end of copied text >          CRITICAL CARE TIME SPENT: 90 mins  Time spent evaluating/treating patient with medical issues that pose a high risk for life threatening deterioration and/or end-organ damage, reviewing data/labs/imaging, discussing case with multidisciplinary team, discussing plan/goals of care with patient/family. Non-inclusive of procedure time. The date of entry of this note reflects the date of services rendered.

## 2024-06-05 NOTE — PROGRESS NOTE ADULT - ATTENDING COMMENTS
78M PMH multi-vessel CAD s/p CABG s/p PCI x19 with coronary brachytherapy 11/2023, ICM, HFrEF EF 37%, s/p ICD, IDDM who initially presented 5/23/24 with radiating chest pain, dizziness, lightheadedness, found with NSTEMI, RAS, TTE with EF <20%. Pt has had multiple cardiac arrests x3 in setting of VT, had LHC 5/25 noted with 3/4 occluded grafts, only LIMA/LAD patent, and no target seen for intervention. Sensitivities/thresholds of ICD were optimized by EP during his course. On 6/4/24 had RHC showing PCWP 30, elevated LVEDP and low cardiac output, was started on Milrinone and Bumex gtts. Overnight 6/5/24 had yet another cardiac arrest x 8 minutes, this time with PEA, and was re-intubated, and had PA catheter and A-line placed. Noted with worsening oliguria, shock state, likely cardiogenic. Lower lung infiltrates on CXR have appeared improved; will empirically continue Zosyn. Today 6/5/24 went for MCS with IABP (vasculature not conducive to Impella). Given worsening renal failure, Milrinone was switched to Dobutamine. F/u repeat TTE. Serial pulse checks, heparin gtt, DAPT ASA/Plavix. C/w Mexitil 150mg Q8H. Ongoing renal failure, in on Bumex gtt; hoping that IABP will improve renal perfusion and therefore UOP/renal function. If not, may need trial of CVVHD, nephrology is on board. Notably pt was made DNR but rescinded by family. Palliative care now following. Overall patients prognosis is poor. C/w care in ICU.      Andrew Willoughby MD, [facility]  , Pulmonary & Critical Care Medicine  [facility]  Pulmonary and Sleep Medicine at [place]  [address]  [place] [address]  T: [phone]  F: [phone]
78M with hx of CAD s/p PCI/CODI x s/p 19, brachytherapy 11/2023/CABG, ICM (EF 37%) s/p ICD, DM, HTN, HLD presented 5/23 with chest pain/sob/dizziness and increased PVC burden on Holter found to have RAS/NSTEMI with Echo showing interval worsening EF <20 with global hypokinesis and was planned for cath when he developed episode of Afib RVR that degenerated into VT 5/25 inappropriately sensed as SVT by device with cardiac arrest x 3 minutes with ROSC and taken emergently to cath lab for LHC with all SVGs occluded with LIMA to LAD patent with no targets for PCI. Pt subsequently extubated 5/26. Pt subsequently developed recurrent WCT 5/27 PM despite amio with worsening mentation and hypotension with brief cardiac arrest with ROSC. Pt subsequently extubated 5/27 and mentating well. Family had initially opted for DNR/DNI after second cardiac arrest but subsequently rescinded. Pt remains in shock state on levophed but primarily secondary to low diastolic decreasing MAP. Pt underwent swan placement with severe pre and post pulm HTN with mPAP 57 with PCWP 31 and CO 3. Pt aggressively weaned off levophed and started on milrinone per HF recs. Started on aggressive diuresis. No significant ectopy noted during the day but later in the evening had recurrent runs of likely AT with aberrancy. Initially was planning for decrease of amiodarone but per discussion with EP will maintain for now and start mexiletine. Remains on heparin drip for Afib.
78M with hx of CAD s/p PCI/CODI x s/p 19, brachytherapy 11/2023/CABG, ICM (EF 37%) s/p ICD, DM, HTN, HLD presented 5/23 with chest pain/sob/dizziness and increased PVC burden on Holter found to have RAS/NSTEMI with Echo showing interval worsening EF <20 with global hypokinesis and was planned for cath when he developed episode of Afib RVR that degenerated into VT 5/25 inappropriately sensed as SVT by device with cardiac arrest x 3 minutes with ROSC and taken emergently to cath lab for LHC with all SVGs occluded with LIMA to LAD patent with no targets for PCI. Pt subsequently extubated 5/26. Pt subsequently developed recurrent WCT 5/27 PM despite amio with worsening mentation and hypotension with brief cardiac arrest with ROSC. Pt subsequently extubated 5/27 and mentating well. Family had initially opted for DNR/DNI after second cardiac arrest but subsequently rescinded. Pt remains in shock state on levophed but primarily secondary to low diastolic decreasing MAP. Pt underwent swan placement with severe pre and post pulm HTN with mPAP 57 with PCWP 31 and CO 3. Pt started on milrinone assisted diuresis 5/29. Mexiletine added 5/29. Overnight was started on levophed for recurrent hypotension but now weaned off. Remains on milrinone 0.125. No recurrent ectopy since yesterday. Will decrease amiodarone to 0.5mg/min. Diuresis being augmented given still elevated filling pressures. Trend CO/mixed venous/CMP q6h. Remains on heparin gtt for Afib. Remains on zosyn for possible aspiration PNA.

## 2024-06-05 NOTE — PROCEDURE NOTE - SUPERVISORY STATEMENT
Skip Turpin MD  Clinical Cardiac Electrophysiology
Skip Turpin MD  Clinical Cardiac Electrophysiology

## 2024-06-05 NOTE — PROGRESS NOTE ADULT - SUBJECTIVE AND OBJECTIVE BOX
[facility] Advanced Heart Failure - Progress Note  [address] [place] [address]  Office Phone: [phone]/Fax: [phone]  Service/On Call Phone [phone]    Interval events: PEA arrest overnight, downtime ~8 minutes. Currently intubated/sedated on Milrinone/Levo/Vaso.    Medications:  albuterol/ipratropium for Nebulization 3 milliLiter(s) Nebulizer every 6 hours PRN  aMIOdarone    Tablet 200 milliGRAM(s) Oral daily  aspirin  chewable 81 milliGRAM(s) Oral daily  atorvastatin 80 milliGRAM(s) Oral at bedtime  buMETAnide Infusion 2 mG/Hr IV Continuous <Continuous>  chlorhexidine 2% Cloths 1 Application(s) Topical <User Schedule>  clopidogrel Tablet 75 milliGRAM(s) Oral daily  dextrose 50% Injectable 25 Gram(s) IV Push once  DOBUTamine Infusion 5 MICROgram(s)/kG/Min IV Continuous <Continuous>  heparin   Injectable 5500 Unit(s) IV Push every 6 hours PRN  heparin   Injectable 2500 Unit(s) IV Push every 6 hours PRN  heparin  Infusion. 800 Unit(s)/Hr IV Continuous <Continuous>  insulin glargine Injectable (LANTUS) 9 Unit(s) SubCutaneous at bedtime  insulin lispro (ADMELOG) corrective regimen sliding scale   SubCutaneous every 6 hours  insulin lispro Injectable (ADMELOG) 3 Unit(s) SubCutaneous every 8 hours  levothyroxine Injectable 44 MICROGram(s) IV Push at bedtime  mexiletine 150 milliGRAM(s) Oral every 8 hours  montelukast 10 milliGRAM(s) Oral daily  norepinephrine Infusion 0.05 MICROgram(s)/kG/Min IV Continuous <Continuous>  ondansetron Injectable 4 milliGRAM(s) IV Push every 8 hours PRN  pantoprazole  Injectable 40 milliGRAM(s) IV Push daily  propofol Infusion 20 MICROgram(s)/kG/Min IV Continuous <Continuous>  senna 2 Tablet(s) Oral at bedtime PRN  sodium bicarbonate 650 milliGRAM(s) Oral every 12 hours  sodium chloride 0.9% lock flush 10 milliLiter(s) IV Push every 1 hour PRN  vasopressin Infusion 0.04 Unit(s)/Min IV Continuous <Continuous>    Vital Signs Last 24 Hours  T(C): 36.4 (06-05-24 @ 14:00), Max: 36.7 (06-05-24 @ 12:00)  HR: 72 (06-05-24 @ 14:30) (60 - 79)  BP: 134/62 (06-05-24 @ 06:45) (89/50 - 134/62)  RR: 16 (06-05-24 @ 14:00) (16 - 22)  SpO2: 97% (06-05-24 @ 14:30) (96% - 100%)    I&O's Summary  04 Jun 2024 07:01  -  05 Jun 2024 07:00  --------------------------------------------------------  IN: 859.9 mL / OUT: 305 mL / NET: 554.9 mL    05 Jun 2024 07:01  -  05 Jun 2024 15:01  --------------------------------------------------------  IN: 876.7 mL / OUT: 140 mL / NET: 736.7 mL    Tele: paced    Physical Exam:  General: Intubated, sedated.  Neck: Neck supple. JVP not elevated.   Chest: Anterior course.  CV: RRR. Normal S1 and S2. No murmurs, rub, or gallops. Lukewarm peripherally.  PV: No significant LE edema noted.  Abdomen: Soft, non-distended.  Skin: warm, dry.  Neurology: KATYA-sedated    Labs:                        11.5   24.18 )-----------( 326      ( 05 Jun 2024 14:30 )             33.4     06-05    130<L>  |  89<L>  |  87.6<H>  ----------------------------<  181<H>  3.3<L>   |  19.0<L>  |  5.30<H>    Ca    8.1<L>      05 Jun 2024 08:16  Phos  5.4     06-05  Mg     2.3     06-05    TPro  6.1<L>  /  Alb  3.0<L>  /  TBili  0.8  /  DBili  x   /  AST  93<H>  /  ALT  89<H>  /  AlkPhos  129<H>  06-05    PTT - ( 05 Jun 2024 13:30 )  PTT:71.6 sec    Lactate, Blood: 1.6 mmol/L (06-05 @ 05:40)  Lactate, Blood: 1.8 mmol/L (06-04 @ 09:33)

## 2024-06-05 NOTE — CONSULT NOTE ADULT - CONSULT REASON
support and to assist with ongoing goals of care
Cardiac arrest
Cardiac arrest
Heart Failure
VT arrest
NSTEMI/ RAS
Cardiogenic shock, request for impella
Chest Pain
RAS

## 2024-06-05 NOTE — PROGRESS NOTE ADULT - SUBJECTIVE AND OBJECTIVE BOX
NEPHROLOGY INTERVAL HPI/OVERNIGHT EVENTS:  pt remains vented  continues with pressor and inotrope    MEDICATIONS  (STANDING):  aMIOdarone    Tablet 200 milliGRAM(s) Oral daily  aspirin  chewable 81 milliGRAM(s) Oral daily  atorvastatin 80 milliGRAM(s) Oral at bedtime  buMETAnide Infusion 2 mG/Hr (10 mL/Hr) IV Continuous <Continuous>  chlorhexidine 2% Cloths 1 Application(s) Topical <User Schedule>  clopidogrel Tablet 75 milliGRAM(s) Oral daily  dextrose 50% Injectable 25 Gram(s) IV Push once  DOBUTamine Infusion 5 MICROgram(s)/kG/Min (10.5 mL/Hr) IV Continuous <Continuous>  heparin  Infusion. 800 Unit(s)/Hr (8 mL/Hr) IV Continuous <Continuous>  insulin glargine Injectable (LANTUS) 9 Unit(s) SubCutaneous at bedtime  insulin lispro (ADMELOG) corrective regimen sliding scale   SubCutaneous every 6 hours  insulin lispro Injectable (ADMELOG) 3 Unit(s) SubCutaneous every 8 hours  levothyroxine Injectable 44 MICROGram(s) IV Push at bedtime  mexiletine 150 milliGRAM(s) Oral every 8 hours  montelukast 10 milliGRAM(s) Oral daily  norepinephrine Infusion 0.05 MICROgram(s)/kG/Min (3.27 mL/Hr) IV Continuous <Continuous>  pantoprazole  Injectable 40 milliGRAM(s) IV Push daily  piperacillin/tazobactam IVPB.- 3.375 Gram(s) IV Intermittent once  potassium chloride  20 mEq/100 mL IVPB 20 milliEquivalent(s) IV Intermittent every 2 hours  propofol Infusion 20 MICROgram(s)/kG/Min (7.86 mL/Hr) IV Continuous <Continuous>  sodium bicarbonate 650 milliGRAM(s) Oral every 12 hours  vasopressin Infusion 0.04 Unit(s)/Min (6 mL/Hr) IV Continuous <Continuous>    MEDICATIONS  (PRN):  albuterol/ipratropium for Nebulization 3 milliLiter(s) Nebulizer every 6 hours PRN Shortness of Breath and/or Wheezing  heparin   Injectable 5500 Unit(s) IV Push every 6 hours PRN For aPTT less than 40  heparin   Injectable 2500 Unit(s) IV Push every 6 hours PRN For aPTT between 40 - 57  ondansetron Injectable 4 milliGRAM(s) IV Push every 8 hours PRN Nausea and/or Vomiting  senna 2 Tablet(s) Oral at bedtime PRN Constipation  sodium chloride 0.9% lock flush 10 milliLiter(s) IV Push every 1 hour PRN Pre/post blood products, medications, blood draw, and to maintain line patency      Allergies    No Known Allergies    Intolerances    DOPamine (Hypotension)          Vital Signs Last 24 Hrs  T(C): 36.7 (05 Jun 2024 12:00), Max: 36.7 (05 Jun 2024 12:00)  T(F): 98.1 (05 Jun 2024 12:00), Max: 98.1 (05 Jun 2024 12:00)  HR: 74 (05 Jun 2024 12:00) (60 - 79)  BP: 134/62 (05 Jun 2024 06:45) (89/50 - 134/62)  BP(mean): 85 (05 Jun 2024 06:45) (64 - 87)  RR: 16 (05 Jun 2024 12:00) (16 - 22)  SpO2: 100% (05 Jun 2024 12:00) (97% - 100%)    Parameters below as of 05 Jun 2024 12:00      O2 Concentration (%): 40    PHYSICAL EXAM:      LABS:                        11.2   19.40 )-----------( 348      ( 05 Jun 2024 05:40 )             32.4     06-05    130<L>  |  89<L>  |  87.6<H>  ----------------------------<  181<H>  3.3<L>   |  19.0<L>  |  5.30<H>    Ca    8.1<L>      05 Jun 2024 08:16  Phos  5.4     06-05  Mg     2.3     06-05    TPro  6.1<L>  /  Alb  3.0<L>  /  TBili  0.8  /  DBili  x   /  AST  93<H>  /  ALT  89<H>  /  AlkPhos  129<H>  06-05    PTT - ( 05 Jun 2024 05:40 )  PTT:30.6 sec  Urinalysis Basic - ( 05 Jun 2024 08:16 )    Color: x / Appearance: x / SG: x / pH: x  Gluc: 181 mg/dL / Ketone: x  / Bili: x / Urobili: x   Blood: x / Protein: x / Nitrite: x   Leuk Esterase: x / RBC: x / WBC x   Sq Epi: x / Non Sq Epi: x / Bacteria: x      Magnesium: 2.3 mg/dL (06-05 @ 08:16)  Phosphorus: 5.4 mg/dL (06-05 @ 08:16)  Magnesium: 2.4 mg/dL (06-05 @ 05:40)  Phosphorus: 6.1 mg/dL (06-05 @ 05:40)      RADIOLOGY & ADDITIONAL TESTS:

## 2024-06-05 NOTE — PROGRESS NOTE ADULT - PROBLEM SELECTOR PLAN 1
- Initially weaned off levo 5/29, milrinone 5/31 post VT arrest x2  - Lactate/LFT improved however renal function worsening over past 3 days  - RHC 6/4 revealed significantly elevated filling pressures and low CO for which low dose Milrinone was resumed and bumex gtt.  - PEA arrest overnight. Device interrogation without arrhythmias.   - Lactate cleared, LFTs mildly elevated  - Renal failure/oliguria - will need to consider CVVHD  - Inotropes/tMCS: Switch milrinone to  5 mcg/kg/min. Consideration of IABP vs Impella as a potential bridge to recovery. The patient is not a candidate for advanced therapies. Will first obtain head CT, vascular studies and repeat TTE to assess RV function.  - Continue to monitor perfusion markers q4hs (lactate, LFTs).  - Maintain K+ >4, Mg >2  - Document strict I&Os

## 2024-06-05 NOTE — PROCEDURE NOTE - ADDITIONAL PROCEDURE DETAILS
Battery: 6.2 years remaining  No recent tachycardic events recorded or treated. No evidence of VT or VF during PEA  OptiVol 2.0 fluid consistently elevated since last interrogation with slight rise.    No AF events since last interrogation.

## 2024-06-05 NOTE — CONSULT NOTE ADULT - CONSULT REQUESTED DATE/TIME
23-May-2024 18:01
24-May-2024 10:09
23-May-2024 15:08
25-May-2024 10:00
28-May-2024 12:12
05-Jun-2024 09:39
25-May-2024 13:06
05-Jun-2024 15:00
05-Jun-2024 02:30

## 2024-06-05 NOTE — PROGRESS NOTE ADULT - ASSESSMENT
Patient is a 78 year old male with history of HTN, HLD, DM, CKD, CAD s/p 4V CABG and multiple PCI's, ischemic cardiomyopathy (EF < 20%), and LBBB s/p MDT CRT-D. He had an admission in November for ADHF. At that time a LHC revealed his grafts were occluded except for the LIMA to LAD. He also underwent a CRT-D implant.     On 5/25 he had a VT arrest with ROSC achieved after 3 minutes was sent to the cath lab which showed his LIMA is still patent. Patient was extubated on 5/27. RHC on 5/28 showed severely elevated biv filling pressures, severe Cpc-PH, and low CO. He was started on low dose milrinone and diuresed well on IV Bumex. Weaned off pressors 5/29 and Milrinone was weaned off 5/31.    Renal function worsening (sCr 4.65) therefore RHC performed 6/4 which revealed significantly elevated filling pressures w/ low CI. Milrinone was resumed at 0.25 mcg/kg/min as well as Bumex gtt.    6/5: PEA arrest overnight, intubated/sedated in MICU on levo/vaso/milrinone gtts.     Bedside Hemodynamics:  5/31 (mil 0.125): CVP 9-10, PA 72/24/43, PA sat 70.7%, Fauzia CO/CI 4.6/2.6, /56 (73), SVR 1096 dsc.    Cardiac Studies:  6/4/24 RHC: RA 29/26/24, RV 83/12/23, PA 81/31/47 PCWP 32, PA sat 42.4, Fauzia CO/CI 2.75/1.8.    5/28/24 RHC: RA 18, RV 92/18, PA 95/38/57, PCW 31, PA sat 51.5%, Fauzia CO/CI 3.09/1.74, TPG 26, DPG 7, SVR 1889 dsc, PVR 8.41 MEYERS, Vidhi 3.2, /63 (91) on levo 0.08.    5/25/24 LHC: All grafts occluded except LIMA-LAD. LVEDP 29 mmHg.    5/23/24 TTE: LVEF <20%, LVIDd 5.8cm, mild RVE with mild RV dysfunction, TAPSE 0.7cm, mild-mod AS, mild-mod MR, mild TR, mild MI, est PASP 75 mmHg.

## 2024-06-05 NOTE — PROCEDURE NOTE - INTERROGATION NOTE: UNDERLYING RHYTHM
Updatert message sent:   CBC with differential is within normal limits.  Thyroid studies are within normal limits, including antibody levels.  Comprehensive metabolic panel is within normal limits.  Urticaria induced basophil activation is within normal limits.  - No changes in the previously discussed treatment plan to treat hives.
SR
SR

## 2024-06-05 NOTE — PROGRESS NOTE ADULT - ASSESSMENT
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 There is   new right apical opacity which may be related to slightly different   technique.  - continue zosyn    CV  - 5/25 LHC  with 3/4 occluded grafts, only LIMA/LAD patent, no acute intervention  - 6/4 RHC: PCWP 30, elevated LVEDP, low cardiac output  - Continue diuresis with bumex infusion, closely monitor  - Previously on milrinone, changed to dobutamine  - on levo and vaso - wean as tolerated, keep MAP >65  - Continue Mexiletine and Amiodarone   - STAT ECHO, US duplex arterial b/l upper and lower extremities to assess patency for possible intervention     RENAL  - Oliguric; trend BUN/Cr  - Trend electrolytes, replete as necessary, goal Mg>2, K>4, Phos>3  - strict I/Os    GI  NPO       ENDO  - insulin ss  - maintain euglycemia  - continue synthroid    ID  - lower lung infiltrates on chest x-ray 6/5, continue zosyn empirically    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 There is   new right apical opacity which may be related to slightly different   technique.  - continue zosyn    CV  - 5/25 LHC  with 3/4 occluded grafts, only LIMA/LAD patent, no acute intervention  - 6/4 RHC: PCWP 30, elevated LVEDP, low cardiac output  - Continue diuresis with bumex infusion, closely monitor  - Previously on milrinone, changed to dobutamine  - on levo and vaso - wean as tolerated, keep MAP >65  - Continue Mexiletine and Amiodarone   - EP interrogated ICD - no evidence of VT/VF during PEA arrest.   - STAT ECHO, US duplex arterial b/l upper and lower extremities to assess patency for possible intervention     RENAL  - Oliguric; trend BUN/Cr  - Trend electrolytes, replete as necessary, goal Mg>2, K>4, Phos>3  - strict I/Os    GI  NPO       ENDO  - insulin ss  - maintain euglycemia  - continue synthroid    ID  - lower lung infiltrates on chest x-ray 6/5, continue zosyn empirically

## 2024-06-05 NOTE — CHART NOTE - NSCHARTNOTEFT_GEN_A_CORE
Now s/p IABP insertion via RFA with Dr. Reid, tolerated procedure well. Pt arrived to recovery in NAD and HDS, access site stable, no bleed/hematoma, distal pulse +,   Intraprocedurally: Patient on IV propofol as pre-procedure  Findings: R fem IABP inserted under fluoro  Post-IABP insertion EKG: pending    Plan:  -Formal report pending  -Post procedure management/monitoring per protocol  -Access site precautions  -Pulse checks Q 15 min x 4, Q 30 min x 2, Q 1 hr x 4 then per ICU team  -Heparin infusion to remain on post-procedure  -Bedrest while IABP in place  -Repeat ECG if any clinical indication or change on tele  -Continue current medical therapy  -Dual anti platelet therapy with aspirin/plavix **  -Unable to tolerate BB therapy secondary to inotropic and vasopressor support  -Cont statin therapy with Lipitor 80mg po qHS **  -Educated regarding strict adherence with DAPT   -Educated regarding post procedure management and care  -Discussed the importance of RF modification  -Care per MICU  -DISPO: Return to MICU

## 2024-06-05 NOTE — PROGRESS NOTE ADULT - NS ATTEND AMEND GEN_ALL_CORE FT
Patient had a cardiac arrest overnight  (PEA). His total downtime was 8 minutes. This morning he is on high dose pressors and . Although his LFTs are stable and he has a normal lactate, the patient has an elevated creatinine and is anuric. We had a family meeting with the patient and his family regarding his overall prognosis. We explained that we can upgrade him to temporary MCS with hope of recovery. In his current state he is not an advanced therapies candidate. The family understood and wanted to trial MCS

## 2024-06-05 NOTE — PROCEDURE NOTE - GENERAL PROCEDURE DETAILS
PA Catheter inserted through RIJ 9Fr Cordis, balloon inflated @ 20cm depth, catheter slowly advanced while monitoring waveform, PCWP 30, balloon deflated and catheter retracted approx 2 cm, covered with sheath and secured. Final depth 55cm.

## 2024-06-05 NOTE — PROGRESS NOTE ADULT - SUBJECTIVE AND OBJECTIVE BOX
BRIEF HOSPITAL COURSE:   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 Recently had Holter monitor placement for PVC burden which was noted to have worsened the day of presentation. In ED, patient weak appearing, ruled in for NSTEMI with positive troponin, and labs remarkable for RAS on CKD as well as hypoglycemia. A TTE revealed acute on chronic systolic heart failure with EF <20%. He was admitted to telemetry for treatment of ACS on DAPT and heparin gtt, planned for LHC. Patient suffered VF/VT cardiac arrest on 5/25 without ICD shock (subsequently reprogrammed by EP) and was taken for emergent LHC where he was found to have 3/4 occluded grafts, only LIMA to LAD was patent. No target for intervention. Extubated on 5/26, however suffered another VT cardiac arrest later that day (again without ICD shock) with 1 minute downtime prior to ROSC. Hospital course further complicated by cardiogenic shock requiring IV vasopressor and inotrope support. Extubated on 5/27. Progressive RAS over the past few days.  Patient was CODE BLUE overnight, PEA arrest, tele demonstrating paced rhythm at 60bpm just prior to CPR.ROSC achieved after 8 minutes of downtime.  Of note, underwent RHC which revealed severely elevated filling pressures and low cardiac output- subsequently started on milrinone and bumetanide infusions.      INTERVAL/24H EVENTS:         PAST MEDICAL & SURGICAL HISTORY:  GERD (gastroesophageal reflux disease)  High cholesterol    Coronary artery disease involving coronary bypass graft of native heart with unstable angina pectoris    Coronary artery disease involving native coronary artery of native heart, angina presence unspecifie    Type 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, without long-term current use of insul    Essential hypertension    HFrEF (heart failure with reduced ejection fraction)    Stage 4 chronic kidney disease    LBBB (left bundle branch block)    Facial nerve palsy    Hypothyroidism    S/P CABG (coronary artery bypass graft)  4V 1983 [place]    Status post open reduction with internal fixation of fracture    History of insertion of stent into coronary artery bypass graft    History of brachytherapy      Review of Systems:                       All other ROS are negative.    Allergies    No Known Allergies    Intolerances    DOPamine (Hypotension)    Weight (kg): 69.8 (06-05-24 @ 03:30)  BMI (kg/m2): 23.4 (06-05-24 @ 03:30)    ICU Vital Signs Last 24 Hrs  T(C): 35.6 (05 Jun 2024 08:00), Max: 36.8 (04 Jun 2024 12:00)  T(F): 96.1 (05 Jun 2024 08:00), Max: 98.2 (04 Jun 2024 12:00)  HR: 70 (05 Jun 2024 10:30) (60 - 76)  BP: 134/62 (05 Jun 2024 06:45) (89/50 - 134/62)  BP(mean): 85 (05 Jun 2024 06:45) (64 - 87)  ABP: 104/48 (05 Jun 2024 10:30) (97/49 - 129/54)  ABP(mean): 64 (05 Jun 2024 10:30) (64 - 77)  RR: 22 (05 Jun 2024 08:00) (16 - 22)  SpO2: 100% (05 Jun 2024 10:30) (95% - 100%)    O2 Parameters below as of 05 Jun 2024 10:30      O2 Concentration (%): 40      Physical Examination:    General:     HEENT:     PULM:     CVS:     ABD:     EXT:     SKIN:     Neuro:    ABG - ( 05 Jun 2024 05:58 )  pH, Arterial: 7.470 pH, Blood: x     /  pCO2: 28    /  pO2: 99    / HCO3: 20    / Base Excess: -3.3  /  SaO2: 97.7              Mode: AC/ CMV (Assist Control/ Continuous Mandatory Ventilation)  RR (machine): 22  TV (machine): 500  FiO2: 50  PEEP: 6  MAP: 14  PIP: 34    Mode: AC/ CMV (Assist Control/ Continuous Mandatory Ventilation), RR (machine): 22, TV (machine): 500, FiO2: 50, PEEP: 6, MAP: 14, PIP: 34  LABS:                        11.2   19.40 )-----------( 348      ( 05 Jun 2024 05:40 )             32.4     06-05    130<L>  |  89<L>  |  87.6<H>  ----------------------------<  181<H>  3.3<L>   |  19.0<L>  |  5.30<H>    Ca    8.1<L>      05 Jun 2024 08:16  Phos  5.4     06-05  Mg     2.3     06-05    TPro  6.1<L>  /  Alb  3.0<L>  /  TBili  0.8  /  DBili  x   /  AST  93<H>  /  ALT  89<H>  /  AlkPhos  129<H>  06-05          CAPILLARY BLOOD GLUCOSE      POCT Blood Glucose.: 134 mg/dL (05 Jun 2024 02:43)  POCT Blood Glucose.: 124 mg/dL (04 Jun 2024 22:26)  POCT Blood Glucose.: 190 mg/dL (04 Jun 2024 19:30)  POCT Blood Glucose.: 130 mg/dL (04 Jun 2024 15:36)  POCT Blood Glucose.: 115 mg/dL (04 Jun 2024 11:33)    PTT - ( 05 Jun 2024 05:40 )  PTT:30.6 sec  Urinalysis Basic - ( 05 Jun 2024 08:16 )    Color: x / Appearance: x / SG: x / pH: x  Gluc: 181 mg/dL / Ketone: x  / Bili: x / Urobili: x   Blood: x / Protein: x / Nitrite: x   Leuk Esterase: x / RBC: x / WBC x   Sq Epi: x / Non Sq Epi: x / Bacteria: x      CULTURES:      Medications:  MEDICATIONS  (STANDING):  aMIOdarone    Tablet 200 milliGRAM(s) Oral daily  aspirin  chewable 81 milliGRAM(s) Oral daily  atorvastatin 80 milliGRAM(s) Oral at bedtime  buMETAnide Infusion 2 mG/Hr (10 mL/Hr) IV Continuous <Continuous>  chlorhexidine 2% Cloths 1 Application(s) Topical <User Schedule>  clopidogrel Tablet 75 milliGRAM(s) Oral daily  dextrose 50% Injectable 25 Gram(s) IV Push once  fentaNYL   Infusion 2 MICROgram(s)/kG/Hr (14 mL/Hr) IV Continuous <Continuous>  heparin  Infusion. 800 Unit(s)/Hr (8 mL/Hr) IV Continuous <Continuous>  insulin glargine Injectable (LANTUS) 9 Unit(s) SubCutaneous at bedtime  insulin lispro (ADMELOG) corrective regimen sliding scale   SubCutaneous every 6 hours  insulin lispro Injectable (ADMELOG) 3 Unit(s) SubCutaneous every 8 hours  levothyroxine Injectable 44 MICROGram(s) IV Push at bedtime  mexiletine 150 milliGRAM(s) Oral every 8 hours  milrinone Infusion 0.25 MICROgram(s)/kG/Min (4.91 mL/Hr) IV Continuous <Continuous>  montelukast 10 milliGRAM(s) Oral daily  norepinephrine Infusion 0.05 MICROgram(s)/kG/Min (3.27 mL/Hr) IV Continuous <Continuous>  pantoprazole  Injectable 40 milliGRAM(s) IV Push daily  piperacillin/tazobactam IVPB.- 3.375 Gram(s) IV Intermittent once  potassium chloride  20 mEq/100 mL IVPB 20 milliEquivalent(s) IV Intermittent every 2 hours  propofol Infusion 20 MICROgram(s)/kG/Min (7.86 mL/Hr) IV Continuous <Continuous>  sodium bicarbonate 650 milliGRAM(s) Oral every 12 hours    MEDICATIONS  (PRN):  albuterol/ipratropium for Nebulization 3 milliLiter(s) Nebulizer every 6 hours PRN Shortness of Breath and/or Wheezing  heparin   Injectable 5500 Unit(s) IV Push every 6 hours PRN For aPTT less than 40  heparin   Injectable 2500 Unit(s) IV Push every 6 hours PRN For aPTT between 40 - 57  ondansetron Injectable 4 milliGRAM(s) IV Push every 8 hours PRN Nausea and/or Vomiting  senna 2 Tablet(s) Oral at bedtime PRN Constipation  sodium chloride 0.9% lock flush 10 milliLiter(s) IV Push every 1 hour PRN Pre/post blood products, medications, blood draw, and to maintain line patency        06-04 @ 07:01 - 06-05 @ 07:00  --------------------------------------------------------  IN: 859.9 mL / OUT: 305 mL / NET: 554.9 mL    06-05 @ 07:01  - 06-05 @ 10:35  --------------------------------------------------------  IN: 336.1 mL / OUT: 105 mL / NET: 231.1 mL        RADIOLOGY/IMAGING/ECHO    Critical care point of care ultrasound:    Assessment/Plan:     BRIEF HOSPITAL COURSE:   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 Recently had Holter monitor placement for PVC burden which was noted to have worsened the day of presentation. In ED, patient weak appearing, ruled in for NSTEMI with positive troponin, and labs remarkable for RAS on CKD as well as hypoglycemia. A TTE revealed acute on chronic systolic heart failure with EF <20%. He was admitted to telemetry for treatment of ACS on DAPT and heparin gtt, planned for LHC. Patient suffered VF/VT cardiac arrest on 5/25 without ICD shock (subsequently reprogrammed by EP) and was taken for emergent LHC where he was found to have 3/4 occluded grafts, only LIMA to LAD was patent. No target for intervention. Extubated on 5/26, however suffered another VT cardiac arrest later that day (again without ICD shock) with 1 minute downtime prior to ROSC. Hospital course further complicated by cardiogenic shock requiring IV vasopressor and inotrope support. Extubated on 5/27. Progressive RAS over the past few days.  Patient was CODE BLUE overnight, PEA arrest, tele demonstrating paced rhythm at 60bpm just prior to CPR.ROSC achieved after 8 minutes of downtime.  Of note, underwent RHC which revealed severely elevated filling pressures and low cardiac output- subsequently started on milrinone and bumetanide infusions.      INTERVAL/24H EVENTS: Evaluated at bedside at AM rounds. PA catheter and A-line placed overnight. Remains vented. urine output of ~70 after bumex 2mg IVP. Increased Bumex infusion from 1mg/hr to 2mg/hr. Patient became hypotensive, levo increased, added vaso, bumex reduced to 1mg/hr. Milrinone switched to dobutamine. Fentanyl d/c'd. Propofol weaned down.  Patient is paced and capturing on monitor.  EP interrogated ICD - no evidence of VT/VF during PEA arrest. Discussed GOC with HCP (Son) who wants everything done. CT head, ECHO, US arterial b/l UE and LE ordered for possible CRRT.        PAST MEDICAL & SURGICAL HISTORY:  GERD (gastroesophageal reflux disease)  High cholesterol    Coronary artery disease involving coronary bypass graft of native heart with unstable angina pectoris    Coronary artery disease involving native coronary artery of native heart, angina presence unspecifie    Type 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, without long-term current use of insul    Essential hypertension    HFrEF (heart failure with reduced ejection fraction)    Stage 4 chronic kidney disease    LBBB (left bundle branch block)    Facial nerve palsy    Hypothyroidism    S/P CABG (coronary artery bypass graft)  4V 1983 [place]    Status post open reduction with internal fixation of fracture    History of insertion of stent into coronary artery bypass graft    History of brachytherapy      Review of Systems:                       All other ROS are negative.    Allergies    No Known Allergies    Intolerances    DOPamine (Hypotension)    Weight (kg): 69.8 (06-05-24 @ 03:30)  BMI (kg/m2): 23.4 (06-05-24 @ 03:30)    ICU Vital Signs Last 24 Hrs  T(C): 35.6 (05 Jun 2024 08:00), Max: 36.8 (04 Jun 2024 12:00)  T(F): 96.1 (05 Jun 2024 08:00), Max: 98.2 (04 Jun 2024 12:00)  HR: 70 (05 Jun 2024 10:30) (60 - 76)  BP: 134/62 (05 Jun 2024 06:45) (89/50 - 134/62)  BP(mean): 85 (05 Jun 2024 06:45) (64 - 87)  ABP: 104/48 (05 Jun 2024 10:30) (97/49 - 129/54)  ABP(mean): 64 (05 Jun 2024 10:30) (64 - 77)  RR: 22 (05 Jun 2024 08:00) (16 - 22)  SpO2: 100% (05 Jun 2024 10:30) (95% - 100%)    O2 Parameters below as of 05 Jun 2024 10:30      O2 Concentration (%): 40        Physical Examination:    General: Intubated, no purposeful movements  HEENT: Pupils equal, reactive to light.  Symmetric.  PULM: Clear to auscultation bilaterally, no significant sputum production  CVS: RRR  - paced  ABD: Soft, nondistended, nontender  EXT: No edema, nontender  SKIN: Warm and well perfused  NEURO: unresponsive, no purposeful movements          ABG - ( 05 Jun 2024 05:58 )  pH, Arterial: 7.470 pH, Blood: x     /  pCO2: 28    /  pO2: 99    / HCO3: 20    / Base Excess: -3.3  /  SaO2: 97.7              Mode: AC/ CMV (Assist Control/ Continuous Mandatory Ventilation)  RR (machine): 22  TV (machine): 500  FiO2: 50  PEEP: 6  MAP: 14  PIP: 34    Mode: AC/ CMV (Assist Control/ Continuous Mandatory Ventilation), RR (machine): 22, TV (machine): 500, FiO2: 50, PEEP: 6, MAP: 14, PIP: 34  LABS:                        11.2   19.40 )-----------( 348      ( 05 Jun 2024 05:40 )             32.4     06-05    130<L>  |  89<L>  |  87.6<H>  ----------------------------<  181<H>  3.3<L>   |  19.0<L>  |  5.30<H>    Ca    8.1<L>      05 Jun 2024 08:16  Phos  5.4     06-05  Mg     2.3     06-05    TPro  6.1<L>  /  Alb  3.0<L>  /  TBili  0.8  /  DBili  x   /  AST  93<H>  /  ALT  89<H>  /  AlkPhos  129<H>  06-05          CAPILLARY BLOOD GLUCOSE      POCT Blood Glucose.: 134 mg/dL (05 Jun 2024 02:43)  POCT Blood Glucose.: 124 mg/dL (04 Jun 2024 22:26)  POCT Blood Glucose.: 190 mg/dL (04 Jun 2024 19:30)  POCT Blood Glucose.: 130 mg/dL (04 Jun 2024 15:36)  POCT Blood Glucose.: 115 mg/dL (04 Jun 2024 11:33)    PTT - ( 05 Jun 2024 05:40 )  PTT:30.6 sec  Urinalysis Basic - ( 05 Jun 2024 08:16 )    Color: x / Appearance: x / SG: x / pH: x  Gluc: 181 mg/dL / Ketone: x  / Bili: x / Urobili: x   Blood: x / Protein: x / Nitrite: x   Leuk Esterase: x / RBC: x / WBC x   Sq Epi: x / Non Sq Epi: x / Bacteria: x      CULTURES:      Medications:  MEDICATIONS  (STANDING):  aMIOdarone    Tablet 200 milliGRAM(s) Oral daily  aspirin  chewable 81 milliGRAM(s) Oral daily  atorvastatin 80 milliGRAM(s) Oral at bedtime  buMETAnide Infusion 2 mG/Hr (10 mL/Hr) IV Continuous <Continuous>  chlorhexidine 2% Cloths 1 Application(s) Topical <User Schedule>  clopidogrel Tablet 75 milliGRAM(s) Oral daily  dextrose 50% Injectable 25 Gram(s) IV Push once  fentaNYL   Infusion 2 MICROgram(s)/kG/Hr (14 mL/Hr) IV Continuous <Continuous>  heparin  Infusion. 800 Unit(s)/Hr (8 mL/Hr) IV Continuous <Continuous>  insulin glargine Injectable (LANTUS) 9 Unit(s) SubCutaneous at bedtime  insulin lispro (ADMELOG) corrective regimen sliding scale   SubCutaneous every 6 hours  insulin lispro Injectable (ADMELOG) 3 Unit(s) SubCutaneous every 8 hours  levothyroxine Injectable 44 MICROGram(s) IV Push at bedtime  mexiletine 150 milliGRAM(s) Oral every 8 hours  milrinone Infusion 0.25 MICROgram(s)/kG/Min (4.91 mL/Hr) IV Continuous <Continuous>  montelukast 10 milliGRAM(s) Oral daily  norepinephrine Infusion 0.05 MICROgram(s)/kG/Min (3.27 mL/Hr) IV Continuous <Continuous>  pantoprazole  Injectable 40 milliGRAM(s) IV Push daily  piperacillin/tazobactam IVPB.- 3.375 Gram(s) IV Intermittent once  potassium chloride  20 mEq/100 mL IVPB 20 milliEquivalent(s) IV Intermittent every 2 hours  propofol Infusion 20 MICROgram(s)/kG/Min (7.86 mL/Hr) IV Continuous <Continuous>  sodium bicarbonate 650 milliGRAM(s) Oral every 12 hours    MEDICATIONS  (PRN):  albuterol/ipratropium for Nebulization 3 milliLiter(s) Nebulizer every 6 hours PRN Shortness of Breath and/or Wheezing  heparin   Injectable 5500 Unit(s) IV Push every 6 hours PRN For aPTT less than 40  heparin   Injectable 2500 Unit(s) IV Push every 6 hours PRN For aPTT between 40 - 57  ondansetron Injectable 4 milliGRAM(s) IV Push every 8 hours PRN Nausea and/or Vomiting  senna 2 Tablet(s) Oral at bedtime PRN Constipation  sodium chloride 0.9% lock flush 10 milliLiter(s) IV Push every 1 hour PRN Pre/post blood products, medications, blood draw, and to maintain line patency        06-04 @ 07:01  -  06-05 @ 07:00  --------------------------------------------------------  IN: 859.9 mL / OUT: 305 mL / NET: 554.9 mL    06-05 @ 07:01  -  06-05 @ 10:35  --------------------------------------------------------  IN: 336.1 mL / OUT: 105 mL / NET: 231.1 mL        RADIOLOGY/IMAGING/ECHO  < from: Xray Chest 1 View- PORTABLE-Routine (Xray Chest 1 View- PORTABLE-Routine in AM.) (06.02.24 @ 04:16) >      INTERPRETATION:  History: Follow-up pleural effusions.    Technique: Single frontal view of the chest    Comparison: 5/30/2024    Findings:    Left-sided pacer AICD is unchanged in configuration. Right IJ [place] has   been removed.    Lungs: Right greater than left pleural effusions appear stable. There is   new right apical opacity which may be related to slightly different   technique.    Heart/Mediastinum: Cardiomegaly with coronary stent, stable in appearance.    Osseous structures: Sternotomy wires.    Impression:    Stable bilateral pleural effusions.    --- End of Report ---      < end of copied text >  < from: TTE Limited W or WO Ultrasound Enhancing Agent (05.23.24 @ 19:54) >  TRANSTHORACIC ECHOCARDIOGRAM REPORT  ________________________________________________________________________________                                      _______       Pt. Name:       ADRIANNA SHANKS Study Date:    5/23/2024  MRN:            [id number]         YOB: 1945  Accession #:    [id number]          Age:           78 years  Account#:       [id number]         Gender:        M  Visit ID#  Heart Rate:                        Height:        67.00 in (170.18 cm)  Rhythm:                     Weight:        144.00 lb (65.32 kg)  Blood Pressure: 104/73 mmHg        BSA/BMI:       1.76 m² / 22.55 kg/m²  ________________________________________________________________________________________  Referring Physician:    [id number] Miguel Espana  Interpreting Physician: Ranjit Comlenares MD  Primary Sonographer:    Pauline Chairez    CPT:                LIMITED SPECTRAL - 52444.m;DOPPL ECHO COLOR FLOW -                      97438.m;ECHO TTE W CON FU LTD - .m;DEFINITY ECHO                      CONTRAST PER ML - .m  Indication(s):      Chest pain, unspecified - R07.9  Procedure:          Limited transthoracic echocardiogram. Color Doppler                      performed. Spectral Doppler performed.  Ordering Location:  [facility]  Admission Status:   ED  Contrast Injection: Verbal consent was obtained for injection of Ultrasonic                      Enhancing Agent following a discussion of risks and                      benefits.                      Endocardial visualization enhanced with 2 ml of Definity                      Ultrasound enhancing agent (Lot#:[id number] Exp.Date:05/2025).  UEA Reaction:       Patient had no adverse reaction after injection of                      Ultrasound Enhancing Agent.    _______________________________________________________________________________________     CONCLUSIONS:      1. Left ventricular systolic function is severely decreased with an ejection fraction visually estimated at <20 %. Global left ventricular hypokinesis.   2. Elevated filling pressure.   3. Mildly reduced right ventricular systolic function.   4. Mild to moderate mitral regurgitation.   5. Mild to moderate aortic stenosis.   6. Mild pulmonic regurgitation.   7. Mild tricuspid regurgitation.   8. Estimated pulmonary artery systolic pressure is 75 mmHg, consistent with elevated pulmonary artery pressure.   9. No prior echocardiogram is available for comparison.    ________________________________________________________________________________________  FINDINGS:     Left Ventricle:  The left ventricular cavity is normal in size. Left ventricular systolic function is severely decreased with an ejection fraction visually estimated at <20%. There is global left ventricular hypokinesis. Elevated filling pressure. Analysis of left ventricular diastolic function and filling pressure is made challenging by the presence of atrial fibrillation. No left ventricular hypertrophy. There is normal LV mass and normal geometry.     Right Ventricle:  The right ventricular cavity is mildly enlarged in size and mildly reduced systolic function. Tricuspid annular plane systolic excursion (TAPSE) is 0.7 cm (normal >=1.7 cm). Tricuspid annular tissue Doppler S' is 5.0 cm/s (normal >10 cm/s). A device lead is visualized in the right heart.     Left Atrium:  The left atrium is normal in size.     Right Atrium:  The right atrium is normal in size with an indexed volume of 13.76 ml/m² and an indexed area of 6.20 cm²/m².     Interatrial Septum:  The interatrial septum appears intact.     Aortic Valve:  The aortic valve appears trileaflet. There is mild to moderate aortic stenosis. The peak transaortic velocity is 1.54 m/s, peak transaortic gradient is 9.5 mmHg and mean transaortic gradient is 5.6 mmHg with an LVOT/aortic valve VTI ratio of 0.51. The aortic valve acceleration time is 65 msec. The aortic valve area is estimated at 1.46 cm² by the continuity equation. There is trace aortic regurgitation.     Mitral Valve:  Structurally normal mitral valve with normal leaflet excursion. There is mild to moderate mitral regurgitation.     Tricuspid Valve:  Structurally normal tricuspid valve with normal leaflet excursion. There is mild tricuspid regurgitation. Estimated pulmonary artery systolic pressure is 75 mmHg, consistent with elevated pulmonary artery pressure.     Pulmonic Valve:  Structurally normal pulmonic valve with normal leaflet excursion. There is mild pulmonic regurgitation.     Aorta:  The aortic root at the sinuses of Valsalva is normal in size, measuring 2.60 cm (indexed 1.48 cm/m²). The ascending aorta diameter is normal in size, measuring 2.90 cm (indexed 1.65 cm/m²).     Pericardium:  No pericardial effusion seen.     Systemic Veins:  The inferior vena cava is normal in size (normal <2.1cm) with abnormal inspiratory collapse (abnormal <50%) consistent with mildly elevated right atrial pressure (~8, range 5-10mmHg).  ____________________________________________________________________  QUANTITATIVE DATA:  LeftVentricle Measurements: (Indexed to BSA)     IVSd (2D):   0.7 cm  LVPWd (2D):  0.8 cm  LVIDd (2D):  5.8 cm  LVIDs (2D):  5.2 cm  LV Mass:     171 g  97.4 g/m²  Visualized LV EF%: <20%     MV E Vmax:    1.43 m/s  e' lateral:   3.00 cm/s  e' medial:3.00 cm/s  E/e' lateral: 47.67  E/e' medial:  47.67  E/e' Average: 47.67    Aorta Measurements: (Normal range) (Indexed to BSA)     Sinuses of Valsalva: 2.60 cm (3.1 - 3.7 cm)  Ao Asc prox:         2.90 cm       Left Atrium Measurements: (Indexed to BSA)  LA Diam 2D:        4.50 cm  LA Vol s, MOD A4C: 85.60 ml.  LA Vol s, MOD A2C: 60.50 ml.    Right Ventricle Measurements: Right Atrial Measurements:     TAPSE:           0.7 cm       RA Vol:       24.20 ml  RV Base (RVID1): 4.3 cm       RA Vol Index: 13.76 ml/m²  RV Mid (RVID2):  3.8 cm       LVOT / RVOT/ Qp/Qs Data: (Indexed to BSA)  LVOT Diameter:  1.90 cm  LVOT Area:      2.84 cm²  LVOT Vmax:      0.85 m/s  LVOT Vmn:       0.557 m/s  LVOT VTI:       13.93 cm  LVOT peak grad: 3 mmHg  LVOT SV:        39.5 ml    22.46 ml/m²  LVOT CO:        3.71 l/min 2.11 l/min/m²    Aortic Valve Measurements:  AV Vmax:                1.5 m/s  AV Peak Gradient:       9.5 mmHg  AV Mean Gradient:       5.6 mmHg  AV VTI:                 27.1 cm  AV VTI Ratio:           0.51  AoV EOA, Contin:        1.46 cm²  AoV EOA, Contin i:      0.83 cm²/m²  AoV Dimensionless Index 0.51    Mitral Valve Measurements:     MV E Vmax: 1.4 m/s         MR Vmax:          4.00 m/s                             MR VTI:116.00 cm                             MR Mean Gradient: 40.0 mmHg                             MR Peak Gradient: 64.0 mmHg       Tricuspid Valve Measurements:     TV S'             5.0 cm/s  TR Vmax:          4.1 m/s  TR Peak Gradient: 66.9 mmHg  RA Pressure:      8 mmHg  PASP:             75 mmHg    < end of copied text >

## 2024-06-05 NOTE — CONSULT NOTE ADULT - PROBLEM SELECTOR PROBLEM 2
Acute on chronic HFrEF (heart failure with reduced ejection fraction)
VT (ventricular tachycardia)
NSTEMI (non-ST elevation myocardial infarction)

## 2024-06-06 LAB
ALBUMIN SERPL ELPH-MCNC: 2.7 G/DL — LOW (ref 3.3–5.2)
ALBUMIN SERPL ELPH-MCNC: 2.8 G/DL — LOW (ref 3.3–5.2)
ALBUMIN SERPL ELPH-MCNC: 2.9 G/DL — LOW (ref 3.3–5.2)
ALBUMIN SERPL ELPH-MCNC: 2.9 G/DL — LOW (ref 3.3–5.2)
ALP SERPL-CCNC: 127 U/L — HIGH (ref 40–120)
ALP SERPL-CCNC: 132 U/L — HIGH (ref 40–120)
ALP SERPL-CCNC: 161 U/L — HIGH (ref 40–120)
ALP SERPL-CCNC: 178 U/L — HIGH (ref 40–120)
ALT FLD-CCNC: 67 U/L — HIGH
ALT FLD-CCNC: 70 U/L — HIGH
ALT FLD-CCNC: 73 U/L — HIGH
ALT FLD-CCNC: 77 U/L — HIGH
ANION GAP SERPL CALC-SCNC: 16 MMOL/L — SIGNIFICANT CHANGE UP (ref 5–17)
ANION GAP SERPL CALC-SCNC: 17 MMOL/L — SIGNIFICANT CHANGE UP (ref 5–17)
APTT BLD: 102.6 SEC — HIGH (ref 24.5–35.6)
APTT BLD: 106.7 SEC — HIGH (ref 24.5–35.6)
APTT BLD: 122.1 SEC — CRITICAL HIGH (ref 24.5–35.6)
APTT BLD: 86.9 SEC — HIGH (ref 24.5–35.6)
AST SERPL-CCNC: 64 U/L — HIGH
AST SERPL-CCNC: 67 U/L — HIGH
AST SERPL-CCNC: 70 U/L — HIGH
AST SERPL-CCNC: 77 U/L — HIGH
BASE EXCESS BLDV CALC-SCNC: -2.6 MMOL/L — LOW (ref -2–3)
BASE EXCESS BLDV CALC-SCNC: -3.1 MMOL/L — LOW (ref -2–3)
BASE EXCESS BLDV CALC-SCNC: -3.2 MMOL/L — LOW (ref -2–3)
BASE EXCESS BLDV CALC-SCNC: -3.8 MMOL/L — LOW (ref -2–3)
BILIRUB SERPL-MCNC: 0.6 MG/DL — SIGNIFICANT CHANGE UP (ref 0.4–2)
BILIRUB SERPL-MCNC: 0.7 MG/DL — SIGNIFICANT CHANGE UP (ref 0.4–2)
BILIRUB SERPL-MCNC: 0.9 MG/DL — SIGNIFICANT CHANGE UP (ref 0.4–2)
BILIRUB SERPL-MCNC: 1.1 MG/DL — SIGNIFICANT CHANGE UP (ref 0.4–2)
BLOOD GAS COMMENTS, VENOUS: SIGNIFICANT CHANGE UP
BUN SERPL-MCNC: 73.4 MG/DL — HIGH (ref 8–20)
BUN SERPL-MCNC: 77.3 MG/DL — HIGH (ref 8–20)
BUN SERPL-MCNC: 78.7 MG/DL — HIGH (ref 8–20)
BUN SERPL-MCNC: 84 MG/DL — HIGH (ref 8–20)
CA-I SERPL-SCNC: 1.06 MMOL/L — LOW (ref 1.15–1.33)
CA-I SERPL-SCNC: 1.07 MMOL/L — LOW (ref 1.15–1.33)
CA-I SERPL-SCNC: 1.08 MMOL/L — LOW (ref 1.15–1.33)
CA-I SERPL-SCNC: 1.11 MMOL/L — LOW (ref 1.15–1.33)
CALCIUM SERPL-MCNC: 7.9 MG/DL — LOW (ref 8.4–10.5)
CALCIUM SERPL-MCNC: 8 MG/DL — LOW (ref 8.4–10.5)
CALCIUM SERPL-MCNC: 8 MG/DL — LOW (ref 8.4–10.5)
CALCIUM SERPL-MCNC: 8.1 MG/DL — LOW (ref 8.4–10.5)
CHLORIDE BLDV-SCNC: 96 MMOL/L — SIGNIFICANT CHANGE UP (ref 96–108)
CHLORIDE BLDV-SCNC: 96 MMOL/L — SIGNIFICANT CHANGE UP (ref 96–108)
CHLORIDE BLDV-SCNC: 97 MMOL/L — SIGNIFICANT CHANGE UP (ref 96–108)
CHLORIDE BLDV-SCNC: 97 MMOL/L — SIGNIFICANT CHANGE UP (ref 96–108)
CHLORIDE SERPL-SCNC: 89 MMOL/L — LOW (ref 96–108)
CHLORIDE SERPL-SCNC: 90 MMOL/L — LOW (ref 96–108)
CHLORIDE SERPL-SCNC: 91 MMOL/L — LOW (ref 96–108)
CHLORIDE SERPL-SCNC: 91 MMOL/L — LOW (ref 96–108)
CO2 SERPL-SCNC: 21 MMOL/L — LOW (ref 22–29)
CREAT SERPL-MCNC: 3.85 MG/DL — HIGH (ref 0.5–1.3)
CREAT SERPL-MCNC: 4.3 MG/DL — HIGH (ref 0.5–1.3)
CREAT SERPL-MCNC: 4.47 MG/DL — HIGH (ref 0.5–1.3)
CREAT SERPL-MCNC: 4.71 MG/DL — HIGH (ref 0.5–1.3)
EGFR: 12 ML/MIN/1.73M2 — LOW
EGFR: 13 ML/MIN/1.73M2 — LOW
EGFR: 14 ML/MIN/1.73M2 — LOW
EGFR: 15 ML/MIN/1.73M2 — LOW
GAS PNL BLDV: 126 MMOL/L — LOW (ref 136–145)
GAS PNL BLDV: 127 MMOL/L — LOW (ref 136–145)
GAS PNL BLDV: SIGNIFICANT CHANGE UP
GLUCOSE BLDC GLUCOMTR-MCNC: 181 MG/DL — HIGH (ref 70–99)
GLUCOSE BLDC GLUCOMTR-MCNC: 188 MG/DL — HIGH (ref 70–99)
GLUCOSE BLDC GLUCOMTR-MCNC: 229 MG/DL — HIGH (ref 70–99)
GLUCOSE BLDC GLUCOMTR-MCNC: 231 MG/DL — HIGH (ref 70–99)
GLUCOSE BLDV-MCNC: 162 MG/DL — HIGH (ref 70–99)
GLUCOSE BLDV-MCNC: 177 MG/DL — HIGH (ref 70–99)
GLUCOSE BLDV-MCNC: 200 MG/DL — HIGH (ref 70–99)
GLUCOSE BLDV-MCNC: 207 MG/DL — HIGH (ref 70–99)
GLUCOSE SERPL-MCNC: 176 MG/DL — HIGH (ref 70–99)
GLUCOSE SERPL-MCNC: 185 MG/DL — HIGH (ref 70–99)
GLUCOSE SERPL-MCNC: 203 MG/DL — HIGH (ref 70–99)
GLUCOSE SERPL-MCNC: 211 MG/DL — HIGH (ref 70–99)
HCO3 BLDV-SCNC: 22 MMOL/L — SIGNIFICANT CHANGE UP (ref 22–29)
HCO3 BLDV-SCNC: 23 MMOL/L — SIGNIFICANT CHANGE UP (ref 22–29)
HCT VFR BLD CALC: 31.2 % — LOW (ref 39–50)
HCT VFR BLDA CALC: 31 % — SIGNIFICANT CHANGE UP
HCT VFR BLDA CALC: 32 % — SIGNIFICANT CHANGE UP
HGB BLD CALC-MCNC: 10.2 G/DL — LOW (ref 12.6–17.4)
HGB BLD CALC-MCNC: 10.5 G/DL — LOW (ref 12.6–17.4)
HGB BLD CALC-MCNC: 10.6 G/DL — LOW (ref 12.6–17.4)
HGB BLD CALC-MCNC: 10.7 G/DL — LOW (ref 12.6–17.4)
HGB BLD-MCNC: 10.6 G/DL — LOW (ref 13–17)
HOROWITZ INDEX BLDV+IHG-RTO: 0.4 — SIGNIFICANT CHANGE UP
LACTATE BLDV-MCNC: 0.9 MMOL/L — SIGNIFICANT CHANGE UP (ref 0.5–2)
LACTATE BLDV-MCNC: 1.1 MMOL/L — SIGNIFICANT CHANGE UP (ref 0.5–2)
LACTATE BLDV-MCNC: 1.3 MMOL/L — SIGNIFICANT CHANGE UP (ref 0.5–2)
LACTATE BLDV-MCNC: 1.3 MMOL/L — SIGNIFICANT CHANGE UP (ref 0.5–2)
MAGNESIUM SERPL-MCNC: 2.1 MG/DL — SIGNIFICANT CHANGE UP (ref 1.6–2.6)
MAGNESIUM SERPL-MCNC: 2.2 MG/DL — SIGNIFICANT CHANGE UP (ref 1.6–2.6)
MCHC RBC-ENTMCNC: 29.7 PG — SIGNIFICANT CHANGE UP (ref 27–34)
MCHC RBC-ENTMCNC: 34 GM/DL — SIGNIFICANT CHANGE UP (ref 32–36)
MCV RBC AUTO: 87.4 FL — SIGNIFICANT CHANGE UP (ref 80–100)
PCO2 BLDV: 37 MMHG — LOW (ref 42–55)
PCO2 BLDV: 38 MMHG — LOW (ref 42–55)
PCO2 BLDV: 42 MMHG — SIGNIFICANT CHANGE UP (ref 42–55)
PCO2 BLDV: 42 MMHG — SIGNIFICANT CHANGE UP (ref 42–55)
PH BLDV: 7.33 — SIGNIFICANT CHANGE UP (ref 7.32–7.43)
PH BLDV: 7.34 — SIGNIFICANT CHANGE UP (ref 7.32–7.43)
PH BLDV: 7.38 — SIGNIFICANT CHANGE UP (ref 7.32–7.43)
PH BLDV: 7.38 — SIGNIFICANT CHANGE UP (ref 7.32–7.43)
PHOSPHATE SERPL-MCNC: 5.2 MG/DL — HIGH (ref 2.4–4.7)
PHOSPHATE SERPL-MCNC: 5.6 MG/DL — HIGH (ref 2.4–4.7)
PHOSPHATE SERPL-MCNC: 5.7 MG/DL — HIGH (ref 2.4–4.7)
PHOSPHATE SERPL-MCNC: 5.8 MG/DL — HIGH (ref 2.4–4.7)
PLATELET # BLD AUTO: 230 K/UL — SIGNIFICANT CHANGE UP (ref 150–400)
PO2 BLDV: 119 MMHG — HIGH (ref 25–45)
PO2 BLDV: 48 MMHG — HIGH (ref 25–45)
PO2 BLDV: 49 MMHG — HIGH (ref 25–45)
PO2 BLDV: 70 MMHG — HIGH (ref 25–45)
POTASSIUM BLDV-SCNC: 3.8 MMOL/L — SIGNIFICANT CHANGE UP (ref 3.5–5.1)
POTASSIUM BLDV-SCNC: 3.9 MMOL/L — SIGNIFICANT CHANGE UP (ref 3.5–5.1)
POTASSIUM BLDV-SCNC: 3.9 MMOL/L — SIGNIFICANT CHANGE UP (ref 3.5–5.1)
POTASSIUM BLDV-SCNC: 4.1 MMOL/L — SIGNIFICANT CHANGE UP (ref 3.5–5.1)
POTASSIUM SERPL-MCNC: 3.9 MMOL/L — SIGNIFICANT CHANGE UP (ref 3.5–5.3)
POTASSIUM SERPL-MCNC: 4 MMOL/L — SIGNIFICANT CHANGE UP (ref 3.5–5.3)
POTASSIUM SERPL-MCNC: 4.1 MMOL/L — SIGNIFICANT CHANGE UP (ref 3.5–5.3)
POTASSIUM SERPL-MCNC: 4.4 MMOL/L — SIGNIFICANT CHANGE UP (ref 3.5–5.3)
POTASSIUM SERPL-SCNC: 3.9 MMOL/L — SIGNIFICANT CHANGE UP (ref 3.5–5.3)
POTASSIUM SERPL-SCNC: 4 MMOL/L — SIGNIFICANT CHANGE UP (ref 3.5–5.3)
POTASSIUM SERPL-SCNC: 4.1 MMOL/L — SIGNIFICANT CHANGE UP (ref 3.5–5.3)
POTASSIUM SERPL-SCNC: 4.4 MMOL/L — SIGNIFICANT CHANGE UP (ref 3.5–5.3)
PROT SERPL-MCNC: 5.6 G/DL — LOW (ref 6.6–8.7)
PROT SERPL-MCNC: 5.7 G/DL — LOW (ref 6.6–8.7)
PROT SERPL-MCNC: 5.8 G/DL — LOW (ref 6.6–8.7)
PROT SERPL-MCNC: 5.9 G/DL — LOW (ref 6.6–8.7)
RBC # BLD: 3.57 M/UL — LOW (ref 4.2–5.8)
RBC # FLD: 15.9 % — HIGH (ref 10.3–14.5)
SAO2 % BLDV: 75.1 % — SIGNIFICANT CHANGE UP
SAO2 % BLDV: 78.3 % — SIGNIFICANT CHANGE UP
SAO2 % BLDV: 93.3 % — SIGNIFICANT CHANGE UP
SAO2 % BLDV: 97.3 % — SIGNIFICANT CHANGE UP
SODIUM SERPL-SCNC: 126 MMOL/L — LOW (ref 135–145)
SODIUM SERPL-SCNC: 128 MMOL/L — LOW (ref 135–145)
SODIUM SERPL-SCNC: 128 MMOL/L — LOW (ref 135–145)
SODIUM SERPL-SCNC: 129 MMOL/L — LOW (ref 135–145)
WBC # BLD: 17.87 K/UL — HIGH (ref 3.8–10.5)
WBC # FLD AUTO: 17.87 K/UL — HIGH (ref 3.8–10.5)

## 2024-06-06 PROCEDURE — 71045 X-RAY EXAM CHEST 1 VIEW: CPT | Mod: 26

## 2024-06-06 PROCEDURE — 99232 SBSQ HOSP IP/OBS MODERATE 35: CPT

## 2024-06-06 PROCEDURE — 99233 SBSQ HOSP IP/OBS HIGH 50: CPT

## 2024-06-06 PROCEDURE — 99291 CRITICAL CARE FIRST HOUR: CPT | Mod: GC

## 2024-06-06 RX ORDER — CALCIUM GLUCONATE 100 MG/ML
1 VIAL (ML) INTRAVENOUS ONCE
Refills: 0 | Status: COMPLETED | OUTPATIENT
Start: 2024-06-06 | End: 2024-06-06

## 2024-06-06 RX ORDER — POTASSIUM CHLORIDE 20 MEQ
10 PACKET (EA) ORAL
Refills: 0 | Status: COMPLETED | OUTPATIENT
Start: 2024-06-06 | End: 2024-06-06

## 2024-06-06 RX ADMIN — Medication 100 MILLIEQUIVALENT(S): at 18:03

## 2024-06-06 RX ADMIN — Medication 100 GRAM(S): at 09:33

## 2024-06-06 RX ADMIN — INSULIN GLARGINE 9 UNIT(S): 100 INJECTION, SOLUTION SUBCUTANEOUS at 23:17

## 2024-06-06 RX ADMIN — Medication 1: at 11:59

## 2024-06-06 RX ADMIN — VASOPRESSIN 6 UNIT(S)/MIN: 20 INJECTION INTRAVENOUS at 15:28

## 2024-06-06 RX ADMIN — MONTELUKAST 10 MILLIGRAM(S): 4 TABLET, CHEWABLE ORAL at 11:42

## 2024-06-06 RX ADMIN — PROPOFOL 7.86 MICROGRAM(S)/KG/MIN: 10 INJECTION, EMULSION INTRAVENOUS at 04:00

## 2024-06-06 RX ADMIN — Medication 44 MICROGRAM(S): at 23:17

## 2024-06-06 RX ADMIN — HEPARIN SODIUM 500 UNIT(S)/HR: 5000 INJECTION INTRAVENOUS; SUBCUTANEOUS at 23:28

## 2024-06-06 RX ADMIN — Medication 81 MILLIGRAM(S): at 11:43

## 2024-06-06 RX ADMIN — Medication 1: at 05:39

## 2024-06-06 RX ADMIN — PROPOFOL 7.86 MICROGRAM(S)/KG/MIN: 10 INJECTION, EMULSION INTRAVENOUS at 23:08

## 2024-06-06 RX ADMIN — Medication 2: at 23:17

## 2024-06-06 RX ADMIN — CHLORHEXIDINE GLUCONATE 1 APPLICATION(S): 213 SOLUTION TOPICAL at 05:32

## 2024-06-06 RX ADMIN — Medication 3.27 MICROGRAM(S)/KG/MIN: at 17:05

## 2024-06-06 RX ADMIN — HEPARIN SODIUM 700 UNIT(S)/HR: 5000 INJECTION INTRAVENOUS; SUBCUTANEOUS at 10:06

## 2024-06-06 RX ADMIN — PIPERACILLIN AND TAZOBACTAM 25 GRAM(S): 4; .5 INJECTION, POWDER, LYOPHILIZED, FOR SOLUTION INTRAVENOUS at 11:43

## 2024-06-06 RX ADMIN — HEPARIN SODIUM 600 UNIT(S)/HR: 5000 INJECTION INTRAVENOUS; SUBCUTANEOUS at 16:24

## 2024-06-06 RX ADMIN — Medication 650 MILLIGRAM(S): at 17:05

## 2024-06-06 RX ADMIN — Medication 2: at 17:05

## 2024-06-06 RX ADMIN — Medication 100 MILLIEQUIVALENT(S): at 19:27

## 2024-06-06 RX ADMIN — Medication 10.5 MICROGRAM(S)/KG/MIN: at 10:07

## 2024-06-06 RX ADMIN — HEPARIN SODIUM 700 UNIT(S)/HR: 5000 INJECTION INTRAVENOUS; SUBCUTANEOUS at 03:18

## 2024-06-06 RX ADMIN — Medication 100 MILLIEQUIVALENT(S): at 16:54

## 2024-06-06 RX ADMIN — PIPERACILLIN AND TAZOBACTAM 25 GRAM(S): 4; .5 INJECTION, POWDER, LYOPHILIZED, FOR SOLUTION INTRAVENOUS at 23:17

## 2024-06-06 RX ADMIN — Medication 3.27 MICROGRAM(S)/KG/MIN: at 02:50

## 2024-06-06 RX ADMIN — CLOPIDOGREL BISULFATE 75 MILLIGRAM(S): 75 TABLET, FILM COATED ORAL at 11:43

## 2024-06-06 RX ADMIN — PROPOFOL 7.86 MICROGRAM(S)/KG/MIN: 10 INJECTION, EMULSION INTRAVENOUS at 10:42

## 2024-06-06 RX ADMIN — Medication 650 MILLIGRAM(S): at 05:32

## 2024-06-06 RX ADMIN — AMIODARONE HYDROCHLORIDE 200 MILLIGRAM(S): 400 TABLET ORAL at 05:32

## 2024-06-06 RX ADMIN — PANTOPRAZOLE SODIUM 40 MILLIGRAM(S): 20 TABLET, DELAYED RELEASE ORAL at 11:42

## 2024-06-06 RX ADMIN — ATORVASTATIN CALCIUM 80 MILLIGRAM(S): 80 TABLET, FILM COATED ORAL at 23:17

## 2024-06-06 NOTE — CHART NOTE - NSCHARTNOTEFT_GEN_A_CORE
Patient seen at bedside this AM. Per RN, patient had SBT this AM and followed commands. Balloon remains on 1:1, AV paced at 65. Patient remains on heparin, vaso, levo and  at 5. Last CO 3.8, CI 2.1. Means at 75. Balloon site with no hematoma, bleeding, + pedal pulses on RLE.

## 2024-06-06 NOTE — PROVIDER CONTACT NOTE (OTHER) - SITUATION
Patient has suspected stage II PI
Alerted by monitor tech that pt had 5 beats of Atrial Tach, at time of occurrence pt resting comfortably in bed, denies pain cp or sob, vital signs obtained and charted WNL
After turning and repositioning pt, blood back flowing from the introducer into the swan sheath noted.

## 2024-06-06 NOTE — PROGRESS NOTE ADULT - SUBJECTIVE AND OBJECTIVE BOX
[facility] PALLIATIVE MEDICINE     CC: FOLLOW UP VISIT + GOC    INTERVAL HPI/OVERNIGHT EVENTS:  Source if other than patient:  []Family   [x]Team     S/P IABP LAST EVENING  Remains intubated, sedated and triple vasopressor dependent  Grandson at bedside    PRESENT SYMPTOMS:     Dyspnea: vented  Nausea/Vomiting:  No  Anxiety:   No  Depression: unable to obtain  Fatigue: Yes   Loss of appetite: npo  Constipation: No    Pain: no overt sign of pain            Character-            Duration-            Effect-            Factors-            Frequency-            Location-            Severity-    Pain AD Score:  [email or web address] (press ctrl + left click to view)    Review of Systems:  Unable to obtain due to poor mentation/encephalopathy                       MEDICATIONS  (STANDING):  aMIOdarone    Tablet 200 milliGRAM(s) Oral daily  aspirin  chewable 81 milliGRAM(s) Oral daily  atorvastatin 80 milliGRAM(s) Oral at bedtime  chlorhexidine 2% Cloths 1 Application(s) Topical <User Schedule>  clopidogrel Tablet 75 milliGRAM(s) Oral daily  dextrose 50% Injectable 25 Gram(s) IV Push once  DOBUTamine Infusion 5 MICROgram(s)/kG/Min (10.5 mL/Hr) IV Continuous <Continuous>  heparin  Infusion. 800 Unit(s)/Hr (8 mL/Hr) IV Continuous <Continuous>  insulin glargine Injectable (LANTUS) 9 Unit(s) SubCutaneous at bedtime  insulin lispro (ADMELOG) corrective regimen sliding scale   SubCutaneous every 6 hours  insulin lispro Injectable (ADMELOG) 3 Unit(s) SubCutaneous every 8 hours  levothyroxine Injectable 44 MICROGram(s) IV Push at bedtime  montelukast 10 milliGRAM(s) Oral daily  norepinephrine Infusion 0.05 MICROgram(s)/kG/Min (3.27 mL/Hr) IV Continuous <Continuous>  pantoprazole  Injectable 40 milliGRAM(s) IV Push daily  piperacillin/tazobactam IVPB.. 3.375 Gram(s) IV Intermittent every 12 hours  propofol Infusion 20 MICROgram(s)/kG/Min (7.86 mL/Hr) IV Continuous <Continuous>  sodium bicarbonate 650 milliGRAM(s) Oral every 12 hours  vasopressin Infusion 0.04 Unit(s)/Min (6 mL/Hr) IV Continuous <Continuous>    MEDICATIONS  (PRN):  albuterol/ipratropium for Nebulization 3 milliLiter(s) Nebulizer every 6 hours PRN Shortness of Breath and/or Wheezing  heparin   Injectable 5500 Unit(s) IV Push every 6 hours PRN For aPTT less than 40  heparin   Injectable 2500 Unit(s) IV Push every 6 hours PRN For aPTT between 40 - 57  ondansetron Injectable 4 milliGRAM(s) IV Push every 8 hours PRN Nausea and/or Vomiting  senna 2 Tablet(s) Oral at bedtime PRN Constipation  sodium chloride 0.9% lock flush 10 milliLiter(s) IV Push every 1 hour PRN Pre/post blood products, medications, blood draw, and to maintain line patency      PHYSICAL EXAM:    Vital Signs Last 24 Hrs  T(C): 36.4 (06 Jun 2024 08:00), Max: 37.1 (05 Jun 2024 23:00)  T(F): 97.5 (06 Jun 2024 08:00), Max: 98.8 (05 Jun 2024 23:00)  HR: 65 (06 Jun 2024 11:15) (60 - 78)  BP: --  BP(mean): --  RR: 16 (06 Jun 2024 08:00) (16 - 17)  SpO2: 100% (06 Jun 2024 11:15) (96% - 100%)    Parameters below as of 06 Jun 2024 08:00  Patient On (Oxygen Delivery Method): ventilator    O2 Concentration (%): 40    Karnofsky:  20%    GEN: resting comfortably and no acute distress    HEENT: mucous membrane moist  +ETT    Lungs: comfortable, nonlabored      CV: +s1/s2 regular rate and rhythm      GI: +BS, abdomen soft, nontender, nondistended      :  oliguria    MSK:  no cyanosis or edema +bedbound     NEURO: nonfocal. sedated/ lethargic    Skin: warm and dry.     LABS: Reviewed                          10.6   17.87 )-----------( 230      ( 06 Jun 2024 02:45 )             31.2     06-06    129<L>  |  91<L>  |  78.7<H>  ----------------------------<  185<H>  4.0   |  21.0<L>  |  4.47<H>    Ca    8.0<L>      06 Jun 2024 09:30  Phos  5.8     06-06  Mg     2.1     06-06    TPro  5.6<L>  /  Alb  2.9<L>  /  TBili  0.7  /  DBili  x   /  AST  70<H>  /  ALT  73<H>  /  AlkPhos  132<H>  06-06    PTT - ( 06 Jun 2024 09:30 )  PTT:86.9 sec  Urinalysis Basic - ( 06 Jun 2024 09:30 )    Color: x / Appearance: x / SG: x / pH: x  Gluc: 185 mg/dL / Ketone: x  / Bili: x / Urobili: x   Blood: x / Protein: x / Nitrite: x   Leuk Esterase: x / RBC: x / WBC x   Sq Epi: x / Non Sq Epi: x / Bacteria: x      I&O's Summary    05 Jun 2024 07:01  -  06 Jun 2024 07:00  --------------------------------------------------------  IN: 2055 mL / OUT: 2070 mL / NET: -15 mL    06 Jun 2024 07:01  -  06 Jun 2024 12:08  --------------------------------------------------------  IN: 260.8 mL / OUT: 800 mL / NET: -539.2 mL        RADIOLOGY & ADDITIONAL STUDIES: Reviewed     [facility]    ACC: [id number] EXAM:  CT BRAIN   ORDERED BY: SOFIE HOFFMAN     PROCEDURE DATE:  06/05/2024          INTERPRETATION:  .    CLINICAL INFORMATION: Critically ill.    TECHNIQUE: Multiple axial CT images of the head were obtained without   contrast. Sagittal and coronal reconstructed images were acquired from   the source data.    COMPARISON: Prior contrast enhanced brain and IAC MRI study dated   4/8/2021.    FINDINGS: There is no acute intracranial hemorrhage, mass effect, shift   of the midline structures, herniation, extra-axial fluid collection, or   hydrocephalus.    A chronic lacunar infarct is seen within the left thalamus.    There is diffuse cerebral volume loss with prominence of the sulci,   fissures, and cisternal spaces which is normal for the patient's age.   There is mild deep and periventricular white matter hypoattenuation   statistically compatible with microvascular changes given calcific   atherosclerotic disease of the intracranial arteries.    The paranasal sinuses and tympanomastoid cavities are clear. The   calvarium is intact. There is evidence of bilateral cataract removal.    IMPRESSION: No acute intracranial hemorrhage, mass effect, or shift of   the midline structures.    --- End of Report ---  SANIYA BURGESS MD; Attending Radiologist  This document has been electronically signed. Jun 5 2024  2:22PM      ADVANCE DIRECTIVES/TREATMENT PREFERENCES: FULL CODE    NEUROLOGICAL MEDICATIONS/OPIOIDS/BENZODIAZEPINE IN PAST 24 HOURS:    propofol Infusion   7.86 mL/Hr IV Continuous (06-06-24 @ 04:01)   7.86 mL/Hr IV Continuous (06-05-24 @ 23:20)   7.86 mL/Hr IV Continuous (06-05-24 @ 17:42)

## 2024-06-06 NOTE — PROGRESS NOTE ADULT - ASSESSMENT
78 yr old male with hx of multiple STEMI/ NSTEMI, CAD s/p 4V CABG with multiple stents and brachytherapy, HFrEF, IDDM2, CKD3-4,, HTN, HLD admittd on 5/23 for atypical chest pain after recent holter monitor placement, found also with NSTEMI and RAS on CKD complicated further by cardiac arrest x 3, s/p LHC with occlusion of all SVGs except LIMA to LAD with no target point of PCI, acute respiratory failure currently intubated/sedated, cardiogenic shock on vasopressors, worsening renal function with possible need for RRT in the near future, s/p IABP and overall guarded prognosis at best. Palliative consulted for support and to assist with ongoing goals of care.     Post Cardiac Arrest   - s/p cardiac arrest x 3 this admission (5/25 ~3 min, 5/27 ~1 min, 6/4 ~8min)  - CTH reviewed, no acute pathology  - currently not hemodynamically stable to get MRI brain  - mgnt per ICU    Cardiogenic Shock  Acute Decompensated HFrEF, CAD  - remains on triple vasopressor support   - s/p LHC 5/25 with occlusion of all SVGs except LIMA to LAD with no target point of PCI  - s/p RHC 6/4 with elevated filling pressures, low CI  - s/p IABP 6/5  - cardio and CHF team following, inputs appreciated; GDMT limited due to worsening renal dysfunction    Acute Respiratory Failure  - remains intubated on vent support, sedated for vent dyssynchrony, wean/SBT as tolerated    RAS/CKD  - slight improvement in renal indices but still with poor overall urine output  - nephrology following, likely need for RRT if renal function continues to worsen  - avoid nephrotoxic agents    Multiorgan Failure  - multiple organ dysfunction, poor prognostic factor    Palliative Care Encounter  - FULL CODE, prior DNR/DNI status rescinded after pt recovered from second cardiac arrest  - Per chart and staff, total of 13 children -->family reported son David is hcp, advised to bring in copy for our records  - Emotional support provided to grandson at bedside today. He states family has been getting daily updates from medical team and no further questions at this time. Family is taking it day by day, hopeful for meaningful clinical improvement with time. All questions answered.

## 2024-06-06 NOTE — PROGRESS NOTE ADULT - NS ATTEND AMEND GEN_ALL_CORE FT
Patient received an IABP yesterday  His pressor requirements halved from 0.43 to 0.22 of levo, continue to wean as tolerated  Continue with  5  IABP at 1:1  Creatinine still elevated but is now making robust urine output  Patient has a good  mental status when sedation is weaned  Patient is still critically ill but slowly improving

## 2024-06-06 NOTE — CHART NOTE - NSCHARTNOTEFT_GEN_A_CORE
Source: Patient [ ]  Family [ ]   other [x] EMR    Current Diet: No diet ordered     Current Weight:   6/5 69.8 kg  6/2 71.8 kg  5/31 64.6 kg  5/25 64.8 kg  +1 generalized, L foot edema; weights likely on upward trend secondary to fluid and bed scale weights; continue to monitor trends    Pertinent Medications: MEDICATIONS  (STANDING):  insulin glargine Injectable (LANTUS) 9 Unit(s) SubCutaneous at bedtime  insulin lispro (ADMELOG) corrective regimen sliding scale   SubCutaneous every 6 hours  norepinephrine Infusion 0.05 MICROgram(s)/kG/Min (3.27 mL/Hr) IV Continuous <Continuous>  pantoprazole  Injectable 40 milliGRAM(s) IV Push daily  propofol Infusion 20 MICROgram(s)/kG/Min (7.86 mL/Hr) IV Continuous <Continuous>  sodium bicarbonate 650 milliGRAM(s) Oral every 12 hours  vasopressin Infusion 0.04 Unit(s)/Min (6 mL/Hr) IV Continuous <Continuous>    MEDICATIONS  (PRN):  ondansetron Injectable 4 milliGRAM(s) IV Push every 8 hours PRN Nausea and/or Vomiting  senna 2 Tablet(s) Oral at bedtime PRN Constipation    Pertinent Labs: 06-06 Na129 mmol/L<L> Glu 185 mg/dL<H> K+ 4.0 mmol/L Cr  4.47 mg/dL<H> BUN 78.7 mg/dL<H> Phos 5.8 mg/dL<H> Alb 2.9 g/dL<L>     Skin: L forehead, R hip, Left AC, and L flank blisters, Stage 2 Coccyx pressure injury     Estimated Needs:   2236-6239 kcals/day (based on 25-30 kcal/kg IBW 70 kg)  84-98 g pro/day (based on 1.2-1.4 g/kg IBW 70 kg)    Clinical Course: 79 y/o M with a h/o CAD (PCI/CODI x s/p 19, brachytherapy 11/2023), 4V CABG, ICM, HFrEF EF 37%, s/p ICD, DM2 on insulin, 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  NSTEMI with positive troponin, and labs remarkable for RAS on CKD as well as hypoglycemia on admission. S/p TTE - acute on chronic systolic heart failure with EF <20%. Patient suffered VF/VT cardiac arrest on 5/25 without ICD shock and was taken for emergent LHC where he was found to have 3/4 occluded grafts, only LIMA to LAD was patent. No target for intervention. Extubated on 5/26, however suffered another VT cardiac arrest later that day, ROSC acheived. Hospital course further complicated by cardiogenic shock requiring IV vasopressor and inotrope support. Extubated on 5/27.  6/4 RHC for c/f low flow state, revealed elevated filling pressures, low cardiac output and index. 6/5 PEA arrest, tele demonstrating paced rhythm at 60bpm just prior to CPR. Pt was intubated and ROSC achieved after 8 minutes of downtime line placed with swan buster, placed on dobutamine, propofol, levo, vaso, bumex infusions. S/p IABP placed.     Current Nutrition Diagnosis: Inadequate energy-protein intake in setting of critical illness requiring intubation, sedated, IABP and pressors as evidenced by pt NPO on mechanically ventilation.    Chart and events reviewed. S/p PEA arrest yesterday. Now intubated/sedated, on levo and vaso, IABP in place, propofol @ 7.86 ml/hr x 24 hrs providing 206 kcals/day. Currently NPO, has OGT. He was previously on Halal, DASH/TLC, 1500 ml fluid restriction diet with fair po intake. Weight trends noted, likely skewed with edema/bed scale weights. Last BM 6/5. Labs reviewed, BG levels, h/o DM and renal labs noted, if to start on tube feeds Nepro formula would be appropriate at this time. Recommendations below:    Recommendations:   1. When medically feasible, start Nepro @ 10 cc/hr and increase by 10 cc q 4 hrs until goal rate 60 cc/hr x 18 hrs (1080 ml, 1944 kcals, 87 g pro, 788 ml free water); additional free water per medical teams discretion  2. Monitor initiation/tolerance/adequacy of TF regimen  3. Monitor kcals from propofol, adjust TF regimen as needed  4. Rx: MVI, Vit C 500 mg daily via appropriate route to promote wound healing  5. Daily weights and trends     Monitoring and Evaluation:   [X] Weights  [X] Follow up per protocol [X] Labs: chem 8, phos, mg, BGM

## 2024-06-06 NOTE — CHART NOTE - NSCHARTNOTEFT_GEN_A_CORE
Palliative care social work note.     and palliative care physician Dr Meelndez met with lenny at bedside to provide support in coping with catastrophic events and medical complexities in patients care. Lenny reports that family getting all the medical updates and is very appreciative. Lenny also acknowledges "hoping he gets better with all that is being done". Palliative care team aware of many children involved and will need to have further discussions regarding expectations, understanding and goals of care.

## 2024-06-06 NOTE — PROGRESS NOTE ADULT - SUBJECTIVE AND OBJECTIVE BOX
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 Recently had Holter monitor placement for PVC burden which was noted to have worsened the day of presentation. In ED, patient weak appearing, ruled in for NSTEMI with positive troponin, and labs remarkable for RAS on CKD as well as hypoglycemia. A TTE revealed acute on chronic systolic heart failure with EF <20%. He was admitted to telemetry for treatment of ACS on DAPT and heparin gtt, planned for LHC. Patient suffered VF/VT cardiac arrest on 5/25 without ICD shock (subsequently reprogrammed by EP) and was taken for emergent LHC where he was found to have 3/4 occluded grafts, only LIMA to LAD was patent. No target for intervention. Extubated on 5/26, however suffered another VT cardiac arrest later that day (again without ICD shock) with 1 minute downtime prior to ROSC. Hospital course further complicated by cardiogenic shock requiring IV vasopressor and inotrope support. Extubated on 5/27. Progressive RAS over the past few days.    6/4 RHC for c/f low flow state, revealed elevated filling pressures, low cardiac output and index.     6/5 PEA arrest, tele demonstrating paced rhythm at 60bpm just prior to CPR. Pt was intubated and ROSC achieved after 8 minutes of downtime line placed with swan buster, placed on dobutamine, propofol, levo, vaso, bumex infusions. . Cardio for IABP vs. impella placement in setting of cardiogenic shock.    INTERVAL/OVERNIGHT EVENTS:  - Bumex DC overnight (1.7L output overnight)  - IABP placed, pressure support 1:1  - Heparin infusion continued     SUBJECTIVE: Patient seen and examined at bedside.     REVIEW OF SYSTEMS : patient intubated and sedated    VITAL SIGNS:  ICU Vital Signs Last 24 Hrs  T(C): 36.3 (06 Jun 2024 07:17), Max: 37.1 (05 Jun 2024 23:00)  T(F): 97.3 (06 Jun 2024 07:17), Max: 98.8 (05 Jun 2024 23:00)  HR: 62 (06 Jun 2024 07:00) (60 - 79)  ABP: 118/33 (06 Jun 2024 07:00) (89/32 - 155/54)  ABP(mean): 69 (06 Jun 2024 07:00) (51 - 86)  RR: 16 (06 Jun 2024 05:00) (16 - 22)  SpO2: 100% (06 Jun 2024 07:00) (96% - 100%)    O2 Parameters below as of 06 Jun 2024 04:00  Patient On (Oxygen Delivery Method): ventilator  O2 Flow (L/min): 40  O2 Concentration (%): 27      Mode: AC/ CMV (Assist Control/ Continuous Mandatory Ventilation), RR (machine): 16, TV (machine): 450, FiO2: 40, PEEP: 6, MAP: 10, PIP: 28  Plateau pressure:   P/F ratio:     06-05 @ 07:01  -  06-06 @ 07:00  --------------------------------------------------------  IN: 2055 mL / OUT: 2070 mL / NET: -15 mL      CAPILLARY BLOOD GLUCOSE      POCT Blood Glucose.: 181 mg/dL (06 Jun 2024 05:38)    ECG:    PHYSICAL EXAM:  GENERAL: lying in bed comfortably  NERVOUS SYSTEM:  A&Ox4, moving all extremities, no focal deficits   HEAD:  Atraumatic  EYES: PERRLA, conjunctiva and sclera clear  NECK: Supple, trachea midline, no JVD  HEART: Regular rate and rhythm, no murmurs, rubs, or gallops  LUNGS: Unlabored respirations.  Clear to auscultation bilaterally, no crackles, wheezing, or rhonchi  ABDOMEN: Soft, nontender, nondistended, +BS  EXTREMITIES: 2+ peripheral pulses bilaterally. No clubbing, cyanosis, or edema  SKIN: No rashes or lesions    MEDICATIONS:  MEDICATIONS  (STANDING):  aMIOdarone    Tablet 200 milliGRAM(s) Oral daily  aspirin  chewable 81 milliGRAM(s) Oral daily  atorvastatin 80 milliGRAM(s) Oral at bedtime  buMETAnide Infusion 1.5 mG/Hr (7.5 mL/Hr) IV Continuous <Continuous>  chlorhexidine 2% Cloths 1 Application(s) Topical <User Schedule>  clopidogrel Tablet 75 milliGRAM(s) Oral daily  dextrose 50% Injectable 25 Gram(s) IV Push once  DOBUTamine Infusion 5 MICROgram(s)/kG/Min (10.5 mL/Hr) IV Continuous <Continuous>  heparin  Infusion. 800 Unit(s)/Hr (8 mL/Hr) IV Continuous <Continuous>  insulin glargine Injectable (LANTUS) 9 Unit(s) SubCutaneous at bedtime  insulin lispro (ADMELOG) corrective regimen sliding scale   SubCutaneous every 6 hours  insulin lispro Injectable (ADMELOG) 3 Unit(s) SubCutaneous every 8 hours  levothyroxine Injectable 44 MICROGram(s) IV Push at bedtime  montelukast 10 milliGRAM(s) Oral daily  norepinephrine Infusion 0.05 MICROgram(s)/kG/Min (3.27 mL/Hr) IV Continuous <Continuous>  pantoprazole  Injectable 40 milliGRAM(s) IV Push daily  piperacillin/tazobactam IVPB.. 3.375 Gram(s) IV Intermittent every 12 hours  propofol Infusion 20 MICROgram(s)/kG/Min (7.86 mL/Hr) IV Continuous <Continuous>  sodium bicarbonate 650 milliGRAM(s) Oral every 12 hours  vasopressin Infusion 0.04 Unit(s)/Min (6 mL/Hr) IV Continuous <Continuous>    MEDICATIONS  (PRN):  albuterol/ipratropium for Nebulization 3 milliLiter(s) Nebulizer every 6 hours PRN Shortness of Breath and/or Wheezing  heparin   Injectable 5500 Unit(s) IV Push every 6 hours PRN For aPTT less than 40  heparin   Injectable 2500 Unit(s) IV Push every 6 hours PRN For aPTT between 40 - 57  ondansetron Injectable 4 milliGRAM(s) IV Push every 8 hours PRN Nausea and/or Vomiting  senna 2 Tablet(s) Oral at bedtime PRN Constipation  sodium chloride 0.9% lock flush 10 milliLiter(s) IV Push every 1 hour PRN Pre/post blood products, medications, blood draw, and to maintain line patency      ALLERGIES:  Allergies    No Known Allergies    Intolerances    DOPamine (Hypotension)      LABS:                        10.6   17.87 )-----------( 230      ( 06 Jun 2024 02:45 )             31.2     06-06    126<L>  |  89<L>  |  84.0<H>  ----------------------------<  176<H>  4.1   |  21.0<L>  |  4.71<H>    Ca    7.9<L>      06 Jun 2024 02:45  Phos  5.7     06-06  Mg     2.2     06-06    TPro  5.8<L>  /  Alb  2.9<L>  /  TBili  0.6  /  DBili  x   /  AST  77<H>  /  ALT  77<H>  /  AlkPhos  127<H>  06-06    PTT - ( 06 Jun 2024 02:45 )  PTT:102.6 sec  Urinalysis Basic - ( 06 Jun 2024 02:45 )    Color: x / Appearance: x / SG: x / pH: x  Gluc: 176 mg/dL / Ketone: x  / Bili: x / Urobili: x   Blood: x / Protein: x / Nitrite: x   Leuk Esterase: x / RBC: x / WBC x   Sq Epi: x / Non Sq Epi: x / Bacteria: x        RADIOLOGY & ADDITIONAL TESTS: Reviewed. 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 Recently had Holter monitor placement for PVC burden which was noted to have worsened the day of presentation. In ED, patient weak appearing, ruled in for NSTEMI with positive troponin, and labs remarkable for RAS on CKD as well as hypoglycemia. A TTE revealed acute on chronic systolic heart failure with EF <20%. He was admitted to telemetry for treatment of ACS on DAPT and heparin gtt, planned for LHC. Patient suffered VF/VT cardiac arrest on 5/25 without ICD shock (subsequently reprogrammed by EP) and was taken for emergent LHC where he was found to have 3/4 occluded grafts, only LIMA to LAD was patent. No target for intervention. Extubated on 5/26, however suffered another VT cardiac arrest later that day (again without ICD shock) with 1 minute downtime prior to ROSC. Hospital course further complicated by cardiogenic shock requiring IV vasopressor and inotrope support. Extubated on 5/27. Progressive RAS over the past few days.    6/4 RHC for c/f low flow state, revealed elevated filling pressures, low cardiac output and index.     6/5 PEA arrest, tele demonstrating paced rhythm at 60bpm just prior to CPR. Pt was intubated and ROSC achieved after 8 minutes of downtime line placed with swan buster, placed on dobutamine, propofol, levo, vaso, bumex infusions. . Cardio for IABP vs. impella placement in setting of cardiogenic shock.    INTERVAL/OVERNIGHT EVENTS:  - Bumex DC overnight (1.7L output overnight)  - IABP placed, pressure support 1:1  - Heparin infusion continued     SUBJECTIVE: Patient seen and examined at bedside.     REVIEW OF SYSTEMS : patient intubated and sedated    VITAL SIGNS:  ICU Vital Signs Last 24 Hrs  T(C): 36.3 (06 Jun 2024 07:17), Max: 37.1 (05 Jun 2024 23:00)  T(F): 97.3 (06 Jun 2024 07:17), Max: 98.8 (05 Jun 2024 23:00)  HR: 62 (06 Jun 2024 07:00) (60 - 79)  ABP: 118/33 (06 Jun 2024 07:00) (89/32 - 155/54)  ABP(mean): 69 (06 Jun 2024 07:00) (51 - 86)  RR: 16 (06 Jun 2024 05:00) (16 - 22)  SpO2: 100% (06 Jun 2024 07:00) (96% - 100%)    O2 Parameters below as of 06 Jun 2024 04:00  Patient On (Oxygen Delivery Method): ventilator  O2 Flow (L/min): 40  O2 Concentration (%): 27      Mode: AC/ CMV (Assist Control/ Continuous Mandatory Ventilation), RR (machine): 16, TV (machine): 450, FiO2: 40, PEEP: 6, MAP: 10, PIP: 28  Plateau pressure:   P/F ratio:     06-05 @ 07:01  -  06-06 @ 07:00  --------------------------------------------------------  IN: 2055 mL / OUT: 2070 mL / NET: -15 mL      CAPILLARY BLOOD GLUCOSE      POCT Blood Glucose.: 181 mg/dL (06 Jun 2024 05:38)    ECG:    PHYSICAL EXAM:  GENERAL: lying in bed comfortably  NERVOUS SYSTEM:  intubated and sedated   HEAD:  Atraumatic  EYES: PERRLA, conjunctiva and sclera clear  NECK: Supple, trachea midline, no JVD  HEART: Regular rate and rhythm  LUNGS: Unlabored respirations.  Clear to auscultation bilaterally, no crackles, wheezing, or rhonchi  ABDOMEN: Soft  nondistended, +BS  EXTREMITIES: 2+ peripheral pulses bilaterally. No clubbing, cyanosis, or edema  SKIN: No rashes or lesions    MEDICATIONS:  MEDICATIONS  (STANDING):  aMIOdarone    Tablet 200 milliGRAM(s) Oral daily  aspirin  chewable 81 milliGRAM(s) Oral daily  atorvastatin 80 milliGRAM(s) Oral at bedtime  buMETAnide Infusion 1.5 mG/Hr (7.5 mL/Hr) IV Continuous <Continuous>  chlorhexidine 2% Cloths 1 Application(s) Topical <User Schedule>  clopidogrel Tablet 75 milliGRAM(s) Oral daily  dextrose 50% Injectable 25 Gram(s) IV Push once  DOBUTamine Infusion 5 MICROgram(s)/kG/Min (10.5 mL/Hr) IV Continuous <Continuous>  heparin  Infusion. 800 Unit(s)/Hr (8 mL/Hr) IV Continuous <Continuous>  insulin glargine Injectable (LANTUS) 9 Unit(s) SubCutaneous at bedtime  insulin lispro (ADMELOG) corrective regimen sliding scale   SubCutaneous every 6 hours  insulin lispro Injectable (ADMELOG) 3 Unit(s) SubCutaneous every 8 hours  levothyroxine Injectable 44 MICROGram(s) IV Push at bedtime  montelukast 10 milliGRAM(s) Oral daily  norepinephrine Infusion 0.05 MICROgram(s)/kG/Min (3.27 mL/Hr) IV Continuous <Continuous>  pantoprazole  Injectable 40 milliGRAM(s) IV Push daily  piperacillin/tazobactam IVPB.. 3.375 Gram(s) IV Intermittent every 12 hours  propofol Infusion 20 MICROgram(s)/kG/Min (7.86 mL/Hr) IV Continuous <Continuous>  sodium bicarbonate 650 milliGRAM(s) Oral every 12 hours  vasopressin Infusion 0.04 Unit(s)/Min (6 mL/Hr) IV Continuous <Continuous>    MEDICATIONS  (PRN):  albuterol/ipratropium for Nebulization 3 milliLiter(s) Nebulizer every 6 hours PRN Shortness of Breath and/or Wheezing  heparin   Injectable 5500 Unit(s) IV Push every 6 hours PRN For aPTT less than 40  heparin   Injectable 2500 Unit(s) IV Push every 6 hours PRN For aPTT between 40 - 57  ondansetron Injectable 4 milliGRAM(s) IV Push every 8 hours PRN Nausea and/or Vomiting  senna 2 Tablet(s) Oral at bedtime PRN Constipation  sodium chloride 0.9% lock flush 10 milliLiter(s) IV Push every 1 hour PRN Pre/post blood products, medications, blood draw, and to maintain line patency      ALLERGIES:  Allergies    No Known Allergies    Intolerances    DOPamine (Hypotension)      LABS:                        10.6   17.87 )-----------( 230      ( 06 Jun 2024 02:45 )             31.2     06-06    126<L>  |  89<L>  |  84.0<H>  ----------------------------<  176<H>  4.1   |  21.0<L>  |  4.71<H>    Ca    7.9<L>      06 Jun 2024 02:45  Phos  5.7     06-06  Mg     2.2     06-06    TPro  5.8<L>  /  Alb  2.9<L>  /  TBili  0.6  /  DBili  x   /  AST  77<H>  /  ALT  77<H>  /  AlkPhos  127<H>  06-06    PTT - ( 06 Jun 2024 02:45 )  PTT:102.6 sec  Urinalysis Basic - ( 06 Jun 2024 02:45 )    Color: x / Appearance: x / SG: x / pH: x  Gluc: 176 mg/dL / Ketone: x  / Bili: x / Urobili: x   Blood: x / Protein: x / Nitrite: x   Leuk Esterase: x / RBC: x / WBC x   Sq Epi: x / Non Sq Epi: x / Bacteria: x        RADIOLOGY & ADDITIONAL TESTS: Reviewed. 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 Recently had Holter monitor placement for PVC burden which was noted to have worsened the day of presentation. In ED, patient weak appearing, ruled in for NSTEMI with positive troponin, and labs remarkable for RAS on CKD as well as hypoglycemia. A TTE revealed acute on chronic systolic heart failure with EF <20%. He was admitted to telemetry for treatment of ACS on DAPT and heparin gtt, planned for LHC. Patient suffered VF/VT cardiac arrest on 5/25 without ICD shock (subsequently reprogrammed by EP) and was taken for emergent LHC where he was found to have 3/4 occluded grafts, only LIMA to LAD was patent. No target for intervention. Extubated on 5/26, however suffered another VT cardiac arrest later that day (again without ICD shock) with 1 minute downtime prior to ROSC. Hospital course further complicated by cardiogenic shock requiring IV vasopressor and inotrope support. Extubated on 5/27. Progressive RAS over the past few days.    6/4 RHC for c/f low flow state, revealed elevated filling pressures, low cardiac output and index.     6/5 PEA arrest, tele demonstrating paced rhythm at 60bpm just prior to CPR. Pt was intubated and ROSC achieved after 8 minutes of downtime line placed with swan buster, placed on dobutamine, propofol, levo, vaso, bumex infusions. . Cardio for IABP vs. impella placement in setting of cardiogenic shock.    INTERVAL/OVERNIGHT EVENTS:  - Bumex DC overnight (1.7L output overnight)  - IABP placed, pressure support 1:1  - Heparin infusion continued     SUBJECTIVE: Patient seen and examined at bedside.     REVIEW OF SYSTEMS : patient intubated and sedated    VITAL SIGNS:  ICU Vital Signs Last 24 Hrs  T(C): 36.3 (06 Jun 2024 07:17), Max: 37.1 (05 Jun 2024 23:00)  T(F): 97.3 (06 Jun 2024 07:17), Max: 98.8 (05 Jun 2024 23:00)  HR: 62 (06 Jun 2024 07:00) (60 - 79)  ABP: 118/33 (06 Jun 2024 07:00) (89/32 - 155/54)  ABP(mean): 69 (06 Jun 2024 07:00) (51 - 86)  RR: 16 (06 Jun 2024 05:00) (16 - 22)  SpO2: 100% (06 Jun 2024 07:00) (96% - 100%)    O2 Parameters below as of 06 Jun 2024 04:00  Patient On (Oxygen Delivery Method): ventilator  O2 Flow (L/min): 40  O2 Concentration (%): 27      Mode: AC/ CMV (Assist Control/ Continuous Mandatory Ventilation), RR (machine): 16, TV (machine): 450, FiO2: 40, PEEP: 6, MAP: 10, PIP: 28  Plateau pressure:   P/F ratio:     06-05 @ 07:01  -  06-06 @ 07:00  --------------------------------------------------------  IN: 2055 mL / OUT: 2070 mL / NET: -15 mL      CAPILLARY BLOOD GLUCOSE      POCT Blood Glucose.: 181 mg/dL (06 Jun 2024 05:38)    ECG:    PHYSICAL EXAM:  GENERAL: lying in bed comfortably  NERVOUS SYSTEM:  intubated and sedated   HEAD:  Atraumatic  EYES: PERRLA, conjunctiva and sclera clear  NECK: Supple, trachea midline, no JVD  HEART: Regular rate and rhythm  LUNGS: Unlabored respirations.  Clear to auscultation bilaterally, no crackles, wheezing, or rhonchi  ABDOMEN: Soft  nondistended, +BS  EXTREMITIES: B/L LE edema, 2+ peripheral pulses bilaterally. No clubbing or cyanosis  SKIN: No rashes or lesions    MEDICATIONS:  MEDICATIONS  (STANDING):  aMIOdarone    Tablet 200 milliGRAM(s) Oral daily  aspirin  chewable 81 milliGRAM(s) Oral daily  atorvastatin 80 milliGRAM(s) Oral at bedtime  buMETAnide Infusion 1.5 mG/Hr (7.5 mL/Hr) IV Continuous <Continuous>  chlorhexidine 2% Cloths 1 Application(s) Topical <User Schedule>  clopidogrel Tablet 75 milliGRAM(s) Oral daily  dextrose 50% Injectable 25 Gram(s) IV Push once  DOBUTamine Infusion 5 MICROgram(s)/kG/Min (10.5 mL/Hr) IV Continuous <Continuous>  heparin  Infusion. 800 Unit(s)/Hr (8 mL/Hr) IV Continuous <Continuous>  insulin glargine Injectable (LANTUS) 9 Unit(s) SubCutaneous at bedtime  insulin lispro (ADMELOG) corrective regimen sliding scale   SubCutaneous every 6 hours  insulin lispro Injectable (ADMELOG) 3 Unit(s) SubCutaneous every 8 hours  levothyroxine Injectable 44 MICROGram(s) IV Push at bedtime  montelukast 10 milliGRAM(s) Oral daily  norepinephrine Infusion 0.05 MICROgram(s)/kG/Min (3.27 mL/Hr) IV Continuous <Continuous>  pantoprazole  Injectable 40 milliGRAM(s) IV Push daily  piperacillin/tazobactam IVPB.. 3.375 Gram(s) IV Intermittent every 12 hours  propofol Infusion 20 MICROgram(s)/kG/Min (7.86 mL/Hr) IV Continuous <Continuous>  sodium bicarbonate 650 milliGRAM(s) Oral every 12 hours  vasopressin Infusion 0.04 Unit(s)/Min (6 mL/Hr) IV Continuous <Continuous>    MEDICATIONS  (PRN):  albuterol/ipratropium for Nebulization 3 milliLiter(s) Nebulizer every 6 hours PRN Shortness of Breath and/or Wheezing  heparin   Injectable 5500 Unit(s) IV Push every 6 hours PRN For aPTT less than 40  heparin   Injectable 2500 Unit(s) IV Push every 6 hours PRN For aPTT between 40 - 57  ondansetron Injectable 4 milliGRAM(s) IV Push every 8 hours PRN Nausea and/or Vomiting  senna 2 Tablet(s) Oral at bedtime PRN Constipation  sodium chloride 0.9% lock flush 10 milliLiter(s) IV Push every 1 hour PRN Pre/post blood products, medications, blood draw, and to maintain line patency      ALLERGIES:  Allergies    No Known Allergies    Intolerances    DOPamine (Hypotension)      LABS:                        10.6   17.87 )-----------( 230      ( 06 Jun 2024 02:45 )             31.2     06-06    126<L>  |  89<L>  |  84.0<H>  ----------------------------<  176<H>  4.1   |  21.0<L>  |  4.71<H>    Ca    7.9<L>      06 Jun 2024 02:45  Phos  5.7     06-06  Mg     2.2     06-06    TPro  5.8<L>  /  Alb  2.9<L>  /  TBili  0.6  /  DBili  x   /  AST  77<H>  /  ALT  77<H>  /  AlkPhos  127<H>  06-06    PTT - ( 06 Jun 2024 02:45 )  PTT:102.6 sec  Urinalysis Basic - ( 06 Jun 2024 02:45 )    Color: x / Appearance: x / SG: x / pH: x  Gluc: 176 mg/dL / Ketone: x  / Bili: x / Urobili: x   Blood: x / Protein: x / Nitrite: x   Leuk Esterase: x / RBC: x / WBC x   Sq Epi: x / Non Sq Epi: x / Bacteria: x        RADIOLOGY & ADDITIONAL TESTS: Reviewed.

## 2024-06-06 NOTE — PROGRESS NOTE ADULT - PROBLEM SELECTOR PLAN 1
- Clinically euvolemic w/ warm extremities.   - Inotropes/tMCS: IABP placed 6/5. Continue  at 5 mcg/kg/min.  - Wean pressors as tolerated.  - Monitor perfusion markers every 6 hrs (lactate, MVO2 from distal port of PA catheter, LFTs)  - Diuretics: Bumex gtt held this morning for low CVP. CVP now 3. Would continue diuretics PRN to keep net even.  - Please document strict I&Os  - Hemodynamic goals: MAP 65, CVP 8-10, MVO2 >65, PCWP 12, CI >2.2, Lactate <2.2  - Overall prognosis is poor. IABP placed as a potential bridge to recovery but he is not a candidate for advanced therapies given age and multiple comorbidities.

## 2024-06-06 NOTE — PROGRESS NOTE ADULT - ASSESSMENT
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 There is   new right apical opacity which may be related to slightly different   technique.  - continue zosyn    CV  - 5/25 LHC  with 3/4 occluded grafts, only LIMA/LAD patent, no acute intervention  - 6/4 RHC: PCWP 30, elevated LVEDP, low cardiac output  - Continue diuresis with bumex infusion, closely monitor  - Previously on milrinone, changed to dobutamine  - on levo and vaso - wean as tolerated, keep MAP >65  - Continue Mexiletine and Amiodarone   - EP interrogated ICD - no evidence of VT/VF during PEA arrest.   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 There is   new right apical opacity which may be related to slightly different   technique.  - continue zosyn    CV  - 5/25 LHC  with 3/4 occluded grafts, only LIMA/LAD patent, no acute intervention  - 6/4 RHC: PCWP 30, elevated LVEDP, low cardiac output  - Previously on milrinone, changed to dobutamine @ 5  - on levo and vaso - wean as tolerated, keep MAP >65  - Continue Mexiletine and Amiodarone   - EP interrogated ICD - no evidence of VT/VF during PEA arrest.   - STAT ECHO:Left ventricular systolic function is severely decreased with an ejection fraction visually estimated at <20 %. Global left ventricular hypokinesis.  - US duplex arterial b/l upper and lower extremities to assess patency for possible intervention     RENAL  - Oliguric; trend BUN/Cr  - Trend electrolytes, replete as necessary, goal Mg>2, K>4, Phos>3  - strict I/Os    GI:  NPO       ENDO  - insulin ss  - maintain euglycemia  - continue synthroid    ID  - lower lung infiltrates on chest x-ray 6/5, continue zosyn empirically

## 2024-06-06 NOTE — PROGRESS NOTE ADULT - SUBJECTIVE AND OBJECTIVE BOX
ADVANCED HEART FAILURE - PROGRESS NOTE  [address] [place] [address]  Office Phone: [phone]/Fax: [phone]  Service/On Call Phone [phone]  _______________________________________________________________________________________________________    Subjective:  - IABP placed last night  - Remains intubated and sedated    Medications:  albuterol/ipratropium for Nebulization 3 milliLiter(s) Nebulizer every 6 hours PRN  aMIOdarone    Tablet 200 milliGRAM(s) Oral daily  aspirin  chewable 81 milliGRAM(s) Oral daily  atorvastatin 80 milliGRAM(s) Oral at bedtime  chlorhexidine 2% Cloths 1 Application(s) Topical <User Schedule>  clopidogrel Tablet 75 milliGRAM(s) Oral daily  dextrose 50% Injectable 25 Gram(s) IV Push once  DOBUTamine Infusion 5 MICROgram(s)/kG/Min IV Continuous <Continuous>  heparin   Injectable 5500 Unit(s) IV Push every 6 hours PRN  heparin   Injectable 2500 Unit(s) IV Push every 6 hours PRN  heparin  Infusion. 800 Unit(s)/Hr IV Continuous <Continuous>  insulin glargine Injectable (LANTUS) 9 Unit(s) SubCutaneous at bedtime  insulin lispro (ADMELOG) corrective regimen sliding scale   SubCutaneous every 6 hours  insulin lispro Injectable (ADMELOG) 3 Unit(s) SubCutaneous every 8 hours  levothyroxine Injectable 44 MICROGram(s) IV Push at bedtime  montelukast 10 milliGRAM(s) Oral daily  norepinephrine Infusion 0.05 MICROgram(s)/kG/Min IV Continuous <Continuous>  ondansetron Injectable 4 milliGRAM(s) IV Push every 8 hours PRN  pantoprazole  Injectable 40 milliGRAM(s) IV Push daily  piperacillin/tazobactam IVPB.. 3.375 Gram(s) IV Intermittent every 12 hours  propofol Infusion 20 MICROgram(s)/kG/Min IV Continuous <Continuous>  senna 2 Tablet(s) Oral at bedtime PRN  sodium bicarbonate 650 milliGRAM(s) Oral every 12 hours  sodium chloride 0.9% lock flush 10 milliLiter(s) IV Push every 1 hour PRN  vasopressin Infusion 0.04 Unit(s)/Min IV Continuous <Continuous>      ICU Vital Signs Last 24 Hrs  T(C): 36.4, Max: 37.1 ( @ 23:00)  HR: 67 (60 - 79)  BP: --  BP(mean): --  ABP: 108/33 (99/33 - 155/54)  ABP(mean): 64 (60 - 86)  RR: 16 (16 - 22)  SpO2: 100% (96% - 100%)    Weight in k.6 (24)  Weight in k.8 (24)      I&O's Summary Last 24 Hrs    IN: 2055 mL / OUT: 2070 mL / NET: -15 mL      Tele:  60s    Physical Exam:    General: No distress. Comfortable.  Neck: JVP not elevated.  Respiratory: Clear to auscultation bilaterally  CV: Normal S1 and S2. No murmurs, rub, or gallops. Radial pulses normal.  Abdomen: Soft, non-distended  Extremities: Warm, no edema  Neurology: KATYA, intubated and sedated. Moves extremities.  Psych: KATYA.    Labs:    ( 24 @ 02:45 )               10.6   17.87 )--------( 230                  31.2     ( 24 @ 09:30 )     129  |  91  |  78.7  ---------------------<  185  4.0  |  21.0  |  4.47    Ca 8.0  Phos 5.8  Mg 2.1    ( 24 @ 09:30 )  TPro  5.6  /  Alb  2.9  /  TBili  0.7  /  DBili  x   /  AST  70  /  ALT  73  /  AlkPhos  132  ( 24 @ 02:45 )  TPro  5.8  /  Alb  2.9  /  TBili  0.6  /  DBili  x   /  AST  77  /  ALT  77  /  AlkPhos  127    PTT/PT/INR - ( 24 @ 09:30 )  PTT: 86.9 sec / PT: x     / INR: x        ( 24 @ 08:16 )  TropHS 202   / CK x     / CKMB x      ( 24 @ 10:21 )  TropHS 379   / CK 69    / CKMB x            VBG - ( 24 @ 09:30 )  pH: 7.380 / pCO2: 38    / pO2: 70    / Oxygen saturation: 93.3    VBG - ( 24 @ 02:50 )  pH: 7.380 / pCO2: 37    / pO2: 48    / Oxygen saturation: 78.3    VBG - ( 24 @ 19:59 )  pH: 7.350 / pCO2: 42    / pO2: 42    / Oxygen saturation: 68.0    VBG - ( 24 @ 14:30 )  pH: 7.340 / pCO2: 38    / pO2: 47    / Oxygen saturation: 71.3      ABG - ( 24 @ 11:16 )  pH: 7.500 / pCO2: 27    / pO2: 75    / HCO3: 21    / Base Excess: -2.1  / SaO2: 95.5     Blood Gas Venous - Lactate: 1.30 (24 @ 09:30)  Blood Gas Venous - Lactate: 0.90 (24 @ 02:50)

## 2024-06-06 NOTE — PROGRESS NOTE ADULT - SUBJECTIVE AND OBJECTIVE BOX
NEPHROLOGY INTERVAL HPI/OVERNIGHT EVENTS:  pt remains vented, sedated  still pressor dependent    MEDICATIONS  (STANDING):  aMIOdarone    Tablet 200 milliGRAM(s) Oral daily  aspirin  chewable 81 milliGRAM(s) Oral daily  atorvastatin 80 milliGRAM(s) Oral at bedtime  chlorhexidine 2% Cloths 1 Application(s) Topical <User Schedule>  clopidogrel Tablet 75 milliGRAM(s) Oral daily  dextrose 50% Injectable 25 Gram(s) IV Push once  DOBUTamine Infusion 5 MICROgram(s)/kG/Min (10.5 mL/Hr) IV Continuous <Continuous>  heparin  Infusion. 800 Unit(s)/Hr (8 mL/Hr) IV Continuous <Continuous>  insulin glargine Injectable (LANTUS) 9 Unit(s) SubCutaneous at bedtime  insulin lispro (ADMELOG) corrective regimen sliding scale   SubCutaneous every 6 hours  insulin lispro Injectable (ADMELOG) 3 Unit(s) SubCutaneous every 8 hours  levothyroxine Injectable 44 MICROGram(s) IV Push at bedtime  montelukast 10 milliGRAM(s) Oral daily  norepinephrine Infusion 0.05 MICROgram(s)/kG/Min (3.27 mL/Hr) IV Continuous <Continuous>  pantoprazole  Injectable 40 milliGRAM(s) IV Push daily  piperacillin/tazobactam IVPB.. 3.375 Gram(s) IV Intermittent every 12 hours  propofol Infusion 20 MICROgram(s)/kG/Min (7.86 mL/Hr) IV Continuous <Continuous>  sodium bicarbonate 650 milliGRAM(s) Oral every 12 hours  vasopressin Infusion 0.04 Unit(s)/Min (6 mL/Hr) IV Continuous <Continuous>    MEDICATIONS  (PRN):  albuterol/ipratropium for Nebulization 3 milliLiter(s) Nebulizer every 6 hours PRN Shortness of Breath and/or Wheezing  heparin   Injectable 5500 Unit(s) IV Push every 6 hours PRN For aPTT less than 40  heparin   Injectable 2500 Unit(s) IV Push every 6 hours PRN For aPTT between 40 - 57  ondansetron Injectable 4 milliGRAM(s) IV Push every 8 hours PRN Nausea and/or Vomiting  senna 2 Tablet(s) Oral at bedtime PRN Constipation  sodium chloride 0.9% lock flush 10 milliLiter(s) IV Push every 1 hour PRN Pre/post blood products, medications, blood draw, and to maintain line patency      Allergies    No Known Allergies    Intolerances    DOPamine (Hypotension)        Vital Signs Last 24 Hrs  T(C): 36.4 (06 Jun 2024 08:00), Max: 37.1 (05 Jun 2024 23:00)  T(F): 97.5 (06 Jun 2024 08:00), Max: 98.8 (05 Jun 2024 23:00)  HR: 64 (06 Jun 2024 10:00) (60 - 79)  BP: --  BP(mean): --  RR: 16 (06 Jun 2024 08:00) (16 - 22)  SpO2: 100% (06 Jun 2024 10:00) (96% - 100%)    Parameters below as of 06 Jun 2024 08:00  Patient On (Oxygen Delivery Method): ventilator    O2 Concentration (%): 40    PHYSICAL EXAM:  GENERAL: Vented  HEENT: Oral intubation  NECK: Supple, No JVD  NERVOUS SYSTEM: Sedated  CHEST: Diminished B/L BS  HEART: No rub  ABDOMEN: Soft, NT/ND BS+  EXTREMITIES:  + dependent edema  SKIN: No rashes  : [place]      LABS:                        10.6   17.87 )-----------( 230      ( 06 Jun 2024 02:45 )             31.2     06-06    129<L>  |  91<L>  |  78.7<H>  ----------------------------<  185<H>  4.0   |  21.0<L>  |  4.47<H>        Ca    8.0<L>      06 Jun 2024 09:30  Phos  5.8     06-06  Mg     2.1     06-06    TPro  5.6<L>  /  Alb  2.9<L>  /  TBili  0.7  /  DBili  x   /  AST  70<H>  /  ALT  73<H>  /  AlkPhos  132<H>  06-06    PTT - ( 06 Jun 2024 09:30 )  PTT:86.9 sec  Urinalysis Basic - ( 06 Jun 2024 09:30 )    Color: x / Appearance: x / SG: x / pH: x  Gluc: 185 mg/dL / Ketone: x  / Bili: x / Urobili: x   Blood: x / Protein: x / Nitrite: x   Leuk Esterase: x / RBC: x / WBC x   Sq Epi: x / Non Sq Epi: x / Bacteria: x      Magnesium: 2.1 mg/dL (06-06 @ 09:30)  Phosphorus: 5.8 mg/dL (06-06 @ 09:30)  Magnesium: 2.2 mg/dL (06-06 @ 02:45)  Phosphorus: 5.7 mg/dL (06-06 @ 02:45)  Phosphorus: 6.0 mg/dL (06-05 @ 20:30)  Magnesium: 2.1 mg/dL (06-05 @ 20:30)  Phosphorus: 5.6 mg/dL (06-05 @ 14:30)  Magnesium: 2.4 mg/dL (06-05 @ 14:30)      RADIOLOGY & ADDITIONAL TESTS:   NEPHROLOGY INTERVAL HPI/OVERNIGHT EVENTS:  pt remains vented, sedated  still pressor dependent  s/p IABP    MEDICATIONS  (STANDING):  aMIOdarone    Tablet 200 milliGRAM(s) Oral daily  aspirin  chewable 81 milliGRAM(s) Oral daily  atorvastatin 80 milliGRAM(s) Oral at bedtime  chlorhexidine 2% Cloths 1 Application(s) Topical <User Schedule>  clopidogrel Tablet 75 milliGRAM(s) Oral daily  dextrose 50% Injectable 25 Gram(s) IV Push once  DOBUTamine Infusion 5 MICROgram(s)/kG/Min (10.5 mL/Hr) IV Continuous <Continuous>  heparin  Infusion. 800 Unit(s)/Hr (8 mL/Hr) IV Continuous <Continuous>  insulin glargine Injectable (LANTUS) 9 Unit(s) SubCutaneous at bedtime  insulin lispro (ADMELOG) corrective regimen sliding scale   SubCutaneous every 6 hours  insulin lispro Injectable (ADMELOG) 3 Unit(s) SubCutaneous every 8 hours  levothyroxine Injectable 44 MICROGram(s) IV Push at bedtime  montelukast 10 milliGRAM(s) Oral daily  norepinephrine Infusion 0.05 MICROgram(s)/kG/Min (3.27 mL/Hr) IV Continuous <Continuous>  pantoprazole  Injectable 40 milliGRAM(s) IV Push daily  piperacillin/tazobactam IVPB.. 3.375 Gram(s) IV Intermittent every 12 hours  propofol Infusion 20 MICROgram(s)/kG/Min (7.86 mL/Hr) IV Continuous <Continuous>  sodium bicarbonate 650 milliGRAM(s) Oral every 12 hours  vasopressin Infusion 0.04 Unit(s)/Min (6 mL/Hr) IV Continuous <Continuous>    MEDICATIONS  (PRN):  albuterol/ipratropium for Nebulization 3 milliLiter(s) Nebulizer every 6 hours PRN Shortness of Breath and/or Wheezing  heparin   Injectable 5500 Unit(s) IV Push every 6 hours PRN For aPTT less than 40  heparin   Injectable 2500 Unit(s) IV Push every 6 hours PRN For aPTT between 40 - 57  ondansetron Injectable 4 milliGRAM(s) IV Push every 8 hours PRN Nausea and/or Vomiting  senna 2 Tablet(s) Oral at bedtime PRN Constipation  sodium chloride 0.9% lock flush 10 milliLiter(s) IV Push every 1 hour PRN Pre/post blood products, medications, blood draw, and to maintain line patency      Allergies    No Known Allergies    Intolerances    DOPamine (Hypotension)        Vital Signs Last 24 Hrs  T(C): 36.4 (06 Jun 2024 08:00), Max: 37.1 (05 Jun 2024 23:00)  T(F): 97.5 (06 Jun 2024 08:00), Max: 98.8 (05 Jun 2024 23:00)  HR: 64 (06 Jun 2024 10:00) (60 - 79)  BP: --  BP(mean): --  RR: 16 (06 Jun 2024 08:00) (16 - 22)  SpO2: 100% (06 Jun 2024 10:00) (96% - 100%)    Parameters below as of 06 Jun 2024 08:00  Patient On (Oxygen Delivery Method): ventilator    O2 Concentration (%): 40    PHYSICAL EXAM:  GENERAL: Vented  HEENT: Oral intubation  NECK: Supple, No JVD  NERVOUS SYSTEM: Sedated  CHEST: Diminished B/L BS  HEART: No rub  ABDOMEN: Soft, NT/ND BS+  EXTREMITIES:  + dependent edema  SKIN: No rashes  : [place]      LABS:                        10.6   17.87 )-----------( 230      ( 06 Jun 2024 02:45 )             31.2     06-06    129<L>  |  91<L>  |  78.7<H>  ----------------------------<  185<H>  4.0   |  21.0<L>  |  4.47<H>        Ca    8.0<L>      06 Jun 2024 09:30  Phos  5.8     06-06  Mg     2.1     06-06    TPro  5.6<L>  /  Alb  2.9<L>  /  TBili  0.7  /  DBili  x   /  AST  70<H>  /  ALT  73<H>  /  AlkPhos  132<H>  06-06    PTT - ( 06 Jun 2024 09:30 )  PTT:86.9 sec  Urinalysis Basic - ( 06 Jun 2024 09:30 )    Color: x / Appearance: x / SG: x / pH: x  Gluc: 185 mg/dL / Ketone: x  / Bili: x / Urobili: x   Blood: x / Protein: x / Nitrite: x   Leuk Esterase: x / RBC: x / WBC x   Sq Epi: x / Non Sq Epi: x / Bacteria: x      Magnesium: 2.1 mg/dL (06-06 @ 09:30)  Phosphorus: 5.8 mg/dL (06-06 @ 09:30)  Magnesium: 2.2 mg/dL (06-06 @ 02:45)  Phosphorus: 5.7 mg/dL (06-06 @ 02:45)  Phosphorus: 6.0 mg/dL (06-05 @ 20:30)  Magnesium: 2.1 mg/dL (06-05 @ 20:30)  Phosphorus: 5.6 mg/dL (06-05 @ 14:30)  Magnesium: 2.4 mg/dL (06-05 @ 14:30)      RADIOLOGY & ADDITIONAL TESTS:

## 2024-06-06 NOTE — PROGRESS NOTE ADULT - ASSESSMENT
Patient is a 78 year old male with history of HTN, HLD, DM, CKD, CAD s/p 4V CABG and multiple PCI's, ischemic cardiomyopathy (EF < 20%), and LBBB s/p MDT CRT-D. He had an admission in November for ADHF. At that time a LHC revealed his grafts were occluded except for the LIMA to LAD. He also underwent a CRT-D implant.     On 5/25 he had a VT arrest with ROSC achieved after 3 minutes was sent to the cath lab which showed his LIMA is still patent. Patient was extubated on 5/27. RHC on 5/28 showed severely elevated biv filling pressures, severe Cpc-PH, and low CO. He was started on low dose milrinone and diuresed well on IV Bumex. Weaned off pressors 5/29 and Milrinone was weaned off 5/31.    Renal function worsening (sCr 4.65) therefore RHC performed 6/4 which revealed significantly elevated filling pressures w/ low CI. Milrinone was resumed at 0.25 mcg/kg/min as well as Bumex gtt. On 6/5 he had a PEA arrest overnight. IABP was placed and he remains intubated/sedated in MICU on levo/vaso/ gtts.     Bedside Hemodynamics:  5/31 (mil 0.125): CVP 9-10, PA 72/24/43, PA sat 70.7%, Fauzia CO/CI 4.6/2.6, /56 (73), SVR 1096 dsc.    Cardiac Studies:  6/4/24 RHC: RA 29/26/24, RV 83/12/23, PA 81/31/47 PCWP 32, PA sat 42.4, Fauiza CO/CI 2.75/1.8.    5/28/24 RHC: RA 18, RV 92/18, PA 95/38/57, PCW 31, PA sat 51.5%, Fauzia CO/CI 3.09/1.74, TPG 26, DPG 7, SVR 1889 dsc, PVR 8.41 MEYERS, Vidhi 3.2, /63 (91) on levo 0.08.    5/25/24 LHC: All grafts occluded except LIMA-LAD. LVEDP 29 mmHg.    5/23/24 TTE: LVEF <20%, LVIDd 5.8cm, mild RVE with mild RV dysfunction, TAPSE 0.7cm, mild-mod AS, mild-mod MR, mild TR, mild ID, est PASP 75 mmHg.

## 2024-06-06 NOTE — PROGRESS NOTE ADULT - ASSESSMENT
RAS: ATN due to cardiogenic shock--> progressive renal decline  CKD(III)  Poor UO noted  - cont pressors, inotropes, IVF  - diuretics  - supportive care  - may require RRT

## 2024-06-06 NOTE — PROVIDER CONTACT NOTE (OTHER) - ACTION/TREATMENT ORDERED:
MICU EHSAN Hunter aware, stat x-ray ordered and done. No further interventions at this time.
continue to monitor on tele

## 2024-06-06 NOTE — PROVIDER CONTACT NOTE (CRITICAL VALUE NOTIFICATION) - ACTION/TREATMENT ORDERED:
Heparin decreased by 100u according to nomagram, now at 600u
per provider, hold heparin at this time. Team will be removing greg
EHSAN Jaramillo made aware of Critical aptt= 195.5. Repeat STAT aptt ordered. No s/s of distress from patient.
EHSAN Jaramillo made aware that repeat aptt resulted as a critical value= 191.3. Order given to follow heparin nomogram. No new orders were given. Patient in no acute distress.

## 2024-06-06 NOTE — PROVIDER CONTACT NOTE (OTHER) - ASSESSMENT
Resting in bed comfortably, denies any pain cp palpitations weakness dizziness
[place] 55cm as previously assessed.

## 2024-06-07 LAB
ALBUMIN SERPL ELPH-MCNC: 2.6 G/DL — LOW (ref 3.3–5.2)
ALBUMIN SERPL ELPH-MCNC: 2.6 G/DL — LOW (ref 3.3–5.2)
ALBUMIN SERPL ELPH-MCNC: 2.7 G/DL — LOW (ref 3.3–5.2)
ALP SERPL-CCNC: 115 U/L — SIGNIFICANT CHANGE UP (ref 40–120)
ALP SERPL-CCNC: 118 U/L — SIGNIFICANT CHANGE UP (ref 40–120)
ALP SERPL-CCNC: 143 U/L — HIGH (ref 40–120)
ALT FLD-CCNC: 49 U/L — HIGH
ALT FLD-CCNC: 52 U/L — HIGH
ALT FLD-CCNC: 64 U/L — HIGH
ANION GAP SERPL CALC-SCNC: 14 MMOL/L — SIGNIFICANT CHANGE UP (ref 5–17)
ANION GAP SERPL CALC-SCNC: 17 MMOL/L — SIGNIFICANT CHANGE UP (ref 5–17)
ANION GAP SERPL CALC-SCNC: 17 MMOL/L — SIGNIFICANT CHANGE UP (ref 5–17)
APTT BLD: 70.4 SEC — HIGH (ref 24.5–35.6)
APTT BLD: 76.8 SEC — HIGH (ref 24.5–35.6)
AST SERPL-CCNC: 47 U/L — HIGH
AST SERPL-CCNC: 52 U/L — HIGH
AST SERPL-CCNC: 57 U/L — HIGH
BASE EXCESS BLDV CALC-SCNC: -0.4 MMOL/L — SIGNIFICANT CHANGE UP (ref -2–3)
BASE EXCESS BLDV CALC-SCNC: -1.1 MMOL/L — SIGNIFICANT CHANGE UP (ref -2–3)
BASE EXCESS BLDV CALC-SCNC: -1.7 MMOL/L — SIGNIFICANT CHANGE UP (ref -2–3)
BILIRUB SERPL-MCNC: 0.8 MG/DL — SIGNIFICANT CHANGE UP (ref 0.4–2)
BUN SERPL-MCNC: 60.2 MG/DL — HIGH (ref 8–20)
BUN SERPL-MCNC: 63 MG/DL — HIGH (ref 8–20)
BUN SERPL-MCNC: 70.4 MG/DL — HIGH (ref 8–20)
CA-I SERPL-SCNC: 1.06 MMOL/L — LOW (ref 1.15–1.33)
CA-I SERPL-SCNC: 1.08 MMOL/L — LOW (ref 1.15–1.33)
CA-I SERPL-SCNC: 1.11 MMOL/L — LOW (ref 1.15–1.33)
CALCIUM SERPL-MCNC: 7.9 MG/DL — LOW (ref 8.4–10.5)
CALCIUM SERPL-MCNC: 8 MG/DL — LOW (ref 8.4–10.5)
CALCIUM SERPL-MCNC: 8.1 MG/DL — LOW (ref 8.4–10.5)
CHLORIDE BLDV-SCNC: 95 MMOL/L — LOW (ref 96–108)
CHLORIDE BLDV-SCNC: 97 MMOL/L — SIGNIFICANT CHANGE UP (ref 96–108)
CHLORIDE BLDV-SCNC: 98 MMOL/L — SIGNIFICANT CHANGE UP (ref 96–108)
CHLORIDE SERPL-SCNC: 91 MMOL/L — LOW (ref 96–108)
CHLORIDE SERPL-SCNC: 94 MMOL/L — LOW (ref 96–108)
CHLORIDE SERPL-SCNC: 95 MMOL/L — LOW (ref 96–108)
CO2 SERPL-SCNC: 20 MMOL/L — LOW (ref 22–29)
CO2 SERPL-SCNC: 21 MMOL/L — LOW (ref 22–29)
CO2 SERPL-SCNC: 23 MMOL/L — SIGNIFICANT CHANGE UP (ref 22–29)
CREAT SERPL-MCNC: 2.68 MG/DL — HIGH (ref 0.5–1.3)
CREAT SERPL-MCNC: 2.98 MG/DL — HIGH (ref 0.5–1.3)
CREAT SERPL-MCNC: 3.51 MG/DL — HIGH (ref 0.5–1.3)
EGFR: 17 ML/MIN/1.73M2 — LOW
EGFR: 21 ML/MIN/1.73M2 — LOW
EGFR: 24 ML/MIN/1.73M2 — LOW
GAS PNL BLDV: 126 MMOL/L — LOW (ref 136–145)
GAS PNL BLDV: 128 MMOL/L — LOW (ref 136–145)
GAS PNL BLDV: 129 MMOL/L — LOW (ref 136–145)
GAS PNL BLDV: SIGNIFICANT CHANGE UP
GLUCOSE BLDC GLUCOMTR-MCNC: 169 MG/DL — HIGH (ref 70–99)
GLUCOSE BLDC GLUCOMTR-MCNC: 171 MG/DL — HIGH (ref 70–99)
GLUCOSE BLDC GLUCOMTR-MCNC: 219 MG/DL — HIGH (ref 70–99)
GLUCOSE BLDV-MCNC: 174 MG/DL — HIGH (ref 70–99)
GLUCOSE BLDV-MCNC: 183 MG/DL — HIGH (ref 70–99)
GLUCOSE BLDV-MCNC: 206 MG/DL — HIGH (ref 70–99)
GLUCOSE SERPL-MCNC: 172 MG/DL — HIGH (ref 70–99)
GLUCOSE SERPL-MCNC: 186 MG/DL — HIGH (ref 70–99)
GLUCOSE SERPL-MCNC: 195 MG/DL — HIGH (ref 70–99)
HCO3 BLDV-SCNC: 24 MMOL/L — SIGNIFICANT CHANGE UP (ref 22–29)
HCO3 BLDV-SCNC: 24 MMOL/L — SIGNIFICANT CHANGE UP (ref 22–29)
HCO3 BLDV-SCNC: 25 MMOL/L — SIGNIFICANT CHANGE UP (ref 22–29)
HCT VFR BLD CALC: 30.2 % — LOW (ref 39–50)
HCT VFR BLDA CALC: 29 % — SIGNIFICANT CHANGE UP
HCT VFR BLDA CALC: 30 % — SIGNIFICANT CHANGE UP
HCT VFR BLDA CALC: 48 % — SIGNIFICANT CHANGE UP
HGB BLD CALC-MCNC: 10.1 G/DL — LOW (ref 12.6–17.4)
HGB BLD CALC-MCNC: 15.9 G/DL — SIGNIFICANT CHANGE UP (ref 12.6–17.4)
HGB BLD CALC-MCNC: 9.5 G/DL — LOW (ref 12.6–17.4)
HGB BLD-MCNC: 10.4 G/DL — LOW (ref 13–17)
LACTATE BLDV-MCNC: 0.8 MMOL/L — SIGNIFICANT CHANGE UP (ref 0.5–2)
LACTATE BLDV-MCNC: 1 MMOL/L — SIGNIFICANT CHANGE UP (ref 0.5–2)
LACTATE BLDV-MCNC: 1 MMOL/L — SIGNIFICANT CHANGE UP (ref 0.5–2)
MAGNESIUM SERPL-MCNC: 2 MG/DL — SIGNIFICANT CHANGE UP (ref 1.6–2.6)
MAGNESIUM SERPL-MCNC: 2 MG/DL — SIGNIFICANT CHANGE UP (ref 1.8–2.6)
MAGNESIUM SERPL-MCNC: 2 MG/DL — SIGNIFICANT CHANGE UP (ref 1.8–2.6)
MCHC RBC-ENTMCNC: 30.2 PG — SIGNIFICANT CHANGE UP (ref 27–34)
MCHC RBC-ENTMCNC: 34.4 GM/DL — SIGNIFICANT CHANGE UP (ref 32–36)
MCV RBC AUTO: 87.8 FL — SIGNIFICANT CHANGE UP (ref 80–100)
PCO2 BLDV: 42 MMHG — SIGNIFICANT CHANGE UP (ref 42–55)
PCO2 BLDV: 43 MMHG — SIGNIFICANT CHANGE UP (ref 42–55)
PCO2 BLDV: 43 MMHG — SIGNIFICANT CHANGE UP (ref 42–55)
PH BLDV: 7.36 — SIGNIFICANT CHANGE UP (ref 7.32–7.43)
PH BLDV: 7.36 — SIGNIFICANT CHANGE UP (ref 7.32–7.43)
PH BLDV: 7.37 — SIGNIFICANT CHANGE UP (ref 7.32–7.43)
PHOSPHATE SERPL-MCNC: 3.7 MG/DL — SIGNIFICANT CHANGE UP (ref 2.4–4.7)
PHOSPHATE SERPL-MCNC: 3.9 MG/DL — SIGNIFICANT CHANGE UP (ref 2.4–4.7)
PHOSPHATE SERPL-MCNC: 4.6 MG/DL — SIGNIFICANT CHANGE UP (ref 2.4–4.7)
PLATELET # BLD AUTO: 162 K/UL — SIGNIFICANT CHANGE UP (ref 150–400)
PO2 BLDV: 48 MMHG — HIGH (ref 25–45)
PO2 BLDV: 53 MMHG — HIGH (ref 25–45)
PO2 BLDV: 53 MMHG — HIGH (ref 25–45)
POTASSIUM BLDV-SCNC: 3.5 MMOL/L — SIGNIFICANT CHANGE UP (ref 3.5–5.1)
POTASSIUM BLDV-SCNC: 3.8 MMOL/L — SIGNIFICANT CHANGE UP (ref 3.5–5.1)
POTASSIUM BLDV-SCNC: 4.1 MMOL/L — SIGNIFICANT CHANGE UP (ref 3.5–5.1)
POTASSIUM SERPL-MCNC: 3.7 MMOL/L — SIGNIFICANT CHANGE UP (ref 3.5–5.3)
POTASSIUM SERPL-MCNC: 3.9 MMOL/L — SIGNIFICANT CHANGE UP (ref 3.5–5.3)
POTASSIUM SERPL-MCNC: 4.7 MMOL/L — SIGNIFICANT CHANGE UP (ref 3.5–5.3)
POTASSIUM SERPL-SCNC: 3.7 MMOL/L — SIGNIFICANT CHANGE UP (ref 3.5–5.3)
POTASSIUM SERPL-SCNC: 3.9 MMOL/L — SIGNIFICANT CHANGE UP (ref 3.5–5.3)
POTASSIUM SERPL-SCNC: 4.7 MMOL/L — SIGNIFICANT CHANGE UP (ref 3.5–5.3)
PROT SERPL-MCNC: 5.5 G/DL — LOW (ref 6.6–8.7)
PROT SERPL-MCNC: 5.6 G/DL — LOW (ref 6.6–8.7)
PROT SERPL-MCNC: 5.8 G/DL — LOW (ref 6.6–8.7)
RBC # BLD: 3.44 M/UL — LOW (ref 4.2–5.8)
RBC # FLD: 15.9 % — HIGH (ref 10.3–14.5)
SAO2 % BLDV: 76.2 % — SIGNIFICANT CHANGE UP
SAO2 % BLDV: 83.6 % — SIGNIFICANT CHANGE UP
SAO2 % BLDV: 84 % — SIGNIFICANT CHANGE UP
SODIUM SERPL-SCNC: 128 MMOL/L — LOW (ref 135–145)
SODIUM SERPL-SCNC: 131 MMOL/L — LOW (ref 135–145)
SODIUM SERPL-SCNC: 132 MMOL/L — LOW (ref 135–145)
TRIGL SERPL-MCNC: 99 MG/DL — SIGNIFICANT CHANGE UP
WBC # BLD: 18.36 K/UL — HIGH (ref 3.8–10.5)
WBC # FLD AUTO: 18.36 K/UL — HIGH (ref 3.8–10.5)

## 2024-06-07 PROCEDURE — 99497 ADVNCD CARE PLAN 30 MIN: CPT | Mod: 25

## 2024-06-07 PROCEDURE — 71045 X-RAY EXAM CHEST 1 VIEW: CPT | Mod: 26

## 2024-06-07 PROCEDURE — 99232 SBSQ HOSP IP/OBS MODERATE 35: CPT

## 2024-06-07 PROCEDURE — 99233 SBSQ HOSP IP/OBS HIGH 50: CPT

## 2024-06-07 RX ORDER — POTASSIUM CHLORIDE 20 MEQ
10 PACKET (EA) ORAL
Refills: 0 | Status: COMPLETED | OUTPATIENT
Start: 2024-06-07 | End: 2024-06-07

## 2024-06-07 RX ADMIN — PROPOFOL 7.86 MICROGRAM(S)/KG/MIN: 10 INJECTION, EMULSION INTRAVENOUS at 14:22

## 2024-06-07 RX ADMIN — PIPERACILLIN AND TAZOBACTAM 25 GRAM(S): 4; .5 INJECTION, POWDER, LYOPHILIZED, FOR SOLUTION INTRAVENOUS at 11:43

## 2024-06-07 RX ADMIN — Medication 650 MILLIGRAM(S): at 17:03

## 2024-06-07 RX ADMIN — PIPERACILLIN AND TAZOBACTAM 25 GRAM(S): 4; .5 INJECTION, POWDER, LYOPHILIZED, FOR SOLUTION INTRAVENOUS at 23:10

## 2024-06-07 RX ADMIN — HEPARIN SODIUM 500 UNIT(S)/HR: 5000 INJECTION INTRAVENOUS; SUBCUTANEOUS at 12:27

## 2024-06-07 RX ADMIN — CHLORHEXIDINE GLUCONATE 1 APPLICATION(S): 213 SOLUTION TOPICAL at 05:16

## 2024-06-07 RX ADMIN — Medication 100 MILLIEQUIVALENT(S): at 16:54

## 2024-06-07 RX ADMIN — Medication 81 MILLIGRAM(S): at 11:42

## 2024-06-07 RX ADMIN — Medication 10.5 MICROGRAM(S)/KG/MIN: at 09:23

## 2024-06-07 RX ADMIN — HEPARIN SODIUM 500 UNIT(S)/HR: 5000 INJECTION INTRAVENOUS; SUBCUTANEOUS at 05:55

## 2024-06-07 RX ADMIN — Medication 1: at 23:13

## 2024-06-07 RX ADMIN — Medication 2: at 17:48

## 2024-06-07 RX ADMIN — VASOPRESSIN 6 UNIT(S)/MIN: 20 INJECTION INTRAVENOUS at 05:57

## 2024-06-07 RX ADMIN — Medication 10.5 MICROGRAM(S)/KG/MIN: at 09:25

## 2024-06-07 RX ADMIN — Medication 3.27 MICROGRAM(S)/KG/MIN: at 12:27

## 2024-06-07 RX ADMIN — INSULIN GLARGINE 9 UNIT(S): 100 INJECTION, SOLUTION SUBCUTANEOUS at 23:10

## 2024-06-07 RX ADMIN — Medication 1: at 12:26

## 2024-06-07 RX ADMIN — Medication 1 DROP(S): at 17:47

## 2024-06-07 RX ADMIN — PANTOPRAZOLE SODIUM 40 MILLIGRAM(S): 20 TABLET, DELAYED RELEASE ORAL at 11:42

## 2024-06-07 RX ADMIN — VASOPRESSIN 6 UNIT(S)/MIN: 20 INJECTION INTRAVENOUS at 21:53

## 2024-06-07 RX ADMIN — PROPOFOL 7.86 MICROGRAM(S)/KG/MIN: 10 INJECTION, EMULSION INTRAVENOUS at 21:53

## 2024-06-07 RX ADMIN — MONTELUKAST 10 MILLIGRAM(S): 4 TABLET, CHEWABLE ORAL at 11:42

## 2024-06-07 RX ADMIN — AMIODARONE HYDROCHLORIDE 200 MILLIGRAM(S): 400 TABLET ORAL at 05:15

## 2024-06-07 RX ADMIN — Medication 650 MILLIGRAM(S): at 05:15

## 2024-06-07 RX ADMIN — ATORVASTATIN CALCIUM 80 MILLIGRAM(S): 80 TABLET, FILM COATED ORAL at 21:11

## 2024-06-07 RX ADMIN — Medication 1: at 05:25

## 2024-06-07 RX ADMIN — Medication 100 MILLIEQUIVALENT(S): at 16:27

## 2024-06-07 RX ADMIN — PROPOFOL 7.86 MICROGRAM(S)/KG/MIN: 10 INJECTION, EMULSION INTRAVENOUS at 04:51

## 2024-06-07 RX ADMIN — CLOPIDOGREL BISULFATE 75 MILLIGRAM(S): 75 TABLET, FILM COATED ORAL at 11:42

## 2024-06-07 RX ADMIN — Medication 100 MILLIEQUIVALENT(S): at 15:59

## 2024-06-07 RX ADMIN — Medication 44 MICROGRAM(S): at 21:12

## 2024-06-07 NOTE — PROGRESS NOTE ADULT - PROBLEM SELECTOR PLAN 1
- Clinically euvolemic w/ warm extremities.   - Inotropes/tMCS: IABP placed 6/5. Continue  at 5 mcg/kg/min.  - Wean pressors as tolerated.  - Monitor perfusion markers every 6 hrs (lactate, MVO2 from distal port of PA catheter, LFTs)  - Diuretics: Bumex gtt held this morning for low CVP. CVP now 3. Would continue diuretics PRN to keep net even.  - Please document strict I&Os  - Hemodynamic goals: MAP 65, CVP 8-10, MVO2 >65, PCWP 12, CI >2.2, Lactate <2.2  -IABP placed as a potential bridge to recovery but he is not a candidate for advanced therapies given age and multiple comorbidities.  - Patient is slowly improving with lessening pressor requirements and improving urine output  - Once patient is off pressors will wean IABP, which will likely be next week

## 2024-06-07 NOTE — PROGRESS NOTE ADULT - ASSESSMENT
79 y/o M with a h/o CAD (PCI/CODI x s/p 19, brachytherapy 2023), 4V CABG, ICM, HFrEF EF 37%, s/p ICD, DM2 on insulin, 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, HTN, HLD, with:    # Cardiac arrest   # Cardiogenic shock  # Acute systolic heart failure  # RAS on CKD    NEURO  - propofol for sedation  - CT head negative     RESPIRATORY  - intubated   - Actively titrating FIO2/PEEP to keep SPO2>92%  - CXR reviewed    CV  -  [facility]  with 3/4 occluded grafts, only LIMA/LAD patent, no acute intervention  -  @5. Levophed being actively titrated for MAP>65.  - Fixed dose Vaso OFF  - IABP Augmenting 1:1, will likely be able to ween this eveing  - MVO2> 65, CI>2, Lactate <2  - EP interrogated ICD - no evidence of VT/VF during PEA arrest.   - TTE: Left ventricular systolic function with an ejection fraction visually estimated at <20 %. Global left ventricular hypokinesis.    RENAL  - UO>100  - Trend electrolytes, replete as necessary, goal Mg>2, K>4, Phos>3  - strict I/Os    GI:  NPO       ENDO  - insulin ss  - maintain euglycemia  - continue synthroid    ID  - Zoysn for presumed PNA    Discussed with Charline     ___43+_ minutes of critical care time spent providing medical care for patient's acute illness/conditions that impairs at least one vital organ system and/or poses a high risk of imminent or life threatening deterioration in the patient's condition. It includes time spent evaluating and treating the patient's acute illness as well as time spent reviewing labs, radiology, discussing goals of care with patient and/or patient's family, and discussing the case with a multidisciplinary team in an effort to prevent further life threatening deterioration or end organ damage. This time is independent of any procedures performed.

## 2024-06-07 NOTE — PROGRESS NOTE ADULT - ASSESSMENT
A) ARF  improving, persistent   hyponatremia, cardiac on pump.    P) Discussed with staff with  fluid  adjustment.

## 2024-06-07 NOTE — PROGRESS NOTE ADULT - SUBJECTIVE AND OBJECTIVE BOX
NEPHROLOGY INTERVAL HPI/OVERNIGHT EVENTS:    No new events.    MEDICATIONS  (STANDING):  aMIOdarone    Tablet 200 milliGRAM(s) Oral daily  aspirin  chewable 81 milliGRAM(s) Oral daily  atorvastatin 80 milliGRAM(s) Oral at bedtime  chlorhexidine 2% Cloths 1 Application(s) Topical <User Schedule>  clopidogrel Tablet 75 milliGRAM(s) Oral daily  dextrose 50% Injectable 25 Gram(s) IV Push once  DOBUTamine Infusion 5 MICROgram(s)/kG/Min (10.5 mL/Hr) IV Continuous <Continuous>  heparin  Infusion. 800 Unit(s)/Hr (8 mL/Hr) IV Continuous <Continuous>  insulin glargine Injectable (LANTUS) 9 Unit(s) SubCutaneous at bedtime  insulin lispro (ADMELOG) corrective regimen sliding scale   SubCutaneous every 6 hours  levothyroxine Injectable 44 MICROGram(s) IV Push at bedtime  montelukast 10 milliGRAM(s) Oral daily  norepinephrine Infusion 0.05 MICROgram(s)/kG/Min (3.27 mL/Hr) IV Continuous <Continuous>  pantoprazole  Injectable 40 milliGRAM(s) IV Push daily  piperacillin/tazobactam IVPB.. 3.375 Gram(s) IV Intermittent every 12 hours  propofol Infusion 20 MICROgram(s)/kG/Min (7.86 mL/Hr) IV Continuous <Continuous>  sodium bicarbonate 650 milliGRAM(s) Oral every 12 hours  vasopressin Infusion 0.04 Unit(s)/Min (6 mL/Hr) IV Continuous <Continuous>    MEDICATIONS  (PRN):  albuterol/ipratropium for Nebulization 3 milliLiter(s) Nebulizer every 6 hours PRN Shortness of Breath and/or Wheezing  heparin   Injectable 5500 Unit(s) IV Push every 6 hours PRN For aPTT less than 40  heparin   Injectable 2500 Unit(s) IV Push every 6 hours PRN For aPTT between 40 - 57  ondansetron Injectable 4 milliGRAM(s) IV Push every 8 hours PRN Nausea and/or Vomiting  senna 2 Tablet(s) Oral at bedtime PRN Constipation  sodium chloride 0.9% lock flush 10 milliLiter(s) IV Push every 1 hour PRN Pre/post blood products, medications, blood draw, and to maintain line patency      Allergies    No Known Allergies      DOPamine (Hypotension)      Vital Signs Last 24 Hrs  T(C): 36.5 (2024 08:00), Max: 36.5 (2024 03:00)  T(F): 97.7 (2024 08:00), Max: 97.7 (2024 03:00)  HR: 66 (2024 09:15) (60 - 82)  BP: --  BP(mean): --  RR: 16 (2024 08:00) (16 - 17)  SpO2: 100% (2024 09:15) (96% - 100%)    Parameters below as of 2024 08:00  Patient On (Oxygen Delivery Method): ventilator    O2 Concentration (%): 40  Daily     Daily Weight in k.7 (2024 03:00)    PHYSICAL EXAM:    GENERAL: in  icu  bed  with  several  family  members present  HEAD:    EYES: closed  ENMT: intubated  NECK: central line with meds  NERVOUS SYSTEM:  sedated  CHEST/LUNG: no wheezes, intubated  HEART: monitor  noted, ballon pump noted  ABDOMEN: soft  EXTREMITIES:  right  groin pump line  LYMPH:   SKIN: pale  : irlanda, I&O noted    LABS:                        10.4   18.36 )-----------( 162      ( 2024 03:00 )             30.2     06-07    128<L>  |  91<L>  |  70.4<H>  ----------------------------<  186<H>  3.9   |  23.0  |  3.51<H>    Ca    8.1<L>      2024 03:00  Phos  4.6     06-07  Mg     2.0     06-07    TPro  5.8<L>  /  Alb  2.7<L>  /  TBili  0.8  /  DBili  x   /  AST  57<H>  /  ALT  64<H>  /  AlkPhos  143<H>  06-07    PTT - ( 2024 05:05 )  PTT:76.8 sec  Urinalysis Basic - ( 2024 03:00 )    Color: x / Appearance: x / SG: x / pH: x  Gluc: 186 mg/dL / Ketone: x  / Bili: x / Urobili: x   Blood: x / Protein: x / Nitrite: x   Leuk Esterase: x / RBC: x / WBC x   Sq Epi: x / Non Sq Epi: x / Bacteria: x      Magnesium: 2.0 mg/dL ( @ 03:00)  Phosphorus: 4.6 mg/dL ( @ 03:00)  Phosphorus: 5.2 mg/dL ( @ 21:00)  Magnesium: 2.2 mg/dL ( @ 21:00)  Magnesium: 2.2 mg/dL ( @ 15:00)  Phosphorus: 5.6 mg/dL ( @ 15:00)    ABG - ( 2024 11:16 )  pH, Arterial: 7.500 pH, Blood: x     /  pCO2: 27    /  pO2: 75    / HCO3: 21    / Base Excess: -2.1  /  SaO2: 95.5                  RADIOLOGY & ADDITIONAL TESTS:

## 2024-06-07 NOTE — PROGRESS NOTE ADULT - SUBJECTIVE AND OBJECTIVE BOX
Date of entry of this note is equal to the date of services rendered    Patient is a 78y old  Male who presents with a chief complaint of Chest Pain / SOB (06 Jun 2024 12:07)      BRIEF HOSPITAL COURSE: ***    Events last 24 hours: ***    PAST MEDICAL & SURGICAL HISTORY:  GERD (gastroesophageal reflux disease)      High cholesterol      Coronary artery disease involving coronary bypass graft of native heart with unstable angina pectoris      Coronary artery disease involving native coronary artery of native heart, angina presence unspecifie      Type 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, without long-term current use of insul      Essential hypertension      HFrEF (heart failure with reduced ejection fraction)      Stage 4 chronic kidney disease      LBBB (left bundle branch block)      Facial nerve palsy      Hypothyroidism      S/P CABG (coronary artery bypass graft)  4V 1983 [place]      Status post open reduction with internal fixation of fracture      History of insertion of stent into coronary artery bypass graft      History of brachytherapy          Review of Systems:  Negative unless stated      Medications:  piperacillin/tazobactam IVPB.. 3.375 Gram(s) IV Intermittent every 12 hours    aMIOdarone    Tablet 200 milliGRAM(s) Oral daily  DOBUTamine Infusion 5 MICROgram(s)/kG/Min IV Continuous <Continuous>  norepinephrine Infusion 0.05 MICROgram(s)/kG/Min IV Continuous <Continuous>    albuterol/ipratropium for Nebulization 3 milliLiter(s) Nebulizer every 6 hours PRN  montelukast 10 milliGRAM(s) Oral daily    ondansetron Injectable 4 milliGRAM(s) IV Push every 8 hours PRN  propofol Infusion 20 MICROgram(s)/kG/Min IV Continuous <Continuous>      aspirin  chewable 81 milliGRAM(s) Oral daily  clopidogrel Tablet 75 milliGRAM(s) Oral daily  heparin   Injectable 5500 Unit(s) IV Push every 6 hours PRN  heparin   Injectable 2500 Unit(s) IV Push every 6 hours PRN  heparin  Infusion. 800 Unit(s)/Hr IV Continuous <Continuous>    pantoprazole  Injectable 40 milliGRAM(s) IV Push daily  senna 2 Tablet(s) Oral at bedtime PRN      atorvastatin 80 milliGRAM(s) Oral at bedtime  dextrose 50% Injectable 25 Gram(s) IV Push once  insulin glargine Injectable (LANTUS) 9 Unit(s) SubCutaneous at bedtime  insulin lispro (ADMELOG) corrective regimen sliding scale   SubCutaneous every 6 hours  levothyroxine Injectable 44 MICROGram(s) IV Push at bedtime  vasopressin Infusion 0.04 Unit(s)/Min IV Continuous <Continuous>    sodium bicarbonate 650 milliGRAM(s) Oral every 12 hours  sodium chloride 0.9% lock flush 10 milliLiter(s) IV Push every 1 hour PRN      chlorhexidine 2% Cloths 1 Application(s) Topical <User Schedule>        Mode: AC/ CMV (Assist Control/ Continuous Mandatory Ventilation)  RR (machine): 16  TV (machine): 450  FiO2: 40  PEEP: 6  PS:   ITime: 1  MAP: 10  PIP: 25      ICU Vital Signs Last 24 Hrs  T(C): 36.5 (07 Jun 2024 03:00), Max: 36.5 (07 Jun 2024 03:00)  T(F): 97.7 (07 Jun 2024 03:00), Max: 97.7 (07 Jun 2024 03:00)  HR: 69 (07 Jun 2024 04:00) (60 - 82)  BP: --  BP(mean): --  ABP: 151/34 (07 Jun 2024 04:00) (99/33 - 176/48)  ABP(mean): 81 (07 Jun 2024 04:00) (60 - 100)  RR: 16 (07 Jun 2024 04:00) (16 - 16)  SpO2: 100% (07 Jun 2024 04:00) (96% - 100%)    O2 Parameters below as of 07 Jun 2024 04:00  Patient On (Oxygen Delivery Method): ventilator    O2 Concentration (%): 40        ABG - ( 05 Jun 2024 11:16 )  pH, Arterial: 7.500 pH, Blood: x     /  pCO2: 27    /  pO2: 75    / HCO3: 21    / Base Excess: -2.1  /  SaO2: 95.5                I&O's Detail    05 Jun 2024 07:01  -  06 Jun 2024 07:00  --------------------------------------------------------  IN:    Bumetanide: 15 mL    Bumetanide: 35 mL    Bumetanide: 85 mL    DOBUTamine: 220.5 mL    FentaNYL: 41.7 mL    Heparin Infusion: 112 mL    Heparin Infusion: 76 mL    IV PiggyBack: 300 mL    IV PiggyBack: 150 mL    Milrinone: 20.8 mL    Norepinephrine: 526.7 mL    Propofol: 352.3 mL    Vasopressin: 120 mL  Total IN: 2055 mL    OUT:    Indwelling Catheter - Urethral (mL): 2070 mL  Total OUT: 2070 mL    Total NET: -15 mL      06 Jun 2024 07:01  -  07 Jun 2024 04:28  --------------------------------------------------------  IN:    DOBUTamine: 210 mL    Heparin Infusion: 124 mL    IV PiggyBack: 50 mL    IV PiggyBack: 300 mL    IV PiggyBack: 200 mL    Norepinephrine: 307.8 mL    Propofol: 294 mL    Vasopressin: 120 mL  Total IN: 1605.8 mL    OUT:    Bumetanide: 0 mL    Indwelling Catheter - Urethral (mL): 3800 mL  Total OUT: 3800 mL    Total NET: -2194.2 mL            LABS:                        10.4   18.36 )-----------( 162      ( 07 Jun 2024 03:00 )             30.2     06-07    128<L>  |  91<L>  |  70.4<H>  ----------------------------<  186<H>  3.9   |  23.0  |  3.51<H>    Ca    8.1<L>      07 Jun 2024 03:00  Phos  4.6     06-07  Mg     2.0     06-07    TPro  5.8<L>  /  Alb  2.7<L>  /  TBili  0.8  /  DBili  x   /  AST  57<H>  /  ALT  64<H>  /  AlkPhos  143<H>  06-07          CAPILLARY BLOOD GLUCOSE      POCT Blood Glucose.: 231 mg/dL (06 Jun 2024 23:14)    PTT - ( 06 Jun 2024 21:59 )  PTT:106.7 sec  Urinalysis Basic - ( 07 Jun 2024 03:00 )    Color: x / Appearance: x / SG: x / pH: x  Gluc: 186 mg/dL / Ketone: x  / Bili: x / Urobili: x   Blood: x / Protein: x / Nitrite: x   Leuk Esterase: x / RBC: x / WBC x   Sq Epi: x / Non Sq Epi: x / Bacteria: x      CULTURES:  Culture Results:   No growth at 24 hours (06-05 @ 05:52)  Culture Results:   No growth at 24 hours (06-05 @ 05:50)      Physical Examination:    General: No acute distress.      HEENT: Pupils equal, reactive to light.  Symmetric.    PULM: Clear to auscultation bilaterally, no significant sputum production    CVS: Regular rate and rhythm    ABD: Soft, nondistended, nontender    SKIN: Warm     NEURO: sedated. Following commands      < from: Xray Chest 1 View- PORTABLE-Urgent (Xray Chest 1 View- PORTABLE-Urgent .) (06.06.24 @ 11:18) >  IMPRESSION: Tubes remain. Persistent atelectatic consolidation left lower   lobe. Some small right lung infiltrates suggested some improvement.    --- End of Report ---            JAZMYNE GODWIN MD; Attending Radiologist  This document has been electronically signed. Jun 6 2024  1:46PM    < end of copied text >         Date of entry of this note is equal to the date of services rendered    Patient is a 78y old  Male who presents with a chief complaint of Chest Pain / SOB (06 Jun 2024 12:07)      Events last 24 hours: Shock state. IABP augmenting well.     PAST MEDICAL & SURGICAL HISTORY:  GERD (gastroesophageal reflux disease)      High cholesterol      Coronary artery disease involving coronary bypass graft of native heart with unstable angina pectoris      Coronary artery disease involving native coronary artery of native heart, angina presence unspecifie      Type 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, without long-term current use of insul      Essential hypertension      HFrEF (heart failure with reduced ejection fraction)      Stage 4 chronic kidney disease      LBBB (left bundle branch block)      Facial nerve palsy      Hypothyroidism      S/P CABG (coronary artery bypass graft)  4V 1983 [place]      Status post open reduction with internal fixation of fracture      History of insertion of stent into coronary artery bypass graft      History of brachytherapy          Review of Systems:  Negative unless stated      Medications:  piperacillin/tazobactam IVPB.. 3.375 Gram(s) IV Intermittent every 12 hours    aMIOdarone    Tablet 200 milliGRAM(s) Oral daily  DOBUTamine Infusion 5 MICROgram(s)/kG/Min IV Continuous <Continuous>  norepinephrine Infusion 0.05 MICROgram(s)/kG/Min IV Continuous <Continuous>    albuterol/ipratropium for Nebulization 3 milliLiter(s) Nebulizer every 6 hours PRN  montelukast 10 milliGRAM(s) Oral daily    ondansetron Injectable 4 milliGRAM(s) IV Push every 8 hours PRN  propofol Infusion 20 MICROgram(s)/kG/Min IV Continuous <Continuous>      aspirin  chewable 81 milliGRAM(s) Oral daily  clopidogrel Tablet 75 milliGRAM(s) Oral daily  heparin   Injectable 5500 Unit(s) IV Push every 6 hours PRN  heparin   Injectable 2500 Unit(s) IV Push every 6 hours PRN  heparin  Infusion. 800 Unit(s)/Hr IV Continuous <Continuous>    pantoprazole  Injectable 40 milliGRAM(s) IV Push daily  senna 2 Tablet(s) Oral at bedtime PRN      atorvastatin 80 milliGRAM(s) Oral at bedtime  dextrose 50% Injectable 25 Gram(s) IV Push once  insulin glargine Injectable (LANTUS) 9 Unit(s) SubCutaneous at bedtime  insulin lispro (ADMELOG) corrective regimen sliding scale   SubCutaneous every 6 hours  levothyroxine Injectable 44 MICROGram(s) IV Push at bedtime  vasopressin Infusion 0.04 Unit(s)/Min IV Continuous <Continuous>    sodium bicarbonate 650 milliGRAM(s) Oral every 12 hours  sodium chloride 0.9% lock flush 10 milliLiter(s) IV Push every 1 hour PRN      chlorhexidine 2% Cloths 1 Application(s) Topical <User Schedule>        Mode: AC/ CMV (Assist Control/ Continuous Mandatory Ventilation)  RR (machine): 16  TV (machine): 450  FiO2: 40  PEEP: 6  PS:   ITime: 1  MAP: 10  PIP: 25      ICU Vital Signs Last 24 Hrs  T(C): 36.5 (07 Jun 2024 03:00), Max: 36.5 (07 Jun 2024 03:00)  T(F): 97.7 (07 Jun 2024 03:00), Max: 97.7 (07 Jun 2024 03:00)  HR: 69 (07 Jun 2024 04:00) (60 - 82)  BP: --  BP(mean): --  ABP: 151/34 (07 Jun 2024 04:00) (99/33 - 176/48)  ABP(mean): 81 (07 Jun 2024 04:00) (60 - 100)  RR: 16 (07 Jun 2024 04:00) (16 - 16)  SpO2: 100% (07 Jun 2024 04:00) (96% - 100%)    O2 Parameters below as of 07 Jun 2024 04:00  Patient On (Oxygen Delivery Method): ventilator    O2 Concentration (%): 40        ABG - ( 05 Jun 2024 11:16 )  pH, Arterial: 7.500 pH, Blood: x     /  pCO2: 27    /  pO2: 75    / HCO3: 21    / Base Excess: -2.1  /  SaO2: 95.5                I&O's Detail    05 Jun 2024 07:01  -  06 Jun 2024 07:00  --------------------------------------------------------  IN:    Bumetanide: 15 mL    Bumetanide: 35 mL    Bumetanide: 85 mL    DOBUTamine: 220.5 mL    FentaNYL: 41.7 mL    Heparin Infusion: 112 mL    Heparin Infusion: 76 mL    IV PiggyBack: 300 mL    IV PiggyBack: 150 mL    Milrinone: 20.8 mL    Norepinephrine: 526.7 mL    Propofol: 352.3 mL    Vasopressin: 120 mL  Total IN: 2055 mL    OUT:    Indwelling Catheter - Urethral (mL): 2070 mL  Total OUT: 2070 mL    Total NET: -15 mL      06 Jun 2024 07:01  -  07 Jun 2024 04:28  --------------------------------------------------------  IN:    DOBUTamine: 210 mL    Heparin Infusion: 124 mL    IV PiggyBack: 50 mL    IV PiggyBack: 300 mL    IV PiggyBack: 200 mL    Norepinephrine: 307.8 mL    Propofol: 294 mL    Vasopressin: 120 mL  Total IN: 1605.8 mL    OUT:    Bumetanide: 0 mL    Indwelling Catheter - Urethral (mL): 3800 mL  Total OUT: 3800 mL    Total NET: -2194.2 mL            LABS:                        10.4   18.36 )-----------( 162      ( 07 Jun 2024 03:00 )             30.2     06-07    128<L>  |  91<L>  |  70.4<H>  ----------------------------<  186<H>  3.9   |  23.0  |  3.51<H>    Ca    8.1<L>      07 Jun 2024 03:00  Phos  4.6     06-07  Mg     2.0     06-07    TPro  5.8<L>  /  Alb  2.7<L>  /  TBili  0.8  /  DBili  x   /  AST  57<H>  /  ALT  64<H>  /  AlkPhos  143<H>  06-07          CAPILLARY BLOOD GLUCOSE      POCT Blood Glucose.: 231 mg/dL (06 Jun 2024 23:14)    PTT - ( 06 Jun 2024 21:59 )  PTT:106.7 sec  Urinalysis Basic - ( 07 Jun 2024 03:00 )    Color: x / Appearance: x / SG: x / pH: x  Gluc: 186 mg/dL / Ketone: x  / Bili: x / Urobili: x   Blood: x / Protein: x / Nitrite: x   Leuk Esterase: x / RBC: x / WBC x   Sq Epi: x / Non Sq Epi: x / Bacteria: x      CULTURES:  Culture Results:   No growth at 24 hours (06-05 @ 05:52)  Culture Results:   No growth at 24 hours (06-05 @ 05:50)      Physical Examination:    General: No acute distress.      HEENT: Pupils equal, reactive to light.  Symmetric.    PULM: Clear to auscultation bilaterally, no significant sputum production    CVS: Regular rate and rhythm    ABD: Soft, nondistended, nontender    SKIN: Warm     NEURO: sedated. Following commands      < from: Xray Chest 1 View- PORTABLE-Urgent (Xray Chest 1 View- PORTABLE-Urgent .) (06.06.24 @ 11:18) >  IMPRESSION: Tubes remain. Persistent atelectatic consolidation left lower   lobe. Some small right lung infiltrates suggested some improvement.    --- End of Report ---            JAZMYNE GODWIN MD; Attending Radiologist  This document has been electronically signed. Jun 6 2024  1:46PM    < end of copied text >

## 2024-06-07 NOTE — PROGRESS NOTE ADULT - CONVERSATION DETAILS
Spouse and 2 sons at bedside today during visit with Palliative SW  Spouse defers to son David as primary spokesperson who then speaks with the rest of the family.   Reviewed hospital course thus far including most recent events including but not limited to 3rd cardiac arrest, multisystem organ involvement, shock on triple vasopressor support and s/p IABP, acute respiratory failure on vent dependence, renal dysfunction, pna, complex comorbidities (severe CAD, CHF) and overall guarded prognosis at best. Family understands that he remains critical ill and they are taking it day by day at this time. We tried to prepare them of the overall tenuous clinical status with high risk factors of potential further decompensation/mortality despite active treatments. Writer also encouraged ongoing discussions regarding advance directives. Family acknowledged understanding and remain hopeful of meaningful recovery with eventual wean off ventilator support. Psychosocial support provided and all questions answered

## 2024-06-07 NOTE — PROGRESS NOTE ADULT - ASSESSMENT
78 yr old male with hx of multiple STEMI/ NSTEMI, CAD s/p 4V CABG with multiple stents and brachytherapy, HFrEF, IDDM2, CKD3-4,, HTN, HLD admittd on 5/23 for atypical chest pain after recent holter monitor placement, found also with NSTEMI and RAS on CKD complicated further by cardiac arrest x 3, s/p LHC with occlusion of all SVGs except LIMA to LAD with no target point of PCI, acute respiratory failure currently intubated/sedated, cardiogenic shock on vasopressors, worsening renal function with possible need for RRT in the near future, s/p IABP and overall guarded prognosis at best. Palliative consulted for support and to assist with ongoing goals of care.     Post Cardiac Arrest   - s/p cardiac arrest x 3 this admission (5/25 ~3 min, 5/27 ~1 min, 6/4 ~8min)  - CTH reviewed, no acute pathology  - currently not hemodynamically stable to get MRI brain  - mgnt per ICU    Cardiogenic Shock  Acute Decompensated HFrEF, CAD  - remains on triple vasopressor support   - s/p LHC 5/25 with occlusion of all SVGs except LIMA to LAD with no target point of PCI  - s/p RHC 6/4 with elevated filling pressures, low CI  - s/p IABP 6/5  - cardio and CHF team following, inputs appreciated; GDMT limited due to worsening renal dysfunction    Acute Respiratory Failure  - remains intubated on vent support, sedated for vent dyssynchrony, wean/SBT as tolerated    RAS/CKD  - slight improvement in renal indices but still with poor overall urine output  - nephrology following, likely need for RRT if renal function continues to worsen  - avoid nephrotoxic agents    Multiorgan Failure  - multiple organ dysfunction, poor prognostic factor    Palliative Care Encounter  - FULL CODE, prior DNR/DNI status rescinded after pt recovered from second cardiac arrest  - Per chart and staff, total of 13 children -->family reported son David is hcp, advised to bring in copy for our records  - Emotional support provided to grandson at bedside today. He states family has been getting daily updates from medical team and no further questions at this time. Family is taking it day by day, hopeful for meaningful clinical improvement with time. All questions answered.     78 yr old male with hx of multiple STEMI/ NSTEMI, CAD s/p 4V CABG with multiple stents and brachytherapy, HFrEF, IDDM2, CKD3-4,, HTN, HLD admittd on 5/23 for atypical chest pain after recent holter monitor placement, found also with NSTEMI and RAS on CKD complicated further by cardiac arrest x 3, s/p LHC with occlusion of all SVGs except LIMA to LAD with no target point of PCI, acute respiratory failure currently intubated/sedated, cardiogenic shock on vasopressors, worsening renal function with possible need for RRT in the near future, s/p IABP and overall guarded prognosis at best. Palliative consulted for support and to assist with ongoing goals of care.     Post Cardiac Arrest   - s/p cardiac arrest x 3 this admission (5/25 ~3 min, 5/27 ~1 min, 6/4 ~8min)  - CTH reviewed, no acute pathology  - mgnt per ICU    Cardiogenic Shock  Acute Decompensated HFrEF, CAD  - remains triple vasopressor dependent   - s/p LHC 5/25 with occlusion of all SVGs except LIMA to LAD with no target point of PCI  - s/p RHC 6/4 with elevated filling pressures, low CI  - s/p IABP 6/5  - cardio and CHF team following, inputs appreciated; GDMT limited due to worsening renal dysfunction    Acute Respiratory Failure  - remains intubated on vent support, sedated for vent dyssynchrony, wean/SBT as tolerated  - per staff, does follow some commands off sedation    RAS/CKD  - renal indices slowly improving  - nephrology following, no current need for RRT  - avoid nephrotoxic agents    Multiorgan Failure  - multiple organ dysfunction, poor overall prognostic factor    Palliative Care Encounter  - FULL CODE, prior DNR/DNI status rescinded after pt recovered from second cardiac arrest  - Per chart and staff, total of 13 children --> spouse and family defer to son David as primary spokesperson. David report there is no formal HCP  - see goc above. Family aware of guarded prognosis given multisystem involvement. They remain hopeful for further clinical improvement with time.  - GOC: continue pursuit of all medical interventions without limitations, FULL CODE

## 2024-06-07 NOTE — CHART NOTE - NSCHARTNOTEFT_GEN_A_CORE
Palliative care social work note.     and palliative care physician met with patients 2 sons and spouse at bedside. Wife and family have deferred to son David s primary decision maker. Patient has 13 children 9 of whom are sons. Support offered in coping with traumatic events and catastrophic issues as well as addressed cultural concerns and management of patient needs. David verbalized his understanding of medical events and was verbalizing optimism in saying " he is getting better". Education provided regarding extent of 3 cardiac arrests, down time, current 3 pressors, vent and balloon pump. Palliative care team attempted to advise family of guarded prognosis and assist family in preparing of high probability for further decline. David, patients son states " it will take a few days to wean him off sedation and vent". Additional clarification provided regarding pressors and balloon pump as steps first needed to be attempted to wean and only if remains stable then may be able to take next steps however also advised of severity of condition that is warranting this level of care.

## 2024-06-07 NOTE — PROGRESS NOTE ADULT - SUBJECTIVE AND OBJECTIVE BOX
[facility] PALLIATIVE MEDICINE     CC: FOLLOW UP VISIT + GOC    INTERVAL HPI/OVERNIGHT EVENTS:  Source if other than patient:  []Family   [x]Team     Remains vented and vasopressor dependent   Family (spouse, sons including David - designated spokesperson) present.   Pt seen with palliative SW     PRESENT SYMPTOMS:     Dyspnea: vented  Nausea/Vomiting:  No  Anxiety:   No  Depression: unable to obtain  Fatigue: Yes   Loss of appetite: npo  Constipation: No    Pain: no overt sign of pain            Character-            Duration-            Effect-            Factors-            Frequency-            Location-            Severity-    Pain AD Score:  [email or web address] (press ctrl + left click to view)    Review of Systems:  Unable to obtain due to poor mentation/encephalopathy     MEDICATIONS  (STANDING):  aMIOdarone    Tablet 200 milliGRAM(s) Oral daily  aspirin  chewable 81 milliGRAM(s) Oral daily  atorvastatin 80 milliGRAM(s) Oral at bedtime  chlorhexidine 2% Cloths 1 Application(s) Topical <User Schedule>  clopidogrel Tablet 75 milliGRAM(s) Oral daily  dextrose 50% Injectable 25 Gram(s) IV Push once  DOBUTamine Infusion 5 MICROgram(s)/kG/Min (10.5 mL/Hr) IV Continuous <Continuous>  heparin  Infusion. 800 Unit(s)/Hr (8 mL/Hr) IV Continuous <Continuous>  insulin glargine Injectable (LANTUS) 9 Unit(s) SubCutaneous at bedtime  insulin lispro (ADMELOG) corrective regimen sliding scale   SubCutaneous every 6 hours  levothyroxine Injectable 44 MICROGram(s) IV Push at bedtime  montelukast 10 milliGRAM(s) Oral daily  norepinephrine Infusion 0.05 MICROgram(s)/kG/Min (3.27 mL/Hr) IV Continuous <Continuous>  pantoprazole  Injectable 40 milliGRAM(s) IV Push daily  piperacillin/tazobactam IVPB.. 3.375 Gram(s) IV Intermittent every 12 hours  propofol Infusion 20 MICROgram(s)/kG/Min (7.86 mL/Hr) IV Continuous <Continuous>  sodium bicarbonate 650 milliGRAM(s) Oral every 12 hours  vasopressin Infusion 0.04 Unit(s)/Min (6 mL/Hr) IV Continuous <Continuous>    MEDICATIONS  (PRN):  albuterol/ipratropium for Nebulization 3 milliLiter(s) Nebulizer every 6 hours PRN Shortness of Breath and/or Wheezing  heparin   Injectable 5500 Unit(s) IV Push every 6 hours PRN For aPTT less than 40  heparin   Injectable 2500 Unit(s) IV Push every 6 hours PRN For aPTT between 40 - 57  ondansetron Injectable 4 milliGRAM(s) IV Push every 8 hours PRN Nausea and/or Vomiting  senna 2 Tablet(s) Oral at bedtime PRN Constipation  sodium chloride 0.9% lock flush 10 milliLiter(s) IV Push every 1 hour PRN Pre/post blood products, medications, blood draw, and to maintain line patency    PHYSICAL EXAM:    Vital Signs Last 24 Hrs  T(C): 36.5 (07 Jun 2024 08:00), Max: 36.5 (07 Jun 2024 03:00)  T(F): 97.7 (07 Jun 2024 08:00), Max: 97.7 (07 Jun 2024 03:00)  HR: 66 (07 Jun 2024 10:15) (60 - 82)  BP: --  BP(mean): --  RR: 16 (07 Jun 2024 08:00) (16 - 17)  SpO2: 100% (07 Jun 2024 10:15) (96% - 100%)    Parameters below as of 07 Jun 2024 08:00  Patient On (Oxygen Delivery Method): ventilator    O2 Concentration (%): 40    Karnofsky:  10%    GEN: frail. resting comfortably and no acute distress    HEENT: mucous membrane moist  +ETT    Lungs: comfortable, nonlabored      CV: +s1/s2 regular rate and rhythm      GI: +BS, abdomen soft, nontender, nondistended      :  oliguria    MSK:  no cyanosis or edema +bedbound     NEURO: nonfocal. sedated/ lethargic    Skin: warm and dry.     LABS: Reviewed                          10.4   18.36 )-----------( 162      ( 07 Jun 2024 03:00 )             30.2     06-07    128<L>  |  91<L>  |  70.4<H>  ----------------------------<  186<H>  3.9   |  23.0  |  3.51<H>    Ca    8.1<L>      07 Jun 2024 03:00  Phos  4.6     06-07  Mg     2.0     06-07    TPro  5.8<L>  /  Alb  2.7<L>  /  TBili  0.8  /  DBili  x   /  AST  57<H>  /  ALT  64<H>  /  AlkPhos  143<H>  06-07        RADIOLOGY & ADDITIONAL STUDIES: Reviewed     [facility]    ACC: [id number] EXAM:  CT BRAIN   ORDERED BY: SOFIESARAH HOFFMAN     PROCEDURE DATE:  06/05/2024          INTERPRETATION:  .    CLINICAL INFORMATION: Critically ill.    TECHNIQUE: Multiple axial CT images of the head were obtained without   contrast. Sagittal and coronal reconstructed images were acquired from   the source data.    COMPARISON: Prior contrast enhanced brain and IAC MRI study dated   4/8/2021.    FINDINGS: There is no acute intracranial hemorrhage, mass effect, shift   of the midline structures, herniation, extra-axial fluid collection, or   hydrocephalus.    A chronic lacunar infarct is seen within the left thalamus.    There is diffuse cerebral volume loss with prominence of the sulci,   fissures, and cisternal spaces which is normal for the patient's age.   There is mild deep and periventricular white matter hypoattenuation   statistically compatible with microvascular changes given calcific   atherosclerotic disease of the intracranial arteries.    The paranasal sinuses and tympanomastoid cavities are clear. The   calvarium is intact. There is evidence of bilateral cataract removal.    IMPRESSION: No acute intracranial hemorrhage, mass effect, or shift of   the midline structures.    --- End of Report ---  SANIYA BURGESS MD; Attending Radiologist  This document has been electronically signed. Jun 5 2024  2:22PM      ADVANCE DIRECTIVES/TREATMENT PREFERENCES: FULL CODE    NEUROLOGICAL MEDICATIONS/OPIOIDS/BENZODIAZEPINE IN PAST 24 HOURS:    propofol Infusion   7.86 mL/Hr IV Continuous (06-06-24 @ 04:01)   7.86 mL/Hr IV Continuous (06-05-24 @ 23:20)   7.86 mL/Hr IV Continuous (06-05-24 @ 17:42)     [facility] PALLIATIVE MEDICINE     CC: FOLLOW UP VISIT + GOC    INTERVAL HPI/OVERNIGHT EVENTS:  Source if other than patient:  []Family   [x]Team     Remains vented and vasopressor dependent   Family (spouse, sons including David - designated spokesperson) present.   Pt seen with palliative SW     PRESENT SYMPTOMS:     Dyspnea: vented  Nausea/Vomiting:  No  Anxiety:   No  Depression: unable to obtain  Fatigue: Yes   Loss of appetite: npo  Constipation: No    Pain: no overt sign of pain            Character-            Duration-            Effect-            Factors-            Frequency-            Location-            Severity-    Pain AD Score:  [email or web address] (press ctrl + left click to view)    Review of Systems:  Unable to obtain due to poor mentation/encephalopathy     MEDICATIONS  (STANDING):  aMIOdarone    Tablet 200 milliGRAM(s) Oral daily  aspirin  chewable 81 milliGRAM(s) Oral daily  atorvastatin 80 milliGRAM(s) Oral at bedtime  chlorhexidine 2% Cloths 1 Application(s) Topical <User Schedule>  clopidogrel Tablet 75 milliGRAM(s) Oral daily  dextrose 50% Injectable 25 Gram(s) IV Push once  DOBUTamine Infusion 5 MICROgram(s)/kG/Min (10.5 mL/Hr) IV Continuous <Continuous>  heparin  Infusion. 800 Unit(s)/Hr (8 mL/Hr) IV Continuous <Continuous>  insulin glargine Injectable (LANTUS) 9 Unit(s) SubCutaneous at bedtime  insulin lispro (ADMELOG) corrective regimen sliding scale   SubCutaneous every 6 hours  levothyroxine Injectable 44 MICROGram(s) IV Push at bedtime  montelukast 10 milliGRAM(s) Oral daily  norepinephrine Infusion 0.05 MICROgram(s)/kG/Min (3.27 mL/Hr) IV Continuous <Continuous>  pantoprazole  Injectable 40 milliGRAM(s) IV Push daily  piperacillin/tazobactam IVPB.. 3.375 Gram(s) IV Intermittent every 12 hours  propofol Infusion 20 MICROgram(s)/kG/Min (7.86 mL/Hr) IV Continuous <Continuous>  sodium bicarbonate 650 milliGRAM(s) Oral every 12 hours  vasopressin Infusion 0.04 Unit(s)/Min (6 mL/Hr) IV Continuous <Continuous>    MEDICATIONS  (PRN):  albuterol/ipratropium for Nebulization 3 milliLiter(s) Nebulizer every 6 hours PRN Shortness of Breath and/or Wheezing  heparin   Injectable 5500 Unit(s) IV Push every 6 hours PRN For aPTT less than 40  heparin   Injectable 2500 Unit(s) IV Push every 6 hours PRN For aPTT between 40 - 57  ondansetron Injectable 4 milliGRAM(s) IV Push every 8 hours PRN Nausea and/or Vomiting  senna 2 Tablet(s) Oral at bedtime PRN Constipation  sodium chloride 0.9% lock flush 10 milliLiter(s) IV Push every 1 hour PRN Pre/post blood products, medications, blood draw, and to maintain line patency    PHYSICAL EXAM:    Vital Signs Last 24 Hrs  T(C): 36.5 (07 Jun 2024 08:00), Max: 36.5 (07 Jun 2024 03:00)  T(F): 97.7 (07 Jun 2024 08:00), Max: 97.7 (07 Jun 2024 03:00)  HR: 66 (07 Jun 2024 10:15) (60 - 82)  BP: --  BP(mean): --  RR: 16 (07 Jun 2024 08:00) (16 - 17)  SpO2: 100% (07 Jun 2024 10:15) (96% - 100%)    Parameters below as of 07 Jun 2024 08:00  Patient On (Oxygen Delivery Method): ventilator    O2 Concentration (%): 40    Karnofsky:  10%    GEN: frail. resting comfortably and no acute distress    HEENT: mucous membrane moist  +ETT    Lungs: comfortable, nonlabored      CV: +s1/s2 regular rate and rhythm      GI: +BS, abdomen soft, nontender, nondistended      :  oliguria    MSK:  no cyanosis or edema +bedbound     NEURO: nonfocal. sedated nonverbal    Skin: warm and dry.     LABS: Reviewed                          10.4   18.36 )-----------( 162      ( 07 Jun 2024 03:00 )             30.2     06-07    128<L>  |  91<L>  |  70.4<H>  ----------------------------<  186<H>  3.9   |  23.0  |  3.51<H>    Ca    8.1<L>      07 Jun 2024 03:00  Phos  4.6     06-07  Mg     2.0     06-07    TPro  5.8<L>  /  Alb  2.7<L>  /  TBili  0.8  /  DBili  x   /  AST  57<H>  /  ALT  64<H>  /  AlkPhos  143<H>  06-07        RADIOLOGY & ADDITIONAL STUDIES: Reviewed      CXR    ACC: [id number] EXAM:  XR CHEST PORTABLE URGENT 1V   ORDERED BY: KATIE CONDE     ACC: [id number] EXAM:  XR CHEST PORTABLE URGENT 1V   ORDERED BY: DULCE MARIA ALLRED     PROCEDURE DATE:  06/06/2024          INTERPRETATION:  AP chest on June 6, 2024 4:59 AM. Patient requires   intubation.    Heart magnified by technique.    Sternotomy, endotracheal tube, nasogastric tube, left-sided   defibrillator, left coronary stent, and [place] device during from the   right jugular area with tip in the right main pulmonary artery again   noted.    There is increasing atelectasis left lower lobe infiltrate compared to   June 5. On June 5 there was a right upper and right lower lobe infiltrate   which shows stability in the right upper lobe improvement at the right   base.    Follow-up AP chest on June 6, 2024 at 11:10 AM.    Right base infiltrate shows improvement. Tubes remain. Persistent   atelectatic consolidation left lower lobe.    IMPRESSION: Tubes remain. Persistent atelectatic consolidation left lower   lobe. Some small right lung infiltrates suggested some improvement.    --- End of Report ---    JAZMYNE GODWIN MD; Attending Radiologist  This document has been electronically signed. Jun 6 2024  1:46PM    ADVANCE DIRECTIVES/TREATMENT PREFERENCES: FULL CODE     NEUROLOGICAL MEDICATIONS/OPIOIDS/BENZODIAZEPINE IN PAST 24 HOURS:    propofol Infusion   7.86 mL/Hr IV Continuous (06-06-24 @ 23:09)

## 2024-06-07 NOTE — PROGRESS NOTE ADULT - ASSESSMENT
Patient is a 78 year old male with history of HTN, HLD, DM, CKD, CAD s/p 4V CABG and multiple PCI's, ischemic cardiomyopathy (EF < 20%), and LBBB s/p MDT CRT-D. He had an admission in November for ADHF. At that time a LHC revealed his grafts were occluded except for the LIMA to LAD. He also underwent a CRT-D implant.     On 5/25 he had a VT arrest with ROSC achieved after 3 minutes was sent to the cath lab which showed his LIMA is still patent. Patient was extubated on 5/27. RHC on 5/28 showed severely elevated biv filling pressures, severe Cpc-PH, and low CO. He was started on low dose milrinone and diuresed well on IV Bumex. Weaned off pressors 5/29 and Milrinone was weaned off 5/31.    Renal function worsening (sCr 4.65) therefore RHC performed 6/4 which revealed significantly elevated filling pressures w/ low CI. Milrinone was resumed at 0.25 mcg/kg/min as well as Bumex gtt. On 6/5 he had a PEA arrest overnight. IABP was placed and he remains intubated/sedated in MICU on levo/vaso/ gtts.     Bedside Hemodynamics:  5/31 (mil 0.125): CVP 9-10, PA 72/24/43, PA sat 70.7%, Fauzia CO/CI 4.6/2.6, /56 (73), SVR 1096 dsc.    Cardiac Studies:  6/4/24 RHC: RA 29/26/24, RV 83/12/23, PA 81/31/47 PCWP 32, PA sat 42.4, Fauzia CO/CI 2.75/1.8.    5/28/24 RHC: RA 18, RV 92/18, PA 95/38/57, PCW 31, PA sat 51.5%, Fauzia CO/CI 3.09/1.74, TPG 26, DPG 7, SVR 1889 dsc, PVR 8.41 MEYERS, Vidhi 3.2, /63 (91) on levo 0.08.    5/25/24 LHC: All grafts occluded except LIMA-LAD. LVEDP 29 mmHg.    5/23/24 TTE: LVEF <20%, LVIDd 5.8cm, mild RVE with mild RV dysfunction, TAPSE 0.7cm, mild-mod AS, mild-mod MR, mild TR, mild WY, est PASP 75 mmHg.

## 2024-06-07 NOTE — PROGRESS NOTE ADULT - SUBJECTIVE AND OBJECTIVE BOX
Interval History: no acute events    Medications:  albuterol/ipratropium for Nebulization 3 milliLiter(s) Nebulizer every 6 hours PRN  aMIOdarone    Tablet 200 milliGRAM(s) Oral daily  aspirin  chewable 81 milliGRAM(s) Oral daily  atorvastatin 80 milliGRAM(s) Oral at bedtime  chlorhexidine 2% Cloths 1 Application(s) Topical <User Schedule>  clopidogrel Tablet 75 milliGRAM(s) Oral daily  dextrose 50% Injectable 25 Gram(s) IV Push once  DOBUTamine Infusion 5 MICROgram(s)/kG/Min IV Continuous <Continuous>  heparin   Injectable 5500 Unit(s) IV Push every 6 hours PRN  heparin   Injectable 2500 Unit(s) IV Push every 6 hours PRN  heparin  Infusion. 800 Unit(s)/Hr IV Continuous <Continuous>  insulin glargine Injectable (LANTUS) 9 Unit(s) SubCutaneous at bedtime  insulin lispro (ADMELOG) corrective regimen sliding scale   SubCutaneous every 6 hours  levothyroxine Injectable 44 MICROGram(s) IV Push at bedtime  montelukast 10 milliGRAM(s) Oral daily  norepinephrine Infusion 0.05 MICROgram(s)/kG/Min IV Continuous <Continuous>  ondansetron Injectable 4 milliGRAM(s) IV Push every 8 hours PRN  pantoprazole  Injectable 40 milliGRAM(s) IV Push daily  piperacillin/tazobactam IVPB.. 3.375 Gram(s) IV Intermittent every 12 hours  potassium chloride  10 mEq/100 mL IVPB 10 milliEquivalent(s) IV Intermittent every 1 hour  propofol Infusion 20 MICROgram(s)/kG/Min IV Continuous <Continuous>  senna 2 Tablet(s) Oral at bedtime PRN  sodium bicarbonate 650 milliGRAM(s) Oral every 12 hours  sodium chloride 0.9% lock flush 10 milliLiter(s) IV Push every 1 hour PRN  vasopressin Infusion 0.04 Unit(s)/Min IV Continuous <Continuous>      Vitals:  T(C): 36.5 (24 @ 12:00), Max: 36.5 (24 @ 03:00)  HR: 70 (24 @ 14:45) (60 - 82)  BP: --  BP(mean): --  RR: 16 (24 @ 08:00) (16 - 17)  SpO2: 100% (24 @ 14:45) (96% - 100%)    Daily     Daily Weight in k.7 (2024 03:00)        I&O's Summary    2024 07:01  -  2024 07:00  --------------------------------------------------------  IN: 1800.8 mL / OUT: 4300 mL / NET: -2499.2 mL    2024 07:01  -  2024 15:16  --------------------------------------------------------  IN: 335.4 mL / OUT: 600 mL / NET: -264.6 mL        Physical Exam:  Appearance: No Acute Distress  HEENT: PERRL  Neck: No JVD  Cardiovascular: Normal S1 S2, No murmurs/rubs/gallops  Respiratory: Clear to auscultation bilaterally  Gastrointestinal: Soft, Non-tender	  Skin: No cyanosis	  Neurologic: Non-focal  Extremities: No LE edema  Psychiatry: A & O x 3, Mood & affect appropriate    Labs:                        10.4   18.36 )-----------( 162      ( 2024 03:00 )             30.2     06-07    132<L>  |  95<L>  |  63.0<H>  ----------------------------<  172<H>  3.7   |  21.0<L>  |  2.98<H>    Ca    8.0<L>      2024 11:50  Phos  3.9     06-07  Mg     2.0     06-07    TPro  5.5<L>  /  Alb  2.6<L>  /  TBili  0.8  /  DBili  x   /  AST  47<H>  /  ALT  52<H>  /  AlkPhos  118  06-07    PTT - ( 2024 11:50 )  PTT:70.4 sec

## 2024-06-08 LAB
ALBUMIN SERPL ELPH-MCNC: 2.5 G/DL — LOW (ref 3.3–5.2)
ALBUMIN SERPL ELPH-MCNC: 2.6 G/DL — LOW (ref 3.3–5.2)
ALBUMIN SERPL ELPH-MCNC: 2.6 G/DL — LOW (ref 3.3–5.2)
ALBUMIN SERPL ELPH-MCNC: 2.8 G/DL — LOW (ref 3.3–5.2)
ALP SERPL-CCNC: 103 U/L — SIGNIFICANT CHANGE UP (ref 40–120)
ALP SERPL-CCNC: 108 U/L — SIGNIFICANT CHANGE UP (ref 40–120)
ALP SERPL-CCNC: 116 U/L — SIGNIFICANT CHANGE UP (ref 40–120)
ALP SERPL-CCNC: 98 U/L — SIGNIFICANT CHANGE UP (ref 40–120)
ALT FLD-CCNC: 40 U/L — SIGNIFICANT CHANGE UP
ALT FLD-CCNC: 43 U/L — HIGH
ALT FLD-CCNC: 47 U/L — HIGH
ALT FLD-CCNC: 47 U/L — HIGH
ANION GAP SERPL CALC-SCNC: 13 MMOL/L — SIGNIFICANT CHANGE UP (ref 5–17)
ANION GAP SERPL CALC-SCNC: 13 MMOL/L — SIGNIFICANT CHANGE UP (ref 5–17)
ANION GAP SERPL CALC-SCNC: 14 MMOL/L — SIGNIFICANT CHANGE UP (ref 5–17)
ANION GAP SERPL CALC-SCNC: 17 MMOL/L — SIGNIFICANT CHANGE UP (ref 5–17)
APTT BLD: 70.7 SEC — HIGH (ref 24.5–35.6)
AST SERPL-CCNC: 48 U/L — HIGH
AST SERPL-CCNC: 49 U/L — HIGH
AST SERPL-CCNC: 49 U/L — HIGH
AST SERPL-CCNC: 50 U/L — HIGH
BASE EXCESS BLDV CALC-SCNC: -0.8 MMOL/L — SIGNIFICANT CHANGE UP (ref -2–3)
BASE EXCESS BLDV CALC-SCNC: 0 MMOL/L — SIGNIFICANT CHANGE UP (ref -2–3)
BASE EXCESS BLDV CALC-SCNC: 0.3 MMOL/L — SIGNIFICANT CHANGE UP (ref -2–3)
BASE EXCESS BLDV CALC-SCNC: 2.5 MMOL/L — SIGNIFICANT CHANGE UP (ref -2–3)
BASOPHILS # BLD AUTO: 0.04 K/UL — SIGNIFICANT CHANGE UP (ref 0–0.2)
BASOPHILS NFR BLD AUTO: 0.4 % — SIGNIFICANT CHANGE UP (ref 0–2)
BILIRUB SERPL-MCNC: 0.6 MG/DL — SIGNIFICANT CHANGE UP (ref 0.4–2)
BILIRUB SERPL-MCNC: 0.7 MG/DL — SIGNIFICANT CHANGE UP (ref 0.4–2)
BLOOD GAS COMMENTS, VENOUS: SIGNIFICANT CHANGE UP
BLOOD GAS COMMENTS, VENOUS: SIGNIFICANT CHANGE UP
BUN SERPL-MCNC: 50.7 MG/DL — HIGH (ref 8–20)
BUN SERPL-MCNC: 54.7 MG/DL — HIGH (ref 8–20)
BUN SERPL-MCNC: 55.5 MG/DL — HIGH (ref 8–20)
BUN SERPL-MCNC: 58.9 MG/DL — HIGH (ref 8–20)
CA-I SERPL-SCNC: 1.11 MMOL/L — LOW (ref 1.15–1.33)
CA-I SERPL-SCNC: 1.12 MMOL/L — LOW (ref 1.15–1.33)
CA-I SERPL-SCNC: 1.16 MMOL/L — SIGNIFICANT CHANGE UP (ref 1.15–1.33)
CA-I SERPL-SCNC: 1.17 MMOL/L — SIGNIFICANT CHANGE UP (ref 1.15–1.33)
CALCIUM SERPL-MCNC: 7.9 MG/DL — LOW (ref 8.4–10.5)
CALCIUM SERPL-MCNC: 8 MG/DL — LOW (ref 8.4–10.5)
CALCIUM SERPL-MCNC: 8.1 MG/DL — LOW (ref 8.4–10.5)
CALCIUM SERPL-MCNC: 8.2 MG/DL — LOW (ref 8.4–10.5)
CHLORIDE BLDV-SCNC: 101 MMOL/L — SIGNIFICANT CHANGE UP (ref 96–108)
CHLORIDE BLDV-SCNC: 95 MMOL/L — LOW (ref 96–108)
CHLORIDE BLDV-SCNC: 97 MMOL/L — SIGNIFICANT CHANGE UP (ref 96–108)
CHLORIDE BLDV-SCNC: 98 MMOL/L — SIGNIFICANT CHANGE UP (ref 96–108)
CHLORIDE SERPL-SCNC: 94 MMOL/L — LOW (ref 96–108)
CHLORIDE SERPL-SCNC: 94 MMOL/L — LOW (ref 96–108)
CHLORIDE SERPL-SCNC: 95 MMOL/L — LOW (ref 96–108)
CHLORIDE SERPL-SCNC: 95 MMOL/L — LOW (ref 96–108)
CO2 SERPL-SCNC: 21 MMOL/L — LOW (ref 22–29)
CO2 SERPL-SCNC: 23 MMOL/L — SIGNIFICANT CHANGE UP (ref 22–29)
CO2 SERPL-SCNC: 24 MMOL/L — SIGNIFICANT CHANGE UP (ref 22–29)
CO2 SERPL-SCNC: 24 MMOL/L — SIGNIFICANT CHANGE UP (ref 22–29)
CREAT SERPL-MCNC: 2.06 MG/DL — HIGH (ref 0.5–1.3)
CREAT SERPL-MCNC: 2.33 MG/DL — HIGH (ref 0.5–1.3)
CREAT SERPL-MCNC: 2.54 MG/DL — HIGH (ref 0.5–1.3)
CREAT SERPL-MCNC: 2.59 MG/DL — HIGH (ref 0.5–1.3)
EGFR: 25 ML/MIN/1.73M2 — LOW
EGFR: 25 ML/MIN/1.73M2 — LOW
EGFR: 28 ML/MIN/1.73M2 — LOW
EGFR: 33 ML/MIN/1.73M2 — LOW
EOSINOPHIL # BLD AUTO: 0.34 K/UL — SIGNIFICANT CHANGE UP (ref 0–0.5)
EOSINOPHIL NFR BLD AUTO: 3.2 % — SIGNIFICANT CHANGE UP (ref 0–6)
GAS PNL BLDV: 128 MMOL/L — LOW (ref 136–145)
GAS PNL BLDV: 129 MMOL/L — LOW (ref 136–145)
GAS PNL BLDV: 130 MMOL/L — LOW (ref 136–145)
GAS PNL BLDV: 130 MMOL/L — LOW (ref 136–145)
GAS PNL BLDV: SIGNIFICANT CHANGE UP
GLUCOSE BLDC GLUCOMTR-MCNC: 132 MG/DL — HIGH (ref 70–99)
GLUCOSE BLDC GLUCOMTR-MCNC: 157 MG/DL — HIGH (ref 70–99)
GLUCOSE BLDC GLUCOMTR-MCNC: 176 MG/DL — HIGH (ref 70–99)
GLUCOSE BLDV-MCNC: 133 MG/DL — HIGH (ref 70–99)
GLUCOSE BLDV-MCNC: 142 MG/DL — HIGH (ref 70–99)
GLUCOSE BLDV-MCNC: 163 MG/DL — HIGH (ref 70–99)
GLUCOSE BLDV-MCNC: 165 MG/DL — HIGH (ref 70–99)
GLUCOSE SERPL-MCNC: 132 MG/DL — HIGH (ref 70–99)
GLUCOSE SERPL-MCNC: 144 MG/DL — HIGH (ref 70–99)
GLUCOSE SERPL-MCNC: 165 MG/DL — HIGH (ref 70–99)
GLUCOSE SERPL-MCNC: 172 MG/DL — HIGH (ref 70–99)
HCO3 BLDV-SCNC: 24 MMOL/L — SIGNIFICANT CHANGE UP (ref 22–29)
HCO3 BLDV-SCNC: 25 MMOL/L — SIGNIFICANT CHANGE UP (ref 22–29)
HCO3 BLDV-SCNC: 25 MMOL/L — SIGNIFICANT CHANGE UP (ref 22–29)
HCO3 BLDV-SCNC: 27 MMOL/L — SIGNIFICANT CHANGE UP (ref 22–29)
HCT VFR BLD CALC: 26.4 % — LOW (ref 39–50)
HCT VFR BLDA CALC: 25 % — SIGNIFICANT CHANGE UP
HCT VFR BLDA CALC: 29 % — SIGNIFICANT CHANGE UP
HCT VFR BLDA CALC: 30 % — SIGNIFICANT CHANGE UP
HCT VFR BLDA CALC: 38 % — SIGNIFICANT CHANGE UP
HGB BLD CALC-MCNC: 12.5 G/DL — LOW (ref 12.6–17.4)
HGB BLD CALC-MCNC: 8.4 G/DL — LOW (ref 12.6–17.4)
HGB BLD CALC-MCNC: 9.5 G/DL — LOW (ref 12.6–17.4)
HGB BLD CALC-MCNC: 9.9 G/DL — LOW (ref 12.6–17.4)
HGB BLD-MCNC: 8.9 G/DL — LOW (ref 13–17)
HOROWITZ INDEX BLDV+IHG-RTO: 0.3 — SIGNIFICANT CHANGE UP
HOROWITZ INDEX BLDV+IHG-RTO: 0.3 — SIGNIFICANT CHANGE UP
IMM GRANULOCYTES NFR BLD AUTO: 1.3 % — HIGH (ref 0–0.9)
LACTATE BLDV-MCNC: 0.7 MMOL/L — SIGNIFICANT CHANGE UP (ref 0.5–2)
LACTATE BLDV-MCNC: 0.8 MMOL/L — SIGNIFICANT CHANGE UP (ref 0.5–2)
LYMPHOCYTES # BLD AUTO: 0.83 K/UL — LOW (ref 1–3.3)
LYMPHOCYTES # BLD AUTO: 7.8 % — LOW (ref 13–44)
MAGNESIUM SERPL-MCNC: 1.9 MG/DL — SIGNIFICANT CHANGE UP (ref 1.6–2.6)
MAGNESIUM SERPL-MCNC: 2 MG/DL — SIGNIFICANT CHANGE UP (ref 1.6–2.6)
MAGNESIUM SERPL-MCNC: 2 MG/DL — SIGNIFICANT CHANGE UP (ref 1.6–2.6)
MAGNESIUM SERPL-MCNC: 2 MG/DL — SIGNIFICANT CHANGE UP (ref 1.8–2.6)
MCHC RBC-ENTMCNC: 29.8 PG — SIGNIFICANT CHANGE UP (ref 27–34)
MCHC RBC-ENTMCNC: 33.7 GM/DL — SIGNIFICANT CHANGE UP (ref 32–36)
MCV RBC AUTO: 88.3 FL — SIGNIFICANT CHANGE UP (ref 80–100)
MONOCYTES # BLD AUTO: 1.33 K/UL — HIGH (ref 0–0.9)
MONOCYTES NFR BLD AUTO: 12.5 % — SIGNIFICANT CHANGE UP (ref 2–14)
NEUTROPHILS # BLD AUTO: 7.97 K/UL — HIGH (ref 1.8–7.4)
NEUTROPHILS NFR BLD AUTO: 74.8 % — SIGNIFICANT CHANGE UP (ref 43–77)
PCO2 BLDV: 40 MMHG — LOW (ref 42–55)
PCO2 BLDV: 43 MMHG — SIGNIFICANT CHANGE UP (ref 42–55)
PCO2 BLDV: 44 MMHG — SIGNIFICANT CHANGE UP (ref 42–55)
PCO2 BLDV: 45 MMHG — SIGNIFICANT CHANGE UP (ref 42–55)
PH BLDV: 7.36 — SIGNIFICANT CHANGE UP (ref 7.32–7.43)
PH BLDV: 7.38 — SIGNIFICANT CHANGE UP (ref 7.32–7.43)
PH BLDV: 7.39 — SIGNIFICANT CHANGE UP (ref 7.32–7.43)
PH BLDV: 7.4 — SIGNIFICANT CHANGE UP (ref 7.32–7.43)
PHOSPHATE SERPL-MCNC: 2.9 MG/DL — SIGNIFICANT CHANGE UP (ref 2.4–4.7)
PHOSPHATE SERPL-MCNC: 3.1 MG/DL — SIGNIFICANT CHANGE UP (ref 2.4–4.7)
PHOSPHATE SERPL-MCNC: 3.3 MG/DL — SIGNIFICANT CHANGE UP (ref 2.4–4.7)
PHOSPHATE SERPL-MCNC: 3.5 MG/DL — SIGNIFICANT CHANGE UP (ref 2.4–4.7)
PLATELET # BLD AUTO: 113 K/UL — LOW (ref 150–400)
PO2 BLDV: 43 MMHG — SIGNIFICANT CHANGE UP (ref 25–45)
PO2 BLDV: 46 MMHG — HIGH (ref 25–45)
PO2 BLDV: 51 MMHG — HIGH (ref 25–45)
PO2 BLDV: <42 MMHG — SIGNIFICANT CHANGE UP (ref 25–45)
POTASSIUM BLDV-SCNC: 3.3 MMOL/L — LOW (ref 3.5–5.1)
POTASSIUM BLDV-SCNC: 3.5 MMOL/L — SIGNIFICANT CHANGE UP (ref 3.5–5.1)
POTASSIUM BLDV-SCNC: 3.8 MMOL/L — SIGNIFICANT CHANGE UP (ref 3.5–5.1)
POTASSIUM BLDV-SCNC: 3.8 MMOL/L — SIGNIFICANT CHANGE UP (ref 3.5–5.1)
POTASSIUM SERPL-MCNC: 3.7 MMOL/L — SIGNIFICANT CHANGE UP (ref 3.5–5.3)
POTASSIUM SERPL-MCNC: 3.7 MMOL/L — SIGNIFICANT CHANGE UP (ref 3.5–5.3)
POTASSIUM SERPL-MCNC: 3.8 MMOL/L — SIGNIFICANT CHANGE UP (ref 3.5–5.3)
POTASSIUM SERPL-MCNC: 3.8 MMOL/L — SIGNIFICANT CHANGE UP (ref 3.5–5.3)
POTASSIUM SERPL-SCNC: 3.7 MMOL/L — SIGNIFICANT CHANGE UP (ref 3.5–5.3)
POTASSIUM SERPL-SCNC: 3.7 MMOL/L — SIGNIFICANT CHANGE UP (ref 3.5–5.3)
POTASSIUM SERPL-SCNC: 3.8 MMOL/L — SIGNIFICANT CHANGE UP (ref 3.5–5.3)
POTASSIUM SERPL-SCNC: 3.8 MMOL/L — SIGNIFICANT CHANGE UP (ref 3.5–5.3)
PROT SERPL-MCNC: 5.5 G/DL — LOW (ref 6.6–8.7)
PROT SERPL-MCNC: 5.5 G/DL — LOW (ref 6.6–8.7)
PROT SERPL-MCNC: 5.7 G/DL — LOW (ref 6.6–8.7)
PROT SERPL-MCNC: 5.7 G/DL — LOW (ref 6.6–8.7)
RBC # BLD: 2.99 M/UL — LOW (ref 4.2–5.8)
RBC # FLD: 16 % — HIGH (ref 10.3–14.5)
SAO2 % BLDV: 70.9 % — SIGNIFICANT CHANGE UP
SAO2 % BLDV: 72.1 % — SIGNIFICANT CHANGE UP
SAO2 % BLDV: 72.7 % — SIGNIFICANT CHANGE UP
SAO2 % BLDV: 81.1 % — SIGNIFICANT CHANGE UP
SODIUM SERPL-SCNC: 131 MMOL/L — LOW (ref 135–145)
SODIUM SERPL-SCNC: 132 MMOL/L — LOW (ref 135–145)
TRIGL SERPL-MCNC: 94 MG/DL — SIGNIFICANT CHANGE UP
WBC # BLD: 10.65 K/UL — HIGH (ref 3.8–10.5)
WBC # FLD AUTO: 10.65 K/UL — HIGH (ref 3.8–10.5)

## 2024-06-08 PROCEDURE — 99232 SBSQ HOSP IP/OBS MODERATE 35: CPT

## 2024-06-08 PROCEDURE — 99291 CRITICAL CARE FIRST HOUR: CPT

## 2024-06-08 RX ORDER — CHLORHEXIDINE GLUCONATE 213 G/1000ML
1 SOLUTION TOPICAL DAILY
Refills: 0 | Status: DISCONTINUED | OUTPATIENT
Start: 2024-06-08 | End: 2024-06-12

## 2024-06-08 RX ORDER — HYDROMORPHONE HYDROCHLORIDE 2 MG/ML
0.5 INJECTION INTRAMUSCULAR; INTRAVENOUS; SUBCUTANEOUS EVERY 4 HOURS
Refills: 0 | Status: DISCONTINUED | OUTPATIENT
Start: 2024-06-08 | End: 2024-06-12

## 2024-06-08 RX ADMIN — AMIODARONE HYDROCHLORIDE 200 MILLIGRAM(S): 400 TABLET ORAL at 05:07

## 2024-06-08 RX ADMIN — ATORVASTATIN CALCIUM 80 MILLIGRAM(S): 80 TABLET, FILM COATED ORAL at 21:57

## 2024-06-08 RX ADMIN — MONTELUKAST 10 MILLIGRAM(S): 4 TABLET, CHEWABLE ORAL at 13:06

## 2024-06-08 RX ADMIN — HEPARIN SODIUM 500 UNIT(S)/HR: 5000 INJECTION INTRAVENOUS; SUBCUTANEOUS at 06:31

## 2024-06-08 RX ADMIN — Medication 81 MILLIGRAM(S): at 13:06

## 2024-06-08 RX ADMIN — HYDROMORPHONE HYDROCHLORIDE 0.5 MILLIGRAM(S): 2 INJECTION INTRAMUSCULAR; INTRAVENOUS; SUBCUTANEOUS at 23:31

## 2024-06-08 RX ADMIN — Medication 1: at 17:24

## 2024-06-08 RX ADMIN — HYDROMORPHONE HYDROCHLORIDE 0.5 MILLIGRAM(S): 2 INJECTION INTRAMUSCULAR; INTRAVENOUS; SUBCUTANEOUS at 17:00

## 2024-06-08 RX ADMIN — CLOPIDOGREL BISULFATE 75 MILLIGRAM(S): 75 TABLET, FILM COATED ORAL at 13:06

## 2024-06-08 RX ADMIN — HEPARIN SODIUM 500 UNIT(S)/HR: 5000 INJECTION INTRAVENOUS; SUBCUTANEOUS at 09:24

## 2024-06-08 RX ADMIN — VASOPRESSIN 6 UNIT(S)/MIN: 20 INJECTION INTRAVENOUS at 14:13

## 2024-06-08 RX ADMIN — HYDROMORPHONE HYDROCHLORIDE 0.5 MILLIGRAM(S): 2 INJECTION INTRAMUSCULAR; INTRAVENOUS; SUBCUTANEOUS at 16:22

## 2024-06-08 RX ADMIN — Medication 44 MICROGRAM(S): at 21:57

## 2024-06-08 RX ADMIN — CHLORHEXIDINE GLUCONATE 1 APPLICATION(S): 213 SOLUTION TOPICAL at 05:08

## 2024-06-08 RX ADMIN — PROPOFOL 7.86 MICROGRAM(S)/KG/MIN: 10 INJECTION, EMULSION INTRAVENOUS at 05:38

## 2024-06-08 RX ADMIN — PIPERACILLIN AND TAZOBACTAM 25 GRAM(S): 4; .5 INJECTION, POWDER, LYOPHILIZED, FOR SOLUTION INTRAVENOUS at 13:05

## 2024-06-08 RX ADMIN — PROPOFOL 7.86 MICROGRAM(S)/KG/MIN: 10 INJECTION, EMULSION INTRAVENOUS at 20:50

## 2024-06-08 RX ADMIN — Medication 1 DROP(S): at 13:14

## 2024-06-08 RX ADMIN — PANTOPRAZOLE SODIUM 40 MILLIGRAM(S): 20 TABLET, DELAYED RELEASE ORAL at 13:06

## 2024-06-08 RX ADMIN — Medication 650 MILLIGRAM(S): at 17:24

## 2024-06-08 RX ADMIN — Medication 650 MILLIGRAM(S): at 05:07

## 2024-06-08 RX ADMIN — Medication 10.5 MICROGRAM(S)/KG/MIN: at 09:21

## 2024-06-08 RX ADMIN — PROPOFOL 7.86 MICROGRAM(S)/KG/MIN: 10 INJECTION, EMULSION INTRAVENOUS at 09:21

## 2024-06-08 NOTE — PROGRESS NOTE ADULT - SUBJECTIVE AND OBJECTIVE BOX
[facility]                                                         CARDIOLOGY AT [place] [address]                                                         Telephone: [phone]. Fax:[phone]                                                                             PROGRESS NOTE    Reason for follow up: Cardiogenic shock     Review of symptoms:   Cardiac:  No chest pain. No dyspnea. No palpitations.  Respiratory: no cough. No dyspnea  Gastrointestinal: No diarrhea. No abdominal pain. No bleeding.   Neuro: No focal neuro complaints.    Vitals:  T(C): 36.8 (06-08-24 @ 11:00), Max: 36.9 (06-08-24 @ 08:00)  HR: 71 (06-08-24 @ 11:27) (66 - 78)  BP: 134/82 (06-08-24 @ 11:00) (101/85 - 148/50)  RR: 16 (06-08-24 @ 08:15) (16 - 18)  SpO2: 99% (06-08-24 @ 11:27) (99% - 100%)  Wt(kg): --  I&O's Summary    07 Jun 2024 07:01  -  08 Jun 2024 07:00  --------------------------------------------------------  IN: 1424.9 mL / OUT: 1935 mL / NET: -510.1 mL    08 Jun 2024 07:01  -  08 Jun 2024 12:02  --------------------------------------------------------  IN: 189.7 mL / OUT: 350 mL / NET: -160.3 mL      Weight (kg): 69.8 (06-05 @ 03:30)    PHYSICAL EXAM:  Appearance: Comfortable. No acute distress  HEENT:  Atraumatic. Normocephalic.  Normal oral mucosa  Neurologic: A & O x 3, no gross focal deficits.  Cardiovascular: RRR S1 S2, No murmur, no rubs/gallops. No JVD  Respiratory: Lungs clear to auscultation, unlabored   Gastrointestinal:  Soft, Non-tender, + BS  Lower Extremities: 2+ Peripheral Pulses, No clubbing, cyanosis, or edema  Psychiatry: Patient is calm. No agitation.   Skin: warm and dry.    CURRENT CARDIAC MEDICATIONS:  aMIOdarone    Tablet 200 milliGRAM(s) Oral daily  DOBUTamine Infusion 5 MICROgram(s)/kG/Min IV Continuous <Continuous>  norepinephrine Infusion 0.05 MICROgram(s)/kG/Min IV Continuous <Continuous>      CURRENT OTHER MEDICATIONS:  albuterol/ipratropium for Nebulization 3 milliLiter(s) Nebulizer every 6 hours PRN Shortness of Breath and/or Wheezing  montelukast 10 milliGRAM(s) Oral daily  piperacillin/tazobactam IVPB.. 3.375 Gram(s) IV Intermittent every 12 hours  HYDROmorphone  Injectable 0.5 milliGRAM(s) IV Push every 4 hours PRN Severe Pain (7 - 10)  ondansetron Injectable 4 milliGRAM(s) IV Push every 8 hours PRN Nausea and/or Vomiting  propofol Infusion 20 MICROgram(s)/kG/Min (7.86 mL/Hr) IV Continuous <Continuous>  pantoprazole  Injectable 40 milliGRAM(s) IV Push daily  senna 2 Tablet(s) Oral at bedtime PRN Constipation  atorvastatin 80 milliGRAM(s) Oral at bedtime  dextrose 50% Injectable 25 Gram(s) IV Push once, Stop order after: 1 Doses  insulin glargine Injectable (LANTUS) 9 Unit(s) SubCutaneous at bedtime  insulin lispro (ADMELOG) corrective regimen sliding scale   SubCutaneous every 6 hours  levothyroxine Injectable 44 MICROGram(s) IV Push at bedtime  vasopressin Infusion 0.04 Unit(s)/Min (6 mL/Hr) IV Continuous <Continuous>  artificial  tears Solution 1 Drop(s) Both EYES two times a day PRN Dry Eyes  aspirin  chewable 81 milliGRAM(s) Oral daily  chlorhexidine 2% Cloths 1 Application(s) Topical <User Schedule>  clopidogrel Tablet 75 milliGRAM(s) Oral daily  heparin   Injectable 5500 Unit(s) IV Push every 6 hours PRN For aPTT less than 40  heparin   Injectable 2500 Unit(s) IV Push every 6 hours PRN For aPTT between 40 - 57  heparin  Infusion. 800 Unit(s)/Hr (8 mL/Hr) IV Continuous <Continuous>  sodium bicarbonate 650 milliGRAM(s) Oral every 12 hours  sodium chloride 0.9% lock flush 10 milliLiter(s) IV Push every 1 hour PRN Pre/post blood products, medications, blood draw, and to maintain line patency      LABS:	 	                            8.9    10.65 )-----------( 113      ( 08 Jun 2024 05:30 )             26.4     06-08    132<L>  |  95<L>  |  55.5<H>  ----------------------------<  144<H>  3.7   |  23.0  |  2.33<H>    Ca    8.1<L>      08 Jun 2024 11:25  Phos  3.1     06-08  Mg     1.9     06-08    TPro  5.5<L>  /  Alb  2.5<L>  /  TBili  0.7  /  DBili  x   /  AST  50<H>  /  ALT  43<H>  /  AlkPhos  103  06-08    PT/INR/PTT ( 08 Jun 2024 05:30 )                       :                       :      X            :       70.7                  .        .                   .              .           .       X           .                                       Lipid Profile: Date: 06-08 @ 05:30  Total cholesterol --; Direct LDL: --; HDL: --; Triglycerides:94  Date: 06-07 @ 03:00  Total cholesterol --; Direct LDL: --; HDL: --; Triglycerides:99    HgA1c:   TSH: Thyroid Stimulating Hormone, Serum: 4.91 uIU/mL

## 2024-06-08 NOTE — PROGRESS NOTE ADULT - SUBJECTIVE AND OBJECTIVE BOX
NEPHROLOGY INTERVAL HPI/OVERNIGHT EVENTS:    No new events.    MEDICATIONS  (STANDING):  aMIOdarone    Tablet 200 milliGRAM(s) Oral daily  aspirin  chewable 81 milliGRAM(s) Oral daily  atorvastatin 80 milliGRAM(s) Oral at bedtime  chlorhexidine 2% Cloths 1 Application(s) Topical <User Schedule>  clopidogrel Tablet 75 milliGRAM(s) Oral daily  dextrose 50% Injectable 25 Gram(s) IV Push once  DOBUTamine Infusion 5 MICROgram(s)/kG/Min (10.5 mL/Hr) IV Continuous <Continuous>  heparin  Infusion. 800 Unit(s)/Hr (8 mL/Hr) IV Continuous <Continuous>  insulin glargine Injectable (LANTUS) 9 Unit(s) SubCutaneous at bedtime  insulin lispro (ADMELOG) corrective regimen sliding scale   SubCutaneous every 6 hours  levothyroxine Injectable 44 MICROGram(s) IV Push at bedtime  montelukast 10 milliGRAM(s) Oral daily  norepinephrine Infusion 0.05 MICROgram(s)/kG/Min (3.27 mL/Hr) IV Continuous <Continuous>  pantoprazole  Injectable 40 milliGRAM(s) IV Push daily  piperacillin/tazobactam IVPB.. 3.375 Gram(s) IV Intermittent every 12 hours  propofol Infusion 20 MICROgram(s)/kG/Min (7.86 mL/Hr) IV Continuous <Continuous>  sodium bicarbonate 650 milliGRAM(s) Oral every 12 hours  vasopressin Infusion 0.04 Unit(s)/Min (6 mL/Hr) IV Continuous <Continuous>    MEDICATIONS  (PRN):  albuterol/ipratropium for Nebulization 3 milliLiter(s) Nebulizer every 6 hours PRN Shortness of Breath and/or Wheezing  heparin   Injectable 5500 Unit(s) IV Push every 6 hours PRN For aPTT less than 40  heparin   Injectable 2500 Unit(s) IV Push every 6 hours PRN For aPTT between 40 - 57  ondansetron Injectable 4 milliGRAM(s) IV Push every 8 hours PRN Nausea and/or Vomiting  senna 2 Tablet(s) Oral at bedtime PRN Constipation  sodium chloride 0.9% lock flush 10 milliLiter(s) IV Push every 1 hour PRN Pre/post blood products, medications, blood draw, and to maintain line patency      Allergies    No Known Allergies      DOPamine (Hypotension)      Vital Signs Last 24 HrsICU   T(C): 36.9 (2024 13:00), Max: 36.9 (2024 08:00)  T(F): 98.4 (2024 13:00), Max: 98.4 (2024 08:00)  HR: 73 (2024 14:00) (66 - 76)  BP: 133/60 (2024 14:00) (101/85 - 151/113)  BP(mean): 80 (2024 14:00) (63 - 125)  ABP: 128/37 (2024 03:00) (102/28 - 148/37)  ABP(mean): 73 (2024 03:00) (53 - 82)  RR: 16 (2024 12:00) (16 - 18)  SpO2: 100% (2024 14:00) (99% - 100%)    O2 Parameters below as of 2024 12:00  Patient On (Oxygen Delivery Method): ventilator    O2 Concentration (%): 30      T(C): 36.5 (2024 08:00), Max: 36.5 (2024 03:00)  T(F): 97.7 (2024 08:00), Max: 97.7 (2024 03:00)  HR: 66 (2024 09:15) (60 - 82)  BP: --  BP(mean): --  RR: 16 (2024 08:00) (16 - 17)  SpO2: 100% (2024 09:15) (96% - 100%)    Parameters below as of 2024 08:00  Patient On (Oxygen Delivery Method): ventilator    O2 Concentration (%): 40    Daily Weight in k.7 (2024 03:00)    PHYSICAL EXAM:    GENERAL: in  icu  bed  with  several  family  members present  HEAD:    EYES: closed  ENMT: intubated  NECK: central line with meds  NERVOUS SYSTEM:  sedated  CHEST/LUNG: no wheezes, intubated  HEART: monitor  noted, pacer noted  ABDOMEN: soft  EXTREMITIES:  right  groin pump line  LYMPH:   SKIN: pale  : fournier, I&O noted    LABS:        132<L>  |  95<L>  |  55.5<H>  ----------------------------<  144<H>  3.7   |  23.0  |  2.33<H>    Ca    8.1<L>      2024 11:25  Phos  3.1     06-08  Mg     1.9     06-08    TPro  5.5<L>  /  Alb  2.5<L>  /  TBili  0.7  /  DBili  x   /  AST  50<H>  /  ALT  43<H>  /  AlkPhos  103  -08                          10.4   18.36 )-----------( 162      ( 2024 03:00 )             30.2     06-07    128<L>  |  91<L>  |  70.4<H>  ----------------------------<  186<H>  3.9   |  23.0  |  3.51<H>    Ca    8.1<L>      2024 03:00  Phos  4.6     06-07  Mg     2.0     06-07    TPro  5.8<L>  /  Alb  2.7<L>  /  TBili  0.8  /  DBili  x   /  AST  57<H>  /  ALT  64<H>  /  AlkPhos  143<H>  06    PTT - ( 2024 05:05 )  PTT:76.8 sec  Urinalysis Basic - ( 2024 03:00 )    Color: x / Appearance: x / SG: x / pH: x  Gluc: 186 mg/dL / Ketone: x  / Bili: x / Urobili: x   Blood: x / Protein: x / Nitrite: x   Leuk Esterase: x / RBC: x / WBC x   Sq Epi: x / Non Sq Epi: x / Bacteria: x      Magnesium: 2.0 mg/dL ( @ 03:00)  Phosphorus: 4.6 mg/dL ( @ 03:00)  Phosphorus: 5.2 mg/dL ( @ 21:00)  Magnesium: 2.2 mg/dL ( @ 21:00)  Magnesium: 2.2 mg/dL ( @ 15:00)  Phosphorus: 5.6 mg/dL (06-06 @ 15:00)    ABG - ( 2024 11:16 )  pH, Arterial: 7.500 pH, Blood: x     /  pCO2: 27    /  pO2: 75    / HCO3: 21    / Base Excess: -2.1  /  SaO2: 95.5                  RADIOLOGY & ADDITIONAL TESTS:

## 2024-06-08 NOTE — PROGRESS NOTE ADULT - PROBLEM SELECTOR PLAN 1
- Clinically euvolemic w/ warm extremities.   - Inotropes/tMCS: IABP placed 6/5. Continue  at 5 mcg/kg/min.  - Wean pressors as tolerated.  - Monitor perfusion markers every 6 hrs (lactate, MVO2 from distal port of PA catheter, LFTs)  - Diuretics: Bumex gtt held this morning for low CVP. CVP now 3. Would continue diuretics PRN to keep net even.  - Please document strict I&Os  - Hemodynamic goals: MAP 65, CVP 8-10, MVO2 >65, PCWP 12, CI >2.2, Lactate <2.2  -IABP placed as a potential bridge to recovery but he is not a candidate for advanced therapies given age and multiple comorbidities.  - Patient is slowly improving with lessening pressor requirements and improving urine output  - Once patient is off pressors will wean IABP, which will likely be next week - Clinically euvolemic w/ warm extremities.   - Inotropes/tMCS: IABP placed 6/5. Continue  at 5 mcg/kg/min.  - Wean pressors as tolerated.  - Monitor perfusion markers every 6 hrs (lactate, MVO2 from distal port of PA catheter, LFTs)  - Diuretics: Bumex gtt held this morning for low CVP. CVP now 3. Would continue diuretics PRN to keep net even.  - Please document strict I&Os  - Hemodynamic goals: MAP 65, CVP 8-10, MVO2 >65, PCWP 12, CI >2.2, Lactate <2.2  -IABP placed as a potential bridge to recovery   - over the weekend should wean IABP and pressors, if able should evaluate for home inotropic support therapy vs LVAD as destination therapy

## 2024-06-08 NOTE — PROGRESS NOTE ADULT - ASSESSMENT
Patient is a 78 year old male with history of HTN, HLD, DM, CKD, CAD s/p 4V CABG and multiple PCI's, ischemic cardiomyopathy (EF < 20%), and LBBB s/p MDT CRT-D. He had an admission in November for ADHF. At that time a LHC revealed his grafts were occluded except for the LIMA to LAD. He also underwent a CRT-D implant.     On 5/25 he had a VT arrest with ROSC achieved after 3 minutes was sent to the cath lab which showed his LIMA is still patent. Patient was extubated on 5/27. RHC on 5/28 showed severely elevated biv filling pressures, severe Cpc-PH, and low CO. He was started on low dose milrinone and diuresed well on IV Bumex. Weaned off pressors 5/29 and Milrinone was weaned off 5/31.    Renal function worsening (sCr 4.65) therefore RHC performed 6/4 which revealed significantly elevated filling pressures w/ low CI. Milrinone was resumed at 0.25 mcg/kg/min as well as Bumex gtt. On 6/5 he had a PEA arrest overnight. IABP was placed and he remains intubated/sedated in MICU on levo/vaso/ gtts.     Bedside Hemodynamics:  5/31 (mil 0.125): CVP 9-10, PA 72/24/43, PA sat 70.7%, Fauzia CO/CI 4.6/2.6, /56 (73), SVR 1096 dsc.    Cardiac Studies:  6/4/24 RHC: RA 29/26/24, RV 83/12/23, PA 81/31/47 PCWP 32, PA sat 42.4, Fauzia CO/CI 2.75/1.8.    5/28/24 RHC: RA 18, RV 92/18, PA 95/38/57, PCW 31, PA sat 51.5%, Fauzia CO/CI 3.09/1.74, TPG 26, DPG 7, SVR 1889 dsc, PVR 8.41 MEYERS, Vidhi 3.2, /63 (91) on levo 0.08.    5/25/24 LHC: All grafts occluded except LIMA-LAD. LVEDP 29 mmHg.    5/23/24 TTE: LVEF <20%, LVIDd 5.8cm, mild RVE with mild RV dysfunction, TAPSE 0.7cm, mild-mod AS, mild-mod MR, mild TR, mild NJ, est PASP 75 mmHg.

## 2024-06-08 NOTE — PROGRESS NOTE ADULT - ASSESSMENT
78M PMH multi-vessel CAD s/p CABG s/p PCI x19 with coronary brachytherapy 11/2023, ICM, HFrEF EF 37%, s/p ICD, IDDM who initially presented 5/23/24 with radiating chest pain, dizziness, lightheadedness, found with NSTEMI, RAS, TTE with EF <20%, course c/b 2 VT arrests and 1 PEA arrest, s/p LHC without viable target lesion for PCI, s/p IABP 6/5/24 78M PMH multi-vessel CAD s/p CABG s/p PCI x19 with coronary brachytherapy 11/2023, ICM, HFrEF EF 37%, s/p ICD, IDDM who initially presented 5/23/24 with radiating chest pain, dizziness, lightheadedness, found with NSTEMI, RAS, TTE with EF <20%, course c/b 2 VT arrests and 1 PEA arrest, s/p LHC without viable target lesion for PCI, s/p IABP 6/5/24    Impression/Plan:    Multi-vessel CAD s/p CABG, s/p PCI x19  ICM, HFrEF EF <20% - s/p ICD  NSTEMI  VT arrest x2 (likely scar mediated); PEA arrest x1  AFib  - S/p IABP placed 6/5/24 - at 1:1 augmentation  - Pt currently not candidate for any further advanced therapies such as LVAD  - C/w Levo + Vaso + Dobutamine  - S/p Bumex infusion; can give PRN doses to maintain net even Is/Os balance  - Heparin gtt while on IABP and for AFib  - ASA/Plavix  - Monitor rhythm on telemetry  - Ongoing GOC    Respiratory failure - in setting of cardiac arrest - s/p intubation  - Monitor Paw/PPlat  - Daily SAT/SBT    Acute renal failure - 2/2 low-flow state, HFrEF  - UOP and renal function improved after placement of IABP  - Monitor UOP closely  - Renally adjust all medications    Diet - NPO except meds  PPx - Heparin as above, PPI IV Daily  Lines/Tubes - A-line, CVC  Code Status - Full code; needs ongoing discussions with family as patients overall prognosis is poor. Palliative care following  Dispo - C/w care in ICU      Andrew Willoughby M.D.  , Pulmonary & Critical Care Medicine  [facility]  Pulmonary and Sleep Medicine at [place]  [address]  [place] [address]  T: [phone]  F: [phone]

## 2024-06-08 NOTE — PROGRESS NOTE ADULT - SUBJECTIVE AND OBJECTIVE BOX
Patient is a 78y old  Male who presents with a chief complaint of Chest Pain / SOB (07 Jun 2024 15:15)      BRIEF HOSPITAL COURSE:   78M PMH multi-vessel CAD s/p CABG s/p PCI x19 with coronary brachytherapy 11/2023, ICM, HFrEF EF 37%, s/p ICD, IDDM who initially presented 5/23/24 with radiating chest pain, dizziness, lightheadedness, found with NSTEMI, RAS, TTE with EF <20%. Pt has had multiple cardiac arrests x3 in setting of VT, had LHC 5/25 noted with 3/4 occluded grafts, only LIMA/LAD patent, and no target seen for intervention. Sensitivities/thresholds of ICD were optimized by EP during his course. On 6/4/24 had RHC showing PCWP 30, elevated LVEDP and low cardiac output, was started on Milrinone and Bumex gtts. Overnight 6/5/24 had yet another cardiac arrest x 8 minutes, this time with PEA, and was re-intubated, and had PA catheter and A-line placed. Noted with worsening oliguria, shock state, likely cardiogenic. On 6/5/24 went for MCS with IABP (vasculature not conducive to Impella). After IABP placed, UOP improved significantly        PAST MEDICAL & SURGICAL HISTORY:  GERD (gastroesophageal reflux disease)      High cholesterol      Coronary artery disease involving coronary bypass graft of native heart with unstable angina pectoris      Coronary artery disease involving native coronary artery of native heart, angina presence unspecifie      Type 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, without long-term current use of insul      Essential hypertension      HFrEF (heart failure with reduced ejection fraction)      Stage 4 chronic kidney disease      LBBB (left bundle branch block)      Facial nerve palsy      Hypothyroidism      S/P CABG (coronary artery bypass graft)  4V 1983 Zullinger      Status post open reduction with internal fixation of fracture      History of insertion of stent into coronary artery bypass graft      History of brachytherapy            Medications:  piperacillin/tazobactam IVPB.. 3.375 Gram(s) IV Intermittent every 12 hours    aMIOdarone    Tablet 200 milliGRAM(s) Oral daily  DOBUTamine Infusion 5 MICROgram(s)/kG/Min IV Continuous <Continuous>  norepinephrine Infusion 0.05 MICROgram(s)/kG/Min IV Continuous <Continuous>    albuterol/ipratropium for Nebulization 3 milliLiter(s) Nebulizer every 6 hours PRN  montelukast 10 milliGRAM(s) Oral daily    ondansetron Injectable 4 milliGRAM(s) IV Push every 8 hours PRN  propofol Infusion 20 MICROgram(s)/kG/Min IV Continuous <Continuous>      aspirin  chewable 81 milliGRAM(s) Oral daily  clopidogrel Tablet 75 milliGRAM(s) Oral daily  heparin   Injectable 5500 Unit(s) IV Push every 6 hours PRN  heparin   Injectable 2500 Unit(s) IV Push every 6 hours PRN  heparin  Infusion. 800 Unit(s)/Hr IV Continuous <Continuous>    pantoprazole  Injectable 40 milliGRAM(s) IV Push daily  senna 2 Tablet(s) Oral at bedtime PRN      atorvastatin 80 milliGRAM(s) Oral at bedtime  dextrose 50% Injectable 25 Gram(s) IV Push once  insulin glargine Injectable (LANTUS) 9 Unit(s) SubCutaneous at bedtime  insulin lispro (ADMELOG) corrective regimen sliding scale   SubCutaneous every 6 hours  levothyroxine Injectable 44 MICROGram(s) IV Push at bedtime  vasopressin Infusion 0.04 Unit(s)/Min IV Continuous <Continuous>    sodium bicarbonate 650 milliGRAM(s) Oral every 12 hours  sodium chloride 0.9% lock flush 10 milliLiter(s) IV Push every 1 hour PRN      artificial  tears Solution 1 Drop(s) Both EYES two times a day PRN  chlorhexidine 2% Cloths 1 Application(s) Topical <User Schedule>        Mode: AC/ CMV (Assist Control/ Continuous Mandatory Ventilation)  RR (machine): 16  TV (machine): 450  FiO2: 30  PEEP: 6  MAP: 10  PIP: 27      ICU Vital Signs Last 24 Hrs  T(C): 36.8 (07 Jun 2024 23:00), Max: 36.8 (07 Jun 2024 23:00)  T(F): 98.2 (07 Jun 2024 23:00), Max: 98.2 (07 Jun 2024 23:00)  HR: 68 (08 Jun 2024 00:00) (65 - 81)  BP: --  BP(mean): --  ABP: 120/23 (08 Jun 2024 00:00) (102/28 - 161/37)  ABP(mean): 62 (08 Jun 2024 00:00) (53 - 87)  RR: 17 (07 Jun 2024 22:00) (16 - 18)  SpO2: 100% (08 Jun 2024 00:00) (100% - 100%)    O2 Parameters below as of 07 Jun 2024 20:00  Patient On (Oxygen Delivery Method): ventilator                I&O's Detail    06 Jun 2024 07:01  -  07 Jun 2024 07:00  --------------------------------------------------------  IN:    DOBUTamine: 252 mL    Heparin Infusion: 144 mL    IV PiggyBack: 50 mL    IV PiggyBack: 300 mL    IV PiggyBack: 200 mL    Norepinephrine: 358 mL    Propofol: 352.8 mL    Vasopressin: 144 mL  Total IN: 1800.8 mL    OUT:    Bumetanide: 0 mL    Indwelling Catheter - Urethral (mL): 4300 mL  Total OUT: 4300 mL    Total NET: -2499.2 mL      07 Jun 2024 07:01  -  08 Jun 2024 02:50  --------------------------------------------------------  IN:    DOBUTamine: 168.5 mL    Heparin Infusion: 85 mL    IV PiggyBack: 300 mL    IV PiggyBack: 125 mL    Norepinephrine: 135.2 mL    Propofol: 192 mL    Vasopressin: 102 mL  Total IN: 1107.7 mL    OUT:    Indwelling Catheter - Urethral (mL): 1550 mL  Total OUT: 1550 mL    Total NET: -442.3 mL            LABS:                        10.4   18.36 )-----------( 162      ( 07 Jun 2024 03:00 )             30.2     06-07    131<L>  |  94<L>  |  54.7<H>  ----------------------------<  165<H>  3.8   |  24.0  |  2.59<H>    Ca    7.9<L>      07 Jun 2024 23:55  Phos  3.5     06-07  Mg     2.0     06-07    TPro  5.7<L>  /  Alb  2.8<L>  /  TBili  0.6  /  DBili  x   /  AST  49<H>  /  ALT  47<H>  /  AlkPhos  116  06-07          CAPILLARY BLOOD GLUCOSE      POCT Blood Glucose.: 169 mg/dL (07 Jun 2024 23:09)    PTT - ( 07 Jun 2024 11:50 )  PTT:70.4 sec  Urinalysis Basic - ( 07 Jun 2024 23:55 )    Color: x / Appearance: x / SG: x / pH: x  Gluc: 165 mg/dL / Ketone: x  / Bili: x / Urobili: x   Blood: x / Protein: x / Nitrite: x   Leuk Esterase: x / RBC: x / WBC x   Sq Epi: x / Non Sq Epi: x / Bacteria: x      CULTURES:  Culture Results:   No growth at 48 Hours (06-05 @ 05:52)  Culture Results:   No growth at 48 Hours (06-05 @ 05:50)      Physical Examination:  GENERAL: Intubated  HEENT: NC/AT  PULM: Ventilator breaths  CVS: +S1, S2      DEVICES:     RADIOLOGY:   < from: Xray Chest 1 View- PORTABLE-Urgent (Xray Chest 1 View- PORTABLE-Urgent .) (06.07.24 @ 12:26) >  IMPRESSION: Stable findings as above.    --- End of Report ---            JAZMYNE GODWIN MD; Attending Radiologist  This document has been electronically signed. Jun 7 2024  3:34PM    < end of copied text >      < from: TTE W or WO Ultrasound Enhancing Agent (06.05.24 @ 14:40) >  _______________________________________________________________________________________     CONCLUSIONS:      1. Limited TTE for EF assessment. Ultrasonic echocontrast not used during this study.   2. Left ventricular systolic function is severely decreased with an ejection fraction visually estimated at <20 %. Global left ventricular hypokinesis.   3. No pericardial effusion seen.    ________________________________________________________________________________________    < end of copied text >

## 2024-06-09 LAB
ALBUMIN SERPL ELPH-MCNC: 2.7 G/DL — LOW (ref 3.3–5.2)
ALBUMIN SERPL ELPH-MCNC: 2.8 G/DL — LOW (ref 3.3–5.2)
ALP SERPL-CCNC: 102 U/L — SIGNIFICANT CHANGE UP (ref 40–120)
ALP SERPL-CCNC: 102 U/L — SIGNIFICANT CHANGE UP (ref 40–120)
ALP SERPL-CCNC: 108 U/L — SIGNIFICANT CHANGE UP (ref 40–120)
ALP SERPL-CCNC: 116 U/L — SIGNIFICANT CHANGE UP (ref 40–120)
ALT FLD-CCNC: 34 U/L — SIGNIFICANT CHANGE UP
ALT FLD-CCNC: 35 U/L — SIGNIFICANT CHANGE UP
ALT FLD-CCNC: 37 U/L — SIGNIFICANT CHANGE UP
ALT FLD-CCNC: 40 U/L — SIGNIFICANT CHANGE UP
ANION GAP SERPL CALC-SCNC: 11 MMOL/L — SIGNIFICANT CHANGE UP (ref 5–17)
ANION GAP SERPL CALC-SCNC: 12 MMOL/L — SIGNIFICANT CHANGE UP (ref 5–17)
ANION GAP SERPL CALC-SCNC: 14 MMOL/L — SIGNIFICANT CHANGE UP (ref 5–17)
ANION GAP SERPL CALC-SCNC: 14 MMOL/L — SIGNIFICANT CHANGE UP (ref 5–17)
APTT BLD: 78.4 SEC — HIGH (ref 24.5–35.6)
AST SERPL-CCNC: 40 U/L — HIGH
AST SERPL-CCNC: 43 U/L — HIGH
AST SERPL-CCNC: 44 U/L — HIGH
AST SERPL-CCNC: 47 U/L — HIGH
BASE EXCESS BLDV CALC-SCNC: 1.2 MMOL/L — SIGNIFICANT CHANGE UP (ref -2–3)
BASE EXCESS BLDV CALC-SCNC: 1.6 MMOL/L — SIGNIFICANT CHANGE UP (ref -2–3)
BASE EXCESS BLDV CALC-SCNC: 2.1 MMOL/L — SIGNIFICANT CHANGE UP (ref -2–3)
BASE EXCESS BLDV CALC-SCNC: 2.2 MMOL/L — SIGNIFICANT CHANGE UP (ref -2–3)
BILIRUB SERPL-MCNC: 0.6 MG/DL — SIGNIFICANT CHANGE UP (ref 0.4–2)
BLOOD GAS COMMENTS, VENOUS: SIGNIFICANT CHANGE UP
BUN SERPL-MCNC: 45.6 MG/DL — HIGH (ref 8–20)
BUN SERPL-MCNC: 47.3 MG/DL — HIGH (ref 8–20)
BUN SERPL-MCNC: 47.8 MG/DL — HIGH (ref 8–20)
BUN SERPL-MCNC: 50.6 MG/DL — HIGH (ref 8–20)
CA-I SERPL-SCNC: 1.16 MMOL/L — SIGNIFICANT CHANGE UP (ref 1.15–1.33)
CA-I SERPL-SCNC: 1.17 MMOL/L — SIGNIFICANT CHANGE UP (ref 1.15–1.33)
CA-I SERPL-SCNC: 1.18 MMOL/L — SIGNIFICANT CHANGE UP (ref 1.15–1.33)
CA-I SERPL-SCNC: 1.19 MMOL/L — SIGNIFICANT CHANGE UP (ref 1.15–1.33)
CALCIUM SERPL-MCNC: 8 MG/DL — LOW (ref 8.4–10.5)
CALCIUM SERPL-MCNC: 8.1 MG/DL — LOW (ref 8.4–10.5)
CALCIUM SERPL-MCNC: 8.2 MG/DL — LOW (ref 8.4–10.5)
CALCIUM SERPL-MCNC: 8.3 MG/DL — LOW (ref 8.4–10.5)
CHLORIDE BLDV-SCNC: 100 MMOL/L — SIGNIFICANT CHANGE UP (ref 96–108)
CHLORIDE BLDV-SCNC: 100 MMOL/L — SIGNIFICANT CHANGE UP (ref 96–108)
CHLORIDE BLDV-SCNC: 95 MMOL/L — LOW (ref 96–108)
CHLORIDE BLDV-SCNC: 99 MMOL/L — SIGNIFICANT CHANGE UP (ref 96–108)
CHLORIDE SERPL-SCNC: 94 MMOL/L — LOW (ref 96–108)
CHLORIDE SERPL-SCNC: 96 MMOL/L — SIGNIFICANT CHANGE UP (ref 96–108)
CHLORIDE SERPL-SCNC: 97 MMOL/L — SIGNIFICANT CHANGE UP (ref 96–108)
CHLORIDE SERPL-SCNC: 98 MMOL/L — SIGNIFICANT CHANGE UP (ref 96–108)
CO2 SERPL-SCNC: 23 MMOL/L — SIGNIFICANT CHANGE UP (ref 22–29)
CO2 SERPL-SCNC: 24 MMOL/L — SIGNIFICANT CHANGE UP (ref 22–29)
CO2 SERPL-SCNC: 24 MMOL/L — SIGNIFICANT CHANGE UP (ref 22–29)
CO2 SERPL-SCNC: 26 MMOL/L — SIGNIFICANT CHANGE UP (ref 22–29)
CREAT SERPL-MCNC: 1.74 MG/DL — HIGH (ref 0.5–1.3)
CREAT SERPL-MCNC: 1.78 MG/DL — HIGH (ref 0.5–1.3)
CREAT SERPL-MCNC: 1.89 MG/DL — HIGH (ref 0.5–1.3)
CREAT SERPL-MCNC: 2.06 MG/DL — HIGH (ref 0.5–1.3)
EGFR: 33 ML/MIN/1.73M2 — LOW
EGFR: 36 ML/MIN/1.73M2 — LOW
EGFR: 39 ML/MIN/1.73M2 — LOW
EGFR: 40 ML/MIN/1.73M2 — LOW
GAS PNL BLDA: SIGNIFICANT CHANGE UP
GAS PNL BLDV: 127 MMOL/L — LOW (ref 136–145)
GAS PNL BLDV: 130 MMOL/L — LOW (ref 136–145)
GAS PNL BLDV: 131 MMOL/L — LOW (ref 136–145)
GAS PNL BLDV: 131 MMOL/L — LOW (ref 136–145)
GAS PNL BLDV: SIGNIFICANT CHANGE UP
GLUCOSE BLDC GLUCOMTR-MCNC: 177 MG/DL — HIGH (ref 70–99)
GLUCOSE BLDC GLUCOMTR-MCNC: 183 MG/DL — HIGH (ref 70–99)
GLUCOSE BLDC GLUCOMTR-MCNC: 196 MG/DL — HIGH (ref 70–99)
GLUCOSE BLDC GLUCOMTR-MCNC: 204 MG/DL — HIGH (ref 70–99)
GLUCOSE BLDV-MCNC: 163 MG/DL — HIGH (ref 70–99)
GLUCOSE BLDV-MCNC: 181 MG/DL — HIGH (ref 70–99)
GLUCOSE BLDV-MCNC: 194 MG/DL — HIGH (ref 70–99)
GLUCOSE BLDV-MCNC: 209 MG/DL — HIGH (ref 70–99)
GLUCOSE SERPL-MCNC: 167 MG/DL — HIGH (ref 70–99)
GLUCOSE SERPL-MCNC: 187 MG/DL — HIGH (ref 70–99)
GLUCOSE SERPL-MCNC: 192 MG/DL — HIGH (ref 70–99)
GLUCOSE SERPL-MCNC: 193 MG/DL — HIGH (ref 70–99)
HAPTOGLOB SERPL-MCNC: <20 MG/DL — LOW (ref 34–200)
HCO3 BLDV-SCNC: 27 MMOL/L — SIGNIFICANT CHANGE UP (ref 22–29)
HCT VFR BLD CALC: 25.1 % — LOW (ref 39–50)
HCT VFR BLD CALC: 25.6 % — LOW (ref 39–50)
HCT VFR BLDA CALC: 25 % — SIGNIFICANT CHANGE UP
HCT VFR BLDA CALC: 26 % — SIGNIFICANT CHANGE UP
HCT VFR BLDA CALC: 28 % — SIGNIFICANT CHANGE UP
HCT VFR BLDA CALC: 44 % — SIGNIFICANT CHANGE UP
HEPARIN-PF4 AB RESULT: 0.8 U/ML — SIGNIFICANT CHANGE UP (ref 0–0.9)
HGB BLD CALC-MCNC: 14.7 G/DL — SIGNIFICANT CHANGE UP (ref 12.6–17.4)
HGB BLD CALC-MCNC: 8.4 G/DL — LOW (ref 12.6–17.4)
HGB BLD CALC-MCNC: 8.7 G/DL — LOW (ref 12.6–17.4)
HGB BLD CALC-MCNC: 9.2 G/DL — LOW (ref 12.6–17.4)
HGB BLD-MCNC: 8.3 G/DL — LOW (ref 13–17)
HGB BLD-MCNC: 8.3 G/DL — LOW (ref 13–17)
HOROWITZ INDEX BLDV+IHG-RTO: 0.3 — SIGNIFICANT CHANGE UP
HOROWITZ INDEX BLDV+IHG-RTO: 30 — SIGNIFICANT CHANGE UP
INR BLD: 1.08 RATIO — SIGNIFICANT CHANGE UP (ref 0.85–1.18)
LACTATE BLDV-MCNC: 0.6 MMOL/L — SIGNIFICANT CHANGE UP (ref 0.5–2)
LACTATE BLDV-MCNC: 0.6 MMOL/L — SIGNIFICANT CHANGE UP (ref 0.5–2)
LACTATE BLDV-MCNC: 0.7 MMOL/L — SIGNIFICANT CHANGE UP (ref 0.5–2)
LACTATE BLDV-MCNC: 1 MMOL/L — SIGNIFICANT CHANGE UP (ref 0.5–2)
LDH SERPL L TO P-CCNC: 528 U/L — HIGH (ref 98–192)
LDH SERPL L TO P-CCNC: 563 U/L — HIGH (ref 98–192)
MAGNESIUM SERPL-MCNC: 1.9 MG/DL — SIGNIFICANT CHANGE UP (ref 1.6–2.6)
MAGNESIUM SERPL-MCNC: 2 MG/DL — SIGNIFICANT CHANGE UP (ref 1.6–2.6)
MAGNESIUM SERPL-MCNC: 2.1 MG/DL — SIGNIFICANT CHANGE UP (ref 1.8–2.6)
MAGNESIUM SERPL-MCNC: 2.1 MG/DL — SIGNIFICANT CHANGE UP (ref 1.8–2.6)
MCHC RBC-ENTMCNC: 29.3 PG — SIGNIFICANT CHANGE UP (ref 27–34)
MCHC RBC-ENTMCNC: 29.7 PG — SIGNIFICANT CHANGE UP (ref 27–34)
MCHC RBC-ENTMCNC: 32.4 GM/DL — SIGNIFICANT CHANGE UP (ref 32–36)
MCHC RBC-ENTMCNC: 33.1 GM/DL — SIGNIFICANT CHANGE UP (ref 32–36)
MCV RBC AUTO: 90 FL — SIGNIFICANT CHANGE UP (ref 80–100)
MCV RBC AUTO: 90.5 FL — SIGNIFICANT CHANGE UP (ref 80–100)
PCO2 BLDV: 44 MMHG — SIGNIFICANT CHANGE UP (ref 42–55)
PCO2 BLDV: 45 MMHG — SIGNIFICANT CHANGE UP (ref 42–55)
PCO2 BLDV: 46 MMHG — SIGNIFICANT CHANGE UP (ref 42–55)
PCO2 BLDV: 50 MMHG — SIGNIFICANT CHANGE UP (ref 42–55)
PF4 HEPARIN CMPLX AB SER-ACNC: NEGATIVE — SIGNIFICANT CHANGE UP
PH BLDV: 7.34 — SIGNIFICANT CHANGE UP (ref 7.32–7.43)
PH BLDV: 7.38 — SIGNIFICANT CHANGE UP (ref 7.32–7.43)
PH BLDV: 7.39 — SIGNIFICANT CHANGE UP (ref 7.32–7.43)
PH BLDV: 7.39 — SIGNIFICANT CHANGE UP (ref 7.32–7.43)
PHOSPHATE SERPL-MCNC: 3 MG/DL — SIGNIFICANT CHANGE UP (ref 2.4–4.7)
PHOSPHATE SERPL-MCNC: 3.2 MG/DL — SIGNIFICANT CHANGE UP (ref 2.4–4.7)
PLATELET # BLD AUTO: 84 K/UL — LOW (ref 150–400)
PLATELET # BLD AUTO: 88 K/UL — LOW (ref 150–400)
PO2 BLDV: 44 MMHG — SIGNIFICANT CHANGE UP (ref 25–45)
PO2 BLDV: <42 MMHG — SIGNIFICANT CHANGE UP (ref 25–45)
POTASSIUM BLDV-SCNC: 3.3 MMOL/L — LOW (ref 3.5–5.1)
POTASSIUM BLDV-SCNC: 3.8 MMOL/L — SIGNIFICANT CHANGE UP (ref 3.5–5.1)
POTASSIUM BLDV-SCNC: 3.9 MMOL/L — SIGNIFICANT CHANGE UP (ref 3.5–5.1)
POTASSIUM BLDV-SCNC: 4 MMOL/L — SIGNIFICANT CHANGE UP (ref 3.5–5.1)
POTASSIUM SERPL-MCNC: 3.4 MMOL/L — LOW (ref 3.5–5.3)
POTASSIUM SERPL-MCNC: 3.8 MMOL/L — SIGNIFICANT CHANGE UP (ref 3.5–5.3)
POTASSIUM SERPL-MCNC: 3.8 MMOL/L — SIGNIFICANT CHANGE UP (ref 3.5–5.3)
POTASSIUM SERPL-MCNC: 4 MMOL/L — SIGNIFICANT CHANGE UP (ref 3.5–5.3)
POTASSIUM SERPL-SCNC: 3.4 MMOL/L — LOW (ref 3.5–5.3)
POTASSIUM SERPL-SCNC: 3.8 MMOL/L — SIGNIFICANT CHANGE UP (ref 3.5–5.3)
POTASSIUM SERPL-SCNC: 3.8 MMOL/L — SIGNIFICANT CHANGE UP (ref 3.5–5.3)
POTASSIUM SERPL-SCNC: 4 MMOL/L — SIGNIFICANT CHANGE UP (ref 3.5–5.3)
PROT SERPL-MCNC: 5.6 G/DL — LOW (ref 6.6–8.7)
PROT SERPL-MCNC: 5.7 G/DL — LOW (ref 6.6–8.7)
PROT SERPL-MCNC: 5.7 G/DL — LOW (ref 6.6–8.7)
PROT SERPL-MCNC: 5.9 G/DL — LOW (ref 6.6–8.7)
PROTHROM AB SERPL-ACNC: 12 SEC — SIGNIFICANT CHANGE UP (ref 9.5–13)
RBC # BLD: 2.79 M/UL — LOW (ref 4.2–5.8)
RBC # BLD: 2.83 M/UL — LOW (ref 4.2–5.8)
RBC # FLD: 15.9 % — HIGH (ref 10.3–14.5)
RBC # FLD: 15.9 % — HIGH (ref 10.3–14.5)
SAO2 % BLDV: 56.5 % — SIGNIFICANT CHANGE UP
SAO2 % BLDV: 68.2 % — SIGNIFICANT CHANGE UP
SAO2 % BLDV: 71.9 % — SIGNIFICANT CHANGE UP
SAO2 % BLDV: 73.9 % — SIGNIFICANT CHANGE UP
SODIUM SERPL-SCNC: 132 MMOL/L — LOW (ref 135–145)
SODIUM SERPL-SCNC: 133 MMOL/L — LOW (ref 135–145)
SODIUM SERPL-SCNC: 134 MMOL/L — LOW (ref 135–145)
SODIUM SERPL-SCNC: 134 MMOL/L — LOW (ref 135–145)
WBC # BLD: 9.6 K/UL — SIGNIFICANT CHANGE UP (ref 3.8–10.5)
WBC # BLD: 9.69 K/UL — SIGNIFICANT CHANGE UP (ref 3.8–10.5)
WBC # FLD AUTO: 9.6 K/UL — SIGNIFICANT CHANGE UP (ref 3.8–10.5)
WBC # FLD AUTO: 9.69 K/UL — SIGNIFICANT CHANGE UP (ref 3.8–10.5)

## 2024-06-09 PROCEDURE — 71045 X-RAY EXAM CHEST 1 VIEW: CPT | Mod: 26

## 2024-06-09 RX ORDER — HEPARIN SODIUM 5000 [USP'U]/ML
5500 INJECTION INTRAVENOUS; SUBCUTANEOUS EVERY 6 HOURS
Refills: 0 | Status: DISCONTINUED | OUTPATIENT
Start: 2024-06-09 | End: 2024-06-11

## 2024-06-09 RX ORDER — HEPARIN SODIUM 5000 [USP'U]/ML
800 INJECTION INTRAVENOUS; SUBCUTANEOUS
Qty: 25000 | Refills: 0 | Status: DISCONTINUED | OUTPATIENT
Start: 2024-06-09 | End: 2024-06-11

## 2024-06-09 RX ORDER — HEPARIN SODIUM 5000 [USP'U]/ML
2500 INJECTION INTRAVENOUS; SUBCUTANEOUS EVERY 6 HOURS
Refills: 0 | Status: DISCONTINUED | OUTPATIENT
Start: 2024-06-09 | End: 2024-06-11

## 2024-06-09 RX ORDER — INSULIN LISPRO 100/ML
2 VIAL (ML) SUBCUTANEOUS EVERY 6 HOURS
Refills: 0 | Status: DISCONTINUED | OUTPATIENT
Start: 2024-06-09 | End: 2024-06-12

## 2024-06-09 RX ORDER — POTASSIUM CHLORIDE 20 MEQ
10 PACKET (EA) ORAL
Refills: 0 | Status: COMPLETED | OUTPATIENT
Start: 2024-06-09 | End: 2024-06-09

## 2024-06-09 RX ADMIN — Medication 100 MILLIEQUIVALENT(S): at 04:55

## 2024-06-09 RX ADMIN — Medication 10.5 MICROGRAM(S)/KG/MIN: at 09:58

## 2024-06-09 RX ADMIN — Medication 100 MILLIEQUIVALENT(S): at 02:29

## 2024-06-09 RX ADMIN — MONTELUKAST 10 MILLIGRAM(S): 4 TABLET, CHEWABLE ORAL at 13:32

## 2024-06-09 RX ADMIN — Medication 1: at 00:45

## 2024-06-09 RX ADMIN — PANTOPRAZOLE SODIUM 40 MILLIGRAM(S): 20 TABLET, DELAYED RELEASE ORAL at 13:32

## 2024-06-09 RX ADMIN — Medication 1 DROP(S): at 02:30

## 2024-06-09 RX ADMIN — PIPERACILLIN AND TAZOBACTAM 25 GRAM(S): 4; .5 INJECTION, POWDER, LYOPHILIZED, FOR SOLUTION INTRAVENOUS at 00:58

## 2024-06-09 RX ADMIN — PROPOFOL 7.86 MICROGRAM(S)/KG/MIN: 10 INJECTION, EMULSION INTRAVENOUS at 03:30

## 2024-06-09 RX ADMIN — Medication 1: at 23:26

## 2024-06-09 RX ADMIN — Medication 1: at 18:39

## 2024-06-09 RX ADMIN — INSULIN GLARGINE 9 UNIT(S): 100 INJECTION, SOLUTION SUBCUTANEOUS at 23:26

## 2024-06-09 RX ADMIN — CLOPIDOGREL BISULFATE 75 MILLIGRAM(S): 75 TABLET, FILM COATED ORAL at 13:32

## 2024-06-09 RX ADMIN — PROPOFOL 7.86 MICROGRAM(S)/KG/MIN: 10 INJECTION, EMULSION INTRAVENOUS at 09:58

## 2024-06-09 RX ADMIN — PIPERACILLIN AND TAZOBACTAM 25 GRAM(S): 4; .5 INJECTION, POWDER, LYOPHILIZED, FOR SOLUTION INTRAVENOUS at 13:33

## 2024-06-09 RX ADMIN — INSULIN GLARGINE 9 UNIT(S): 100 INJECTION, SOLUTION SUBCUTANEOUS at 00:55

## 2024-06-09 RX ADMIN — CHLORHEXIDINE GLUCONATE 1 APPLICATION(S): 213 SOLUTION TOPICAL at 05:24

## 2024-06-09 RX ADMIN — HYDROMORPHONE HYDROCHLORIDE 0.5 MILLIGRAM(S): 2 INJECTION INTRAMUSCULAR; INTRAVENOUS; SUBCUTANEOUS at 00:00

## 2024-06-09 RX ADMIN — Medication 650 MILLIGRAM(S): at 05:23

## 2024-06-09 RX ADMIN — VASOPRESSIN 6 UNIT(S)/MIN: 20 INJECTION INTRAVENOUS at 06:39

## 2024-06-09 RX ADMIN — Medication 2: at 05:23

## 2024-06-09 RX ADMIN — Medication 44 MICROGRAM(S): at 21:12

## 2024-06-09 RX ADMIN — PIPERACILLIN AND TAZOBACTAM 25 GRAM(S): 4; .5 INJECTION, POWDER, LYOPHILIZED, FOR SOLUTION INTRAVENOUS at 23:25

## 2024-06-09 RX ADMIN — Medication 81 MILLIGRAM(S): at 13:32

## 2024-06-09 RX ADMIN — CHLORHEXIDINE GLUCONATE 1 APPLICATION(S): 213 SOLUTION TOPICAL at 12:32

## 2024-06-09 RX ADMIN — HEPARIN SODIUM 5 UNIT(S)/HR: 5000 INJECTION INTRAVENOUS; SUBCUTANEOUS at 21:27

## 2024-06-09 RX ADMIN — HEPARIN SODIUM 500 UNIT(S)/HR: 5000 INJECTION INTRAVENOUS; SUBCUTANEOUS at 06:40

## 2024-06-09 RX ADMIN — VASOPRESSIN 6 UNIT(S)/MIN: 20 INJECTION INTRAVENOUS at 21:39

## 2024-06-09 RX ADMIN — Medication 100 MILLIEQUIVALENT(S): at 03:28

## 2024-06-09 RX ADMIN — Medication 2 UNIT(S): at 13:35

## 2024-06-09 RX ADMIN — AMIODARONE HYDROCHLORIDE 200 MILLIGRAM(S): 400 TABLET ORAL at 05:24

## 2024-06-09 NOTE — AIRWAY REMOVAL NOTE  ADULT & PEDS - RESPIRATORY RHYTHM/PATTERN
depth regular/pattern regular
no shortness of breath/rate regular/depth regular/pattern regular/unlabored
no shortness of breath/rate regular/depth regular/pattern regular/unlabored

## 2024-06-09 NOTE — PROGRESS NOTE ADULT - ASSESSMENT
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, HTN, HLD, with:    # Cardiac arrest x 3  # Cardiogenic shock  # Acute systolic heart failure  # RAS on CKD    - actively titrating norepinephrine infusion to maintain a MAP > 65  - continue dobutamine @ 5 mcg/kg/min  - IABP augmenting @ 1:1, ECG trigger  - recent CO/CI 7.1/3.9 (by Fauzia), lactate 0.60, CVP 6-10  - diuresis on hold, nonoliguric and maintaining recent net negative fluid balance  - continue amiodarone for VT suppression  - continue heparin infusion  - continue DAPT and high intensity statin  - RAS on CKD likely cardiorenal in nature +/- heavy diuresis, optimize end-organ perfusion as above  - trend BUN/Cr, electrolytes, acid-base balance, monitor hourly UOP  - no indication for urgent RRT at this time  - replete potassium  - actively titrating vent settings to maintain SpO2 > 92%  - repeat ABG indicates minute ventilation is adequate  - sedate with propofol to promote vent synchrony and patient comfort    Case discussed with the patient's family at the bedside. Diagnosis, prognosis, and management plan outlined. All questions answered and concerns addressed.     Case discussed with MICU physician, Dr. Barreto.   79 y/o M with a h/o CAD (PCI/CODI x s/p 19, brachytherapy 11/2023), 4V CABG, ICM, HFrEF EF 37%, s/p ICD, DM2 on insulin, 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, HTN, HLD, with:    # Cardiac arrest x 3  # Cardiogenic shock  # Acute systolic heart failure  # RAS on CKD    - actively titrating norepinephrine infusion to maintain a MAP > 65  - continue dobutamine @ 5 mcg/kg/min  - IABP augmenting @ 1:1, ECG trigger (hopeful bridge to recovery as he is not a candidate for advanced therapies)  - recent CO/CI 7.1/3.9 (by Fauzia), lactate 0.60, CVP 6-10  - diuresis on hold, nonoliguric and maintaining recent net negative fluid balance  - continue amiodarone for VT suppression  - continue heparin infusion  - continue DAPT and high intensity statin  - RAS on CKD likely cardiorenal in nature +/- heavy diuresis, optimize end-organ perfusion as above  - trend BUN/Cr, electrolytes, acid-base balance, monitor hourly UOP  - no indication for urgent RRT at this time  - replete potassium  - actively titrating vent settings to maintain SpO2 > 92%  - repeat ABG indicates minute ventilation is adequate  - sedate with propofol to promote vent synchrony and patient comfort    Case discussed with the patient's family at the bedside. Diagnosis, prognosis, and management plan outlined. All questions answered and concerns addressed.     Case discussed with MICU physician, Dr. Barreto.

## 2024-06-09 NOTE — GOALS OF CARE CONVERSATION - ADVANCED CARE PLANNING - CONVERSATION DETAILS
I spoke with Terry Castellon and Yung Castellon (son) who requested to translate. We had a discussion about Kelly Castellon 's care and current condition. Mr. Castellon was off of sedation at this time, remained intubated, but nodded appropriately during discussion.    Overall goals of care including diagnosis, prognosis, and goals of care in relation to advanced directives were discussed. After this conversation decision was made by Terry Castellon with translation done by merari Castellon (son)  to  change status to DNR/DNI. MOLST form completed, signed, and placed in chart.     Above was reviewed with MICU attending physician Dr. Barreto.

## 2024-06-09 NOTE — AIRWAY REMOVAL NOTE  ADULT & PEDS - ARTIFICAL AIRWAY REMOVAL COMMENTS
Writen order for extubation verified.  The patient was identified by full name and date of birth compared to the identification band.  Present during the procedure was [id number] Lopes.
no complications, no stridor noted
Pt extubated to 40% aerosol mask. pt vomited stomach contents right after ET tube and NG tube were pulled out. Nurse was at the bedside..

## 2024-06-09 NOTE — PROGRESS NOTE ADULT - SUBJECTIVE AND OBJECTIVE BOX
Patient is a 78y old  Male who presents with a chief complaint of Chest Pain / SOB (08 Jun 2024 15:04)      BRIEF HOSPITAL COURSE: BRIEF HOSPITAL COURSE: 79 y/o M with a h/o CAD (PCI/CODI x s/p 19, brachytherapy 11/2023), 4V CABG, ICM, HFrEF EF 37%, s/p ICD, DM2 on insulin, 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 Recently had Holter monitor placement for PVC burden which was noted to have worsened the day of presentation. In ED, patient weak appearing, ruled in for NSTEMI with positive troponin, and labs remarkable for RAS on CKD as well as hypoglycemia. A TTE revealed acute on chronic systolic heart failure with EF <20%. He was admitted to telemetry for treatment of ACS on DAPT and heparin gtt, planned for LHC. Patient suffered VF/VT cardiac arrest on 5/25 without ICD shock (subsequently reprogrammed by EP) and was taken for emergent LHC where he was found to have 3/4 occluded grafts, only LIMA to LAD was patent. No target for intervention. Extubated on 5/26, however suffered another VT cardiac arrest later that day (again without ICD shock) with 1 minute downtime prior to ROSC. Hospital course further complicated by cardiogenic shock requiring IV vasopressor and inotrope support. Extubated on 5/27. Underwent RHC on 6/4 which revealed severely elevated filling pressures and low cardiac output- subsequently started on milrinone and bumetanide infusions. Patient suffered third cardiac arrest on 6/5, this time unrelated to cardiac arrhythmia (downtime of 8 mins before ROSC). IABP inserted on 6/5.      Events last 24 hours: Remains dependent on dual IV vasopressor + inotrope support. IABP augmenting @ 1:1. Remains on full mechanical vent support.          PAST MEDICAL & SURGICAL HISTORY:  GERD (gastroesophageal reflux disease)      High cholesterol      Coronary artery disease involving coronary bypass graft of native heart with unstable angina pectoris      Coronary artery disease involving native coronary artery of native heart, angina presence unspecifie      Type 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, without long-term current use of insul      Essential hypertension      HFrEF (heart failure with reduced ejection fraction)      Stage 4 chronic kidney disease      LBBB (left bundle branch block)      Facial nerve palsy      Hypothyroidism      S/P CABG (coronary artery bypass graft)  4V 1983 [place]      Status post open reduction with internal fixation of fracture      History of insertion of stent into coronary artery bypass graft      History of brachytherapy          Review of Systems:  Unable to obtain secondary to sedation/intubation.          Medications:  piperacillin/tazobactam IVPB.. 3.375 Gram(s) IV Intermittent every 12 hours    aMIOdarone    Tablet 200 milliGRAM(s) Oral daily  DOBUTamine Infusion 5 MICROgram(s)/kG/Min IV Continuous <Continuous>  norepinephrine Infusion 0.05 MICROgram(s)/kG/Min IV Continuous <Continuous>    albuterol/ipratropium for Nebulization 3 milliLiter(s) Nebulizer every 6 hours PRN  montelukast 10 milliGRAM(s) Oral daily    HYDROmorphone  Injectable 0.5 milliGRAM(s) IV Push every 4 hours PRN  ondansetron Injectable 4 milliGRAM(s) IV Push every 8 hours PRN  propofol Infusion 20 MICROgram(s)/kG/Min IV Continuous <Continuous>      aspirin  chewable 81 milliGRAM(s) Oral daily  clopidogrel Tablet 75 milliGRAM(s) Oral daily  heparin   Injectable 5500 Unit(s) IV Push every 6 hours PRN  heparin   Injectable 2500 Unit(s) IV Push every 6 hours PRN  heparin  Infusion. 800 Unit(s)/Hr IV Continuous <Continuous>    pantoprazole  Injectable 40 milliGRAM(s) IV Push daily  senna 2 Tablet(s) Oral at bedtime PRN      atorvastatin 80 milliGRAM(s) Oral at bedtime  dextrose 50% Injectable 25 Gram(s) IV Push once  insulin glargine Injectable (LANTUS) 9 Unit(s) SubCutaneous at bedtime  insulin lispro (ADMELOG) corrective regimen sliding scale   SubCutaneous every 6 hours  levothyroxine Injectable 44 MICROGram(s) IV Push at bedtime  vasopressin Infusion 0.04 Unit(s)/Min IV Continuous <Continuous>    potassium chloride  10 mEq/100 mL IVPB 10 milliEquivalent(s) IV Intermittent every 1 hour  sodium bicarbonate 650 milliGRAM(s) Oral every 12 hours  sodium chloride 0.9% lock flush 10 milliLiter(s) IV Push every 1 hour PRN      artificial  tears Solution 1 Drop(s) Both EYES two times a day PRN  chlorhexidine 2% Cloths 1 Application(s) Topical <User Schedule>  chlorhexidine 2% Cloths 1 Application(s) Topical daily        Mode: AC/ CMV (Assist Control/ Continuous Mandatory Ventilation)  RR (machine): 16  TV (machine): 450  FiO2: 30  PEEP: 6  MAP: 10  PIP: 25      ICU Vital Signs Last 24 Hrs  T(C): 36.4 (08 Jun 2024 18:00), Max: 36.9 (08 Jun 2024 08:00)  T(F): 97.5 (08 Jun 2024 18:00), Max: 98.4 (08 Jun 2024 08:00)  HR: 73 (09 Jun 2024 02:00) (66 - 75)  BP: 119/47 (09 Jun 2024 01:00) (85/53 - 151/113)  BP(mean): 70 (09 Jun 2024 01:00) (61 - 125)  ABP: 131/36 (09 Jun 2024 02:00) (118/32 - 150/39)  ABP(mean): 73 (09 Jun 2024 02:00) (68 - 87)  RR: 16 (08 Jun 2024 18:00) (16 - 16)  SpO2: 100% (09 Jun 2024 02:00) (98% - 100%)    O2 Parameters below as of 08 Jun 2024 18:00  Patient On (Oxygen Delivery Method): ventilator    O2 Concentration (%): 30        ABG - ( 09 Jun 2024 00:34 )  pH, Arterial: 7.410 pH, Blood: x     /  pCO2: 39    /  pO2: 136   / HCO3: 25    / Base Excess: 0.1   /  SaO2: 98.8                I&O's Detail    07 Jun 2024 07:01  -  08 Jun 2024 07:00  --------------------------------------------------------  IN:    DOBUTamine: 242 mL    Heparin Infusion: 120 mL    IV PiggyBack: 300 mL    IV PiggyBack: 200 mL    Norepinephrine: 171.1 mL    Propofol: 247.8 mL    Vasopressin: 144 mL  Total IN: 1424.9 mL    OUT:    Indwelling Catheter - Urethral (mL): 1935 mL  Total OUT: 1935 mL    Total NET: -510.1 mL      08 Jun 2024 07:01  -  09 Jun 2024 02:36  --------------------------------------------------------  IN:    DOBUTamine: 168 mL    Glucerna 1.5: 150 mL    Heparin Infusion: 80 mL    IV PiggyBack: 100 mL    Norepinephrine: 31.4 mL    Propofol: 225.8 mL    Vasopressin: 96 mL  Total IN: 851.2 mL    OUT:    Indwelling Catheter - Urethral (mL): 1035 mL  Total OUT: 1035 mL    Total NET: -183.8 mL            LABS:                        8.9    10.65 )-----------( 113      ( 08 Jun 2024 05:30 )             26.4     06-09    132<L>  |  94<L>  |  50.6<H>  ----------------------------<  167<H>  3.4<L>   |  24.0  |  2.06<H>    Ca    8.1<L>      09 Jun 2024 00:30  Phos  3.0     06-09  Mg     1.9     06-09    TPro  5.6<L>  /  Alb  2.7<L>  /  TBili  0.6  /  DBili  x   /  AST  47<H>  /  ALT  40  /  AlkPhos  102  06-09          CAPILLARY BLOOD GLUCOSE      POCT Blood Glucose.: 176 mg/dL (08 Jun 2024 17:16)    PTT - ( 08 Jun 2024 05:30 )  PTT:70.7 sec  Urinalysis Basic - ( 09 Jun 2024 00:30 )    Color: x / Appearance: x / SG: x / pH: x  Gluc: 167 mg/dL / Ketone: x  / Bili: x / Urobili: x   Blood: x / Protein: x / Nitrite: x   Leuk Esterase: x / RBC: x / WBC x   Sq Epi: x / Non Sq Epi: x / Bacteria: x      CULTURES:  Culture Results:   No growth at 72 Hours (06-05-24 @ 05:52)  Culture Results:   No growth at 72 Hours (06-05-24 @ 05:50)        Physical Examination:    General: No acute distress.  sedated, intubated    HEENT: Pupils equal, reactive to light.  Symmetric.    PULM: Clear to auscultation bilaterally, no significant sputum production    CVS: Regular rate and rhythm, no murmurs, rubs, or gallops    ABD: Soft, nondistended, nontender, normoactive bowel sounds, no masses    EXT: No edema, nontender    SKIN: distal extremities are cool to the touch    NEURO: sedated, moves all extremities        RADIOLOGY:     < from: Xray Chest 1 View- PORTABLE-Urgent (Xray Chest 1 View- PORTABLE-Urgent .) (06.07.24 @ 12:26) >  Heart magnified by technique. Sternotomy, left-sided defibrillator,   endotracheal tube, nasogastric tube, left coronary stent, and [place]   device entering from the right jugular area with tip in right main   pulmonary artery again noted.    There are mild perihilar infiltrates left greater than right withsome   degree of atelectasis of the left lower lobe. Small atelectatic area   right upper lobe also seen.    Chest is similar to June 6.    IMPRESSION: Stable findings as above.    < end of copied text >      < from: CT Head No Cont (06.05.24 @ 12:57) >  FINDINGS: There is no acute intracranial hemorrhage, mass effect, shift   of the midline structures, herniation, extra-axial fluid collection, or   hydrocephalus.    A chronic lacunar infarct is seen within the left thalamus.    There is diffuse cerebral volume loss with prominence of the sulci,   fissures, and cisternal spaces which is normal for the patient's age.   There is mild deep and periventricular white matter hypoattenuation   statistically compatible with microvascular changes given calcific   atherosclerotic disease of the intracranial arteries.    The paranasal sinuses and tympanomastoid cavities are clear. The   calvarium is intact. There is evidence of bilateral cataract removal.    IMPRESSION: No acute intracranial hemorrhage, mass effect, or shift of   the midline structures.    < end of copied text >          CRITICAL CARE TIME SPENT: 38 mins  Time spent evaluating/treating patient with medical issues that pose a high risk for life threatening deterioration and/or end-organ damage, reviewing data/labs/imaging, discussing case with multidisciplinary team, discussing plan/goals of care with patient/family. Non-inclusive of procedure time. The date of entry of this note reflects the date of services rendered.

## 2024-06-09 NOTE — AIRWAY REMOVAL NOTE  ADULT & PEDS - RESPIRATORY EXPANSION/ACCESSORY MUSCLES/RETRACTIONS
no use of accessory muscles/no retractions/expansion symmetric
no use of accessory muscles/no retractions/expansion symmetric
no use of accessory muscles/no retractions

## 2024-06-10 LAB
ALBUMIN SERPL ELPH-MCNC: 2.7 G/DL — LOW (ref 3.3–5.2)
ALBUMIN SERPL ELPH-MCNC: 2.8 G/DL — LOW (ref 3.3–5.2)
ALP SERPL-CCNC: 100 U/L — SIGNIFICANT CHANGE UP (ref 40–120)
ALP SERPL-CCNC: 103 U/L — SIGNIFICANT CHANGE UP (ref 40–120)
ALP SERPL-CCNC: 105 U/L — SIGNIFICANT CHANGE UP (ref 40–120)
ALP SERPL-CCNC: 99 U/L — SIGNIFICANT CHANGE UP (ref 40–120)
ALT FLD-CCNC: 32 U/L — SIGNIFICANT CHANGE UP
ALT FLD-CCNC: 33 U/L — SIGNIFICANT CHANGE UP
ALT FLD-CCNC: 34 U/L — SIGNIFICANT CHANGE UP
ALT FLD-CCNC: 34 U/L — SIGNIFICANT CHANGE UP
ANION GAP SERPL CALC-SCNC: 12 MMOL/L — SIGNIFICANT CHANGE UP (ref 5–17)
ANION GAP SERPL CALC-SCNC: 12 MMOL/L — SIGNIFICANT CHANGE UP (ref 5–17)
ANION GAP SERPL CALC-SCNC: 16 MMOL/L — SIGNIFICANT CHANGE UP (ref 5–17)
ANION GAP SERPL CALC-SCNC: 16 MMOL/L — SIGNIFICANT CHANGE UP (ref 5–17)
ANISOCYTOSIS BLD QL: SLIGHT — SIGNIFICANT CHANGE UP
ANISOCYTOSIS BLD QL: SLIGHT — SIGNIFICANT CHANGE UP
APPEARANCE UR: ABNORMAL
APTT BLD: 30.5 SEC — SIGNIFICANT CHANGE UP (ref 24.5–35.6)
APTT BLD: 31.4 SEC — SIGNIFICANT CHANGE UP (ref 24.5–35.6)
APTT BLD: 79.1 SEC — HIGH (ref 24.5–35.6)
AST SERPL-CCNC: 43 U/L — HIGH
AST SERPL-CCNC: 43 U/L — HIGH
AST SERPL-CCNC: 44 U/L — HIGH
AST SERPL-CCNC: 50 U/L — HIGH
BACTERIA # UR AUTO: ABNORMAL /HPF
BASE EXCESS BLDV CALC-SCNC: 0.6 MMOL/L — SIGNIFICANT CHANGE UP (ref -2–3)
BASE EXCESS BLDV CALC-SCNC: 1.6 MMOL/L — SIGNIFICANT CHANGE UP (ref -2–3)
BASE EXCESS BLDV CALC-SCNC: 3.1 MMOL/L — HIGH (ref -2–3)
BASE EXCESS BLDV CALC-SCNC: 3.3 MMOL/L — HIGH (ref -2–3)
BASOPHILS # BLD AUTO: 0.08 K/UL — SIGNIFICANT CHANGE UP (ref 0–0.2)
BASOPHILS NFR BLD AUTO: 0.9 % — SIGNIFICANT CHANGE UP (ref 0–2)
BILIRUB SERPL-MCNC: 0.6 MG/DL — SIGNIFICANT CHANGE UP (ref 0.4–2)
BILIRUB SERPL-MCNC: 0.8 MG/DL — SIGNIFICANT CHANGE UP (ref 0.4–2)
BILIRUB UR-MCNC: ABNORMAL
BLOOD GAS COMMENTS, VENOUS: SIGNIFICANT CHANGE UP
BLOOD GAS COMMENTS, VENOUS: SIGNIFICANT CHANGE UP
BUN SERPL-MCNC: 44.7 MG/DL — HIGH (ref 8–20)
BUN SERPL-MCNC: 45.1 MG/DL — HIGH (ref 8–20)
BUN SERPL-MCNC: 45.2 MG/DL — HIGH (ref 8–20)
BUN SERPL-MCNC: 46.6 MG/DL — HIGH (ref 8–20)
CA-I SERPL-SCNC: 1.2 MMOL/L — SIGNIFICANT CHANGE UP (ref 1.15–1.33)
CA-I SERPL-SCNC: 1.21 MMOL/L — SIGNIFICANT CHANGE UP (ref 1.15–1.33)
CA-I SERPL-SCNC: 1.21 MMOL/L — SIGNIFICANT CHANGE UP (ref 1.15–1.33)
CALCIUM SERPL-MCNC: 8.4 MG/DL — SIGNIFICANT CHANGE UP (ref 8.4–10.5)
CALCIUM SERPL-MCNC: 8.4 MG/DL — SIGNIFICANT CHANGE UP (ref 8.4–10.5)
CALCIUM SERPL-MCNC: 8.5 MG/DL — SIGNIFICANT CHANGE UP (ref 8.4–10.5)
CALCIUM SERPL-MCNC: 8.8 MG/DL — SIGNIFICANT CHANGE UP (ref 8.4–10.5)
CAST: 6 /LPF — HIGH (ref 0–4)
CHLORIDE BLDV-SCNC: 100 MMOL/L — SIGNIFICANT CHANGE UP (ref 96–108)
CHLORIDE BLDV-SCNC: 101 MMOL/L — SIGNIFICANT CHANGE UP (ref 96–108)
CHLORIDE BLDV-SCNC: 101 MMOL/L — SIGNIFICANT CHANGE UP (ref 96–108)
CHLORIDE SERPL-SCNC: 100 MMOL/L — SIGNIFICANT CHANGE UP (ref 96–108)
CHLORIDE SERPL-SCNC: 98 MMOL/L — SIGNIFICANT CHANGE UP (ref 96–108)
CO2 SERPL-SCNC: 21 MMOL/L — LOW (ref 22–29)
CO2 SERPL-SCNC: 23 MMOL/L — SIGNIFICANT CHANGE UP (ref 22–29)
CO2 SERPL-SCNC: 24 MMOL/L — SIGNIFICANT CHANGE UP (ref 22–29)
CO2 SERPL-SCNC: 25 MMOL/L — SIGNIFICANT CHANGE UP (ref 22–29)
COLOR SPEC: ABNORMAL
CREAT SERPL-MCNC: 1.76 MG/DL — HIGH (ref 0.5–1.3)
CREAT SERPL-MCNC: 1.79 MG/DL — HIGH (ref 0.5–1.3)
CREAT SERPL-MCNC: 1.86 MG/DL — HIGH (ref 0.5–1.3)
CREAT SERPL-MCNC: 1.92 MG/DL — HIGH (ref 0.5–1.3)
CULTURE RESULTS: SIGNIFICANT CHANGE UP
CULTURE RESULTS: SIGNIFICANT CHANGE UP
DIFF PNL FLD: ABNORMAL
EGFR: 36 ML/MIN/1.73M2 — LOW
EGFR: 37 ML/MIN/1.73M2 — LOW
EGFR: 39 ML/MIN/1.73M2 — LOW
EGFR: 40 ML/MIN/1.73M2 — LOW
EOSINOPHIL # BLD AUTO: 0.16 K/UL — SIGNIFICANT CHANGE UP (ref 0–0.5)
EOSINOPHIL NFR BLD AUTO: 1.7 % — SIGNIFICANT CHANGE UP (ref 0–6)
GAS PNL BLDA: SIGNIFICANT CHANGE UP
GAS PNL BLDV: 132 MMOL/L — LOW (ref 136–145)
GAS PNL BLDV: 133 MMOL/L — LOW (ref 136–145)
GAS PNL BLDV: 134 MMOL/L — LOW (ref 136–145)
GAS PNL BLDV: SIGNIFICANT CHANGE UP
GIANT PLATELETS BLD QL SMEAR: PRESENT — SIGNIFICANT CHANGE UP
GIANT PLATELETS BLD QL SMEAR: PRESENT — SIGNIFICANT CHANGE UP
GLUCOSE BLDC GLUCOMTR-MCNC: 146 MG/DL — HIGH (ref 70–99)
GLUCOSE BLDC GLUCOMTR-MCNC: 155 MG/DL — HIGH (ref 70–99)
GLUCOSE BLDV-MCNC: 138 MG/DL — HIGH (ref 70–99)
GLUCOSE BLDV-MCNC: 180 MG/DL — HIGH (ref 70–99)
GLUCOSE BLDV-MCNC: 96 MG/DL — SIGNIFICANT CHANGE UP (ref 70–99)
GLUCOSE SERPL-MCNC: 104 MG/DL — HIGH (ref 70–99)
GLUCOSE SERPL-MCNC: 143 MG/DL — HIGH (ref 70–99)
GLUCOSE SERPL-MCNC: 146 MG/DL — HIGH (ref 70–99)
GLUCOSE SERPL-MCNC: 173 MG/DL — HIGH (ref 70–99)
GLUCOSE UR QL: NEGATIVE MG/DL — SIGNIFICANT CHANGE UP
HCO3 BLDV-SCNC: 25 MMOL/L — SIGNIFICANT CHANGE UP (ref 22–29)
HCO3 BLDV-SCNC: 27 MMOL/L — SIGNIFICANT CHANGE UP (ref 22–29)
HCO3 BLDV-SCNC: 28 MMOL/L — SIGNIFICANT CHANGE UP (ref 22–29)
HCO3 BLDV-SCNC: 28 MMOL/L — SIGNIFICANT CHANGE UP (ref 22–29)
HCT VFR BLD CALC: 24.1 % — LOW (ref 39–50)
HCT VFR BLD CALC: 27 % — LOW (ref 39–50)
HCT VFR BLDA CALC: 24 % — SIGNIFICANT CHANGE UP
HCT VFR BLDA CALC: 25 % — SIGNIFICANT CHANGE UP
HCT VFR BLDA CALC: 36 % — SIGNIFICANT CHANGE UP
HGB BLD CALC-MCNC: 11.9 G/DL — LOW (ref 12.6–17.4)
HGB BLD CALC-MCNC: 8.1 G/DL — LOW (ref 12.6–17.4)
HGB BLD CALC-MCNC: 8.2 G/DL — LOW (ref 12.6–17.4)
HGB BLD-MCNC: 8 G/DL — LOW (ref 13–17)
HGB BLD-MCNC: 8.8 G/DL — LOW (ref 13–17)
HOROWITZ INDEX BLDV+IHG-RTO: 0.21 — SIGNIFICANT CHANGE UP
HOROWITZ INDEX BLDV+IHG-RTO: 0.21 — SIGNIFICANT CHANGE UP
HYPOCHROMIA BLD QL: SLIGHT — SIGNIFICANT CHANGE UP
KETONES UR-MCNC: NEGATIVE MG/DL — SIGNIFICANT CHANGE UP
LACTATE BLDV-MCNC: 0.9 MMOL/L — SIGNIFICANT CHANGE UP (ref 0.5–2)
LACTATE BLDV-MCNC: 0.9 MMOL/L — SIGNIFICANT CHANGE UP (ref 0.5–2)
LACTATE BLDV-MCNC: 1 MMOL/L — SIGNIFICANT CHANGE UP (ref 0.5–2)
LEUKOCYTE ESTERASE UR-ACNC: ABNORMAL
LYMPHOCYTES # BLD AUTO: 0.73 K/UL — LOW (ref 1–3.3)
LYMPHOCYTES # BLD AUTO: 7.9 % — LOW (ref 13–44)
MAGNESIUM SERPL-MCNC: 2 MG/DL — SIGNIFICANT CHANGE UP (ref 1.6–2.6)
MAGNESIUM SERPL-MCNC: 2.1 MG/DL — SIGNIFICANT CHANGE UP (ref 1.6–2.6)
MANUAL SMEAR VERIFICATION: SIGNIFICANT CHANGE UP
MANUAL SMEAR VERIFICATION: SIGNIFICANT CHANGE UP
MCHC RBC-ENTMCNC: 29.9 PG — SIGNIFICANT CHANGE UP (ref 27–34)
MCHC RBC-ENTMCNC: 30 PG — SIGNIFICANT CHANGE UP (ref 27–34)
MCHC RBC-ENTMCNC: 32.6 GM/DL — SIGNIFICANT CHANGE UP (ref 32–36)
MCHC RBC-ENTMCNC: 33.2 GM/DL — SIGNIFICANT CHANGE UP (ref 32–36)
MCV RBC AUTO: 90.3 FL — SIGNIFICANT CHANGE UP (ref 80–100)
MCV RBC AUTO: 91.8 FL — SIGNIFICANT CHANGE UP (ref 80–100)
METAMYELOCYTES # FLD: 0.9 % — HIGH (ref 0–0)
MICROCYTES BLD QL: SLIGHT — SIGNIFICANT CHANGE UP
MONOCYTES # BLD AUTO: 0.82 K/UL — SIGNIFICANT CHANGE UP (ref 0–0.9)
MONOCYTES NFR BLD AUTO: 8.8 % — SIGNIFICANT CHANGE UP (ref 2–14)
NEUTROPHILS # BLD AUTO: 7.31 K/UL — SIGNIFICANT CHANGE UP (ref 1.8–7.4)
NEUTROPHILS NFR BLD AUTO: 78.9 % — HIGH (ref 43–77)
NITRITE UR-MCNC: NEGATIVE — SIGNIFICANT CHANGE UP
PCO2 BLDV: 41 MMHG — LOW (ref 42–55)
PCO2 BLDV: 44 MMHG — SIGNIFICANT CHANGE UP (ref 42–55)
PCO2 BLDV: 44 MMHG — SIGNIFICANT CHANGE UP (ref 42–55)
PCO2 BLDV: 45 MMHG — SIGNIFICANT CHANGE UP (ref 42–55)
PH BLDV: 7.39 — SIGNIFICANT CHANGE UP (ref 7.32–7.43)
PH BLDV: 7.4 — SIGNIFICANT CHANGE UP (ref 7.32–7.43)
PH BLDV: 7.4 — SIGNIFICANT CHANGE UP (ref 7.32–7.43)
PH BLDV: 7.41 — SIGNIFICANT CHANGE UP (ref 7.32–7.43)
PH UR: 5.5 — SIGNIFICANT CHANGE UP (ref 5–8)
PHOSPHATE SERPL-MCNC: 2.9 MG/DL — SIGNIFICANT CHANGE UP (ref 2.4–4.7)
PHOSPHATE SERPL-MCNC: 3.1 MG/DL — SIGNIFICANT CHANGE UP (ref 2.4–4.7)
PHOSPHATE SERPL-MCNC: 3.2 MG/DL — SIGNIFICANT CHANGE UP (ref 2.4–4.7)
PHOSPHATE SERPL-MCNC: 3.3 MG/DL — SIGNIFICANT CHANGE UP (ref 2.4–4.7)
PLAT MORPH BLD: NORMAL — SIGNIFICANT CHANGE UP
PLAT MORPH BLD: NORMAL — SIGNIFICANT CHANGE UP
PLATELET # BLD AUTO: 84 K/UL — LOW (ref 150–400)
PLATELET # BLD AUTO: 95 K/UL — LOW (ref 150–400)
PO2 BLDV: <42 MMHG — SIGNIFICANT CHANGE UP (ref 25–45)
POIKILOCYTOSIS BLD QL AUTO: SLIGHT — SIGNIFICANT CHANGE UP
POLYCHROMASIA BLD QL SMEAR: SLIGHT — SIGNIFICANT CHANGE UP
POLYCHROMASIA BLD QL SMEAR: SLIGHT — SIGNIFICANT CHANGE UP
POTASSIUM BLDV-SCNC: 3.4 MMOL/L — LOW (ref 3.5–5.1)
POTASSIUM BLDV-SCNC: 3.7 MMOL/L — SIGNIFICANT CHANGE UP (ref 3.5–5.1)
POTASSIUM BLDV-SCNC: 4.4 MMOL/L — SIGNIFICANT CHANGE UP (ref 3.5–5.1)
POTASSIUM SERPL-MCNC: 3.5 MMOL/L — SIGNIFICANT CHANGE UP (ref 3.5–5.3)
POTASSIUM SERPL-MCNC: 3.7 MMOL/L — SIGNIFICANT CHANGE UP (ref 3.5–5.3)
POTASSIUM SERPL-MCNC: 4.2 MMOL/L — SIGNIFICANT CHANGE UP (ref 3.5–5.3)
POTASSIUM SERPL-MCNC: 4.4 MMOL/L — SIGNIFICANT CHANGE UP (ref 3.5–5.3)
POTASSIUM SERPL-SCNC: 3.5 MMOL/L — SIGNIFICANT CHANGE UP (ref 3.5–5.3)
POTASSIUM SERPL-SCNC: 3.7 MMOL/L — SIGNIFICANT CHANGE UP (ref 3.5–5.3)
POTASSIUM SERPL-SCNC: 4.2 MMOL/L — SIGNIFICANT CHANGE UP (ref 3.5–5.3)
POTASSIUM SERPL-SCNC: 4.4 MMOL/L — SIGNIFICANT CHANGE UP (ref 3.5–5.3)
PROT SERPL-MCNC: 5.8 G/DL — LOW (ref 6.6–8.7)
PROT SERPL-MCNC: 6.1 G/DL — LOW (ref 6.6–8.7)
PROT SERPL-MCNC: 6.1 G/DL — LOW (ref 6.6–8.7)
PROT SERPL-MCNC: 6.3 G/DL — LOW (ref 6.6–8.7)
PROT UR-MCNC: 100 MG/DL
RBC # BLD: 2.67 M/UL — LOW (ref 4.2–5.8)
RBC # BLD: 2.94 M/UL — LOW (ref 4.2–5.8)
RBC # FLD: 15.9 % — HIGH (ref 10.3–14.5)
RBC # FLD: 16 % — HIGH (ref 10.3–14.5)
RBC BLD AUTO: ABNORMAL
RBC BLD AUTO: ABNORMAL
RBC CASTS # UR COMP ASSIST: >1900 /HPF — HIGH (ref 0–4)
SAO2 % BLDV: 47.9 % — SIGNIFICANT CHANGE UP
SAO2 % BLDV: 60.6 % — SIGNIFICANT CHANGE UP
SAO2 % BLDV: 63.5 % — SIGNIFICANT CHANGE UP
SAO2 % BLDV: 66.9 % — SIGNIFICANT CHANGE UP
SODIUM SERPL-SCNC: 135 MMOL/L — SIGNIFICANT CHANGE UP (ref 135–145)
SODIUM SERPL-SCNC: 135 MMOL/L — SIGNIFICANT CHANGE UP (ref 135–145)
SODIUM SERPL-SCNC: 136 MMOL/L — SIGNIFICANT CHANGE UP (ref 135–145)
SODIUM SERPL-SCNC: 137 MMOL/L — SIGNIFICANT CHANGE UP (ref 135–145)
SP GR SPEC: 1.02 — SIGNIFICANT CHANGE UP (ref 1–1.03)
SPECIMEN SOURCE: SIGNIFICANT CHANGE UP
SPECIMEN SOURCE: SIGNIFICANT CHANGE UP
SQUAMOUS # UR AUTO: 2 /HPF — SIGNIFICANT CHANGE UP (ref 0–5)
URATE CRY FLD QL MICRO: PRESENT
UROBILINOGEN FLD QL: 0.2 MG/DL — SIGNIFICANT CHANGE UP (ref 0.2–1)
VARIANT LYMPHS # BLD: 1.8 % — SIGNIFICANT CHANGE UP (ref 0–6)
VARIANT LYMPHS # BLD: 6.1 % — HIGH (ref 0–6)
WBC # BLD: 9.27 K/UL — SIGNIFICANT CHANGE UP (ref 3.8–10.5)
WBC # BLD: 9.67 K/UL — SIGNIFICANT CHANGE UP (ref 3.8–10.5)
WBC # FLD AUTO: 9.27 K/UL — SIGNIFICANT CHANGE UP (ref 3.8–10.5)
WBC # FLD AUTO: 9.67 K/UL — SIGNIFICANT CHANGE UP (ref 3.8–10.5)
WBC UR QL: 19 /HPF — HIGH (ref 0–5)

## 2024-06-10 PROCEDURE — 99233 SBSQ HOSP IP/OBS HIGH 50: CPT

## 2024-06-10 PROCEDURE — 71045 X-RAY EXAM CHEST 1 VIEW: CPT | Mod: 26

## 2024-06-10 PROCEDURE — 33968 REMOVE AORTIC ASSIST DEVICE: CPT | Mod: RT

## 2024-06-10 PROCEDURE — 99232 SBSQ HOSP IP/OBS MODERATE 35: CPT

## 2024-06-10 RX ORDER — SODIUM,POTASSIUM PHOSPHATES 278-250MG
1 POWDER IN PACKET (EA) ORAL ONCE
Refills: 0 | Status: COMPLETED | OUTPATIENT
Start: 2024-06-10 | End: 2024-06-10

## 2024-06-10 RX ORDER — BUMETANIDE 0.25 MG/ML
1 INJECTION INTRAMUSCULAR; INTRAVENOUS
Qty: 20 | Refills: 0 | Status: DISCONTINUED | OUTPATIENT
Start: 2024-06-10 | End: 2024-06-11

## 2024-06-10 RX ORDER — LEVOTHYROXINE SODIUM 125 MCG
88 TABLET ORAL DAILY
Refills: 0 | Status: DISCONTINUED | OUTPATIENT
Start: 2024-06-10 | End: 2024-06-12

## 2024-06-10 RX ORDER — BUMETANIDE 0.25 MG/ML
1 INJECTION INTRAMUSCULAR; INTRAVENOUS ONCE
Refills: 0 | Status: COMPLETED | OUTPATIENT
Start: 2024-06-10 | End: 2024-06-10

## 2024-06-10 RX ORDER — AMIODARONE HYDROCHLORIDE 400 MG/1
200 TABLET ORAL DAILY
Refills: 0 | Status: DISCONTINUED | OUTPATIENT
Start: 2024-06-10 | End: 2024-06-12

## 2024-06-10 RX ORDER — BUMETANIDE 0.25 MG/ML
2 INJECTION INTRAMUSCULAR; INTRAVENOUS ONCE
Refills: 0 | Status: COMPLETED | OUTPATIENT
Start: 2024-06-10 | End: 2024-06-10

## 2024-06-10 RX ORDER — POTASSIUM CHLORIDE 20 MEQ
40 PACKET (EA) ORAL ONCE
Refills: 0 | Status: COMPLETED | OUTPATIENT
Start: 2024-06-10 | End: 2024-06-10

## 2024-06-10 RX ORDER — SODIUM BICARBONATE 1 MEQ/ML
650 SYRINGE (ML) INTRAVENOUS EVERY 12 HOURS
Refills: 0 | Status: DISCONTINUED | OUTPATIENT
Start: 2024-06-10 | End: 2024-06-11

## 2024-06-10 RX ADMIN — BUMETANIDE 1 MILLIGRAM(S): 0.25 INJECTION INTRAMUSCULAR; INTRAVENOUS at 23:26

## 2024-06-10 RX ADMIN — BUMETANIDE 2 MILLIGRAM(S): 0.25 INJECTION INTRAMUSCULAR; INTRAVENOUS at 11:19

## 2024-06-10 RX ADMIN — Medication 40 MILLIEQUIVALENT(S): at 08:40

## 2024-06-10 RX ADMIN — CLOPIDOGREL BISULFATE 75 MILLIGRAM(S): 75 TABLET, FILM COATED ORAL at 11:20

## 2024-06-10 RX ADMIN — HEPARIN SODIUM 5 UNIT(S)/HR: 5000 INJECTION INTRAVENOUS; SUBCUTANEOUS at 23:28

## 2024-06-10 RX ADMIN — INSULIN GLARGINE 9 UNIT(S): 100 INJECTION, SOLUTION SUBCUTANEOUS at 23:27

## 2024-06-10 RX ADMIN — MONTELUKAST 10 MILLIGRAM(S): 4 TABLET, CHEWABLE ORAL at 11:20

## 2024-06-10 RX ADMIN — AMIODARONE HYDROCHLORIDE 200 MILLIGRAM(S): 400 TABLET ORAL at 05:17

## 2024-06-10 RX ADMIN — Medication 1: at 23:27

## 2024-06-10 RX ADMIN — CHLORHEXIDINE GLUCONATE 1 APPLICATION(S): 213 SOLUTION TOPICAL at 12:09

## 2024-06-10 RX ADMIN — Medication 81 MILLIGRAM(S): at 11:20

## 2024-06-10 RX ADMIN — HEPARIN SODIUM 5 UNIT(S)/HR: 5000 INJECTION INTRAVENOUS; SUBCUTANEOUS at 06:35

## 2024-06-10 RX ADMIN — PIPERACILLIN AND TAZOBACTAM 25 GRAM(S): 4; .5 INJECTION, POWDER, LYOPHILIZED, FOR SOLUTION INTRAVENOUS at 23:26

## 2024-06-10 RX ADMIN — Medication 650 MILLIGRAM(S): at 19:01

## 2024-06-10 RX ADMIN — PIPERACILLIN AND TAZOBACTAM 25 GRAM(S): 4; .5 INJECTION, POWDER, LYOPHILIZED, FOR SOLUTION INTRAVENOUS at 11:19

## 2024-06-10 RX ADMIN — Medication 650 MILLIGRAM(S): at 05:17

## 2024-06-10 RX ADMIN — ATORVASTATIN CALCIUM 80 MILLIGRAM(S): 80 TABLET, FILM COATED ORAL at 21:17

## 2024-06-10 RX ADMIN — PANTOPRAZOLE SODIUM 40 MILLIGRAM(S): 20 TABLET, DELAYED RELEASE ORAL at 11:19

## 2024-06-10 RX ADMIN — CHLORHEXIDINE GLUCONATE 1 APPLICATION(S): 213 SOLUTION TOPICAL at 05:32

## 2024-06-10 RX ADMIN — Medication 1 PACKET(S): at 08:40

## 2024-06-10 NOTE — CHART NOTE - NSCHARTNOTEFT_GEN_A_CORE
Interventional Cardiology ACP Event note    CC: Cardiogenic shock, on IABP,      Patient is a 78 year old male with history of HTN, HLD, DM, CKD, CAD s/p 4V CABG and multiple PCI's, ischemic cardiomyopathy (EF < 20%), and LBBB s/p MDT CRT-D. He had an admission in November for ADHF. At that time a LHC revealed his grafts were occluded except for the LIMA to LAD. He also underwent a CRT-D implant. On 5/25 he had a VT arrest with ROSC achieved after 3 minutes was sent to the cath lab which showed his LIMA is still patent. Patient was extubated on 5/27. RHC on 5/28 showed severely elevated biv filling pressures, severe Cpc-PH, and low CO. He was started on low dose milrinone and diuresed well on IV Bumex. Weaned off pressors 5/29 and Milrinone was weaned off 5/31.  Renal function worsening (sCr 4.65) therefore RHC performed 6/4 which revealed significantly elevated filling pressures w/ low CI. Milrinone was resumed at 0.25 mcg/kg/min as well as Bumex gtt. On 6/5 he had a PEA arrest overnight. IABP was placed and he remains intubated/sedated in MICU on levo/vaso/ gtts.   6/4/24 RHC: RA 29/26/24, RV 83/12/23, PA 81/31/47 PCWP 32, PA sat 42.4, Fauzia CO/CI 2.75/1.8.  Bedside Hemodynamics:  6/10/24 ( 5, IABP 1:3): HR 82, /44/73, CVP 16, PAP 71/31/45, TD CO/CI 4.6/2.5, MVO2 63.5%, Fauzia CO/CI 5.4/3.0.  Cardiogenic shock.  IABP placed 6/5, tolerated wean to 1:3 this am. patient on dobutamine, Levo off 6/9, vaso off 6/10  Per HF, he is not a candidate for advanced therapies given age and multiple comorbidities.  Patient DNR/DNI Status    # Cardiogenic shock  Patient was placed on IABP on 06/05  On ionotrope  dobutamine gtt   End organ perfison markers stable  Hb/8.0, Plt 84  VSS  HF, Cardiology on board  Patient tolerated weaning of IABP  Heparin gtt off from 11 am per cardiology recommendation  Awaiting ptt results off heparin gtt  Plan for IABP removal once ptt results  Discussed with HF, Cardiology, DR Reid

## 2024-06-10 NOTE — PROGRESS NOTE ADULT - SUBJECTIVE AND OBJECTIVE BOX
NEPHROLOGY INTERVAL HPI/OVERNIGHT EVENTS:    No new events.    MEDICATIONS  (STANDING):  aMIOdarone    Tablet 200 milliGRAM(s) Oral daily  aspirin  chewable 81 milliGRAM(s) Oral daily  atorvastatin 80 milliGRAM(s) Oral at bedtime  chlorhexidine 2% Cloths 1 Application(s) Topical <User Schedule>  clopidogrel Tablet 75 milliGRAM(s) Oral daily  dextrose 50% Injectable 25 Gram(s) IV Push once  DOBUTamine Infusion 5 MICROgram(s)/kG/Min (10.5 mL/Hr) IV Continuous <Continuous>  heparin  Infusion. 800 Unit(s)/Hr (8 mL/Hr) IV Continuous <Continuous>  insulin glargine Injectable (LANTUS) 9 Unit(s) SubCutaneous at bedtime  insulin lispro (ADMELOG) corrective regimen sliding scale   SubCutaneous every 6 hours  levothyroxine Injectable 44 MICROGram(s) IV Push at bedtime  montelukast 10 milliGRAM(s) Oral daily  norepinephrine Infusion 0.05 MICROgram(s)/kG/Min (3.27 mL/Hr) IV Continuous <Continuous>  pantoprazole  Injectable 40 milliGRAM(s) IV Push daily  piperacillin/tazobactam IVPB.. 3.375 Gram(s) IV Intermittent every 12 hours  propofol Infusion 20 MICROgram(s)/kG/Min (7.86 mL/Hr) IV Continuous <Continuous>  sodium bicarbonate 650 milliGRAM(s) Oral every 12 hours  vasopressin Infusion 0.04 Unit(s)/Min (6 mL/Hr) IV Continuous <Continuous>    MEDICATIONS  (PRN):  albuterol/ipratropium for Nebulization 3 milliLiter(s) Nebulizer every 6 hours PRN Shortness of Breath and/or Wheezing  heparin   Injectable 5500 Unit(s) IV Push every 6 hours PRN For aPTT less than 40  heparin   Injectable 2500 Unit(s) IV Push every 6 hours PRN For aPTT between 40 - 57  ondansetron Injectable 4 milliGRAM(s) IV Push every 8 hours PRN Nausea and/or Vomiting  senna 2 Tablet(s) Oral at bedtime PRN Constipation  sodium chloride 0.9% lock flush 10 milliLiter(s) IV Push every 1 hour PRN Pre/post blood products, medications, blood draw, and to maintain line patency      Allergies    No Known Allergies      DOPamine (Hypotension)      Vital Signs Last 24 HrsICU   T(C): 37.2 (10 Filiberto 2024 07:30), Max: 37.4 (10 Filiberto 2024 00:00)  T(F): 99 (10 Filiberto 2024 07:30), Max: 99.3 (10 Filiberto 2024 00:00)  HR: 82 (10 Filiberto 2024 07:00) (71 - 85)  BP: 120/39 (10 Filiberto 2024 06:30) (82/51 - 139/51)  BP(mean): 63 (10 Filiberto 2024 06:30) (55 - 107)  ABP: 109/30 (10 Filiberto 2024 07:00) (79/47 - 144/35)  ABP(mean): 64 (10 Filiberto 2024 07:00) (61 - 307)  RR: 16 (2024 16:00) (16 - 17)  SpO2: 96% (10 Filiberto 2024 07:00) (93% - 100%)    O2 Parameters below as of 2024 20:00  Patient On (Oxygen Delivery Method): room air          T(C): 36.9 (2024 13:00), Max: 36.9 (2024 08:00)  T(F): 98.4 (2024 13:00), Max: 98.4 (2024 08:00)  HR: 73 (2024 14:00) (66 - 76)  BP: 133/60 (2024 14:00) (101/85 - 151/113)  BP(mean): 80 (2024 14:00) (63 - 125)  ABP: 128/37 (2024 03:00) (102/28 - 148/37)  ABP(mean): 73 (2024 03:00) (53 - 82)  RR: 16 (2024 12:00) (16 - 18)  SpO2: 100% (2024 14:00) (99% - 100%)    O2 Parameters below as of 2024 12:00  Patient On (Oxygen Delivery Method): ventilator    O2 Concentration (%): 30      T(C): 36.5 (2024 08:00), Max: 36.5 (2024 03:00)  T(F): 97.7 (2024 08:00), Max: 97.7 (2024 03:00)  HR: 66 (2024 09:15) (60 - 82)  BP: --  BP(mean): --  RR: 16 (2024 08:00) (16 - 17)  SpO2: 100% (2024 09:15) (96% - 100%)    Parameters below as of 2024 08:00  Patient On (Oxygen Delivery Method): ventilator    O2 Concentration (%): 40    Daily Weight in k.7 (2024 03:00)    PHYSICAL EXAM:    GENERAL: in  icu  bed  with  family  member present  HEAD:    EYES: closed  ENMT: intubated  NECK: central line with meds  NERVOUS SYSTEM:  sedated  CHEST/LUNG: no wheezes, intubated  HEART: monitor  noted, pacer noted  ABDOMEN: soft  EXTREMITIES:  right  groin pump line  LYMPH:   SKIN: pale  : [place], I&O noted    LABS:    06-10    135  |  98  |  46.6<H>  ----------------------------<  146<H>  3.5   |  25.0  |  1.79<H>    Ca    8.5      10 Filiberto 2024 05:50  Phos  2.9     06-10  Mg     2.1     06-10    TPro  6.1<L>  /  Alb  2.8<L>  /  TBili  0.6  /  DBili  x   /  AST  43<H>  /  ALT  32  /  AlkPhos  105  06-10      06-08    132<L>  |  95<L>  |  55.5<H>  ----------------------------<  144<H>  3.7   |  23.0  |  2.33<H>    Ca    8.1<L>      2024 11:25  Phos  3.1     06-08  Mg     1.9     06-08    TPro  5.5<L>  /  Alb  2.5<L>  /  TBili  0.7  /  DBili  x   /  AST  50<H>  /  ALT  43<H>  /  AlkPhos  103  06-08                          10.4   18.36 )-----------( 162      ( 2024 03:00 )             30.2     06-07    128<L>  |  91<L>  |  70.4<H>  ----------------------------<  186<H>  3.9   |  23.0  |  3.51<H>    Ca    8.1<L>      2024 03:00  Phos  4.6       Mg     2.0         TPro  5.8<L>  /  Alb  2.7<L>  /  TBili  0.8  /  DBili  x   /  AST  57<H>  /  ALT  64<H>  /  AlkPhos  143<H>      PTT - ( 2024 05:05 )  PTT:76.8 sec  Urinalysis Basic - ( 2024 03:00 )    Color: x / Appearance: x / SG: x / pH: x  Gluc: 186 mg/dL / Ketone: x  / Bili: x / Urobili: x   Blood: x / Protein: x / Nitrite: x   Leuk Esterase: x / RBC: x / WBC x   Sq Epi: x / Non Sq Epi: x / Bacteria: x      Magnesium: 2.0 mg/dL ( @ 03:00)  Phosphorus: 4.6 mg/dL ( @ 03:00)  Phosphorus: 5.2 mg/dL ( @ 21:00)  Magnesium: 2.2 mg/dL ( @ 21:00)  Magnesium: 2.2 mg/dL ( @ 15:00)  Phosphorus: 5.6 mg/dL ( @ 15:00)    ABG - ( 2024 11:16 )  pH, Arterial: 7.500 pH, Blood: x     /  pCO2: 27    /  pO2: 75    / HCO3: 21    / Base Excess: -2.1  /  SaO2: 95.5                  RADIOLOGY & ADDITIONAL TESTS:

## 2024-06-10 NOTE — PROGRESS NOTE ADULT - PROBLEM SELECTOR PLAN 1
- Clinically appears close to euvolemia w/ warm extremities.   - Levo off 6/9, vaso off 6/10  - Lactate 0.9, MVO2 63.5%  - Inotropes/tMCS: IABP placed 6/5, tolerated wean to 1:3 this am. Plan for removal today. Continue  at 5 mcg/kg/min (do not wean).  - Monitor perfusion markers every 6 hrs (lactate, MVO2 from distal port of PA catheter, LFTs)  - Diuretics: CVP elevated to 16. Recommend Bumex 2 mg IVP x 1 for now.  - Please document strict I&Os  - Hemodynamic goals: MAP 65, CVP 8-10, MVO2 >65, PCWP 12, CI >2.2, Lactate <2.2  -IABP placed as bridge to recovery and has had considerable improvement in his end organ function. Will plan to remove IABP today with hopes he can be maintained on inotropic support. Unfortunately, he is not a candidate for advanced therapies given age and multiple comorbidities. He is now a DNR/DNI.

## 2024-06-10 NOTE — PROGRESS NOTE ADULT - PROBLEM SELECTOR PLAN 4
- LHC showed all grafts occluded except LIMA-LAD  - Continue medical management with DAPT, statin
- LHC showed all grafts occluded except LIMA-LAD  - Continue medical management with DAPT, statin
- LHC showed all grafts occluded except LIMA-LAD  - Continue medical management with DAPT, [place], and statin
- LHC showed all grafts occluded except LIMA-LAD  - Continue medical management with DAPT, statin
- CRT-D interrogated that showed 6% AT/AF burden on device, all occurring recently.  - CHADS2-VASc of 6 (CHF, HTN, DM, Agex2, CAD)   - On Amiodarone at this time.   - Continue heparin gtt for AC.
- CRT-D interrogated that showed 6% AT/AF burden on device, all occurring recently.  - CHADS2-VASc of 6 (CHF, HTN, DM, Agex2, CAD)   - On Amiodarone at this time.   - Continue heparin gtt for AC.
- LHC showed all grafts occluded except LIMA-LAD  - Continue medical management with DAPT, [place], and statin
- LHC showed all grafts occluded except LIMA-LAD  - Continue medical management with DAPT, [place], and statin
- LHC showed all grafts occluded except LIMA-LAD  - Continue medical management with DAPT, statin
.  - LHC showed all grafts occluded except LIMA-LAD  - Continue medical management with DAPT, [place]ex[place], and statin.
- LHC showed all grafts occluded except LIMA-LAD  - Continue medical management with DAPT, statin

## 2024-06-10 NOTE — PROGRESS NOTE ADULT - NS ATTEND AMEND GEN_ALL_CORE FT
Patient weaned off vasopressors this weekend.  Wean IABP to 1:3 if he tolerates it HD will remove it  Keep swan in until the  is weaned off

## 2024-06-10 NOTE — PROGRESS NOTE ADULT - PROBLEM SELECTOR PROBLEM 5
Paroxysmal atrial fibrillation
Presence of biventricular AICD
Presence of biventricular AICD
Paroxysmal atrial fibrillation
Presence of biventricular AICD
Paroxysmal atrial fibrillation

## 2024-06-10 NOTE — PROGRESS NOTE ADULT - SUBJECTIVE AND OBJECTIVE BOX
Patient is a 78y old  Male who presents with a chief complaint of Chest Pain / SOB (09 Jun 2024 02:36)      BRIEF HOSPITAL COURSE: BRIEF HOSPITAL COURSE: 79 y/o M with a h/o CAD (PCI/CODI x s/p 19, brachytherapy 11/2023), 4V CABG, ICM, HFrEF EF 37%, s/p ICD, DM2 on insulin, 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 Recently had Holter monitor placement for PVC burden which was noted to have worsened the day of presentation. In ED, patient weak appearing, ruled in for NSTEMI with positive troponin, and labs remarkable for RAS on CKD as well as hypoglycemia. A TTE revealed acute on chronic systolic heart failure with EF <20%. He was admitted to telemetry for treatment of ACS on DAPT and heparin gtt, planned for LHC. Patient suffered VF/VT cardiac arrest on 5/25 without ICD shock (subsequently reprogrammed by EP) and was taken for emergent LHC where he was found to have 3/4 occluded grafts, only LIMA to LAD was patent. No target for intervention. Extubated on 5/26, however suffered another VT cardiac arrest later that day (again without ICD shock) with 1 minute downtime prior to ROSC. Hospital course further complicated by cardiogenic shock requiring IV vasopressor and inotrope support. Extubated on 5/27. Underwent RHC on 6/4 which revealed severely elevated filling pressures and low cardiac output- subsequently started on milrinone and bumetanide infusions. Patient suffered third cardiac arrest on 6/5, this time unrelated to cardiac arrhythmia (downtime of 8 mins before ROSC). IABP inserted on 6/5.      Events last 24 hours: Down to single IV vasopressor + inotrope support. IABP augmenting @ 1:1, ECG trigger. Extubated successfully yesterday.        PAST MEDICAL & SURGICAL HISTORY:  GERD (gastroesophageal reflux disease)      High cholesterol      Coronary artery disease involving coronary bypass graft of native heart with unstable angina pectoris      Coronary artery disease involving native coronary artery of native heart, angina presence unspecifie      Type 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, without long-term current use of insul      Essential hypertension      HFrEF (heart failure with reduced ejection fraction)      Stage 4 chronic kidney disease      LBBB (left bundle branch block)      Facial nerve palsy      Hypothyroidism      S/P CABG (coronary artery bypass graft)  4V 1983 Donie      Status post open reduction with internal fixation of fracture      History of insertion of stent into coronary artery bypass graft      History of brachytherapy          Review of Systems:  CONSTITUTIONAL: No fever, chills, or fatigue  EYES: No eye pain, visual disturbances, or discharge  ENMT:  No difficulty hearing, tinnitus, vertigo; No sinus or throat pain  NECK: No pain or stiffness  RESPIRATORY: No cough, wheezing, chills or hemoptysis; No shortness of breath  CARDIOVASCULAR: No chest pain, palpitations, dizziness, or leg swelling  GASTROINTESTINAL: No abdominal or epigastric pain. No nausea, vomiting, or hematemesis; No diarrhea or constipation. No melena or hematochezia.  GENITOURINARY: No dysuria, frequency, hematuria, or incontinence  NEUROLOGICAL: No headaches, memory loss, loss of strength, numbness, or tremors  SKIN: No itching, burning, rashes, or lesions   MUSCULOSKELETAL: No joint pain or swelling; No muscle, back, or extremity pain  PSYCHIATRIC: No depression, anxiety, mood swings, or difficulty sleeping      Medications:  piperacillin/tazobactam IVPB.. 3.375 Gram(s) IV Intermittent every 12 hours    aMIOdarone    Tablet 200 milliGRAM(s) Oral daily  DOBUTamine Infusion 5 MICROgram(s)/kG/Min IV Continuous <Continuous>    albuterol/ipratropium for Nebulization 3 milliLiter(s) Nebulizer every 6 hours PRN  montelukast 10 milliGRAM(s) Oral daily    HYDROmorphone  Injectable 0.5 milliGRAM(s) IV Push every 4 hours PRN  ondansetron Injectable 4 milliGRAM(s) IV Push every 8 hours PRN      aspirin  chewable 81 milliGRAM(s) Oral daily  clopidogrel Tablet 75 milliGRAM(s) Oral daily  heparin   Injectable 5500 Unit(s) IV Push every 6 hours PRN  heparin   Injectable 2500 Unit(s) IV Push every 6 hours PRN  heparin  Infusion 800 Unit(s)/Hr IV Continuous <Continuous>    pantoprazole  Injectable 40 milliGRAM(s) IV Push daily  senna 2 Tablet(s) Oral at bedtime PRN      atorvastatin 80 milliGRAM(s) Oral at bedtime  dextrose 50% Injectable 25 Gram(s) IV Push once  insulin glargine Injectable (LANTUS) 9 Unit(s) SubCutaneous at bedtime  insulin lispro (ADMELOG) corrective regimen sliding scale   SubCutaneous every 6 hours  insulin lispro Injectable (ADMELOG) 2 Unit(s) SubCutaneous every 6 hours  levothyroxine Injectable 44 MICROGram(s) IV Push at bedtime  vasopressin Infusion 0.04 Unit(s)/Min IV Continuous <Continuous>    sodium bicarbonate 650 milliGRAM(s) Oral every 12 hours  sodium chloride 0.9% lock flush 10 milliLiter(s) IV Push every 1 hour PRN      artificial  tears Solution 1 Drop(s) Both EYES two times a day PRN  chlorhexidine 2% Cloths 1 Application(s) Topical <User Schedule>  chlorhexidine 2% Cloths 1 Application(s) Topical daily        Mode: CPAP with PS  FiO2: 30  PEEP: 6  PS: 10  MAP: 10      ICU Vital Signs Last 24 Hrs  T(C): 37 (09 Jun 2024 12:00), Max: 37 (09 Jun 2024 07:45)  T(F): 98.6 (09 Jun 2024 12:00), Max: 98.6 (09 Jun 2024 07:45)  HR: 80 (10 Filiberto 2024 01:00) (69 - 83)  BP: 125/39 (10 Filiberto 2024 01:00) (82/51 - 139/51)  BP(mean): 65 (10 Filiberto 2024 01:00) (55 - 107)  ABP: 122/24 (10 Filiberto 2024 01:00) (79/47 - 144/35)  ABP(mean): 65 (10 Filiberto 2024 01:00) (61 - 307)  RR: 16 (09 Jun 2024 16:00) (16 - 17)  SpO2: 96% (10 Filiberto 2024 01:00) (93% - 100%)    O2 Parameters below as of 09 Jun 2024 20:00  Patient On (Oxygen Delivery Method): room air            ABG - ( 10 Filiberto 2024 00:15 )  pH, Arterial: 7.430 pH, Blood: x     /  pCO2: 38    /  pO2: 68    / HCO3: 25    / Base Excess: 0.9   /  SaO2: 95.9                I&O's Detail    08 Jun 2024 07:01  -  09 Jun 2024 07:00  --------------------------------------------------------  IN:    DOBUTamine: 252 mL    Glucerna 1.5: 410 mL    Heparin Infusion: 120 mL    IV PiggyBack: 300 mL    IV PiggyBack: 200 mL    Norepinephrine: 43.4 mL    Propofol: 325.8 mL    Vasopressin: 144 mL  Total IN: 1795.2 mL    OUT:    Indwelling Catheter - Urethral (mL): 1580 mL  Total OUT: 1580 mL    Total NET: 215.2 mL      09 Jun 2024 07:01  -  10 Filiberto 2024 04:25  --------------------------------------------------------  IN:    DOBUTamine: 189 mL    Glucerna 1.5: 260 mL    Heparin: 25 mL    Heparin Infusion: 60 mL    IV PiggyBack: 175 mL    Norepinephrine: 17.8 mL    Propofol: 44.1 mL    Vasopressin: 108 mL  Total IN: 878.9 mL    OUT:    Indwelling Catheter - Urethral (mL): 850 mL  Total OUT: 850 mL    Total NET: 28.9 mL            LABS:                        8.3    9.69  )-----------( 84       ( 09 Jun 2024 17:20 )             25.6     06-10    137  |  98  |  44.7<H>  ----------------------------<  173<H>  3.7   |  23.0  |  1.76<H>    Ca    8.4      10 Filiberto 2024 00:10  Phos  3.1     06-10  Mg     2.0     06-10    TPro  5.8<L>  /  Alb  2.7<L>  /  TBili  0.6  /  DBili  x   /  AST  43<H>  /  ALT  34  /  AlkPhos  99  06-10          CAPILLARY BLOOD GLUCOSE      POCT Blood Glucose.: 183 mg/dL (09 Jun 2024 23:16)    PT/INR - ( 09 Jun 2024 17:20 )   PT: 12.0 sec;   INR: 1.08 ratio         PTT - ( 09 Jun 2024 05:52 )  PTT:78.4 sec  Urinalysis Basic - ( 10 Filiberto 2024 00:10 )    Color: x / Appearance: x / SG: x / pH: x  Gluc: 173 mg/dL / Ketone: x  / Bili: x / Urobili: x   Blood: x / Protein: x / Nitrite: x   Leuk Esterase: x / RBC: x / WBC x   Sq Epi: x / Non Sq Epi: x / Bacteria: x      CULTURES:  Culture Results:   No growth at 4 days (06-05-24 @ 05:52)  Culture Results:   No growth at 4 days (06-05-24 @ 05:50)        Physical Examination:    General: No acute distress.  Alert, oriented, interactive, nonfocal, somnolent    HEENT: Pupils equal, reactive to light.  Symmetric.    PULM: Clear to auscultation bilaterally, no significant sputum production    CVS: Regular rate and rhythm, no murmurs, rubs, or gallops    ABD: Soft, nondistended, nontender, normoactive bowel sounds, no masses    EXT: No edema, nontender    SKIN: distal extremities are cool to the touch    NEURO: A&Ox3, strength 5/5 all extremities, cranial nerves grossly intact, no focal deficits        RADIOLOGY:     < from: Xray Chest 1 View- PORTABLE-Urgent (Xray Chest 1 View- PORTABLE-Urgent .) (06.07.24 @ 12:26) >  Heart magnified by technique. Sternotomy, left-sided defibrillator,   endotracheal tube, nasogastric tube, left coronary stent, and [place]-Celestino   device entering from the right jugular area with tip in right main   pulmonary artery again noted.    There are mild perihilar infiltrates left greater than right withsome   degree of atelectasis of the left lower lobe. Small atelectatic area   right upper lobe also seen.    Chest is similar to June 6.    IMPRESSION: Stable findings as above.    < end of copied text >          CRITICAL CARE TIME SPENT: 37 mins  Time spent evaluating/treating patient with medical issues that pose a high risk for life threatening deterioration and/or end-organ damage, reviewing data/labs/imaging, discussing case with multidisciplinary team, discussing plan/goals of care with patient/family. Non-inclusive of procedure time. The date of entry of this note reflects the date of services rendered.

## 2024-06-10 NOTE — PROGRESS NOTE ADULT - PROBLEM SELECTOR PROBLEM 4
Acute on chronic HFrEF (heart failure with reduced ejection fraction)
CAD (coronary artery disease)
Acute on chronic HFrEF (heart failure with reduced ejection fraction)
Acute on chronic HFrEF (heart failure with reduced ejection fraction)
CAD (coronary artery disease)
Paroxysmal atrial fibrillation
CAD (coronary artery disease)
CAD (coronary artery disease)
Paroxysmal atrial fibrillation
Cardiac arrest with successful resuscitation
CAD (coronary artery disease)
CAD (coronary artery disease)

## 2024-06-10 NOTE — PROCEDURE NOTE - ADDITIONAL PROCEDURE DETAILS
IABP VIA RFA ACCESS, EXPLANTED WITH CATHETER INTACT,  PER PROTOCOL  MANUAL PRESSURE APPLIED FOR 40 MINUTES, AND HEMOSTASIS ACHIEVED  RT GROIN WITH NO BLEEDING/HEMATOMA, RT LE DP/PT PULSSES PALPABLE  RLE TO KEEP STRAIGHT FOR 4 HOURS, PATIENT CAN SIT UP AFTER 4 HOURS, OOB AFTER 5 HOURS IF PATIENT TOLERATES/AND PER ICU PROTOCOL  RT GROIN PRECAUTIONS EXPLAINED TO THE PATIENT AND FAMILY AND THEY VERBALIZED UNDERSTANDING

## 2024-06-10 NOTE — PROGRESS NOTE ADULT - PROBLEM SELECTOR PROBLEM 2
Acute on chronic HFrEF (heart failure with reduced ejection fraction)
VT (ventricular tachycardia)
Acute on chronic HFrEF (heart failure with reduced ejection fraction)
NSTEMI (non-ST elevation myocardial infarction)
NSTEMI (non-ST elevation myocardial infarction)
Acute on chronic HFrEF (heart failure with reduced ejection fraction)
VT (ventricular tachycardia)
NSTEMI (non-ST elevation myocardial infarction)
VT (ventricular tachycardia)
Acute on chronic HFrEF (heart failure with reduced ejection fraction)
VT (ventricular tachycardia)
NSTEMI (non-ST elevation myocardial infarction)
Acute on chronic HFrEF (heart failure with reduced ejection fraction)
Acute on chronic HFrEF (heart failure with reduced ejection fraction)

## 2024-06-10 NOTE — PROGRESS NOTE ADULT - PROBLEM SELECTOR PROBLEM 7
Elevated LFTs
Acute on chronic HFrEF (heart failure with reduced ejection fraction)
Elevated LFTs

## 2024-06-10 NOTE — PROGRESS NOTE ADULT - PROBLEM SELECTOR PLAN 3
LHC showed all grafts occluded except LIMA-LAD  Continue medical management with DAPT, Ranexa, and statin
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
LHC showed all grafts occluded except LIMA-LAD  Continue medical management with DAPT, Ranexa, and statin
- Type 1 NSTEMI   - trop 200-->400   - cath initially deferred for medical/renal optimization and concern for worsening renal function, pt taken emergently today for cardiac arrest.   - previous TTE performed 4/2024 ef 37%, improved from previous, with known RWMA from CAD  - repeat limited echo shows worsening EF <20% with global hypokinesis, mod AS  - GDMT limited due to renal function, but will need PRN diuresis. Avoid IV hydration.
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
- Pt presented with NSTEMI, initially deferred LHC due to renal function, but brought emergently 05/25/24 due to VT and cardiac arrest.  - Ischemic cardiomyopathy / CAD  LIMA-LAD, but occluded SVG-Circ, all other grafts/vessels noted to be occluded: %, %, Circ 100%, %. Lima-LAD is filling LCX/RCA through collateral circulation.   - previous TTE performed 4/2024 ef 37%, improved from previous, with known RWMA from CAD  - repeat limited echo shows worsening EF <20% with global hypokinesis, mod AS  - Continue aspirin, plavix, heparin, ranexa.   - Can hold statin due to worsening transaminitis.   - GDMT limited due to renal function, but will need PRN diuresis. Avoid IV hydration.
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
- Pt presented with NSTEMI, initially deferred LHC due to renal function, but brought emergently 05/25/24 due to VT and cardiac arrest.  - Ischemic cardiomyopathy / CAD  LIMA-LAD, but occluded SVG-Circ, all other grafts/vessels noted to be occluded: %, %, Circ 100%, %. Lima-LAD is filling LCX/RCA through collateral circulation.   - previous TTE performed 4/2024 ef 37%, improved from previous, with known RWMA from CAD  - repeat limited echo shows worsening EF <20% with global hypokinesis, mod AS  - Continue aspirin, plavix, heparin, ranexa.   - Can hold statin due to worsening transaminitis.   - GDMT limited due to renal function, but will need PRN diuresis. Avoid IV hydration.
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
- Type 1 NSTEMI   - trop 200-->400   - cath initially deferred for medical/renal optimization and concern for worsening renal function, pt taken emergently today for cardiac arrest.   - previous TTE performed 4/2024 ef 37%, improved from previous, with known RWMA from CAD  - repeat limited echo shows worsening EF <20% with global hypokinesis, mod AS  - GDMT limited due to renal function, but will need PRN diuresis. Avoid IV hydration.
.  - 6% AT/AF burden on device  - Continue AC with heparin gtt and amiodarone as above  - If AT/AF cannot be controlled by amiodarone, EP may consider AVN ablation.
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

## 2024-06-10 NOTE — PROGRESS NOTE ADULT - ASSESSMENT
78M with history of multiple STEMI/ NSTEMI, CAD s/p 4V CABG with multiple stents and brachytherapy, HFrEF, IDDM2, CKD3-4,, HTN, HLD admittd on 5/23 for atypical chest pain after recent holter monitor placement, found also with NSTEMI and RAS on CKD complicated further by cardiac arrest x 3, s/p LHC with occlusion of all SVGs except LIMA to LAD with no target point of PCI, acute respiratory failure currently intubated now extubated, cardiogenic shock on vasopressors, weaning down, s/p IABP, kidney dysfunction.     # Post Cardiac Arrest   - s/p cardiac arrest x 3 this admission (5/25 ~3 min, 5/27 ~1 min, 6/4 ~8min)  - CTH reviewed, no acute pathology  - mgnt per Intensive care unit  - now do not resuscitate agreed to by family     # Cardiogenic Shock, Acute Decompensated HFrEF, CAD  - s/p LHC 5/25 with occlusion of all SVGs except LIMA to LAD with no target point of PCI  - s/p RHC 6/4 with elevated filling pressures, low CI  - s/p IABP 6/5 - possibility of removal today per cardio   - on heparin gtt   - cardio and CHF team following, inputs appreciated  - improving, continue to wean pressor support   - reqiring inotropic support with dobutamine as well     # Acute Respiratory Failure  - improved  - extubated 6/9/24   - now do not intubate if deteriorate     #Acute kidney injury on CKD   - supportive measures  - trend creatinine   - avoid nephrotoxins   - nephrology following, no need for RRT/HD     # Palliative Care Encounter  - slowly improved but remains high risk for deterioration  - overall guarded condition, if improves, may require home inotropes, unclear if will be a candidate for invasive cardiac procedures. If not, may be a good candidate for home hospice   78M with history of multiple STEMI/ NSTEMI, CAD s/p 4V CABG with multiple stents and brachytherapy, HFrEF, IDDM2, CKD3-4,, HTN, HLD admitted on 5/23 for atypical chest pain after recent holter monitor placement, found also with NSTEMI and RAS on CKD complicated further by cardiac arrest x 3, s/p LHC with occlusion of all SVGs except LIMA to LAD with no target point of PCI, acute respiratory failure currently intubated now extubated, cardiogenic shock on vasopressors, weaning down, s/p IABP, kidney dysfunction.     # Post Cardiac Arrest   - s/p cardiac arrest x 3 this admission (5/25 ~3 min, 5/27 ~1 min, 6/4 ~8min)  - CTH reviewed, no acute pathology  - mgnt per Intensive care unit  - now do not resuscitate agreed to by family     # Cardiogenic Shock, Acute Decompensated HFrEF, CAD  - s/p LHC 5/25 with occlusion of all SVGs except LIMA to LAD with no target point of PCI  - s/p RHC 6/4 with elevated filling pressures, low CI  - s/p IABP 6/5 - possibility of removal today per cardio   - on heparin gtt   - cardio and CHF team following, inputs appreciated  - improving, continue to wean pressor support   - requiring inotropic support with dobutamine as well     # Acute Respiratory Failure  - improved  - extubated 6/9/24   - now do not intubate if deteriorate     #Acute kidney injury on CKD   - supportive measures  - trend creatinine   - avoid nephrotoxins   - nephrology following, no need for RRT/HD     # Palliative Care Encounter  - slowly improved but remains high risk for deterioration  - overall guarded condition, if improves, may require home inotropes, unclear if will be a candidate for invasive cardiac procedures. If not, may be a good candidate for home hospice.

## 2024-06-10 NOTE — PROGRESS NOTE ADULT - SUBJECTIVE AND OBJECTIVE BOX
[facility] Advanced Heart Failure - Progress Note  [address] [place] [address]  Office Phone: [phone]/Fax: [phone]  Service/On Call Phone [phone]    Subjective/Objective: Extubated 6/9. Hemodynamically stable on  @ 5 mcg/kg/min and IABP with plan for wean of tMCS today.    Medications:  albuterol/ipratropium for Nebulization 3 milliLiter(s) Nebulizer every 6 hours PRN  aMIOdarone    Tablet 200 milliGRAM(s) Oral daily  artificial  tears Solution 1 Drop(s) Both EYES two times a day PRN  aspirin  chewable 81 milliGRAM(s) Oral daily  atorvastatin 80 milliGRAM(s) Oral at bedtime  chlorhexidine 2% Cloths 1 Application(s) Topical <User Schedule>  chlorhexidine 2% Cloths 1 Application(s) Topical daily  clopidogrel Tablet 75 milliGRAM(s) Oral daily  dextrose 50% Injectable 25 Gram(s) IV Push once  DOBUTamine Infusion 5 MICROgram(s)/kG/Min IV Continuous <Continuous>  heparin   Injectable 5500 Unit(s) IV Push every 6 hours PRN  heparin   Injectable 2500 Unit(s) IV Push every 6 hours PRN  heparin  Infusion 800 Unit(s)/Hr IV Continuous <Continuous>  HYDROmorphone  Injectable 0.5 milliGRAM(s) IV Push every 4 hours PRN  insulin glargine Injectable (LANTUS) 9 Unit(s) SubCutaneous at bedtime  insulin lispro (ADMELOG) corrective regimen sliding scale   SubCutaneous every 6 hours  insulin lispro Injectable (ADMELOG) 2 Unit(s) SubCutaneous every 6 hours  levothyroxine Injectable 44 MICROGram(s) IV Push at bedtime  montelukast 10 milliGRAM(s) Oral daily  ondansetron Injectable 4 milliGRAM(s) IV Push every 8 hours PRN  pantoprazole  Injectable 40 milliGRAM(s) IV Push daily  piperacillin/tazobactam IVPB.. 3.375 Gram(s) IV Intermittent every 12 hours  senna 2 Tablet(s) Oral at bedtime PRN  sodium bicarbonate 650 milliGRAM(s) Oral every 12 hours  sodium chloride 0.9% lock flush 10 milliLiter(s) IV Push every 1 hour PRN  vasopressin Infusion 0.04 Unit(s)/Min IV Continuous <Continuous>    Vital Signs Last 24 Hours  T(C): 37.2 (06-10-24 @ 11:00), Max: 37.4 (06-10-24 @ 00:00)  HR: 80 (06-10-24 @ 10:00) (73 - 85)  BP: 102/50 (06-10-24 @ 10:00) (102/50 - 139/51)  RR: 16 (06-10-24 @ 08:00) (16 - 17)  SpO2: 98% (06-10-24 @ 10:00) (93% - 100%)    I&O's Summary  09 Jun 2024 07:01  -  10 Filiberto 2024 07:00  --------------------------------------------------------  IN: 1008.9 mL / OUT: 1225 mL / NET: -216.1 mL    10 Filiberto 2024 07:01  -  10 Filiberto 2024 11:48  --------------------------------------------------------  IN: 106.5 mL / OUT: 95 mL / NET: 11.5 mL    Tele: paced    Physical Exam:  General: In no distress.   HEENT: EOMs intact.  Neck: Neck supple. JVP difficult to asses, [facility] PA Cath.   Chest: Clear to auscultation bilaterally.  CV: RRR. Normal S1 and S2. No murmurs, rub, or gallops. Warm peripherally.  PV: No significant LE edema noted. Pulses full/equal in all four extremities.  Abdomen: Soft, non-distended, non-tender.  Skin: warm, dry.  Neurology: Alert and oriented times three. Sensation intact.  Psych: Appropriate affect.    Labs:                        8.0    9.27  )-----------( 84       ( 10 Filiberto 2024 05:50 )             24.1     06-10    136  |  100  |  45.2<H>  ----------------------------<  104<H>  4.4   |  24.0  |  1.92<H>    Ca    8.4      10 Filiberto 2024 10:30  Phos  3.2     06-10  Mg     2.1     06-10    TPro  6.1<L>  /  Alb  2.7<L>  /  TBili  0.6  /  DBili  x   /  AST  44<H>  /  ALT  33  /  AlkPhos  100  06-10    PT/INR - ( 09 Jun 2024 17:20 )   PT: 12.0 sec;   INR: 1.08 ratio         PTT - ( 10 Filiberto 2024 05:50 )  PTT:79.1 sec      Lactate Dehydrogenase, Serum: 528 U/L (06-09 @ 17:15)  Lactate Dehydrogenase, Serum: 563 U/L (06-09 @ 12:05)

## 2024-06-10 NOTE — PROGRESS NOTE ADULT - PROBLEM SELECTOR PLAN 2
- Electrophysiology following  - Recurrent VT yesterday requiring re load of Amio/lidocaine.   - Still maintained on amiodarone and lidocaine drips.   - CRT-D adjusted per Dr. Turpin.
- Etiology: ICMP  - Clinically appears euvolemic on exam w/ warm extremities  - Management of cardiogenic shock as above.  - Device: s/p CRT-D  - Low Na+, 1.5L FR diet
- Electrophysiology following  - scar mediated VT   - amiodarone as above
- Etiology: ICMP  - Clinically appears euvolemic on exam w/ warm extremities  - Management of cardiogenic shock as above  - Device: s/p CRT-D  - Low Na+, 1.5L FR diet
- Etiology: ICMP  - Clinically appears euvolemic on exam w/ warm extremities  - Management of cardiogenic shock as above.  - Device: s/p CRT-D  - Low Na+, 1.5L FR diet
- Etiology: ICMP  - Clinically appears euvolemic on exam w/ warm extremities  - Management of cardiogenic shock as above.  - Device: s/p CRT-D  - Low Na+, 1.5L FR diet
- Electrophysiology following  - scar mediated VT   - amiodarone as above
- Etiology: ICMP  - Clinically appears euvolemic w/ lukewarm extremities  - GDMT: Currently limited by renal dysfunction. Increase HDZN to 25 mg TID (hold for SBP <90). Continue Lopressor 12.5 mg BID (switch to Toprol XL prior to discharge).   - Diuretics: Hold for today as above  - Strict I&Os  - Device: s/p CRT-D
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
.  - tele- paced, no alarms  - cont amiodarone, mexiletine
- Electrophysiology following  - Recurrent VT yesterday requiring re load of Amio/lidocaine.   - Lidocaine weaned off, then recurrent VT this morning.  - Unable to add AV nodals due to shock.  - CRT-D adjusted per Dr. Turpin.
- Etiology: ICMP  - Clinically appears euvolemic on exam w/ warm extremities however RHC yesterday revealed significantly elevated filling pressures.   - GDMT: Currently limited by renal dysfunction. Stop HDZN while requiring vasopressors. Hold BB.  - Device: s/p CRT-D  - Low Na+, 1.5L FR diet
- Etiology: ICMP  - Clinically close to euvolemic w/ warm extremities  - GDMT: Currently limited by renal dysfunction. Continue HDZN 20 mg TID and uptitrate as tolerated (hold for SBP <90). Will add beta blocker once stable off inotropes for at least 48 hours.  - Diuretics: Continue Bumex 3 mg IV BID.   - Strict I&Os  - Device: s/p CRT-D
- Etiology: ICMP  - Clinically appears euvolemic on exam w/ warm extremities   - GDMT: Currently limited by renal dysfunction. C/w HDZN to 25 mg TID (hold for SBP <90). Continue Lopressor 12.5 mg BID (switch to Toprol XL prior to discharge).   - Aggressive diuresis as above  - Device: s/p CRT-D  - Low Na+, 1.5L FR diet

## 2024-06-10 NOTE — PROGRESS NOTE ADULT - SUBJECTIVE AND OBJECTIVE BOX
"Pt called stating he is to have cCTA per Dr Haas.    Discussed test and prep, already on BB.  Scheduled cCTA for Wednesday July 8, arrival 10:45.  Will mail  Prep and directions to pt.  Pt verbalized understanding.  DENISHA DEUTSCH RN on 6/8/2020 at 3:32 PM    CT Angiogram  Your procedure will be done at [facility] Imaging Department [address].   3rd floor of [facility]~[address]~[place], [place]. Please park in the  Sky[address]  ramp on the west side of [facility] on [address]. Take the skyway over [facility] to the hospital. Go up one floor to the 3rd floor, where your procedure will be done.  What is it?  This test looks at your heart and heart arteries. A CT (computed tomography) scan is a series of pictures that allow us to look inside your body. Our scanner creates images of the body in cross sections, much like slices of bread. This helps us see any problems more clearly. Before the scan, we will give you contrast fluid (X-ray dye) through an IV (small needle in your arm) The contrast helps your blood vessels show up more clearly on the pictures.  How do I get ready for this exam?  Please allow two hours for this test. Bring any scans or x-rays taken at other hospitals if similar tests were done. Also bring a current list of your medications including vitamins, minerals and over-the-counter medications. It is safest to leave personal items at home.     The day before your exam, drink extra fluids, water is best. Drink at least six 8 oz glasses unless your doctor has asked you to restrict your fluid intake.    No caffeine AND No smoking the day of your test.    Do not eat or drink for 3 hours before your exam.    If you are not on a beta blocker medication you will be taking 2 doses of a medication called, \"Metoprolol\". Take 50 mg the evening before your procedure and 50 mg the morning of your procedure. This medication is used to slow your heart rate so we can get clear " OVERNIGHT EVENTS: doing better, extubated. remains with IABP and pressor support     Present Symptoms:     Dyspnea: Mild Moderate Severe  Nausea/Vomiting: Yes No  Anxiety:  Yes No  Depression: Yes No  Fatigue: Yes No  Loss of appetite: Yes No  Constipation:     Pain:             Character-            Duration-            Effect-            Factors-            Frequency-            Location-            Severity-    Pain AD Score:  [email or web address] (press ctrl + left click to view)    Review of Systems: Reviewed                     Negative:                     Positive:  Unable to obtain due to poor mentation   All others negative    MEDICATIONS  (STANDING):  aMIOdarone    Tablet 200 milliGRAM(s) Oral daily  aspirin  chewable 81 milliGRAM(s) Oral daily  atorvastatin 80 milliGRAM(s) Oral at bedtime  chlorhexidine 2% Cloths 1 Application(s) Topical <User Schedule>  chlorhexidine 2% Cloths 1 Application(s) Topical daily  clopidogrel Tablet 75 milliGRAM(s) Oral daily  dextrose 50% Injectable 25 Gram(s) IV Push once  DOBUTamine Infusion 5 MICROgram(s)/kG/Min (10.5 mL/Hr) IV Continuous <Continuous>  heparin  Infusion 800 Unit(s)/Hr (5 mL/Hr) IV Continuous <Continuous>  insulin glargine Injectable (LANTUS) 9 Unit(s) SubCutaneous at bedtime  insulin lispro (ADMELOG) corrective regimen sliding scale   SubCutaneous every 6 hours  insulin lispro Injectable (ADMELOG) 2 Unit(s) SubCutaneous every 6 hours  levothyroxine Injectable 44 MICROGram(s) IV Push at bedtime  montelukast 10 milliGRAM(s) Oral daily  pantoprazole  Injectable 40 milliGRAM(s) IV Push daily  piperacillin/tazobactam IVPB.. 3.375 Gram(s) IV Intermittent every 12 hours  sodium bicarbonate 650 milliGRAM(s) Oral every 12 hours  vasopressin Infusion 0.04 Unit(s)/Min (6 mL/Hr) IV Continuous <Continuous>    MEDICATIONS  (PRN):  albuterol/ipratropium for Nebulization 3 milliLiter(s) Nebulizer every 6 hours PRN Shortness of Breath and/or Wheezing  artificial  tears Solution 1 Drop(s) Both EYES two times a day PRN Dry Eyes  heparin   Injectable 5500 Unit(s) IV Push every 6 hours PRN For aPTT less than 40  heparin   Injectable 2500 Unit(s) IV Push every 6 hours PRN For aPTT between 40 - 57  HYDROmorphone  Injectable 0.5 milliGRAM(s) IV Push every 4 hours PRN Severe Pain (7 - 10)  ondansetron Injectable 4 milliGRAM(s) IV Push every 8 hours PRN Nausea and/or Vomiting  senna 2 Tablet(s) Oral at bedtime PRN Constipation  sodium chloride 0.9% lock flush 10 milliLiter(s) IV Push every 1 hour PRN Pre/post blood products, medications, blood draw, and to maintain line patency      PHYSICAL EXAM:    Vital Signs Last 24 Hrs  T(C): 37.2 (10 Filiberto 2024 11:00), Max: 37.4 (10 Filiberto 2024 00:00)  T(F): 99 (10 Filiberto 2024 11:00), Max: 99.3 (10 Filiberto 2024 00:00)  HR: 80 (10 Filiberto 2024 10:00) (73 - 85)  BP: 102/50 (10 Filiberto 2024 10:00) (102/50 - 139/51)  BP(mean): 67 (10 Filiberto 2024 10:00) (60 - 107)  RR: 16 (10 Filiberto 2024 08:00) (16 - 17)  SpO2: 98% (10 Filiberto 2024 10:00) (93% - 100%)    Parameters below as of 10 Filiberto 2024 08:00  Patient On (Oxygen Delivery Method): room air        General: alert  oriented x ____ lethargic agitated                  cachexia  nonverbal  coma    Karnofsky:  %    HEENT: normal  dry mouth  ET tube/trach    Lungs: comfortable tachypnea/labored breathing  excessive secretions    CV: normal  tachycardia    GI: normal  distended  tender  no BS               PEG/NG/OG tube  constipation  last BM:     : normal  incontinent  oliguria/anuria  fournier    MSK: normal  weakness  edema             ambulatory  bedbound/wheelchair bound    Skin: normal  pressure ulcers- Stage_____  no rash    LABS:                          8.0    9.27  )-----------( 84       ( 10 Filiberto 2024 05:50 )             24.1     06-10    136  |  100  |  45.2<H>  ----------------------------<  104<H>  4.4   |  24.0  |  1.92<H>    Ca    8.4      10 Filiberto 2024 10:30  Phos  3.2     06-10  Mg     2.1     06-10    TPro  6.1<L>  /  Alb  2.7<L>  /  TBili  0.6  /  DBili  x   /  AST  44<H>  /  ALT  33  /  AlkPhos  100  06-10    PT/INR - ( 09 Jun 2024 17:20 )   PT: 12.0 sec;   INR: 1.08 ratio         PTT - ( 10 Filiberto 2024 05:50 )  PTT:79.1 sec  Urinalysis Basic - ( 10 Filiberto 2024 10:30 )    Color: x / Appearance: x / SG: x / pH: x  Gluc: 104 mg/dL / Ketone: x  / Bili: x / Urobili: x   Blood: x / Protein: x / Nitrite: x   Leuk Esterase: x / RBC: x / WBC x   Sq Epi: x / Non Sq Epi: x / Bacteria: x      I&O's Summary    09 Jun 2024 07:01  -  10 Filiberto 2024 07:00  --------------------------------------------------------  IN: 1008.9 mL / OUT: 1225 mL / NET: -216.1 mL    10 Filiberto 2024 07:01  -  10 Filiberto 2024 11:44  --------------------------------------------------------  IN: 106.5 mL / OUT: 95 mL / NET: 11.5 mL        RADIOLOGY & ADDITIONAL STUDIES:    ADVANCE DIRECTIVES/TREATMENT PREFERENCES:  DNR YES NO  Completed on:                     MOLST  YES NO   Completed on:  Living Will  YES NO   Completed on: pictures.    You may have or need a blood test (creatinine test) within 30 days of your exam. Staff will let you know when you are there.     You may take your morning medications with a small sip of water unless below.    Do not take diuretics (furosemide; HCTZ; torsemide; bumex) the morning of your test.    Do not take NSAIDS (ibuprofen; naproxen; indomethacin) the morning of your test.    Do not take your oral diabetic medication(s) the day of your test. Restart these medication(s) 48 hours after testing.    Do not take Viagra, Levitra or Cialis for 48 hours prior to your test.  Be sure to tell your doctor:    If you have any allergies, including any reaction to contrast.     If there is a chance you may be pregnant.    If you are breastfeeding.    If you have any special needs.  What happens during the exam?  Please wear loose clothing such as a sweat suit or jogging clothes. Avoid snaps, zippers and other metal. We may have you undress and put on a hospital gown.    We will place pads (EKG leads) on your chest.    You will lie on a table with your arms extended above your head. The table will move through the scanner. The scanner is a short tube. You will not be enclosed.    We will place a small needle (IV) into a vein in your arm. The contrast fluid flows through this needle. You may received medicine to slow your heart rate as well.    The scanner will take a series of pictures.You must lie very still during this time but you can talk to staff via a 2-way speaker system.  Is it safe?  As with any scan that uses radiation there is a very small increased risk of cancer. Your doctor orders a scan when he or she feels the potential benefits outweigh the risks of the scan. Please talk with your doctor if you have any concerns before your exam.  When will I know the result?  The doctor who did your exam and the radiologist (x-ray doctor) will prepare a report of your results. The report will be sent to your doctor  who will discuss the result with you.  Who should I call with questions?  Please call the [facility] diagnostic imaging scheduling department at [phone]  with any questions. You may also contact your cardiology nurse at [phone].       OVERNIGHT EVENTS: doing better, extubated. remains with IABP and pressor support     Present Symptoms:     Dyspnea: none   Nausea/Vomiting: No  Anxiety:  No  Depression: unable   Fatigue: none   Loss of appetite: none   Constipation: none     Pain: none             Character-            Duration-            Effect-            Factors-            Frequency-            Location-            Severity-    Pain AD Score:  [email or web address] (press ctrl + left click to view)    Review of Systems: Reviewed                     Negative: no chest pain                      All others negative    MEDICATIONS  (STANDING):  aMIOdarone    Tablet 200 milliGRAM(s) Oral daily  aspirin  chewable 81 milliGRAM(s) Oral daily  atorvastatin 80 milliGRAM(s) Oral at bedtime  chlorhexidine 2% Cloths 1 Application(s) Topical <User Schedule>  chlorhexidine 2% Cloths 1 Application(s) Topical daily  clopidogrel Tablet 75 milliGRAM(s) Oral daily  dextrose 50% Injectable 25 Gram(s) IV Push once  DOBUTamine Infusion 5 MICROgram(s)/kG/Min (10.5 mL/Hr) IV Continuous <Continuous>  heparin  Infusion 800 Unit(s)/Hr (5 mL/Hr) IV Continuous <Continuous>  insulin glargine Injectable (LANTUS) 9 Unit(s) SubCutaneous at bedtime  insulin lispro (ADMELOG) corrective regimen sliding scale   SubCutaneous every 6 hours  insulin lispro Injectable (ADMELOG) 2 Unit(s) SubCutaneous every 6 hours  levothyroxine Injectable 44 MICROGram(s) IV Push at bedtime  montelukast 10 milliGRAM(s) Oral daily  pantoprazole  Injectable 40 milliGRAM(s) IV Push daily  piperacillin/tazobactam IVPB.. 3.375 Gram(s) IV Intermittent every 12 hours  sodium bicarbonate 650 milliGRAM(s) Oral every 12 hours  vasopressin Infusion 0.04 Unit(s)/Min (6 mL/Hr) IV Continuous <Continuous>    MEDICATIONS  (PRN):  albuterol/ipratropium for Nebulization 3 milliLiter(s) Nebulizer every 6 hours PRN Shortness of Breath and/or Wheezing  artificial  tears Solution 1 Drop(s) Both EYES two times a day PRN Dry Eyes  heparin   Injectable 5500 Unit(s) IV Push every 6 hours PRN For aPTT less than 40  heparin   Injectable 2500 Unit(s) IV Push every 6 hours PRN For aPTT between 40 - 57  HYDROmorphone  Injectable 0.5 milliGRAM(s) IV Push every 4 hours PRN Severe Pain (7 - 10)  ondansetron Injectable 4 milliGRAM(s) IV Push every 8 hours PRN Nausea and/or Vomiting  senna 2 Tablet(s) Oral at bedtime PRN Constipation  sodium chloride 0.9% lock flush 10 milliLiter(s) IV Push every 1 hour PRN Pre/post blood products, medications, blood draw, and to maintain line patency    PHYSICAL EXAM:    Vital Signs Last 24 Hrs  T(C): 37.2 (10 Filiberto 2024 11:00), Max: 37.4 (10 Filiberto 2024 00:00)  T(F): 99 (10 Filiberto 2024 11:00), Max: 99.3 (10 Filiberto 2024 00:00)  HR: 80 (10 Filiberto 2024 10:00) (73 - 85)  BP: 102/50 (10 Filiberto 2024 10:00) (102/50 - 139/51)  BP(mean): 67 (10 Filiberto 2024 10:00) (60 - 107)  RR: 16 (10 Filiberto 2024 08:00) (16 - 17)  SpO2: 98% (10 Filiberto 2024 10:00) (93% - 100%)    Parameters below as of 10 Filiberto 2024 08:00  Patient On (Oxygen Delivery Method): room air    General: alert     HEENT: normal      Lungs: comfortable    CV: normal      GI: normal      : fournier    MSK: weakness     Skin: no rash    LABS:                      8.0    9.27  )-----------( 84       ( 10 Filiberto 2024 05:50 )             24.1     06-10    136  |  100  |  45.2<H>  ----------------------------<  104<H>  4.4   |  24.0  |  1.92<H>    Ca    8.4      10 Filiberto 2024 10:30  Phos  3.2     06-10  Mg     2.1     06-10    TPro  6.1<L>  /  Alb  2.7<L>  /  TBili  0.6  /  DBili  x   /  AST  44<H>  /  ALT  33  /  AlkPhos  100  06-10    PT/INR - ( 09 Jun 2024 17:20 )   PT: 12.0 sec;   INR: 1.08 ratio       PTT - ( 10 Filiberto 2024 05:50 )  PTT:79.1 sec  Urinalysis Basic - ( 10 Filiberto 2024 10:30 )    Color: x / Appearance: x / SG: x / pH: x  Gluc: 104 mg/dL / Ketone: x  / Bili: x / Urobili: x   Blood: x / Protein: x / Nitrite: x   Leuk Esterase: x / RBC: x / WBC x   Sq Epi: x / Non Sq Epi: x / Bacteria: x    I&O's Summary    09 Jun 2024 07:01  -  10 Filiberto 2024 07:00  --------------------------------------------------------  IN: 1008.9 mL / OUT: 1225 mL / NET: -216.1 mL    10 Filiberto 2024 07:01  -  10 Filiberto 2024 11:44  --------------------------------------------------------  IN: 106.5 mL / OUT: 95 mL / NET: 11.5 mL    RADIOLOGY & ADDITIONAL STUDIES:    < from: Xray Chest 1 View- PORTABLE-Urgent (Xray Chest 1 View- PORTABLE-Urgent .) (06.07.24 @ 12:26) >    IMPRESSION: Stable findings as above.    --- End of Report ---    < end of copied text >    ADVANCE DIRECTIVES/TREATMENT PREFERENCES:  do not resuscitate and do not intubate

## 2024-06-10 NOTE — PROGRESS NOTE ADULT - PROBLEM SELECTOR PLAN 7
- Likely congestive hepatopathy  - Continue diuresis as above and trend daily
- Likely secondary to congestive hepatopathy  - Continue to trend daily
- Improving  - Likely secondary to congestive hepatopathy  - Continue to trend daily
- Likely secondary to congestive hepatopathy  - Continue to trend daily
- Improving  - Likely secondary to congestive hepatopathy  - Continue to trend daily
- Improving  - Likely secondary to congestive hepatopathy  - Continue to trend daily
- Likely secondary to congestive hepatopathy  - Continue to trend daily
- Likely secondary to congestive hepatopathy  - Continue to trend daily

## 2024-06-10 NOTE — PROGRESS NOTE ADULT - ASSESSMENT
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, HTN, HLD, with:    # Cardiac arrest x 3  # Cardiogenic shock  # Acute systolic heart failure  # RAS on CKD    - weaned off norepinephrine infusion  - maintain fixed dose vasopressin, will plan to trial off soon  - continue dobutamine @ 5 mcg/kg/min  - IABP augmenting @ 1:1, ECG trigger (hopeful bridge to recovery as he is not a candidate for advanced therapies)  - recent CO/CI 5.2/2.9 (by Fauzia), lactate 0.90, CVP primarily single digits, f/u 0600 numbers  - nonoliguric and maintaining recent net even fluid balance, IV diuresis PRN  - continue amiodarone for VT suppression  - continue heparin infusion  - continue DAPT and high intensity statin  - RAS on CKD likely cardiorenal in nature +/- heavy diuresis, optimize end-organ perfusion as above  - trend BUN/Cr, electrolytes, acid-base balance, monitor hourly UOP  - no indication for urgent RRT at this time    Family has now reinstated DNR/DNI advanced directives as they understand his end stage heart failure will keep him at increased risk for sudden cardiac arrest, which has occurred 3 times this admission. Otherwise there are no limitations to care.    Case discussed with the patient's family at the bedside. Diagnosis, prognosis, and management plan outlined. All questions answered and concerns addressed.     Case discussed with MICU physician, Dr. Olivier.

## 2024-06-10 NOTE — PROGRESS NOTE ADULT - PROBLEM SELECTOR PROBLEM 6
Acute kidney injury superimposed on CKD
HFrEF (heart failure with reduced ejection fraction)
Acute kidney injury superimposed on CKD

## 2024-06-10 NOTE — PROGRESS NOTE ADULT - PROBLEM SELECTOR PLAN 6
- Worsening sCr today 4.65 (3.84, 3.18, 2.89)  - s/p RHC today which revealed significantly elevated R/L sided filling pressures. Will resume diuresis as above.   - Avoid nephrotoxins   - Nephrology following
- Worsening sCr today 5.5 (4.65, 3.84, 3.18, 2.89)  - Poor UOP response to continuous bumex infusion  - Consider CVVHD if no improvement  - Avoid nephrotoxins   - Nephrology following
- Renal function slowly improving with optimization as above  - Avoid nephrotoxins   - Nephrology following
- Renal function slowly improving with optimization as above  - Avoid nephrotoxins   - Nephrology following
- Cr in March was 1.4-1.7, now stable ~2.4 (may be new baseline?)  - Nephrology following  - Avoid nephrotoxins
- Renal function slowly improving with optimization as above  - Avoid nephrotoxins   - Nephrology following
- sCr continues to rise 3.84 (3.18, 2.89)  - Diuretic holiday  - Avoid nephrotoxins   - Nephrology following
- Renal function much improved, stable ~ 1.79  - Avoid nephrotoxins   - Nephrology following

## 2024-06-10 NOTE — PROGRESS NOTE ADULT - PROBLEM SELECTOR PROBLEM 3
VT (ventricular tachycardia)
CAD (coronary artery disease)
NSTEMI (non-ST elevation myocardial infarction)
VT (ventricular tachycardia)
Paroxysmal atrial fibrillation
NSTEMI (non-ST elevation myocardial infarction)
VT (ventricular tachycardia)
CAD (coronary artery disease)
NSTEMI (non-ST elevation myocardial infarction)
Paroxysmal atrial fibrillation
VT (ventricular tachycardia)
NSTEMI (non-ST elevation myocardial infarction)
VT (ventricular tachycardia)
VT (ventricular tachycardia)

## 2024-06-10 NOTE — PROGRESS NOTE ADULT - ASSESSMENT
Patient is a 78 year old male with history of HTN, HLD, DM, CKD, CAD s/p 4V CABG and multiple PCI's, ischemic cardiomyopathy (EF < 20%), and LBBB s/p MDT CRT-D. He had an admission in November for ADHF. At that time a LHC revealed his grafts were occluded except for the LIMA to LAD. He also underwent a CRT-D implant.     On 5/25 he had a VT arrest with ROSC achieved after 3 minutes was sent to the cath lab which showed his LIMA is still patent. Patient was extubated on 5/27. RHC on 5/28 showed severely elevated biv filling pressures, severe Cpc-PH, and low CO. He was started on low dose milrinone and diuresed well on IV Bumex. Weaned off pressors 5/29 and Milrinone was weaned off 5/31.    Renal function worsening (sCr 4.65) therefore RHC performed 6/4 which revealed significantly elevated filling pressures w/ low CI. Milrinone was resumed at 0.25 mcg/kg/min as well as Bumex gtt. On 6/5 he had a PEA arrest overnight. IABP was placed and he remains intubated/sedated in MICU on levo/vaso/ gtts.     Bedside Hemodynamics:  6/10/24 ( 5, IABP 1:3): HR 82, /44/73, CVP 16, PAP 71/31/45, TD CO/CI 4.6/2.5, MVO2 63.5%, Fauzia CO/CI 5.4/3.0.    5/31 (mil 0.125): CVP 9-10, PA 72/24/43, PA sat 70.7%, Fauzia CO/CI 4.6/2.6, /56 (73), SVR 1096 dsc.    Cardiac Studies:  6/4/24 RHC: RA 29/26/24, RV 83/12/23, PA 81/31/47 PCWP 32, PA sat 42.4, Fauzia CO/CI 2.75/1.8.    5/28/24 RHC: RA 18, RV 92/18, PA 95/38/57, PCW 31, PA sat 51.5%, Fauzia CO/CI 3.09/1.74, TPG 26, DPG 7, SVR 1889 dsc, PVR 8.41 MEYERS, Vidhi 3.2, /63 (91) on levo 0.08.    5/25/24 LHC: All grafts occluded except LIMA-LAD. LVEDP 29 mmHg.    5/23/24 TTE: LVEF <20%, LVIDd 5.8cm, mild RVE with mild RV dysfunction, TAPSE 0.7cm, mild-mod AS, mild-mod MR, mild TR, mild OR, est PASP 75 mmHg.

## 2024-06-10 NOTE — CHART NOTE - NSCHARTNOTEFT_GEN_A_CORE
Patient seen at bedside this AM. Extubated, not complaining of any pain. Remains on dobutamine at 5mcg. IABP augmenting at 1:3 with plans for labs at 11am. Plan to review labs and discuss d/cing balloon today. Physical exam, paced rhythm, lungs clear to auscultation bilaterally. No hematoma, bleeding, numbness/tingling to leg that balloon inserted through. Palpable pulse to RLE. VS stable, labs WDL.

## 2024-06-10 NOTE — PROGRESS NOTE ADULT - PROBLEM SELECTOR PLAN 5
- 6% AT/AF burden on device  - Continue AC with heparin gtt
- 6% AT/AF burden on device  - Continue AC with heparin gtt and amiodarone as above  - If AT/AF cannot be controlled by amiodarone, EP may consider AVN ablation
- 6% AT/AF burden on device  - Continue AC with heparin gtt
- 6% AT/AF burden on device  - Continue AC with heparin gtt
- 6% AT/AF burden on device  - Continue AC with heparin gtt and amiodarone as above  - If AT/AF cannot be controlled by amiodarone, EP may consider AVN ablation
- 6% AT/AF burden on device  - Continue AC with heparin gtt
- 6% AT/AF burden on device  - Continue AC with heparin gtt and amiodarone as above  - If AT/AF cannot be controlled by amiodarone, EP may consider AVN ablation
- 6% AT/AF burden on device  - Continue AC with heparin gtt

## 2024-06-11 ENCOUNTER — TRANSCRIPTION ENCOUNTER (OUTPATIENT)
Age: 79
End: 2024-06-11

## 2024-06-11 LAB
ALBUMIN SERPL ELPH-MCNC: 2.8 G/DL — LOW (ref 3.3–5.2)
ALBUMIN SERPL ELPH-MCNC: 2.9 G/DL — LOW (ref 3.3–5.2)
ALBUMIN SERPL ELPH-MCNC: 2.9 G/DL — LOW (ref 3.3–5.2)
ALP SERPL-CCNC: 112 U/L — SIGNIFICANT CHANGE UP (ref 40–120)
ALP SERPL-CCNC: 115 U/L — SIGNIFICANT CHANGE UP (ref 40–120)
ALP SERPL-CCNC: 126 U/L — HIGH (ref 40–120)
ALT FLD-CCNC: 41 U/L — HIGH
ALT FLD-CCNC: 46 U/L — HIGH
ALT FLD-CCNC: 98 U/L — HIGH
ANION GAP SERPL CALC-SCNC: 16 MMOL/L — SIGNIFICANT CHANGE UP (ref 5–17)
ANION GAP SERPL CALC-SCNC: 17 MMOL/L — SIGNIFICANT CHANGE UP (ref 5–17)
ANION GAP SERPL CALC-SCNC: 18 MMOL/L — HIGH (ref 5–17)
APTT BLD: 56.6 SEC — HIGH (ref 24.5–35.6)
AST SERPL-CCNC: 167 U/L — HIGH
AST SERPL-CCNC: 59 U/L — HIGH
AST SERPL-CCNC: 73 U/L — HIGH
BASE EXCESS BLDV CALC-SCNC: -0.8 MMOL/L — SIGNIFICANT CHANGE UP (ref -2–3)
BASE EXCESS BLDV CALC-SCNC: 0.4 MMOL/L — SIGNIFICANT CHANGE UP (ref -2–3)
BASE EXCESS BLDV CALC-SCNC: 1.2 MMOL/L — SIGNIFICANT CHANGE UP (ref -2–3)
BILIRUB SERPL-MCNC: 0.8 MG/DL — SIGNIFICANT CHANGE UP (ref 0.4–2)
BILIRUB SERPL-MCNC: 0.9 MG/DL — SIGNIFICANT CHANGE UP (ref 0.4–2)
BILIRUB SERPL-MCNC: 0.9 MG/DL — SIGNIFICANT CHANGE UP (ref 0.4–2)
BLOOD GAS COMMENTS, VENOUS: SIGNIFICANT CHANGE UP
BLOOD GAS COMMENTS, VENOUS: SIGNIFICANT CHANGE UP
BUN SERPL-MCNC: 47.4 MG/DL — HIGH (ref 8–20)
BUN SERPL-MCNC: 48.5 MG/DL — HIGH (ref 8–20)
BUN SERPL-MCNC: 51.5 MG/DL — HIGH (ref 8–20)
CA-I SERPL-SCNC: 1.19 MMOL/L — SIGNIFICANT CHANGE UP (ref 1.15–1.33)
CA-I SERPL-SCNC: 1.2 MMOL/L — SIGNIFICANT CHANGE UP (ref 1.15–1.33)
CALCIUM SERPL-MCNC: 8.6 MG/DL — SIGNIFICANT CHANGE UP (ref 8.4–10.5)
CHLORIDE BLDV-SCNC: 102 MMOL/L — SIGNIFICANT CHANGE UP (ref 96–108)
CHLORIDE BLDV-SCNC: 103 MMOL/L — SIGNIFICANT CHANGE UP (ref 96–108)
CHLORIDE SERPL-SCNC: 98 MMOL/L — SIGNIFICANT CHANGE UP (ref 96–108)
CHLORIDE SERPL-SCNC: 99 MMOL/L — SIGNIFICANT CHANGE UP (ref 96–108)
CHLORIDE SERPL-SCNC: 99 MMOL/L — SIGNIFICANT CHANGE UP (ref 96–108)
CO2 SERPL-SCNC: 20 MMOL/L — LOW (ref 22–29)
CO2 SERPL-SCNC: 20 MMOL/L — LOW (ref 22–29)
CO2 SERPL-SCNC: 21 MMOL/L — LOW (ref 22–29)
CREAT SERPL-MCNC: 2.05 MG/DL — HIGH (ref 0.5–1.3)
CREAT SERPL-MCNC: 2.13 MG/DL — HIGH (ref 0.5–1.3)
CREAT SERPL-MCNC: 2.13 MG/DL — HIGH (ref 0.5–1.3)
EGFR: 31 ML/MIN/1.73M2 — LOW
EGFR: 31 ML/MIN/1.73M2 — LOW
EGFR: 33 ML/MIN/1.73M2 — LOW
GAS PNL BLDA: SIGNIFICANT CHANGE UP
GAS PNL BLDV: 134 MMOL/L — LOW (ref 136–145)
GAS PNL BLDV: 135 MMOL/L — LOW (ref 136–145)
GAS PNL BLDV: SIGNIFICANT CHANGE UP
GLUCOSE BLDC GLUCOMTR-MCNC: 178 MG/DL — HIGH (ref 70–99)
GLUCOSE BLDV-MCNC: 150 MG/DL — HIGH (ref 70–99)
GLUCOSE BLDV-MCNC: 166 MG/DL — HIGH (ref 70–99)
GLUCOSE SERPL-MCNC: 151 MG/DL — HIGH (ref 70–99)
GLUCOSE SERPL-MCNC: 154 MG/DL — HIGH (ref 70–99)
GLUCOSE SERPL-MCNC: 161 MG/DL — HIGH (ref 70–99)
HCO3 BLDV-SCNC: 24 MMOL/L — SIGNIFICANT CHANGE UP (ref 22–29)
HCO3 BLDV-SCNC: 25 MMOL/L — SIGNIFICANT CHANGE UP (ref 22–29)
HCO3 BLDV-SCNC: 26 MMOL/L — SIGNIFICANT CHANGE UP (ref 22–29)
HCT VFR BLD CALC: 27.1 % — LOW (ref 39–50)
HCT VFR BLDA CALC: 26 % — SIGNIFICANT CHANGE UP
HCT VFR BLDA CALC: 28 % — SIGNIFICANT CHANGE UP
HGB BLD CALC-MCNC: 8.8 G/DL — LOW (ref 12.6–17.4)
HGB BLD CALC-MCNC: 9.3 G/DL — LOW (ref 12.6–17.4)
HGB BLD-MCNC: 8.7 G/DL — LOW (ref 13–17)
HOROWITZ INDEX BLDV+IHG-RTO: 0.21 — SIGNIFICANT CHANGE UP
HOROWITZ INDEX BLDV+IHG-RTO: SIGNIFICANT CHANGE UP
INR BLD: 1.19 RATIO — HIGH (ref 0.85–1.18)
LACTATE BLDV-MCNC: 1.6 MMOL/L — SIGNIFICANT CHANGE UP (ref 0.5–2)
LACTATE BLDV-MCNC: 1.7 MMOL/L — SIGNIFICANT CHANGE UP (ref 0.5–2)
MAGNESIUM SERPL-MCNC: 2.1 MG/DL — SIGNIFICANT CHANGE UP (ref 1.6–2.6)
MAGNESIUM SERPL-MCNC: 2.1 MG/DL — SIGNIFICANT CHANGE UP (ref 1.6–2.6)
MAGNESIUM SERPL-MCNC: 2.3 MG/DL — SIGNIFICANT CHANGE UP (ref 1.6–2.6)
MCHC RBC-ENTMCNC: 29.4 PG — SIGNIFICANT CHANGE UP (ref 27–34)
MCHC RBC-ENTMCNC: 32.1 GM/DL — SIGNIFICANT CHANGE UP (ref 32–36)
MCV RBC AUTO: 91.6 FL — SIGNIFICANT CHANGE UP (ref 80–100)
PCO2 BLDV: 41 MMHG — LOW (ref 42–55)
PCO2 BLDV: 42 MMHG — SIGNIFICANT CHANGE UP (ref 42–55)
PCO2 BLDV: 42 MMHG — SIGNIFICANT CHANGE UP (ref 42–55)
PH BLDV: 7.38 — SIGNIFICANT CHANGE UP (ref 7.32–7.43)
PH BLDV: 7.39 — SIGNIFICANT CHANGE UP (ref 7.32–7.43)
PH BLDV: 7.4 — SIGNIFICANT CHANGE UP (ref 7.32–7.43)
PHOSPHATE SERPL-MCNC: 4.1 MG/DL — SIGNIFICANT CHANGE UP (ref 2.4–4.7)
PHOSPHATE SERPL-MCNC: 4.2 MG/DL — SIGNIFICANT CHANGE UP (ref 2.4–4.7)
PHOSPHATE SERPL-MCNC: 4.2 MG/DL — SIGNIFICANT CHANGE UP (ref 2.4–4.7)
PLATELET # BLD AUTO: 109 K/UL — LOW (ref 150–400)
PO2 BLDV: <42 MMHG — SIGNIFICANT CHANGE UP (ref 25–45)
POTASSIUM BLDV-SCNC: 4.1 MMOL/L — SIGNIFICANT CHANGE UP (ref 3.5–5.1)
POTASSIUM BLDV-SCNC: 4.2 MMOL/L — SIGNIFICANT CHANGE UP (ref 3.5–5.1)
POTASSIUM SERPL-MCNC: 4 MMOL/L — SIGNIFICANT CHANGE UP (ref 3.5–5.3)
POTASSIUM SERPL-MCNC: 4.1 MMOL/L — SIGNIFICANT CHANGE UP (ref 3.5–5.3)
POTASSIUM SERPL-MCNC: 4.3 MMOL/L — SIGNIFICANT CHANGE UP (ref 3.5–5.3)
POTASSIUM SERPL-SCNC: 4 MMOL/L — SIGNIFICANT CHANGE UP (ref 3.5–5.3)
POTASSIUM SERPL-SCNC: 4.1 MMOL/L — SIGNIFICANT CHANGE UP (ref 3.5–5.3)
POTASSIUM SERPL-SCNC: 4.3 MMOL/L — SIGNIFICANT CHANGE UP (ref 3.5–5.3)
PROT SERPL-MCNC: 6.2 G/DL — LOW (ref 6.6–8.7)
PROT SERPL-MCNC: 6.3 G/DL — LOW (ref 6.6–8.7)
PROT SERPL-MCNC: 6.4 G/DL — LOW (ref 6.6–8.7)
PROTHROM AB SERPL-ACNC: 13.1 SEC — HIGH (ref 9.5–13)
RBC # BLD: 2.96 M/UL — LOW (ref 4.2–5.8)
RBC # FLD: 15.9 % — HIGH (ref 10.3–14.5)
SAO2 % BLDV: 49.1 % — SIGNIFICANT CHANGE UP
SAO2 % BLDV: 49.5 % — SIGNIFICANT CHANGE UP
SAO2 % BLDV: 49.6 % — SIGNIFICANT CHANGE UP
SODIUM SERPL-SCNC: 135 MMOL/L — SIGNIFICANT CHANGE UP (ref 135–145)
SODIUM SERPL-SCNC: 136 MMOL/L — SIGNIFICANT CHANGE UP (ref 135–145)
SODIUM SERPL-SCNC: 136 MMOL/L — SIGNIFICANT CHANGE UP (ref 135–145)
WBC # BLD: 12.12 K/UL — HIGH (ref 3.8–10.5)
WBC # FLD AUTO: 12.12 K/UL — HIGH (ref 3.8–10.5)

## 2024-06-11 PROCEDURE — 99497 ADVNCD CARE PLAN 30 MIN: CPT | Mod: 25

## 2024-06-11 PROCEDURE — 99232 SBSQ HOSP IP/OBS MODERATE 35: CPT

## 2024-06-11 PROCEDURE — 99233 SBSQ HOSP IP/OBS HIGH 50: CPT

## 2024-06-11 PROCEDURE — 71045 X-RAY EXAM CHEST 1 VIEW: CPT | Mod: 26

## 2024-06-11 PROCEDURE — 76942 ECHO GUIDE FOR BIOPSY: CPT | Mod: 26,59

## 2024-06-11 PROCEDURE — 76937 US GUIDE VASCULAR ACCESS: CPT | Mod: 26,59

## 2024-06-11 RX ORDER — CHLORHEXIDINE GLUCONATE 213 G/1000ML
1 SOLUTION TOPICAL
Refills: 0 | Status: DISCONTINUED | OUTPATIENT
Start: 2024-06-11 | End: 2024-06-12

## 2024-06-11 RX ORDER — SODIUM BICARBONATE 1 MEQ/ML
1300 SYRINGE (ML) INTRAVENOUS
Refills: 0 | Status: DISCONTINUED | OUTPATIENT
Start: 2024-06-11 | End: 2024-06-12

## 2024-06-11 RX ORDER — LANOLIN ALCOHOL/MO/W.PET/CERES
5 CREAM (GRAM) TOPICAL AT BEDTIME
Refills: 0 | Status: DISCONTINUED | OUTPATIENT
Start: 2024-06-11 | End: 2024-06-12

## 2024-06-11 RX ORDER — LOPERAMIDE HCL 2 MG
2 TABLET ORAL EVERY 8 HOURS
Refills: 0 | Status: DISCONTINUED | OUTPATIENT
Start: 2024-06-11 | End: 2024-06-12

## 2024-06-11 RX ORDER — APIXABAN 2.5 MG/1
5 TABLET, FILM COATED ORAL EVERY 12 HOURS
Refills: 0 | Status: DISCONTINUED | OUTPATIENT
Start: 2024-06-11 | End: 2024-06-12

## 2024-06-11 RX ORDER — ERYTHROMYCIN BASE 5 MG/GRAM
1 OINTMENT (GRAM) OPHTHALMIC (EYE)
Refills: 0 | Status: DISCONTINUED | OUTPATIENT
Start: 2024-06-11 | End: 2024-06-12

## 2024-06-11 RX ORDER — MILRINONE LACTATE 1 MG/ML
0.25 INJECTION, SOLUTION INTRAVENOUS
Qty: 20 | Refills: 0 | Status: DISCONTINUED | OUTPATIENT
Start: 2024-06-11 | End: 2024-06-12

## 2024-06-11 RX ORDER — BUMETANIDE 0.25 MG/ML
2 INJECTION INTRAMUSCULAR; INTRAVENOUS
Refills: 0 | Status: DISCONTINUED | OUTPATIENT
Start: 2024-06-11 | End: 2024-06-12

## 2024-06-11 RX ADMIN — Medication 81 MILLIGRAM(S): at 12:35

## 2024-06-11 RX ADMIN — AMIODARONE HYDROCHLORIDE 200 MILLIGRAM(S): 400 TABLET ORAL at 05:38

## 2024-06-11 RX ADMIN — CHLORHEXIDINE GLUCONATE 1 APPLICATION(S): 213 SOLUTION TOPICAL at 16:17

## 2024-06-11 RX ADMIN — Medication 1 APPLICATION(S): at 17:17

## 2024-06-11 RX ADMIN — CLOPIDOGREL BISULFATE 75 MILLIGRAM(S): 75 TABLET, FILM COATED ORAL at 12:35

## 2024-06-11 RX ADMIN — Medication 15.7 MICROGRAM(S)/KG/MIN: at 05:36

## 2024-06-11 RX ADMIN — Medication 1300 MILLIGRAM(S): at 17:16

## 2024-06-11 RX ADMIN — CHLORHEXIDINE GLUCONATE 1 APPLICATION(S): 213 SOLUTION TOPICAL at 12:35

## 2024-06-11 RX ADMIN — BUMETANIDE 2 MILLIGRAM(S): 0.25 INJECTION INTRAMUSCULAR; INTRAVENOUS at 21:38

## 2024-06-11 RX ADMIN — Medication 2 MILLIGRAM(S): at 17:18

## 2024-06-11 RX ADMIN — MILRINONE LACTATE 5.24 MICROGRAM(S)/KG/MIN: 1 INJECTION, SOLUTION INTRAVENOUS at 12:34

## 2024-06-11 RX ADMIN — APIXABAN 5 MILLIGRAM(S): 2.5 TABLET, FILM COATED ORAL at 17:16

## 2024-06-11 RX ADMIN — INSULIN GLARGINE 9 UNIT(S): 100 INJECTION, SOLUTION SUBCUTANEOUS at 22:10

## 2024-06-11 RX ADMIN — CHLORHEXIDINE GLUCONATE 1 APPLICATION(S): 213 SOLUTION TOPICAL at 05:42

## 2024-06-11 RX ADMIN — Medication 88 MICROGRAM(S): at 05:48

## 2024-06-11 RX ADMIN — BUMETANIDE 5 MG/HR: 0.25 INJECTION INTRAMUSCULAR; INTRAVENOUS at 00:20

## 2024-06-11 RX ADMIN — MONTELUKAST 10 MILLIGRAM(S): 4 TABLET, CHEWABLE ORAL at 12:35

## 2024-06-11 RX ADMIN — Medication 100 MILLIGRAM(S): at 17:16

## 2024-06-11 RX ADMIN — Medication 650 MILLIGRAM(S): at 05:38

## 2024-06-11 RX ADMIN — PIPERACILLIN AND TAZOBACTAM 25 GRAM(S): 4; .5 INJECTION, POWDER, LYOPHILIZED, FOR SOLUTION INTRAVENOUS at 12:36

## 2024-06-11 RX ADMIN — Medication 1: at 05:36

## 2024-06-11 RX ADMIN — ATORVASTATIN CALCIUM 80 MILLIGRAM(S): 80 TABLET, FILM COATED ORAL at 21:38

## 2024-06-11 NOTE — PROGRESS NOTE ADULT - NS ATTEND BILL GEN_ALL_CORE
Attending to bill
PA/NP to bill
Attending to bill
PA/NP to bill
Attending to bill

## 2024-06-11 NOTE — DISCHARGE NOTE PROVIDER - ATTENDING DISCHARGE PHYSICAL EXAMINATION:
Constitutional: alert and oriented, in no acute distress   Neck: Soft and supple  Respiratory: Clear to auscultation bilaterally, no wheezes or crackles  Cardiovascular: Regular rate and rhyhtm, no murmurs, gallops, rubs  Gastrointestinal: Soft, non-tender to palpation, +bs  Vascular: 2+ peripheral pulses  Neurological: A/O x 3, no focal neurological deficits  Musculoskeletal: 5/5 strength b/l upper and lower extremities, no lower extremity edema bilaterally

## 2024-06-11 NOTE — PROGRESS NOTE ADULT - ASSESSMENT
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     Cardiogenic shock   -shock state improving   -on Milrinone infusion   -plan for home hospice     Acute systolic heart failure/ICM s/p ICD  -cont with Bumax 2mg po bid   -strict I/O, daily weight   -cont with Amiodarone for VT suppression      RAS on CKD  -renal function stable   -cont monitor while on diuretic     CAD (PCI/CODI x s/p 19/CABG x4  -s/p LHC with 3/4 occluded grafts, unable to intervene   -cont with Plavix, ASA, high intensity statin     IDDM  -cont with Lantus 9u QHS   -cont with premeal insulin     Hypothyroidism  -cont with synthroid       DVT ppx  -on Eliquis

## 2024-06-11 NOTE — PROGRESS NOTE ADULT - ASSESSMENT
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, HTN, HLD, with:    # Cardiac arrest x 3  # Cardiogenic shock  # Acute systolic heart failure  # RAS on CKD      - increase dobutamine to 7.5 mcg/kg/min for drop in cardiac index to 1.5, slight uptrend in renal indices and transaminases  - bumetanide infusion started for dwindling UOP and climbing CVP  - weaned off both vasopressors over 24 hours ago, IABP removed 6/10  - best case scenario here may be palliative inotrope @ home, although may never be stable enough for safe discharge  - continue amiodarone for VT suppression  - continue heparin infusion  - continue DAPT and high intensity statin  - RAS on CKD likely cardiorenal in nature, optimize end-organ perfusion as above  - trend BUN/Cr, electrolytes, acid-base balance, monitor hourly UOP  - no indication for urgent RRT at this time    Family has reinstated DNR/DNI advanced directives as they understand his end stage heart failure will keep him at increased risk for sudden cardiac arrest, which has occurred 3 times this admission. Otherwise there are no limitations to care.      Case discussed with MICU physician, Dr. Barreto.

## 2024-06-11 NOTE — DISCHARGE NOTE PROVIDER - NSDCFUSCHEDAPPT_GEN_ALL_CORE_FT
[facility] Physician [facility]  ELECTROPH [address]  Scheduled Appointment: 07/08/2024    Kelly Benitez  [facility]  ENDOCRIN [address]  Scheduled Appointment: 07/09/2024    [facility]  CARDIOLOGY [address]  Scheduled Appointment: 07/16/2024    Sydney Valenzuela  [facility]  CARDIOLOGY [address]  Scheduled Appointment: 08/06/2024    Tad Bazzi  [facility]  ELECTROPH [address]  Scheduled Appointment: 08/21/2024    Jose Augustin  [facility]  OTOLARYNG [address]  Scheduled Appointment: 09/05/2024

## 2024-06-11 NOTE — PROGRESS NOTE ADULT - SUBJECTIVE AND OBJECTIVE BOX
MICU DOWN GRADE NOTE      Patient is a 78y old  Male who presents with a chief complaint of Chest Pain / SOB (11 Jun 2024 14:20)      HPI:    INTERVAL HPI/OVERNIGHT EVENTS:        REVIEW OF SYSTEMS:  CONSTITUTIONAL: No fever, chills  HEENT:  No blurry vision No sinus or throat pain  NECK: No pain or stiffness  RESPIRATORY: No cough, wheezing, chills or hemoptysis; No shortness of breath  CARDIOVASCULAR: No chest pain, palpitations  GASTROINTESTINAL: No abdominal pain. No nausea, vomiting, or diarrhea  GENITOURINARY: No dysuria  NEUROLOGICAL: No HA, No focal weakness  SKIN: No itching, burning, rashes, or lesions   MUSCULOSKELETAL: No joint pain or swelling; No muscle, back, or extremity pain    MEDICATIONS:  albuterol/ipratropium for Nebulization 3 milliLiter(s) Nebulizer every 6 hours PRN  aMIOdarone    Tablet 200 milliGRAM(s) Oral daily  apixaban 5 milliGRAM(s) Oral every 12 hours  artificial  tears Solution 1 Drop(s) Both EYES two times a day PRN  aspirin  chewable 81 milliGRAM(s) Oral daily  atorvastatin 80 milliGRAM(s) Oral at bedtime  benzonatate 100 milliGRAM(s) Oral three times a day PRN  buMETAnide 2 milliGRAM(s) Oral two times a day  chlorhexidine 2% Cloths 1 Application(s) Topical <User Schedule>  chlorhexidine 2% Cloths 1 Application(s) Topical daily  chlorhexidine 2% Cloths 1 Application(s) Topical <User Schedule>  chlorhexidine 2% Cloths 1 Application(s) Topical <User Schedule>  clopidogrel Tablet 75 milliGRAM(s) Oral daily  dextrose 50% Injectable 25 Gram(s) IV Push once  erythromycin   Ointment 1 Application(s) Left EYE two times a day  HYDROmorphone  Injectable 0.5 milliGRAM(s) IV Push every 4 hours PRN  insulin glargine Injectable (LANTUS) 9 Unit(s) SubCutaneous at bedtime  insulin lispro (ADMELOG) corrective regimen sliding scale   SubCutaneous every 6 hours  insulin lispro Injectable (ADMELOG) 2 Unit(s) SubCutaneous every 6 hours  levothyroxine 88 MICROGram(s) Oral daily  loperamide 2 milliGRAM(s) Oral every 8 hours PRN  LORazepam    Concentrate 0.5 milliGRAM(s) SubLingual every 4 hours PRN  melatonin 5 milliGRAM(s) Oral at bedtime PRN  milrinone Infusion 0.25 MICROgram(s)/kG/Min IV Continuous <Continuous>  montelukast 10 milliGRAM(s) Oral daily  ondansetron Injectable 4 milliGRAM(s) IV Push every 8 hours PRN  sodium bicarbonate 1300 milliGRAM(s) Oral two times a day  sodium chloride 0.9% lock flush 10 milliLiter(s) IV Push every 1 hour PRN      T(C): 36.6 (06-11-24 @ 15:21), Max: 37.4 (06-11-24 @ 07:12)  HR: 102 (06-11-24 @ 20:00) (89 - 102)  BP: 107/65 (06-11-24 @ 20:00) (98/62 - 127/66)  RR: 18 (06-11-24 @ 20:00) (18 - 18)  SpO2: 99% (06-11-24 @ 20:00) (98% - 100%)  Wt(kg): --Vital Signs Last 24 Hrs  T(C): 36.6 (11 Jun 2024 15:21), Max: 37.4 (11 Jun 2024 07:12)  T(F): 97.8 (11 Jun 2024 15:21), Max: 99.3 (11 Jun 2024 07:12)  HR: 102 (11 Jun 2024 20:00) (89 - 102)  BP: 107/65 (11 Jun 2024 20:00) (98/62 - 127/66)  BP(mean): 78 (11 Jun 2024 20:00) (66 - 100)  RR: 18 (11 Jun 2024 20:00) (18 - 18)  SpO2: 99% (11 Jun 2024 20:00) (98% - 100%)    Parameters below as of 11 Jun 2024 20:00  Patient On (Oxygen Delivery Method): room air        PHYSICAL EXAM:  GENERAL: NAD, well-groomed, well-developed  HEAD:  Atraumatic, Normocephalic  EYES: EOMI, PERRLA, conjunctiva and sclera clear  ENMT:  Moist mucous membranes  NECK: Supple, No JVD,  CHEST/LUNG: Clear to auscultation bilaterally; No rales, rhonchi, wheezing, or rubs  HEART: Regular rate and rhythm; No murmurs, rubs, or gallops  ABDOMEN: Soft, Nontender, Nondistended; Bowel sounds present  NEURO: Alert & Oriented X3  EXTREMITIES: No LE edema, no calf tenderness  LYMPH: No lymphadenopathy noted  SKIN: No rashes or lesions    Consultant(s) Notes Reviewed:  [x ] YES  [ ] NO  Care Discussed with Consultants/Other Providers [ x] YES  [ ] NO    LABS:                        8.7    12.12 )-----------( 109      ( 11 Jun 2024 04:40 )             27.1     06-11    136  |  99  |  51.5<H>  ----------------------------<  161<H>  4.1   |  21.0<L>  |  2.13<H>    Ca    8.6      11 Jun 2024 10:14  Phos  4.1     06-11  Mg     2.3     06-11    TPro  6.4<L>  /  Alb  2.8<L>  /  TBili  0.8  /  DBili  x   /  AST  167<H>  /  ALT  98<H>  /  AlkPhos  126<H>  06-11    PT/INR - ( 11 Jun 2024 04:40 )   PT: 13.1 sec;   INR: 1.19 ratio         PTT - ( 11 Jun 2024 04:40 )  PTT:56.6 sec  Urinalysis Basic - ( 11 Jun 2024 10:14 )    Color: x / Appearance: x / SG: x / pH: x  Gluc: 161 mg/dL / Ketone: x  / Bili: x / Urobili: x   Blood: x / Protein: x / Nitrite: x   Leuk Esterase: x / RBC: x / WBC x   Sq Epi: x / Non Sq Epi: x / Bacteria: x      CAPILLARY BLOOD GLUCOSE      POCT Blood Glucose.: 178 mg/dL (11 Jun 2024 22:06)  POCT Blood Glucose.: 155 mg/dL (10 Filiberto 2024 23:25)      ABG - ( 11 Jun 2024 10:15 )  pH, Arterial: 7.440 pH, Blood: x     /  pCO2: 36    /  pO2: 85    / HCO3: 24    / Base Excess: 0.3   /  SaO2: 97.9              Urinalysis Basic - ( 11 Jun 2024 10:14 )    Color: x / Appearance: x / SG: x / pH: x  Gluc: 161 mg/dL / Ketone: x  / Bili: x / Urobili: x   Blood: x / Protein: x / Nitrite: x   Leuk Esterase: x / RBC: x / WBC x   Sq Epi: x / Non Sq Epi: x / Bacteria: x        RADIOLOGY & ADDITIONAL TESTS:    Imaging Personally Reviewed:  [x ] YES  [ ] NO   MICU DOWN GRADE NOTE      Patient is a 78y old  Male who presents with a chief complaint of Chest Pain / SOB (11 Jun 2024 14:20)      HPI/hospital course:   79yo M with a h/o CAD (PCI/CODI x s/p 19, brachytherapy 11/2023), 4V CABG, ICM, HFrEF EF 37%, s/p ICD, DM2 on insulin, 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 Recently had Holter monitor placement for PVC burden which was noted to have worsened the day of presentation. In ED, patient weak appearing, ruled in for NSTEMI with positive troponin, and labs remarkable for RAS on CKD as well as hypoglycemia. A TTE revealed acute on chronic systolic heart failure with EF <20%. He was admitted to telemetry for treatment of ACS on DAPT and heparin gtt, planned for LHC. Patient suffered VF/VT cardiac arrest on 5/25 without ICD shock (subsequently reprogrammed by EP) and was taken for emergent LHC where he was found to have 3/4 occluded grafts, only LIMA to LAD was patent. No target for intervention. Extubated on 5/26, however suffered another VT cardiac arrest later that day (again without ICD shock) with 1 minute downtime prior to ROSC. Hospital course further complicated by cardiogenic shock requiring IV vasopressor and inotrope support. Extubated on 5/27. Underwent RHC on 6/4 which revealed severely elevated filling pressures and low cardiac output- subsequently started on milrinone and bumetanide infusions. Patient suffered third cardiac arrest on 6/5, this time unrelated to cardiac arrhythmia (downtime of 8 mins before ROSC). IABP inserted on 6/5. IABP removed 6/10, Off Dobutamine, currently on Milrinone infusion.  Pt pending home hospice. Pt see at bedside with family. Pt sleeping, and family requested not to woke pt up. As per chart family want Salmeron cath and rectal tube in place.       REVIEW OF SYSTEMS:  UTO    MEDICATIONS:  albuterol/ipratropium for Nebulization 3 milliLiter(s) Nebulizer every 6 hours PRN  aMIOdarone    Tablet 200 milliGRAM(s) Oral daily  apixaban 5 milliGRAM(s) Oral every 12 hours  artificial  tears Solution 1 Drop(s) Both EYES two times a day PRN  aspirin  chewable 81 milliGRAM(s) Oral daily  atorvastatin 80 milliGRAM(s) Oral at bedtime  benzonatate 100 milliGRAM(s) Oral three times a day PRN  buMETAnide 2 milliGRAM(s) Oral two times a day  chlorhexidine 2% Cloths 1 Application(s) Topical <User Schedule>  chlorhexidine 2% Cloths 1 Application(s) Topical daily  chlorhexidine 2% Cloths 1 Application(s) Topical <User Schedule>  chlorhexidine 2% Cloths 1 Application(s) Topical <User Schedule>  clopidogrel Tablet 75 milliGRAM(s) Oral daily  dextrose 50% Injectable 25 Gram(s) IV Push once  erythromycin   Ointment 1 Application(s) Left EYE two times a day  HYDROmorphone  Injectable 0.5 milliGRAM(s) IV Push every 4 hours PRN  insulin glargine Injectable (LANTUS) 9 Unit(s) SubCutaneous at bedtime  insulin lispro (ADMELOG) corrective regimen sliding scale   SubCutaneous every 6 hours  insulin lispro Injectable (ADMELOG) 2 Unit(s) SubCutaneous every 6 hours  levothyroxine 88 MICROGram(s) Oral daily  loperamide 2 milliGRAM(s) Oral every 8 hours PRN  LORazepam    Concentrate 0.5 milliGRAM(s) SubLingual every 4 hours PRN  melatonin 5 milliGRAM(s) Oral at bedtime PRN  milrinone Infusion 0.25 MICROgram(s)/kG/Min IV Continuous <Continuous>  montelukast 10 milliGRAM(s) Oral daily  ondansetron Injectable 4 milliGRAM(s) IV Push every 8 hours PRN  sodium bicarbonate 1300 milliGRAM(s) Oral two times a day  sodium chloride 0.9% lock flush 10 milliLiter(s) IV Push every 1 hour PRN      T(C): 36.6 (06-11-24 @ 15:21), Max: 37.4 (06-11-24 @ 07:12)  HR: 102 (06-11-24 @ 20:00) (89 - 102)  BP: 107/65 (06-11-24 @ 20:00) (98/62 - 127/66)  RR: 18 (06-11-24 @ 20:00) (18 - 18)  SpO2: 99% (06-11-24 @ 20:00) (98% - 100%)  Wt(kg): --Vital Signs Last 24 Hrs  T(C): 36.6 (11 Jun 2024 15:21), Max: 37.4 (11 Jun 2024 07:12)  T(F): 97.8 (11 Jun 2024 15:21), Max: 99.3 (11 Jun 2024 07:12)  HR: 102 (11 Jun 2024 20:00) (89 - 102)  BP: 107/65 (11 Jun 2024 20:00) (98/62 - 127/66)  BP(mean): 78 (11 Jun 2024 20:00) (66 - 100)  RR: 18 (11 Jun 2024 20:00) (18 - 18)  SpO2: 99% (11 Jun 2024 20:00) (98% - 100%)    Parameters below as of 11 Jun 2024 20:00  Patient On (Oxygen Delivery Method): room air      PHYSICAL EXAM:  GENERAL: pt ill appearing, sleeping, NAD  Pt not examined as per family request.       LABS:                        8.7    12.12 )-----------( 109      ( 11 Jun 2024 04:40 )             27.1     06-11    136  |  99  |  51.5<H>  ----------------------------<  161<H>  4.1   |  21.0<L>  |  2.13<H>    Ca    8.6      11 Jun 2024 10:14  Phos  4.1     06-11  Mg     2.3     06-11    TPro  6.4<L>  /  Alb  2.8<L>  /  TBili  0.8  /  DBili  x   /  AST  167<H>  /  ALT  98<H>  /  AlkPhos  126<H>  06-11    PT/INR - ( 11 Jun 2024 04:40 )   PT: 13.1 sec;   INR: 1.19 ratio         PTT - ( 11 Jun 2024 04:40 )  PTT:56.6 sec  Urinalysis Basic - ( 11 Jun 2024 10:14 )    Color: x / Appearance: x / SG: x / pH: x  Gluc: 161 mg/dL / Ketone: x  / Bili: x / Urobili: x   Blood: x / Protein: x / Nitrite: x   Leuk Esterase: x / RBC: x / WBC x   Sq Epi: x / Non Sq Epi: x / Bacteria: x      CAPILLARY BLOOD GLUCOSE      POCT Blood Glucose.: 178 mg/dL (11 Jun 2024 22:06)  POCT Blood Glucose.: 155 mg/dL (10 Filiberto 2024 23:25)    ABG - ( 11 Jun 2024 10:15 )  pH, Arterial: 7.440 pH, Blood: x     /  pCO2: 36    /  pO2: 85    / HCO3: 24    / Base Excess: 0.3   /  SaO2: 97.9      Urinalysis Basic - ( 11 Jun 2024 10:14 )    Color: x / Appearance: x / SG: x / pH: x  Gluc: 161 mg/dL / Ketone: x  / Bili: x / Urobili: x   Blood: x / Protein: x / Nitrite: x   Leuk Esterase: x / RBC: x / WBC x   Sq Epi: x / Non Sq Epi: x / Bacteria: x       Pt seen before midnight.     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 Recently had Holter monitor placement for PVC burden which was noted to have worsened the day of presentation. In ED, patient weak appearing, ruled in for NSTEMI with positive troponin, and labs remarkable for RAS on CKD as well as hypoglycemia. A TTE revealed acute on chronic systolic heart failure with EF <20%. He was admitted to telemetry for treatment of ACS on DAPT and heparin gtt, planned for LHC. Patient suffered VF/VT cardiac arrest on 5/25 without ICD shock (subsequently reprogrammed by EP) and was taken for emergent LHC where he was found to have 3/4 occluded grafts, only LIMA to LAD was patent. No target for intervention. Extubated on 5/26, however suffered another VT cardiac arrest later that day (again without ICD shock) with 1 minute downtime prior to ROSC. Hospital course further complicated by cardiogenic shock requiring IV vasopressor and inotrope support. Extubated on 5/27. Underwent RHC on 6/4 which revealed severely elevated filling pressures and low cardiac output- subsequently started on milrinone and bumetanide infusions. Patient suffered third cardiac arrest on 6/5, this time unrelated to cardiac arrhythmia (downtime of 8 mins before ROSC). IABP inserted on 6/5. IABP removed 6/10, Off Dobutamine, currently on Milrinone infusion.  Pt pending home hospice. Pt see at bedside with family. Pt sleeping, and family requested not to woke pt up. As per chart family want Salmeron cath and rectal tube in place.       REVIEW OF SYSTEMS:  UTO    MEDICATIONS:  albuterol/ipratropium for Nebulization 3 milliLiter(s) Nebulizer every 6 hours PRN  aMIOdarone    Tablet 200 milliGRAM(s) Oral daily  apixaban 5 milliGRAM(s) Oral every 12 hours  artificial  tears Solution 1 Drop(s) Both EYES two times a day PRN  aspirin  chewable 81 milliGRAM(s) Oral daily  atorvastatin 80 milliGRAM(s) Oral at bedtime  benzonatate 100 milliGRAM(s) Oral three times a day PRN  buMETAnide 2 milliGRAM(s) Oral two times a day  chlorhexidine 2% Cloths 1 Application(s) Topical <User Schedule>  chlorhexidine 2% Cloths 1 Application(s) Topical daily  chlorhexidine 2% Cloths 1 Application(s) Topical <User Schedule>  chlorhexidine 2% Cloths 1 Application(s) Topical <User Schedule>  clopidogrel Tablet 75 milliGRAM(s) Oral daily  dextrose 50% Injectable 25 Gram(s) IV Push once  erythromycin   Ointment 1 Application(s) Left EYE two times a day  HYDROmorphone  Injectable 0.5 milliGRAM(s) IV Push every 4 hours PRN  insulin glargine Injectable (LANTUS) 9 Unit(s) SubCutaneous at bedtime  insulin lispro (ADMELOG) corrective regimen sliding scale   SubCutaneous every 6 hours  insulin lispro Injectable (ADMELOG) 2 Unit(s) SubCutaneous every 6 hours  levothyroxine 88 MICROGram(s) Oral daily  loperamide 2 milliGRAM(s) Oral every 8 hours PRN  LORazepam    Concentrate 0.5 milliGRAM(s) SubLingual every 4 hours PRN  melatonin 5 milliGRAM(s) Oral at bedtime PRN  milrinone Infusion 0.25 MICROgram(s)/kG/Min IV Continuous <Continuous>  montelukast 10 milliGRAM(s) Oral daily  ondansetron Injectable 4 milliGRAM(s) IV Push every 8 hours PRN  sodium bicarbonate 1300 milliGRAM(s) Oral two times a day  sodium chloride 0.9% lock flush 10 milliLiter(s) IV Push every 1 hour PRN      T(C): 36.6 (06-11-24 @ 15:21), Max: 37.4 (06-11-24 @ 07:12)  HR: 102 (06-11-24 @ 20:00) (89 - 102)  BP: 107/65 (06-11-24 @ 20:00) (98/62 - 127/66)  RR: 18 (06-11-24 @ 20:00) (18 - 18)  SpO2: 99% (06-11-24 @ 20:00) (98% - 100%)  Wt(kg): --Vital Signs Last 24 Hrs  T(C): 36.6 (11 Jun 2024 15:21), Max: 37.4 (11 Jun 2024 07:12)  T(F): 97.8 (11 Jun 2024 15:21), Max: 99.3 (11 Jun 2024 07:12)  HR: 102 (11 Jun 2024 20:00) (89 - 102)  BP: 107/65 (11 Jun 2024 20:00) (98/62 - 127/66)  BP(mean): 78 (11 Jun 2024 20:00) (66 - 100)  RR: 18 (11 Jun 2024 20:00) (18 - 18)  SpO2: 99% (11 Jun 2024 20:00) (98% - 100%)    Parameters below as of 11 Jun 2024 20:00  Patient On (Oxygen Delivery Method): room air      PHYSICAL EXAM:  GENERAL: pt ill appearing, sleeping, NAD  Pt not examined as per family request.       LABS:                        8.7    12.12 )-----------( 109      ( 11 Jun 2024 04:40 )             27.1     06-11    136  |  99  |  51.5<H>  ----------------------------<  161<H>  4.1   |  21.0<L>  |  2.13<H>    Ca    8.6      11 Jun 2024 10:14  Phos  4.1     06-11  Mg     2.3     06-11    TPro  6.4<L>  /  Alb  2.8<L>  /  TBili  0.8  /  DBili  x   /  AST  167<H>  /  ALT  98<H>  /  AlkPhos  126<H>  06-11    PT/INR - ( 11 Jun 2024 04:40 )   PT: 13.1 sec;   INR: 1.19 ratio         PTT - ( 11 Jun 2024 04:40 )  PTT:56.6 sec  Urinalysis Basic - ( 11 Jun 2024 10:14 )    Color: x / Appearance: x / SG: x / pH: x  Gluc: 161 mg/dL / Ketone: x  / Bili: x / Urobili: x   Blood: x / Protein: x / Nitrite: x   Leuk Esterase: x / RBC: x / WBC x   Sq Epi: x / Non Sq Epi: x / Bacteria: x      CAPILLARY BLOOD GLUCOSE      POCT Blood Glucose.: 178 mg/dL (11 Jun 2024 22:06)  POCT Blood Glucose.: 155 mg/dL (10 Filiberto 2024 23:25)    ABG - ( 11 Jun 2024 10:15 )  pH, Arterial: 7.440 pH, Blood: x     /  pCO2: 36    /  pO2: 85    / HCO3: 24    / Base Excess: 0.3   /  SaO2: 97.9      Urinalysis Basic - ( 11 Jun 2024 10:14 )    Color: x / Appearance: x / SG: x / pH: x  Gluc: 161 mg/dL / Ketone: x  / Bili: x / Urobili: x   Blood: x / Protein: x / Nitrite: x   Leuk Esterase: x / RBC: x / WBC x   Sq Epi: x / Non Sq Epi: x / Bacteria: x

## 2024-06-11 NOTE — PROCEDURE NOTE - NSPOSTPRCRAD_GEN_A_CORE
chest radiograph/depth of insertion/line adjusted to depth of insertion/postion of catheter/ultrasound
central line located in the superior vena cava/no pneumothorax/post-procedure radiography performed

## 2024-06-11 NOTE — PROCEDURE NOTE - NSANATOMICLLOCATION_GEN_A_CORE
right/radial artery
internal jugular vein/right
GROIN/right
right/femoral artery
right/femoral artery
right/brachial vein

## 2024-06-11 NOTE — DISCHARGE NOTE PROVIDER - NSDCMRMEDTOKEN_GEN_ALL_CORE_FT
aspirin 81 mg oral tablet, chewable: 1 tab(s) orally once a day  atorvastatin 40 mg oral tablet: 1 tab(s) orally once a day (at bedtime)  Basaglar KwikPen 100 units/mL subcutaneous solution: 15 unit(s) subcutaneous once a day (at bedtime)   Farxiga 10 mg oral tablet: 1 tab(s) orally once a day  insulin aspart 100 units/mL injectable solution: 10 injectable 3 times a day  levothyroxine 88 mcg (0.088 mg) oral tablet: 1 tab(s) orally once a day  Metoprolol Succinate ER 50 mg oral tablet, extended release: 1 tab(s) orally once a day  montelukast 10 mg oral tablet: 1 tab(s) orally once a day  NexIUM 40 mg oral delayed release capsule: 1 cap(s) orally once a day (at bedtime)  ranolazine 500 mg oral tablet, extended release: 1 tab(s) orally 2 times a day  sacubitril-valsartan 24 mg-26 mg oral tablet: 1 tab(s) orally 2 times a day  ticagrelor 90 mg oral tablet: 1 tab(s) orally every 12 hours  torsemide 20 mg oral tablet: 1 tab(s) orally once a day   amiodarone 200 mg oral tablet: 1 tab(s) orally once a day  apixaban 2.5 mg oral tablet: 1 tab(s) orally 2 times a day  aspirin 81 mg oral tablet, chewable: 1 tab(s) orally once a day  atorvastatin 40 mg oral tablet: 1 tab(s) orally once a day (at bedtime)  Basaglar KwikPen 100 units/mL subcutaneous solution: 12 unit(s) subcutaneous once a day (at bedtime)  bumetanide 2 mg oral tablet: 1 tab(s) orally 2 times a day Reduce the dose to 1 tablet per day IF your oral intake is decreased. If however you notice an increased  clopidogrel 75 mg oral tablet: 1 tab(s) orally once a day  ipratropium-albuterol 0.5 mg-2.5 mg/3 mL inhalation solution: 3 milliliter(s) inhaled every 6 hours As needed Shortness of Breath and/or Wheezing  levothyroxine 88 mcg (0.088 mg) oral tablet: 1 tab(s) orally once a day  loperamide 2 mg oral capsule: 1 cap(s) orally every 8 hours as needed for Diarrhea  LORazepam 2 mg/mL oral concentrate: 1 milligram(s) orally every 8 hours  montelukast 10 mg oral tablet: 1 tab(s) orally once a day  NexIUM 40 mg oral delayed release capsule: 1 cap(s) orally once a day (at bedtime)  ranolazine 500 mg oral tablet, extended release: 1 tab(s) orally 2 times a day  torsemide 20 mg oral tablet: 1 tab(s) orally once a day   amiodarone 200 mg oral tablet: 1 tab(s) orally once a day  amiodarone 200 mg oral tablet: 1 tab(s) orally once a day  apixaban 2.5 mg oral tablet: 1 tab(s) orally 2 times a day  apixaban 2.5 mg oral tablet: 1 tab(s) orally 2 times a day  aspirin 81 mg oral tablet, chewable: 1 tab(s) orally once a day x 7 days  atorvastatin 40 mg oral tablet: 1 tab(s) orally once a day (at bedtime) x 7 days  Basaglar KwikPen 100 units/mL subcutaneous solution: 12 unit(s) subcutaneous once a day (at bedtime)  bumetanide 2 mg oral tablet: 1 tab(s) orally 2 times a day Reduce the dose to 1 tablet per day IF your oral intake is decreased. If however you notice an increased  clopidogrel 75 mg oral tablet: 1 tab(s) orally once a day x 7 days  clopidogrel 75 mg oral tablet: 1 tab(s) orally once a day  erythromycin 0.5% ophthalmic ointment: 11 application to each affected eye 2 times a day 7  esomeprazole 20 mg oral delayed release capsule: 1 cap(s) orally once a day x 7 days Take on an empty stomach 1 hour prior to breakfast  ipratropium-albuterol 0.5 mg-2.5 mg/3 mL inhalation solution: 3 milliliter(s) inhaled every 6 hours as needed for Shortness of Breath and/or Wheezing  ipratropium-albuterol 0.5 mg-2.5 mg/3 mL inhalation solution: 3 milliliter(s) inhaled every 6 hours As needed Shortness of Breath and/or Wheezing  levothyroxine 88 mcg (0.088 mg) oral tablet: 1 tab(s) orally once a day  loperamide 2 mg oral capsule: 1 cap(s) orally every 8 hours x 7 days as needed for Diarrhea  loperamide 2 mg oral capsule: 1 cap(s) orally every 8 hours as needed for Diarrhea  LORazepam 2 mg/mL oral concentrate: 1 milligram(s) orally every 8 hours  milrinone 200 mcg/mL-D5% intravenous solution: 50 mcg/kg intravenously 0.25 mcg/kg/min  montelukast 10 mg oral tablet: 1 tab(s) orally once a day  morphine 10 mg/5 mL oral solution: 1 milliliter(s) orally every 6 hours x 7 days as needed for  shortness of breath and/or pain MDD: 4 ml  ranolazine 500 mg oral tablet, extended release: 1 tab(s) orally 2 times a day  sodium bicarbonate 650 mg oral tablet: 2 tab(s) orally 2 times a day   amiodarone 200 mg oral tablet: 1 tab(s) orally once a day x 7 days  apixaban 2.5 mg oral tablet: 1 tab(s) orally 2 times a day x 7 days  aspirin 81 mg oral tablet, chewable: 1 tab(s) orally once a day x 7 days  atorvastatin 40 mg oral tablet: 1 tab(s) orally once a day (at bedtime) x 7 days  Basaglar KwikPen 100 units/mL subcutaneous solution: 12 unit(s) subcutaneous once a day (at bedtime) x 7 days  bumetanide 2 mg oral tablet: 1 tab(s) orally 2 times a day x 7 days Reduce the dose to 1 tablet per day IF your oral intake is decreased. If however you notice an increased  clopidogrel 75 mg oral tablet: 1 tab(s) orally once a day x 7 days  erythromycin 0.5% ophthalmic ointment: 11 application to each affected eye 2 times a day 7  esomeprazole 20 mg oral delayed release capsule: 1 cap(s) orally once a day x 7 days Take on an empty stomach 1 hour prior to breakfast  ipratropium-albuterol 0.5 mg-2.5 mg/3 mL inhalation solution: 3 milliliter(s) inhaled every 6 hours as needed for Shortness of Breath and/or Wheezing  levothyroxine 88 mcg (0.088 mg) oral tablet: 1 tab(s) orally once a day  loperamide 2 mg oral capsule: 1 cap(s) orally every 8 hours x 7 days as needed for Diarrhea  LORazepam 2 mg/mL oral concentrate: 1 milligram(s) orally every 6 hours as needed for  anxiety MDD: 4 mg  milrinone 200 mcg/mL-D5% intravenous solution: 50 mcg/kg intravenously once a day 0.25 mcg/kg/min  montelukast 10 mg oral tablet: 1 tab(s) orally once a day  morphine 10 mg/5 mL oral solution: 1 milliliter(s) orally every 6 hours as needed for  shortness of breath and/or pain MDD: 4 ml  morphine 10 mg/5 mL oral solution: 1 milliliter(s) orally every 6 hours x 7 days as needed for Pain or shortness of breath MDD: 8 mg  ranolazine 500 mg oral tablet, extended release: 1 tab(s) orally 2 times a day  sodium bicarbonate 650 mg oral tablet: 2 tab(s) orally 2 times a day

## 2024-06-11 NOTE — PROGRESS NOTE ADULT - TIME BILLING
Conjuntivae and eyelids appear normal , Sclerae : White without injection
patient care , labs ,meds, chart review
- Generation of cardiovascular treatment plan.  - Coordination of care with primary team.
This includes chart review, patient assessment, discussion with interdisciplinary team members following patient. Coordination of care with providers and care coordination. This excludes advanced care planning discussion.
D/W lenny, Palliative OSCAR Castañeda, RN, MICU  Total time also includes discussion during interdisciplinary team rounds, chart review including but limited to prior admissions/ GOC discussions, review of established ACP documentations ( ex. living will, HCP, MOLST form),  review of medications/ labs/ imaging, examination, care coordination with other health care professionals, documentation EXCLUDING current goals of care or advance care planning discussions.
D/W family, RN, ICU DR Barreto, Palliative OSCAR Castañeda  Total time also includes discussion during interdisciplinary team rounds, chart review including but limited to prior admissions/ GOC discussions, review of established ACP documentations ( ex. living will, HCP, MOLST form),  review of medications/ labs/ imaging, examination, care coordination with other health care professionals, documentation EXCLUDING current goals of care or advance care planning discussions.
This includes chart review, patient assessment, discussion with interdisciplinary team members following patient. Coordination of care with providers and care coordination. This excludes advanced care planning discussion. BAY. Dr. Akers
patient care , labs ,meds, chart review
- Generation of cardiovascular treatment plan.  - Coordination of care with primary team.

## 2024-06-11 NOTE — GOALS OF CARE CONVERSATION - ADVANCED CARE PLANNING - CONVERSATION DETAILS
Discussed overall medical condition, prognosis, and options for plan of care with patient, his son David at bedside along side Dr. Leavitt. Patient has opted to forego aggressive measures or replacement of baloon p[ump and would like to go home as soon as possible with the intravenous medications. Everyone has been made aware that his prognosis is likely hours to days at this popint, and he is at end of life. Patient is ready if God calls him but it also hoping for a miracle and feels he just wants to spend his time with family and see what happens. We explained possible symptoms and burdens of being at home and we recommended home hospice as they will be able to get into the house easier to help manage end of life symptoms. We discussed the intravenous medication at home and that while it may help him temporarily its value is very limited and he may deteriorate rapidly. We addressed deactivation of the defibrillator as that will just try to shock him and cause pain while he is naturally dying. All in agreement with shift to comfort, continue inotropes for symptoms, and plans for home hospice as soon as possible. We explained we will try to make this happen today but it may not be until tomorrow. Discussed overall medical condition, prognosis, and options for plan of care with patient, his son David at bedside along side Dr. Leavitt. Patient has opted to forego aggressive measures or replacement of baloon p[ump and would like to go home as soon as possible with the intravenous medications. Everyone has been made aware that his prognosis is likely hours to days at this popint, and he is at end of life. Patient is ready if God calls him but it also hoping for a miracle and feels he just wants to spend his time with family and see what happens. We explained possible symptoms and burdens of being at home and we recommended home hospice as they will be able to get into the house easier to help manage end of life symptoms. We discussed the intravenous medication at home and that while it may help him temporarily its value is very limited and he may deteriorate rapidly. We addressed deactivation of the defibrillator as that will just try to shock him and cause pain while he is naturally dying. All in agreement with shift to comfort, continue inotropes for symptoms, and plans for home hospice as soon as possible. We explained we will try to make this happen today but it may not be until tomorrow.    merari Hernandez

## 2024-06-11 NOTE — PROGRESS NOTE ADULT - SUBJECTIVE AND OBJECTIVE BOX
OVERNIGHT EVENTS: doing poorly, post balloon pump removal. he is awake and alert     Present Symptoms:     Dyspnea: mild   Nausea/Vomiting: No  Anxiety:  No  Depression: No  Fatigue: Yes   Loss of appetite: No  Constipation: none     Pain: none             Character-            Duration-            Effect-            Factors-            Frequency-            Location-            Severity-    Pain AD Score:  [email or web address]/[email or web address] (press ctrl + left click to view)    Review of Systems: Reviewed                     Negative: no chest pain                      All others negative    MEDICATIONS  (STANDING):  aMIOdarone    Tablet 200 milliGRAM(s) Oral daily  aspirin  chewable 81 milliGRAM(s) Oral daily  atorvastatin 80 milliGRAM(s) Oral at bedtime  buMETAnide Infusion 1 mG/Hr (5 mL/Hr) IV Continuous <Continuous>  chlorhexidine 2% Cloths 1 Application(s) Topical <User Schedule>  chlorhexidine 2% Cloths 1 Application(s) Topical daily  clopidogrel Tablet 75 milliGRAM(s) Oral daily  dextrose 50% Injectable 25 Gram(s) IV Push once  DOBUTamine Infusion 7.5 MICROgram(s)/kG/Min (15.7 mL/Hr) IV Continuous <Continuous>  erythromycin   Ointment 1 Application(s) Left EYE two times a day  heparin  Infusion 800 Unit(s)/Hr (5 mL/Hr) IV Continuous <Continuous>  insulin glargine Injectable (LANTUS) 9 Unit(s) SubCutaneous at bedtime  insulin lispro (ADMELOG) corrective regimen sliding scale   SubCutaneous every 6 hours  insulin lispro Injectable (ADMELOG) 2 Unit(s) SubCutaneous every 6 hours  levothyroxine 88 MICROGram(s) Oral daily  montelukast 10 milliGRAM(s) Oral daily  piperacillin/tazobactam IVPB.. 3.375 Gram(s) IV Intermittent every 12 hours  sodium bicarbonate 650 milliGRAM(s) Oral every 12 hours    MEDICATIONS  (PRN):  albuterol/ipratropium for Nebulization 3 milliLiter(s) Nebulizer every 6 hours PRN Shortness of Breath and/or Wheezing  artificial  tears Solution 1 Drop(s) Both EYES two times a day PRN Dry Eyes  heparin   Injectable 5500 Unit(s) IV Push every 6 hours PRN For aPTT less than 40  heparin   Injectable 2500 Unit(s) IV Push every 6 hours PRN For aPTT between 40 - 57  HYDROmorphone  Injectable 0.5 milliGRAM(s) IV Push every 4 hours PRN Severe Pain (7 - 10)  melatonin 5 milliGRAM(s) Oral at bedtime PRN Sleep  ondansetron Injectable 4 milliGRAM(s) IV Push every 8 hours PRN Nausea and/or Vomiting  senna 2 Tablet(s) Oral at bedtime PRN Constipation  sodium chloride 0.9% lock flush 10 milliLiter(s) IV Push every 1 hour PRN Pre/post blood products, medications, blood draw, and to maintain line patency    PHYSICAL EXAM:    Vital Signs Last 24 Hrs  T(C): 37.4 (11 Jun 2024 07:12), Max: 37.4 (11 Jun 2024 07:12)  T(F): 99.3 (11 Jun 2024 07:12), Max: 99.3 (11 Jun 2024 07:12)  HR: 96 (11 Jun 2024 09:00) (80 - 96)  BP: 111/76 (11 Jun 2024 08:30) (99/69 - 139/39)  BP(mean): 88 (11 Jun 2024 08:30) (61 - 100)  RR: 18 (11 Jun 2024 08:00) (18 - 18)  SpO2: 100% (11 Jun 2024 09:00) (96% - 100%)    Parameters below as of 11 Jun 2024 08:00  Patient On (Oxygen Delivery Method): nasal cannula    General: alert      HEENT: normal     Lungs: mild tachypnea on speaking     CV: normal      GI: normal      : fournier    MSK: weakness      Skin: no rash    LABS:                      8.7    12.12 )-----------( 109      ( 11 Jun 2024 04:40 )             27.1     06-11    136  |  99  |  48.5<H>  ----------------------------<  154<H>  4.3   |  20.0<L>  |  2.05<H>    Ca    8.6      11 Jun 2024 04:40  Phos  4.2     06-11  Mg     2.1     06-11    TPro  6.3<L>  /  Alb  2.9<L>  /  TBili  0.9  /  DBili  x   /  AST  73<H>  /  ALT  46<H>  /  AlkPhos  112  06-11    PT/INR - ( 11 Jun 2024 04:40 )   PT: 13.1 sec;   INR: 1.19 ratio       PTT - ( 11 Jun 2024 04:40 )  PTT:56.6 sec  Urinalysis Basic - ( 11 Jun 2024 04:40 )    Color: x / Appearance: x / SG: x / pH: x  Gluc: 154 mg/dL / Ketone: x  / Bili: x / Urobili: x   Blood: x / Protein: x / Nitrite: x   Leuk Esterase: x / RBC: x / WBC x   Sq Epi: x / Non Sq Epi: x / Bacteria: x    I&O's Summary    10 Filiberto 2024 07:01  -  11 Jun 2024 07:00  --------------------------------------------------------  IN: 467.4 mL / OUT: 775 mL / NET: -307.6 mL    11 Jun 2024 07:01  -  11 Jun 2024 09:38  --------------------------------------------------------  IN: 51.4 mL / OUT: 60 mL / NET: -8.6 mL    RADIOLOGY & ADDITIONAL STUDIES:    < from: Xray Chest 1 View- PORTABLE-Urgent (Xray Chest 1 View- PORTABLE-Urgent .) (06.07.24 @ 12:26) >  IMPRESSION: Stable findings as above.    --- End of Report ---    < end of copied text >    ADVANCE DIRECTIVES/TREATMENT PREFERENCES:  do not resuscitate and do not intubate

## 2024-06-11 NOTE — PROCEDURE NOTE - NSPROCDETAILS_GEN_ALL_CORE
positive blood return obtained via catheter/hemostasis with direct pressure, dressing applied/all materials/supplies accounted for at end of procedure
location identified, draped/prepped, sterile technique used/sterile dressing applied/sterile technique, catheter placed/supine position/ultrasound guidance
location identified, draped/prepped, sterile technique used, needle inserted/introduced/positive blood return obtained via catheter/connected to a pressurized flush line/sutured in place/hemostasis with direct pressure, dressing applied/all materials/supplies accounted for at end of procedure
US equipment (sterile probe cover) not available - emergent procedure/guidewire recovered/lumen(s) aspirated and flushed/sterile dressing applied/sterile technique, catheter placed
location identified, draped/prepped, sterile technique used, needle inserted/introduced/positive blood return obtained via catheter/connected to a pressurized flush line/sutured in place/Seldinger technique/all materials/supplies accounted for at end of procedure
guidewire recovered/lumen(s) aspirated and flushed/sterile dressing applied/sterile technique, catheter placed/ultrasound guidance with use of sterile gel and probe cove

## 2024-06-11 NOTE — PROCEDURE NOTE - NSPOSTCAREGUIDE_GEN_A_CORE
Care for catheter as per unit/ICU protocols
Verbal/written post procedure instructions were given to patient/caregiver/Instructed patient/caregiver to follow-up with primary care physician/Instructed patient/caregiver regarding signs and symptoms of infection/Keep the cast/splint/dressing clean and dry/Care for catheter as per unit/ICU protocols
Verbal/written post procedure instructions were given to patient/caregiver/Care for catheter as per unit/ICU protocols

## 2024-06-11 NOTE — PROCEDURE NOTE - ATTENDING PROVIDER
DR BUSTAMANTE, DR HENDERSON, DR GAMBOA
Loni
Dr. Turpin
Tolu
Dr. Andrew Willoughby
Tolu
Tolu
Dr. Andrew Willoughby
Tolu
Dr. Turpin

## 2024-06-11 NOTE — PROCEDURE NOTE - NSINDICATIONS_GEN_A_CORE
airway protection/cardiac arrest/critical patient/respiratory failure
arterial puncture to obtain ABG's
arterial puncture to obtain ABG's/critical patient/monitoring purposes
airway protection/cardiac arrest/critical patient/mental status change
vasopressors, PA catheter access/critical illness
long term dobutamine
critical illness/emergency venous access/hypertonic/irritant infusion
critical patient/monitoring purposes
clears/8am med

## 2024-06-11 NOTE — CHART NOTE - NSCHARTNOTEFT_GEN_A_CORE
EP contacted for ICD deactivation in support of transition to comfort measures in accordance with pt/family's expressed wishes.   [employer]onic CRT-D Cobalt XT HF Quad [id number]   DOI 12/21/2023 with  Dr. Bazzi  DDD 60-130bpm    All tachycardia detections and therapies deactivated.  Bradycardia mode remains unchanged.  Parameters confirmed at the end of session.     Please contact EP service at [phone] with questions

## 2024-06-11 NOTE — PROGRESS NOTE ADULT - ASSESSMENT
Patient is a 78 year old male with history of HTN, HLD, DM, CKD, CAD s/p 4V CABG and multiple PCI's, ischemic cardiomyopathy (EF < 20%), and LBBB s/p MDT CRT-D. He had an admission in November for ADHF. At that time a LHC revealed his grafts were occluded except for the LIMA to LAD. He also underwent a CRT-D implant.     On 5/25 he had a VT arrest with ROSC achieved after 3 minutes was sent to the cath lab which showed his LIMA is still patent. Patient was extubated on 5/27. RHC on 5/28 showed severely elevated biv filling pressures, severe Cpc-PH, and low CO. He was started on low dose milrinone and diuresed well on IV Bumex. Weaned off pressors 5/29 and Milrinone was weaned off 5/31.    Renal function worsening (sCr 4.65) therefore RHC performed 6/4 which revealed significantly elevated filling pressures w/ low CI. Milrinone was resumed at 0.25 mcg/kg/min as well as Bumex gtt. On 6/5 he had a PEA arrest overnight. IABP was placed and he remains intubated/sedated in MICU on levo/vaso/ gtts.     Bedside Hemodynamics:  6/10/24 ( 5, IABP 1:3): HR 82, /44/73, CVP 16, PAP 71/31/45, TD CO/CI 4.6/2.5, MVO2 63.5%, Fauzia CO/CI 5.4/3.0.    5/31 (mil 0.125): CVP 9-10, PA 72/24/43, PA sat 70.7%, Fauzia CO/CI 4.6/2.6, /56 (73), SVR 1096 dsc.    Cardiac Studies:  6/4/24 RHC: RA 29/26/24, RV 83/12/23, PA 81/31/47 PCWP 32, PA sat 42.4, Fauzia CO/CI 2.75/1.8.    5/28/24 RHC: RA 18, RV 92/18, PA 95/38/57, PCW 31, PA sat 51.5%, Fauzia CO/CI 3.09/1.74, TPG 26, DPG 7, SVR 1889 dsc, PVR 8.41 MEYERS, Vidhi 3.2, /63 (91) on levo 0.08.    5/25/24 LHC: All grafts occluded except LIMA-LAD. LVEDP 29 mmHg.    5/23/24 TTE: LVEF <20%, LVIDd 5.8cm, mild RVE with mild RV dysfunction, TAPSE 0.7cm, mild-mod AS, mild-mod MR, mild TR, mild TX, est PASP 75 mmHg.

## 2024-06-11 NOTE — PROGRESS NOTE ADULT - SUBJECTIVE AND OBJECTIVE BOX
Patient is a 78y old  Male who presents with a chief complaint of Chest Pain / SOB (10 Filiberto 2024 11:48)      BRIEF HOSPITAL COURSE: BRIEF HOSPITAL COURSE: 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 Recently had Holter monitor placement for PVC burden which was noted to have worsened the day of presentation. In ED, patient weak appearing, ruled in for NSTEMI with positive troponin, and labs remarkable for RAS on CKD as well as hypoglycemia. A TTE revealed acute on chronic systolic heart failure with EF <20%. He was admitted to telemetry for treatment of ACS on DAPT and heparin gtt, planned for LHC. Patient suffered VF/VT cardiac arrest on 5/25 without ICD shock (subsequently reprogrammed by EP) and was taken for emergent LHC where he was found to have 3/4 occluded grafts, only LIMA to LAD was patent. No target for intervention. Extubated on 5/26, however suffered another VT cardiac arrest later that day (again without ICD shock) with 1 minute downtime prior to ROSC. Hospital course further complicated by cardiogenic shock requiring IV vasopressor and inotrope support. Extubated on 5/27. Underwent RHC on 6/4 which revealed severely elevated filling pressures and low cardiac output- subsequently started on milrinone and bumetanide infusions. Patient suffered third cardiac arrest on 6/5, this time unrelated to cardiac arrhythmia (downtime of 8 mins before ROSC). IABP inserted on 6/5.      Events last 24 hours: IABP removed yesterday. Off IV vasopressor. Dobutamine increased to 7.5 mcg/kg/min for drop in cardiac index to 1.5 this morning.        PAST MEDICAL & SURGICAL HISTORY:  GERD (gastroesophageal reflux disease)      High cholesterol      Coronary artery disease involving coronary bypass graft of native heart with unstable angina pectoris      Coronary artery disease involving native coronary artery of native heart, angina presence unspecifie      Type 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, without long-term current use of insul      Essential hypertension      HFrEF (heart failure with reduced ejection fraction)      Stage 4 chronic kidney disease      LBBB (left bundle branch block)      Facial nerve palsy      Hypothyroidism      S/P CABG (coronary artery bypass graft)  4V 1983 [place]      Status post open reduction with internal fixation of fracture      History of insertion of stent into coronary artery bypass graft      History of brachytherapy          Review of Systems:  CONSTITUTIONAL: No fever, chills, or fatigue  EYES: No eye pain, visual disturbances, or discharge  ENMT:  No difficulty hearing, tinnitus, vertigo; No sinus or throat pain  NECK: No pain or stiffness  RESPIRATORY: No cough, wheezing, chills or hemoptysis; No shortness of breath  CARDIOVASCULAR: No chest pain, palpitations, dizziness, or leg swelling  GASTROINTESTINAL: No abdominal or epigastric pain. No nausea, vomiting, or hematemesis; No diarrhea or constipation. No melena or hematochezia.  GENITOURINARY: No dysuria, frequency, hematuria, or incontinence  NEUROLOGICAL: No headaches, memory loss, loss of strength, numbness, or tremors  SKIN: No itching, burning, rashes, or lesions   MUSCULOSKELETAL: No joint pain or swelling; No muscle, back, or extremity pain  PSYCHIATRIC: No depression, anxiety, mood swings, or difficulty sleeping      Medications:  piperacillin/tazobactam IVPB.. 3.375 Gram(s) IV Intermittent every 12 hours    aMIOdarone    Tablet 200 milliGRAM(s) Oral daily  buMETAnide Infusion 1 mG/Hr IV Continuous <Continuous>  DOBUTamine Infusion 7.5 MICROgram(s)/kG/Min IV Continuous <Continuous>    albuterol/ipratropium for Nebulization 3 milliLiter(s) Nebulizer every 6 hours PRN  montelukast 10 milliGRAM(s) Oral daily    HYDROmorphone  Injectable 0.5 milliGRAM(s) IV Push every 4 hours PRN  melatonin 5 milliGRAM(s) Oral at bedtime PRN  ondansetron Injectable 4 milliGRAM(s) IV Push every 8 hours PRN      aspirin  chewable 81 milliGRAM(s) Oral daily  clopidogrel Tablet 75 milliGRAM(s) Oral daily  heparin   Injectable 5500 Unit(s) IV Push every 6 hours PRN  heparin   Injectable 2500 Unit(s) IV Push every 6 hours PRN  heparin  Infusion 800 Unit(s)/Hr IV Continuous <Continuous>    senna 2 Tablet(s) Oral at bedtime PRN      atorvastatin 80 milliGRAM(s) Oral at bedtime  dextrose 50% Injectable 25 Gram(s) IV Push once  insulin glargine Injectable (LANTUS) 9 Unit(s) SubCutaneous at bedtime  insulin lispro (ADMELOG) corrective regimen sliding scale   SubCutaneous every 6 hours  insulin lispro Injectable (ADMELOG) 2 Unit(s) SubCutaneous every 6 hours  levothyroxine 88 MICROGram(s) Oral daily    sodium bicarbonate 650 milliGRAM(s) Oral every 12 hours  sodium chloride 0.9% lock flush 10 milliLiter(s) IV Push every 1 hour PRN      artificial  tears Solution 1 Drop(s) Both EYES two times a day PRN  chlorhexidine 2% Cloths 1 Application(s) Topical <User Schedule>  chlorhexidine 2% Cloths 1 Application(s) Topical daily            ICU Vital Signs Last 24 Hrs  T(C): 37.2 (10 Filiberto 2024 20:00), Max: 37.2 (10 Filiberto 2024 07:30)  T(F): 99 (10 Filiberto 2024 20:00), Max: 99 (10 Filiberto 2024 07:30)  HR: 91 (11 Jun 2024 04:30) (80 - 96)  BP: 118/67 (11 Jun 2024 04:30) (101/59 - 139/39)  BP(mean): 83 (11 Jun 2024 04:30) (60 - 100)  ABP: 96/56 (11 Jun 2024 04:00) (80/69 - 139/47)  ABP(mean): 298 (11 Jun 2024 04:30) (64 - 326)  RR: 16 (10 Filiberto 2024 08:00) (16 - 16)  SpO2: 99% (11 Jun 2024 04:30) (95% - 100%)    O2 Parameters below as of 10 Filiberto 2024 20:00  Patient On (Oxygen Delivery Method): room air            ABG - ( 11 Jun 2024 00:28 )  pH, Arterial: 7.420 pH, Blood: x     /  pCO2: 35    /  pO2: 82    / HCO3: 23    / Base Excess: -1.8  /  SaO2: 97.3                I&O's Detail    09 Jun 2024 07:01  -  10 Filiberto 2024 07:00  --------------------------------------------------------  IN:    DOBUTamine: 252 mL    Glucerna 1.5: 260 mL    Heparin: 55 mL    Heparin Infusion: 60 mL    IV PiggyBack: 200 mL    Norepinephrine: 17.8 mL    Propofol: 44.1 mL    Vasopressin: 120 mL  Total IN: 1008.9 mL    OUT:    Indwelling Catheter - Urethral (mL): 1025 mL    Rectal Tube (mL): 200 mL  Total OUT: 1225 mL    Total NET: -216.1 mL      10 Filiberto 2024 07:01  -  11 Jun 2024 05:59  --------------------------------------------------------  IN:    Bumetanide: 30 mL    DOBUTamine: 231 mL    Heparin: 45 mL    Oral Fluid: 110 mL  Total IN: 416 mL    OUT:    Indwelling Catheter - Urethral (mL): 685 mL    Vasopressin: 0 mL  Total OUT: 685 mL    Total NET: -269 mL            LABS:                        8.7    12.12 )-----------( 109      ( 11 Jun 2024 04:40 )             27.1     06-11    136  |  99  |  48.5<H>  ----------------------------<  154<H>  4.3   |  20.0<L>  |  2.05<H>    Ca    8.6      11 Jun 2024 04:40  Phos  4.2     06-11  Mg     2.1     06-11    TPro  6.3<L>  /  Alb  2.9<L>  /  TBili  0.9  /  DBili  x   /  AST  73<H>  /  ALT  46<H>  /  AlkPhos  112  06-11          CAPILLARY BLOOD GLUCOSE      POCT Blood Glucose.: 155 mg/dL (10 Filiberto 2024 23:25)    PT/INR - ( 09 Jun 2024 17:20 )   PT: 12.0 sec;   INR: 1.08 ratio         PTT - ( 10 Filiberto 2024 19:40 )  PTT:30.5 sec  Urinalysis Basic - ( 11 Jun 2024 04:40 )    Color: x / Appearance: x / SG: x / pH: x  Gluc: 154 mg/dL / Ketone: x  / Bili: x / Urobili: x   Blood: x / Protein: x / Nitrite: x   Leuk Esterase: x / RBC: x / WBC x   Sq Epi: x / Non Sq Epi: x / Bacteria: x      CULTURES:  Culture Results:   No growth at 5 days (06-05-24 @ 05:52)  Culture Results:   No growth at 5 days (06-05-24 @ 05:50)        Physical Examination:    General: No acute distress.  Alert, oriented, interactive, nonfocal, somnolent    HEENT: Pupils equal, reactive to light.  Symmetric.    PULM: Clear to auscultation bilaterally, no significant sputum production    CVS: Regular rate and rhythm, no murmurs, rubs, or gallops    ABD: Soft, nondistended, nontender, normoactive bowel sounds, no masses    EXT: No edema, nontender    SKIN: distal extremities are cool to the touch    NEURO: A&Ox3, strength 5/5 all extremities, cranial nerves grossly intact, no focal deficits          RADIOLOGY:     < from: Xray Chest 1 View- PORTABLE-Urgent (Xray Chest 1 View- PORTABLE-Urgent .) (06.07.24 @ 12:26) >  Heart magnified by technique. Sternotomy, left-sided defibrillator,   endotracheal tube, nasogastric tube, left coronary stent, and [place]ip[place]-Celestino   device entering from the right jugular area with tip in right main   pulmonary artery again noted.    There are mild perihilar infiltrates left greater than right withsome   degree of atelectasis of the left lower lobe. Small atelectatic area   right upper lobe also seen.    Chest is similar to June 6.    IMPRESSION: Stable findings as above.    < end of copied text >          CRITICAL CARE TIME SPENT: 35 mins  Time spent evaluating/treating patient with medical issues that pose a high risk for life threatening deterioration and/or end-organ damage, reviewing data/labs/imaging, discussing case with multidisciplinary team, discussing plan/goals of care with patient/family. Non-inclusive of procedure time. The date of entry of this note reflects the date of services rendered.

## 2024-06-11 NOTE — PROCEDURE NOTE - PROCEDURE DATE TIME, MLM
05-Jun-2024 04:30
25-May-2024 10:30
05-Jun-2024 04:40
05-Jun-2024 03:00
25-May-2024 10:10
11-Jun-2024
26-May-2024 17:30
25-May-2024 14:08
25-May-2024 14:08
26-May-2024 17:30
10-Filiberto-2024 17:00
05-Jun-2024 04:50

## 2024-06-11 NOTE — DISCHARGE NOTE PROVIDER - HOSPITAL COURSE
This patient is a 77 y/o M with a past medical history of coronary artery disease, (PCI/CODI x s/p 19, brachytherapy 11/2023), 4V CABG, ICM, HFrEF EF 37%, s/p ICD, DM2 on insulin, stage 3-4 chronic kidney disease, hypertension, hyperlipidemia, admitted on 5/23 with a two day history of chest discomfort with radiation to left arm and associated dizziness, lightheadedness. Recently had Holter monitor placement for PVC burden which was noted to have worsened the day of presentation.     In ED, patient weak appearing, ruled in for NSTEMI with positive troponin, and labs remarkable for acute kidney injury on chronic kidney disease as well as hypoglycemia. A TTE revealed acute on chronic systolic heart failure with EF <20%. He was admitted to telemetry for treatment of acute coronary syndrome on DAPT and heparin gtt, planned for left heart cath.     Patient suffered VF/VT cardiac arrest on 5/25 without ICD shock (subsequently reprogrammed by EP) and was taken for emergent LHC where he was found to have 3/4 occluded grafts, only LIMA to LAD was patent. No target for intervention.     He was extubated on 5/26, however suffered another VT cardiac arrest later that day (again without ICD shock) with 1 minute downtime prior to ROSC. Hospital course further complicated by cardiogenic shock requiring IV vasopressor and inotrope support.     He was then extubated on 5/27. Underwent RHC on 6/4 which revealed severely elevated filling pressures and low cardiac output- subsequently started on milrinone and bumetanide infusions. Patient suffered third cardiac arrest on 6/5, this time unrelated to cardiac arrhythmia (downtime of 8 mins before ROSC). The patient had an intraortic balloon pump  inserted on 6/5. The patient was extubated on 6/9. The intraortic balloon pump was discontinued on 6/10. Overnight 6/10 into 6/11 the patient had worsening end organ damage noted with elevation in creatine associated with a decreased urine output as well as elevation in liver enzymes at which time he was started on a Bumex drip and increased the dobutamine. The patient is being discharged home on hospice with continuous inotropic support with Milrinone.    This patient is a 79 y/o M with a past medical history of coronary artery disease, (PCI/CODI x s/p 19, brachytherapy 11/2023), 4V CABG, ICM, HFrEF EF 37%, s/p ICD, DM2 on insulin, stage 3-4 chronic kidney disease, hypertension, hyperlipidemia, admitted on 5/23 with a two day history of chest discomfort with radiation to left arm and associated dizziness, lightheadedness. Recently had Holter monitor placement for PVC burden which was noted to have worsened the day of presentation.     In ED, patient weak appearing, ruled in for NSTEMI with positive troponin, and labs remarkable for acute kidney injury on chronic kidney disease as well as hypoglycemia. A TTE revealed acute on chronic systolic heart failure with EF <20%. He was admitted to telemetry for treatment of acute coronary syndrome on DAPT and heparin gtt, planned for left heart cath.     Patient suffered VF/VT cardiac arrest on 5/25 without ICD shock (subsequently reprogrammed by EP) and was taken for emergent LHC where he was found to have 3/4 occluded grafts, only LIMA to LAD was patent. No target for intervention.     He was extubated on 5/26, however suffered another VT cardiac arrest later that day (again without ICD shock) with 1 minute downtime prior to ROSC. Hospital course further complicated by cardiogenic shock requiring IV vasopressor and inotrope support.     He was then extubated on 5/27. Underwent RHC on 6/4 which revealed severely elevated filling pressures and low cardiac output- subsequently started on milrinone and bumetanide infusions. Patient suffered third cardiac arrest on 6/5, this time unrelated to cardiac arrhythmia (downtime of 8 mins before ROSC). The patient had an intraortic balloon pump  inserted on 6/5. The patient was extubated on 6/9. The intraortic balloon pump was discontinued on 6/10. Overnight 6/10 into 6/11 the patient had worsening end organ damage noted with elevation in creatine associated with a decreased urine output as well as elevation in liver enzymes at which time he was started on a Bumex drip and increased the dobutamine. The patient is being discharged home on hospice with continuous inotropic support with Milrinone.

## 2024-06-11 NOTE — PROCEDURE NOTE - NSSITEPREP_SKIN_A_CORE
alcohol
chlorhexidine
chlorhexidine
chlorhexidine/Adherence to aseptic technique: hand hygiene prior to donning barriers (gown, gloves), don cap and mask, sterile drape over patient
chlorhexidine/Adherence to aseptic technique: hand hygiene prior to donning barriers (gown, gloves), don cap and mask, sterile drape over patient
chlorhexidine

## 2024-06-11 NOTE — PROGRESS NOTE ADULT - NS PANP COMMENT GEN_ALL_CORE FT
#VF/VT cardiac arrest  #inappropriate ICD sensing, s/p ICD interrogation and adjustment on 5/25  #CAD s/p multiple stents, 4V CABG  #HFrEF, < 20%  #acute hypoxic respiratory failure  #metabolic alkalosis   #Payal on CKD     - patient passed SAT/SBT, extubated 5/26 AM , doinig well, following commands   - attempted levophed, however has more ectopies, thus weaned off and remain on  phenylephrine  - started diamox 250 mg IVPB x2 doses  - overall patient clinically improved, continue wean down pressor  - continue antibiotics, pending culture  - cardio following   - rest of management per EHSAN Danielle
79 y/o M with hx of CAD s/p cabg, s/p PCI 19x, coronary brachytherapy, HFrEF, EF 37%, s/p ICD, initially present on 5/23 for chest pain, admitted for NSTEMI with new EF < 20%.  Patient had multiple cardiac arrest, s/p LHC 5/25 show only LIMA/LAD patent, but no targetable lesions.  S/p RHC 6/4 which show wedge of 30, started on milrinone and bmex drip.  Then had another cardiac arrest 6/5 for 8 minute, PEA arrest, s/p IABP placed.  Started on dobutamine.  c/w amiodarone, heparin drip, DAPT.  Patient slowly with renal recovery, still remain on levophed and dobutamine.  Palliative on board, family recinded previous Dni/DNR.  Currently full code with hope for recovery.   At this time, will try to optimize him for possible extubation.  attempt SAT SBT today 6/7, patient then had 6 beat NSVT , placed back on full vent support and sedation.   Overall long term prognosis is poor, unclear if patient can return to previous functional status after multiple arrest.   patient not candidate for LVAD or transplant, best case scenario to wean balloon pump   appreciate heart failure/renal/palliative inputs
Extensive GOC with multiple sons (Terry and Yung at bedside), at bedside, along with patient's wife,   Family understand that he is suffering from prolong time on the ventilator, and patient has been clear enough to show them that  he does not want to the ET tube.   Explained that patient is passing SBT, however overall still very weak.  Given of his multiple arrest, chance of recurrence of decompensation will be high after extubation.    Finally, with patient's presence, that they all agree with extubation to DNI/DNR.    Post extubation,  patient said "thanks" for the care  discussed with cardio,  hopefully best case scenario will be able to wean off the balloon pump  may try to wean to palliative dobutamine to home with home hospice if possible.    will continue current management
78M with hx of CAD s/p PCI/CODI x s/p 19, brachytherapy 11/2023/CABG, ICM (EF 37%) s/p ICD, DM, HTN, HLD presented 5/23 with chest pain/sob/dizziness and increased PVC burden on Holter found to have RAS/NSTEMI with Echo showing interval worsening EF <20 with global hypokinesis and was planned for cath when he developed episode of Afib RVR that degenerated into VT 5/25 inappropriately sensed as SVT by device with cardiac arrest x 3 minutes with ROSC and taken emergently to cath lab for LHC with all SVGs occluded with LIMA to LAD patent with no targets for PCI. Pt subsequently extubated 5/26. Pt subsequently developed recurrent WCT 5/27 PM despite amio with worsening mentation and hypotension with brief cardiac arrest with ROSC. Pt subsequently extubated 5/27 and mentating well. Family had initially opted for DNR/DNI after second cardiac arrest but subsequently rescinded. Amiodarone transitioned to PO and mexiletine added without any further significant ectopy. Pt underwent swan placement 5/28 with severe pre and post pulm HTN with mPAP 57 with PCWP 31 and CO 3 and started on milrinone assisted diuresis and subsequently weaned off 5/31. Pt completed course of empiric zosyn for suspected aspiration PNA. Pt downgraded to medicine 6/2. Pt developed worsening renal failure with Cr uptrending to 4 and underwent repeat RHC 6/4 with CO 2.75L/min, mPAP 45, PCWP 30 and was resumed on bumex gtt and milrinone. Pt suffered PEA arrest 6/5 with ROSC after 8 mins with cardiogenic shock despite pressors/inotrope and had IABP placed 6/5 as bridge to recovery given not a candidate for advanced therapies. Pt was extubated 6/9 and DNR/DNI instated after discussion with family/pt. Pt tolerated IABP weaning and was removed 6/10. This AM dobutamine increased due to drop in CI and resumed bumex given uptrend in CVP. Heart failure team and palliative had extensive goals of care discussion with family regarding options in light of his lack of durable recovery and family has opted for pursuing home hospice. Tivoli buster/central line/arterial line removed. PICC line was placed for inotrope infusion. Given hospice only able to provide milrinone, will transition dobutamine. Will switch to PO diuretic and heparin to be switched to Eliquis. Confirmed with son that he is okay with ICD therapies being discontinued and was subsequently deactivated by EP. BP remains stable off vasopressors. Pt awake and conversive and denies any current complaints but notes persistent cough. Infusion to be set up tomorrow. Will downgrade to telemetry given change in goals of care. Family updated at the bedside.

## 2024-06-11 NOTE — DISCHARGE NOTE PROVIDER - DID THE PATIENT PRESENT WITH OR WAS TREATED FOR MALNUTRITION DURING THIS ADMISSION
[facility]  [address] [place] [address]  Phone:  [phone]                                    Janina Silva   MRN: [id number]    Department:  [facility]   Date of Visit:  6/3/2019           After Visit Summary Signature Page    I have received my discharge instructions, and my questions have been answered. I have discussed any challenges I see with this plan with the nurse or doctor.    ..........................................................................................................................................  Patient/Patient Representative Signature      ..........................................................................................................................................  Patient Representative Print Name and Relationship to Patient    ..................................................               ................................................  Date                                   Time    ..........................................................................................................................................  Reviewed by Signature/Title    ...................................................              ..............................................  Date                                               Time          22EPIC Rev 08/18       
Yes...

## 2024-06-11 NOTE — PROGRESS NOTE ADULT - SUBJECTIVE AND OBJECTIVE BOX
NEPHROLOGY INTERVAL HPI/OVERNIGHT EVENTS:    No new events.    MEDICATIONS  (STANDING):  aMIOdarone    Tablet 200 milliGRAM(s) Oral daily  aspirin  chewable 81 milliGRAM(s) Oral daily  atorvastatin 80 milliGRAM(s) Oral at bedtime  chlorhexidine 2% Cloths 1 Application(s) Topical <User Schedule>  clopidogrel Tablet 75 milliGRAM(s) Oral daily  dextrose 50% Injectable 25 Gram(s) IV Push once  DOBUTamine Infusion 5 MICROgram(s)/kG/Min (10.5 mL/Hr) IV Continuous <Continuous>  heparin  Infusion. 800 Unit(s)/Hr (8 mL/Hr) IV Continuous <Continuous>  insulin glargine Injectable (LANTUS) 9 Unit(s) SubCutaneous at bedtime  insulin lispro (ADMELOG) corrective regimen sliding scale   SubCutaneous every 6 hours  levothyroxine Injectable 44 MICROGram(s) IV Push at bedtime  montelukast 10 milliGRAM(s) Oral daily  norepinephrine Infusion 0.05 MICROgram(s)/kG/Min (3.27 mL/Hr) IV Continuous <Continuous>  pantoprazole  Injectable 40 milliGRAM(s) IV Push daily  piperacillin/tazobactam IVPB.. 3.375 Gram(s) IV Intermittent every 12 hours  propofol Infusion 20 MICROgram(s)/kG/Min (7.86 mL/Hr) IV Continuous <Continuous>  sodium bicarbonate 650 milliGRAM(s) Oral every 12 hours  vasopressin Infusion 0.04 Unit(s)/Min (6 mL/Hr) IV Continuous <Continuous>    MEDICATIONS  (PRN):  albuterol/ipratropium for Nebulization 3 milliLiter(s) Nebulizer every 6 hours PRN Shortness of Breath and/or Wheezing  heparin   Injectable 5500 Unit(s) IV Push every 6 hours PRN For aPTT less than 40  heparin   Injectable 2500 Unit(s) IV Push every 6 hours PRN For aPTT between 40 - 57  ondansetron Injectable 4 milliGRAM(s) IV Push every 8 hours PRN Nausea and/or Vomiting  senna 2 Tablet(s) Oral at bedtime PRN Constipation  sodium chloride 0.9% lock flush 10 milliLiter(s) IV Push every 1 hour PRN Pre/post blood products, medications, blood draw, and to maintain line patency      Allergies    No Known Allergies      DOPamine (Hypotension)      Vital Signs Last 24 HrsICU   T(C): 37.4 (2024 07:12), Max: 37.4 (2024 07:12)  T(F): 99.3 (2024 07:12), Max: 99.3 (2024 07:12)  HR: 94 (2024 09:30) (82 - 96)  BP: 111/60 (2024 09:30) (99/69 - 139/39)  BP(mean): 66 (2024 09:30) (61 - 100)  ABP: 137/133 (2024 09:30) (80/69 - 139/47)  ABP(mean): 135 (2024 09:30) (64 - 326)  RR: 18 (2024 08:00) (18 - 18)  SpO2: 99% (2024 09:30) (96% - 100%)    O2 Parameters below as of 2024 08:00  Patient On (Oxygen Delivery Method): nasal cannula          T(C): 37.2 (10 Filiberto 2024 07:30), Max: 37.4 (10 Filiberto 2024 00:00)  T(F): 99 (10 Filiberto 2024 07:30), Max: 99.3 (10 Filiberto 2024 00:00)  HR: 82 (10 Filiberto 2024 07:00) (71 - 85)  BP: 120/39 (10 Filiberto 2024 06:30) (82/51 - 139/51)  BP(mean): 63 (10 Filiberto 2024 06:30) (55 - 107)  ABP: 109/30 (10 Filiberto 2024 07:00) (79/47 - 144/35)  ABP(mean): 64 (10 Filiberto 2024 07:00) (61 - 307)  RR: 16 (2024 16:00) (16 - 17)  SpO2: 96% (10 Filiberto 2024 07:00) (93% - 100%)    O2 Parameters below as of 2024 20:00  Patient On (Oxygen Delivery Method): room air          T(C): 36.9 (2024 13:00), Max: 36.9 (2024 08:00)  T(F): 98.4 (2024 13:00), Max: 98.4 (2024 08:00)  HR: 73 (2024 14:00) (66 - 76)  BP: 133/60 (2024 14:00) (101/85 - 151/113)  BP(mean): 80 (2024 14:00) (63 - 125)  ABP: 128/37 (2024 03:00) (102/28 - 148/37)  ABP(mean): 73 (2024 03:00) (53 - 82)  RR: 16 (2024 12:00) (16 - 18)  SpO2: 100% (2024 14:00) (99% - 100%)    O2 Parameters below as of 2024 12:00  Patient On (Oxygen Delivery Method): ventilator    O2 Concentration (%): 30      T(C): 36.5 (2024 08:00), Max: 36.5 (2024 03:00)  T(F): 97.7 (2024 08:00), Max: 97.7 (2024 03:00)  HR: 66 (2024 09:15) (60 - 82)  BP: --  BP(mean): --  RR: 16 (2024 08:00) (16 - 17)  SpO2: 100% (2024 09:15) (96% - 100%)    Parameters below as of 2024 08:00  Patient On (Oxygen Delivery Method): ventilator    O2 Concentration (%): 40    Daily Weight in k.7 (2024 03:00)    PHYSICAL EXAM:    GENERAL: in  icu  bed  with  family  member present  HEAD:    EYES: open  ENMT: intubated  NECK: central line with meds  NERVOUS SYSTEM:  alert, verbal  CHEST/LUNG: no wheezes, no 02  HEART: monitor  noted, pacer noted, off pump  ABDOMEN: soft  EXTREMITIES:  muscle  wasting noted  LYMPH:   SKIN: pale  :  I&O noted    LABS:        136  |  99  |  48.5<H>  ----------------------------<  154<H>  4.3   |  20.0<L>  |  2.05<H>    Ca    8.6      2024 04:40  Phos  4.2     06-11  Mg     2.1     06-11    TPro  6.3<L>  /  Alb  2.9<L>  /  TBili  0.9  /  DBili  x   /  AST  73<H>  /  ALT  46<H>  /  AlkPhos  112  06-11      06-10    135  |  98  |  46.6<H>  ----------------------------<  146<H>  3.5   |  25.0  |  1.79<H>    Ca    8.5      10 Filiberto 2024 05:50  Phos  2.9     06-10  Mg     2.1     06-10    TPro  6.1<L>  /  Alb  2.8<L>  /  TBili  0.6  /  DBili  x   /  AST  43<H>  /  ALT  32  /  AlkPhos  105  06-10      06-08    132<L>  |  95<L>  |  55.5<H>  ----------------------------<  144<H>  3.7   |  23.0  |  2.33<H>    Ca    8.1<L>      2024 11:25  Phos  3.1     06-08  Mg     1.9     06-08    TPro  5.5<L>  /  Alb  2.5<L>  /  TBili  0.7  /  DBili  x   /  AST  50<H>  /  ALT  43<H>  /  AlkPhos  103  06-08                          10.4   18.36 )-----------( 162      ( 2024 03:00 )             30.2     06-07    128<L>  |  91<L>  |  70.4<H>  ----------------------------<  186<H>  3.9   |  23.0  |  3.51<H>    Ca    8.1<L>      2024 03:00  Phos  4.6     06-07  Mg     2.0     06-07    TPro  5.8<L>  /  Alb  2.7<L>  /  TBili  0.8  /  DBili  x   /  AST  57<H>  /  ALT  64<H>  /  AlkPhos  143<H>  06-07    PTT - ( 2024 05:05 )  PTT:76.8 sec  Urinalysis Basic - ( 2024 03:00 )    Color: x / Appearance: x / SG: x / pH: x  Gluc: 186 mg/dL / Ketone: x  / Bili: x / Urobili: x   Blood: x / Protein: x / Nitrite: x   Leuk Esterase: x / RBC: x / WBC x   Sq Epi: x / Non Sq Epi: x / Bacteria: x      Magnesium: 2.0 mg/dL ( @ 03:00)  Phosphorus: 4.6 mg/dL ( @ 03:00)  Phosphorus: 5.2 mg/dL ( @ 21:00)  Magnesium: 2.2 mg/dL ( @ 21:00)  Magnesium: 2.2 mg/dL ( @ 15:00)  Phosphorus: 5.6 mg/dL ( @ 15:00)    ABG - ( 2024 11:16 )  pH, Arterial: 7.500 pH, Blood: x     /  pCO2: 27    /  pO2: 75    / HCO3: 21    / Base Excess: -2.1  /  SaO2: 95.5                  RADIOLOGY & ADDITIONAL TESTS:

## 2024-06-11 NOTE — CHART NOTE - NSCHARTNOTEFT_GEN_A_CORE
Source: Patient [ ]  Family [x ]   other [x ]  EMR and staff     Current Diet: Diet, Minced and Moist:   Halal (06-11-24 @ 08:34) [Active]  Diet, DASH/TLC:   Sodium & Cholesterol Restricted  Consistent Carbohydrate {No Snacks} (CSTCHO)  Halal  Supplement Feeding Modality:  Oral  Glucerna Shake Cans or Servings Per Day:  1       Frequency:  Two Times a day (06-01-24 @ 10:12) [Pending Verification By Attending]    PO intake:  < 50% [ ]   50-75%  [x ]   %  [ ]  other :    Source for PO intake [ ] Patient [x ] family [x ] chart [ ] staff [ ] other    Current Weight:   6/7: 75.7 kg   6/3: 71.6 kg  5/30: 71.0 kg   5/23: 66.5 kg   (2+ edema B/L feet)     % Weight Change: Unsure of accuracy of weights; will continue to monitor weights for trends.     Pertinent Medications: MEDICATIONS  (STANDING):  aMIOdarone    Tablet 200 milliGRAM(s) Oral daily  aspirin  chewable 81 milliGRAM(s) Oral daily  atorvastatin 80 milliGRAM(s) Oral at bedtime  buMETAnide Infusion 1 mG/Hr (5 mL/Hr) IV Continuous <Continuous>  chlorhexidine 2% Cloths 1 Application(s) Topical <User Schedule>  clopidogrel Tablet 75 milliGRAM(s) Oral daily  dextrose 50% Injectable 25 Gram(s) IV Push once  DOBUTamine Infusion 7.5 MICROgram(s)/kG/Min (15.7 mL/Hr) IV Continuous <Continuous>  heparin  Infusion 800 Unit(s)/Hr (5 mL/Hr) IV Continuous <Continuous>  insulin lispro Injectable (ADMELOG) 2 Unit(s) SubCutaneous every 6 hours  levothyroxine 88 MICROGram(s) Oral daily  montelukast 10 milliGRAM(s) Oral daily  piperacillin/tazobactam IVPB.. 3.375 Gram(s) IV Intermittent every 12 hours  sodium bicarbonate 650 milliGRAM(s) Oral every 12 hours    MEDICATIONS  (PRN):  albuterol/ipratropium for Nebulization 3 milliLiter(s) Nebulizer every 6 hours PRN Shortness of Breath and/or Wheezing  artificial  tears Solution 1 Drop(s) Both EYES two times a day PRN Dry Eyes  heparin   Injectable 5500 Unit(s) IV Push every 6 hours PRN For aPTT less than 40  HYDROmorphone  Injectable 0.5 milliGRAM(s) IV Push every 4 hours PRN Severe Pain (7 - 10)  melatonin 5 milliGRAM(s) Oral at bedtime PRN Sleep  ondansetron Injectable 4 milliGRAM(s) IV Push every 8 hours PRN Nausea and/or Vomiting  senna 2 Tablet(s) Oral at bedtime PRN Constipation  sodium chloride 0.9% lock flush 10 milliLiter(s) IV Push every 1 hour PRN Pre/post blood products, medications, blood draw, and to maintain line patency    Pertinent Labs: CBC Full  -  ( 11 Jun 2024 04:40 )  WBC Count : 12.12 K/uL  RBC Count : 2.96 M/uL  Hemoglobin : 8.7 g/dL  Hematocrit : 27.1 %  Platelet Count - Automated : 109 K/uL  Mean Cell Volume : 91.6 fl  Mean Cell Hemoglobin : 29.4 pg  Mean Cell Hemoglobin Concentration : 32.1 gm/dL    Skin: L flank blister, L hip blister, L forehead blister, Stage II coccyx     Nutrition focused physical exam conducted - found signs of malnutrition [ ]absent [x ]present    Subcutaneous fat loss: Mild  [x ] Orbital fat pads region, [ ]Buccal fat region, [ ]Triceps region,  [ ]Ribs region    Muscle wasting: Mild [ x]Temples region, [x ]Clavicle region, [x ]Shoulder region, [ ]Scapula region, [ ]Interosseous region,  [ ]thigh region, [ ]Calf region    Estimated Needs:   [x ] no change since previous assessment  [ ] recalculated:     Hospital Course:   Pt is a 79 y/o M with a h/o CAD (PCI/CODI x s/p 19, brachytherapy 11/2023), 4V CABG, ICM, HFrEF EF 37%, s/p ICD, DM2 on insulin, 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 Recently had Holter monitor placement for PVC burden which was noted to have worsened the day of presentation. In ED, patient weak appearing, ruled in for NSTEMI with positive troponin, and labs remarkable for RAS on CKD as well as hypoglycemia. A TTE revealed acute on chronic systolic heart failure with EF <20%. He was admitted to telemetry for treatment of ACS on DAPT and heparin gtt, planned for LHC. Patient suffered VF/VT cardiac arrest on 5/25 without ICD shock (subsequently reprogrammed by EP) and was taken for emergent LHC where he was found to have 3/4 occluded grafts, only LIMA to LAD was patent. No target for intervention. Extubated on 5/26, however suffered another VT cardiac arrest later that day (again without ICD shock) with 1 minute downtime prior to ROSC. Hospital course further complicated by cardiogenic shock requiring IV vasopressor and inotrope support. Extubated on 5/27. Underwent RHC on 6/4 which revealed severely elevated filling pressures and low cardiac output- subsequently started on milrinone and bumetanide infusions. Patient suffered third cardiac arrest on 6/5, this time unrelated to cardiac arrhythmia (downtime of 8 mins before ROSC). IABP inserted on 6/5. (IABP now removed)     Current Nutrition Diagnosis:  Pt remains at high nutrition risk secondary to Moderate (acute) protein calorie malnutrition related to inability to meet sufficient protein energy needs in setting of Cardiac arrest x 3, Cardiogenic shock, Acute systolic heart failure, RAS on CKD as evidenced by physical signs of mild muscle/fat loss. Spoke with family at bedside; pt with fair PO intake at this time; consuming ~ 50% at meals with assistance. Recommendations below       Recommendations:   1. RX: MVI and Vit. C daily (500mg).   2. Check weight daily to monitor trends   3. Assistance with meals   4. Glucerna shake TID (220kcal, 10gm protein)   5. Monitor Renal labs, BGM     Monitoring and Evaluation:   [x ] PO intake [ x] Tolerance to diet prescription [X] Weights  [X] Follow up per protocol [X] Labs: Source: Patient [ ]  Family [x ]   other [x ]  EMR and staff     Current Diet: Diet, Minced and Moist:   Halal (06-11-24 @ 08:34) [Active]  Diet, DASH/TLC:   Sodium & Cholesterol Restricted  Consistent Carbohydrate {No Snacks} (CSTCHO)  Halal  Supplement Feeding Modality:  Oral  Glucerna Shake Cans or Servings Per Day:  1       Frequency:  Two Times a day (06-01-24 @ 10:12) [Pending Verification By Attending]    PO intake:  < 50% [ ]   50-75%  [x ]   %  [ ]  other :    Source for PO intake [ ] Patient [x ] family [x ] chart [ ] staff [ ] other    Current Weight:   6/7: 75.7 kg   6/3: 71.6 kg  5/30: 71.0 kg   5/23: 66.5 kg   (2+ edema B/L feet)     % Weight Change: Unsure of accuracy of weights; will continue to monitor weights for trends.     Pertinent Medications: MEDICATIONS  (STANDING):  aMIOdarone    Tablet 200 milliGRAM(s) Oral daily  aspirin  chewable 81 milliGRAM(s) Oral daily  atorvastatin 80 milliGRAM(s) Oral at bedtime  buMETAnide Infusion 1 mG/Hr (5 mL/Hr) IV Continuous <Continuous>  chlorhexidine 2% Cloths 1 Application(s) Topical <User Schedule>  clopidogrel Tablet 75 milliGRAM(s) Oral daily  dextrose 50% Injectable 25 Gram(s) IV Push once  DOBUTamine Infusion 7.5 MICROgram(s)/kG/Min (15.7 mL/Hr) IV Continuous <Continuous>  heparin  Infusion 800 Unit(s)/Hr (5 mL/Hr) IV Continuous <Continuous>  insulin lispro Injectable (ADMELOG) 2 Unit(s) SubCutaneous every 6 hours  levothyroxine 88 MICROGram(s) Oral daily  montelukast 10 milliGRAM(s) Oral daily  piperacillin/tazobactam IVPB.. 3.375 Gram(s) IV Intermittent every 12 hours  sodium bicarbonate 650 milliGRAM(s) Oral every 12 hours    MEDICATIONS  (PRN):  albuterol/ipratropium for Nebulization 3 milliLiter(s) Nebulizer every 6 hours PRN Shortness of Breath and/or Wheezing  artificial  tears Solution 1 Drop(s) Both EYES two times a day PRN Dry Eyes  heparin   Injectable 5500 Unit(s) IV Push every 6 hours PRN For aPTT less than 40  HYDROmorphone  Injectable 0.5 milliGRAM(s) IV Push every 4 hours PRN Severe Pain (7 - 10)  melatonin 5 milliGRAM(s) Oral at bedtime PRN Sleep  ondansetron Injectable 4 milliGRAM(s) IV Push every 8 hours PRN Nausea and/or Vomiting  senna 2 Tablet(s) Oral at bedtime PRN Constipation  sodium chloride 0.9% lock flush 10 milliLiter(s) IV Push every 1 hour PRN Pre/post blood products, medications, blood draw, and to maintain line patency    Pertinent Labs: CBC Full  -  ( 11 Jun 2024 04:40 )  WBC Count : 12.12 K/uL  RBC Count : 2.96 M/uL  Hemoglobin : 8.7 g/dL  Hematocrit : 27.1 %  Platelet Count - Automated : 109 K/uL  Mean Cell Volume : 91.6 fl  Mean Cell Hemoglobin : 29.4 pg  Mean Cell Hemoglobin Concentration : 32.1 gm/dL    Skin: L flank blister, L hip blister, L forehead blister, Stage II coccyx     Nutrition focused physical exam conducted - found signs of malnutrition [ ]absent [x ]present    Subcutaneous fat loss: Mild  [x ] Orbital fat pads region, [ ]Buccal fat region, [ ]Triceps region,  [ ]Ribs region    Muscle wasting: Mild [ x]Temples region, [x ]Clavicle region, [x ]Shoulder region, [ ]Scapula region, [ ]Interosseous region,  [ ]thigh region, [ ]Calf region    Estimated Needs:   [x ] no change since previous assessment  [ ] recalculated:     Hospital Course:   Pt is a 79 y/o M with a h/o CAD (PCI/CODI x s/p 19, brachytherapy 11/2023), 4V CABG, ICM, HFrEF EF 37%, s/p ICD, DM2 on insulin, 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 Recently had Holter monitor placement for PVC burden which was noted to have worsened the day of presentation. In ED, patient weak appearing, ruled in for NSTEMI with positive troponin, and labs remarkable for RAS on CKD as well as hypoglycemia. A TTE revealed acute on chronic systolic heart failure with EF <20%. He was admitted to telemetry for treatment of ACS on DAPT and heparin gtt, planned for LHC. Patient suffered VF/VT cardiac arrest on 5/25 without ICD shock (subsequently reprogrammed by EP) and was taken for emergent LHC where he was found to have 3/4 occluded grafts, only LIMA to LAD was patent. No target for intervention. Extubated on 5/26, however suffered another VT cardiac arrest later that day (again without ICD shock) with 1 minute downtime prior to ROSC. Hospital course further complicated by cardiogenic shock requiring IV vasopressor and inotrope support. Extubated on 5/27. Underwent RHC on 6/4 which revealed severely elevated filling pressures and low cardiac output- subsequently started on milrinone and bumetanide infusions. Patient suffered third cardiac arrest on 6/5, this time unrelated to cardiac arrhythmia (downtime of 8 mins before ROSC). IABP inserted on 6/5. (IABP now removed)     Current Nutrition Diagnosis:  Pt remains at high nutrition risk secondary to Moderate (acute) protein calorie malnutrition related to inability to meet sufficient protein energy needs in setting of Cardiac arrest x 3, Cardiogenic shock, Acute systolic heart failure, RAS on CKD as evidenced by physical signs of mild muscle/fat loss, meeting < 75% estimated energy needs > 7days and edema. . Spoke with family at bedside; pt with fair PO intake at this time; consuming ~ 50% at meals with assistance. Recommendations below       Recommendations:   1. RX: MVI and Vit. C daily (500mg).   2. Check weight daily to monitor trends   3. Assistance with meals   4. Glucerna shake TID (220kcal, 10gm protein)   5. Monitor Renal labs, BGM     Monitoring and Evaluation:   [x ] PO intake [ x] Tolerance to diet prescription [X] Weights  [X] Follow up per protocol [X] Labs:

## 2024-06-11 NOTE — DISCHARGE NOTE PROVIDER - CARE PROVIDER_API CALL
[facility]  Cardiac Electrophysiology  [address] [place] [address], [address]  [place] [address]  Phone: [phone]  Fax: [phone]  Scheduled Appointment: 08/21/2024

## 2024-06-11 NOTE — PROGRESS NOTE ADULT - ASSESSMENT
78M with history of multiple STEMI/ NSTEMI, CAD s/p 4V CABG with multiple stents and brachytherapy, HFrEF, IDDM2, CKD3-4,, HTN, HLD admitted on 5/23 for atypical chest pain after recent holter monitor placement, found also with NSTEMI and RAS on CKD complicated further by cardiac arrest x 3, s/p LHC with occlusion of all SVGs except LIMA to LAD with no target point of PCI, acute respiratory failure currently intubated now extubated, cardiogenic shock on vasopressors, weaning down, s/p IABP, kidney dysfunction.     # Post Cardiac Arrest   - s/p cardiac arrest x 3 this admission (5/25 ~3 min, 5/27 ~1 min, 6/4 ~8min)  - CTH reviewed, no acute pathology  - mgnt per Intensive care unit  - now do not resuscitate agreed to by family     # Cardiogenic Shock, Acute Decompensated HFrEF, CAD  - s/p LHC 5/25 with occlusion of all SVGs except LIMA to LAD with no target point of PCI  - s/p RHC 6/4 with elevated filling pressures, low CI  - s/p IABP 6/5 - s/p removal 6/11 - doing poorly, worsening numbers   - on heparin gtt   - cardio and CHF team following, inputs appreciated  - requiring inotropic support with dobutamine - escalating doses since removal of pump   - poor overall prognosis - options been given by Dr. Leavitt - replace IABP and trasnfer to [facility] for LVAD evaluation, comfort measures, or attempts at home hospice with inotropes.      # Acute Respiratory Failure  - extubated 6/9/24   - now do not intubate if deteriorates     #Acute kidney injury on CKD   - supportive measures  - trend creatinine   - avoid nephrotoxins   - nephrology following, no need for RRT/HD     # Palliative Care Encounter  - worsening condition  - options been given by Dr. Leavitt, primary son coming in around 12 - will return with Dr. Leavitt at that time to have further discussion about goals of therapy and plan of care

## 2024-06-11 NOTE — PROGRESS NOTE ADULT - PROBLEM SELECTOR PLAN 1
IABP was removed and initially the hemodynamics were stable however his PA sats started to drop and his creatinine and LFTs are starting to rise.   We have an extensive family discuss with the family and the patient with palliative care team. The patient and family have chosen to not do anymore aggressive therapies. They would like to go home with hospice care understanding that he will likely pass in days  Remove all lines  Will place a PICC line to inotrope infusion. He is currently on dobutamine but will transition to milrinone   Will have EP come to turn off tachy therapies on the ICD

## 2024-06-11 NOTE — CHART NOTE - NSCHARTNOTEFT_GEN_A_CORE
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 Recently had Holter monitor placement for PVC burden which was noted to have worsened the day of presentation. In ED, patient weak appearing, ruled in for NSTEMI with positive troponin, and labs remarkable for RAS on CKD as well as hypoglycemia. A TTE revealed acute on chronic systolic heart failure with EF <20%. He was admitted to telemetry for treatment of ACS on DAPT and heparin gtt, planned for LHC. Patient suffered VF/VT cardiac arrest on 5/25 without ICD shock (subsequently reprogrammed by EP) and was taken for emergent LHC where he was found to have 3/4 occluded grafts, only LIMA to LAD was patent. No target for intervention. Extubated on 5/26, however suffered another VT cardiac arrest later that day (again without ICD shock) with 1 minute downtime prior to ROSC. Hospital course further complicated by cardiogenic shock requiring IV vasopressor and inotrope support. Extubated on 5/27. Underwent RHC on 6/4 which revealed severely elevated filling pressures and low cardiac output- subsequently started on milrinone and bumetanide infusions. Patient suffered third cardiac arrest on 6/5, this time unrelated to cardiac arrhythmia (downtime of 8 mins before ROSC). IABP inserted on 6/5.      Events last 24 hours: IABP removed yesterday. Off IV vasopressor. Dobutamine increased to 7.5 mcg/kg/min for drop in cardiac index to 1.5 this morning. Now pending home hospice with inotropic support.          Review of Systems:  CONSTITUTIONAL: No fever, chills, or fatigue  EYES: + left eye pain, visual disturbances, or discharge  ENMT:  No difficulty hearing, tinnitus, vertigo; No sinus or throat pain  NECK: No pain or stiffness  RESPIRATORY: + cough, wheezing, chills or hemoptysis; No shortness of breath  CARDIOVASCULAR: No chest pain, palpitations, dizziness, or leg swelling  GASTROINTESTINAL: + Diarrhea, No abdominal or epigastric pain. No nausea, vomiting, or hematemesis; No constipation. No melena or hematochezia.  GENITOURINARY: No dysuria, frequency, hematuria, or incontinence  NEUROLOGICAL: No headaches, memory loss, loss of strength, numbness, or tremors  SKIN: No itching, burning, rashes, or lesions   MUSCULOSKELETAL: No joint pain or swelling; No muscle, back, or extremity pain  PSYCHIATRIC: No depression, anxiety, mood swings, or difficulty sleeping        ICU Vital Signs Last 24 Hrs  T(C): 37.2 (10 Filiberto 2024 20:00), Max: 37.2 (10 Filiberto 2024 07:30)  T(F): 99 (10 Filiberto 2024 20:00), Max: 99 (10 Filiberto 2024 07:30)  HR: 91 (11 Jun 2024 04:30) (80 - 96)  BP: 118/67 (11 Jun 2024 04:30) (101/59 - 139/39)  BP(mean): 83 (11 Jun 2024 04:30) (60 - 100)  ABP: 96/56 (11 Jun 2024 04:00) (80/69 - 139/47)  ABP(mean): 298 (11 Jun 2024 04:30) (64 - 326)  RR: 16 (10 Filiberto 2024 08:00) (16 - 16)  SpO2: 99% (11 Jun 2024 04:30) (95% - 100%)    O2 Parameters below as of 10 Filiberto 2024 20:00  Patient On (Oxygen Delivery Method): room air      Assessment and Plan:   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, HTN, HLD, with:    # Cardiac arrest x 3  # Cardiogenic shock  # Acute systolic heart failure  # RAS on CKD      - Transitioned to milrinone  - bumetanide infusion transitioned to bumex 2mg BID  - weaned off both vasopressors over 24 hours ago, IABP removed 6/10  - Plan for hospice discharge with intropic support with milrinone  -ICD turned off, now with only pacing function  - continue PO amiodarone for VT suppression  - continue apixaban 5mg q12  - continue DAPT with aspirin 81mg oral daily, Plavix 75mg daily and high intensity statin atorvastatin 80mg daily  - RAS on CKD likely cardiorenal in nature, decreasing   -Tessalon pearls added for cough  -Imodium added for diarrhea, family and patient requesting removal of rectal tube prior to discharge  -Salmeron d/c prior to discharge at family request    #Dispo: Tele    Above case discussed with attending Dr. Motley. Patient is appropriate for transfer to telemetry. Provider handoff given to Dr. Levy.

## 2024-06-11 NOTE — PROGRESS NOTE ADULT - ASSESSMENT
A) Stable renal function, DM 2, CAD, Anemia, Hypothyroid.      P) Meds  same, follow up labs with iron panel.

## 2024-06-11 NOTE — CHART NOTE - NSCHARTNOTEFT_GEN_A_CORE
Called to the bedside in [facility] 3EST [id number] ([facility] 3EST) by АЛЕКСАНДР Benítez to evaluate the status of patient ADRIANNA SHANKS. Identity was confirmed by wrist band.   The following person(s) were in the room during examination: Son Sergey and cousins of patient as well as АЛЕСКАНДР Benítez.    Physical Examination:  No signs of respiratory effort: No spontaneous respirations, No respiratory sounds after 5 minutes of auscultation  No response to verbal stimuli: eyes remained closed, no facial changes or sounds made by patient   No response to painful stimuli: nonresponsive sternal rub, corneal reflex absent  No pupillary response to light: fixed and dilated  No central pulse: carotid, radial, femoral evaluated   No heart sounds after 5 minutes of auscultation; EKG rhythm strip shows asystole.        Patient pronounced dead at 1719.  Attending notified.

## 2024-06-11 NOTE — PROCEDURE NOTE - NSINFORMCONSENT_GEN_A_CORE
This was an emergent procedure.
Benefits, risks, and possible complications of procedure explained to patient/caregiver who verbalized understanding and gave verbal consent.
son bedside/Benefits, risks, and possible complications of procedure explained to patient/caregiver who verbalized understanding and gave written consent.

## 2024-06-11 NOTE — PROCEDURE NOTE - NSPROCNAME_GEN_A_CORE
Arterial Puncture/Cannulation
Central Line Insertion
Central Line Insertion
General
CRT-D (Cardiac Resynchronization Therapy with Defibrillation Capabilities) Interrogation Note
CRT-D (Cardiac Resynchronization Therapy with Defibrillation Capabilities) Interrogation Note
Tracheal Intubation
General
Arterial Puncture/Cannulation
Arterial Puncture/Cannulation
Tracheal Intubation
PICC Line Insertion

## 2024-06-11 NOTE — DIETITIAN NUTRITION RISK NOTIFICATION - TREATMENT: THE FOLLOWING DIET HAS BEEN RECOMMENDED
Diet, Minced and Moist:   Halal (06-11-24 @ 08:34) [Active]  Diet, DASH/TLC:   Sodium & Cholesterol Restricted  Consistent Carbohydrate {No Snacks} (CSTCHO)  Halal  Supplement Feeding Modality:  Oral  Glucerna Shake Cans or Servings Per Day:  1       Frequency:  Two Times a day (06-01-24 @ 10:12) [Pending Verification By Attending]

## 2024-06-11 NOTE — PROGRESS NOTE ADULT - SUBJECTIVE AND OBJECTIVE BOX
Interval History: IABP removed yesterday however his hemodynamics have worsened and he appears more lethargic    Medications:  albuterol/ipratropium for Nebulization 3 milliLiter(s) Nebulizer every 6 hours PRN  aMIOdarone    Tablet 200 milliGRAM(s) Oral daily  apixaban 5 milliGRAM(s) Oral every 12 hours  artificial  tears Solution 1 Drop(s) Both EYES two times a day PRN  aspirin  chewable 81 milliGRAM(s) Oral daily  atorvastatin 80 milliGRAM(s) Oral at bedtime  buMETAnide Infusion 1 mG/Hr IV Continuous <Continuous>  chlorhexidine 2% Cloths 1 Application(s) Topical <User Schedule>  chlorhexidine 2% Cloths 1 Application(s) Topical daily  clopidogrel Tablet 75 milliGRAM(s) Oral daily  dextrose 50% Injectable 25 Gram(s) IV Push once  DOBUTamine Infusion 7.5 MICROgram(s)/kG/Min IV Continuous <Continuous>  erythromycin   Ointment 1 Application(s) Left EYE two times a day  HYDROmorphone  Injectable 0.5 milliGRAM(s) IV Push every 4 hours PRN  insulin glargine Injectable (LANTUS) 9 Unit(s) SubCutaneous at bedtime  insulin lispro (ADMELOG) corrective regimen sliding scale   SubCutaneous every 6 hours  insulin lispro Injectable (ADMELOG) 2 Unit(s) SubCutaneous every 6 hours  levothyroxine 88 MICROGram(s) Oral daily  melatonin 5 milliGRAM(s) Oral at bedtime PRN  milrinone Infusion 0.25 MICROgram(s)/kG/Min IV Continuous <Continuous>  montelukast 10 milliGRAM(s) Oral daily  ondansetron Injectable 4 milliGRAM(s) IV Push every 8 hours PRN  piperacillin/tazobactam IVPB.. 3.375 Gram(s) IV Intermittent every 12 hours  senna 2 Tablet(s) Oral at bedtime PRN  sodium bicarbonate 1300 milliGRAM(s) Oral two times a day  sodium chloride 0.9% lock flush 10 milliLiter(s) IV Push every 1 hour PRN      Vitals:  T(C): 37.4 (06-11-24 @ 07:12), Max: 37.4 (06-11-24 @ 07:12)  HR: 96 (06-11-24 @ 10:00) (85 - 96)  BP: 110/70 (06-11-24 @ 10:00) (99/69 - 129/38)  BP(mean): 84 (06-11-24 @ 10:00) (66 - 100)  RR: 18 (06-11-24 @ 08:00) (18 - 18)  SpO2: 99% (06-11-24 @ 10:00) (97% - 100%)    Daily     Daily         I&O's Summary    10 Filiberto 2024 07:01  -  11 Jun 2024 07:00  --------------------------------------------------------  IN: 467.4 mL / OUT: 775 mL / NET: -307.6 mL    11 Jun 2024 07:01  -  11 Jun 2024 12:07  --------------------------------------------------------  IN: 77.1 mL / OUT: 85 mL / NET: -7.9 mL        Physical Exam:  Appearance: No Acute Distress  HEENT: PERRL  Neck: No JVD  Cardiovascular: Normal S1 S2, No murmurs/rubs/gallops  Respiratory: Clear to auscultation bilaterally  Gastrointestinal: Soft, Non-tender	  Skin: No cyanosis	  Neurologic: Non-focal  Extremities: No LE edema  Psychiatry: A & O x 3, Mood & affect appropriate    Labs:                        8.7    12.12 )-----------( 109      ( 11 Jun 2024 04:40 )             27.1     06-11    136  |  99  |  51.5<H>  ----------------------------<  161<H>  4.1   |  21.0<L>  |  2.13<H>    Ca    8.6      11 Jun 2024 10:14  Phos  4.1     06-11  Mg     2.3     06-11    TPro  6.4<L>  /  Alb  2.8<L>  /  TBili  0.8  /  DBili  x   /  AST  167<H>  /  ALT  98<H>  /  AlkPhos  126<H>  06-11    PT/INR - ( 11 Jun 2024 04:40 )   PT: 13.1 sec;   INR: 1.19 ratio         PTT - ( 11 Jun 2024 04:40 )  PTT:56.6 sec       30-Apr-2024

## 2024-06-12 ENCOUNTER — TRANSCRIPTION ENCOUNTER (OUTPATIENT)
Age: 79
End: 2024-06-12

## 2024-06-12 VITALS
SYSTOLIC BLOOD PRESSURE: 95 MMHG | HEART RATE: 102 BPM | DIASTOLIC BLOOD PRESSURE: 57 MMHG | OXYGEN SATURATION: 99 % | RESPIRATION RATE: 26 BRPM

## 2024-06-12 LAB
GLUCOSE BLDC GLUCOMTR-MCNC: 157 MG/DL — HIGH (ref 70–99)
GLUCOSE BLDC GLUCOMTR-MCNC: 158 MG/DL — HIGH (ref 70–99)
UNFRACTIONATED HEPARIN INTERPRETATION: SIGNIFICANT CHANGE UP
UNFRACTIONATED HEPARIN RESULT: NEGATIVE — SIGNIFICANT CHANGE UP
UNFRACTIONATED HEPARIN-HIGH DOSE: 0 % — SIGNIFICANT CHANGE UP
UNFRACTIONATED HEPARIN-LOW DOSE: 0 % — SIGNIFICANT CHANGE UP

## 2024-06-12 PROCEDURE — 99239 HOSP IP/OBS DSCHRG MGMT >30: CPT

## 2024-06-12 PROCEDURE — 99233 SBSQ HOSP IP/OBS HIGH 50: CPT

## 2024-06-12 RX ORDER — LEVOTHYROXINE SODIUM 125 MCG
1 TABLET ORAL
Qty: 7 | Refills: 0
Start: 2024-06-12 | End: 2024-06-18

## 2024-06-12 RX ORDER — MORPHINE SULFATE 50 MG/1
1 CAPSULE, EXTENDED RELEASE ORAL
Qty: 28 | Refills: 0
Start: 2024-06-12 | End: 2024-06-18

## 2024-06-12 RX ORDER — BENZOCAINE 10 %
1 GEL (GRAM) MUCOUS MEMBRANE ONCE
Refills: 0 | Status: COMPLETED | OUTPATIENT
Start: 2024-06-12 | End: 2024-06-12

## 2024-06-12 RX ORDER — LOPERAMIDE HCL 2 MG
1 TABLET ORAL
Qty: 21 | Refills: 0
Start: 2024-06-12 | End: 2024-06-18

## 2024-06-12 RX ORDER — BUMETANIDE 0.25 MG/ML
1 INJECTION INTRAMUSCULAR; INTRAVENOUS
Qty: 0 | Refills: 0 | DISCHARGE
Start: 2024-06-12

## 2024-06-12 RX ORDER — SODIUM BICARBONATE 1 MEQ/ML
2 SYRINGE (ML) INTRAVENOUS
Qty: 28 | Refills: 0
Start: 2024-06-12 | End: 2024-06-18

## 2024-06-12 RX ORDER — DAPAGLIFLOZIN 10 MG/1
1 TABLET, FILM COATED ORAL
Refills: 0 | DISCHARGE

## 2024-06-12 RX ORDER — IPRATROPIUM/ALBUTEROL SULFATE 18-103MCG
3 AEROSOL WITH ADAPTER (GRAM) INHALATION
Qty: 28 | Refills: 0
Start: 2024-06-12 | End: 2024-06-18

## 2024-06-12 RX ORDER — INSULIN ASPART 100 [IU]/ML
2 INJECTION, SOLUTION SUBCUTANEOUS
Qty: 1 | Refills: 0
Start: 2024-06-12 | End: 2024-06-18

## 2024-06-12 RX ORDER — AMIODARONE HYDROCHLORIDE 400 MG/1
1 TABLET ORAL
Qty: 7 | Refills: 0
Start: 2024-06-12 | End: 2024-06-18

## 2024-06-12 RX ORDER — CLOPIDOGREL BISULFATE 75 MG/1
1 TABLET, FILM COATED ORAL
Qty: 7 | Refills: 0
Start: 2024-06-12 | End: 2024-06-18

## 2024-06-12 RX ORDER — METOPROLOL TARTRATE 50 MG
1 TABLET ORAL
Refills: 0 | DISCHARGE

## 2024-06-12 RX ORDER — ESOMEPRAZOLE MAGNESIUM 40 MG/1
1 CAPSULE, DELAYED RELEASE ORAL
Qty: 7 | Refills: 0
Start: 2024-06-12 | End: 2024-06-18

## 2024-06-12 RX ORDER — MILRINONE LACTATE 1 MG/ML
50 INJECTION, SOLUTION INTRAVENOUS
Qty: 1 | Refills: 0
Start: 2024-06-12 | End: 2024-07-11

## 2024-06-12 RX ORDER — MONTELUKAST 4 MG/1
1 TABLET, CHEWABLE ORAL
Refills: 0 | DISCHARGE

## 2024-06-12 RX ORDER — MILRINONE LACTATE 1 MG/ML
50 INJECTION, SOLUTION INTRAVENOUS
Qty: 1 | Refills: 0
Start: 2024-06-12

## 2024-06-12 RX ORDER — ASPIRIN/CALCIUM CARB/MAGNESIUM 324 MG
1 TABLET ORAL
Qty: 7 | Refills: 0
Start: 2024-06-12 | End: 2024-06-18

## 2024-06-12 RX ORDER — MONTELUKAST 4 MG/1
1 TABLET, CHEWABLE ORAL
Qty: 7 | Refills: 0
Start: 2024-06-12 | End: 2024-06-18

## 2024-06-12 RX ORDER — IPRATROPIUM/ALBUTEROL SULFATE 18-103MCG
3 AEROSOL WITH ADAPTER (GRAM) INHALATION
Qty: 0 | Refills: 0 | DISCHARGE
Start: 2024-06-12

## 2024-06-12 RX ORDER — AMIODARONE HYDROCHLORIDE 400 MG/1
1 TABLET ORAL
Qty: 0 | Refills: 0 | DISCHARGE
Start: 2024-06-12

## 2024-06-12 RX ORDER — APIXABAN 2.5 MG/1
1 TABLET, FILM COATED ORAL
Qty: 14 | Refills: 0
Start: 2024-06-12 | End: 2024-06-18

## 2024-06-12 RX ORDER — LEVOTHYROXINE SODIUM 125 MCG
1 TABLET ORAL
Refills: 0 | DISCHARGE

## 2024-06-12 RX ORDER — ERYTHROMYCIN BASE 5 MG/GRAM
11 OINTMENT (GRAM) OPHTHALMIC (EYE)
Qty: 1 | Refills: 0
Start: 2024-06-12 | End: 2024-06-18

## 2024-06-12 RX ORDER — CLOPIDOGREL BISULFATE 75 MG/1
1 TABLET, FILM COATED ORAL
Qty: 0 | Refills: 0 | DISCHARGE
Start: 2024-06-12

## 2024-06-12 RX ORDER — ESOMEPRAZOLE MAGNESIUM 40 MG/1
1 CAPSULE, DELAYED RELEASE ORAL
Qty: 0 | Refills: 0 | DISCHARGE

## 2024-06-12 RX ORDER — BUMETANIDE 0.25 MG/ML
1 INJECTION INTRAMUSCULAR; INTRAVENOUS
Qty: 14 | Refills: 0
Start: 2024-06-12 | End: 2024-06-18

## 2024-06-12 RX ORDER — APIXABAN 2.5 MG/1
1 TABLET, FILM COATED ORAL
Qty: 0 | Refills: 0 | DISCHARGE
Start: 2024-06-12

## 2024-06-12 RX ORDER — LOPERAMIDE HCL 2 MG
1 TABLET ORAL
Qty: 15 | Refills: 0
Start: 2024-06-12 | End: 2024-06-16

## 2024-06-12 RX ORDER — ATORVASTATIN CALCIUM 80 MG/1
1 TABLET, FILM COATED ORAL
Qty: 7 | Refills: 0
Start: 2024-06-12 | End: 2024-06-18

## 2024-06-12 RX ORDER — APIXABAN 2.5 MG/1
2.5 TABLET, FILM COATED ORAL
Refills: 0 | Status: DISCONTINUED | OUTPATIENT
Start: 2024-06-12 | End: 2024-06-12

## 2024-06-12 RX ADMIN — Medication 1 SPRAY(S): at 11:35

## 2024-06-12 RX ADMIN — MILRINONE LACTATE 5.24 MICROGRAM(S)/KG/MIN: 1 INJECTION, SOLUTION INTRAVENOUS at 03:03

## 2024-06-12 RX ADMIN — Medication 1 APPLICATION(S): at 09:31

## 2024-06-12 RX ADMIN — Medication 2 UNIT(S): at 11:54

## 2024-06-12 RX ADMIN — BUMETANIDE 2 MILLIGRAM(S): 0.25 INJECTION INTRAMUSCULAR; INTRAVENOUS at 05:51

## 2024-06-12 RX ADMIN — BUMETANIDE 2 MILLIGRAM(S): 0.25 INJECTION INTRAMUSCULAR; INTRAVENOUS at 15:44

## 2024-06-12 RX ADMIN — CHLORHEXIDINE GLUCONATE 1 APPLICATION(S): 213 SOLUTION TOPICAL at 05:57

## 2024-06-12 RX ADMIN — CHLORHEXIDINE GLUCONATE 1 APPLICATION(S): 213 SOLUTION TOPICAL at 05:58

## 2024-06-12 RX ADMIN — MONTELUKAST 10 MILLIGRAM(S): 4 TABLET, CHEWABLE ORAL at 11:35

## 2024-06-12 RX ADMIN — Medication 1300 MILLIGRAM(S): at 05:50

## 2024-06-12 RX ADMIN — Medication 88 MICROGRAM(S): at 05:51

## 2024-06-12 RX ADMIN — Medication 81 MILLIGRAM(S): at 11:35

## 2024-06-12 RX ADMIN — Medication 100 MILLIGRAM(S): at 09:20

## 2024-06-12 RX ADMIN — AMIODARONE HYDROCHLORIDE 200 MILLIGRAM(S): 400 TABLET ORAL at 05:51

## 2024-06-12 RX ADMIN — Medication 1: at 11:54

## 2024-06-12 RX ADMIN — CLOPIDOGREL BISULFATE 75 MILLIGRAM(S): 75 TABLET, FILM COATED ORAL at 11:35

## 2024-06-12 RX ADMIN — APIXABAN 5 MILLIGRAM(S): 2.5 TABLET, FILM COATED ORAL at 05:51

## 2024-06-12 RX ADMIN — CHLORHEXIDINE GLUCONATE 1 APPLICATION(S): 213 SOLUTION TOPICAL at 11:54

## 2024-06-12 NOTE — PROGRESS NOTE ADULT - ASSESSMENT
78M with history of multiple STEMI/ NSTEMI, CAD s/p 4V CABG with multiple stents and brachytherapy, HFrEF, IDDM2, CKD3-4,, HTN, HLD admitted on 5/23 for atypical chest pain after recent holter monitor placement, found also with NSTEMI and RAS on CKD complicated further by cardiac arrest x 3, s/p LHC with occlusion of all SVGs except LIMA to LAD with no target point of PCI, acute respiratory failure currently intubated now extubated, cardiogenic shock on vasopressors, weaning down, s/p IABP, kidney dysfunction.     # Post Cardiac Arrest   - s/p cardiac arrest x 3 this admission (5/25 ~3 min, 5/27 ~1 min, 6/4 ~8min)  - CTH reviewed, no acute pathology  - do not resuscitate     # Cardiogenic Shock, Acute Decompensated HFrEF, CAD  - s/p LHC 5/25 with occlusion of all SVGs except LIMA to LAD with no target point of PCI  - s/p RHC 6/4 with elevated filling pressures, low CI  - s/p IABP 6/5 - s/p removal 6/11 - doing poorly, worsening numbers   - patient and family opting to forego further agressive measures  - plans for home hospice on milrinone  - ICD deactivated 6/11     # Acute Respiratory Failure  - extubated 6/9/24   - do not intubate if worsens     #Acute kidney injury on CKD   - no HD     # Palliative Care Encounter  - home hospice today once intravenous infusion set up   - family and patient have been made aware of grave prognosis of days to weeks and likely worsening condition  - no aggressive measures desired  - patient just wants be home and surrounded by his family. he is a very strong believer in Gods will

## 2024-06-12 NOTE — PROGRESS NOTE ADULT - REASON FOR ADMISSION
Chest Pain / SOB
C
Chest Pain / SOB
C
Chest Pain / SOB

## 2024-06-12 NOTE — CHART NOTE - NSCHARTNOTEFT_GEN_A_CORE
Pt examined laying in bed  AAO4, son at bedside.,   D.w pt and son at length on dsc planning and meds  Cleared for dsc home with home hospice )on Milrinone ggt)

## 2024-06-12 NOTE — DISCHARGE NOTE NURSING/CASE MANAGEMENT/SOCIAL WORK - NSDCVIVACCINE_GEN_ALL_CORE_FT
Tdap; 02-Nov-2017 16:11; Mehdi Tsai (АЛЕКСАНДР); [employer]; [id number]; IntraMuscular; Deltoid Left.; 0.5 milliLiter(s); VIS (VIS Published: 09-May-2013, VIS Presented: 02-Nov-2017);

## 2024-06-12 NOTE — PROGRESS NOTE ADULT - SUBJECTIVE AND OBJECTIVE BOX
OVERNIGHT EVENTS: alert, wants to go home, son at bedside     Present Symptoms:     Dyspnea: none   Nausea/Vomiting: No  Anxiety:  No  Depression: No  Fatigue: Yes   Loss of appetite: No  Constipation: none     Pain: none currently             Character-            Duration-            Effect-            Factors-            Frequency-            Location-            Severity-    Pain AD Score:  [email or web address] (press ctrl + left click to view)    Review of Systems: Reviewed                     Negative: no chest pain                    All others negative    MEDICATIONS  (STANDING):  aMIOdarone    Tablet 200 milliGRAM(s) Oral daily  apixaban 5 milliGRAM(s) Oral every 12 hours  aspirin  chewable 81 milliGRAM(s) Oral daily  atorvastatin 80 milliGRAM(s) Oral at bedtime  buMETAnide 2 milliGRAM(s) Oral two times a day  chlorhexidine 2% Cloths 1 Application(s) Topical <User Schedule>  chlorhexidine 2% Cloths 1 Application(s) Topical <User Schedule>  chlorhexidine 2% Cloths 1 Application(s) Topical daily  chlorhexidine 2% Cloths 1 Application(s) Topical <User Schedule>  clopidogrel Tablet 75 milliGRAM(s) Oral daily  dextrose 50% Injectable 25 Gram(s) IV Push once  erythromycin   Ointment 1 Application(s) Left EYE two times a day  insulin glargine Injectable (LANTUS) 9 Unit(s) SubCutaneous at bedtime  insulin lispro (ADMELOG) corrective regimen sliding scale   SubCutaneous every 6 hours  insulin lispro Injectable (ADMELOG) 2 Unit(s) SubCutaneous every 6 hours  levothyroxine 88 MICROGram(s) Oral daily  milrinone Infusion 0.25 MICROgram(s)/kG/Min (5.24 mL/Hr) IV Continuous <Continuous>  montelukast 10 milliGRAM(s) Oral daily  sodium bicarbonate 1300 milliGRAM(s) Oral two times a day    MEDICATIONS  (PRN):  albuterol/ipratropium for Nebulization 3 milliLiter(s) Nebulizer every 6 hours PRN Shortness of Breath and/or Wheezing  artificial  tears Solution 1 Drop(s) Both EYES two times a day PRN Dry Eyes  benzonatate 100 milliGRAM(s) Oral three times a day PRN Cough  HYDROmorphone  Injectable 0.5 milliGRAM(s) IV Push every 4 hours PRN Severe Pain (7 - 10)  loperamide 2 milliGRAM(s) Oral every 8 hours PRN Diarrhea  LORazepam    Concentrate 0.5 milliGRAM(s) SubLingual every 4 hours PRN Agitation  melatonin 5 milliGRAM(s) Oral at bedtime PRN Sleep  ondansetron Injectable 4 milliGRAM(s) IV Push every 8 hours PRN Nausea and/or Vomiting  sodium chloride 0.9% lock flush 10 milliLiter(s) IV Push every 1 hour PRN Pre/post blood products, medications, blood draw, and to maintain line patency      PHYSICAL EXAM:    Vital Signs Last 24 Hrs  T(C): 36.4 (12 Jun 2024 07:31), Max: 36.7 (11 Jun 2024 23:41)  T(F): 97.5 (12 Jun 2024 07:31), Max: 98.1 (11 Jun 2024 23:41)  HR: 103 (12 Jun 2024 08:00) (89 - 105)  BP: 104/65 (12 Jun 2024 08:00) (83/62 - 126/70)  BP(mean): 77 (12 Jun 2024 08:00) (66 - 90)  RR: 19 (12 Jun 2024 08:00) (18 - 20)  SpO2: 100% (12 Jun 2024 08:00) (98% - 100%)    Parameters below as of 12 Jun 2024 08:00  Patient On (Oxygen Delivery Method): room air        General: alert  oriented x ____ lethargic agitated                  cachexia  nonverbal  coma    Karnofsky:  %    HEENT: normal  dry mouth  ET tube/trach    Lungs: comfortable tachypnea/labored breathing  excessive secretions    CV: normal  tachycardia    GI: normal  distended  tender  no BS               PEG/NG/OG tube  constipation  last BM:     : normal  incontinent  oliguria/anuria  fournier    MSK: normal  weakness  edema             ambulatory  bedbound/wheelchair bound    Skin: normal  pressure ulcers- Stage_____  no rash    LABS:                          8.7    12.12 )-----------( 109      ( 11 Jun 2024 04:40 )             27.1     06-11    136  |  99  |  51.5<H>  ----------------------------<  161<H>  4.1   |  21.0<L>  |  2.13<H>    Ca    8.6      11 Jun 2024 10:14  Phos  4.1     06-11  Mg     2.3     06-11    TPro  6.4<L>  /  Alb  2.8<L>  /  TBili  0.8  /  DBili  x   /  AST  167<H>  /  ALT  98<H>  /  AlkPhos  126<H>  06-11    PT/INR - ( 11 Jun 2024 04:40 )   PT: 13.1 sec;   INR: 1.19 ratio       PTT - ( 11 Jun 2024 04:40 )  PTT:56.6 sec  Urinalysis Basic - ( 11 Jun 2024 10:14 )    Color: x / Appearance: x / SG: x / pH: x  Gluc: 161 mg/dL / Ketone: x  / Bili: x / Urobili: x   Blood: x / Protein: x / Nitrite: x   Leuk Esterase: x / RBC: x / WBC x   Sq Epi: x / Non Sq Epi: x / Bacteria: x      I&O's Summary    11 Jun 2024 07:01  -  12 Jun 2024 07:00  --------------------------------------------------------  IN: 228.5 mL / OUT: 565 mL / NET: -336.5 mL    12 Jun 2024 07:01  -  12 Jun 2024 08:31  --------------------------------------------------------  IN: 5 mL / OUT: 15 mL / NET: -10 mL        RADIOLOGY & ADDITIONAL STUDIES:    ADVANCE DIRECTIVES/TREATMENT PREFERENCES:  DNR YES NO  Completed on:                     MOLST  YES NO   Completed on:  Living Will  YES NO   Completed on:

## 2024-06-12 NOTE — PROGRESS NOTE ADULT - NUTRITIONAL ASSESSMENT
This patient has been assessed with a concern for Malnutrition and has been determined to have a diagnosis/diagnoses of Moderate protein-calorie malnutrition.    This patient is being managed with:   Diet Minced and Moist-  Halal  Entered: Jun 11 2024  8:34AM    The following pending diet order is being considered for treatment of Moderate protein-calorie malnutrition:  Diet DASH/TLC-  Sodium & Cholesterol Restricted  Consistent Carbohydrate {No Snacks} (CSTCHO)  Halal  Supplement Feeding Modality:  Oral  Glucerna Shake Cans or Servings Per Day:  1       Frequency:  Two Times a day  Entered: Jun 1 2024 10:12AM  

## 2024-06-12 NOTE — DISCHARGE NOTE NURSING/CASE MANAGEMENT/SOCIAL WORK - PATIENT PORTAL LINK FT
You can access the FollowMyHealth Patient Portal offered by [facility] by registering at the following website: [email or web address]. By joining [facility]’s FollowMyHealth portal, you will also be able to view your health information using other applications (apps) compatible with our system.

## 2024-06-12 NOTE — PROGRESS NOTE ADULT - SUBJECTIVE AND OBJECTIVE BOX
Interim noted   Palliative Care follow up appreciated     - patient and family opting to forego further agressive measures  - plans for home hospice on milrinone  - ICD deactivated 6/11     Will no longer follow

## 2024-06-20 NOTE — DISCUSSION/SUMMARY
Medication: simvastatin  Last office visit date: 10/04/2023  Medication Refill Protocol Failed.  Failed criteria: Last Lipid 02/23/2022. Sent to clinician to review.        Name from pharmacy: SIMVASTATIN TABS 20MG         Will file in chart as: simvastatin (ZOCOR) 20 MG tablet    Sig: TAKE 1 TABLET NIGHTLY    Disp: 90 tablet    Refills: 3    Start: 6/19/2024    Class: Eprescribe    Last ordered: 11 months ago (6/28/2023) by Jerrica Samayoa MD    Last refill: 3/22/2024    Rx #: [id number]    Hmg CoA Reductase Inhibitors (Statin) Refill Protocol - 12 Month Protocol Eklbye0806/19/2024 11:36 PM   Protocol Details Lipid Panel or Direct LDL resulted within last 15 months -- IF CRITERIA FAILED REFER TO PROTOCOL DETAILS    Patient is NOT on Gemfibrozil    Seen by prescribing provider or same department within the last 12 months or has a future appt in 3 months - IF FAILED PLEASE LOOK AT CHART REVIEW FOR LAST VISIT AND PROCEED ACCORDINGLY    Request is NOT for Simvastatin 80 mg or Vytorin 10-80 mg    Medication (including dose and sig) on current meds list         [Patient] : the patient [Risks] : risks [Benefits] : benefits [Alternatives] : alternatives [With Me] : with me [___ Month(s)] : in [unfilled] month(s) [FreeTextEntry1] : This is a 77 year old male with history HTN, DM, HLD, CAD, h/o MI,  s/p CABG, s/p PCIx4, recent unstable angina s/p PCI with CODI to SVG to LCX,  \par 1)  Angina pectoris:  stable coronary artery disease  PCI and brachy therapy recent PCI to SVG.  nov 2020 ct Dual antiplatelet therapy .aspirin. brillinta.  ct lipitor 40 mg daily .  ranexa 500 mg Q12  \par aspirin and  ct brillinta    Lipid LDL not at goal. 104.  \par 2) CAD, s/p CABG, s/p PCI: ASA, c ct Brilinta ,  \par 3) HTN:  CHF meds. uncotnrolled. \par 4) ICM: EF    .  toprol 50 mg daily . lasix 40 mg every other day..    . farxiga daily . aldactone 25 mg Every other day.  half valsartan 40 mg . 0.5  tablets in am and PM.  repeat Muga 38% ; LVEF 36%  Bp check in afternoon.  \par 7) hypothyroidism:  and Diabetes Mellitus : endocrine follows up. \par 8) Elevated lipoprotein a  : on statins.    wants to defer injectable PCSK 9 inhibitor. r \par

## 2024-06-25 PROCEDURE — 94002 VENT MGMT INPAT INIT DAY: CPT

## 2024-06-25 PROCEDURE — 36415 COLL VENOUS BLD VENIPUNCTURE: CPT

## 2024-06-25 PROCEDURE — 97163 PT EVAL HIGH COMPLEX 45 MIN: CPT

## 2024-06-25 PROCEDURE — C1887: CPT

## 2024-06-25 PROCEDURE — 94640 AIRWAY INHALATION TREATMENT: CPT

## 2024-06-25 PROCEDURE — 84132 ASSAY OF SERUM POTASSIUM: CPT

## 2024-06-25 PROCEDURE — 93321 DOPPLER ECHO F-UP/LMTD STD: CPT

## 2024-06-25 PROCEDURE — 87040 BLOOD CULTURE FOR BACTERIA: CPT

## 2024-06-25 PROCEDURE — C8924: CPT

## 2024-06-25 PROCEDURE — 93930 UPPER EXTREMITY STUDY: CPT

## 2024-06-25 PROCEDURE — 85027 COMPLETE CBC AUTOMATED: CPT

## 2024-06-25 PROCEDURE — 96375 TX/PRO/DX INJ NEW DRUG ADDON: CPT

## 2024-06-25 PROCEDURE — 86022 PLATELET ANTIBODIES: CPT

## 2024-06-25 PROCEDURE — C1769: CPT

## 2024-06-25 PROCEDURE — 93451 RIGHT HEART CATH: CPT

## 2024-06-25 PROCEDURE — 94760 N-INVAS EAR/PLS OXIMETRY 1: CPT

## 2024-06-25 PROCEDURE — 84295 ASSAY OF SERUM SODIUM: CPT

## 2024-06-25 PROCEDURE — C1894: CPT

## 2024-06-25 PROCEDURE — 84100 ASSAY OF PHOSPHORUS: CPT

## 2024-06-25 PROCEDURE — 82330 ASSAY OF CALCIUM: CPT

## 2024-06-25 PROCEDURE — 83615 LACTATE (LD) (LDH) ENZYME: CPT

## 2024-06-25 PROCEDURE — P9047: CPT

## 2024-06-25 PROCEDURE — 71045 X-RAY EXAM CHEST 1 VIEW: CPT

## 2024-06-25 PROCEDURE — 87640 STAPH A DNA AMP PROBE: CPT

## 2024-06-25 PROCEDURE — 70450 CT HEAD/BRAIN W/O DYE: CPT | Mod: MC

## 2024-06-25 PROCEDURE — 36592 COLLECT BLOOD FROM PICC: CPT

## 2024-06-25 PROCEDURE — 84478 ASSAY OF TRIGLYCERIDES: CPT

## 2024-06-25 PROCEDURE — 82947 ASSAY GLUCOSE BLOOD QUANT: CPT

## 2024-06-25 PROCEDURE — 93925 LOWER EXTREMITY STUDY: CPT

## 2024-06-25 PROCEDURE — 82962 GLUCOSE BLOOD TEST: CPT

## 2024-06-25 PROCEDURE — 85730 THROMBOPLASTIN TIME PARTIAL: CPT

## 2024-06-25 PROCEDURE — 93306 TTE W/DOPPLER COMPLETE: CPT

## 2024-06-25 PROCEDURE — 83735 ASSAY OF MAGNESIUM: CPT

## 2024-06-25 PROCEDURE — 82435 ASSAY OF BLOOD CHLORIDE: CPT

## 2024-06-25 PROCEDURE — 93005 ELECTROCARDIOGRAM TRACING: CPT

## 2024-06-25 PROCEDURE — 93459 L HRT ART/GRFT ANGIO: CPT

## 2024-06-25 PROCEDURE — 85014 HEMATOCRIT: CPT

## 2024-06-25 PROCEDURE — 83880 ASSAY OF NATRIURETIC PEPTIDE: CPT

## 2024-06-25 PROCEDURE — 83036 HEMOGLOBIN GLYCOSYLATED A1C: CPT

## 2024-06-25 PROCEDURE — 80048 BASIC METABOLIC PNL TOTAL CA: CPT

## 2024-06-25 PROCEDURE — 94003 VENT MGMT INPAT SUBQ DAY: CPT

## 2024-06-25 PROCEDURE — 82550 ASSAY OF CK (CPK): CPT

## 2024-06-25 PROCEDURE — 85018 HEMOGLOBIN: CPT

## 2024-06-25 PROCEDURE — 85025 COMPLETE CBC W/AUTO DIFF WBC: CPT

## 2024-06-25 PROCEDURE — 96374 THER/PROPH/DIAG INJ IV PUSH: CPT

## 2024-06-25 PROCEDURE — 33967 INSERT I-AORT PERCUT DEVICE: CPT

## 2024-06-25 PROCEDURE — 85610 PROTHROMBIN TIME: CPT

## 2024-06-25 PROCEDURE — 92950 HEART/LUNG RESUSCITATION CPR: CPT

## 2024-06-25 PROCEDURE — 36600 WITHDRAWAL OF ARTERIAL BLOOD: CPT

## 2024-06-25 PROCEDURE — 81001 URINALYSIS AUTO W/SCOPE: CPT

## 2024-06-25 PROCEDURE — C1751: CPT

## 2024-06-25 PROCEDURE — 99285 EMERGENCY DEPT VISIT HI MDM: CPT

## 2024-06-25 PROCEDURE — 93325 DOPPLER ECHO COLOR FLOW MAPG: CPT

## 2024-06-25 PROCEDURE — 87641 MR-STAPH DNA AMP PROBE: CPT

## 2024-06-25 PROCEDURE — 83605 ASSAY OF LACTIC ACID: CPT

## 2024-06-25 PROCEDURE — C1889: CPT

## 2024-06-25 PROCEDURE — 80053 COMPREHEN METABOLIC PANEL: CPT

## 2024-06-25 PROCEDURE — 84484 ASSAY OF TROPONIN QUANT: CPT

## 2024-06-25 PROCEDURE — 82803 BLOOD GASES ANY COMBINATION: CPT

## 2024-06-25 PROCEDURE — 84443 ASSAY THYROID STIM HORMONE: CPT

## 2024-06-25 PROCEDURE — 83010 ASSAY OF HAPTOGLOBIN QUANT: CPT

## 2024-07-08 ENCOUNTER — APPOINTMENT (OUTPATIENT)
Dept: ELECTROPHYSIOLOGY | Facility: CLINIC | Age: 79
End: 2024-07-08

## 2024-07-09 ENCOUNTER — APPOINTMENT (OUTPATIENT)
Dept: ENDOCRINOLOGY | Facility: CLINIC | Age: 79
End: 2024-07-09

## 2024-07-16 ENCOUNTER — APPOINTMENT (OUTPATIENT)
Dept: CARDIOLOGY | Facility: CLINIC | Age: 79
End: 2024-07-16

## 2024-08-06 ENCOUNTER — APPOINTMENT (OUTPATIENT)
Dept: CARDIOLOGY | Facility: CLINIC | Age: 79
End: 2024-08-06

## 2024-08-07 NOTE — PROGRESS NOTE ADULT - ASSESSMENT
Abdomen , soft, nontender, nondistended , no guarding or rigidity , no masses palpable , normal bowel sounds , Liver and Spleen,  no hepatosplenomegaly , liver nontender This is a 74 year old male with history of dyslipidemia and dyslipidemia, coronary artery disease prior MI, prior PCI, recent brachytherapy, presents with dyspnea on exertion and shortness of breathe     1) Acute decomepnsated heart failure.  : Acute wornseing of LV and RV functon. unclear cause. Appears non ischehmic due to acuity.   mildrinone drip for inotropy.  did not tolerate low dose. dopamine .  hold beta-blocker for now.  evaluate for beta blockes tomorrow probabyl start metoprolol 12.5 Q12. if blood pressure tolerates.   no ACE-ARB.  after completion of diuresis today, will reevaluate the doses of diuretics tomorrow am. Paient is responding really .   Diuretic dose tomorrow after check ing renal function.   Admit to step down.    no ICU for now. If Bp does not tolerate diuresis, then will admit to ICU and then pressors.  Mag and potassium check.   Once cr stable, may need cardiac cath to evaluate for any new obstructive coronary artery disease   2) coronary artery disease : statins aspirn. dual antiplatelet therapy    3) Deep venous thrombosis prophylaxis: heparin

## 2024-08-21 ENCOUNTER — APPOINTMENT (OUTPATIENT)
Dept: ELECTROPHYSIOLOGY | Facility: CLINIC | Age: 79
End: 2024-08-21

## 2024-09-05 ENCOUNTER — APPOINTMENT (OUTPATIENT)
Dept: OTOLARYNGOLOGY | Facility: CLINIC | Age: 79
End: 2024-09-05

## 2024-10-17 NOTE — ED PROVIDER NOTE - CPE EDP GASTRO NORM
Lab Results   Component Value Date    HGBA1C 8.1 09/19/2020   Has follow-up hemoglobin A1c scheduled.  Managed by her primary care physician.   normal...

## 2024-11-22 NOTE — CHART NOTE - NSCHARTNOTESELECT_GEN_ALL_CORE
Cath Lab PA Note/Event Note
Electrophysiology PA/Event Note
Event Note
Expiration/Event Note
Line Removal/Event Note
Nutrition Services
Downgrade/Event Note
Event Note
Interventional site check/Event Note
Nutrition Services
Nutrition Services
Site Check/Event Note
Site check/Event Note
You are advised to please follow up with your primary care doctor within the next 24 hours and return to the Emergency Department for worsening symptoms or any other concerns.  Your doctor may call [phone] to follow up on the specific results of the tests performed today in the emergency department.    Weakness  Weakness is a lack of strength. You may feel weak all over your body (generalized), or you may feel weak in one part of your body (focal). There are many potential causes of weakness. Sometimes, the cause of your weakness may not be known. Some causes of weakness can be serious, so it is important to see your doctor.    Follow these instructions at home:  Activity    Rest as needed.  Try to get enough sleep. Most adults need 7–8 hours of sleep each night. Talk to your doctor about how much sleep you need.  Do exercises, such as arm curls and leg raises, for 30 minutes at least 2 days a week or as told by your doctor.  Think about working with a physical therapist or  to help you get stronger.  General instructions    A plate with examples of foods in a healthy diet.  Take over-the-counter and prescription medicines only as told by your doctor.  Eat a healthy, well-balanced diet. This includes:  Proteins to build muscles, such as lean meats and fish.  Fresh fruits and vegetables.  Carbohydrates to boost energy, such as whole grains.  Drink enough fluid to keep your pee (urine) pale yellow.  Keep all follow-up visits.  Contact a doctor if:  Your weakness does not get better or it gets worse.  Your weakness affects your ability to:  Think clearly.  Do your normal daily activities.  Get help right away if:  You have sudden weakness on one side of your face or body.  You have chest pain.  You have trouble breathing or shortness of breath.  You have problems with how you see (vision).  You have trouble talking or swallowing.  You have trouble standing or walking.  You are light-headed or faint.  These symptoms may be an emergency. Get help right away. Call [phone].  Do not wait to see if the symptoms will go away.  Do not drive yourself to the hospital.  Summary  Weakness is a lack of strength. You may feel weak all over your body or just in one part of your body.  There are many potential causes of weakness. Sometimes, the cause of your weakness may not be known.  Rest as needed, and try to get enough sleep. Most adults need 7–8 hours of sleep each night.  Eat a healthy, well-balanced diet.

## 2025-03-31 NOTE — DIETITIAN INITIAL EVALUATION ADULT. - PROBLEM/PLAN-6
Patient came in today for Testosterone injection. Injected Testosterone 0.9 ml into patient's right buttock per clinic policy. Patient tolerated well.   
DISPLAY PLAN FREE TEXT

## 2025-04-16 ENCOUNTER — APPOINTMENT (OUTPATIENT)
Dept: ELECTROPHYSIOLOGY | Facility: CLINIC | Age: 80
End: 2025-04-16

## 2025-04-17 NOTE — ED ADULT NURSE NOTE - NS ED NURSE LEVEL OF CONSCIOUSNESS AFFECT
on the discharge service for the patient. I have reviewed and made amendments to the documentation where necessary.
Calm

## 2025-05-09 NOTE — ED ADULT NURSE NOTE - NSFALLRSKOUTCOME_ED_ALL_ED
Physical Therapy Visit    Visit Type: Daily Treatment Note  Visit: 2  Referring Provider: Craig Rodriguez MD  Medical Diagnosis (from order): [unfilled]   Patient alert and oriented X3.    SUBJECTIVE                                                                                                               Patient feels as if the functional dry needling helped last session with the lateral hip discomfort  Patient reports of left lower back pain which travels down into the left buttock along the left lateral sacrum and occasionally into the left proximal thigh  General lower back soreness      OBJECTIVE                                                                                                                         Palpation  Left  - Lumbar Paraspinals: hypertonic  Right  - Lumbar Paraspinals: hypertonic    Muscle Length Tests  Mild quadriceps tightness bilaterally   Moderate bilateral hamstring tightness  Mild to moderate iliotibial band tightness  Good calf flexibility                 Treatment    Dry Needling:  - Consent signed: yes  - Dry needling used to/for: pain relief, reduction of muscle spasm and reduced myofascial dysfunction/restriction  - Dry needling utilized with electrical stimulation to enhance effects of dry needling treatment.  - Time Out performed at 1233, with patient verifying procedure and consent prior to performing the procedure.    Sign Out performed at 1243, with patient verifying procedure performed and addressing specimens if applicable upon completion of the procedure.    Education about indications, contraindications and potential side effects completed with patient.  Screen Completed    - Precautions: local skin lesions, lyme disease, local lymphedema, severe hyperalgesia/allodynia, metal allergies: nickel and chromium, abnormal bleeding tendency, immunodeficiency and/or compromised immune system, second or third trimester of pregnancy, vascular disease, history of  spontaneous pneumothorax   - Contraindications: local or systemic infections including the flu, over implants, active cancer, area of lymphatic compromise, area of lumpectomy/mastectomy, first trimester of pregnancy    Dry Needling completed and enhanced with electrical stimulation (E-Stim)   - low to medium frequency   - intensity to patient tolerance   - duration: 20-30 seconds at each site     - Location: Bilateral L5-S1 multifidi Needle size: 0.3 x 60mm   Quantity: 2   - Location: Bilateral gluteus medius  Needle size: 0.3 x 60mm   Quantity: 2    Total Needles Inserted: 4 Removed: 4    Results: decreased pain and tissue softening  Reaction: no adverse reaction to treatment      Therapeutic Exercise  Flexibility assessement as noted  Seated hamstring stretch added to home exercise program   Supine single leg bridge (added to home exercise program)  Split squats added to home exercise program     Manual Therapy   Verbally educated and received verbal consent for hand placement, positioning of patient, and techniques to be performed today, necessary clothing adjustments, therapist position and hand placement for techniques. Patient educated on rationale and benefits of Soft tissue mobilization.     Patient in prone position  Treatment included effleurage (gliding strokes) and pétrissage (kneading) strokes, friction (circular pressure) as indicated to decrease pain and tightness / soft tissue restriction in bilateral lumbar paraspinals and gluteal  musculature.        Skilled input: verbal instruction/cues and as detailed above    Writer verbally educated and received verbal consent for hand placement, positioning of patient, and techniques to be performed today from patient for clothing adjustments for techniques as described above and how they are pertinent to the patient's plan of care.  Home Exercise Program  Access Code: [email or web address]  URL: [email or web address]  Date: 05/09/2025  Prepared  by: Irma Greer    Exercises  - Standing Quad Stretch in [facility] Split Squat Position  - 1 x daily - 4 x weekly - 1-2 sets - 10 reps - 5 hold  - Single Leg Bridge  - 1-2 x daily - 1-2 sets - 10 reps - 5 hold  - Seated Table Hamstring Stretch  - 1-2 x daily - 1-2 sets - 3 reps - 20 hold      ASSESSMENT                                                                                                            Good response following functional dry needling last session  Moderate gluteal fatigue end of today's session   Education:   - Results of above outlined education: Verbalizes understanding and Demonstrates understanding    PLAN                                                                                                                           Suggestions for next session as indicated: Progress per plan of care  Progress with gluteal strengthening   Functional dry needling as indicated   Progress with flexibility  - hamstring        Therapy procedure time and total treatment time can be found documented on the Time Entry flowsheet     Universal Safety Interventions

## 2025-08-01 NOTE — ED ADULT NURSE NOTE - NS ED NURSE RECORD ANOTHER VITAL SIGN
He refused to see me. I discussed Hospice with him and daughter Ele. The DON tole me he is refusing his medications. 
Yes